# Patient Record
Sex: MALE | Race: WHITE | NOT HISPANIC OR LATINO
[De-identification: names, ages, dates, MRNs, and addresses within clinical notes are randomized per-mention and may not be internally consistent; named-entity substitution may affect disease eponyms.]

---

## 2018-01-10 ENCOUNTER — TRANSCRIPTION ENCOUNTER (OUTPATIENT)
Age: 73
End: 2018-01-10

## 2018-01-11 ENCOUNTER — OUTPATIENT (OUTPATIENT)
Dept: OUTPATIENT SERVICES | Facility: HOSPITAL | Age: 73
LOS: 1 days | End: 2018-01-11
Payer: MEDICARE

## 2018-01-11 VITALS
HEART RATE: 65 BPM | RESPIRATION RATE: 12 BRPM | OXYGEN SATURATION: 100 % | SYSTOLIC BLOOD PRESSURE: 125 MMHG | DIASTOLIC BLOOD PRESSURE: 60 MMHG

## 2018-01-11 VITALS
TEMPERATURE: 98 F | HEART RATE: 61 BPM | SYSTOLIC BLOOD PRESSURE: 122 MMHG | HEIGHT: 67 IN | DIASTOLIC BLOOD PRESSURE: 61 MMHG | OXYGEN SATURATION: 100 % | RESPIRATION RATE: 19 BRPM | WEIGHT: 199.74 LBS

## 2018-01-11 DIAGNOSIS — Z95.810 PRESENCE OF AUTOMATIC (IMPLANTABLE) CARDIAC DEFIBRILLATOR: Chronic | ICD-10-CM

## 2018-01-11 DIAGNOSIS — Z90.89 ACQUIRED ABSENCE OF OTHER ORGANS: Chronic | ICD-10-CM

## 2018-01-11 DIAGNOSIS — H25.11 AGE-RELATED NUCLEAR CATARACT, RIGHT EYE: ICD-10-CM

## 2018-01-11 PROCEDURE — V2632: CPT

## 2018-01-11 PROCEDURE — 66984 XCAPSL CTRC RMVL W/O ECP: CPT | Mod: RT

## 2018-01-11 NOTE — ASU PATIENT PROFILE, ADULT - PMH
Aortic insufficiency    Cardiomyopathy    CKD (chronic kidney disease)    Hypertension    Mitral insufficiency

## 2018-01-11 NOTE — ASU DISCHARGE PLAN (ADULT/PEDIATRIC). - SPECIAL INSTRUCTIONS
No bending, straining or lifting for 1 week. No swimming, hot tubs, saunas or steam rooms for 2 weeks. Keep eye shield in place when not using drops. Start drops as directed when you get home.  Call Dr. Metzger at [phone] with any quesitons or concerns. No bending, straining or lifting for 1 week. No swimming, hot tubs, saunas or steam rooms for 2 weeks. Keep eye shield in place when not using drops. Start drops as directed when you get home.  Call Dr. Metzger at [phone] with any questions or concerns.

## 2018-06-04 PROBLEM — I35.1 NONRHEUMATIC AORTIC (VALVE) INSUFFICIENCY: Chronic | Status: ACTIVE | Noted: 2018-01-11

## 2018-06-04 PROBLEM — I10 ESSENTIAL (PRIMARY) HYPERTENSION: Chronic | Status: ACTIVE | Noted: 2018-01-11

## 2018-06-04 PROBLEM — N18.9 CHRONIC KIDNEY DISEASE, UNSPECIFIED: Chronic | Status: ACTIVE | Noted: 2018-01-11

## 2018-06-04 PROBLEM — I34.0 NONRHEUMATIC MITRAL (VALVE) INSUFFICIENCY: Chronic | Status: ACTIVE | Noted: 2018-01-11

## 2018-09-05 ENCOUNTER — APPOINTMENT (OUTPATIENT)
Dept: HEART AND VASCULAR | Facility: CLINIC | Age: 73
End: 2018-09-05
Payer: MEDICARE

## 2018-09-05 VITALS
HEIGHT: 70 IN | RESPIRATION RATE: 16 BRPM | HEART RATE: 135 BPM | SYSTOLIC BLOOD PRESSURE: 90 MMHG | WEIGHT: 198 LBS | DIASTOLIC BLOOD PRESSURE: 60 MMHG | BODY MASS INDEX: 28.35 KG/M2

## 2018-09-05 PROCEDURE — 93000 ELECTROCARDIOGRAM COMPLETE: CPT

## 2018-09-05 PROCEDURE — 99215 OFFICE O/P EST HI 40 MIN: CPT | Mod: 25

## 2018-09-06 ENCOUNTER — APPOINTMENT (OUTPATIENT)
Dept: HEART AND VASCULAR | Facility: CLINIC | Age: 73
End: 2018-09-06
Payer: MEDICARE

## 2018-09-06 PROCEDURE — 99223 1ST HOSP IP/OBS HIGH 75: CPT

## 2018-09-20 ENCOUNTER — APPOINTMENT (OUTPATIENT)
Dept: HEART AND VASCULAR | Facility: CLINIC | Age: 73
End: 2018-09-20
Payer: MEDICARE

## 2018-09-20 VITALS — WEIGHT: 200 LBS | HEIGHT: 70 IN | BODY MASS INDEX: 28.63 KG/M2

## 2018-09-20 VITALS — SYSTOLIC BLOOD PRESSURE: 125 MMHG | DIASTOLIC BLOOD PRESSURE: 70 MMHG

## 2018-09-20 PROCEDURE — 93000 ELECTROCARDIOGRAM COMPLETE: CPT

## 2018-09-20 PROCEDURE — 99214 OFFICE O/P EST MOD 30 MIN: CPT

## 2018-09-21 LAB
ANION GAP SERPL CALC-SCNC: 11 MMOL/L
BASOPHILS # BLD AUTO: 0.02 K/UL
BASOPHILS NFR BLD AUTO: 0.4 %
BUN SERPL-MCNC: 35 MG/DL
CALCIUM SERPL-MCNC: 9.8 MG/DL
CHLORIDE SERPL-SCNC: 104 MMOL/L
CO2 SERPL-SCNC: 29 MMOL/L
CREAT SERPL-MCNC: 1.3 MG/DL
EOSINOPHIL # BLD AUTO: 0.18 K/UL
EOSINOPHIL NFR BLD AUTO: 3.9 %
GLUCOSE SERPL-MCNC: 189 MG/DL
HCT VFR BLD CALC: 39.7 %
HGB BLD-MCNC: 12.8 G/DL
IMM GRANULOCYTES NFR BLD AUTO: 0 %
LYMPHOCYTES # BLD AUTO: 0.98 K/UL
LYMPHOCYTES NFR BLD AUTO: 21.2 %
MAN DIFF?: NORMAL
MCHC RBC-ENTMCNC: 31.1 PG
MCHC RBC-ENTMCNC: 32.2 GM/DL
MCV RBC AUTO: 96.4 FL
MONOCYTES # BLD AUTO: 0.31 K/UL
MONOCYTES NFR BLD AUTO: 6.7 %
NEUTROPHILS # BLD AUTO: 3.14 K/UL
NEUTROPHILS NFR BLD AUTO: 67.8 %
NT-PROBNP SERPL-MCNC: 5161 PG/ML
PLATELET # BLD AUTO: 105 K/UL
POTASSIUM SERPL-SCNC: 4.5 MMOL/L
RBC # BLD: 4.12 M/UL
RBC # FLD: 15.5 %
SODIUM SERPL-SCNC: 144 MMOL/L
WBC # FLD AUTO: 4.63 K/UL

## 2018-10-10 ENCOUNTER — RX RENEWAL (OUTPATIENT)
Age: 73
End: 2018-10-10

## 2018-10-10 DIAGNOSIS — M10.9 GOUT, UNSPECIFIED: ICD-10-CM

## 2018-10-30 ENCOUNTER — APPOINTMENT (OUTPATIENT)
Dept: HEART AND VASCULAR | Facility: CLINIC | Age: 73
End: 2018-10-30
Payer: MEDICARE

## 2018-10-30 VITALS
RESPIRATION RATE: 15 BRPM | BODY MASS INDEX: 29.35 KG/M2 | HEART RATE: 60 BPM | HEIGHT: 70 IN | WEIGHT: 205 LBS | DIASTOLIC BLOOD PRESSURE: 70 MMHG | SYSTOLIC BLOOD PRESSURE: 120 MMHG

## 2018-10-30 DIAGNOSIS — Z87.09 PERSONAL HISTORY OF OTHER DISEASES OF THE RESPIRATORY SYSTEM: ICD-10-CM

## 2018-10-30 PROCEDURE — G0008: CPT

## 2018-10-30 PROCEDURE — 90686 IIV4 VACC NO PRSV 0.5 ML IM: CPT

## 2018-10-30 PROCEDURE — 93000 ELECTROCARDIOGRAM COMPLETE: CPT

## 2018-10-30 PROCEDURE — 99214 OFFICE O/P EST MOD 30 MIN: CPT

## 2018-11-01 LAB
ANION GAP SERPL CALC-SCNC: 8 MMOL/L
BUN SERPL-MCNC: 38 MG/DL
CALCIUM SERPL-MCNC: 9.8 MG/DL
CHLORIDE SERPL-SCNC: 105 MMOL/L
CO2 SERPL-SCNC: 28 MMOL/L
CREAT SERPL-MCNC: 1.32 MG/DL
GLUCOSE SERPL-MCNC: 123 MG/DL
POTASSIUM SERPL-SCNC: 4.7 MMOL/L
SODIUM SERPL-SCNC: 141 MMOL/L

## 2018-11-27 ENCOUNTER — OTHER (OUTPATIENT)
Age: 73
End: 2018-11-27

## 2018-11-27 ENCOUNTER — APPOINTMENT (OUTPATIENT)
Dept: HEART AND VASCULAR | Facility: CLINIC | Age: 73
End: 2018-11-27
Payer: MEDICARE

## 2018-11-27 VITALS
RESPIRATION RATE: 15 BRPM | HEIGHT: 70 IN | DIASTOLIC BLOOD PRESSURE: 70 MMHG | SYSTOLIC BLOOD PRESSURE: 110 MMHG | WEIGHT: 205 LBS | HEART RATE: 60 BPM | BODY MASS INDEX: 29.35 KG/M2

## 2018-11-27 PROCEDURE — 93000 ELECTROCARDIOGRAM COMPLETE: CPT

## 2018-11-27 PROCEDURE — 99214 OFFICE O/P EST MOD 30 MIN: CPT

## 2018-11-27 NOTE — ASSESSMENT
[FreeTextEntry1] : EKG:  fully paced\par Hemodynamically stable.  No signs of CHF on exam.  BP well controlled\par

## 2018-11-27 NOTE — PHYSICAL EXAM
[General Appearance - Well Developed] : well developed [Normal Appearance] : normal appearance [Well Groomed] : well groomed [General Appearance - Well Nourished] : well nourished [No Deformities] : no deformities [General Appearance - In No Acute Distress] : no acute distress [Normal Conjunctiva] : the conjunctiva exhibited no abnormalities [Eyelids - No Xanthelasma] : the eyelids demonstrated no xanthelasmas [Normal Oral Mucosa] : normal oral mucosa [No Oral Pallor] : no oral pallor [No Oral Cyanosis] : no oral cyanosis [Normal Jugular Venous V Waves Present] : normal jugular venous V waves present [Respiration, Rhythm And Depth] : normal respiratory rhythm and effort [Exaggerated Use Of Accessory Muscles For Inspiration] : no accessory muscle use [Auscultation Breath Sounds / Voice Sounds] : lungs were clear to auscultation bilaterally [Abdomen Soft] : soft [Abdomen Tenderness] : non-tender [Abdomen Mass (___ Cm)] : no abdominal mass palpated [Abnormal Walk] : normal gait [Gait - Sufficient For Exercise Testing] : the gait was sufficient for exercise testing [Nail Clubbing] : no clubbing of the fingernails [Cyanosis, Localized] : no localized cyanosis [Petechial Hemorrhages (___cm)] : no petechial hemorrhages [] : no ischemic changes [FreeTextEntry1] : apical systolic murmur, diastolic murmur at the lower left sternal border

## 2018-11-27 NOTE — DISCUSSION/SUMMARY
[FreeTextEntry1] : Maintain on current medical regimen.  Ambulate as tolerated.  Maintain low sodium, low caloric, low cholesterol diabetic diet.  SMA7 drawn and pending.  Will adjust meds based on results.\par

## 2018-11-27 NOTE — REVIEW OF SYSTEMS
[Negative] : Heme/Lymph [Dyspnea on exertion] : not dyspnea during exertion [Chest Pain] : no chest pain [Palpitations] : no palpitations [Dizziness] : no dizziness [Tremor] : no tremor was seen [Convulsions] : no convulsions [Tingling (Paresthesia)] : no tingling

## 2018-11-28 LAB
ANION GAP SERPL CALC-SCNC: 10 MMOL/L
BUN SERPL-MCNC: 70 MG/DL
CALCIUM SERPL-MCNC: 10 MG/DL
CHLORIDE SERPL-SCNC: 106 MMOL/L
CO2 SERPL-SCNC: 29 MMOL/L
CREAT SERPL-MCNC: 1.78 MG/DL
GLUCOSE SERPL-MCNC: 110 MG/DL
POTASSIUM SERPL-SCNC: 4.4 MMOL/L
SODIUM SERPL-SCNC: 145 MMOL/L

## 2018-11-30 ENCOUNTER — RX RENEWAL (OUTPATIENT)
Age: 73
End: 2018-11-30

## 2018-12-02 ENCOUNTER — RX RENEWAL (OUTPATIENT)
Age: 73
End: 2018-12-02

## 2019-02-06 ENCOUNTER — LABORATORY RESULT (OUTPATIENT)
Age: 74
End: 2019-02-06

## 2019-02-06 ENCOUNTER — APPOINTMENT (OUTPATIENT)
Dept: HEART AND VASCULAR | Facility: CLINIC | Age: 74
End: 2019-02-06
Payer: MEDICARE

## 2019-02-06 VITALS
RESPIRATION RATE: 15 BRPM | SYSTOLIC BLOOD PRESSURE: 110 MMHG | DIASTOLIC BLOOD PRESSURE: 70 MMHG | WEIGHT: 203 LBS | BODY MASS INDEX: 29.06 KG/M2 | HEART RATE: 60 BPM | HEIGHT: 70 IN

## 2019-02-06 DIAGNOSIS — I48.92 UNSPECIFIED ATRIAL FLUTTER: ICD-10-CM

## 2019-02-06 PROCEDURE — 93000 ELECTROCARDIOGRAM COMPLETE: CPT | Mod: 59

## 2019-02-06 PROCEDURE — 93289 INTERROG DEVICE EVAL HEART: CPT

## 2019-02-06 PROCEDURE — 99214 OFFICE O/P EST MOD 30 MIN: CPT | Mod: 25

## 2019-02-06 NOTE — HISTORY OF PRESENT ILLNESS
[FreeTextEntry1] : Patient denies chest pain, palpitation, PND or orthopnea.  Has occasional ALCAZAR.\par

## 2019-02-06 NOTE — PROCEDURE
[DDD] : DDD [Longevity: ___ months] : The estimated remaining battery life is [unfilled] months [de-identified] : [facility]

## 2019-02-06 NOTE — REASON FOR VISIT
[Follow-Up - Clinic] : a clinic follow-up of [Cardiomyopathy] : cardiomyopathy [Hypertension] : hypertension [Follow-up Device Check] : follow-up device check visit

## 2019-02-06 NOTE — PROCEDURE
[DDD] : DDD [Longevity: ___ months] : The estimated remaining battery life is [unfilled] months [de-identified] : [facility]

## 2019-02-06 NOTE — ASSESSMENT
[FreeTextEntry1] : EKG:  fully paced.\par Hemodynamically stable.  No signs of CHF on exam.  BP well controlled\par

## 2019-02-06 NOTE — DISCUSSION/SUMMARY
[FreeTextEntry1] : Maintain on current medical regimen.  Ambulate as tolerated.  Maintain low sodium, low caloric, low cholesterol diabetic diet.\par Regular ICD interrogations. [AICD Function Normal] : normal AICD function

## 2019-02-11 ENCOUNTER — RX CHANGE (OUTPATIENT)
Age: 74
End: 2019-02-11

## 2019-02-11 LAB
ALBUMIN SERPL ELPH-MCNC: 4.1 G/DL
ALP BLD-CCNC: 56 U/L
ALT SERPL-CCNC: 13 U/L
ANION GAP SERPL CALC-SCNC: 13 MMOL/L
AST SERPL-CCNC: 20 U/L
BASOPHILS # BLD AUTO: 0.02 K/UL
BASOPHILS NFR BLD AUTO: 0.4 %
BILIRUB SERPL-MCNC: 0.7 MG/DL
BUN SERPL-MCNC: 69 MG/DL
CALCIUM SERPL-MCNC: 9.7 MG/DL
CHLORIDE SERPL-SCNC: 105 MMOL/L
CHOLEST SERPL-MCNC: 127 MG/DL
CHOLEST/HDLC SERPL: 2.6 RATIO
CO2 SERPL-SCNC: 25 MMOL/L
CREAT SERPL-MCNC: 1.87 MG/DL
EOSINOPHIL # BLD AUTO: 0.11 K/UL
EOSINOPHIL NFR BLD AUTO: 2.4 %
GLUCOSE SERPL-MCNC: 99 MG/DL
HBA1C MFR BLD HPLC: 5.7 %
HCT VFR BLD CALC: 40 %
HDLC SERPL-MCNC: 49 MG/DL
HGB BLD-MCNC: 12.6 G/DL
IMM GRANULOCYTES NFR BLD AUTO: 0 %
LDLC SERPL CALC-MCNC: 66 MG/DL
LYMPHOCYTES # BLD AUTO: 0.9 K/UL
LYMPHOCYTES NFR BLD AUTO: 19.9 %
MAN DIFF?: NORMAL
MCHC RBC-ENTMCNC: 30.5 PG
MCHC RBC-ENTMCNC: 31.5 GM/DL
MCV RBC AUTO: 96.9 FL
MONOCYTES # BLD AUTO: 0.42 K/UL
MONOCYTES NFR BLD AUTO: 9.3 %
NEUTROPHILS # BLD AUTO: 3.07 K/UL
NEUTROPHILS NFR BLD AUTO: 68 %
PLATELET # BLD AUTO: 88 K/UL
POTASSIUM SERPL-SCNC: 5.1 MMOL/L
PROT SERPL-MCNC: 6.2 G/DL
RBC # BLD: 4.13 M/UL
RBC # FLD: 15 %
SODIUM SERPL-SCNC: 143 MMOL/L
T3RU NFR SERPL: 0.88 INDEX
T4 FREE SERPL-MCNC: 1.4 NG/DL
T4 SERPL-MCNC: 7.3 UG/DL
TRIGL SERPL-MCNC: 58 MG/DL
TSH SERPL-ACNC: 2.47 UIU/ML
WBC # FLD AUTO: 4.52 K/UL

## 2019-02-11 RX ORDER — LOSARTAN POTASSIUM 25 MG/1
25 TABLET, FILM COATED ORAL
Qty: 90 | Refills: 0 | Status: DISCONTINUED | COMMUNITY
Start: 2018-10-10 | End: 2019-02-11

## 2019-02-11 RX ORDER — METOLAZONE 2.5 MG/1
2.5 TABLET ORAL
Qty: 12 | Refills: 3 | Status: DISCONTINUED | COMMUNITY
End: 2019-02-11

## 2019-02-11 RX ORDER — METOLAZONE 2.5 MG/1
2.5 TABLET ORAL
Qty: 12 | Refills: 0 | Status: DISCONTINUED | COMMUNITY
Start: 2018-09-25 | End: 2019-02-11

## 2019-04-19 ENCOUNTER — RX RENEWAL (OUTPATIENT)
Age: 74
End: 2019-04-19

## 2019-05-07 ENCOUNTER — RESULT CHARGE (OUTPATIENT)
Age: 74
End: 2019-05-07

## 2019-05-07 ENCOUNTER — APPOINTMENT (OUTPATIENT)
Dept: HEART AND VASCULAR | Facility: CLINIC | Age: 74
End: 2019-05-07
Payer: MEDICARE

## 2019-05-07 ENCOUNTER — LABORATORY RESULT (OUTPATIENT)
Age: 74
End: 2019-05-07

## 2019-05-07 VITALS
DIASTOLIC BLOOD PRESSURE: 70 MMHG | HEIGHT: 70 IN | SYSTOLIC BLOOD PRESSURE: 110 MMHG | RESPIRATION RATE: 16 BRPM | BODY MASS INDEX: 29.78 KG/M2 | HEART RATE: 66 BPM | WEIGHT: 208 LBS

## 2019-05-07 PROCEDURE — 93289 INTERROG DEVICE EVAL HEART: CPT

## 2019-05-07 PROCEDURE — 93290 INTERROG DEV EVAL ICPMS IP: CPT

## 2019-05-07 PROCEDURE — 93000 ELECTROCARDIOGRAM COMPLETE: CPT

## 2019-05-07 PROCEDURE — 99214 OFFICE O/P EST MOD 30 MIN: CPT

## 2019-05-07 NOTE — HISTORY OF PRESENT ILLNESS
[FreeTextEntry1] : Patient denies chest pain, palpitation, PND or orthopnea.  Now exercising regularly in gym with a .  Has occasional ALCAZAR when increasing incline on treadmill.  has a change in bowel habits the past few months.  Not as regular as has been.\par

## 2019-05-07 NOTE — DISCUSSION/SUMMARY
[FreeTextEntry1] : Maintain on current medical regimen.  Ambulate as tolerated.  Maintain low sodium, low caloric, low cholesterol diabetic diet.  GI consultation and possible CF advised.  Regular ICD evaluations.\par  [AICD Function Normal] : normal AICD function

## 2019-05-07 NOTE — REASON FOR VISIT
[Follow-Up - Clinic] : a clinic follow-up of [Cardiomyopathy] : cardiomyopathy [Follow-up Device Check] : follow-up device check visit

## 2019-05-07 NOTE — REVIEW OF SYSTEMS
[Negative] : Heme/Lymph [Dyspnea on exertion] : not dyspnea during exertion [Chest Pain] : no chest pain [Tremor] : no tremor was seen [Dizziness] : no dizziness [Palpitations] : no palpitations [Convulsions] : no convulsions [Tingling (Paresthesia)] : no tingling

## 2019-05-07 NOTE — PROCEDURE
[ICD] : Implantable cardioverter-defibrillator [Longevity: ___ months] : The estimated remaining battery life is [unfilled] months [DDD] : DDD [de-identified] : [facility]

## 2019-05-07 NOTE — PROCEDURE
[ICD] : Implantable cardioverter-defibrillator [Longevity: ___ months] : The estimated remaining battery life is [unfilled] months [DDD] : DDD [de-identified] : [facility]

## 2019-05-07 NOTE — PHYSICAL EXAM
[General Appearance - Well Developed] : well developed [Normal Appearance] : normal appearance [Well Groomed] : well groomed [General Appearance - Well Nourished] : well nourished [General Appearance - In No Acute Distress] : no acute distress [No Deformities] : no deformities [Normal Conjunctiva] : the conjunctiva exhibited no abnormalities [Eyelids - No Xanthelasma] : the eyelids demonstrated no xanthelasmas [Normal Oral Mucosa] : normal oral mucosa [No Oral Pallor] : no oral pallor [No Oral Cyanosis] : no oral cyanosis [Normal Jugular Venous V Waves Present] : normal jugular venous V waves present [Respiration, Rhythm And Depth] : normal respiratory rhythm and effort [Exaggerated Use Of Accessory Muscles For Inspiration] : no accessory muscle use [Auscultation Breath Sounds / Voice Sounds] : lungs were clear to auscultation bilaterally [Abdomen Tenderness] : non-tender [Abdomen Soft] : soft [Gait - Sufficient For Exercise Testing] : the gait was sufficient for exercise testing [Abnormal Walk] : normal gait [Abdomen Mass (___ Cm)] : no abdominal mass palpated [Nail Clubbing] : no clubbing of the fingernails [Petechial Hemorrhages (___cm)] : no petechial hemorrhages [Cyanosis, Localized] : no localized cyanosis [] : no ischemic changes [FreeTextEntry1] : soft bilateral carotid bruits

## 2019-05-07 NOTE — PHYSICAL EXAM
[Normal Appearance] : normal appearance [General Appearance - Well Developed] : well developed [General Appearance - Well Nourished] : well nourished [Well Groomed] : well groomed [General Appearance - In No Acute Distress] : no acute distress [No Deformities] : no deformities [Normal Conjunctiva] : the conjunctiva exhibited no abnormalities [Eyelids - No Xanthelasma] : the eyelids demonstrated no xanthelasmas [Normal Oral Mucosa] : normal oral mucosa [No Oral Cyanosis] : no oral cyanosis [Normal Jugular Venous V Waves Present] : normal jugular venous V waves present [No Oral Pallor] : no oral pallor [Respiration, Rhythm And Depth] : normal respiratory rhythm and effort [Exaggerated Use Of Accessory Muscles For Inspiration] : no accessory muscle use [Auscultation Breath Sounds / Voice Sounds] : lungs were clear to auscultation bilaterally [Abdomen Tenderness] : non-tender [Abdomen Soft] : soft [Gait - Sufficient For Exercise Testing] : the gait was sufficient for exercise testing [Abdomen Mass (___ Cm)] : no abdominal mass palpated [Abnormal Walk] : normal gait [Cyanosis, Localized] : no localized cyanosis [Petechial Hemorrhages (___cm)] : no petechial hemorrhages [Nail Clubbing] : no clubbing of the fingernails [] : no ischemic changes [FreeTextEntry1] : soft bilateral carotid bruits

## 2019-05-10 LAB
ALBUMIN SERPL ELPH-MCNC: 4.1 G/DL
ALP BLD-CCNC: 52 U/L
ALT SERPL-CCNC: 14 U/L
ANION GAP SERPL CALC-SCNC: 11 MMOL/L
AST SERPL-CCNC: 27 U/L
BASOPHILS # BLD AUTO: 0.01 K/UL
BASOPHILS NFR BLD AUTO: 0.2 %
BILIRUB SERPL-MCNC: 0.8 MG/DL
BUN SERPL-MCNC: 59 MG/DL
CALCIUM SERPL-MCNC: 9.8 MG/DL
CHLORIDE SERPL-SCNC: 107 MMOL/L
CHOLEST SERPL-MCNC: 133 MG/DL
CHOLEST/HDLC SERPL: 2.8 RATIO
CO2 SERPL-SCNC: 25 MMOL/L
CREAT SERPL-MCNC: 1.64 MG/DL
EOSINOPHIL # BLD AUTO: 0.11 K/UL
EOSINOPHIL NFR BLD AUTO: 2.4 %
ESTIMATED AVERAGE GLUCOSE: 120 MG/DL
GLUCOSE SERPL-MCNC: 115 MG/DL
HBA1C MFR BLD HPLC: 5.8 %
HCT VFR BLD CALC: 39.7 %
HDLC SERPL-MCNC: 48 MG/DL
HGB BLD-MCNC: 12.1 G/DL
IMM GRANULOCYTES NFR BLD AUTO: 0.2 %
LDLC SERPL CALC-MCNC: 71 MG/DL
LYMPHOCYTES # BLD AUTO: 0.95 K/UL
LYMPHOCYTES NFR BLD AUTO: 20.6 %
MAN DIFF?: NORMAL
MCHC RBC-ENTMCNC: 30.5 GM/DL
MCHC RBC-ENTMCNC: 30.8 PG
MCV RBC AUTO: 101 FL
MONOCYTES # BLD AUTO: 0.44 K/UL
MONOCYTES NFR BLD AUTO: 9.5 %
NEUTROPHILS # BLD AUTO: 3.09 K/UL
NEUTROPHILS NFR BLD AUTO: 67.1 %
NT-PROBNP SERPL-MCNC: 2507 PG/ML
PLATELET # BLD AUTO: 74 K/UL
POTASSIUM SERPL-SCNC: 5.1 MMOL/L
PROT SERPL-MCNC: 6.3 G/DL
RBC # BLD: 3.93 M/UL
RBC # FLD: 14.6 %
SODIUM SERPL-SCNC: 143 MMOL/L
T3FREE SERPL-MCNC: 2.24 PG/ML
T3RU NFR SERPL: 0.9 TBI
T4 FREE SERPL-MCNC: 1.3 NG/DL
T4 SERPL-MCNC: 6.2 UG/DL
TRIGL SERPL-MCNC: 69 MG/DL
TSH SERPL-ACNC: 1.65 UIU/ML
URATE SERPL-MCNC: 4.5 MG/DL
WBC # FLD AUTO: 4.61 K/UL

## 2019-05-12 ENCOUNTER — RX RENEWAL (OUTPATIENT)
Age: 74
End: 2019-05-12

## 2019-08-06 ENCOUNTER — APPOINTMENT (OUTPATIENT)
Dept: HEART AND VASCULAR | Facility: CLINIC | Age: 74
End: 2019-08-06
Payer: MEDICARE

## 2019-08-06 ENCOUNTER — LABORATORY RESULT (OUTPATIENT)
Age: 74
End: 2019-08-06

## 2019-08-06 VITALS
SYSTOLIC BLOOD PRESSURE: 120 MMHG | RESPIRATION RATE: 15 BRPM | DIASTOLIC BLOOD PRESSURE: 70 MMHG | HEIGHT: 70 IN | BODY MASS INDEX: 29.63 KG/M2 | WEIGHT: 207 LBS | HEART RATE: 60 BPM

## 2019-08-06 DIAGNOSIS — Z00.00 ENCOUNTER FOR GENERAL ADULT MEDICAL EXAMINATION W/OUT ABNORMAL FINDINGS: ICD-10-CM

## 2019-08-06 DIAGNOSIS — L30.9 DERMATITIS, UNSPECIFIED: ICD-10-CM

## 2019-08-06 DIAGNOSIS — Z86.79 PERSONAL HISTORY OF OTHER DISEASES OF THE CIRCULATORY SYSTEM: ICD-10-CM

## 2019-08-06 PROCEDURE — 93000 ELECTROCARDIOGRAM COMPLETE: CPT

## 2019-08-06 PROCEDURE — 99214 OFFICE O/P EST MOD 30 MIN: CPT

## 2019-08-06 PROCEDURE — 93290 INTERROG DEV EVAL ICPMS IP: CPT

## 2019-08-06 PROCEDURE — 93289 INTERROG DEVICE EVAL HEART: CPT

## 2019-08-06 NOTE — PHYSICAL EXAM

## 2019-08-06 NOTE — PHYSICAL EXAM
[General Appearance - Well Developed] : well developed [Normal Appearance] : normal appearance [Well Groomed] : well groomed [General Appearance - Well Nourished] : well nourished [No Deformities] : no deformities [Normal Conjunctiva] : the conjunctiva exhibited no abnormalities [General Appearance - In No Acute Distress] : no acute distress [Eyelids - No Xanthelasma] : the eyelids demonstrated no xanthelasmas [Normal Oral Mucosa] : normal oral mucosa [No Oral Cyanosis] : no oral cyanosis [No Oral Pallor] : no oral pallor [Normal Jugular Venous V Waves Present] : normal jugular venous V waves present [Exaggerated Use Of Accessory Muscles For Inspiration] : no accessory muscle use [Respiration, Rhythm And Depth] : normal respiratory rhythm and effort [Auscultation Breath Sounds / Voice Sounds] : lungs were clear to auscultation bilaterally [Abdomen Soft] : soft [Abdomen Tenderness] : non-tender [Abdomen Mass (___ Cm)] : no abdominal mass palpated [Abnormal Walk] : normal gait [Gait - Sufficient For Exercise Testing] : the gait was sufficient for exercise testing [Nail Clubbing] : no clubbing of the fingernails [Cyanosis, Localized] : no localized cyanosis [Petechial Hemorrhages (___cm)] : no petechial hemorrhages [] : no ischemic changes [FreeTextEntry1] : generalized rash

## 2019-08-06 NOTE — HISTORY OF PRESENT ILLNESS
[FreeTextEntry1] : Patient denies chest pain, palpitation, PND or orthopnea.  Has occasional ALCAZAR.\par Remains active.  Dieting properly.  Has rash the past 2 weeks.  Pruritic.

## 2019-08-06 NOTE — PROCEDURE
[ICD] : Implantable cardioverter-defibrillator [DDD] : DDD [Longevity: ___ months] : The estimated remaining battery life is [unfilled] months [de-identified] : [facility]

## 2019-08-06 NOTE — PROCEDURE
[ICD] : Implantable cardioverter-defibrillator [DDD] : DDD [Longevity: ___ months] : The estimated remaining battery life is [unfilled] months [de-identified] : [facility]

## 2019-08-06 NOTE — REVIEW OF SYSTEMS
[Negative] : Heme/Lymph [Chest Pain] : no chest pain [Dyspnea on exertion] : not dyspnea during exertion [Palpitations] : no palpitations [Dizziness] : no dizziness [Tremor] : no tremor was seen [Convulsions] : no convulsions [Tingling (Paresthesia)] : no tingling

## 2019-08-06 NOTE — REASON FOR VISIT
[Cardiomyopathy] : cardiomyopathy [Follow-Up - Clinic] : a clinic follow-up of [Hypertension] : hypertension [Follow-up Device Check] : follow-up device check visit

## 2019-08-08 LAB
ALBUMIN SERPL ELPH-MCNC: 4.2 G/DL
ALP BLD-CCNC: 56 U/L
ALT SERPL-CCNC: 8 U/L
ANION GAP SERPL CALC-SCNC: 10 MMOL/L
AST SERPL-CCNC: 17 U/L
BASOPHILS # BLD AUTO: 0.02 K/UL
BASOPHILS NFR BLD AUTO: 0.4 %
BILIRUB SERPL-MCNC: 1.2 MG/DL
BUN SERPL-MCNC: 36 MG/DL
CALCIUM SERPL-MCNC: 10 MG/DL
CHLORIDE SERPL-SCNC: 107 MMOL/L
CHOLEST SERPL-MCNC: 149 MG/DL
CHOLEST/HDLC SERPL: 2.7 RATIO
CO2 SERPL-SCNC: 26 MMOL/L
CREAT SERPL-MCNC: 1.35 MG/DL
EOSINOPHIL # BLD AUTO: 0.25 K/UL
EOSINOPHIL NFR BLD AUTO: 5.4 %
ESTIMATED AVERAGE GLUCOSE: 120 MG/DL
FERRITIN SERPL-MCNC: 201 NG/ML
GLUCOSE SERPL-MCNC: 96 MG/DL
HBA1C MFR BLD HPLC: 5.8 %
HCT VFR BLD CALC: 38.6 %
HDLC SERPL-MCNC: 56 MG/DL
HGB BLD-MCNC: 12.1 G/DL
IMM GRANULOCYTES NFR BLD AUTO: 0.4 %
IRON SERPL-MCNC: 39 UG/DL
LDLC SERPL CALC-MCNC: 85 MG/DL
LYMPHOCYTES # BLD AUTO: 0.71 K/UL
LYMPHOCYTES NFR BLD AUTO: 15.4 %
MAN DIFF?: NORMAL
MCHC RBC-ENTMCNC: 31.3 GM/DL
MCHC RBC-ENTMCNC: 31.5 PG
MCV RBC AUTO: 100.5 FL
MONOCYTES # BLD AUTO: 0.5 K/UL
MONOCYTES NFR BLD AUTO: 10.8 %
NEUTROPHILS # BLD AUTO: 3.11 K/UL
NEUTROPHILS NFR BLD AUTO: 67.6 %
PLATELET # BLD AUTO: 81 K/UL
POTASSIUM SERPL-SCNC: 4.9 MMOL/L
PROT SERPL-MCNC: 6.1 G/DL
RBC # BLD: 3.84 M/UL
RBC # BLD: 3.84 M/UL
RBC # FLD: 15 %
RETICS # AUTO: 1.8 %
RETICS AGGREG/RBC NFR: 68.7 K/UL
SODIUM SERPL-SCNC: 143 MMOL/L
T3FREE SERPL-MCNC: 1.95 PG/ML
T3RU NFR SERPL: 0.9 TBI
T4 FREE SERPL-MCNC: 1.3 NG/DL
T4 SERPL-MCNC: 6.2 UG/DL
TRIGL SERPL-MCNC: 40 MG/DL
TSH SERPL-ACNC: 2.63 UIU/ML
WBC # FLD AUTO: 4.61 K/UL

## 2019-08-12 ENCOUNTER — RX RENEWAL (OUTPATIENT)
Age: 74
End: 2019-08-12

## 2019-09-09 ENCOUNTER — RX RENEWAL (OUTPATIENT)
Age: 74
End: 2019-09-09

## 2019-10-22 ENCOUNTER — RX RENEWAL (OUTPATIENT)
Age: 74
End: 2019-10-22

## 2019-11-05 ENCOUNTER — LABORATORY RESULT (OUTPATIENT)
Age: 74
End: 2019-11-05

## 2019-11-05 ENCOUNTER — APPOINTMENT (OUTPATIENT)
Dept: HEART AND VASCULAR | Facility: CLINIC | Age: 74
End: 2019-11-05
Payer: MEDICARE

## 2019-11-05 ENCOUNTER — NON-APPOINTMENT (OUTPATIENT)
Age: 74
End: 2019-11-05

## 2019-11-05 VITALS
RESPIRATION RATE: 16 BRPM | BODY MASS INDEX: 29.06 KG/M2 | WEIGHT: 203 LBS | SYSTOLIC BLOOD PRESSURE: 120 MMHG | HEIGHT: 70 IN | HEART RATE: 66 BPM | DIASTOLIC BLOOD PRESSURE: 70 MMHG

## 2019-11-05 DIAGNOSIS — M12.9 ARTHROPATHY, UNSPECIFIED: ICD-10-CM

## 2019-11-05 PROCEDURE — 93290 INTERROG DEV EVAL ICPMS IP: CPT

## 2019-11-05 PROCEDURE — 93000 ELECTROCARDIOGRAM COMPLETE: CPT | Mod: 59

## 2019-11-05 PROCEDURE — 99213 OFFICE O/P EST LOW 20 MIN: CPT

## 2019-11-05 PROCEDURE — 93289 INTERROG DEVICE EVAL HEART: CPT

## 2019-11-05 NOTE — DISCUSSION/SUMMARY
[FreeTextEntry1] : Maintain on current medical regimen.  Ambulate as tolerated.  Maintain low sodium, low caloric, low cholesterol,  diabetic diet.\par  [AICD Function Normal] : normal AICD function

## 2019-11-05 NOTE — PROCEDURE
[ICD] : Implantable cardioverter-defibrillator [DDD] : DDD [Longevity: ___ months] : The estimated remaining battery life is [unfilled] months [de-identified] : [facility]

## 2019-11-05 NOTE — HISTORY OF PRESENT ILLNESS
[FreeTextEntry1] : Patient denies chest pain, palpitation, PND or orthopnea.  Has occasional ALCAZAR.\par Pulse has been elevated at times.  Not exercising the past 10 days due to right knee pain & swelling.

## 2019-11-05 NOTE — PHYSICAL EXAM
[General Appearance - Well Developed] : well developed [Normal Appearance] : normal appearance [Well Groomed] : well groomed [General Appearance - Well Nourished] : well nourished [No Deformities] : no deformities [General Appearance - In No Acute Distress] : no acute distress [Normal Conjunctiva] : the conjunctiva exhibited no abnormalities [Eyelids - No Xanthelasma] : the eyelids demonstrated no xanthelasmas [Normal Oral Mucosa] : normal oral mucosa [No Oral Pallor] : no oral pallor [No Oral Cyanosis] : no oral cyanosis [Normal Jugular Venous V Waves Present] : normal jugular venous V waves present [Respiration, Rhythm And Depth] : normal respiratory rhythm and effort [Exaggerated Use Of Accessory Muscles For Inspiration] : no accessory muscle use [Auscultation Breath Sounds / Voice Sounds] : lungs were clear to auscultation bilaterally [Abdomen Soft] : soft [Abdomen Tenderness] : non-tender [Abdomen Mass (___ Cm)] : no abdominal mass palpated [Abnormal Walk] : normal gait [Gait - Sufficient For Exercise Testing] : the gait was sufficient for exercise testing [Nail Clubbing] : no clubbing of the fingernails [Cyanosis, Localized] : no localized cyanosis [Petechial Hemorrhages (___cm)] : no petechial hemorrhages [] : no ischemic changes [FreeTextEntry1] : generalized rash

## 2019-11-05 NOTE — PROCEDURE
[ICD] : Implantable cardioverter-defibrillator [DDD] : DDD [Longevity: ___ months] : The estimated remaining battery life is [unfilled] months [de-identified] : [facility]

## 2019-11-06 ENCOUNTER — MED ADMIN CHARGE (OUTPATIENT)
Age: 74
End: 2019-11-06

## 2019-11-07 LAB
ALBUMIN SERPL ELPH-MCNC: 4.1 G/DL
ALP BLD-CCNC: 52 U/L
ALT SERPL-CCNC: 11 U/L
ANION GAP SERPL CALC-SCNC: 12 MMOL/L
AST SERPL-CCNC: 15 U/L
BASOPHILS # BLD AUTO: 0.01 K/UL
BASOPHILS NFR BLD AUTO: 0.2 %
BILIRUB SERPL-MCNC: 0.9 MG/DL
BUN SERPL-MCNC: 50 MG/DL
CALCIUM SERPL-MCNC: 10 MG/DL
CHLORIDE SERPL-SCNC: 107 MMOL/L
CHOLEST SERPL-MCNC: 137 MG/DL
CHOLEST/HDLC SERPL: 2.7 RATIO
CO2 SERPL-SCNC: 23 MMOL/L
CREAT SERPL-MCNC: 1.72 MG/DL
EOSINOPHIL # BLD AUTO: 0.31 K/UL
EOSINOPHIL NFR BLD AUTO: 6.3 %
ESTIMATED AVERAGE GLUCOSE: 126 MG/DL
GLUCOSE SERPL-MCNC: 122 MG/DL
HBA1C MFR BLD HPLC: 6 %
HCT VFR BLD CALC: 38.6 %
HDLC SERPL-MCNC: 50 MG/DL
HGB BLD-MCNC: 12 G/DL
IMM GRANULOCYTES NFR BLD AUTO: 0.2 %
LDLC SERPL CALC-MCNC: 78 MG/DL
LYMPHOCYTES # BLD AUTO: 1.03 K/UL
LYMPHOCYTES NFR BLD AUTO: 20.9 %
MAN DIFF?: NORMAL
MCHC RBC-ENTMCNC: 31.1 GM/DL
MCHC RBC-ENTMCNC: 31.2 PG
MCV RBC AUTO: 100.3 FL
MONOCYTES # BLD AUTO: 0.37 K/UL
MONOCYTES NFR BLD AUTO: 7.5 %
NEUTROPHILS # BLD AUTO: 3.19 K/UL
NEUTROPHILS NFR BLD AUTO: 64.9 %
PLATELET # BLD AUTO: 92 K/UL
POTASSIUM SERPL-SCNC: 5.1 MMOL/L
PROT SERPL-MCNC: 6.4 G/DL
RBC # BLD: 3.85 M/UL
RBC # FLD: 15.5 %
SODIUM SERPL-SCNC: 142 MMOL/L
T3FREE SERPL-MCNC: 2.27 PG/ML
T3RU NFR SERPL: 0.9 TBI
T4 FREE SERPL-MCNC: 1.4 NG/DL
T4 SERPL-MCNC: 6.9 UG/DL
TRIGL SERPL-MCNC: 47 MG/DL
TSH SERPL-ACNC: 2.38 UIU/ML
WBC # FLD AUTO: 4.92 K/UL

## 2019-11-12 ENCOUNTER — TRANSCRIPTION ENCOUNTER (OUTPATIENT)
Age: 74
End: 2019-11-12

## 2019-11-13 ENCOUNTER — RX RENEWAL (OUTPATIENT)
Age: 74
End: 2019-11-13

## 2019-11-18 ENCOUNTER — TRANSCRIPTION ENCOUNTER (OUTPATIENT)
Age: 74
End: 2019-11-18

## 2019-11-21 ENCOUNTER — INPATIENT (INPATIENT)
Facility: HOSPITAL | Age: 74
LOS: 2 days | Discharge: HOME | End: 2019-11-24
Attending: INTERNAL MEDICINE | Admitting: INTERNAL MEDICINE
Payer: MEDICARE

## 2019-11-21 VITALS
WEIGHT: 220.02 LBS | RESPIRATION RATE: 20 BRPM | HEART RATE: 80 BPM | HEIGHT: 70 IN | TEMPERATURE: 98 F | SYSTOLIC BLOOD PRESSURE: 122 MMHG | OXYGEN SATURATION: 98 % | DIASTOLIC BLOOD PRESSURE: 82 MMHG

## 2019-11-21 DIAGNOSIS — Z95.810 PRESENCE OF AUTOMATIC (IMPLANTABLE) CARDIAC DEFIBRILLATOR: Chronic | ICD-10-CM

## 2019-11-21 DIAGNOSIS — Z90.89 ACQUIRED ABSENCE OF OTHER ORGANS: Chronic | ICD-10-CM

## 2019-11-21 LAB
ALBUMIN SERPL ELPH-MCNC: 3.5 G/DL — SIGNIFICANT CHANGE UP (ref 3.3–5)
ALP SERPL-CCNC: 59 U/L — SIGNIFICANT CHANGE UP (ref 40–120)
ALT FLD-CCNC: 32 U/L — SIGNIFICANT CHANGE UP (ref 12–78)
ANION GAP SERPL CALC-SCNC: 7 MMOL/L — SIGNIFICANT CHANGE UP (ref 5–17)
APTT BLD: 38.1 SEC — HIGH (ref 27.5–36.3)
AST SERPL-CCNC: 40 U/L — HIGH (ref 15–37)
BILIRUB SERPL-MCNC: 1.2 MG/DL — SIGNIFICANT CHANGE UP (ref 0.2–1.2)
BUN SERPL-MCNC: 43 MG/DL — HIGH (ref 7–23)
CALCIUM SERPL-MCNC: 9.8 MG/DL — SIGNIFICANT CHANGE UP (ref 8.5–10.1)
CHLORIDE SERPL-SCNC: 111 MMOL/L — HIGH (ref 96–108)
CK MB CFR SERPL CALC: 2.3 NG/ML — SIGNIFICANT CHANGE UP (ref 0.5–3.6)
CO2 SERPL-SCNC: 26 MMOL/L — SIGNIFICANT CHANGE UP (ref 22–31)
CREAT SERPL-MCNC: 2.06 MG/DL — HIGH (ref 0.5–1.3)
D DIMER BLD IA.RAPID-MCNC: 303 NG/ML DDU — HIGH
GLUCOSE SERPL-MCNC: 177 MG/DL — HIGH (ref 70–99)
HCT VFR BLD CALC: 40.7 % — SIGNIFICANT CHANGE UP (ref 39–50)
HGB BLD-MCNC: 12.9 G/DL — LOW (ref 13–17)
INR BLD: 1.37 RATIO — HIGH (ref 0.88–1.16)
MCHC RBC-ENTMCNC: 30.7 PG — SIGNIFICANT CHANGE UP (ref 27–34)
MCHC RBC-ENTMCNC: 31.7 GM/DL — LOW (ref 32–36)
MCV RBC AUTO: 96.9 FL — SIGNIFICANT CHANGE UP (ref 80–100)
NRBC # BLD: 0 /100 WBCS — SIGNIFICANT CHANGE UP (ref 0–0)
NT-PROBNP SERPL-SCNC: HIGH PG/ML (ref 0–125)
PLATELET # BLD AUTO: 141 K/UL — LOW (ref 150–400)
POTASSIUM SERPL-MCNC: 5.3 MMOL/L — SIGNIFICANT CHANGE UP (ref 3.5–5.3)
POTASSIUM SERPL-SCNC: 5.3 MMOL/L — SIGNIFICANT CHANGE UP (ref 3.5–5.3)
PROT SERPL-MCNC: 6.7 GM/DL — SIGNIFICANT CHANGE UP (ref 6–8.3)
PROTHROM AB SERPL-ACNC: 15.5 SEC — HIGH (ref 10–12.9)
RBC # BLD: 4.2 M/UL — SIGNIFICANT CHANGE UP (ref 4.2–5.8)
RBC # FLD: 16.2 % — HIGH (ref 10.3–14.5)
SODIUM SERPL-SCNC: 144 MMOL/L — SIGNIFICANT CHANGE UP (ref 135–145)
TROPONIN I SERPL-MCNC: 0.03 NG/ML — SIGNIFICANT CHANGE UP (ref 0.01–0.04)
WBC # BLD: 5.82 K/UL — SIGNIFICANT CHANGE UP (ref 3.8–10.5)
WBC # FLD AUTO: 5.82 K/UL — SIGNIFICANT CHANGE UP (ref 3.8–10.5)

## 2019-11-21 PROCEDURE — 93010 ELECTROCARDIOGRAM REPORT: CPT

## 2019-11-21 PROCEDURE — 99222 1ST HOSP IP/OBS MODERATE 55: CPT

## 2019-11-21 PROCEDURE — 99285 EMERGENCY DEPT VISIT HI MDM: CPT

## 2019-11-21 PROCEDURE — 71045 X-RAY EXAM CHEST 1 VIEW: CPT | Mod: 26

## 2019-11-21 RX ORDER — FUROSEMIDE 40 MG
80 TABLET ORAL ONCE
Refills: 0 | Status: COMPLETED | OUTPATIENT
Start: 2019-11-21 | End: 2019-11-21

## 2019-11-21 RX ORDER — PIPERACILLIN AND TAZOBACTAM 4; .5 G/20ML; G/20ML
3.38 INJECTION, POWDER, LYOPHILIZED, FOR SOLUTION INTRAVENOUS ONCE
Refills: 0 | Status: COMPLETED | OUTPATIENT
Start: 2019-11-21 | End: 2019-11-21

## 2019-11-21 RX ORDER — VANCOMYCIN HCL 1 G
1000 VIAL (EA) INTRAVENOUS ONCE
Refills: 0 | Status: COMPLETED | OUTPATIENT
Start: 2019-11-21 | End: 2019-11-22

## 2019-11-21 RX ADMIN — PIPERACILLIN AND TAZOBACTAM 200 GRAM(S): 4; .5 INJECTION, POWDER, LYOPHILIZED, FOR SOLUTION INTRAVENOUS at 23:27

## 2019-11-21 RX ADMIN — Medication 80 MILLIGRAM(S): at 20:39

## 2019-11-21 NOTE — ED PROVIDER NOTE - PHYSICAL EXAMINATION
Vitals: WNL  Gen: AAOx3, NAD, sitting comfortably in stretcher, calm, cooperative, non-toxic   Head: ncat, perrla, eomi b/l  Neck: supple, no lymphadenopathy, no midline deviation  Heart: rrr, no m/r/g  Lungs: CTA b/l, no rales/ronchi/wheezes  Abd: soft, nontender, non-distended, no rebound or guarding  Ext: no clubbing/cyanosis, 2+ b/l LE edema, no unilateral swelling or tenderness of either calf   Neuro: sensation and muscle strength intact b/l, steady gait

## 2019-11-21 NOTE — ED PROVIDER NOTE - PROGRESS NOTE DETAILS
pt. reports mild improvement in sob  results reported to patient thus far--elevated bnp, cloudy cxr with likely pna on L possible RLL also  will keep for antbx, and possible r/o PE pending creatinine--endorsed to Dr. welch

## 2019-11-21 NOTE — ED PROVIDER NOTE - OBJECTIVE STATEMENT
75 yo M with dyspnea on exertion and LE edema for 1 week.  Pt. was diagnosed with PNA as outpt at [facility] on Nov 12th, s/p Azithro x 5 days, now on doxy x 3 days with no improvement of symptoms.  + generalized weakness and mild cough.  Pt. thought his PNA brought on his CHF.  He hasn't had LE edema for 7 years (s/p AICD).    ROS: negative for fever, headache, chest pain, abd pain, nausea, vomiting, diarrhea, rash, paresthesia, and weakness--all other systems reviewed are negative.   PMH: negative; Meds: lasix 40, potassium, allopurinol, coreg, Eliquis, Entresto, doxycycline; SH: Denies smoking/drinking/drug use   Cardio: Dr. Cecil Loya, [phone]

## 2019-11-21 NOTE — ED PROVIDER NOTE - CARE PLAN
Principal Discharge DX:	Dyspnea on exertion Principal Discharge DX:	Dyspnea on exertion  Secondary Diagnosis:	Pneumonia of both lower lobes due to infectious organism  Secondary Diagnosis:	Acute on chronic congestive heart failure, unspecified heart failure type

## 2019-11-21 NOTE — ED PROVIDER NOTE - CLINICAL SUMMARY MEDICAL DECISION MAKING FREE TEXT BOX
73 yo M with dyspnea on exertion, LE swelling, possible worsening sob, doubt PE and DVT (Pt. on eliquis), doubt ACS, possible worsening pna/pulmonary edema   -basic labs, coags, trop, ckmb, dimer, cxr, ekg, iv, furosemide 80 IV, monitor  -f/u results, reeval

## 2019-11-22 DIAGNOSIS — I50.23 ACUTE ON CHRONIC SYSTOLIC (CONGESTIVE) HEART FAILURE: ICD-10-CM

## 2019-11-22 DIAGNOSIS — N18.9 CHRONIC KIDNEY DISEASE, UNSPECIFIED: ICD-10-CM

## 2019-11-22 DIAGNOSIS — J15.9 UNSPECIFIED BACTERIAL PNEUMONIA: ICD-10-CM

## 2019-11-22 DIAGNOSIS — I48.20 CHRONIC ATRIAL FIBRILLATION, UNSPECIFIED: ICD-10-CM

## 2019-11-22 LAB
ANION GAP SERPL CALC-SCNC: 5 MMOL/L — SIGNIFICANT CHANGE UP (ref 5–17)
BUN SERPL-MCNC: 43 MG/DL — HIGH (ref 7–23)
CALCIUM SERPL-MCNC: 9 MG/DL — SIGNIFICANT CHANGE UP (ref 8.5–10.1)
CHLORIDE SERPL-SCNC: 110 MMOL/L — HIGH (ref 96–108)
CO2 SERPL-SCNC: 29 MMOL/L — SIGNIFICANT CHANGE UP (ref 22–31)
CREAT SERPL-MCNC: 1.78 MG/DL — HIGH (ref 0.5–1.3)
GLUCOSE SERPL-MCNC: 151 MG/DL — HIGH (ref 70–99)
POTASSIUM SERPL-MCNC: 3.2 MMOL/L — LOW (ref 3.5–5.3)
POTASSIUM SERPL-SCNC: 3.2 MMOL/L — LOW (ref 3.5–5.3)
SODIUM SERPL-SCNC: 144 MMOL/L — SIGNIFICANT CHANGE UP (ref 135–145)

## 2019-11-22 PROCEDURE — 99222 1ST HOSP IP/OBS MODERATE 55: CPT

## 2019-11-22 PROCEDURE — 99233 SBSQ HOSP IP/OBS HIGH 50: CPT

## 2019-11-22 PROCEDURE — 99223 1ST HOSP IP/OBS HIGH 75: CPT

## 2019-11-22 RX ORDER — FUROSEMIDE 40 MG
40 TABLET ORAL
Refills: 0 | Status: DISCONTINUED | OUTPATIENT
Start: 2019-11-22 | End: 2019-11-23

## 2019-11-22 RX ORDER — POTASSIUM CHLORIDE 20 MEQ
10 PACKET (EA) ORAL ONCE
Refills: 0 | Status: COMPLETED | OUTPATIENT
Start: 2019-11-22 | End: 2019-11-22

## 2019-11-22 RX ORDER — HEPARIN SODIUM 5000 [USP'U]/ML
5000 INJECTION INTRAVENOUS; SUBCUTANEOUS EVERY 12 HOURS
Refills: 0 | Status: DISCONTINUED | OUTPATIENT
Start: 2019-11-22 | End: 2019-11-22

## 2019-11-22 RX ORDER — ALLOPURINOL 300 MG
100 TABLET ORAL DAILY
Refills: 0 | Status: DISCONTINUED | OUTPATIENT
Start: 2019-11-22 | End: 2019-11-24

## 2019-11-22 RX ORDER — SACUBITRIL AND VALSARTAN 24; 26 MG/1; MG/1
1 TABLET, FILM COATED ORAL
Refills: 0 | Status: DISCONTINUED | OUTPATIENT
Start: 2019-11-22 | End: 2019-11-24

## 2019-11-22 RX ORDER — CARVEDILOL PHOSPHATE 80 MG/1
12.5 CAPSULE, EXTENDED RELEASE ORAL EVERY 12 HOURS
Refills: 0 | Status: DISCONTINUED | OUTPATIENT
Start: 2019-11-22 | End: 2019-11-24

## 2019-11-22 RX ORDER — APIXABAN 2.5 MG/1
5 TABLET, FILM COATED ORAL EVERY 12 HOURS
Refills: 0 | Status: DISCONTINUED | OUTPATIENT
Start: 2019-11-22 | End: 2019-11-24

## 2019-11-22 RX ADMIN — SACUBITRIL AND VALSARTAN 1 TABLET(S): 24; 26 TABLET, FILM COATED ORAL at 17:25

## 2019-11-22 RX ADMIN — Medication 250 MILLIGRAM(S): at 00:25

## 2019-11-22 RX ADMIN — SACUBITRIL AND VALSARTAN 1 TABLET(S): 24; 26 TABLET, FILM COATED ORAL at 06:39

## 2019-11-22 RX ADMIN — Medication 40 MILLIGRAM(S): at 17:25

## 2019-11-22 RX ADMIN — APIXABAN 5 MILLIGRAM(S): 2.5 TABLET, FILM COATED ORAL at 17:25

## 2019-11-22 RX ADMIN — CARVEDILOL PHOSPHATE 12.5 MILLIGRAM(S): 80 CAPSULE, EXTENDED RELEASE ORAL at 17:25

## 2019-11-22 RX ADMIN — APIXABAN 5 MILLIGRAM(S): 2.5 TABLET, FILM COATED ORAL at 06:39

## 2019-11-22 RX ADMIN — CARVEDILOL PHOSPHATE 12.5 MILLIGRAM(S): 80 CAPSULE, EXTENDED RELEASE ORAL at 06:39

## 2019-11-22 RX ADMIN — Medication 40 MILLIGRAM(S): at 06:39

## 2019-11-22 RX ADMIN — Medication 100 MILLIEQUIVALENT(S): at 11:39

## 2019-11-22 RX ADMIN — Medication 100 MILLIGRAM(S): at 11:39

## 2019-11-22 NOTE — PATIENT PROFILE ADULT - NSPROMEDSBROUGHTTOHOSP_GEN_A_NUR
Rx for Lorazepam 1 mg faxed to [facility] in [facility] ([facility]) (confirmation received ) . no

## 2019-11-22 NOTE — PROGRESS NOTE ADULT - ASSESSMENT
73 yo M with PMH of CHF (EF 10%) s/p AICD, Gout, CKD presents to the ER for worsening shortness of breath. Pt was treated for pneumonia with azithro and then doxy as an outpt. He states that he had to prop his bed while sleeping as he was more short of breath for the past few days.    Acute on chronic CHF  - on iv lasix, could be changed to PO tomorrow  - continue with Entresto 75 yo M with PMH of CHF (EF 10%) s/p AICD, Gout, CKD, Afib on Eliquis presents to the ER for worsening shortness of breath. Pt was treated for pneumonia with azithro and then doxy as an outpt. He states that he had to prop his bed while sleeping as he was more short of breath for the past few days.    Acute on chronic CHF  - on iv lasix, could be changed to PO tomorrow  - continue with Entresto, carvedilol  - EF ~ 10%  - cardiology consult noted.    EDIE on CKD 75 yo M with PMH of CHF (EF 10%) s/p AICD, Gout, CKD, Afib on Eliquis presents to the ER for worsening shortness of breath. Pt was treated for pneumonia with azithro and then doxy as an outpt. Hold off on abx for now as he does not have any fever, WBC, cough. He states that he had to prop his bed while sleeping as he was more short of breath for the past few days.    Acute on chronic CHF  - CXR shows b/l infiltrates, BNP- 15,641  - on iv lasix, could be changed to PO tomorrow  - continue with Entresto, carvedilol  - EF ~ 10%  - cardiology consult noted.    EDIE on CKD  - Creat trending down 1.76  - probably sec to Ac on chronic CHF  - continue with lasix    Hypokalemia  - repleted  - f/u bmp.

## 2019-11-22 NOTE — PROGRESS NOTE ADULT - SUBJECTIVE AND OBJECTIVE BOX
MAIN [id number]  Patient is a 74y old  Male who presents with a chief complaint of dyspnea (2019 15:40)        Subjective: Pt seen and examined. Pt feels better.      Daily Height in cm: 177.8 (2019 03:00)    Daily Weight in k.7 (2019 05:40)  Vital Signs Last 24 Hrs  T(C): 36.4 (2019 16:34), Max: 36.9 (2019 03:00)  T(F): 97.6 (2019 16:34), Max: 98.5 (2019 03:00)  HR: 78 (2019 16:34) (77 - 82)  BP: 133/84 (2019 16:34) (112/63 - 140/88)  BP(mean): --  RR: 17 (2019 16:34) (15 - 21)  SpO2: 96% (2019 16:34) (96% - 98%)                        12.9   5.82  )-----------( 141      ( 2019 20:37 )             40.7   11-22    144  |  110<H>  |  43<H>  ----------------------------<  151<H>  3.2<L>   |  29  |  1.78<H>    Ca    9.0      2019 09:36    TPro  6.7  /  Alb  3.5  /  TBili  1.2  /  DBili  x   /  AST  40<H>  /  ALT  32  /  AlkPhos  59  11-21  PT/INR - ( 2019 20:37 )   PT: 15.5 sec;   INR: 1.37 ratio         PTT - ( 2019 20:37 )  PTT:38.1 secCAPILLARY BLOOD GLUCOSE      CARDIAC MARKERS ( 2019 20:37 )  .028 ng/mL / x     / x     / x     / 2.3 ng/mL        MEDICATIONS  (STANDING):  allopurinol 100 milliGRAM(s) Oral daily  apixaban 5 milliGRAM(s) Oral every 12 hours  carvedilol 12.5 milliGRAM(s) Oral every 12 hours  furosemide   Injectable 40 milliGRAM(s) IV Push two times a day  sacubitril 24 mG/valsartan 26 mG 1 Tablet(s) Oral two times a day    MEDICATIONS  (PRN):

## 2019-11-22 NOTE — CONSULT NOTE ADULT - SUBJECTIVE AND OBJECTIVE BOX
CARDIOLOGY CONSULT NOTE    11-22-19 @ 09:59  MAIN BRASWELL is a 74y Male with a known history of chronic systolic HF s/p ICD, gout, CKD, AFib? on Eliquis.  He presented to the ED for worsening dyspnea on exertion for the last 10 days.  Patient reports he developed a dry cough and he went to an urgent care center and was diagnosed with CAP.  Was given oral azithromycin.  Patient states that his breathing did not improve so he returned and was started on PO doxycyclcine which he has been on for the last 3-4 days.  Patient reports that now his dyspnea has progressed to where he cannot walk into a different room in his house without dyspnea.  Patient has no other complaints. (22 Nov 2019 00:58)      REVIEW OF SYSTEMS:    CONSTITUTIONAL: No fever, weight loss, or fatigue  EYES: No eye pain, visual disturbances, or discharge  ENMT:  No difficulty hearing, tinnitus, vertigo; No sinus or throat pain  NECK: No pain or stiffness  BREASTS: No pain, masses, or nipple discharge  RESPIRATORY: No cough, wheezing, chills or hemoptysis; No shortness of breath  CARDIOVASCULAR: as above  GASTROINTESTINAL: No abdominal or epigastric pain. No nausea, vomiting, or hematemesis; No diarrhea or constipation. No melena or hematochezia.  GENITOURINARY: No dysuria, frequency, hematuria, or incontinence  NEUROLOGICAL: No headaches, memory loss, loss of strength, numbness, or tremors  SKIN: No itching, burning, rashes, or lesions   LYMPH NODES: No enlarged glands  ENDOCRINE: No heat or cold intolerance; No hair loss  MUSCULOSKELETAL: No joint pain or swelling; No muscle, back, or extremity pain  PSYCHIATRIC: No depression, anxiety, mood swings, or difficulty sleeping  HEME/LYMPH: No easy bruising, or bleeding gums  ALLERGY AND IMMUNOLOGIC: No hives or eczema    MEDICATIONS  (STANDING):  allopurinol 100 milliGRAM(s) Oral daily  apixaban 5 milliGRAM(s) Oral every 12 hours  carvedilol 12.5 milliGRAM(s) Oral every 12 hours  furosemide   Injectable 40 milliGRAM(s) IV Push two times a day  sacubitril 24 mG/valsartan 26 mG 1 Tablet(s) Oral two times a day    MEDICATIONS  (PRN):    allopurinal:   Coreg 12.5 mg oral tablet: 1 tab(s) orally 2 times a day  doxycycline:   Eliquis:   intrestor:   Lasix 40 mg oral tablet: 1 tab(s) orally once a day  potassium chloride 20 mEq oral granule, extended release:     ALLERGIES: No Known Allergies      FAMILY HISTORY: FAMILY HISTORY:      PHYSICAL EXAMINATION:  -----------------------------  T(C): 36.1 (11-22-19 @ 05:40), Max: 36.9 (11-22-19 @ 03:00)  HR: 77 (11-22-19 @ 05:40) (77 - 82)  BP: 133/76 (11-22-19 @ 05:40) (122/82 - 140/88)  RR: 18 (11-22-19 @ 05:40) (15 - 21)  SpO2: 97% (11-22-19 @ 05:40) (96% - 98%)  Wt(kg): --    11-21 @ 07:01  -  11-22 @ 07:00  --------------------------------------------------------  IN:  Total IN: 0 mL    OUT:    Voided: 250 mL  Total OUT: 250 mL    Total NET: -250 mL        Height (cm): 177.8 (11-22 @ 03:00)  Weight (kg): 93.7 (11-22 @ 03:00)  BMI (kg/m2): 29.6 (11-22 @ 03:00)  BSA (m2): 2.12 (11-22 @ 03:00)    Constitutional: well developed, normal appearance, well groomed, well nourished, no deformities and no acute distress.   Eyes: the conjunctiva exhibited no abnormalities and the eyelids demonstrated no xanthelasmas.   HEENT: normal oral mucosa, no oral pallor and no oral cyanosis.   Neck: normal jugular venous A waves present, normal jugular venous V waves present and no jugular venous montemayor A waves.   Pulmonary: no respiratory distress, normal respiratory rhythm and effort, no accessory muscle use and lungs were clear to auscultation bilaterally.   Cardiovascular: heart rate and rhythm were normal, normal S1 and S2 and no murmur, gallop, rub, heave or thrill are present.   Abdomen: soft, non-tender, no hepato-splenomegaly and no abdominal mass palpated.   Musculoskeletal: the gait could not be assessed..   Extremities: no clubbing of the fingernails, no localized cyanosis, no petechial hemorrhages and no ischemic changes.   Skin: normal skin color and pigmentation, no rash, no venous stasis, no skin lesions, no skin ulcer and no xanthoma was observed.   Psychiatric: oriented to person, place, and time, the affect was normal, the mood was normal and not feeling anxious.     LABS:   --------  11-21    144  |  111<H>  |  43<H>  ----------------------------<  177<H>  5.3   |  26  |  2.06<H>    Ca    9.8      21 Nov 2019 20:37    TPro  6.7  /  Alb  3.5  /  TBili  1.2  /  DBili  x   /  AST  40<H>  /  ALT  32  /  AlkPhos  59  11-21                         12.9   5.82  )-----------( 141      ( 21 Nov 2019 20:37 )             40.7     PT/INR - ( 21 Nov 2019 20:37 )   PT: 15.5 sec;   INR: 1.37 ratio         PTT - ( 21 Nov 2019 20:37 )  PTT:38.1 sec  11-21 @ 20:37 BNP: 49520 pg/mL    11-21 @ 20:37 CPK total:--, CKMB --, Troponin I - .028 ng/mL        CARDIAC MARKERS ( 21 Nov 2019 20:37 )  .028 ng/mL / x     / x     / x     / 2.3 ng/mL        RADIOLOGY:  -----------------    < from: Xray Chest 1 View- PORTABLE-Urgent (11.21.19 @ 21:46) >    EXAM:  XR CHEST PORTABLE URGENT 1V                            PROCEDURE DATE:  11/21/2019          INTERPRETATION:  INDICATION: Shortness of breath    PRIORS: 11/12/2019    VIEWS: Portable AP radiography of the chest performed. Evaluation limited   secondary to shallow inspiration.    FINDINGS: Heart size appears enlarged. Note is made of an indwelling   pacemaker. Increased markings are identified within the bilateral lower   lung fields, without significant interval change from prior examination,   suggesting bilateral infiltrates. Small pleural effusions cannot be   excluded. There is no evidence for pneumothorax. No mediastinal shift is   noted. No osseous fractures are demonstrated.    IMPRESSION: As above.                SHANELL BURCIAGA  This document has been electronically signed. Nov 22 2019  8:46AM                < end of copied text >      ECG: CARDIOLOGY CONSULT NOTE    11-22-19 @ 09:59  MAIN BRASWELL is a 74y Male with a known history of chronic systolic HF (NICMP, EF=10% at last hospitalization in 2018) s/p ICD, gout, CKD, h/o AFib (s/p DCCV in 2018 at [facility]) on Eliquis.  He presented to the ED for worsening dyspnea on exertion for the last 10 days.  Patient reports he developed a dry cough and he went to an urgent care center and was diagnosed with CAP.  Was given oral azithromycin.  Patient states that his breathing did not improve so he returned and was started on PO doxycycline which he has been on for the last 3-4 days.  Patient reports that now his dyspnea has progressed to where he cannot walk into a different room in his house without dyspnea.  Noted increased LE edema a several days with icreased weight (12 lbs). No fever/chills.      REVIEW OF SYSTEMS:    CONSTITUTIONAL: No fever, weight loss, or fatigue  EYES: No eye pain, visual disturbances, or discharge  ENMT:  No difficulty hearing, tinnitus, vertigo; No sinus or throat pain  NECK: No pain or stiffness  BREASTS: No pain, masses, or nipple discharge  RESPIRATORY: No cough, wheezing, chills or hemoptysis; No shortness of breath  CARDIOVASCULAR: as above  GASTROINTESTINAL: No abdominal or epigastric pain. No nausea, vomiting, or hematemesis; No diarrhea or constipation. No melena or hematochezia.  GENITOURINARY: No dysuria, frequency, hematuria, or incontinence  NEUROLOGICAL: No headaches, memory loss, loss of strength, numbness, or tremors  SKIN: No itching, burning, rashes, or lesions   LYMPH NODES: No enlarged glands  ENDOCRINE: No heat or cold intolerance; No hair loss  MUSCULOSKELETAL: No joint pain or swelling; No muscle, back, or extremity pain  PSYCHIATRIC: No depression, anxiety, mood swings, or difficulty sleeping  HEME/LYMPH: No easy bruising, or bleeding gums  ALLERGY AND IMMUNOLOGIC: No hives or eczema    MEDICATIONS  (STANDING):  allopurinol 100 milliGRAM(s) Oral daily  apixaban 5 milliGRAM(s) Oral every 12 hours  carvedilol 12.5 milliGRAM(s) Oral every 12 hours  furosemide   Injectable 40 milliGRAM(s) IV Push two times a day  sacubitril 24 mG/valsartan 26 mG 1 Tablet(s) Oral two times a day    MEDICATIONS  (PRN):    allopurinal:   Coreg 12.5 mg oral tablet: 1 tab(s) orally 2 times a day  doxycycline:   Eliquis:   intrestor:   Lasix 40 mg oral tablet: 1 tab(s) orally once a day  potassium chloride 20 mEq oral granule, extended release:     ALLERGIES: No Known Allergies      FAMILY HISTORY: FAMILY HISTORY:      PHYSICAL EXAMINATION:  -----------------------------  T(C): 36.1 (11-22-19 @ 05:40), Max: 36.9 (11-22-19 @ 03:00)  HR: 77 (11-22-19 @ 05:40) (77 - 82)  BP: 133/76 (11-22-19 @ 05:40) (122/82 - 140/88)  RR: 18 (11-22-19 @ 05:40) (15 - 21)  SpO2: 97% (11-22-19 @ 05:40) (96% - 98%)  Wt(kg): --    11-21 @ 07:01  -  11-22 @ 07:00  --------------------------------------------------------  IN:  Total IN: 0 mL    OUT:    Voided: 250 mL  Total OUT: 250 mL    Total NET: -250 mL        Height (cm): 177.8 (11-22 @ 03:00)  Weight (kg): 93.7 (11-22 @ 03:00)  BMI (kg/m2): 29.6 (11-22 @ 03:00)  BSA (m2): 2.12 (11-22 @ 03:00)    Constitutional: well developed, normal appearance, well groomed, well nourished, no deformities and no acute distress.   Eyes: the conjunctiva exhibited no abnormalities and the eyelids demonstrated no xanthelasmas.   HEENT: normal oral mucosa, no oral pallor and no oral cyanosis.   Neck: normal jugular venous A waves present, normal jugular venous V waves present and no jugular venous montemayor A waves.   Pulmonary: no respiratory distress, normal respiratory rhythm and effort, no accessory muscle use and lungs were clear to auscultation bilaterally.   Cardiovascular: heart rate and rhythm were normal, normal S1 and S2 and no murmur, gallop, rub, heave or thrill are present. 1+ Le edema.  Abdomen: soft, non-tender, no hepato-splenomegaly and no abdominal mass palpated.   Musculoskeletal: the gait could not be assessed..   Extremities: no clubbing of the fingernails, no localized cyanosis, no petechial hemorrhages and no ischemic changes.   Skin: normal skin color and pigmentation, no rash, no venous stasis, no skin lesions, no skin ulcer and no xanthoma was observed.   Psychiatric: oriented to person, place, and time, the affect was normal, the mood was normal and not feeling anxious.     LABS:   --------  11-21    144  |  111<H>  |  43<H>  ----------------------------<  177<H>  5.3   |  26  |  2.06<H>    Ca    9.8      21 Nov 2019 20:37    TPro  6.7  /  Alb  3.5  /  TBili  1.2  /  DBili  x   /  AST  40<H>  /  ALT  32  /  AlkPhos  59  11-21                         12.9   5.82  )-----------( 141      ( 21 Nov 2019 20:37 )             40.7     PT/INR - ( 21 Nov 2019 20:37 )   PT: 15.5 sec;   INR: 1.37 ratio         PTT - ( 21 Nov 2019 20:37 )  PTT:38.1 sec  11-21 @ 20:37 BNP: 32098 pg/mL    11-21 @ 20:37 CPK total:--, CKMB --, Troponin I - .028 ng/mL        CARDIAC MARKERS ( 21 Nov 2019 20:37 )  .028 ng/mL / x     / x     / x     / 2.3 ng/mL        RADIOLOGY:  -----------------    < from: Xray Chest 1 View- PORTABLE-Urgent (11.21.19 @ 21:46) >    EXAM:  XR CHEST PORTABLE URGENT 1V                            PROCEDURE DATE:  11/21/2019          INTERPRETATION:  INDICATION: Shortness of breath    PRIORS: 11/12/2019    VIEWS: Portable AP radiography of the chest performed. Evaluation limited   secondary to shallow inspiration.    FINDINGS: Heart size appears enlarged. Note is made of an indwelling   pacemaker. Increased markings are identified within the bilateral lower   lung fields, without significant interval change from prior examination,   suggesting bilateral infiltrates. Small pleural effusions cannot be   excluded. There is no evidence for pneumothorax. No mediastinal shift is   noted. No osseous fractures are demonstrated.    IMPRESSION: As above.                SHANELL BURCIAGA  This document has been electronically signed. Nov 22 2019  8:46AM                < end of copied text >      ECG:

## 2019-11-22 NOTE — H&P ADULT - HISTORY OF PRESENT ILLNESS
Patient is a 75 YO M with a PE of CHF s/p ICD, gout, CKD, AFib? on Eliquis who presents to ED for worsening dyspnea on exertion for the last 10 days.  Patient reports he developed a dry cough and he went to an urgent care center and was diagnosed with CAP.  Was given oral azithromycin.  Patient states that his breathing did not improve so he returned and was started on PO doxycyclcine which he has been on for the last 3-4 days.  Patient reports that now his dyspnea has progressed to where he cannot walk into a diffrent room in his house without dyspnea.  Patient has no other complaints.

## 2019-11-22 NOTE — H&P ADULT - NSICDXPASTSURGICALHX_GEN_ALL_CORE_FT
PAST SURGICAL HISTORY:  AICD (automatic cardioverter/defibrillator) present 8/2012    History of tonsillectomy childhood

## 2019-11-22 NOTE — H&P ADULT - NSHPPHYSICALEXAM_GEN_ALL_CORE
Physical exam:  General: patient in no acute distress, resting comfortably  Cardio: S1/S2 +ve, regular rate and rhythm, no M/G/R  Resp: bibasalar rales and poor air entry  GI: abdomen soft, nontender, non distended, no guarding, BS +ve x 4  Ext: 2+ pedal edema  Neuro: CN 2-12 intact, no significant motor or sensory deficits.

## 2019-11-22 NOTE — H&P ADULT - ASSESSMENT
Elderly male with CHF s/p ICD presents with dyspnea.  Has been terated for outpatient PNA and reports continued symptoms.  Labs show elevated Cr, signifcant elevation of BNP.  Will admit for PNA and CHF    IMPROVE VTE Individual Risk Assessment          RISK                                                          Points  [  ] Previous VTE                                                3  [  ] Thrombophilia                                             2  [  ] Lower limb paralysis                                    2        (unable to hold up >15 seconds)    [  ] Current Cancer                                             2         (within 6 months)  [  ] Immobilization > 24 hrs                              1  [  ] ICU/CCU stay > 24 hours                            1  [  ] Age > 60                                                    1    IMPROVE VTE Score - on eliquis

## 2019-11-22 NOTE — CONSULT NOTE ADULT - SUBJECTIVE AND OBJECTIVE BOX
Infectious Diseases - Attending at Dr. Black    HPI:  Patient is a 73 YO M with a PE of CHF s/p ICD, gout, CKD, AFib? on Eliquis who presents to ED for worsening dyspnea on exertion for the last 10 days.  Patient reports he developed a dry cough and he went to an urgent care center and was diagnosed with CAP.  Was given oral azithromycin.  Patient states that his breathing did not improve so he returned and was started on PO doxycyclcine which he has been on for the last 3-4 days.  Patient reports that now his dyspnea has progressed to where he cannot walk into a diffrent room in his house without dyspnea.  Patient has no other complaints. (22 Nov 2019 00:58) At no time purulent sputum or fever etc. Pt says it doesn't feel like pneumonia which he had twice in 2012      PAST MEDICAL & SURGICAL HISTORY:  Aortic insufficiency  Mitral insufficiency  CKD (chronic kidney disease)  Cardiomyopathy  Hypertension  History of tonsillectomy: childhood  AICD (automatic cardioverter/defibrillator) present: 8/2012      Allergies    No Known Allergies    Intolerances        FAMILY HISTORY: no h/o CHF in mother ,father      SOCIAL HISTORY: non smoker    REVIEW OF SYSTEMS:    Constitutional: No fever, weight loss or fatigue  Eyes: No eye pain, visual disturbances, or discharge  ENT:  No difficulty hearing, tinnitus, vertigo; No sinus or throat pain  Neck: No pain or stiffness  Respiratory: + chronic dry cough but now comes in bouts, wheezing, chills or hemoptysis  Cardiovascular: No chest pain, palpitations, +shortness of breath, dizziness + leg swelling  Gastrointestinal: No abdominal or epigastric pain. No nausea, vomiting or hematemesis; No diarrhea or constipation. No melena or hematochezia.  Genitourinary: No dysuria, frequency, hematuria or incontinence  Neurological: No headaches, memory loss, loss of strength, numbness or tremors  Skin: No itching, burning, rashes or lesions       MEDICATIONS  (STANDING):  allopurinol 100 milliGRAM(s) Oral daily  apixaban 5 milliGRAM(s) Oral every 12 hours  carvedilol 12.5 milliGRAM(s) Oral every 12 hours  furosemide   Injectable 40 milliGRAM(s) IV Push two times a day  sacubitril 24 mG/valsartan 26 mG 1 Tablet(s) Oral two times a day    MEDICATIONS  (PRN):      Vital Signs Last 24 Hrs  T(C): 36.1 (22 Nov 2019 11:22), Max: 36.9 (22 Nov 2019 03:00)  T(F): 96.9 (22 Nov 2019 11:22), Max: 98.5 (22 Nov 2019 03:00)  HR: 78 (22 Nov 2019 11:22) (77 - 82)  BP: 112/63 (22 Nov 2019 11:22) (112/63 - 140/88)  BP(mean): --  RR: 18 (22 Nov 2019 11:22) (15 - 21)  SpO2: 98% (22 Nov 2019 11:22) (96% - 98%)    PHYSICAL EXAM:    Constitutional: NAD, well-groomed, well-developed  HEENT: PERRLA, EOMI, Normal Hearing, MMM  Neck: No LAD, No JVD  Back: Normal spine flexure, No CVA tenderness  Respiratory: CTAB/L  Cardiovascular: S1 and S2, RRR, no M/G/R  Gastrointestinal: BS+, soft, NT/ND  Extremities: + mild  peripheral edema  Vascular: 2+ peripheral pulses  Neurological: A/O x 3, no focal deficits  Skin: No rashes      LABS:                        12.9   5.82  )-----------( 141      ( 21 Nov 2019 20:37 )             40.7     11-22    144  |  110<H>  |  43<H>  ----------------------------<  151<H>  3.2<L>   |  29  |  1.78<H>    Ca    9.0      22 Nov 2019 09:36    TPro  6.7  /  Alb  3.5  /  TBili  1.2  /  DBili  x   /  AST  40<H>  /  ALT  32  /  AlkPhos  59  11-21    PT/INR - ( 21 Nov 2019 20:37 )   PT: 15.5 sec;   INR: 1.37 ratio         PTT - ( 21 Nov 2019 20:37 )  PTT:38.1 sec      MICROBIOLOGY:  RECENT CULTURES:        RADIOLOGY & ADDITIONAL STUDIES:    FINDINGS: Heart size appears enlarged. Note is made of an indwelling   pacemaker. Increased markings are identified within the bilateral lower   lung fields, without significant interval change from prior examination,   suggesting bilateral infiltrates. Small pleural effusions cannot be   excluded. There is no evidence for pneumothorax. No mediastinal shift is   noted. No osseous fractures are demonstrated.    IMPRESSION: As above.

## 2019-11-22 NOTE — CONSULT NOTE ADULT - ASSESSMENT
Patient is a 75 YO M with a PE of CHF s/p ICD, gout, CKD, AFib? on Eliquis who presents to ED for worsening dyspnea on exertion for the last 10 days.Pt has been treated for pneumonia as outpt twice ,first with azithro and doxy without much improvement  seen with   1.worsening shortness of breath and bouts of cough : lungs CTA ,no phlegm   no fever or leukocytosis   pt may have a tracheobronchitis   CXR has not worsened since 11/12   s/p i dose of vanco and zosyn   wont cont antibiotics right now   check PCT   monitor for fever and leukocytosis   2.CHF with defibrillator  CXR shows congestion though CTA B/L   3AKI or EDIE on CKD
75 yo male with acute on chronic sys/diastolic HF, known NICMP for many years, EF ~ 10%.  Possibly superimposed bilateral infiltrates that may have trigerred this decompensation.  Known CKD, last Creat 1.7, 2 weeks ago.  Plan:    Agree with IV diuretics, close monitoring of renal function.  Continue Entresto, BBL, may benefit from Aldactone addition.  Repleting potassium.  Treat for possible pneumonic process as per medicine.  Contacted Dr Loya and let him known his patient in ospital.

## 2019-11-22 NOTE — H&P ADULT - PROBLEM SELECTOR PLAN 1
Started on Zosyn and Vanco in the ED.  Will continue  Follow blood cultures - de-escalate antibiotics as needed   ID consult  - emerson

## 2019-11-22 NOTE — H&P ADULT - NSHPLABSRESULTS_GEN_ALL_CORE
Recent Vitals  T(C): 36.3 (11-21-19 @ 23:38), Max: 36.6 (11-21-19 @ 15:49)  HR: 80 (11-21-19 @ 23:38) (77 - 80)  BP: 135/79 (11-21-19 @ 23:38) (122/82 - 135/79)  RR: 19 (11-21-19 @ 23:38) (19 - 21)  SpO2: 98% (11-21-19 @ 23:38) (97% - 98%)                        12.9   5.82  )-----------( 141      ( 21 Nov 2019 20:37 )             40.7     11-21    144  |  111<H>  |  43<H>  ----------------------------<  177<H>  5.3   |  26  |  2.06<H>    Ca    9.8      21 Nov 2019 20:37    TPro  6.7  /  Alb  3.5  /  TBili  1.2  /  DBili  x   /  AST  40<H>  /  ALT  32  /  AlkPhos  59  11-21    PT/INR - ( 21 Nov 2019 20:37 )   PT: 15.5 sec;   INR: 1.37 ratio         PTT - ( 21 Nov 2019 20:37 )  PTT:38.1 sec  LIVER FUNCTIONS - ( 21 Nov 2019 20:37 )  Alb: 3.5 g/dL / Pro: 6.7 gm/dL / ALK PHOS: 59 U/L / ALT: 32 U/L / AST: 40 U/L / GGT: x               Home Medications:  allopurinal:  (21 Nov 2019 17:25)  Coreg 12.5 mg oral tablet: 1 tab(s) orally 2 times a day (21 Nov 2019 17:25)  doxycycline:  (21 Nov 2019 17:25)  Eliquis:  (21 Nov 2019 17:25)  intrestor:  (21 Nov 2019 17:25)  Lasix 40 mg oral tablet: 1 tab(s) orally once a day (21 Nov 2019 17:25)  potassium chloride 20 mEq oral granule, extended release:  (21 Nov 2019 17:25)

## 2019-11-22 NOTE — H&P ADULT - NSICDXPASTMEDICALHX_GEN_ALL_CORE_FT
PAST MEDICAL HISTORY:  Aortic insufficiency     Cardiomyopathy     CKD (chronic kidney disease)     Hypertension     Mitral insufficiency

## 2019-11-23 LAB
ANION GAP SERPL CALC-SCNC: 6 MMOL/L — SIGNIFICANT CHANGE UP (ref 5–17)
BUN SERPL-MCNC: 42 MG/DL — HIGH (ref 7–23)
CALCIUM SERPL-MCNC: 9 MG/DL — SIGNIFICANT CHANGE UP (ref 8.5–10.1)
CHLORIDE SERPL-SCNC: 110 MMOL/L — HIGH (ref 96–108)
CO2 SERPL-SCNC: 29 MMOL/L — SIGNIFICANT CHANGE UP (ref 22–31)
CREAT SERPL-MCNC: 1.61 MG/DL — HIGH (ref 0.5–1.3)
GLUCOSE SERPL-MCNC: 139 MG/DL — HIGH (ref 70–99)
HCV AB S/CO SERPL IA: 0.15 S/CO — SIGNIFICANT CHANGE UP (ref 0–0.99)
HCV AB SERPL-IMP: SIGNIFICANT CHANGE UP
POTASSIUM SERPL-MCNC: 2.9 MMOL/L — CRITICAL LOW (ref 3.5–5.3)
POTASSIUM SERPL-MCNC: 3.7 MMOL/L — SIGNIFICANT CHANGE UP (ref 3.5–5.3)
POTASSIUM SERPL-SCNC: 2.9 MMOL/L — CRITICAL LOW (ref 3.5–5.3)
POTASSIUM SERPL-SCNC: 3.7 MMOL/L — SIGNIFICANT CHANGE UP (ref 3.5–5.3)
PROCALCITONIN SERPL-MCNC: 0.15 NG/ML — HIGH (ref 0.02–0.1)
SODIUM SERPL-SCNC: 145 MMOL/L — SIGNIFICANT CHANGE UP (ref 135–145)

## 2019-11-23 PROCEDURE — 99232 SBSQ HOSP IP/OBS MODERATE 35: CPT

## 2019-11-23 PROCEDURE — 99233 SBSQ HOSP IP/OBS HIGH 50: CPT

## 2019-11-23 RX ORDER — POTASSIUM CHLORIDE 20 MEQ
20 PACKET (EA) ORAL ONCE
Refills: 0 | Status: COMPLETED | OUTPATIENT
Start: 2019-11-23 | End: 2019-11-23

## 2019-11-23 RX ORDER — POTASSIUM CHLORIDE 20 MEQ
10 PACKET (EA) ORAL
Refills: 0 | Status: COMPLETED | OUTPATIENT
Start: 2019-11-23 | End: 2019-11-23

## 2019-11-23 RX ORDER — POTASSIUM CHLORIDE 20 MEQ
40 PACKET (EA) ORAL EVERY 4 HOURS
Refills: 0 | Status: COMPLETED | OUTPATIENT
Start: 2019-11-23 | End: 2019-11-23

## 2019-11-23 RX ORDER — FUROSEMIDE 40 MG
40 TABLET ORAL DAILY
Refills: 0 | Status: DISCONTINUED | OUTPATIENT
Start: 2019-11-24 | End: 2019-11-23

## 2019-11-23 RX ORDER — POTASSIUM CHLORIDE 20 MEQ
40 PACKET (EA) ORAL DAILY
Refills: 0 | Status: DISCONTINUED | OUTPATIENT
Start: 2019-11-23 | End: 2019-11-24

## 2019-11-23 RX ADMIN — SACUBITRIL AND VALSARTAN 1 TABLET(S): 24; 26 TABLET, FILM COATED ORAL at 18:37

## 2019-11-23 RX ADMIN — Medication 40 MILLIEQUIVALENT(S): at 18:37

## 2019-11-23 RX ADMIN — APIXABAN 5 MILLIGRAM(S): 2.5 TABLET, FILM COATED ORAL at 06:25

## 2019-11-23 RX ADMIN — Medication 40 MILLIEQUIVALENT(S): at 11:29

## 2019-11-23 RX ADMIN — APIXABAN 5 MILLIGRAM(S): 2.5 TABLET, FILM COATED ORAL at 18:39

## 2019-11-23 RX ADMIN — CARVEDILOL PHOSPHATE 12.5 MILLIGRAM(S): 80 CAPSULE, EXTENDED RELEASE ORAL at 06:25

## 2019-11-23 RX ADMIN — Medication 40 MILLIGRAM(S): at 06:25

## 2019-11-23 RX ADMIN — Medication 100 MILLIGRAM(S): at 11:29

## 2019-11-23 RX ADMIN — Medication 20 MILLIEQUIVALENT(S): at 07:58

## 2019-11-23 RX ADMIN — Medication 100 MILLIEQUIVALENT(S): at 07:58

## 2019-11-23 RX ADMIN — CARVEDILOL PHOSPHATE 12.5 MILLIGRAM(S): 80 CAPSULE, EXTENDED RELEASE ORAL at 18:37

## 2019-11-23 RX ADMIN — Medication 100 MILLIEQUIVALENT(S): at 09:07

## 2019-11-23 RX ADMIN — Medication 40 MILLIEQUIVALENT(S): at 16:05

## 2019-11-23 RX ADMIN — SACUBITRIL AND VALSARTAN 1 TABLET(S): 24; 26 TABLET, FILM COATED ORAL at 06:25

## 2019-11-23 NOTE — PROGRESS NOTE ADULT - ASSESSMENT
73 yo M with PMH of CHF (EF 10%) s/p AICD, Gout, CKD, Afib on Eliquis presents to the ER for worsening shortness of breath. Pt was treated for pneumonia with azithro and then doxy as an outpt. Hold off on abx for now as he does not have any fever, WBC, cough. He states that he had to prop his bed while sleeping as he was more short of breath for the past few days.    Acute on chronic CHF  - CXR shows b/l infiltrates, BNP- 15,641  - on iv lasix, could be changed to PO tomorrow  - continue with Entresto, carvedilol  - EF ~ 10%  - cardiology consult noted.    EDIE on CKD  - Creat trending down 1.76  - probably sec to Ac on chronic CHF  - continue with lasix    Hypokalemia  - repleted  - f/u bmp. 75 yo M with PMH of CHF (EF 10%) s/p AICD, Gout, CKD, Afib on Eliquis presents to the ER for worsening shortness of breath. Pt was treated for pneumonia with azithro and then doxy as an outpt. Hold off on abx for now as he does not have any fever, WBC, cough. He states that he had to prop his bed while sleeping as he was more short of breath for the past few days.    Acute on chronic CHF  - CXR shows b/l infiltrates, BNP- 15,641, On lasix, will hold today until K > 4.0. K low today at 2.9.   - continue with Entresto, carvedilol 12.5 mg bid. EF ~ 10%, has AICD in place.   - cardiology consult noted.    EDIE on CKD  - Creat trending down 1.61  - probably sec to Ac on chronic CHF  -     Hypokalemia  - repleted,  f/u bmp AM with Magnesium. 75 yo M with PMH of CHF (EF 10%) s/p AICD, Gout, CKD, Afib on Eliquis presents to the ER for worsening shortness of breath. Pt was treated for pneumonia with azithro and then doxy as an outpt. Hold off on abx for now as he does not have any fever, WBC, cough. He states that he had to prop his bed while sleeping as he was more short of breath for the past few days.    Acute on chronic CHF  - CXR shows b/l infiltrates, BNP- 15,641, On lasix, will hold today until K > 4.0. K low today at 2.9 and supplemented oral and IV.    - continue with Entresto, carvedilol 12.5 mg bid. EF ~ 10%, has AICD in place.   - cardiology consult noted. TTE to be done today.     EDIE on CKD  - Creat trending down 1.61  - probably sec to Ac on chronic CHF  -     Hypokalemia  - repleted,  f/u bmp AM with Magnesium.

## 2019-11-23 NOTE — PROGRESS NOTE ADULT - SUBJECTIVE AND OBJECTIVE BOX
CARDIOLOGY PROGRESS NOTE    MAIN BRASWELL is a 74y Male with a known history of chronic systolic HF (NICMP, EF=10% at last hospitalization in 2018) s/p ICD, gout, CKD, h/o AFib (s/p DCCV in 2018 at [facility]) on Eliquis.  He presented to the ED for worsening dyspnea on exertion for the last 10 days.  Patient reports he developed a dry cough and he went to an urgent care center and was diagnosed with CAP.  Was given oral azithromycin.  Patient states that his breathing did not improve so he returned and was started on PO doxycycline which he has been on for the last 3-4 days.  Patient reports that now his dyspnea has progressed to where he cannot walk into a different room in his house without dyspnea.  Noted increased LE edema a several days with increased weight (12 lbs). No fever/chills.    O/N no events, dyspnea and edema have resolved. Remains quite hypokalemic.    MEDICATIONS  (STANDING):  allopurinol 100 milliGRAM(s) Oral daily  apixaban 5 milliGRAM(s) Oral every 12 hours  carvedilol 12.5 milliGRAM(s) Oral every 12 hours  potassium chloride    Tablet ER 40 milliEquivalent(s) Oral daily  potassium chloride    Tablet ER 40 milliEquivalent(s) Oral every 4 hours  sacubitril 24 mG/valsartan 26 mG 1 Tablet(s) Oral two times a day    REVIEW OF SYSTEMS:    CONSTITUTIONAL: No fever, weight loss, or fatigue  EYES: No eye pain, visual disturbances, or discharge  ENMT:  No difficulty hearing, tinnitus, vertigo; No sinus or throat pain  NECK: No pain or stiffness  BREASTS: No pain, masses, or nipple discharge  RESPIRATORY: No cough, wheezing, chills or hemoptysis; No shortness of breath  CARDIOVASCULAR: as above  GASTROINTESTINAL: No abdominal or epigastric pain. No nausea, vomiting, or hematemesis; No diarrhea or constipation. No melena or hematochezia.  GENITOURINARY: No dysuria, frequency, hematuria, or incontinence  NEUROLOGICAL: No headaches, memory loss, loss of strength, numbness, or tremors  SKIN: No itching, burning, rashes, or lesions   LYMPH NODES: No enlarged glands  ENDOCRINE: No heat or cold intolerance; No hair loss  MUSCULOSKELETAL: No joint pain or swelling; No muscle, back, or extremity pain  PSYCHIATRIC: No depression, anxiety, mood swings, or difficulty sleeping  HEME/LYMPH: No easy bruising, or bleeding gums  ALLERGY AND IMMUNOLOGIC: No hives or eczema    allopurinal:   Coreg 12.5 mg oral tablet: 1 tab(s) orally 2 times a day  doxycycline:   Eliquis:   intrestor:   Lasix 40 mg oral tablet: 1 tab(s) orally once a day  potassium chloride 20 mEq oral granule, extended release:     ALLERGIES: No Known Allergies      FAMILY HISTORY: FAMILY HISTORY:      PHYSICAL EXAMINATION:  -----------------------------  ICU Vital Signs Last 24 Hrs  T(C): 36.3 (2019 11:16), Max: 36.4 (2019 16:34)  T(F): 97.3 (2019 11:16), Max: 97.6 (2019 16:34)  HR: 78 (2019 11:16) (65 - 78)  BP: 125/78 (2019 11:16) (117/72 - 133/84)  RR: 17 (2019 11:16) (17 - 18)  SpO2: 94% (2019 11:16) (94% - 97%)    Daily     Daily Weight in k.2 (2019 05:13)     Weight (kg): 93.7 ( @ 03:00)      Constitutional: well developed, normal appearance, well groomed, well nourished, no deformities and no acute distress.   Eyes: the conjunctiva exhibited no abnormalities and the eyelids demonstrated no xanthelasmas.   HEENT: normal oral mucosa, no oral pallor and no oral cyanosis.   Neck: normal jugular venous A waves present, normal jugular venous V waves present and no jugular venous montemayor A waves.   Pulmonary: no respiratory distress, normal respiratory rhythm and effort, no accessory muscle use and lungs were clear to auscultation bilaterally.   Cardiovascular: heart rate and rhythm were normal, normal S1 and S2 and no murmur, gallop, rub, heave or thrill are present. 1+ Le edema.  Abdomen: soft, non-tender, no hepato-splenomegaly and no abdominal mass palpated.   Musculoskeletal: the gait could not be assessed..   Extremities: no clubbing of the fingernails, no localized cyanosis, no petechial hemorrhages and no ischemic changes.   Skin: normal skin color and pigmentation, no rash, no venous stasis, no skin lesions, no skin ulcer and no xanthoma was observed.   Psychiatric: oriented to person, place, and time, the affect was normal, the mood was normal and not feeling anxious.     LABS:   --------      x   |  x   |  x   ----------------------------<  x   3.7   |  x   |  x     Ca    9.0      2019 06:44    TPro  6.7  /  Alb  3.5  /  TBili  1.2  /  DBili  x   /  AST  40<H>  /  ALT  32  /  AlkPhos  59          RADIOLOGY:  -----------------    < from: Xray Chest 1 View- PORTABLE-Urgent (19 @ 21:46) >    EXAM:  XR CHEST PORTABLE URGENT 1V                            PROCEDURE DATE:  2019          INTERPRETATION:  INDICATION: Shortness of breath    PRIORS: 2019    VIEWS: Portable AP radiography of the chest performed. Evaluation limited   secondary to shallow inspiration.    FINDINGS: Heart size appears enlarged. Note is made of an indwelling   pacemaker. Increased markings are identified within the bilateral lower   lung fields, without significant interval change from prior examination,   suggesting bilateral infiltrates. Small pleural effusions cannot be   excluded. There is no evidence for pneumothorax. No mediastinal shift is   noted. No osseous fractures are demonstrated.    IMPRESSION: As above.                SHANELL BURCIAGA  This document has been electronically signed. 2019  8:46AM                < end of copied text >

## 2019-11-23 NOTE — PROGRESS NOTE ADULT - ASSESSMENT
73 yo male with acute on chronic sys/diastolic HF, known NICMP for many years, EF ~ 10%.  Possibly superimposed bilateral infiltrates that may have trigerred this decompensation.  Known CKD, last Creat 1.7, 2 weeks ago.      Plan:    1.	Decrease intensity of diuretics, close monitoring of renal function.  2.	Continue Entresto, BBL, may benefit from Aldactone addition.  3.	Repleting potassium aggressively.  4.	Treat for possible pneumonic process as per medicine.  5.	TTE pending.  6.	Contacted Dr Loya and let him known his patient in the hospital.  7.	Increase activity as tolerated.

## 2019-11-23 NOTE — PROGRESS NOTE ADULT - SUBJECTIVE AND OBJECTIVE BOX
INTERVAL HPI/OVERNIGHT EVENTS:  Pt seen and examined at bedside.     Allergies/Intolerance: No Known Allergies      MEDICATIONS  (STANDING):  allopurinol 100 milliGRAM(s) Oral daily  apixaban 5 milliGRAM(s) Oral every 12 hours  carvedilol 12.5 milliGRAM(s) Oral every 12 hours  furosemide    Tablet 40 milliGRAM(s) Oral daily  potassium chloride    Tablet ER 40 milliEquivalent(s) Oral daily  sacubitril 24 mG/valsartan 26 mG 1 Tablet(s) Oral two times a day    MEDICATIONS  (PRN):        ROS: all systems reviewed and wnl      PHYSICAL EXAMINATION:  Vital Signs Last 24 Hrs  T(C): 35.9 (23 Nov 2019 05:13), Max: 36.4 (22 Nov 2019 16:34)  T(F): 96.7 (23 Nov 2019 05:13), Max: 97.6 (22 Nov 2019 16:34)  HR: 65 (23 Nov 2019 05:13) (65 - 78)  BP: 120/80 (23 Nov 2019 05:13) (112/63 - 133/84)  BP(mean): --  RR: 18 (23 Nov 2019 05:13) (17 - 18)  SpO2: 97% (23 Nov 2019 05:13) (95% - 98%)  CAPILLARY BLOOD GLUCOSE          11-22 @ 07:01  -  11-23 @ 07:00  --------------------------------------------------------  IN: 200 mL / OUT: 300 mL / NET: -100 mL    11-23 @ 07:01  -  11-23 @ 11:04  --------------------------------------------------------  IN: 200 mL / OUT: 600 mL / NET: -400 mL        GENERAL:   NECK: supple, No JVD  CHEST/LUNG: clear to auscultation bilaterally; no rales, rhonchi, or wheezing b/l  HEART: normal S1, S2  ABDOMEN: BS+, soft, ND, NT   EXTREMITIES:  pulses palpable; no clubbing, cyanosis, or edema b/l LEs  SKIN: no rashes or lesions      LABS:                        12.9   5.82  )-----------( 141      ( 21 Nov 2019 20:37 )             40.7     11-23    145  |  110<H>  |  42<H>  ----------------------------<  139<H>  2.9<LL>   |  29  |  1.61<H>    Ca    9.0      23 Nov 2019 06:44    TPro  6.7  /  Alb  3.5  /  TBili  1.2  /  DBili  x   /  AST  40<H>  /  ALT  32  /  AlkPhos  59  11-21    PT/INR - ( 21 Nov 2019 20:37 )   PT: 15.5 sec;   INR: 1.37 ratio         PTT - ( 21 Nov 2019 20:37 )  PTT:38.1 sec INTERVAL HPI/OVERNIGHT EVENTS:  Pt seen and examined at bedside.     Allergies/Intolerance: No Known Allergies      MEDICATIONS  (STANDING):  allopurinol 100 milliGRAM(s) Oral daily  apixaban 5 milliGRAM(s) Oral every 12 hours  carvedilol 12.5 milliGRAM(s) Oral every 12 hours  furosemide    Tablet 40 milliGRAM(s) Oral daily  potassium chloride    Tablet ER 40 milliEquivalent(s) Oral daily  sacubitril 24 mG/valsartan 26 mG 1 Tablet(s) Oral two times a day    MEDICATIONS  (PRN):        ROS: all systems reviewed and wnl      PHYSICAL EXAMINATION:  Vital Signs Last 24 Hrs  T(C): 35.9 (23 Nov 2019 05:13), Max: 36.4 (22 Nov 2019 16:34)  T(F): 96.7 (23 Nov 2019 05:13), Max: 97.6 (22 Nov 2019 16:34)  HR: 65 (23 Nov 2019 05:13) (65 - 78)  BP: 120/80 (23 Nov 2019 05:13) (112/63 - 133/84)  BP(mean): --  RR: 18 (23 Nov 2019 05:13) (17 - 18)  SpO2: 97% (23 Nov 2019 05:13) (95% - 98%)  CAPILLARY BLOOD GLUCOSE          11-22 @ 07:01  -  11-23 @ 07:00  --------------------------------------------------------  IN: 200 mL / OUT: 300 mL / NET: -100 mL    11-23 @ 07:01  -  11-23 @ 11:04  --------------------------------------------------------  IN: 200 mL / OUT: 600 mL / NET: -400 mL        GENERAL: comfortable in chair, NAD, no SOB or CP at rest.   NECK: supple, No JVD  CHEST/LUNG: clear to auscultation bilaterally; no rales, rhonchi, or wheezing b/l  HEART: normal S1, S2  ABDOMEN: BS+, soft, ND, NT   EXTREMITIES:  pulses palpable; no clubbing, cyanosis, 1 plus edema b/l LEs  SKIN: no rashes or lesions      LABS:                        12.9   5.82  )-----------( 141      ( 21 Nov 2019 20:37 )             40.7     11-23    145  |  110<H>  |  42<H>  ----------------------------<  139<H>  2.9<LL>   |  29  |  1.61<H>    Ca    9.0      23 Nov 2019 06:44    TPro  6.7  /  Alb  3.5  /  TBili  1.2  /  DBili  x   /  AST  40<H>  /  ALT  32  /  AlkPhos  59  11-21    PT/INR - ( 21 Nov 2019 20:37 )   PT: 15.5 sec;   INR: 1.37 ratio         PTT - ( 21 Nov 2019 20:37 )  PTT:38.1 sec

## 2019-11-24 ENCOUNTER — TRANSCRIPTION ENCOUNTER (OUTPATIENT)
Age: 74
End: 2019-11-24

## 2019-11-24 VITALS
OXYGEN SATURATION: 97 % | HEART RATE: 72 BPM | RESPIRATION RATE: 18 BRPM | DIASTOLIC BLOOD PRESSURE: 54 MMHG | SYSTOLIC BLOOD PRESSURE: 106 MMHG | TEMPERATURE: 97 F

## 2019-11-24 LAB
ANION GAP SERPL CALC-SCNC: 6 MMOL/L — SIGNIFICANT CHANGE UP (ref 5–17)
BUN SERPL-MCNC: 45 MG/DL — HIGH (ref 7–23)
CALCIUM SERPL-MCNC: 9.5 MG/DL — SIGNIFICANT CHANGE UP (ref 8.5–10.1)
CHLORIDE SERPL-SCNC: 112 MMOL/L — HIGH (ref 96–108)
CO2 SERPL-SCNC: 27 MMOL/L — SIGNIFICANT CHANGE UP (ref 22–31)
CREAT SERPL-MCNC: 1.72 MG/DL — HIGH (ref 0.5–1.3)
GLUCOSE SERPL-MCNC: 146 MG/DL — HIGH (ref 70–99)
MAGNESIUM SERPL-MCNC: 1.8 MG/DL — SIGNIFICANT CHANGE UP (ref 1.6–2.6)
POTASSIUM SERPL-MCNC: 4.3 MMOL/L — SIGNIFICANT CHANGE UP (ref 3.5–5.3)
POTASSIUM SERPL-SCNC: 4.3 MMOL/L — SIGNIFICANT CHANGE UP (ref 3.5–5.3)
SODIUM SERPL-SCNC: 145 MMOL/L — SIGNIFICANT CHANGE UP (ref 135–145)

## 2019-11-24 PROCEDURE — 99238 HOSP IP/OBS DSCHRG MGMT 30/<: CPT

## 2019-11-24 PROCEDURE — 99239 HOSP IP/OBS DSCHRG MGMT >30: CPT

## 2019-11-24 RX ORDER — SACUBITRIL AND VALSARTAN 24; 26 MG/1; MG/1
1 TABLET, FILM COATED ORAL
Qty: 0 | Refills: 0 | DISCHARGE
Start: 2019-11-24

## 2019-11-24 RX ORDER — SPIRONOLACTONE 25 MG/1
25 TABLET, FILM COATED ORAL DAILY
Refills: 0 | Status: DISCONTINUED | OUTPATIENT
Start: 2019-11-24 | End: 2019-11-24

## 2019-11-24 RX ORDER — SPIRONOLACTONE 25 MG/1
1 TABLET, FILM COATED ORAL
Qty: 30 | Refills: 0
Start: 2019-11-24 | End: 2019-12-23

## 2019-11-24 RX ORDER — POTASSIUM CHLORIDE 20 MEQ
0 PACKET (EA) ORAL
Qty: 0 | Refills: 0 | DISCHARGE

## 2019-11-24 RX ORDER — POTASSIUM CHLORIDE 20 MEQ
2 PACKET (EA) ORAL
Qty: 0 | Refills: 0 | DISCHARGE
Start: 2019-11-24

## 2019-11-24 RX ORDER — ALLOPURINOL 300 MG
1 TABLET ORAL
Qty: 0 | Refills: 0 | DISCHARGE
Start: 2019-11-24

## 2019-11-24 RX ADMIN — APIXABAN 5 MILLIGRAM(S): 2.5 TABLET, FILM COATED ORAL at 05:19

## 2019-11-24 RX ADMIN — CARVEDILOL PHOSPHATE 12.5 MILLIGRAM(S): 80 CAPSULE, EXTENDED RELEASE ORAL at 05:19

## 2019-11-24 RX ADMIN — SACUBITRIL AND VALSARTAN 1 TABLET(S): 24; 26 TABLET, FILM COATED ORAL at 05:19

## 2019-11-24 RX ADMIN — Medication 100 MILLIGRAM(S): at 11:16

## 2019-11-24 RX ADMIN — Medication 40 MILLIEQUIVALENT(S): at 11:15

## 2019-11-24 NOTE — PROGRESS NOTE ADULT - ASSESSMENT
75 yo male with acute on chronic sys/diastolic HF, known NICMP for many years, EF ~ 10%.  Possibly superimposed bilateral infiltrates that may have trigerred this decompensation.  Known CKD, last Creat 1.7, 2 weeks ago.    Plan:    1.	Discharge on Lasix 40, advised patient that if he appears to be retaining fluid, should double up on diuretic dose and should discuss Metolazone supplementation with his cardiologist.  2.	Continue Entresto, BBL, may benefit from Aldactone addition.  3.	Continue potassium 40 meq, until reevaluated.  4.	Cardiomyopathy appears unchanged.  5.	Contacted Dr Loya and let him known his patient in the hospital.  6.	Can d/c from CV perspective.

## 2019-11-24 NOTE — DISCHARGE NOTE PROVIDER - NSDCMRMEDTOKEN_GEN_ALL_CORE_FT
allopurinol 100 mg oral tablet: 1 tab(s) orally once a day  Coreg 12.5 mg oral tablet: 1 tab(s) orally 2 times a day  Eliquis:   Lasix 40 mg oral tablet: 1 tab(s) orally once a day  potassium chloride 20 mEq oral tablet, extended release: 2 tab(s) orally once a day  sacubitril-valsartan 24 mg-26 mg oral tablet: 1 tab(s) orally 2 times a day  spironolactone 25 mg oral tablet: 1 tab(s) orally once a day

## 2019-11-24 NOTE — DISCHARGE NOTE PROVIDER - CARE PROVIDERS DIRECT ADDRESSES
[email or web address]

## 2019-11-24 NOTE — DISCHARGE NOTE PROVIDER - NSDCCPCAREPLAN_GEN_ALL_CORE_FT
PRINCIPAL DISCHARGE DIAGNOSIS  Diagnosis: Dyspnea on exertion  Assessment and Plan of Treatment:       SECONDARY DISCHARGE DIAGNOSES  Diagnosis: Acute on chronic congestive heart failure, unspecified heart failure type  Assessment and Plan of Treatment:     Diagnosis: Pneumonia of both lower lobes due to infectious organism  Assessment and Plan of Treatment:

## 2019-11-24 NOTE — PROGRESS NOTE ADULT - SUBJECTIVE AND OBJECTIVE BOX
CARDIOLOGY PROGRESS NOTE    MAIN BRASWELL is a 74y Male with a known history of chronic systolic HF (NICMP, EF=10% at last hospitalization in 2018) s/p ICD, gout, CKD, h/o AFib (s/p DCCV in 2018 at [facility]) on Eliquis.  He presented to the ED for worsening dyspnea on exertion for the last 10 days.  Patient reports he developed a dry cough and he went to an urgent care center and was diagnosed with CAP.  Was given oral azithromycin.  Patient states that his breathing did not improve so he returned and was started on PO doxycycline which he has been on for the last 3-4 days.  Patient reports that now his dyspnea has progressed to where he cannot walk into a different room in his house without dyspnea.  Noted increased LE edema a several days with increased weight (12 lbs). No fever/chills.    O/N no events, dyspnea and edema have resolved. Hypokalemia resolved, no new complaints.    MEDICATIONS  (STANDING):  allopurinol 100 milliGRAM(s) Oral daily  apixaban 5 milliGRAM(s) Oral every 12 hours  carvedilol 12.5 milliGRAM(s) Oral every 12 hours  potassium chloride    Tablet ER 40 milliEquivalent(s) Oral daily  potassium chloride    Tablet ER 40 milliEquivalent(s) Oral every 4 hours  sacubitril 24 mG/valsartan 26 mG 1 Tablet(s) Oral two times a day    REVIEW OF SYSTEMS:    CONSTITUTIONAL: No fever, weight loss, or fatigue  EYES: No eye pain, visual disturbances, or discharge  ENMT:  No difficulty hearing, tinnitus, vertigo; No sinus or throat pain  NECK: No pain or stiffness  BREASTS: No pain, masses, or nipple discharge  RESPIRATORY: No cough, wheezing, chills or hemoptysis; No shortness of breath  CARDIOVASCULAR: as above  GASTROINTESTINAL: No abdominal or epigastric pain. No nausea, vomiting, or hematemesis; No diarrhea or constipation. No melena or hematochezia.  GENITOURINARY: No dysuria, frequency, hematuria, or incontinence  NEUROLOGICAL: No headaches, memory loss, loss of strength, numbness, or tremors  SKIN: No itching, burning, rashes, or lesions   LYMPH NODES: No enlarged glands  ENDOCRINE: No heat or cold intolerance; No hair loss  MUSCULOSKELETAL: No joint pain or swelling; No muscle, back, or extremity pain  PSYCHIATRIC: No depression, anxiety, mood swings, or difficulty sleeping  HEME/LYMPH: No easy bruising, or bleeding gums  ALLERGY AND IMMUNOLOGIC: No hives or eczema    allopurinal:   Coreg 12.5 mg oral tablet: 1 tab(s) orally 2 times a day  doxycycline:   Eliquis:   intrestor:   Lasix 40 mg oral tablet: 1 tab(s) orally once a day  potassium chloride 20 mEq oral granule, extended release:     ALLERGIES: No Known Allergies      FAMILY HISTORY: FAMILY HISTORY:      PHYSICAL EXAMINATION:  -----------------------------  ICU Vital Signs Last 24 Hrs  T(C): 36.3 (2019 11:16), Max: 36.4 (2019 16:34)  T(F): 97.3 (2019 11:16), Max: 97.6 (2019 16:34)  HR: 78 (2019 11:16) (65 - 78)  BP: 125/78 (2019 11:16) (117/72 - 133/84)  RR: 17 (2019 11:16) (17 - 18)  SpO2: 94% (2019 11:16) (94% - 97%)    Daily     Daily Weight in k.2 (2019 05:13)     Weight (kg): 93.7 (-22 @ 03:00)      Constitutional: well developed, normal appearance, well groomed, well nourished, no deformities and no acute distress.   Eyes: the conjunctiva exhibited no abnormalities and the eyelids demonstrated no xanthelasmas.   HEENT: normal oral mucosa, no oral pallor and no oral cyanosis.   Neck: normal jugular venous A waves present, normal jugular venous V waves present and no jugular venous montemayor A waves.   Pulmonary: no respiratory distress, normal respiratory rhythm and effort, no accessory muscle use and lungs were clear to auscultation bilaterally.   Cardiovascular: heart rate and rhythm were normal, normal S1 and S2 and no murmur, gallop, rub, heave or thrill are present. 1+ Le edema.  Abdomen: soft, non-tender, no hepato-splenomegaly and no abdominal mass palpated.   Musculoskeletal: the gait could not be assessed..   Extremities: no clubbing of the fingernails, no localized cyanosis, no petechial hemorrhages and no ischemic changes.   Skin: normal skin color and pigmentation, no rash, no venous stasis, no skin lesions, no skin ulcer and no xanthoma was observed.   Psychiatric: oriented to person, place, and time, the affect was normal, the mood was normal and not feeling anxious.     LABS:   --------      x   |  x   |  x   ----------------------------<  x   3.7   |  x   |  x     Ca    9.0      2019 06:44    TPro  6.7  /  Alb  3.5  /  TBili  1.2  /  DBili  x   /  AST  40<H>  /  ALT  32  /  AlkPhos  59          RADIOLOGY:  -----------------    < from: Xray Chest 1 View- PORTABLE-Urgent (19 @ 21:46) >    EXAM:  XR CHEST PORTABLE URGENT 1V                            PROCEDURE DATE:  2019          INTERPRETATION:  INDICATION: Shortness of breath    PRIORS: 2019    VIEWS: Portable AP radiography of the chest performed. Evaluation limited   secondary to shallow inspiration.    FINDINGS: Heart size appears enlarged. Note is made of an indwelling   pacemaker. Increased markings are identified within the bilateral lower   lung fields, without significant interval change from prior examination,   suggesting bilateral infiltrates. Small pleural effusions cannot be   excluded. There is no evidence for pneumothorax. No mediastinal shift is   noted. No osseous fractures are demonstrated.    IMPRESSION: As above.      SHANELL BURCIAGA  This document has been electronically signed. 2019  8:46AM      < end of copied text >

## 2019-11-24 NOTE — DISCHARGE NOTE NURSING/CASE MANAGEMENT/SOCIAL WORK - PATIENT PORTAL LINK FT
You can access the FollowMyHealth Patient Portal offered by [facility] by registering at the following website: [email or web address]. By joining State’s FollowMyHealth portal, you will also be able to view your health information using other applications (apps) compatible with our system.

## 2019-11-24 NOTE — PROGRESS NOTE ADULT - ASSESSMENT
73 yo M with PMH of CHF (EF 10%) s/p AICD, Gout, CKD, Afib on Eliquis presents to the ER for worsening shortness of breath. Pt was treated for pneumonia with azithro and then doxy as an outpt. Hold off on abx for now as he does not have any fever, WBC, cough. He states that he had to prop his bed while sleeping as he was more short of breath for the past few days.    Acute on chronic CHF  - CXR shows b/l infiltrates, BNP- 15,641, On lasix, will hold today until K > 4.0. K low today at 2.9 and supplemented oral and IV.    - continue with Entresto, carvedilol 12.5 mg bid. EF ~ 10%, has AICD in place.   - cardiology consult noted. TTE to be done today.     EDIE on CKD  - Creat trending down 1.61  - probably sec to Ac on chronic CHF  -     Hypokalemia  - repleted,  f/u bmp AM with Magnesium. 75 yo M with PMH of CHF (EF 10%) s/p AICD, Gout, CKD, Afib on Eliquis presents to the ER for worsening shortness of breath. Pt was treated for pneumonia with azithro and then doxy as an outpt. Hold off on abx for now as he does not have any fever, WBC, cough. He states that he had to prop his bed while sleeping as he was more short of breath for the past few days.    Acute on chronic CHF  - CXR shows b/l infiltrates, BNP- 15,641, Resume lasix and Aldactone as K is 4.3 after supplements. Continue with Entresto, carvedilol 12.5 mg bid. EF ~ 10%, has AICD in place. cardiology consult noted. TTE done, no changes compared to prior. Continue Eliquis 5 mg bid.      EDIE on CKD  - Creat trending down 1.61  - probably sec to Ac on chronic CHF  -     Hypokalemia  - repleted,  f/u bmp AM with Magnesium.

## 2019-11-24 NOTE — DISCHARGE NOTE PROVIDER - CARE PROVIDER_API CALL
Ever Lopez)  Cardiology; Cardiovascular Disease; Nuclear Cardiology  [address] [place] [address]  Phone: [phone]  Fax: [phone]  Follow Up Time: 1 week

## 2019-11-24 NOTE — PROGRESS NOTE ADULT - SUBJECTIVE AND OBJECTIVE BOX
INTERVAL HPI/OVERNIGHT EVENTS:  Pt seen and examined at bedside.     Allergies/Intolerance: No Known Allergies      MEDICATIONS  (STANDING):  allopurinol 100 milliGRAM(s) Oral daily  apixaban 5 milliGRAM(s) Oral every 12 hours  carvedilol 12.5 milliGRAM(s) Oral every 12 hours  potassium chloride    Tablet ER 40 milliEquivalent(s) Oral daily  sacubitril 24 mG/valsartan 26 mG 1 Tablet(s) Oral two times a day    MEDICATIONS  (PRN):        ROS: all systems reviewed and wnl      PHYSICAL EXAMINATION:  Vital Signs Last 24 Hrs  T(C): 36.2 (24 Nov 2019 04:51), Max: 36.3 (23 Nov 2019 11:16)  T(F): 97.1 (24 Nov 2019 04:51), Max: 97.3 (23 Nov 2019 11:16)  HR: 79 (24 Nov 2019 04:51) (74 - 79)  BP: 125/79 (24 Nov 2019 04:51) (115/82 - 125/79)  BP(mean): --  RR: 20 (24 Nov 2019 04:51) (17 - 20)  SpO2: 97% (24 Nov 2019 04:51) (94% - 98%)  CAPILLARY BLOOD GLUCOSE          11-23 @ 07:01  -  11-24 @ 07:00  --------------------------------------------------------  IN: 914 mL / OUT: 1400 mL / NET: -486 mL        GENERAL:   NECK: supple, No JVD  CHEST/LUNG: clear to auscultation bilaterally; no rales, rhonchi, or wheezing b/l  HEART: normal S1, S2  ABDOMEN: BS+, soft, ND, NT   EXTREMITIES:  pulses palpable; no clubbing, cyanosis, or edema b/l LEs  SKIN: no rashes or lesions      LABS:    11-24    145  |  112<H>  |  45<H>  ----------------------------<  146<H>  4.3   |  27  |  1.72<H>    Ca    9.5      24 Nov 2019 07:01  Mg     1.8     11-24 INTERVAL HPI/OVERNIGHT EVENTS:  Pt seen and examined at bedside.     Allergies/Intolerance: No Known Allergies      MEDICATIONS  (STANDING):  allopurinol 100 milliGRAM(s) Oral daily  apixaban 5 milliGRAM(s) Oral every 12 hours  carvedilol 12.5 milliGRAM(s) Oral every 12 hours  potassium chloride    Tablet ER 40 milliEquivalent(s) Oral daily  sacubitril 24 mG/valsartan 26 mG 1 Tablet(s) Oral two times a day    MEDICATIONS  (PRN):        ROS: all systems reviewed and wnl      PHYSICAL EXAMINATION:  Vital Signs Last 24 Hrs  T(C): 36.2 (24 Nov 2019 04:51), Max: 36.3 (23 Nov 2019 11:16)  T(F): 97.1 (24 Nov 2019 04:51), Max: 97.3 (23 Nov 2019 11:16)  HR: 79 (24 Nov 2019 04:51) (74 - 79)  BP: 125/79 (24 Nov 2019 04:51) (115/82 - 125/79)  BP(mean): --  RR: 20 (24 Nov 2019 04:51) (17 - 20)  SpO2: 97% (24 Nov 2019 04:51) (94% - 98%)  CAPILLARY BLOOD GLUCOSE          11-23 @ 07:01  -  11-24 @ 07:00  --------------------------------------------------------  IN: 914 mL / OUT: 1400 mL / NET: -486 mL        GENERAL: stable in chair, no new complaints, eager for discharge.   NECK: supple, No JVD  CHEST/LUNG: clear to auscultation bilaterally; no rales, rhonchi, or wheezing b/l  HEART: normal S1, S2  ABDOMEN: BS+, soft, ND, NT   EXTREMITIES:  pulses palpable; no clubbing, cyanosis, less edema b/l LEs  SKIN: no rashes or lesions      LABS:    11-24    145  |  112<H>  |  45<H>  ----------------------------<  146<H>  4.3   |  27  |  1.72<H>    Ca    9.5      24 Nov 2019 07:01  Mg     1.8     11-24

## 2019-11-24 NOTE — DISCHARGE NOTE PROVIDER - HOSPITAL COURSE
Patient is a 73 YO M with a PE of CHF s/p ICD, gout, CKD, AFib? on Eliquis who presents to ED for worsening dyspnea on exertion for the last 10 days.  Patient reports he developed a dry cough and he went to an urgent care center and was diagnosed with CAP.  Was given oral azithromycin.  Patient states that his breathing did not improve so he returned and was started on PO doxycyclcine which he has been on for the last 3-4 days.  Patient reports that now his dyspnea has progressed to where he cannot walk into a diffrent room in his house without dyspnea.  Patient has no other complaints.     In hospital pt admitted to telemetry unit. Pt with acutely decompensated CHF, diuresed with lasix.

## 2019-11-25 ENCOUNTER — INBOUND DOCUMENT (OUTPATIENT)
Age: 74
End: 2019-11-25

## 2019-11-27 LAB
CULTURE RESULTS: SIGNIFICANT CHANGE UP
CULTURE RESULTS: SIGNIFICANT CHANGE UP
SPECIMEN SOURCE: SIGNIFICANT CHANGE UP
SPECIMEN SOURCE: SIGNIFICANT CHANGE UP

## 2019-11-28 DIAGNOSIS — I13.0 HYPERTENSIVE HEART AND CHRONIC KIDNEY DISEASE WITH HEART FAILURE AND STAGE 1 THROUGH STAGE 4 CHRONIC KIDNEY DISEASE, OR UNSPECIFIED CHRONIC KIDNEY DISEASE: ICD-10-CM

## 2019-11-28 DIAGNOSIS — N17.9 ACUTE KIDNEY FAILURE, UNSPECIFIED: ICD-10-CM

## 2019-11-28 DIAGNOSIS — J15.9 UNSPECIFIED BACTERIAL PNEUMONIA: ICD-10-CM

## 2019-11-28 DIAGNOSIS — I50.23 ACUTE ON CHRONIC SYSTOLIC (CONGESTIVE) HEART FAILURE: ICD-10-CM

## 2019-11-28 DIAGNOSIS — I42.9 CARDIOMYOPATHY, UNSPECIFIED: ICD-10-CM

## 2019-11-28 DIAGNOSIS — N18.9 CHRONIC KIDNEY DISEASE, UNSPECIFIED: ICD-10-CM

## 2019-11-28 DIAGNOSIS — R06.02 SHORTNESS OF BREATH: ICD-10-CM

## 2019-12-04 ENCOUNTER — NON-APPOINTMENT (OUTPATIENT)
Age: 74
End: 2019-12-04

## 2019-12-04 ENCOUNTER — APPOINTMENT (OUTPATIENT)
Dept: HEART AND VASCULAR | Facility: CLINIC | Age: 74
End: 2019-12-04
Payer: MEDICARE

## 2019-12-04 VITALS
DIASTOLIC BLOOD PRESSURE: 70 MMHG | BODY MASS INDEX: 27.63 KG/M2 | HEIGHT: 70 IN | WEIGHT: 193 LBS | SYSTOLIC BLOOD PRESSURE: 120 MMHG | RESPIRATION RATE: 15 BRPM | HEART RATE: 78 BPM

## 2019-12-04 DIAGNOSIS — I34.0 NONRHEUMATIC MITRAL (VALVE) INSUFFICIENCY: ICD-10-CM

## 2019-12-04 DIAGNOSIS — I35.1 NONRHEUMATIC AORTIC (VALVE) INSUFFICIENCY: ICD-10-CM

## 2019-12-04 PROCEDURE — 99214 OFFICE O/P EST MOD 30 MIN: CPT

## 2019-12-04 PROCEDURE — 93000 ELECTROCARDIOGRAM COMPLETE: CPT

## 2019-12-04 RX ORDER — CARVEDILOL 12.5 MG/1
12.5 TABLET, FILM COATED ORAL
Qty: 180 | Refills: 0 | Status: DISCONTINUED | COMMUNITY
Start: 2018-10-10 | End: 2019-12-04

## 2019-12-04 NOTE — PHYSICAL EXAM
[Normal Appearance] : normal appearance [General Appearance - Well Developed] : well developed [Well Groomed] : well groomed [No Deformities] : no deformities [General Appearance - Well Nourished] : well nourished [General Appearance - In No Acute Distress] : no acute distress [Normal Conjunctiva] : the conjunctiva exhibited no abnormalities [Eyelids - No Xanthelasma] : the eyelids demonstrated no xanthelasmas [Normal Oral Mucosa] : normal oral mucosa [No Oral Pallor] : no oral pallor [No Oral Cyanosis] : no oral cyanosis [Respiration, Rhythm And Depth] : normal respiratory rhythm and effort [Normal Jugular Venous V Waves Present] : normal jugular venous V waves present [Exaggerated Use Of Accessory Muscles For Inspiration] : no accessory muscle use [Auscultation Breath Sounds / Voice Sounds] : lungs were clear to auscultation bilaterally [Abdomen Soft] : soft [Abdomen Tenderness] : non-tender [Abnormal Walk] : normal gait [Abdomen Mass (___ Cm)] : no abdominal mass palpated [Gait - Sufficient For Exercise Testing] : the gait was sufficient for exercise testing [Nail Clubbing] : no clubbing of the fingernails [Cyanosis, Localized] : no localized cyanosis [] : no ischemic changes [Petechial Hemorrhages (___cm)] : no petechial hemorrhages [FreeTextEntry1] : generalized rash

## 2019-12-04 NOTE — HISTORY OF PRESENT ILLNESS
[FreeTextEntry1] : Patient was hospitalized with URI symptoms & CHF.  Gained 15 lbs at home prior to admission.  Discharged home on increased dose of Lasix x 2 days & on spironolactone 25 mg a day until his weight dropped into mid 190 lbs.  Off Spironolactone now & Lasix dose back to 40 MG a day.  Much less dyspnea though he feels weak and at times ataxic.

## 2019-12-04 NOTE — REASON FOR VISIT
[Follow-Up - From Hospitalization] : follow-up of a recent hospitalization for [Cardiomyopathy] : cardiomyopathy [Dyspnea] : dyspnea

## 2019-12-04 NOTE — ASSESSMENT
[FreeTextEntry1] : EKG:  fully paced.\par Hemodynamically stable.  No signs of CHF on exam.  BP well controlled\par Patient's weight is lower than his baseline.

## 2019-12-04 NOTE — DISCUSSION/SUMMARY
[FreeTextEntry1] : Maintain on current medical regimen.  Will check electrolytes & renal function.  Ambulate as tolerated.  Maintain low sodium, low caloric, low cholesterol,  diabetic diet.  Will adjust diuretics as appropriate after reviewing blood work.\par

## 2019-12-05 LAB
ANION GAP SERPL CALC-SCNC: 15 MMOL/L
BUN SERPL-MCNC: 51 MG/DL
CALCIUM SERPL-MCNC: 10.1 MG/DL
CHLORIDE SERPL-SCNC: 105 MMOL/L
CO2 SERPL-SCNC: 23 MMOL/L
CREAT SERPL-MCNC: 1.71 MG/DL
GLUCOSE SERPL-MCNC: 131 MG/DL
NT-PROBNP SERPL-MCNC: 6064 PG/ML
POTASSIUM SERPL-SCNC: 4.6 MMOL/L
SODIUM SERPL-SCNC: 143 MMOL/L

## 2020-01-01 ENCOUNTER — NON-APPOINTMENT (OUTPATIENT)
Age: 75
End: 2020-01-01

## 2020-01-01 ENCOUNTER — APPOINTMENT (OUTPATIENT)
Dept: ENDOCRINOLOGY | Facility: CLINIC | Age: 75
End: 2020-01-01
Payer: MEDICARE

## 2020-01-01 ENCOUNTER — INPATIENT (INPATIENT)
Facility: HOSPITAL | Age: 75
LOS: 2 days | Discharge: ANOTHER HOSPITAL | End: 2020-07-17
Attending: INTERNAL MEDICINE | Admitting: INTERNAL MEDICINE
Payer: MEDICARE

## 2020-01-01 ENCOUNTER — APPOINTMENT (OUTPATIENT)
Dept: HEART AND VASCULAR | Facility: CLINIC | Age: 75
End: 2020-01-01

## 2020-01-01 ENCOUNTER — APPOINTMENT (OUTPATIENT)
Dept: HEART FAILURE | Facility: CLINIC | Age: 75
End: 2020-01-01
Payer: MEDICARE

## 2020-01-01 ENCOUNTER — APPOINTMENT (OUTPATIENT)
Dept: HEART FAILURE | Facility: CLINIC | Age: 75
End: 2020-01-01

## 2020-01-01 ENCOUNTER — RX RENEWAL (OUTPATIENT)
Age: 75
End: 2020-01-01

## 2020-01-01 ENCOUNTER — LABORATORY RESULT (OUTPATIENT)
Age: 75
End: 2020-01-01

## 2020-01-01 ENCOUNTER — APPOINTMENT (OUTPATIENT)
Dept: CARDIOTHORACIC SURGERY | Facility: HOSPITAL | Age: 75
End: 2020-01-01

## 2020-01-01 ENCOUNTER — APPOINTMENT (OUTPATIENT)
Dept: ELECTROPHYSIOLOGY | Facility: CLINIC | Age: 75
End: 2020-01-01
Payer: MEDICARE

## 2020-01-01 ENCOUNTER — TRANSCRIPTION ENCOUNTER (OUTPATIENT)
Age: 75
End: 2020-01-01

## 2020-01-01 ENCOUNTER — APPOINTMENT (OUTPATIENT)
Dept: CARDIOTHORACIC SURGERY | Facility: CLINIC | Age: 75
End: 2020-01-01
Payer: MEDICARE

## 2020-01-01 ENCOUNTER — INPATIENT (INPATIENT)
Facility: HOSPITAL | Age: 75
LOS: 30 days | Discharge: HOME | DRG: 266 | End: 2020-09-21
Attending: STUDENT IN AN ORGANIZED HEALTH CARE EDUCATION/TRAINING PROGRAM | Admitting: INTERNAL MEDICINE
Payer: MEDICARE

## 2020-01-01 ENCOUNTER — INPATIENT (INPATIENT)
Facility: HOSPITAL | Age: 75
LOS: 16 days | Discharge: HOME | DRG: 286 | End: 2020-11-18
Attending: INTERNAL MEDICINE | Admitting: INTERNAL MEDICINE
Payer: MEDICARE

## 2020-01-01 ENCOUNTER — APPOINTMENT (OUTPATIENT)
Dept: OTOLARYNGOLOGY | Facility: CLINIC | Age: 75
End: 2020-01-01
Payer: MEDICARE

## 2020-01-01 ENCOUNTER — APPOINTMENT (OUTPATIENT)
Dept: HEART AND VASCULAR | Facility: CLINIC | Age: 75
End: 2020-01-01
Payer: MEDICARE

## 2020-01-01 ENCOUNTER — INPATIENT (INPATIENT)
Facility: HOSPITAL | Age: 75
LOS: 19 days | DRG: 388 | End: 2020-12-27
Attending: INTERNAL MEDICINE | Admitting: INTERNAL MEDICINE
Payer: MEDICARE

## 2020-01-01 ENCOUNTER — APPOINTMENT (OUTPATIENT)
Dept: NEPHROLOGY | Facility: CLINIC | Age: 75
End: 2020-01-01
Payer: MEDICARE

## 2020-01-01 ENCOUNTER — INPATIENT (INPATIENT)
Facility: HOSPITAL | Age: 75
LOS: 4 days | Discharge: HOME | DRG: 245 | End: 2020-07-22
Attending: INTERNAL MEDICINE | Admitting: INTERNAL MEDICINE
Payer: MEDICARE

## 2020-01-01 VITALS — BODY MASS INDEX: 26.92 KG/M2 | WEIGHT: 188 LBS | HEIGHT: 70 IN

## 2020-01-01 VITALS
DIASTOLIC BLOOD PRESSURE: 61 MMHG | HEART RATE: 81 BPM | WEIGHT: 177 LBS | BODY MASS INDEX: 25.4 KG/M2 | SYSTOLIC BLOOD PRESSURE: 97 MMHG | OXYGEN SATURATION: 97 %

## 2020-01-01 VITALS
BODY MASS INDEX: 25.77 KG/M2 | TEMPERATURE: 98.1 F | SYSTOLIC BLOOD PRESSURE: 96 MMHG | RESPIRATION RATE: 14 BRPM | HEIGHT: 70 IN | OXYGEN SATURATION: 100 % | HEART RATE: 80 BPM | WEIGHT: 180 LBS | DIASTOLIC BLOOD PRESSURE: 64 MMHG

## 2020-01-01 VITALS
RESPIRATION RATE: 12 BRPM | DIASTOLIC BLOOD PRESSURE: 60 MMHG | SYSTOLIC BLOOD PRESSURE: 95 MMHG | BODY MASS INDEX: 29.49 KG/M2 | OXYGEN SATURATION: 100 % | HEART RATE: 60 BPM | WEIGHT: 206 LBS | HEIGHT: 70 IN | TEMPERATURE: 97.4 F

## 2020-01-01 VITALS
TEMPERATURE: 97 F | DIASTOLIC BLOOD PRESSURE: 52 MMHG | OXYGEN SATURATION: 96 % | SYSTOLIC BLOOD PRESSURE: 92 MMHG | HEIGHT: 68 IN | WEIGHT: 199.96 LBS | RESPIRATION RATE: 18 BRPM | HEART RATE: 50 BPM

## 2020-01-01 VITALS
DIASTOLIC BLOOD PRESSURE: 53 MMHG | HEIGHT: 70 IN | RESPIRATION RATE: 12 BRPM | TEMPERATURE: 97.4 F | WEIGHT: 198 LBS | OXYGEN SATURATION: 95 % | BODY MASS INDEX: 28.35 KG/M2 | SYSTOLIC BLOOD PRESSURE: 85 MMHG | HEART RATE: 83 BPM

## 2020-01-01 VITALS
TEMPERATURE: 95.5 F | WEIGHT: 185 LBS | HEIGHT: 70 IN | HEART RATE: 59 BPM | SYSTOLIC BLOOD PRESSURE: 107 MMHG | BODY MASS INDEX: 26.48 KG/M2 | DIASTOLIC BLOOD PRESSURE: 66 MMHG

## 2020-01-01 VITALS
OXYGEN SATURATION: 99 % | RESPIRATION RATE: 18 BRPM | DIASTOLIC BLOOD PRESSURE: 63 MMHG | TEMPERATURE: 97 F | HEART RATE: 82 BPM | SYSTOLIC BLOOD PRESSURE: 98 MMHG

## 2020-01-01 VITALS
TEMPERATURE: 98 F | OXYGEN SATURATION: 96 % | DIASTOLIC BLOOD PRESSURE: 51 MMHG | SYSTOLIC BLOOD PRESSURE: 88 MMHG | HEART RATE: 101 BPM | HEIGHT: 69 IN | RESPIRATION RATE: 16 BRPM | WEIGHT: 149.91 LBS

## 2020-01-01 VITALS
SYSTOLIC BLOOD PRESSURE: 96 MMHG | OXYGEN SATURATION: 100 % | WEIGHT: 208.33 LBS | BODY MASS INDEX: 29.89 KG/M2 | DIASTOLIC BLOOD PRESSURE: 62 MMHG | HEART RATE: 69 BPM

## 2020-01-01 VITALS
RESPIRATION RATE: 12 BRPM | SYSTOLIC BLOOD PRESSURE: 84 MMHG | OXYGEN SATURATION: 100 % | HEIGHT: 70 IN | HEART RATE: 80 BPM | DIASTOLIC BLOOD PRESSURE: 47 MMHG | WEIGHT: 184 LBS | TEMPERATURE: 98 F | BODY MASS INDEX: 26.34 KG/M2

## 2020-01-01 VITALS
TEMPERATURE: 98 F | SYSTOLIC BLOOD PRESSURE: 99 MMHG | RESPIRATION RATE: 18 BRPM | DIASTOLIC BLOOD PRESSURE: 57 MMHG | OXYGEN SATURATION: 99 % | HEART RATE: 80 BPM

## 2020-01-01 VITALS
HEART RATE: 96 BPM | WEIGHT: 204 LBS | SYSTOLIC BLOOD PRESSURE: 120 MMHG | HEIGHT: 70 IN | DIASTOLIC BLOOD PRESSURE: 70 MMHG | BODY MASS INDEX: 29.2 KG/M2 | RESPIRATION RATE: 15 BRPM

## 2020-01-01 VITALS
TEMPERATURE: 98 F | OXYGEN SATURATION: 90 % | HEART RATE: 80 BPM | DIASTOLIC BLOOD PRESSURE: 40 MMHG | SYSTOLIC BLOOD PRESSURE: 68 MMHG | RESPIRATION RATE: 17 BRPM

## 2020-01-01 VITALS
BODY MASS INDEX: 27.77 KG/M2 | SYSTOLIC BLOOD PRESSURE: 110 MMHG | OXYGEN SATURATION: 98 % | HEIGHT: 70 IN | TEMPERATURE: 98 F | DIASTOLIC BLOOD PRESSURE: 70 MMHG | WEIGHT: 194 LBS | HEART RATE: 92 BPM

## 2020-01-01 VITALS
WEIGHT: 198 LBS | SYSTOLIC BLOOD PRESSURE: 82 MMHG | OXYGEN SATURATION: 100 % | RESPIRATION RATE: 12 BRPM | DIASTOLIC BLOOD PRESSURE: 45 MMHG | TEMPERATURE: 97.5 F | HEART RATE: 80 BPM | HEIGHT: 70 IN | BODY MASS INDEX: 28.35 KG/M2

## 2020-01-01 VITALS
WEIGHT: 161 LBS | BODY MASS INDEX: 23.05 KG/M2 | DIASTOLIC BLOOD PRESSURE: 54 MMHG | HEART RATE: 79 BPM | OXYGEN SATURATION: 100 % | SYSTOLIC BLOOD PRESSURE: 93 MMHG | RESPIRATION RATE: 16 BRPM | TEMPERATURE: 97.6 F | HEIGHT: 70 IN

## 2020-01-01 VITALS
HEART RATE: 87 BPM | DIASTOLIC BLOOD PRESSURE: 72 MMHG | BODY MASS INDEX: 29.2 KG/M2 | OXYGEN SATURATION: 99 % | WEIGHT: 204 LBS | HEIGHT: 70 IN | SYSTOLIC BLOOD PRESSURE: 109 MMHG

## 2020-01-01 VITALS
RESPIRATION RATE: 18 BRPM | DIASTOLIC BLOOD PRESSURE: 58 MMHG | HEART RATE: 93 BPM | OXYGEN SATURATION: 99 % | SYSTOLIC BLOOD PRESSURE: 101 MMHG | TEMPERATURE: 98 F

## 2020-01-01 VITALS
OXYGEN SATURATION: 100 % | DIASTOLIC BLOOD PRESSURE: 56 MMHG | BODY MASS INDEX: 28.41 KG/M2 | SYSTOLIC BLOOD PRESSURE: 87 MMHG | HEART RATE: 80 BPM | WEIGHT: 198 LBS

## 2020-01-01 VITALS
SYSTOLIC BLOOD PRESSURE: 99 MMHG | DIASTOLIC BLOOD PRESSURE: 60 MMHG | TEMPERATURE: 98 F | RESPIRATION RATE: 20 BRPM | WEIGHT: 190.04 LBS | HEART RATE: 120 BPM | OXYGEN SATURATION: 100 % | HEIGHT: 70 IN

## 2020-01-01 VITALS
HEIGHT: 70 IN | HEART RATE: 77 BPM | OXYGEN SATURATION: 95 % | BODY MASS INDEX: 29.2 KG/M2 | TEMPERATURE: 97.3 F | DIASTOLIC BLOOD PRESSURE: 60 MMHG | SYSTOLIC BLOOD PRESSURE: 100 MMHG | WEIGHT: 204 LBS

## 2020-01-01 VITALS
DIASTOLIC BLOOD PRESSURE: 68 MMHG | SYSTOLIC BLOOD PRESSURE: 100 MMHG | RESPIRATION RATE: 18 BRPM | HEART RATE: 84 BPM | HEIGHT: 70 IN | TEMPERATURE: 98 F | WEIGHT: 212.31 LBS | OXYGEN SATURATION: 100 %

## 2020-01-01 VITALS
HEIGHT: 68 IN | TEMPERATURE: 98 F | WEIGHT: 199.96 LBS | OXYGEN SATURATION: 100 % | DIASTOLIC BLOOD PRESSURE: 76 MMHG | SYSTOLIC BLOOD PRESSURE: 115 MMHG | HEART RATE: 86 BPM | RESPIRATION RATE: 18 BRPM

## 2020-01-01 VITALS — HEART RATE: 79 BPM | DIASTOLIC BLOOD PRESSURE: 63 MMHG | SYSTOLIC BLOOD PRESSURE: 98 MMHG

## 2020-01-01 VITALS
SYSTOLIC BLOOD PRESSURE: 110 MMHG | HEART RATE: 81 BPM | DIASTOLIC BLOOD PRESSURE: 75 MMHG | TEMPERATURE: 99 F | OXYGEN SATURATION: 98 % | RESPIRATION RATE: 18 BRPM

## 2020-01-01 DIAGNOSIS — I50.21 ACUTE SYSTOLIC (CONGESTIVE) HEART FAILURE: ICD-10-CM

## 2020-01-01 DIAGNOSIS — D69.6 THROMBOCYTOPENIA, UNSPECIFIED: ICD-10-CM

## 2020-01-01 DIAGNOSIS — I50.22 CHRONIC SYSTOLIC (CONGESTIVE) HEART FAILURE: ICD-10-CM

## 2020-01-01 DIAGNOSIS — K56.609 UNSPECIFIED INTESTINAL OBSTRUCTION, UNSPECIFIED AS TO PARTIAL VERSUS COMPLETE OBSTRUCTION: ICD-10-CM

## 2020-01-01 DIAGNOSIS — I48.20 CHRONIC ATRIAL FIBRILLATION, UNSPECIFIED: ICD-10-CM

## 2020-01-01 DIAGNOSIS — Z87.891 PERSONAL HISTORY OF NICOTINE DEPENDENCE: ICD-10-CM

## 2020-01-01 DIAGNOSIS — I95.9 HYPOTENSION, UNSPECIFIED: ICD-10-CM

## 2020-01-01 DIAGNOSIS — I10 ESSENTIAL (PRIMARY) HYPERTENSION: ICD-10-CM

## 2020-01-01 DIAGNOSIS — S02.2XXA FRACTURE OF NASAL BONES, INITIAL ENCOUNTER FOR CLOSED FRACTURE: ICD-10-CM

## 2020-01-01 DIAGNOSIS — R53.2 FUNCTIONAL QUADRIPLEGIA: ICD-10-CM

## 2020-01-01 DIAGNOSIS — E87.6 HYPOKALEMIA: ICD-10-CM

## 2020-01-01 DIAGNOSIS — I48.0 PAROXYSMAL ATRIAL FIBRILLATION: ICD-10-CM

## 2020-01-01 DIAGNOSIS — S90.829A BLISTER (NONTHERMAL), UNSPECIFIED FOOT, INITIAL ENCOUNTER: ICD-10-CM

## 2020-01-01 DIAGNOSIS — N18.9 CHRONIC KIDNEY DISEASE, UNSPECIFIED: ICD-10-CM

## 2020-01-01 DIAGNOSIS — R09.81 NASAL CONGESTION: ICD-10-CM

## 2020-01-01 DIAGNOSIS — Z95.810 PRESENCE OF AUTOMATIC (IMPLANTABLE) CARDIAC DEFIBRILLATOR: Chronic | ICD-10-CM

## 2020-01-01 DIAGNOSIS — E11.22 TYPE 2 DIABETES MELLITUS WITH DIABETIC CHRONIC KIDNEY DISEASE: ICD-10-CM

## 2020-01-01 DIAGNOSIS — N17.9 ACUTE KIDNEY FAILURE, UNSPECIFIED: ICD-10-CM

## 2020-01-01 DIAGNOSIS — R57.0 CARDIOGENIC SHOCK: ICD-10-CM

## 2020-01-01 DIAGNOSIS — Z51.5 ENCOUNTER FOR PALLIATIVE CARE: ICD-10-CM

## 2020-01-01 DIAGNOSIS — R00.0 TACHYCARDIA, UNSPECIFIED: ICD-10-CM

## 2020-01-01 DIAGNOSIS — D64.9 ANEMIA, UNSPECIFIED: ICD-10-CM

## 2020-01-01 DIAGNOSIS — I50.9 HEART FAILURE, UNSPECIFIED: ICD-10-CM

## 2020-01-01 DIAGNOSIS — I50.23 ACUTE ON CHRONIC SYSTOLIC (CONGESTIVE) HEART FAILURE: ICD-10-CM

## 2020-01-01 DIAGNOSIS — E87.2 ACIDOSIS: ICD-10-CM

## 2020-01-01 DIAGNOSIS — R79.89 OTHER SPECIFIED ABNORMAL FINDINGS OF BLOOD CHEMISTRY: ICD-10-CM

## 2020-01-01 DIAGNOSIS — I50.43 ACUTE ON CHRONIC COMBINED SYSTOLIC (CONGESTIVE) AND DIASTOLIC (CONGESTIVE) HEART FAILURE: ICD-10-CM

## 2020-01-01 DIAGNOSIS — Z29.9 ENCOUNTER FOR PROPHYLACTIC MEASURES, UNSPECIFIED: ICD-10-CM

## 2020-01-01 DIAGNOSIS — Z98.890 OTHER SPECIFIED POSTPROCEDURAL STATES: ICD-10-CM

## 2020-01-01 DIAGNOSIS — Z95.810 PRESENCE OF AUTOMATIC (IMPLANTABLE) CARDIAC DEFIBRILLATOR: ICD-10-CM

## 2020-01-01 DIAGNOSIS — R11.0 NAUSEA: ICD-10-CM

## 2020-01-01 DIAGNOSIS — Z90.89 ACQUIRED ABSENCE OF OTHER ORGANS: Chronic | ICD-10-CM

## 2020-01-01 DIAGNOSIS — E11.69 TYPE 2 DIABETES MELLITUS WITH OTHER SPECIFIED COMPLICATION: ICD-10-CM

## 2020-01-01 DIAGNOSIS — M10.9 GOUT, UNSPECIFIED: ICD-10-CM

## 2020-01-01 DIAGNOSIS — E03.9 HYPOTHYROIDISM, UNSPECIFIED: ICD-10-CM

## 2020-01-01 DIAGNOSIS — I42.9 CARDIOMYOPATHY, UNSPECIFIED: ICD-10-CM

## 2020-01-01 DIAGNOSIS — I95.89 OTHER HYPOTENSION: ICD-10-CM

## 2020-01-01 DIAGNOSIS — I48.91 UNSPECIFIED ATRIAL FIBRILLATION: ICD-10-CM

## 2020-01-01 DIAGNOSIS — I34.0 NONRHEUMATIC MITRAL (VALVE) INSUFFICIENCY: ICD-10-CM

## 2020-01-01 DIAGNOSIS — I50.20 UNSPECIFIED SYSTOLIC (CONGESTIVE) HEART FAILURE: ICD-10-CM

## 2020-01-01 DIAGNOSIS — Z86.39 PERSONAL HISTORY OF OTHER ENDOCRINE, NUTRITIONAL AND METABOLIC DISEASE: ICD-10-CM

## 2020-01-01 DIAGNOSIS — E87.5 HYPERKALEMIA: ICD-10-CM

## 2020-01-01 DIAGNOSIS — D53.9 NUTRITIONAL ANEMIA, UNSPECIFIED: ICD-10-CM

## 2020-01-01 DIAGNOSIS — Z86.79 PERSONAL HISTORY OF OTHER DISEASES OF THE CIRCULATORY SYSTEM: ICD-10-CM

## 2020-01-01 DIAGNOSIS — R06.00 DYSPNEA, UNSPECIFIED: ICD-10-CM

## 2020-01-01 DIAGNOSIS — R52 PAIN, UNSPECIFIED: ICD-10-CM

## 2020-01-01 DIAGNOSIS — N18.30 CHRONIC KIDNEY DISEASE, STAGE 3 UNSPECIFIED: ICD-10-CM

## 2020-01-01 DIAGNOSIS — G47.00 INSOMNIA, UNSPECIFIED: ICD-10-CM

## 2020-01-01 DIAGNOSIS — N28.9 DISORDER OF KIDNEY AND URETER, UNSPECIFIED: ICD-10-CM

## 2020-01-01 DIAGNOSIS — N19 UNSPECIFIED KIDNEY FAILURE: ICD-10-CM

## 2020-01-01 DIAGNOSIS — C73 MALIGNANT NEOPLASM OF THYROID GLAND: ICD-10-CM

## 2020-01-01 DIAGNOSIS — Z71.89 OTHER SPECIFIED COUNSELING: ICD-10-CM

## 2020-01-01 DIAGNOSIS — N18.4 CHRONIC KIDNEY DISEASE, STAGE 4 (SEVERE): ICD-10-CM

## 2020-01-01 DIAGNOSIS — E83.39 OTHER DISORDERS OF PHOSPHORUS METABOLISM: ICD-10-CM

## 2020-01-01 DIAGNOSIS — D72.828 OTHER ELEVATED WHITE BLOOD CELL COUNT: ICD-10-CM

## 2020-01-01 DIAGNOSIS — Z79.01 LONG TERM (CURRENT) USE OF ANTICOAGULANTS: ICD-10-CM

## 2020-01-01 DIAGNOSIS — Z95.818 OTHER SPECIFIED POSTPROCEDURAL STATES: ICD-10-CM

## 2020-01-01 DIAGNOSIS — E11.9 TYPE 2 DIABETES MELLITUS WITHOUT COMPLICATIONS: ICD-10-CM

## 2020-01-01 DIAGNOSIS — I13.0 HYPERTENSIVE HEART AND CHRONIC KIDNEY DISEASE WITH HEART FAILURE AND STAGE 1 THROUGH STAGE 4 CHRONIC KIDNEY DISEASE, OR UNSPECIFIED CHRONIC KIDNEY DISEASE: ICD-10-CM

## 2020-01-01 DIAGNOSIS — R74.01 ELEVATION OF LEVELS OF LIVER TRANSAMINASE LEVELS: ICD-10-CM

## 2020-01-01 DIAGNOSIS — R13.10 DYSPHAGIA, UNSPECIFIED: ICD-10-CM

## 2020-01-01 DIAGNOSIS — R05 COUGH: ICD-10-CM

## 2020-01-01 DIAGNOSIS — I27.20 PULMONARY HYPERTENSION, UNSPECIFIED: ICD-10-CM

## 2020-01-01 DIAGNOSIS — N18.9 ACUTE KIDNEY FAILURE, UNSPECIFIED: ICD-10-CM

## 2020-01-01 DIAGNOSIS — D72.829 ELEVATED WHITE BLOOD CELL COUNT, UNSPECIFIED: ICD-10-CM

## 2020-01-01 DIAGNOSIS — S80.829A BLISTER (NONTHERMAL), UNSPECIFIED LOWER LEG, INITIAL ENCOUNTER: ICD-10-CM

## 2020-01-01 DIAGNOSIS — Z82.49 FAMILY HISTORY OF ISCHEMIC HEART DISEASE AND OTHER DISEASES OF THE CIRCULATORY SYSTEM: ICD-10-CM

## 2020-01-01 DIAGNOSIS — E04.2 NONTOXIC MULTINODULAR GOITER: ICD-10-CM

## 2020-01-01 DIAGNOSIS — Z80.0 FAMILY HISTORY OF MALIGNANT NEOPLASM OF DIGESTIVE ORGANS: ICD-10-CM

## 2020-01-01 DIAGNOSIS — I08.0 RHEUMATIC DISORDERS OF BOTH MITRAL AND AORTIC VALVES: ICD-10-CM

## 2020-01-01 LAB
% ALBUMIN: 62.2 % — SIGNIFICANT CHANGE UP
% ALBUMIN: 63.4 % — SIGNIFICANT CHANGE UP
% ALPHA 1: 4.5 % — SIGNIFICANT CHANGE UP
% ALPHA 1: 5.1 % — SIGNIFICANT CHANGE UP
% ALPHA 2: 6.7 % — SIGNIFICANT CHANGE UP
% ALPHA 2: 7.7 % — SIGNIFICANT CHANGE UP
% BETA: 10.8 % — SIGNIFICANT CHANGE UP
% BETA: 9.1 % — SIGNIFICANT CHANGE UP
% GAMMA: 15.2 % — SIGNIFICANT CHANGE UP
% GAMMA: 15.3 % — SIGNIFICANT CHANGE UP
-  AMIKACIN: SIGNIFICANT CHANGE UP
-  AMIKACIN: SIGNIFICANT CHANGE UP
-  AMOXICILLIN/CLAVULANIC ACID: SIGNIFICANT CHANGE UP
-  AMPICILLIN/SULBACTAM: SIGNIFICANT CHANGE UP
-  AMPICILLIN: SIGNIFICANT CHANGE UP
-  AZTREONAM: SIGNIFICANT CHANGE UP
-  AZTREONAM: SIGNIFICANT CHANGE UP
-  CEFAZOLIN: SIGNIFICANT CHANGE UP
-  CEFEPIME: SIGNIFICANT CHANGE UP
-  CEFEPIME: SIGNIFICANT CHANGE UP
-  CEFOXITIN: SIGNIFICANT CHANGE UP
-  CEFTAZIDIME: SIGNIFICANT CHANGE UP
-  CEFTRIAXONE: SIGNIFICANT CHANGE UP
-  CIPROFLOXACIN: SIGNIFICANT CHANGE UP
-  CIPROFLOXACIN: SIGNIFICANT CHANGE UP
-  ERTAPENEM: SIGNIFICANT CHANGE UP
-  GENTAMICIN: SIGNIFICANT CHANGE UP
-  GENTAMICIN: SIGNIFICANT CHANGE UP
-  IMIPENEM: SIGNIFICANT CHANGE UP
-  LEVOFLOXACIN: SIGNIFICANT CHANGE UP
-  LEVOFLOXACIN: SIGNIFICANT CHANGE UP
-  MEROPENEM: SIGNIFICANT CHANGE UP
-  MEROPENEM: SIGNIFICANT CHANGE UP
-  NITROFURANTOIN: SIGNIFICANT CHANGE UP
-  PIPERACILLIN/TAZOBACTAM: SIGNIFICANT CHANGE UP
-  PIPERACILLIN/TAZOBACTAM: SIGNIFICANT CHANGE UP
-  TOBRAMYCIN: SIGNIFICANT CHANGE UP
-  TOBRAMYCIN: SIGNIFICANT CHANGE UP
-  TRIMETHOPRIM/SULFAMETHOXAZOLE: SIGNIFICANT CHANGE UP
24R-OH-CALCIDIOL SERPL-MCNC: 21.7 NG/ML — LOW (ref 30–80)
A1C WITH ESTIMATED AVERAGE GLUCOSE RESULT: 6.1 % — HIGH (ref 4–5.6)
A1C WITH ESTIMATED AVERAGE GLUCOSE RESULT: 6.2 % — HIGH (ref 4–5.6)
A1C WITH ESTIMATED AVERAGE GLUCOSE RESULT: 6.3 % — HIGH (ref 4–5.6)
ALBUMIN SERPL ELPH-MCNC: 2.1 G/DL — LOW (ref 3.3–5)
ALBUMIN SERPL ELPH-MCNC: 2.2 G/DL — LOW (ref 3.3–5)
ALBUMIN SERPL ELPH-MCNC: 2.3 G/DL — LOW (ref 3.3–5)
ALBUMIN SERPL ELPH-MCNC: 2.9 G/DL — LOW (ref 3.3–5)
ALBUMIN SERPL ELPH-MCNC: 3 G/DL — LOW (ref 3.3–5)
ALBUMIN SERPL ELPH-MCNC: 3 G/DL — LOW (ref 3.3–5)
ALBUMIN SERPL ELPH-MCNC: 3.1 G/DL — LOW (ref 3.3–5)
ALBUMIN SERPL ELPH-MCNC: 3.2 G/DL — LOW (ref 3.3–5)
ALBUMIN SERPL ELPH-MCNC: 3.3 G/DL — SIGNIFICANT CHANGE UP (ref 3.3–5)
ALBUMIN SERPL ELPH-MCNC: 3.4 G/DL — SIGNIFICANT CHANGE UP (ref 3.3–5)
ALBUMIN SERPL ELPH-MCNC: 3.6 G/DL — SIGNIFICANT CHANGE UP (ref 3.3–5)
ALBUMIN SERPL ELPH-MCNC: 3.6 G/DL — SIGNIFICANT CHANGE UP (ref 3.6–5.5)
ALBUMIN SERPL ELPH-MCNC: 3.7 G/DL — SIGNIFICANT CHANGE UP (ref 3.3–5)
ALBUMIN SERPL ELPH-MCNC: 3.8 G/DL
ALBUMIN SERPL ELPH-MCNC: 3.8 G/DL — SIGNIFICANT CHANGE UP (ref 3.6–5.5)
ALBUMIN SERPL ELPH-MCNC: 3.9 G/DL — SIGNIFICANT CHANGE UP (ref 3.3–5)
ALBUMIN SERPL ELPH-MCNC: 3.9 G/DL — SIGNIFICANT CHANGE UP (ref 3.3–5)
ALBUMIN SERPL ELPH-MCNC: 4 G/DL — SIGNIFICANT CHANGE UP (ref 3.3–5)
ALBUMIN SERPL ELPH-MCNC: 4 G/DL — SIGNIFICANT CHANGE UP (ref 3.3–5)
ALBUMIN SERPL ELPH-MCNC: 4.1 G/DL
ALBUMIN SERPL ELPH-MCNC: 4.1 G/DL — SIGNIFICANT CHANGE UP (ref 3.3–5)
ALBUMIN SERPL ELPH-MCNC: 4.1 G/DL — SIGNIFICANT CHANGE UP (ref 3.3–5)
ALBUMIN SERPL ELPH-MCNC: 4.3 G/DL
ALBUMIN SERPL ELPH-MCNC: 4.3 G/DL
ALBUMIN/GLOB SERPL ELPH: 1.7 RATIO — SIGNIFICANT CHANGE UP
ALBUMIN/GLOB SERPL ELPH: 1.7 RATIO — SIGNIFICANT CHANGE UP
ALP BLD-CCNC: 108 U/L
ALP BLD-CCNC: 60 U/L
ALP BLD-CCNC: 64 U/L
ALP BLD-CCNC: 66 U/L
ALP BLD-CCNC: 78 U/L
ALP SERPL-CCNC: 100 U/L — SIGNIFICANT CHANGE UP (ref 40–120)
ALP SERPL-CCNC: 101 U/L — SIGNIFICANT CHANGE UP (ref 40–120)
ALP SERPL-CCNC: 103 U/L — SIGNIFICANT CHANGE UP (ref 40–120)
ALP SERPL-CCNC: 103 U/L — SIGNIFICANT CHANGE UP (ref 40–120)
ALP SERPL-CCNC: 104 U/L — SIGNIFICANT CHANGE UP (ref 40–120)
ALP SERPL-CCNC: 105 U/L — SIGNIFICANT CHANGE UP (ref 40–120)
ALP SERPL-CCNC: 106 U/L — SIGNIFICANT CHANGE UP (ref 40–120)
ALP SERPL-CCNC: 106 U/L — SIGNIFICANT CHANGE UP (ref 40–120)
ALP SERPL-CCNC: 110 U/L — SIGNIFICANT CHANGE UP (ref 40–120)
ALP SERPL-CCNC: 111 U/L — SIGNIFICANT CHANGE UP (ref 40–120)
ALP SERPL-CCNC: 114 U/L — SIGNIFICANT CHANGE UP (ref 40–120)
ALP SERPL-CCNC: 114 U/L — SIGNIFICANT CHANGE UP (ref 40–120)
ALP SERPL-CCNC: 123 U/L — HIGH (ref 40–120)
ALP SERPL-CCNC: 131 U/L — HIGH (ref 40–120)
ALP SERPL-CCNC: 132 U/L — HIGH (ref 40–120)
ALP SERPL-CCNC: 59 U/L — SIGNIFICANT CHANGE UP (ref 40–120)
ALP SERPL-CCNC: 61 U/L — SIGNIFICANT CHANGE UP (ref 40–120)
ALP SERPL-CCNC: 61 U/L — SIGNIFICANT CHANGE UP (ref 40–120)
ALP SERPL-CCNC: 62 U/L — SIGNIFICANT CHANGE UP (ref 40–120)
ALP SERPL-CCNC: 62 U/L — SIGNIFICANT CHANGE UP (ref 40–120)
ALP SERPL-CCNC: 63 U/L — SIGNIFICANT CHANGE UP (ref 40–120)
ALP SERPL-CCNC: 64 U/L — SIGNIFICANT CHANGE UP (ref 40–120)
ALP SERPL-CCNC: 66 U/L — SIGNIFICANT CHANGE UP (ref 40–120)
ALP SERPL-CCNC: 67 U/L — SIGNIFICANT CHANGE UP (ref 40–120)
ALP SERPL-CCNC: 67 U/L — SIGNIFICANT CHANGE UP (ref 40–120)
ALP SERPL-CCNC: 69 U/L — SIGNIFICANT CHANGE UP (ref 40–120)
ALP SERPL-CCNC: 77 U/L — SIGNIFICANT CHANGE UP (ref 40–120)
ALP SERPL-CCNC: 78 U/L — SIGNIFICANT CHANGE UP (ref 40–120)
ALP SERPL-CCNC: 85 U/L — SIGNIFICANT CHANGE UP (ref 40–120)
ALP SERPL-CCNC: 88 U/L — SIGNIFICANT CHANGE UP (ref 40–120)
ALP SERPL-CCNC: 94 U/L — SIGNIFICANT CHANGE UP (ref 40–120)
ALP SERPL-CCNC: 96 U/L — SIGNIFICANT CHANGE UP (ref 40–120)
ALPHA1 GLOB SERPL ELPH-MCNC: 0.3 G/DL — SIGNIFICANT CHANGE UP (ref 0.1–0.4)
ALPHA1 GLOB SERPL ELPH-MCNC: 0.3 G/DL — SIGNIFICANT CHANGE UP (ref 0.1–0.4)
ALPHA2 GLOB SERPL ELPH-MCNC: 0.4 G/DL — LOW (ref 0.5–1)
ALPHA2 GLOB SERPL ELPH-MCNC: 0.4 G/DL — LOW (ref 0.5–1)
ALT FLD-CCNC: 109 U/L — HIGH (ref 10–45)
ALT FLD-CCNC: 11 U/L — SIGNIFICANT CHANGE UP (ref 10–45)
ALT FLD-CCNC: 12 U/L — SIGNIFICANT CHANGE UP (ref 10–45)
ALT FLD-CCNC: 12 U/L — SIGNIFICANT CHANGE UP (ref 10–45)
ALT FLD-CCNC: 128 U/L — HIGH (ref 10–45)
ALT FLD-CCNC: 129 U/L — HIGH (ref 10–45)
ALT FLD-CCNC: 131 U/L — HIGH (ref 10–45)
ALT FLD-CCNC: 14 U/L — SIGNIFICANT CHANGE UP (ref 10–45)
ALT FLD-CCNC: 140 U/L — HIGH (ref 10–45)
ALT FLD-CCNC: 146 U/L — HIGH (ref 10–45)
ALT FLD-CCNC: 153 U/L — HIGH (ref 10–45)
ALT FLD-CCNC: 16 U/L — SIGNIFICANT CHANGE UP (ref 10–45)
ALT FLD-CCNC: 195 U/L — HIGH (ref 10–45)
ALT FLD-CCNC: 20 U/L — SIGNIFICANT CHANGE UP (ref 10–45)
ALT FLD-CCNC: 203 U/L — HIGH (ref 10–45)
ALT FLD-CCNC: 203 U/L — HIGH (ref 10–45)
ALT FLD-CCNC: 205 U/L — HIGH (ref 10–45)
ALT FLD-CCNC: 21 U/L — SIGNIFICANT CHANGE UP (ref 10–45)
ALT FLD-CCNC: 220 U/L — HIGH (ref 10–45)
ALT FLD-CCNC: 224 U/L — HIGH (ref 10–45)
ALT FLD-CCNC: 25 U/L — SIGNIFICANT CHANGE UP (ref 10–45)
ALT FLD-CCNC: 254 U/L — HIGH (ref 10–45)
ALT FLD-CCNC: 30 U/L — SIGNIFICANT CHANGE UP (ref 10–45)
ALT FLD-CCNC: 34 U/L — SIGNIFICANT CHANGE UP (ref 10–45)
ALT FLD-CCNC: 34 U/L — SIGNIFICANT CHANGE UP (ref 12–78)
ALT FLD-CCNC: 34 U/L — SIGNIFICANT CHANGE UP (ref 12–78)
ALT FLD-CCNC: 35 U/L — SIGNIFICANT CHANGE UP (ref 10–45)
ALT FLD-CCNC: 35 U/L — SIGNIFICANT CHANGE UP (ref 10–45)
ALT FLD-CCNC: 37 U/L — SIGNIFICANT CHANGE UP (ref 10–45)
ALT FLD-CCNC: 39 U/L — SIGNIFICANT CHANGE UP (ref 12–78)
ALT FLD-CCNC: 40 U/L — SIGNIFICANT CHANGE UP (ref 10–45)
ALT FLD-CCNC: 45 U/L — SIGNIFICANT CHANGE UP (ref 10–45)
ALT FLD-CCNC: 45 U/L — SIGNIFICANT CHANGE UP (ref 10–45)
ALT FLD-CCNC: 46 U/L — HIGH (ref 10–45)
ALT FLD-CCNC: 49 U/L — HIGH (ref 10–45)
ALT FLD-CCNC: 59 U/L — HIGH (ref 10–45)
ALT FLD-CCNC: 97 U/L — HIGH (ref 10–45)
ALT SERPL-CCNC: 13 U/L
ALT SERPL-CCNC: 16 U/L
ALT SERPL-CCNC: 32 U/L
ALT SERPL-CCNC: 43 U/L
ALT SERPL-CCNC: 46 U/L
AMPHET UR-MCNC: NEGATIVE — SIGNIFICANT CHANGE UP
ANA SER IF-ACNC: NEGATIVE
ANABASINE UR-MCNC: <2 NG/ML — SIGNIFICANT CHANGE UP
ANION GAP SERPL CALC-SCNC: 10 MMOL/L — SIGNIFICANT CHANGE UP (ref 5–17)
ANION GAP SERPL CALC-SCNC: 11 MMOL/L — SIGNIFICANT CHANGE UP (ref 5–17)
ANION GAP SERPL CALC-SCNC: 12 MMOL/L
ANION GAP SERPL CALC-SCNC: 12 MMOL/L
ANION GAP SERPL CALC-SCNC: 12 MMOL/L — SIGNIFICANT CHANGE UP (ref 5–17)
ANION GAP SERPL CALC-SCNC: 13 MMOL/L
ANION GAP SERPL CALC-SCNC: 13 MMOL/L — SIGNIFICANT CHANGE UP (ref 5–17)
ANION GAP SERPL CALC-SCNC: 14 MMOL/L
ANION GAP SERPL CALC-SCNC: 14 MMOL/L — SIGNIFICANT CHANGE UP (ref 5–17)
ANION GAP SERPL CALC-SCNC: 15 MMOL/L
ANION GAP SERPL CALC-SCNC: 15 MMOL/L — SIGNIFICANT CHANGE UP (ref 5–17)
ANION GAP SERPL CALC-SCNC: 16 MMOL/L
ANION GAP SERPL CALC-SCNC: 16 MMOL/L
ANION GAP SERPL CALC-SCNC: 16 MMOL/L — SIGNIFICANT CHANGE UP (ref 5–17)
ANION GAP SERPL CALC-SCNC: 17 MMOL/L
ANION GAP SERPL CALC-SCNC: 17 MMOL/L — SIGNIFICANT CHANGE UP (ref 5–17)
ANION GAP SERPL CALC-SCNC: 18 MMOL/L — HIGH (ref 5–17)
ANION GAP SERPL CALC-SCNC: 19 MMOL/L — HIGH (ref 5–17)
ANION GAP SERPL CALC-SCNC: 7 MMOL/L — SIGNIFICANT CHANGE UP (ref 5–17)
ANION GAP SERPL CALC-SCNC: 8 MMOL/L — SIGNIFICANT CHANGE UP (ref 5–17)
ANION GAP SERPL CALC-SCNC: 9 MMOL/L — SIGNIFICANT CHANGE UP (ref 5–17)
APPEARANCE UR: CLEAR — SIGNIFICANT CHANGE UP
APPEARANCE: CLEAR
APTT BLD: 104.4 SEC — HIGH (ref 27.5–35.5)
APTT BLD: 119 SEC — HIGH (ref 27.5–35.5)
APTT BLD: 124.5 SEC — CRITICAL HIGH (ref 27.5–35.5)
APTT BLD: 124.7 SEC — CRITICAL HIGH (ref 27.5–35.5)
APTT BLD: 128.3 SEC — CRITICAL HIGH (ref 27.5–35.5)
APTT BLD: 135.3 SEC — CRITICAL HIGH (ref 27.5–35.5)
APTT BLD: 142.8 SEC — CRITICAL HIGH (ref 27.5–35.5)
APTT BLD: 144.6 SEC — CRITICAL HIGH (ref 27.5–35.5)
APTT BLD: 150.9 SEC — CRITICAL HIGH (ref 27.5–35.5)
APTT BLD: 168.6 SEC — CRITICAL HIGH (ref 27.5–35.5)
APTT BLD: 36.4 SEC — HIGH (ref 27.5–35.5)
APTT BLD: 36.7 SEC — HIGH (ref 27.5–35.5)
APTT BLD: 36.9 SEC — HIGH (ref 27.5–35.5)
APTT BLD: 39.2 SEC — HIGH (ref 27.5–35.5)
APTT BLD: 39.2 SEC — HIGH (ref 27.5–35.5)
APTT BLD: 39.7 SEC — HIGH (ref 27.5–35.5)
APTT BLD: 40.2 SEC — HIGH (ref 27.5–35.5)
APTT BLD: 40.2 SEC — HIGH (ref 27.5–35.5)
APTT BLD: 40.7 SEC — HIGH (ref 27.5–35.5)
APTT BLD: 40.8 SEC — HIGH (ref 27.5–35.5)
APTT BLD: 40.9 SEC — HIGH (ref 27.5–35.5)
APTT BLD: 41.6 SEC — HIGH (ref 27.5–35.5)
APTT BLD: 42.6 SEC — HIGH (ref 27.5–35.5)
APTT BLD: 44.1 SEC — HIGH (ref 27.5–35.5)
APTT BLD: 44.7 SEC — HIGH (ref 27.5–35.5)
APTT BLD: 44.7 SEC — HIGH (ref 27.5–35.5)
APTT BLD: 45.1 SEC — HIGH (ref 27.5–35.5)
APTT BLD: 45.2 SEC — HIGH (ref 27.5–35.5)
APTT BLD: 49.5 SEC — HIGH (ref 27.5–35.5)
APTT BLD: 50.5 SEC — HIGH (ref 27.5–35.5)
APTT BLD: 52.7 SEC — HIGH (ref 27.5–35.5)
APTT BLD: 53.7 SEC — HIGH (ref 27.5–35.5)
APTT BLD: 54.4 SEC — HIGH (ref 27.5–35.5)
APTT BLD: 55 SEC — HIGH (ref 27.5–35.5)
APTT BLD: 56.3 SEC — HIGH (ref 27.5–35.5)
APTT BLD: 57.7 SEC — HIGH (ref 27.5–35.5)
APTT BLD: 57.8 SEC — HIGH (ref 27.5–35.5)
APTT BLD: 58.7 SEC — HIGH (ref 27.5–35.5)
APTT BLD: 59.3 SEC — HIGH (ref 27.5–35.5)
APTT BLD: 59.8 SEC — HIGH (ref 27.5–35.5)
APTT BLD: 62.2 SEC — HIGH (ref 27.5–35.5)
APTT BLD: 62.5 SEC — HIGH (ref 27.5–35.5)
APTT BLD: 64.1 SEC — HIGH (ref 27.5–35.5)
APTT BLD: 64.3 SEC — HIGH (ref 27.5–35.5)
APTT BLD: 65.3 SEC — HIGH (ref 27.5–35.5)
APTT BLD: 65.7 SEC — HIGH (ref 27.5–35.5)
APTT BLD: 68.9 SEC — HIGH (ref 27.5–35.5)
APTT BLD: 70.1 SEC — HIGH (ref 27.5–35.5)
APTT BLD: 71.2 SEC — HIGH (ref 27.5–35.5)
APTT BLD: 71.9 SEC — HIGH (ref 27.5–35.5)
APTT BLD: 74.9 SEC — HIGH (ref 27.5–35.5)
APTT BLD: 76 SEC — HIGH (ref 27.5–35.5)
APTT BLD: 77.5 SEC — HIGH (ref 27.5–35.5)
APTT BLD: 81.3 SEC — HIGH (ref 27.5–35.5)
APTT BLD: 81.5 SEC — HIGH (ref 27.5–35.5)
APTT BLD: 83.9 SEC — HIGH (ref 27.5–35.5)
APTT BLD: 85.5 SEC — HIGH (ref 27.5–35.5)
APTT BLD: 85.8 SEC — HIGH (ref 27.5–35.5)
APTT BLD: 90.7 SEC — HIGH (ref 27.5–35.5)
APTT BLD: 95.1 SEC — HIGH (ref 27.5–35.5)
APTT BLD: 97.1 SEC — HIGH (ref 27.5–35.5)
APTT BLD: >200 SEC — CRITICAL HIGH (ref 27.5–35.5)
AST SERPL-CCNC: 102 U/L — HIGH (ref 10–40)
AST SERPL-CCNC: 154 U/L — HIGH (ref 10–40)
AST SERPL-CCNC: 17 U/L — SIGNIFICANT CHANGE UP (ref 10–40)
AST SERPL-CCNC: 171 U/L — HIGH (ref 10–40)
AST SERPL-CCNC: 18 U/L — SIGNIFICANT CHANGE UP (ref 10–40)
AST SERPL-CCNC: 20 U/L — SIGNIFICANT CHANGE UP (ref 10–40)
AST SERPL-CCNC: 202 U/L — HIGH (ref 10–40)
AST SERPL-CCNC: 206 U/L — HIGH (ref 10–40)
AST SERPL-CCNC: 207 U/L — HIGH (ref 10–40)
AST SERPL-CCNC: 22 U/L — SIGNIFICANT CHANGE UP (ref 10–40)
AST SERPL-CCNC: 22 U/L — SIGNIFICANT CHANGE UP (ref 10–40)
AST SERPL-CCNC: 23 U/L
AST SERPL-CCNC: 243 U/L — HIGH (ref 10–40)
AST SERPL-CCNC: 25 U/L — SIGNIFICANT CHANGE UP (ref 10–40)
AST SERPL-CCNC: 26 U/L — SIGNIFICANT CHANGE UP (ref 10–40)
AST SERPL-CCNC: 27 U/L — SIGNIFICANT CHANGE UP (ref 15–37)
AST SERPL-CCNC: 28 U/L — SIGNIFICANT CHANGE UP (ref 15–37)
AST SERPL-CCNC: 29 U/L
AST SERPL-CCNC: 29 U/L — SIGNIFICANT CHANGE UP (ref 10–40)
AST SERPL-CCNC: 29 U/L — SIGNIFICANT CHANGE UP (ref 10–40)
AST SERPL-CCNC: 30 U/L — SIGNIFICANT CHANGE UP (ref 15–37)
AST SERPL-CCNC: 31 U/L
AST SERPL-CCNC: 31 U/L — SIGNIFICANT CHANGE UP (ref 10–40)
AST SERPL-CCNC: 31 U/L — SIGNIFICANT CHANGE UP (ref 10–40)
AST SERPL-CCNC: 315 U/L — HIGH (ref 10–40)
AST SERPL-CCNC: 32 U/L — SIGNIFICANT CHANGE UP (ref 10–40)
AST SERPL-CCNC: 33 U/L — SIGNIFICANT CHANGE UP (ref 10–40)
AST SERPL-CCNC: 33 U/L — SIGNIFICANT CHANGE UP (ref 10–40)
AST SERPL-CCNC: 37 U/L — SIGNIFICANT CHANGE UP (ref 10–40)
AST SERPL-CCNC: 38 U/L — SIGNIFICANT CHANGE UP (ref 10–40)
AST SERPL-CCNC: 39 U/L
AST SERPL-CCNC: 39 U/L — SIGNIFICANT CHANGE UP (ref 10–40)
AST SERPL-CCNC: 44 U/L — HIGH (ref 10–40)
AST SERPL-CCNC: 46 U/L
AST SERPL-CCNC: 46 U/L — HIGH (ref 10–40)
AST SERPL-CCNC: 61 U/L — HIGH (ref 10–40)
AST SERPL-CCNC: 74 U/L — HIGH (ref 10–40)
AST SERPL-CCNC: 81 U/L — HIGH (ref 10–40)
AST SERPL-CCNC: 85 U/L — HIGH (ref 10–40)
AST SERPL-CCNC: 85 U/L — HIGH (ref 10–40)
AST SERPL-CCNC: 92 U/L — HIGH (ref 10–40)
AST SERPL-CCNC: 99 U/L — HIGH (ref 10–40)
B-GLOBULIN SERPL ELPH-MCNC: 0.5 G/DL — SIGNIFICANT CHANGE UP (ref 0.5–1)
B-GLOBULIN SERPL ELPH-MCNC: 0.7 G/DL — SIGNIFICANT CHANGE UP (ref 0.5–1)
BACTERIA # UR AUTO: NEGATIVE — SIGNIFICANT CHANGE UP
BACTERIA: NEGATIVE
BARBITURATES, URINE.: NEGATIVE — SIGNIFICANT CHANGE UP
BASE EXCESS BLDMV CALC-SCNC: -1.1 MMOL/L — SIGNIFICANT CHANGE UP (ref -3–3)
BASE EXCESS BLDMV CALC-SCNC: -1.3 MMOL/L — SIGNIFICANT CHANGE UP (ref -3–3)
BASE EXCESS BLDMV CALC-SCNC: -1.7 MMOL/L — SIGNIFICANT CHANGE UP (ref -3–3)
BASE EXCESS BLDMV CALC-SCNC: -2.5 MMOL/L — SIGNIFICANT CHANGE UP (ref -3–3)
BASE EXCESS BLDMV CALC-SCNC: -3 MMOL/L — SIGNIFICANT CHANGE UP (ref -3–3)
BASE EXCESS BLDMV CALC-SCNC: -3.3 MMOL/L — LOW (ref -3–3)
BASE EXCESS BLDMV CALC-SCNC: -3.4 MMOL/L — LOW (ref -3–3)
BASE EXCESS BLDMV CALC-SCNC: -4.9 MMOL/L — LOW (ref -3–3)
BASE EXCESS BLDMV CALC-SCNC: 0.1 MMOL/L — SIGNIFICANT CHANGE UP (ref -3–3)
BASE EXCESS BLDMV CALC-SCNC: 0.8 MMOL/L — SIGNIFICANT CHANGE UP (ref -3–3)
BASE EXCESS BLDMV CALC-SCNC: 1.1 MMOL/L — SIGNIFICANT CHANGE UP (ref -3–3)
BASE EXCESS BLDMV CALC-SCNC: 1.5 MMOL/L — SIGNIFICANT CHANGE UP (ref -3–3)
BASE EXCESS BLDMV CALC-SCNC: 1.7 MMOL/L — SIGNIFICANT CHANGE UP (ref -3–3)
BASE EXCESS BLDMV CALC-SCNC: 10.8 MMOL/L — HIGH (ref -3–3)
BASE EXCESS BLDMV CALC-SCNC: 11.2 MMOL/L — HIGH (ref -3–3)
BASE EXCESS BLDMV CALC-SCNC: 2.2 MMOL/L — SIGNIFICANT CHANGE UP (ref -3–3)
BASE EXCESS BLDMV CALC-SCNC: 6 MMOL/L — HIGH (ref -3–3)
BASE EXCESS BLDMV CALC-SCNC: 6.7 MMOL/L — HIGH (ref -3–3)
BASE EXCESS BLDMV CALC-SCNC: 7.3 MMOL/L — HIGH (ref -3–3)
BASE EXCESS BLDMV CALC-SCNC: 9.9 MMOL/L — HIGH (ref -3–3)
BASE EXCESS BLDV CALC-SCNC: -2.3 MMOL/L — LOW (ref -2–2)
BASE EXCESS BLDV CALC-SCNC: -4 MMOL/L — LOW (ref -2–2)
BASE EXCESS BLDV CALC-SCNC: -5.4 MMOL/L — LOW (ref -2–2)
BASE EXCESS BLDV CALC-SCNC: -5.4 MMOL/L — LOW (ref -2–2)
BASE EXCESS BLDV CALC-SCNC: -5.5 MMOL/L — LOW (ref -2–2)
BASE EXCESS BLDV CALC-SCNC: 1.1 MMOL/L — SIGNIFICANT CHANGE UP (ref -2–2)
BASE EXCESS BLDV CALC-SCNC: 4.5 MMOL/L — HIGH (ref -2–2)
BASE EXCESS BLDV CALC-SCNC: 4.7 MMOL/L — HIGH (ref -2–2)
BASE EXCESS BLDV CALC-SCNC: 6.7 MMOL/L — HIGH (ref -2–2)
BASE EXCESS BLDV CALC-SCNC: 6.9 MMOL/L — HIGH (ref -2–2)
BASOPHILS # BLD AUTO: 0.02 K/UL — SIGNIFICANT CHANGE UP (ref 0–0.2)
BASOPHILS # BLD AUTO: 0.03 K/UL
BASOPHILS # BLD AUTO: 0.03 K/UL — SIGNIFICANT CHANGE UP (ref 0–0.2)
BASOPHILS # BLD AUTO: 0.03 K/UL — SIGNIFICANT CHANGE UP (ref 0–0.2)
BASOPHILS # BLD AUTO: 0.04 K/UL
BASOPHILS # BLD AUTO: 0.04 K/UL — SIGNIFICANT CHANGE UP (ref 0–0.2)
BASOPHILS # BLD AUTO: 0.05 K/UL — SIGNIFICANT CHANGE UP (ref 0–0.2)
BASOPHILS NFR BLD AUTO: 0.2 % — SIGNIFICANT CHANGE UP (ref 0–2)
BASOPHILS NFR BLD AUTO: 0.3 % — SIGNIFICANT CHANGE UP (ref 0–2)
BASOPHILS NFR BLD AUTO: 0.4 % — SIGNIFICANT CHANGE UP (ref 0–2)
BASOPHILS NFR BLD AUTO: 0.5 %
BASOPHILS NFR BLD AUTO: 0.5 % — SIGNIFICANT CHANGE UP (ref 0–2)
BASOPHILS NFR BLD AUTO: 0.5 % — SIGNIFICANT CHANGE UP (ref 0–2)
BASOPHILS NFR BLD AUTO: 0.6 % — SIGNIFICANT CHANGE UP (ref 0–2)
BASOPHILS NFR BLD AUTO: 0.9 %
BENZODIAZ UR-MCNC: NEGATIVE — SIGNIFICANT CHANGE UP
BILIRUB DIRECT SERPL-MCNC: 0.3 MG/DL — HIGH (ref 0–0.2)
BILIRUB DIRECT SERPL-MCNC: 0.6 MG/DL — HIGH (ref 0–0.2)
BILIRUB INDIRECT FLD-MCNC: 0.5 MG/DL — SIGNIFICANT CHANGE UP (ref 0.2–1)
BILIRUB INDIRECT FLD-MCNC: 0.8 MG/DL — SIGNIFICANT CHANGE UP (ref 0.2–1)
BILIRUB SERPL-MCNC: 0.6 MG/DL — SIGNIFICANT CHANGE UP (ref 0.2–1.2)
BILIRUB SERPL-MCNC: 0.6 MG/DL — SIGNIFICANT CHANGE UP (ref 0.2–1.2)
BILIRUB SERPL-MCNC: 0.7 MG/DL — SIGNIFICANT CHANGE UP (ref 0.2–1.2)
BILIRUB SERPL-MCNC: 0.8 MG/DL
BILIRUB SERPL-MCNC: 0.8 MG/DL — SIGNIFICANT CHANGE UP (ref 0.2–1.2)
BILIRUB SERPL-MCNC: 0.9 MG/DL — SIGNIFICANT CHANGE UP (ref 0.2–1.2)
BILIRUB SERPL-MCNC: 1 MG/DL — SIGNIFICANT CHANGE UP (ref 0.2–1.2)
BILIRUB SERPL-MCNC: 1 MG/DL — SIGNIFICANT CHANGE UP (ref 0.2–1.2)
BILIRUB SERPL-MCNC: 1.2 MG/DL — SIGNIFICANT CHANGE UP (ref 0.2–1.2)
BILIRUB SERPL-MCNC: 1.2 MG/DL — SIGNIFICANT CHANGE UP (ref 0.2–1.2)
BILIRUB SERPL-MCNC: 1.3 MG/DL
BILIRUB SERPL-MCNC: 1.3 MG/DL — HIGH (ref 0.2–1.2)
BILIRUB SERPL-MCNC: 1.4 MG/DL — HIGH (ref 0.2–1.2)
BILIRUB SERPL-MCNC: 1.5 MG/DL — HIGH (ref 0.2–1.2)
BILIRUB SERPL-MCNC: 1.6 MG/DL — HIGH (ref 0.2–1.2)
BILIRUB SERPL-MCNC: 1.6 MG/DL — HIGH (ref 0.2–1.2)
BILIRUB SERPL-MCNC: 1.7 MG/DL — HIGH (ref 0.2–1.2)
BILIRUB SERPL-MCNC: 1.7 MG/DL — HIGH (ref 0.2–1.2)
BILIRUB SERPL-MCNC: 1.8 MG/DL
BILIRUB SERPL-MCNC: 1.8 MG/DL
BILIRUB SERPL-MCNC: 1.8 MG/DL — HIGH (ref 0.2–1.2)
BILIRUB SERPL-MCNC: 1.8 MG/DL — HIGH (ref 0.2–1.2)
BILIRUB SERPL-MCNC: 1.9 MG/DL — HIGH (ref 0.2–1.2)
BILIRUB SERPL-MCNC: 2 MG/DL — HIGH (ref 0.2–1.2)
BILIRUB SERPL-MCNC: 2 MG/DL — HIGH (ref 0.2–1.2)
BILIRUB SERPL-MCNC: 2.1 MG/DL — HIGH (ref 0.2–1.2)
BILIRUB SERPL-MCNC: 2.1 MG/DL — HIGH (ref 0.2–1.2)
BILIRUB SERPL-MCNC: 2.2 MG/DL
BILIRUB SERPL-MCNC: 2.3 MG/DL — HIGH (ref 0.2–1.2)
BILIRUB SERPL-MCNC: 2.3 MG/DL — HIGH (ref 0.2–1.2)
BILIRUB SERPL-MCNC: 2.7 MG/DL — HIGH (ref 0.2–1.2)
BILIRUB SERPL-MCNC: 2.7 MG/DL — HIGH (ref 0.2–1.2)
BILIRUB SERPL-MCNC: 2.8 MG/DL — HIGH (ref 0.2–1.2)
BILIRUB SERPL-MCNC: 2.9 MG/DL — HIGH (ref 0.2–1.2)
BILIRUB UR-MCNC: NEGATIVE — SIGNIFICANT CHANGE UP
BILIRUBIN URINE: NEGATIVE
BLD GP AB SCN SERPL QL: NEGATIVE — SIGNIFICANT CHANGE UP
BLOOD URINE: NEGATIVE
BUN SERPL-MCNC: 100 MG/DL — HIGH (ref 7–23)
BUN SERPL-MCNC: 100 MG/DL — HIGH (ref 7–23)
BUN SERPL-MCNC: 101 MG/DL — HIGH (ref 7–23)
BUN SERPL-MCNC: 102 MG/DL — HIGH (ref 7–23)
BUN SERPL-MCNC: 103 MG/DL — HIGH (ref 7–23)
BUN SERPL-MCNC: 104 MG/DL — HIGH (ref 7–23)
BUN SERPL-MCNC: 105 MG/DL — HIGH (ref 7–23)
BUN SERPL-MCNC: 107 MG/DL
BUN SERPL-MCNC: 108 MG/DL — HIGH (ref 7–23)
BUN SERPL-MCNC: 109 MG/DL
BUN SERPL-MCNC: 109 MG/DL — HIGH (ref 7–23)
BUN SERPL-MCNC: 110 MG/DL — HIGH (ref 7–23)
BUN SERPL-MCNC: 111 MG/DL — HIGH (ref 7–23)
BUN SERPL-MCNC: 112 MG/DL — HIGH (ref 7–23)
BUN SERPL-MCNC: 112 MG/DL — HIGH (ref 7–23)
BUN SERPL-MCNC: 113 MG/DL — HIGH (ref 7–23)
BUN SERPL-MCNC: 114 MG/DL — HIGH (ref 7–23)
BUN SERPL-MCNC: 114 MG/DL — HIGH (ref 7–23)
BUN SERPL-MCNC: 116 MG/DL — HIGH (ref 7–23)
BUN SERPL-MCNC: 117 MG/DL
BUN SERPL-MCNC: 117 MG/DL
BUN SERPL-MCNC: 117 MG/DL — HIGH (ref 7–23)
BUN SERPL-MCNC: 117 MG/DL — HIGH (ref 7–23)
BUN SERPL-MCNC: 121 MG/DL — HIGH (ref 7–23)
BUN SERPL-MCNC: 122 MG/DL — HIGH (ref 7–23)
BUN SERPL-MCNC: 123 MG/DL — HIGH (ref 7–23)
BUN SERPL-MCNC: 124 MG/DL — HIGH (ref 7–23)
BUN SERPL-MCNC: 125 MG/DL — HIGH (ref 7–23)
BUN SERPL-MCNC: 128 MG/DL — HIGH (ref 7–23)
BUN SERPL-MCNC: 129 MG/DL — HIGH (ref 7–23)
BUN SERPL-MCNC: 130 MG/DL — HIGH (ref 7–23)
BUN SERPL-MCNC: 131 MG/DL — HIGH (ref 7–23)
BUN SERPL-MCNC: 131 MG/DL — HIGH (ref 7–23)
BUN SERPL-MCNC: 132 MG/DL — HIGH (ref 7–23)
BUN SERPL-MCNC: 134 MG/DL — HIGH (ref 7–23)
BUN SERPL-MCNC: 141 MG/DL — HIGH (ref 7–23)
BUN SERPL-MCNC: 150 MG/DL — HIGH (ref 7–23)
BUN SERPL-MCNC: 58 MG/DL — HIGH (ref 7–23)
BUN SERPL-MCNC: 60 MG/DL — HIGH (ref 7–23)
BUN SERPL-MCNC: 62 MG/DL
BUN SERPL-MCNC: 62 MG/DL — HIGH (ref 7–23)
BUN SERPL-MCNC: 62 MG/DL — HIGH (ref 7–23)
BUN SERPL-MCNC: 63 MG/DL — HIGH (ref 7–23)
BUN SERPL-MCNC: 64 MG/DL
BUN SERPL-MCNC: 64 MG/DL — HIGH (ref 7–23)
BUN SERPL-MCNC: 65 MG/DL — HIGH (ref 7–23)
BUN SERPL-MCNC: 68 MG/DL
BUN SERPL-MCNC: 69 MG/DL — HIGH (ref 7–23)
BUN SERPL-MCNC: 70 MG/DL — HIGH (ref 7–23)
BUN SERPL-MCNC: 71 MG/DL — HIGH (ref 7–23)
BUN SERPL-MCNC: 73 MG/DL — HIGH (ref 7–23)
BUN SERPL-MCNC: 74 MG/DL
BUN SERPL-MCNC: 76 MG/DL — HIGH (ref 7–23)
BUN SERPL-MCNC: 80 MG/DL — HIGH (ref 7–23)
BUN SERPL-MCNC: 80 MG/DL — HIGH (ref 7–23)
BUN SERPL-MCNC: 86 MG/DL — HIGH (ref 7–23)
BUN SERPL-MCNC: 89 MG/DL — HIGH (ref 7–23)
BUN SERPL-MCNC: 89 MG/DL — HIGH (ref 7–23)
BUN SERPL-MCNC: 92 MG/DL — HIGH (ref 7–23)
BUN SERPL-MCNC: 92 MG/DL — HIGH (ref 7–23)
BUN SERPL-MCNC: 93 MG/DL — HIGH (ref 7–23)
BUN SERPL-MCNC: 94 MG/DL — HIGH (ref 7–23)
BUN SERPL-MCNC: 95 MG/DL — HIGH (ref 7–23)
BUN SERPL-MCNC: 96 MG/DL — HIGH (ref 7–23)
BUN SERPL-MCNC: 96 MG/DL — HIGH (ref 7–23)
BUN SERPL-MCNC: 97 MG/DL — HIGH (ref 7–23)
BUN SERPL-MCNC: 98 MG/DL — HIGH (ref 7–23)
BUN SERPL-MCNC: 99 MG/DL — HIGH (ref 7–23)
C3 SERPL-MCNC: 58 MG/DL
C4 SERPL-MCNC: 8 MG/DL
CA-I SERPL-SCNC: 1.14 MMOL/L — SIGNIFICANT CHANGE UP (ref 1.12–1.3)
CA-I SERPL-SCNC: 1.24 MMOL/L — SIGNIFICANT CHANGE UP (ref 1.12–1.3)
CA-I SERPL-SCNC: 1.25 MMOL/L — SIGNIFICANT CHANGE UP (ref 1.12–1.3)
CA-I SERPL-SCNC: 1.25 MMOL/L — SIGNIFICANT CHANGE UP (ref 1.12–1.3)
CA-I SERPL-SCNC: 1.28 MMOL/L — SIGNIFICANT CHANGE UP (ref 1.12–1.3)
CA-I SERPL-SCNC: 1.29 MMOL/L — SIGNIFICANT CHANGE UP (ref 1.12–1.3)
CA-I SERPL-SCNC: 1.36 MMOL/L — HIGH (ref 1.12–1.3)
CALCIUM SERPL-MCNC: 10 MG/DL
CALCIUM SERPL-MCNC: 10 MG/DL — SIGNIFICANT CHANGE UP (ref 8.4–10.5)
CALCIUM SERPL-MCNC: 10.1 MG/DL — SIGNIFICANT CHANGE UP (ref 8.4–10.5)
CALCIUM SERPL-MCNC: 10.2 MG/DL — SIGNIFICANT CHANGE UP (ref 8.4–10.5)
CALCIUM SERPL-MCNC: 10.3 MG/DL
CALCIUM SERPL-MCNC: 10.3 MG/DL — SIGNIFICANT CHANGE UP (ref 8.4–10.5)
CALCIUM SERPL-MCNC: 10.4 MG/DL — SIGNIFICANT CHANGE UP (ref 8.4–10.5)
CALCIUM SERPL-MCNC: 10.5 MG/DL — SIGNIFICANT CHANGE UP (ref 8.4–10.5)
CALCIUM SERPL-MCNC: 10.6 MG/DL — HIGH (ref 8.4–10.5)
CALCIUM SERPL-MCNC: 11.1 MG/DL — HIGH (ref 8.4–10.5)
CALCIUM SERPL-MCNC: 7.3 MG/DL — LOW (ref 8.4–10.5)
CALCIUM SERPL-MCNC: 8.2 MG/DL — LOW (ref 8.4–10.5)
CALCIUM SERPL-MCNC: 8.2 MG/DL — LOW (ref 8.4–10.5)
CALCIUM SERPL-MCNC: 8.4 MG/DL — SIGNIFICANT CHANGE UP (ref 8.4–10.5)
CALCIUM SERPL-MCNC: 8.4 MG/DL — SIGNIFICANT CHANGE UP (ref 8.4–10.5)
CALCIUM SERPL-MCNC: 8.5 MG/DL — SIGNIFICANT CHANGE UP (ref 8.4–10.5)
CALCIUM SERPL-MCNC: 8.6 MG/DL — SIGNIFICANT CHANGE UP (ref 8.4–10.5)
CALCIUM SERPL-MCNC: 8.7 MG/DL — SIGNIFICANT CHANGE UP (ref 8.4–10.5)
CALCIUM SERPL-MCNC: 8.8 MG/DL — SIGNIFICANT CHANGE UP (ref 8.4–10.5)
CALCIUM SERPL-MCNC: 8.9 MG/DL — SIGNIFICANT CHANGE UP (ref 8.5–10.1)
CALCIUM SERPL-MCNC: 8.9 MG/DL — SIGNIFICANT CHANGE UP (ref 8.5–10.1)
CALCIUM SERPL-MCNC: 9.1 MG/DL — SIGNIFICANT CHANGE UP (ref 8.4–10.5)
CALCIUM SERPL-MCNC: 9.2 MG/DL — SIGNIFICANT CHANGE UP (ref 8.4–10.5)
CALCIUM SERPL-MCNC: 9.2 MG/DL — SIGNIFICANT CHANGE UP (ref 8.5–10.1)
CALCIUM SERPL-MCNC: 9.3 MG/DL
CALCIUM SERPL-MCNC: 9.3 MG/DL — SIGNIFICANT CHANGE UP (ref 8.4–10.5)
CALCIUM SERPL-MCNC: 9.3 MG/DL — SIGNIFICANT CHANGE UP (ref 8.4–10.5)
CALCIUM SERPL-MCNC: 9.3 MG/DL — SIGNIFICANT CHANGE UP (ref 8.5–10.1)
CALCIUM SERPL-MCNC: 9.4 MG/DL — SIGNIFICANT CHANGE UP (ref 8.4–10.5)
CALCIUM SERPL-MCNC: 9.5 MG/DL — SIGNIFICANT CHANGE UP (ref 8.4–10.5)
CALCIUM SERPL-MCNC: 9.6 MG/DL
CALCIUM SERPL-MCNC: 9.6 MG/DL — SIGNIFICANT CHANGE UP (ref 8.4–10.5)
CALCIUM SERPL-MCNC: 9.6 MG/DL — SIGNIFICANT CHANGE UP (ref 8.5–10.1)
CALCIUM SERPL-MCNC: 9.7 MG/DL
CALCIUM SERPL-MCNC: 9.7 MG/DL
CALCIUM SERPL-MCNC: 9.7 MG/DL — SIGNIFICANT CHANGE UP (ref 8.4–10.5)
CALCIUM SERPL-MCNC: 9.8 MG/DL
CALCIUM SERPL-MCNC: 9.8 MG/DL — SIGNIFICANT CHANGE UP (ref 8.4–10.5)
CALCIUM SERPL-MCNC: 9.9 MG/DL — SIGNIFICANT CHANGE UP (ref 8.4–10.5)
CHLORIDE BLDV-SCNC: 100 MMOL/L — SIGNIFICANT CHANGE UP (ref 96–108)
CHLORIDE BLDV-SCNC: 103 MMOL/L — SIGNIFICANT CHANGE UP (ref 96–108)
CHLORIDE BLDV-SCNC: 105 MMOL/L — SIGNIFICANT CHANGE UP (ref 96–108)
CHLORIDE BLDV-SCNC: 97 MMOL/L — SIGNIFICANT CHANGE UP (ref 96–108)
CHLORIDE BLDV-SCNC: 98 MMOL/L — SIGNIFICANT CHANGE UP (ref 96–108)
CHLORIDE BLDV-SCNC: 98 MMOL/L — SIGNIFICANT CHANGE UP (ref 96–108)
CHLORIDE BLDV-SCNC: 99 MMOL/L — SIGNIFICANT CHANGE UP (ref 96–108)
CHLORIDE SERPL-SCNC: 100 MMOL/L
CHLORIDE SERPL-SCNC: 100 MMOL/L — SIGNIFICANT CHANGE UP (ref 96–108)
CHLORIDE SERPL-SCNC: 101 MMOL/L — SIGNIFICANT CHANGE UP (ref 96–108)
CHLORIDE SERPL-SCNC: 102 MMOL/L
CHLORIDE SERPL-SCNC: 102 MMOL/L
CHLORIDE SERPL-SCNC: 103 MMOL/L
CHLORIDE SERPL-SCNC: 103 MMOL/L — SIGNIFICANT CHANGE UP (ref 96–108)
CHLORIDE SERPL-SCNC: 104 MMOL/L — SIGNIFICANT CHANGE UP (ref 96–108)
CHLORIDE SERPL-SCNC: 105 MMOL/L
CHLORIDE SERPL-SCNC: 105 MMOL/L — SIGNIFICANT CHANGE UP (ref 96–108)
CHLORIDE SERPL-SCNC: 105 MMOL/L — SIGNIFICANT CHANGE UP (ref 96–108)
CHLORIDE SERPL-SCNC: 108 MMOL/L — SIGNIFICANT CHANGE UP (ref 96–108)
CHLORIDE SERPL-SCNC: 110 MMOL/L — HIGH (ref 96–108)
CHLORIDE SERPL-SCNC: 112 MMOL/L — HIGH (ref 96–108)
CHLORIDE SERPL-SCNC: 112 MMOL/L — HIGH (ref 96–108)
CHLORIDE SERPL-SCNC: 114 MMOL/L — HIGH (ref 96–108)
CHLORIDE SERPL-SCNC: 89 MMOL/L — LOW (ref 96–108)
CHLORIDE SERPL-SCNC: 90 MMOL/L
CHLORIDE SERPL-SCNC: 91 MMOL/L — LOW (ref 96–108)
CHLORIDE SERPL-SCNC: 92 MMOL/L — LOW (ref 96–108)
CHLORIDE SERPL-SCNC: 93 MMOL/L
CHLORIDE SERPL-SCNC: 93 MMOL/L — LOW (ref 96–108)
CHLORIDE SERPL-SCNC: 94 MMOL/L — LOW (ref 96–108)
CHLORIDE SERPL-SCNC: 95 MMOL/L — LOW (ref 96–108)
CHLORIDE SERPL-SCNC: 96 MMOL/L
CHLORIDE SERPL-SCNC: 96 MMOL/L — SIGNIFICANT CHANGE UP (ref 96–108)
CHLORIDE SERPL-SCNC: 97 MMOL/L — SIGNIFICANT CHANGE UP (ref 96–108)
CHLORIDE SERPL-SCNC: 98 MMOL/L — SIGNIFICANT CHANGE UP (ref 96–108)
CHLORIDE SERPL-SCNC: 99 MMOL/L — SIGNIFICANT CHANGE UP (ref 96–108)
CHLORIDE UR-SCNC: <35 MMOL/L — SIGNIFICANT CHANGE UP
CHLORIDE UR-SCNC: <35 MMOL/L — SIGNIFICANT CHANGE UP
CHOLEST SERPL-MCNC: 116 MG/DL — SIGNIFICANT CHANGE UP (ref 10–199)
CHOLEST SERPL-MCNC: 139 MG/DL
CHOLEST SERPL-MCNC: 98 MG/DL
CHOLEST/HDLC SERPL: 2.2 RATIO
CHOLEST/HDLC SERPL: 2.9 RATIO
CK SERPL-CCNC: 214 U/L — HIGH (ref 30–200)
CK SERPL-CCNC: 325 U/L — HIGH (ref 30–200)
CO2 BLDMV-SCNC: 20 MMOL/L — LOW (ref 21–29)
CO2 BLDMV-SCNC: 21 MMOL/L — SIGNIFICANT CHANGE UP (ref 21–29)
CO2 BLDMV-SCNC: 22 MMOL/L — SIGNIFICANT CHANGE UP (ref 21–29)
CO2 BLDMV-SCNC: 22 MMOL/L — SIGNIFICANT CHANGE UP (ref 21–29)
CO2 BLDMV-SCNC: 23 MMOL/L — SIGNIFICANT CHANGE UP (ref 21–29)
CO2 BLDMV-SCNC: 24 MMOL/L — SIGNIFICANT CHANGE UP (ref 21–29)
CO2 BLDMV-SCNC: 24 MMOL/L — SIGNIFICANT CHANGE UP (ref 21–29)
CO2 BLDMV-SCNC: 25 MMOL/L — SIGNIFICANT CHANGE UP (ref 21–29)
CO2 BLDMV-SCNC: 26 MMOL/L — SIGNIFICANT CHANGE UP (ref 21–29)
CO2 BLDMV-SCNC: 27 MMOL/L — SIGNIFICANT CHANGE UP (ref 21–29)
CO2 BLDMV-SCNC: 28 MMOL/L — SIGNIFICANT CHANGE UP (ref 21–29)
CO2 BLDMV-SCNC: 28 MMOL/L — SIGNIFICANT CHANGE UP (ref 21–29)
CO2 BLDMV-SCNC: 32 MMOL/L — HIGH (ref 21–29)
CO2 BLDMV-SCNC: 33 MMOL/L — HIGH (ref 21–29)
CO2 BLDMV-SCNC: 33 MMOL/L — HIGH (ref 21–29)
CO2 BLDMV-SCNC: 36 MMOL/L — HIGH (ref 21–29)
CO2 BLDMV-SCNC: 37 MMOL/L — HIGH (ref 21–29)
CO2 BLDMV-SCNC: 37 MMOL/L — HIGH (ref 21–29)
CO2 BLDV-SCNC: 21 MMOL/L — LOW (ref 22–30)
CO2 BLDV-SCNC: 22 MMOL/L — SIGNIFICANT CHANGE UP (ref 22–30)
CO2 BLDV-SCNC: 22 MMOL/L — SIGNIFICANT CHANGE UP (ref 22–30)
CO2 BLDV-SCNC: 28 MMOL/L — SIGNIFICANT CHANGE UP (ref 22–30)
CO2 BLDV-SCNC: 30 MMOL/L — SIGNIFICANT CHANGE UP (ref 22–30)
CO2 BLDV-SCNC: 31 MMOL/L — HIGH (ref 22–30)
CO2 BLDV-SCNC: 33 MMOL/L — HIGH (ref 22–30)
CO2 BLDV-SCNC: 35 MMOL/L — HIGH (ref 22–30)
CO2 SERPL-SCNC: 16 MMOL/L — LOW (ref 22–31)
CO2 SERPL-SCNC: 17 MMOL/L — LOW (ref 22–31)
CO2 SERPL-SCNC: 18 MMOL/L — LOW (ref 22–31)
CO2 SERPL-SCNC: 19 MMOL/L
CO2 SERPL-SCNC: 19 MMOL/L — LOW (ref 22–31)
CO2 SERPL-SCNC: 20 MMOL/L — LOW (ref 22–31)
CO2 SERPL-SCNC: 21 MMOL/L — LOW (ref 22–31)
CO2 SERPL-SCNC: 22 MMOL/L — SIGNIFICANT CHANGE UP (ref 22–31)
CO2 SERPL-SCNC: 23 MMOL/L — SIGNIFICANT CHANGE UP (ref 22–31)
CO2 SERPL-SCNC: 24 MMOL/L
CO2 SERPL-SCNC: 24 MMOL/L — SIGNIFICANT CHANGE UP (ref 22–31)
CO2 SERPL-SCNC: 25 MMOL/L — SIGNIFICANT CHANGE UP (ref 22–31)
CO2 SERPL-SCNC: 26 MMOL/L
CO2 SERPL-SCNC: 26 MMOL/L
CO2 SERPL-SCNC: 26 MMOL/L — SIGNIFICANT CHANGE UP (ref 22–31)
CO2 SERPL-SCNC: 26 MMOL/L — SIGNIFICANT CHANGE UP (ref 22–31)
CO2 SERPL-SCNC: 27 MMOL/L — SIGNIFICANT CHANGE UP (ref 22–31)
CO2 SERPL-SCNC: 28 MMOL/L
CO2 SERPL-SCNC: 28 MMOL/L — SIGNIFICANT CHANGE UP (ref 22–31)
CO2 SERPL-SCNC: 29 MMOL/L — SIGNIFICANT CHANGE UP (ref 22–31)
CO2 SERPL-SCNC: 30 MMOL/L
CO2 SERPL-SCNC: 30 MMOL/L — SIGNIFICANT CHANGE UP (ref 22–31)
CO2 SERPL-SCNC: 31 MMOL/L — SIGNIFICANT CHANGE UP (ref 22–31)
CO2 SERPL-SCNC: 32 MMOL/L — HIGH (ref 22–31)
CO2 SERPL-SCNC: 33 MMOL/L
CO2 SERPL-SCNC: 33 MMOL/L — HIGH (ref 22–31)
CO2 SERPL-SCNC: 34 MMOL/L
CO2 SERPL-SCNC: 34 MMOL/L — HIGH (ref 22–31)
COCAINE METAB.OTHER UR-MCNC: NEGATIVE — SIGNIFICANT CHANGE UP
COLOR SPEC: SIGNIFICANT CHANGE UP
COLOR SPEC: YELLOW — SIGNIFICANT CHANGE UP
COLOR: YELLOW
COTININE UR-MCNC: <5 NG/ML — SIGNIFICANT CHANGE UP
CREAT ?TM UR-MCNC: 105 MG/DL — SIGNIFICANT CHANGE UP
CREAT ?TM UR-MCNC: 131 MG/DL — SIGNIFICANT CHANGE UP
CREAT ?TM UR-MCNC: 16 MG/DL — SIGNIFICANT CHANGE UP
CREAT ?TM UR-MCNC: 26 MG/DL — SIGNIFICANT CHANGE UP
CREAT ?TM UR-MCNC: 38 MG/DL — SIGNIFICANT CHANGE UP
CREAT ?TM UR-MCNC: 85 MG/DL — SIGNIFICANT CHANGE UP
CREAT SERPL-MCNC: 2.03 MG/DL
CREAT SERPL-MCNC: 2.13 MG/DL
CREAT SERPL-MCNC: 2.2 MG/DL — HIGH (ref 0.5–1.3)
CREAT SERPL-MCNC: 2.22 MG/DL — HIGH (ref 0.5–1.3)
CREAT SERPL-MCNC: 2.23 MG/DL — HIGH (ref 0.5–1.3)
CREAT SERPL-MCNC: 2.25 MG/DL
CREAT SERPL-MCNC: 2.26 MG/DL — HIGH (ref 0.5–1.3)
CREAT SERPL-MCNC: 2.33 MG/DL — HIGH (ref 0.5–1.3)
CREAT SERPL-MCNC: 2.33 MG/DL — HIGH (ref 0.5–1.3)
CREAT SERPL-MCNC: 2.34 MG/DL — HIGH (ref 0.5–1.3)
CREAT SERPL-MCNC: 2.34 MG/DL — HIGH (ref 0.5–1.3)
CREAT SERPL-MCNC: 2.36 MG/DL — HIGH (ref 0.5–1.3)
CREAT SERPL-MCNC: 2.38 MG/DL — HIGH (ref 0.5–1.3)
CREAT SERPL-MCNC: 2.38 MG/DL — HIGH (ref 0.5–1.3)
CREAT SERPL-MCNC: 2.39 MG/DL
CREAT SERPL-MCNC: 2.39 MG/DL — HIGH (ref 0.5–1.3)
CREAT SERPL-MCNC: 2.4 MG/DL — HIGH (ref 0.5–1.3)
CREAT SERPL-MCNC: 2.42 MG/DL — HIGH (ref 0.5–1.3)
CREAT SERPL-MCNC: 2.46 MG/DL — HIGH (ref 0.5–1.3)
CREAT SERPL-MCNC: 2.47 MG/DL
CREAT SERPL-MCNC: 2.47 MG/DL — HIGH (ref 0.5–1.3)
CREAT SERPL-MCNC: 2.48 MG/DL — HIGH (ref 0.5–1.3)
CREAT SERPL-MCNC: 2.49 MG/DL — HIGH (ref 0.5–1.3)
CREAT SERPL-MCNC: 2.5 MG/DL — HIGH (ref 0.5–1.3)
CREAT SERPL-MCNC: 2.53 MG/DL — HIGH (ref 0.5–1.3)
CREAT SERPL-MCNC: 2.55 MG/DL — HIGH (ref 0.5–1.3)
CREAT SERPL-MCNC: 2.59 MG/DL — HIGH (ref 0.5–1.3)
CREAT SERPL-MCNC: 2.6 MG/DL — HIGH (ref 0.5–1.3)
CREAT SERPL-MCNC: 2.63 MG/DL — HIGH (ref 0.5–1.3)
CREAT SERPL-MCNC: 2.64 MG/DL — HIGH (ref 0.5–1.3)
CREAT SERPL-MCNC: 2.67 MG/DL — HIGH (ref 0.5–1.3)
CREAT SERPL-MCNC: 2.71 MG/DL
CREAT SERPL-MCNC: 2.71 MG/DL — HIGH (ref 0.5–1.3)
CREAT SERPL-MCNC: 2.71 MG/DL — HIGH (ref 0.5–1.3)
CREAT SERPL-MCNC: 2.74 MG/DL — HIGH (ref 0.5–1.3)
CREAT SERPL-MCNC: 2.74 MG/DL — HIGH (ref 0.5–1.3)
CREAT SERPL-MCNC: 2.76 MG/DL — HIGH (ref 0.5–1.3)
CREAT SERPL-MCNC: 2.77 MG/DL — HIGH (ref 0.5–1.3)
CREAT SERPL-MCNC: 2.77 MG/DL — HIGH (ref 0.5–1.3)
CREAT SERPL-MCNC: 2.79 MG/DL — HIGH (ref 0.5–1.3)
CREAT SERPL-MCNC: 2.81 MG/DL — HIGH (ref 0.5–1.3)
CREAT SERPL-MCNC: 2.81 MG/DL — HIGH (ref 0.5–1.3)
CREAT SERPL-MCNC: 2.82 MG/DL — HIGH (ref 0.5–1.3)
CREAT SERPL-MCNC: 2.82 MG/DL — HIGH (ref 0.5–1.3)
CREAT SERPL-MCNC: 2.86 MG/DL — HIGH (ref 0.5–1.3)
CREAT SERPL-MCNC: 2.87 MG/DL — HIGH (ref 0.5–1.3)
CREAT SERPL-MCNC: 2.89 MG/DL — HIGH (ref 0.5–1.3)
CREAT SERPL-MCNC: 2.89 MG/DL — HIGH (ref 0.5–1.3)
CREAT SERPL-MCNC: 2.9 MG/DL — HIGH (ref 0.5–1.3)
CREAT SERPL-MCNC: 2.91 MG/DL — HIGH (ref 0.5–1.3)
CREAT SERPL-MCNC: 2.92 MG/DL
CREAT SERPL-MCNC: 2.92 MG/DL — HIGH (ref 0.5–1.3)
CREAT SERPL-MCNC: 2.93 MG/DL — HIGH (ref 0.5–1.3)
CREAT SERPL-MCNC: 2.94 MG/DL — HIGH (ref 0.5–1.3)
CREAT SERPL-MCNC: 2.96 MG/DL — HIGH (ref 0.5–1.3)
CREAT SERPL-MCNC: 2.97 MG/DL — HIGH (ref 0.5–1.3)
CREAT SERPL-MCNC: 2.99 MG/DL — HIGH (ref 0.5–1.3)
CREAT SERPL-MCNC: 3.08 MG/DL — HIGH (ref 0.5–1.3)
CREAT SERPL-MCNC: 3.08 MG/DL — HIGH (ref 0.5–1.3)
CREAT SERPL-MCNC: 3.12 MG/DL — HIGH (ref 0.5–1.3)
CREAT SERPL-MCNC: 3.12 MG/DL — HIGH (ref 0.5–1.3)
CREAT SERPL-MCNC: 3.15 MG/DL
CREAT SERPL-MCNC: 3.16 MG/DL — HIGH (ref 0.5–1.3)
CREAT SERPL-MCNC: 3.19 MG/DL — HIGH (ref 0.5–1.3)
CREAT SERPL-MCNC: 3.2 MG/DL — HIGH (ref 0.5–1.3)
CREAT SERPL-MCNC: 3.2 MG/DL — HIGH (ref 0.5–1.3)
CREAT SERPL-MCNC: 3.25 MG/DL — HIGH (ref 0.5–1.3)
CREAT SERPL-MCNC: 3.3 MG/DL — HIGH (ref 0.5–1.3)
CREAT SERPL-MCNC: 3.32 MG/DL — HIGH (ref 0.5–1.3)
CREAT SERPL-MCNC: 3.35 MG/DL — HIGH (ref 0.5–1.3)
CREAT SERPL-MCNC: 3.41 MG/DL — HIGH (ref 0.5–1.3)
CREAT SERPL-MCNC: 3.43 MG/DL — HIGH (ref 0.5–1.3)
CREAT SERPL-MCNC: 3.46 MG/DL — HIGH (ref 0.5–1.3)
CREAT SERPL-MCNC: 3.46 MG/DL — HIGH (ref 0.5–1.3)
CREAT SERPL-MCNC: 3.48 MG/DL — HIGH (ref 0.5–1.3)
CREAT SERPL-MCNC: 3.56 MG/DL — HIGH (ref 0.5–1.3)
CREAT SERPL-MCNC: 3.6 MG/DL — HIGH (ref 0.5–1.3)
CREAT SERPL-MCNC: 3.64 MG/DL — HIGH (ref 0.5–1.3)
CREAT SERPL-MCNC: 3.7 MG/DL — HIGH (ref 0.5–1.3)
CREAT SERPL-MCNC: 3.85 MG/DL — HIGH (ref 0.5–1.3)
CREAT SERPL-MCNC: 3.92 MG/DL — HIGH (ref 0.5–1.3)
CREAT SERPL-MCNC: 4.14 MG/DL — HIGH (ref 0.5–1.3)
CREAT SERPL-MCNC: 4.21 MG/DL — HIGH (ref 0.5–1.3)
CREAT SERPL-MCNC: 4.25 MG/DL — HIGH (ref 0.5–1.3)
CREAT SERPL-MCNC: 4.26 MG/DL — HIGH (ref 0.5–1.3)
CREAT SERPL-MCNC: 4.28 MG/DL — HIGH (ref 0.5–1.3)
CREAT SERPL-MCNC: 4.31 MG/DL — HIGH (ref 0.5–1.3)
CREAT SERPL-MCNC: 4.32 MG/DL — HIGH (ref 0.5–1.3)
CREAT SERPL-MCNC: 4.34 MG/DL — HIGH (ref 0.5–1.3)
CREAT SERPL-MCNC: 4.36 MG/DL — HIGH (ref 0.5–1.3)
CREAT SERPL-MCNC: 4.37 MG/DL — HIGH (ref 0.5–1.3)
CREAT SERPL-MCNC: 4.39 MG/DL — HIGH (ref 0.5–1.3)
CREAT SERPL-MCNC: 4.41 MG/DL — HIGH (ref 0.5–1.3)
CREAT SERPL-MCNC: 4.49 MG/DL — HIGH (ref 0.5–1.3)
CREAT SERPL-MCNC: 4.49 MG/DL — HIGH (ref 0.5–1.3)
CREAT SERPL-MCNC: 4.91 MG/DL — HIGH (ref 0.5–1.3)
CREAT SERPL-MCNC: 4.94 MG/DL — HIGH (ref 0.5–1.3)
CREAT SERPL-MCNC: 5 MG/DL — HIGH (ref 0.5–1.3)
CREAT SERPL-MCNC: 5.15 MG/DL — HIGH (ref 0.5–1.3)
CREAT SERPL-MCNC: 5.18 MG/DL — HIGH (ref 0.5–1.3)
CREAT SERPL-MCNC: 5.2 MG/DL — HIGH (ref 0.5–1.3)
CREAT SERPL-MCNC: 5.22 MG/DL — HIGH (ref 0.5–1.3)
CREAT SERPL-MCNC: 5.68 MG/DL — HIGH (ref 0.5–1.3)
CREAT SERPL-MCNC: 5.87 MG/DL — HIGH (ref 0.5–1.3)
CREAT SPEC-SCNC: 72 MG/DL
CREAT/PROT UR: 0.7 RATIO
CREATININE, URINE RESULT: 132 MG/DL — SIGNIFICANT CHANGE UP
CREATININE, URINE THERAPEUTIC: 36.4 MG/DL — SIGNIFICANT CHANGE UP
CRP SERPL-MCNC: 0.66 MG/DL — HIGH (ref 0–0.4)
CULTURE RESULTS: SIGNIFICANT CHANGE UP
DIFF PNL FLD: ABNORMAL
DIFF PNL FLD: ABNORMAL
DIFF PNL FLD: NEGATIVE — SIGNIFICANT CHANGE UP
DSDNA AB SER-ACNC: <12 IU/ML
EOSINOPHIL # BLD AUTO: 0.01 K/UL — SIGNIFICANT CHANGE UP (ref 0–0.5)
EOSINOPHIL # BLD AUTO: 0.03 K/UL — SIGNIFICANT CHANGE UP (ref 0–0.5)
EOSINOPHIL # BLD AUTO: 0.04 K/UL — SIGNIFICANT CHANGE UP (ref 0–0.5)
EOSINOPHIL # BLD AUTO: 0.09 K/UL — SIGNIFICANT CHANGE UP (ref 0–0.5)
EOSINOPHIL # BLD AUTO: 0.09 K/UL — SIGNIFICANT CHANGE UP (ref 0–0.5)
EOSINOPHIL # BLD AUTO: 0.1 K/UL — SIGNIFICANT CHANGE UP (ref 0–0.5)
EOSINOPHIL # BLD AUTO: 0.12 K/UL
EOSINOPHIL # BLD AUTO: 0.14 K/UL — SIGNIFICANT CHANGE UP (ref 0–0.5)
EOSINOPHIL # BLD AUTO: 0.16 K/UL — SIGNIFICANT CHANGE UP (ref 0–0.5)
EOSINOPHIL # BLD AUTO: 0.17 K/UL — SIGNIFICANT CHANGE UP (ref 0–0.5)
EOSINOPHIL # BLD AUTO: 0.18 K/UL — SIGNIFICANT CHANGE UP (ref 0–0.5)
EOSINOPHIL # BLD AUTO: 0.19 K/UL — SIGNIFICANT CHANGE UP (ref 0–0.5)
EOSINOPHIL # BLD AUTO: 0.2 K/UL — SIGNIFICANT CHANGE UP (ref 0–0.5)
EOSINOPHIL # BLD AUTO: 0.21 K/UL — SIGNIFICANT CHANGE UP (ref 0–0.5)
EOSINOPHIL # BLD AUTO: 0.27 K/UL — SIGNIFICANT CHANGE UP (ref 0–0.5)
EOSINOPHIL # BLD AUTO: 0.28 K/UL
EOSINOPHIL # BLD AUTO: 0.28 K/UL — SIGNIFICANT CHANGE UP (ref 0–0.5)
EOSINOPHIL NFR BLD AUTO: 0.1 % — SIGNIFICANT CHANGE UP (ref 0–6)
EOSINOPHIL NFR BLD AUTO: 0.5 % — SIGNIFICANT CHANGE UP (ref 0–6)
EOSINOPHIL NFR BLD AUTO: 0.5 % — SIGNIFICANT CHANGE UP (ref 0–6)
EOSINOPHIL NFR BLD AUTO: 1.8 %
EOSINOPHIL NFR BLD AUTO: 2 % — SIGNIFICANT CHANGE UP (ref 0–6)
EOSINOPHIL NFR BLD AUTO: 2.1 % — SIGNIFICANT CHANGE UP (ref 0–6)
EOSINOPHIL NFR BLD AUTO: 2.2 % — SIGNIFICANT CHANGE UP (ref 0–6)
EOSINOPHIL NFR BLD AUTO: 2.5 % — SIGNIFICANT CHANGE UP (ref 0–6)
EOSINOPHIL NFR BLD AUTO: 2.6 % — SIGNIFICANT CHANGE UP (ref 0–6)
EOSINOPHIL NFR BLD AUTO: 2.6 % — SIGNIFICANT CHANGE UP (ref 0–6)
EOSINOPHIL NFR BLD AUTO: 2.9 % — SIGNIFICANT CHANGE UP (ref 0–6)
EOSINOPHIL NFR BLD AUTO: 3.7 % — SIGNIFICANT CHANGE UP (ref 0–6)
EOSINOPHIL NFR BLD AUTO: 3.8 % — SIGNIFICANT CHANGE UP (ref 0–6)
EOSINOPHIL NFR BLD AUTO: 3.9 % — SIGNIFICANT CHANGE UP (ref 0–6)
EOSINOPHIL NFR BLD AUTO: 4.2 % — SIGNIFICANT CHANGE UP (ref 0–6)
EOSINOPHIL NFR BLD AUTO: 4.2 % — SIGNIFICANT CHANGE UP (ref 0–6)
EOSINOPHIL NFR BLD AUTO: 6.3 %
EPI CELLS # UR: 0 /HPF — SIGNIFICANT CHANGE UP
ERYTHROCYTE [SEDIMENTATION RATE] IN BLOOD: 14 MM/HR — SIGNIFICANT CHANGE UP (ref 0–20)
ESTIMATED AVERAGE GLUCOSE: 123 MG/DL
ESTIMATED AVERAGE GLUCOSE: 128 MG/DL — HIGH (ref 68–114)
ESTIMATED AVERAGE GLUCOSE: 131 MG/DL — HIGH (ref 68–114)
ESTIMATED AVERAGE GLUCOSE: 134 MG/DL — HIGH (ref 68–114)
ESTIMATED AVERAGE GLUCOSE: 137 MG/DL
FERRITIN SERPL-MCNC: 108 NG/ML — SIGNIFICANT CHANGE UP (ref 30–400)
FERRITIN SERPL-MCNC: 145 NG/ML — SIGNIFICANT CHANGE UP (ref 30–400)
FERRITIN SERPL-MCNC: 92 NG/ML — SIGNIFICANT CHANGE UP (ref 30–400)
FERRITIN SERPL-MCNC: 95 NG/ML — SIGNIFICANT CHANGE UP (ref 30–400)
FIBRINOGEN PPP-MCNC: 463 MG/DL — SIGNIFICANT CHANGE UP (ref 350–510)
FOLATE SERPL-MCNC: 12.4 NG/ML — SIGNIFICANT CHANGE UP
FOLATE SERPL-MCNC: 19.4 NG/ML — SIGNIFICANT CHANGE UP
GAMMA GLOBULIN: 0.9 G/DL — SIGNIFICANT CHANGE UP (ref 0.6–1.6)
GAMMA GLOBULIN: 0.9 G/DL — SIGNIFICANT CHANGE UP (ref 0.6–1.6)
GAMMA INTERFERON BACKGROUND BLD IA-ACNC: 0.01 IU/ML — SIGNIFICANT CHANGE UP
GAS PNL BLDA: SIGNIFICANT CHANGE UP
GAS PNL BLDMV: SIGNIFICANT CHANGE UP
GAS PNL BLDV: 129 MMOL/L — LOW (ref 135–145)
GAS PNL BLDV: 130 MMOL/L — LOW (ref 135–145)
GAS PNL BLDV: 134 MMOL/L — LOW (ref 135–145)
GAS PNL BLDV: 134 MMOL/L — LOW (ref 135–145)
GAS PNL BLDV: 135 MMOL/L — SIGNIFICANT CHANGE UP (ref 135–145)
GAS PNL BLDV: 135 MMOL/L — SIGNIFICANT CHANGE UP (ref 135–145)
GAS PNL BLDV: 139 MMOL/L — SIGNIFICANT CHANGE UP (ref 135–145)
GAS PNL BLDV: SIGNIFICANT CHANGE UP
GLUCOSE BLDC GLUCOMTR-MCNC: 101 MG/DL — HIGH (ref 70–99)
GLUCOSE BLDC GLUCOMTR-MCNC: 110 MG/DL — HIGH (ref 70–99)
GLUCOSE BLDC GLUCOMTR-MCNC: 111 MG/DL — HIGH (ref 70–99)
GLUCOSE BLDC GLUCOMTR-MCNC: 115 MG/DL — HIGH (ref 70–99)
GLUCOSE BLDC GLUCOMTR-MCNC: 118 MG/DL — HIGH (ref 70–99)
GLUCOSE BLDC GLUCOMTR-MCNC: 118 MG/DL — HIGH (ref 70–99)
GLUCOSE BLDC GLUCOMTR-MCNC: 122 MG/DL — HIGH (ref 70–99)
GLUCOSE BLDC GLUCOMTR-MCNC: 123 MG/DL — HIGH (ref 70–99)
GLUCOSE BLDC GLUCOMTR-MCNC: 123 MG/DL — HIGH (ref 70–99)
GLUCOSE BLDC GLUCOMTR-MCNC: 124 MG/DL — HIGH (ref 70–99)
GLUCOSE BLDC GLUCOMTR-MCNC: 125 MG/DL — HIGH (ref 70–99)
GLUCOSE BLDC GLUCOMTR-MCNC: 126 MG/DL — HIGH (ref 70–99)
GLUCOSE BLDC GLUCOMTR-MCNC: 126 MG/DL — HIGH (ref 70–99)
GLUCOSE BLDC GLUCOMTR-MCNC: 127 MG/DL — HIGH (ref 70–99)
GLUCOSE BLDC GLUCOMTR-MCNC: 127 MG/DL — HIGH (ref 70–99)
GLUCOSE BLDC GLUCOMTR-MCNC: 130 MG/DL — HIGH (ref 70–99)
GLUCOSE BLDC GLUCOMTR-MCNC: 131 MG/DL — HIGH (ref 70–99)
GLUCOSE BLDC GLUCOMTR-MCNC: 132 MG/DL — HIGH (ref 70–99)
GLUCOSE BLDC GLUCOMTR-MCNC: 133 MG/DL — HIGH (ref 70–99)
GLUCOSE BLDC GLUCOMTR-MCNC: 134 MG/DL — HIGH (ref 70–99)
GLUCOSE BLDC GLUCOMTR-MCNC: 135 MG/DL — HIGH (ref 70–99)
GLUCOSE BLDC GLUCOMTR-MCNC: 136 MG/DL — HIGH (ref 70–99)
GLUCOSE BLDC GLUCOMTR-MCNC: 137 MG/DL — HIGH (ref 70–99)
GLUCOSE BLDC GLUCOMTR-MCNC: 138 MG/DL — HIGH (ref 70–99)
GLUCOSE BLDC GLUCOMTR-MCNC: 139 MG/DL — HIGH (ref 70–99)
GLUCOSE BLDC GLUCOMTR-MCNC: 139 MG/DL — HIGH (ref 70–99)
GLUCOSE BLDC GLUCOMTR-MCNC: 140 MG/DL — HIGH (ref 70–99)
GLUCOSE BLDC GLUCOMTR-MCNC: 141 MG/DL — HIGH (ref 70–99)
GLUCOSE BLDC GLUCOMTR-MCNC: 142 MG/DL — HIGH (ref 70–99)
GLUCOSE BLDC GLUCOMTR-MCNC: 142 MG/DL — HIGH (ref 70–99)
GLUCOSE BLDC GLUCOMTR-MCNC: 143 MG/DL — HIGH (ref 70–99)
GLUCOSE BLDC GLUCOMTR-MCNC: 144 MG/DL — HIGH (ref 70–99)
GLUCOSE BLDC GLUCOMTR-MCNC: 145 MG/DL — HIGH (ref 70–99)
GLUCOSE BLDC GLUCOMTR-MCNC: 146 MG/DL — HIGH (ref 70–99)
GLUCOSE BLDC GLUCOMTR-MCNC: 146 MG/DL — HIGH (ref 70–99)
GLUCOSE BLDC GLUCOMTR-MCNC: 147 MG/DL — HIGH (ref 70–99)
GLUCOSE BLDC GLUCOMTR-MCNC: 148 MG/DL — HIGH (ref 70–99)
GLUCOSE BLDC GLUCOMTR-MCNC: 149 MG/DL — HIGH (ref 70–99)
GLUCOSE BLDC GLUCOMTR-MCNC: 149 MG/DL — HIGH (ref 70–99)
GLUCOSE BLDC GLUCOMTR-MCNC: 150 MG/DL — HIGH (ref 70–99)
GLUCOSE BLDC GLUCOMTR-MCNC: 151 MG/DL — HIGH (ref 70–99)
GLUCOSE BLDC GLUCOMTR-MCNC: 152 MG/DL — HIGH (ref 70–99)
GLUCOSE BLDC GLUCOMTR-MCNC: 153 MG/DL — HIGH (ref 70–99)
GLUCOSE BLDC GLUCOMTR-MCNC: 153 MG/DL — HIGH (ref 70–99)
GLUCOSE BLDC GLUCOMTR-MCNC: 154 MG/DL — HIGH (ref 70–99)
GLUCOSE BLDC GLUCOMTR-MCNC: 155 MG/DL — HIGH (ref 70–99)
GLUCOSE BLDC GLUCOMTR-MCNC: 155 MG/DL — HIGH (ref 70–99)
GLUCOSE BLDC GLUCOMTR-MCNC: 156 MG/DL — HIGH (ref 70–99)
GLUCOSE BLDC GLUCOMTR-MCNC: 156 MG/DL — HIGH (ref 70–99)
GLUCOSE BLDC GLUCOMTR-MCNC: 157 MG/DL — HIGH (ref 70–99)
GLUCOSE BLDC GLUCOMTR-MCNC: 157 MG/DL — HIGH (ref 70–99)
GLUCOSE BLDC GLUCOMTR-MCNC: 158 MG/DL — HIGH (ref 70–99)
GLUCOSE BLDC GLUCOMTR-MCNC: 159 MG/DL — HIGH (ref 70–99)
GLUCOSE BLDC GLUCOMTR-MCNC: 160 MG/DL — HIGH (ref 70–99)
GLUCOSE BLDC GLUCOMTR-MCNC: 161 MG/DL — HIGH (ref 70–99)
GLUCOSE BLDC GLUCOMTR-MCNC: 162 MG/DL — HIGH (ref 70–99)
GLUCOSE BLDC GLUCOMTR-MCNC: 164 MG/DL — HIGH (ref 70–99)
GLUCOSE BLDC GLUCOMTR-MCNC: 166 MG/DL — HIGH (ref 70–99)
GLUCOSE BLDC GLUCOMTR-MCNC: 168 MG/DL — HIGH (ref 70–99)
GLUCOSE BLDC GLUCOMTR-MCNC: 169 MG/DL — HIGH (ref 70–99)
GLUCOSE BLDC GLUCOMTR-MCNC: 170 MG/DL — HIGH (ref 70–99)
GLUCOSE BLDC GLUCOMTR-MCNC: 170 MG/DL — HIGH (ref 70–99)
GLUCOSE BLDC GLUCOMTR-MCNC: 171 MG/DL — HIGH (ref 70–99)
GLUCOSE BLDC GLUCOMTR-MCNC: 174 MG/DL — HIGH (ref 70–99)
GLUCOSE BLDC GLUCOMTR-MCNC: 175 MG/DL — HIGH (ref 70–99)
GLUCOSE BLDC GLUCOMTR-MCNC: 177 MG/DL — HIGH (ref 70–99)
GLUCOSE BLDC GLUCOMTR-MCNC: 178 MG/DL — HIGH (ref 70–99)
GLUCOSE BLDC GLUCOMTR-MCNC: 179 MG/DL — HIGH (ref 70–99)
GLUCOSE BLDC GLUCOMTR-MCNC: 180 MG/DL — HIGH (ref 70–99)
GLUCOSE BLDC GLUCOMTR-MCNC: 180 MG/DL — HIGH (ref 70–99)
GLUCOSE BLDC GLUCOMTR-MCNC: 181 MG/DL — HIGH (ref 70–99)
GLUCOSE BLDC GLUCOMTR-MCNC: 182 MG/DL — HIGH (ref 70–99)
GLUCOSE BLDC GLUCOMTR-MCNC: 183 MG/DL — HIGH (ref 70–99)
GLUCOSE BLDC GLUCOMTR-MCNC: 184 MG/DL — HIGH (ref 70–99)
GLUCOSE BLDC GLUCOMTR-MCNC: 184 MG/DL — HIGH (ref 70–99)
GLUCOSE BLDC GLUCOMTR-MCNC: 185 MG/DL — HIGH (ref 70–99)
GLUCOSE BLDC GLUCOMTR-MCNC: 186 MG/DL — HIGH (ref 70–99)
GLUCOSE BLDC GLUCOMTR-MCNC: 187 MG/DL — HIGH (ref 70–99)
GLUCOSE BLDC GLUCOMTR-MCNC: 188 MG/DL — HIGH (ref 70–99)
GLUCOSE BLDC GLUCOMTR-MCNC: 189 MG/DL — HIGH (ref 70–99)
GLUCOSE BLDC GLUCOMTR-MCNC: 189 MG/DL — HIGH (ref 70–99)
GLUCOSE BLDC GLUCOMTR-MCNC: 190 MG/DL — HIGH (ref 70–99)
GLUCOSE BLDC GLUCOMTR-MCNC: 192 MG/DL — HIGH (ref 70–99)
GLUCOSE BLDC GLUCOMTR-MCNC: 193 MG/DL — HIGH (ref 70–99)
GLUCOSE BLDC GLUCOMTR-MCNC: 194 MG/DL — HIGH (ref 70–99)
GLUCOSE BLDC GLUCOMTR-MCNC: 195 MG/DL — HIGH (ref 70–99)
GLUCOSE BLDC GLUCOMTR-MCNC: 196 MG/DL — HIGH (ref 70–99)
GLUCOSE BLDC GLUCOMTR-MCNC: 197 MG/DL — HIGH (ref 70–99)
GLUCOSE BLDC GLUCOMTR-MCNC: 198 MG/DL — HIGH (ref 70–99)
GLUCOSE BLDC GLUCOMTR-MCNC: 200 MG/DL — HIGH (ref 70–99)
GLUCOSE BLDC GLUCOMTR-MCNC: 200 MG/DL — HIGH (ref 70–99)
GLUCOSE BLDC GLUCOMTR-MCNC: 201 MG/DL — HIGH (ref 70–99)
GLUCOSE BLDC GLUCOMTR-MCNC: 201 MG/DL — HIGH (ref 70–99)
GLUCOSE BLDC GLUCOMTR-MCNC: 202 MG/DL — HIGH (ref 70–99)
GLUCOSE BLDC GLUCOMTR-MCNC: 203 MG/DL — HIGH (ref 70–99)
GLUCOSE BLDC GLUCOMTR-MCNC: 204 MG/DL — HIGH (ref 70–99)
GLUCOSE BLDC GLUCOMTR-MCNC: 205 MG/DL — HIGH (ref 70–99)
GLUCOSE BLDC GLUCOMTR-MCNC: 206 MG/DL — HIGH (ref 70–99)
GLUCOSE BLDC GLUCOMTR-MCNC: 206 MG/DL — HIGH (ref 70–99)
GLUCOSE BLDC GLUCOMTR-MCNC: 207 MG/DL — HIGH (ref 70–99)
GLUCOSE BLDC GLUCOMTR-MCNC: 207 MG/DL — HIGH (ref 70–99)
GLUCOSE BLDC GLUCOMTR-MCNC: 211 MG/DL — HIGH (ref 70–99)
GLUCOSE BLDC GLUCOMTR-MCNC: 212 MG/DL — HIGH (ref 70–99)
GLUCOSE BLDC GLUCOMTR-MCNC: 214 MG/DL — HIGH (ref 70–99)
GLUCOSE BLDC GLUCOMTR-MCNC: 214 MG/DL — HIGH (ref 70–99)
GLUCOSE BLDC GLUCOMTR-MCNC: 215 MG/DL — HIGH (ref 70–99)
GLUCOSE BLDC GLUCOMTR-MCNC: 216 MG/DL — HIGH (ref 70–99)
GLUCOSE BLDC GLUCOMTR-MCNC: 218 MG/DL — HIGH (ref 70–99)
GLUCOSE BLDC GLUCOMTR-MCNC: 218 MG/DL — HIGH (ref 70–99)
GLUCOSE BLDC GLUCOMTR-MCNC: 221 MG/DL — HIGH (ref 70–99)
GLUCOSE BLDC GLUCOMTR-MCNC: 222 MG/DL — HIGH (ref 70–99)
GLUCOSE BLDC GLUCOMTR-MCNC: 223 MG/DL — HIGH (ref 70–99)
GLUCOSE BLDC GLUCOMTR-MCNC: 224 MG/DL — HIGH (ref 70–99)
GLUCOSE BLDC GLUCOMTR-MCNC: 225 MG/DL — HIGH (ref 70–99)
GLUCOSE BLDC GLUCOMTR-MCNC: 231 MG/DL — HIGH (ref 70–99)
GLUCOSE BLDC GLUCOMTR-MCNC: 231 MG/DL — HIGH (ref 70–99)
GLUCOSE BLDC GLUCOMTR-MCNC: 232 MG/DL — HIGH (ref 70–99)
GLUCOSE BLDC GLUCOMTR-MCNC: 234 MG/DL — HIGH (ref 70–99)
GLUCOSE BLDC GLUCOMTR-MCNC: 234 MG/DL — HIGH (ref 70–99)
GLUCOSE BLDC GLUCOMTR-MCNC: 235 MG/DL — HIGH (ref 70–99)
GLUCOSE BLDC GLUCOMTR-MCNC: 235 MG/DL — HIGH (ref 70–99)
GLUCOSE BLDC GLUCOMTR-MCNC: 239 MG/DL — HIGH (ref 70–99)
GLUCOSE BLDC GLUCOMTR-MCNC: 239 MG/DL — HIGH (ref 70–99)
GLUCOSE BLDC GLUCOMTR-MCNC: 242 MG/DL — HIGH (ref 70–99)
GLUCOSE BLDC GLUCOMTR-MCNC: 243 MG/DL — HIGH (ref 70–99)
GLUCOSE BLDC GLUCOMTR-MCNC: 244 MG/DL — HIGH (ref 70–99)
GLUCOSE BLDC GLUCOMTR-MCNC: 245 MG/DL — HIGH (ref 70–99)
GLUCOSE BLDC GLUCOMTR-MCNC: 245 MG/DL — HIGH (ref 70–99)
GLUCOSE BLDC GLUCOMTR-MCNC: 252 MG/DL — HIGH (ref 70–99)
GLUCOSE BLDC GLUCOMTR-MCNC: 255 MG/DL — HIGH (ref 70–99)
GLUCOSE BLDC GLUCOMTR-MCNC: 258 MG/DL — HIGH (ref 70–99)
GLUCOSE BLDC GLUCOMTR-MCNC: 78 MG/DL — SIGNIFICANT CHANGE UP (ref 70–99)
GLUCOSE BLDC GLUCOMTR-MCNC: 79 MG/DL — SIGNIFICANT CHANGE UP (ref 70–99)
GLUCOSE BLDC GLUCOMTR-MCNC: 83 MG/DL — SIGNIFICANT CHANGE UP (ref 70–99)
GLUCOSE BLDC GLUCOMTR-MCNC: 84 MG/DL — SIGNIFICANT CHANGE UP (ref 70–99)
GLUCOSE BLDC GLUCOMTR-MCNC: 87 MG/DL — SIGNIFICANT CHANGE UP (ref 70–99)
GLUCOSE BLDC GLUCOMTR-MCNC: 91 MG/DL — SIGNIFICANT CHANGE UP (ref 70–99)
GLUCOSE BLDC GLUCOMTR-MCNC: 93 MG/DL — SIGNIFICANT CHANGE UP (ref 70–99)
GLUCOSE BLDV-MCNC: 123 MG/DL — HIGH (ref 70–99)
GLUCOSE BLDV-MCNC: 150 MG/DL — HIGH (ref 70–99)
GLUCOSE BLDV-MCNC: 180 MG/DL — HIGH (ref 70–99)
GLUCOSE BLDV-MCNC: 183 MG/DL — HIGH (ref 70–99)
GLUCOSE BLDV-MCNC: 71 MG/DL — SIGNIFICANT CHANGE UP (ref 70–99)
GLUCOSE BLDV-MCNC: 92 MG/DL — SIGNIFICANT CHANGE UP (ref 70–99)
GLUCOSE BLDV-MCNC: 97 MG/DL — SIGNIFICANT CHANGE UP (ref 70–99)
GLUCOSE QUALITATIVE U: NEGATIVE
GLUCOSE SERPL-MCNC: 100 MG/DL — HIGH (ref 70–99)
GLUCOSE SERPL-MCNC: 105 MG/DL — HIGH (ref 70–99)
GLUCOSE SERPL-MCNC: 106 MG/DL — HIGH (ref 70–99)
GLUCOSE SERPL-MCNC: 107 MG/DL — HIGH (ref 70–99)
GLUCOSE SERPL-MCNC: 113 MG/DL — HIGH (ref 70–99)
GLUCOSE SERPL-MCNC: 115 MG/DL — HIGH (ref 70–99)
GLUCOSE SERPL-MCNC: 117 MG/DL — HIGH (ref 70–99)
GLUCOSE SERPL-MCNC: 117 MG/DL — HIGH (ref 70–99)
GLUCOSE SERPL-MCNC: 119 MG/DL — HIGH (ref 70–99)
GLUCOSE SERPL-MCNC: 120 MG/DL — HIGH (ref 70–99)
GLUCOSE SERPL-MCNC: 122 MG/DL — HIGH (ref 70–99)
GLUCOSE SERPL-MCNC: 123 MG/DL — HIGH (ref 70–99)
GLUCOSE SERPL-MCNC: 124 MG/DL — HIGH (ref 70–99)
GLUCOSE SERPL-MCNC: 124 MG/DL — HIGH (ref 70–99)
GLUCOSE SERPL-MCNC: 126 MG/DL — HIGH (ref 70–99)
GLUCOSE SERPL-MCNC: 127 MG/DL — HIGH (ref 70–99)
GLUCOSE SERPL-MCNC: 129 MG/DL
GLUCOSE SERPL-MCNC: 130 MG/DL — HIGH (ref 70–99)
GLUCOSE SERPL-MCNC: 131 MG/DL — HIGH (ref 70–99)
GLUCOSE SERPL-MCNC: 131 MG/DL — HIGH (ref 70–99)
GLUCOSE SERPL-MCNC: 132 MG/DL — HIGH (ref 70–99)
GLUCOSE SERPL-MCNC: 133 MG/DL — HIGH (ref 70–99)
GLUCOSE SERPL-MCNC: 135 MG/DL — HIGH (ref 70–99)
GLUCOSE SERPL-MCNC: 138 MG/DL — HIGH (ref 70–99)
GLUCOSE SERPL-MCNC: 138 MG/DL — HIGH (ref 70–99)
GLUCOSE SERPL-MCNC: 139 MG/DL — HIGH (ref 70–99)
GLUCOSE SERPL-MCNC: 139 MG/DL — HIGH (ref 70–99)
GLUCOSE SERPL-MCNC: 141 MG/DL — HIGH (ref 70–99)
GLUCOSE SERPL-MCNC: 143 MG/DL — HIGH (ref 70–99)
GLUCOSE SERPL-MCNC: 143 MG/DL — HIGH (ref 70–99)
GLUCOSE SERPL-MCNC: 144 MG/DL — HIGH (ref 70–99)
GLUCOSE SERPL-MCNC: 145 MG/DL — HIGH (ref 70–99)
GLUCOSE SERPL-MCNC: 147 MG/DL — HIGH (ref 70–99)
GLUCOSE SERPL-MCNC: 147 MG/DL — HIGH (ref 70–99)
GLUCOSE SERPL-MCNC: 148 MG/DL — HIGH (ref 70–99)
GLUCOSE SERPL-MCNC: 148 MG/DL — HIGH (ref 70–99)
GLUCOSE SERPL-MCNC: 149 MG/DL — HIGH (ref 70–99)
GLUCOSE SERPL-MCNC: 150 MG/DL — HIGH (ref 70–99)
GLUCOSE SERPL-MCNC: 151 MG/DL — HIGH (ref 70–99)
GLUCOSE SERPL-MCNC: 152 MG/DL
GLUCOSE SERPL-MCNC: 154 MG/DL — HIGH (ref 70–99)
GLUCOSE SERPL-MCNC: 154 MG/DL — HIGH (ref 70–99)
GLUCOSE SERPL-MCNC: 155 MG/DL — HIGH (ref 70–99)
GLUCOSE SERPL-MCNC: 155 MG/DL — HIGH (ref 70–99)
GLUCOSE SERPL-MCNC: 156 MG/DL — HIGH (ref 70–99)
GLUCOSE SERPL-MCNC: 156 MG/DL — HIGH (ref 70–99)
GLUCOSE SERPL-MCNC: 158 MG/DL — HIGH (ref 70–99)
GLUCOSE SERPL-MCNC: 159 MG/DL — HIGH (ref 70–99)
GLUCOSE SERPL-MCNC: 160 MG/DL — HIGH (ref 70–99)
GLUCOSE SERPL-MCNC: 160 MG/DL — HIGH (ref 70–99)
GLUCOSE SERPL-MCNC: 162 MG/DL
GLUCOSE SERPL-MCNC: 162 MG/DL — HIGH (ref 70–99)
GLUCOSE SERPL-MCNC: 162 MG/DL — HIGH (ref 70–99)
GLUCOSE SERPL-MCNC: 163 MG/DL — HIGH (ref 70–99)
GLUCOSE SERPL-MCNC: 163 MG/DL — HIGH (ref 70–99)
GLUCOSE SERPL-MCNC: 164 MG/DL — HIGH (ref 70–99)
GLUCOSE SERPL-MCNC: 165 MG/DL — HIGH (ref 70–99)
GLUCOSE SERPL-MCNC: 166 MG/DL — HIGH (ref 70–99)
GLUCOSE SERPL-MCNC: 167 MG/DL — HIGH (ref 70–99)
GLUCOSE SERPL-MCNC: 168 MG/DL — HIGH (ref 70–99)
GLUCOSE SERPL-MCNC: 170 MG/DL — HIGH (ref 70–99)
GLUCOSE SERPL-MCNC: 171 MG/DL — HIGH (ref 70–99)
GLUCOSE SERPL-MCNC: 172 MG/DL
GLUCOSE SERPL-MCNC: 172 MG/DL — HIGH (ref 70–99)
GLUCOSE SERPL-MCNC: 173 MG/DL — HIGH (ref 70–99)
GLUCOSE SERPL-MCNC: 176 MG/DL — HIGH (ref 70–99)
GLUCOSE SERPL-MCNC: 177 MG/DL — HIGH (ref 70–99)
GLUCOSE SERPL-MCNC: 177 MG/DL — HIGH (ref 70–99)
GLUCOSE SERPL-MCNC: 179 MG/DL — HIGH (ref 70–99)
GLUCOSE SERPL-MCNC: 180 MG/DL — HIGH (ref 70–99)
GLUCOSE SERPL-MCNC: 182 MG/DL — HIGH (ref 70–99)
GLUCOSE SERPL-MCNC: 182 MG/DL — HIGH (ref 70–99)
GLUCOSE SERPL-MCNC: 183 MG/DL — HIGH (ref 70–99)
GLUCOSE SERPL-MCNC: 185 MG/DL — HIGH (ref 70–99)
GLUCOSE SERPL-MCNC: 186 MG/DL
GLUCOSE SERPL-MCNC: 187 MG/DL — HIGH (ref 70–99)
GLUCOSE SERPL-MCNC: 189 MG/DL
GLUCOSE SERPL-MCNC: 189 MG/DL — HIGH (ref 70–99)
GLUCOSE SERPL-MCNC: 192 MG/DL — HIGH (ref 70–99)
GLUCOSE SERPL-MCNC: 194 MG/DL — HIGH (ref 70–99)
GLUCOSE SERPL-MCNC: 199 MG/DL — HIGH (ref 70–99)
GLUCOSE SERPL-MCNC: 200 MG/DL — HIGH (ref 70–99)
GLUCOSE SERPL-MCNC: 205 MG/DL — HIGH (ref 70–99)
GLUCOSE SERPL-MCNC: 217 MG/DL
GLUCOSE SERPL-MCNC: 220 MG/DL — HIGH (ref 70–99)
GLUCOSE SERPL-MCNC: 233 MG/DL — HIGH (ref 70–99)
GLUCOSE SERPL-MCNC: 236 MG/DL — HIGH (ref 70–99)
GLUCOSE SERPL-MCNC: 245 MG/DL — HIGH (ref 70–99)
GLUCOSE SERPL-MCNC: 69 MG/DL
GLUCOSE SERPL-MCNC: 71 MG/DL — SIGNIFICANT CHANGE UP (ref 70–99)
GLUCOSE SERPL-MCNC: 72 MG/DL — SIGNIFICANT CHANGE UP (ref 70–99)
GLUCOSE SERPL-MCNC: 97 MG/DL — SIGNIFICANT CHANGE UP (ref 70–99)
GLUCOSE SERPL-MCNC: 98 MG/DL — SIGNIFICANT CHANGE UP (ref 70–99)
GLUCOSE UR QL: NEGATIVE MG/DL — SIGNIFICANT CHANGE UP
GLUCOSE UR QL: NEGATIVE — SIGNIFICANT CHANGE UP
HAV IGG SER QL IA: SIGNIFICANT CHANGE UP
HBA1C MFR BLD HPLC: 5.9 %
HBA1C MFR BLD HPLC: 6.4 %
HBV CORE AB SER-ACNC: SIGNIFICANT CHANGE UP
HBV CORE IGG+IGM SER QL: NONREACTIVE
HBV DNA # SERPL NAA+PROBE: SIGNIFICANT CHANGE UP
HBV DNA SERPL NAA+PROBE-LOG#: SIGNIFICANT CHANGE UP LOG10IU/ML
HBV SURFACE AB SER-ACNC: <3 MIU/ML — LOW
HBV SURFACE AB SER-ACNC: SIGNIFICANT CHANGE UP
HBV SURFACE AB SER-ACNC: SIGNIFICANT CHANGE UP
HBV SURFACE AB SERPL IA-ACNC: <3 MIU/ML
HBV SURFACE AG SER QL: NONREACTIVE
HBV SURFACE AG SER-ACNC: SIGNIFICANT CHANGE UP
HBV SURFACE AG SER-ACNC: SIGNIFICANT CHANGE UP
HCO3 BLDMV-SCNC: 19 MMOL/L — LOW (ref 20–28)
HCO3 BLDMV-SCNC: 20 MMOL/L — SIGNIFICANT CHANGE UP (ref 20–28)
HCO3 BLDMV-SCNC: 21 MMOL/L — SIGNIFICANT CHANGE UP (ref 20–28)
HCO3 BLDMV-SCNC: 21 MMOL/L — SIGNIFICANT CHANGE UP (ref 20–28)
HCO3 BLDMV-SCNC: 22 MMOL/L — SIGNIFICANT CHANGE UP (ref 20–28)
HCO3 BLDMV-SCNC: 23 MMOL/L — SIGNIFICANT CHANGE UP (ref 20–28)
HCO3 BLDMV-SCNC: 24 MMOL/L — SIGNIFICANT CHANGE UP (ref 20–28)
HCO3 BLDMV-SCNC: 25 MMOL/L — SIGNIFICANT CHANGE UP (ref 20–28)
HCO3 BLDMV-SCNC: 25 MMOL/L — SIGNIFICANT CHANGE UP (ref 20–28)
HCO3 BLDMV-SCNC: 26 MMOL/L — SIGNIFICANT CHANGE UP (ref 20–28)
HCO3 BLDMV-SCNC: 26 MMOL/L — SIGNIFICANT CHANGE UP (ref 20–28)
HCO3 BLDMV-SCNC: 27 MMOL/L — SIGNIFICANT CHANGE UP (ref 20–28)
HCO3 BLDMV-SCNC: 30 MMOL/L — HIGH (ref 20–28)
HCO3 BLDMV-SCNC: 31 MMOL/L — HIGH (ref 20–28)
HCO3 BLDMV-SCNC: 32 MMOL/L — HIGH (ref 20–28)
HCO3 BLDMV-SCNC: 35 MMOL/L — HIGH (ref 20–28)
HCO3 BLDMV-SCNC: 35 MMOL/L — HIGH (ref 20–28)
HCO3 BLDMV-SCNC: 36 MMOL/L — HIGH (ref 20–28)
HCO3 BLDV-SCNC: 20 MMOL/L — LOW (ref 21–29)
HCO3 BLDV-SCNC: 21 MMOL/L — SIGNIFICANT CHANGE UP (ref 21–29)
HCO3 BLDV-SCNC: 21 MMOL/L — SIGNIFICANT CHANGE UP (ref 21–29)
HCO3 BLDV-SCNC: 26 MMOL/L — SIGNIFICANT CHANGE UP (ref 21–29)
HCO3 BLDV-SCNC: 29 MMOL/L — SIGNIFICANT CHANGE UP (ref 21–29)
HCO3 BLDV-SCNC: 29 MMOL/L — SIGNIFICANT CHANGE UP (ref 21–29)
HCO3 BLDV-SCNC: 32 MMOL/L — HIGH (ref 21–29)
HCO3 BLDV-SCNC: 33 MMOL/L — HIGH (ref 21–29)
HCT VFR BLD CALC: 23.8 % — LOW (ref 39–50)
HCT VFR BLD CALC: 24.1 % — LOW (ref 39–50)
HCT VFR BLD CALC: 25.3 % — LOW (ref 39–50)
HCT VFR BLD CALC: 25.3 % — LOW (ref 39–50)
HCT VFR BLD CALC: 25.4 % — LOW (ref 39–50)
HCT VFR BLD CALC: 25.8 % — LOW (ref 39–50)
HCT VFR BLD CALC: 26 % — LOW (ref 39–50)
HCT VFR BLD CALC: 26.2 % — LOW (ref 39–50)
HCT VFR BLD CALC: 26.5 % — LOW (ref 39–50)
HCT VFR BLD CALC: 26.8 % — LOW (ref 39–50)
HCT VFR BLD CALC: 26.9 % — LOW (ref 39–50)
HCT VFR BLD CALC: 27 % — LOW (ref 39–50)
HCT VFR BLD CALC: 27 % — LOW (ref 39–50)
HCT VFR BLD CALC: 27.2 % — LOW (ref 39–50)
HCT VFR BLD CALC: 27.3 % — LOW (ref 39–50)
HCT VFR BLD CALC: 27.3 % — LOW (ref 39–50)
HCT VFR BLD CALC: 27.4 % — LOW (ref 39–50)
HCT VFR BLD CALC: 27.4 % — LOW (ref 39–50)
HCT VFR BLD CALC: 27.5 % — LOW (ref 39–50)
HCT VFR BLD CALC: 27.8 % — LOW (ref 39–50)
HCT VFR BLD CALC: 27.8 % — LOW (ref 39–50)
HCT VFR BLD CALC: 27.9 % — LOW (ref 39–50)
HCT VFR BLD CALC: 28 % — LOW (ref 39–50)
HCT VFR BLD CALC: 28.1 % — LOW (ref 39–50)
HCT VFR BLD CALC: 28.3 % — LOW (ref 39–50)
HCT VFR BLD CALC: 28.3 % — LOW (ref 39–50)
HCT VFR BLD CALC: 28.4 % — LOW (ref 39–50)
HCT VFR BLD CALC: 28.7 % — LOW (ref 39–50)
HCT VFR BLD CALC: 28.9 % — LOW (ref 39–50)
HCT VFR BLD CALC: 29 % — LOW (ref 39–50)
HCT VFR BLD CALC: 29.1 % — LOW (ref 39–50)
HCT VFR BLD CALC: 29.4 % — LOW (ref 39–50)
HCT VFR BLD CALC: 29.5 % — LOW (ref 39–50)
HCT VFR BLD CALC: 29.8 % — LOW (ref 39–50)
HCT VFR BLD CALC: 29.9 % — LOW (ref 39–50)
HCT VFR BLD CALC: 30.2 % — LOW (ref 39–50)
HCT VFR BLD CALC: 30.3 % — LOW (ref 39–50)
HCT VFR BLD CALC: 30.5 % — LOW (ref 39–50)
HCT VFR BLD CALC: 30.5 % — LOW (ref 39–50)
HCT VFR BLD CALC: 30.9 % — LOW (ref 39–50)
HCT VFR BLD CALC: 31 % — LOW (ref 39–50)
HCT VFR BLD CALC: 31.2 % — LOW (ref 39–50)
HCT VFR BLD CALC: 32.6 % — LOW (ref 39–50)
HCT VFR BLD CALC: 32.7 % — LOW (ref 39–50)
HCT VFR BLD CALC: 33.1 % — LOW (ref 39–50)
HCT VFR BLD CALC: 33.6 % — LOW (ref 39–50)
HCT VFR BLD CALC: 33.8 % — LOW (ref 39–50)
HCT VFR BLD CALC: 34.3 % — LOW (ref 39–50)
HCT VFR BLD CALC: 34.3 % — LOW (ref 39–50)
HCT VFR BLD CALC: 34.4 % — LOW (ref 39–50)
HCT VFR BLD CALC: 35 % — LOW (ref 39–50)
HCT VFR BLD CALC: 35.1 % — LOW (ref 39–50)
HCT VFR BLD CALC: 35.9 % — LOW (ref 39–50)
HCT VFR BLD CALC: 36.1 %
HCT VFR BLD CALC: 37.6 % — LOW (ref 39–50)
HCT VFR BLD CALC: 38.9 % — LOW (ref 39–50)
HCT VFR BLD CALC: 38.9 % — LOW (ref 39–50)
HCT VFR BLD CALC: 39.1 % — SIGNIFICANT CHANGE UP (ref 39–50)
HCT VFR BLD CALC: 39.5 %
HCT VFR BLD CALC: 39.7 % — SIGNIFICANT CHANGE UP (ref 39–50)
HCT VFR BLD CALC: 40.5 % — SIGNIFICANT CHANGE UP (ref 39–50)
HCT VFR BLD CALC: 42 % — SIGNIFICANT CHANGE UP (ref 39–50)
HCT VFR BLD CALC: 44 % — SIGNIFICANT CHANGE UP (ref 39–50)
HCT VFR BLDA CALC: 27 % — LOW (ref 39–50)
HCT VFR BLDA CALC: 29 % — LOW (ref 39–50)
HCT VFR BLDA CALC: 30 % — LOW (ref 39–50)
HCT VFR BLDA CALC: 32 % — LOW (ref 39–50)
HCT VFR BLDA CALC: 34 % — LOW (ref 39–50)
HCV AB S/CO SERPL IA: 0.1 S/CO — SIGNIFICANT CHANGE UP (ref 0–0.99)
HCV AB S/CO SERPL IA: 0.16 S/CO — SIGNIFICANT CHANGE UP (ref 0–0.99)
HCV AB SER QL: NONREACTIVE
HCV AB SERPL-IMP: SIGNIFICANT CHANGE UP
HCV AB SERPL-IMP: SIGNIFICANT CHANGE UP
HCV RNA SERPL NAA DL=5-ACNC: SIGNIFICANT CHANGE UP
HCV RNA SPEC NAA+PROBE-LOG IU: SIGNIFICANT CHANGE UP LOG10IU/ML
HCV S/CO RATIO: 0.11 S/CO
HDLC SERPL-MCNC: 44 MG/DL
HDLC SERPL-MCNC: 48 MG/DL
HDLC SERPL-MCNC: 48 MG/DL — SIGNIFICANT CHANGE UP
HDV AB SER-ACNC: NEGATIVE — SIGNIFICANT CHANGE UP
HEPARIN-PF4 AB RESULT: <0.6 U/ML — SIGNIFICANT CHANGE UP (ref 0–0.9)
HEPARIN-PF4 AB RESULT: <0.6 U/ML — SIGNIFICANT CHANGE UP (ref 0–0.9)
HEV AB FLD QL: NEGATIVE — SIGNIFICANT CHANGE UP
HGB BLD CALC-MCNC: 10.3 G/DL — LOW (ref 13–17)
HGB BLD CALC-MCNC: 11 G/DL — LOW (ref 13–17)
HGB BLD CALC-MCNC: 11.2 G/DL — LOW (ref 13–17)
HGB BLD CALC-MCNC: 11.2 G/DL — LOW (ref 13–17)
HGB BLD CALC-MCNC: 8.8 G/DL — LOW (ref 13–17)
HGB BLD CALC-MCNC: 9.5 G/DL — LOW (ref 13–17)
HGB BLD CALC-MCNC: 9.8 G/DL — LOW (ref 13–17)
HGB BLD-MCNC: 10 G/DL — LOW (ref 13–17)
HGB BLD-MCNC: 10.3 G/DL — LOW (ref 13–17)
HGB BLD-MCNC: 10.4 G/DL — LOW (ref 13–17)
HGB BLD-MCNC: 10.4 G/DL — LOW (ref 13–17)
HGB BLD-MCNC: 10.5 G/DL
HGB BLD-MCNC: 10.5 G/DL — LOW (ref 13–17)
HGB BLD-MCNC: 10.6 G/DL — LOW (ref 13–17)
HGB BLD-MCNC: 10.7 G/DL — LOW (ref 13–17)
HGB BLD-MCNC: 10.7 G/DL — LOW (ref 13–17)
HGB BLD-MCNC: 10.8 G/DL — LOW (ref 13–17)
HGB BLD-MCNC: 11 G/DL — LOW (ref 13–17)
HGB BLD-MCNC: 11.8 G/DL — LOW (ref 13–17)
HGB BLD-MCNC: 12.4 G/DL — LOW (ref 13–17)
HGB BLD-MCNC: 12.5 G/DL
HGB BLD-MCNC: 12.5 G/DL — LOW (ref 13–17)
HGB BLD-MCNC: 12.6 G/DL — LOW (ref 13–17)
HGB BLD-MCNC: 12.7 G/DL — LOW (ref 13–17)
HGB BLD-MCNC: 13 G/DL — SIGNIFICANT CHANGE UP (ref 13–17)
HGB BLD-MCNC: 13.5 G/DL — SIGNIFICANT CHANGE UP (ref 13–17)
HGB BLD-MCNC: 14.2 G/DL — SIGNIFICANT CHANGE UP (ref 13–17)
HGB BLD-MCNC: 7.5 G/DL — LOW (ref 13–17)
HGB BLD-MCNC: 7.5 G/DL — LOW (ref 13–17)
HGB BLD-MCNC: 7.7 G/DL — LOW (ref 13–17)
HGB BLD-MCNC: 7.8 G/DL — LOW (ref 13–17)
HGB BLD-MCNC: 7.8 G/DL — LOW (ref 13–17)
HGB BLD-MCNC: 7.9 G/DL — LOW (ref 13–17)
HGB BLD-MCNC: 8 G/DL — LOW (ref 13–17)
HGB BLD-MCNC: 8.1 G/DL — LOW (ref 13–17)
HGB BLD-MCNC: 8.1 G/DL — LOW (ref 13–17)
HGB BLD-MCNC: 8.2 G/DL — LOW (ref 13–17)
HGB BLD-MCNC: 8.3 G/DL — LOW (ref 13–17)
HGB BLD-MCNC: 8.5 G/DL — LOW (ref 13–17)
HGB BLD-MCNC: 8.5 G/DL — LOW (ref 13–17)
HGB BLD-MCNC: 8.6 G/DL — LOW (ref 13–17)
HGB BLD-MCNC: 8.6 G/DL — LOW (ref 13–17)
HGB BLD-MCNC: 8.7 G/DL — LOW (ref 13–17)
HGB BLD-MCNC: 8.8 G/DL — LOW (ref 13–17)
HGB BLD-MCNC: 8.9 G/DL — LOW (ref 13–17)
HGB BLD-MCNC: 8.9 G/DL — LOW (ref 13–17)
HGB BLD-MCNC: 9 G/DL — LOW (ref 13–17)
HGB BLD-MCNC: 9 G/DL — LOW (ref 13–17)
HGB BLD-MCNC: 9.1 G/DL — LOW (ref 13–17)
HGB BLD-MCNC: 9.2 G/DL — LOW (ref 13–17)
HGB BLD-MCNC: 9.3 G/DL — LOW (ref 13–17)
HGB BLD-MCNC: 9.4 G/DL — LOW (ref 13–17)
HGB BLD-MCNC: 9.5 G/DL — LOW (ref 13–17)
HGB BLD-MCNC: 9.6 G/DL — LOW (ref 13–17)
HGB BLD-MCNC: 9.7 G/DL — LOW (ref 13–17)
HGB BLD-MCNC: 9.8 G/DL — LOW (ref 13–17)
HGB BLD-MCNC: 9.9 G/DL — LOW (ref 13–17)
HIV-1 VIRAL LOAD RESULT: SIGNIFICANT CHANGE UP
HIV1 RNA # SERPL NAA+PROBE: SIGNIFICANT CHANGE UP
HIV1 RNA SER-IMP: SIGNIFICANT CHANGE UP
HIV1 RNA SERPL NAA+PROBE-ACNC: SIGNIFICANT CHANGE UP
HIV1 RNA SERPL NAA+PROBE-LOG#: SIGNIFICANT CHANGE UP LG COP/ML
HOROWITZ INDEX BLDMV+IHG-RTO: 21 — SIGNIFICANT CHANGE UP
HOROWITZ INDEX BLDMV+IHG-RTO: 28 — SIGNIFICANT CHANGE UP
HOROWITZ INDEX BLDV+IHG-RTO: 21 — SIGNIFICANT CHANGE UP
HOROWITZ INDEX BLDV+IHG-RTO: 28 — SIGNIFICANT CHANGE UP
HYALINE CASTS # UR AUTO: 0 /LPF — SIGNIFICANT CHANGE UP (ref 0–2)
HYALINE CASTS # UR AUTO: 0 /LPF — SIGNIFICANT CHANGE UP (ref 0–2)
HYALINE CASTS # UR AUTO: 6 /LPF — HIGH (ref 0–2)
HYALINE CASTS: 1 /LPF
IGA FLD-MCNC: 258 MG/DL — SIGNIFICANT CHANGE UP (ref 84–499)
IGG FLD-MCNC: 965 MG/DL — SIGNIFICANT CHANGE UP (ref 610–1660)
IGM SERPL-MCNC: 116 MG/DL — SIGNIFICANT CHANGE UP (ref 35–242)
IMM GRANULOCYTES NFR BLD AUTO: 0.2 %
IMM GRANULOCYTES NFR BLD AUTO: 0.2 % — SIGNIFICANT CHANGE UP (ref 0–1.5)
IMM GRANULOCYTES NFR BLD AUTO: 0.2 % — SIGNIFICANT CHANGE UP (ref 0–1.5)
IMM GRANULOCYTES NFR BLD AUTO: 0.3 %
IMM GRANULOCYTES NFR BLD AUTO: 0.3 % — SIGNIFICANT CHANGE UP (ref 0–1.5)
IMM GRANULOCYTES NFR BLD AUTO: 0.4 % — SIGNIFICANT CHANGE UP (ref 0–1.5)
IMM GRANULOCYTES NFR BLD AUTO: 0.5 % — SIGNIFICANT CHANGE UP (ref 0–1.5)
IMM GRANULOCYTES NFR BLD AUTO: 0.5 % — SIGNIFICANT CHANGE UP (ref 0–1.5)
IMM GRANULOCYTES NFR BLD AUTO: 0.6 % — SIGNIFICANT CHANGE UP (ref 0–1.5)
IMM GRANULOCYTES NFR BLD AUTO: 0.6 % — SIGNIFICANT CHANGE UP (ref 0–1.5)
IMM GRANULOCYTES NFR BLD AUTO: 0.7 % — SIGNIFICANT CHANGE UP (ref 0–1.5)
IMM GRANULOCYTES NFR BLD AUTO: 0.8 % — SIGNIFICANT CHANGE UP (ref 0–1.5)
IMM GRANULOCYTES NFR BLD AUTO: 1 % — SIGNIFICANT CHANGE UP (ref 0–1.5)
INR BLD: 1.08 RATIO — SIGNIFICANT CHANGE UP (ref 0.88–1.16)
INR BLD: 1.1 RATIO — SIGNIFICANT CHANGE UP (ref 0.88–1.16)
INR BLD: 1.12 RATIO — SIGNIFICANT CHANGE UP (ref 0.88–1.16)
INR BLD: 1.13 RATIO — SIGNIFICANT CHANGE UP (ref 0.88–1.16)
INR BLD: 1.13 RATIO — SIGNIFICANT CHANGE UP (ref 0.88–1.16)
INR BLD: 1.32 RATIO — HIGH (ref 0.88–1.16)
INR BLD: 1.62 RATIO — HIGH (ref 0.88–1.16)
INR BLD: 1.65 RATIO — HIGH (ref 0.88–1.16)
INR BLD: 1.66 RATIO — HIGH (ref 0.88–1.16)
INR BLD: 1.82 RATIO — HIGH (ref 0.88–1.16)
INR BLD: 1.86 RATIO — HIGH (ref 0.88–1.16)
INR BLD: 1.99 RATIO — HIGH (ref 0.88–1.16)
INR BLD: 2 RATIO — HIGH (ref 0.88–1.16)
INR BLD: 2.04 RATIO — HIGH (ref 0.88–1.16)
INR BLD: 2.06 RATIO — HIGH (ref 0.88–1.16)
INR BLD: 2.17 RATIO — HIGH (ref 0.88–1.16)
INR BLD: 2.22 RATIO — HIGH (ref 0.88–1.16)
INR BLD: 2.24 RATIO — HIGH (ref 0.88–1.16)
INR BLD: 2.29 RATIO — HIGH (ref 0.88–1.16)
INR BLD: 2.78 RATIO — HIGH (ref 0.88–1.16)
INR BLD: 3.21 RATIO — HIGH (ref 0.88–1.16)
INR BLD: 4.92 RATIO — HIGH (ref 0.88–1.16)
INTERPRETATION 24H UR IFE-IMP: SIGNIFICANT CHANGE UP
INTERPRETATION SERPL IFE-IMP: SIGNIFICANT CHANGE UP
IRON SATN MFR SERPL: 12 % — LOW (ref 16–55)
IRON SATN MFR SERPL: 25 % — SIGNIFICANT CHANGE UP (ref 16–55)
IRON SATN MFR SERPL: 26 % — SIGNIFICANT CHANGE UP (ref 16–55)
IRON SATN MFR SERPL: 28 % — SIGNIFICANT CHANGE UP (ref 16–55)
IRON SATN MFR SERPL: 36 UG/DL — LOW (ref 45–165)
IRON SATN MFR SERPL: 64 UG/DL — SIGNIFICANT CHANGE UP (ref 45–165)
IRON SATN MFR SERPL: 76 UG/DL — SIGNIFICANT CHANGE UP (ref 45–165)
IRON SATN MFR SERPL: 78 UG/DL — SIGNIFICANT CHANGE UP (ref 45–165)
KAPPA LC SER QL IFE: 6.27 MG/DL — HIGH (ref 0.33–1.94)
KAPPA LC SER QL IFE: 6.27 MG/DL — HIGH (ref 0.33–1.94)
KAPPA/LAMBDA FREE LIGHT CHAIN RATIO, SERUM: 1.36 RATIO — SIGNIFICANT CHANGE UP (ref 0.26–1.65)
KAPPA/LAMBDA FREE LIGHT CHAIN RATIO, SERUM: 1.36 RATIO — SIGNIFICANT CHANGE UP (ref 0.26–1.65)
KETONES UR-MCNC: NEGATIVE — SIGNIFICANT CHANGE UP
KETONES URINE: NEGATIVE
LACTATE BLDV-MCNC: 0.5 MMOL/L — LOW (ref 0.7–2)
LACTATE BLDV-MCNC: 1.1 MMOL/L — SIGNIFICANT CHANGE UP (ref 0.7–2)
LACTATE BLDV-MCNC: 1.2 MMOL/L — SIGNIFICANT CHANGE UP (ref 0.7–2)
LACTATE BLDV-MCNC: 1.4 MMOL/L — SIGNIFICANT CHANGE UP (ref 0.7–2)
LACTATE BLDV-MCNC: 1.4 MMOL/L — SIGNIFICANT CHANGE UP (ref 0.7–2)
LACTATE BLDV-MCNC: 2.2 MMOL/L — HIGH (ref 0.7–2)
LACTATE BLDV-MCNC: 2.4 MMOL/L — HIGH (ref 0.7–2)
LACTATE BLDV-MCNC: 2.6 MMOL/L — HIGH (ref 0.7–2)
LACTATE BLDV-MCNC: 5.2 MMOL/L — CRITICAL HIGH (ref 0.7–2)
LACTATE SERPL-SCNC: 0.5 MMOL/L — LOW (ref 0.7–2)
LACTATE SERPL-SCNC: 0.6 MMOL/L — LOW (ref 0.7–2)
LACTATE SERPL-SCNC: 0.7 MMOL/L — SIGNIFICANT CHANGE UP (ref 0.7–2)
LACTATE SERPL-SCNC: 0.8 MMOL/L — SIGNIFICANT CHANGE UP (ref 0.7–2)
LACTATE SERPL-SCNC: 0.9 MMOL/L — SIGNIFICANT CHANGE UP (ref 0.7–2)
LACTATE SERPL-SCNC: 0.9 MMOL/L — SIGNIFICANT CHANGE UP (ref 0.7–2)
LACTATE SERPL-SCNC: 1.1 MMOL/L — SIGNIFICANT CHANGE UP (ref 0.7–2)
LACTATE SERPL-SCNC: 1.1 MMOL/L — SIGNIFICANT CHANGE UP (ref 0.7–2)
LACTATE SERPL-SCNC: 1.3 MMOL/L — SIGNIFICANT CHANGE UP (ref 0.7–2)
LACTATE SERPL-SCNC: 3.6 MMOL/L — HIGH (ref 0.7–2)
LAMBDA LC SER QL IFE: 4.6 MG/DL — HIGH (ref 0.57–2.63)
LAMBDA LC SER QL IFE: 4.6 MG/DL — HIGH (ref 0.57–2.63)
LDH SERPL L TO P-CCNC: 189 U/L — SIGNIFICANT CHANGE UP (ref 50–242)
LDH SERPL L TO P-CCNC: 196 U/L — SIGNIFICANT CHANGE UP (ref 50–242)
LDH SERPL L TO P-CCNC: 215 U/L — SIGNIFICANT CHANGE UP (ref 50–242)
LDLC SERPL CALC-MCNC: 42 MG/DL
LDLC SERPL CALC-MCNC: 79 MG/DL
LEUKOCYTE ESTERASE UR-ACNC: NEGATIVE — SIGNIFICANT CHANGE UP
LEUKOCYTE ESTERASE URINE: NEGATIVE
LIPID PNL WITH DIRECT LDL SERPL: 58 MG/DL — SIGNIFICANT CHANGE UP
LYMPHOCYTES # BLD AUTO: 0.29 K/UL — LOW (ref 1–3.3)
LYMPHOCYTES # BLD AUTO: 0.37 K/UL — LOW (ref 1–3.3)
LYMPHOCYTES # BLD AUTO: 0.47 K/UL — LOW (ref 1–3.3)
LYMPHOCYTES # BLD AUTO: 0.52 K/UL — LOW (ref 1–3.3)
LYMPHOCYTES # BLD AUTO: 0.55 K/UL — LOW (ref 1–3.3)
LYMPHOCYTES # BLD AUTO: 0.56 K/UL — LOW (ref 1–3.3)
LYMPHOCYTES # BLD AUTO: 0.58 K/UL — LOW (ref 1–3.3)
LYMPHOCYTES # BLD AUTO: 0.61 K/UL — LOW (ref 1–3.3)
LYMPHOCYTES # BLD AUTO: 0.62 K/UL — LOW (ref 1–3.3)
LYMPHOCYTES # BLD AUTO: 0.62 K/UL — LOW (ref 1–3.3)
LYMPHOCYTES # BLD AUTO: 0.63 K/UL — LOW (ref 1–3.3)
LYMPHOCYTES # BLD AUTO: 0.66 K/UL — LOW (ref 1–3.3)
LYMPHOCYTES # BLD AUTO: 0.67 K/UL
LYMPHOCYTES # BLD AUTO: 0.71 K/UL
LYMPHOCYTES # BLD AUTO: 0.79 K/UL — LOW (ref 1–3.3)
LYMPHOCYTES # BLD AUTO: 0.84 K/UL — LOW (ref 1–3.3)
LYMPHOCYTES # BLD AUTO: 1.16 K/UL — SIGNIFICANT CHANGE UP (ref 1–3.3)
LYMPHOCYTES # BLD AUTO: 10.1 % — LOW (ref 13–44)
LYMPHOCYTES # BLD AUTO: 10.4 % — LOW (ref 13–44)
LYMPHOCYTES # BLD AUTO: 10.4 % — LOW (ref 13–44)
LYMPHOCYTES # BLD AUTO: 11.4 % — LOW (ref 13–44)
LYMPHOCYTES # BLD AUTO: 11.5 % — LOW (ref 13–44)
LYMPHOCYTES # BLD AUTO: 12.1 % — LOW (ref 13–44)
LYMPHOCYTES # BLD AUTO: 13 % — SIGNIFICANT CHANGE UP (ref 13–44)
LYMPHOCYTES # BLD AUTO: 13.3 % — SIGNIFICANT CHANGE UP (ref 13–44)
LYMPHOCYTES # BLD AUTO: 13.5 % — SIGNIFICANT CHANGE UP (ref 13–44)
LYMPHOCYTES # BLD AUTO: 14.6 % — SIGNIFICANT CHANGE UP (ref 13–44)
LYMPHOCYTES # BLD AUTO: 2.5 % — LOW (ref 13–44)
LYMPHOCYTES # BLD AUTO: 6.5 % — LOW (ref 13–44)
LYMPHOCYTES # BLD AUTO: 8.9 % — LOW (ref 13–44)
LYMPHOCYTES # BLD AUTO: 9.1 % — LOW (ref 13–44)
LYMPHOCYTES # BLD AUTO: 9.6 % — LOW (ref 13–44)
LYMPHOCYTES NFR BLD AUTO: 10.3 %
LYMPHOCYTES NFR BLD AUTO: 16 %
M PROTEIN 24H UR ELPH-MRATE: SIGNIFICANT CHANGE UP
M TB IFN-G BLD-IMP: NEGATIVE — SIGNIFICANT CHANGE UP
M TB IFN-G CD4+ BCKGRND COR BLD-ACNC: 0 IU/ML — SIGNIFICANT CHANGE UP
M TB IFN-G CD4+CD8+ BCKGRND COR BLD-ACNC: 0 IU/ML — SIGNIFICANT CHANGE UP
MAGNESIUM SERPL-MCNC: 1.6 MG/DL — SIGNIFICANT CHANGE UP (ref 1.6–2.6)
MAGNESIUM SERPL-MCNC: 1.9 MG/DL — SIGNIFICANT CHANGE UP (ref 1.6–2.6)
MAGNESIUM SERPL-MCNC: 2 MG/DL — SIGNIFICANT CHANGE UP (ref 1.6–2.6)
MAGNESIUM SERPL-MCNC: 2.1 MG/DL — SIGNIFICANT CHANGE UP (ref 1.6–2.6)
MAGNESIUM SERPL-MCNC: 2.2 MG/DL — SIGNIFICANT CHANGE UP (ref 1.6–2.6)
MAGNESIUM SERPL-MCNC: 2.3 MG/DL — SIGNIFICANT CHANGE UP (ref 1.6–2.6)
MAGNESIUM SERPL-MCNC: 2.4 MG/DL — SIGNIFICANT CHANGE UP (ref 1.6–2.6)
MAGNESIUM SERPL-MCNC: 2.5 MG/DL
MAGNESIUM SERPL-MCNC: 2.5 MG/DL — SIGNIFICANT CHANGE UP (ref 1.6–2.6)
MAGNESIUM SERPL-MCNC: 2.5 MG/DL — SIGNIFICANT CHANGE UP (ref 1.6–2.6)
MAGNESIUM SERPL-MCNC: 2.6 MG/DL
MAGNESIUM SERPL-MCNC: 2.6 MG/DL — SIGNIFICANT CHANGE UP (ref 1.6–2.6)
MAGNESIUM SERPL-MCNC: 2.7 MG/DL
MAGNESIUM SERPL-MCNC: 2.7 MG/DL — HIGH (ref 1.6–2.6)
MAGNESIUM SERPL-MCNC: 2.8 MG/DL — HIGH (ref 1.6–2.6)
MAGNESIUM SERPL-MCNC: 2.9 MG/DL — HIGH (ref 1.6–2.6)
MAGNESIUM SERPL-MCNC: 3 MG/DL — HIGH (ref 1.6–2.6)
MAN DIFF?: NORMAL
MAN DIFF?: NORMAL
MCHC RBC-ENTMCNC: 25.7 PG — LOW (ref 27–34)
MCHC RBC-ENTMCNC: 25.8 PG — LOW (ref 27–34)
MCHC RBC-ENTMCNC: 25.8 PG — LOW (ref 27–34)
MCHC RBC-ENTMCNC: 25.9 PG — LOW (ref 27–34)
MCHC RBC-ENTMCNC: 25.9 PG — LOW (ref 27–34)
MCHC RBC-ENTMCNC: 26.1 PG — LOW (ref 27–34)
MCHC RBC-ENTMCNC: 26.1 PG — LOW (ref 27–34)
MCHC RBC-ENTMCNC: 26.2 PG — LOW (ref 27–34)
MCHC RBC-ENTMCNC: 26.3 PG — LOW (ref 27–34)
MCHC RBC-ENTMCNC: 26.4 PG — LOW (ref 27–34)
MCHC RBC-ENTMCNC: 26.6 PG — LOW (ref 27–34)
MCHC RBC-ENTMCNC: 26.8 PG — LOW (ref 27–34)
MCHC RBC-ENTMCNC: 26.9 PG — LOW (ref 27–34)
MCHC RBC-ENTMCNC: 26.9 PG — LOW (ref 27–34)
MCHC RBC-ENTMCNC: 27.1 PG — SIGNIFICANT CHANGE UP (ref 27–34)
MCHC RBC-ENTMCNC: 27.1 PG — SIGNIFICANT CHANGE UP (ref 27–34)
MCHC RBC-ENTMCNC: 27.2 PG — SIGNIFICANT CHANGE UP (ref 27–34)
MCHC RBC-ENTMCNC: 27.3 PG — SIGNIFICANT CHANGE UP (ref 27–34)
MCHC RBC-ENTMCNC: 27.4 PG — SIGNIFICANT CHANGE UP (ref 27–34)
MCHC RBC-ENTMCNC: 27.5 PG — SIGNIFICANT CHANGE UP (ref 27–34)
MCHC RBC-ENTMCNC: 27.5 PG — SIGNIFICANT CHANGE UP (ref 27–34)
MCHC RBC-ENTMCNC: 27.7 PG — SIGNIFICANT CHANGE UP (ref 27–34)
MCHC RBC-ENTMCNC: 27.8 PG — SIGNIFICANT CHANGE UP (ref 27–34)
MCHC RBC-ENTMCNC: 29.1 GM/DL
MCHC RBC-ENTMCNC: 29.1 PG — SIGNIFICANT CHANGE UP (ref 27–34)
MCHC RBC-ENTMCNC: 29.4 PG — SIGNIFICANT CHANGE UP (ref 27–34)
MCHC RBC-ENTMCNC: 29.5 PG — SIGNIFICANT CHANGE UP (ref 27–34)
MCHC RBC-ENTMCNC: 29.5 PG — SIGNIFICANT CHANGE UP (ref 27–34)
MCHC RBC-ENTMCNC: 29.8 GM/DL — LOW (ref 32–36)
MCHC RBC-ENTMCNC: 29.9 GM/DL — LOW (ref 32–36)
MCHC RBC-ENTMCNC: 29.9 PG — SIGNIFICANT CHANGE UP (ref 27–34)
MCHC RBC-ENTMCNC: 29.9 PG — SIGNIFICANT CHANGE UP (ref 27–34)
MCHC RBC-ENTMCNC: 30 GM/DL — LOW (ref 32–36)
MCHC RBC-ENTMCNC: 30 PG — SIGNIFICANT CHANGE UP (ref 27–34)
MCHC RBC-ENTMCNC: 30 PG — SIGNIFICANT CHANGE UP (ref 27–34)
MCHC RBC-ENTMCNC: 30.1 GM/DL — LOW (ref 32–36)
MCHC RBC-ENTMCNC: 30.1 GM/DL — LOW (ref 32–36)
MCHC RBC-ENTMCNC: 30.2 GM/DL — LOW (ref 32–36)
MCHC RBC-ENTMCNC: 30.2 PG — SIGNIFICANT CHANGE UP (ref 27–34)
MCHC RBC-ENTMCNC: 30.2 PG — SIGNIFICANT CHANGE UP (ref 27–34)
MCHC RBC-ENTMCNC: 30.3 GM/DL — LOW (ref 32–36)
MCHC RBC-ENTMCNC: 30.3 GM/DL — LOW (ref 32–36)
MCHC RBC-ENTMCNC: 30.3 PG — SIGNIFICANT CHANGE UP (ref 27–34)
MCHC RBC-ENTMCNC: 30.4 GM/DL — LOW (ref 32–36)
MCHC RBC-ENTMCNC: 30.4 PG — SIGNIFICANT CHANGE UP (ref 27–34)
MCHC RBC-ENTMCNC: 30.4 PG — SIGNIFICANT CHANGE UP (ref 27–34)
MCHC RBC-ENTMCNC: 30.5 GM/DL — LOW (ref 32–36)
MCHC RBC-ENTMCNC: 30.5 PG — SIGNIFICANT CHANGE UP (ref 27–34)
MCHC RBC-ENTMCNC: 30.6 GM/DL — LOW (ref 32–36)
MCHC RBC-ENTMCNC: 30.6 PG — SIGNIFICANT CHANGE UP (ref 27–34)
MCHC RBC-ENTMCNC: 30.7 GM/DL — LOW (ref 32–36)
MCHC RBC-ENTMCNC: 30.7 PG — SIGNIFICANT CHANGE UP (ref 27–34)
MCHC RBC-ENTMCNC: 30.8 GM/DL — LOW (ref 32–36)
MCHC RBC-ENTMCNC: 30.8 PG — SIGNIFICANT CHANGE UP (ref 27–34)
MCHC RBC-ENTMCNC: 30.8 PG — SIGNIFICANT CHANGE UP (ref 27–34)
MCHC RBC-ENTMCNC: 30.9 GM/DL — LOW (ref 32–36)
MCHC RBC-ENTMCNC: 30.9 PG — SIGNIFICANT CHANGE UP (ref 27–34)
MCHC RBC-ENTMCNC: 31 GM/DL — LOW (ref 32–36)
MCHC RBC-ENTMCNC: 31 GM/DL — LOW (ref 32–36)
MCHC RBC-ENTMCNC: 31 PG — SIGNIFICANT CHANGE UP (ref 27–34)
MCHC RBC-ENTMCNC: 31.1 GM/DL — LOW (ref 32–36)
MCHC RBC-ENTMCNC: 31.1 PG — SIGNIFICANT CHANGE UP (ref 27–34)
MCHC RBC-ENTMCNC: 31.2 GM/DL — LOW (ref 32–36)
MCHC RBC-ENTMCNC: 31.3 GM/DL — LOW (ref 32–36)
MCHC RBC-ENTMCNC: 31.3 PG — SIGNIFICANT CHANGE UP (ref 27–34)
MCHC RBC-ENTMCNC: 31.4 GM/DL — LOW (ref 32–36)
MCHC RBC-ENTMCNC: 31.4 PG
MCHC RBC-ENTMCNC: 31.4 PG — SIGNIFICANT CHANGE UP (ref 27–34)
MCHC RBC-ENTMCNC: 31.5 GM/DL — LOW (ref 32–36)
MCHC RBC-ENTMCNC: 31.5 PG
MCHC RBC-ENTMCNC: 31.5 PG — SIGNIFICANT CHANGE UP (ref 27–34)
MCHC RBC-ENTMCNC: 31.6 GM/DL
MCHC RBC-ENTMCNC: 31.6 PG — SIGNIFICANT CHANGE UP (ref 27–34)
MCHC RBC-ENTMCNC: 31.7 GM/DL — LOW (ref 32–36)
MCHC RBC-ENTMCNC: 31.7 GM/DL — LOW (ref 32–36)
MCHC RBC-ENTMCNC: 31.9 GM/DL — LOW (ref 32–36)
MCHC RBC-ENTMCNC: 31.9 GM/DL — LOW (ref 32–36)
MCHC RBC-ENTMCNC: 32 GM/DL — SIGNIFICANT CHANGE UP (ref 32–36)
MCHC RBC-ENTMCNC: 32.1 GM/DL — SIGNIFICANT CHANGE UP (ref 32–36)
MCHC RBC-ENTMCNC: 32.2 GM/DL — SIGNIFICANT CHANGE UP (ref 32–36)
MCHC RBC-ENTMCNC: 32.3 GM/DL — SIGNIFICANT CHANGE UP (ref 32–36)
MCHC RBC-ENTMCNC: 32.3 GM/DL — SIGNIFICANT CHANGE UP (ref 32–36)
MCHC RBC-ENTMCNC: 32.4 GM/DL — SIGNIFICANT CHANGE UP (ref 32–36)
MCHC RBC-ENTMCNC: 32.5 GM/DL — SIGNIFICANT CHANGE UP (ref 32–36)
MCHC RBC-ENTMCNC: 33 GM/DL — SIGNIFICANT CHANGE UP (ref 32–36)
MCV RBC AUTO: 100.6 FL — HIGH (ref 80–100)
MCV RBC AUTO: 108.4 FL
MCV RBC AUTO: 83.2 FL — SIGNIFICANT CHANGE UP (ref 80–100)
MCV RBC AUTO: 84.2 FL — SIGNIFICANT CHANGE UP (ref 80–100)
MCV RBC AUTO: 84.3 FL — SIGNIFICANT CHANGE UP (ref 80–100)
MCV RBC AUTO: 84.6 FL — SIGNIFICANT CHANGE UP (ref 80–100)
MCV RBC AUTO: 84.7 FL — SIGNIFICANT CHANGE UP (ref 80–100)
MCV RBC AUTO: 84.7 FL — SIGNIFICANT CHANGE UP (ref 80–100)
MCV RBC AUTO: 85 FL — SIGNIFICANT CHANGE UP (ref 80–100)
MCV RBC AUTO: 85.2 FL — SIGNIFICANT CHANGE UP (ref 80–100)
MCV RBC AUTO: 85.2 FL — SIGNIFICANT CHANGE UP (ref 80–100)
MCV RBC AUTO: 86.2 FL — SIGNIFICANT CHANGE UP (ref 80–100)
MCV RBC AUTO: 86.3 FL — SIGNIFICANT CHANGE UP (ref 80–100)
MCV RBC AUTO: 86.3 FL — SIGNIFICANT CHANGE UP (ref 80–100)
MCV RBC AUTO: 86.4 FL — SIGNIFICANT CHANGE UP (ref 80–100)
MCV RBC AUTO: 86.6 FL — SIGNIFICANT CHANGE UP (ref 80–100)
MCV RBC AUTO: 87.2 FL — SIGNIFICANT CHANGE UP (ref 80–100)
MCV RBC AUTO: 87.3 FL — SIGNIFICANT CHANGE UP (ref 80–100)
MCV RBC AUTO: 87.5 FL — SIGNIFICANT CHANGE UP (ref 80–100)
MCV RBC AUTO: 87.9 FL — SIGNIFICANT CHANGE UP (ref 80–100)
MCV RBC AUTO: 88.2 FL — SIGNIFICANT CHANGE UP (ref 80–100)
MCV RBC AUTO: 88.5 FL — SIGNIFICANT CHANGE UP (ref 80–100)
MCV RBC AUTO: 88.5 FL — SIGNIFICANT CHANGE UP (ref 80–100)
MCV RBC AUTO: 88.8 FL — SIGNIFICANT CHANGE UP (ref 80–100)
MCV RBC AUTO: 88.9 FL — SIGNIFICANT CHANGE UP (ref 80–100)
MCV RBC AUTO: 89.1 FL — SIGNIFICANT CHANGE UP (ref 80–100)
MCV RBC AUTO: 89.1 FL — SIGNIFICANT CHANGE UP (ref 80–100)
MCV RBC AUTO: 89.5 FL — SIGNIFICANT CHANGE UP (ref 80–100)
MCV RBC AUTO: 89.7 FL — SIGNIFICANT CHANGE UP (ref 80–100)
MCV RBC AUTO: 89.8 FL — SIGNIFICANT CHANGE UP (ref 80–100)
MCV RBC AUTO: 89.8 FL — SIGNIFICANT CHANGE UP (ref 80–100)
MCV RBC AUTO: 89.9 FL — SIGNIFICANT CHANGE UP (ref 80–100)
MCV RBC AUTO: 89.9 FL — SIGNIFICANT CHANGE UP (ref 80–100)
MCV RBC AUTO: 90 FL — SIGNIFICANT CHANGE UP (ref 80–100)
MCV RBC AUTO: 90.3 FL — SIGNIFICANT CHANGE UP (ref 80–100)
MCV RBC AUTO: 90.4 FL — SIGNIFICANT CHANGE UP (ref 80–100)
MCV RBC AUTO: 94.5 FL — SIGNIFICANT CHANGE UP (ref 80–100)
MCV RBC AUTO: 95.1 FL — SIGNIFICANT CHANGE UP (ref 80–100)
MCV RBC AUTO: 95.2 FL — SIGNIFICANT CHANGE UP (ref 80–100)
MCV RBC AUTO: 95.3 FL — SIGNIFICANT CHANGE UP (ref 80–100)
MCV RBC AUTO: 95.5 FL — SIGNIFICANT CHANGE UP (ref 80–100)
MCV RBC AUTO: 95.6 FL — SIGNIFICANT CHANGE UP (ref 80–100)
MCV RBC AUTO: 95.7 FL — SIGNIFICANT CHANGE UP (ref 80–100)
MCV RBC AUTO: 95.8 FL — SIGNIFICANT CHANGE UP (ref 80–100)
MCV RBC AUTO: 95.9 FL — SIGNIFICANT CHANGE UP (ref 80–100)
MCV RBC AUTO: 95.9 FL — SIGNIFICANT CHANGE UP (ref 80–100)
MCV RBC AUTO: 96 FL — SIGNIFICANT CHANGE UP (ref 80–100)
MCV RBC AUTO: 96.1 FL — SIGNIFICANT CHANGE UP (ref 80–100)
MCV RBC AUTO: 96.2 FL — SIGNIFICANT CHANGE UP (ref 80–100)
MCV RBC AUTO: 96.2 FL — SIGNIFICANT CHANGE UP (ref 80–100)
MCV RBC AUTO: 96.3 FL — SIGNIFICANT CHANGE UP (ref 80–100)
MCV RBC AUTO: 96.4 FL — SIGNIFICANT CHANGE UP (ref 80–100)
MCV RBC AUTO: 96.5 FL — SIGNIFICANT CHANGE UP (ref 80–100)
MCV RBC AUTO: 96.5 FL — SIGNIFICANT CHANGE UP (ref 80–100)
MCV RBC AUTO: 96.7 FL — SIGNIFICANT CHANGE UP (ref 80–100)
MCV RBC AUTO: 96.9 FL — SIGNIFICANT CHANGE UP (ref 80–100)
MCV RBC AUTO: 97 FL — SIGNIFICANT CHANGE UP (ref 80–100)
MCV RBC AUTO: 97 FL — SIGNIFICANT CHANGE UP (ref 80–100)
MCV RBC AUTO: 97.1 FL — SIGNIFICANT CHANGE UP (ref 80–100)
MCV RBC AUTO: 97.4 FL — SIGNIFICANT CHANGE UP (ref 80–100)
MCV RBC AUTO: 97.7 FL — SIGNIFICANT CHANGE UP (ref 80–100)
MCV RBC AUTO: 97.8 FL — SIGNIFICANT CHANGE UP (ref 80–100)
MCV RBC AUTO: 97.8 FL — SIGNIFICANT CHANGE UP (ref 80–100)
MCV RBC AUTO: 97.9 FL — SIGNIFICANT CHANGE UP (ref 80–100)
MCV RBC AUTO: 98 FL — SIGNIFICANT CHANGE UP (ref 80–100)
MCV RBC AUTO: 98.3 FL — SIGNIFICANT CHANGE UP (ref 80–100)
MCV RBC AUTO: 98.6 FL — SIGNIFICANT CHANGE UP (ref 80–100)
MCV RBC AUTO: 99.2 FL
MCV RBC AUTO: 99.4 FL — SIGNIFICANT CHANGE UP (ref 80–100)
MCV RBC AUTO: 99.4 FL — SIGNIFICANT CHANGE UP (ref 80–100)
MCV RBC AUTO: 99.7 FL — SIGNIFICANT CHANGE UP (ref 80–100)
METHADONE UR-MCNC: NEGATIVE — SIGNIFICANT CHANGE UP
METHAQUALONE UR QL: NEGATIVE — SIGNIFICANT CHANGE UP
METHAQUALONE UR-MCNC: NEGATIVE — SIGNIFICANT CHANGE UP
METHOD TYPE: SIGNIFICANT CHANGE UP
METHOD TYPE: SIGNIFICANT CHANGE UP
MICROSCOPIC-UA: NORMAL
MONOCYTES # BLD AUTO: 0.28 K/UL — SIGNIFICANT CHANGE UP (ref 0–0.9)
MONOCYTES # BLD AUTO: 0.41 K/UL — SIGNIFICANT CHANGE UP (ref 0–0.9)
MONOCYTES # BLD AUTO: 0.44 K/UL — SIGNIFICANT CHANGE UP (ref 0–0.9)
MONOCYTES # BLD AUTO: 0.5 K/UL — SIGNIFICANT CHANGE UP (ref 0–0.9)
MONOCYTES # BLD AUTO: 0.51 K/UL
MONOCYTES # BLD AUTO: 0.57 K/UL — SIGNIFICANT CHANGE UP (ref 0–0.9)
MONOCYTES # BLD AUTO: 0.58 K/UL
MONOCYTES # BLD AUTO: 0.6 K/UL — SIGNIFICANT CHANGE UP (ref 0–0.9)
MONOCYTES # BLD AUTO: 0.62 K/UL — SIGNIFICANT CHANGE UP (ref 0–0.9)
MONOCYTES # BLD AUTO: 0.66 K/UL — SIGNIFICANT CHANGE UP (ref 0–0.9)
MONOCYTES # BLD AUTO: 0.68 K/UL — SIGNIFICANT CHANGE UP (ref 0–0.9)
MONOCYTES # BLD AUTO: 0.76 K/UL — SIGNIFICANT CHANGE UP (ref 0–0.9)
MONOCYTES # BLD AUTO: 0.77 K/UL — SIGNIFICANT CHANGE UP (ref 0–0.9)
MONOCYTES # BLD AUTO: 0.98 K/UL — HIGH (ref 0–0.9)
MONOCYTES # BLD AUTO: 0.99 K/UL — HIGH (ref 0–0.9)
MONOCYTES # BLD AUTO: 1.09 K/UL — HIGH (ref 0–0.9)
MONOCYTES # BLD AUTO: 1.25 K/UL — HIGH (ref 0–0.9)
MONOCYTES NFR BLD AUTO: 11.2 % — SIGNIFICANT CHANGE UP (ref 2–14)
MONOCYTES NFR BLD AUTO: 11.4 % — SIGNIFICANT CHANGE UP (ref 2–14)
MONOCYTES NFR BLD AUTO: 11.5 %
MONOCYTES NFR BLD AUTO: 11.6 % — SIGNIFICANT CHANGE UP (ref 2–14)
MONOCYTES NFR BLD AUTO: 11.9 % — SIGNIFICANT CHANGE UP (ref 2–14)
MONOCYTES NFR BLD AUTO: 12 % — SIGNIFICANT CHANGE UP (ref 2–14)
MONOCYTES NFR BLD AUTO: 12.6 % — SIGNIFICANT CHANGE UP (ref 2–14)
MONOCYTES NFR BLD AUTO: 12.9 % — SIGNIFICANT CHANGE UP (ref 2–14)
MONOCYTES NFR BLD AUTO: 13.3 % — SIGNIFICANT CHANGE UP (ref 2–14)
MONOCYTES NFR BLD AUTO: 13.7 % — SIGNIFICANT CHANGE UP (ref 2–14)
MONOCYTES NFR BLD AUTO: 14.6 % — HIGH (ref 2–14)
MONOCYTES NFR BLD AUTO: 6.5 % — SIGNIFICANT CHANGE UP (ref 2–14)
MONOCYTES NFR BLD AUTO: 8.6 % — SIGNIFICANT CHANGE UP (ref 2–14)
MONOCYTES NFR BLD AUTO: 8.7 % — SIGNIFICANT CHANGE UP (ref 2–14)
MONOCYTES NFR BLD AUTO: 8.9 %
MONOCYTES NFR BLD AUTO: 9.3 % — SIGNIFICANT CHANGE UP (ref 2–14)
MONOCYTES NFR BLD AUTO: 9.9 % — SIGNIFICANT CHANGE UP (ref 2–14)
MPO AB + PR3 PNL SER: NORMAL
MRSA PCR RESULT.: SIGNIFICANT CHANGE UP
MRSA PCR RESULT.: SIGNIFICANT CHANGE UP
NEUTROPHILS # BLD AUTO: 10.1 K/UL — HIGH (ref 1.8–7.4)
NEUTROPHILS # BLD AUTO: 2.9 K/UL
NEUTROPHILS # BLD AUTO: 3.04 K/UL — SIGNIFICANT CHANGE UP (ref 1.8–7.4)
NEUTROPHILS # BLD AUTO: 3.16 K/UL — SIGNIFICANT CHANGE UP (ref 1.8–7.4)
NEUTROPHILS # BLD AUTO: 3.17 K/UL — SIGNIFICANT CHANGE UP (ref 1.8–7.4)
NEUTROPHILS # BLD AUTO: 3.21 K/UL — SIGNIFICANT CHANGE UP (ref 1.8–7.4)
NEUTROPHILS # BLD AUTO: 3.28 K/UL — SIGNIFICANT CHANGE UP (ref 1.8–7.4)
NEUTROPHILS # BLD AUTO: 3.42 K/UL — SIGNIFICANT CHANGE UP (ref 1.8–7.4)
NEUTROPHILS # BLD AUTO: 3.85 K/UL — SIGNIFICANT CHANGE UP (ref 1.8–7.4)
NEUTROPHILS # BLD AUTO: 4.05 K/UL — SIGNIFICANT CHANGE UP (ref 1.8–7.4)
NEUTROPHILS # BLD AUTO: 4.13 K/UL — SIGNIFICANT CHANGE UP (ref 1.8–7.4)
NEUTROPHILS # BLD AUTO: 4.51 K/UL — SIGNIFICANT CHANGE UP (ref 1.8–7.4)
NEUTROPHILS # BLD AUTO: 4.52 K/UL — SIGNIFICANT CHANGE UP (ref 1.8–7.4)
NEUTROPHILS # BLD AUTO: 5.1 K/UL
NEUTROPHILS # BLD AUTO: 6.78 K/UL — SIGNIFICANT CHANGE UP (ref 1.8–7.4)
NEUTROPHILS # BLD AUTO: 7.11 K/UL — SIGNIFICANT CHANGE UP (ref 1.8–7.4)
NEUTROPHILS # BLD AUTO: 8.36 K/UL — HIGH (ref 1.8–7.4)
NEUTROPHILS NFR BLD AUTO: 65.1 %
NEUTROPHILS NFR BLD AUTO: 69.1 % — SIGNIFICANT CHANGE UP (ref 43–77)
NEUTROPHILS NFR BLD AUTO: 69.8 % — SIGNIFICANT CHANGE UP (ref 43–77)
NEUTROPHILS NFR BLD AUTO: 70.1 % — SIGNIFICANT CHANGE UP (ref 43–77)
NEUTROPHILS NFR BLD AUTO: 71 % — SIGNIFICANT CHANGE UP (ref 43–77)
NEUTROPHILS NFR BLD AUTO: 72.1 % — SIGNIFICANT CHANGE UP (ref 43–77)
NEUTROPHILS NFR BLD AUTO: 73.4 % — SIGNIFICANT CHANGE UP (ref 43–77)
NEUTROPHILS NFR BLD AUTO: 73.6 % — SIGNIFICANT CHANGE UP (ref 43–77)
NEUTROPHILS NFR BLD AUTO: 74.9 % — SIGNIFICANT CHANGE UP (ref 43–77)
NEUTROPHILS NFR BLD AUTO: 75.6 % — SIGNIFICANT CHANGE UP (ref 43–77)
NEUTROPHILS NFR BLD AUTO: 75.7 % — SIGNIFICANT CHANGE UP (ref 43–77)
NEUTROPHILS NFR BLD AUTO: 75.7 % — SIGNIFICANT CHANGE UP (ref 43–77)
NEUTROPHILS NFR BLD AUTO: 76.1 % — SIGNIFICANT CHANGE UP (ref 43–77)
NEUTROPHILS NFR BLD AUTO: 78.2 %
NEUTROPHILS NFR BLD AUTO: 78.4 % — HIGH (ref 43–77)
NEUTROPHILS NFR BLD AUTO: 80.1 % — HIGH (ref 43–77)
NEUTROPHILS NFR BLD AUTO: 88.2 % — HIGH (ref 43–77)
NICOTINE UR-MCNC: <5 NG/ML — SIGNIFICANT CHANGE UP
NITRITE UR-MCNC: NEGATIVE — SIGNIFICANT CHANGE UP
NITRITE URINE: NEGATIVE
NORNICOTINE UR-MCNC: <2 NG/ML — SIGNIFICANT CHANGE UP
NRBC # BLD: 0 /100 WBCS — SIGNIFICANT CHANGE UP (ref 0–0)
NT-PROBNP SERPL-MCNC: ABNORMAL PG/ML
NT-PROBNP SERPL-SCNC: HIGH PG/ML (ref 0–125)
NT-PROBNP SERPL-SCNC: HIGH PG/ML (ref 0–300)
O2 CT VFR BLD CALC: 32 MMHG — SIGNIFICANT CHANGE UP (ref 30–65)
O2 CT VFR BLD CALC: 35 MMHG — SIGNIFICANT CHANGE UP (ref 30–65)
O2 CT VFR BLD CALC: 37 MMHG — SIGNIFICANT CHANGE UP (ref 30–65)
O2 CT VFR BLD CALC: 39 MMHG — SIGNIFICANT CHANGE UP (ref 30–65)
O2 CT VFR BLD CALC: 40 MMHG — SIGNIFICANT CHANGE UP (ref 30–65)
O2 CT VFR BLD CALC: 41 MMHG — SIGNIFICANT CHANGE UP (ref 30–65)
O2 CT VFR BLD CALC: 41 MMHG — SIGNIFICANT CHANGE UP (ref 30–65)
O2 CT VFR BLD CALC: 42 MMHG — SIGNIFICANT CHANGE UP (ref 30–65)
O2 CT VFR BLD CALC: 43 MMHG — SIGNIFICANT CHANGE UP (ref 30–65)
O2 CT VFR BLD CALC: 45 MMHG — SIGNIFICANT CHANGE UP (ref 30–65)
O2 CT VFR BLD CALC: 49 MMHG — SIGNIFICANT CHANGE UP (ref 30–65)
OPIATES UR-MCNC: NEGATIVE — SIGNIFICANT CHANGE UP
ORGANISM # SPEC MICROSCOPIC CNT: SIGNIFICANT CHANGE UP
OSMOLALITY UR: 306 MOS/KG — SIGNIFICANT CHANGE UP (ref 300–900)
OSMOLALITY UR: 307 MOSM/KG — SIGNIFICANT CHANGE UP (ref 50–1200)
OSMOLALITY UR: 387 MOSM/KG — SIGNIFICANT CHANGE UP (ref 50–1200)
OTHER CELLS CSF MANUAL: 10 ML/DL — LOW (ref 18–22)
OTHER CELLS CSF MANUAL: 11 ML/DL — LOW (ref 18–22)
OTHER CELLS CSF MANUAL: 13 ML/DL — LOW (ref 18–22)
OTHER CELLS CSF MANUAL: 7 ML/DL — LOW (ref 18–23)
OTHER CELLS CSF MANUAL: 9 ML/DL — LOW (ref 18–22)
PARATHYROID HORMONE INTACT: 328 PG/ML
PCO2 BLDMV: 30 MMHG — SIGNIFICANT CHANGE UP (ref 30–65)
PCO2 BLDMV: 35 MMHG — SIGNIFICANT CHANGE UP (ref 30–65)
PCO2 BLDMV: 36 MMHG — SIGNIFICANT CHANGE UP (ref 30–65)
PCO2 BLDMV: 38 MMHG — SIGNIFICANT CHANGE UP (ref 30–65)
PCO2 BLDMV: 39 MMHG — SIGNIFICANT CHANGE UP (ref 30–65)
PCO2 BLDMV: 40 MMHG — SIGNIFICANT CHANGE UP (ref 30–65)
PCO2 BLDMV: 41 MMHG — SIGNIFICANT CHANGE UP (ref 30–65)
PCO2 BLDMV: 41 MMHG — SIGNIFICANT CHANGE UP (ref 30–65)
PCO2 BLDMV: 42 MMHG — SIGNIFICANT CHANGE UP (ref 30–65)
PCO2 BLDMV: 43 MMHG — SIGNIFICANT CHANGE UP (ref 30–65)
PCO2 BLDMV: 43 MMHG — SIGNIFICANT CHANGE UP (ref 30–65)
PCO2 BLDMV: 44 MMHG — SIGNIFICANT CHANGE UP (ref 30–65)
PCO2 BLDMV: 44 MMHG — SIGNIFICANT CHANGE UP (ref 30–65)
PCO2 BLDMV: 46 MMHG — SIGNIFICANT CHANGE UP (ref 30–65)
PCO2 BLDMV: 47 MMHG — SIGNIFICANT CHANGE UP (ref 30–65)
PCO2 BLDMV: 48 MMHG — SIGNIFICANT CHANGE UP (ref 30–65)
PCO2 BLDMV: 49 MMHG — SIGNIFICANT CHANGE UP (ref 30–65)
PCO2 BLDMV: 49 MMHG — SIGNIFICANT CHANGE UP (ref 30–65)
PCO2 BLDV: 34 MMHG — LOW (ref 35–50)
PCO2 BLDV: 38 MMHG — SIGNIFICANT CHANGE UP (ref 35–50)
PCO2 BLDV: 40 MMHG — SIGNIFICANT CHANGE UP (ref 35–50)
PCO2 BLDV: 40 MMHG — SIGNIFICANT CHANGE UP (ref 35–50)
PCO2 BLDV: 42 MMHG — SIGNIFICANT CHANGE UP (ref 35–50)
PCO2 BLDV: 43 MMHG — SIGNIFICANT CHANGE UP (ref 35–50)
PCO2 BLDV: 47 MMHG — SIGNIFICANT CHANGE UP (ref 35–50)
PCO2 BLDV: 47 MMHG — SIGNIFICANT CHANGE UP (ref 35–50)
PCO2 BLDV: 48 MMHG — SIGNIFICANT CHANGE UP (ref 35–50)
PCO2 BLDV: 60 MMHG — HIGH (ref 35–50)
PCP UR-MCNC: NEGATIVE — SIGNIFICANT CHANGE UP
PF4 HEPARIN CMPLX AB SER-ACNC: NEGATIVE — SIGNIFICANT CHANGE UP
PF4 HEPARIN CMPLX AB SER-ACNC: NEGATIVE — SIGNIFICANT CHANGE UP
PH BLDMV: 7.36 — SIGNIFICANT CHANGE UP (ref 7.32–7.45)
PH BLDMV: 7.38 — SIGNIFICANT CHANGE UP (ref 7.32–7.45)
PH BLDMV: 7.39 — SIGNIFICANT CHANGE UP (ref 7.32–7.45)
PH BLDMV: 7.4 — SIGNIFICANT CHANGE UP (ref 7.32–7.45)
PH BLDMV: 7.4 — SIGNIFICANT CHANGE UP (ref 7.32–7.45)
PH BLDMV: 7.41 — SIGNIFICANT CHANGE UP (ref 7.32–7.45)
PH BLDMV: 7.41 — SIGNIFICANT CHANGE UP (ref 7.32–7.45)
PH BLDMV: 7.42 — SIGNIFICANT CHANGE UP (ref 7.32–7.45)
PH BLDMV: 7.44 — SIGNIFICANT CHANGE UP (ref 7.32–7.45)
PH BLDMV: 7.45 — SIGNIFICANT CHANGE UP (ref 7.32–7.45)
PH BLDMV: 7.46 — HIGH (ref 7.32–7.45)
PH BLDMV: 7.48 — HIGH (ref 7.32–7.45)
PH BLDMV: 7.48 — HIGH (ref 7.32–7.45)
PH BLDV: 7.31 — LOW (ref 7.35–7.45)
PH BLDV: 7.32 — LOW (ref 7.35–7.45)
PH BLDV: 7.32 — LOW (ref 7.35–7.45)
PH BLDV: 7.33 — LOW (ref 7.35–7.45)
PH BLDV: 7.36 — SIGNIFICANT CHANGE UP (ref 7.35–7.45)
PH BLDV: 7.36 — SIGNIFICANT CHANGE UP (ref 7.35–7.45)
PH BLDV: 7.4 — SIGNIFICANT CHANGE UP (ref 7.35–7.45)
PH BLDV: 7.42 — SIGNIFICANT CHANGE UP (ref 7.35–7.45)
PH BLDV: 7.44 — SIGNIFICANT CHANGE UP (ref 7.35–7.45)
PH BLDV: 7.44 — SIGNIFICANT CHANGE UP (ref 7.35–7.45)
PH UR: 5 — SIGNIFICANT CHANGE UP (ref 5–8)
PH UR: 5 — SIGNIFICANT CHANGE UP (ref 5–8)
PH UR: 5.5 — SIGNIFICANT CHANGE UP (ref 5–8)
PH UR: 6.5 — SIGNIFICANT CHANGE UP (ref 5–8)
PH UR: 7 — SIGNIFICANT CHANGE UP (ref 5–8)
PH URINE: 6.5
PHOSPHATE 24H UR-MCNC: 52.9 MG/DL — SIGNIFICANT CHANGE UP
PHOSPHATE SERPL-MCNC: 1.6 MG/DL — LOW (ref 2.5–4.5)
PHOSPHATE SERPL-MCNC: 2.1 MG/DL — LOW (ref 2.5–4.5)
PHOSPHATE SERPL-MCNC: 2.2 MG/DL — LOW (ref 2.5–4.5)
PHOSPHATE SERPL-MCNC: 2.6 MG/DL — SIGNIFICANT CHANGE UP (ref 2.5–4.5)
PHOSPHATE SERPL-MCNC: 2.6 MG/DL — SIGNIFICANT CHANGE UP (ref 2.5–4.5)
PHOSPHATE SERPL-MCNC: 2.7 MG/DL — SIGNIFICANT CHANGE UP (ref 2.5–4.5)
PHOSPHATE SERPL-MCNC: 2.8 MG/DL — SIGNIFICANT CHANGE UP (ref 2.5–4.5)
PHOSPHATE SERPL-MCNC: 2.8 MG/DL — SIGNIFICANT CHANGE UP (ref 2.5–4.5)
PHOSPHATE SERPL-MCNC: 2.9 MG/DL — SIGNIFICANT CHANGE UP (ref 2.5–4.5)
PHOSPHATE SERPL-MCNC: 2.9 MG/DL — SIGNIFICANT CHANGE UP (ref 2.5–4.5)
PHOSPHATE SERPL-MCNC: 3 MG/DL — SIGNIFICANT CHANGE UP (ref 2.5–4.5)
PHOSPHATE SERPL-MCNC: 3 MG/DL — SIGNIFICANT CHANGE UP (ref 2.5–4.5)
PHOSPHATE SERPL-MCNC: 3.1 MG/DL — SIGNIFICANT CHANGE UP (ref 2.5–4.5)
PHOSPHATE SERPL-MCNC: 3.2 MG/DL — SIGNIFICANT CHANGE UP (ref 2.5–4.5)
PHOSPHATE SERPL-MCNC: 3.4 MG/DL
PHOSPHATE SERPL-MCNC: 3.4 MG/DL — SIGNIFICANT CHANGE UP (ref 2.5–4.5)
PHOSPHATE SERPL-MCNC: 3.4 MG/DL — SIGNIFICANT CHANGE UP (ref 2.5–4.5)
PHOSPHATE SERPL-MCNC: 3.5 MG/DL — SIGNIFICANT CHANGE UP (ref 2.5–4.5)
PHOSPHATE SERPL-MCNC: 3.6 MG/DL — SIGNIFICANT CHANGE UP (ref 2.5–4.5)
PHOSPHATE SERPL-MCNC: 3.8 MG/DL — SIGNIFICANT CHANGE UP (ref 2.5–4.5)
PHOSPHATE SERPL-MCNC: 3.9 MG/DL — SIGNIFICANT CHANGE UP (ref 2.5–4.5)
PHOSPHATE SERPL-MCNC: 4 MG/DL — SIGNIFICANT CHANGE UP (ref 2.5–4.5)
PHOSPHATE SERPL-MCNC: 4.1 MG/DL — SIGNIFICANT CHANGE UP (ref 2.5–4.5)
PHOSPHATE SERPL-MCNC: 4.3 MG/DL — SIGNIFICANT CHANGE UP (ref 2.5–4.5)
PHOSPHATE SERPL-MCNC: 4.4 MG/DL — SIGNIFICANT CHANGE UP (ref 2.5–4.5)
PHOSPHATE SERPL-MCNC: 4.4 MG/DL — SIGNIFICANT CHANGE UP (ref 2.5–4.5)
PHOSPHATE SERPL-MCNC: 4.5 MG/DL — SIGNIFICANT CHANGE UP (ref 2.5–4.5)
PHOSPHATE SERPL-MCNC: 4.5 MG/DL — SIGNIFICANT CHANGE UP (ref 2.5–4.5)
PHOSPHATE SERPL-MCNC: 4.7 MG/DL — HIGH (ref 2.5–4.5)
PHOSPHATE SERPL-MCNC: 4.9 MG/DL — HIGH (ref 2.5–4.5)
PHOSPHATE SERPL-MCNC: 5.5 MG/DL — HIGH (ref 2.5–4.5)
PHOSPHATE SERPL-MCNC: 5.6 MG/DL — HIGH (ref 2.5–4.5)
PLATELET # BLD AUTO: 100 K/UL — LOW (ref 150–400)
PLATELET # BLD AUTO: 101 K/UL — LOW (ref 150–400)
PLATELET # BLD AUTO: 101 K/UL — LOW (ref 150–400)
PLATELET # BLD AUTO: 102 K/UL — LOW (ref 150–400)
PLATELET # BLD AUTO: 103 K/UL — LOW (ref 150–400)
PLATELET # BLD AUTO: 105 K/UL — LOW (ref 150–400)
PLATELET # BLD AUTO: 105 K/UL — LOW (ref 150–400)
PLATELET # BLD AUTO: 107 K/UL — LOW (ref 150–400)
PLATELET # BLD AUTO: 109 K/UL — LOW (ref 150–400)
PLATELET # BLD AUTO: 111 K/UL — LOW (ref 150–400)
PLATELET # BLD AUTO: 112 K/UL — LOW (ref 150–400)
PLATELET # BLD AUTO: 118 K/UL — LOW (ref 150–400)
PLATELET # BLD AUTO: 120 K/UL — LOW (ref 150–400)
PLATELET # BLD AUTO: 121 K/UL — LOW (ref 150–400)
PLATELET # BLD AUTO: 121 K/UL — LOW (ref 150–400)
PLATELET # BLD AUTO: 122 K/UL — LOW (ref 150–400)
PLATELET # BLD AUTO: 123 K/UL — LOW (ref 150–400)
PLATELET # BLD AUTO: 124 K/UL — LOW (ref 150–400)
PLATELET # BLD AUTO: 127 K/UL — LOW (ref 150–400)
PLATELET # BLD AUTO: 128 K/UL — LOW (ref 150–400)
PLATELET # BLD AUTO: 129 K/UL — LOW (ref 150–400)
PLATELET # BLD AUTO: 130 K/UL — LOW (ref 150–400)
PLATELET # BLD AUTO: 131 K/UL — LOW (ref 150–400)
PLATELET # BLD AUTO: 131 K/UL — LOW (ref 150–400)
PLATELET # BLD AUTO: 133 K/UL — LOW (ref 150–400)
PLATELET # BLD AUTO: 134 K/UL — LOW (ref 150–400)
PLATELET # BLD AUTO: 136 K/UL — LOW (ref 150–400)
PLATELET # BLD AUTO: 136 K/UL — LOW (ref 150–400)
PLATELET # BLD AUTO: 137 K/UL — LOW (ref 150–400)
PLATELET # BLD AUTO: 137 K/UL — LOW (ref 150–400)
PLATELET # BLD AUTO: 138 K/UL — LOW (ref 150–400)
PLATELET # BLD AUTO: 139 K/UL — LOW (ref 150–400)
PLATELET # BLD AUTO: 142 K/UL — LOW (ref 150–400)
PLATELET # BLD AUTO: 143 K/UL — LOW (ref 150–400)
PLATELET # BLD AUTO: 146 K/UL — LOW (ref 150–400)
PLATELET # BLD AUTO: 152 K/UL — SIGNIFICANT CHANGE UP (ref 150–400)
PLATELET # BLD AUTO: 154 K/UL — SIGNIFICANT CHANGE UP (ref 150–400)
PLATELET # BLD AUTO: 156 K/UL — SIGNIFICANT CHANGE UP (ref 150–400)
PLATELET # BLD AUTO: 162 K/UL — SIGNIFICANT CHANGE UP (ref 150–400)
PLATELET # BLD AUTO: 170 K/UL — SIGNIFICANT CHANGE UP (ref 150–400)
PLATELET # BLD AUTO: 171 K/UL — SIGNIFICANT CHANGE UP (ref 150–400)
PLATELET # BLD AUTO: 199 K/UL — SIGNIFICANT CHANGE UP (ref 150–400)
PLATELET # BLD AUTO: 199 K/UL — SIGNIFICANT CHANGE UP (ref 150–400)
PLATELET # BLD AUTO: 212 K/UL — SIGNIFICANT CHANGE UP (ref 150–400)
PLATELET # BLD AUTO: 57 K/UL — LOW (ref 150–400)
PLATELET # BLD AUTO: 62 K/UL — LOW (ref 150–400)
PLATELET # BLD AUTO: 62 K/UL — LOW (ref 150–400)
PLATELET # BLD AUTO: 65 K/UL — LOW (ref 150–400)
PLATELET # BLD AUTO: 66 K/UL — LOW (ref 150–400)
PLATELET # BLD AUTO: 67 K/UL — LOW (ref 150–400)
PLATELET # BLD AUTO: 68 K/UL — LOW (ref 150–400)
PLATELET # BLD AUTO: 74 K/UL — LOW (ref 150–400)
PLATELET # BLD AUTO: 75 K/UL — LOW (ref 150–400)
PLATELET # BLD AUTO: 76 K/UL — LOW (ref 150–400)
PLATELET # BLD AUTO: 76 K/UL — LOW (ref 150–400)
PLATELET # BLD AUTO: 77 K/UL — LOW (ref 150–400)
PLATELET # BLD AUTO: 78 K/UL — LOW (ref 150–400)
PLATELET # BLD AUTO: 79 K/UL
PLATELET # BLD AUTO: 79 K/UL — LOW (ref 150–400)
PLATELET # BLD AUTO: 79 K/UL — LOW (ref 150–400)
PLATELET # BLD AUTO: 80 K/UL — LOW (ref 150–400)
PLATELET # BLD AUTO: 82 K/UL — LOW (ref 150–400)
PLATELET # BLD AUTO: 85 K/UL — LOW (ref 150–400)
PLATELET # BLD AUTO: 86 K/UL — LOW (ref 150–400)
PLATELET # BLD AUTO: 87 K/UL
PLATELET # BLD AUTO: 87 K/UL — LOW (ref 150–400)
PLATELET # BLD AUTO: 87 K/UL — LOW (ref 150–400)
PLATELET # BLD AUTO: 89 K/UL — LOW (ref 150–400)
PLATELET # BLD AUTO: 90 K/UL — LOW (ref 150–400)
PLATELET # BLD AUTO: 91 K/UL — LOW (ref 150–400)
PLATELET # BLD AUTO: 93 K/UL — LOW (ref 150–400)
PO2 BLDV: 21 MMHG — LOW (ref 25–45)
PO2 BLDV: 33 MMHG — SIGNIFICANT CHANGE UP (ref 25–45)
PO2 BLDV: 36 MMHG — SIGNIFICANT CHANGE UP (ref 25–45)
PO2 BLDV: 37 MMHG — SIGNIFICANT CHANGE UP (ref 25–45)
PO2 BLDV: 38 MMHG — SIGNIFICANT CHANGE UP (ref 25–45)
PO2 BLDV: 43 MMHG — SIGNIFICANT CHANGE UP (ref 25–45)
PO2 BLDV: 44 MMHG — SIGNIFICANT CHANGE UP (ref 25–45)
PO2 BLDV: 45 MMHG — SIGNIFICANT CHANGE UP (ref 25–45)
PO2 BLDV: 45 MMHG — SIGNIFICANT CHANGE UP (ref 25–45)
PO2 BLDV: 54 MMHG — HIGH (ref 25–45)
POTASSIUM BLDV-SCNC: 3.4 MMOL/L — LOW (ref 3.5–5.3)
POTASSIUM BLDV-SCNC: 3.9 MMOL/L — SIGNIFICANT CHANGE UP (ref 3.5–5.3)
POTASSIUM BLDV-SCNC: 4.2 MMOL/L — SIGNIFICANT CHANGE UP (ref 3.5–5.3)
POTASSIUM BLDV-SCNC: 4.5 MMOL/L — SIGNIFICANT CHANGE UP (ref 3.5–5.3)
POTASSIUM BLDV-SCNC: 4.5 MMOL/L — SIGNIFICANT CHANGE UP (ref 3.5–5.3)
POTASSIUM BLDV-SCNC: 5.2 MMOL/L — SIGNIFICANT CHANGE UP (ref 3.5–5.3)
POTASSIUM BLDV-SCNC: 5.3 MMOL/L — SIGNIFICANT CHANGE UP (ref 3.5–5.3)
POTASSIUM SERPL-MCNC: 2.7 MMOL/L — CRITICAL LOW (ref 3.5–5.3)
POTASSIUM SERPL-MCNC: 2.9 MMOL/L — CRITICAL LOW (ref 3.5–5.3)
POTASSIUM SERPL-MCNC: 3.2 MMOL/L — LOW (ref 3.5–5.3)
POTASSIUM SERPL-MCNC: 3.4 MMOL/L — LOW (ref 3.5–5.3)
POTASSIUM SERPL-MCNC: 3.5 MMOL/L — SIGNIFICANT CHANGE UP (ref 3.5–5.3)
POTASSIUM SERPL-MCNC: 3.6 MMOL/L — SIGNIFICANT CHANGE UP (ref 3.5–5.3)
POTASSIUM SERPL-MCNC: 3.7 MMOL/L — SIGNIFICANT CHANGE UP (ref 3.5–5.3)
POTASSIUM SERPL-MCNC: 3.8 MMOL/L — SIGNIFICANT CHANGE UP (ref 3.5–5.3)
POTASSIUM SERPL-MCNC: 3.9 MMOL/L — SIGNIFICANT CHANGE UP (ref 3.5–5.3)
POTASSIUM SERPL-MCNC: 4 MMOL/L — SIGNIFICANT CHANGE UP (ref 3.5–5.3)
POTASSIUM SERPL-MCNC: 4.1 MMOL/L — SIGNIFICANT CHANGE UP (ref 3.5–5.3)
POTASSIUM SERPL-MCNC: 4.2 MMOL/L — SIGNIFICANT CHANGE UP (ref 3.5–5.3)
POTASSIUM SERPL-MCNC: 4.2 MMOL/L — SIGNIFICANT CHANGE UP (ref 3.5–5.3)
POTASSIUM SERPL-MCNC: 4.3 MMOL/L — SIGNIFICANT CHANGE UP (ref 3.5–5.3)
POTASSIUM SERPL-MCNC: 4.4 MMOL/L — SIGNIFICANT CHANGE UP (ref 3.5–5.3)
POTASSIUM SERPL-MCNC: 4.4 MMOL/L — SIGNIFICANT CHANGE UP (ref 3.5–5.3)
POTASSIUM SERPL-MCNC: 4.5 MMOL/L — SIGNIFICANT CHANGE UP (ref 3.5–5.3)
POTASSIUM SERPL-MCNC: 4.6 MMOL/L — SIGNIFICANT CHANGE UP (ref 3.5–5.3)
POTASSIUM SERPL-MCNC: 4.7 MMOL/L — SIGNIFICANT CHANGE UP (ref 3.5–5.3)
POTASSIUM SERPL-MCNC: 4.7 MMOL/L — SIGNIFICANT CHANGE UP (ref 3.5–5.3)
POTASSIUM SERPL-MCNC: 4.9 MMOL/L — SIGNIFICANT CHANGE UP (ref 3.5–5.3)
POTASSIUM SERPL-MCNC: 5 MMOL/L — SIGNIFICANT CHANGE UP (ref 3.5–5.3)
POTASSIUM SERPL-MCNC: 5.1 MMOL/L — SIGNIFICANT CHANGE UP (ref 3.5–5.3)
POTASSIUM SERPL-MCNC: 5.2 MMOL/L — SIGNIFICANT CHANGE UP (ref 3.5–5.3)
POTASSIUM SERPL-MCNC: 5.2 MMOL/L — SIGNIFICANT CHANGE UP (ref 3.5–5.3)
POTASSIUM SERPL-MCNC: 5.3 MMOL/L — SIGNIFICANT CHANGE UP (ref 3.5–5.3)
POTASSIUM SERPL-MCNC: 5.5 MMOL/L — HIGH (ref 3.5–5.3)
POTASSIUM SERPL-MCNC: 5.6 MMOL/L — HIGH (ref 3.5–5.3)
POTASSIUM SERPL-MCNC: 5.7 MMOL/L — HIGH (ref 3.5–5.3)
POTASSIUM SERPL-MCNC: 6 MMOL/L — HIGH (ref 3.5–5.3)
POTASSIUM SERPL-MCNC: 6.6 MMOL/L — CRITICAL HIGH (ref 3.5–5.3)
POTASSIUM SERPL-MCNC: 7.3 MMOL/L — CRITICAL HIGH (ref 3.5–5.3)
POTASSIUM SERPL-MCNC: 8.1 MMOL/L — CRITICAL HIGH (ref 3.5–5.3)
POTASSIUM SERPL-SCNC: 2.7 MMOL/L — CRITICAL LOW (ref 3.5–5.3)
POTASSIUM SERPL-SCNC: 2.8 MMOL/L
POTASSIUM SERPL-SCNC: 2.9 MMOL/L — CRITICAL LOW (ref 3.5–5.3)
POTASSIUM SERPL-SCNC: 3.2 MMOL/L — LOW (ref 3.5–5.3)
POTASSIUM SERPL-SCNC: 3.4 MMOL/L — LOW (ref 3.5–5.3)
POTASSIUM SERPL-SCNC: 3.5 MMOL/L — SIGNIFICANT CHANGE UP (ref 3.5–5.3)
POTASSIUM SERPL-SCNC: 3.6 MMOL/L — SIGNIFICANT CHANGE UP (ref 3.5–5.3)
POTASSIUM SERPL-SCNC: 3.7 MMOL/L
POTASSIUM SERPL-SCNC: 3.7 MMOL/L — SIGNIFICANT CHANGE UP (ref 3.5–5.3)
POTASSIUM SERPL-SCNC: 3.8 MMOL/L
POTASSIUM SERPL-SCNC: 3.8 MMOL/L — SIGNIFICANT CHANGE UP (ref 3.5–5.3)
POTASSIUM SERPL-SCNC: 3.9 MMOL/L
POTASSIUM SERPL-SCNC: 3.9 MMOL/L — SIGNIFICANT CHANGE UP (ref 3.5–5.3)
POTASSIUM SERPL-SCNC: 4 MMOL/L — SIGNIFICANT CHANGE UP (ref 3.5–5.3)
POTASSIUM SERPL-SCNC: 4.1 MMOL/L — SIGNIFICANT CHANGE UP (ref 3.5–5.3)
POTASSIUM SERPL-SCNC: 4.2 MMOL/L — SIGNIFICANT CHANGE UP (ref 3.5–5.3)
POTASSIUM SERPL-SCNC: 4.2 MMOL/L — SIGNIFICANT CHANGE UP (ref 3.5–5.3)
POTASSIUM SERPL-SCNC: 4.3 MMOL/L — SIGNIFICANT CHANGE UP (ref 3.5–5.3)
POTASSIUM SERPL-SCNC: 4.4 MMOL/L — SIGNIFICANT CHANGE UP (ref 3.5–5.3)
POTASSIUM SERPL-SCNC: 4.4 MMOL/L — SIGNIFICANT CHANGE UP (ref 3.5–5.3)
POTASSIUM SERPL-SCNC: 4.5 MMOL/L
POTASSIUM SERPL-SCNC: 4.5 MMOL/L
POTASSIUM SERPL-SCNC: 4.5 MMOL/L — SIGNIFICANT CHANGE UP (ref 3.5–5.3)
POTASSIUM SERPL-SCNC: 4.6 MMOL/L — SIGNIFICANT CHANGE UP (ref 3.5–5.3)
POTASSIUM SERPL-SCNC: 4.7 MMOL/L — SIGNIFICANT CHANGE UP (ref 3.5–5.3)
POTASSIUM SERPL-SCNC: 4.7 MMOL/L — SIGNIFICANT CHANGE UP (ref 3.5–5.3)
POTASSIUM SERPL-SCNC: 4.9 MMOL/L
POTASSIUM SERPL-SCNC: 4.9 MMOL/L — SIGNIFICANT CHANGE UP (ref 3.5–5.3)
POTASSIUM SERPL-SCNC: 5 MMOL/L — SIGNIFICANT CHANGE UP (ref 3.5–5.3)
POTASSIUM SERPL-SCNC: 5.1 MMOL/L — SIGNIFICANT CHANGE UP (ref 3.5–5.3)
POTASSIUM SERPL-SCNC: 5.2 MMOL/L — SIGNIFICANT CHANGE UP (ref 3.5–5.3)
POTASSIUM SERPL-SCNC: 5.2 MMOL/L — SIGNIFICANT CHANGE UP (ref 3.5–5.3)
POTASSIUM SERPL-SCNC: 5.3 MMOL/L — SIGNIFICANT CHANGE UP (ref 3.5–5.3)
POTASSIUM SERPL-SCNC: 5.5 MMOL/L — HIGH (ref 3.5–5.3)
POTASSIUM SERPL-SCNC: 5.6 MMOL/L — HIGH (ref 3.5–5.3)
POTASSIUM SERPL-SCNC: 5.7 MMOL/L — HIGH (ref 3.5–5.3)
POTASSIUM SERPL-SCNC: 6 MMOL/L
POTASSIUM SERPL-SCNC: 6 MMOL/L — HIGH (ref 3.5–5.3)
POTASSIUM SERPL-SCNC: 6.6 MMOL/L — CRITICAL HIGH (ref 3.5–5.3)
POTASSIUM SERPL-SCNC: 7.3 MMOL/L — CRITICAL HIGH (ref 3.5–5.3)
POTASSIUM SERPL-SCNC: 8.1 MMOL/L — CRITICAL HIGH (ref 3.5–5.3)
POTASSIUM UR-SCNC: 42 MMOL/L — SIGNIFICANT CHANGE UP
PREALB SERPL-MCNC: 15 MG/DL — LOW (ref 20–40)
PROPOXYPH UR QL: NEGATIVE — SIGNIFICANT CHANGE UP
PROT ?TM UR-MCNC: 16 MG/DL — HIGH (ref 0–12)
PROT ?TM UR-MCNC: 4 MG/DL — SIGNIFICANT CHANGE UP (ref 0–12)
PROT ?TM UR-MCNC: <4 MG/DL — SIGNIFICANT CHANGE UP (ref 0–12)
PROT ?TM UR-MCNC: SIGNIFICANT CHANGE UP MG/DL (ref 0–12)
PROT PATTERN 24H UR ELPH-IMP: SIGNIFICANT CHANGE UP
PROT PATTERN SERPL ELPH-IMP: SIGNIFICANT CHANGE UP
PROT PATTERN SERPL ELPH-IMP: SIGNIFICANT CHANGE UP
PROT SERPL-MCNC: 4.3 G/DL — LOW (ref 6–8.3)
PROT SERPL-MCNC: 4.8 G/DL — LOW (ref 6–8.3)
PROT SERPL-MCNC: 5.1 G/DL — LOW (ref 6–8.3)
PROT SERPL-MCNC: 5.5 G/DL — LOW (ref 6–8.3)
PROT SERPL-MCNC: 5.6 G/DL — LOW (ref 6–8.3)
PROT SERPL-MCNC: 5.7 G/DL — LOW (ref 6–8.3)
PROT SERPL-MCNC: 5.9 G/DL — LOW (ref 6–8.3)
PROT SERPL-MCNC: 5.9 G/DL — LOW (ref 6–8.3)
PROT SERPL-MCNC: 6 G/DL — SIGNIFICANT CHANGE UP (ref 6–8.3)
PROT SERPL-MCNC: 6.1 G/DL
PROT SERPL-MCNC: 6.1 G/DL — SIGNIFICANT CHANGE UP (ref 6–8.3)
PROT SERPL-MCNC: 6.1 GM/DL — SIGNIFICANT CHANGE UP (ref 6–8.3)
PROT SERPL-MCNC: 6.2 G/DL — SIGNIFICANT CHANGE UP (ref 6–8.3)
PROT SERPL-MCNC: 6.2 GM/DL — SIGNIFICANT CHANGE UP (ref 6–8.3)
PROT SERPL-MCNC: 6.3 G/DL — SIGNIFICANT CHANGE UP (ref 6–8.3)
PROT SERPL-MCNC: 6.4 G/DL
PROT SERPL-MCNC: 6.4 G/DL
PROT SERPL-MCNC: 6.5 GM/DL — SIGNIFICANT CHANGE UP (ref 6–8.3)
PROT SERPL-MCNC: 6.6 G/DL
PROT SERPL-MCNC: 6.6 G/DL
PROT SERPL-MCNC: 6.6 G/DL — SIGNIFICANT CHANGE UP (ref 6–8.3)
PROT SERPL-MCNC: 7 G/DL — SIGNIFICANT CHANGE UP (ref 6–8.3)
PROT SERPL-MCNC: 7 G/DL — SIGNIFICANT CHANGE UP (ref 6–8.3)
PROT SERPL-MCNC: 7.1 G/DL — SIGNIFICANT CHANGE UP (ref 6–8.3)
PROT SERPL-MCNC: 7.9 G/DL — SIGNIFICANT CHANGE UP (ref 6–8.3)
PROT UR-MCNC: 30 MG/DL
PROT UR-MCNC: 51 MG/DL
PROT UR-MCNC: ABNORMAL
PROT UR-MCNC: NEGATIVE — SIGNIFICANT CHANGE UP
PROT UR-MCNC: SIGNIFICANT CHANGE UP
PROT/CREAT UR-RTO: 0.1 RATIO — SIGNIFICANT CHANGE UP (ref 0–0.2)
PROT/CREAT UR-RTO: <0.2 RATIO — SIGNIFICANT CHANGE UP (ref 0–0.2)
PROTEIN URINE: ABNORMAL
PROTHROM AB SERPL-ACNC: 12.8 SEC — SIGNIFICANT CHANGE UP (ref 10.6–13.6)
PROTHROM AB SERPL-ACNC: 13 SEC — SIGNIFICANT CHANGE UP (ref 10.6–13.6)
PROTHROM AB SERPL-ACNC: 13.2 SEC — SIGNIFICANT CHANGE UP (ref 10.6–13.6)
PROTHROM AB SERPL-ACNC: 13.3 SEC — SIGNIFICANT CHANGE UP (ref 10.6–13.6)
PROTHROM AB SERPL-ACNC: 13.3 SEC — SIGNIFICANT CHANGE UP (ref 10.6–13.6)
PROTHROM AB SERPL-ACNC: 13.4 SEC — SIGNIFICANT CHANGE UP (ref 10.6–13.6)
PROTHROM AB SERPL-ACNC: 13.4 SEC — SIGNIFICANT CHANGE UP (ref 10.6–13.6)
PROTHROM AB SERPL-ACNC: 15.5 SEC — HIGH (ref 10.6–13.6)
PROTHROM AB SERPL-ACNC: 19 SEC — HIGH (ref 10.6–13.6)
PROTHROM AB SERPL-ACNC: 19.3 SEC — HIGH (ref 10.6–13.6)
PROTHROM AB SERPL-ACNC: 19.5 SEC — HIGH (ref 10.6–13.6)
PROTHROM AB SERPL-ACNC: 20 SEC — HIGH (ref 10.6–13.6)
PROTHROM AB SERPL-ACNC: 21.1 SEC — HIGH (ref 10.6–13.6)
PROTHROM AB SERPL-ACNC: 21.4 SEC — HIGH (ref 10.6–13.6)
PROTHROM AB SERPL-ACNC: 23.2 SEC — HIGH (ref 10.6–13.6)
PROTHROM AB SERPL-ACNC: 23.5 SEC — HIGH (ref 10.6–13.6)
PROTHROM AB SERPL-ACNC: 23.9 SEC — HIGH (ref 10.6–13.6)
PROTHROM AB SERPL-ACNC: 25.1 SEC — HIGH (ref 10.6–13.6)
PROTHROM AB SERPL-ACNC: 25.4 SEC — HIGH (ref 10.6–13.6)
PROTHROM AB SERPL-ACNC: 25.7 SEC — HIGH (ref 10.6–13.6)
PROTHROM AB SERPL-ACNC: 26.4 SEC — HIGH (ref 10.6–13.6)
PROTHROM AB SERPL-ACNC: 31.5 SEC — HIGH (ref 10.6–13.6)
PROTHROM AB SERPL-ACNC: 36.4 SEC — HIGH (ref 10.6–13.6)
PROTHROM AB SERPL-ACNC: 54.7 SEC — HIGH (ref 10.6–13.6)
PSA SERPL-MCNC: 1.14 NG/ML — SIGNIFICANT CHANGE UP (ref 0–4)
QUANT TB PLUS MITOGEN MINUS NIL: 0.72 IU/ML — SIGNIFICANT CHANGE UP
RBC # BLD: 2.79 M/UL — LOW (ref 4.2–5.8)
RBC # BLD: 2.84 M/UL — LOW (ref 4.2–5.8)
RBC # BLD: 2.85 M/UL — LOW (ref 4.2–5.8)
RBC # BLD: 2.86 M/UL — LOW (ref 4.2–5.8)
RBC # BLD: 2.86 M/UL — LOW (ref 4.2–5.8)
RBC # BLD: 2.87 M/UL — LOW (ref 4.2–5.8)
RBC # BLD: 2.89 M/UL — LOW (ref 4.2–5.8)
RBC # BLD: 2.91 M/UL — LOW (ref 4.2–5.8)
RBC # BLD: 2.94 M/UL — LOW (ref 4.2–5.8)
RBC # BLD: 2.95 M/UL — LOW (ref 4.2–5.8)
RBC # BLD: 2.96 M/UL — LOW (ref 4.2–5.8)
RBC # BLD: 2.96 M/UL — LOW (ref 4.2–5.8)
RBC # BLD: 2.97 M/UL — LOW (ref 4.2–5.8)
RBC # BLD: 2.97 M/UL — LOW (ref 4.2–5.8)
RBC # BLD: 2.98 M/UL — LOW (ref 4.2–5.8)
RBC # BLD: 2.99 M/UL — LOW (ref 4.2–5.8)
RBC # BLD: 2.99 M/UL — LOW (ref 4.2–5.8)
RBC # BLD: 3.01 M/UL — LOW (ref 4.2–5.8)
RBC # BLD: 3.01 M/UL — LOW (ref 4.2–5.8)
RBC # BLD: 3.02 M/UL — LOW (ref 4.2–5.8)
RBC # BLD: 3.03 M/UL — LOW (ref 4.2–5.8)
RBC # BLD: 3.03 M/UL — LOW (ref 4.2–5.8)
RBC # BLD: 3.05 M/UL — LOW (ref 4.2–5.8)
RBC # BLD: 3.05 M/UL — LOW (ref 4.2–5.8)
RBC # BLD: 3.06 M/UL — LOW (ref 4.2–5.8)
RBC # BLD: 3.06 M/UL — LOW (ref 4.2–5.8)
RBC # BLD: 3.1 M/UL — LOW (ref 4.2–5.8)
RBC # BLD: 3.11 M/UL — LOW (ref 4.2–5.8)
RBC # BLD: 3.13 M/UL — LOW (ref 4.2–5.8)
RBC # BLD: 3.15 M/UL — LOW (ref 4.2–5.8)
RBC # BLD: 3.16 M/UL — LOW (ref 4.2–5.8)
RBC # BLD: 3.17 M/UL — LOW (ref 4.2–5.8)
RBC # BLD: 3.21 M/UL — LOW (ref 4.2–5.8)
RBC # BLD: 3.21 M/UL — LOW (ref 4.2–5.8)
RBC # BLD: 3.22 M/UL — LOW (ref 4.2–5.8)
RBC # BLD: 3.24 M/UL — LOW (ref 4.2–5.8)
RBC # BLD: 3.31 M/UL — LOW (ref 4.2–5.8)
RBC # BLD: 3.33 M/UL
RBC # BLD: 3.33 M/UL — LOW (ref 4.2–5.8)
RBC # BLD: 3.37 M/UL — LOW (ref 4.2–5.8)
RBC # BLD: 3.38 M/UL — LOW (ref 4.2–5.8)
RBC # BLD: 3.39 M/UL — LOW (ref 4.2–5.8)
RBC # BLD: 3.42 M/UL — LOW (ref 4.2–5.8)
RBC # BLD: 3.45 M/UL — LOW (ref 4.2–5.8)
RBC # BLD: 3.46 M/UL — LOW (ref 4.2–5.8)
RBC # BLD: 3.46 M/UL — LOW (ref 4.2–5.8)
RBC # BLD: 3.48 M/UL — LOW (ref 4.2–5.8)
RBC # BLD: 3.49 M/UL — LOW (ref 4.2–5.8)
RBC # BLD: 3.53 M/UL — LOW (ref 4.2–5.8)
RBC # BLD: 3.57 M/UL — LOW (ref 4.2–5.8)
RBC # BLD: 3.79 M/UL — LOW (ref 4.2–5.8)
RBC # BLD: 3.81 M/UL — LOW (ref 4.2–5.8)
RBC # BLD: 3.9 M/UL — LOW (ref 4.2–5.8)
RBC # BLD: 3.92 M/UL — LOW (ref 4.2–5.8)
RBC # BLD: 3.98 M/UL
RBC # BLD: 3.99 M/UL — LOW (ref 4.2–5.8)
RBC # BLD: 4.01 M/UL — LOW (ref 4.2–5.8)
RBC # BLD: 4.01 M/UL — LOW (ref 4.2–5.8)
RBC # BLD: 4.06 M/UL — LOW (ref 4.2–5.8)
RBC # BLD: 4.06 M/UL — LOW (ref 4.2–5.8)
RBC # BLD: 4.2 M/UL — SIGNIFICANT CHANGE UP (ref 4.2–5.8)
RBC # BLD: 4.31 M/UL — SIGNIFICANT CHANGE UP (ref 4.2–5.8)
RBC # BLD: 4.37 M/UL — SIGNIFICANT CHANGE UP (ref 4.2–5.8)
RBC # BLD: 4.54 M/UL — SIGNIFICANT CHANGE UP (ref 4.2–5.8)
RBC # FLD: 16.1 %
RBC # FLD: 16.3 % — HIGH (ref 10.3–14.5)
RBC # FLD: 16.4 % — HIGH (ref 10.3–14.5)
RBC # FLD: 16.5 % — HIGH (ref 10.3–14.5)
RBC # FLD: 16.6 % — HIGH (ref 10.3–14.5)
RBC # FLD: 16.7 % — HIGH (ref 10.3–14.5)
RBC # FLD: 16.8 % — HIGH (ref 10.3–14.5)
RBC # FLD: 17 % — HIGH (ref 10.3–14.5)
RBC # FLD: 17 % — HIGH (ref 10.3–14.5)
RBC # FLD: 17.1 % — HIGH (ref 10.3–14.5)
RBC # FLD: 17.2 % — HIGH (ref 10.3–14.5)
RBC # FLD: 17.3 % — HIGH (ref 10.3–14.5)
RBC # FLD: 17.4 % — HIGH (ref 10.3–14.5)
RBC # FLD: 17.5 % — HIGH (ref 10.3–14.5)
RBC # FLD: 17.6 % — HIGH (ref 10.3–14.5)
RBC # FLD: 17.6 % — HIGH (ref 10.3–14.5)
RBC # FLD: 17.7 % — HIGH (ref 10.3–14.5)
RBC # FLD: 17.8 % — HIGH (ref 10.3–14.5)
RBC # FLD: 17.9 % — HIGH (ref 10.3–14.5)
RBC # FLD: 18.2 %
RBC # FLD: 18.3 % — HIGH (ref 10.3–14.5)
RBC # FLD: 18.4 % — HIGH (ref 10.3–14.5)
RBC # FLD: 18.5 % — HIGH (ref 10.3–14.5)
RBC # FLD: 18.5 % — HIGH (ref 10.3–14.5)
RBC # FLD: 18.6 % — HIGH (ref 10.3–14.5)
RBC # FLD: 18.6 % — HIGH (ref 10.3–14.5)
RBC # FLD: 18.7 % — HIGH (ref 10.3–14.5)
RBC # FLD: 18.8 % — HIGH (ref 10.3–14.5)
RBC CASTS # UR COMP ASSIST: 2 /HPF — SIGNIFICANT CHANGE UP (ref 0–4)
RBC CASTS # UR COMP ASSIST: 2 /HPF — SIGNIFICANT CHANGE UP (ref 0–4)
RBC CASTS # UR COMP ASSIST: 23 /HPF — HIGH (ref 0–4)
RED BLOOD CELLS URINE: 0 /HPF
RETICS #: 79.7 K/UL — SIGNIFICANT CHANGE UP (ref 25–125)
RETICS/RBC NFR: 2.3 % — SIGNIFICANT CHANGE UP (ref 0.5–2.5)
RH IG SCN BLD-IMP: NEGATIVE — SIGNIFICANT CHANGE UP
RHEUMATOID FACT SERPL-ACNC: <10 IU/ML — SIGNIFICANT CHANGE UP (ref 0–13)
S AUREUS DNA NOSE QL NAA+PROBE: SIGNIFICANT CHANGE UP
S AUREUS DNA NOSE QL NAA+PROBE: SIGNIFICANT CHANGE UP
SAO2 % BLDMV: 55 % — LOW (ref 60–90)
SAO2 % BLDMV: 61 % — SIGNIFICANT CHANGE UP (ref 60–90)
SAO2 % BLDMV: 64 % — SIGNIFICANT CHANGE UP (ref 60–90)
SAO2 % BLDMV: 67 % — SIGNIFICANT CHANGE UP (ref 60–90)
SAO2 % BLDMV: 68 % — SIGNIFICANT CHANGE UP (ref 60–90)
SAO2 % BLDMV: 70 % — SIGNIFICANT CHANGE UP (ref 60–90)
SAO2 % BLDMV: 70 % — SIGNIFICANT CHANGE UP (ref 60–90)
SAO2 % BLDMV: 71 % — SIGNIFICANT CHANGE UP (ref 60–90)
SAO2 % BLDMV: 72 % — SIGNIFICANT CHANGE UP (ref 60–90)
SAO2 % BLDMV: 74 % — SIGNIFICANT CHANGE UP (ref 60–90)
SAO2 % BLDMV: 75 % — SIGNIFICANT CHANGE UP (ref 60–90)
SAO2 % BLDMV: 76 % — SIGNIFICANT CHANGE UP (ref 60–90)
SAO2 % BLDMV: 76 % — SIGNIFICANT CHANGE UP (ref 60–90)
SAO2 % BLDMV: 81 % — SIGNIFICANT CHANGE UP (ref 60–90)
SAO2 % BLDV: 26 % — LOW (ref 67–88)
SAO2 % BLDV: 56 % — LOW (ref 67–88)
SAO2 % BLDV: 59 % — LOW (ref 67–88)
SAO2 % BLDV: 63 % — LOW (ref 67–88)
SAO2 % BLDV: 68 % — SIGNIFICANT CHANGE UP (ref 67–88)
SAO2 % BLDV: 71 % — SIGNIFICANT CHANGE UP (ref 67–88)
SAO2 % BLDV: 72 % — SIGNIFICANT CHANGE UP (ref 67–88)
SAO2 % BLDV: 74 % — SIGNIFICANT CHANGE UP (ref 67–88)
SAO2 % BLDV: 75 % — SIGNIFICANT CHANGE UP (ref 67–88)
SAO2 % BLDV: 86 % — SIGNIFICANT CHANGE UP (ref 67–88)
SARS-COV-2 IGG SERPL QL IA: NEGATIVE — SIGNIFICANT CHANGE UP
SARS-COV-2 IGM SERPL IA-ACNC: 0.09 INDEX — SIGNIFICANT CHANGE UP
SARS-COV-2 IGM SERPL IA-ACNC: <0.1 INDEX — SIGNIFICANT CHANGE UP
SARS-COV-2 RNA SPEC QL NAA+PROBE: SIGNIFICANT CHANGE UP
SODIUM SERPL-SCNC: 128 MMOL/L — LOW (ref 135–145)
SODIUM SERPL-SCNC: 129 MMOL/L — LOW (ref 135–145)
SODIUM SERPL-SCNC: 130 MMOL/L — LOW (ref 135–145)
SODIUM SERPL-SCNC: 130 MMOL/L — LOW (ref 135–145)
SODIUM SERPL-SCNC: 131 MMOL/L — LOW (ref 135–145)
SODIUM SERPL-SCNC: 131 MMOL/L — LOW (ref 135–145)
SODIUM SERPL-SCNC: 132 MMOL/L — LOW (ref 135–145)
SODIUM SERPL-SCNC: 133 MMOL/L — LOW (ref 135–145)
SODIUM SERPL-SCNC: 134 MMOL/L — LOW (ref 135–145)
SODIUM SERPL-SCNC: 135 MMOL/L — SIGNIFICANT CHANGE UP (ref 135–145)
SODIUM SERPL-SCNC: 136 MMOL/L
SODIUM SERPL-SCNC: 136 MMOL/L — SIGNIFICANT CHANGE UP (ref 135–145)
SODIUM SERPL-SCNC: 137 MMOL/L — SIGNIFICANT CHANGE UP (ref 135–145)
SODIUM SERPL-SCNC: 138 MMOL/L
SODIUM SERPL-SCNC: 138 MMOL/L — SIGNIFICANT CHANGE UP (ref 135–145)
SODIUM SERPL-SCNC: 139 MMOL/L — SIGNIFICANT CHANGE UP (ref 135–145)
SODIUM SERPL-SCNC: 139 MMOL/L — SIGNIFICANT CHANGE UP (ref 135–145)
SODIUM SERPL-SCNC: 140 MMOL/L
SODIUM SERPL-SCNC: 140 MMOL/L — SIGNIFICANT CHANGE UP (ref 135–145)
SODIUM SERPL-SCNC: 141 MMOL/L
SODIUM SERPL-SCNC: 141 MMOL/L
SODIUM SERPL-SCNC: 141 MMOL/L — SIGNIFICANT CHANGE UP (ref 135–145)
SODIUM SERPL-SCNC: 142 MMOL/L — SIGNIFICANT CHANGE UP (ref 135–145)
SODIUM SERPL-SCNC: 143 MMOL/L
SODIUM SERPL-SCNC: 143 MMOL/L
SODIUM SERPL-SCNC: 143 MMOL/L — SIGNIFICANT CHANGE UP (ref 135–145)
SODIUM SERPL-SCNC: 144 MMOL/L
SODIUM SERPL-SCNC: 144 MMOL/L — SIGNIFICANT CHANGE UP (ref 135–145)
SODIUM SERPL-SCNC: 144 MMOL/L — SIGNIFICANT CHANGE UP (ref 135–145)
SODIUM UR-SCNC: 85 MMOL/L — SIGNIFICANT CHANGE UP
SODIUM UR-SCNC: 86 MMOL/L — SIGNIFICANT CHANGE UP
SODIUM UR-SCNC: <20 MMOL/L — SIGNIFICANT CHANGE UP
SODIUM UR-SCNC: <35 MMOL/L — SIGNIFICANT CHANGE UP
SODIUM UR-SCNC: <35 MMOL/L — SIGNIFICANT CHANGE UP
SP GR SPEC: 1.01 — LOW (ref 1.01–1.02)
SP GR SPEC: 1.01 — LOW (ref 1.01–1.02)
SP GR SPEC: 1.01 — SIGNIFICANT CHANGE UP (ref 1.01–1.02)
SP GR SPEC: 1.02 — SIGNIFICANT CHANGE UP (ref 1.01–1.02)
SP GR SPEC: 1.02 — SIGNIFICANT CHANGE UP (ref 1.01–1.02)
SPECIFIC GRAVITY URINE: 1.01
SPECIMEN SOURCE: SIGNIFICANT CHANGE UP
SQUAMOUS EPITHELIAL CELLS: 0 /HPF
T PALLIDUM AB TITR SER: NEGATIVE — SIGNIFICANT CHANGE UP
T3 SERPL-MCNC: 54 NG/DL — LOW (ref 80–200)
T3 SERPL-MCNC: 71 NG/DL
T3FREE SERPL-MCNC: 1.48 PG/ML — LOW (ref 1.8–4.6)
T3FREE SERPL-MCNC: 1.62 PG/ML — LOW (ref 1.8–4.6)
T3FREE SERPL-MCNC: 2.33 PG/ML
T3RU NFR SERPL: 0.8 TBI
T3RU NFR SERPL: 0.9 TBI
T4 AB SER-ACNC: 7.4 UG/DL — SIGNIFICANT CHANGE UP (ref 4.6–12)
T4 FREE SERPL-MCNC: 1.4 NG/DL
T4 FREE SERPL-MCNC: 1.5 NG/DL
T4 FREE SERPL-MCNC: 1.6 NG/DL — SIGNIFICANT CHANGE UP (ref 0.9–1.8)
T4 FREE SERPL-MCNC: 1.7 NG/DL — SIGNIFICANT CHANGE UP (ref 0.9–1.8)
T4 FREE SERPL-MCNC: 2.5 NG/DL — HIGH (ref 0.9–1.8)
T4 SERPL-MCNC: 7 UG/DL
T4 SERPL-MCNC: 7.1 UG/DL
THC UR QL: NEGATIVE — SIGNIFICANT CHANGE UP
TIBC SERPL-MCNC: 253 UG/DL — SIGNIFICANT CHANGE UP (ref 220–430)
TIBC SERPL-MCNC: 282 UG/DL — SIGNIFICANT CHANGE UP (ref 220–430)
TIBC SERPL-MCNC: 292 UG/DL — SIGNIFICANT CHANGE UP (ref 220–430)
TIBC SERPL-MCNC: 314 UG/DL — SIGNIFICANT CHANGE UP (ref 220–430)
TOTAL CHOLESTEROL/HDL RATIO MEASUREMENT: 2.4 RATIO — LOW (ref 3.4–9.6)
TRIGL SERPL-MCNC: 55 MG/DL — SIGNIFICANT CHANGE UP (ref 10–149)
TRIGL SERPL-MCNC: 60 MG/DL
TRIGL SERPL-MCNC: 63 MG/DL
TROPONIN I SERPL-MCNC: 0.05 NG/ML — HIGH (ref 0.01–0.04)
TROPONIN I SERPL-MCNC: 0.06 NG/ML — HIGH (ref 0.01–0.04)
TROPONIN T, HIGH SENSITIVITY RESULT: 93 NG/L — HIGH (ref 0–51)
TROPONIN T, HIGH SENSITIVITY RESULT: 93 NG/L — HIGH (ref 0–51)
TROPONIN T, HIGH SENSITIVITY RESULT: 95 NG/L — HIGH (ref 0–51)
TSH SERPL-ACNC: 2.96 UIU/ML
TSH SERPL-ACNC: 4.86 UIU/ML
TSH SERPL-MCNC: 2.13 UU/ML — SIGNIFICANT CHANGE UP (ref 0.36–3.74)
TSH SERPL-MCNC: 3.6 UIU/ML — SIGNIFICANT CHANGE UP (ref 0.27–4.2)
TSH SERPL-MCNC: 3.86 UIU/ML — SIGNIFICANT CHANGE UP (ref 0.27–4.2)
TSH SERPL-MCNC: 4.13 UIU/ML — SIGNIFICANT CHANGE UP (ref 0.27–4.2)
TSH SERPL-MCNC: 4.23 UIU/ML — HIGH (ref 0.27–4.2)
TSH SERPL-MCNC: 8.61 UIU/ML — HIGH (ref 0.27–4.2)
UIBC SERPL-MCNC: 189 UG/DL — SIGNIFICANT CHANGE UP (ref 110–370)
UIBC SERPL-MCNC: 204 UG/DL — SIGNIFICANT CHANGE UP (ref 110–370)
UIBC SERPL-MCNC: 216 UG/DL — SIGNIFICANT CHANGE UP (ref 110–370)
UIBC SERPL-MCNC: 278 UG/DL — SIGNIFICANT CHANGE UP (ref 110–370)
URATE SERPL-MCNC: 6.1 MG/DL — SIGNIFICANT CHANGE UP (ref 3.4–8.8)
URATE SERPL-MCNC: 6.1 MG/DL — SIGNIFICANT CHANGE UP (ref 3.4–8.8)
URATE SERPL-MCNC: 7.2 MG/DL — SIGNIFICANT CHANGE UP (ref 3.4–8.8)
URINE CREATININE CALCULATION: SIGNIFICANT CHANGE UP G/24 H (ref 1–2)
UROBILINOGEN FLD QL: ABNORMAL
UROBILINOGEN FLD QL: NEGATIVE MG/DL — SIGNIFICANT CHANGE UP
UROBILINOGEN FLD QL: NEGATIVE — SIGNIFICANT CHANGE UP
UROBILINOGEN FLD QL: SIGNIFICANT CHANGE UP
UROBILINOGEN URINE: NORMAL
UUN UR-MCNC: 355 MG/DL — SIGNIFICANT CHANGE UP
UUN UR-MCNC: 453 MG/DL — SIGNIFICANT CHANGE UP
UUN UR-MCNC: 673 MG/DL — SIGNIFICANT CHANGE UP
VANCOMYCIN FLD-MCNC: 14.2 UG/ML — SIGNIFICANT CHANGE UP
VANCOMYCIN FLD-MCNC: 7.1 UG/ML — SIGNIFICANT CHANGE UP
VANCOMYCIN TROUGH SERPL-MCNC: 11.4 UG/ML — SIGNIFICANT CHANGE UP (ref 10–20)
VIT B12 SERPL-MCNC: 592 PG/ML — SIGNIFICANT CHANGE UP (ref 232–1245)
VIT B12 SERPL-MCNC: 853 PG/ML — SIGNIFICANT CHANGE UP (ref 232–1245)
WBC # BLD: 10.92 K/UL — HIGH (ref 3.8–10.5)
WBC # BLD: 11.17 K/UL — HIGH (ref 3.8–10.5)
WBC # BLD: 11.45 K/UL — HIGH (ref 3.8–10.5)
WBC # BLD: 13.18 K/UL — HIGH (ref 3.8–10.5)
WBC # BLD: 14.15 K/UL — HIGH (ref 3.8–10.5)
WBC # BLD: 15.75 K/UL — HIGH (ref 3.8–10.5)
WBC # BLD: 17.04 K/UL — HIGH (ref 3.8–10.5)
WBC # BLD: 3.63 K/UL — LOW (ref 3.8–10.5)
WBC # BLD: 3.75 K/UL — LOW (ref 3.8–10.5)
WBC # BLD: 3.85 K/UL — SIGNIFICANT CHANGE UP (ref 3.8–10.5)
WBC # BLD: 3.87 K/UL — SIGNIFICANT CHANGE UP (ref 3.8–10.5)
WBC # BLD: 3.9 K/UL — SIGNIFICANT CHANGE UP (ref 3.8–10.5)
WBC # BLD: 3.93 K/UL — SIGNIFICANT CHANGE UP (ref 3.8–10.5)
WBC # BLD: 3.94 K/UL — SIGNIFICANT CHANGE UP (ref 3.8–10.5)
WBC # BLD: 3.95 K/UL — SIGNIFICANT CHANGE UP (ref 3.8–10.5)
WBC # BLD: 3.99 K/UL — SIGNIFICANT CHANGE UP (ref 3.8–10.5)
WBC # BLD: 4.03 K/UL — SIGNIFICANT CHANGE UP (ref 3.8–10.5)
WBC # BLD: 4.14 K/UL — SIGNIFICANT CHANGE UP (ref 3.8–10.5)
WBC # BLD: 4.15 K/UL — SIGNIFICANT CHANGE UP (ref 3.8–10.5)
WBC # BLD: 4.18 K/UL — SIGNIFICANT CHANGE UP (ref 3.8–10.5)
WBC # BLD: 4.31 K/UL — SIGNIFICANT CHANGE UP (ref 3.8–10.5)
WBC # BLD: 4.31 K/UL — SIGNIFICANT CHANGE UP (ref 3.8–10.5)
WBC # BLD: 4.48 K/UL — SIGNIFICANT CHANGE UP (ref 3.8–10.5)
WBC # BLD: 4.5 K/UL — SIGNIFICANT CHANGE UP (ref 3.8–10.5)
WBC # BLD: 4.52 K/UL — SIGNIFICANT CHANGE UP (ref 3.8–10.5)
WBC # BLD: 4.52 K/UL — SIGNIFICANT CHANGE UP (ref 3.8–10.5)
WBC # BLD: 4.53 K/UL — SIGNIFICANT CHANGE UP (ref 3.8–10.5)
WBC # BLD: 4.56 K/UL — SIGNIFICANT CHANGE UP (ref 3.8–10.5)
WBC # BLD: 4.63 K/UL — SIGNIFICANT CHANGE UP (ref 3.8–10.5)
WBC # BLD: 4.65 K/UL — SIGNIFICANT CHANGE UP (ref 3.8–10.5)
WBC # BLD: 4.71 K/UL — SIGNIFICANT CHANGE UP (ref 3.8–10.5)
WBC # BLD: 4.74 K/UL — SIGNIFICANT CHANGE UP (ref 3.8–10.5)
WBC # BLD: 4.81 K/UL — SIGNIFICANT CHANGE UP (ref 3.8–10.5)
WBC # BLD: 4.81 K/UL — SIGNIFICANT CHANGE UP (ref 3.8–10.5)
WBC # BLD: 4.84 K/UL — SIGNIFICANT CHANGE UP (ref 3.8–10.5)
WBC # BLD: 4.87 K/UL — SIGNIFICANT CHANGE UP (ref 3.8–10.5)
WBC # BLD: 4.94 K/UL — SIGNIFICANT CHANGE UP (ref 3.8–10.5)
WBC # BLD: 4.99 K/UL — SIGNIFICANT CHANGE UP (ref 3.8–10.5)
WBC # BLD: 5 K/UL — SIGNIFICANT CHANGE UP (ref 3.8–10.5)
WBC # BLD: 5.04 K/UL — SIGNIFICANT CHANGE UP (ref 3.8–10.5)
WBC # BLD: 5.08 K/UL — SIGNIFICANT CHANGE UP (ref 3.8–10.5)
WBC # BLD: 5.08 K/UL — SIGNIFICANT CHANGE UP (ref 3.8–10.5)
WBC # BLD: 5.15 K/UL — SIGNIFICANT CHANGE UP (ref 3.8–10.5)
WBC # BLD: 5.28 K/UL — SIGNIFICANT CHANGE UP (ref 3.8–10.5)
WBC # BLD: 5.33 K/UL — SIGNIFICANT CHANGE UP (ref 3.8–10.5)
WBC # BLD: 5.33 K/UL — SIGNIFICANT CHANGE UP (ref 3.8–10.5)
WBC # BLD: 5.35 K/UL — SIGNIFICANT CHANGE UP (ref 3.8–10.5)
WBC # BLD: 5.47 K/UL — SIGNIFICANT CHANGE UP (ref 3.8–10.5)
WBC # BLD: 5.48 K/UL — SIGNIFICANT CHANGE UP (ref 3.8–10.5)
WBC # BLD: 5.53 K/UL — SIGNIFICANT CHANGE UP (ref 3.8–10.5)
WBC # BLD: 5.55 K/UL — SIGNIFICANT CHANGE UP (ref 3.8–10.5)
WBC # BLD: 5.56 K/UL — SIGNIFICANT CHANGE UP (ref 3.8–10.5)
WBC # BLD: 5.65 K/UL — SIGNIFICANT CHANGE UP (ref 3.8–10.5)
WBC # BLD: 5.71 K/UL — SIGNIFICANT CHANGE UP (ref 3.8–10.5)
WBC # BLD: 5.73 K/UL — SIGNIFICANT CHANGE UP (ref 3.8–10.5)
WBC # BLD: 5.96 K/UL — SIGNIFICANT CHANGE UP (ref 3.8–10.5)
WBC # BLD: 5.97 K/UL — SIGNIFICANT CHANGE UP (ref 3.8–10.5)
WBC # BLD: 6.11 K/UL — SIGNIFICANT CHANGE UP (ref 3.8–10.5)
WBC # BLD: 6.13 K/UL — SIGNIFICANT CHANGE UP (ref 3.8–10.5)
WBC # BLD: 6.53 K/UL — SIGNIFICANT CHANGE UP (ref 3.8–10.5)
WBC # BLD: 6.57 K/UL — SIGNIFICANT CHANGE UP (ref 3.8–10.5)
WBC # BLD: 6.96 K/UL — SIGNIFICANT CHANGE UP (ref 3.8–10.5)
WBC # BLD: 7.07 K/UL — SIGNIFICANT CHANGE UP (ref 3.8–10.5)
WBC # BLD: 7.34 K/UL — SIGNIFICANT CHANGE UP (ref 3.8–10.5)
WBC # BLD: 7.52 K/UL — SIGNIFICANT CHANGE UP (ref 3.8–10.5)
WBC # BLD: 7.6 K/UL — SIGNIFICANT CHANGE UP (ref 3.8–10.5)
WBC # BLD: 7.7 K/UL — SIGNIFICANT CHANGE UP (ref 3.8–10.5)
WBC # BLD: 8.66 K/UL — SIGNIFICANT CHANGE UP (ref 3.8–10.5)
WBC # BLD: 9.41 K/UL — SIGNIFICANT CHANGE UP (ref 3.8–10.5)
WBC # FLD AUTO: 10.92 K/UL — HIGH (ref 3.8–10.5)
WBC # FLD AUTO: 11.17 K/UL — HIGH (ref 3.8–10.5)
WBC # FLD AUTO: 11.45 K/UL — HIGH (ref 3.8–10.5)
WBC # FLD AUTO: 13.18 K/UL — HIGH (ref 3.8–10.5)
WBC # FLD AUTO: 14.15 K/UL — HIGH (ref 3.8–10.5)
WBC # FLD AUTO: 15.75 K/UL — HIGH (ref 3.8–10.5)
WBC # FLD AUTO: 17.04 K/UL — HIGH (ref 3.8–10.5)
WBC # FLD AUTO: 3.63 K/UL — LOW (ref 3.8–10.5)
WBC # FLD AUTO: 3.75 K/UL — LOW (ref 3.8–10.5)
WBC # FLD AUTO: 3.85 K/UL — SIGNIFICANT CHANGE UP (ref 3.8–10.5)
WBC # FLD AUTO: 3.87 K/UL — SIGNIFICANT CHANGE UP (ref 3.8–10.5)
WBC # FLD AUTO: 3.9 K/UL — SIGNIFICANT CHANGE UP (ref 3.8–10.5)
WBC # FLD AUTO: 3.93 K/UL — SIGNIFICANT CHANGE UP (ref 3.8–10.5)
WBC # FLD AUTO: 3.94 K/UL — SIGNIFICANT CHANGE UP (ref 3.8–10.5)
WBC # FLD AUTO: 3.95 K/UL — SIGNIFICANT CHANGE UP (ref 3.8–10.5)
WBC # FLD AUTO: 3.99 K/UL — SIGNIFICANT CHANGE UP (ref 3.8–10.5)
WBC # FLD AUTO: 4.03 K/UL — SIGNIFICANT CHANGE UP (ref 3.8–10.5)
WBC # FLD AUTO: 4.14 K/UL — SIGNIFICANT CHANGE UP (ref 3.8–10.5)
WBC # FLD AUTO: 4.15 K/UL — SIGNIFICANT CHANGE UP (ref 3.8–10.5)
WBC # FLD AUTO: 4.18 K/UL — SIGNIFICANT CHANGE UP (ref 3.8–10.5)
WBC # FLD AUTO: 4.31 K/UL — SIGNIFICANT CHANGE UP (ref 3.8–10.5)
WBC # FLD AUTO: 4.31 K/UL — SIGNIFICANT CHANGE UP (ref 3.8–10.5)
WBC # FLD AUTO: 4.45 K/UL
WBC # FLD AUTO: 4.48 K/UL — SIGNIFICANT CHANGE UP (ref 3.8–10.5)
WBC # FLD AUTO: 4.5 K/UL — SIGNIFICANT CHANGE UP (ref 3.8–10.5)
WBC # FLD AUTO: 4.52 K/UL — SIGNIFICANT CHANGE UP (ref 3.8–10.5)
WBC # FLD AUTO: 4.52 K/UL — SIGNIFICANT CHANGE UP (ref 3.8–10.5)
WBC # FLD AUTO: 4.53 K/UL — SIGNIFICANT CHANGE UP (ref 3.8–10.5)
WBC # FLD AUTO: 4.56 K/UL — SIGNIFICANT CHANGE UP (ref 3.8–10.5)
WBC # FLD AUTO: 4.63 K/UL — SIGNIFICANT CHANGE UP (ref 3.8–10.5)
WBC # FLD AUTO: 4.65 K/UL — SIGNIFICANT CHANGE UP (ref 3.8–10.5)
WBC # FLD AUTO: 4.71 K/UL — SIGNIFICANT CHANGE UP (ref 3.8–10.5)
WBC # FLD AUTO: 4.74 K/UL — SIGNIFICANT CHANGE UP (ref 3.8–10.5)
WBC # FLD AUTO: 4.81 K/UL — SIGNIFICANT CHANGE UP (ref 3.8–10.5)
WBC # FLD AUTO: 4.81 K/UL — SIGNIFICANT CHANGE UP (ref 3.8–10.5)
WBC # FLD AUTO: 4.84 K/UL — SIGNIFICANT CHANGE UP (ref 3.8–10.5)
WBC # FLD AUTO: 4.87 K/UL — SIGNIFICANT CHANGE UP (ref 3.8–10.5)
WBC # FLD AUTO: 4.94 K/UL — SIGNIFICANT CHANGE UP (ref 3.8–10.5)
WBC # FLD AUTO: 4.99 K/UL — SIGNIFICANT CHANGE UP (ref 3.8–10.5)
WBC # FLD AUTO: 5 K/UL — SIGNIFICANT CHANGE UP (ref 3.8–10.5)
WBC # FLD AUTO: 5.04 K/UL — SIGNIFICANT CHANGE UP (ref 3.8–10.5)
WBC # FLD AUTO: 5.08 K/UL — SIGNIFICANT CHANGE UP (ref 3.8–10.5)
WBC # FLD AUTO: 5.08 K/UL — SIGNIFICANT CHANGE UP (ref 3.8–10.5)
WBC # FLD AUTO: 5.15 K/UL — SIGNIFICANT CHANGE UP (ref 3.8–10.5)
WBC # FLD AUTO: 5.28 K/UL — SIGNIFICANT CHANGE UP (ref 3.8–10.5)
WBC # FLD AUTO: 5.33 K/UL — SIGNIFICANT CHANGE UP (ref 3.8–10.5)
WBC # FLD AUTO: 5.33 K/UL — SIGNIFICANT CHANGE UP (ref 3.8–10.5)
WBC # FLD AUTO: 5.35 K/UL — SIGNIFICANT CHANGE UP (ref 3.8–10.5)
WBC # FLD AUTO: 5.47 K/UL — SIGNIFICANT CHANGE UP (ref 3.8–10.5)
WBC # FLD AUTO: 5.48 K/UL — SIGNIFICANT CHANGE UP (ref 3.8–10.5)
WBC # FLD AUTO: 5.53 K/UL — SIGNIFICANT CHANGE UP (ref 3.8–10.5)
WBC # FLD AUTO: 5.55 K/UL — SIGNIFICANT CHANGE UP (ref 3.8–10.5)
WBC # FLD AUTO: 5.56 K/UL — SIGNIFICANT CHANGE UP (ref 3.8–10.5)
WBC # FLD AUTO: 5.65 K/UL — SIGNIFICANT CHANGE UP (ref 3.8–10.5)
WBC # FLD AUTO: 5.71 K/UL — SIGNIFICANT CHANGE UP (ref 3.8–10.5)
WBC # FLD AUTO: 5.73 K/UL — SIGNIFICANT CHANGE UP (ref 3.8–10.5)
WBC # FLD AUTO: 5.96 K/UL — SIGNIFICANT CHANGE UP (ref 3.8–10.5)
WBC # FLD AUTO: 5.97 K/UL — SIGNIFICANT CHANGE UP (ref 3.8–10.5)
WBC # FLD AUTO: 6.11 K/UL — SIGNIFICANT CHANGE UP (ref 3.8–10.5)
WBC # FLD AUTO: 6.13 K/UL — SIGNIFICANT CHANGE UP (ref 3.8–10.5)
WBC # FLD AUTO: 6.52 K/UL
WBC # FLD AUTO: 6.53 K/UL — SIGNIFICANT CHANGE UP (ref 3.8–10.5)
WBC # FLD AUTO: 6.57 K/UL — SIGNIFICANT CHANGE UP (ref 3.8–10.5)
WBC # FLD AUTO: 6.96 K/UL — SIGNIFICANT CHANGE UP (ref 3.8–10.5)
WBC # FLD AUTO: 7.07 K/UL — SIGNIFICANT CHANGE UP (ref 3.8–10.5)
WBC # FLD AUTO: 7.34 K/UL — SIGNIFICANT CHANGE UP (ref 3.8–10.5)
WBC # FLD AUTO: 7.52 K/UL — SIGNIFICANT CHANGE UP (ref 3.8–10.5)
WBC # FLD AUTO: 7.6 K/UL — SIGNIFICANT CHANGE UP (ref 3.8–10.5)
WBC # FLD AUTO: 7.7 K/UL — SIGNIFICANT CHANGE UP (ref 3.8–10.5)
WBC # FLD AUTO: 8.66 K/UL — SIGNIFICANT CHANGE UP (ref 3.8–10.5)
WBC # FLD AUTO: 9.41 K/UL — SIGNIFICANT CHANGE UP (ref 3.8–10.5)
WBC UR QL: 1 /HPF — SIGNIFICANT CHANGE UP (ref 0–5)
WBC UR QL: 1 /HPF — SIGNIFICANT CHANGE UP (ref 0–5)
WBC UR QL: 2 /HPF — SIGNIFICANT CHANGE UP (ref 0–5)
WBC UR QL: SIGNIFICANT CHANGE UP
WHITE BLOOD CELLS URINE: 0 /HPF

## 2020-01-01 PROCEDURE — 85014 HEMATOCRIT: CPT

## 2020-01-01 PROCEDURE — 93923 UPR/LXTR ART STDY 3+ LVLS: CPT | Mod: 26

## 2020-01-01 PROCEDURE — 36556 INSERT NON-TUNNEL CV CATH: CPT

## 2020-01-01 PROCEDURE — 80048 BASIC METABOLIC PNL TOTAL CA: CPT

## 2020-01-01 PROCEDURE — 93010 ELECTROCARDIOGRAM REPORT: CPT

## 2020-01-01 PROCEDURE — 99233 SBSQ HOSP IP/OBS HIGH 50: CPT

## 2020-01-01 PROCEDURE — 82728 ASSAY OF FERRITIN: CPT

## 2020-01-01 PROCEDURE — 71045 X-RAY EXAM CHEST 1 VIEW: CPT | Mod: 26

## 2020-01-01 PROCEDURE — 99232 SBSQ HOSP IP/OBS MODERATE 35: CPT

## 2020-01-01 PROCEDURE — 33418 REPAIR TCAT MITRAL VALVE: CPT | Mod: 62,Q0

## 2020-01-01 PROCEDURE — 99285 EMERGENCY DEPT VISIT HI MDM: CPT

## 2020-01-01 PROCEDURE — 97110 THERAPEUTIC EXERCISES: CPT

## 2020-01-01 PROCEDURE — 83735 ASSAY OF MAGNESIUM: CPT

## 2020-01-01 PROCEDURE — C1889: CPT

## 2020-01-01 PROCEDURE — 87040 BLOOD CULTURE FOR BACTERIA: CPT

## 2020-01-01 PROCEDURE — C1882: CPT

## 2020-01-01 PROCEDURE — 76700 US EXAM ABDOM COMPLETE: CPT | Mod: 26

## 2020-01-01 PROCEDURE — 99291 CRITICAL CARE FIRST HOUR: CPT

## 2020-01-01 PROCEDURE — 99233 SBSQ HOSP IP/OBS HIGH 50: CPT | Mod: GC

## 2020-01-01 PROCEDURE — 93320 DOPPLER ECHO COMPLETE: CPT | Mod: 26

## 2020-01-01 PROCEDURE — 86850 RBC ANTIBODY SCREEN: CPT

## 2020-01-01 PROCEDURE — 99223 1ST HOSP IP/OBS HIGH 75: CPT

## 2020-01-01 PROCEDURE — 82330 ASSAY OF CALCIUM: CPT

## 2020-01-01 PROCEDURE — 84132 ASSAY OF SERUM POTASSIUM: CPT

## 2020-01-01 PROCEDURE — 82570 ASSAY OF URINE CREATININE: CPT

## 2020-01-01 PROCEDURE — 99232 SBSQ HOSP IP/OBS MODERATE 35: CPT | Mod: GC

## 2020-01-01 PROCEDURE — 93355 ECHO TRANSESOPHAGEAL (TEE): CPT

## 2020-01-01 PROCEDURE — 93451 RIGHT HEART CATH: CPT

## 2020-01-01 PROCEDURE — 72170 X-RAY EXAM OF PELVIS: CPT | Mod: 26

## 2020-01-01 PROCEDURE — 72170 X-RAY EXAM OF PELVIS: CPT

## 2020-01-01 PROCEDURE — 99024 POSTOP FOLLOW-UP VISIT: CPT

## 2020-01-01 PROCEDURE — 86022 PLATELET ANTIBODIES: CPT

## 2020-01-01 PROCEDURE — 93925 LOWER EXTREMITY STUDY: CPT

## 2020-01-01 PROCEDURE — 71250 CT THORAX DX C-: CPT | Mod: 26

## 2020-01-01 PROCEDURE — 93970 EXTREMITY STUDY: CPT

## 2020-01-01 PROCEDURE — 84540 ASSAY OF URINE/UREA-N: CPT

## 2020-01-01 PROCEDURE — U0003: CPT

## 2020-01-01 PROCEDURE — 97164 PT RE-EVAL EST PLAN CARE: CPT

## 2020-01-01 PROCEDURE — 87340 HEPATITIS B SURFACE AG IA: CPT

## 2020-01-01 PROCEDURE — 82746 ASSAY OF FOLIC ACID SERUM: CPT

## 2020-01-01 PROCEDURE — 87522 HEPATITIS C REVRS TRNSCRPJ: CPT

## 2020-01-01 PROCEDURE — 86769 SARS-COV-2 COVID-19 ANTIBODY: CPT

## 2020-01-01 PROCEDURE — 86803 HEPATITIS C AB TEST: CPT

## 2020-01-01 PROCEDURE — 99205 OFFICE O/P NEW HI 60 MIN: CPT

## 2020-01-01 PROCEDURE — 93306 TTE W/DOPPLER COMPLETE: CPT | Mod: 26

## 2020-01-01 PROCEDURE — 99497 ADVNCD CARE PLAN 30 MIN: CPT

## 2020-01-01 PROCEDURE — 99152 MOD SED SAME PHYS/QHP 5/>YRS: CPT

## 2020-01-01 PROCEDURE — 99215 OFFICE O/P EST HI 40 MIN: CPT | Mod: 24

## 2020-01-01 PROCEDURE — 97116 GAIT TRAINING THERAPY: CPT

## 2020-01-01 PROCEDURE — 74176 CT ABD & PELVIS W/O CONTRAST: CPT | Mod: 26

## 2020-01-01 PROCEDURE — 97166 OT EVAL MOD COMPLEX 45 MIN: CPT

## 2020-01-01 PROCEDURE — 93325 DOPPLER ECHO COLOR FLOW MAPG: CPT | Mod: 26

## 2020-01-01 PROCEDURE — 84550 ASSAY OF BLOOD/URIC ACID: CPT

## 2020-01-01 PROCEDURE — 93290 INTERROG DEV EVAL ICPMS IP: CPT

## 2020-01-01 PROCEDURE — 93005 ELECTROCARDIOGRAM TRACING: CPT

## 2020-01-01 PROCEDURE — 84481 FREE ASSAY (FT-3): CPT

## 2020-01-01 PROCEDURE — 86900 BLOOD TYPING SEROLOGIC ABO: CPT

## 2020-01-01 PROCEDURE — 99072 ADDL SUPL MATRL&STAF TM PHE: CPT

## 2020-01-01 PROCEDURE — 99214 OFFICE O/P EST MOD 30 MIN: CPT

## 2020-01-01 PROCEDURE — 84100 ASSAY OF PHOSPHORUS: CPT

## 2020-01-01 PROCEDURE — 71250 CT THORAX DX C-: CPT

## 2020-01-01 PROCEDURE — 86780 TREPONEMA PALLIDUM: CPT

## 2020-01-01 PROCEDURE — 70450 CT HEAD/BRAIN W/O DYE: CPT

## 2020-01-01 PROCEDURE — 74263 CT COLONOGRAPHY SCREENING: CPT | Mod: 26

## 2020-01-01 PROCEDURE — 83880 ASSAY OF NATRIURETIC PEPTIDE: CPT

## 2020-01-01 PROCEDURE — 87640 STAPH A DNA AMP PROBE: CPT

## 2020-01-01 PROCEDURE — 93010 ELECTROCARDIOGRAM REPORT: CPT | Mod: 76

## 2020-01-01 PROCEDURE — 99223 1ST HOSP IP/OBS HIGH 75: CPT | Mod: 25

## 2020-01-01 PROCEDURE — 93284 PRGRMG EVAL IMPLANTABLE DFB: CPT

## 2020-01-01 PROCEDURE — 99292 CRITICAL CARE ADDL 30 MIN: CPT

## 2020-01-01 PROCEDURE — G0480: CPT

## 2020-01-01 PROCEDURE — 74263 CT COLONOGRAPHY SCREENING: CPT

## 2020-01-01 PROCEDURE — C1769: CPT

## 2020-01-01 PROCEDURE — 82247 BILIRUBIN TOTAL: CPT

## 2020-01-01 PROCEDURE — 93284 PRGRMG EVAL IMPLANTABLE DFB: CPT | Mod: 26

## 2020-01-01 PROCEDURE — 93312 ECHO TRANSESOPHAGEAL: CPT | Mod: 26

## 2020-01-01 PROCEDURE — 93000 ELECTROCARDIOGRAM COMPLETE: CPT

## 2020-01-01 PROCEDURE — 84300 ASSAY OF URINE SODIUM: CPT

## 2020-01-01 PROCEDURE — 82550 ASSAY OF CK (CPK): CPT

## 2020-01-01 PROCEDURE — C8929: CPT

## 2020-01-01 PROCEDURE — 84436 ASSAY OF TOTAL THYROXINE: CPT

## 2020-01-01 PROCEDURE — 93923 UPR/LXTR ART STDY 3+ LVLS: CPT

## 2020-01-01 PROCEDURE — 85027 COMPLETE CBC AUTOMATED: CPT

## 2020-01-01 PROCEDURE — 99204 OFFICE O/P NEW MOD 45 MIN: CPT | Mod: 25

## 2020-01-01 PROCEDURE — 93978 VASCULAR STUDY: CPT

## 2020-01-01 PROCEDURE — C1751: CPT

## 2020-01-01 PROCEDURE — 94640 AIRWAY INHALATION TREATMENT: CPT

## 2020-01-01 PROCEDURE — 94010 BREATHING CAPACITY TEST: CPT

## 2020-01-01 PROCEDURE — 93306 TTE W/DOPPLER COMPLETE: CPT

## 2020-01-01 PROCEDURE — 82435 ASSAY OF BLOOD CHLORIDE: CPT

## 2020-01-01 PROCEDURE — 33418 REPAIR TCAT MITRAL VALVE: CPT | Mod: Q0

## 2020-01-01 PROCEDURE — 99285 EMERGENCY DEPT VISIT HI MDM: CPT | Mod: 25

## 2020-01-01 PROCEDURE — 36581 REPLACE TUNNELED CV CATH: CPT

## 2020-01-01 PROCEDURE — 85384 FIBRINOGEN ACTIVITY: CPT

## 2020-01-01 PROCEDURE — 70450 CT HEAD/BRAIN W/O DYE: CPT | Mod: 26

## 2020-01-01 PROCEDURE — 80061 LIPID PANEL: CPT

## 2020-01-01 PROCEDURE — 93970 EXTREMITY STUDY: CPT | Mod: 26

## 2020-01-01 PROCEDURE — 77001 FLUOROGUIDE FOR VEIN DEVICE: CPT | Mod: 26

## 2020-01-01 PROCEDURE — 99223 1ST HOSP IP/OBS HIGH 75: CPT | Mod: GC

## 2020-01-01 PROCEDURE — 76705 ECHO EXAM OF ABDOMEN: CPT | Mod: 26

## 2020-01-01 PROCEDURE — 83605 ASSAY OF LACTIC ACID: CPT

## 2020-01-01 PROCEDURE — 99233 SBSQ HOSP IP/OBS HIGH 50: CPT | Mod: 24

## 2020-01-01 PROCEDURE — 84156 ASSAY OF PROTEIN URINE: CPT

## 2020-01-01 PROCEDURE — 83036 HEMOGLOBIN GLYCOSYLATED A1C: CPT

## 2020-01-01 PROCEDURE — 93503 INSERT/PLACE HEART CATHETER: CPT

## 2020-01-01 PROCEDURE — 93451 RIGHT HEART CATH: CPT | Mod: 26,79

## 2020-01-01 PROCEDURE — 86706 HEP B SURFACE ANTIBODY: CPT

## 2020-01-01 PROCEDURE — 82962 GLUCOSE BLOOD TEST: CPT

## 2020-01-01 PROCEDURE — 86901 BLOOD TYPING SEROLOGIC RH(D): CPT

## 2020-01-01 PROCEDURE — 84484 ASSAY OF TROPONIN QUANT: CPT

## 2020-01-01 PROCEDURE — 99204 OFFICE O/P NEW MOD 45 MIN: CPT | Mod: GC

## 2020-01-01 PROCEDURE — 76700 US EXAM ABDOM COMPLETE: CPT

## 2020-01-01 PROCEDURE — 93312 ECHO TRANSESOPHAGEAL: CPT

## 2020-01-01 PROCEDURE — 99497 ADVNCD CARE PLAN 30 MIN: CPT | Mod: 25

## 2020-01-01 PROCEDURE — 84480 ASSAY TRIIODOTHYRONINE (T3): CPT

## 2020-01-01 PROCEDURE — 76981 USE PARENCHYMA: CPT | Mod: 26

## 2020-01-01 PROCEDURE — 85025 COMPLETE CBC W/AUTO DIFF WBC: CPT

## 2020-01-01 PROCEDURE — 99442: CPT

## 2020-01-01 PROCEDURE — 76536 US EXAM OF HEAD AND NECK: CPT

## 2020-01-01 PROCEDURE — 71045 X-RAY EXAM CHEST 1 VIEW: CPT | Mod: 26,77

## 2020-01-01 PROCEDURE — 83615 LACTATE (LD) (LDH) ENZYME: CPT

## 2020-01-01 PROCEDURE — 99239 HOSP IP/OBS DSCHRG MGMT >30: CPT

## 2020-01-01 PROCEDURE — 85730 THROMBOPLASTIN TIME PARTIAL: CPT

## 2020-01-01 PROCEDURE — 85018 HEMOGLOBIN: CPT

## 2020-01-01 PROCEDURE — 86692 HEPATITIS DELTA AGENT ANTBDY: CPT

## 2020-01-01 PROCEDURE — 74018 RADEX ABDOMEN 1 VIEW: CPT

## 2020-01-01 PROCEDURE — 97530 THERAPEUTIC ACTIVITIES: CPT

## 2020-01-01 PROCEDURE — C1894: CPT

## 2020-01-01 PROCEDURE — 76981 USE PARENCHYMA: CPT

## 2020-01-01 PROCEDURE — 93451 RIGHT HEART CATH: CPT | Mod: 26

## 2020-01-01 PROCEDURE — 84443 ASSAY THYROID STIM HORMONE: CPT

## 2020-01-01 PROCEDURE — 36556 INSERT NON-TUNNEL CV CATH: CPT | Mod: 59

## 2020-01-01 PROCEDURE — 99231 SBSQ HOSP IP/OBS SF/LOW 25: CPT

## 2020-01-01 PROCEDURE — 94010 BREATHING CAPACITY TEST: CPT | Mod: 26

## 2020-01-01 PROCEDURE — 85610 PROTHROMBIN TIME: CPT

## 2020-01-01 PROCEDURE — 82607 VITAMIN B-12: CPT

## 2020-01-01 PROCEDURE — 33264 RMVL & RPLCMT DFB GEN MLT LD: CPT

## 2020-01-01 PROCEDURE — 77001 FLUOROGUIDE FOR VEIN DEVICE: CPT

## 2020-01-01 PROCEDURE — 71045 X-RAY EXAM CHEST 1 VIEW: CPT

## 2020-01-01 PROCEDURE — 76937 US GUIDE VASCULAR ACCESS: CPT

## 2020-01-01 PROCEDURE — 97535 SELF CARE MNGMENT TRAINING: CPT

## 2020-01-01 PROCEDURE — 99222 1ST HOSP IP/OBS MODERATE 55: CPT | Mod: GC

## 2020-01-01 PROCEDURE — 97161 PT EVAL LOW COMPLEX 20 MIN: CPT

## 2020-01-01 PROCEDURE — 86704 HEP B CORE ANTIBODY TOTAL: CPT

## 2020-01-01 PROCEDURE — 83550 IRON BINDING TEST: CPT

## 2020-01-01 PROCEDURE — 83540 ASSAY OF IRON: CPT

## 2020-01-01 PROCEDURE — 84295 ASSAY OF SERUM SODIUM: CPT

## 2020-01-01 PROCEDURE — 36558 INSERT TUNNELED CV CATH: CPT

## 2020-01-01 PROCEDURE — 93978 VASCULAR STUDY: CPT | Mod: 26

## 2020-01-01 PROCEDURE — 99215 OFFICE O/P EST HI 40 MIN: CPT | Mod: 25

## 2020-01-01 PROCEDURE — 82947 ASSAY GLUCOSE BLOOD QUANT: CPT

## 2020-01-01 PROCEDURE — 31575 DIAGNOSTIC LARYNGOSCOPY: CPT

## 2020-01-01 PROCEDURE — 82306 VITAMIN D 25 HYDROXY: CPT

## 2020-01-01 PROCEDURE — 83521 IG LIGHT CHAINS FREE EACH: CPT

## 2020-01-01 PROCEDURE — 99291 CRITICAL CARE FIRST HOUR: CPT | Mod: 25

## 2020-01-01 PROCEDURE — 93010 ELECTROCARDIOGRAM REPORT: CPT | Mod: 77

## 2020-01-01 PROCEDURE — 76937 US GUIDE VASCULAR ACCESS: CPT | Mod: 26

## 2020-01-01 PROCEDURE — 93880 EXTRACRANIAL BILAT STUDY: CPT | Mod: 26

## 2020-01-01 PROCEDURE — 80053 COMPREHEN METABOLIC PANEL: CPT

## 2020-01-01 PROCEDURE — 74018 RADEX ABDOMEN 1 VIEW: CPT | Mod: 26

## 2020-01-01 PROCEDURE — 93325 DOPPLER ECHO COLOR FLOW MAPG: CPT

## 2020-01-01 PROCEDURE — 93925 LOWER EXTREMITY STUDY: CPT | Mod: 26

## 2020-01-01 PROCEDURE — 99222 1ST HOSP IP/OBS MODERATE 55: CPT

## 2020-01-01 PROCEDURE — 86140 C-REACTIVE PROTEIN: CPT

## 2020-01-01 PROCEDURE — 86790 VIRUS ANTIBODY NOS: CPT

## 2020-01-01 PROCEDURE — 93880 EXTRACRANIAL BILAT STUDY: CPT

## 2020-01-01 PROCEDURE — 87641 MR-STAPH DNA AMP PROBE: CPT

## 2020-01-01 PROCEDURE — 99221 1ST HOSP IP/OBS SF/LOW 40: CPT

## 2020-01-01 PROCEDURE — 86480 TB TEST CELL IMMUN MEASURE: CPT

## 2020-01-01 PROCEDURE — 81003 URINALYSIS AUTO W/O SCOPE: CPT

## 2020-01-01 PROCEDURE — 84439 ASSAY OF FREE THYROXINE: CPT

## 2020-01-01 PROCEDURE — 82248 BILIRUBIN DIRECT: CPT

## 2020-01-01 PROCEDURE — 99215 OFFICE O/P EST HI 40 MIN: CPT

## 2020-01-01 PROCEDURE — 99292 CRITICAL CARE ADDL 30 MIN: CPT | Mod: 25

## 2020-01-01 PROCEDURE — 84165 PROTEIN E-PHORESIS SERUM: CPT

## 2020-01-01 PROCEDURE — 83935 ASSAY OF URINE OSMOLALITY: CPT

## 2020-01-01 PROCEDURE — 99214 OFFICE O/P EST MOD 30 MIN: CPT | Mod: GC

## 2020-01-01 PROCEDURE — 81001 URINALYSIS AUTO W/SCOPE: CPT

## 2020-01-01 PROCEDURE — 82803 BLOOD GASES ANY COMBINATION: CPT

## 2020-01-01 PROCEDURE — 87517 HEPATITIS B DNA QUANT: CPT

## 2020-01-01 PROCEDURE — 33418 REPAIR TCAT MITRAL VALVE: CPT | Mod: Q0,62

## 2020-01-01 PROCEDURE — 92960 CARDIOVERSION ELECTRIC EXT: CPT | Mod: 59

## 2020-01-01 PROCEDURE — 84153 ASSAY OF PSA TOTAL: CPT

## 2020-01-01 PROCEDURE — 82565 ASSAY OF CREATININE: CPT

## 2020-01-01 PROCEDURE — 86431 RHEUMATOID FACTOR QUANT: CPT

## 2020-01-01 PROCEDURE — 76770 US EXAM ABDO BACK WALL COMP: CPT | Mod: 26

## 2020-01-01 PROCEDURE — 86708 HEPATITIS A ANTIBODY: CPT

## 2020-01-01 PROCEDURE — 80202 ASSAY OF VANCOMYCIN: CPT

## 2020-01-01 PROCEDURE — 93289 INTERROG DEVICE EVAL HEART: CPT

## 2020-01-01 PROCEDURE — 84134 ASSAY OF PREALBUMIN: CPT

## 2020-01-01 PROCEDURE — 80307 DRUG TEST PRSMV CHEM ANLYZR: CPT

## 2020-01-01 PROCEDURE — 36620 INSERTION CATHETER ARTERY: CPT

## 2020-01-01 PROCEDURE — 85045 AUTOMATED RETICULOCYTE COUNT: CPT

## 2020-01-01 PROCEDURE — 85652 RBC SED RATE AUTOMATED: CPT

## 2020-01-01 PROCEDURE — 97163 PT EVAL HIGH COMPLEX 45 MIN: CPT

## 2020-01-01 PROCEDURE — 76770 US EXAM ABDO BACK WALL COMP: CPT

## 2020-01-01 PROCEDURE — 93320 DOPPLER ECHO COMPLETE: CPT

## 2020-01-01 PROCEDURE — 99283 EMERGENCY DEPT VISIT LOW MDM: CPT

## 2020-01-01 PROCEDURE — 97162 PT EVAL MOD COMPLEX 30 MIN: CPT

## 2020-01-01 PROCEDURE — 87536 HIV-1 QUANT&REVRSE TRNSCRPJ: CPT

## 2020-01-01 RX ORDER — MUPIROCIN 20 MG/G
1 OINTMENT TOPICAL
Qty: 1 | Refills: 0
Start: 2020-01-01 | End: 2020-01-01

## 2020-01-01 RX ORDER — DEXTROSE 50 % IN WATER 50 %
15 SYRINGE (ML) INTRAVENOUS ONCE
Refills: 0 | Status: DISCONTINUED | OUTPATIENT
Start: 2020-01-01 | End: 2020-01-01

## 2020-01-01 RX ORDER — SODIUM CHLORIDE 9 MG/ML
500 INJECTION INTRAMUSCULAR; INTRAVENOUS; SUBCUTANEOUS ONCE
Refills: 0 | Status: COMPLETED | OUTPATIENT
Start: 2020-01-01 | End: 2020-01-01

## 2020-01-01 RX ORDER — INSULIN LISPRO 100/ML
VIAL (ML) SUBCUTANEOUS
Refills: 0 | Status: DISCONTINUED | OUTPATIENT
Start: 2020-01-01 | End: 2020-01-01

## 2020-01-01 RX ORDER — NOREPINEPHRINE BITARTRATE/D5W 8 MG/250ML
0.05 PLASTIC BAG, INJECTION (ML) INTRAVENOUS
Qty: 16 | Refills: 0 | Status: DISCONTINUED | OUTPATIENT
Start: 2020-01-01 | End: 2020-01-01

## 2020-01-01 RX ORDER — HEPARIN SODIUM 5000 [USP'U]/ML
1100 INJECTION INTRAVENOUS; SUBCUTANEOUS
Qty: 25000 | Refills: 0 | Status: DISCONTINUED | OUTPATIENT
Start: 2020-01-01 | End: 2020-01-01

## 2020-01-01 RX ORDER — MUPIROCIN 20 MG/G
1 OINTMENT TOPICAL
Refills: 0 | Status: DISCONTINUED | OUTPATIENT
Start: 2020-01-01 | End: 2020-01-01

## 2020-01-01 RX ORDER — SODIUM CHLORIDE 9 MG/ML
1000 INJECTION, SOLUTION INTRAVENOUS
Refills: 0 | Status: DISCONTINUED | OUTPATIENT
Start: 2020-01-01 | End: 2020-01-01

## 2020-01-01 RX ORDER — APIXABAN 2.5 MG/1
1 TABLET, FILM COATED ORAL
Qty: 0 | Refills: 0 | DISCHARGE

## 2020-01-01 RX ORDER — POLYETHYLENE GLYCOL 3350 17 G/17G
17 POWDER, FOR SOLUTION ORAL ONCE
Refills: 0 | Status: COMPLETED | OUTPATIENT
Start: 2020-01-01 | End: 2020-01-01

## 2020-01-01 RX ORDER — ACETAMINOPHEN 500 MG
650 TABLET ORAL EVERY 6 HOURS
Refills: 0 | Status: DISCONTINUED | OUTPATIENT
Start: 2020-01-01 | End: 2020-01-01

## 2020-01-01 RX ORDER — AMIODARONE HYDROCHLORIDE 400 MG/1
200 TABLET ORAL DAILY
Refills: 0 | Status: DISCONTINUED | OUTPATIENT
Start: 2020-01-01 | End: 2020-01-01

## 2020-01-01 RX ORDER — SPIRONOLACTONE 25 MG/1
12.5 TABLET, FILM COATED ORAL DAILY
Refills: 0 | Status: DISCONTINUED | OUTPATIENT
Start: 2020-01-01 | End: 2020-01-01

## 2020-01-01 RX ORDER — ISOSORBIDE DINITRATE 5 MG/1
10 TABLET ORAL THREE TIMES A DAY
Refills: 0 | Status: DISCONTINUED | OUTPATIENT
Start: 2020-01-01 | End: 2020-01-01

## 2020-01-01 RX ORDER — POTASSIUM CHLORIDE 20 MEQ
20 PACKET (EA) ORAL ONCE
Refills: 0 | Status: COMPLETED | OUTPATIENT
Start: 2020-01-01 | End: 2020-01-01

## 2020-01-01 RX ORDER — INSULIN LISPRO 100/ML
1 VIAL (ML) SUBCUTANEOUS
Refills: 0 | Status: DISCONTINUED | OUTPATIENT
Start: 2020-01-01 | End: 2020-01-01

## 2020-01-01 RX ORDER — DEXTROSE 50 % IN WATER 50 %
25 SYRINGE (ML) INTRAVENOUS ONCE
Refills: 0 | Status: DISCONTINUED | OUTPATIENT
Start: 2020-01-01 | End: 2020-01-01

## 2020-01-01 RX ORDER — ZALEPLON 10 MG
5 CAPSULE ORAL AT BEDTIME
Refills: 0 | Status: DISCONTINUED | OUTPATIENT
Start: 2020-01-01 | End: 2020-01-01

## 2020-01-01 RX ORDER — FUROSEMIDE 40 MG
20 TABLET ORAL
Qty: 500 | Refills: 0 | Status: DISCONTINUED | OUTPATIENT
Start: 2020-01-01 | End: 2020-01-01

## 2020-01-01 RX ORDER — ALTEPLASE 100 MG
2 KIT INTRAVENOUS ONCE
Refills: 0 | Status: COMPLETED | OUTPATIENT
Start: 2020-01-01 | End: 2020-01-01

## 2020-01-01 RX ORDER — CHLORHEXIDINE GLUCONATE 213 G/1000ML
1 SOLUTION TOPICAL
Refills: 0 | Status: DISCONTINUED | OUTPATIENT
Start: 2020-01-01 | End: 2020-01-01

## 2020-01-01 RX ORDER — SODIUM CHLORIDE 9 MG/ML
1000 INJECTION INTRAMUSCULAR; INTRAVENOUS; SUBCUTANEOUS ONCE
Refills: 0 | Status: COMPLETED | OUTPATIENT
Start: 2020-01-01 | End: 2020-01-01

## 2020-01-01 RX ORDER — HEPARIN SODIUM 5000 [USP'U]/ML
3000 INJECTION INTRAVENOUS; SUBCUTANEOUS EVERY 6 HOURS
Refills: 0 | Status: DISCONTINUED | OUTPATIENT
Start: 2020-01-01 | End: 2020-01-01

## 2020-01-01 RX ORDER — POTASSIUM CHLORIDE 20 MEQ
2 PACKET (EA) ORAL
Qty: 0 | Refills: 0 | DISCHARGE

## 2020-01-01 RX ORDER — CEFEPIME 1 G/1
INJECTION, POWDER, FOR SOLUTION INTRAMUSCULAR; INTRAVENOUS
Refills: 0 | Status: DISCONTINUED | OUTPATIENT
Start: 2020-01-01 | End: 2020-01-01

## 2020-01-01 RX ORDER — LOSARTAN POTASSIUM 100 MG/1
25 TABLET, FILM COATED ORAL DAILY
Refills: 0 | Status: DISCONTINUED | OUTPATIENT
Start: 2020-01-01 | End: 2020-01-01

## 2020-01-01 RX ORDER — MILRINONE LACTATE 1 MG/ML
0.4 INJECTION, SOLUTION INTRAVENOUS
Qty: 20 | Refills: 0 | Status: DISCONTINUED | OUTPATIENT
Start: 2020-01-01 | End: 2020-01-01

## 2020-01-01 RX ORDER — MIDODRINE HYDROCHLORIDE 2.5 MG/1
10 TABLET ORAL ONCE
Refills: 0 | Status: COMPLETED | OUTPATIENT
Start: 2020-01-01 | End: 2020-01-01

## 2020-01-01 RX ORDER — POTASSIUM CHLORIDE 1500 MG/1
20 TABLET, EXTENDED RELEASE ORAL DAILY
Refills: 0 | Status: DISCONTINUED | COMMUNITY
Start: 2020-01-01 | End: 2020-01-01

## 2020-01-01 RX ORDER — POTASSIUM CHLORIDE 20 MEQ
40 PACKET (EA) ORAL EVERY 4 HOURS
Refills: 0 | Status: COMPLETED | OUTPATIENT
Start: 2020-01-01 | End: 2020-01-01

## 2020-01-01 RX ORDER — HYDRALAZINE HCL 50 MG
25 TABLET ORAL EVERY 8 HOURS
Refills: 0 | Status: DISCONTINUED | OUTPATIENT
Start: 2020-01-01 | End: 2020-01-01

## 2020-01-01 RX ORDER — LANOLIN ALCOHOL/MO/W.PET/CERES
3 CREAM (GRAM) TOPICAL AT BEDTIME
Refills: 0 | Status: DISCONTINUED | OUTPATIENT
Start: 2020-01-01 | End: 2020-01-01

## 2020-01-01 RX ORDER — POTASSIUM CHLORIDE 20 MEQ
20 PACKET (EA) ORAL
Refills: 0 | Status: COMPLETED | OUTPATIENT
Start: 2020-01-01 | End: 2020-01-01

## 2020-01-01 RX ORDER — POTASSIUM CHLORIDE 20 MEQ
40 PACKET (EA) ORAL ONCE
Refills: 0 | Status: COMPLETED | OUTPATIENT
Start: 2020-01-01 | End: 2020-01-01

## 2020-01-01 RX ORDER — SODIUM CHLORIDE 9 MG/ML
250 INJECTION INTRAMUSCULAR; INTRAVENOUS; SUBCUTANEOUS ONCE
Refills: 0 | Status: COMPLETED | OUTPATIENT
Start: 2020-01-01 | End: 2020-01-01

## 2020-01-01 RX ORDER — HYDROCORTISONE 1 %
1 OINTMENT (GRAM) TOPICAL DAILY
Refills: 0 | Status: DISCONTINUED | OUTPATIENT
Start: 2020-01-01 | End: 2020-01-01

## 2020-01-01 RX ORDER — GLUCAGON INJECTION, SOLUTION 0.5 MG/.1ML
1 INJECTION, SOLUTION SUBCUTANEOUS ONCE
Refills: 0 | Status: DISCONTINUED | OUTPATIENT
Start: 2020-01-01 | End: 2020-01-01

## 2020-01-01 RX ORDER — HEPARIN SODIUM 5000 [USP'U]/ML
1400 INJECTION INTRAVENOUS; SUBCUTANEOUS
Qty: 25000 | Refills: 0 | Status: DISCONTINUED | OUTPATIENT
Start: 2020-01-01 | End: 2020-01-01

## 2020-01-01 RX ORDER — SIMETHICONE 80 MG/1
80 TABLET, CHEWABLE ORAL
Refills: 0 | Status: DISCONTINUED | OUTPATIENT
Start: 2020-01-01 | End: 2020-01-01

## 2020-01-01 RX ORDER — CARVEDILOL PHOSPHATE 80 MG/1
6.25 CAPSULE, EXTENDED RELEASE ORAL EVERY 12 HOURS
Refills: 0 | Status: DISCONTINUED | OUTPATIENT
Start: 2020-01-01 | End: 2020-01-01

## 2020-01-01 RX ORDER — LEVOTHYROXINE SODIUM 125 MCG
25 TABLET ORAL DAILY
Refills: 0 | Status: DISCONTINUED | OUTPATIENT
Start: 2020-01-01 | End: 2020-01-01

## 2020-01-01 RX ORDER — ONDANSETRON 8 MG/1
4 TABLET, FILM COATED ORAL ONCE
Refills: 0 | Status: COMPLETED | OUTPATIENT
Start: 2020-01-01 | End: 2020-01-01

## 2020-01-01 RX ORDER — HYDRALAZINE HYDROCHLORIDE 10 MG/1
10 TABLET ORAL
Qty: 270 | Refills: 1 | Status: DISCONTINUED | COMMUNITY
Start: 2020-01-01 | End: 2020-01-01

## 2020-01-01 RX ORDER — SPIRONOLACTONE 25 MG/1
1 TABLET, FILM COATED ORAL
Qty: 30 | Refills: 0
Start: 2020-01-01 | End: 2020-01-01

## 2020-01-01 RX ORDER — ZOLPIDEM TARTRATE 10 MG/1
5 TABLET ORAL ONCE
Refills: 0 | Status: DISCONTINUED | OUTPATIENT
Start: 2020-01-01 | End: 2020-01-01

## 2020-01-01 RX ORDER — INSULIN LISPRO 100/ML
VIAL (ML) SUBCUTANEOUS AT BEDTIME
Refills: 0 | Status: DISCONTINUED | OUTPATIENT
Start: 2020-01-01 | End: 2020-01-01

## 2020-01-01 RX ORDER — NOREPINEPHRINE BITARTRATE/D5W 8 MG/250ML
0.05 PLASTIC BAG, INJECTION (ML) INTRAVENOUS
Qty: 8 | Refills: 0 | Status: DISCONTINUED | OUTPATIENT
Start: 2020-01-01 | End: 2020-01-01

## 2020-01-01 RX ORDER — POTASSIUM CHLORIDE 20 MEQ
10 PACKET (EA) ORAL ONCE
Refills: 0 | Status: COMPLETED | OUTPATIENT
Start: 2020-01-01 | End: 2020-01-01

## 2020-01-01 RX ORDER — HEPARIN SODIUM 5000 [USP'U]/ML
INJECTION INTRAVENOUS; SUBCUTANEOUS
Qty: 25000 | Refills: 0 | Status: DISCONTINUED | OUTPATIENT
Start: 2020-01-01 | End: 2020-01-01

## 2020-01-01 RX ORDER — HYDROCORTISONE 1 %
1 OINTMENT (GRAM) TOPICAL
Refills: 0 | Status: COMPLETED | OUTPATIENT
Start: 2020-01-01 | End: 2020-01-01

## 2020-01-01 RX ORDER — APIXABAN 5 MG/1
5 TABLET, FILM COATED ORAL TWICE DAILY
Qty: 2 | Refills: 5 | Status: ACTIVE | COMMUNITY
Start: 2018-12-02

## 2020-01-01 RX ORDER — POTASSIUM CHLORIDE 1500 MG/1
20 TABLET, EXTENDED RELEASE ORAL DAILY
Qty: 180 | Refills: 1 | Status: COMPLETED | COMMUNITY
Start: 2018-10-10 | End: 2020-01-01

## 2020-01-01 RX ORDER — ISOSORBIDE MONONITRATE 60 MG/1
1 TABLET, EXTENDED RELEASE ORAL
Qty: 30 | Refills: 0
Start: 2020-01-01 | End: 2020-01-01

## 2020-01-01 RX ORDER — HYDRALAZINE HCL 50 MG
10 TABLET ORAL THREE TIMES A DAY
Refills: 0 | Status: DISCONTINUED | OUTPATIENT
Start: 2020-01-01 | End: 2020-01-01

## 2020-01-01 RX ORDER — LEVOTHYROXINE SODIUM 125 MCG
50 TABLET ORAL DAILY
Refills: 0 | Status: DISCONTINUED | OUTPATIENT
Start: 2020-01-01 | End: 2020-01-01

## 2020-01-01 RX ORDER — CARVEDILOL 12.5 MG/1
12.5 TABLET, FILM COATED ORAL TWICE DAILY
Qty: 60 | Refills: 5 | Status: DISCONTINUED | COMMUNITY
Start: 2019-10-22 | End: 2020-01-01

## 2020-01-01 RX ORDER — MILRINONE LACTATE 1 MG/ML
0.3 INJECTION, SOLUTION INTRAVENOUS
Qty: 20 | Refills: 0 | Status: DISCONTINUED | OUTPATIENT
Start: 2020-01-01 | End: 2020-01-01

## 2020-01-01 RX ORDER — HYDRALAZINE HCL 50 MG
1 TABLET ORAL
Qty: 90 | Refills: 0
Start: 2020-01-01 | End: 2020-01-01

## 2020-01-01 RX ORDER — POLYETHYLENE GLYCOL 3350 17 G/17G
17 POWDER, FOR SOLUTION ORAL DAILY
Refills: 0 | Status: DISCONTINUED | OUTPATIENT
Start: 2020-01-01 | End: 2020-01-01

## 2020-01-01 RX ORDER — ROBINUL 0.2 MG/ML
0.4 INJECTION INTRAMUSCULAR; INTRAVENOUS EVERY 4 HOURS
Refills: 0 | Status: DISCONTINUED | OUTPATIENT
Start: 2020-01-01 | End: 2020-01-01

## 2020-01-01 RX ORDER — BUMETANIDE 0.25 MG/ML
2 INJECTION INTRAMUSCULAR; INTRAVENOUS ONCE
Refills: 0 | Status: DISCONTINUED | OUTPATIENT
Start: 2020-01-01 | End: 2020-01-01

## 2020-01-01 RX ORDER — METOPROLOL SUCCINATE 25 MG/1
25 TABLET, EXTENDED RELEASE ORAL DAILY
Qty: 30 | Refills: 3 | Status: ACTIVE | COMMUNITY
Start: 2020-01-01 | End: 1900-01-01

## 2020-01-01 RX ORDER — DEXTROSE 50 % IN WATER 50 %
25 SYRINGE (ML) INTRAVENOUS ONCE
Refills: 0 | Status: COMPLETED | OUTPATIENT
Start: 2020-01-01 | End: 2020-01-01

## 2020-01-01 RX ORDER — LANOLIN ALCOHOL/MO/W.PET/CERES
1 CREAM (GRAM) TOPICAL
Qty: 30 | Refills: 0
Start: 2020-01-01 | End: 2020-01-01

## 2020-01-01 RX ORDER — ISOSORBIDE MONONITRATE 60 MG/1
60 TABLET, EXTENDED RELEASE ORAL AT BEDTIME
Refills: 0 | Status: DISCONTINUED | OUTPATIENT
Start: 2020-01-01 | End: 2020-01-01

## 2020-01-01 RX ORDER — ISOSORBIDE MONONITRATE 60 MG/1
30 TABLET, EXTENDED RELEASE ORAL AT BEDTIME
Refills: 0 | Status: DISCONTINUED | OUTPATIENT
Start: 2020-01-01 | End: 2020-01-01

## 2020-01-01 RX ORDER — FLUTICASONE PROPIONATE 50 MCG
1 SPRAY, SUSPENSION NASAL EVERY 12 HOURS
Refills: 0 | Status: DISCONTINUED | OUTPATIENT
Start: 2020-01-01 | End: 2020-01-01

## 2020-01-01 RX ORDER — MILRINONE LACTATE 1 MG/ML
0.15 INJECTION, SOLUTION INTRAVENOUS
Qty: 20 | Refills: 0 | Status: DISCONTINUED | OUTPATIENT
Start: 2020-01-01 | End: 2020-01-01

## 2020-01-01 RX ORDER — MIDODRINE HYDROCHLORIDE 2.5 MG/1
5 TABLET ORAL ONCE
Refills: 0 | Status: COMPLETED | OUTPATIENT
Start: 2020-01-01 | End: 2020-01-01

## 2020-01-01 RX ORDER — HYDROMORPHONE HYDROCHLORIDE 2 MG/ML
0.2 INJECTION INTRAMUSCULAR; INTRAVENOUS; SUBCUTANEOUS
Refills: 0 | Status: DISCONTINUED | OUTPATIENT
Start: 2020-01-01 | End: 2020-01-01

## 2020-01-01 RX ORDER — VANCOMYCIN HCL 1 G
1000 VIAL (EA) INTRAVENOUS ONCE
Refills: 0 | Status: COMPLETED | OUTPATIENT
Start: 2020-01-01 | End: 2020-01-01

## 2020-01-01 RX ORDER — POTASSIUM CHLORIDE 20 MEQ
10 PACKET (EA) ORAL
Refills: 0 | Status: COMPLETED | OUTPATIENT
Start: 2020-01-01 | End: 2020-01-01

## 2020-01-01 RX ORDER — APIXABAN 2.5 MG/1
2.5 TABLET, FILM COATED ORAL
Refills: 0 | Status: DISCONTINUED | OUTPATIENT
Start: 2020-01-01 | End: 2020-01-01

## 2020-01-01 RX ORDER — SODIUM CHLORIDE 9 MG/ML
1000 INJECTION INTRAMUSCULAR; INTRAVENOUS; SUBCUTANEOUS
Refills: 0 | Status: DISCONTINUED | OUTPATIENT
Start: 2020-01-01 | End: 2020-01-01

## 2020-01-01 RX ORDER — SODIUM CHLORIDE 5 G/100ML
150 INJECTION, SOLUTION INTRAVENOUS
Refills: 0 | Status: COMPLETED | OUTPATIENT
Start: 2020-01-01 | End: 2020-01-01

## 2020-01-01 RX ORDER — AMIODARONE HYDROCHLORIDE 400 MG/1
200 TABLET ORAL DAILY
Refills: 0 | Status: CANCELLED | OUTPATIENT
Start: 2020-01-01 | End: 2020-01-01

## 2020-01-01 RX ORDER — ASPIRIN/CALCIUM CARB/MAGNESIUM 324 MG
325 TABLET ORAL ONCE
Refills: 0 | Status: COMPLETED | OUTPATIENT
Start: 2020-01-01 | End: 2020-01-01

## 2020-01-01 RX ORDER — DEXTROSE 50 % IN WATER 50 %
12.5 SYRINGE (ML) INTRAVENOUS ONCE
Refills: 0 | Status: DISCONTINUED | OUTPATIENT
Start: 2020-01-01 | End: 2020-01-01

## 2020-01-01 RX ORDER — ACETAMINOPHEN 500 MG
1000 TABLET ORAL ONCE
Refills: 0 | Status: COMPLETED | OUTPATIENT
Start: 2020-01-01 | End: 2020-01-01

## 2020-01-01 RX ORDER — METOCLOPRAMIDE HCL 10 MG
10 TABLET ORAL EVERY 8 HOURS
Refills: 0 | Status: DISCONTINUED | OUTPATIENT
Start: 2020-01-01 | End: 2020-01-01

## 2020-01-01 RX ORDER — DOBUTAMINE HCL 250MG/20ML
2.5 VIAL (ML) INTRAVENOUS
Qty: 500 | Refills: 0 | Status: DISCONTINUED | OUTPATIENT
Start: 2020-01-01 | End: 2020-01-01

## 2020-01-01 RX ORDER — HEPARIN SODIUM 5000 [USP'U]/ML
900 INJECTION INTRAVENOUS; SUBCUTANEOUS
Qty: 25000 | Refills: 0 | Status: DISCONTINUED | OUTPATIENT
Start: 2020-01-01 | End: 2020-01-01

## 2020-01-01 RX ORDER — BUMETANIDE 0.25 MG/ML
4 INJECTION INTRAMUSCULAR; INTRAVENOUS ONCE
Refills: 0 | Status: COMPLETED | OUTPATIENT
Start: 2020-01-01 | End: 2020-01-01

## 2020-01-01 RX ORDER — LEVOTHYROXINE SODIUM 125 MCG
1 TABLET ORAL
Qty: 0 | Refills: 0 | DISCHARGE

## 2020-01-01 RX ORDER — METOPROLOL TARTRATE 50 MG
1 TABLET ORAL
Qty: 30 | Refills: 0
Start: 2020-01-01 | End: 2020-01-01

## 2020-01-01 RX ORDER — BACITRACIN ZINC 500 UNIT/G
1 OINTMENT IN PACKET (EA) TOPICAL THREE TIMES A DAY
Refills: 0 | Status: DISCONTINUED | OUTPATIENT
Start: 2020-01-01 | End: 2020-01-01

## 2020-01-01 RX ORDER — FUROSEMIDE 40 MG
40 TABLET ORAL DAILY
Refills: 0 | Status: DISCONTINUED | OUTPATIENT
Start: 2020-01-01 | End: 2020-01-01

## 2020-01-01 RX ORDER — TORSEMIDE 20 MG/1
20 TABLET ORAL DAILY
Refills: 0 | Status: COMPLETED | COMMUNITY
Start: 2020-01-01 | End: 2020-01-01

## 2020-01-01 RX ORDER — ZOLPIDEM TARTRATE 10 MG/1
1 TABLET ORAL
Qty: 0 | Refills: 0 | DISCHARGE

## 2020-01-01 RX ORDER — INSULIN LISPRO 100/ML
3 VIAL (ML) SUBCUTANEOUS
Refills: 0 | Status: DISCONTINUED | OUTPATIENT
Start: 2020-01-01 | End: 2020-01-01

## 2020-01-01 RX ORDER — HEPARIN SODIUM 5000 [USP'U]/ML
700 INJECTION INTRAVENOUS; SUBCUTANEOUS
Qty: 25000 | Refills: 0 | Status: DISCONTINUED | OUTPATIENT
Start: 2020-01-01 | End: 2020-01-01

## 2020-01-01 RX ORDER — METOPROLOL TARTRATE 50 MG
25 TABLET ORAL DAILY
Refills: 0 | Status: DISCONTINUED | OUTPATIENT
Start: 2020-01-01 | End: 2020-01-01

## 2020-01-01 RX ORDER — CEFUROXIME AXETIL 250 MG
1500 TABLET ORAL EVERY 24 HOURS
Refills: 0 | Status: COMPLETED | OUTPATIENT
Start: 2020-01-01 | End: 2020-01-01

## 2020-01-01 RX ORDER — MILRINONE LACTATE 1 MG/ML
0.25 INJECTION, SOLUTION INTRAVENOUS
Qty: 20 | Refills: 0 | Status: DISCONTINUED | OUTPATIENT
Start: 2020-01-01 | End: 2020-01-01

## 2020-01-01 RX ORDER — POTASSIUM CHLORIDE 20 MEQ
1 PACKET (EA) ORAL
Qty: 0 | Refills: 0 | DISCHARGE

## 2020-01-01 RX ORDER — POTASSIUM PHOSPHATE, MONOBASIC POTASSIUM PHOSPHATE, DIBASIC 236; 224 MG/ML; MG/ML
15 INJECTION, SOLUTION INTRAVENOUS ONCE
Refills: 0 | Status: COMPLETED | OUTPATIENT
Start: 2020-01-01 | End: 2020-01-01

## 2020-01-01 RX ORDER — CEFUROXIME AXETIL 250 MG
1500 TABLET ORAL EVERY 12 HOURS
Refills: 0 | Status: DISCONTINUED | OUTPATIENT
Start: 2020-01-01 | End: 2020-01-01

## 2020-01-01 RX ORDER — MIDODRINE HYDROCHLORIDE 2.5 MG/1
5 TABLET ORAL
Refills: 0 | Status: DISCONTINUED | OUTPATIENT
Start: 2020-01-01 | End: 2020-01-01

## 2020-01-01 RX ORDER — INSULIN GLARGINE 100 [IU]/ML
10 INJECTION, SOLUTION SUBCUTANEOUS AT BEDTIME
Refills: 0 | Status: DISCONTINUED | OUTPATIENT
Start: 2020-01-01 | End: 2020-01-01

## 2020-01-01 RX ORDER — FUROSEMIDE 40 MG
40 TABLET ORAL ONCE
Refills: 0 | Status: COMPLETED | OUTPATIENT
Start: 2020-01-01 | End: 2020-01-01

## 2020-01-01 RX ORDER — ACETAMINOPHEN 500 MG
2 TABLET ORAL
Qty: 0 | Refills: 0 | DISCHARGE
Start: 2020-01-01

## 2020-01-01 RX ORDER — HEPARIN SODIUM 5000 [USP'U]/ML
6000 INJECTION INTRAVENOUS; SUBCUTANEOUS EVERY 6 HOURS
Refills: 0 | Status: DISCONTINUED | OUTPATIENT
Start: 2020-01-01 | End: 2020-01-01

## 2020-01-01 RX ORDER — HYDROCORTISONE 1 %
1 OINTMENT (GRAM) TOPICAL
Qty: 0 | Refills: 0 | DISCHARGE
Start: 2020-01-01

## 2020-01-01 RX ORDER — METOLAZONE 2.5 MG/1
2.5 TABLET ORAL
Qty: 30 | Refills: 0 | Status: DISCONTINUED | COMMUNITY
Start: 2020-01-01 | End: 2020-01-01

## 2020-01-01 RX ORDER — POTASSIUM CHLORIDE 20 MEQ
40 PACKET (EA) ORAL DAILY
Refills: 0 | Status: DISCONTINUED | OUTPATIENT
Start: 2020-01-01 | End: 2020-01-01

## 2020-01-01 RX ORDER — SODIUM CHLORIDE 9 MG/ML
10 INJECTION INTRAMUSCULAR; INTRAVENOUS; SUBCUTANEOUS
Refills: 0 | Status: DISCONTINUED | OUTPATIENT
Start: 2020-01-01 | End: 2020-01-01

## 2020-01-01 RX ORDER — METOCLOPRAMIDE HCL 10 MG
10 TABLET ORAL ONCE
Refills: 0 | Status: DISCONTINUED | OUTPATIENT
Start: 2020-01-01 | End: 2020-01-01

## 2020-01-01 RX ORDER — TRAMADOL HYDROCHLORIDE AND ACETAMINOPHEN 37.5; 325 MG/1; MG/1
37.5-325 TABLET, FILM COATED ORAL TWICE DAILY
Qty: 60 | Refills: 3 | Status: COMPLETED | COMMUNITY
Start: 2018-11-28 | End: 2020-01-01

## 2020-01-01 RX ORDER — BUMETANIDE 0.25 MG/ML
2 INJECTION INTRAMUSCULAR; INTRAVENOUS
Qty: 20 | Refills: 0 | Status: DISCONTINUED | OUTPATIENT
Start: 2020-01-01 | End: 2020-01-01

## 2020-01-01 RX ORDER — HYDROMORPHONE HYDROCHLORIDE 2 MG/ML
0.2 INJECTION INTRAMUSCULAR; INTRAVENOUS; SUBCUTANEOUS EVERY 4 HOURS
Refills: 0 | Status: DISCONTINUED | OUTPATIENT
Start: 2020-01-01 | End: 2020-01-01

## 2020-01-01 RX ORDER — LEVOTHYROXINE SODIUM 0.03 MG/1
25 TABLET ORAL
Qty: 30 | Refills: 0 | Status: COMPLETED | COMMUNITY
Start: 2020-01-01 | End: 2020-01-01

## 2020-01-01 RX ORDER — APIXABAN 2.5 MG/1
5 TABLET, FILM COATED ORAL
Refills: 0 | Status: DISCONTINUED | OUTPATIENT
Start: 2020-01-01 | End: 2020-01-01

## 2020-01-01 RX ORDER — HYDROCORTISONE 25 MG/G
2.5 OINTMENT TOPICAL
Refills: 0 | Status: DISCONTINUED | COMMUNITY
Start: 2020-01-01 | End: 2020-01-01

## 2020-01-01 RX ORDER — CARVEDILOL PHOSPHATE 80 MG/1
1 CAPSULE, EXTENDED RELEASE ORAL
Qty: 0 | Refills: 0 | DISCHARGE

## 2020-01-01 RX ORDER — SODIUM CHLORIDE 9 MG/ML
250 INJECTION INTRAMUSCULAR; INTRAVENOUS; SUBCUTANEOUS ONCE
Refills: 0 | Status: DISCONTINUED | OUTPATIENT
Start: 2020-01-01 | End: 2020-01-01

## 2020-01-01 RX ORDER — SPIRONOLACTONE 25 MG/1
25 TABLET, FILM COATED ORAL DAILY
Refills: 0 | Status: DISCONTINUED | OUTPATIENT
Start: 2020-01-01 | End: 2020-01-01

## 2020-01-01 RX ORDER — CALCIUM GLUCONATE 100 MG/ML
1 VIAL (ML) INTRAVENOUS ONCE
Refills: 0 | Status: COMPLETED | OUTPATIENT
Start: 2020-01-01 | End: 2020-01-01

## 2020-01-01 RX ORDER — DESMOPRESSIN ACETATE 0.1 MG/1
20 TABLET ORAL ONCE
Refills: 0 | Status: COMPLETED | OUTPATIENT
Start: 2020-01-01 | End: 2020-01-01

## 2020-01-01 RX ORDER — INSULIN HUMAN 100 [IU]/ML
4 INJECTION, SOLUTION SUBCUTANEOUS ONCE
Refills: 0 | Status: COMPLETED | OUTPATIENT
Start: 2020-01-01 | End: 2020-01-01

## 2020-01-01 RX ORDER — FUROSEMIDE 40 MG
10 TABLET ORAL
Qty: 500 | Refills: 0 | Status: DISCONTINUED | OUTPATIENT
Start: 2020-01-01 | End: 2020-01-01

## 2020-01-01 RX ORDER — METOCLOPRAMIDE HCL 10 MG
5 TABLET ORAL ONCE
Refills: 0 | Status: COMPLETED | OUTPATIENT
Start: 2020-01-01 | End: 2020-01-01

## 2020-01-01 RX ORDER — CARVEDILOL PHOSPHATE 80 MG/1
3.12 CAPSULE, EXTENDED RELEASE ORAL EVERY 12 HOURS
Refills: 0 | Status: DISCONTINUED | OUTPATIENT
Start: 2020-01-01 | End: 2020-01-01

## 2020-01-01 RX ORDER — SODIUM CHLORIDE 5 G/100ML
150 INJECTION, SOLUTION INTRAVENOUS
Refills: 0 | Status: DISCONTINUED | OUTPATIENT
Start: 2020-01-01 | End: 2020-01-01

## 2020-01-01 RX ORDER — AMIODARONE HYDROCHLORIDE 400 MG/1
400 TABLET ORAL EVERY 8 HOURS
Refills: 0 | Status: DISCONTINUED | OUTPATIENT
Start: 2020-01-01 | End: 2020-01-01

## 2020-01-01 RX ORDER — POLYETHYLENE GLYCOL 3350 17 G/17G
17 POWDER, FOR SOLUTION ORAL ONCE
Refills: 0 | Status: DISCONTINUED | OUTPATIENT
Start: 2020-01-01 | End: 2020-01-01

## 2020-01-01 RX ORDER — ISOSORBIDE DINITRATE 5 MG/1
20 TABLET ORAL EVERY 8 HOURS
Refills: 0 | Status: DISCONTINUED | OUTPATIENT
Start: 2020-01-01 | End: 2020-01-01

## 2020-01-01 RX ORDER — MIDODRINE HYDROCHLORIDE 2.5 MG/1
2.5 TABLET ORAL THREE TIMES A DAY
Refills: 0 | Status: DISCONTINUED | OUTPATIENT
Start: 2020-01-01 | End: 2020-01-01

## 2020-01-01 RX ORDER — HEPARIN SODIUM 5000 [USP'U]/ML
6500 INJECTION INTRAVENOUS; SUBCUTANEOUS EVERY 6 HOURS
Refills: 0 | Status: DISCONTINUED | OUTPATIENT
Start: 2020-01-01 | End: 2020-01-01

## 2020-01-01 RX ORDER — MILRINONE LACTATE 1 MG/ML
0.12 INJECTION, SOLUTION INTRAVENOUS
Qty: 20 | Refills: 0 | Status: DISCONTINUED | OUTPATIENT
Start: 2020-01-01 | End: 2020-01-01

## 2020-01-01 RX ORDER — CEFEPIME 1 G/1
1000 INJECTION, POWDER, FOR SOLUTION INTRAMUSCULAR; INTRAVENOUS ONCE
Refills: 0 | Status: COMPLETED | OUTPATIENT
Start: 2020-01-01 | End: 2020-01-01

## 2020-01-01 RX ORDER — AMIODARONE HYDROCHLORIDE 200 MG/1
200 TABLET ORAL DAILY
Qty: 90 | Refills: 3 | Status: ACTIVE | COMMUNITY
Start: 2020-01-01 | End: 1900-01-01

## 2020-01-01 RX ORDER — HYDROCORTISONE 1 %
12 CREAM (GRAM) TOPICAL TWICE DAILY
Qty: 1 | Refills: 3 | Status: DISCONTINUED | COMMUNITY
Start: 2019-08-06 | End: 2020-01-01

## 2020-01-01 RX ORDER — SODIUM CHLORIDE 9 MG/ML
200 INJECTION INTRAMUSCULAR; INTRAVENOUS; SUBCUTANEOUS ONCE
Refills: 0 | Status: COMPLETED | OUTPATIENT
Start: 2020-01-01 | End: 2020-01-01

## 2020-01-01 RX ORDER — HYDRALAZINE HCL 50 MG
10 TABLET ORAL EVERY 8 HOURS
Refills: 0 | Status: DISCONTINUED | OUTPATIENT
Start: 2020-01-01 | End: 2020-01-01

## 2020-01-01 RX ORDER — BUMETANIDE 0.25 MG/ML
4 INJECTION INTRAMUSCULAR; INTRAVENOUS
Refills: 0 | Status: DISCONTINUED | OUTPATIENT
Start: 2020-01-01 | End: 2020-01-01

## 2020-01-01 RX ORDER — FUROSEMIDE 40 MG
15 TABLET ORAL
Qty: 500 | Refills: 0 | Status: DISCONTINUED | OUTPATIENT
Start: 2020-01-01 | End: 2020-01-01

## 2020-01-01 RX ORDER — HYDRALAZINE HCL 50 MG
50 TABLET ORAL THREE TIMES A DAY
Refills: 0 | Status: DISCONTINUED | OUTPATIENT
Start: 2020-01-01 | End: 2020-01-01

## 2020-01-01 RX ORDER — HYDROCORTISONE 25 MG/G
2.5 CREAM TOPICAL
Qty: 30 | Refills: 3 | Status: COMPLETED | COMMUNITY
Start: 2019-11-05 | End: 2020-01-01

## 2020-01-01 RX ORDER — METOPROLOL TARTRATE 50 MG
2.5 TABLET ORAL
Refills: 0 | Status: DISCONTINUED | OUTPATIENT
Start: 2020-01-01 | End: 2020-01-01

## 2020-01-01 RX ORDER — ALLOPURINOL 300 MG
1 TABLET ORAL
Qty: 0 | Refills: 0 | DISCHARGE

## 2020-01-01 RX ORDER — CHLORHEXIDINE GLUCONATE 213 G/1000ML
1 SOLUTION TOPICAL ONCE
Refills: 0 | Status: COMPLETED | OUTPATIENT
Start: 2020-01-01 | End: 2020-01-01

## 2020-01-01 RX ORDER — MAGNESIUM SULFATE 500 MG/ML
1 VIAL (ML) INJECTION ONCE
Refills: 0 | Status: COMPLETED | OUTPATIENT
Start: 2020-01-01 | End: 2020-01-01

## 2020-01-01 RX ORDER — INSULIN HUMAN 100 [IU]/ML
4 INJECTION, SOLUTION SUBCUTANEOUS
Qty: 100 | Refills: 0 | Status: DISCONTINUED | OUTPATIENT
Start: 2020-01-01 | End: 2020-01-01

## 2020-01-01 RX ORDER — BENZOCAINE AND MENTHOL 5; 1 G/100ML; G/100ML
1 LIQUID ORAL
Refills: 0 | Status: DISCONTINUED | OUTPATIENT
Start: 2020-01-01 | End: 2020-01-01

## 2020-01-01 RX ORDER — CEFEPIME 1 G/1
1000 INJECTION, POWDER, FOR SOLUTION INTRAMUSCULAR; INTRAVENOUS EVERY 12 HOURS
Refills: 0 | Status: COMPLETED | OUTPATIENT
Start: 2020-01-01 | End: 2020-01-01

## 2020-01-01 RX ORDER — MUPIROCIN 20 MG/G
2 OINTMENT TOPICAL
Qty: 1 | Refills: 3 | Status: DISCONTINUED | COMMUNITY
Start: 2020-01-01 | End: 2020-01-01

## 2020-01-01 RX ORDER — SACUBITRIL AND VALSARTAN 49; 51 MG/1; MG/1
49-51 TABLET, FILM COATED ORAL
Qty: 60 | Refills: 3 | Status: DISCONTINUED | COMMUNITY
Start: 2018-09-20 | End: 2020-01-01

## 2020-01-01 RX ORDER — CEFEPIME 1 G/1
1000 INJECTION, POWDER, FOR SOLUTION INTRAMUSCULAR; INTRAVENOUS ONCE
Refills: 0 | Status: DISCONTINUED | OUTPATIENT
Start: 2020-01-01 | End: 2020-01-01

## 2020-01-01 RX ORDER — FUROSEMIDE 40 MG
60 TABLET ORAL
Refills: 0 | Status: DISCONTINUED | OUTPATIENT
Start: 2020-01-01 | End: 2020-01-01

## 2020-01-01 RX ORDER — CARVEDILOL PHOSPHATE 80 MG/1
1 CAPSULE, EXTENDED RELEASE ORAL
Qty: 60 | Refills: 0
Start: 2020-01-01 | End: 2020-01-01

## 2020-01-01 RX ORDER — MILRINONE LACTATE 0.2 MG/ML
20-5 INJECTION, SOLUTION INTRAVENOUS
Refills: 0 | Status: ACTIVE | COMMUNITY
Start: 2020-01-01

## 2020-01-01 RX ORDER — VANCOMYCIN HCL 1 G
900 VIAL (EA) INTRAVENOUS ONCE
Refills: 0 | Status: DISCONTINUED | OUTPATIENT
Start: 2020-01-01 | End: 2020-01-01

## 2020-01-01 RX ORDER — SODIUM CHLORIDE 9 MG/ML
500 INJECTION INTRAMUSCULAR; INTRAVENOUS; SUBCUTANEOUS ONCE
Refills: 0 | Status: DISCONTINUED | OUTPATIENT
Start: 2020-01-01 | End: 2020-01-01

## 2020-01-01 RX ORDER — INSULIN LISPRO 100/ML
2 VIAL (ML) SUBCUTANEOUS ONCE
Refills: 0 | Status: COMPLETED | OUTPATIENT
Start: 2020-01-01 | End: 2020-01-01

## 2020-01-01 RX ORDER — HEPARIN SODIUM 5000 [USP'U]/ML
1000 INJECTION INTRAVENOUS; SUBCUTANEOUS
Qty: 25000 | Refills: 0 | Status: DISCONTINUED | OUTPATIENT
Start: 2020-01-01 | End: 2020-01-01

## 2020-01-01 RX ORDER — METOPROLOL TARTRATE 50 MG
2.5 TABLET ORAL ONCE
Refills: 0 | Status: DISCONTINUED | OUTPATIENT
Start: 2020-01-01 | End: 2020-01-01

## 2020-01-01 RX ORDER — METOPROLOL TARTRATE 50 MG
25 TABLET ORAL
Refills: 0 | Status: DISCONTINUED | OUTPATIENT
Start: 2020-01-01 | End: 2020-01-01

## 2020-01-01 RX ORDER — SPIRONOLACTONE 25 MG/1
25 TABLET ORAL DAILY
Refills: 0 | Status: DISCONTINUED | COMMUNITY
Start: 2020-01-01 | End: 2020-01-01

## 2020-01-01 RX ORDER — FUROSEMIDE 40 MG/1
40 TABLET ORAL
Qty: 90 | Refills: 3 | Status: COMPLETED | COMMUNITY
Start: 2018-10-10 | End: 2020-01-01

## 2020-01-01 RX ORDER — POTASSIUM CHLORIDE 20 MEQ
2 PACKET (EA) ORAL
Qty: 60 | Refills: 0
Start: 2020-01-01 | End: 2020-01-01

## 2020-01-01 RX ORDER — METOPROLOL TARTRATE 50 MG
1 TABLET ORAL
Qty: 0 | Refills: 0 | DISCHARGE

## 2020-01-01 RX ORDER — METOPROLOL TARTRATE 50 MG
12.5 TABLET ORAL
Refills: 0 | Status: DISCONTINUED | OUTPATIENT
Start: 2020-01-01 | End: 2020-01-01

## 2020-01-01 RX ORDER — CALCIUM CARBONATE 500(1250)
1 TABLET ORAL ONCE
Refills: 0 | Status: COMPLETED | OUTPATIENT
Start: 2020-01-01 | End: 2020-01-01

## 2020-01-01 RX ORDER — HYDRALAZINE HCL 50 MG
37.5 TABLET ORAL THREE TIMES A DAY
Refills: 0 | Status: DISCONTINUED | OUTPATIENT
Start: 2020-01-01 | End: 2020-01-01

## 2020-01-01 RX ORDER — FUROSEMIDE 40 MG
80 TABLET ORAL ONCE
Refills: 0 | Status: COMPLETED | OUTPATIENT
Start: 2020-01-01 | End: 2020-01-01

## 2020-01-01 RX ORDER — FLUTICASONE PROPIONATE 50 MCG
1 SPRAY, SUSPENSION NASAL
Refills: 0 | Status: DISCONTINUED | OUTPATIENT
Start: 2020-01-01 | End: 2020-01-01

## 2020-01-01 RX ORDER — AMIODARONE HYDROCHLORIDE 400 MG/1
1 TABLET ORAL
Qty: 30 | Refills: 0
Start: 2020-01-01 | End: 2020-01-01

## 2020-01-01 RX ORDER — FLUTICASONE PROPIONATE 50 MCG
1 SPRAY, SUSPENSION NASAL
Qty: 1 | Refills: 0
Start: 2020-01-01 | End: 2020-01-01

## 2020-01-01 RX ORDER — SIMETHICONE 80 MG/1
80 TABLET, CHEWABLE ORAL ONCE
Refills: 0 | Status: COMPLETED | OUTPATIENT
Start: 2020-01-01 | End: 2020-01-01

## 2020-01-01 RX ORDER — SODIUM ZIRCONIUM CYCLOSILICATE 10 G/10G
10 POWDER, FOR SUSPENSION ORAL ONCE
Refills: 0 | Status: COMPLETED | OUTPATIENT
Start: 2020-01-01 | End: 2020-01-01

## 2020-01-01 RX ORDER — HEPARIN SODIUM 5000 [USP'U]/ML
7500 INJECTION INTRAVENOUS; SUBCUTANEOUS EVERY 6 HOURS
Refills: 0 | Status: DISCONTINUED | OUTPATIENT
Start: 2020-01-01 | End: 2020-01-01

## 2020-01-01 RX ORDER — LEVOTHYROXINE SODIUM 125 MCG
25 TABLET ORAL AT BEDTIME
Refills: 0 | Status: DISCONTINUED | OUTPATIENT
Start: 2020-01-01 | End: 2020-01-01

## 2020-01-01 RX ORDER — MILRINONE LACTATE 1 MG/ML
0.3 INJECTION, SOLUTION INTRAVENOUS
Qty: 12960 | Refills: 0
Start: 2020-01-01 | End: 2020-01-01

## 2020-01-01 RX ORDER — MIDODRINE HYDROCHLORIDE 2.5 MG/1
5 TABLET ORAL THREE TIMES A DAY
Refills: 0 | Status: DISCONTINUED | OUTPATIENT
Start: 2020-01-01 | End: 2020-01-01

## 2020-01-01 RX ORDER — BUMETANIDE 0.25 MG/ML
4 INJECTION INTRAMUSCULAR; INTRAVENOUS ONCE
Refills: 0 | Status: DISCONTINUED | OUTPATIENT
Start: 2020-01-01 | End: 2020-01-01

## 2020-01-01 RX ORDER — ALLOPURINOL 300 MG/1
300 TABLET ORAL
Qty: 90 | Refills: 3 | Status: COMPLETED | COMMUNITY
Start: 2018-10-10 | End: 2020-01-01

## 2020-01-01 RX ORDER — ALBUTEROL 90 UG/1
10 AEROSOL, METERED ORAL ONCE
Refills: 0 | Status: COMPLETED | OUTPATIENT
Start: 2020-01-01 | End: 2020-01-01

## 2020-01-01 RX ORDER — MILRINONE LACTATE 1 MG/ML
0.2 INJECTION, SOLUTION INTRAVENOUS
Qty: 20 | Refills: 0 | Status: DISCONTINUED | OUTPATIENT
Start: 2020-01-01 | End: 2020-01-01

## 2020-01-01 RX ORDER — HEPARIN SODIUM 5000 [USP'U]/ML
950 INJECTION INTRAVENOUS; SUBCUTANEOUS
Qty: 25000 | Refills: 0 | Status: DISCONTINUED | OUTPATIENT
Start: 2020-01-01 | End: 2020-01-01

## 2020-01-01 RX ORDER — ISOSORBIDE MONONITRATE 60 MG/1
60 TABLET, EXTENDED RELEASE ORAL AT BEDTIME
Refills: 0 | Status: ACTIVE | COMMUNITY
Start: 2020-01-01

## 2020-01-01 RX ORDER — ONDANSETRON 8 MG/1
4 TABLET, FILM COATED ORAL EVERY 6 HOURS
Refills: 0 | Status: COMPLETED | OUTPATIENT
Start: 2020-01-01 | End: 2020-01-01

## 2020-01-01 RX ORDER — BUMETANIDE 0.25 MG/ML
0.5 INJECTION INTRAMUSCULAR; INTRAVENOUS
Qty: 20 | Refills: 0 | Status: DISCONTINUED | OUTPATIENT
Start: 2020-01-01 | End: 2020-01-01

## 2020-01-01 RX ORDER — APIXABAN 2.5 MG/1
0 TABLET, FILM COATED ORAL
Qty: 0 | Refills: 0 | DISCHARGE

## 2020-01-01 RX ORDER — APIXABAN 2.5 MG/1
2.5 TABLET, FILM COATED ORAL EVERY 12 HOURS
Refills: 0 | Status: DISCONTINUED | OUTPATIENT
Start: 2020-01-01 | End: 2020-01-01

## 2020-01-01 RX ORDER — TORSEMIDE 20 MG/1
20 TABLET ORAL TWICE DAILY
Qty: 360 | Refills: 3 | Status: DISCONTINUED | COMMUNITY
Start: 2020-01-01 | End: 2020-01-01

## 2020-01-01 RX ORDER — FUROSEMIDE 40 MG
1 TABLET ORAL
Qty: 0 | Refills: 0 | DISCHARGE

## 2020-01-01 RX ORDER — HEPARIN SODIUM 5000 [USP'U]/ML
3500 INJECTION INTRAVENOUS; SUBCUTANEOUS EVERY 6 HOURS
Refills: 0 | Status: DISCONTINUED | OUTPATIENT
Start: 2020-01-01 | End: 2020-01-01

## 2020-01-01 RX ORDER — MILRINONE LACTATE 1 MG/ML
0.38 INJECTION, SOLUTION INTRAVENOUS
Qty: 20 | Refills: 0 | Status: DISCONTINUED | OUTPATIENT
Start: 2020-01-01 | End: 2020-01-01

## 2020-01-01 RX ORDER — MAGNESIUM SULFATE 500 MG/ML
2 VIAL (ML) INJECTION ONCE
Refills: 0 | Status: COMPLETED | OUTPATIENT
Start: 2020-01-01 | End: 2020-01-01

## 2020-01-01 RX ORDER — HYDROCORTISONE 1 %
1 OINTMENT (GRAM) TOPICAL
Refills: 0 | Status: DISCONTINUED | OUTPATIENT
Start: 2020-01-01 | End: 2020-01-01

## 2020-01-01 RX ORDER — BACITRACIN ZINC 500 UNIT/G
1 OINTMENT IN PACKET (EA) TOPICAL
Refills: 0 | Status: DISCONTINUED | OUTPATIENT
Start: 2020-01-01 | End: 2020-01-01

## 2020-01-01 RX ORDER — INSULIN HUMAN 100 [IU]/ML
5 INJECTION, SOLUTION SUBCUTANEOUS ONCE
Refills: 0 | Status: COMPLETED | OUTPATIENT
Start: 2020-01-01 | End: 2020-01-01

## 2020-01-01 RX ORDER — INSULIN HUMAN 100 [IU]/ML
10 INJECTION, SOLUTION SUBCUTANEOUS ONCE
Refills: 0 | Status: COMPLETED | OUTPATIENT
Start: 2020-01-01 | End: 2020-01-01

## 2020-01-01 RX ORDER — VASOPRESSIN 20 [USP'U]/ML
0.04 INJECTION INTRAVENOUS
Qty: 50 | Refills: 0 | Status: DISCONTINUED | OUTPATIENT
Start: 2020-01-01 | End: 2020-01-01

## 2020-01-01 RX ORDER — CEFEPIME 1 G/1
1000 INJECTION, POWDER, FOR SOLUTION INTRAMUSCULAR; INTRAVENOUS EVERY 24 HOURS
Refills: 0 | Status: COMPLETED | OUTPATIENT
Start: 2020-01-01 | End: 2020-01-01

## 2020-01-01 RX ORDER — FUROSEMIDE 40 MG
60 TABLET ORAL DAILY
Refills: 0 | Status: DISCONTINUED | OUTPATIENT
Start: 2020-01-01 | End: 2020-01-01

## 2020-01-01 RX ORDER — METOCLOPRAMIDE HCL 10 MG
10 TABLET ORAL
Refills: 0 | Status: DISCONTINUED | OUTPATIENT
Start: 2020-01-01 | End: 2020-01-01

## 2020-01-01 RX ORDER — AMIODARONE HYDROCHLORIDE 400 MG/1
1 TABLET ORAL
Qty: 0 | Refills: 0 | DISCHARGE

## 2020-01-01 RX ORDER — MIDODRINE HYDROCHLORIDE 2.5 MG/1
10 TABLET ORAL ONCE
Refills: 0 | Status: DISCONTINUED | OUTPATIENT
Start: 2020-01-01 | End: 2020-01-01

## 2020-01-01 RX ORDER — BUMETANIDE 0.25 MG/ML
1 INJECTION INTRAMUSCULAR; INTRAVENOUS
Qty: 20 | Refills: 0 | Status: DISCONTINUED | OUTPATIENT
Start: 2020-01-01 | End: 2020-01-01

## 2020-01-01 RX ORDER — DEXTROSE 50 % IN WATER 50 %
50 SYRINGE (ML) INTRAVENOUS ONCE
Refills: 0 | Status: COMPLETED | OUTPATIENT
Start: 2020-01-01 | End: 2020-01-01

## 2020-01-01 RX ORDER — CHLORHEXIDINE GLUCONATE 213 G/1000ML
5 SOLUTION TOPICAL ONCE
Refills: 0 | Status: DISCONTINUED | OUTPATIENT
Start: 2020-01-01 | End: 2020-01-01

## 2020-01-01 RX ORDER — POTASSIUM CHLORIDE 20 MEQ
2 PACKET (EA) ORAL
Qty: 0 | Refills: 0 | DISCHARGE
Start: 2020-01-01

## 2020-01-01 RX ORDER — ZOLPIDEM TARTRATE 10 MG/1
10 TABLET ORAL
Qty: 30 | Refills: 5 | Status: DISCONTINUED | COMMUNITY
Start: 2019-11-06 | End: 2020-01-01

## 2020-01-01 RX ORDER — AMIODARONE HYDROCHLORIDE 400 MG/1
TABLET ORAL
Refills: 0 | Status: DISCONTINUED | OUTPATIENT
Start: 2020-01-01 | End: 2020-01-01

## 2020-01-01 RX ORDER — CALCIUM GLUCONATE 100 MG/ML
2 VIAL (ML) INTRAVENOUS ONCE
Refills: 0 | Status: COMPLETED | OUTPATIENT
Start: 2020-01-01 | End: 2020-01-01

## 2020-01-01 RX ORDER — CARVEDILOL PHOSPHATE 80 MG/1
12.5 CAPSULE, EXTENDED RELEASE ORAL EVERY 12 HOURS
Refills: 0 | Status: DISCONTINUED | OUTPATIENT
Start: 2020-01-01 | End: 2020-01-01

## 2020-01-01 RX ORDER — MIDODRINE HYDROCHLORIDE 2.5 MG/1
1 TABLET ORAL
Qty: 0 | Refills: 0 | DISCHARGE

## 2020-01-01 RX ORDER — CEFEPIME 1 G/1
1000 INJECTION, POWDER, FOR SOLUTION INTRAMUSCULAR; INTRAVENOUS EVERY 24 HOURS
Refills: 0 | Status: DISCONTINUED | OUTPATIENT
Start: 2020-01-01 | End: 2020-01-01

## 2020-01-01 RX ORDER — ALLOPURINOL 300 MG
100 TABLET ORAL DAILY
Refills: 0 | Status: DISCONTINUED | OUTPATIENT
Start: 2020-01-01 | End: 2020-01-01

## 2020-01-01 RX ORDER — FUROSEMIDE 40 MG
40 TABLET ORAL
Refills: 0 | Status: DISCONTINUED | OUTPATIENT
Start: 2020-01-01 | End: 2020-01-01

## 2020-01-01 RX ORDER — DILTIAZEM HCL 120 MG
15 CAPSULE, EXT RELEASE 24 HR ORAL ONCE
Refills: 0 | Status: COMPLETED | OUTPATIENT
Start: 2020-01-01 | End: 2020-01-01

## 2020-01-01 RX ORDER — FAMOTIDINE 10 MG/ML
20 INJECTION INTRAVENOUS DAILY
Refills: 0 | Status: DISCONTINUED | OUTPATIENT
Start: 2020-01-01 | End: 2020-01-01

## 2020-01-01 RX ORDER — HYDRALAZINE HCL 50 MG
25 TABLET ORAL THREE TIMES A DAY
Refills: 0 | Status: DISCONTINUED | OUTPATIENT
Start: 2020-01-01 | End: 2020-01-01

## 2020-01-01 RX ORDER — CARVEDILOL PHOSPHATE 80 MG/1
0.5 CAPSULE, EXTENDED RELEASE ORAL
Qty: 0 | Refills: 0 | DISCHARGE

## 2020-01-01 RX ORDER — POTASSIUM CHLORIDE 1500 MG/1
20 TABLET, EXTENDED RELEASE ORAL
Qty: 180 | Refills: 3 | Status: DISCONTINUED | COMMUNITY
End: 2020-01-01

## 2020-01-01 RX ORDER — METOCLOPRAMIDE HCL 10 MG
10 TABLET ORAL ONCE
Refills: 0 | Status: COMPLETED | OUTPATIENT
Start: 2020-01-01 | End: 2020-01-01

## 2020-01-01 RX ORDER — HEPARIN SODIUM 5000 [USP'U]/ML
5000 INJECTION INTRAVENOUS; SUBCUTANEOUS EVERY 8 HOURS
Refills: 0 | Status: DISCONTINUED | OUTPATIENT
Start: 2020-01-01 | End: 2020-01-01

## 2020-01-01 RX ADMIN — Medication 40 MILLIGRAM(S): at 05:30

## 2020-01-01 RX ADMIN — APIXABAN 5 MILLIGRAM(S): 2.5 TABLET, FILM COATED ORAL at 05:26

## 2020-01-01 RX ADMIN — APIXABAN 5 MILLIGRAM(S): 2.5 TABLET, FILM COATED ORAL at 06:44

## 2020-01-01 RX ADMIN — Medication 10 MILLIGRAM(S): at 22:29

## 2020-01-01 RX ADMIN — AMIODARONE HYDROCHLORIDE 200 MILLIGRAM(S): 400 TABLET ORAL at 06:01

## 2020-01-01 RX ADMIN — Medication 10 MILLIGRAM(S): at 12:42

## 2020-01-01 RX ADMIN — Medication 1 SPRAY(S): at 18:35

## 2020-01-01 RX ADMIN — SPIRONOLACTONE 12.5 MILLIGRAM(S): 25 TABLET, FILM COATED ORAL at 05:41

## 2020-01-01 RX ADMIN — Medication 50 MILLIGRAM(S): at 05:29

## 2020-01-01 RX ADMIN — AMIODARONE HYDROCHLORIDE 200 MILLIGRAM(S): 400 TABLET ORAL at 05:21

## 2020-01-01 RX ADMIN — MILRINONE LACTATE 10.2 MICROGRAM(S)/KG/MIN: 1 INJECTION, SOLUTION INTRAVENOUS at 00:05

## 2020-01-01 RX ADMIN — Medication 10 MILLIGRAM(S): at 12:19

## 2020-01-01 RX ADMIN — Medication 40 MILLIEQUIVALENT(S): at 20:57

## 2020-01-01 RX ADMIN — CARVEDILOL PHOSPHATE 6.25 MILLIGRAM(S): 80 CAPSULE, EXTENDED RELEASE ORAL at 18:08

## 2020-01-01 RX ADMIN — Medication 1: at 08:34

## 2020-01-01 RX ADMIN — Medication 50 MICROGRAM(S): at 05:16

## 2020-01-01 RX ADMIN — APIXABAN 5 MILLIGRAM(S): 2.5 TABLET, FILM COATED ORAL at 05:40

## 2020-01-01 RX ADMIN — Medication 50 MICROGRAM(S): at 05:50

## 2020-01-01 RX ADMIN — Medication 1 SPRAY(S): at 17:49

## 2020-01-01 RX ADMIN — MUPIROCIN 1 APPLICATION(S): 20 OINTMENT TOPICAL at 16:20

## 2020-01-01 RX ADMIN — MILRINONE LACTATE 4.32 MICROGRAM(S)/KG/MIN: 1 INJECTION, SOLUTION INTRAVENOUS at 07:48

## 2020-01-01 RX ADMIN — CARVEDILOL PHOSPHATE 6.25 MILLIGRAM(S): 80 CAPSULE, EXTENDED RELEASE ORAL at 18:25

## 2020-01-01 RX ADMIN — HEPARIN SODIUM 1100 UNIT(S)/HR: 5000 INJECTION INTRAVENOUS; SUBCUTANEOUS at 10:35

## 2020-01-01 RX ADMIN — Medication 1: at 21:50

## 2020-01-01 RX ADMIN — Medication 1 SPRAY(S): at 05:26

## 2020-01-01 RX ADMIN — Medication 50 MICROGRAM(S): at 06:05

## 2020-01-01 RX ADMIN — Medication 25 MICROGRAM(S): at 05:54

## 2020-01-01 RX ADMIN — Medication 1 SPRAY(S): at 17:46

## 2020-01-01 RX ADMIN — HEPARIN SODIUM 700 UNIT(S)/HR: 5000 INJECTION INTRAVENOUS; SUBCUTANEOUS at 07:18

## 2020-01-01 RX ADMIN — Medication 1 APPLICATION(S): at 17:44

## 2020-01-01 RX ADMIN — Medication 1 SPRAY(S): at 17:28

## 2020-01-01 RX ADMIN — Medication 1 SPRAY(S): at 18:37

## 2020-01-01 RX ADMIN — HEPARIN SODIUM 5000 UNIT(S): 5000 INJECTION INTRAVENOUS; SUBCUTANEOUS at 22:15

## 2020-01-01 RX ADMIN — BUMETANIDE 132 MILLIGRAM(S): 0.25 INJECTION INTRAMUSCULAR; INTRAVENOUS at 08:36

## 2020-01-01 RX ADMIN — Medication 2: at 12:14

## 2020-01-01 RX ADMIN — Medication 40 MILLIEQUIVALENT(S): at 06:02

## 2020-01-01 RX ADMIN — CARVEDILOL PHOSPHATE 6.25 MILLIGRAM(S): 80 CAPSULE, EXTENDED RELEASE ORAL at 18:26

## 2020-01-01 RX ADMIN — Medication 30 MILLILITER(S): at 22:59

## 2020-01-01 RX ADMIN — Medication 20 MILLIEQUIVALENT(S): at 13:47

## 2020-01-01 RX ADMIN — Medication 250 MILLIGRAM(S): at 17:26

## 2020-01-01 RX ADMIN — MIDODRINE HYDROCHLORIDE 5 MILLIGRAM(S): 2.5 TABLET ORAL at 17:25

## 2020-01-01 RX ADMIN — Medication 1 APPLICATION(S): at 05:50

## 2020-01-01 RX ADMIN — Medication 1 UNIT(S): at 08:10

## 2020-01-01 RX ADMIN — Medication 100 MILLIGRAM(S): at 11:50

## 2020-01-01 RX ADMIN — Medication 650 MILLIGRAM(S): at 22:00

## 2020-01-01 RX ADMIN — CHLORHEXIDINE GLUCONATE 1 APPLICATION(S): 213 SOLUTION TOPICAL at 11:55

## 2020-01-01 RX ADMIN — Medication 80 MILLIGRAM(S): at 09:19

## 2020-01-01 RX ADMIN — Medication 1 UNIT(S): at 08:36

## 2020-01-01 RX ADMIN — Medication 1: at 17:54

## 2020-01-01 RX ADMIN — AMIODARONE HYDROCHLORIDE 200 MILLIGRAM(S): 400 TABLET ORAL at 04:56

## 2020-01-01 RX ADMIN — BUMETANIDE 2.5 MG/HR: 0.25 INJECTION INTRAMUSCULAR; INTRAVENOUS at 18:32

## 2020-01-01 RX ADMIN — Medication 1: at 12:40

## 2020-01-01 RX ADMIN — CEFEPIME 100 MILLIGRAM(S): 1 INJECTION, POWDER, FOR SOLUTION INTRAMUSCULAR; INTRAVENOUS at 12:02

## 2020-01-01 RX ADMIN — HYDROMORPHONE HYDROCHLORIDE 0.2 MILLIGRAM(S): 2 INJECTION INTRAMUSCULAR; INTRAVENOUS; SUBCUTANEOUS at 15:10

## 2020-01-01 RX ADMIN — ISOSORBIDE DINITRATE 10 MILLIGRAM(S): 5 TABLET ORAL at 22:03

## 2020-01-01 RX ADMIN — MILRINONE LACTATE 5.63 MICROGRAM(S)/KG/MIN: 1 INJECTION, SOLUTION INTRAVENOUS at 05:25

## 2020-01-01 RX ADMIN — Medication 1 APPLICATION(S): at 04:28

## 2020-01-01 RX ADMIN — Medication 1 UNIT(S): at 18:14

## 2020-01-01 RX ADMIN — Medication 1 SPRAY(S): at 06:05

## 2020-01-01 RX ADMIN — CHLORHEXIDINE GLUCONATE 1 APPLICATION(S): 213 SOLUTION TOPICAL at 09:48

## 2020-01-01 RX ADMIN — HEPARIN SODIUM 1700 UNIT(S)/HR: 5000 INJECTION INTRAVENOUS; SUBCUTANEOUS at 06:03

## 2020-01-01 RX ADMIN — HEPARIN SODIUM 900 UNIT(S)/HR: 5000 INJECTION INTRAVENOUS; SUBCUTANEOUS at 00:40

## 2020-01-01 RX ADMIN — HEPARIN SODIUM 1100 UNIT(S)/HR: 5000 INJECTION INTRAVENOUS; SUBCUTANEOUS at 08:00

## 2020-01-01 RX ADMIN — CHLORHEXIDINE GLUCONATE 1 APPLICATION(S): 213 SOLUTION TOPICAL at 08:33

## 2020-01-01 RX ADMIN — Medication 1: at 11:51

## 2020-01-01 RX ADMIN — HEPARIN SODIUM 9.5 UNIT(S)/HR: 5000 INJECTION INTRAVENOUS; SUBCUTANEOUS at 04:26

## 2020-01-01 RX ADMIN — Medication 50 MICROGRAM(S): at 06:14

## 2020-01-01 RX ADMIN — MILRINONE LACTATE 10.2 MICROGRAM(S)/KG/MIN: 1 INJECTION, SOLUTION INTRAVENOUS at 05:29

## 2020-01-01 RX ADMIN — Medication 1 APPLICATION(S): at 10:00

## 2020-01-01 RX ADMIN — Medication 1 UNIT(S): at 08:04

## 2020-01-01 RX ADMIN — CARVEDILOL PHOSPHATE 6.25 MILLIGRAM(S): 80 CAPSULE, EXTENDED RELEASE ORAL at 04:44

## 2020-01-01 RX ADMIN — APIXABAN 5 MILLIGRAM(S): 2.5 TABLET, FILM COATED ORAL at 18:08

## 2020-01-01 RX ADMIN — CHLORHEXIDINE GLUCONATE 1 APPLICATION(S): 213 SOLUTION TOPICAL at 12:52

## 2020-01-01 RX ADMIN — POTASSIUM PHOSPHATE, MONOBASIC POTASSIUM PHOSPHATE, DIBASIC 62.5 MILLIMOLE(S): 236; 224 INJECTION, SOLUTION INTRAVENOUS at 16:35

## 2020-01-01 RX ADMIN — APIXABAN 5 MILLIGRAM(S): 2.5 TABLET, FILM COATED ORAL at 05:54

## 2020-01-01 RX ADMIN — APIXABAN 5 MILLIGRAM(S): 2.5 TABLET, FILM COATED ORAL at 17:29

## 2020-01-01 RX ADMIN — ISOSORBIDE DINITRATE 10 MILLIGRAM(S): 5 TABLET ORAL at 05:26

## 2020-01-01 RX ADMIN — AMIODARONE HYDROCHLORIDE 200 MILLIGRAM(S): 400 TABLET ORAL at 17:56

## 2020-01-01 RX ADMIN — Medication 3 MILLIGRAM(S): at 21:43

## 2020-01-01 RX ADMIN — ISOSORBIDE DINITRATE 10 MILLIGRAM(S): 5 TABLET ORAL at 05:40

## 2020-01-01 RX ADMIN — Medication 2: at 12:27

## 2020-01-01 RX ADMIN — ISOSORBIDE DINITRATE 10 MILLIGRAM(S): 5 TABLET ORAL at 05:18

## 2020-01-01 RX ADMIN — Medication 650 MILLIGRAM(S): at 06:44

## 2020-01-01 RX ADMIN — MILRINONE LACTATE 4.32 MICROGRAM(S)/KG/MIN: 1 INJECTION, SOLUTION INTRAVENOUS at 19:26

## 2020-01-01 RX ADMIN — MILRINONE LACTATE 4.32 MICROGRAM(S)/KG/MIN: 1 INJECTION, SOLUTION INTRAVENOUS at 18:21

## 2020-01-01 RX ADMIN — AMIODARONE HYDROCHLORIDE 400 MILLIGRAM(S): 400 TABLET ORAL at 05:53

## 2020-01-01 RX ADMIN — SPIRONOLACTONE 12.5 MILLIGRAM(S): 25 TABLET, FILM COATED ORAL at 05:18

## 2020-01-01 RX ADMIN — CEFEPIME 1000 MILLIGRAM(S): 1 INJECTION, POWDER, FOR SOLUTION INTRAMUSCULAR; INTRAVENOUS at 14:15

## 2020-01-01 RX ADMIN — CARVEDILOL PHOSPHATE 6.25 MILLIGRAM(S): 80 CAPSULE, EXTENDED RELEASE ORAL at 17:47

## 2020-01-01 RX ADMIN — Medication 50 MICROGRAM(S): at 04:56

## 2020-01-01 RX ADMIN — Medication 40 MILLIEQUIVALENT(S): at 18:29

## 2020-01-01 RX ADMIN — Medication 3 UNIT(S): at 14:08

## 2020-01-01 RX ADMIN — Medication 10 MILLIGRAM(S): at 21:42

## 2020-01-01 RX ADMIN — MILRINONE LACTATE 5.45 MICROGRAM(S)/KG/MIN: 1 INJECTION, SOLUTION INTRAVENOUS at 08:33

## 2020-01-01 RX ADMIN — SODIUM CHLORIDE 60 MILLILITER(S): 9 INJECTION INTRAMUSCULAR; INTRAVENOUS; SUBCUTANEOUS at 05:52

## 2020-01-01 RX ADMIN — Medication 2: at 18:02

## 2020-01-01 RX ADMIN — Medication 1 APPLICATION(S): at 17:42

## 2020-01-01 RX ADMIN — Medication 2: at 14:13

## 2020-01-01 RX ADMIN — MILRINONE LACTATE 4.32 MICROGRAM(S)/KG/MIN: 1 INJECTION, SOLUTION INTRAVENOUS at 05:18

## 2020-01-01 RX ADMIN — Medication 50 MICROGRAM(S): at 05:18

## 2020-01-01 RX ADMIN — BUMETANIDE 5 MG/HR: 0.25 INJECTION INTRAMUSCULAR; INTRAVENOUS at 21:09

## 2020-01-01 RX ADMIN — Medication 25 MILLIGRAM(S): at 17:56

## 2020-01-01 RX ADMIN — MIDODRINE HYDROCHLORIDE 5 MILLIGRAM(S): 2.5 TABLET ORAL at 11:50

## 2020-01-01 RX ADMIN — Medication 1: at 18:19

## 2020-01-01 RX ADMIN — INSULIN HUMAN 5 UNIT(S): 100 INJECTION, SOLUTION SUBCUTANEOUS at 03:29

## 2020-01-01 RX ADMIN — Medication 0.5 MILLIGRAM(S): at 11:18

## 2020-01-01 RX ADMIN — APIXABAN 5 MILLIGRAM(S): 2.5 TABLET, FILM COATED ORAL at 13:40

## 2020-01-01 RX ADMIN — Medication 25 MILLIGRAM(S): at 11:06

## 2020-01-01 RX ADMIN — Medication 1 APPLICATION(S): at 21:41

## 2020-01-01 RX ADMIN — HEPARIN SODIUM 1000 UNIT(S)/HR: 5000 INJECTION INTRAVENOUS; SUBCUTANEOUS at 13:06

## 2020-01-01 RX ADMIN — SODIUM CHLORIDE 100 MILLILITER(S): 9 INJECTION INTRAMUSCULAR; INTRAVENOUS; SUBCUTANEOUS at 14:41

## 2020-01-01 RX ADMIN — Medication 10 MILLIGRAM(S): at 13:04

## 2020-01-01 RX ADMIN — AMIODARONE HYDROCHLORIDE 200 MILLIGRAM(S): 400 TABLET ORAL at 17:58

## 2020-01-01 RX ADMIN — Medication 1 APPLICATION(S): at 06:59

## 2020-01-01 RX ADMIN — Medication 10 MILLIGRAM(S): at 06:40

## 2020-01-01 RX ADMIN — MILRINONE LACTATE 7.5 MICROGRAM(S)/KG/MIN: 1 INJECTION, SOLUTION INTRAVENOUS at 22:10

## 2020-01-01 RX ADMIN — APIXABAN 5 MILLIGRAM(S): 2.5 TABLET, FILM COATED ORAL at 18:35

## 2020-01-01 RX ADMIN — ISOSORBIDE DINITRATE 10 MILLIGRAM(S): 5 TABLET ORAL at 13:13

## 2020-01-01 RX ADMIN — MILRINONE LACTATE 7.47 MICROGRAM(S)/KG/MIN: 1 INJECTION, SOLUTION INTRAVENOUS at 16:40

## 2020-01-01 RX ADMIN — Medication 20 MILLIEQUIVALENT(S): at 12:20

## 2020-01-01 RX ADMIN — Medication 25 MILLIGRAM(S): at 05:21

## 2020-01-01 RX ADMIN — Medication 5 MG/HR: at 11:36

## 2020-01-01 RX ADMIN — Medication 25 MILLIGRAM(S): at 13:53

## 2020-01-01 RX ADMIN — MILRINONE LACTATE 4.32 MICROGRAM(S)/KG/MIN: 1 INJECTION, SOLUTION INTRAVENOUS at 01:21

## 2020-01-01 RX ADMIN — SODIUM CHLORIDE 250 MILLILITER(S): 9 INJECTION INTRAMUSCULAR; INTRAVENOUS; SUBCUTANEOUS at 13:32

## 2020-01-01 RX ADMIN — CARVEDILOL PHOSPHATE 6.25 MILLIGRAM(S): 80 CAPSULE, EXTENDED RELEASE ORAL at 05:26

## 2020-01-01 RX ADMIN — MILRINONE LACTATE 5.45 MICROGRAM(S)/KG/MIN: 1 INJECTION, SOLUTION INTRAVENOUS at 19:34

## 2020-01-01 RX ADMIN — Medication 40 MILLIGRAM(S): at 17:00

## 2020-01-01 RX ADMIN — Medication 40 MILLIGRAM(S): at 17:29

## 2020-01-01 RX ADMIN — Medication 10 MILLIGRAM(S): at 06:45

## 2020-01-01 RX ADMIN — Medication 40 MILLIEQUIVALENT(S): at 13:50

## 2020-01-01 RX ADMIN — AMIODARONE HYDROCHLORIDE 200 MILLIGRAM(S): 400 TABLET ORAL at 05:06

## 2020-01-01 RX ADMIN — Medication 10 MILLIGRAM(S): at 07:49

## 2020-01-01 RX ADMIN — Medication 650 MILLIGRAM(S): at 11:30

## 2020-01-01 RX ADMIN — Medication 100 MILLIGRAM(S): at 08:29

## 2020-01-01 RX ADMIN — Medication 650 MILLIGRAM(S): at 12:20

## 2020-01-01 RX ADMIN — HEPARIN SODIUM 1100 UNIT(S)/HR: 5000 INJECTION INTRAVENOUS; SUBCUTANEOUS at 04:00

## 2020-01-01 RX ADMIN — ROBINUL 0.4 MILLIGRAM(S): 0.2 INJECTION INTRAMUSCULAR; INTRAVENOUS at 16:49

## 2020-01-01 RX ADMIN — SODIUM ZIRCONIUM CYCLOSILICATE 10 GRAM(S): 10 POWDER, FOR SUSPENSION ORAL at 08:48

## 2020-01-01 RX ADMIN — BUMETANIDE 5 MG/HR: 0.25 INJECTION INTRAMUSCULAR; INTRAVENOUS at 21:02

## 2020-01-01 RX ADMIN — Medication 20 MILLIEQUIVALENT(S): at 08:28

## 2020-01-01 RX ADMIN — MIDODRINE HYDROCHLORIDE 5 MILLIGRAM(S): 2.5 TABLET ORAL at 11:57

## 2020-01-01 RX ADMIN — HYDROMORPHONE HYDROCHLORIDE 0.2 MILLIGRAM(S): 2 INJECTION INTRAMUSCULAR; INTRAVENOUS; SUBCUTANEOUS at 19:44

## 2020-01-01 RX ADMIN — Medication 50 MICROGRAM(S): at 05:38

## 2020-01-01 RX ADMIN — Medication 20 MILLIEQUIVALENT(S): at 16:43

## 2020-01-01 RX ADMIN — CARVEDILOL PHOSPHATE 6.25 MILLIGRAM(S): 80 CAPSULE, EXTENDED RELEASE ORAL at 18:18

## 2020-01-01 RX ADMIN — CHLORHEXIDINE GLUCONATE 1 APPLICATION(S): 213 SOLUTION TOPICAL at 07:10

## 2020-01-01 RX ADMIN — Medication 50 MICROGRAM(S): at 05:17

## 2020-01-01 RX ADMIN — SPIRONOLACTONE 12.5 MILLIGRAM(S): 25 TABLET, FILM COATED ORAL at 05:21

## 2020-01-01 RX ADMIN — CARVEDILOL PHOSPHATE 6.25 MILLIGRAM(S): 80 CAPSULE, EXTENDED RELEASE ORAL at 06:02

## 2020-01-01 RX ADMIN — Medication 40 MILLIEQUIVALENT(S): at 12:23

## 2020-01-01 RX ADMIN — Medication 4.25 MICROGRAM(S)/KG/MIN: at 05:14

## 2020-01-01 RX ADMIN — AMIODARONE HYDROCHLORIDE 200 MILLIGRAM(S): 400 TABLET ORAL at 18:03

## 2020-01-01 RX ADMIN — SPIRONOLACTONE 12.5 MILLIGRAM(S): 25 TABLET, FILM COATED ORAL at 05:26

## 2020-01-01 RX ADMIN — MUPIROCIN 1 APPLICATION(S): 20 OINTMENT TOPICAL at 18:23

## 2020-01-01 RX ADMIN — POLYETHYLENE GLYCOL 3350 17 GRAM(S): 17 POWDER, FOR SOLUTION ORAL at 17:10

## 2020-01-01 RX ADMIN — Medication 25 MICROGRAM(S): at 21:48

## 2020-01-01 RX ADMIN — AMIODARONE HYDROCHLORIDE 200 MILLIGRAM(S): 400 TABLET ORAL at 05:54

## 2020-01-01 RX ADMIN — INSULIN HUMAN 10 UNIT(S): 100 INJECTION, SOLUTION SUBCUTANEOUS at 19:39

## 2020-01-01 RX ADMIN — APIXABAN 5 MILLIGRAM(S): 2.5 TABLET, FILM COATED ORAL at 17:27

## 2020-01-01 RX ADMIN — HEPARIN SODIUM 0 UNIT(S)/HR: 5000 INJECTION INTRAVENOUS; SUBCUTANEOUS at 21:07

## 2020-01-01 RX ADMIN — Medication 1: at 07:55

## 2020-01-01 RX ADMIN — Medication 1 APPLICATION(S): at 06:15

## 2020-01-01 RX ADMIN — MILRINONE LACTATE 7.5 MICROGRAM(S)/KG/MIN: 1 INJECTION, SOLUTION INTRAVENOUS at 19:38

## 2020-01-01 RX ADMIN — Medication 100 MILLIEQUIVALENT(S): at 02:24

## 2020-01-01 RX ADMIN — Medication 1 UNIT(S): at 17:46

## 2020-01-01 RX ADMIN — BUMETANIDE 2.5 MG/HR: 0.25 INJECTION INTRAMUSCULAR; INTRAVENOUS at 05:17

## 2020-01-01 RX ADMIN — MIDODRINE HYDROCHLORIDE 5 MILLIGRAM(S): 2.5 TABLET ORAL at 12:03

## 2020-01-01 RX ADMIN — MIDODRINE HYDROCHLORIDE 5 MILLIGRAM(S): 2.5 TABLET ORAL at 17:17

## 2020-01-01 RX ADMIN — Medication 1 SPRAY(S): at 17:33

## 2020-01-01 RX ADMIN — CARVEDILOL PHOSPHATE 6.25 MILLIGRAM(S): 80 CAPSULE, EXTENDED RELEASE ORAL at 18:20

## 2020-01-01 RX ADMIN — Medication 50 MICROGRAM(S): at 05:01

## 2020-01-01 RX ADMIN — Medication 1: at 13:08

## 2020-01-01 RX ADMIN — CARVEDILOL PHOSPHATE 6.25 MILLIGRAM(S): 80 CAPSULE, EXTENDED RELEASE ORAL at 17:49

## 2020-01-01 RX ADMIN — Medication 5 MILLIGRAM(S): at 06:01

## 2020-01-01 RX ADMIN — Medication 2: at 17:48

## 2020-01-01 RX ADMIN — CEFEPIME 100 MILLIGRAM(S): 1 INJECTION, POWDER, FOR SOLUTION INTRAMUSCULAR; INTRAVENOUS at 23:25

## 2020-01-01 RX ADMIN — SODIUM ZIRCONIUM CYCLOSILICATE 10 GRAM(S): 10 POWDER, FOR SUSPENSION ORAL at 22:15

## 2020-01-01 RX ADMIN — Medication 2: at 13:46

## 2020-01-01 RX ADMIN — HEPARIN SODIUM 0 UNIT(S)/HR: 5000 INJECTION INTRAVENOUS; SUBCUTANEOUS at 13:37

## 2020-01-01 RX ADMIN — Medication 1: at 12:15

## 2020-01-01 RX ADMIN — MUPIROCIN 1 APPLICATION(S): 20 OINTMENT TOPICAL at 11:21

## 2020-01-01 RX ADMIN — Medication 50 MICROGRAM(S): at 05:57

## 2020-01-01 RX ADMIN — Medication 1 UNIT(S): at 17:47

## 2020-01-01 RX ADMIN — SODIUM CHLORIDE 150 MILLILITER(S): 5 INJECTION, SOLUTION INTRAVENOUS at 14:01

## 2020-01-01 RX ADMIN — CARVEDILOL PHOSPHATE 6.25 MILLIGRAM(S): 80 CAPSULE, EXTENDED RELEASE ORAL at 18:17

## 2020-01-01 RX ADMIN — Medication 4: at 12:07

## 2020-01-01 RX ADMIN — MIDODRINE HYDROCHLORIDE 5 MILLIGRAM(S): 2.5 TABLET ORAL at 17:39

## 2020-01-01 RX ADMIN — Medication 1 SPRAY(S): at 05:40

## 2020-01-01 RX ADMIN — Medication 25 MICROGRAM(S): at 21:00

## 2020-01-01 RX ADMIN — Medication 1 UNIT(S): at 08:45

## 2020-01-01 RX ADMIN — SODIUM CHLORIDE 50 MILLILITER(S): 9 INJECTION, SOLUTION INTRAVENOUS at 18:21

## 2020-01-01 RX ADMIN — Medication 1 UNIT(S): at 13:12

## 2020-01-01 RX ADMIN — Medication 1 SPRAY(S): at 06:14

## 2020-01-01 RX ADMIN — Medication 10 MILLIGRAM(S): at 17:22

## 2020-01-01 RX ADMIN — Medication 10 MILLIGRAM(S): at 21:50

## 2020-01-01 RX ADMIN — AMIODARONE HYDROCHLORIDE 200 MILLIGRAM(S): 400 TABLET ORAL at 05:30

## 2020-01-01 RX ADMIN — Medication 650 MILLIGRAM(S): at 16:11

## 2020-01-01 RX ADMIN — APIXABAN 5 MILLIGRAM(S): 2.5 TABLET, FILM COATED ORAL at 05:38

## 2020-01-01 RX ADMIN — APIXABAN 5 MILLIGRAM(S): 2.5 TABLET, FILM COATED ORAL at 05:57

## 2020-01-01 RX ADMIN — Medication 1: at 17:49

## 2020-01-01 RX ADMIN — MILRINONE LACTATE 4.32 MICROGRAM(S)/KG/MIN: 1 INJECTION, SOLUTION INTRAVENOUS at 23:12

## 2020-01-01 RX ADMIN — Medication 5 MILLIGRAM(S): at 21:35

## 2020-01-01 RX ADMIN — Medication 1 SPRAY(S): at 18:43

## 2020-01-01 RX ADMIN — MILRINONE LACTATE 10.2 MICROGRAM(S)/KG/MIN: 1 INJECTION, SOLUTION INTRAVENOUS at 21:44

## 2020-01-01 RX ADMIN — MILRINONE LACTATE 5.45 MICROGRAM(S)/KG/MIN: 1 INJECTION, SOLUTION INTRAVENOUS at 16:35

## 2020-01-01 RX ADMIN — Medication 12.5 MILLIGRAM(S): at 03:27

## 2020-01-01 RX ADMIN — AMIODARONE HYDROCHLORIDE 200 MILLIGRAM(S): 400 TABLET ORAL at 06:02

## 2020-01-01 RX ADMIN — APIXABAN 5 MILLIGRAM(S): 2.5 TABLET, FILM COATED ORAL at 05:22

## 2020-01-01 RX ADMIN — HEPARIN SODIUM 900 UNIT(S)/HR: 5000 INJECTION INTRAVENOUS; SUBCUTANEOUS at 04:19

## 2020-01-01 RX ADMIN — Medication 1 UNIT(S): at 12:55

## 2020-01-01 RX ADMIN — MILRINONE LACTATE 5.63 MICROGRAM(S)/KG/MIN: 1 INJECTION, SOLUTION INTRAVENOUS at 16:30

## 2020-01-01 RX ADMIN — Medication 1 APPLICATION(S): at 17:45

## 2020-01-01 RX ADMIN — CHLORHEXIDINE GLUCONATE 1 APPLICATION(S): 213 SOLUTION TOPICAL at 11:36

## 2020-01-01 RX ADMIN — HYDROMORPHONE HYDROCHLORIDE 0.2 MILLIGRAM(S): 2 INJECTION INTRAMUSCULAR; INTRAVENOUS; SUBCUTANEOUS at 07:00

## 2020-01-01 RX ADMIN — MILRINONE LACTATE 10.2 MICROGRAM(S)/KG/MIN: 1 INJECTION, SOLUTION INTRAVENOUS at 05:44

## 2020-01-01 RX ADMIN — Medication 50 MICROGRAM(S): at 05:43

## 2020-01-01 RX ADMIN — Medication 1 UNIT(S): at 13:09

## 2020-01-01 RX ADMIN — APIXABAN 5 MILLIGRAM(S): 2.5 TABLET, FILM COATED ORAL at 04:36

## 2020-01-01 RX ADMIN — HYDROMORPHONE HYDROCHLORIDE 0.2 MILLIGRAM(S): 2 INJECTION INTRAMUSCULAR; INTRAVENOUS; SUBCUTANEOUS at 20:00

## 2020-01-01 RX ADMIN — Medication 10 MILLIGRAM(S): at 21:11

## 2020-01-01 RX ADMIN — Medication 10 MILLIGRAM(S): at 05:17

## 2020-01-01 RX ADMIN — Medication 10 MILLIGRAM(S): at 14:01

## 2020-01-01 RX ADMIN — Medication 50 MICROGRAM(S): at 06:23

## 2020-01-01 RX ADMIN — Medication 25 MILLIGRAM(S): at 13:55

## 2020-01-01 RX ADMIN — CARVEDILOL PHOSPHATE 6.25 MILLIGRAM(S): 80 CAPSULE, EXTENDED RELEASE ORAL at 18:27

## 2020-01-01 RX ADMIN — ONDANSETRON 4 MILLIGRAM(S): 8 TABLET, FILM COATED ORAL at 05:12

## 2020-01-01 RX ADMIN — Medication 50 MICROGRAM(S): at 06:03

## 2020-01-01 RX ADMIN — Medication 80 MILLIGRAM(S): at 08:43

## 2020-01-01 RX ADMIN — HEPARIN SODIUM 0 UNIT(S)/HR: 5000 INJECTION INTRAVENOUS; SUBCUTANEOUS at 00:54

## 2020-01-01 RX ADMIN — MUPIROCIN 1 APPLICATION(S): 20 OINTMENT TOPICAL at 08:30

## 2020-01-01 RX ADMIN — Medication 1 APPLICATION(S): at 17:56

## 2020-01-01 RX ADMIN — Medication 650 MILLIGRAM(S): at 22:39

## 2020-01-01 RX ADMIN — Medication 3 UNIT(S): at 17:16

## 2020-01-01 RX ADMIN — Medication 20 MILLIEQUIVALENT(S): at 08:41

## 2020-01-01 RX ADMIN — MUPIROCIN 1 APPLICATION(S): 20 OINTMENT TOPICAL at 12:20

## 2020-01-01 RX ADMIN — Medication 3 UNIT(S): at 08:02

## 2020-01-01 RX ADMIN — Medication 50 MICROGRAM(S): at 05:26

## 2020-01-01 RX ADMIN — Medication 10 MILLIGRAM(S): at 06:12

## 2020-01-01 RX ADMIN — MILRINONE LACTATE 5.63 MICROGRAM(S)/KG/MIN: 1 INJECTION, SOLUTION INTRAVENOUS at 21:36

## 2020-01-01 RX ADMIN — Medication 1 APPLICATION(S): at 19:39

## 2020-01-01 RX ADMIN — HEPARIN SODIUM 700 UNIT(S)/HR: 5000 INJECTION INTRAVENOUS; SUBCUTANEOUS at 22:48

## 2020-01-01 RX ADMIN — MILRINONE LACTATE 7.5 MICROGRAM(S)/KG/MIN: 1 INJECTION, SOLUTION INTRAVENOUS at 12:30

## 2020-01-01 RX ADMIN — Medication 50 MICROGRAM(S): at 05:29

## 2020-01-01 RX ADMIN — CARVEDILOL PHOSPHATE 6.25 MILLIGRAM(S): 80 CAPSULE, EXTENDED RELEASE ORAL at 18:33

## 2020-01-01 RX ADMIN — Medication 60 MILLIGRAM(S): at 11:54

## 2020-01-01 RX ADMIN — Medication 40 MILLIEQUIVALENT(S): at 12:17

## 2020-01-01 RX ADMIN — Medication 50 MICROGRAM(S): at 05:22

## 2020-01-01 RX ADMIN — Medication 1 APPLICATION(S): at 18:25

## 2020-01-01 RX ADMIN — Medication 1 APPLICATION(S): at 05:19

## 2020-01-01 RX ADMIN — Medication 50 MICROGRAM(S): at 06:01

## 2020-01-01 RX ADMIN — Medication 1 APPLICATION(S): at 17:50

## 2020-01-01 RX ADMIN — MILRINONE LACTATE 5.63 MICROGRAM(S)/KG/MIN: 1 INJECTION, SOLUTION INTRAVENOUS at 08:32

## 2020-01-01 RX ADMIN — HEPARIN SODIUM 1400 UNIT(S)/HR: 5000 INJECTION INTRAVENOUS; SUBCUTANEOUS at 14:45

## 2020-01-01 RX ADMIN — MILRINONE LACTATE 6.8 MICROGRAM(S)/KG/MIN: 1 INJECTION, SOLUTION INTRAVENOUS at 16:20

## 2020-01-01 RX ADMIN — Medication 50 MILLIEQUIVALENT(S): at 13:00

## 2020-01-01 RX ADMIN — MILRINONE LACTATE 7.47 MICROGRAM(S)/KG/MIN: 1 INJECTION, SOLUTION INTRAVENOUS at 12:59

## 2020-01-01 RX ADMIN — Medication 3: at 11:41

## 2020-01-01 RX ADMIN — CARVEDILOL PHOSPHATE 6.25 MILLIGRAM(S): 80 CAPSULE, EXTENDED RELEASE ORAL at 05:16

## 2020-01-01 RX ADMIN — Medication 100 MILLIGRAM(S): at 12:01

## 2020-01-01 RX ADMIN — Medication 1: at 08:19

## 2020-01-01 RX ADMIN — Medication 1000 MILLIGRAM(S): at 07:30

## 2020-01-01 RX ADMIN — Medication 3 MILLIGRAM(S): at 21:45

## 2020-01-01 RX ADMIN — MILRINONE LACTATE 5.63 MICROGRAM(S)/KG/MIN: 1 INJECTION, SOLUTION INTRAVENOUS at 22:02

## 2020-01-01 RX ADMIN — HEPARIN SODIUM 1100 UNIT(S)/HR: 5000 INJECTION INTRAVENOUS; SUBCUTANEOUS at 10:34

## 2020-01-01 RX ADMIN — SODIUM ZIRCONIUM CYCLOSILICATE 10 GRAM(S): 10 POWDER, FOR SUSPENSION ORAL at 19:58

## 2020-01-01 RX ADMIN — CEFEPIME 100 MILLIGRAM(S): 1 INJECTION, POWDER, FOR SOLUTION INTRAMUSCULAR; INTRAVENOUS at 11:47

## 2020-01-01 RX ADMIN — Medication 2: at 11:54

## 2020-01-01 RX ADMIN — Medication 100 MILLIGRAM(S): at 14:58

## 2020-01-01 RX ADMIN — ISOSORBIDE DINITRATE 10 MILLIGRAM(S): 5 TABLET ORAL at 05:20

## 2020-01-01 RX ADMIN — AMIODARONE HYDROCHLORIDE 400 MILLIGRAM(S): 400 TABLET ORAL at 06:24

## 2020-01-01 RX ADMIN — Medication 1 APPLICATION(S): at 06:05

## 2020-01-01 RX ADMIN — MIDODRINE HYDROCHLORIDE 5 MILLIGRAM(S): 2.5 TABLET ORAL at 05:17

## 2020-01-01 RX ADMIN — Medication 650 MILLIGRAM(S): at 05:17

## 2020-01-01 RX ADMIN — AMIODARONE HYDROCHLORIDE 200 MILLIGRAM(S): 400 TABLET ORAL at 06:12

## 2020-01-01 RX ADMIN — Medication 10 MILLIGRAM(S): at 09:11

## 2020-01-01 RX ADMIN — SODIUM CHLORIDE 250 MILLILITER(S): 9 INJECTION INTRAMUSCULAR; INTRAVENOUS; SUBCUTANEOUS at 17:14

## 2020-01-01 RX ADMIN — Medication 1: at 12:20

## 2020-01-01 RX ADMIN — Medication 3 UNIT(S): at 14:01

## 2020-01-01 RX ADMIN — MUPIROCIN 1 APPLICATION(S): 20 OINTMENT TOPICAL at 12:40

## 2020-01-01 RX ADMIN — AMIODARONE HYDROCHLORIDE 200 MILLIGRAM(S): 400 TABLET ORAL at 05:41

## 2020-01-01 RX ADMIN — Medication 1 SPRAY(S): at 18:29

## 2020-01-01 RX ADMIN — Medication 100 GRAM(S): at 16:52

## 2020-01-01 RX ADMIN — Medication 10 MILLIGRAM(S): at 21:05

## 2020-01-01 RX ADMIN — Medication 25 MICROGRAM(S): at 21:30

## 2020-01-01 RX ADMIN — HYDROMORPHONE HYDROCHLORIDE 0.2 MILLIGRAM(S): 2 INJECTION INTRAMUSCULAR; INTRAVENOUS; SUBCUTANEOUS at 12:35

## 2020-01-01 RX ADMIN — SPIRONOLACTONE 12.5 MILLIGRAM(S): 25 TABLET, FILM COATED ORAL at 05:20

## 2020-01-01 RX ADMIN — Medication 10 MILLIGRAM(S): at 21:36

## 2020-01-01 RX ADMIN — MILRINONE LACTATE 7.5 MICROGRAM(S)/KG/MIN: 1 INJECTION, SOLUTION INTRAVENOUS at 21:56

## 2020-01-01 RX ADMIN — Medication 50 MICROGRAM(S): at 05:08

## 2020-01-01 RX ADMIN — Medication 1 SPRAY(S): at 16:32

## 2020-01-01 RX ADMIN — CARVEDILOL PHOSPHATE 6.25 MILLIGRAM(S): 80 CAPSULE, EXTENDED RELEASE ORAL at 17:26

## 2020-01-01 RX ADMIN — Medication 1 UNIT(S): at 13:44

## 2020-01-01 RX ADMIN — Medication 2: at 12:39

## 2020-01-01 RX ADMIN — Medication 1 APPLICATION(S): at 16:11

## 2020-01-01 RX ADMIN — SODIUM CHLORIDE 50 MILLILITER(S): 9 INJECTION INTRAMUSCULAR; INTRAVENOUS; SUBCUTANEOUS at 17:43

## 2020-01-01 RX ADMIN — CEFEPIME 100 MILLIGRAM(S): 1 INJECTION, POWDER, FOR SOLUTION INTRAMUSCULAR; INTRAVENOUS at 11:34

## 2020-01-01 RX ADMIN — Medication 2: at 17:16

## 2020-01-01 RX ADMIN — Medication 1: at 08:04

## 2020-01-01 RX ADMIN — BENZOCAINE AND MENTHOL 1 LOZENGE: 5; 1 LIQUID ORAL at 06:54

## 2020-01-01 RX ADMIN — Medication 3 MILLIGRAM(S): at 21:15

## 2020-01-01 RX ADMIN — HEPARIN SODIUM 1100 UNIT(S)/HR: 5000 INJECTION INTRAVENOUS; SUBCUTANEOUS at 21:05

## 2020-01-01 RX ADMIN — APIXABAN 5 MILLIGRAM(S): 2.5 TABLET, FILM COATED ORAL at 17:17

## 2020-01-01 RX ADMIN — MILRINONE LACTATE 4.32 MICROGRAM(S)/KG/MIN: 1 INJECTION, SOLUTION INTRAVENOUS at 19:40

## 2020-01-01 RX ADMIN — Medication 1: at 14:21

## 2020-01-01 RX ADMIN — FAMOTIDINE 20 MILLIGRAM(S): 10 INJECTION INTRAVENOUS at 14:21

## 2020-01-01 RX ADMIN — ISOSORBIDE DINITRATE 10 MILLIGRAM(S): 5 TABLET ORAL at 13:04

## 2020-01-01 RX ADMIN — SODIUM CHLORIDE 250 MILLILITER(S): 9 INJECTION INTRAMUSCULAR; INTRAVENOUS; SUBCUTANEOUS at 17:43

## 2020-01-01 RX ADMIN — Medication 50 MICROGRAM(S): at 06:12

## 2020-01-01 RX ADMIN — HEPARIN SODIUM 900 UNIT(S)/HR: 5000 INJECTION INTRAVENOUS; SUBCUTANEOUS at 22:59

## 2020-01-01 RX ADMIN — CHLORHEXIDINE GLUCONATE 1 APPLICATION(S): 213 SOLUTION TOPICAL at 07:05

## 2020-01-01 RX ADMIN — MIDODRINE HYDROCHLORIDE 5 MILLIGRAM(S): 2.5 TABLET ORAL at 13:28

## 2020-01-01 RX ADMIN — Medication 40 MILLIGRAM(S): at 18:28

## 2020-01-01 RX ADMIN — Medication 10 MILLIGRAM(S): at 14:18

## 2020-01-01 RX ADMIN — Medication 10 MILLIGRAM(S): at 04:36

## 2020-01-01 RX ADMIN — BUMETANIDE 5 MG/HR: 0.25 INJECTION INTRAMUSCULAR; INTRAVENOUS at 09:37

## 2020-01-01 RX ADMIN — APIXABAN 5 MILLIGRAM(S): 2.5 TABLET, FILM COATED ORAL at 16:32

## 2020-01-01 RX ADMIN — Medication 650 MILLIGRAM(S): at 05:47

## 2020-01-01 RX ADMIN — HEPARIN SODIUM 900 UNIT(S)/HR: 5000 INJECTION INTRAVENOUS; SUBCUTANEOUS at 06:41

## 2020-01-01 RX ADMIN — MIDODRINE HYDROCHLORIDE 5 MILLIGRAM(S): 2.5 TABLET ORAL at 11:29

## 2020-01-01 RX ADMIN — Medication 7.5 MG/HR: at 07:38

## 2020-01-01 RX ADMIN — AMIODARONE HYDROCHLORIDE 200 MILLIGRAM(S): 400 TABLET ORAL at 05:34

## 2020-01-01 RX ADMIN — APIXABAN 5 MILLIGRAM(S): 2.5 TABLET, FILM COATED ORAL at 17:37

## 2020-01-01 RX ADMIN — Medication 1 SPRAY(S): at 17:41

## 2020-01-01 RX ADMIN — AMIODARONE HYDROCHLORIDE 200 MILLIGRAM(S): 400 TABLET ORAL at 04:17

## 2020-01-01 RX ADMIN — BUMETANIDE 5 MG/HR: 0.25 INJECTION INTRAMUSCULAR; INTRAVENOUS at 08:09

## 2020-01-01 RX ADMIN — AMIODARONE HYDROCHLORIDE 200 MILLIGRAM(S): 400 TABLET ORAL at 05:40

## 2020-01-01 RX ADMIN — MIDODRINE HYDROCHLORIDE 5 MILLIGRAM(S): 2.5 TABLET ORAL at 16:21

## 2020-01-01 RX ADMIN — MILRINONE LACTATE 10.2 MICROGRAM(S)/KG/MIN: 1 INJECTION, SOLUTION INTRAVENOUS at 19:00

## 2020-01-01 RX ADMIN — Medication 100 GRAM(S): at 09:56

## 2020-01-01 RX ADMIN — Medication 1 SPRAY(S): at 17:18

## 2020-01-01 RX ADMIN — Medication 1: at 12:02

## 2020-01-01 RX ADMIN — MIDODRINE HYDROCHLORIDE 5 MILLIGRAM(S): 2.5 TABLET ORAL at 16:53

## 2020-01-01 RX ADMIN — Medication 50 MICROGRAM(S): at 05:20

## 2020-01-01 RX ADMIN — AMIODARONE HYDROCHLORIDE 400 MILLIGRAM(S): 400 TABLET ORAL at 20:37

## 2020-01-01 RX ADMIN — Medication 1 UNIT(S): at 08:20

## 2020-01-01 RX ADMIN — HYDROMORPHONE HYDROCHLORIDE 0.2 MILLIGRAM(S): 2 INJECTION INTRAMUSCULAR; INTRAVENOUS; SUBCUTANEOUS at 17:08

## 2020-01-01 RX ADMIN — APIXABAN 5 MILLIGRAM(S): 2.5 TABLET, FILM COATED ORAL at 17:47

## 2020-01-01 RX ADMIN — APIXABAN 5 MILLIGRAM(S): 2.5 TABLET, FILM COATED ORAL at 05:30

## 2020-01-01 RX ADMIN — MILRINONE LACTATE 5.63 MICROGRAM(S)/KG/MIN: 1 INJECTION, SOLUTION INTRAVENOUS at 06:05

## 2020-01-01 RX ADMIN — Medication 10 MILLIGRAM(S): at 13:13

## 2020-01-01 RX ADMIN — Medication 1 SPRAY(S): at 05:59

## 2020-01-01 RX ADMIN — MILRINONE LACTATE 10.2 MICROGRAM(S)/KG/MIN: 1 INJECTION, SOLUTION INTRAVENOUS at 19:24

## 2020-01-01 RX ADMIN — Medication 1: at 12:00

## 2020-01-01 RX ADMIN — MILRINONE LACTATE 4.32 MICROGRAM(S)/KG/MIN: 1 INJECTION, SOLUTION INTRAVENOUS at 11:38

## 2020-01-01 RX ADMIN — Medication 1: at 17:38

## 2020-01-01 RX ADMIN — AMIODARONE HYDROCHLORIDE 200 MILLIGRAM(S): 400 TABLET ORAL at 05:28

## 2020-01-01 RX ADMIN — Medication 25 MILLIGRAM(S): at 14:41

## 2020-01-01 RX ADMIN — BUMETANIDE 2.5 MG/HR: 0.25 INJECTION INTRAMUSCULAR; INTRAVENOUS at 20:06

## 2020-01-01 RX ADMIN — AMIODARONE HYDROCHLORIDE 200 MILLIGRAM(S): 400 TABLET ORAL at 04:36

## 2020-01-01 RX ADMIN — HEPARIN SODIUM 3000 UNIT(S): 5000 INJECTION INTRAVENOUS; SUBCUTANEOUS at 19:50

## 2020-01-01 RX ADMIN — AMIODARONE HYDROCHLORIDE 400 MILLIGRAM(S): 400 TABLET ORAL at 14:18

## 2020-01-01 RX ADMIN — Medication 40 MILLIGRAM(S): at 17:57

## 2020-01-01 RX ADMIN — CARVEDILOL PHOSPHATE 6.25 MILLIGRAM(S): 80 CAPSULE, EXTENDED RELEASE ORAL at 05:40

## 2020-01-01 RX ADMIN — MIDODRINE HYDROCHLORIDE 5 MILLIGRAM(S): 2.5 TABLET ORAL at 16:51

## 2020-01-01 RX ADMIN — APIXABAN 5 MILLIGRAM(S): 2.5 TABLET, FILM COATED ORAL at 05:34

## 2020-01-01 RX ADMIN — AMIODARONE HYDROCHLORIDE 200 MILLIGRAM(S): 400 TABLET ORAL at 05:26

## 2020-01-01 RX ADMIN — Medication 100 MILLIGRAM(S): at 11:15

## 2020-01-01 RX ADMIN — AMIODARONE HYDROCHLORIDE 200 MILLIGRAM(S): 400 TABLET ORAL at 07:01

## 2020-01-01 RX ADMIN — MILRINONE LACTATE 4.32 MICROGRAM(S)/KG/MIN: 1 INJECTION, SOLUTION INTRAVENOUS at 05:16

## 2020-01-01 RX ADMIN — BUMETANIDE 2.5 MG/HR: 0.25 INJECTION INTRAMUSCULAR; INTRAVENOUS at 12:58

## 2020-01-01 RX ADMIN — CARVEDILOL PHOSPHATE 6.25 MILLIGRAM(S): 80 CAPSULE, EXTENDED RELEASE ORAL at 05:37

## 2020-01-01 RX ADMIN — Medication 100 MILLIGRAM(S): at 11:57

## 2020-01-01 RX ADMIN — Medication 1: at 08:17

## 2020-01-01 RX ADMIN — Medication 2 UNIT(S): at 14:58

## 2020-01-01 RX ADMIN — Medication 10 MILLIGRAM(S): at 05:26

## 2020-01-01 RX ADMIN — MIDODRINE HYDROCHLORIDE 5 MILLIGRAM(S): 2.5 TABLET ORAL at 05:27

## 2020-01-01 RX ADMIN — AMIODARONE HYDROCHLORIDE 200 MILLIGRAM(S): 400 TABLET ORAL at 18:22

## 2020-01-01 RX ADMIN — Medication 1 UNIT(S): at 18:08

## 2020-01-01 RX ADMIN — CARVEDILOL PHOSPHATE 6.25 MILLIGRAM(S): 80 CAPSULE, EXTENDED RELEASE ORAL at 16:43

## 2020-01-01 RX ADMIN — Medication 1: at 13:42

## 2020-01-01 RX ADMIN — CHLORHEXIDINE GLUCONATE 1 APPLICATION(S): 213 SOLUTION TOPICAL at 04:42

## 2020-01-01 RX ADMIN — MILRINONE LACTATE 5.63 MICROGRAM(S)/KG/MIN: 1 INJECTION, SOLUTION INTRAVENOUS at 21:09

## 2020-01-01 RX ADMIN — Medication 1 SPRAY(S): at 17:42

## 2020-01-01 RX ADMIN — ISOSORBIDE DINITRATE 10 MILLIGRAM(S): 5 TABLET ORAL at 07:04

## 2020-01-01 RX ADMIN — Medication 1 APPLICATION(S): at 18:36

## 2020-01-01 RX ADMIN — AMIODARONE HYDROCHLORIDE 400 MILLIGRAM(S): 400 TABLET ORAL at 21:41

## 2020-01-01 RX ADMIN — Medication 1 APPLICATION(S): at 03:26

## 2020-01-01 RX ADMIN — Medication 40 MILLIGRAM(S): at 05:08

## 2020-01-01 RX ADMIN — MILRINONE LACTATE 4.32 MICROGRAM(S)/KG/MIN: 1 INJECTION, SOLUTION INTRAVENOUS at 11:13

## 2020-01-01 RX ADMIN — Medication 1 APPLICATION(S): at 16:19

## 2020-01-01 RX ADMIN — MIDODRINE HYDROCHLORIDE 5 MILLIGRAM(S): 2.5 TABLET ORAL at 01:51

## 2020-01-01 RX ADMIN — ONDANSETRON 4 MILLIGRAM(S): 8 TABLET, FILM COATED ORAL at 16:33

## 2020-01-01 RX ADMIN — CARVEDILOL PHOSPHATE 6.25 MILLIGRAM(S): 80 CAPSULE, EXTENDED RELEASE ORAL at 17:10

## 2020-01-01 RX ADMIN — Medication 1 APPLICATION(S): at 18:04

## 2020-01-01 RX ADMIN — AMIODARONE HYDROCHLORIDE 200 MILLIGRAM(S): 400 TABLET ORAL at 06:40

## 2020-01-01 RX ADMIN — MILRINONE LACTATE 2.82 MICROGRAM(S)/KG/MIN: 1 INJECTION, SOLUTION INTRAVENOUS at 22:03

## 2020-01-01 RX ADMIN — ISOSORBIDE DINITRATE 10 MILLIGRAM(S): 5 TABLET ORAL at 22:04

## 2020-01-01 RX ADMIN — MILRINONE LACTATE 7.5 MICROGRAM(S)/KG/MIN: 1 INJECTION, SOLUTION INTRAVENOUS at 21:45

## 2020-01-01 RX ADMIN — CHLORHEXIDINE GLUCONATE 1 APPLICATION(S): 213 SOLUTION TOPICAL at 23:15

## 2020-01-01 RX ADMIN — Medication 1: at 11:57

## 2020-01-01 RX ADMIN — AMIODARONE HYDROCHLORIDE 200 MILLIGRAM(S): 400 TABLET ORAL at 05:20

## 2020-01-01 RX ADMIN — Medication 1 UNIT(S): at 18:35

## 2020-01-01 RX ADMIN — ISOSORBIDE DINITRATE 10 MILLIGRAM(S): 5 TABLET ORAL at 18:53

## 2020-01-01 RX ADMIN — Medication 10 MILLIGRAM(S): at 16:51

## 2020-01-01 RX ADMIN — MIDODRINE HYDROCHLORIDE 2.5 MILLIGRAM(S): 2.5 TABLET ORAL at 13:00

## 2020-01-01 RX ADMIN — CHLORHEXIDINE GLUCONATE 1 APPLICATION(S): 213 SOLUTION TOPICAL at 05:13

## 2020-01-01 RX ADMIN — Medication 4.25 MICROGRAM(S)/KG/MIN: at 19:07

## 2020-01-01 RX ADMIN — MILRINONE LACTATE 5.63 MICROGRAM(S)/KG/MIN: 1 INJECTION, SOLUTION INTRAVENOUS at 08:30

## 2020-01-01 RX ADMIN — Medication 1: at 08:25

## 2020-01-01 RX ADMIN — Medication 1 UNIT(S): at 17:18

## 2020-01-01 RX ADMIN — MILRINONE LACTATE 5.63 MICROGRAM(S)/KG/MIN: 1 INJECTION, SOLUTION INTRAVENOUS at 21:29

## 2020-01-01 RX ADMIN — MILRINONE LACTATE 6.64 MICROGRAM(S)/KG/MIN: 1 INJECTION, SOLUTION INTRAVENOUS at 16:23

## 2020-01-01 RX ADMIN — Medication 10 MILLIGRAM(S): at 13:05

## 2020-01-01 RX ADMIN — ALTEPLASE 2 MILLIGRAM(S): KIT at 22:58

## 2020-01-01 RX ADMIN — SODIUM CHLORIDE 300 MILLILITER(S): 5 INJECTION, SOLUTION INTRAVENOUS at 22:02

## 2020-01-01 RX ADMIN — Medication 20 MILLIEQUIVALENT(S): at 08:38

## 2020-01-01 RX ADMIN — ISOSORBIDE DINITRATE 10 MILLIGRAM(S): 5 TABLET ORAL at 05:21

## 2020-01-01 RX ADMIN — ISOSORBIDE DINITRATE 10 MILLIGRAM(S): 5 TABLET ORAL at 06:32

## 2020-01-01 RX ADMIN — APIXABAN 2.5 MILLIGRAM(S): 2.5 TABLET, FILM COATED ORAL at 05:28

## 2020-01-01 RX ADMIN — Medication 1 SPRAY(S): at 05:24

## 2020-01-01 RX ADMIN — SPIRONOLACTONE 25 MILLIGRAM(S): 25 TABLET, FILM COATED ORAL at 05:21

## 2020-01-01 RX ADMIN — Medication 50 MILLIEQUIVALENT(S): at 23:37

## 2020-01-01 RX ADMIN — CARVEDILOL PHOSPHATE 6.25 MILLIGRAM(S): 80 CAPSULE, EXTENDED RELEASE ORAL at 05:23

## 2020-01-01 RX ADMIN — CEFEPIME 100 MILLIGRAM(S): 1 INJECTION, POWDER, FOR SOLUTION INTRAMUSCULAR; INTRAVENOUS at 23:16

## 2020-01-01 RX ADMIN — Medication 50 MICROGRAM(S): at 06:02

## 2020-01-01 RX ADMIN — ISOSORBIDE DINITRATE 10 MILLIGRAM(S): 5 TABLET ORAL at 21:21

## 2020-01-01 RX ADMIN — MILRINONE LACTATE 7.47 MICROGRAM(S)/KG/MIN: 1 INJECTION, SOLUTION INTRAVENOUS at 09:38

## 2020-01-01 RX ADMIN — APIXABAN 5 MILLIGRAM(S): 2.5 TABLET, FILM COATED ORAL at 09:27

## 2020-01-01 RX ADMIN — Medication 1 UNIT(S): at 08:01

## 2020-01-01 RX ADMIN — Medication 50 MILLIEQUIVALENT(S): at 01:34

## 2020-01-01 RX ADMIN — MILRINONE LACTATE 4.32 MICROGRAM(S)/KG/MIN: 1 INJECTION, SOLUTION INTRAVENOUS at 20:04

## 2020-01-01 RX ADMIN — MUPIROCIN 1 APPLICATION(S): 20 OINTMENT TOPICAL at 09:25

## 2020-01-01 RX ADMIN — Medication 1 UNIT(S): at 14:15

## 2020-01-01 RX ADMIN — Medication 10 MILLIGRAM(S): at 16:25

## 2020-01-01 RX ADMIN — Medication 5 MG/HR: at 20:58

## 2020-01-01 RX ADMIN — CEFEPIME 100 MILLIGRAM(S): 1 INJECTION, POWDER, FOR SOLUTION INTRAMUSCULAR; INTRAVENOUS at 14:00

## 2020-01-01 RX ADMIN — Medication 4: at 14:08

## 2020-01-01 RX ADMIN — Medication 1 APPLICATION(S): at 17:24

## 2020-01-01 RX ADMIN — Medication 250 MILLIGRAM(S): at 16:16

## 2020-01-01 RX ADMIN — MILRINONE LACTATE 10 MICROGRAM(S)/KG/MIN: 1 INJECTION, SOLUTION INTRAVENOUS at 21:45

## 2020-01-01 RX ADMIN — Medication 1: at 18:33

## 2020-01-01 RX ADMIN — MILRINONE LACTATE 7.47 MICROGRAM(S)/KG/MIN: 1 INJECTION, SOLUTION INTRAVENOUS at 08:20

## 2020-01-01 RX ADMIN — CARVEDILOL PHOSPHATE 6.25 MILLIGRAM(S): 80 CAPSULE, EXTENDED RELEASE ORAL at 17:38

## 2020-01-01 RX ADMIN — BUMETANIDE 2.5 MG/HR: 0.25 INJECTION INTRAMUSCULAR; INTRAVENOUS at 19:34

## 2020-01-01 RX ADMIN — Medication 1 APPLICATION(S): at 05:22

## 2020-01-01 RX ADMIN — Medication 3 MILLIGRAM(S): at 21:57

## 2020-01-01 RX ADMIN — CEFEPIME 100 MILLIGRAM(S): 1 INJECTION, POWDER, FOR SOLUTION INTRAMUSCULAR; INTRAVENOUS at 13:14

## 2020-01-01 RX ADMIN — Medication 20 MILLIEQUIVALENT(S): at 17:09

## 2020-01-01 RX ADMIN — Medication 650 MILLIGRAM(S): at 06:43

## 2020-01-01 RX ADMIN — CHLORHEXIDINE GLUCONATE 1 APPLICATION(S): 213 SOLUTION TOPICAL at 05:36

## 2020-01-01 RX ADMIN — Medication 100 MILLIEQUIVALENT(S): at 11:13

## 2020-01-01 RX ADMIN — AMIODARONE HYDROCHLORIDE 200 MILLIGRAM(S): 400 TABLET ORAL at 17:17

## 2020-01-01 RX ADMIN — Medication 3 MILLIGRAM(S): at 22:45

## 2020-01-01 RX ADMIN — Medication 25 MILLILITER(S): at 03:29

## 2020-01-01 RX ADMIN — Medication 1 SPRAY(S): at 06:13

## 2020-01-01 RX ADMIN — APIXABAN 5 MILLIGRAM(S): 2.5 TABLET, FILM COATED ORAL at 17:45

## 2020-01-01 RX ADMIN — Medication 50 MILLIEQUIVALENT(S): at 20:59

## 2020-01-01 RX ADMIN — APIXABAN 5 MILLIGRAM(S): 2.5 TABLET, FILM COATED ORAL at 04:56

## 2020-01-01 RX ADMIN — ISOSORBIDE DINITRATE 10 MILLIGRAM(S): 5 TABLET ORAL at 13:58

## 2020-01-01 RX ADMIN — ISOSORBIDE DINITRATE 10 MILLIGRAM(S): 5 TABLET ORAL at 13:24

## 2020-01-01 RX ADMIN — Medication 40 MILLIEQUIVALENT(S): at 23:32

## 2020-01-01 RX ADMIN — HEPARIN SODIUM 1100 UNIT(S)/HR: 5000 INJECTION INTRAVENOUS; SUBCUTANEOUS at 01:29

## 2020-01-01 RX ADMIN — Medication 10 MILLIGRAM(S): at 04:54

## 2020-01-01 RX ADMIN — SPIRONOLACTONE 12.5 MILLIGRAM(S): 25 TABLET, FILM COATED ORAL at 05:29

## 2020-01-01 RX ADMIN — Medication 7.5 MG/HR: at 21:56

## 2020-01-01 RX ADMIN — MILRINONE LACTATE 7.47 MICROGRAM(S)/KG/MIN: 1 INJECTION, SOLUTION INTRAVENOUS at 21:03

## 2020-01-01 RX ADMIN — Medication 50 MICROGRAM(S): at 05:59

## 2020-01-01 RX ADMIN — Medication 1 UNIT(S): at 12:34

## 2020-01-01 RX ADMIN — HYDROMORPHONE HYDROCHLORIDE 0.2 MILLIGRAM(S): 2 INJECTION INTRAMUSCULAR; INTRAVENOUS; SUBCUTANEOUS at 08:08

## 2020-01-01 RX ADMIN — Medication 10 MG/HR: at 00:38

## 2020-01-01 RX ADMIN — ISOSORBIDE DINITRATE 10 MILLIGRAM(S): 5 TABLET ORAL at 13:55

## 2020-01-01 RX ADMIN — HYDROMORPHONE HYDROCHLORIDE 0.2 MILLIGRAM(S): 2 INJECTION INTRAMUSCULAR; INTRAVENOUS; SUBCUTANEOUS at 11:06

## 2020-01-01 RX ADMIN — MILRINONE LACTATE 10.2 MICROGRAM(S)/KG/MIN: 1 INJECTION, SOLUTION INTRAVENOUS at 20:57

## 2020-01-01 RX ADMIN — SODIUM CHLORIDE 166.67 MILLILITER(S): 9 INJECTION INTRAMUSCULAR; INTRAVENOUS; SUBCUTANEOUS at 04:27

## 2020-01-01 RX ADMIN — BENZOCAINE AND MENTHOL 1 LOZENGE: 5; 1 LIQUID ORAL at 17:44

## 2020-01-01 RX ADMIN — Medication 1: at 17:25

## 2020-01-01 RX ADMIN — BUMETANIDE 2.5 MG/HR: 0.25 INJECTION INTRAMUSCULAR; INTRAVENOUS at 21:13

## 2020-01-01 RX ADMIN — MIDODRINE HYDROCHLORIDE 5 MILLIGRAM(S): 2.5 TABLET ORAL at 05:16

## 2020-01-01 RX ADMIN — HEPARIN SODIUM 1700 UNIT(S)/HR: 5000 INJECTION INTRAVENOUS; SUBCUTANEOUS at 23:37

## 2020-01-01 RX ADMIN — ZOLPIDEM TARTRATE 5 MILLIGRAM(S): 10 TABLET ORAL at 23:29

## 2020-01-01 RX ADMIN — APIXABAN 5 MILLIGRAM(S): 2.5 TABLET, FILM COATED ORAL at 17:25

## 2020-01-01 RX ADMIN — Medication 1 SPRAY(S): at 05:13

## 2020-01-01 RX ADMIN — CARVEDILOL PHOSPHATE 6.25 MILLIGRAM(S): 80 CAPSULE, EXTENDED RELEASE ORAL at 17:15

## 2020-01-01 RX ADMIN — APIXABAN 5 MILLIGRAM(S): 2.5 TABLET, FILM COATED ORAL at 18:43

## 2020-01-01 RX ADMIN — MIDODRINE HYDROCHLORIDE 10 MILLIGRAM(S): 2.5 TABLET ORAL at 06:50

## 2020-01-01 RX ADMIN — Medication 1 UNIT(S): at 17:53

## 2020-01-01 RX ADMIN — Medication 40 MILLIEQUIVALENT(S): at 11:41

## 2020-01-01 RX ADMIN — AMIODARONE HYDROCHLORIDE 200 MILLIGRAM(S): 400 TABLET ORAL at 06:00

## 2020-01-01 RX ADMIN — MUPIROCIN 1 APPLICATION(S): 20 OINTMENT TOPICAL at 12:26

## 2020-01-01 RX ADMIN — Medication 3 MILLIGRAM(S): at 21:11

## 2020-01-01 RX ADMIN — SPIRONOLACTONE 12.5 MILLIGRAM(S): 25 TABLET, FILM COATED ORAL at 18:29

## 2020-01-01 RX ADMIN — Medication 50 MICROGRAM(S): at 05:40

## 2020-01-01 RX ADMIN — Medication 50 MICROGRAM(S): at 06:40

## 2020-01-01 RX ADMIN — Medication 10 MILLIGRAM(S): at 05:57

## 2020-01-01 RX ADMIN — Medication 250 MILLIGRAM(S): at 18:16

## 2020-01-01 RX ADMIN — Medication 80 MILLIGRAM(S): at 15:23

## 2020-01-01 RX ADMIN — ISOSORBIDE DINITRATE 20 MILLIGRAM(S): 5 TABLET ORAL at 06:06

## 2020-01-01 RX ADMIN — Medication 0.5 MILLIGRAM(S): at 14:18

## 2020-01-01 RX ADMIN — Medication 10 MILLIGRAM(S): at 15:43

## 2020-01-01 RX ADMIN — SODIUM CHLORIDE 50 MILLILITER(S): 9 INJECTION, SOLUTION INTRAVENOUS at 14:04

## 2020-01-01 RX ADMIN — Medication 10 MILLIGRAM(S): at 11:08

## 2020-01-01 RX ADMIN — MUPIROCIN 1 APPLICATION(S): 20 OINTMENT TOPICAL at 08:46

## 2020-01-01 RX ADMIN — Medication 7.5 MG/HR: at 12:12

## 2020-01-01 RX ADMIN — Medication 2: at 12:12

## 2020-01-01 RX ADMIN — Medication 200 GRAM(S): at 22:16

## 2020-01-01 RX ADMIN — Medication 10 MILLIGRAM(S): at 13:23

## 2020-01-01 RX ADMIN — Medication 10 MILLIGRAM(S): at 05:21

## 2020-01-01 RX ADMIN — MILRINONE LACTATE 7.47 MICROGRAM(S)/KG/MIN: 1 INJECTION, SOLUTION INTRAVENOUS at 09:11

## 2020-01-01 RX ADMIN — BUMETANIDE 5 MG/HR: 0.25 INJECTION INTRAMUSCULAR; INTRAVENOUS at 14:23

## 2020-01-01 RX ADMIN — Medication 50 MICROGRAM(S): at 05:09

## 2020-01-01 RX ADMIN — AMIODARONE HYDROCHLORIDE 400 MILLIGRAM(S): 400 TABLET ORAL at 22:02

## 2020-01-01 RX ADMIN — SODIUM CHLORIDE 50 MILLILITER(S): 9 INJECTION, SOLUTION INTRAVENOUS at 23:12

## 2020-01-01 RX ADMIN — Medication 10 MILLIGRAM(S): at 05:20

## 2020-01-01 RX ADMIN — Medication 1 TABLET(S): at 03:27

## 2020-01-01 RX ADMIN — Medication 25 MILLIGRAM(S): at 06:58

## 2020-01-01 RX ADMIN — Medication 1 SPRAY(S): at 18:03

## 2020-01-01 RX ADMIN — APIXABAN 5 MILLIGRAM(S): 2.5 TABLET, FILM COATED ORAL at 17:15

## 2020-01-01 RX ADMIN — AMIODARONE HYDROCHLORIDE 200 MILLIGRAM(S): 400 TABLET ORAL at 05:38

## 2020-01-01 RX ADMIN — Medication 1 APPLICATION(S): at 05:40

## 2020-01-01 RX ADMIN — Medication 1: at 17:27

## 2020-01-01 RX ADMIN — Medication 10 MILLIGRAM(S): at 05:08

## 2020-01-01 RX ADMIN — CHLORHEXIDINE GLUCONATE 1 APPLICATION(S): 213 SOLUTION TOPICAL at 06:04

## 2020-01-01 RX ADMIN — SPIRONOLACTONE 12.5 MILLIGRAM(S): 25 TABLET, FILM COATED ORAL at 05:25

## 2020-01-01 RX ADMIN — AMIODARONE HYDROCHLORIDE 400 MILLIGRAM(S): 400 TABLET ORAL at 13:22

## 2020-01-01 RX ADMIN — APIXABAN 5 MILLIGRAM(S): 2.5 TABLET, FILM COATED ORAL at 17:11

## 2020-01-01 RX ADMIN — Medication 25 MILLIGRAM(S): at 11:13

## 2020-01-01 RX ADMIN — AMIODARONE HYDROCHLORIDE 200 MILLIGRAM(S): 400 TABLET ORAL at 17:18

## 2020-01-01 RX ADMIN — Medication 1: at 17:11

## 2020-01-01 RX ADMIN — SPIRONOLACTONE 25 MILLIGRAM(S): 25 TABLET, FILM COATED ORAL at 15:28

## 2020-01-01 RX ADMIN — HEPARIN SODIUM 0 UNIT(S)/HR: 5000 INJECTION INTRAVENOUS; SUBCUTANEOUS at 06:53

## 2020-01-01 RX ADMIN — Medication 80 MILLIGRAM(S): at 09:25

## 2020-01-01 RX ADMIN — Medication 2: at 17:37

## 2020-01-01 RX ADMIN — Medication 1 UNIT(S): at 08:05

## 2020-01-01 RX ADMIN — Medication 50 MILLILITER(S): at 19:39

## 2020-01-01 RX ADMIN — CHLORHEXIDINE GLUCONATE 1 APPLICATION(S): 213 SOLUTION TOPICAL at 06:59

## 2020-01-01 RX ADMIN — HYDROMORPHONE HYDROCHLORIDE 0.2 MILLIGRAM(S): 2 INJECTION INTRAMUSCULAR; INTRAVENOUS; SUBCUTANEOUS at 15:12

## 2020-01-01 RX ADMIN — Medication 40 MILLIEQUIVALENT(S): at 07:47

## 2020-01-01 RX ADMIN — Medication 1 SPRAY(S): at 18:16

## 2020-01-01 RX ADMIN — Medication 1 APPLICATION(S): at 18:06

## 2020-01-01 RX ADMIN — AMIODARONE HYDROCHLORIDE 200 MILLIGRAM(S): 400 TABLET ORAL at 05:51

## 2020-01-01 RX ADMIN — FAMOTIDINE 20 MILLIGRAM(S): 10 INJECTION INTRAVENOUS at 14:17

## 2020-01-01 RX ADMIN — HEPARIN SODIUM 10 UNIT(S)/HR: 5000 INJECTION INTRAVENOUS; SUBCUTANEOUS at 02:29

## 2020-01-01 RX ADMIN — Medication 1 SPRAY(S): at 06:58

## 2020-01-01 RX ADMIN — Medication 1: at 17:47

## 2020-01-01 RX ADMIN — CHLORHEXIDINE GLUCONATE 1 APPLICATION(S): 213 SOLUTION TOPICAL at 10:52

## 2020-01-01 RX ADMIN — SODIUM CHLORIDE 100 MILLILITER(S): 9 INJECTION INTRAMUSCULAR; INTRAVENOUS; SUBCUTANEOUS at 23:40

## 2020-01-01 RX ADMIN — MIDODRINE HYDROCHLORIDE 2.5 MILLIGRAM(S): 2.5 TABLET ORAL at 17:18

## 2020-01-01 RX ADMIN — MILRINONE LACTATE 5.63 MICROGRAM(S)/KG/MIN: 1 INJECTION, SOLUTION INTRAVENOUS at 23:21

## 2020-01-01 RX ADMIN — Medication 10 MILLIGRAM(S): at 14:36

## 2020-01-01 RX ADMIN — Medication 10 MILLIGRAM(S): at 21:41

## 2020-01-01 RX ADMIN — Medication 25 MICROGRAM(S): at 05:29

## 2020-01-01 RX ADMIN — APIXABAN 5 MILLIGRAM(S): 2.5 TABLET, FILM COATED ORAL at 21:33

## 2020-01-01 RX ADMIN — Medication 10 MILLIGRAM(S): at 05:24

## 2020-01-01 RX ADMIN — CARVEDILOL PHOSPHATE 6.25 MILLIGRAM(S): 80 CAPSULE, EXTENDED RELEASE ORAL at 05:12

## 2020-01-01 RX ADMIN — Medication 1: at 17:10

## 2020-01-01 RX ADMIN — Medication 2: at 18:28

## 2020-01-01 RX ADMIN — Medication 1 SPRAY(S): at 05:51

## 2020-01-01 RX ADMIN — HYDROMORPHONE HYDROCHLORIDE 0.2 MILLIGRAM(S): 2 INJECTION INTRAMUSCULAR; INTRAVENOUS; SUBCUTANEOUS at 17:15

## 2020-01-01 RX ADMIN — MILRINONE LACTATE 10.2 MICROGRAM(S)/KG/MIN: 1 INJECTION, SOLUTION INTRAVENOUS at 06:04

## 2020-01-01 RX ADMIN — Medication 10 MILLIGRAM(S): at 21:21

## 2020-01-01 RX ADMIN — MILRINONE LACTATE 10.2 MICROGRAM(S)/KG/MIN: 1 INJECTION, SOLUTION INTRAVENOUS at 22:14

## 2020-01-01 RX ADMIN — HEPARIN SODIUM 900 UNIT(S)/HR: 5000 INJECTION INTRAVENOUS; SUBCUTANEOUS at 02:32

## 2020-01-01 RX ADMIN — Medication 10 MILLIGRAM(S): at 05:40

## 2020-01-01 RX ADMIN — ISOSORBIDE DINITRATE 10 MILLIGRAM(S): 5 TABLET ORAL at 21:09

## 2020-01-01 RX ADMIN — Medication 25 MICROGRAM(S): at 05:47

## 2020-01-01 RX ADMIN — AMIODARONE HYDROCHLORIDE 200 MILLIGRAM(S): 400 TABLET ORAL at 06:23

## 2020-01-01 RX ADMIN — Medication 10 MILLIGRAM(S): at 22:47

## 2020-01-01 RX ADMIN — Medication 1 APPLICATION(S): at 18:30

## 2020-01-01 RX ADMIN — Medication 1 APPLICATION(S): at 17:18

## 2020-01-01 RX ADMIN — Medication 40 MILLIEQUIVALENT(S): at 21:43

## 2020-01-01 RX ADMIN — AMIODARONE HYDROCHLORIDE 200 MILLIGRAM(S): 400 TABLET ORAL at 05:12

## 2020-01-01 RX ADMIN — Medication 10 MILLIGRAM(S): at 13:26

## 2020-01-01 RX ADMIN — AMIODARONE HYDROCHLORIDE 200 MILLIGRAM(S): 400 TABLET ORAL at 05:23

## 2020-01-01 RX ADMIN — SODIUM CHLORIDE 50 MILLILITER(S): 9 INJECTION, SOLUTION INTRAVENOUS at 21:00

## 2020-01-01 RX ADMIN — Medication 0.5 MILLIGRAM(S): at 01:20

## 2020-01-01 RX ADMIN — CHLORHEXIDINE GLUCONATE 1 APPLICATION(S): 213 SOLUTION TOPICAL at 12:14

## 2020-01-01 RX ADMIN — HEPARIN SODIUM 9.5 UNIT(S)/HR: 5000 INJECTION INTRAVENOUS; SUBCUTANEOUS at 19:28

## 2020-01-01 RX ADMIN — APIXABAN 5 MILLIGRAM(S): 2.5 TABLET, FILM COATED ORAL at 05:21

## 2020-01-01 RX ADMIN — MILRINONE LACTATE 4.32 MICROGRAM(S)/KG/MIN: 1 INJECTION, SOLUTION INTRAVENOUS at 07:35

## 2020-01-01 RX ADMIN — Medication 10 MILLIGRAM(S): at 17:38

## 2020-01-01 RX ADMIN — AMIODARONE HYDROCHLORIDE 200 MILLIGRAM(S): 400 TABLET ORAL at 17:44

## 2020-01-01 RX ADMIN — CARVEDILOL PHOSPHATE 6.25 MILLIGRAM(S): 80 CAPSULE, EXTENDED RELEASE ORAL at 18:00

## 2020-01-01 RX ADMIN — APIXABAN 5 MILLIGRAM(S): 2.5 TABLET, FILM COATED ORAL at 18:17

## 2020-01-01 RX ADMIN — AMIODARONE HYDROCHLORIDE 200 MILLIGRAM(S): 400 TABLET ORAL at 06:05

## 2020-01-01 RX ADMIN — HEPARIN SODIUM 1700 UNIT(S)/HR: 5000 INJECTION INTRAVENOUS; SUBCUTANEOUS at 23:16

## 2020-01-01 RX ADMIN — Medication 20 MILLIEQUIVALENT(S): at 07:44

## 2020-01-01 RX ADMIN — Medication 2: at 07:44

## 2020-01-01 RX ADMIN — Medication 25 MILLIGRAM(S): at 05:30

## 2020-01-01 RX ADMIN — CHLORHEXIDINE GLUCONATE 1 APPLICATION(S): 213 SOLUTION TOPICAL at 08:00

## 2020-01-01 RX ADMIN — HEPARIN SODIUM 1100 UNIT(S)/HR: 5000 INJECTION INTRAVENOUS; SUBCUTANEOUS at 07:53

## 2020-01-01 RX ADMIN — Medication 0.5 MILLIGRAM(S): at 00:43

## 2020-01-01 RX ADMIN — CARVEDILOL PHOSPHATE 6.25 MILLIGRAM(S): 80 CAPSULE, EXTENDED RELEASE ORAL at 05:30

## 2020-01-01 RX ADMIN — CHLORHEXIDINE GLUCONATE 1 APPLICATION(S): 213 SOLUTION TOPICAL at 11:39

## 2020-01-01 RX ADMIN — Medication 40 MILLIEQUIVALENT(S): at 16:36

## 2020-01-01 RX ADMIN — Medication 12.5 MILLIGRAM(S): at 18:52

## 2020-01-01 RX ADMIN — ISOSORBIDE DINITRATE 10 MILLIGRAM(S): 5 TABLET ORAL at 22:15

## 2020-01-01 RX ADMIN — Medication 1 APPLICATION(S): at 05:26

## 2020-01-01 RX ADMIN — MILRINONE LACTATE 6.64 MICROGRAM(S)/KG/MIN: 1 INJECTION, SOLUTION INTRAVENOUS at 05:50

## 2020-01-01 RX ADMIN — APIXABAN 5 MILLIGRAM(S): 2.5 TABLET, FILM COATED ORAL at 06:40

## 2020-01-01 RX ADMIN — Medication 1: at 08:01

## 2020-01-01 RX ADMIN — HEPARIN SODIUM 1100 UNIT(S)/HR: 5000 INJECTION INTRAVENOUS; SUBCUTANEOUS at 01:08

## 2020-01-01 RX ADMIN — Medication 3 UNIT(S): at 18:07

## 2020-01-01 RX ADMIN — Medication 40 MILLIEQUIVALENT(S): at 11:27

## 2020-01-01 RX ADMIN — Medication 10 MILLIGRAM(S): at 05:41

## 2020-01-01 RX ADMIN — Medication 40 MILLIEQUIVALENT(S): at 18:48

## 2020-01-01 RX ADMIN — FAMOTIDINE 20 MILLIGRAM(S): 10 INJECTION INTRAVENOUS at 11:47

## 2020-01-01 RX ADMIN — MILRINONE LACTATE 7.47 MICROGRAM(S)/KG/MIN: 1 INJECTION, SOLUTION INTRAVENOUS at 09:56

## 2020-01-01 RX ADMIN — Medication 3 UNIT(S): at 07:47

## 2020-01-01 RX ADMIN — APIXABAN 5 MILLIGRAM(S): 2.5 TABLET, FILM COATED ORAL at 05:25

## 2020-01-01 RX ADMIN — MIDODRINE HYDROCHLORIDE 5 MILLIGRAM(S): 2.5 TABLET ORAL at 05:33

## 2020-01-01 RX ADMIN — Medication 40 MILLIEQUIVALENT(S): at 14:22

## 2020-01-01 RX ADMIN — Medication 3 UNIT(S): at 11:54

## 2020-01-01 RX ADMIN — AMIODARONE HYDROCHLORIDE 200 MILLIGRAM(S): 400 TABLET ORAL at 18:52

## 2020-01-01 RX ADMIN — MILRINONE LACTATE 5.63 MICROGRAM(S)/KG/MIN: 1 INJECTION, SOLUTION INTRAVENOUS at 18:27

## 2020-01-01 RX ADMIN — Medication 2: at 12:04

## 2020-01-01 RX ADMIN — AMIODARONE HYDROCHLORIDE 400 MILLIGRAM(S): 400 TABLET ORAL at 05:22

## 2020-01-01 RX ADMIN — ALBUTEROL 10 MILLIGRAM(S): 90 AEROSOL, METERED ORAL at 16:12

## 2020-01-01 RX ADMIN — AMIODARONE HYDROCHLORIDE 200 MILLIGRAM(S): 400 TABLET ORAL at 05:59

## 2020-01-01 RX ADMIN — Medication 1 APPLICATION(S): at 05:53

## 2020-01-01 RX ADMIN — MILRINONE LACTATE 4.32 MICROGRAM(S)/KG/MIN: 1 INJECTION, SOLUTION INTRAVENOUS at 21:01

## 2020-01-01 RX ADMIN — MILRINONE LACTATE 10.2 MICROGRAM(S)/KG/MIN: 1 INJECTION, SOLUTION INTRAVENOUS at 16:20

## 2020-01-01 RX ADMIN — Medication 3 MILLIGRAM(S): at 22:14

## 2020-01-01 RX ADMIN — Medication 50 MICROGRAM(S): at 05:15

## 2020-01-01 RX ADMIN — Medication 50 MICROGRAM(S): at 05:06

## 2020-01-01 RX ADMIN — CHLORHEXIDINE GLUCONATE 1 APPLICATION(S): 213 SOLUTION TOPICAL at 06:01

## 2020-01-01 RX ADMIN — Medication 1 UNIT(S): at 13:56

## 2020-01-01 RX ADMIN — ISOSORBIDE DINITRATE 10 MILLIGRAM(S): 5 TABLET ORAL at 21:06

## 2020-01-01 RX ADMIN — SPIRONOLACTONE 12.5 MILLIGRAM(S): 25 TABLET, FILM COATED ORAL at 05:22

## 2020-01-01 RX ADMIN — Medication 1 UNIT(S): at 17:50

## 2020-01-01 RX ADMIN — Medication 10 MILLIGRAM(S): at 21:06

## 2020-01-01 RX ADMIN — AMIODARONE HYDROCHLORIDE 200 MILLIGRAM(S): 400 TABLET ORAL at 17:45

## 2020-01-01 RX ADMIN — Medication 12.5 MILLIGRAM(S): at 20:47

## 2020-01-01 RX ADMIN — POLYETHYLENE GLYCOL 3350 17 GRAM(S): 17 POWDER, FOR SOLUTION ORAL at 17:22

## 2020-01-01 RX ADMIN — ISOSORBIDE DINITRATE 10 MILLIGRAM(S): 5 TABLET ORAL at 05:09

## 2020-01-01 RX ADMIN — Medication 10 MILLIGRAM(S): at 21:57

## 2020-01-01 RX ADMIN — HEPARIN SODIUM 900 UNIT(S)/HR: 5000 INJECTION INTRAVENOUS; SUBCUTANEOUS at 20:12

## 2020-01-01 RX ADMIN — HEPARIN SODIUM 1300 UNIT(S)/HR: 5000 INJECTION INTRAVENOUS; SUBCUTANEOUS at 00:26

## 2020-01-01 RX ADMIN — Medication 100 MILLIEQUIVALENT(S): at 07:55

## 2020-01-01 RX ADMIN — SPIRONOLACTONE 12.5 MILLIGRAM(S): 25 TABLET, FILM COATED ORAL at 05:06

## 2020-01-01 RX ADMIN — Medication 10 MG/HR: at 16:19

## 2020-01-01 RX ADMIN — HEPARIN SODIUM 1200 UNIT(S)/HR: 5000 INJECTION INTRAVENOUS; SUBCUTANEOUS at 18:21

## 2020-01-01 RX ADMIN — CHLORHEXIDINE GLUCONATE 1 APPLICATION(S): 213 SOLUTION TOPICAL at 09:49

## 2020-01-01 RX ADMIN — MIDODRINE HYDROCHLORIDE 5 MILLIGRAM(S): 2.5 TABLET ORAL at 05:54

## 2020-01-01 RX ADMIN — HEPARIN SODIUM 900 UNIT(S)/HR: 5000 INJECTION INTRAVENOUS; SUBCUTANEOUS at 14:38

## 2020-01-01 RX ADMIN — Medication 10 MILLIGRAM(S): at 05:16

## 2020-01-01 RX ADMIN — HEPARIN SODIUM 0 UNIT(S)/HR: 5000 INJECTION INTRAVENOUS; SUBCUTANEOUS at 06:30

## 2020-01-01 RX ADMIN — ISOSORBIDE DINITRATE 10 MILLIGRAM(S): 5 TABLET ORAL at 14:52

## 2020-01-01 RX ADMIN — MIDODRINE HYDROCHLORIDE 5 MILLIGRAM(S): 2.5 TABLET ORAL at 11:08

## 2020-01-01 RX ADMIN — Medication 25 MICROGRAM(S): at 06:00

## 2020-01-01 RX ADMIN — Medication 10 MILLIGRAM(S): at 13:49

## 2020-01-01 RX ADMIN — CHLORHEXIDINE GLUCONATE 1 APPLICATION(S): 213 SOLUTION TOPICAL at 05:12

## 2020-01-01 RX ADMIN — ONDANSETRON 4 MILLIGRAM(S): 8 TABLET, FILM COATED ORAL at 14:20

## 2020-01-01 RX ADMIN — AMIODARONE HYDROCHLORIDE 200 MILLIGRAM(S): 400 TABLET ORAL at 05:50

## 2020-01-01 RX ADMIN — Medication 50 MICROGRAM(S): at 05:41

## 2020-01-01 RX ADMIN — CARVEDILOL PHOSPHATE 6.25 MILLIGRAM(S): 80 CAPSULE, EXTENDED RELEASE ORAL at 17:45

## 2020-01-01 RX ADMIN — CARVEDILOL PHOSPHATE 3.12 MILLIGRAM(S): 80 CAPSULE, EXTENDED RELEASE ORAL at 06:45

## 2020-01-01 RX ADMIN — MILRINONE LACTATE 10.2 MICROGRAM(S)/KG/MIN: 1 INJECTION, SOLUTION INTRAVENOUS at 11:36

## 2020-01-01 RX ADMIN — Medication 50 MILLIEQUIVALENT(S): at 06:02

## 2020-01-01 RX ADMIN — Medication 4: at 12:05

## 2020-01-01 RX ADMIN — Medication 10 MILLIGRAM(S): at 06:01

## 2020-01-01 RX ADMIN — CARVEDILOL PHOSPHATE 6.25 MILLIGRAM(S): 80 CAPSULE, EXTENDED RELEASE ORAL at 17:22

## 2020-01-01 RX ADMIN — Medication 1: at 13:24

## 2020-01-01 RX ADMIN — Medication 2: at 13:04

## 2020-01-01 RX ADMIN — AMIODARONE HYDROCHLORIDE 200 MILLIGRAM(S): 400 TABLET ORAL at 05:18

## 2020-01-01 RX ADMIN — MILRINONE LACTATE 5.63 MICROGRAM(S)/KG/MIN: 1 INJECTION, SOLUTION INTRAVENOUS at 10:27

## 2020-01-01 RX ADMIN — AMIODARONE HYDROCHLORIDE 200 MILLIGRAM(S): 400 TABLET ORAL at 20:47

## 2020-01-01 RX ADMIN — BUMETANIDE 5 MG/HR: 0.25 INJECTION INTRAMUSCULAR; INTRAVENOUS at 01:22

## 2020-01-01 RX ADMIN — SODIUM CHLORIDE 50 MILLILITER(S): 9 INJECTION, SOLUTION INTRAVENOUS at 20:05

## 2020-01-01 RX ADMIN — CARVEDILOL PHOSPHATE 6.25 MILLIGRAM(S): 80 CAPSULE, EXTENDED RELEASE ORAL at 04:54

## 2020-01-01 RX ADMIN — ISOSORBIDE DINITRATE 10 MILLIGRAM(S): 5 TABLET ORAL at 14:35

## 2020-01-01 RX ADMIN — Medication 10 MILLIGRAM(S): at 04:56

## 2020-01-01 RX ADMIN — Medication 10 MILLIGRAM(S): at 21:09

## 2020-01-01 RX ADMIN — Medication 1 UNIT(S): at 13:57

## 2020-01-01 RX ADMIN — Medication 1 UNIT(S): at 18:27

## 2020-01-01 RX ADMIN — Medication 10 MILLIGRAM(S): at 20:58

## 2020-01-01 RX ADMIN — HEPARIN SODIUM 0 UNIT(S)/HR: 5000 INJECTION INTRAVENOUS; SUBCUTANEOUS at 12:25

## 2020-01-01 RX ADMIN — SODIUM CHLORIDE 60 MILLILITER(S): 9 INJECTION INTRAMUSCULAR; INTRAVENOUS; SUBCUTANEOUS at 17:20

## 2020-01-01 RX ADMIN — Medication 2: at 11:50

## 2020-01-01 RX ADMIN — Medication 40 MILLIGRAM(S): at 12:29

## 2020-01-01 RX ADMIN — MIDODRINE HYDROCHLORIDE 5 MILLIGRAM(S): 2.5 TABLET ORAL at 05:09

## 2020-01-01 RX ADMIN — MIDODRINE HYDROCHLORIDE 5 MILLIGRAM(S): 2.5 TABLET ORAL at 12:01

## 2020-01-01 RX ADMIN — ISOSORBIDE DINITRATE 10 MILLIGRAM(S): 5 TABLET ORAL at 13:05

## 2020-01-01 RX ADMIN — Medication 1 APPLICATION(S): at 17:16

## 2020-01-01 RX ADMIN — Medication 1: at 18:29

## 2020-01-01 RX ADMIN — Medication 400 MILLIGRAM(S): at 06:26

## 2020-01-01 RX ADMIN — MILRINONE LACTATE 7.47 MICROGRAM(S)/KG/MIN: 1 INJECTION, SOLUTION INTRAVENOUS at 22:05

## 2020-01-01 RX ADMIN — HYDROMORPHONE HYDROCHLORIDE 0.2 MILLIGRAM(S): 2 INJECTION INTRAMUSCULAR; INTRAVENOUS; SUBCUTANEOUS at 07:11

## 2020-01-01 RX ADMIN — BUMETANIDE 2.5 MG/HR: 0.25 INJECTION INTRAMUSCULAR; INTRAVENOUS at 08:33

## 2020-01-01 RX ADMIN — Medication 100 MILLIEQUIVALENT(S): at 13:06

## 2020-01-01 RX ADMIN — Medication 3 MILLIGRAM(S): at 21:44

## 2020-01-01 RX ADMIN — CHLORHEXIDINE GLUCONATE 1 APPLICATION(S): 213 SOLUTION TOPICAL at 05:22

## 2020-01-01 RX ADMIN — Medication 8.5 MICROGRAM(S)/KG/MIN: at 00:38

## 2020-01-01 RX ADMIN — AMIODARONE HYDROCHLORIDE 200 MILLIGRAM(S): 400 TABLET ORAL at 17:38

## 2020-01-01 RX ADMIN — MILRINONE LACTATE 7.47 MICROGRAM(S)/KG/MIN: 1 INJECTION, SOLUTION INTRAVENOUS at 08:09

## 2020-01-01 RX ADMIN — Medication 40 MILLIEQUIVALENT(S): at 13:00

## 2020-01-01 RX ADMIN — APIXABAN 5 MILLIGRAM(S): 2.5 TABLET, FILM COATED ORAL at 05:06

## 2020-01-01 RX ADMIN — Medication 50 GRAM(S): at 17:35

## 2020-01-01 RX ADMIN — Medication 10 MILLIGRAM(S): at 14:02

## 2020-01-01 RX ADMIN — Medication 1: at 18:14

## 2020-01-01 RX ADMIN — Medication 37.5 MILLIGRAM(S): at 23:19

## 2020-01-01 RX ADMIN — INSULIN HUMAN 4 UNIT(S): 100 INJECTION, SOLUTION SUBCUTANEOUS at 16:14

## 2020-01-01 RX ADMIN — APIXABAN 5 MILLIGRAM(S): 2.5 TABLET, FILM COATED ORAL at 17:16

## 2020-01-01 RX ADMIN — ISOSORBIDE DINITRATE 10 MILLIGRAM(S): 5 TABLET ORAL at 04:54

## 2020-01-01 RX ADMIN — Medication 50 MICROGRAM(S): at 06:04

## 2020-01-01 RX ADMIN — AMIODARONE HYDROCHLORIDE 200 MILLIGRAM(S): 400 TABLET ORAL at 04:44

## 2020-01-01 RX ADMIN — CHLORHEXIDINE GLUCONATE 1 APPLICATION(S): 213 SOLUTION TOPICAL at 05:29

## 2020-01-01 RX ADMIN — CARVEDILOL PHOSPHATE 6.25 MILLIGRAM(S): 80 CAPSULE, EXTENDED RELEASE ORAL at 05:06

## 2020-01-01 RX ADMIN — Medication 20 MILLIEQUIVALENT(S): at 12:02

## 2020-01-01 RX ADMIN — BUMETANIDE 5 MG/HR: 0.25 INJECTION INTRAMUSCULAR; INTRAVENOUS at 22:04

## 2020-01-01 RX ADMIN — Medication 650 MILLIGRAM(S): at 17:00

## 2020-01-01 RX ADMIN — CHLORHEXIDINE GLUCONATE 1 APPLICATION(S): 213 SOLUTION TOPICAL at 07:52

## 2020-01-01 RX ADMIN — HEPARIN SODIUM 1400 UNIT(S)/HR: 5000 INJECTION INTRAVENOUS; SUBCUTANEOUS at 07:35

## 2020-01-01 RX ADMIN — Medication 1: at 17:28

## 2020-01-01 RX ADMIN — MILRINONE LACTATE 6.64 MICROGRAM(S)/KG/MIN: 1 INJECTION, SOLUTION INTRAVENOUS at 22:02

## 2020-01-01 RX ADMIN — HYDROMORPHONE HYDROCHLORIDE 0.2 MILLIGRAM(S): 2 INJECTION INTRAMUSCULAR; INTRAVENOUS; SUBCUTANEOUS at 15:01

## 2020-01-01 RX ADMIN — MILRINONE LACTATE 6.64 MICROGRAM(S)/KG/MIN: 1 INJECTION, SOLUTION INTRAVENOUS at 17:44

## 2020-01-01 RX ADMIN — FAMOTIDINE 20 MILLIGRAM(S): 10 INJECTION INTRAVENOUS at 11:17

## 2020-01-01 RX ADMIN — Medication 40 MILLIEQUIVALENT(S): at 13:04

## 2020-01-01 RX ADMIN — Medication 1: at 12:34

## 2020-01-01 RX ADMIN — HYDROMORPHONE HYDROCHLORIDE 0.2 MILLIGRAM(S): 2 INJECTION INTRAMUSCULAR; INTRAVENOUS; SUBCUTANEOUS at 01:01

## 2020-01-01 RX ADMIN — Medication 10 MILLIGRAM(S): at 16:53

## 2020-01-01 RX ADMIN — Medication 650 MILLIGRAM(S): at 10:00

## 2020-01-01 RX ADMIN — ISOSORBIDE DINITRATE 10 MILLIGRAM(S): 5 TABLET ORAL at 05:01

## 2020-01-01 RX ADMIN — Medication 10 MILLIGRAM(S): at 15:23

## 2020-01-01 RX ADMIN — BUMETANIDE 2.5 MG/HR: 0.25 INJECTION INTRAMUSCULAR; INTRAVENOUS at 21:08

## 2020-01-01 RX ADMIN — Medication 1 APPLICATION(S): at 18:23

## 2020-01-01 RX ADMIN — APIXABAN 5 MILLIGRAM(S): 2.5 TABLET, FILM COATED ORAL at 04:54

## 2020-01-01 RX ADMIN — MIDODRINE HYDROCHLORIDE 5 MILLIGRAM(S): 2.5 TABLET ORAL at 07:49

## 2020-01-01 RX ADMIN — Medication 20 MILLIEQUIVALENT(S): at 11:47

## 2020-01-01 RX ADMIN — SODIUM CHLORIDE 100 MILLILITER(S): 9 INJECTION INTRAMUSCULAR; INTRAVENOUS; SUBCUTANEOUS at 01:23

## 2020-01-01 RX ADMIN — MIDODRINE HYDROCHLORIDE 2.5 MILLIGRAM(S): 2.5 TABLET ORAL at 17:45

## 2020-01-01 RX ADMIN — SODIUM CHLORIDE 100 MILLILITER(S): 9 INJECTION INTRAMUSCULAR; INTRAVENOUS; SUBCUTANEOUS at 19:54

## 2020-01-01 RX ADMIN — APIXABAN 5 MILLIGRAM(S): 2.5 TABLET, FILM COATED ORAL at 17:49

## 2020-01-01 RX ADMIN — Medication 50 MICROGRAM(S): at 05:21

## 2020-01-01 RX ADMIN — APIXABAN 5 MILLIGRAM(S): 2.5 TABLET, FILM COATED ORAL at 06:00

## 2020-01-01 RX ADMIN — Medication 10 MILLIGRAM(S): at 14:04

## 2020-01-01 RX ADMIN — Medication 10 MILLIGRAM(S): at 14:09

## 2020-01-01 RX ADMIN — Medication 1 SPRAY(S): at 16:19

## 2020-01-01 RX ADMIN — SODIUM CHLORIDE 4.17 MILLILITER(S): 9 INJECTION INTRAMUSCULAR; INTRAVENOUS; SUBCUTANEOUS at 11:17

## 2020-01-01 RX ADMIN — Medication 1 APPLICATION(S): at 18:43

## 2020-01-01 RX ADMIN — Medication 1 SPRAY(S): at 17:59

## 2020-01-01 RX ADMIN — MILRINONE LACTATE 5.63 MICROGRAM(S)/KG/MIN: 1 INJECTION, SOLUTION INTRAVENOUS at 18:45

## 2020-01-01 RX ADMIN — MIDODRINE HYDROCHLORIDE 5 MILLIGRAM(S): 2.5 TABLET ORAL at 12:19

## 2020-01-01 RX ADMIN — AMIODARONE HYDROCHLORIDE 200 MILLIGRAM(S): 400 TABLET ORAL at 05:08

## 2020-01-01 RX ADMIN — Medication 5 MG/HR: at 03:27

## 2020-01-01 RX ADMIN — CHLORHEXIDINE GLUCONATE 1 APPLICATION(S): 213 SOLUTION TOPICAL at 08:06

## 2020-01-01 RX ADMIN — Medication 25 MICROGRAM(S): at 05:33

## 2020-01-01 RX ADMIN — Medication 25 MICROGRAM(S): at 06:23

## 2020-01-01 RX ADMIN — Medication 3 UNIT(S): at 08:04

## 2020-01-01 RX ADMIN — SODIUM CHLORIDE 50 MILLILITER(S): 9 INJECTION, SOLUTION INTRAVENOUS at 12:43

## 2020-01-01 RX ADMIN — CARVEDILOL PHOSPHATE 6.25 MILLIGRAM(S): 80 CAPSULE, EXTENDED RELEASE ORAL at 17:17

## 2020-01-01 RX ADMIN — Medication 50 MICROGRAM(S): at 13:45

## 2020-01-01 RX ADMIN — Medication 10 MILLIGRAM(S): at 05:01

## 2020-01-01 RX ADMIN — MILRINONE LACTATE 5.63 MICROGRAM(S)/KG/MIN: 1 INJECTION, SOLUTION INTRAVENOUS at 05:26

## 2020-01-01 RX ADMIN — APIXABAN 5 MILLIGRAM(S): 2.5 TABLET, FILM COATED ORAL at 23:29

## 2020-01-01 RX ADMIN — MILRINONE LACTATE 7.5 MICROGRAM(S)/KG/MIN: 1 INJECTION, SOLUTION INTRAVENOUS at 11:37

## 2020-01-01 RX ADMIN — AMIODARONE HYDROCHLORIDE 200 MILLIGRAM(S): 400 TABLET ORAL at 05:33

## 2020-01-01 RX ADMIN — ONDANSETRON 4 MILLIGRAM(S): 8 TABLET, FILM COATED ORAL at 05:26

## 2020-01-01 RX ADMIN — Medication 650 MILLIGRAM(S): at 09:05

## 2020-01-01 RX ADMIN — MILRINONE LACTATE 10.2 MICROGRAM(S)/KG/MIN: 1 INJECTION, SOLUTION INTRAVENOUS at 12:07

## 2020-01-01 RX ADMIN — MIDODRINE HYDROCHLORIDE 5 MILLIGRAM(S): 2.5 TABLET ORAL at 18:49

## 2020-01-01 RX ADMIN — BUMETANIDE 5 MG/HR: 0.25 INJECTION INTRAMUSCULAR; INTRAVENOUS at 08:20

## 2020-01-01 RX ADMIN — HEPARIN SODIUM 900 UNIT(S)/HR: 5000 INJECTION INTRAVENOUS; SUBCUTANEOUS at 21:05

## 2020-01-01 RX ADMIN — Medication 4: at 12:52

## 2020-01-01 RX ADMIN — FAMOTIDINE 20 MILLIGRAM(S): 10 INJECTION INTRAVENOUS at 14:02

## 2020-01-01 RX ADMIN — MIDODRINE HYDROCHLORIDE 5 MILLIGRAM(S): 2.5 TABLET ORAL at 10:46

## 2020-01-01 RX ADMIN — AMIODARONE HYDROCHLORIDE 200 MILLIGRAM(S): 400 TABLET ORAL at 04:54

## 2020-01-01 RX ADMIN — CARVEDILOL PHOSPHATE 6.25 MILLIGRAM(S): 80 CAPSULE, EXTENDED RELEASE ORAL at 05:20

## 2020-01-01 RX ADMIN — HEPARIN SODIUM 900 UNIT(S)/HR: 5000 INJECTION INTRAVENOUS; SUBCUTANEOUS at 08:40

## 2020-01-01 RX ADMIN — Medication 1: at 08:05

## 2020-01-01 RX ADMIN — HEPARIN SODIUM 1200 UNIT(S)/HR: 5000 INJECTION INTRAVENOUS; SUBCUTANEOUS at 02:33

## 2020-01-01 RX ADMIN — Medication 40 MILLIEQUIVALENT(S): at 10:58

## 2020-01-01 RX ADMIN — BENZOCAINE AND MENTHOL 1 LOZENGE: 5; 1 LIQUID ORAL at 18:22

## 2020-01-01 RX ADMIN — MILRINONE LACTATE 2.82 MICROGRAM(S)/KG/MIN: 1 INJECTION, SOLUTION INTRAVENOUS at 08:01

## 2020-01-01 RX ADMIN — AMIODARONE HYDROCHLORIDE 200 MILLIGRAM(S): 400 TABLET ORAL at 05:22

## 2020-01-01 RX ADMIN — Medication 20 MILLIEQUIVALENT(S): at 10:38

## 2020-01-01 RX ADMIN — AMIODARONE HYDROCHLORIDE 200 MILLIGRAM(S): 400 TABLET ORAL at 09:27

## 2020-01-01 RX ADMIN — MILRINONE LACTATE 7.35 MICROGRAM(S)/KG/MIN: 1 INJECTION, SOLUTION INTRAVENOUS at 07:57

## 2020-01-01 RX ADMIN — Medication 25 MILLIGRAM(S): at 22:00

## 2020-01-01 RX ADMIN — Medication 2: at 17:49

## 2020-01-01 RX ADMIN — HEPARIN SODIUM 1100 UNIT(S)/HR: 5000 INJECTION INTRAVENOUS; SUBCUTANEOUS at 18:38

## 2020-01-01 RX ADMIN — Medication 100 MILLIGRAM(S): at 11:02

## 2020-01-01 RX ADMIN — Medication 1 UNIT(S): at 17:49

## 2020-01-01 RX ADMIN — Medication 25 MILLIGRAM(S): at 21:12

## 2020-01-01 RX ADMIN — Medication 1 APPLICATION(S): at 05:52

## 2020-01-01 RX ADMIN — MILRINONE LACTATE 4.32 MICROGRAM(S)/KG/MIN: 1 INJECTION, SOLUTION INTRAVENOUS at 12:03

## 2020-01-01 RX ADMIN — ISOSORBIDE DINITRATE 10 MILLIGRAM(S): 5 TABLET ORAL at 05:28

## 2020-01-01 RX ADMIN — ALTEPLASE 2 MILLIGRAM(S): KIT at 22:00

## 2020-01-01 RX ADMIN — Medication 0.5 MILLIGRAM(S): at 08:12

## 2020-01-01 RX ADMIN — MILRINONE LACTATE 3.24 MICROGRAM(S)/KG/MIN: 1 INJECTION, SOLUTION INTRAVENOUS at 06:53

## 2020-01-01 RX ADMIN — Medication 1: at 11:47

## 2020-01-01 RX ADMIN — MILRINONE LACTATE 7.5 MICROGRAM(S)/KG/MIN: 1 INJECTION, SOLUTION INTRAVENOUS at 08:42

## 2020-01-01 RX ADMIN — ISOSORBIDE DINITRATE 10 MILLIGRAM(S): 5 TABLET ORAL at 13:49

## 2020-01-01 RX ADMIN — Medication 1: at 13:55

## 2020-01-01 RX ADMIN — CHLORHEXIDINE GLUCONATE 1 APPLICATION(S): 213 SOLUTION TOPICAL at 06:26

## 2020-01-01 RX ADMIN — HEPARIN SODIUM 3000 UNIT(S): 5000 INJECTION INTRAVENOUS; SUBCUTANEOUS at 06:03

## 2020-01-01 RX ADMIN — MILRINONE LACTATE 3.24 MICROGRAM(S)/KG/MIN: 1 INJECTION, SOLUTION INTRAVENOUS at 21:18

## 2020-01-01 RX ADMIN — Medication 1 APPLICATION(S): at 04:36

## 2020-01-01 RX ADMIN — Medication 40 MILLIGRAM(S): at 05:21

## 2020-01-01 RX ADMIN — APIXABAN 2.5 MILLIGRAM(S): 2.5 TABLET, FILM COATED ORAL at 19:14

## 2020-01-01 RX ADMIN — ISOSORBIDE DINITRATE 10 MILLIGRAM(S): 5 TABLET ORAL at 17:18

## 2020-01-01 RX ADMIN — CARVEDILOL PHOSPHATE 6.25 MILLIGRAM(S): 80 CAPSULE, EXTENDED RELEASE ORAL at 16:40

## 2020-01-01 RX ADMIN — CARVEDILOL PHOSPHATE 6.25 MILLIGRAM(S): 80 CAPSULE, EXTENDED RELEASE ORAL at 05:41

## 2020-01-01 RX ADMIN — Medication 2: at 14:22

## 2020-01-01 RX ADMIN — Medication 1 APPLICATION(S): at 06:12

## 2020-01-01 RX ADMIN — Medication 20 MILLIGRAM(S): at 06:39

## 2020-01-01 RX ADMIN — Medication 1: at 17:41

## 2020-01-01 RX ADMIN — HEPARIN SODIUM 1100 UNIT(S)/HR: 5000 INJECTION INTRAVENOUS; SUBCUTANEOUS at 02:07

## 2020-01-01 RX ADMIN — ONDANSETRON 4 MILLIGRAM(S): 8 TABLET, FILM COATED ORAL at 22:00

## 2020-01-01 RX ADMIN — SODIUM CHLORIDE 50 MILLILITER(S): 9 INJECTION, SOLUTION INTRAVENOUS at 21:18

## 2020-01-01 RX ADMIN — Medication 50 MICROGRAM(S): at 06:45

## 2020-01-01 RX ADMIN — ISOSORBIDE DINITRATE 10 MILLIGRAM(S): 5 TABLET ORAL at 21:29

## 2020-01-01 RX ADMIN — MILRINONE LACTATE 6.8 MICROGRAM(S)/KG/MIN: 1 INJECTION, SOLUTION INTRAVENOUS at 00:38

## 2020-01-01 RX ADMIN — MILRINONE LACTATE 4.32 MICROGRAM(S)/KG/MIN: 1 INJECTION, SOLUTION INTRAVENOUS at 13:00

## 2020-01-01 RX ADMIN — HYDROMORPHONE HYDROCHLORIDE 0.2 MILLIGRAM(S): 2 INJECTION INTRAMUSCULAR; INTRAVENOUS; SUBCUTANEOUS at 07:53

## 2020-01-01 RX ADMIN — Medication 50 MICROGRAM(S): at 04:35

## 2020-01-01 RX ADMIN — Medication 2: at 13:57

## 2020-01-01 RX ADMIN — CHLORHEXIDINE GLUCONATE 1 APPLICATION(S): 213 SOLUTION TOPICAL at 07:01

## 2020-01-01 RX ADMIN — SPIRONOLACTONE 12.5 MILLIGRAM(S): 25 TABLET, FILM COATED ORAL at 05:01

## 2020-01-01 RX ADMIN — HEPARIN SODIUM 9 UNIT(S)/HR: 5000 INJECTION INTRAVENOUS; SUBCUTANEOUS at 21:45

## 2020-01-01 RX ADMIN — Medication 1 APPLICATION(S): at 18:26

## 2020-01-01 RX ADMIN — Medication 4.25 MICROGRAM(S)/KG/MIN: at 19:23

## 2020-01-01 RX ADMIN — HEPARIN SODIUM 900 UNIT(S)/HR: 5000 INJECTION INTRAVENOUS; SUBCUTANEOUS at 13:14

## 2020-01-01 RX ADMIN — Medication 10 MILLIGRAM(S): at 21:29

## 2020-01-01 RX ADMIN — Medication 40 MILLIGRAM(S): at 10:42

## 2020-01-01 RX ADMIN — HEPARIN SODIUM 1100 UNIT(S)/HR: 5000 INJECTION INTRAVENOUS; SUBCUTANEOUS at 10:26

## 2020-01-01 RX ADMIN — ALTEPLASE 2 MILLIGRAM(S): KIT at 18:31

## 2020-01-01 RX ADMIN — Medication 1 UNIT(S): at 08:32

## 2020-01-01 RX ADMIN — MIDODRINE HYDROCHLORIDE 5 MILLIGRAM(S): 2.5 TABLET ORAL at 06:58

## 2020-01-01 RX ADMIN — MIDODRINE HYDROCHLORIDE 5 MILLIGRAM(S): 2.5 TABLET ORAL at 06:14

## 2020-01-01 RX ADMIN — Medication 1 UNIT(S): at 17:10

## 2020-01-01 RX ADMIN — BENZOCAINE AND MENTHOL 1 LOZENGE: 5; 1 LIQUID ORAL at 06:44

## 2020-01-01 RX ADMIN — Medication 100 MILLIEQUIVALENT(S): at 07:48

## 2020-01-01 RX ADMIN — Medication 40 MILLIEQUIVALENT(S): at 10:33

## 2020-01-01 RX ADMIN — BUMETANIDE 5 MG/HR: 0.25 INJECTION INTRAMUSCULAR; INTRAVENOUS at 16:40

## 2020-01-01 RX ADMIN — MILRINONE LACTATE 10 MICROGRAM(S)/KG/MIN: 1 INJECTION, SOLUTION INTRAVENOUS at 21:09

## 2020-01-01 RX ADMIN — MILRINONE LACTATE 10 MICROGRAM(S)/KG/MIN: 1 INJECTION, SOLUTION INTRAVENOUS at 17:54

## 2020-01-01 RX ADMIN — Medication 10 MILLIGRAM(S): at 21:08

## 2020-01-01 RX ADMIN — Medication 1 APPLICATION(S): at 05:58

## 2020-01-01 RX ADMIN — CHLORHEXIDINE GLUCONATE 1 APPLICATION(S): 213 SOLUTION TOPICAL at 06:05

## 2020-01-01 RX ADMIN — MIDODRINE HYDROCHLORIDE 2.5 MILLIGRAM(S): 2.5 TABLET ORAL at 04:22

## 2020-01-01 RX ADMIN — CHLORHEXIDINE GLUCONATE 1 APPLICATION(S): 213 SOLUTION TOPICAL at 05:21

## 2020-01-01 RX ADMIN — Medication 10 MILLIGRAM(S): at 13:14

## 2020-01-01 RX ADMIN — Medication 10 MG/HR: at 19:23

## 2020-01-01 RX ADMIN — APIXABAN 2.5 MILLIGRAM(S): 2.5 TABLET, FILM COATED ORAL at 05:16

## 2020-01-01 RX ADMIN — Medication 1 SPRAY(S): at 17:38

## 2020-01-01 RX ADMIN — Medication 5 MILLIGRAM(S): at 20:11

## 2020-01-01 RX ADMIN — APIXABAN 5 MILLIGRAM(S): 2.5 TABLET, FILM COATED ORAL at 05:12

## 2020-01-01 RX ADMIN — Medication 3 MILLIGRAM(S): at 20:57

## 2020-01-01 RX ADMIN — HEPARIN SODIUM 900 UNIT(S)/HR: 5000 INJECTION INTRAVENOUS; SUBCUTANEOUS at 14:37

## 2020-01-01 RX ADMIN — Medication 1 APPLICATION(S): at 05:24

## 2020-01-01 RX ADMIN — Medication 15 MILLIGRAM(S): at 00:26

## 2020-01-01 RX ADMIN — MILRINONE LACTATE 7.5 MICROGRAM(S)/KG/MIN: 1 INJECTION, SOLUTION INTRAVENOUS at 05:57

## 2020-01-01 RX ADMIN — Medication 650 MILLIGRAM(S): at 05:59

## 2020-01-01 RX ADMIN — Medication 1 APPLICATION(S): at 16:32

## 2020-01-01 RX ADMIN — Medication 50 MILLIEQUIVALENT(S): at 16:35

## 2020-01-01 RX ADMIN — MIDODRINE HYDROCHLORIDE 5 MILLIGRAM(S): 2.5 TABLET ORAL at 19:14

## 2020-01-01 RX ADMIN — Medication 7.5 MG/HR: at 21:45

## 2020-01-01 RX ADMIN — BENZOCAINE AND MENTHOL 1 LOZENGE: 5; 1 LIQUID ORAL at 18:25

## 2020-01-01 RX ADMIN — Medication 10 MILLIGRAM(S): at 05:51

## 2020-01-01 RX ADMIN — CARVEDILOL PHOSPHATE 6.25 MILLIGRAM(S): 80 CAPSULE, EXTENDED RELEASE ORAL at 07:00

## 2020-01-01 RX ADMIN — Medication 1: at 12:25

## 2020-01-01 RX ADMIN — APIXABAN 5 MILLIGRAM(S): 2.5 TABLET, FILM COATED ORAL at 17:22

## 2020-01-01 RX ADMIN — Medication 1 UNIT(S): at 08:46

## 2020-01-01 RX ADMIN — MILRINONE LACTATE 5.63 MICROGRAM(S)/KG/MIN: 1 INJECTION, SOLUTION INTRAVENOUS at 21:05

## 2020-01-01 RX ADMIN — MILRINONE LACTATE 4.32 MICROGRAM(S)/KG/MIN: 1 INJECTION, SOLUTION INTRAVENOUS at 17:19

## 2020-01-01 RX ADMIN — ISOSORBIDE DINITRATE 10 MILLIGRAM(S): 5 TABLET ORAL at 17:22

## 2020-01-01 RX ADMIN — HEPARIN SODIUM 900 UNIT(S)/HR: 5000 INJECTION INTRAVENOUS; SUBCUTANEOUS at 06:03

## 2020-01-01 RX ADMIN — Medication 100 MILLIEQUIVALENT(S): at 06:48

## 2020-01-01 RX ADMIN — CARVEDILOL PHOSPHATE 6.25 MILLIGRAM(S): 80 CAPSULE, EXTENDED RELEASE ORAL at 05:29

## 2020-01-01 RX ADMIN — Medication 1 UNIT(S): at 12:27

## 2020-01-01 RX ADMIN — HYDROMORPHONE HYDROCHLORIDE 0.2 MILLIGRAM(S): 2 INJECTION INTRAMUSCULAR; INTRAVENOUS; SUBCUTANEOUS at 23:34

## 2020-01-01 RX ADMIN — Medication 1 APPLICATION(S): at 05:00

## 2020-01-01 RX ADMIN — CARVEDILOL PHOSPHATE 3.12 MILLIGRAM(S): 80 CAPSULE, EXTENDED RELEASE ORAL at 05:40

## 2020-01-01 RX ADMIN — Medication 2: at 13:50

## 2020-01-01 RX ADMIN — AMIODARONE HYDROCHLORIDE 200 MILLIGRAM(S): 400 TABLET ORAL at 05:02

## 2020-01-01 RX ADMIN — ISOSORBIDE DINITRATE 10 MILLIGRAM(S): 5 TABLET ORAL at 03:26

## 2020-01-01 RX ADMIN — SPIRONOLACTONE 12.5 MILLIGRAM(S): 25 TABLET, FILM COATED ORAL at 04:56

## 2020-01-01 RX ADMIN — APIXABAN 2.5 MILLIGRAM(S): 2.5 TABLET, FILM COATED ORAL at 18:33

## 2020-01-01 RX ADMIN — CHLORHEXIDINE GLUCONATE 1 APPLICATION(S): 213 SOLUTION TOPICAL at 12:26

## 2020-01-01 RX ADMIN — Medication 1 APPLICATION(S): at 22:54

## 2020-01-01 RX ADMIN — HYDROMORPHONE HYDROCHLORIDE 0.2 MILLIGRAM(S): 2 INJECTION INTRAMUSCULAR; INTRAVENOUS; SUBCUTANEOUS at 14:57

## 2020-01-01 RX ADMIN — Medication 10 MILLIGRAM(S): at 14:51

## 2020-01-01 RX ADMIN — Medication 3 MILLIGRAM(S): at 21:41

## 2020-01-01 RX ADMIN — Medication 60 MILLIGRAM(S): at 05:20

## 2020-01-01 RX ADMIN — Medication 25 MICROGRAM(S): at 05:34

## 2020-01-01 RX ADMIN — Medication 4: at 14:00

## 2020-01-01 RX ADMIN — ISOSORBIDE DINITRATE 10 MILLIGRAM(S): 5 TABLET ORAL at 01:02

## 2020-01-01 RX ADMIN — CHLORHEXIDINE GLUCONATE 1 APPLICATION(S): 213 SOLUTION TOPICAL at 06:00

## 2020-01-01 RX ADMIN — SODIUM CHLORIDE 50 MILLILITER(S): 9 INJECTION, SOLUTION INTRAVENOUS at 18:19

## 2020-01-01 RX ADMIN — SPIRONOLACTONE 12.5 MILLIGRAM(S): 25 TABLET, FILM COATED ORAL at 06:02

## 2020-01-01 RX ADMIN — Medication 1 APPLICATION(S): at 05:43

## 2020-01-01 RX ADMIN — CARVEDILOL PHOSPHATE 6.25 MILLIGRAM(S): 80 CAPSULE, EXTENDED RELEASE ORAL at 06:40

## 2020-01-01 RX ADMIN — ISOSORBIDE DINITRATE 10 MILLIGRAM(S): 5 TABLET ORAL at 21:05

## 2020-01-01 RX ADMIN — Medication 25 MILLIGRAM(S): at 13:45

## 2020-01-01 RX ADMIN — Medication 2: at 12:02

## 2020-01-01 RX ADMIN — Medication 1 APPLICATION(S): at 17:35

## 2020-01-01 RX ADMIN — CARVEDILOL PHOSPHATE 3.12 MILLIGRAM(S): 80 CAPSULE, EXTENDED RELEASE ORAL at 17:16

## 2020-01-01 RX ADMIN — SODIUM CHLORIDE 250 MILLILITER(S): 9 INJECTION INTRAMUSCULAR; INTRAVENOUS; SUBCUTANEOUS at 06:30

## 2020-01-01 RX ADMIN — CHLORHEXIDINE GLUCONATE 1 APPLICATION(S): 213 SOLUTION TOPICAL at 08:29

## 2020-01-01 RX ADMIN — MIDODRINE HYDROCHLORIDE 5 MILLIGRAM(S): 2.5 TABLET ORAL at 21:05

## 2020-01-01 RX ADMIN — Medication 1: at 18:27

## 2020-01-01 RX ADMIN — Medication 50 MICROGRAM(S): at 07:12

## 2020-01-01 RX ADMIN — Medication 50 MICROGRAM(S): at 05:12

## 2020-01-01 RX ADMIN — Medication 10 MILLIGRAM(S): at 21:43

## 2020-01-01 RX ADMIN — Medication 4: at 12:50

## 2020-01-01 RX ADMIN — Medication 40 MILLIGRAM(S): at 18:11

## 2020-01-01 RX ADMIN — Medication 10 MILLIGRAM(S): at 13:58

## 2020-01-01 RX ADMIN — CARVEDILOL PHOSPHATE 6.25 MILLIGRAM(S): 80 CAPSULE, EXTENDED RELEASE ORAL at 05:08

## 2020-01-01 RX ADMIN — HEPARIN SODIUM 1200 UNIT(S)/HR: 5000 INJECTION INTRAVENOUS; SUBCUTANEOUS at 19:48

## 2020-01-01 RX ADMIN — AMIODARONE HYDROCHLORIDE 200 MILLIGRAM(S): 400 TABLET ORAL at 05:16

## 2020-01-01 RX ADMIN — MUPIROCIN 1 APPLICATION(S): 20 OINTMENT TOPICAL at 11:35

## 2020-01-01 RX ADMIN — Medication 12.5 MILLIGRAM(S): at 18:22

## 2020-01-01 RX ADMIN — MIDODRINE HYDROCHLORIDE 2.5 MILLIGRAM(S): 2.5 TABLET ORAL at 11:02

## 2020-01-01 RX ADMIN — Medication 25 MILLIGRAM(S): at 21:20

## 2020-01-01 RX ADMIN — AMIODARONE HYDROCHLORIDE 200 MILLIGRAM(S): 400 TABLET ORAL at 05:57

## 2020-01-01 RX ADMIN — Medication 1 SPRAY(S): at 05:30

## 2020-01-01 RX ADMIN — Medication 1: at 08:42

## 2020-01-01 RX ADMIN — BENZOCAINE AND MENTHOL 1 LOZENGE: 5; 1 LIQUID ORAL at 17:17

## 2020-01-01 RX ADMIN — Medication 650 MILLIGRAM(S): at 22:30

## 2020-01-01 RX ADMIN — Medication 7.5 MG/HR: at 21:09

## 2020-01-01 RX ADMIN — Medication 1: at 15:59

## 2020-01-01 RX ADMIN — ISOSORBIDE DINITRATE 10 MILLIGRAM(S): 5 TABLET ORAL at 21:04

## 2020-01-01 RX ADMIN — Medication 1 UNIT(S): at 08:02

## 2020-01-01 RX ADMIN — ISOSORBIDE DINITRATE 10 MILLIGRAM(S): 5 TABLET ORAL at 04:56

## 2020-01-01 RX ADMIN — Medication 1 SPRAY(S): at 05:22

## 2020-01-01 RX ADMIN — Medication 25 MICROGRAM(S): at 21:17

## 2020-01-01 RX ADMIN — Medication 1 SPRAY(S): at 04:36

## 2020-01-01 RX ADMIN — Medication 100 MILLIGRAM(S): at 11:29

## 2020-01-01 RX ADMIN — Medication 20 MILLIEQUIVALENT(S): at 19:12

## 2020-01-01 RX ADMIN — Medication 10 MILLIGRAM(S): at 05:28

## 2020-01-01 RX ADMIN — SODIUM CHLORIDE 125 MILLILITER(S): 9 INJECTION INTRAMUSCULAR; INTRAVENOUS; SUBCUTANEOUS at 11:26

## 2020-01-01 RX ADMIN — Medication 3 MILLIGRAM(S): at 22:16

## 2020-01-01 RX ADMIN — APIXABAN 5 MILLIGRAM(S): 2.5 TABLET, FILM COATED ORAL at 06:12

## 2020-01-01 RX ADMIN — AMIODARONE HYDROCHLORIDE 200 MILLIGRAM(S): 400 TABLET ORAL at 06:44

## 2020-01-01 RX ADMIN — Medication 25 MILLIGRAM(S): at 07:03

## 2020-01-01 RX ADMIN — Medication 50 MICROGRAM(S): at 04:54

## 2020-01-01 RX ADMIN — Medication 325 MILLIGRAM(S): at 14:55

## 2020-01-01 RX ADMIN — Medication 3 MILLIGRAM(S): at 01:16

## 2020-01-01 RX ADMIN — Medication 20 MILLIEQUIVALENT(S): at 09:02

## 2020-01-01 RX ADMIN — ONDANSETRON 4 MILLIGRAM(S): 8 TABLET, FILM COATED ORAL at 22:08

## 2020-01-01 RX ADMIN — ISOSORBIDE DINITRATE 10 MILLIGRAM(S): 5 TABLET ORAL at 13:35

## 2020-01-01 RX ADMIN — ISOSORBIDE DINITRATE 10 MILLIGRAM(S): 5 TABLET ORAL at 06:02

## 2020-01-01 RX ADMIN — HYDROMORPHONE HYDROCHLORIDE 0.2 MILLIGRAM(S): 2 INJECTION INTRAMUSCULAR; INTRAVENOUS; SUBCUTANEOUS at 17:45

## 2020-01-01 RX ADMIN — Medication 1: at 18:35

## 2020-01-01 RX ADMIN — Medication 1 UNIT(S): at 13:24

## 2020-01-01 RX ADMIN — SPIRONOLACTONE 12.5 MILLIGRAM(S): 25 TABLET, FILM COATED ORAL at 04:54

## 2020-01-01 RX ADMIN — ISOSORBIDE MONONITRATE 30 MILLIGRAM(S): 60 TABLET, EXTENDED RELEASE ORAL at 22:16

## 2020-01-01 RX ADMIN — Medication 1 UNIT(S): at 13:03

## 2020-01-01 RX ADMIN — CARVEDILOL PHOSPHATE 6.25 MILLIGRAM(S): 80 CAPSULE, EXTENDED RELEASE ORAL at 05:21

## 2020-01-01 RX ADMIN — Medication 1 APPLICATION(S): at 05:30

## 2020-01-01 RX ADMIN — Medication 650 MILLIGRAM(S): at 23:40

## 2020-01-01 RX ADMIN — Medication 2: at 12:17

## 2020-01-01 RX ADMIN — SODIUM CHLORIDE 1000 MILLILITER(S): 9 INJECTION INTRAMUSCULAR; INTRAVENOUS; SUBCUTANEOUS at 00:20

## 2020-01-01 RX ADMIN — APIXABAN 5 MILLIGRAM(S): 2.5 TABLET, FILM COATED ORAL at 17:19

## 2020-01-01 RX ADMIN — ISOSORBIDE DINITRATE 10 MILLIGRAM(S): 5 TABLET ORAL at 21:42

## 2020-01-01 RX ADMIN — AMIODARONE HYDROCHLORIDE 200 MILLIGRAM(S): 400 TABLET ORAL at 06:14

## 2020-01-01 RX ADMIN — MILRINONE LACTATE 7.47 MICROGRAM(S)/KG/MIN: 1 INJECTION, SOLUTION INTRAVENOUS at 14:23

## 2020-01-01 RX ADMIN — SODIUM CHLORIDE 50 MILLILITER(S): 9 INJECTION, SOLUTION INTRAVENOUS at 11:13

## 2020-01-01 RX ADMIN — Medication 1 APPLICATION(S): at 06:03

## 2020-01-01 RX ADMIN — MIDODRINE HYDROCHLORIDE 2.5 MILLIGRAM(S): 2.5 TABLET ORAL at 18:21

## 2020-01-01 RX ADMIN — MILRINONE LACTATE 7.47 MICROGRAM(S)/KG/MIN: 1 INJECTION, SOLUTION INTRAVENOUS at 21:09

## 2020-01-01 RX ADMIN — Medication 10 MILLIGRAM(S): at 21:44

## 2020-01-01 RX ADMIN — Medication 10 MILLIGRAM(S): at 12:03

## 2020-01-01 RX ADMIN — MIDODRINE HYDROCHLORIDE 5 MILLIGRAM(S): 2.5 TABLET ORAL at 05:35

## 2020-01-01 RX ADMIN — MIDODRINE HYDROCHLORIDE 5 MILLIGRAM(S): 2.5 TABLET ORAL at 13:13

## 2020-01-01 RX ADMIN — Medication 40 MILLIGRAM(S): at 05:20

## 2020-01-01 RX ADMIN — Medication 40 MILLIEQUIVALENT(S): at 14:01

## 2020-01-01 RX ADMIN — Medication 50 MICROGRAM(S): at 06:32

## 2020-01-01 RX ADMIN — ISOSORBIDE DINITRATE 10 MILLIGRAM(S): 5 TABLET ORAL at 21:36

## 2020-01-01 RX ADMIN — Medication 2: at 12:54

## 2020-01-01 RX ADMIN — CHLORHEXIDINE GLUCONATE 1 APPLICATION(S): 213 SOLUTION TOPICAL at 10:02

## 2020-01-01 RX ADMIN — AMIODARONE HYDROCHLORIDE 200 MILLIGRAM(S): 400 TABLET ORAL at 05:24

## 2020-01-01 RX ADMIN — AMIODARONE HYDROCHLORIDE 200 MILLIGRAM(S): 400 TABLET ORAL at 06:04

## 2020-01-01 RX ADMIN — Medication 50 MICROGRAM(S): at 05:24

## 2020-01-01 RX ADMIN — CHLORHEXIDINE GLUCONATE 1 APPLICATION(S): 213 SOLUTION TOPICAL at 12:21

## 2020-01-01 RX ADMIN — Medication 40 MILLIEQUIVALENT(S): at 13:13

## 2020-01-01 RX ADMIN — Medication 2: at 13:03

## 2020-01-01 RX ADMIN — Medication 10 MILLIGRAM(S): at 14:38

## 2020-01-01 RX ADMIN — Medication 1: at 08:10

## 2020-01-01 RX ADMIN — CEFEPIME 100 MILLIGRAM(S): 1 INJECTION, POWDER, FOR SOLUTION INTRAMUSCULAR; INTRAVENOUS at 11:38

## 2020-01-01 RX ADMIN — Medication 100 GRAM(S): at 15:23

## 2020-01-01 RX ADMIN — MIDODRINE HYDROCHLORIDE 2.5 MILLIGRAM(S): 2.5 TABLET ORAL at 05:28

## 2020-01-01 RX ADMIN — Medication 2: at 17:15

## 2020-01-01 RX ADMIN — Medication 50 MILLILITER(S): at 16:13

## 2020-01-01 RX ADMIN — HEPARIN SODIUM 0 UNIT(S)/HR: 5000 INJECTION INTRAVENOUS; SUBCUTANEOUS at 17:15

## 2020-01-01 RX ADMIN — ISOSORBIDE DINITRATE 10 MILLIGRAM(S): 5 TABLET ORAL at 00:07

## 2020-01-01 RX ADMIN — DESMOPRESSIN ACETATE 220 MICROGRAM(S): 0.1 TABLET ORAL at 15:57

## 2020-01-01 RX ADMIN — POLYETHYLENE GLYCOL 3350 17 GRAM(S): 17 POWDER, FOR SOLUTION ORAL at 18:58

## 2020-01-01 RX ADMIN — HYDROMORPHONE HYDROCHLORIDE 0.2 MILLIGRAM(S): 2 INJECTION INTRAMUSCULAR; INTRAVENOUS; SUBCUTANEOUS at 00:41

## 2020-01-01 RX ADMIN — CARVEDILOL PHOSPHATE 6.25 MILLIGRAM(S): 80 CAPSULE, EXTENDED RELEASE ORAL at 04:56

## 2020-01-01 RX ADMIN — MIDODRINE HYDROCHLORIDE 5 MILLIGRAM(S): 2.5 TABLET ORAL at 05:59

## 2020-01-01 RX ADMIN — Medication 25 MILLIGRAM(S): at 05:25

## 2020-01-01 RX ADMIN — APIXABAN 5 MILLIGRAM(S): 2.5 TABLET, FILM COATED ORAL at 16:44

## 2020-01-01 RX ADMIN — Medication 50 MILLIGRAM(S): at 13:35

## 2020-01-01 RX ADMIN — HEPARIN SODIUM 900 UNIT(S)/HR: 5000 INJECTION INTRAVENOUS; SUBCUTANEOUS at 18:21

## 2020-01-01 RX ADMIN — AMIODARONE HYDROCHLORIDE 200 MILLIGRAM(S): 400 TABLET ORAL at 18:06

## 2020-01-01 RX ADMIN — AMIODARONE HYDROCHLORIDE 200 MILLIGRAM(S): 400 TABLET ORAL at 05:37

## 2020-01-01 RX ADMIN — Medication 40 MILLIEQUIVALENT(S): at 17:10

## 2020-01-01 RX ADMIN — MIDODRINE HYDROCHLORIDE 5 MILLIGRAM(S): 2.5 TABLET ORAL at 13:22

## 2020-01-01 RX ADMIN — Medication 1 APPLICATION(S): at 05:20

## 2020-01-01 RX ADMIN — AMIODARONE HYDROCHLORIDE 200 MILLIGRAM(S): 400 TABLET ORAL at 05:47

## 2020-01-01 RX ADMIN — HEPARIN SODIUM 900 UNIT(S)/HR: 5000 INJECTION INTRAVENOUS; SUBCUTANEOUS at 11:03

## 2020-01-01 RX ADMIN — Medication 2: at 13:12

## 2020-01-01 RX ADMIN — APIXABAN 5 MILLIGRAM(S): 2.5 TABLET, FILM COATED ORAL at 17:10

## 2020-01-01 RX ADMIN — MIDODRINE HYDROCHLORIDE 2.5 MILLIGRAM(S): 2.5 TABLET ORAL at 14:31

## 2020-01-01 RX ADMIN — ISOSORBIDE DINITRATE 10 MILLIGRAM(S): 5 TABLET ORAL at 20:58

## 2020-01-01 RX ADMIN — ISOSORBIDE MONONITRATE 30 MILLIGRAM(S): 60 TABLET, EXTENDED RELEASE ORAL at 21:15

## 2020-01-01 RX ADMIN — SODIUM CHLORIDE 500 MILLILITER(S): 9 INJECTION INTRAMUSCULAR; INTRAVENOUS; SUBCUTANEOUS at 03:21

## 2020-01-01 RX ADMIN — BUMETANIDE 5 MG/HR: 0.25 INJECTION INTRAMUSCULAR; INTRAVENOUS at 19:25

## 2020-01-01 RX ADMIN — MILRINONE LACTATE 7.47 MICROGRAM(S)/KG/MIN: 1 INJECTION, SOLUTION INTRAVENOUS at 19:25

## 2020-01-01 RX ADMIN — Medication 50 MICROGRAM(S): at 05:13

## 2020-01-01 RX ADMIN — MILRINONE LACTATE 5.63 MICROGRAM(S)/KG/MIN: 1 INJECTION, SOLUTION INTRAVENOUS at 08:37

## 2020-01-01 RX ADMIN — Medication 25 MILLIGRAM(S): at 13:12

## 2020-01-01 RX ADMIN — INSULIN HUMAN 4 UNIT(S)/HR: 100 INJECTION, SOLUTION SUBCUTANEOUS at 06:06

## 2020-01-01 RX ADMIN — Medication 37.5 MILLIGRAM(S): at 05:10

## 2020-01-01 RX ADMIN — Medication 1: at 13:10

## 2020-01-01 RX ADMIN — SODIUM CHLORIDE 1000 MILLILITER(S): 9 INJECTION INTRAMUSCULAR; INTRAVENOUS; SUBCUTANEOUS at 06:08

## 2020-01-01 RX ADMIN — Medication 40 MILLIEQUIVALENT(S): at 16:38

## 2020-01-01 RX ADMIN — Medication 1 APPLICATION(S): at 18:02

## 2020-01-01 RX ADMIN — MILRINONE LACTATE 5.63 MICROGRAM(S)/KG/MIN: 1 INJECTION, SOLUTION INTRAVENOUS at 20:58

## 2020-01-01 RX ADMIN — Medication 1 APPLICATION(S): at 17:36

## 2020-01-01 RX ADMIN — Medication 100 MILLIGRAM(S): at 11:38

## 2020-01-01 RX ADMIN — Medication 40 MILLIEQUIVALENT(S): at 12:25

## 2020-01-01 RX ADMIN — Medication 50 MILLIEQUIVALENT(S): at 03:10

## 2020-01-01 RX ADMIN — Medication 50 MILLIEQUIVALENT(S): at 18:35

## 2020-01-01 RX ADMIN — Medication 1 APPLICATION(S): at 17:57

## 2020-01-01 RX ADMIN — HYDROMORPHONE HYDROCHLORIDE 0.2 MILLIGRAM(S): 2 INJECTION INTRAMUSCULAR; INTRAVENOUS; SUBCUTANEOUS at 16:49

## 2020-01-01 RX ADMIN — APIXABAN 5 MILLIGRAM(S): 2.5 TABLET, FILM COATED ORAL at 18:29

## 2020-01-01 RX ADMIN — Medication 3 MILLIGRAM(S): at 22:03

## 2020-01-01 RX ADMIN — AMIODARONE HYDROCHLORIDE 200 MILLIGRAM(S): 400 TABLET ORAL at 13:43

## 2020-01-01 RX ADMIN — BUMETANIDE 5 MG/HR: 0.25 INJECTION INTRAMUSCULAR; INTRAVENOUS at 09:56

## 2020-01-01 RX ADMIN — Medication 1: at 18:17

## 2020-01-01 RX ADMIN — APIXABAN 5 MILLIGRAM(S): 2.5 TABLET, FILM COATED ORAL at 05:24

## 2020-01-01 RX ADMIN — ISOSORBIDE DINITRATE 10 MILLIGRAM(S): 5 TABLET ORAL at 21:12

## 2020-01-01 RX ADMIN — CHLORHEXIDINE GLUCONATE 1 APPLICATION(S): 213 SOLUTION TOPICAL at 08:43

## 2020-01-01 RX ADMIN — Medication 20 MILLIGRAM(S): at 11:47

## 2020-01-01 RX ADMIN — HYDROMORPHONE HYDROCHLORIDE 0.2 MILLIGRAM(S): 2 INJECTION INTRAMUSCULAR; INTRAVENOUS; SUBCUTANEOUS at 23:19

## 2020-01-01 RX ADMIN — MIDODRINE HYDROCHLORIDE 2.5 MILLIGRAM(S): 2.5 TABLET ORAL at 11:14

## 2020-01-01 RX ADMIN — MILRINONE LACTATE 4.32 MICROGRAM(S)/KG/MIN: 1 INJECTION, SOLUTION INTRAVENOUS at 13:14

## 2020-01-01 RX ADMIN — Medication 3 MILLIGRAM(S): at 21:09

## 2020-01-01 RX ADMIN — BENZOCAINE AND MENTHOL 1 LOZENGE: 5; 1 LIQUID ORAL at 18:03

## 2020-01-01 RX ADMIN — CARVEDILOL PHOSPHATE 6.25 MILLIGRAM(S): 80 CAPSULE, EXTENDED RELEASE ORAL at 04:17

## 2020-01-01 RX ADMIN — Medication 1: at 12:57

## 2020-01-01 RX ADMIN — AMIODARONE HYDROCHLORIDE 200 MILLIGRAM(S): 400 TABLET ORAL at 05:15

## 2020-01-01 RX ADMIN — Medication 10 MILLIGRAM(S): at 05:18

## 2020-01-01 RX ADMIN — HEPARIN SODIUM 900 UNIT(S)/HR: 5000 INJECTION INTRAVENOUS; SUBCUTANEOUS at 17:45

## 2020-01-01 RX ADMIN — HEPARIN SODIUM 3000 UNIT(S): 5000 INJECTION INTRAVENOUS; SUBCUTANEOUS at 21:06

## 2020-01-01 RX ADMIN — Medication 50 MICROGRAM(S): at 05:30

## 2020-01-01 RX ADMIN — Medication 10 MILLIGRAM(S): at 08:14

## 2020-01-01 RX ADMIN — HYDROMORPHONE HYDROCHLORIDE 0.2 MILLIGRAM(S): 2 INJECTION INTRAMUSCULAR; INTRAVENOUS; SUBCUTANEOUS at 00:43

## 2020-01-01 RX ADMIN — ISOSORBIDE DINITRATE 10 MILLIGRAM(S): 5 TABLET ORAL at 13:26

## 2020-01-01 RX ADMIN — ISOSORBIDE DINITRATE 10 MILLIGRAM(S): 5 TABLET ORAL at 13:12

## 2020-01-01 RX ADMIN — MILRINONE LACTATE 7.35 MICROGRAM(S)/KG/MIN: 1 INJECTION, SOLUTION INTRAVENOUS at 19:57

## 2020-01-01 RX ADMIN — APIXABAN 5 MILLIGRAM(S): 2.5 TABLET, FILM COATED ORAL at 05:47

## 2020-01-01 RX ADMIN — CARVEDILOL PHOSPHATE 6.25 MILLIGRAM(S): 80 CAPSULE, EXTENDED RELEASE ORAL at 05:18

## 2020-01-01 RX ADMIN — Medication 50 MICROGRAM(S): at 06:00

## 2020-01-01 RX ADMIN — Medication 25 MILLIGRAM(S): at 09:27

## 2020-01-01 RX ADMIN — Medication 1 APPLICATION(S): at 17:43

## 2020-01-01 RX ADMIN — Medication 50 MILLIGRAM(S): at 21:04

## 2020-01-01 RX ADMIN — Medication 50 MICROGRAM(S): at 06:56

## 2020-01-01 RX ADMIN — Medication 25 MICROGRAM(S): at 22:25

## 2020-01-01 RX ADMIN — CARVEDILOL PHOSPHATE 6.25 MILLIGRAM(S): 80 CAPSULE, EXTENDED RELEASE ORAL at 06:12

## 2020-01-01 RX ADMIN — Medication 40 MILLIEQUIVALENT(S): at 11:13

## 2020-01-01 RX ADMIN — HEPARIN SODIUM 1100 UNIT(S)/HR: 5000 INJECTION INTRAVENOUS; SUBCUTANEOUS at 11:37

## 2020-01-01 RX ADMIN — SIMETHICONE 80 MILLIGRAM(S): 80 TABLET, CHEWABLE ORAL at 18:03

## 2020-01-01 RX ADMIN — Medication 50 MICROGRAM(S): at 05:51

## 2020-01-01 RX ADMIN — MUPIROCIN 1 APPLICATION(S): 20 OINTMENT TOPICAL at 12:15

## 2020-01-01 RX ADMIN — APIXABAN 5 MILLIGRAM(S): 2.5 TABLET, FILM COATED ORAL at 16:39

## 2020-01-01 RX ADMIN — Medication 1 SPRAY(S): at 23:23

## 2020-01-01 RX ADMIN — MIDODRINE HYDROCHLORIDE 5 MILLIGRAM(S): 2.5 TABLET ORAL at 05:47

## 2020-02-04 PROBLEM — R05 COUGH, PERSISTENT: Status: ACTIVE | Noted: 2020-01-01

## 2020-02-04 NOTE — ASSESSMENT
[FreeTextEntry1] : EKG:  fully paced.\par Hemodynamically stable.  No signs of CHF on exam.  BP well controlled\par Has cough that persists.  May be due to Entresto but will rule out pulmonary pathology.\par States has decreased exertional tolerance but not exercising regularly at this point.

## 2020-02-04 NOTE — PHYSICAL EXAM
[General Appearance - Well Developed] : well developed [Normal Appearance] : normal appearance [General Appearance - Well Nourished] : well nourished [Well Groomed] : well groomed [General Appearance - In No Acute Distress] : no acute distress [No Deformities] : no deformities [Normal Conjunctiva] : the conjunctiva exhibited no abnormalities [Eyelids - No Xanthelasma] : the eyelids demonstrated no xanthelasmas [No Oral Pallor] : no oral pallor [Normal Oral Mucosa] : normal oral mucosa [No Oral Cyanosis] : no oral cyanosis [Normal Jugular Venous V Waves Present] : normal jugular venous V waves present [Respiration, Rhythm And Depth] : normal respiratory rhythm and effort [Exaggerated Use Of Accessory Muscles For Inspiration] : no accessory muscle use [Auscultation Breath Sounds / Voice Sounds] : lungs were clear to auscultation bilaterally [Abdomen Tenderness] : non-tender [Abdomen Soft] : soft [Gait - Sufficient For Exercise Testing] : the gait was sufficient for exercise testing [Abdomen Mass (___ Cm)] : no abdominal mass palpated [Abnormal Walk] : normal gait [Cyanosis, Localized] : no localized cyanosis [Nail Clubbing] : no clubbing of the fingernails [] : no ischemic changes [Petechial Hemorrhages (___cm)] : no petechial hemorrhages [FreeTextEntry1] : generalized rash

## 2020-02-04 NOTE — PROCEDURE
[ICD] : Implantable cardioverter-defibrillator [DDD] : DDD [Longevity: ___ months] : The estimated remaining battery life is [unfilled] months [de-identified] : [facility]

## 2020-02-04 NOTE — PHYSICAL EXAM
[Normal Appearance] : normal appearance [General Appearance - Well Developed] : well developed [General Appearance - Well Nourished] : well nourished [Well Groomed] : well groomed [General Appearance - In No Acute Distress] : no acute distress Continue aspirin 81 mg daily  Daily Coumadin dosing and PT/INR. [No Deformities] : no deformities [Eyelids - No Xanthelasma] : the eyelids demonstrated no xanthelasmas [Normal Conjunctiva] : the conjunctiva exhibited no abnormalities [Normal Oral Mucosa] : normal oral mucosa [No Oral Pallor] : no oral pallor [No Oral Cyanosis] : no oral cyanosis [Normal Jugular Venous V Waves Present] : normal jugular venous V waves present [Respiration, Rhythm And Depth] : normal respiratory rhythm and effort [Exaggerated Use Of Accessory Muscles For Inspiration] : no accessory muscle use [Auscultation Breath Sounds / Voice Sounds] : lungs were clear to auscultation bilaterally [Abdomen Tenderness] : non-tender [Abdomen Soft] : soft [Abdomen Mass (___ Cm)] : no abdominal mass palpated [Abnormal Walk] : normal gait Continue aspirin 81 mg daily  Daily Coumadin dosing and PT/INR- 2.32 [Gait - Sufficient For Exercise Testing] : the gait was sufficient for exercise testing [Nail Clubbing] : no clubbing of the fingernails [Cyanosis, Localized] : no localized cyanosis [] : no ischemic changes [Petechial Hemorrhages (___cm)] : no petechial hemorrhages [FreeTextEntry1] : generalized rash

## 2020-02-04 NOTE — HISTORY OF PRESENT ILLNESS
[FreeTextEntry1] : Patient complains of dry rasping cough that occurs intermittently.  Does not have a post nasal drip at this point.  Appetite is less & not eating as much.  Less endurance now than several months ago.  No fevers or chills.

## 2020-02-04 NOTE — PROCEDURE
[ICD] : Implantable cardioverter-defibrillator [DDD] : DDD [Longevity: ___ months] : The estimated remaining battery life is [unfilled] months [de-identified] : [facility]

## 2020-02-04 NOTE — DISCUSSION/SUMMARY
[FreeTextEntry1] : Urged to walk regularly on treadmill & advise me if endurance doesn't increase.  Maintain on current medical regimen for now.  May need higher dose of Entresto.  Maintain on all other baseline medication.\par Low sodium diet reinforced.  Will obtain CT scan of chest without contrast.   [AICD Function Normal] : normal AICD function

## 2020-02-04 NOTE — REASON FOR VISIT
[Follow-Up - Clinic] : a clinic follow-up of [Hypertension] : hypertension [Cardiomyopathy] : cardiomyopathy [Follow-up Device Check] : follow-up device check visit

## 2020-03-04 NOTE — ASU PREOP CHECKLIST - DENTURES
[facility]    Dept: [phone]    Marybeth Denny MD    Patient Name: Dagmar Hawthorne      : 1947     You are scheduled to have the following procedure: EGD     Dr. Marybeth Denny MD will be performing this procedure.      Procedure Date: 3/18/20 Arrival Time: 8:00 Procedure Time: 9:00       Please report to the [facility] (1) hour before your procedure:    [address] [place] [address].  This is the south entrance along [facility] (closest to [facility]).      PREP INSTRUCTIONS     NOTHING TO DRINK (OR EAT) AFTER MIDNIGHT DAY OF PROCEDURE.    MEDICATION INSTRUCTIONS    Do not take vitamins or fish oil for 7 days prior to procedure.      Do not take ibuprofen or NSAIDs for 3 days prior to procedure.      Please call our office if you need to reschedule your procedure or if you have questions regarding your upcoming procedure: Dept: [phone]    Office Hours:  8:00 am- 4:30 pm Monday-Friday         no

## 2020-04-17 PROBLEM — Z86.39 HISTORY OF TYPE 2 DIABETES MELLITUS: Status: RESOLVED | Noted: 2018-09-05 | Resolved: 2020-01-01

## 2020-04-17 PROBLEM — Z86.79 HISTORY OF ESSENTIAL HYPERTENSION: Status: RESOLVED | Noted: 2020-01-01 | Resolved: 2020-01-01

## 2020-04-17 PROBLEM — E04.2 MULTIPLE THYROID NODULES: Status: ACTIVE | Noted: 2020-01-01

## 2020-04-17 NOTE — HISTORY OF PRESENT ILLNESS
[de-identified] : 75 y/o M with multiple thyroid nodules that were noted on chest CT.  US on 2/18 showed multiple nodules, 2 largest were Rt. mid lobe 1.5 x 1.3 x 1.2 CM and left upper pole 1.2 x 1.2 x 0.9cm.  Both were then Biopsied.  Rt. Mid pole was Benign, bethesda II.  The left upper came back suspicious for papillary thyroid carcinoma [place] V. \par He notes no throat pain.  No trouble eating or drinking and No change in voice. \par Pt. on Eliquis.

## 2020-04-17 NOTE — PROCEDURE
[de-identified] : Flexible scope #28 used. Passed through nasal passage and nasopharynx/oropharynx/hypopharynx clear. Supraglottis normal. Glottis, post presbi laryngis, with fully mobile vocal cords with lesions or masses. Visualized subglottis clear. Postcricoid area without erythema or edema. No pooling of secretions.\par \par

## 2020-04-17 NOTE — REASON FOR VISIT
[Initial Consultation] : an initial consultation for [FreeTextEntry2] : Papillary thyroid carcinoma.

## 2020-04-17 NOTE — DATA REVIEWED
[de-identified] : thyroid function reviewed and essentially all WNL [de-identified] : thyroid us with multiple nodules. noted to have a left 1.2 cm nodule bethesda V on FNA

## 2020-04-17 NOTE — CONSULT LETTER
[Consult Letter:] : I had the pleasure of evaluating your patient, [unfilled]. [Consult Closing:] : Thank you very much for allowing me to participate in the care of this patient.  If you have any questions, please do not hesitate to contact me. [Sincerely,] : Sincerely, [Dear  ___] : Dear  [unfilled], [Please see my note below.] : Please see my note below. [FreeTextEntry1] : Hello, please see my consult below. Can you also comment on his overall prognosis and health as we determine whether he is a candidate for surgery. Also have sent him to see endocrinology for their input. Thank you [FreeTextEntry3] : Shahla Haddad MD\par Otolaryngology and Cranial Base Surgery\par Attending Physician - Department of Otolaryngology and Head & Neck Surgery\par [facility]\par  - [employer] [facility]\par Office: [phone]\par Fax: [phone]\par

## 2020-04-17 NOTE — ASSESSMENT
[FreeTextEntry1] : Thyroid nodules with FNA showing Left upper lobe nodule Possible Papillary carcinoma, [place] Catagory V.\par - discussed indolent nature of PTC and in light of his significant medical comorbidities will refer him to endocrinology (prob telehealth visit) to discuss need for surgery\par - if they recommend surgery would recommend lobectomy vs total thyroidectomy; scope today with normal cord motion so does not need to come back in for exam if surgery is recommended\par - if plan for surgery will need cardiac clearance, will need to come off Eliquis for surgery; will ask his PCP who is also his cardiologist to comment on his risk of general anesthesia \par - will touch base with patient after endo eval

## 2020-04-17 NOTE — PHYSICAL EXAM
[Midline] : trachea located in midline position [Normal] : no rashes [de-identified] : slightly irregular gland felt, but no palpable nodules noted

## 2020-05-06 PROBLEM — Z95.810 ICD (IMPLANTABLE CARDIOVERTER-DEFIBRILLATOR) IN PLACE: Status: RESOLVED | Noted: 2018-09-05 | Resolved: 2020-01-01

## 2020-05-17 NOTE — PHYSICAL EXAM
[Alert] : alert [Well Nourished] : well nourished [No Acute Distress] : no acute distress [Normal Sclera/Conjunctiva] : normal sclera/conjunctiva [PERRL] : pupils equal, round and reactive to light [No Proptosis] : no proptosis [Normal Hearing] : hearing was normal [Normal Outer Ear/Nose] : the ears and nose were normal in appearance [Normal TMs] : both tympanic membranes were normal [No Neck Mass] : no neck mass was observed [Thyroid Not Enlarged] : the thyroid was not enlarged [No Thyroid Nodules] : no palpable thyroid nodules [No Respiratory Distress] : no respiratory distress [Normal to Percussion] : lungs were normal to percussion [Clear to Auscultation] : lungs were clear to auscultation bilaterally [Normal S1, S2] : normal S1 and S2 [Normal Rate] : heart rate was normal [Normal Bowel Sounds] : normal bowel sounds [Regular Rhythm] : with a regular rhythm [Not Tender] : non-tender [Soft] : abdomen soft [No CVA Tenderness] : no ~M costovertebral angle tenderness [Kyphosis] : no kyphosis present [Scoliosis] : no scoliosis [Normal Gait] : normal gait [No Clubbing, Cyanosis] : no clubbing  or cyanosis of the fingernails [No Joint Swelling] : no joint swelling seen [Normal Strength/Tone] : muscle strength and tone were normal [No Rash] : no rash [No Skin Lesions] : no skin lesions [No Motor Deficits] : the motor exam was normal [Normal Reflexes] : deep tendon reflexes were 2+ and symmetric [Oriented x3] : oriented to person, place, and time [Normal Affect] : the affect was normal [No Tremors] : no tremors [Normal Insight/Judgement] : insight and judgment were intact [Normal Mood] : the mood was normal

## 2020-05-17 NOTE — HISTORY OF PRESENT ILLNESS
[FreeTextEntry1] : 74 year old male with history of HTN, aortic valve insufficiency, renal insufficiency and anemia who was here for evaluation of recently discovered thyroid nodules \par \par \par 75 y/o M with multiple thyroid nodules that were noted on chest CT.\par  US on 2/18 showed multiple nodules however the largest nodules were in the right mid lobe 1.5 x 1.3 x 1.2 cm and left upper pole 1.2 x 1.2 x 0.9 cm. Both nodules have been FNAed and the left upper pole nodule (calcified) was noted to be [place] V ( PTC)\par Right sided nodule was benign \par He denied any family history of thyroid cancer or any previous head or neck radiation exposure \par He denied any dysphonia, dysphagia or choking sensation \par \par \par

## 2020-05-17 NOTE — REVIEW OF SYSTEMS
[Fatigue] : no fatigue [Decreased Appetite] : appetite not decreased [Recent Weight Gain (___ Lbs)] : no recent weight gain [Recent Weight Loss (___ Lbs)] : no recent weight loss [Dry Eyes] : no dryness [Visual Field Defect] : no visual field defect [Eye Pain] : no pain [Neck Pain] : no neck pain [Dysphagia] : no dysphagia [Dysphonia] : no dysphonia [Nasal Congestion] : no nasal congestion [Chest Pain] : no chest pain [Slow Heart Rate] : heart rate is not slow [Palpitations] : no palpitations [Shortness Of Breath] : no shortness of breath [Fast Heart Rate] : heart rate is not fast [Cough] : no cough [Polyuria] : no polyuria [Hesistancy] : no hesitancy [Joint Pain] : no joint pain [Myalgia] : no myalgia  [Muscle Weakness] : no muscle weakness [Acanthosis] : no acanthosis  [Acne] : no acne [Headaches] : no headaches [Dry Skin] : no dry skin [Tremors] : no tremors [Dizziness] : no dizziness [Pain/Numbness of Digits] : no pain/numbness of digits [Depression] : no depression [Polydipsia] : no polydipsia [Cold Intolerance] : no cold intolerance [Easy Bleeding] : no ~M tendency for easy bleeding [Easy Bruising] : no tendency for easy bruising [Swelling] : no swelling

## 2020-05-17 NOTE — ASSESSMENT
[FreeTextEntry1] : 74 year old male with history of thyroid nodules here for evaluation. A left sided thyroid nodule on the left which is 1.2 cm in maximal dimension resulted as bethesda V, he is explained that he had a >75% that this nodule is PTC. However at this time, patient would prefer to hold off on surgery and continued to be monitored. \par Will initiate him on low dose levothyroxine to keep the TSH <2. Will monitor his nodule every 3 months for size and also lymph node surveillance will be done \par \par -Follow up in 3 months for ultrasound visit \par -Start levothyroxine 25 mcg

## 2020-07-14 NOTE — RAPID RESPONSE TEAM SUMMARY - NSADDTLFINDINGSRRT_GEN_ALL_CORE
RRT was called overhead for patient with hypotension, according to RN was initially SBP 78, then 80s upon arrival of writer.  Pt awake, alert, oriented, denies CP, SOB, dizziness. He states that he has a history of afib and knew his heart rate was fast, so he came into the hospital. In the ED, pt's SBP was from 80-99 with HR in the 110s.  Dr. Jacobs also in attendance. Will give IVF bolus of 250cc/hr x1, then continue with gentle hydration for BP and EDIE. Continue coreg and elquis. Closely monitor BP.  Pt is in agreement. RN aware. RRT was called overhead for patient with hypotension, according to RN was initially SBP 78, then 80s upon arrival of writer.  Pt awake, alert, oriented, denies CP, SOB, dizziness. He states that he has a history of afib and knew his heart rate was fast, so he came into the hospital. In the ED, pt's SBP was from 80-99 with HR in the 110s.  Dr. Jacobs also in attendance. Will give IVF bolus of 250cc/hr x1, then continue with gentle hydration for BP and EDIE. Continue coreg and elquis. Closely monitor BP. Will also f/u cardiology consult called.  Pt is in agreement. RN aware.

## 2020-07-14 NOTE — ED ADULT TRIAGE NOTE - CHIEF COMPLAINT QUOTE
heart racing and difficulty breathing since last night thinks it may be his atrial fibrillation H e has a pacemaker defibrillator. He thinks it may have to be interrogated heart racing and difficulty breathing since last night thinks it may be his ventricular fibrillation He has a pacemaker defibrillator. He thinks it may have to be interrogated

## 2020-07-14 NOTE — ED PROVIDER NOTE - OBJECTIVE STATEMENT
75yo M with with pmh CHF s/p ICD, gout, CKD,  ?AFib on Eliquis with history of cardioversion presents for exhaustion and soboe x 3 days. Pt took his HR and it was 130s and BP was 90/50. Pt thought he'd wait it out. Pt states the last time he felt this he was cardioverted. Pt otherwise denies any recent illness, Pt denies cp. Denies fever, recent illness. Pt does report chronic cough unchanged.     No fever/chills, No photophobia/eye pain/changes in vision, No ear pain/sore throat/dysphagia, No chest pain/+palpitations, +SOB, no cough, no wheeze/stridor, No abdominal pain, No N/V/D, no dysuria/frequency/discharge, No neck/back pain, no rash, no changes in neurological status/function.

## 2020-07-14 NOTE — ED PROVIDER NOTE - PROGRESS NOTE DETAILS
SBP to 85, however, pt is AOx3, and not symptomatic. . CXR clear, will given 250cc bolus and reassess. BP improved.

## 2020-07-14 NOTE — ED PROVIDER NOTE - PHYSICAL EXAMINATION
Gen: Alert, Well appearing. NAD    Head: NC, AT, PERRL, normal lids/conjunctiva   ENT: Bilateral TM WNL, patent oropharynx without erythema/exudate, uvula midline  Neck: supple, no tenderness/meningismus  Pulm: Bilateral clear BS, normal resp effort  CV: + irregular, tachy, + murmur, no R/G, +dist pulses   Abd: soft, NT/ND, +BS, no guarding/rebound tenderness  Mskel: no edema/erythema/cyanosis   Skin: no rash, no bruising  Neuro: AAOx3, no sensory/motor deficits, CN 2-12 intact

## 2020-07-14 NOTE — H&P ADULT - NSHPPHYSICALEXAM_GEN_ALL_CORE
ICU Vital Signs Last 24 Hrs  T(C): 36.5 (14 Jul 2020 10:51), Max: 36.5 (14 Jul 2020 10:51)  T(F): 97.7 (14 Jul 2020 10:51), Max: 97.7 (14 Jul 2020 10:51)  HR: 100 (14 Jul 2020 13:59) (89 - 120)  BP: 95/59 (14 Jul 2020 13:59) (84/55 - 99/60)  BP(mean): --  ABP: --  ABP(mean): --  RR: 20 (14 Jul 2020 10:51) (20 - 20)  SpO2: 100% (14 Jul 2020 13:59) (100% - 100%)  GENERAL: NAD well-developed  HEAD:  Atraumatic, Normocephalic  EYES: EOMI, PERRLA, conjunctiva and sclera clear  ENMT: No tonsillar erythema, exudates, or enlargement; Moist mucous membranes, Good dentition, No lesions  NECK: Supple, No JVD, Normal thyroid  NERVOUS SYSTEM:  Alert & Oriented X3, Good concentration; Motor Strength 5/5 B/L upper and lower extremities; DTRs 2+ intact and symmetric  CHEST/LUNG: Clear to percussion bilaterally; No rales, rhonchi, wheezing, or rubs  HEART: IRRegular rate and rhythm; No murmurs, rubs, or gallops  ABDOMEN: Soft, Nontender, Nondistended; Bowel sounds present  EXTREMITIES:  2+ Peripheral Pulses, No clubbing, cyanosis, or edema  LYMPH: No lymphadenopathy   SKIN: No rashes or lesions

## 2020-07-14 NOTE — H&P ADULT - ASSESSMENT
74m with history of atrial fibrillation comes in with weakness aND FOUND TO HAVE DECREASED B/P AND AFIB WITH RVR - SEN BY DR GIRARD         IMPROVE VTE Individual Risk Assessment    RISK                                                          Points  [] Previous VTE                                           3  [] Thrombophilia                                        2  [] Lower limb paralysis                              2   [] Current Cancer                                       2   [] Immobilization > 24 hrs                        1  [] ICU/CCU stay > 24 hours                       1  [] Age > 60                                                   1    IMPROVE VTE Score:22  ON XARELTO

## 2020-07-14 NOTE — ED PROVIDER NOTE - CARE PLAN
Principal Discharge DX:	Atrial fibrillation with RVR  Secondary Diagnosis:	Pacemaker malfunction Principal Discharge DX:	Tachycardia  Secondary Diagnosis:	Atrial fibrillation with RVR  Secondary Diagnosis:	Hypotension  Secondary Diagnosis:	EDIE (acute kidney injury) Principal Discharge DX:	Tachycardia  Secondary Diagnosis:	Atrial fibrillation with RVR  Secondary Diagnosis:	Hypotension  Secondary Diagnosis:	EDIE (acute kidney injury)  Secondary Diagnosis:	Elevated troponin

## 2020-07-14 NOTE — ED PROVIDER NOTE - SECONDARY DIAGNOSIS.
Pacemaker malfunction Atrial fibrillation with RVR Hypotension EDIE (acute kidney injury) Elevated troponin

## 2020-07-14 NOTE — H&P ADULT - NSHPREVIEWOFSYSTEMS_GEN_ALL_CORE
CONSTITUTIONAL: No fever, weight loss, or POSITIVE fatigue  EYES: No eye pain, visual disturbances, or discharge  ENMT:  No difficulty hearing, tinnitus, vertigo; No sinus or throat pain  NECK: No pain or stiffness  RESPIRATORY: No cough, wheezing, chills or hemoptysis; No shortness of breath  CARDIOVASCULAR: No chest pain, palpitations, dizziness, or leg swelling  GASTROINTESTINAL: No abdominal or epigastric pain. No nausea, vomiting, or hematemesis; No diarrhea or constipation. No melena or hematochezia.  GENITOURINARY: No dysuria, frequency, hematuria, or incontinence  NEUROLOGICAL: No headaches, memory loss, loss of strength, numbness, or tremors  SKIN: No itching, burning, rashes, or lesions   LYMPH NODES: No enlarged glands  ENDOCRINE: No heat or cold intolerance; No hair loss  MUSCULOSKELETAL: No joint pain or swelling; No muscle, back, or extremity pain  PSYCHIATRIC: No depression, anxiety, mood swings, or difficulty sleeping  HEME/LYMPH: No easy bruising, or bleeding gums  ALLERGY AND IMMUNOLOGIC: No hives or eczema

## 2020-07-14 NOTE — ED ADULT NURSE NOTE - NSIMPLEMENTINTERV_GEN_ALL_ED
Implemented All Fall with Harm Risk Interventions:  [place] to call system. Call bell, personal items and telephone within reach. Instruct patient to call for assistance. Room bathroom lighting operational. Non-slip footwear when patient is off stretcher. Physically safe environment: no spills, clutter or unnecessary equipment. Stretcher in lowest position, wheels locked, appropriate side rails in place. Provide visual cue, wrist band, yellow gown, etc. Monitor gait and stability. Monitor for mental status changes and reorient to person, place, and time. Review medications for side effects contributing to fall risk. Reinforce activity limits and safety measures with patient and family. Provide visual clues: red socks.

## 2020-07-14 NOTE — RAPID RESPONSE TEAM SUMMARY - NSSITUATIONBACKGROUNDRRT_GEN_ALL_CORE
74m with history of CHF s/p ICD, gout, CKD, AFib on Eliquis, comes in with weakness and tachycardia, noted to have afib with RVR, hypotension, EDIE

## 2020-07-14 NOTE — CONSULT NOTE ADULT - ASSESSMENT
MAIN BRASWELL is a 74y Male with a known history of chronic systolic HF (NICMP, EF=10% at last hospitalization in 2018) s/p ICD, gout, CKD, h/o AFib (s/[facility]  He presented to the ED for worsening dyspnea on exertion for the last few days.    In the ER:  HR 130s and BP 90/50  Denied chest pain.  Last time he felt like this, he was cardioverted.  Labs significant for :  creat 1.7 -> 2.7  K 5.5  INR 2  BNP 20,000  LFTs normal ranges    -monitor on tele  -trend Arcadio MAIN BRASWELL is a 74y Male with a known history of chronic systolic HF (NICMP, EF=10% at last hospitalization in 2018) s/p ICD, gout, CKD, h/o AFib (s/p DCCV in 2018 at [facility]) on Eliquis.  He presented to the ED for worsening dyspnea on exertion for the last few days.    In the ER:  HR 130s and BP 90/50  Denied chest pain.  Last time he felt like this, he was cardioverted.  Labs significant for :  creat 1.7 -> 2.7  K 5.5  INR 2  BNP 20,000  LFTs normal ranges  CXR no evidence of CHF or infiltrates    -monitor on tele  -trend Arcadio  -VERY gentle IVFs; LVEF 10%  -would d/c lasix overnight and monitor  -would recheck K/creat overnight  -renal following  -would hold Eliquis... ?elevated INR 2... auto-AC?  LFTs appear normal   -started amio load for RVR... hypotensive but asymptomatic; unable to give bbs/CCBs 2/2 hypotension and creat elevated so cannot give dig  -if RVR persists and HD unstable, will need to DCCV emergently

## 2020-07-14 NOTE — ED ADULT NURSE NOTE - CHIEF COMPLAINT QUOTE
heart racing and difficulty breathing since last night thinks it may be his ventricular fibrillation He has a pacemaker defibrillator. He thinks it may have to be interrogated

## 2020-07-14 NOTE — ED ADULT NURSE NOTE - OBJECTIVE STATEMENT
Pt presents to the ED for feeling palpitations, hypotension, and SOB. Pt explains that he has a history of afib and would like to be evaluated.  At this time pt denies chest pain/dizziness/syncope/abd pain/dysuria.

## 2020-07-14 NOTE — ED PROVIDER NOTE - CLINICAL SUMMARY MEDICAL DECISION MAKING FREE TEXT BOX
Pt AOX3, comfortable, no distress. sat 100% on RA. Pt in afib however HR in 100s, BP 85, unclear etiology. Also notes with some EDIE. will admit. dw dr zapata and dr montes de oca

## 2020-07-14 NOTE — CONSULT NOTE ADULT - SUBJECTIVE AND OBJECTIVE BOX
MAIN BRASWELL is a 74y Male with a known history of chronic systolic HF (NICMP, EF=10% at last hospitalization in 2018) s/p ICD, gout, CKD, h/o AFib (s/p DCCV in 2018 at [facility]) on [facility].  He presented to the ED for worsening dyspnea on exertion for the last few days.    In the ER:  HR 130s and BP 90/50  Denied chest pain.  Last time he felt like this, he was cardioverted.  Labs significant for :  creat 1.7 -> 2.7  K 5.5  INR 2  BNP 20,000  LFTs normal ranges      MEDICATIONS  (STANDING):  allopurinol 100 milliGRAM(s) Oral daily  apixaban 2.5 milliGRAM(s) Oral every 12 hours  carvedilol 12.5 milliGRAM(s) Oral every 12 hours  furosemide    Tablet 40 milliGRAM(s) Oral daily  losartan 25 milliGRAM(s) Oral daily  spironolactone 25 milliGRAM(s) Oral daily    MEDICATIONS  (PRN):      REVIEW OF SYSTEMS:    CONSTITUTIONAL: No fever, weight loss, or fatigue  EYES: No eye pain, visual disturbances, or discharge  ENMT:  No difficulty hearing, tinnitus, vertigo; No sinus or throat pain  NECK: No pain or stiffness  BREASTS: No pain, masses, or nipple discharge  RESPIRATORY: No cough, wheezing, chills or hemoptysis; +shortness of breath  CARDIOVASCULAR: as above  GASTROINTESTINAL: No abdominal or epigastric pain. No nausea, vomiting, or hematemesis; No diarrhea or constipation. No melena or hematochezia.  GENITOURINARY: No dysuria, frequency, hematuria, or incontinence  NEUROLOGICAL: No headaches, memory loss, loss of strength, numbness, or tremors  SKIN: No itching, burning, rashes, or lesions   LYMPH NODES: No enlarged glands  ENDOCRINE: No heat or cold intolerance; No hair loss  MUSCULOSKELETAL: No joint pain or swelling; No muscle, back, or extremity pain  PSYCHIATRIC: No depression, anxiety, mood swings, or difficulty sleeping  HEME/LYMPH: No easy bruising, or bleeding gums  ALLERGY AND IMMUNOLOGIC: No hives or eczema      ALLERGIES: No Known Allergies      FAMILY HISTORY: FAMILY HISTORY:      PHYSICAL EXAMINATION:  -----------------------------  Vital Signs Last 24 Hrs  T(C): 36.5 (14 Jul 2020 16:15), Max: 36.5 (14 Jul 2020 10:51)  T(F): 97.7 (14 Jul 2020 16:15), Max: 97.7 (14 Jul 2020 10:51)  HR: 116 (14 Jul 2020 16:53) (89 - 120)  BP: 85/64 (14 Jul 2020 16:53) (80/59 - 99/60)  RR: 20 (14 Jul 2020 16:15) (19 - 20)  SpO2: 99% (14 Jul 2020 16:15) (99% - 100%)      Constitutional: ill appearing but in no acute distress  Eyes: the conjunctiva exhibited no abnormalities and the eyelids demonstrated no xanthelasmas.   HEENT: normal oral mucosa, no oral pallor and no oral cyanosis.   Neck: normal jugular venous A waves present, normal jugular venous V waves present and no jugular venous montemayor A waves.   Pulmonary: diminished at bases; no rales  Cardiovascular: tachy irreg irreg; 2/6 SM  Abdomen: soft, non-tender, no hepato-splenomegaly and no abdominal mass palpated.   Musculoskeletal: the gait could not be assessed..   Extremities: no clubbing of the fingernails, no localized cyanosis, no petechial hemorrhages and no ischemic changes.   Skin: normal skin color and pigmentation, no rash, no venous stasis, no skin lesions, no skin ulcer and no xanthoma was observed.   Psychiatric: oriented to person, place, and time, the affect was normal, the mood was normal and not feeling anxious.     LABS:   --------                        12.6   4.31  )-----------( 75       ( 14 Jul 2020 11:55 )             38.9     07-14    142  |  110<H>  |  93<H>  ----------------------------<  200<H>  5.5<H>   |  24  |  2.76<H>    Ca    9.3      14 Jul 2020 11:55  Mg     2.8     07-14    TPro  6.5  /  Alb  3.6  /  TBili  1.2  /  DBili  x   /  AST  28  /  ALT  39  /  AlkPhos  69  07-14          RADIOLOGY:  -----------------    EKG: afib w RVR 111bpm; LBBB    < from: TTE Echo Doppler w/o Cont (11.23.19 @ 14:57) >   1. Left ventricular ejection fraction, by visual estimation, is <20%.   2. Multiple left ventricular regional wall motion abnormalities exist.   See wall motion findings.   3. Dilated cardiomyopathy.   4. Mildly enlarged right atrium.   5. Large pleural effusion in both left and right lateral regions.   6. Mild mitral valve regurgitation.   7. Moderate tricuspid regurgitation.   8. Mild aortic regurgitation.   9. Moderate pulmonic valve regurgitation.  10. Estimated pulmonary artery systolic pressure is 71.2 mmHg assuming a   right atrial pressure of 15 mmHg, which is consistent with severe   pulmonary hypertension.    < end of copied text > MAIN BRASWELL is a 74y Male with a known history of chronic systolic HF (NICMP, EF=10% at last hospitalization in 2018) s/p ICD, gout, CKD, h/o AFib ([facility]  He presented to the ED for worsening dyspnea on exertion for the last few days.    In the ER:  HR 130s and BP 90/50  Denied chest pain.  Last time he felt like this, he was cardioverted.  Labs significant for :  creat 1.7 -> 2.7  K 5.5  INR 2  BNP 20,000  LFTs normal ranges    MEDICATIONS  (STANDING):  allopurinol 100 milliGRAM(s) Oral daily  aMIOdarone    Tablet   Oral   aMIOdarone    Tablet 400 milliGRAM(s) Oral every 8 hours  apixaban 2.5 milliGRAM(s) Oral every 12 hours  carvedilol 12.5 milliGRAM(s) Oral every 12 hours  furosemide    Tablet 40 milliGRAM(s) Oral daily  losartan 25 milliGRAM(s) Oral daily  sodium chloride 0.9%. 1000 milliLiter(s) (250 mL/Hr) IV Continuous <Continuous>  sodium chloride 0.9%. 1000 milliLiter(s) (60 mL/Hr) IV Continuous <Continuous>  spironolactone 25 milliGRAM(s) Oral daily    MEDICATIONS  (PRN):      REVIEW OF SYSTEMS:    CONSTITUTIONAL: No fever, weight loss, or fatigue  EYES: No eye pain, visual disturbances, or discharge  ENMT:  No difficulty hearing, tinnitus, vertigo; No sinus or throat pain  NECK: No pain or stiffness  BREASTS: No pain, masses, or nipple discharge  RESPIRATORY: No cough, wheezing, chills or hemoptysis; +shortness of breath  CARDIOVASCULAR: as above  GASTROINTESTINAL: No abdominal or epigastric pain. No nausea, vomiting, or hematemesis; No diarrhea or constipation. No melena or hematochezia.  GENITOURINARY: No dysuria, frequency, hematuria, or incontinence  NEUROLOGICAL: No headaches, memory loss, loss of strength, numbness, or tremors  SKIN: No itching, burning, rashes, or lesions   LYMPH NODES: No enlarged glands  ENDOCRINE: No heat or cold intolerance; No hair loss  MUSCULOSKELETAL: No joint pain or swelling; No muscle, back, or extremity pain  PSYCHIATRIC: No depression, anxiety, mood swings, or difficulty sleeping  HEME/LYMPH: No easy bruising, or bleeding gums  ALLERGY AND IMMUNOLOGIC: No hives or eczema      ALLERGIES: No Known Allergies      FAMILY HISTORY: FAMILY HISTORY:      PHYSICAL EXAMINATION:  -----------------------------  Vital Signs Last 24 Hrs  T(C): 36.5 (14 Jul 2020 16:15), Max: 36.5 (14 Jul 2020 10:51)  T(F): 97.7 (14 Jul 2020 16:15), Max: 97.7 (14 Jul 2020 10:51)  HR: 116 (14 Jul 2020 16:53) (89 - 120)  BP: 85/64 (14 Jul 2020 16:53) (80/59 - 99/60)  RR: 20 (14 Jul 2020 16:15) (19 - 20)  SpO2: 99% (14 Jul 2020 16:15) (99% - 100%)      Constitutional: ill appearing but in no acute distress  Eyes: the conjunctiva exhibited no abnormalities and the eyelids demonstrated no xanthelasmas.   HEENT: normal oral mucosa, no oral pallor and no oral cyanosis.   Neck: normal jugular venous A waves present, normal jugular venous V waves present and no jugular venous montemayor A waves.   Pulmonary: diminished at bases; no rales  Cardiovascular: tachy irreg irreg; 2/6 SM  Abdomen: soft, non-tender, no hepato-splenomegaly and no abdominal mass palpated.   Musculoskeletal: the gait could not be assessed..   Extremities: no clubbing of the fingernails, no localized cyanosis, no petechial hemorrhages and no ischemic changes.   Skin: normal skin color and pigmentation, no rash, no venous stasis, no skin lesions, no skin ulcer and no xanthoma was observed.   Psychiatric: oriented to person, place, and time, the affect was normal, the mood was normal and not feeling anxious.     LABS:   --------                        12.6   4.31  )-----------( 75       ( 14 Jul 2020 11:55 )             38.9     07-14    142  |  110<H>  |  93<H>  ----------------------------<  200<H>  5.5<H>   |  24  |  2.76<H>    Ca    9.3      14 Jul 2020 11:55  Mg     2.8     07-14    TPro  6.5  /  Alb  3.6  /  TBili  1.2  /  DBili  x   /  AST  28  /  ALT  39  /  AlkPhos  69  07-14          RADIOLOGY:  -----------------    EKG: afib w RVR 111bpm; LBBB    < from: TTE Echo Doppler w/o Cont (11.23.19 @ 14:57) >   1. Left ventricular ejection fraction, by visual estimation, is <20%.   2. Multiple left ventricular regional wall motion abnormalities exist.   See wall motion findings.   3. Dilated cardiomyopathy.   4. Mildly enlarged right atrium.   5. Large pleural effusion in both left and right lateral regions.   6. Mild mitral valve regurgitation.   7. Moderate tricuspid regurgitation.   8. Mild aortic regurgitation.   9. Moderate pulmonic valve regurgitation.  10. Estimated pulmonary artery systolic pressure is 71.2 mmHg assuming a   right atrial pressure of 15 mmHg, which is consistent with severe   pulmonary hypertension.    < end of copied text >

## 2020-07-15 NOTE — PROGRESS NOTE ADULT - ASSESSMENT
74y Male with a known history of chronic systolic HF (NICMP, EF=10% at last hospitalization in 2018) s/p ICD, gout, CKD, h/o AFib (s/p DCCV in 2018 at [facility]) on Eliquis.  He presented to the ED for worsening dyspnea on exertion for the last few days.    In the ER:  HR 130s and BP 90/50  Denied chest pain.  Last time he felt like this, he was cardioverted.  Labs significant for :  creat 1.7 -> 2.7 ->2.9  K 5.5->5.2  INR 1.99  BNP 20,000  Trop .05 x3  LFTs normal ranges  CXR no evidence of CHF or infiltrates    Plan  -monitor on tele  -VERY gentle IVFs; LVEF ~10%  -Holding Lasix   -renal eval  -Cont Eliquis for AC  -On amio load for afib with RVR, remains  hypotensive but asymptomatic; unable to give bbs/CCBs 2/2 hypotension and creat elevated so cannot give dig  -HR in low 100s,  If RVR reoccurs and HD unstable, will need to DCCV emergently 74y Male with a known history of chronic systolic HF (NICMP, EF=10% at last hospitalization in 2018) s/p ICD, gout, CKD, h/o AFib (s/p DCCV in 2018 at [facility]) on Eliquis.  He presented to the ED for worsening dyspnea on exertion for the last few days.    In the ER:  HR 130s and BP 90/50  Denied chest pain.  Last time he felt like this, he was cardioverted.  Labs significant for :  creat 1.7 -> 2.7 ->2.9  K 5.5->5.2  INR 1.99  BNP 20,000  Trop .05 x3  LFTs normal ranges  CXR no evidence of CHF or infiltrates    Plan  -monitor on tele  -Very gentle IVFs; LVEF ~10%  -Holding Lasix for now  -renal eval  -Cont Eliquis for AC  -Hold Losartan 2/2 low BP and EDIE  -On amio load for afib with RVR, remains  hypotensive but asymptomatic; unable to give bbs/CCBs 2/2 hypotension and creat elevated so cannot give dig  -HR 90- low 100s now,  If RVR reoccurs and HD unstable, will need to DCCV emergently   -Will interrogate AICD ( [place]) to assess battery life  -Pt follow with Dr. Cecil Loya  in [place], his cardiologist

## 2020-07-15 NOTE — CONSULT NOTE ADULT - SUBJECTIVE AND OBJECTIVE BOX
[facility] NEPHROLOGY SERVICES, [facility]  NEPHROLOGY AND HYPERTENSION  [address]  [address]  [place] [address]  [phone]    MD TE RALPH MD ANDREY GONCHARUK, MD MADHU KORRAPATI, MD YELENA ROSENBERG, MD BINNY KOSHY, MD CHRISTOPHER CAPUTO, MD EDWARD BOVER, MD      Information from chart:  "Patient is a 74y old  Male who presents with a chief complaint of weakness (15 Jul 2020 15:30)    HPI:  73yo M with with pmh CHF s/p ICD, gout, CKD,  ?AFib on Eliquis with history of cardioversion presents for exhaustion and soboe x 3 days. Pt took his HR and it was 130s and BP was 90/50. Pt thought he'd wait it out. Pt states the last time he felt this he was cardioverted. Pt otherwise denies any recent illness, Pt denies cp. Denies fever, recent illness. Pt does report chronic cough unchanged.   	No fever/chills, No photophobia/eye pain/changes in vision, No ear pain/sore throat/dysphagia, No chest pain/+palpitations, +SOB, no cough, no wheeze/stridor, No abdominal pain, No N/V/D, no dysuria/frequency/discharge, No neck/back pain, no rash, no changes in neurological status/function. (14 Jul 2020 15:12)   "    Patient alert and awake; no distress      PAST MEDICAL & SURGICAL HISTORY:  Aortic insufficiency  Mitral insufficiency  CKD (chronic kidney disease)  Cardiomyopathy  Hypertension  History of tonsillectomy: childhood  AICD (automatic cardioverter/defibrillator) present: 8/2012    FAMILY HISTORY:    Allergies    No Known Allergies    Intolerances      Home Medications:  allopurinol 100 mg oral tablet: 1 tab(s) orally once a day (24 Nov 2019 14:32)  Coreg 12.5 mg oral tablet: 1 tab(s) orally 2 times a day (21 Nov 2019 17:25)  Eliquis:  (21 Nov 2019 17:25)  Lasix 40 mg oral tablet: 1 tab(s) orally once a day (21 Nov 2019 17:25)  potassium chloride 20 mEq oral tablet, extended release: 2 tab(s) orally once a day (24 Nov 2019 14:32)  sacubitril-valsartan 24 mg-26 mg oral tablet: 1 tab(s) orally 2 times a day (24 Nov 2019 14:32)    MEDICATIONS  (STANDING):  allopurinol 100 milliGRAM(s) Oral daily  aMIOdarone    Tablet   Oral   aMIOdarone    Tablet 400 milliGRAM(s) Oral every 8 hours  apixaban 2.5 milliGRAM(s) Oral every 12 hours  levothyroxine 25 MICROGram(s) Oral daily  sodium chloride 0.9%. 1000 milliLiter(s) (250 mL/Hr) IV Continuous <Continuous>  sodium chloride 0.9%. 1000 milliLiter(s) (60 mL/Hr) IV Continuous <Continuous>    MEDICATIONS  (PRN):    Vital Signs Last 24 Hrs  T(C): 36.3 (15 Jul 2020 16:35), Max: 36.6 (15 Jul 2020 00:48)  T(F): 97.4 (15 Jul 2020 16:35), Max: 97.9 (15 Jul 2020 11:20)  HR: 120 (15 Jul 2020 21:20) (80 - 140)  BP: 97/51 (15 Jul 2020 21:20) (81/60 - 97/68)  BP(mean): --  RR: 17 (15 Jul 2020 16:35) (17 - 18)  SpO2: 100% (15 Jul 2020 16:35) (100% - 100%)    Daily Height in cm: 177.8 (15 Jul 2020 02:00)    Daily     07-14-20 @ 07:01  -  07-15-20 @ 07:00  --------------------------------------------------------  IN: 360 mL / OUT: 1 mL / NET: 359 mL    07-15-20 @ 07:01  -  07-15-20 @ 21:37  --------------------------------------------------------  IN: 565 mL / OUT: 0 mL / NET: 565 mL      CAPILLARY BLOOD GLUCOSE        PHYSICAL EXAM:      T(C): 36.3 (07-15-20 @ 16:35), Max: 36.6 (07-15-20 @ 00:48)  HR: 120 (07-15-20 @ 21:20) (80 - 140)  BP: 97/51 (07-15-20 @ 21:20) (81/60 - 97/68)  RR: 17 (07-15-20 @ 16:35) (17 - 18)  SpO2: 100% (07-15-20 @ 16:35) (100% - 100%)  Wt(kg): --  Lungs clear  Heart S1S2  IRREG  Abd soft NT ND  Extremities:   tr edema              07-15    144  |  112<H>  |  96<H>  ----------------------------<  172<H>  5.3   |  25  |  2.97<H>    Ca    9.2      15 Jul 2020 07:45  Mg     2.8     07-15    TPro  6.1  /  Alb  3.3  /  TBili  0.9  /  DBili  x   /  AST  30  /  ALT  34  /  AlkPhos  67  07-15                          12.5   4.87  )-----------( 89       ( 15 Jul 2020 07:45 )             39.1     Creatinine Trend: 2.97<--, 2.87<--, 2.76<--          Assessment   EDIE CKD 3-4 CRS; warranted diuresis;     Plan  Agree with holding diuretics and sacubitril-valsartan    Hold KCL  Judicious IVF for 24 hrs  Will follow.    Berlin Castelan MD

## 2020-07-15 NOTE — PROGRESS NOTE ADULT - SUBJECTIVE AND OBJECTIVE BOX
Patient is a 74y old  Male who presents with a chief complaint of weakness (15 Jul 2020 15:05)    PAST MEDICAL & SURGICAL HISTORY:  Aortic insufficiency  Afib  Mitral insufficiency  CKD (chronic kidney disease)  Cardiomyopathy  Hypertension  History of tonsillectomy: childhood  AICD (automatic cardioverter/defibrillator) present: 8/2012    INTERVAL HISTORY:  	  MEDICATIONS:  MEDICATIONS  (STANDING):  allopurinol 100 milliGRAM(s) Oral daily  aMIOdarone    Tablet   Oral   aMIOdarone    Tablet 400 milliGRAM(s) Oral every 8 hours  apixaban 2.5 milliGRAM(s) Oral every 12 hours  carvedilol 12.5 milliGRAM(s) Oral every 12 hours  levothyroxine 25 MICROGram(s) Oral daily  losartan 25 milliGRAM(s) Oral daily  sodium chloride 0.9%. 1000 milliLiter(s) (250 mL/Hr) IV Continuous <Continuous>  sodium chloride 0.9%. 1000 milliLiter(s) (60 mL/Hr) IV Continuous <Continuous>    Vitals:  T(F): 97.9 (07-15-20 @ 11:20), Max: 98.3 (07-14-20 @ 20:14)  HR: 103 (07-15-20 @ 14:13) (80 - 140)  BP: 81/60 (07-15-20 @ 14:13) (80/53 - 97/68)  RR: 18 (07-15-20 @ 11:20) (17 - 20)  SpO2: 100% (07-15-20 @ 11:20) (99% - 100%)    07-14 @ 07:01  -  07-15 @ 07:00  --------------------------------------------------------  IN:    sodium chloride 0.9%.: 360 mL  Total IN: 360 mL    OUT:    Voided: 1 mL  Total OUT: 1 mL    Total NET: 359 mL      07-15 @ 07:01  -  07-15 @ 15:31  --------------------------------------------------------  IN:    Oral Fluid: 240 mL  Total IN: 240 mL    OUT:  Total OUT: 0 mL    Total NET: 240 mL    Weight (kg): 92.5 (07-15 @ 02:00)  BMI (kg/m2): 29.3 (07-15 @ 02:00)    PHYSICAL EXAM:  Neuro: Awake, responsive  CV: S1 S2 RRR  Lungs: CTABL  GI: Soft, BS +, ND, NT  Extremities: No edema    TELEMETRY: paced/afib    RADIOLOGY:< from: Xray Chest 1 View-PORTABLE IMMEDIATE (07.14.20 @ 13:30) >  No definite evidence for focal infiltrate or lobar consolidation.    < end of copied text >    DIAGNOSTIC TESTING:    [x ] Echocardiogram:< from: TTE Echo Doppler w/o Cont (11.23.19 @ 14:57) >   1. Left ventricular ejection fraction, by visual estimation, is <20%.   2. Multiple left ventricular regional wall motion abnormalities exist.   See wall motion findings.   3. Dilated cardiomyopathy.   4. Mildly enlarged right atrium.   5. Large pleural effusion in both left and right lateral regions.   6. Mild mitral valve regurgitation.   7. Moderate tricuspid regurgitation.   8. Mild aortic regurgitation.   9. Moderate pulmonic valve regurgitation.  10. Estimated pulmonary artery systolic pressure is 71.2 mmHg assuming a   right atrial pressure of 15 mmHg, which is consistent with severe   pulmonary hypertension.    < end of copied text >    LABS:	 	    CARDIAC MARKERS:  Troponin I, Serum: .058 ng/mL (07-15 @ 07:45)  Troponin I, Serum: .058 ng/mL (07-15 @ 00:43)  Troponin I, Serum: .059 ng/mL (07-14 @ 11:55)    15 Jul 2020 07:45    144    |  112    |  96     ----------------------------<  172    5.3     |  25     |  2.97   15 Jul 2020 00:43    143    |  114    |  93     ----------------------------<  162    5.2     |  21     |  2.87   14 Jul 2020 11:55    142    |  110    |  93     ----------------------------<  200    5.5     |  24     |  2.76     Ca    9.2        15 Jul 2020 07:45  Mg     2.8       15 Jul 2020 00:43    TPro  6.1    /  Alb  3.3    /  TBili  0.9    /  DBili  x      /  AST  30     /  ALT  34     /  AlkPhos  67     15 Jul 2020 00:43                          12.5   4.87  )-----------( 89       ( 15 Jul 2020 07:45 )             39.1 ,                       12.4   4.74  )-----------( 77       ( 15 Jul 2020 00:43 )             37.6 ,                       12.6   4.31  )-----------( 75       ( 14 Jul 2020 11:55 )             38.9   proBNP: Serum Pro-Brain Natriuretic Peptide: 14993 pg/mL (07-14 @ 11:55)    INR: 1.99 ratio (07-14 @ 11:55) Patient is a 74y old  Male who presents with a chief complaint of weakness (15 Jul 2020 15:05)    PAST MEDICAL & SURGICAL HISTORY:  Aortic insufficiency  Afib  Mitral insufficiency  CKD (chronic kidney disease)  Cardiomyopathy  Hypertension  Thyroid Ca  History of tonsillectomy: childhood  AICD (automatic cardioverter/defibrillator) present: 8/2012    INTERVAL HISTORY: Resting in bed, not in any distress   	  MEDICATIONS:  MEDICATIONS  (STANDING):  allopurinol 100 milliGRAM(s) Oral daily  aMIOdarone    Tablet   Oral   aMIOdarone    Tablet 400 milliGRAM(s) Oral every 8 hours  apixaban 2.5 milliGRAM(s) Oral every 12 hours  carvedilol 12.5 milliGRAM(s) Oral every 12 hours  levothyroxine 25 MICROGram(s) Oral daily  losartan 25 milliGRAM(s) Oral daily  sodium chloride 0.9%. 1000 milliLiter(s) (250 mL/Hr) IV Continuous <Continuous>  sodium chloride 0.9%. 1000 milliLiter(s) (60 mL/Hr) IV Continuous <Continuous>    Vitals:  T(F): 97.9 (07-15-20 @ 11:20), Max: 98.3 (07-14-20 @ 20:14)  HR: 103 (07-15-20 @ 14:13) (80 - 140)  BP: 81/60 (07-15-20 @ 14:13) (80/53 - 97/68)  RR: 18 (07-15-20 @ 11:20) (17 - 20)  SpO2: 100% (07-15-20 @ 11:20) (99% - 100%)    07-14 @ 07:01  -  07-15 @ 07:00  --------------------------------------------------------  IN:    sodium chloride 0.9%.: 360 mL  Total IN: 360 mL    OUT:    Voided: 1 mL  Total OUT: 1 mL    Total NET: 359 mL      07-15 @ 07:01  -  07-15 @ 15:31  --------------------------------------------------------  IN:    Oral Fluid: 240 mL  Total IN: 240 mL    OUT:  Total OUT: 0 mL    Total NET: 240 mL    Weight (kg): 92.5 (07-15 @ 02:00)  BMI (kg/m2): 29.3 (07-15 @ 02:00)    PHYSICAL EXAM:  Neuro: Awake, responsive  CV: S1 S2 irregular  Lungs: CTABL  GI: Soft, BS +, ND, NT  Extremities: + LE edema    TELEMETRY: paced/afib    RADIOLOGY:< from: Xray Chest 1 View-PORTABLE IMMEDIATE (07.14.20 @ 13:30) >  No definite evidence for focal infiltrate or lobar consolidation.    < end of copied text >    DIAGNOSTIC TESTING:    [x ] Echocardiogram:< from: TTE Echo Doppler w/o Cont (11.23.19 @ 14:57) >   1. Left ventricular ejection fraction, by visual estimation, is <20%.   2. Multiple left ventricular regional wall motion abnormalities exist.   See wall motion findings.   3. Dilated cardiomyopathy.   4. Mildly enlarged right atrium.   5. Large pleural effusion in both left and right lateral regions.   6. Mild mitral valve regurgitation.   7. Moderate tricuspid regurgitation.   8. Mild aortic regurgitation.   9. Moderate pulmonic valve regurgitation.  10. Estimated pulmonary artery systolic pressure is 71.2 mmHg assuming a   right atrial pressure of 15 mmHg, which is consistent with severe   pulmonary hypertension.    < end of copied text >    LABS:	 	    CARDIAC MARKERS:  Troponin I, Serum: .058 ng/mL (07-15 @ 07:45)  Troponin I, Serum: .058 ng/mL (07-15 @ 00:43)  Troponin I, Serum: .059 ng/mL (07-14 @ 11:55)    15 Jul 2020 07:45    144    |  112    |  96     ----------------------------<  172    5.3     |  25     |  2.97   15 Jul 2020 00:43    143    |  114    |  93     ----------------------------<  162    5.2     |  21     |  2.87   14 Jul 2020 11:55    142    |  110    |  93     ----------------------------<  200    5.5     |  24     |  2.76     Ca    9.2        15 Jul 2020 07:45  Mg     2.8       15 Jul 2020 00:43    TPro  6.1    /  Alb  3.3    /  TBili  0.9    /  DBili  x      /  AST  30     /  ALT  34     /  AlkPhos  67     15 Jul 2020 00:43                          12.5   4.87  )-----------( 89       ( 15 Jul 2020 07:45 )             39.1 ,                       12.4   4.74  )-----------( 77       ( 15 Jul 2020 00:43 )             37.6 ,                       12.6   4.31  )-----------( 75       ( 14 Jul 2020 11:55 )             38.9   proBNP: Serum Pro-Brain Natriuretic Peptide: 38351 pg/mL (07-14 @ 11:55)    INR: 1.99 ratio (07-14 @ 11:55)

## 2020-07-15 NOTE — PROGRESS NOTE ADULT - ASSESSMENT
73 yo M with PMH of CHF (EF 10%) s/p AICD, Gout, CKD, Afib on Eliquis presents to the ER for worsening shortness of breath. Pt was treated for pneumonia with azithro and then doxy as an outpt. Hold off on abx for now as he does not have any fever, WBC, cough. He states that he had to prop his bed while sleeping as he was more short of breath for the past few days.    A/P  Acute on chronic CHF  - CXR shows b/l infiltrates, BNP- 15,641,   - Resume lasix with cautions given EDIE.  - continue Aldactone.  - Continue with Entresto, carvedilol 12.5 mg bid. EF ~ 10%, has AICD in place.   - cardiology consult noted. TTE done, no changes compared to prior.   - Continue Eliquis 5 mg bid.      EDIE on CKD  - probably sec to Ac on chronic CHF.  - Renal eval wit Dr Castelan.    Hypokalemia  - repleted,     PT eval.

## 2020-07-15 NOTE — PATIENT PROFILE ADULT - NSPROHMSYMPCOND_GEN_A_NUR
Patient states that if his circumstances change during his stay here he may consider home care if needed/cardiovascular

## 2020-07-15 NOTE — PROGRESS NOTE ADULT - SUBJECTIVE AND OBJECTIVE BOX
Patient is a 74y old  Male who presents with a chief complaint of weakness (14 Jul 2020 17:05), no chest pin, no SOB.       MEDICATIONS  (STANDING):  allopurinol 100 milliGRAM(s) Oral daily  aMIOdarone    Tablet   Oral   aMIOdarone    Tablet 400 milliGRAM(s) Oral every 8 hours  apixaban 2.5 milliGRAM(s) Oral every 12 hours  carvedilol 12.5 milliGRAM(s) Oral every 12 hours  levothyroxine 25 MICROGram(s) Oral daily  losartan 25 milliGRAM(s) Oral daily  sodium chloride 0.9%. 1000 milliLiter(s) (250 mL/Hr) IV Continuous <Continuous>  sodium chloride 0.9%. 1000 milliLiter(s) (60 mL/Hr) IV Continuous <Continuous>    MEDICATIONS  (PRN):    REVIEW OF SYSTEMS: 12 systems reviewed and negative.      Vital Signs Last 24 Hrs  T(C): 36.6 (15 Jul 2020 11:20), Max: 36.8 (14 Jul 2020 20:14)  T(F): 97.9 (15 Jul 2020 11:20), Max: 98.3 (14 Jul 2020 20:14)  HR: 103 (15 Jul 2020 14:13) (80 - 140)  BP: 81/60 (15 Jul 2020 14:13) (80/53 - 97/68)  BP(mean): --  RR: 18 (15 Jul 2020 11:20) (17 - 20)  SpO2: 100% (15 Jul 2020 11:20) (99% - 100%)    PHYSICAL EXAM:  GENERAL: NAD, well-groomed, well-developed  HEAD:  Atraumatic, Normocephalic  EYES:  Conjunctiva and sclera clear  ENMT: No tonsillar erythema, exudates, or enlargement; Moist mucous membranes   NECK: Supple, No JVD   NERVOUS SYSTEM:  Alert & Oriented X 3, Motor Strength 5/5 B/L upper and lower extremities.  CHEST/LUNG: good air entry bilaterally; No rales, rhonchi, wheezing, or rubs  HEART: Regular rate and rhythm; No murmurs, rubs, or gallops  ABDOMEN: Soft, Nontender, Nondistended; Bowel sounds present  EXTREMITIES:  2+ Peripheral Pulses, No clubbing, bilateral leg edema, no tenderness.  LYMPH: No lymphadenopathy noted  SKIN: No petechiae.    LABS:                        12.5   4.87  )-----------( 89       ( 15 Jul 2020 07:45 )             39.1     07-15    144  |  112<H>  |  96<H>  ----------------------------<  172<H>  5.3   |  25  |  2.97<H>    Ca    9.2      15 Jul 2020 07:45  Mg     2.8     07-15    TPro  6.1  /  Alb  3.3  /  TBili  0.9  /  DBili  x   /  AST  30  /  ALT  34  /  AlkPhos  67  07-15    PT/INR - ( 14 Jul 2020 11:55 )   PT: 21.4 sec;   INR: 1.99 ratio         PTT - ( 14 Jul 2020 11:55 )  PTT:41.6 sec   cardiac markers Troponin .058  CK --  Troponin .058  CK --      CAPILLARY BLOOD GLUCOSE      POCT Blood Glucose.: 258 mg/dL (14 Jul 2020 16:28)    Cultures    RADIOLOGY & ADDITIONAL TESTS:    Imaging Personally Reviewed:  [ ] YES  [ ] NO    Consultant(s) Notes Reviewed:  [ x] YES  [ ] NO    Care Discussed with Consultants/Other Providers [ ] YES  [ ] NO

## 2020-07-16 NOTE — PROGRESS NOTE ADULT - SUBJECTIVE AND OBJECTIVE BOX
Patient is a 74y old  Male who presents with a chief complaint of weakness (16 Jul 2020 11:08), he denies any pain.      MEDICATIONS  (STANDING):  allopurinol 100 milliGRAM(s) Oral daily  aMIOdarone    Tablet   Oral   aMIOdarone    Tablet 400 milliGRAM(s) Oral every 8 hours  apixaban 2.5 milliGRAM(s) Oral every 12 hours  levothyroxine 25 MICROGram(s) Oral daily  midodrine. 2.5 milliGRAM(s) Oral three times a day  sodium chloride 0.9%. 1000 milliLiter(s) (250 mL/Hr) IV Continuous <Continuous>  sodium chloride 0.9%. 1000 milliLiter(s) (60 mL/Hr) IV Continuous <Continuous>    MEDICATIONS  (PRN):        REVIEW OF SYSTEMS:  12 systems reviewed and negative.     Vital Signs Last 24 Hrs  T(C): 36.4 (16 Jul 2020 10:16), Max: 36.4 (15 Jul 2020 23:22)  T(F): 97.6 (16 Jul 2020 10:16), Max: 97.6 (16 Jul 2020 04:49)  HR: 106 (16 Jul 2020 12:31) (81 - 120)  BP: 92/65 (16 Jul 2020 12:31) (81/60 - 106/56)  BP(mean): --  RR: 18 (16 Jul 2020 10:16) (17 - 18)  SpO2: 98% (16 Jul 2020 10:16) (98% - 100%)    PHYSICAL EXAM:  GENERAL: NAD, well-groomed, well-developed  HEAD:  Atraumatic, Normocephalic  EYES:  Conjunctiva and sclera clear  ENMT:  Moist mucous membranes   NECK: Supple, No JVD   NERVOUS SYSTEM:  Alert & Oriented X 3, Motor Strength 5/5 B/L upper and lower extremities.  CHEST/LUNG: good air entry bilaterally; No rales, rhonchi, wheezing, or rubs  HEART: Regular rate and rhythm; No murmurs, rubs, or gallops  ABDOMEN: Soft, Nontender, Nondistended; Bowel sounds present  EXTREMITIES:  2+ Peripheral Pulses, No clubbing, bilateral leg edema, no tenderness.  LYMPH: No lymphadenopathy noted  SKIN: No petechiae.    LABS:                        12.7   5.96  )-----------( 82       ( 16 Jul 2020 06:36 )             39.7     07-16    142  |  112<H>  |  98<H>  ----------------------------<  166<H>  5.1   |  22  |  2.91<H>    Ca    8.9      16 Jul 2020 06:36  Mg     2.8     07-15    TPro  6.1  /  Alb  3.3  /  TBili  0.9  /  DBili  x   /  AST  30  /  ALT  34  /  AlkPhos  67  07-15       cardiac markers Troponin .046  CK --      CAPILLARY BLOOD GLUCOSE      POCT Blood Glucose.: 196 mg/dL (16 Jul 2020 11:25)  POCT Blood Glucose.: 177 mg/dL (16 Jul 2020 08:03)  POCT Blood Glucose.: 186 mg/dL (15 Jul 2020 21:40)    Cultures    RADIOLOGY & ADDITIONAL TESTS:    Imaging Personally Reviewed:  [ ] YES  [ ] NO    Consultant(s) Notes Reviewed:  [ ] YES  [ ] NO    Care Discussed with Consultants/Other Providers [ ] YES  [ ] NO

## 2020-07-16 NOTE — PROGRESS NOTE ADULT - ASSESSMENT
74y Male with a known history of chronic systolic HF (NICMP, EF=10% at last hospitalization in 2018) s/p ICD, gout, CKD, h/o AFib (s/p DCCV in 2018 at [facility]) on Eliquis.  He presented to the ED for worsening dyspnea on exertion for the last few days.    In the ER:  HR 130s and BP 90/50  Denied chest pain.  Last time he felt like this, he was cardioverted.  Labs significant for :  creat 1.7 -> 2.7 ->2.9  K 5.5->5.2  INR 1.99  BNP 20,000  Trop .05 x3  LFTs normal range  CXR no evidence of CHF or infiltrates    Plan  -monitor on tele  -Very gentle IVFs; LVEF ~10%  -Holding Lasix for now  -renal eval  -Cont Eliquis for AC  -No ACE-I/ARB 2/2 low BP and EDIE  -On amio load for afib with RVR, remains  hypotensive but asymptomatic; unable to give bbs/CCBs 2/2 hypotension and creat elevated so cannot give dig, will add midodrine for BP support   -HR 80- 90s, If RVR reoccurs and HD unstable, will need to DCCV emergently   -Interrogated AICD ( [employer]), battery life 4.1 month, longer episodes of afib since July 10th, will make arrangements for pt to go to [facility] for EP eval/generator change, will ask ICU consult if symptomatic hypotension occurs in the interum  -Pt follow with Dr. Cecil Loya  in [place], his cardiologist as outpatient 74y Male with a known history of chronic systolic HF (NICMP, EF=10% at last hospitalization in 2018) s/p ICD, gout, CKD, h/o AFib (s/p DCCV in 2018 at [facility]) on Eliquis.  He presented to the ED for worsening dyspnea on exertion for the last few days.    In the ER:  HR 130s and BP 90/50  Denied chest pain.  Last time he felt like this, he was cardioverted.  Labs significant for :  creat 1.7 -> 2.7 ->2.9  K 5.5->5.2  INR 1.99  BNP 20,000  Trop .05 x3  LFTs normal range  CXR no evidence of CHF or infiltrates    Plan  -monitor on tele  -Very gentle IVFs; LVEF ~10%  -Holding Lasix for now  -renal eval  -will repeat TTE  -Cont Eliquis for AC  -No ACE-I/ARB 2/2 low BP and EDIE  -On amio load for afib with RVR, remains  hypotensive but asymptomatic; unable to give bbs/CCBs 2/2 hypotension and creat elevated so cannot give dig, added midodrine for BP support   -HR 80- 90s, If RVR reoccurs and HD unstable, will need to DCCV emergently   -Interrogated AICD ([employer]), battery life 4.1 month, longer episodes of afib since July 10th, will make arrangements for pt to go to [facility] for EP eval/possible generator change with Dr. Alvarenga/Dr. Diehl,  will ask ICU consult if symptomatic hypotension occurs in the interum  -Pt follow with Dr. Cecil Loya  in [place], his cardiologist as outpatient

## 2020-07-16 NOTE — PROGRESS NOTE ADULT - ASSESSMENT
73 yo M with PMH of CHF (EF 10%) s/p AICD, Gout, CKD, Afib on Eliquis presents to the ER for worsening shortness of breath. Pt was treated for pneumonia with azithro and then doxy as an outpt. Hold off on abx for now as he does not have any fever, WBC, cough. He states that he had to prop his bed while sleeping as he was more short of breath for the past few days.    A/P  Acute on chronic CHF  - CXR shows b/l infiltrates, BNP- 15,641,   - Resume lasix with cautions given EDIE.  - continue Aldactone.  - Continue with Entresto, carvedilol 12.5 mg bid. EF ~ 10%, has AICD in place.   - cardiology consult noted. TTE done, no changes compared to prior.   - Continue Eliquis 5 mg bid.      Persistent arterial hypotension.  EF of 10%  -low dose midodrine started, 2.5 mg PO q8hr.  -monitor BP closely.  -cardiology is following.    EDIE on CKD  - probably sec to Ac on chronic CHF.  - Renal is following    PT eval.

## 2020-07-16 NOTE — PROGRESS NOTE ADULT - SUBJECTIVE AND OBJECTIVE BOX
Patient is a 74y old  Male who presents with a chief complaint of weakness (16 Jul 2020 11:08)    PAST MEDICAL & SURGICAL HISTORY:  Aortic insufficiency  Mitral insufficiency  CKD (chronic kidney disease)  Cardiomyopathy  Hypertension  History of tonsillectomy: childhood  AICD (automatic cardioverter/defibrillator) present: 8/2012    INTERVAL HISTORY: Resting in bed, not [place] distress, denies any chest pain , sob, dizziness or palpitation  	  MEDICATIONS:  MEDICATIONS  (STANDING):  allopurinol 100 milliGRAM(s) Oral daily  aMIOdarone    Tablet   Oral   aMIOdarone    Tablet 400 milliGRAM(s) Oral every 8 hours  apixaban 2.5 milliGRAM(s) Oral every 12 hours  levothyroxine 25 MICROGram(s) Oral daily  midodrine. 2.5 milliGRAM(s) Oral three times a day  sodium chloride 0.9%. 1000 milliLiter(s) (250 mL/Hr) IV Continuous <Continuous>  sodium chloride 0.9%. 1000 milliLiter(s) (60 mL/Hr) IV Continuous <Continuous>    Vitals:  T(F): 97.6 (07-16-20 @ 10:16), Max: 97.6 (07-16-20 @ 04:49)  HR: 106 (07-16-20 @ 12:31) (81 - 120)  BP: 92/65 (07-16-20 @ 12:31) (81/60 - 106/56)  RR: 18 (07-16-20 @ 10:16) (17 - 18)  SpO2: 98% (07-16-20 @ 10:16) (98% - 100%)    07-15 @ 07:01  -  07-16 @ 07:00  --------------------------------------------------------  IN:    Oral Fluid: 765 mL    sodium chloride 0.9%.: 250 mL  Total IN: 1015 mL    OUT:  Total OUT: 0 mL    Total NET: 1015 mL    Weight (kg): 92.5 (07-15 @ 02:00)  BMI (kg/m2): 29.3 (07-15 @ 02:00)    PHYSICAL EXAM:  Neuro: Awake, responsive  CV: S1 S2 irregular   Lungs: CTABL  GI: Soft, BS +, ND, NT  Extremities: + LE edema    TELEMETRY: atrial fibrillation/paced    RADIOLOGY: < from: Xray Chest 1 View-PORTABLE IMMEDIATE (07.14.20 @ 13:30) >  No definite evidence for focal infiltrate or lobar consolidation.    < end of copied text >    DIAGNOSTIC TESTING:    [x ] Echocardiogram: < from: TTE Echo Doppler w/o Cont (11.23.19 @ 14:57) >   1. Left ventricular ejection fraction, by visual estimation, is <20%.   2. Multiple left ventricular regional wall motion abnormalities exist.   See wall motion findings.   3. Dilated cardiomyopathy.   4. Mildly enlarged right atrium.   5. Large pleural effusion in both left and right lateral regions.   6. Mild mitral valve regurgitation.   7. Moderate tricuspid regurgitation.   8. Mild aortic regurgitation.   9. Moderate pulmonic valve regurgitation.  10. Estimated pulmonary artery systolic pressure is 71.2 mmHg assuming a   right atrial pressure of 15 mmHg, which is consistent with severe   pulmonary hypertension.    < end of copied text >    LABS:	 	    CARDIAC MARKERS:  Troponin I, Serum: .046 ng/mL (07-15 @ 18:01)  Troponin I, Serum: .058 ng/mL (07-15 @ 07:45)  Troponin I, Serum: .058 ng/mL (07-15 @ 00:43)  Troponin I, Serum: .059 ng/mL (07-14 @ 11:55)    16 Jul 2020 06:36    142    |  112    |  98     ----------------------------<  166    5.1     |  22     |  2.91   15 Jul 2020 07:45    144    |  112    |  96     ----------------------------<  172    5.3     |  25     |  2.97   15 Jul 2020 00:43    143    |  114    |  93     ----------------------------<  162    5.2     |  21     |  2.87     Ca    8.9        16 Jul 2020 06:36  Mg     2.8       15 Jul 2020 00:43    TPro  6.1    /  Alb  3.3    /  TBili  0.9    /  DBili  x      /  AST  30     /  ALT  34     /  AlkPhos  67     15 Jul 2020 00:43                          12.7   5.96  )-----------( 82       ( 16 Jul 2020 06:36 )             39.7 ,                       12.5   4.87  )-----------( 89       ( 15 Jul 2020 07:45 )             39.1 ,                       12.4   4.74  )-----------( 77       ( 15 Jul 2020 00:43 )             37.6 ,                       12.6   4.31  )-----------( 75       ( 14 Jul 2020 11:55 )             38.9   proBNP: Serum Pro-Brain Natriuretic Peptide: 32125 pg/mL (07-14 @ 11:55)  TSH: Thyroid Stimulating Hormone, Serum: 2.130 uU/mL (07-16 @ 06:36)    INR: 1.99 ratio (07-14 @ 11:55)

## 2020-07-17 NOTE — H&P ADULT - PROBLEM SELECTOR PLAN 3
-Holding anti-hypertensives and diuretics for now.   -F/u urine studies.   -Will get Renal US.   -Avoid nephrotoxins and renally dose meds.   -Monitor BMP, Mg, Phos.   -Likely will need inpatient renal consult.  -C/w renally dosed allopurinol for gout PPx.

## 2020-07-17 NOTE — PATIENT PROFILE ADULT - NSPROHMSYMPCOND_GEN_A_NUR
cardiovascular/Patient states that if his circumstances change during his stay here he may consider home care if needed

## 2020-07-17 NOTE — H&P ADULT - NSICDXPASTMEDICALHX_GEN_ALL_CORE_FT
PAST MEDICAL HISTORY:  Afib     Aortic insufficiency     Cardiomyopathy Systolic CHF    CKD (chronic kidney disease)     Hypertension     Mitral insufficiency     Type II diabetes mellitus PAST MEDICAL HISTORY:  Afib     Aortic insufficiency     Cardiomyopathy Systolic CHF    CKD (chronic kidney disease)     Hypertension     Hypothyroidism     Mitral insufficiency     Type II diabetes mellitus

## 2020-07-17 NOTE — H&P ADULT - PROBLEM SELECTOR PLAN 1
-History of cardioversion about 4 years ago.   -F/u EP recs re: ?Afib ablation.   -C/w amiodarone maintenance dose for now.   -Holding carvedilol for now in view of hypotension.  -C/w Eliquis 2.5mg BID (renally dosed) for now, but may need to change to heparin drip if patient going for procedure.   -C/w telemetry.

## 2020-07-17 NOTE — H&P ADULT - PROBLEM SELECTOR PLAN 7
-On Eliquis for AC which covers DVT PPx.   -Fall precautions.   -PT eval. -C/w home levothyroxine.  -TFT's checked at OSH.

## 2020-07-17 NOTE — H&P ADULT - PROBLEM SELECTOR PROBLEM 5
Type 2 diabetes mellitus with other specified complication, without long-term current use of insulin Thrombocytopenia

## 2020-07-17 NOTE — H&P ADULT - PROBLEM SELECTOR PLAN 6
-C/w home levothyroxine.  -TFT's checked at OSH. -Diet controlled reportedly.   -MARYSOL QAC/HS for now.   -DASH CC diet.   -F/u HbA1c.  -ENRIQUE donaldson.

## 2020-07-17 NOTE — H&P ADULT - PROBLEM SELECTOR PLAN 2
-Patient reportedly with an EF of 10%.   -Holding Lasix and Entresto and spironolactone for now in view of hypotension and EDIE on CKD.   -Holding carvedilol for now in view of hypotension.  -F/u Echo report done at [facility] before transfer.   -F/u with EP regarding ICD ?battery change.   -ICD reportedly interrogated at OSH.   -Strict I's/O's and daily weights.   -Consider advanced heart failure team consultation.  -Wean O2 as tolerated.  -F/u BNP in am.

## 2020-07-17 NOTE — H&P ADULT - NSHPLABSRESULTS_GEN_ALL_CORE
LABS:                        12.7   5.96  )-----------( 82       ( 2020 06:36 )             39.7       07-17    138  |  108  |  110<H>  ----------------------------<  160<H>  4.9   |  20<L>  |  3.19<H>    Ca    9.6      2020 06:33  Phos  4.3     07-17    TPro  6.2  /  Alb  3.1<L>  /  TBili  1.0  /  DBili  x   /  AST  27  /  ALT  34  /  AlkPhos  59  07-17          CAPILLARY BLOOD GLUCOSE      POCT Blood Glucose.: 196 mg/dL (2020 11:25)      PT/INR - ( 2020 06:33 )   PT: 20.0 sec;   INR: 1.86 ratio         PTT - ( 2020 06:33 )  PTT:36.7 sec    Lactate Trend      CARDIAC MARKERS ( 15 Jul 2020 18:01 )  .046 ng/mL / x     / x     / x     / x        Urinalysis Basic - ( 2020 14:23 )    Color: Yellow / Appearance: Clear / S.020 / pH: x  Gluc: x / Ketone: Negative  / Bili: Negative / Urobili: Negative mg/dL   Blood: x / Protein: 30 mg/dL / Nitrite: Negative   Leuk Esterase: Negative / RBC: x / WBC 0-2   Sq Epi: x / Non Sq Epi: x / Bacteria: x LABS:                        12.7   5.96  )-----------( 82       ( 2020 06:36 )             39.7       07-17    138  |  108  |  110<H>  ----------------------------<  160<H>  4.9   |  20<L>  |  3.19<H>    Ca    9.6      2020 06:33  Phos  4.3     07-17    TPro  6.2  /  Alb  3.1<L>  /  TBili  1.0  /  DBili  x   /  AST  27  /  ALT  34  /  AlkPhos  59  07-17          CAPILLARY BLOOD GLUCOSE      POCT Blood Glucose.: 196 mg/dL (2020 11:25)      PT/INR - ( 2020 06:33 )   PT: 20.0 sec;   INR: 1.86 ratio         PTT - ( 2020 06:33 )  PTT:36.7 sec    Lactate Trend      CARDIAC MARKERS ( 15 Jul 2020 18:01 )  .046 ng/mL / x     / x     / x     / x        Urinalysis Basic - ( 2020 14:23 )    Color: Yellow / Appearance: Clear / S.020 / pH: x  Gluc: x / Ketone: Negative  / Bili: Negative / Urobili: Negative mg/dL   Blood: x / Protein: 30 mg/dL / Nitrite: Negative   Leuk Esterase: Negative / RBC: x / WBC 0-2   Sq Epi: x / Non Sq Epi: x / Bacteria: x    RADIOLOGY:    < from: Xray Chest 1 View-PORTABLE IMMEDIATE (20 @ 13:30) >      IMPRESSION: No definite evidence for focal infiltrate or lobar consolidation.    < end of copied text >      CARDIOLOGY:    < from: TTE Echo Doppler w/o Cont (19 @ 14:57) >    Summary:   1. Left ventricular ejection fraction, by visual estimation, is <20%.   2. Multiple left ventricular regional wall motion abnormalities exist.   See wall motion findings.   3. Dilated cardiomyopathy.   4. Mildly enlarged right atrium.   5. Large pleural effusion in both left and right lateral regions.   6. Mild mitral valve regurgitation.   7. Moderate tricuspid regurgitation.   8. Mild aortic regurgitation.   9. Moderate pulmonic valve regurgitation.  10. Estimated pulmonary artery systolic pressure is 71.2 mmHg assuming a   right atrial pressure of 15 mmHg, which is consistent with severe   pulmonary hypertension.    < end of copied text >    < from: 12 Lead ECG (20 @ 10:48) >    Diagnosis Line Atrial fibrillation with rapid ventricular response  Left axis deviation  Left bundle branch block  Abnormal ECG  When compared with ECG of 2019 20:11,  Atrial fibrillation has replaced Electronic ventricular pacemaker    < end of copied text >    Telemetry reviewed: Vpaced. Afib 's.

## 2020-07-17 NOTE — H&P ADULT - ASSESSMENT
74 year old male with PMH of chronic systolic heart failure (EF 10%) s/p ICD, Afib s/p DCCV, CKD, DM-2, hypothyroidism; initially presented to [facility] with ALCAZAR/SOB and found to be in Afib with RVR and having hypotension and EDIE on CKD; transferred to [facility] for EP eval for possible Afib ablation and ICD battery change.

## 2020-07-17 NOTE — PROGRESS NOTE ADULT - ASSESSMENT
75 yo M with PMH of CHF (EF 10%) s/p AICD, Gout, CKD, Afib on Eliquis presents to the ER for worsening shortness of breath. Pt was treated for pneumonia with azithro and then doxy as an outpt. Hold off on abx for now as he does not have any fever, WBC, cough. He states that he had to prop his bed while sleeping as he was more short of breath for the past few days.    A/P  Acute on chronic CHF  - Persistent arterial hypotension.  - CXR shows b/l infiltrates, BNP- 15,641,   - hold lasix  given EIDE, and hypotension.  - Hold Aldactone, Entresto, carvedilol given hypotension.  - EF ~ 10%, has AICD in place.   - TTE done, no changes compared to prior.   - Added midodrine for blood pressure support.  - Cardiology is following.    Chronic A. fib, rate better now.  - on amiodarone  - AICD was Interrogated by cardiology, battery life 4.1 month, longer episodes of afib since July 10th,   - As per cardiology, arrangements to transfer [facility] for EP eval/poss generator change with Dr. Alvarenga/Dr. Diehl,   - Eliquis 5 mg bid.   - Cardiology is following    EDIE on CKD  - probably sec to Ac on chronic CHF, diuretic use.  - Renal is following    PT eval.    DVT Px.  - on eliquis

## 2020-07-17 NOTE — H&P ADULT - HISTORY OF PRESENT ILLNESS
74 year old male with PMH of chronic systolic heart failure (EF 10%) s/p ICD, Afib s/p DCCV, CKD, DM-2, hypothyroidism; initially presented to [facility] with ALCAZAR/SOB and found to be in Afib with RVR and having hypotension and EDIE on CKD; transferred to [facility] for EP eval for possible Afib ablation and ICD battery change. Diuretics and carvedilol being held for hypotension and EDIE on CKD. Amiodarone and midodrine initiated.

## 2020-07-17 NOTE — CONSULT NOTE ADULT - SUBJECTIVE AND OBJECTIVE BOX
CHIEF COMPLAINT:  ALCAZAR/SOB    HISTORY OF PRESENT ILLNESS:  74 year old male with PMH of chronic systolic heart failure (NICM, EF <20% ) s/p BIV ICD  at [facility], Afib diagnosed approx , s/p DCCV , CKD, DM-2, hypothyroidism; initially presented to [facility] with worsening dyspnea on exertion and shortness of breath, foundt o be in Atrial Fibrillation with RVR hypotension systolic in 80’s and EDIE Cr today 3.1. Patient was transferred to [facility] for further AF management and ICD generator change. Currently denies chest pain, palpitations, dizziness, lightheadedness, and shortness of breath.     Cardiologist: Eunice Tate      Allergies  No Known Allergies    	    MEDICATIONS:  aMIOdarone    Tablet 200 milliGRAM(s) Oral daily  apixaban 2.5 milliGRAM(s) Oral two times a day  midodrine. 5 milliGRAM(s) Oral three times a day  acetaminophen   Tablet .. 650 milliGRAM(s) Oral every 6 hours PRN  allopurinol 100 milliGRAM(s) Oral daily  dextrose 40% Gel 15 Gram(s) Oral once PRN  dextrose 50% Injectable 12.5 Gram(s) IV Push once  dextrose 50% Injectable 25 Gram(s) IV Push once  dextrose 50% Injectable 25 Gram(s) IV Push once  glucagon  Injectable 1 milliGRAM(s) IntraMuscular once PRN  insulin lispro (HumaLOG) corrective regimen sliding scale   SubCutaneous three times a day before meals  insulin lispro (HumaLOG) corrective regimen sliding scale   SubCutaneous at bedtime  levothyroxine 25 MICROGram(s) Oral daily  dextrose 5%. 1000 milliLiter(s) IV Continuous <Continuous>      PAST MEDICAL & SURGICAL HISTORY:  Hypothyroidism  Afib  Type II diabetes mellitus  Aortic insufficiency  Mitral insufficiency  CKD (chronic kidney disease)  Cardiomyopathy: Systolic CHF  Hypertension  History of tonsillectomy: childhood  AICD (automatic cardioverter/defibrillator) present: 2012      FAMILY HISTORY:  Family history of CVA      SOCIAL HISTORY:    [x] Non-smoker-Quit 50 years ago  [x] Denies Alcohol      REVIEW OF SYSTEMS:  See HPI. Otherwise, 10 point ROS done and otherwise negative.    PHYSICAL EXAM:  T(C): 36.6 (20 @ 11:38), Max: 36.6 (20 @ 23:16)  HR: 84 (20 @ 11:38) (83 - 93)  BP: 100/68 (20 @ 11:38) (95/64 - 102/69)  RR: 18 (20 @ 15:34) (18 - 18)  SpO2: 95% (20 @ 15:34) (95% - 100%)  Wt(kg): --  I&O's Summary    2020 07:01  -  2020 18:30  --------------------------------------------------------  IN: 0 mL / OUT: 250 mL / NET: -250 mL        Appearance: Normal	  Cardiovascular: Normal S1 S2, No JVD, No murmurs, No edema, irregular  Respiratory: Lungs clear to auscultation	  Psychiatry: A & O x 3, Mood & affect appropriate  Skin: No rashes, No ecchymoses, No cyanosis	  Extremities: Normal range of motion, No clubbing, +1 b/l lower extremity edema  Vascular: Peripheral pulses palpable 2+ bilaterally        LABS:	 	    CBC Full  -  ( 2020 06:36 )  WBC Count : 5.96 K/uL  Hemoglobin : 12.7 g/dL  Hematocrit : 39.7 %  Platelet Count - Automated : 82 K/uL  Mean Cell Volume : 97.8 fl  Mean Cell Hemoglobin : 31.3 pg  Mean Cell Hemoglobin Concentration : 32.0 gm/dL  Auto Neutrophil # : x  Auto Lymphocyte # : x  Auto Monocyte # : x  Auto Eosinophil # : x  Auto Basophil # : x  Auto Neutrophil % : x  Auto Lymphocyte % : x  Auto Monocyte % : x  Auto Eosinophil % : x  Auto Basophil % : x        138  |  108  |  110<H>  ----------------------------<  160<H>  4.9   |  20<L>  |  3.19<H>      142  |  112<H>  |  98<H>  ----------------------------<  166<H>  5.1   |  22  |  2.91<H>    Ca    9.6      2020 06:33  Ca    8.9      2020 06:36  Phos  4.3     07-17    TPro  6.2  /  Alb  3.1<L>  /  TBili  1.0  /  DBili  x   /  AST  27  /  ALT  34  /  AlkPhos  59  07-17  TPro  5.7<L>  /  Alb  x   /  TBili  x   /  DBili  x   /  AST  x   /  ALT  x   /  AlkPhos  x   07-16        TSH: Thyroid Stimulating Hormone, Serum in AM (20 @ 06:36)    Thyroid Stimulating Hormone, Serum: 2.130 uU/mL        TELEMETRY: AF, Vpaced 80's    ECG:    	  PREVIOUS DIAGNOSTIC TESTING:    [x] Echocardiogram:  < from: TTE Echo Complete w/o Contrast w/ Doppler (20 @ 19:12) >     EXAM:  ECHO TTE WO CON COMP W DOPP         PROCEDURE DATE:  2020        INTERPRETATION:  REPORT:    TRANSTHORACIC ECHOCARDIOGRAM REPORT         Patient Name:   MAIN BRASWELL Patient Location: [facility] Rec #:  [id number]         Accession #:      [id number]  Account #:                         Height:           67.3 in 171.0 cm  YOB: 1945           Weight:           203.9 lb 92.50 kg  Patient Age:    74 years           BSA:              2.05 m²  Patient Gender: M          BP:               76/59 mmHg       Date of Exam:        2020 7:12:09 PM  Sonographer:         ÓSCAR  Referring Physician: TAMMY    Procedure:     2D Echo/Doppler/Color Doppler Complete.  Indications:   Heart failure, unspecified - I50.9  Diagnosis:     Heart failure, unspecified - I50.9  Study Details: Technically good study.         2D AND M-MODE MEASUREMENTS (normal ranges within parentheses):  Left Ventricle:                  Normal   Aorta/Left Atrium:          Normal  IVSd (2D):             0.86 cm (0.7-1.1) Aortic Root (2D):  3.32 cm (2.4-3.7)  LVPWd (2D):             0.92 cm (0.7-1.1) Left Atrium (2D):  5.19 cm (1.9-4.0)  LVIDd (2D):             6.72 cm (3.4-5.7) Right Ventricle:  LVIDs (2D):             6.33 cmTAPSE:           1.64 cm  LV FS (2D):              5.8 %   (>25%)  Relative Wall Thickness  0.27    (<0.42)    LV DIASTOLIC FUNCTION:  MV Peak E: 0.93 m/s Decel Time: 111 msec    SPECTRAL DOPPLER ANALYSIS (where applicable):  Mitral Valve:  MV P1/2 Time: 32.29 msec  MV Area, PHT: 6.81 cm²    Aortic Valve: AoV Max Josias: 1.01 m/s AoV Peak P.1 mmHg AoV Mean P.6 mmHg    LVOT Vmax: 0.77 m/s LVOT VTI: 0.099 m LVOT Diameter: 2.14 cm    AoV Area, Vmax: 2.73 cm² AoV Area, VTI: 2.74 cm² AoV Area, Vmn: 2.22 cm²    Tricuspid Valve and PA/RV Systolic Pressure: TR Max Velocity: 3.25 m/s RA Pressure: 15 mmHg RVSP/PASP: 57.2 mmHg       PHYSICIAN INTERPRETATION:  Left Ventricle: The left ventricular internal cavity size is moderate to severely increased.  Global LV systolic function was severely decreased. Left ventricular ejection fraction, by visual estimation, is <20%. The mitral in-flow pattern reveals no discernable A-wave, therefore no comment on diastolic function can be made.  Findings are consistent with dilated cardiomyopathy.       LV Wall Scoring:  The basal and mid anterior septum is dyskinetic. The mid and apical inferior  septum, mid and apical inferior wall, mid inferolateral segment, and apex are  akinetic.    Right Ventricle: The right ventricular size is normal. RV systolic function is mildly reduced.  Left Atrium: Moderately enlarged left atrium.  Right Atrium: Moderately enlarged right atrium.  Mitral Valve: The mitral valve leaflets are tethered which is due to reducedsystolic function and elevated LVDP. Moderate to severe mitral valve regurgitation is seen.  Tricuspid Valve: Moderate tricuspid regurgitation is visualized. Estimated pulmonary artery systolic pressure is 57.2 mmHg assuming a right atrial pressure of 15 mmHg, which is consistent with moderate pulmonary hypertension.  Aortic Valve: The aortic valve is trileaflet. Peak transaortic gradient equals 4.1 mmHg, mean transaortic gradient equals 2.6 mmHg, the calculated aortic valve area equals 2.74 cm² by the continuity equation consistent with normally opening aortic valve. The peak aortic velocity was obtained from the apical view. Trivial aortic valve regurgitation is seen.  Pulmonic Valve: Moderate pulmonic valve regurgitation.  Additional Comments: A pacer wire is visualized in the right atrium and right ventricle.       Summary:   1. Left ventricular ejection fraction, by visual estimation, is <20%.   2. Technically good study.   3. Severely decreased global left ventricular systolic function.   4. Multiple left ventricular regional wall motion abnormalities exist. See wall motion findings.   5. Dilated cardiomyopathy.   6. Moderate to severely increased left ventricular internal cavity size.   7. The mitral in-flow pattern reveals no discernable A-wave, therefore no comment on diastolic function can be made.   8. Moderate to severe mitral valve regurgitation.   9. The mitral valve leaflets are tethered which is due to reduced systolic function and elevated LVDP.  10. Moderate tricuspid regurgitation.  11. Moderate pulmonic valve regurgitation.  12. Estimated pulmonary artery systolic pressure is 57.2 mmHg assuming a right atrial pressure of 15 mmHg, which is consistent with moderate/severe pulmonary hypertension.  13. Comparedwith the study from 19, findings are similar.    Mane Buchanan MD FACC, FASE, FACP  Electronically signed on 2020 at 3:41:18 PM      *** Final ***    < end of copied text > CHIEF COMPLAINT:  ALCAZAR/SOB    HISTORY OF PRESENT ILLNESS:  74 year old male with PMH of chronic systolic heart failure (NICM, EF <20% ) s/p BIV ICD  at [facility], Afib diagnosed approx , s/p DCCV , CKD, DM-2, hypothyroidism; initially presented to [facility] with worsening dyspnea on exertion and shortness of breath, found to be in Atrial Fibrillation with RVR hypotension systolic in 80’s and EDIE Cr today 3.1. Patient was transferred to [facility] for further AF management and ICD generator change. Currently denies chest pain, palpitations, dizziness, lightheadedness, and shortness of breath.     Cardiologist: Eunice Tate      Allergies  No Known Allergies    	    MEDICATIONS:  aMIOdarone    Tablet 200 milliGRAM(s) Oral daily  apixaban 2.5 milliGRAM(s) Oral two times a day  midodrine. 5 milliGRAM(s) Oral three times a day  acetaminophen   Tablet .. 650 milliGRAM(s) Oral every 6 hours PRN  allopurinol 100 milliGRAM(s) Oral daily  dextrose 40% Gel 15 Gram(s) Oral once PRN  dextrose 50% Injectable 12.5 Gram(s) IV Push once  dextrose 50% Injectable 25 Gram(s) IV Push once  dextrose 50% Injectable 25 Gram(s) IV Push once  glucagon  Injectable 1 milliGRAM(s) IntraMuscular once PRN  insulin lispro (HumaLOG) corrective regimen sliding scale   SubCutaneous three times a day before meals  insulin lispro (HumaLOG) corrective regimen sliding scale   SubCutaneous at bedtime  levothyroxine 25 MICROGram(s) Oral daily  dextrose 5%. 1000 milliLiter(s) IV Continuous <Continuous>      PAST MEDICAL & SURGICAL HISTORY:  Hypothyroidism  Afib  Type II diabetes mellitus  Aortic insufficiency  Mitral insufficiency  CKD (chronic kidney disease)  Cardiomyopathy: Systolic CHF  Hypertension  History of tonsillectomy: childhood  AICD (automatic cardioverter/defibrillator) present: 2012      FAMILY HISTORY:  Family history of CVA      SOCIAL HISTORY:    [x] Non-smoker-Quit 50 years ago  [x] Denies Alcohol      REVIEW OF SYSTEMS:  See HPI. Otherwise, 10 point ROS done and otherwise negative.    PHYSICAL EXAM:  T(C): 36.6 (20 @ 11:38), Max: 36.6 (20 @ 23:16)  HR: 84 (07-17-20 @ 11:38) (83 - 93)  BP: 100/68 (20 @ 11:38) (95/64 - 102/69)  RR: 18 (20 @ 15:34) (18 - 18)  SpO2: 95% (20 @ 15:34) (95% - 100%)  Wt(kg): --  I&O's Summary    2020 07:01  -  2020 18:30  --------------------------------------------------------  IN: 0 mL / OUT: 250 mL / NET: -250 mL        Appearance: Normal	  Cardiovascular: Normal S1 S2, No JVD, No murmurs, No edema, irregular  Respiratory: Lungs clear to auscultation	  Psychiatry: A & O x 3, Mood & affect appropriate  Skin: No rashes, No ecchymoses, No cyanosis	  Extremities: Normal range of motion, No clubbing, +1 b/l lower extremity edema  Vascular: Peripheral pulses palpable 2+ bilaterally        LABS:	 	    CBC Full  -  ( 2020 06:36 )  WBC Count : 5.96 K/uL  Hemoglobin : 12.7 g/dL  Hematocrit : 39.7 %  Platelet Count - Automated : 82 K/uL  Mean Cell Volume : 97.8 fl  Mean Cell Hemoglobin : 31.3 pg  Mean Cell Hemoglobin Concentration : 32.0 gm/dL  Auto Neutrophil # : x  Auto Lymphocyte # : x  Auto Monocyte # : x  Auto Eosinophil # : x  Auto Basophil # : x  Auto Neutrophil % : x  Auto Lymphocyte % : x  Auto Monocyte % : x  Auto Eosinophil % : x  Auto Basophil % : x        138  |  108  |  110<H>  ----------------------------<  160<H>  4.9   |  20<L>  |  3.19<H>      142  |  112<H>  |  98<H>  ----------------------------<  166<H>  5.1   |  22  |  2.91<H>    Ca    9.6      2020 06:33  Ca    8.9      2020 06:36  Phos  4.3     07-17    TPro  6.2  /  Alb  3.1<L>  /  TBili  1.0  /  DBili  x   /  AST  27  /  ALT  34  /  AlkPhos  59  07-17  TPro  5.7<L>  /  Alb  x   /  TBili  x   /  DBili  x   /  AST  x   /  ALT  x   /  AlkPhos  x   07-16        TSH: Thyroid Stimulating Hormone, Serum in AM (20 @ 06:36)    Thyroid Stimulating Hormone, Serum: 2.130 uU/mL        TELEMETRY: AF, Vpaced 80's    ECG:    	  PREVIOUS DIAGNOSTIC TESTING:    [x] Echocardiogram:  < from: TTE Echo Complete w/o Contrast w/ Doppler (20 @ 19:12) >     EXAM:  ECHO TTE WO CON COMP W DOPP         PROCEDURE DATE:  2020        INTERPRETATION:  REPORT:    TRANSTHORACIC ECHOCARDIOGRAM REPORT         Patient Name:   MAIN BRASWELL Patient Location: [facility] Rec #:  [id number]         Accession #:      [id number]  Account #:                         Height:           67.3 in 171.0 cm  YOB: 1945           Weight:           203.9 lb 92.50 kg  Patient Age:    74 years           BSA:              2.05 m²  Patient Gender: M          BP:               76/59 mmHg       Date of Exam:        2020 7:12:09 PM  Sonographer:         ÓSCAR  Referring Physician: TAMMY    Procedure:     2D Echo/Doppler/Color Doppler Complete.  Indications:   Heart failure, unspecified - I50.9  Diagnosis:     Heart failure, unspecified - I50.9  Study Details: Technically good study.         2D AND M-MODE MEASUREMENTS (normal ranges within parentheses):  Left Ventricle:                  Normal   Aorta/Left Atrium:          Normal  IVSd (2D):             0.86 cm (0.7-1.1) Aortic Root (2D):  3.32 cm (2.4-3.7)  LVPWd (2D):             0.92 cm (0.7-1.1) Left Atrium (2D):  5.19 cm (1.9-4.0)  LVIDd (2D):             6.72 cm (3.4-5.7) Right Ventricle:  LVIDs (2D):             6.33 cmTAPSE:           1.64 cm  LV FS (2D):              5.8 %   (>25%)  Relative Wall Thickness  0.27    (<0.42)    LV DIASTOLIC FUNCTION:  MV Peak E: 0.93 m/s Decel Time: 111 msec    SPECTRAL DOPPLER ANALYSIS (where applicable):  Mitral Valve:  MV P1/2 Time: 32.29 msec  MV Area, PHT: 6.81 cm²    Aortic Valve: AoV Max Josias: 1.01 m/s AoV Peak P.1 mmHg AoV Mean P.6 mmHg    LVOT Vmax: 0.77 m/s LVOT VTI: 0.099 m LVOT Diameter: 2.14 cm    AoV Area, Vmax: 2.73 cm² AoV Area, VTI: 2.74 cm² AoV Area, Vmn: 2.22 cm²    Tricuspid Valve and PA/RV Systolic Pressure: TR Max Velocity: 3.25 m/s RA Pressure: 15 mmHg RVSP/PASP: 57.2 mmHg       PHYSICIAN INTERPRETATION:  Left Ventricle: The left ventricular internal cavity size is moderate to severely increased.  Global LV systolic function was severely decreased. Left ventricular ejection fraction, by visual estimation, is <20%. The mitral in-flow pattern reveals no discernable A-wave, therefore no comment on diastolic function can be made.  Findings are consistent with dilated cardiomyopathy.       LV Wall Scoring:  The basal and mid anterior septum is dyskinetic. The mid and apical inferior  septum, mid and apical inferior wall, mid inferolateral segment, and apex are  akinetic.    Right Ventricle: The right ventricular size is normal. RV systolic function is mildly reduced.  Left Atrium: Moderately enlarged left atrium.  Right Atrium: Moderately enlarged right atrium.  Mitral Valve: The mitral valve leaflets are tethered which is due to reducedsystolic function and elevated LVDP. Moderate to severe mitral valve regurgitation is seen.  Tricuspid Valve: Moderate tricuspid regurgitation is visualized. Estimated pulmonary artery systolic pressure is 57.2 mmHg assuming a right atrial pressure of 15 mmHg, which is consistent with moderate pulmonary hypertension.  Aortic Valve: The aortic valve is trileaflet. Peak transaortic gradient equals 4.1 mmHg, mean transaortic gradient equals 2.6 mmHg, the calculated aortic valve area equals 2.74 cm² by the continuity equation consistent with normally opening aortic valve. The peak aortic velocity was obtained from the apical view. Trivial aortic valve regurgitation is seen.  Pulmonic Valve: Moderate pulmonic valve regurgitation.  Additional Comments: A pacer wire is visualized in the right atrium and right ventricle.       Summary:   1. Left ventricular ejection fraction, by visual estimation, is <20%.   2. Technically good study.   3. Severely decreased global left ventricular systolic function.   4. Multiple left ventricular regional wall motion abnormalities exist. See wall motion findings.   5. Dilated cardiomyopathy.   6. Moderate to severely increased left ventricular internal cavity size.   7. The mitral in-flow pattern reveals no discernable A-wave, therefore no comment on diastolic function can be made.   8. Moderate to severe mitral valve regurgitation.   9. The mitral valve leaflets are tethered which is due to reduced systolic function and elevated LVDP.  10. Moderate tricuspid regurgitation.  11. Moderate pulmonic valve regurgitation.  12. Estimated pulmonary artery systolic pressure is 57.2 mmHg assuming a right atrial pressure of 15 mmHg, which is consistent with moderate/severe pulmonary hypertension.  13. Comparedwith the study from 19, findings are similar.    Mane Buchanan MD FACC, FASE, FACP  Electronically signed on 2020 at 3:41:18 PM      *** Final ***    < end of copied text > CHIEF COMPLAINT:  ALCAZAR/SOB    HISTORY OF PRESENT ILLNESS:  74 year old male with PMH of chronic systolic heart failure (NICM, EF <20% ) s/p BIV ICD  at [facility], Afib diagnosed approx , s/p DCCV , CKD, DM-2, hypothyroidism; initially presented to [facility] with worsening dyspnea on exertion and shortness of breath, found to be in Atrial Fibrillation with RVR hypotension systolic in 80’s and EDIE Cr today 3.1. Patient was transferred to [facility] for further AF management and ICD generator change. Currently denies chest pain, palpitations, dizziness, lightheadedness, and shortness of breath.     Cardiologist: Eunice Tate      Allergies  No Known Allergies    	    MEDICATIONS:  aMIOdarone    Tablet 200 milliGRAM(s) Oral daily  apixaban 2.5 milliGRAM(s) Oral two times a day  midodrine. 5 milliGRAM(s) Oral three times a day  acetaminophen   Tablet .. 650 milliGRAM(s) Oral every 6 hours PRN  allopurinol 100 milliGRAM(s) Oral daily  dextrose 40% Gel 15 Gram(s) Oral once PRN  dextrose 50% Injectable 12.5 Gram(s) IV Push once  dextrose 50% Injectable 25 Gram(s) IV Push once  dextrose 50% Injectable 25 Gram(s) IV Push once  glucagon  Injectable 1 milliGRAM(s) IntraMuscular once PRN  insulin lispro (HumaLOG) corrective regimen sliding scale   SubCutaneous three times a day before meals  insulin lispro (HumaLOG) corrective regimen sliding scale   SubCutaneous at bedtime  levothyroxine 25 MICROGram(s) Oral daily  dextrose 5%. 1000 milliLiter(s) IV Continuous <Continuous>      PAST MEDICAL & SURGICAL HISTORY:  Hypothyroidism  Afib  Type II diabetes mellitus  Aortic insufficiency  Mitral insufficiency  CKD (chronic kidney disease)  Cardiomyopathy: Systolic CHF  Hypertension  History of tonsillectomy: childhood  AICD (automatic cardioverter/defibrillator) present: 2012      FAMILY HISTORY:  Family history of CVA      SOCIAL HISTORY:    [x] Non-smoker-Quit 50 years ago  [x] Denies Alcohol      REVIEW OF SYSTEMS:  See HPI. Otherwise, 10 point ROS done and otherwise negative.    PHYSICAL EXAM:  T(C): 36.6 (20 @ 11:38), Max: 36.6 (20 @ 23:16)  HR: 84 (07-17-20 @ 11:38) (83 - 93)  BP: 100/68 (20 @ 11:38) (95/64 - 102/69)  RR: 18 (20 @ 15:34) (18 - 18)  SpO2: 95% (20 @ 15:34) (95% - 100%)  Wt(kg): --  I&O's Summary    2020 07:01  -  2020 18:30  --------------------------------------------------------  IN: 0 mL / OUT: 250 mL / NET: -250 mL        Appearance: Normal	  Cardiovascular: Normal S1 S2, No JVD, No murmurs, No edema, irregular  Respiratory: Lungs clear to auscultation	  Psychiatry: A & O x 3, Mood & affect appropriate  Skin: No rashes, No ecchymoses, No cyanosis	  Extremities: Normal range of motion, No clubbing, +1 b/l lower extremity edema  Vascular: Peripheral pulses palpable 2+ bilaterally        LABS:	 	    CBC Full  -  ( 2020 06:36 )  WBC Count : 5.96 K/uL  Hemoglobin : 12.7 g/dL  Hematocrit : 39.7 %  Platelet Count - Automated : 82 K/uL  Mean Cell Volume : 97.8 fl  Mean Cell Hemoglobin : 31.3 pg  Mean Cell Hemoglobin Concentration : 32.0 gm/dL  Auto Neutrophil # : x  Auto Lymphocyte # : x  Auto Monocyte # : x  Auto Eosinophil # : x  Auto Basophil # : x  Auto Neutrophil % : x  Auto Lymphocyte % : x  Auto Monocyte % : x  Auto Eosinophil % : x  Auto Basophil % : x        138  |  108  |  110<H>  ----------------------------<  160<H>  4.9   |  20<L>  |  3.19<H>      142  |  112<H>  |  98<H>  ----------------------------<  166<H>  5.1   |  22  |  2.91<H>    Ca    9.6      2020 06:33  Ca    8.9      2020 06:36  Phos  4.3     07-17    TPro  6.2  /  Alb  3.1<L>  /  TBili  1.0  /  DBili  x   /  AST  27  /  ALT  34  /  AlkPhos  59  07-17  TPro  5.7<L>  /  Alb  x   /  TBili  x   /  DBili  x   /  AST  x   /  ALT  x   /  AlkPhos  x   07-16        TSH: Thyroid Stimulating Hormone, Serum in AM (20 @ 06:36)    Thyroid Stimulating Hormone, Serum: 2.130 uU/mL        TELEMETRY: AF, Vpaced 80's    EC20 Atrial Fibrillation w/intermittent BIV pacing w/LBBB 90bpm. QRS 174ms   	  PREVIOUS DIAGNOSTIC TESTING:    [x] Echocardiogram:  < from: TTE Echo Complete w/o Contrast w/ Doppler (20 @ 19:12) >     EXAM:  ECHO TTE WO CON COMP W DOPP         PROCEDURE DATE:  2020        INTERPRETATION:  REPORT:    TRANSTHORACIC ECHOCARDIOGRAM REPORT         Patient Name:   MAIN BRASWELL Patient Location: [facility] Rec #:  [id number]         Accession #:      [id number]  Account #:                         Height:           67.3 in 171.0 cm  YOB: 1945           Weight:           203.9 lb 92.50 kg  Patient Age:    74 years           BSA:              2.05 m²  Patient Gender: M          BP:               76/59 mmHg       Date of Exam:        2020 7:12:09 PM  Sonographer:         ÓSCAR  Referring Physician: TAMMY    Procedure:     2D Echo/Doppler/Color Doppler Complete.  Indications:   Heart failure, unspecified - I50.9  Diagnosis:     Heart failure, unspecified - I50.9  Study Details: Technically good study.         2D AND M-MODE MEASUREMENTS (normal ranges within parentheses):  Left Ventricle:                  Normal   Aorta/Left Atrium:          Normal  IVSd (2D):             0.86 cm (0.7-1.1) Aortic Root (2D):  3.32 cm (2.4-3.7)  LVPWd (2D):             0.92 cm (0.7-1.1) Left Atrium (2D):  5.19 cm (1.9-4.0)  LVIDd (2D):             6.72 cm (3.4-5.7) Right Ventricle:  LVIDs (2D):             6.33 cmTAPSE:           1.64 cm  LV FS (2D):              5.8 %   (>25%)  Relative Wall Thickness  0.27    (<0.42)    LV DIASTOLIC FUNCTION:  MV Peak E: 0.93 m/s Decel Time: 111 msec    SPECTRAL DOPPLER ANALYSIS (where applicable):  Mitral Valve:  MV P1/2 Time: 32.29 msec  MV Area, PHT: 6.81 cm²    Aortic Valve: AoV Max Josias: 1.01 m/s AoV Peak P.1 mmHg AoV Mean P.6 mmHg    LVOT Vmax: 0.77 m/s LVOT VTI: 0.099 m LVOT Diameter: 2.14 cm    AoV Area, Vmax: 2.73 cm² AoV Area, VTI: 2.74 cm² AoV Area, Vmn: 2.22 cm²    Tricuspid Valve and PA/RV Systolic Pressure: TR Max Velocity: 3.25 m/s RA Pressure: 15 mmHg RVSP/PASP: 57.2 mmHg       PHYSICIAN INTERPRETATION:  Left Ventricle: The left ventricular internal cavity size is moderate to severely increased.  Global LV systolic function was severely decreased. Left ventricular ejection fraction, by visual estimation, is <20%. The mitral in-flow pattern reveals no discernable A-wave, therefore no comment on diastolic function can be made.  Findings are consistent with dilated cardiomyopathy.       LV Wall Scoring:  The basal and mid anterior septum is dyskinetic. The mid and apical inferior  septum, mid and apical inferior wall, mid inferolateral segment, and apex are  akinetic.    Right Ventricle: The right ventricular size is normal. RV systolic function is mildly reduced.  Left Atrium: Moderately enlarged left atrium.  Right Atrium: Moderately enlarged right atrium.  Mitral Valve: The mitral valve leaflets are tethered which is due to reducedsystolic function and elevated LVDP. Moderate to severe mitral valve regurgitation is seen.  Tricuspid Valve: Moderate tricuspid regurgitation is visualized. Estimated pulmonary artery systolic pressure is 57.2 mmHg assuming a right atrial pressure of 15 mmHg, which is consistent with moderate pulmonary hypertension.  Aortic Valve: The aortic valve is trileaflet. Peak transaortic gradient equals 4.1 mmHg, mean transaortic gradient equals 2.6 mmHg, the calculated aortic valve area equals 2.74 cm² by the continuity equation consistent with normally opening aortic valve. The peak aortic velocity was obtained from the apical view. Trivial aortic valve regurgitation is seen.  Pulmonic Valve: Moderate pulmonic valve regurgitation.  Additional Comments: A pacer wire is visualized in the right atrium and right ventricle.       Summary:   1. Left ventricular ejection fraction, by visual estimation, is <20%.   2. Technically good study.   3. Severely decreased global left ventricular systolic function.   4. Multiple left ventricular regional wall motion abnormalities exist. See wall motion findings.   5. Dilated cardiomyopathy.   6. Moderate to severely increased left ventricular internal cavity size.   7. The mitral in-flow pattern reveals no discernable A-wave, therefore no comment on diastolic function can be made.   8. Moderate to severe mitral valve regurgitation.   9. The mitral valve leaflets are tethered which is due to reduced systolic function and elevated LVDP.  10. Moderate tricuspid regurgitation.  11. Moderate pulmonic valve regurgitation.  12. Estimated pulmonary artery systolic pressure is 57.2 mmHg assuming a right atrial pressure of 15 mmHg, which is consistent with moderate/severe pulmonary hypertension.  13. Comparedwith the study from 19, findings are similar.    Mane Buchanan MD FACC, FASE, FACP  Electronically signed on 2020 at 3:41:18 PM      *** Final ***    < end of copied text >

## 2020-07-17 NOTE — H&P ADULT - PROBLEM SELECTOR PLAN 4
-Likely due to severe systolic CHF and Afib.   -Holding anti-hypertensives and diuretics for now.   -Started on midodrine 5mg TID at OSH, continue cautiously for now.

## 2020-07-17 NOTE — H&P ADULT - ATTENDING COMMENTS
Yogi Montero MD  Division of [facility] Medicine  Cell: [phone]  Pager: [phone]  Office: [phone]

## 2020-07-17 NOTE — H&P ADULT - NSHPREVIEWOFSYSTEMS_GEN_ALL_CORE
REVIEW OF SYSTEMS:    CONSTITUTIONAL: No fevers or chills  ENMT:  No ear pain, No vertigo or throat pain  EYES: No visual changes  or photophobia  NECK: No pain or stiffness  RESPIRATORY: No cough, wheezing, hemoptysis; mild shortness of breath  CARDIOVASCULAR: No chest pain or palpitations  GASTROINTESTINAL: No abdominal or epigastric pain. No nausea, vomiting, or hematemesis; No diarrhea or constipation. No melena or hematochezia.  MUSCULOSKELETAL: Mild LE edema.   GENITOURINARY: No dysuria, frequency or hematuria  NEUROLOGICAL: No numbness or weakness  PSYCHIATRIC: No depression or anhedonia.  ENDOCRINE: No sx hypoglycemic episodes.  SKIN: No itching, burning, rashes, or lesions

## 2020-07-17 NOTE — PROGRESS NOTE ADULT - SUBJECTIVE AND OBJECTIVE BOX
Patient is a 74y old  Male who presents with a chief complaint of weakness (2020 13:43), has no chest pain, no SOB, states he is feeling fine.       OVERNIGHT EVENTS:  None.    MEDICATIONS  (STANDING):  allopurinol 100 milliGRAM(s) Oral daily  aMIOdarone    Tablet   Oral   aMIOdarone    Tablet 400 milliGRAM(s) Oral every 8 hours  apixaban 2.5 milliGRAM(s) Oral every 12 hours  levothyroxine 25 MICROGram(s) Oral daily  midodrine. 5 milliGRAM(s) Oral three times a day  sodium chloride 0.9%. 1000 milliLiter(s) (250 mL/Hr) IV Continuous <Continuous>  sodium chloride 0.9%. 1000 milliLiter(s) (60 mL/Hr) IV Continuous <Continuous>    MEDICATIONS  (PRN):      REVIEW OF SYSTEMS:  12 systems reviewed and negative.     Vital Signs Last 24 Hrs  T(C): 36.6 (2020 10:34), Max: 36.6 (2020 23:16)  T(F): 97.8 (2020 10:34), Max: 97.8 (2020 23:16)  HR: 93 (2020 10:34) (82 - 106)  BP: 101/58 (2020 10:34) (76/59 - 102/69)  BP(mean): --  RR: 18 (2020 10:34) (18 - 18)  SpO2: 99% (2020 10:34) (98% - 99%)    PHYSICAL EXAM:  GENERAL: NAD, well-groomed, well-developed  HEAD:  Atraumatic, Normocephalic  EYES: EOMI, PERRLA, conjunctiva and sclera clear  ENMT: No tonsillar erythema, exudates, or enlargement; Moist mucous membranes   NECK: Supple, No JVD   NERVOUS SYSTEM:  Alert & Oriented X 3, Good concentration; Motor Strength 5/5 B/L upper and lower extremities; DTRs 2+ intact and symmetric  CHEST/LUNG: Clear to auscultation  bilaterally; No rales, rhonchi, wheezing, or rubs  HEART: Regular rate and rhythm; No murmurs, rubs, or gallops  ABDOMEN: Soft, Nontender, Nondistended; Bowel sounds present  EXTREMITIES:  2+ Peripheral Pulses, mild leg edema, no clubbing, or cyanosis,   LYMPH: No lymphadenopathy noted  SKIN: No  lesions    LABS:                        12.7   5.96  )-----------( 82       ( 2020 06:36 )             39.7     07-17    138  |  108  |  110<H>  ----------------------------<  160<H>  4.9   |  20<L>  |  3.19<H>    Ca    9.6      2020 06:33  Phos  4.3     07-17    TPro  6.2  /  Alb  3.1<L>  /  TBili  1.0  /  DBili  x   /  AST  27  /  ALT  34  /  AlkPhos  59  07-17    PT/INR - ( 2020 06:33 )   PT: 20.0 sec;   INR: 1.86 ratio         PTT - ( 2020 06:33 )  PTT:36.7 sec   cardiac markers   Urinalysis Basic - ( 2020 14:23 )    Color: Yellow / Appearance: Clear / S.020 / pH: x  Gluc: x / Ketone: Negative  / Bili: Negative / Urobili: Negative mg/dL   Blood: x / Protein: 30 mg/dL / Nitrite: Negative   Leuk Esterase: Negative / RBC: x / WBC 0-2   Sq Epi: x / Non Sq Epi: x / Bacteria: x      CAPILLARY BLOOD GLUCOSE      POCT Blood Glucose.: 196 mg/dL (2020 11:25)    Cultures    RADIOLOGY & ADDITIONAL TESTS:    Imaging Personally Reviewed:  [ ] YES  [ ] NO    Consultant(s) Notes Reviewed:  [x] YES  [ ] NO    Care Discussed with Consultants/Other Providers [ ] YES  [ ] NO

## 2020-07-17 NOTE — H&P ADULT - PROBLEM SELECTOR PLAN 5
-Diet controlled reportedly.   -MARYSOL QAC/HS for now.   -DASH CC diet.   -F/u HbA1c.  -ENRIQUE donaldson. -Thrombocytopenia noted.  -Monitor CBC daily.   -C/w AC cautiously.   -Will check iron studies and B12 and folate in am.

## 2020-07-18 NOTE — DIETITIAN INITIAL EVALUATION ADULT. - REASON INDICATOR FOR ASSESSMENT
Nutrition consult warranted for RD Assessment/Education  Source: EMR; Pending Pt Interview Nutrition consult warranted for RD Assessment/Education  Source: EMR, Pt

## 2020-07-18 NOTE — PHYSICAL THERAPY INITIAL EVALUATION ADULT - PLANNED THERAPY INTERVENTIONS, PT EVAL
bed mobility training/Stair Training: Goal: Improve to 10 steps I with single rail, step to pattern by 2 weeks./gait training/balance training/transfer training

## 2020-07-18 NOTE — CONSULT NOTE ADULT - ASSESSMENT
74y Male with a known history of chronic systolic HF (NICMP, EF=10% at last hospitalization in 2018) s/p ICD, gout, CKD, h/o AFib (s/p DCCV in 2018 at [facility]) on Eliquis.  He presented to the ED for worsening dyspnea on exertion,noted AF with RVR was Amiodarone loaded,ICD [facility] interrogated battery life 4.1 month, longer episodes of AFib since July 10th transferred to [facility] for EF evaluation.  Consideration for ANAHI/DCCV in attempt to restore Sinus rhythm.      Problem/Plan - 1:  ·  Problem: Atrial fibrillation with RVR.  Plan: -History of cardioversion 2008  -Rate control limited by hypotension  -Amiodarone loaded on maintenance  -AC with Eliquis  -EP evaluation for ANAHI/DCCV      Problem/Plan - 2:  ·  Problem: Acute on chronic systolic (congestive) heart failure.  Plan:EF <20%  -Holding Lasix and Entresto and spironolactone for now in view of hypotension and EDIE on CKD.   -Holding carvedilol for now in view of hypotension.  -ECG-LBBB QRS 172ms  -Consideration for BiVICD    Problem/Plan - 3:  ·  Problem: Acute renal failure superimposed on stage 3 chronic kidney disease, unspecified acute renal failure type.  Plan: -Holding anti-hypertensives and diuretics for now.   -Likely Cardiorenal      Problem/Plan - 4:  ·  Problem: Hypotension, unspecified hypotension type.  Plan: -Likely due to severe systolic CHF and Afib.   -Holding anti-hypertensives and diuretics for now.   -Started on midodrine 5mg TID       Problem/Plan - 5:  ·  Problem: Thrombocytopenia. Plan: -Thrombocytopenia noted.  -Monitor CBC daily.    - 72---> 82 >--- 131K-  -Improved    Problem/Plan - 6:  Problem: Type 2 diabetes mellitus with other specified complication, without long-term current use of insulin.Plan: -Diet controlled reportedly.   POCT  Blood Glucose (07.17.20 @ 14:14)    POCT Blood Glucose.: 221 <---196 <---172 mg/dL  -A1C with Estimated Average Glucose in AM (07.18.20 @ 09:51)    A1C with Estimated Average Glucose Result: 6.1    Problem/Plan - 7:  ·  Problem: Hypothyroidism, unspecified type. Plan: -C/w home levothyroxine.  -Thyroid Stimulating Hormone, Serum in AM (07.16.20 @ 06:36)    Thyroid Stimulating Hormone, Serum: 2.130 uU/mL

## 2020-07-18 NOTE — DIETITIAN INITIAL EVALUATION ADULT. - PROBLEM SELECTOR PLAN 6
-Diet controlled reportedly.   -MARYSOL QAC/HS for now.   -DASH CC diet.   -F/u HbA1c.  -ENRIQUE donaldson.

## 2020-07-18 NOTE — PROGRESS NOTE ADULT - SUBJECTIVE AND OBJECTIVE BOX
Cardiology Progress Note    Interval: Pt resting comfortably in bed. Noted feeling a mild GI symptom but otherwise denied chest pain and palpitations.    Tele: AF with ventricular rate 's    Medications:  acetaminophen   Tablet .. 650 milliGRAM(s) Oral every 6 hours PRN  allopurinol 100 milliGRAM(s) Oral daily  aMIOdarone    Tablet 200 milliGRAM(s) Oral daily  apixaban 5 milliGRAM(s) Oral two times a day  dextrose 40% Gel 15 Gram(s) Oral once PRN  dextrose 5%. 1000 milliLiter(s) IV Continuous <Continuous>  dextrose 50% Injectable 12.5 Gram(s) IV Push once  dextrose 50% Injectable 25 Gram(s) IV Push once  dextrose 50% Injectable 25 Gram(s) IV Push once  glucagon  Injectable 1 milliGRAM(s) IntraMuscular once PRN  insulin lispro (HumaLOG) corrective regimen sliding scale   SubCutaneous three times a day before meals  insulin lispro (HumaLOG) corrective regimen sliding scale   SubCutaneous at bedtime  levothyroxine 25 MICROGram(s) Oral daily  midodrine. 5 milliGRAM(s) Oral three times a day      Review of Systems:  Constitutional: [ ] Fever [ ] Chills [ ] Fatigue [ ] Weight change   HEENT: [ ] Blurred vision [ ] Eye Pain [ ] Headache [ ] Runny nose [ ] Sore Throat   Respiratory: [ ] Cough [ ] Wheezing [ ] Shortness of breath  Cardiovascular: [ ] Chest Pain [ ] Palpitations [ ] ALCAZAR [ ] PND [ ] Orthopnea  Gastrointestinal: [ ] Abdominal Pain [ ] Diarrhea [ ] Constipation [ ] Hemorrhoids [ ] Nausea [ ] Vomiting  Genitourinary: [ ] Nocturia [ ] Dysuria [ ] Incontinence  Extremities: [ ] Swelling [ ] Joint Pain  Neurologic: [ ] Focal deficit [ ] Paresthesias [ ] Syncope  Lymphatic: [ ] Swelling [ ] Lymphadenopathy   Skin: [ ] Rash [ ] Ecchymoses [ ] Wounds [ ] Lesions  Psychiatry: [ ] Depression [ ] Suicidal/Homicidal Ideation [ ] Anxiety [ ] Sleep Disturbances  [ ] 10 point review of systems is otherwise negative except as mentioned above            [ ]Unable to obtain    Vitals:  T(C): 36.6 (07-18-20 @ 04:34), Max: 36.6 (07-17-20 @ 11:38)  HR: 88 (07-18-20 @ 04:34) (76 - 88)  BP: 104/71 (07-18-20 @ 04:34) (93/64 - 104/71)  BP(mean): --  RR: 18 (07-18-20 @ 04:34) (18 - 18)  SpO2: 98% (07-18-20 @ 04:34) (95% - 100%)  Wt(kg): --  Daily Height in cm: 177.8 (17 Jul 2020 11:38)    Daily   I&O's Summary    17 Jul 2020 07:01  -  18 Jul 2020 07:00  --------------------------------------------------------  IN: 620 mL / OUT: 550 mL / NET: 70 mL    18 Jul 2020 07:01  -  18 Jul 2020 10:43  --------------------------------------------------------  IN: 240 mL / OUT: 0 mL / NET: 240 mL        Physical Exam:  General: NAD  Eye: PERRL, EOMI  HENT: Normal oral mucosa NC/AT  CV: Normal S1/S2, irregular, No M/R/G, no edema, no elevation in JVP  Resp: Normal respiratory effort, clear to auscultation bilaterally, no wheezing, no crackles  Abd: Soft, Non-tender, Non-distended, BS+  Ext: No clubbing, No joint deformity   Neuro: Non-focal, motor and sensory intact  Lymph: No lymphadenopathy  Psych: AAOx3, Mood & affect appropriate  Skin: No rashes, No ecchymoses, No cyanosis    Labs:                        13.5   6.57  )-----------( 131      ( 18 Jul 2020 07:07 )             42.0     07-18    134<L>  |  101  |  114<H>  ----------------------------<  147<H>  5.3   |  16<L>  |  3.56<H>    Ca    9.9      18 Jul 2020 07:05  Phos  4.9     07-18  Mg     2.7     07-18    TPro  6.2  /  Alb  3.1<L>  /  TBili  1.0  /  DBili  x   /  AST  27  /  ALT  34  /  AlkPhos  59  07-17    PT/INR - ( 17 Jul 2020 06:33 )   PT: 20.0 sec;   INR: 1.86 ratio         PTT - ( 17 Jul 2020 06:33 )  PTT:36.7 sec      Serum Pro-Brain Natriuretic Peptide: 83213 pg/mL (07-18 @ 07:05)  Serum Pro-Brain Natriuretic Peptide: 84414 pg/mL (07-14 @ 11:55)          New results/imaging:

## 2020-07-18 NOTE — PROGRESS NOTE ADULT - ASSESSMENT
A/P: 74 year old male with PMH of NICM (EF<20%) s/p BIV ICD 2012, AF diagnosed in 2015 s/p DCCV 2015, CKD, DM-2, hypothyroidism; initially presented to [facility] with worsening dyspnea on exertion and shortness of breath, found to be in AF with RVR hypotension systolic in 80’s. Patient was transferred to [facility] for further AF management and ICD generator change.    - cont iv diuresis  - cont cardioversion / ep flu    DM2- iss

## 2020-07-18 NOTE — PHYSICAL THERAPY INITIAL EVALUATION ADULT - PERTINENT HX OF CURRENT PROBLEM, REHAB EVAL
73 y/o M with PMH of chronic systolic heart failure (EF 10%) s/p ICD, Afib s/p DCCV, CKD, DM-2, hypothyroidism; initially presented to [facility] with ALCAZAR/SOB and found to be in Afib with RVR and having hypotension and EDIE on CKD; transferred to [facility] for EP eval for possible Afib ablation and ICD battery change. Pt is planned for ANAHI/DCCV and generator change on 7/20.

## 2020-07-18 NOTE — DIETITIAN INITIAL EVALUATION ADULT. - OTHER INFO
"74 year old male with PMH of chronic systolic heart failure (EF 10%) s/p ICD, Afib s/p DCCV, CKD, DM-2, hypothyroidism; initially presented to [facility] with ALCAZAR/SOB and found to be in Afib with RVR and having hypotension and EDIE on CKD; transferred to [facility] for EP eval for possible Afib ablation and ICD battery change."    Dosing weight 7/17 - 212.3 pounds (standing) - pt noted edematous with lasix currently being held for hypotension and EDIE on CKD; Pt taking PO lasix PTA.   Per H&P, NKFA, and micronutrient supplementation PTA includes potassium chloride.    Hx of DM, A1c 6.1%, revealing optimal glycemic control PTA "74 year old male with PMH of chronic systolic heart failure (EF 10%) s/p ICD, Afib s/p DCCV, CKD, DM-2, hypothyroidism; initially presented to [facility] with ALCAZAR/SOB and found to be in Afib with RVR and having hypotension and EDIE on CKD; transferred to [facility] for EP eval for possible Afib ablation and ICD battery change."    Pt notes he usually consumes at least 3 meals/day PTA, admits to slight decrease in appetite x4 days, consuming ~75% of usual intake. Admits at home he follows a Consistent Carb/Sugar restricted & Low sodium diet and is conscious of protein needs.    Admits to taking daily weights; UBW "ranges from high 180's to low 190's". Dosing weight 7/17 - 212.3 pounds (standing) - pt noted edematous with lasix currently being held for hypotension and EDIE on CKD; Pt taking PO lasix PTA. Pt notes weight used to be in 300's prior to losing 150 pounds 11 years ago (intentional); joined [employer]. Pt knowledgeable about overall healthy lifestyle. Pt notes after he lost weigh he was able to control his DM without medication - does not monitor BG daily. Current A1c 6.1% (7/18), revealing optimal glycemic control PTA.    Pt declined Nutrition focused physical exam at this time, no overt signs of muscle wasting/fat loss observed. Denies N+V, admits to loose bowels with last BM 7/18. No difficulties chewing/swallowing. Per H&P, NKFA, and micronutrient supplementation PTA includes potassium chloride.    Briefly reviewed HF diet education. Discussed Na restriction, foods high in Na to avoid, reading food labels, tips for limiting Na in your diet. Reviewed daily weights, Wt gain parameters to contact MD. Discussed Na intake in relation to fluid retention, edema and Wt gain. Also reviewed dietary recommendations for EDIE on CKD -  Reviewed Phos restriction (phos currently elevated), discussed relationship to Protein and Phos, foods high in Phos and portion sizing. Pt verbalized understanding and accepted written materials.

## 2020-07-18 NOTE — CONSULT NOTE ADULT - ATTENDING COMMENTS
Patient was seen and examined by me on 07/18/2020,interim events noted,labs and radiology studies reviewed.  Loyd Mitchell MD,FACC.  [address] [place] [address].  [id number].  [phone]

## 2020-07-18 NOTE — DIETITIAN INITIAL EVALUATION ADULT. - ADD RECOMMEND
1. Continue Consistent CHO, DASH diet as ordered. Monitor fluid intake. 2. Recommend Multivitamin supplementation daily. 3. RD to provide Diet Mighty Shake x2 daily (per servinkcals, 7g pro). 4. Diet education provided with literature presented at bedside. Pt made aware RD remains available for additional information PRN. 5. Monitor PO intake/tolerance, weights, labs, hydration status, bowels, and skin integrity.

## 2020-07-18 NOTE — DIETITIAN INITIAL EVALUATION ADULT. - PROBLEM SELECTOR PLAN 2
-Patient reportedly with an EF of 10%.   -Holding Lasix and Entresto and spironolactone for now in view of hypotension and EDIE on CKD.   -Holding carvedilol for now in view of hypotension.  -F/u Echo report done at [facility] before transfer.   -F/u with EP regarding ICD ?battery change.   -ICD reportedly interrogated at OSH.   -Strict I's/O's and daily weights.   -Consider advanced heart failure team consultation.  -Wean O2 as tolerated.  -F/u BNP in am.

## 2020-07-18 NOTE — PROGRESS NOTE ADULT - ASSESSMENT
A/P: 74 year old male with PMH of NICM (EF<20%) s/p BIV ICD 2012, AF diagnosed in 2015 s/p DCCV 2015, CKD, DM-2, hypothyroidism; initially presented to [facility] with worsening dyspnea on exertion and shortness of breath, found to be in AF with RVR hypotension systolic in 80’s. Patient was transferred to [facility] for further AF management and ICD generator change.    - clinically stable with no active palpitations but endorsing mild GI symptoms  - telemetry reviewed, in AF  - continue eliquis for a/c and amiodarone  - NPO after midnight Sunday for ANAHI/DCCV on Monday (7/20)  - will continue to follow, please call with further questions    Mane Rao, #[phone]  Cardiology Fellow

## 2020-07-18 NOTE — PROGRESS NOTE ADULT - SUBJECTIVE AND OBJECTIVE BOX
SUBJECTIVE / OVERNIGHT EVENTS: pt denies chest pain, shortness of breath       MEDICATIONS  (STANDING):  allopurinol 100 milliGRAM(s) Oral daily  aMIOdarone    Tablet 200 milliGRAM(s) Oral daily  apixaban 5 milliGRAM(s) Oral two times a day  dextrose 5%. 1000 milliLiter(s) (50 mL/Hr) IV Continuous <Continuous>  dextrose 50% Injectable 12.5 Gram(s) IV Push once  dextrose 50% Injectable 25 Gram(s) IV Push once  dextrose 50% Injectable 25 Gram(s) IV Push once  insulin lispro (HumaLOG) corrective regimen sliding scale   SubCutaneous three times a day before meals  insulin lispro (HumaLOG) corrective regimen sliding scale   SubCutaneous at bedtime  levothyroxine 25 MICROGram(s) Oral daily  midodrine. 5 milliGRAM(s) Oral three times a day    MEDICATIONS  (PRN):  acetaminophen   Tablet .. 650 milliGRAM(s) Oral every 6 hours PRN Mild Pain (1 - 3), Moderate Pain (4 - 6)  dextrose 40% Gel 15 Gram(s) Oral once PRN Blood Glucose LESS THAN 70 milliGRAM(s)/deciliter  glucagon  Injectable 1 milliGRAM(s) IntraMuscular once PRN Glucose LESS THAN 70 milligrams/deciliter      Vital Signs Last 24 Hrs  T(C): 36.8 (18 Jul 2020 12:40), Max: 36.8 (18 Jul 2020 12:40)  T(F): 98.3 (18 Jul 2020 12:40), Max: 98.3 (18 Jul 2020 12:40)  HR: 106 (18 Jul 2020 14:11) (88 - 109)  BP: 90/58 (18 Jul 2020 14:11) (84/58 - 104/71)  BP(mean): --  RR: 18 (18 Jul 2020 12:40) (18 - 18)  SpO2: 98% (18 Jul 2020 14:11) (95% - 98%)  CAPILLARY BLOOD GLUCOSE      POCT Blood Glucose.: 155 mg/dL (18 Jul 2020 16:34)  POCT Blood Glucose.: 158 mg/dL (18 Jul 2020 11:28)  POCT Blood Glucose.: 147 mg/dL (18 Jul 2020 07:28)  POCT Blood Glucose.: 125 mg/dL (17 Jul 2020 21:21)    I&O's Summary    17 Jul 2020 07:01  -  18 Jul 2020 07:00  --------------------------------------------------------  IN: 620 mL / OUT: 550 mL / NET: 70 mL    18 Jul 2020 07:01  -  18 Jul 2020 20:32  --------------------------------------------------------  IN: 240 mL / OUT: 200 mL / NET: 40 mL        Constitutional: No fever, fatigue  Skin: No rash.  Eyes: No recent vision problems or eye pain.  ENT: No congestion, ear pain, or sore throat.  Cardiovascular: No chest pain or palpation.  Respiratory: No cough, shortness of breath, congestion, or wheezing.  Gastrointestinal: No abdominal pain, nausea, vomiting, or diarrhea.  Genitourinary: No dysuria.  Musculoskeletal: No joint swelling.  Neurologic: No headache.    PHYSICAL EXAM:  GENERAL: NAD  EYES: EOMI, PERRLA  NECK: Supple, No JVD  CHEST/LUNG: crackles at bases  HEART:  S1 , S2 +  ABDOMEN: soft , bs+  EXTREMITIES:  edema+  NEUROLOGY:alert awake       LABS:                        13.5   6.57  )-----------( 131      ( 18 Jul 2020 07:07 )             42.0     07-18    134<L>  |  101  |  114<H>  ----------------------------<  147<H>  5.3   |  16<L>  |  3.56<H>    Ca    9.9      18 Jul 2020 07:05  Phos  4.9     07-18  Mg     2.7     07-18    TPro  6.2  /  Alb  3.1<L>  /  TBili  1.0  /  DBili  x   /  AST  27  /  ALT  34  /  AlkPhos  59  07-17    PT/INR - ( 17 Jul 2020 06:33 )   PT: 20.0 sec;   INR: 1.86 ratio         PTT - ( 17 Jul 2020 06:33 )  PTT:36.7 sec          RADIOLOGY & ADDITIONAL TESTS:    Imaging Personally Reviewed:    Consultant(s) Notes Reviewed:      Care Discussed with Consultants/Other Providers:

## 2020-07-18 NOTE — DIETITIAN INITIAL EVALUATION ADULT. - PHYSICAL APPEARANCE
Ht: 5'10"  Wt: 212.3 pounds  BMI: 30.5 kg/m2 IBW: 166 pounds (+/-10%) 128%IBW  Edema: 2+ R+L leg/ankle  Skin per nursing documentation: no pressure injuries noted other (specify)

## 2020-07-18 NOTE — CONSULT NOTE ADULT - SUBJECTIVE AND OBJECTIVE BOX
DATE OF SERVICE:Patient was seen,examined and evaluated on-07/18/2020    CHIEF COMPLAINT:    HPI:74 year old male with PMH of Chronic systolic heart failure (EF 10%) s/p ICD, Atrial Fibrillation s/p DCCV, CKD, DM-2, hypothyroidism; initially presented to [facility] with ALCAZAR/SOB and found to be in Afib with RVR and having hypotension and EDIE on CKD; transferred to [facility] for EP eval for possible Afib ablation and ICD battery change. Diuretics and carvedilol being held for hypotension and EDIE on CKD. Amiodarone and midodrine initiated. (17 Jul 2020 12:57)      PAST MEDICAL & SURGICAL HISTORY:  Hypothyroidism  Afib  Type II diabetes mellitus  Aortic insufficiency  Mitral insufficiency  CKD (chronic kidney disease)  Cardiomyopathy: Systolic CHF  Hypertension  History of tonsillectomy: childhood  AICD (automatic cardioverter/defibrillator) present: 8/2012      MEDICATIONS  (STANDING):  allopurinol 100 milliGRAM(s) Oral daily  aMIOdarone    Tablet 200 milliGRAM(s) Oral daily  apixaban 5 milliGRAM(s) Oral two times a day  dextrose 5%. 1000 milliLiter(s) (50 mL/Hr) IV Continuous <Continuous>  dextrose 50% Injectable 12.5 Gram(s) IV Push once  dextrose 50% Injectable 25 Gram(s) IV Push once  dextrose 50% Injectable 25 Gram(s) IV Push once  insulin lispro (HumaLOG) corrective regimen sliding scale   SubCutaneous three times a day before meals  insulin lispro (HumaLOG) corrective regimen sliding scale   SubCutaneous at bedtime  levothyroxine 25 MICROGram(s) Oral daily  midodrine. 5 milliGRAM(s) Oral three times a day    MEDICATIONS  (PRN):  acetaminophen   Tablet .. 650 milliGRAM(s) Oral every 6 hours PRN Mild Pain (1 - 3), Moderate Pain (4 - 6)  dextrose 40% Gel 15 Gram(s) Oral once PRN Blood Glucose LESS THAN 70 milliGRAM(s)/deciliter  glucagon  Injectable 1 milliGRAM(s) IntraMuscular once PRN Glucose LESS THAN 70 milligrams/deciliter      FAMILY HISTORY:  Family history of CVA    No family history of premature coronary artery disease or sudden cardiac death    SOCIAL HISTORY:  Smoking-  Alcohol-  Ilicit Drug use-    REVIEW OF SYSTEMS:  Constitutional: [ ] fever, [ ]weight loss, [ ]fatigue Activity [ ] Bedbound,[ ] Ambulates [ ] Unassisted[ ] Cane/Walker [ ] Assistence.  Eyes: [ ] visual changes  Respiratory: [ ]shortness of breath;  [ ] cough, [ ]wheezing, [ ]chills, [ ]hemoptysis  Cardiovascular: [ ] chest pain, [ ]palpitations, [ ]dizziness,  [ ]leg swelling[ ]orthopnea [ ]PND  Gastrointestinal: [ ] abdominal pain, [ ]nausea, [ ]vomiting,  [ ]diarrhea,[ ]constipation  Genitourinary: [ ] dysuria, [ ] hematuria  Neurologic: [ ] headaches [ ] tremors[ ] weakness  Skin: [ ] itching, [ ]burning, [ ] rashes  Endocrine: [ ] heat or cold intolerance  Musculoskeletal: [ ] joint pain or swelling; [ ] muscle, back, or extremity pain  Psychiatric: [ ] depression, [ ]anxiety, [ ]mood swings, or [ ]difficulty sleeping  Hematologic: [ ] easy bruising, [ ] bleeding gums       [ x] All others negative	  [ ] Unable to obtain    Vital Signs Last 24 Hrs  T(C): 36.6 (18 Jul 2020 04:34), Max: 36.6 (17 Jul 2020 10:34)  T(F): 97.9 (18 Jul 2020 04:34), Max: 97.9 (17 Jul 2020 11:38)  HR: 88 (18 Jul 2020 04:34) (76 - 93)  BP: 104/71 (18 Jul 2020 04:34) (93/64 - 104/71)  BP(mean): --  RR: 18 (18 Jul 2020 04:34) (18 - 18)  SpO2: 98% (18 Jul 2020 04:34) (95% - 100%)  I&O's Summary    17 Jul 2020 07:01  -  18 Jul 2020 07:00  --------------------------------------------------------  IN: 620 mL / OUT: 550 mL / NET: 70 mL        PHYSICAL EXAM:  General: No acute distress BMI-  HEENT: EOMI, PERRL[ ] Icteric  Neck: Supple, No JVD  Lungs: Equal air entry bilaterally; [ ] Rales [ ] Rhonchi [ ] Wheezing  Heart: Regular rate and rhythm;[ ] Murmurs-   /6 [ ] Systolic [ ] Diastolic [ ] Radiation,No rubs, or gallops  Abdomen: Nontender, bowel sounds present  Extremities: No clubbing, cyanosis, or edema[ ] Calf tenderness  Nervous system:  Alert & Oriented X3, no focal deficits  Psychiatric: Normal affect  Skin: No rashes or lesions      LABS:  07-18    134<L>  |  101  |  114<H>  ----------------------------<  147<H>  5.3   |  16<L>  |  3.56<H>    Ca    9.9      18 Jul 2020 07:05  Phos  4.9     07-18  Mg     2.7     07-18    TPro  6.2  /  Alb  3.1<L>  /  TBili  1.0  /  DBili  x   /  AST  27  /  ALT  34  /  AlkPhos  59  07-17    Creatinine Trend: 3.56<--, 3.19<--, 2.91<--, 2.97<--, 2.87<--, 2.76<--                        13.5   6.57  )-----------( 131      ( 18 Jul 2020 07:07 )             42.0     PT/INR - ( 17 Jul 2020 06:33 )   PT: 20.0 sec;   INR: 1.86 ratio         PTT - ( 17 Jul 2020 06:33 )  PTT:36.7 sec    Lipid Panel:   Cardiac Enzymes:     Serum Pro-Brain Natriuretic Peptide: [id number] pg/mL (07-18-20 @ 07:05)        RADIOLOGY:    ECG [my interpretation]:    TELEMETRY:    ECHO:    STRESS TEST:    CATHETERIZATION: DATE OF SERVICE:Patient was seen,examined and evaluated on-07/18/2020    CHIEF COMPLAINT:Dyspnea ADHF    HPI:74 year old male with PMH of Chronic systolic heart failure (EF 10%) s/p ICD, Atrial Fibrillation s/p DCCV, CKD, DM-2, hypothyroidism; initially presented to [facility] with ALCAZAR/SOB and found to be in Afib with RVR and having hypotension and EDIE on CKD; transferred to [facility] for EP eval for possible Afib ablation and ICD battery change. Diuretics and carvedilol being held for hypotension and EDIE on CKD. Amiodarone and midodrine initiated.No chest pain remains in Atrial Fibrillation Dyspnea stable     PAST MEDICAL & SURGICAL HISTORY:  Hypothyroidism  Afib  Type II diabetes mellitus  Aortic insufficiency  Mitral insufficiency  CKD (chronic kidney disease)  Cardiomyopathy: Systolic CHF  Hypertension  History of tonsillectomy: childhood  AICD (automatic cardioverter/defibrillator) present: 8/2012      MEDICATIONS  (STANDING):  allopurinol 100 milliGRAM(s) Oral daily  aMIOdarone    Tablet 200 milliGRAM(s) Oral daily  apixaban 5 milliGRAM(s) Oral two times a day  insulin lispro (HumaLOG) corrective regimen sliding scale   SubCutaneous three times a day before meals  insulin lispro (HumaLOG) corrective regimen sliding scale   SubCutaneous at bedtime  levothyroxine 25 MICROGram(s) Oral daily  midodrine. 5 milliGRAM(s) Oral three times a day    MEDICATIONS  (PRN):  acetaminophen   Tablet .. 650 milliGRAM(s) Oral every 6 hours PRN Mild Pain (1 - 3), Moderate Pain (4 - 6)  dextrose 40% Gel 15 Gram(s) Oral once PRN Blood Glucose LESS THAN 70 milliGRAM(s)/deciliter  glucagon  Injectable 1 milliGRAM(s) IntraMuscular once PRN Glucose LESS THAN 70 milligrams/deciliter      FAMILY HISTORY:  Family history of CVA    No family history of premature coronary artery disease or sudden cardiac death    SOCIAL HISTORY:  Smoking-Former Smoker  Alcohol-Denies  Ilicit Drug use-Denies    REVIEW OF SYSTEMS:  Constitutional: [ ] fever, [ ]weight loss, [x ]fatigue Activity [ ] Bedbound,[x ] Ambulates [x ] Unassisted[ ] Cane/Walker [ ] Assistence.  Eyes: [ ] visual changes  Respiratory: [x ]shortness of breath;  [ ] cough, [ ]wheezing, [ ]chills, [ ]hemoptysis  Cardiovascular: [ ] chest pain, [ ]palpitations, [ ]dizziness,  [ ]leg swelling[ ]orthopnea [ ]PND  Gastrointestinal: [x ] abdominal pain, [ ]nausea, [ ]vomiting,  [ ]diarrhea,[ ]constipation  Genitourinary: [ ] dysuria, [ ] hematuria  Neurologic: [ ] headaches [ ] tremors[ ] weakness  Skin: [ ] itching, [ ]burning, [ ] rashes  Endocrine: [ ] heat or cold intolerance  Musculoskeletal: [ ] joint pain or swelling; [ ] muscle, back, or extremity pain  Psychiatric: [ ] depression, [ ]anxiety, [ ]mood swings, or [ ]difficulty sleeping  Hematologic: [ ] easy bruising, [ ] bleeding gums       [ x] All others negative	  [ ] Unable to obtain    Vital Signs Last 24 Hrs  T(C): 36.6 (18 Jul 2020 04:34), Max: 36.6 (17 Jul 2020 10:34)  T(F): 97.9 (18 Jul 2020 04:34), Max: 97.9 (17 Jul 2020 11:38)  HR: 88 (18 Jul 2020 04:34) (76 - 93)  BP: 104/71 (18 Jul 2020 04:34) (93/64 - 104/71)  RR: 18 (18 Jul 2020 04:34) (18 - 18)  SpO2: 98% (18 Jul 2020 04:34) (95% - 100%)  I&O's Summary    17 Jul 2020 07:01  -  18 Jul 2020 07:00  --------------------------------------------------------  IN: 620 mL / OUT: 550 mL / NET: 70 mL        PHYSICAL EXAM:  General: No acute distress BMI-30.5  HEENT: EOMI, PERRL[ ] Icteric  Neck: Supple, No JVD  Lungs: Equal air entry bilaterally; [ ] Rales [ ] Rhonchi [ ] Wheezing  Heart: Irregular rate and rhythm;[x ] Murmurs-  2 /6 [x ] Systolic [ ] Diastolic [ ] Radiation,No rubs, or gallops  Abdomen: Nontender, bowel sounds present  Extremities: No clubbing, cyanosis, 1+ edema[ ] Calf tenderness  Nervous system:  Alert & Oriented X3, no focal deficits  Psychiatric: Normal affect  Skin: No rashes or lesions      LABS:  07-18    134<L>  |  101  |  114<H>  ----------------------------<  147<H>  5.3   |  16<L>  |  3.56<H>    Ca    9.9      18 Jul 2020 07:05  Phos  4.9     07-18  Mg     2.7     07-18    TPro  6.2  /  Alb  3.1<L>  /  TBili  1.0  /  DBili  x   /  AST  27  /  ALT  34  /  AlkPhos  59  07-17    Creatinine Trend: 3.56<--, 3.19<--, 2.91<--, 2.97<--, 2.87<--, 2.76<--                        13.5   6.57  )-----------( 131      ( 18 Jul 2020 07:07 )             42.0     PT/INR - ( 17 Jul 2020 06:33 )   PT: 20.0 sec;   INR: 1.86 ratio         PTT - ( 17 Jul 2020 06:33 )  PTT:36.7 sec      Serum Pro-Brain Natriuretic Peptide: [id number] pg/mL (07-18-20 @ 07:05)    COVID-19  Antibody - for prior infection screening (07.17.20 @ 16:47)    COVID-19 IgG Antibody Index: <0.10: [employer] CMIA  Negative Result    <= 1.39 Index  Positive Result      >= 1.40 Index Index    COVID-19 IgG Antibody Interpretation: Negative    COVID-19 PCR . (07.14.20 @ 11:55)    COVID-19 PCR: NotDetected        RADIOLOGY:   XR CHEST PORTABLE IMMED 1V               IMPRESSION: No definite evidence for focal infiltrate or lobar consolidation.      ECG [my interpretation]:Atrial Fibrillation at 92 BPM Demand Paced  LBBB QRS 172ms  QTc 576ms    TELEMETRY:Atrial Fibrillation Demand V paced    ECHO:PROCEDURE DATE:  07/16/2020    Summary:   1. Left ventricular ejection fraction, by visual estimation, is <20%.   2. Technically good study.   3. Severely decreased global left ventricular systolic function.   4. Multiple left ventricular regional wall motion abnormalities exist. See wall motion findings.   5. Dilated cardiomyopathy.   6. Moderate to severely increased left ventricular internal cavity size.   7. The mitral in-flow pattern reveals no discernable A-wave, therefore no comment on diastolic function can be made.   8. Moderate to severe mitral valve regurgitation.   9. The mitral valve leaflets are tethered which is due to reduced systolic function and elevated LVDP.  10. Moderate tricuspid regurgitation.  11. Moderate pulmonic valve regurgitation.  12. Estimated pulmonary artery systolic pressure is 57.2 mmHg assuming a right atrial pressure of 15 mmHg, which is consistent with moderate/severe pulmonary hypertension.  13. Comparedwith the study from 11-23-19, findings are similar.

## 2020-07-18 NOTE — DIETITIAN INITIAL EVALUATION ADULT. - PERTINENT LABORATORY DATA
07-18 Na134 mmol/L<L> Glu 147 mg/dL<H> K+ 5.3 mmol/L Cr  3.56 mg/dL<H>  mg/dL<H> Phos 4.9 mg/dL<H> Alb n/a   PAB n/a

## 2020-07-18 NOTE — DIETITIAN INITIAL EVALUATION ADULT. - PERTINENT MEDS FT
MEDICATIONS  (STANDING):  allopurinol 100 milliGRAM(s) Oral daily  aMIOdarone    Tablet 200 milliGRAM(s) Oral daily  apixaban 5 milliGRAM(s) Oral two times a day  dextrose 5%. 1000 milliLiter(s) (50 mL/Hr) IV Continuous <Continuous>  dextrose 50% Injectable 12.5 Gram(s) IV Push once  dextrose 50% Injectable 25 Gram(s) IV Push once  dextrose 50% Injectable 25 Gram(s) IV Push once  insulin lispro (HumaLOG) corrective regimen sliding scale   SubCutaneous three times a day before meals  insulin lispro (HumaLOG) corrective regimen sliding scale   SubCutaneous at bedtime  levothyroxine 25 MICROGram(s) Oral daily  midodrine. 5 milliGRAM(s) Oral three times a day

## 2020-07-18 NOTE — PHYSICAL THERAPY INITIAL EVALUATION ADULT - ADDITIONAL COMMENTS
Patient lives in a private home with his niece  4 AKHIL. +HR, as per pt he was independent w/ all ADLs and functional mobility PTA, pt owns a SC but haven't used in 11 years.

## 2020-07-18 NOTE — DIETITIAN INITIAL EVALUATION ADULT. - PROBLEM SELECTOR PLAN 5
-Thrombocytopenia noted.  -Monitor CBC daily.   -C/w AC cautiously.   -Will check iron studies and B12 and folate in am.

## 2020-07-19 NOTE — PROGRESS NOTE ADULT - SUBJECTIVE AND OBJECTIVE BOX
SUBJECTIVE / OVERNIGHT EVENTS: pt denies chest pain, shortness of breath     MEDICATIONS  (STANDING):  allopurinol 100 milliGRAM(s) Oral daily  aMIOdarone    Tablet 200 milliGRAM(s) Oral daily  apixaban 5 milliGRAM(s) Oral two times a day  dextrose 5%. 1000 milliLiter(s) (50 mL/Hr) IV Continuous <Continuous>  dextrose 50% Injectable 12.5 Gram(s) IV Push once  dextrose 50% Injectable 25 Gram(s) IV Push once  dextrose 50% Injectable 25 Gram(s) IV Push once  insulin lispro (HumaLOG) corrective regimen sliding scale   SubCutaneous three times a day before meals  insulin lispro (HumaLOG) corrective regimen sliding scale   SubCutaneous at bedtime  levothyroxine 25 MICROGram(s) Oral daily  midodrine. 5 milliGRAM(s) Oral three times a day    MEDICATIONS  (PRN):  acetaminophen   Tablet .. 650 milliGRAM(s) Oral every 6 hours PRN Mild Pain (1 - 3), Moderate Pain (4 - 6)  aluminum hydroxide/magnesium hydroxide/simethicone Suspension 30 milliLiter(s) Oral every 4 hours PRN Dyspepsia  dextrose 40% Gel 15 Gram(s) Oral once PRN Blood Glucose LESS THAN 70 milliGRAM(s)/deciliter  glucagon  Injectable 1 milliGRAM(s) IntraMuscular once PRN Glucose LESS THAN 70 milligrams/deciliter    Vital Signs Last 24 Hrs  T(C): 36.5 (2020 20:25), Max: 36.7 (2020 04:03)  T(F): 97.7 (2020 20:25), Max: 98 (2020 04:03)  HR: 76 (2020 20:25) (76 - 108)  BP: 98/60 (2020 20:57) (88/58 - 99/64)  BP(mean): --  RR: 17 (2020 20:25) (17 - 18)  SpO2: 98% (2020 20:25) (92% - 99%)    Constitutional: No fever, fatigue  Skin: No rash.  Eyes: No recent vision problems or eye pain.  ENT: No congestion, ear pain, or sore throat.  Cardiovascular: No chest pain or palpation.  Respiratory: No cough, shortness of breath, congestion, or wheezing.  Gastrointestinal: No abdominal pain, nausea, vomiting, or diarrhea.  Genitourinary: No dysuria.  Musculoskeletal: No joint swelling.  Neurologic: No headache.    PHYSICAL EXAM:  GENERAL: NAD  EYES: EOMI, PERRLA  NECK: Supple, No JVD  CHEST/LUNG: crackles at bases  HEART:  S1 , S2 +  ABDOMEN: soft , bs+  EXTREMITIES:  edema+  NEUROLOGY:alert awake       LABS:      133<L>  |  99  |  123<H>  ----------------------------<  133<H>  4.9   |  16<L>  |  3.85<H>    Ca    10.1      2020 07:22  Phos  4.9     07-18  Mg     2.7     07-18      Creatinine Trend: 3.85 <--, 3.56 <--, 3.19 <--, 2.91 <--, 2.97 <--, 2.87 <--, 2.76 <--                        14.2   5.97  )-----------( 154      ( 2020 07:22 )             44.0     Urine Studies:  Urinalysis Basic - ( 2020 14:23 )    Color: Yellow / Appearance: Clear / S.020 / pH:   Gluc:  / Ketone: Negative  / Bili: Negative / Urobili: Negative mg/dL   Blood:  / Protein: 30 mg/dL / Nitrite: Negative   Leuk Esterase: Negative / RBC:  / WBC 0-2   Sq Epi:  / Non Sq Epi:  / Bacteria:       Sodium, Random Urine: <20 mmol/L ( @ 00:45)  Creatinine, Random Urine: 131 mg/dL ( @ 00:45)              PTT - ( 2020 06:33 )  PTT:36.7 sec          RADIOLOGY & ADDITIONAL TESTS:    Imaging Personally Reviewed:    Consultant(s) Notes Reviewed:      Care Discussed with Consultants/Other Providers:

## 2020-07-19 NOTE — PROGRESS NOTE ADULT - ASSESSMENT
A/P: 74 year old male with PMH of NICM (EF<20%) s/p BIV ICD 2012, AF diagnosed in 2015 s/p DCCV 2015, CKD, DM-2, hypothyroidism; initially presented to [facility] with worsening dyspnea on exertion and shortness of breath, found to be in AF with RVR hypotension systolic in 80’s. Patient was transferred to [facility] for further AF management and ICD generator change.    - cont iv diuresis  - cont cardioversion / ep flu    DM2- iss

## 2020-07-19 NOTE — PROGRESS NOTE ADULT - SUBJECTIVE AND OBJECTIVE BOX
DATE OF SERVICE:Patient was seen and examined :07/19/2020    PRESENTING CC:ADHF/Atrial Fibrillation    SUBJ: 74 year old male with PMH of Chronic systolic heart failure (EF 10%) s/p ICD, Atrial Fibrillation s/p DCCV, CKD, DM-2, hypothyroidism; initially presented to [facility] with ALCAZAR/SOB and found to be in Afib with RVR and having hypotension and EDIE on CKD remains in atrial fibrillation no dyspnea but refers to abdominal discomfort mild nausea has not had a good BM though feels like going to bathroom frequently      PMH -reviewed admission note, no change since admission  Heart failure: acute [ ] chronic [ ] acute or chronic [x ] diastolic [ ] systolic [x ] combined systolic and diastolic[ ]  EDIE: ATN[ ] renal medullary necrosis [ ] CKD I [ ]CKDII [ ]CKD III [ ]CKD IV [ ]CKD V [ ]Other pathological lesions [ ]    MEDICATIONS  (STANDING):  allopurinol 100 milliGRAM(s) Oral daily  aMIOdarone    Tablet 200 milliGRAM(s) Oral daily  apixaban 5 milliGRAM(s) Oral two times a day  dextrose 50% Injectable 25 Gram(s) IV Push once  insulin lispro (HumaLOG) corrective regimen sliding scale   SubCutaneous three times a day before meals  insulin lispro (HumaLOG) corrective regimen sliding scale   SubCutaneous at bedtime  levothyroxine 25 MICROGram(s) Oral daily  midodrine. 5 milliGRAM(s) Oral three times a day    MEDICATIONS  (PRN):  acetaminophen   Tablet .. 650 milliGRAM(s) Oral every 6 hours PRN Mild Pain (1 - 3), Moderate Pain (4 - 6)  dextrose 40% Gel 15 Gram(s) Oral once PRN Blood Glucose LESS THAN 70 milliGRAM(s)/deciliter  glucagon  Injectable 1 milliGRAM(s) IntraMuscular once PRN Glucose LESS THAN 70 milligrams/deciliter              REVIEW OF SYSTEMS:  Constitutional: [ ] fever, [ ]weight loss,  [x ]fatigue  Eyes: [ ] visual changes  Respiratory: [ ]shortness of breath;  [ ] cough, [ ]wheezing, [ ]chills, [ ]hemoptysis  Cardiovascular: [ ] chest pain, [ ]palpitations, [ ]dizziness,  [ ]leg swelling[ ]orthopnea[ ]PND  Gastrointestinal: [x ] abdominal pain, [x ]nausea, [ ]vomiting,  [ ]diarrhea   Genitourinary: [ ] dysuria, [ ] hematuria  Neurologic: [ ] headaches [ ] tremors[ ]weakness  Skin: [ ] itching, [ ]burning, [ ] rashes  Endocrine: [ ] heat or cold intolerance  Musculoskeletal: [ ] joint pain or swelling; [ ] muscle, back, or extremity pain  Psychiatric: [ ] depression, [ ]anxiety, [ ]mood swings, or [ ]difficulty sleeping  Hematologic: [ ] easy bruising, [ ] bleeding gums    [x] All remaining systems negative except as per above.   [ ]Unable to obtain.    Vital Signs Last 24 Hrs  T(C): 36.7 (19 Jul 2020 04:03), Max: 36.8 (18 Jul 2020 12:40)  T(F): 98 (19 Jul 2020 04:03), Max: 98.3 (18 Jul 2020 12:40)  HR: 104 (19 Jul 2020 04:03) (102 - 109)  BP: 99/64 (19 Jul 2020 04:03) (84/58 - 99/64)  RR: 18 (19 Jul 2020 04:03) (18 - 18)  SpO2: 99% (19 Jul 2020 04:03) (95% - 99%)  I&O's Summary    18 Jul 2020 07:01  -  19 Jul 2020 07:00  --------------------------------------------------------  IN: 240 mL / OUT: 200 mL / NET: 40 mL        PHYSICAL EXAM:  General: No acute distress BMI-29  HEENT: EOMI, PERRL  Neck: Supple, [ ] JVD  Lungs: Equal air entry bilaterally; [ ] rales [ ] wheezing [ ] rhonchi  Heart: Irregular rate and rhythm; [x ] murmur   2/6 [x ] systolic [ ] diastolic [ ] radiation[ ] rubs [ ]  gallops  Abdomen: Nontender, bowel sounds present  Extremities: No clubbing, cyanosis, [x ] edema  Nervous system:  Alert & Oriented X3, no focal deficits  Psychiatric: Normal affect  Skin: No rashes or lesions    LABS:  07-18    134<L>  |  101  |  114<H>  ----------------------------<  147<H>  5.3   |  16<L>  |  3.56<H>    Ca    9.9      18 Jul 2020 07:05  Phos  4.9     07-18  Mg     2.7     07-18      Creatinine Trend: 3.56<--, 3.19<--, 2.91<--, 2.97<--, 2.87<--, 2.76<--                        13.5   6.57  )-----------( 131      ( 18 Jul 2020 07:07 )             42.0       Serum Pro-Brain Natriuretic Peptide: 15342 pg/mL (07-18-20 @ 07:05)        TELEMETRY:Atrial Fibrillation ~~ 100's Demand V Paved    ECHO:PROCEDURE DATE:  07/16/2020    Summary:   1. Left ventricular ejection fraction, by visual estimation, is <20%.   2. Technically good study.   3. Severely decreased global left ventricular systolic function.   4. Multiple left ventricular regional wall motion abnormalities exist. See wall motion findings.   5. Dilated cardiomyopathy.   6. Moderate to severely increased left ventricular internal cavity size.   7. The mitral in-flow pattern reveals no discernable A-wave, therefore no comment on diastolic function can be made.   8. Moderate to severe mitral valve regurgitation.   9. The mitral valve leaflets are tethered which is due to reduced systolic function and elevated LVDP.  10. Moderate tricuspid regurgitation.  11. Moderate pulmonic valve regurgitation.  12. Estimated pulmonary artery systolic pressure is 57.2 mmHg assuming a right atrial pressure of 15 mmHg, which is consistent with moderate/severe pulmonary hypertension.  13. Comparedwith the study from 11-23-19, findings are similar.    IMPRESSION AND PLAN:    Stable BP now has abdominal discomfort.  -Abdominal Xray  -Laxative      Problem/Plan - 1:  ·  Problem: Atrial fibrillation with RVR.  Plan: -History of cardioversion 2008  -Rate control limited by hypotension  -Amiodarone loaded on maintenance  -AC with Eliquis  -Scheduled  for ANAHI/DCCV      Problem/Plan - 2:  ·  Problem: Acute on chronic systolic (congestive) heart failure.  Plan:EF <20%  -Holding Lasix and Entresto and spironolactone for now in view of hypotension and EDIE on CKD.   -Holding carvedilol for now in view of hypotension.  -ECG-LBBB QRS 172ms  -Consideration for BiVICD    Problem/Plan - 3:  ·  Problem: Acute renal failure superimposed on stage 3 chronic kidney disease, unspecified acute renal failure type.  Plan: -Holding anti-hypertensives and diuretics for now.   -Likely Cardiorenal      Problem/Plan - 4:  ·  Problem: Hypotension, unspecified hypotension type.  Plan: -Likely due to severe systolic CHF and Afib.   -Holding anti-hypertensives and diuretics for now.   -Started on midodrine 5mg TID       Problem/Plan - 5:  ·  Problem: Thrombocytopenia. Plan: -Thrombocytopenia noted.  -Monitor CBC daily.    - 72---> 82 >--- 131K-  -Improved    Problem/Plan - 6:  Problem: Type 2 diabetes mellitus with other specified complication, without long-term current use of insulin.Plan: -Diet controlled reportedly.

## 2020-07-20 NOTE — PROGRESS NOTE ADULT - ASSESSMENT
A/P: 74 year old male with PMH of NICM (EF<20%) s/p BIV ICD 2012, AF diagnosed in 2015 s/p DCCV 2015, CKD, DM-2, hypothyroidism; initially presented to [facility] with worsening dyspnea on exertion and shortness of breath, found to be in AF with RVR hypotension systolic in 80’s. Patient was transferred to [facility] for further AF management and ICD generator change.    - cont iv diuresis  - poss cardioversion / ep flu    DM2- iss

## 2020-07-20 NOTE — PROGRESS NOTE ADULT - ASSESSMENT
74 year old male with PMH of NICM (EF<20%) s/p BIV ICD 2012, AF diagnosed in 2015 s/p DCCV 2015, CKD, DM-2, hypothyroidism; initially presented to [facility] with worsening dyspnea on exertion and shortness of breath, found to be in AF with RVR hypotension systolic in 80’s. Patient was transferred to [facility] for further AF management and ICD generator change.    1. NICM s/p STJ BIV ICD  2. Atrial Fibrillation RVR  3. Systolic HF EF <20%    -Tele: AF  bpm  -Currently NPO for ANAHI/DCCV and ICD generator change today  -Continue Amiodarone 200mg daily  -Continue Eliquis 5mg BID  -EP will continue to follow    [phone]

## 2020-07-20 NOTE — PROGRESS NOTE ADULT - SUBJECTIVE AND OBJECTIVE BOX
DATE OF SERVICE:Patient was seen and examined :07/20/2020    PRESENTING CC:Dyspnea ADHF    SUBJ: 74 year old male with PMH of Chronic systolic heart failure (EF 10%) s/p ICD, Atrial Fibrillation s/p DCCV, CKD, DM-2, hypothyroidism; initially presented to [facility] with ALCAZAR/SOB and found to be in Afib with RVR and having hypotension and EDIE on CKD remains in atrial fibrillation      PMH -reviewed admission note, no change since admission  Heart failure: acute [ ] chronic [ ] acute or chronic [ ] diastolic [ ] systolic [ ] combined systolic and diastolic[ ]  EDIE: ATN[ ] renal medullary necrosis [ ] CKD I [ ]CKDII [ ]CKD III [ ]CKD IV [ ]CKD V [ ]Other pathological lesions [ ]    MEDICATIONS  (STANDING):  allopurinol 100 milliGRAM(s) Oral daily  aMIOdarone    Tablet 200 milliGRAM(s) Oral daily  apixaban 5 milliGRAM(s) Oral two times a day  insulin lispro (HumaLOG) corrective regimen sliding scale   SubCutaneous three times a day before meals  insulin lispro (HumaLOG) corrective regimen sliding scale   SubCutaneous at bedtime  levothyroxine 25 MICROGram(s) Oral daily  midodrine. 5 milliGRAM(s) Oral three times a day    MEDICATIONS  (PRN):  acetaminophen   Tablet .. 650 milliGRAM(s) Oral every 6 hours PRN Mild Pain (1 - 3), Moderate Pain (4 - 6)  aluminum hydroxide/magnesium hydroxide/simethicone Suspension 30 milliLiter(s) Oral every 4 hours PRN Dyspepsia  dextrose 40% Gel 15 Gram(s) Oral once PRN Blood Glucose LESS THAN 70 milliGRAM(s)/deciliter  glucagon  Injectable 1 milliGRAM(s) IntraMuscular once PRN Glucose LESS THAN 70 milligrams/deciliter              REVIEW OF SYSTEMS:  Constitutional: [ ] fever, [ ]weight loss,  [ ]fatigue  Eyes: [ ] visual changes  Respiratory: [ ]shortness of breath;  [ ] cough, [ ]wheezing, [ ]chills, [ ]hemoptysis  Cardiovascular: [ ] chest pain, [ ]palpitations, [ ]dizziness,  [ ]leg swelling[ ]orthopnea[ ]PND  Gastrointestinal: [ ] abdominal pain, [ ]nausea, [ ]vomiting,  [ ]diarrhea   Genitourinary: [ ] dysuria, [ ] hematuria  Neurologic: [ ] headaches [ ] tremors[ ]weakness  Skin: [ ] itching, [ ]burning, [ ] rashes  Endocrine: [ ] heat or cold intolerance  Musculoskeletal: [ ] joint pain or swelling; [ ] muscle, back, or extremity pain  Psychiatric: [ ] depression, [ ]anxiety, [ ]mood swings, or [ ]difficulty sleeping  Hematologic: [ ] easy bruising, [ ] bleeding gums    [x] All remaining systems negative except as per above.   [ ]Unable to obtain.    Vital Signs Last 24 Hrs  T(C): 36.3 (20 Jul 2020 04:00), Max: 36.5 (19 Jul 2020 20:25)  T(F): 97.4 (20 Jul 2020 04:00), Max: 97.7 (19 Jul 2020 20:25)  HR: 99 (20 Jul 2020 04:00) (76 - 108)  BP: 90/69 (20 Jul 2020 04:00) (88/58 - 98/60)  BP(mean): --  RR: 18 (20 Jul 2020 04:00) (17 - 18)  SpO2: 100% (20 Jul 2020 04:00) (92% - 100%)  I&O's Summary    19 Jul 2020 07:01  -  20 Jul 2020 07:00  --------------------------------------------------------  IN: 220 mL / OUT: 385 mL / NET: -165 mL        PHYSICAL EXAM:  General: No acute distress BMI-  HEENT: EOMI, PERRL  Neck: Supple, [ ] JVD  Lungs: Equal air entry bilaterally; [ ] rales [ ] wheezing [ ] rhonchi  Heart: Regular rate and rhythm; [ ] murmur   /6 [ ] systolic [ ] diastolic [ ] radiation[ ] rubs [ ]  gallops  Abdomen: Nontender, bowel sounds present  Extremities: No clubbing, cyanosis, [ ] edema  Nervous system:  Alert & Oriented X3, no focal deficits  Psychiatric: Normal affect  Skin: No rashes or lesions    LABS:  07-19    133<L>  |  99  |  123<H>  ----------------------------<  133<H>  4.9   |  16<L>  |  3.85<H>    Ca    10.1      19 Jul 2020 07:22      Creatinine Trend: 3.85<--, 3.56<--, 3.19<--, 2.91<--, 2.97<--, 2.87<--                        14.2   5.97  )-----------( 154      ( 19 Jul 2020 07:22 )             44.0       Lipid Panel:   Cardiac Enzymes:           RADIOLOGY:    ECG [my interpretation]:    TELEMETRY:    ECHO:    STRESS TEST:    CATHETERIZATION:      IMPRESSION AND PLAN: DATE OF SERVICE:Patient was seen and examined :07/20/2020    PRESENTING CC:Dyspnea ADHF    SUBJ: 74 year old male with PMH of Chronic systolic heart failure (EF 10%) s/p ICD, Atrial Fibrillation s/p DCCV, CKD, DM-2, hypothyroidism; initially presented to [facility] with ALCAZAR/SOB and found to be in Afib with RVR and having hypotension and EDIE on CKD remains in atrial fibrillation-is scheduled for ANAHI/DCCV still refers to abdominal discomfort no diarrhea-AXR no acute findings noted      PMH -reviewed admission note, no change since admission  Heart failure: acute [ ] chronic [ ] acute or chronic [x ] diastolic [ ] systolic [x ] combined systolic and diastolic[ ]  EDIE: ATN[ ] renal medullary necrosis [ ] CKD I [ ]CKDII [ ]CKD III [ ]CKD IV [x ]CKD V [ ]Other pathological lesions [ ]    MEDICATIONS  (STANDING):  allopurinol 100 milliGRAM(s) Oral daily  aMIOdarone    Tablet 200 milliGRAM(s) Oral daily  apixaban 5 milliGRAM(s) Oral two times a day  insulin lispro (HumaLOG) corrective regimen sliding scale   SubCutaneous three times a day before meals  insulin lispro (HumaLOG) corrective regimen sliding scale   SubCutaneous at bedtime  levothyroxine 25 MICROGram(s) Oral daily  midodrine. 5 milliGRAM(s) Oral three times a day    MEDICATIONS  (PRN):  acetaminophen   Tablet .. 650 milliGRAM(s) Oral every 6 hours PRN Mild Pain (1 - 3), Moderate Pain (4 - 6)  aluminum hydroxide/magnesium hydroxide/simethicone Suspension 30 milliLiter(s) Oral every 4 hours PRN Dyspepsia  dextrose 40% Gel 15 Gram(s) Oral once PRN Blood Glucose LESS THAN 70 milliGRAM(s)/deciliter  glucagon  Injectable 1 milliGRAM(s) IntraMuscular once PRN Glucose LESS THAN 70 milligrams/deciliter              REVIEW OF SYSTEMS:  Constitutional: [ ] fever, [ ]weight loss,  [x ]fatigue  Eyes: [ ] visual changes  Respiratory: [x ]shortness of breath;  [ ] cough, [ ]wheezing, [ ]chills, [ ]hemoptysis  Cardiovascular: [ ] chest pain, [ ]palpitations, [ ]dizziness,  [ ]leg swelling[ ]orthopnea[ ]PND  Gastrointestinal: [x ] abdominal pain, [x ]nausea, [ ]vomiting,  [ ]diarrhea   Genitourinary: [ ] dysuria, [ ] hematuria  Neurologic: [ ] headaches [ ] tremors[ ]weakness  Skin: [ ] itching, [ ]burning, [ ] rashes  Endocrine: [ ] heat or cold intolerance  Musculoskeletal: [ ] joint pain or swelling; [ ] muscle, back, or extremity pain  Psychiatric: [ ] depression, [ ]anxiety, [ ]mood swings, or [ ]difficulty sleeping  Hematologic: [ ] easy bruising, [ ] bleeding gums    [x] All remaining systems negative except as per above.   [ ]Unable to obtain.    Vital Signs Last 24 Hrs  T(C): 36.3 (20 Jul 2020 04:00), Max: 36.5 (19 Jul 2020 20:25)  T(F): 97.4 (20 Jul 2020 04:00), Max: 97.7 (19 Jul 2020 20:25)  HR: 99 (20 Jul 2020 04:00) (76 - 108)  BP: 90/69 (20 Jul 2020 04:00) (88/58 - 98/60)  RR: 18 (20 Jul 2020 04:00) (17 - 18)  SpO2: 100% (20 Jul 2020 04:00) (92% - 100%)  I&O's Summary    19 Jul 2020 07:01  -  20 Jul 2020 07:00  --------------------------------------------------------  IN: 220 mL / OUT: 385 mL / NET: -165 mL        PHYSICAL EXAM:  General: No acute distress BMI-30.5  HEENT: EOMI, PERRL  Neck: Supple, [ ] JVD  Lungs: Equal air entry bilaterally; [ ] rales [ ] wheezing [ ] rhonchi  Heart: Irregular rate and rhythm; [x ] murmur   2/6 [x ] systolic [ ] diastolic [ ] radiation[ ] rubs [ ]  gallops  Abdomen: Nontender, bowel sounds present  Extremities: No clubbing, cyanosis, [x ] edema  Nervous system:  Alert & Oriented X3, no focal deficits  Psychiatric: Normal affect  Skin: No rashes or lesions    LABS:  07-19    133<L>  |  99  |  123<H>  ----------------------------<  133<H>  4.9   |  16<L>  |  3.85<H>    Ca    10.1      19 Jul 2020 07:22      Creatinine Trend: 3.85<--, 3.56<--, 3.19<--, 2.91<--, 2.97<--, 2.87<--                        14.2   5.97  )-----------( 154      ( 19 Jul 2020 07:22 )             44.0         RADIOLOGY: XR ABDOMEN PORTABLE  IMPRESSION:  Nonobstructive bowel gas pattern.      TELEMETRY:Atrial Fibrillation ~~90's    ECHO:PROCEDURE DATE:  07/16/2020    Summary:   1. Left ventricular ejection fraction, by visual estimation, is <20%.   2. Technically good study.   3. Severely decreased global left ventricular systolic function.   4. Multiple left ventricular regional wall motion abnormalities exist. See wall motion findings.   5. Dilated cardiomyopathy.   6. Moderate to severely increased left ventricular internal cavity size.   7. The mitral in-flow pattern reveals no discernable A-wave, therefore no comment on diastolic function can be made.   8. Moderate to severe mitral valve regurgitation.   9. The mitral valve leaflets are tethered which is due to reduced systolic function and elevated LVDP.  10. Moderate tricuspid regurgitation.  11. Moderate pulmonic valve regurgitation.  12. Estimated pulmonary artery systolic pressure is 57.2 mmHg assuming a right atrial pressure of 15 mmHg, which is consistent with moderate/severe pulmonary hypertension.  13. Comparedwith the study from 11-23-19, findings are similar.        IMPRESSION AND PLAN:    Problem/Plan - 1:  ·  Problem: Atrial fibrillation with RVR.  Plan: -History of cardioversion 2008  -Rate control limited by hypotension  -Amiodarone loaded on maintenance  -AC with Eliquis  -Scheduled  for ANAHI/DCCV today      Problem/Plan - 2:  ·  Problem: Acute on chronic systolic (congestive) heart failure.  Plan:EF <20%  -Holding Lasix and Entresto and spironolactone for now in view of hypotension and EDIE on CKD.   -Holding carvedilol for now in view of hypotension.  -ECG-LBBB QRS 172ms  -Consideration for BiVICD    Problem/Plan - 3:  ·  Problem: Acute renal failure superimposed on stage 3 chronic kidney disease, unspecified acute renal failure type.  Plan: -Holding anti-hypertensives and diuretics for now.   -Likely Cardiorenal  -US kidney-Unremarkable renal ultrasound.      Problem/Plan - 4:  ·  Problem: Hypotension, unspecified hypotension type.  Plan: -Likely due to severe systolic CHF and Afib.   -Holding anti-hypertensives and diuretics for now.   -Started on midodrine 5mg TID       Problem/Plan - 5:  ·  Problem: Thrombocytopenia. Plan: -Thrombocytopenia noted.  -Monitor CBC daily.    - 72k ---> 82k >--- 131k--->154k  -Improved    Problem/Plan - 6:  Problem: Type 2 diabetes mellitus with other specified complication, without long-term current use of insulin.Plan: -Diet controlled reportedly.   POCT  Blood Glucose (07.20.20 @ 07:33)    POCT Blood Glucose.: 144  <--- 150 <---206 mg/dL

## 2020-07-20 NOTE — PROGRESS NOTE ADULT - SUBJECTIVE AND OBJECTIVE BOX
24H hour events:     MEDICATIONS:  aMIOdarone    Tablet 200 milliGRAM(s) Oral daily  apixaban 5 milliGRAM(s) Oral two times a day  midodrine. 5 milliGRAM(s) Oral three times a day        acetaminophen   Tablet .. 650 milliGRAM(s) Oral every 6 hours PRN    aluminum hydroxide/magnesium hydroxide/simethicone Suspension 30 milliLiter(s) Oral every 4 hours PRN    allopurinol 100 milliGRAM(s) Oral daily  dextrose 40% Gel 15 Gram(s) Oral once PRN  dextrose 50% Injectable 12.5 Gram(s) IV Push once  dextrose 50% Injectable 25 Gram(s) IV Push once  dextrose 50% Injectable 25 Gram(s) IV Push once  glucagon  Injectable 1 milliGRAM(s) IntraMuscular once PRN  insulin lispro (HumaLOG) corrective regimen sliding scale   SubCutaneous three times a day before meals  insulin lispro (HumaLOG) corrective regimen sliding scale   SubCutaneous at bedtime  levothyroxine 25 MICROGram(s) Oral daily    dextrose 5%. 1000 milliLiter(s) IV Continuous <Continuous>      REVIEW OF SYSTEMS:  Complete 10point ROS negative.    PHYSICAL EXAM:  T(C): 36.4 (07-20-20 @ 11:19), Max: 36.5 (07-19-20 @ 20:25)  HR: 111 (07-20-20 @ 11:19) (76 - 111)  BP: 96/62 (07-20-20 @ 11:19) (88/58 - 98/60)  RR: 17 (07-20-20 @ 11:19) (17 - 18)  SpO2: 100% (07-20-20 @ 11:19) (92% - 100%)  Wt(kg): --  I&O's Summary    19 Jul 2020 07:01  -  20 Jul 2020 07:00  --------------------------------------------------------  IN: 220 mL / OUT: 385 mL / NET: -165 mL        Appearance: Normal	  HEENT:   Normal oral mucosa, PERRL, EOMI	  Lymphatic: No lymphadenopathy  Cardiovascular: Normal S1 S2, No JVD, No murmurs, No edema  Respiratory: Lungs clear to auscultation	  Psychiatry: A & O x 3, Mood & affect appropriate  Gastrointestinal:  Soft, Non-tender, + BS	  Skin: No rashes, No ecchymoses, No cyanosis	  Neurologic: Non-focal  Extremities: Normal range of motion, No clubbing, cyanosis or edema  Vascular: Peripheral pulses palpable 2+ bilaterally        LABS:	 	    CBC Full  -  ( 19 Jul 2020 07:22 )  WBC Count : 5.97 K/uL  Hemoglobin : 14.2 g/dL  Hematocrit : 44.0 %  Platelet Count - Automated : 154 K/uL  Mean Cell Volume : 96.9 fl  Mean Cell Hemoglobin : 31.3 pg  Mean Cell Hemoglobin Concentration : 32.3 gm/dL  Auto Neutrophil # : x  Auto Lymphocyte # : x  Auto Monocyte # : x  Auto Eosinophil # : x  Auto Basophil # : x  Auto Neutrophil % : x  Auto Lymphocyte % : x  Auto Monocyte % : x  Auto Eosinophil % : x  Auto Basophil % : x    07-20    131<L>  |  99  |  131<H>  ----------------------------<  148<H>  5.3   |  16<L>  |  3.92<H>  07-19    133<L>  |  99  |  123<H>  ----------------------------<  133<H>  4.9   |  16<L>  |  3.85<H>    Ca    9.9      20 Jul 2020 07:17  Ca    10.1      19 Jul 2020 07:22        proBNP: Serum Pro-Brain Natriuretic Peptide: 94041 pg/mL (07-18 @ 07:05)      TELEMETRY: AF Ventricular paced  	    	  ASSESSMENT/PLAN: 24H hour events:   No acute events overnight. Patient is sitting in recliner resting comfortably inquiring when he will be going for his EP procedures today. Denies chest pain, palpitations, dizziness, lightheadedness, and shortness of breath.     MEDICATIONS:  aMIOdarone    Tablet 200 milliGRAM(s) Oral daily  apixaban 5 milliGRAM(s) Oral two times a day  midodrine. 5 milliGRAM(s) Oral three times a day  acetaminophen   Tablet .. 650 milliGRAM(s) Oral every 6 hours PRN  aluminum hydroxide/magnesium hydroxide/simethicone Suspension 30 milliLiter(s) Oral every 4 hours PRN  allopurinol 100 milliGRAM(s) Oral daily  dextrose 40% Gel 15 Gram(s) Oral once PRN  dextrose 50% Injectable 12.5 Gram(s) IV Push once  dextrose 50% Injectable 25 Gram(s) IV Push once  dextrose 50% Injectable 25 Gram(s) IV Push once  glucagon  Injectable 1 milliGRAM(s) IntraMuscular once PRN  insulin lispro (HumaLOG) corrective regimen sliding scale   SubCutaneous three times a day before meals  insulin lispro (HumaLOG) corrective regimen sliding scale   SubCutaneous at bedtime  levothyroxine 25 MICROGram(s) Oral daily  dextrose 5%. 1000 milliLiter(s) IV Continuous <Continuous>      REVIEW OF SYSTEMS:  Complete 10point ROS negative.    PHYSICAL EXAM:  T(C): 36.4 (07-20-20 @ 11:19), Max: 36.5 (07-19-20 @ 20:25)  HR: 111 (07-20-20 @ 11:19) (76 - 111)  BP: 96/62 (07-20-20 @ 11:19) (88/58 - 98/60)  RR: 17 (07-20-20 @ 11:19) (17 - 18)  SpO2: 100% (07-20-20 @ 11:19) (92% - 100%)  Wt(kg): --  I&O's Summary    19 Jul 2020 07:01  -  20 Jul 2020 07:00  --------------------------------------------------------  IN: 220 mL / OUT: 385 mL / NET: -165 mL        Appearance: Normal	  Cardiovascular: Normal S1 S2, No JVD, No murmurs, No edema  Respiratory: Lungs clear to auscultation	  Psychiatry: A & O x 3, Mood & affect appropriate  Skin: No rashes, No ecchymoses, No cyanosis	  Extremities: Normal range of motion, No clubbing, cyanosis or edema, b/l lower ext edema +2  Vascular: Peripheral pulses palpable 2+ bilaterally        LABS:	 	    CBC Full  -  ( 19 Jul 2020 07:22 )  WBC Count : 5.97 K/uL  Hemoglobin : 14.2 g/dL  Hematocrit : 44.0 %  Platelet Count - Automated : 154 K/uL  Mean Cell Volume : 96.9 fl  Mean Cell Hemoglobin : 31.3 pg  Mean Cell Hemoglobin Concentration : 32.3 gm/dL  Auto Neutrophil # : x  Auto Lymphocyte # : x  Auto Monocyte # : x  Auto Eosinophil # : x  Auto Basophil # : x  Auto Neutrophil % : x  Auto Lymphocyte % : x  Auto Monocyte % : x  Auto Eosinophil % : x  Auto Basophil % : x    07-20    131<L>  |  99  |  131<H>  ----------------------------<  148<H>  5.3   |  16<L>  |  3.92<H>  07-19    133<L>  |  99  |  123<H>  ----------------------------<  133<H>  4.9   |  16<L>  |  3.85<H>    Ca    9.9      20 Jul 2020 07:17  Ca    10.1      19 Jul 2020 07:22        proBNP: Serum Pro-Brain Natriuretic Peptide: 45661 pg/mL (07-18 @ 07:05)      TELEMETRY: AF Ventricular paced

## 2020-07-20 NOTE — CHART NOTE - NSCHARTNOTEFT_GEN_A_CORE
Procedure Note    Pre-op diagnosis  Chronic systolic heart failure  Heart failure decompensation  Persistent atrial fibrillation  CRT-D nearing FRANKY      Post operative diagnosis  Same    Procedure  Cardioversion post ANAHI  ICD generator change    Findings:  ANAHI - no LA thrombus  Note CS lead in possible anterolateral branch with RBBB configuration to pacing. Hence lead accepted. ICD generator change performed. Note abrasion on atrial lead ([employer] 2088 lead) repaired by use of a lead sleeve and adhesive.    New CR- Generator ([employer]) implanted and wound closed in layers. No bleeding issues.    Cardioversion performed 200J single shock with conversion to sinus rhythm,    Device programming  DDD 70 -130   bpm      Plan  Resume apixaban and amiodarone  Consider discontinuation of midodrine given severe systolic heart failure      Choco Mccarty MD  Attending Cardiac Electrophysiologist

## 2020-07-20 NOTE — PROGRESS NOTE ADULT - SUBJECTIVE AND OBJECTIVE BOX
SUBJECTIVE / OVERNIGHT EVENTS: pt denies chest pain, shortness of breath     MEDICATIONS  (STANDING):  allopurinol 100 milliGRAM(s) Oral daily  aMIOdarone    Tablet 200 milliGRAM(s) Oral daily  apixaban 5 milliGRAM(s) Oral two times a day  dextrose 5%. 1000 milliLiter(s) (50 mL/Hr) IV Continuous <Continuous>  dextrose 50% Injectable 12.5 Gram(s) IV Push once  dextrose 50% Injectable 25 Gram(s) IV Push once  dextrose 50% Injectable 25 Gram(s) IV Push once  insulin lispro (HumaLOG) corrective regimen sliding scale   SubCutaneous three times a day before meals  insulin lispro (HumaLOG) corrective regimen sliding scale   SubCutaneous at bedtime  levothyroxine 25 MICROGram(s) Oral daily  midodrine. 5 milliGRAM(s) Oral three times a day    MEDICATIONS  (PRN):  acetaminophen   Tablet .. 650 milliGRAM(s) Oral every 6 hours PRN Mild Pain (1 - 3), Moderate Pain (4 - 6)  aluminum hydroxide/magnesium hydroxide/simethicone Suspension 30 milliLiter(s) Oral every 4 hours PRN Dyspepsia  dextrose 40% Gel 15 Gram(s) Oral once PRN Blood Glucose LESS THAN 70 milliGRAM(s)/deciliter  glucagon  Injectable 1 milliGRAM(s) IntraMuscular once PRN Glucose LESS THAN 70 milligrams/deciliter    Vital Signs Last 24 Hrs  T(C): 36.5 (2020 20:25), Max: 36.7 (2020 04:03)  T(F): 97.7 (2020 20:25), Max: 98 (2020 04:03)  HR: 76 (2020 20:25) (76 - 108)  BP: 98/60 (2020 20:57) (88/58 - 99/64)  BP(mean): --  RR: 17 (2020 20:25) (17 - 18)  SpO2: 98% (2020 20:25) (92% - 99%)    Constitutional: No fever, fatigue  Skin: No rash.  Eyes: No recent vision problems or eye pain.  ENT: No congestion, ear pain, or sore throat.  Cardiovascular: No chest pain or palpation.  Respiratory: No cough, shortness of breath, congestion, or wheezing.  Gastrointestinal: No abdominal pain, nausea, vomiting, or diarrhea.  Genitourinary: No dysuria.  Musculoskeletal: No joint swelling.  Neurologic: No headache.    PHYSICAL EXAM:  GENERAL: NAD  EYES: EOMI, PERRLA  NECK: Supple, No JVD  CHEST/LUNG: crackles at bases  HEART:  S1 , S2 +  ABDOMEN: soft , bs+  EXTREMITIES:  edema+  NEUROLOGY:alert awake     LABS:      131<L>  |  99  |  131<H>  ----------------------------<  148<H>  5.3   |  16<L>  |  3.92<H>    Ca    9.9      2020 07:17      Creatinine Trend: 3.92 <--, 3.85 <--, 3.56 <--, 3.19 <--, 2.91 <--, 2.97 <--, 2.87 <--, 2.76 <--                        14.2   5.97  )-----------( 154      ( 2020 07:22 )             44.0     Urine Studies:  Urinalysis Basic - ( 2020 14:23 )    Color: Yellow / Appearance: Clear / S.020 / pH:   Gluc:  / Ketone: Negative  / Bili: Negative / Urobili: Negative mg/dL   Blood:  / Protein: 30 mg/dL / Nitrite: Negative   Leuk Esterase: Negative / RBC:  / WBC 0-2   Sq Epi:  / Non Sq Epi:  / Bacteria:       Sodium, Random Urine: <20 mmol/L ( @ 00:45)  Creatinine, Random Urine: 131 mg/dL ( @ 00:45)              Leuk Esterase: Negative / RBC:  / WBC 0-2   Sq Epi:  / Non Sq Epi:  / Bacteria:       Sodium, Random Urine: <20 mmol/L ( @ 00:45)  Creatinine, Random Urine: 131 mg/dL ( @ 00:45)              PTT - ( 2020 06:33 )  PTT:36.7 sec          RADIOLOGY & ADDITIONAL TESTS:    Imaging Personally Reviewed:    Consultant(s) Notes Reviewed:      Care Discussed with Consultants/Other Providers:

## 2020-07-21 PROBLEM — E03.9 HYPOTHYROIDISM, UNSPECIFIED: Chronic | Status: ACTIVE | Noted: 2020-01-01

## 2020-07-21 PROBLEM — E11.9 TYPE 2 DIABETES MELLITUS WITHOUT COMPLICATIONS: Chronic | Status: ACTIVE | Noted: 2020-01-01

## 2020-07-21 PROBLEM — I48.91 UNSPECIFIED ATRIAL FIBRILLATION: Chronic | Status: ACTIVE | Noted: 2020-01-01

## 2020-07-21 PROBLEM — I42.9 CARDIOMYOPATHY, UNSPECIFIED: Chronic | Status: ACTIVE | Noted: 2018-01-11

## 2020-07-21 NOTE — PROGRESS NOTE ADULT - ASSESSMENT
A/P: 74 year old male with PMH of NICM (EF<20%) s/p BIV ICD 2012, AF diagnosed in 2015 s/p DCCV 2015, CKD, DM-2, hypothyroidism; initially presented to [facility] with worsening dyspnea on exertion and shortness of breath, found to be in AF with RVR hypotension systolic in 80’s. Patient was transferred to [facility] for further AF management and ICD generator change.    - cards to decide about diuresis  - s/p cardioversion / ep flu    DM2- iss    PT recommended LATRICIA - pt declined LATRICIA want to ho home

## 2020-07-21 NOTE — CONSULT NOTE ADULT - SUBJECTIVE AND OBJECTIVE BOX
NEPHROLOGY CONSULTATION    CHIEF COMPLAINT: CM    HPI:  Pt is 73 yo male with PMH of chronic systolic heart failure(EF 10%), s/p ICD, Afib s/p DCCV, CKD 3, DM2, hypothyroidism, initially presented to [facility] 7/14 with ALCAZAR/SOB and found to be in Afib with RVR and having hypotension and EDIE on CKD. Transferred to [facility] 7/17 for EP eval for possible Afib ablation and ICD battery change. Diuretics and carvedilol being held for hypotension and EDIE on CKD. Amiodarone and midodrine initiated.     ROS:  as above    Allergies:  No Known Allergies    PAST MEDICAL & SURGICAL HISTORY:  Hypothyroidism  Afib  Type II diabetes mellitus  Aortic insufficiency  Mitral insufficiency  CKD 3 (chronic kidney disease)  Cardiomyopathy: Systolic CHF  History of tonsillectomy: childhood  AICD (automatic cardioverter/defibrillator) present: 8/2012    SOCIAL HISTORY:  negative    FAMILY HISTORY:  Family history of CVA    MEDICATIONS  (STANDING):  allopurinol 100 milliGRAM(s) Oral daily  aMIOdarone    Tablet 200 milliGRAM(s) Oral daily  apixaban 5 milliGRAM(s) Oral two times a day  dextrose 5%. 1000 milliLiter(s) (50 mL/Hr) IV Continuous <Continuous>  dextrose 50% Injectable 12.5 Gram(s) IV Push once  dextrose 50% Injectable 25 Gram(s) IV Push once  dextrose 50% Injectable 25 Gram(s) IV Push once  insulin lispro (HumaLOG) corrective regimen sliding scale   SubCutaneous three times a day before meals  insulin lispro (HumaLOG) corrective regimen sliding scale   SubCutaneous at bedtime  levothyroxine 25 MICROGram(s) Oral daily  midodrine. 5 milliGRAM(s) Oral three times a day    Vital Signs Last 24 Hrs  T(C): 36.5 (07-21-20 @ 11:36), Max: 36.6 (07-20-20 @ 17:30)  T(F): 97.7 (07-21-20 @ 11:36), Max: 97.8 (07-20-20 @ 17:30)  HR: 73 (07-21-20 @ 11:36) (70 - 73)  BP: 101/67 (07-21-20 @ 11:36) (101/67 - 114/76)  RR: 18 (07-21-20 @ 11:36) (18 - 18)  SpO2: 97% (07-21-20 @ 11:36) (97% - 100%)    s1s2  b/l air entry  soft  edema    LABS:                        13.0   6.96  )-----------( 146      ( 21 Jul 2020 06:57 )             40.5     07-21    133<L>  |  101  |  129<H>  ----------------------------<  145<H>  5.0   |  17<L>  |  3.70<H>    Ca    9.9      21 Jul 2020 06:57    A/P: NEPHROLOGY CONSULTATION    CHIEF COMPLAINT: CM    HPI:  Pt is 75 yo male with PMH of chronic systolic heart failure(EF < 20%), s/p ICD, Afib s/p DCCV, CKD 3, DM2, hypothyroidism, initially presented to [facility] 7/14 with ALCAZAR/SOB and found to be in Afib with RVR and having hypotension and EDIE on CKD. Transferred to [facility] 7/17 for EP eval for possible Afib ablation and ICD battery change. Diuretics and carvedilol being held for hypotension and EDIE on CKD. Amiodarone and midodrine initiated. Awake, alert, weak, presently denies CP, SOB, + ALCAZAR, no N/V/D/C/F/C, dysuria.     ROS:  as above    Allergies:  No Known Allergies    PAST MEDICAL & SURGICAL HISTORY:  Hypothyroidism  Afib  Type II diabetes mellitus  Aortic insufficiency  Mitral insufficiency  CKD 3 (chronic kidney disease)  Cardiomyopathy: Systolic CHF  History of tonsillectomy: childhood  AICD (automatic cardioverter/defibrillator) present: 8/2012    SOCIAL HISTORY:  negative    FAMILY HISTORY:  Family history of CVA    MEDICATIONS  (STANDING):  allopurinol 100 milliGRAM(s) Oral daily  aMIOdarone    Tablet 200 milliGRAM(s) Oral daily  apixaban 5 milliGRAM(s) Oral two times a day  dextrose 5%. 1000 milliLiter(s) (50 mL/Hr) IV Continuous <Continuous>  dextrose 50% Injectable 12.5 Gram(s) IV Push once  dextrose 50% Injectable 25 Gram(s) IV Push once  dextrose 50% Injectable 25 Gram(s) IV Push once  insulin lispro (HumaLOG) corrective regimen sliding scale   SubCutaneous three times a day before meals  insulin lispro (HumaLOG) corrective regimen sliding scale   SubCutaneous at bedtime  levothyroxine 25 MICROGram(s) Oral daily  midodrine. 5 milliGRAM(s) Oral three times a day    Vital Signs Last 24 Hrs  T(C): 36.5 (07-21-20 @ 11:36), Max: 36.6 (07-20-20 @ 17:30)  T(F): 97.7 (07-21-20 @ 11:36), Max: 97.8 (07-20-20 @ 17:30)  HR: 73 (07-21-20 @ 11:36) (70 - 73)  BP: 101/67 (07-21-20 @ 11:36) (101/67 - 114/76)  RR: 18 (07-21-20 @ 11:36) (18 - 18)  SpO2: 97% (07-21-20 @ 11:36) (97% - 100%)    s1s2  b/l air entry  soft, NT  ++ edema  AO    LABS:                        13.0   6.96  )-----------( 146      ( 21 Jul 2020 06:57 )             40.5     07-21    133<L>  |  101  |  129<H>  ----------------------------<  145<H>  5.0   |  17<L>  |  3.70<H>    Ca    9.9      21 Jul 2020 06:57    A/P:    Cardio-renal EDIE on CKD 3 (Cr 1.71 - 12/4/19, Cr 2.13 - 2/4/20)  SONO w/o hydro  UA w/pr 30, otherwise negative  Diuretics on hold  Edema  Options are limited and prognosis is guarded overall  Dose meds for CrCl ~ 15  Avoid nephrotoxins  BMP in am  I/Os  Daily weights  Further recommendations pending clinical course    [phone]

## 2020-07-21 NOTE — PROGRESS NOTE ADULT - SUBJECTIVE AND OBJECTIVE BOX
DATE OF SERVICE:Patient was seen and examined :07/21/2020    PRESENTING CC:Dyspnea    SUBJ: 74 year old male with PMH of Chronic systolic heart failure (EF 10%) s/p ICD, Atrial Fibrillation s/p DCCV, CKD, DM-2, hypothyroidism; initially presented to [facility] with ALCAZAR/SOB and found to be in Afib with RVR and having hypotension and EDIE on CKD underwent ANAHI/DCCV now in sinus rhythm also had ICD generator change,refers to mild pain at ICD site no cyspnea at rest      PMH -reviewed admission note, no change since admission  Heart failure: acute [ ] chronic [ ] acute or chronic [x ] diastolic [ ] systolic [x ] combined systolic and diastolic[ ]  EDIE: ATN[ ] renal medullary necrosis [ ] CKD I [ ]CKDII [ ]CKD III [ ]CKD IV [x ]CKD V [ ]Other pathological lesions [ ]    MEDICATIONS  (STANDING):  allopurinol 100 milliGRAM(s) Oral daily  aMIOdarone    Tablet 200 milliGRAM(s) Oral daily  apixaban 5 milliGRAM(s) Oral two times a day  insulin lispro (HumaLOG) corrective regimen sliding scale   SubCutaneous three times a day before meals  insulin lispro (HumaLOG) corrective regimen sliding scale   SubCutaneous at bedtime  levothyroxine 25 MICROGram(s) Oral daily  midodrine. 5 milliGRAM(s) Oral three times a day    MEDICATIONS  (PRN):  acetaminophen   Tablet .. 650 milliGRAM(s) Oral every 6 hours PRN Mild Pain (1 - 3), Moderate Pain (4 - 6)  aluminum hydroxide/magnesium hydroxide/simethicone Suspension 30 milliLiter(s) Oral every 4 hours PRN Dyspepsia  dextrose 40% Gel 15 Gram(s) Oral once PRN Blood Glucose LESS THAN 70 milliGRAM(s)/deciliter  glucagon  Injectable 1 milliGRAM(s) IntraMuscular once PRN Glucose LESS THAN 70 milligrams/deciliter              REVIEW OF SYSTEMS:  Constitutional: [ ] fever, [ ]weight loss,  [x ]fatigue  Eyes: [ ] visual changes  Respiratory: [ ]shortness of breath;  [ ] cough, [ ]wheezing, [ ]chills, [ ]hemoptysis  Cardiovascular: [x ] chest pain, [ ]palpitations, [ ]dizziness,  [x ]leg swelling[ ]orthopnea[ ]PND  Gastrointestinal: [ ] abdominal pain, [ ]nausea, [ ]vomiting,  [ ]diarrhea   Genitourinary: [ ] dysuria, [ ] hematuria  Neurologic: [ ] headaches [ ] tremors[ ]weakness  Skin: [ ] itching, [ ]burning, [ ] rashes  Endocrine: [ ] heat or cold intolerance  Musculoskeletal: [ ] joint pain or swelling; [ ] muscle, back, or extremity pain  Psychiatric: [ ] depression, [ ]anxiety, [ ]mood swings, or [ ]difficulty sleeping  Hematologic: [ ] easy bruising, [ ] bleeding gums    [x] All remaining systems negative except as per above.   [ ]Unable to obtain.    Vital Signs Last 24 Hrs  T(C): 36.5 (21 Jul 2020 04:27), Max: 36.6 (20 Jul 2020 17:30)  T(F): 97.7 (21 Jul 2020 04:27), Max: 97.8 (20 Jul 2020 17:30)  HR: 70 (21 Jul 2020 04:27) (70 - 111)  BP: 105/63 (21 Jul 2020 04:27) (96/62 - 114/76)  RR: 18 (21 Jul 2020 04:27) (17 - 18)  SpO2: 98% (21 Jul 2020 04:27) (98% - 100%)  I&O's Summary    20 Jul 2020 07:01  -  21 Jul 2020 07:00  --------------------------------------------------------  IN: 400 mL / OUT: 990 mL / NET: -590 mL        PHYSICAL EXAM:  General: No acute distress BMI-25.4  HEENT: EOMI, PERRL  Neck: Supple, [ ] JVD  Lungs: Equal air entry bilaterally; [ ] rales [ ] wheezing [ ] rhonchi  Heart: Regular rate and rhythm; [x ] murmur  2 /6 [x ] systolic [ ] diastolic [ ] radiation[ ] rubs [ ]  gallops  Abdomen: Nontender, bowel sounds present  Extremities: No clubbing, cyanosis, [x ] edema  Nervous system:  Alert & Oriented X3, no focal deficits  Psychiatric: Normal affect  Skin: No rashes or lesions    LABS:  07-21    133<L>  |  101  |  x   ----------------------------<  145<H>  5.0   |  17<L>  |  3.70<H>    Ca    9.9      21 Jul 2020 06:57      Creatinine Trend: 3.70<--, 3.92<--, 3.85<--, 3.56<--, 3.19<--, 2.91<--                        13.0   6.96  )-----------( 146      ( 21 Jul 2020 06:57 )             40.5         TELEMETRY:Paced rhythm    IMPRESSION AND PLAN:    Problem/Plan - 1:  ·  Problem: Atrial fibrillation with RVR.  Plan: -History of cardioversion 2008  -Rate control limited by hypotension  -Amiodarone loaded on maintenance  -AC with Eliquis  -s/p ANAHI/DCCV      Problem/Plan - 2:  ·  Problem: Acute on chronic systolic (congestive) heart failure.  Plan:EF <20%  -Holding Lasix and Entresto and spironolactone for now in view of hypotension and EDIE on CKD.   -Holding carvedilol for now in view of hypotension.  -ECG-LBBB QRS 172ms  -Had ICD generator change    Problem/Plan - 3:  ·  Problem: Acute renal failure superimposed on stage 3 chronic kidney disease, unspecified acute renal failure type.  Plan: -Holding anti-hypertensives and diuretics for now.   -Likely Cardiorenal  -US kidney-Unremarkable renal ultrasound.      Problem/Plan - 4:  ·  Problem: Hypotension, unspecified hypotension type.  Plan: -Likely due to severe systolic CHF and Afib.   -Holding anti-hypertensives and diuretics for now.   -To stop Mididrine in view of Severe Systolic HF    Problem/Plan - 5:  ·  Problem: Thrombocytopenia. Plan: -Thrombocytopenia noted.  -Monitor CBC daily.    - 72k ---> 82k >--- 131k--->154k ---> 146k  -Improved    Problem/Plan - 6:  Problem: Type 2 diabetes mellitus with other specified complication, without long-term current use of insulin.Plan: -Diet controlled reportedly.   POCT  Blood Glucose (07.21.20 @ 07:35)    POCT Blood Glucose.: 130 <--- 164 <---150 mg/dL

## 2020-07-21 NOTE — PROGRESS NOTE ADULT - ASSESSMENT
74 year old male with PMH of NICM (EF<20%) s/p BIV ICD 2012, AF diagnosed in 2015 s/p DCCV 2015, CKD, DM-2, hypothyroidism; [facility] on [facility]ion and shortness of breath, found to be in AF with RVR hypotension systolic in 80’s. Patient was transferred to [facility] for further AF management and ICD generator change.    1. NICM s/p STJ BIV ICD  2. Atrial Fibrillation RVR  3. Systolic HF EF <20%    -Tele: Sinus BIV paced 70-90  -s/p ANAHI/DCCV with STJ BIV ICD generator change   -Continue Amiodarone 200mg daily. Patient educated about amiodarone black box warning. Will need periodic follow up of LFT, TFT, PFT, and opthalmology eval as outpatient while on amiodarone.  -Continue Eliquis 5mg BID  -Consider HF consult  -Consider discontinuing Midodrine  -To follow up for wound check 7/31 @1300. F/u with cardiologist.  -Clear from EP perspective for discharge planning, EP will sign off    [id number] 74 year old male with PMH of NICM (EF<20%) s/p BIV ICD 2012, AF diagnosed in 2015 s/p DCCV 2015, CKD, DM-2, hypothyroidism; initially presented to [facility] with worsening dyspnea on exertion and shortness of breath, found to be in AF with RVR hypotension systolic in 80’s. Patient was transferred to [facility] for further AF management and ICD generator change.    1. NICM s/p STJ BIV ICD  2. Atrial Fibrillation RVR  3. Systolic HF EF <20%    -Tele: Sinus BIV paced 70-90  -s/p ANAHI/DCCV with STJ BIV ICD generator change.  -Post op interrogation done and device paired by ([facility]) rep; Normal device function  -ICD ID card and booklet given to patient, teaching enforced and patient verbalized understanding  -Continue Amiodarone 200mg daily. Patient educated about amiodarone black box warning. Will need periodic follow up of LFT, TFT, PFT, and opthalmology eval as outpatient while on amiodarone.  -Continue Eliquis 5mg BID  -Consider HF consult  -Consider discontinuing Midodrine  -To follow up for wound check 7/31 @1300. Patient would like to f/u with his cardiologist.  -Clear from EP perspective for discharge planning, EP will sign off    [id number]

## 2020-07-21 NOTE — PROGRESS NOTE ADULT - SUBJECTIVE AND OBJECTIVE BOX
SUBJECTIVE / OVERNIGHT EVENTS: pt denies chest pain, shortness of breath     MEDICATIONS  (STANDING):  allopurinol 100 milliGRAM(s) Oral daily  aMIOdarone    Tablet 200 milliGRAM(s) Oral daily  apixaban 5 milliGRAM(s) Oral two times a day  dextrose 5%. 1000 milliLiter(s) (50 mL/Hr) IV Continuous <Continuous>  dextrose 50% Injectable 12.5 Gram(s) IV Push once  dextrose 50% Injectable 25 Gram(s) IV Push once  dextrose 50% Injectable 25 Gram(s) IV Push once  insulin lispro (HumaLOG) corrective regimen sliding scale   SubCutaneous three times a day before meals  insulin lispro (HumaLOG) corrective regimen sliding scale   SubCutaneous at bedtime  levothyroxine 25 MICROGram(s) Oral daily    MEDICATIONS  (PRN):  acetaminophen   Tablet .. 650 milliGRAM(s) Oral every 6 hours PRN Mild Pain (1 - 3), Moderate Pain (4 - 6)  dextrose 40% Gel 15 Gram(s) Oral once PRN Blood Glucose LESS THAN 70 milliGRAM(s)/deciliter  glucagon  Injectable 1 milliGRAM(s) IntraMuscular once PRN Glucose LESS THAN 70 milligrams/deciliter    Vital Signs Last 24 Hrs  T(C): 36.6 (2020 21:13), Max: 36.6 (2020 21:13)  T(F): 97.9 (2020 21:13), Max: 97.9 (2020 21:13)  HR: 82 (2020 21:13) (70 - 82)  BP: 110/75 (2020 21:13) (101/67 - 110/75)  BP(mean): --  RR: 18 (2020 21:13) (18 - 18)  SpO2: 100% (2020 21:13) (97% - 100%)    Constitutional: No fever, fatigue  Skin: No rash.  Eyes: No recent vision problems or eye pain.  ENT: No congestion, ear pain, or sore throat.  Cardiovascular: No chest pain or palpation.  Respiratory: No cough, shortness of breath, congestion, or wheezing.  Gastrointestinal: No abdominal pain, nausea, vomiting, or diarrhea.  Genitourinary: No dysuria.  Musculoskeletal: No joint swelling.  Neurologic: No headache.    PHYSICAL EXAM:  GENERAL: NAD  EYES: EOMI, PERRLA  NECK: Supple, No JVD  CHEST/LUNG: crackles at bases  HEART:  S1 , S2 +  ABDOMEN: soft , bs+  EXTREMITIES:  edema+  NEUROLOGY:alert awake     LABS:      133<L>  |  101  |  129<H>  ----------------------------<  145<H>  5.0   |  17<L>  |  3.70<H>    Ca    9.9      2020 06:57      Creatinine Trend: 3.70 <--, 3.92 <--, 3.85 <--, 3.56 <--, 3.19 <--, 2.91 <--, 2.97 <--, 2.87 <--                        13.0   6.96  )-----------( 146      ( 2020 06:57 )             40.5     Urine Studies:  Urinalysis Basic - ( 2020 14:23 )    Color: Yellow / Appearance: Clear / S.020 / pH:   Gluc:  / Ketone: Negative  / Bili: Negative / Urobili: Negative mg/dL   Blood:  / Protein: 30 mg/dL / Nitrite: Negative   Leuk Esterase: Negative / RBC:  / WBC 0-2   Sq Epi:  / Non Sq Epi:  / Bacteria:       Sodium, Random Urine: <20 mmol/L ( @ 00:45)  Creatinine, Random Urine: 131 mg/dL ( @ 00:45)              Consultant(s) Notes Reviewed:      Care Discussed with Consultants/Other Providers:

## 2020-07-21 NOTE — CHART NOTE - NSCHARTNOTEFT_GEN_A_CORE
Medicine Np note    CC: Pain at procedure site    Notified by RN that patient c/o pain left chest wall procedure site  Patient s/p ANAHI/DCCV and PPM generator change 07/20  Seen the patient at bedside and evaluated the procedure site,  site with no bleeding or hematoma  Vitals stable  Tylenol 1 gram IVPB x1 dose ordered  Reassess pain  F/U EP AM  Will follow    Brennan Will P BC  [id number]

## 2020-07-21 NOTE — CONSULT NOTE ADULT - REASON FOR ADMISSION
Afib, EP eval, acute on chronic systolic CHF

## 2020-07-21 NOTE — PROGRESS NOTE ADULT - SUBJECTIVE AND OBJECTIVE BOX
24H hour events:   S/p ANAHI/DCCV w/STJ BIV ICD generator change by EP attending MD Mccarty. Complaints of mild soreness at the generator change site. Denies chest pain, palpitations, dizziness, lightheadedness, and shortness of breath.       MEDICATIONS:  aMIOdarone    Tablet 200 milliGRAM(s) Oral daily  apixaban 5 milliGRAM(s) Oral two times a day  midodrine. 5 milliGRAM(s) Oral three times a day  acetaminophen   Tablet .. 650 milliGRAM(s) Oral every 6 hours PRN  aluminum hydroxide/magnesium hydroxide/simethicone Suspension 30 milliLiter(s) Oral every 4 hours PRN  allopurinol 100 milliGRAM(s) Oral daily  dextrose 40% Gel 15 Gram(s) Oral once PRN  dextrose 50% Injectable 12.5 Gram(s) IV Push once  dextrose 50% Injectable 25 Gram(s) IV Push once  dextrose 50% Injectable 25 Gram(s) IV Push once  glucagon  Injectable 1 milliGRAM(s) IntraMuscular once PRN  insulin lispro (HumaLOG) corrective regimen sliding scale   SubCutaneous three times a day before meals  insulin lispro (HumaLOG) corrective regimen sliding scale   SubCutaneous at bedtime  levothyroxine 25 MICROGram(s) Oral daily  dextrose 5%. 1000 milliLiter(s) IV Continuous <Continuous>      REVIEW OF SYSTEMS:  Complete 10point ROS negative.    PHYSICAL EXAM:  T(C): 36.5 (07-21-20 @ 04:27), Max: 36.6 (07-20-20 @ 17:30)  HR: 70 (07-21-20 @ 04:27) (70 - 111)  BP: 105/63 (07-21-20 @ 04:27) (96/62 - 114/76)  RR: 18 (07-21-20 @ 04:27) (17 - 18)  SpO2: 98% (07-21-20 @ 04:27) (98% - 100%)  Wt(kg): --  I&O's Summary    20 Jul 2020 07:01  -  21 Jul 2020 07:00  --------------------------------------------------------  IN: 400 mL / OUT: 990 mL / NET: -590 mL    21 Jul 2020 07:01  -  21 Jul 2020 10:12  --------------------------------------------------------  IN: 240 mL / OUT: 0 mL / NET: 240 mL        Appearance: Normal	  Cardiovascular: Normal S1 S2, No JVD, No murmurs, No edema  Respiratory: Lungs clear to auscultation	  Psychiatry: A & O x 3, Mood & affect appropriate  Skin: No rashes, No ecchymoses, No cyanosis. Left infraclavicular ICD site open to air, with steri strips in place, scant blood, mild swelling, no ecchymosis. No active oozing or hematoma.	  Extremities: Normal range of motion, No clubbing, cyanosis or edema. B/l lower extremity edema+2   Vascular: Peripheral pulses palpable 2+ bilaterally        LABS:	 	    CBC Full  -  ( 21 Jul 2020 06:57 )  WBC Count : 6.96 K/uL  Hemoglobin : 13.0 g/dL  Hematocrit : 40.5 %  Platelet Count - Automated : 146 K/uL  Mean Cell Volume : 96.4 fl  Mean Cell Hemoglobin : 31.0 pg  Mean Cell Hemoglobin Concentration : 32.1 gm/dL  Auto Neutrophil # : x  Auto Lymphocyte # : x  Auto Monocyte # : x  Auto Eosinophil # : x  Auto Basophil # : x  Auto Neutrophil % : x  Auto Lymphocyte % : x  Auto Monocyte % : x  Auto Eosinophil % : x  Auto Basophil % : x    07-21    133<L>  |  101  |  129<H>  ----------------------------<  145<H>  5.0   |  17<L>  |  3.70<H>  07-20    131<L>  |  99  |  131<H>  ----------------------------<  148<H>  5.3   |  16<L>  |  3.92<H>    Ca    9.9      21 Jul 2020 06:57  Ca    9.9      20 Jul 2020 07:17        proBNP: Serum Pro-Brain Natriuretic Peptide: 42299 pg/mL (07-18 @ 07:05)          TELEMETRY: SR BVP 70-90 	    ECG: SR BVP 	  RADIOLOGY:  [x] Echocardiogram:    < from: Transesophageal Echocardiogram w/o TTE (07.20.20 @ 14:19) >  Patient name: MAIN BRASWELL  YOB: 1945   Age: 74 (M)   MR#: [id number]  Study Date: 7/20/2020  Location: [address] [place], [place] [id number]: Edna Jain MD  Study quality: Technically good  Referring Physician: Gerald Diehl MD  Height:178 cm  Weight: 96 kg  BSA: 2.1 m2  ------------------------------------------------------------------------  PROCEDURE: Transesophageal (ANAHI) was performed.  Informed  consent was first obtained for ANAHI. The patient was sedated  - see anesthesia record.  The procedure was monitored with  automatic blood pressure monitoring, ECG tracings and pulse  oximetry. The transesophageal probe was placed in the  esophagus posterior to the heart without complications.  INDICATION: Unspecified atrial fibrillation (I48.91)  ------------------------------------------------------------------------  Observations:  Mitral Valve: Tethered mitral valve leaflets with normal  opening. At least moderate-severe mitral regurgitation. By  PISA, calculated EROabout 37mmsq (Pisa rad 0.9 cm, Va 28.1  cm/s, Vm 3.8 m/sec)  Aortic Valve/Aorta: Normal trileaflet aortic valve.  Normal aortic root, aortic arch and descending thoracic  aorta. Mild atheroma.  Left Atrium: Left atrial enlargement. No left atrial or  left atrial appendage thrombus. Decreased left atrial  appendage velocities (about 30 cm/sec) noted.  Left Ventricle: Severe global left ventricular systolic  dysfunction. Severe left ventricular enlargement.  Right Heart: No right atrial thrombus. Decreasedright  ventricular systolic function. A device wire is noted in  the right heart. Normal tricuspid valve. Moderate tricuspid  regurgitation.  Pericardium/Pleura: Normal pericardium with no pericardial  effusion.  ------------------------------------------------------------------------  Conclusions:  1. Tethered mitral valve leaflets with normal opening. At  least moderate-severe mitral regurgitation. By PISA,  calculated EROabout 37 mmsq (Pisa rad 0.9 cm, Va 28.1 cm/s,  Vm 3.8 m/sec)  2. Left atrial enlargement. No left atrial or left atrial  appendage thrombus. Decreased left atrial appendage  velocities (about 30 cm/sec) noted.  3. Severe left ventricular enlargement.  4. Severe global left ventricular systolic dysfunction.  5. Decreased right ventricular systolic function. A device  wire is noted in the right heart.  *** No previous Echo exam.  ADDENDUM 7/20/2020: Height and weight corrected.  ------------------------------------------------------------------------  Revised on 7/20/2020 - 5:41 PM by Carl Jain M.D.  ------------------------------------------------------------------------    < end of copied text >

## 2020-07-22 NOTE — PROGRESS NOTE ADULT - PROVIDER SPECIALTY LIST ADULT
Cardiology
Cardiology
Electrophysiology
Internal Medicine
Nephrology
Cardiology
Cardiology

## 2020-07-22 NOTE — DISCHARGE NOTE PROVIDER - HOSPITAL COURSE
A/P: 74 year old male with PMH of NICM (EF<20%) s/p BIV ICD 2012, AF diagnosed in 2015 s/p DCCV 2015, CKD, DM-2, hypothyroidism; [facility] dys[facility] on exertion and shortness of breath, found to be in AF with RVR hypotensive with systolic in 80’s. Patient was transferred to [facility] for further AF management and ICD generator change. A/P: 74 year old male with PMH of NICM (EF<20%) s/p BIV ICD 2012, AF diagnosed in 2015 s/p DCCV 2015, CKD, DM-2, hypothyroidism; initially presented to [facility] with worsening dyspnea on exertion and shortness of breath, found to be in AF with RVR hypotensive with systolic in 80’s. Patient was transferred to [facility] for further AF management and ICD generator change.        Atrial fibrillation with RVR.  Plan: -History of cardioversion 2008    -Rate control limited by hypotension    -Amiodarone loaded on maintenance    -AC with Eliquis    -s/p ANAHI/DCCV        Acute on chronic systolic (congestive) heart failure.  Plan:EF <20%    -Holding Lasix and Entresto and spironolactone for now in view of hypotension and EDIE on CKD.     -Holding carvedilol for now in view of hypotension.    -ECG-LBBB QRS 172ms    -Had ICD generator change        Acute renal failure superimposed on stage 3 chronic kidney disease, unspecified acute renal failure type.  Plan: -Holding anti-hypertensives and diuretics for now.     -Likely Cardiorenal    -US kidney-Unremarkable renal ultrasound.        Hypotension, unspecified hypotension type.  Plan: -Likely due to severe systolic CHF and Afib.     -Holding anti-hypertensives and diuretics for now.     Midodrine stopped in view of Severe Systolic HF        Thrombocytopenia. Plan: -Thrombocytopenia noted.    -Monitor CBC daily.     -Improved

## 2020-07-22 NOTE — DISCHARGE NOTE PROVIDER - CARE PROVIDER_API CALL
Cecil Loya  CARDIOLOGY  [address] [id number]  Phone: [phone]  Fax: [phone]  Scheduled Appointment: 07/29/2020

## 2020-07-22 NOTE — DISCHARGE NOTE PROVIDER - NSDCFUADDAPPT_GEN_ALL_CORE_FT
Maintain appointment with cardiologist on 7/29/20 Maintain appointment with cardiologist on 7/29/20  Follow up with EP for wound check 7/31 @1300.

## 2020-07-22 NOTE — PROGRESS NOTE ADULT - SUBJECTIVE AND OBJECTIVE BOX
No distress    Vital Signs Last 24 Hrs  T(C): 37 (07-22-20 @ 11:18), Max: 37 (07-22-20 @ 11:18)  T(F): 98.6 (07-22-20 @ 11:18), Max: 98.6 (07-22-20 @ 11:18)  HR: 81 (07-22-20 @ 11:18) (75 - 82)  BP: 110/75 (07-22-20 @ 11:18) (105/71 - 110/75)  RR: 18 (07-22-20 @ 11:18) (18 - 18)  SpO2: 98% (07-22-20 @ 11:18) (98% - 100%)    s1s2  b/l air entry  soft, NT  + edema  AO                        13.0   6.96  )-----------( 146      ( 21 Jul 2020 06:57 )             40.5     22 Jul 2020 06:29    136    |  103    |  117    ----------------------------<  143    4.5     |  17     |  3.30     Ca    9.6        22 Jul 2020 06:29    acetaminophen   Tablet .. 650 milliGRAM(s) Oral every 6 hours PRN  allopurinol 100 milliGRAM(s) Oral daily  aMIOdarone    Tablet 200 milliGRAM(s) Oral daily  apixaban 5 milliGRAM(s) Oral two times a day  dextrose 40% Gel 15 Gram(s) Oral once PRN  dextrose 5%. 1000 milliLiter(s) IV Continuous <Continuous>  dextrose 50% Injectable 12.5 Gram(s) IV Push once  dextrose 50% Injectable 25 Gram(s) IV Push once  dextrose 50% Injectable 25 Gram(s) IV Push once  glucagon  Injectable 1 milliGRAM(s) IntraMuscular once PRN  insulin lispro (HumaLOG) corrective regimen sliding scale   SubCutaneous three times a day before meals  insulin lispro (HumaLOG) corrective regimen sliding scale   SubCutaneous at bedtime  levothyroxine 25 MICROGram(s) Oral daily    A/P:    Cardio-renal EDIE on CKD 3 (Cr 1.71 - 12/4/19, Cr 2.13 - 2/4/20)  SONO w/o hydro  UA w/pr 30, otherwise negative  Diuretics on hold  Edema  Options are limited and prognosis is guarded overall  Cr is better today  Dose meds for CrCl < 20  Avoid nephrotoxins  F/u BMP, I/Os  Daily weights  Avoid ACE/ARB/Entresto in future    [phone]

## 2020-07-22 NOTE — PROGRESS NOTE ADULT - SUBJECTIVE AND OBJECTIVE BOX
DATE OF SERVICE:Patient was seen and examined :07/22/2020    PRESENTING CC:Dyspnea    SUBJ: 74 year old male with PMH of Chronic systolic heart failure (EF 10%) s/p ICD, Atrial Fibrillation s/p DCCV, CKD, DM-2, hypothyroidism; initially presented to [facility] with ALCAZAR/SOB and found to be in Afib with RVR and having hypotension and EDIE on CKD underwent ANAHI/DCCV now in sinus rhythm also had ICD generator change,refers to mild pain at ICD site no dyspnea at rest abdominal discomfort is better anxious to go home.BP remains soft        PMH -reviewed admission note, no change since admission  Heart failure: acute [ ] chronic [ ] acute or chronic [x ] diastolic [ ] systolic [x ] combined systolic and diastolic[ ]  EDIE: ATN[ ] renal medullary necrosis [ ] CKD I [ ]CKDII [ ]CKD III [x ]CKD IV [ ]CKD V [ ]Other pathological lesions [ ]    MEDICATIONS  (STANDING):  allopurinol 100 milliGRAM(s) Oral daily  aMIOdarone    Tablet 200 milliGRAM(s) Oral daily  apixaban 5 milliGRAM(s) Oral two times a day  dextrose 50% Injectable 25 Gram(s) IV Push once  insulin lispro (HumaLOG) corrective regimen sliding scale   SubCutaneous three times a day before meals  insulin lispro (HumaLOG) corrective regimen sliding scale   SubCutaneous at bedtime  levothyroxine 25 MICROGram(s) Oral daily      REVIEW OF SYSTEMS:  Constitutional: [ ] fever, [ ]weight loss,  [x ]fatigue  Eyes: [ ] visual changes  Respiratory: [ ]shortness of breath;  [ ] cough, [ ]wheezing, [ ]chills, [ ]hemoptysis  Cardiovascular: [ ] chest pain, [ ]palpitations, [ ]dizziness,  [ ]leg swelling[ ]orthopnea[ ]PND  Gastrointestinal: [ ] abdominal pain, [ ]nausea, [ ]vomiting,  [ ]diarrhea   Genitourinary: [ ] dysuria, [ ] hematuria  Neurologic: [ ] headaches [ ] tremors[ ]weakness  Skin: [ ] itching, [ ]burning, [ ] rashes  Endocrine: [ ] heat or cold intolerance  Musculoskeletal: [ ] joint pain or swelling; [ ] muscle, back, or extremity pain  Psychiatric: [ ] depression, [ ]anxiety, [ ]mood swings, or [ ]difficulty sleeping  Hematologic: [ ] easy bruising, [ ] bleeding gums    [x] All remaining systems negative except as per above.   [ ]Unable to obtain.    Vital Signs Last 24 Hrs  T(C): 37 (22 Jul 2020 11:18), Max: 37 (22 Jul 2020 11:18)  T(F): 98.6 (22 Jul 2020 11:18), Max: 98.6 (22 Jul 2020 11:18)  HR: 81 (22 Jul 2020 11:18) (81 - 81)  BP: 110/75 (22 Jul 2020 11:18) (110/75 - 110/75)  RR: 18 (22 Jul 2020 11:18) (18 - 18)  SpO2: 98% (22 Jul 2020 11:18) (98% - 98%)  I&O's Summary    21 Jul 2020 07:01  -  22 Jul 2020 07:00  --------------------------------------------------------  IN: 800 mL / OUT: 2140 mL / NET: -1340 mL    22 Jul 2020 07:01  -  23 Jul 2020 06:36  --------------------------------------------------------  IN: 440 mL / OUT: 1300 mL / NET: -860 mL        PHYSICAL EXAM:  General: No acute distress BMI-25.1  HEENT: EOMI, PERRL  Neck: Supple, [ ] JVD  Lungs: Equal air entry bilaterally; [ ] rales [ ] wheezing [ ] rhonchi  Heart: Regular rate and rhythm; [x ] murmur  3 /6 [x ] systolic [ ] diastolic [ ] radiation[ ] rubs [ ]  gallops  Abdomen: Nontender, bowel sounds present  Extremities: No clubbing, cyanosis, [x ] edema  Nervous system:  Alert & Oriented X3, no focal deficits  Psychiatric: Normal affect  Skin: No rashes or lesions    LABS:  07-22    136  |  103  |  117<H>  ----------------------------<  143<H>  4.5   |  17<L>  |  3.30<H>    Ca    9.6      22 Jul 2020 06:29      Creatinine Trend: 3.30<--, 3.70<--, 3.92<--, 3.85<--, 3.56<--, 3.19<--                        13.0   6.96  )-----------( 146      ( 21 Jul 2020 06:57 )             40.5     ECG [my interpretation]:AV Sequential paced rhythm BiV paced with improved QRS interval    TELEMETRY:AV Paced        IMPRESSION AND PLAN:      74 year old male with PMH of Chronic systolic heart failure (EF 10%) s/p ICD, Atrial Fibrillation s/p DCCV, CKD, DM-2, hypothyroidism; initially presented to [facility] with ALCAZAR/SOB and found to be in Afib with RVR and having hypotension and EDIE on CKD underwent ANAHI/DCCV now in sinus rhythm also had ICD generator change,with improved BiV pacing      Problem/Plan - 1:  ·  Problem: Atrial fibrillation with RVR.  Plan: -History of cardioversion 2008  -Rate control limited by hypotension  -Amiodarone loaded on maintenance  -AC with Eliquis  -s/p ANAHI/DCCV  -Remains in AV paced rhythm      Problem/Plan - 2:  ·  Problem: Acute on chronic systolic (congestive) heart failure.  Plan:EF <20%  -Holding Lasix and Entresto and spironolactone for now in view of hypotension and EDIE on CKD.   -Holding carvedilol for now in view of hypotension.  -ECG-LBBB QRS 172ms  -Had ICD generator change with BiV pacing  -HF meds not restarted due to low BP will reinstate as outpatient      Problem/Plan - 3:  ·  Problem: Acute renal failure superimposed on stage 3 chronic kidney disease, unspecified acute renal failure type.  Plan: -Holding anti-hypertensives and diuretics for now.   -Likely Cardiorenal  -US kidney-Unremarkable renal ultrasound.      Problem/Plan - 4:  ·  Problem: Hypotension, unspecified hypotension type.  Plan: -Likely due to severe systolic CHF and Afib.   -Holding anti-hypertensives and diuretics for now.   -To stop Mididrine in view of Severe Systolic HF      Problem/Plan - 5:  ·  Problem: Thrombocytopenia. Plan: -Thrombocytopenia noted.  -Monitor CBC daily.    - 72k ---> 82k >--- 131k--->154k ---> 146k  -Improved    Problem/Plan - 6:  Problem: Type 2 diabetes mellitus with other specified complication, without long-term current use of insulin.Plan: -Diet controlled reportedly.

## 2020-07-22 NOTE — DISCHARGE NOTE PROVIDER - NSDCFUSCHEDAPPT_GEN_ALL_CORE_FT
MAIN [place] ; 07/29/2020 ; [facility] HeartVasc [address]  MAIN [place] ; 07/31/2020 ; [facility] Cardio Electro [address] [place] ; 08/19/2020 ; [facility] Med Endocr [address] [facility] [place] [place] ; 07/29/2020 ; [facility] HeartVasc [address]  MAIN [place] ; 07/31/2020 ; [facility] Cardio Electro [address] ; 08/19/2020 ; [facility] Med Endocr [address] [facility]

## 2020-07-22 NOTE — DISCHARGE NOTE PROVIDER - NSDCMRMEDTOKEN_GEN_ALL_CORE_FT
allopurinol 100 mg oral tablet: 1 tab(s) orally once a day  amiodarone 200 mg oral tablet: 1 tab(s) orally once a day  Coreg 12.5 mg oral tablet: 1 tab(s) orally 2 times a day  Eliquis 2.5 mg oral tablet: 1 tab(s) orally 2 times a day  Lasix 40 mg oral tablet: 1 tab(s) orally once a day  levothyroxine 25 mcg (0.025 mg) oral tablet: 1 tab(s) orally once a day  midodrine 5 mg oral tablet: 1 tab(s) orally 3 times a day  potassium chloride 20 mEq oral tablet, extended release: 2 tab(s) orally once a day  sacubitril-valsartan 24 mg-26 mg oral tablet: 1 tab(s) orally 2 times a day  spironolactone 25 mg oral tablet: 1 tab(s) orally once a day allopurinol 100 mg oral tablet: 1 tab(s) orally once a day  amiodarone 200 mg oral tablet: 1 tab(s) orally once a day  Eliquis 2.5 mg oral tablet: 1 tab(s) orally 2 times a day  levothyroxine 25 mcg (0.025 mg) oral tablet: 1 tab(s) orally once a day

## 2020-07-22 NOTE — PROGRESS NOTE ADULT - REASON FOR ADMISSION
Afib, EP eval, acute on chronic systolic CHF

## 2020-07-22 NOTE — PROGRESS NOTE ADULT - ATTENDING COMMENTS
Patient was seen and examined by me on 07/21/2020,interim events noted,labs and radiology studies reviewed.  Loyd Mitchell MD,FACC.  [address] [place] [address].  [id number].  [phone]
Patient was seen and examined by me on 07/22/2020,interim events noted,labs and radiology studies reviewed.  Loyd Mitchell MD,FACC.  [address] [place] [address].  [facility].  [phone]
He has loss of BiV pacing due to onset of AF. We have increased upper rate limit and will plan to cardiovert after ANAHI because he was underdosed with apixaban. He has renal disease with a creatinine of 3.1 but age below 80 and weight above 60 Kg.     He wants to have his ICD generator replaced and we will proceed as such after cardioversion without interruption of anticoagulation.
Patient was seen and examined by me on 07/19/2020,interim events noted,labs and radiology studies reviewed.  Loyd Mitchell MD,FACC.  [address] [place] [address].  [id number].  [phone]
Patient was seen and examined by me on 07/20/2020,interim events noted,labs and radiology studies reviewed.  Loyd Mitchell MD,FACC.  [address] [place] [address].  [id number].  [phone]

## 2020-07-22 NOTE — DISCHARGE NOTE PROVIDER - NSDCCPCAREPLAN_GEN_ALL_CORE_FT
PRINCIPAL DISCHARGE DIAGNOSIS  Diagnosis: Atrial fibrillation with RVR  Assessment and Plan of Treatment: Post cardioversion, and ICD generator changed   Currently in normal sinus rhythm        SECONDARY DISCHARGE DIAGNOSES  Diagnosis: Acute on chronic systolic (congestive) heart failure  Assessment and Plan of Treatment: Continue to Hold Lasix  Follow up with Shalini Anne in 1 week  Weigh yourself daily.  If you gain 3lbs in 3 days, or 5lbs in a week call your Health Care Provider.  Do not eat or drink foods containing more than 2000mg of salt (sodium) in your diet every day.  Call your Health Care Provider if you have any swelling or increased swelling in your feet, ankles, and/or stomach.  Take all of your medication as directed.  If you become dizzy call your Health Care Provider.      Diagnosis: Acute renal failure superimposed on stage 3 chronic kidney disease, unspecified acute renal failure type  Assessment and Plan of Treatment: Acute renal failure superimposed on stage 3 chronic kidney disease, unspecified acute renal failure type PRINCIPAL DISCHARGE DIAGNOSIS  Diagnosis: Atrial fibrillation with RVR  Assessment and Plan of Treatment: Post cardioversion, currently in normal sinus rhythm  Continue Eliquis and amiodarone  Will need periodic follow up of LFT, TFT, PFT, and opthalmology eval as outpatient while on amiodarone.        SECONDARY DISCHARGE DIAGNOSES  Diagnosis: Implantable cardioverter-defibrillator (ICD) generator end of life  Assessment and Plan of Treatment: ICD generator change.  -Post op interrogation done and device paired by (SILKE) rep; Normal device function  -ICD ID card and booklet given to patient, teaching enforced and patient verbalized understanding  Follow up for wound check 7/31 @1300.       Diagnosis: Hypotension, unspecified hypotension type  Assessment and Plan of Treatment: Hold all antihypertensive medications  Follow up with Dr. Loya in 1 week    Diagnosis: Acute on chronic systolic (congestive) heart failure  Assessment and Plan of Treatment: Continue to Hold Lasix and Midodrine  Follow up with Shalini Anne in 1 week  Weigh yourself daily.  If you gain 3lbs in 3 days, or 5lbs in a week call your Health Care Provider.  Do not eat or drink foods containing more than 2000mg of salt (sodium) in your diet every day.  Call your Health Care Provider if you have any swelling or increased swelling in your feet, ankles, and/or stomach.  Take all of your medication as directed.  If you become dizzy call your Health Care Provider.      Diagnosis: Acute renal failure superimposed on stage 3 chronic kidney disease, unspecified acute renal failure type  Assessment and Plan of Treatment: HOLD Entresto, LAsix and Aldactone  Follow up with primary care physician/cardiologist on when to resume  Avoid taking (NSAIDs) - (ex: Ibuprofen, Advil, Celebrex, Naprosyn)  Avoid taking any nephrotoxic agents (can harm kidneys) - Intravenous contrast for diagnostic testing, combination cold medications.  Have all medications adjusted for your renal function by your Health Care Provider.  Blood pressure control is important.  Take all medication as prescribed.

## 2020-07-22 NOTE — DISCHARGE NOTE NURSING/CASE MANAGEMENT/SOCIAL WORK - PATIENT PORTAL LINK FT
You can access the FollowMyHealth Patient Portal offered by [facility] by registering at the following website: [email or web address]. By joining [employer]’s FollowMyHealth portal, you will also be able to view your health information using other applications (apps) compatible with our system.

## 2020-07-29 PROBLEM — E87.6 HYPOKALEMIA: Status: ACTIVE | Noted: 2018-10-10

## 2020-07-29 PROBLEM — S90.829A BLISTER OF FOOT: Status: ACTIVE | Noted: 2020-01-01

## 2020-07-29 PROBLEM — N28.9 RENAL INSUFFICIENCY: Status: ACTIVE | Noted: 2018-09-05

## 2020-07-29 PROBLEM — S80.829A BLISTER OF LEG: Status: ACTIVE | Noted: 2020-01-01

## 2020-07-29 PROBLEM — D64.9 ANEMIA: Status: ACTIVE | Noted: 2018-09-05

## 2020-07-29 NOTE — DISCUSSION/SUMMARY
[FreeTextEntry1] : Redressed RLE open blister.  Will start Bactroban BID.  Will check blood work today and address all meds tomorrow after reviewing results.  Low sodium, diabetic diet reinforced.

## 2020-07-29 NOTE — PHYSICAL EXAM
[General Appearance - Well Developed] : well developed [Normal Appearance] : normal appearance [Well Groomed] : well groomed [General Appearance - Well Nourished] : well nourished [No Deformities] : no deformities [General Appearance - In No Acute Distress] : no acute distress [Normal Conjunctiva] : the conjunctiva exhibited no abnormalities [Eyelids - No Xanthelasma] : the eyelids demonstrated no xanthelasmas [Normal Oral Mucosa] : normal oral mucosa [No Oral Pallor] : no oral pallor [No Oral Cyanosis] : no oral cyanosis [Normal Jugular Venous V Waves Present] : normal jugular venous V waves present [Respiration, Rhythm And Depth] : normal respiratory rhythm and effort [Exaggerated Use Of Accessory Muscles For Inspiration] : no accessory muscle use [Auscultation Breath Sounds / Voice Sounds] : lungs were clear to auscultation bilaterally [Abdomen Soft] : soft [Abdomen Tenderness] : non-tender [] : no hepato-splenomegaly [Abdomen Mass (___ Cm)] : no abdominal mass palpated [Abnormal Walk] : normal gait [Gait - Sufficient For Exercise Testing] : the gait was sufficient for exercise testing [FreeTextEntry1] : open blister RLE.  No pus or oozing., blister dorsum right foot.  No signs of erythema or infection.

## 2020-07-29 NOTE — REVIEW OF SYSTEMS
[Negative] : Heme/Lymph [Dyspnea on exertion] : not dyspnea during exertion [Dizziness] : no dizziness [Palpitations] : no palpitations [Chest Pain] : no chest pain [Tingling (Paresthesia)] : no tingling [Tremor] : no tremor was seen [Convulsions] : no convulsions

## 2020-07-29 NOTE — HISTORY OF PRESENT ILLNESS
[FreeTextEntry1] : Hospitalized in [facility] with A fib & hypotension.  Transferred to [facility] where had ICD generator changed & cardioversion to sinus rhythm.  Entresto, coreg & Lasix discontinued due to acute renal insufficiency with a creatinine of 3 & hypotension.  Patient developed LE edema & restarted Lasix 4 days ago.  Edema has resolved.  Had water blisters on LLE that opened.

## 2020-07-29 NOTE — ASSESSMENT
[FreeTextEntry1] : EKG:  Fully paced\par Patient has some LE edema\par Patient has blisters RLE\par Has cardiomyopathy & renal insufficiency\par Has nocturia.

## 2020-07-29 NOTE — REASON FOR VISIT
[Follow-Up - Clinic] : a clinic follow-up of [Atrial Fibrillation] : atrial fibrillation [Cardiomyopathy] : cardiomyopathy

## 2020-08-11 NOTE — PHYSICAL EXAM
[General Appearance - In No Acute Distress] : in no acute distress [General Appearance - Alert] : alert [General Appearance - Well Nourished] : well nourished [General Appearance - Well Developed] : well developed [PERRL With Normal Accommodation] : pupils were equal in size, round, and reactive to light [Both Tympanic Membranes Were Examined] : both tympanic membranes were normal [Outer Ear] : the ears and nose were normal in appearance [Neck Appearance] : the appearance of the neck was normal [Jugular Venous Distention Increased] : there was no jugular-venous distention [] : no respiratory distress [Oriented To Time, Place, And Person] : oriented to person, place, and time [FreeTextEntry1] : +3 edema of LE b/l

## 2020-08-11 NOTE — END OF VISIT
[] : Fellow [FreeTextEntry3] : Mr Jarquin has likely chronic and acute cardiorenal syndrome with likely renal venous congestion. His long standing CHF is what is leading to his CKD. We did a bedside lung US, and renal sonogram that showed B lines at all 4 quads and a decent view of 4 chamber heart and decrease contractility and renal sonogram showing thinned cortex and renal disease that is chronic- no hydro,. Bladder was able to be visualized likely no retention as just had urinated. This is likely suggesting all acute volume overload.\par Change furosemide to torsemide 40mg BID and monitor every 2 weeks crt\par Daily wts and goal of 185lbs goal \par Needs CHF team eval- d/w with Primary cards and in agreement\par Allow some elevation in serum creatinine for benefit for decongestion \par f.u 1-2 months but needs more frequent wt monitoring

## 2020-08-11 NOTE — REVIEW OF SYSTEMS
[Orthopnea] : orthopnea [Negative] : Genitourinary [Fever] : no fever [Chills] : no chills [FreeTextEntry2] : fatigue

## 2020-08-11 NOTE — ASSESSMENT
[FreeTextEntry1] : 75 year old male with history of DM, HTN, cardiomyopathy,  HFrEF (last Ef was 10% as per pt), Afib s/p ICD placement, anemia, thyroid carcinoma,  and CKD 3 - Patient presenting today for evaluation of CKD stage3 \par \par \par CKD 3\par sCr ranging between 1.7-2.0 in the past 1 year.\par most likely secondary to cardiorenal, 3+ edema of LE - r/o GN causes. \par will discontinue Furosemide \par start Torsemide 40mg po bid\par avoid NSAIDs, nephrotoxic drugs \par adjust medications to current GFR. \par needs to check daily weights. \par will check kidney sonogram \par renal panel, UA, prot/Cr ratio, CBC, PTH\par will check KAMINI, dsDNA, C3, C4, ANCA\par \par BP \par controlled on current medications \par low Salt diet.  will start torsemide \par discontinue lasix\par monitor BP at home

## 2020-08-11 NOTE — HISTORY OF PRESENT ILLNESS
[FreeTextEntry1] : 75 year old male with history of HTN, cardiomyopathy,  HFrEF (last Ef was 10% as per pt), Afib s/p ICD placement, anemia, thyroid carcinoma,  and CKD 3 - Patient presenting today for evaluation of CKD stage3 \par \par Patient currently c/o fatigue, ET to 100-200 yards, reports b/l edema of LE. sCr ranging between 1.7-2.0 in the past 1 year. Denies SOB, chest pain, hematuria, Nausea/vomiting, diarrhea, abdominal pain \par Denies recent NSAIDs use \par \par To his knowledge, he has had some knowledge of elevated crt even 20 years ago in 1.4mg/dl range and he was told it might be diuretic related. He has had CHF since >36 years and slowly worsening EF from 50% to 35% and now 10%. He has had hosp stays for Acute CHF admissions requiring IV diuresis. His crt does fluctuate with diuresis. He has never seen a nephrologsit. No known prior UA showing proteiuria. No NSIAD use, no diabetic retinopathy. No FH of renal disease. His most recent crt has been rising and now in 2-3 range and was thought to be related to diuresis and entresto. his entresto has been held and his furosemide cut back from 40mg to 20mg dose. His wt is still above his baseline but better than before. \par No n/v. No decrease in appetite, no termors. no decreased sleep. No foamy urine. no hematuria, no dysuria. no confusion. \par

## 2020-08-19 PROBLEM — Z82.49 FAMILY HISTORY OF CEREBRAL ANEURYSM: Status: ACTIVE | Noted: 2020-01-01

## 2020-08-19 PROBLEM — C73 PAPILLARY THYROID CARCINOMA: Status: ACTIVE | Noted: 2020-01-01

## 2020-08-19 PROBLEM — I42.9 CARDIOMYOPATHY: Status: ACTIVE | Noted: 2018-09-05

## 2020-08-19 PROBLEM — Z87.891 FORMER SMOKER: Status: ACTIVE | Noted: 2020-01-01

## 2020-08-19 PROBLEM — Z80.0 FAMILY HISTORY OF MALIGNANT NEOPLASM OF STOMACH: Status: ACTIVE | Noted: 2020-01-01

## 2020-08-19 NOTE — HISTORY OF PRESENT ILLNESS
[FreeTextEntry1] : 74 year old male with history of HTN, aortic valve insufficiency, renal insufficiency and anemia who was here for evaluation of recently discovered thyroid nodules  on chest CT.\par  US on 2/18 showed multiple nodules however the largest nodules were in the right mid lobe 1.5 x 1.3 x 1.2 cm and left upper pole 1.2 x 1.2 x 0.9 cm. Both nodules have been FNAed and the left upper pole nodule (calcified) was noted to be [place] V ( PTC)\par Right sided nodule was benign \par \par Patient decided to hold off on thyroidectomy given his other comorbidities \par He has been taking Levothyroxine 25 mcg - most recent TSH of 4.86\par He denied any family history of thyroid cancer or any previous head or neck radiation exposure \par He denied any dysphonia, dysphagia or choking sensation \par

## 2020-08-19 NOTE — REVIEW OF SYSTEMS
[Fatigue] : fatigue [Decreased Appetite] : appetite not decreased [Visual Field Defect] : no visual field defect [Recent Weight Loss (___ Lbs)] : no recent weight loss [Recent Weight Gain (___ Lbs)] : no recent weight gain [Dysphonia] : no dysphonia [Dysphagia] : no dysphagia [Dry Eyes] : no dryness [Chest Pain] : no chest pain [Palpitations] : no palpitations [Shortness Of Breath] : no shortness of breath [Cough] : no cough [Nausea] : no nausea [Vomiting] : no vomiting [Polyuria] : no polyuria [Hesistancy] : no hesitancy [Polydipsia] : no polydipsia [Headaches] : no headaches [Tremors] : no tremors [Easy Bleeding] : no ~M tendency for easy bleeding

## 2020-08-19 NOTE — PROCEDURE
[Atieva e 2008 model, 10-12 MHz frequencies] : multiple real time longitudinal and transverse images were obtained using a high resolution ultrasound with a linear transducer, Atieva e 2008 model, 10-12 MHz frequencies. All measurements will be reported as longitudinal x ethel-posterior x transverse. [Isoechoic] : isoechoic nodule [Spongiform Appearance] : spongiform appearance [Thin] : has a thin halo [Distinct] : distinct [Right Thyroid] : right [Solid] : solid [Upper] : upper pole there is a  [Hypoechoic] : hypoechoic nodule [Irregular] : irregular [Macrocalcifications] : macrocalcifications [No vascularity] : no vascularity [1] : 1 [Posterior acoustic shadowing] : posterior acoustic shadowing [Cyst] : cyst [Lower] : lower pole there is a  [Left Thyroid] : left [Smooth] : smooth [Mixed] : mixed [Ovoid] : ovoid in shape [Peripheral vascularity] : peripheral vascularity [No] : does not have a halo [Echogenic Foci] : echogenic foci [2] : 2 [No abnormal lymph nodes are seen.] : no abnormal lymph nodes are seen [FreeTextEntry2] : 0.32 [FreeTextEntry1] : 4.32 x 2.3 x 1.36 [FreeTextEntry5] : 3.58 x 1.41 x 1.59 [FreeTextEntry3] : 0.9 x 0.53 x 0.79

## 2020-08-19 NOTE — ASSESSMENT
[FreeTextEntry1] : 75 year old male with history of thyroid nodules here for follow-up.\par A left sided thyroid nodule on the left which is 1.2 cm in maximal dimension resulted as [place] V, he is explained that he had a >75% that this nodule is PTC. However at this time, patient would prefer to hold off on surgery and continued to be monitored. \par Will increase his levothyroxine dose to keep the TSH <2. Will monitor his nodule every 3 months for size and also lymph node surveillance will be done \par \par -Ultrasound today ( see report) displaying interval stability in the left upper pole nodule ( 1.04 cm) \par -No abnormal lymph node metastases noted \par -Increase Levothyroxine to 50 mcg daily \par -Follow up in 3 months for ultrasound visit \par

## 2020-08-19 NOTE — PROCEDURE
[[employer] e 2008 model, 10-12 MHz frequencies] : multiple real time longitudinal and transverse images were obtained using a high resolution ultrasound with a linear transducer, [employer] e 2008 model, 10-12 MHz frequencies. All measurements will be reported as longitudinal x ethel-posterior x transverse. [Isoechoic] : isoechoic nodule [Spongiform Appearance] : spongiform appearance [Thin] : has a thin halo [Distinct] : distinct [Right Thyroid] : right [Solid] : solid [Upper] : upper pole there is a  [Hypoechoic] : hypoechoic nodule [Irregular] : irregular [Macrocalcifications] : macrocalcifications [No vascularity] : no vascularity [Posterior acoustic shadowing] : posterior acoustic shadowing [1] : 1 [Cyst] : cyst [Left Thyroid] : left [Lower] : lower pole there is a  [Ovoid] : ovoid in shape [Smooth] : smooth [Mixed] : mixed [No] : does not have a halo [Peripheral vascularity] : peripheral vascularity [Echogenic Foci] : echogenic foci [No abnormal lymph nodes are seen.] : no abnormal lymph nodes are seen [2] : 2 [FreeTextEntry1] : 4.32 x 2.3 x 1.36 [FreeTextEntry5] : 3.58 x 1.41 x 1.59 [FreeTextEntry2] : 0.32 [FreeTextEntry3] : 0.9 x 0.53 x 0.79

## 2020-08-19 NOTE — PHYSICAL EXAM
[Well Nourished] : well nourished [Alert] : alert [Normal Sclera/Conjunctiva] : normal sclera/conjunctiva [No Acute Distress] : no acute distress [Normal Outer Ear/Nose] : the ears and nose were normal in appearance [PERRL] : pupils equal, round and reactive to light [Normal Hearing] : hearing was normal [Normal S1, S2] : normal S1 and S2 [Clear to Auscultation] : lungs were clear to auscultation bilaterally [No Respiratory Distress] : no respiratory distress [Normal Rate] : heart rate was normal [Normal Bowel Sounds] : normal bowel sounds [Not Tender] : non-tender [Normal Gait] : normal gait [No Clubbing, Cyanosis] : no clubbing  or cyanosis of the fingernails [No CVA Tenderness] : no ~M costovertebral angle tenderness [No Joint Swelling] : no joint swelling seen [No Rash] : no rash [Normal Strength/Tone] : muscle strength and tone were normal [No Tremors] : no tremors [No Skin Lesions] : no skin lesions [No Motor Deficits] : the motor exam was normal [Normal Affect] : the affect was normal [Oriented x3] : oriented to person, place, and time [Normal Insight/Judgement] : insight and judgment were intact [Normal Mood] : the mood was normal

## 2020-08-19 NOTE — IMPRESSION
[FreeTextEntry2] : Follow up ultrasound in 3 months  [FreeTextEntry1] : Left upper pole hypoechoic nodule ( [place] V previously) displaying interval stability. No abnormal lymphadenopathy noted \par Other nodules are stable as well

## 2020-08-19 NOTE — REVIEW OF SYSTEMS
[Fatigue] : fatigue [Decreased Appetite] : appetite not decreased [Recent Weight Gain (___ Lbs)] : no recent weight gain [Visual Field Defect] : no visual field defect [Recent Weight Loss (___ Lbs)] : no recent weight loss [Dry Eyes] : no dryness [Dysphagia] : no dysphagia [Dysphonia] : no dysphonia [Chest Pain] : no chest pain [Palpitations] : no palpitations [Shortness Of Breath] : no shortness of breath [Cough] : no cough [Nausea] : no nausea [Vomiting] : no vomiting [Polyuria] : no polyuria [Hesistancy] : no hesitancy [Headaches] : no headaches [Tremors] : no tremors [Polydipsia] : no polydipsia [Easy Bleeding] : no ~M tendency for easy bleeding

## 2020-08-19 NOTE — HISTORY OF PRESENT ILLNESS
[FreeTextEntry1] : 74 year old male with history of HTN, aortic valve insufficiency, renal insufficiency and anemia who was here for evaluation of recently discovered thyroid nodules  on chest CT.\par  US on 2/18 showed multiple nodules however the largest nodules were in the right mid lobe 1.5 x 1.3 x 1.2 cm and left upper pole 1.2 x 1.2 x 0.9 cm. Both nodules have been FNAed and the left upper pole nodule (calcified) was noted to be [place] V ( PTC)\par Right sided nodule was benign \par \par Patient decided to hold off on thyroidectomy given his other comorbidities \par He has been taking Levothyroxine 25 mcg - most recent TSH of 4.86\par He denied any family history of thyroid cancer or any previous head or neck radiation exposure \par He denied any dysphonia, dysphagia or choking sensation \par

## 2020-08-19 NOTE — PHYSICAL EXAM
[Well Nourished] : well nourished [Alert] : alert [Normal Sclera/Conjunctiva] : normal sclera/conjunctiva [No Acute Distress] : no acute distress [PERRL] : pupils equal, round and reactive to light [Normal Outer Ear/Nose] : the ears and nose were normal in appearance [Normal Hearing] : hearing was normal [Normal S1, S2] : normal S1 and S2 [Clear to Auscultation] : lungs were clear to auscultation bilaterally [No Respiratory Distress] : no respiratory distress [Normal Bowel Sounds] : normal bowel sounds [Normal Rate] : heart rate was normal [Not Tender] : non-tender [No Clubbing, Cyanosis] : no clubbing  or cyanosis of the fingernails [No CVA Tenderness] : no ~M costovertebral angle tenderness [Normal Gait] : normal gait [No Joint Swelling] : no joint swelling seen [No Rash] : no rash [Normal Strength/Tone] : muscle strength and tone were normal [No Motor Deficits] : the motor exam was normal [No Tremors] : no tremors [No Skin Lesions] : no skin lesions [Oriented x3] : oriented to person, place, and time [Normal Affect] : the affect was normal [Normal Insight/Judgement] : insight and judgment were intact [Normal Mood] : the mood was normal

## 2020-08-19 NOTE — ASSESSMENT
[FreeTextEntry1] : 75 year old male with history of thyroid nodules here for follow-up.\par A left sided thyroid nodule on the left which is 1.2 cm in maximal dimension resulted as [place] V, he is explained that he had a >75% that this nodule is PTC. However at this time, patient would prefer to hold off on surgery and continued to be monitored. \par Will increase his levothyroxine dose to keep the TSH <2. Will monitor his nodule every 3 months for size and also lymph node surveillance will be done \par \par -Ultrasound today ( see report) displaying interval stability in the left upper pole nodule ( 1.04 cm) \par -No abnormal lymph node metastases noted \par -Increase Levothyroxine to 50 mcg daily \par -Follow up in 3 months for ultrasound visit \par

## 2020-08-21 NOTE — PHYSICAL THERAPY INITIAL EVALUATION ADULT - DISCHARGE PLANNER MADE AWARE
Patient called midwives voice mail by mistake. She would like a call back for issues not discussed on message left. She did state she was a patient of dr Arianna Lal. yes

## 2020-08-21 NOTE — ED PROVIDER NOTE - OBJECTIVE STATEMENT
A/P: 75 year old male with PMH of NICM (EF<20%) s/p BIV ICD 2012, AF diagnosed in 2015 s/p DCCV 2015, CKD, DM-2, hypothyroidism, recent admission A/P: 75 year old male with PMH of NICM (EF<20%) s/p BIV ICD 2012, AF diagnosed in 2015 s/p DCCV 2015, CKD, DM-2, hypothyroidism, recent admission 7/17 - 7/22 for a fib with rvr + CHF exacerbation s/p ICD change who is now presenting for IV diuresis. The patient is being sent to the hospital for IV diuresis by his heart failure doctor, Dr. Anglin. He reports weight gain and increased b/l LE edema since being discharged 7/22/20. He reports attempting PO diuresis with torsemide and metolazone unsuccessfully, therefore he was sent in for IV diuresis. He endorses worsening b/l edema LE and some slight worsening SOB. Cough is chronic, not worsened. Denies CP, fevers, chills, n/v/d or recent illness.

## 2020-08-21 NOTE — PHYSICAL THERAPY INITIAL EVALUATION ADULT - GAIT DEVIATIONS NOTED, PT EVAL
decreased step length/decreased favian/decreased stride length/increased time in double stance/decreased weight-shifting ability

## 2020-08-21 NOTE — ED PROVIDER NOTE - NS ED ROS FT
REVIEW OF SYSTEMS:    CONSTITUTIONAL: No weakness, fevers or chills  EYES/ENT: No visual changes;  No vertigo or throat pain   NECK: No pain or stiffness  RESPIRATORY: No cough, wheezing, hemoptysis; (+) shortness of breath  CARDIOVASCULAR: No chest pain or palpitations  GASTROINTESTINAL: No abdominal or epigastric pain. No nausea, vomiting, or hematemesis; No diarrhea or constipation. No melena or hematochezia.  GENITOURINARY: No dysuria, frequency or hematuria  NEUROLOGICAL: No numbness or weakness  SKIN: No itching, rashes  Ext: leg edema

## 2020-08-21 NOTE — H&P ADULT - PROBLEM SELECTOR PLAN 8
-trend cbc  -maintain active t+s while on eliquis -trend cbc  -hep c negative, send peripheral smear  -maintain active t+s while on eliquis

## 2020-08-21 NOTE — PHYSICAL THERAPY INITIAL EVALUATION ADULT - TRANSFER SAFETY CONCERNS NOTED: SIT/STAND, REHAB EVAL
decreased balance during turns/decreased step length/decreased weight-shifting ability/decreased safety awareness/decreased sequencing ability

## 2020-08-21 NOTE — H&P ADULT - NSHPREVIEWOFSYSTEMS_GEN_ALL_CORE
Review of Systems:   CONSTITUTIONAL: No fever, weight loss  EYES: No eye pain, visual disturbances  ENMT:  No sinus or throat pain  RESPIRATORY: +SOB. +cough No wheezing, chills or hemoptysis  CARDIOVASCULAR: No chest pain, palpitations. No syncope. +leg swelling  GASTROINTESTINAL: No abdominal or epigastric pain. No nausea, vomiting, or hematemesis; No diarrhea or constipation. No melena or hematochezia.  GENITOURINARY: No dysuria, hematuria, or incontinence  NEUROLOGICAL: No headaches, +generalized weakness  SKIN: No itching, burning, rashes, or lesions   LYMPH NODES: No enlarged glands  ENDOCRINE: No heat or cold intolerance; No hair loss  MUSCULOSKELETAL: +joint pain or swelling; +back pain  PSYCHIATRIC: No depression, anxiety  HEME/LYMPH: +easy bruising, No bleeding gums

## 2020-08-21 NOTE — ED PROVIDER NOTE - CLINICAL SUMMARY MEDICAL DECISION MAKING FREE TEXT BOX
Attending MD Quach: sent in by Dr. Anglin for IV diuresis.  Increased weight and LE edema.  ALCAZAR.  Exam: A & O x 3, NAD, HEENT WNL and no facial asymmetry; lungs decreased BS at bases no crackles, heart with reg rhythm without murmur; abdomen soft NTND; extremities with 2+ pitting edema; skin with no rashes, neuro exam non focal with no motor or sensory deficits.

## 2020-08-21 NOTE — CONSULT NOTE ADULT - PROBLEM SELECTOR RECOMMENDATION 9
-Pt VOL on exam now  -Would diurese  -Trial bumetanide 4mg IV x 1; if adequate UOP would start bumetanide 4mg IV BID to target net neg ~2-3L/day  -If inadequate UOP after 3 hours, would start bumetanide gtt @ 0.5mg/hr  -Await basic labs and perfusion labs incl liver tests, lactate  -C/w home med carvedilol 6.25 for now; if labs concerning would halve/hold  -Add more GDMT as hemodynamics allow

## 2020-08-21 NOTE — PHYSICAL THERAPY INITIAL EVALUATION ADULT - PLANNED THERAPY INTERVENTIONS, PT EVAL
transfer training/GOAL: Pt will ascend/descend (1 flight) steps (I) with U HR and step over step pattern in 4 weeks./balance training/bed mobility training/strengthening/gait training

## 2020-08-21 NOTE — ED PROVIDER NOTE - ATTENDING CONTRIBUTION TO CARE
Attending MD Quach:  I personally have seen and examined this patient.  Resident note reviewed and agree on plan of care and except where noted.

## 2020-08-21 NOTE — H&P ADULT - ASSESSMENT
74M w/ PMHx of HFrEF (EF 10%) s/p ICD, Afib w/ recent admission for CHF/afib s/p DCCV, CKD, DM2, hypothyroidism presents after being referred from CHF clinic for admission due to worsening of LE edema and failure of PO diuretics at home. Optimal diuretic management has been difficult to obtain due to baseline hypotension and significant CKD. Will trial 4 iv bumex bid with a low threshold to start on a gtt w/ metolazone as needed to maintain net negative. Low suspicion for new ACS event as never had CP, but will r/o w/ troponin x2. Will maintain on tele, repeat TTE and check TSH, and interrogate device to investigate recurrent afib to investigate other triggers for his current decompensation.

## 2020-08-21 NOTE — H&P ADULT - PROBLEM SELECTOR PLAN 4
-overall creatinine is improving (2.47 from 3.3), prior baseline was ~1.8  -unclear if ongoing cardiorenal EDIE vs new baseline and progression of CKD

## 2020-08-21 NOTE — H&P ADULT - PROBLEM SELECTOR PLAN 1
-bumex IV 4mg bid - titrate to 1-2L net negative  -low threshold to escalate to bumex gtt, add metolazone  -ECG similar to prior  -will obtain trop x2, though low suspicion for acs  -check tsh given recent synthroid change  -CXR  -repeat tte, maintain on tele  -prior tte w/ SWMAs suggestive of CAD- will check lipids. ?role of statin, has indication due to DM2 if nothing else. Currently on eliquis, no indication to add aspirin  -okay to c/w home coreg 6.25 bid - hold if hypotensive  -appreciate CHF recs

## 2020-08-21 NOTE — CONSULT NOTE ADULT - ATTENDING COMMENTS
75yrs, DM, DCM, HFrEF LVEDD 6.7, LVEF 20. CRTD. Af with recent DCCV. Followed   CKD baseline Cr 1.6 in Dec 2019  Seen for first time by NP in HF clinic and referred from clinic with [facility].   Long standing HF. Followed by Dr Loya on ? HF meds.   Admitted July 14-22 dyspnea AFib with RVR DCCV. Generator change. Diuretics.   However sent home off coreg, entresto and lasix. Worsening LE edema. Called cardiologist told him to take lasix 40. ? other meds.   home meds: amnio 200, coreg 6.125 bid, epixiban, synthroid, torsemide 40 bid.   108/44, 80, afebrile,   08-21    141  |  100  |  69<H>  ----------------------------<  180<H>  3.7   |  30  |  2.47<H>  Ca    10.0      21 Aug 2020 08:17  TPro  6.6  /  Alb  4.1  /  TBili  1.6<H>  /  DBili  x   /  AST  31  /  ALT  16  /  AlkPhos  66  08-21                        10.8   5.08  )-----------( 93       ( 21 Aug 2020 08:17 )             35.9   TTE July 2020: LVEDD 6.7, LVEF < 20, mod-sev MR, mod TR, mod PI. RVSP 57.   NT proBNP 71785  EKG: AVbiVpaced. 75yrs, DM, DCM, HFrEF LVEDD 6.7, LVEF 20. CRTD since 2012. Af with recent DCCV. Followed   CKD baseline Cr 1.6 in Dec 2019  Seen for first time by Dr Carreon in HF clinic this week and referred from clinic with [facility].   Long standing HF. Followed by Dr Loya on ? HF meds.   Admitted July 14-22 dyspnea AFib with RVR DCCV. Generator change. Diuretics.   However sent home off coreg, entresto and lasix. Worsening LE edema. Called cardiologist told him to take lasix 40. ? other meds.   home meds: amnio 200, coreg 6.125 bid, epixiban, synthroid, torsemide 40 bid.   Describes progressive decline in exercise tolerance over the past year. Was extremely active a year ago. Now can walk only a few steps.   Dry weight 190. More than 10 lbs up on dry weight.   108/44, 80, afebrile,   08-21    141  |  100  |  69<H>  ----------------------------<  180<H>  3.7   |  30  |  2.47<H>  Ca    10.0      21 Aug 2020 08:17  TPro  6.6  /  Alb  4.1  /  TBili  1.6<H>  /  DBili  x   /  AST  31  /  ALT  16  /  AlkPhos  66  08-21                        10.8   5.08  )-----------( 93       ( 21 Aug 2020 08:17 )             35.9   TTE July 2020: LVEDD 6.7, LVEF < 20, mod-sev MR, mod TR, mod PI. RVSP 57.   NT proBNP 66476  EKG: AVbiVpaced.  75yrs, Stage D HF. Volume overload. Unclear what his true baseline renal function is. May require inotropes if he does not improve with diuresis.   Plan:  Bumex .5/hr, continue outpatient meds.   Daily weights.  Please send BC X1  Check TFTs  Manpreet Rivera.

## 2020-08-21 NOTE — CONSULT NOTE ADULT - ASSESSMENT
76 y/o M w/ PMH of DM2, NICMP/HFrEF (LVIDd 6.7cm, LVEF <20%) s/p CRT-D, AF s/p recent admission w/ DCCV on amio, CKD (BL Cr ~3), and hypoTH referred from CHF clinic for VOL.

## 2020-08-21 NOTE — PHYSICAL THERAPY INITIAL EVALUATION ADULT - ADDITIONAL COMMENTS
Pt resides alone in a two family home, niece lives upstairs. 4 steps to enter with HR. 1 flight to basement  Was ambulating (I) without use of an AD, reports using SC occasionally for outdoors when he felt weak. Was (I) with all ADLS PTA

## 2020-08-21 NOTE — H&P ADULT - NSHPLABSRESULTS_GEN_ALL_CORE
LABS:                         10.8   5.08  )-----------( x        ( 21 Aug 2020 08:17 )             35.9     08-21    141  |  100  |  69<H>  ----------------------------<  180<H>  3.7   |  30  |  2.47<H>    Ca    10.0      21 Aug 2020 08:17    TPro  6.6  /  Alb  4.1  /  TBili  1.6<H>  /  DBili  x   /  AST  31  /  ALT  16  /  AlkPhos  66  08-21    PT/INR - ( 21 Aug 2020 08:17 )   PT: 31.5 sec;   INR: 2.78 ratio         PTT - ( 21 Aug 2020 08:17 )  PTT:40.9 sec          Serum Pro-Brain Natriuretic Peptide: 21670 pg/mL (08-21 @ 08:17)      Records reviewed from prior hospitalization.  Labs reviewed remarkable for: Elevated BNP, decreased from prior admission. Creatinine improved to 2.47 (3.3 1 mo prior)  EKG personally reviewed - av paced, LBBB morphology, nonspecific TW changes, similar to prior  CXR - pending    Transesophageal echo 7/20  < from: Transesophageal Echocardiogram w/o TTE (07.20.20 @ 14:19) >    Conclusions:  1. Tethered mitral valve leaflets with normal opening. At  least moderate-severe mitral regurgitation. By PISA,  calculated EROabout 37 mmsq (Pisa rad 0.9 cm, Va 28.1 cm/s,  Vm 3.8 m/sec)  2. Left atrial enlargement. No left atrial or left atrial  appendage thrombus. Decreased left atrial appendage  velocities (about 30 cm/sec) noted.  3. Severe left ventricular enlargement.  4. Severe global left ventricular systolic dysfunction.  5. Decreased right ventricular systolic function. A device  wire is noted in the right heart.    < end of copied text >  TTE 7/16  < from: TTE Echo Complete w/o Contrast w/ Doppler (07.16.20 @ 19:12) >    Summary:   1. Left ventricular ejection fraction, by visual estimation, is <20%.   2. Technically good study.   3. Severely decreased global left ventricular systolic function.   4. Multiple left ventricular regional wall motion abnormalities exist. See wall motion findings.   5. Dilated cardiomyopathy.   6. Moderate to severely increased left ventricular internal cavity size.   7. The mitral in-flow pattern reveals no discernable A-wave, therefore no comment on diastolic function can be made.   8. Moderate to severe mitral valve regurgitation.   9. The mitral valve leaflets are tethered which is due to reduced systolic function and elevated LVDP.  10. Moderate tricuspid regurgitation.  11. Moderate pulmonic valve regurgitation.  12. Estimated pulmonary artery systolic pressure is 57.2 mmHg assuming a right atrial pressure of 15 mmHg, which is consistent with moderate/severe pulmonary hypertension.  13. Comparedwith the study from 11-23-19, findings are similar.    < end of copied text >

## 2020-08-21 NOTE — PHYSICAL THERAPY INITIAL EVALUATION ADULT - PRECAUTIONS/LIMITATIONS, REHAB EVAL
fall precautions/Per patient was relatively functional until ~1 mo ago when he developed palpitations and light headedness. He was found to be hypotensive and in Afib w/ RVR resulting in his hospitalization at the end  of July. His course was c/b EDIE on CKD and persistent hypotension. He improved after dccv and was ultimately discharged home off entresto, coreg and diuretics due to c/f worsening hypotension. After discharge, patient notes worsening LE edema, ongoing reduced functional capacity, fatigue. fall precautions/Per patient was relatively functional until ~1 mo ago when he developed palpitations and light headedness. He was found to be hypotensive and in Afib w/ RVR resulting in his hospitalization at the end  of July. His course was c/b EDIE on CKD and persistent hypotension. He improved after dccv and was ultimately discharged home off entresto, coreg and diuretics due to c/f worsening hypotension. After discharge, patient notes worsening LE edema, ongoing reduced functional capacity, fatigue. A/w Acute HFrEF and management of LE edema

## 2020-08-21 NOTE — ED PROVIDER NOTE - PHYSICAL EXAMINATION
VITALS:   T(C): 36.6 (08-21-20 @ 07:40), Max: 36.6 (08-21-20 @ 06:48)  HR: 80 (08-21-20 @ 07:40) (80 - 86)  BP: 108/74 (08-21-20 @ 07:40) (108/74 - 115/76)  RR: 18 (08-21-20 @ 07:40) (18 - 18)  SpO2: 100% (08-21-20 @ 07:40) (100% - 100%)    GENERAL: NAD, lying in bed comfortably  HEAD:  Atraumatic, Normocephalic  EYES: EOMI, PERRLA, conjunctiva and sclera clear  ENT: Moist mucous membranes  NECK: Supple, No JVD  CHEST/LUNG: Decreased BS b/l bases. No crackles   HEART: Regular rate; (+) systolic murmur  ABDOMEN: BSx4; Soft, nontender, nondistended  EXTREMITIES:  2+ pitting edema b/l LE  NERVOUS SYSTEM:  A&Ox3, no focal deficits   SKIN: No rashes or lesions

## 2020-08-21 NOTE — ED ADULT NURSE NOTE - NSIMPLEMENTINTERV_GEN_ALL_ED
Implemented All Fall with Harm Risk Interventions:  [place] to call system. Call bell, personal items and telephone within reach. Instruct patient to call for assistance. Room bathroom lighting operational. Non-slip footwear when patient is off stretcher. Physically safe environment: no spills, clutter or unnecessary equipment. Stretcher in lowest position, wheels locked, appropriate side rails in place. Provide visual cue, wrist band, yellow gown, etc. Monitor gait and stability. Monitor for mental status changes and reorient to person, place, and time. Review medications for side effects contributing to fall risk. Reinforce activity limits and safety measures with patient and family. Provide visual clues: red socks.

## 2020-08-21 NOTE — PATIENT PROFILE ADULT - NSPROMEDSBROUGHTTOHOSP_GEN_A_NUR
Body Location Override (Optional - Billing Will Still Be Based On Selected Body Map Location If Applicable): right lateral zygomatic
Detail Level: Detailed
Add 35285 Cpt? (Important Note: In 2017 The Use Of 00914 Is Being Tracked By Cms To Determine Future Global Period Reimbursement For Global Periods): yes
no

## 2020-08-21 NOTE — H&P ADULT - NSICDXPASTMEDICALHX_GEN_ALL_CORE_FT
PAST MEDICAL HISTORY:  Afib     Aortic insufficiency     Cardiomyopathy Systolic CHF    CKD (chronic kidney disease)     Hypertension     Hypothyroidism     Mitral insufficiency     Type II diabetes mellitus

## 2020-08-21 NOTE — PHYSICAL THERAPY INITIAL EVALUATION ADULT - PERTINENT HX OF CURRENT PROBLEM, REHAB EVAL
74M w/ PMHx of HFrEF (EF 10%) s/p ICD, Afib w/ recent admission for CHF/afib s/p DCCV, CKD, DM2, hypothyroidism presents after being referred from CHF clinic for admission due to worsening of LE edema and failure of PO diuretics at home.

## 2020-08-21 NOTE — ED PROVIDER NOTE - PMH
Afib    Aortic insufficiency    Cardiomyopathy  Systolic CHF  CKD (chronic kidney disease)    Hypertension    Hypothyroidism    Mitral insufficiency    Type II diabetes mellitus

## 2020-08-21 NOTE — H&P ADULT - PROBLEM SELECTOR PLAN 7
-repeat iron studies  -check retic count, ldh. B12/folate wnl at last admission  -monitor for bleeding  -maintain active T+S -repeat iron studies  -check retic count, ldh. B12/folate wnl at last admission  -monitor for bleeding  -maintain active T+S  -if anemia w/u unrevealing may need heme w/u outpatient given macrocytic anemia, thrombocytopenia

## 2020-08-21 NOTE — PHYSICAL THERAPY INITIAL EVALUATION ADULT - STRENGTHENING, PT EVAL
GOAL: Pt will improve bilateral LE strength by 1/2 grade on MMT for increased limb stability, to improve gait and facilitate stair negotiation in 4 weeks.

## 2020-08-21 NOTE — H&P ADULT - NSHPPHYSICALEXAM_GEN_ALL_CORE
Vital Signs Last 24 Hrs  T(C): 36.4 (21 Aug 2020 09:30), Max: 36.6 (21 Aug 2020 06:48)  T(F): 97.5 (21 Aug 2020 09:30), Max: 97.9 (21 Aug 2020 07:40)  HR: 87 (21 Aug 2020 09:30) (80 - 87)  BP: 110/75 (21 Aug 2020 09:30) (108/74 - 115/76)  BP(mean): --  RR: 19 (21 Aug 2020 09:30) (18 - 20)  SpO2: 100% (21 Aug 2020 09:30) (99% - 100%)    PHYSICAL EXAM:  GENERAL:  Elderly, chronically ill appearing, in NAD  HEAD:  NCAT  EYES: PERRL, EOMI, conjunctiva clear  NECK: Supple, +slight JVD 1/3 neck  CHEST/LUNG: decreased at bases, no signicant crackles  HEART: Reg rate. Normal S1, S2. +systolic murmur  ABDOMEN: SNTND. Bowel sounds present  EXTREMITIES:  2+ Peripheral Pulses, wwp, +edema to mid thigh  PSYCH: AAOx3, appropriate affect  NEUROLOGY: no gross sensory changes, moving all extremities  SKIN: No rashes or lesions. +eccymoses on legs

## 2020-08-21 NOTE — H&P ADULT - PROBLEM SELECTOR PLAN 2
-appears regular, av paced on tele - on ecg, difficult to discern atrial pacer spikes or clear p waves  -interrogate device for recurrent afib -appears regular, av paced on tele - on ecg, difficult to discern atrial pacer spikes or clear p waves  -interrogate device for recurrent episodes of afib  -cr > 1.5, however age < 80 and weight > 60kg - no indication for dose reduction at this time. Will monitor closely for bleeding. -appears regular, av paced on tele - on ecg, difficult to discern atrial pacer spikes or clear p waves  -interrogate device for recurrent episodes of afib  -cr > 1.5, however age < 80 and weight > 60kg - no definitive indication for dose reduction at this time, however given thrombocytopenia < 100 and significant ckd, will dose reduce to 2.5 mg bid

## 2020-08-21 NOTE — ED ADULT TRIAGE NOTE - CHIEF COMPLAINT QUOTE
Sent in by MD Eunice Loya (cardiologist) & MD Anglin for admission for IV lasix , CHF. Pt denies SOB, presents with Bilateral LE swelling.

## 2020-08-21 NOTE — H&P ADULT - HISTORY OF PRESENT ILLNESS
74M w/ PMHx of HFrEF (EF 10%) s/p ICD, Afib w/ recent admission for CHF/afib s/p DCCV, CKD, DM2, hypothyroidism presents after being referred from CHF clinic for admission due to worsening of LE edema and failure of PO diuretics at home. Per patient was relatively functional until ~1 mo ago when he developed palpitations and light headedness. He was found to be hypotensive and in Afib w/ RVR resulting in his hospitalization at the end  of July. His course was c/b EDIE on CKD and persistent hypotension. He improved after dccv and was ultimately discharged home off entresto, coreg and diuretics due to c/f worsening hypotension. After discharge, patient notes worsening LE edema. He was instructed to resume lasix, which was ultimately escalated to toresemide 80mg bid w/ metolazone 2.5 mg daily. With this regimen he noted increased urination and some mild improvement. However, due to ongoing reduced functional capacity, fatigue, and ALCAZAR he was referred for admission for IV diuretics. Denies any cp or palpitations. Denies repaid HR. Notes weight gain since discharge. Continues to have poor appetite and reduced exercise capacity. No fevers, chills. Notes chronic nonproductive cough which is unchanged. Sleeps elevated due to back pain, denies significant orthopnea. Is able to complete ADLs at baseline with some assistance from his niece. Notes increased urination with high dose diuretics, but no dysuria or hematuria. No abd pain, nausea, vomiting. No diarrhea.

## 2020-08-21 NOTE — CONSULT NOTE ADULT - SUBJECTIVE AND OBJECTIVE BOX
Patient seen and evaluated at bedside    Chief Complaint: VOL    HPI: Briefly, 74 y/o M w/ PMH of DM2, NICMP/HFrEF (LVIDd 6.7cm, LVEF <20%) s/p CRT-D, AF s/p recent admission w/ DCCV on amio, CKD (BL Cr ~3), and hypoTH referred from CHF clinic for VOL. Pt states that he was first dx w/ HFrEF >30Y ago after mult illnesses w/ PNA. He has been doing well since that time and has not highly symptomatic when taking medications. He was recently admitted for AF w/ RVR and had successful DCCV and gen change, however he was noted to be HoTNsive throughout admission and many of his meds incl his diuretics were d/c at time of d/c. Pt states that shortly after d/c, he began to "collect fluid" primarily by LE edema. He denies any CP. No dyspnea, but he does report worsening ALCAZAR. No palps. No presyncope/syncope. He typically sleeps sitting up due to chronic LBP, so he denies any orthopnea/PND. He called his cardiologist, who rec taking furosemide 40mg PO daily, and was eventually seen in CHF clinic and was advised to take torsemide 40mg PO daily. He had no improvement and was advised to come to ED.      PMHx:   Hypothyroidism  Afib  Type II diabetes mellitus  Aortic insufficiency  Mitral insufficiency  CKD (chronic kidney disease)  Cardiomyopathy  Hypertension      PSHx:   History of tonsillectomy  AICD (automatic cardioverter/defibrillator) present      Allergies:  No Known Allergies      Home Meds: Reviewed    Current Medications:   aMIOdarone    Tablet 200 milliGRAM(s) Oral daily  apixaban 5 milliGRAM(s) Oral two times a day  buMETAnide IVPB 4 milliGRAM(s) IV Intermittent two times a day  carvedilol 6.25 milliGRAM(s) Oral every 12 hours  levothyroxine 50 MICROGram(s) Oral daily      FAMILY HISTORY:  Family history of CVA      Social History:  Smoking History: Denies  Alcohol Use: Denies  Drug Use: Denies    REVIEW OF SYSTEMS:  CONSTITUTIONAL: No weakness, fevers or chills  EYES/ENT: No visual changes;  No dysphagia  NECK: No pain or stiffness  RESPIRATORY: No cough, wheezing, hemoptysis; No shortness of breath  CARDIOVASCULAR: No chest pain or palpitations; No lower extremity edema  GASTROINTESTINAL: No abdominal or epigastric pain. No nausea, vomiting, or hematemesis; No diarrhea or constipation. No melena or hematochezia.  BACK: No back pain  GENITOURINARY: No dysuria, frequency or hematuria  NEUROLOGICAL: No numbness or weakness  SKIN: No itching, burning, rashes, or lesions   All other review of systems is negative unless indicated above.    Physical Exam:  T(F): 97.5 (08-21), Max: 97.9 (08-21)  HR: 87 (08-21) (80 - 87)  BP: 110/75 (08-21) (108/74 - 115/76)  RR: 19 (08-21)  SpO2: 100% (08-21)  Gen: NAD. Bradyphrenic.  HEENT: NCAT.  Neck: No JVP elev. Neg HJR.  CV: Normal S1, S2. RRR. No MRG.  Chest: CTAB. No WRR.  Abd: +BSx4. Soft. NTND.  Ext: 3+ b/l LE edema.  Skin: No cyanosis.    Cardiovascular Diagnostic Testing:    ECG: Personally reviewed: A-BiV paced    Echo: Personally reviewed: < from: TTE Echo Complete w/o Contrast w/ Doppler (07.16.20 @ 19:12) >   1. Left ventricular ejection fraction, by visual estimation, is <20%.   2. Technically good study.   3. Severely decreased global left ventricular systolic function.   4. Multiple left ventricular regional wall motion abnormalities exist. See wall motion findings.   5. Dilated cardiomyopathy.   6. Moderate to severely increased left ventricular internal cavity size.   7. The mitral in-flow pattern reveals no discernable A-wave, therefore no comment on diastolic function can be made.   8. Moderate to severe mitral valve regurgitation.   9. The mitral valve leaflets are tethered which is due to reduced systolic function and elevated LVDP.  10. Moderate tricuspid regurgitation.  11. Moderate pulmonic valve regurgitation.  12. Estimated pulmonary artery systolic pressure is 57.2 mmHg assuming a right atrial pressure of 15 mmHg, which is consistent with moderate/severe pulmonary hypertension.  13. Comparedwith the study from 11-23-19, findings are similar.    Imaging:    Labs: Personally reviewed                        10.8   5.08  )-----------( x        ( 21 Aug 2020 08:17 )             35.9     08-21    141  |  100  |  69<H>  ----------------------------<  180<H>  3.7   |  30  |  2.47<H>    Ca    10.0      21 Aug 2020 08:17    TPro  6.6  /  Alb  4.1  /  TBili  1.6<H>  /  DBili  x   /  AST  31  /  ALT  16  /  AlkPhos  66  08-21    PT/INR - ( 21 Aug 2020 08:17 )   PT: 31.5 sec;   INR: 2.78 ratio         PTT - ( 21 Aug 2020 08:17 )  PTT:40.9 sec          Serum Pro-Brain Natriuretic Peptide: 32504 pg/mL (08-21 @ 08:17)

## 2020-08-21 NOTE — ED ADULT NURSE NOTE - OBJECTIVE STATEMENT
76 y/o male sent by MD for IV diuresis.  Pt reports he gained weight and has increased bilateral  LE edema since being discharged 7/22/20.  He reports he tried PO diuresis with torsemide and metolazone but were unsuccessful so he is sent in for IV diuresis.   Pt reports his bilateral leg edema is getting worse and he has some slight worsening SOB, with chronic cough.   Pt denies fevers, chills, chest pain n/v/d or recent illness.  (+) Bilateral leg edema. 76 y/o male sent by MD for IV diuresis.  Pt reports he gained weight and has increased bilateral  LE edema since being discharged 7/22/20.  He reports he tried PO diuresis with torsemide and metolazone but were unsuccessful so he is sent in for IV diuresis.   Pt reports his bilateral leg edema is getting worse and he has some slight worsening SOB, with chronic cough.   Pt denies fevers, chills, chest pain n/v/d or recent illness. (+) SOB on exertion, HOB elevated.  (+) Bilateral leg edema.  Dry healed area on top of foot and lower leg, pt reports he had blisters that have healed. 76 y/o male sent by MD for IV diuresis.  Pt reports he gained weight and has increased bilateral  LE edema since being discharged 7/22/20.  He reports he tried PO diuresis with torsemide and metolazone but were unsuccessful so he is sent in for IV diuresis.   Pt reports his bilateral leg edema is getting worse and he has some slight worsening SOB, with chronic cough.   Pt denies fevers, chills, chest pain n/v/d or recent illness. (+) SOB on exertion, HOB elevated.  (+) Bilateral leg edema.  Dry healed area on top of right  foot and right  lower leg, pt reports he had blisters that have healed.

## 2020-08-22 NOTE — PROGRESS NOTE ADULT - SUBJECTIVE AND OBJECTIVE BOX
SUBJECTIVE / OVERNIGHT EVENTS: pt seen and examined      MEDICATIONS  (STANDING):  aMIOdarone    Tablet 200 milliGRAM(s) Oral daily  apixaban 5 milliGRAM(s) Oral two times a day  buMETAnide Infusion 0.5 mG/Hr (2.5 mL/Hr) IV Continuous <Continuous>  carvedilol 6.25 milliGRAM(s) Oral every 12 hours  dextrose 5%. 1000 milliLiter(s) (50 mL/Hr) IV Continuous <Continuous>  dextrose 50% Injectable 12.5 Gram(s) IV Push once  dextrose 50% Injectable 25 Gram(s) IV Push once  dextrose 50% Injectable 25 Gram(s) IV Push once  insulin lispro (HumaLOG) corrective regimen sliding scale   SubCutaneous three times a day before meals  levothyroxine 50 MICROGram(s) Oral daily    MEDICATIONS  (PRN):  dextrose 40% Gel 15 Gram(s) Oral once PRN Blood Glucose LESS THAN 70 milliGRAM(s)/deciliter  glucagon  Injectable 1 milliGRAM(s) IntraMuscular once PRN Glucose LESS THAN 70 milligrams/deciliter      Vital Signs Last 24 Hrs  T(C): 36.8 (22 Aug 2020 20:06), Max: 36.8 (22 Aug 2020 20:06)  T(F): 98.2 (22 Aug 2020 20:06), Max: 98.2 (22 Aug 2020 20:06)  HR: 82 (22 Aug 2020 20:06) (82 - 85)  BP: 99/59 (22 Aug 2020 20:06) (99/59 - 110/74)  BP(mean): --  RR: 17 (22 Aug 2020 20:06) (16 - 17)  SpO2: 97% (22 Aug 2020 20:06) (97% - 97%)  CAPILLARY BLOOD GLUCOSE      POCT Blood Glucose.: 175 mg/dL (22 Aug 2020 21:31)  POCT Blood Glucose.: 180 mg/dL (22 Aug 2020 16:57)  POCT Blood Glucose.: 216 mg/dL (22 Aug 2020 13:19)  POCT Blood Glucose.: 137 mg/dL (22 Aug 2020 08:22)    I&O's Summary    21 Aug 2020 07:01  -  22 Aug 2020 07:00  --------------------------------------------------------  IN: 630 mL / OUT: 4060 mL / NET: -3430 mL    22 Aug 2020 07:01  -  23 Aug 2020 01:15  --------------------------------------------------------  IN: 992.5 mL / OUT: 1850 mL / NET: -857.5 mL        Constitutional: No fever, fatigue  Skin: No rash.  Eyes: No recent vision problems or eye pain.  ENT: No congestion, ear pain, or sore throat.  Cardiovascular: No chest pain or palpation.  Respiratory: No cough, shortness of breath, congestion, or wheezing.  Gastrointestinal: No abdominal pain, nausea, vomiting, or diarrhea.  Genitourinary: No dysuria.  Musculoskeletal: No joint swelling.  Neurologic: No headache.    PHYSICAL EXAM:  GENERAL: NAD  EYES: EOMI, PERRLA  NECK: Supple, No JVD  CHEST/LUNG: crackles at bases  HEART:  S1 , S2 +  ABDOMEN: soft , bs+  EXTREMITIES:  edema+  NEUROLOGY:alert awake oriented       LABS:                        10.7   4.14  )-----------( 79       ( 22 Aug 2020 06:54 )             34.4     08-22    144  |  99  |  63<H>  ----------------------------<  151<H>  2.7<LL>   |  31  |  2.48<H>    Ca    10.0      22 Aug 2020 06:52  Phos  3.5     08-22  Mg     2.2     08-22    TPro  5.7<L>  /  Alb  3.6  /  TBili  2.0<H>  /  DBili  x   /  AST  20  /  ALT  14  /  AlkPhos  63  08-22    PT/INR - ( 22 Aug 2020 09:15 )   PT: 25.7 sec;   INR: 2.24 ratio         PTT - ( 22 Aug 2020 09:15 )  PTT:40.2 sec          RADIOLOGY & ADDITIONAL TESTS:    Imaging Personally Reviewed:    Consultant(s) Notes Reviewed:      Care Discussed with Consultants/Other Providers:

## 2020-08-22 NOTE — CHART NOTE - NSCHARTNOTEFT_GEN_A_CORE
NP note- cardio f/u    increased dose of eliquis from 2.5mg to 5mg BID as cardiology recommended. age < 80, Wt >60Kg CKD. will monitor s/s of acute bleeding.    NP. Shahzad Beckman  [id number]

## 2020-08-22 NOTE — CONSULT NOTE ADULT - ASSESSMENT
74M w/ PMHx of HFrEF (EF 10%) s/p ICD, Atrial Fibrillation w/ recent admission for CHF/afib s/p DCCV, CKD, DM2, hypothyroidism presents after being referred from CHF clinic for admission due to worsening of LE edema 74M w/ PMHx of HFrEF (EF 10%) s/p ICD, Afib w/ recent admission for CHF/afib s/p DCCV, CKD, DM2, hypothyroidism presents after being referred from CHF clinic for admission due to worsening of LE edema         Problem/Plan - 1:  ·  Problem: Acute HFrEF (heart failure with reduced ejection fraction).  Plan:Acute on Chronic Systolic Heart Failure  -Was off Entresto due to low BP  -Continue Bumex gtt is diuresing well  -Weight >15lbs from baseline in July  -Consider Dobutamine assisted diuresis  -restarted on Carvedilol        Problem/Plan - 2:  ·  Problem: Atrial fibrillation, unspecified type.  Plan:CHADS2-4  Paced rhythm s/p DCCV  -Continue Eliquis-[Age<80 Wt >60Kg CKD] would resume 5 mg BID        Problem/Plan - 3:  ·  Problem: Essential hypertension.  Plan:BP stable        Problem/Plan - 4:  ·  Problem: Acute renal failure superimposed on stage 3 chronic kidney disease, unspecified acute renal failure type.  Plan: -overall creatinine is improving (2.47 from 3.3), prior baseline was ~1.8      Problem/Plan - 5:  ·  Problem: Hypothyroidism, unspecified type.  Plan:Thyroid Stimulating Hormone, Serum in AM (07.16.20 @ 06:36)    Thyroid Stimulating Hormone, Serum: 2.130 uU/mL          Problem/Plan - 6:  Problem: Type 2 diabetes mellitus without complication, without long-term current use of insulin. Plan: -diet controlled at home.  -A1C with Estimated Average Glucose in AM (07.18.20 @ 09:51)    A1C with Estimated Average Glucose Result: 6.1 %  -POCT  Blood Glucose (08.21.20 @ 22:01)    POCT Blood Glucose.: 155 <---156 <---158 mg/dL

## 2020-08-22 NOTE — CONSULT NOTE ADULT - ATTENDING COMMENTS
Patient was seen and examined by me on 08/22/2020,interim events noted,labs and radiology studies reviewed.  Loyd Micthell MD,FACC.  [address] [place] [address].  [facility].  [phone]

## 2020-08-22 NOTE — PROGRESS NOTE ADULT - PROBLEM SELECTOR PLAN 2
-appears regular, av paced on tele - on ecg, difficult to discern atrial pacer spikes or clear p waves  -interrogate device for recurrent episodes of afib  -cr > 1.5, however age < 80 and weight > 60kg - no definitive indication for dose reduction at this time, however given thrombocytopenia < 100 and significant ckd, will dose reduce to 2.5 mg bid

## 2020-08-22 NOTE — CONSULT NOTE ADULT - SUBJECTIVE AND OBJECTIVE BOX
DATE OF SERVICE:Patient was seen,examined and evaluated on-08/22/2020    CHIEF COMPLAINT:LE Edema    HPI:74M w/ PMHx of HFrEF (EF 10%) s/p ICD, Atrial Fibrillation w/ recent admission for CHF/afib s/p DCCV, CKD, DM2, hypothyroidism presents after being referred from CHF clinic for admission due to worsening of LE edema and failure of PO diuretics at home.   Per patient was relatively functional until ~1 mo ago when he developed palpitations and light headedness. He was found to be hypotensive and in Afib w/ RVR resulting in his hospitalization at the end  of July. His course was c/b EDIE on CKD and persistent hypotension. He improved after dccv and was ultimately discharged home off entresto, coreg and diuretics due to c/f worsening hypotension. After discharge, patient notes worsening LE edema. He was instructed to resume lasix, which was ultimately escalated to toresemide 80mg bid w/ metolazone 2.5 mg daily. With this regimen he noted increased urination and some mild improvement. However, due to ongoing reduced functional capacity, fatigue, and ALCAZAR he was referred for admission for IV diuretics. Denies any cp or palpitations. Denies repaid HR. Notes weight gain since discharge. Continues to have poor appetite and reduced exercise capacity. No fevers, chills. Notes chronic nonproductive cough which is unchanged. Sleeps elevated due to back pain, denies significant orthopnea. Is able to complete ADLs at baseline with some assistance from his niece. Notes increased urination with high dose diuretics, but no dysuria or hematuria. No abd pain, nausea, vomiting. No diarrhea.   Seen by [facility] team-started on Bumex gtt is diuresing well    PAST MEDICAL & SURGICAL HISTORY:  Hypothyroidism  Afib  Type II diabetes mellitus  Aortic insufficiency  Mitral insufficiency  CKD (chronic kidney disease)  Cardiomyopathy: Systolic CHF  Hypertension  History of tonsillectomy: childhood  AICD (automatic cardioverter/defibrillator) present: 8/2012      MEDICATIONS  (STANDING):  aMIOdarone    Tablet 200 milliGRAM(s) Oral daily  apixaban 2.5 milliGRAM(s) Oral two times a day  buMETAnide Infusion 0.5 mG/Hr (2.5 mL/Hr) IV Continuous <Continuous>  carvedilol 6.25 milliGRAM(s) Oral every 12 hours  insulin lispro (HumaLOG) corrective regimen sliding scale   SubCutaneous three times a day before meals  levothyroxine 50 MICROGram(s) Oral daily    MEDICATIONS  (PRN):  dextrose 40% Gel 15 Gram(s) Oral once PRN Blood Glucose LESS THAN 70 milliGRAM(s)/deciliter  glucagon  Injectable 1 milliGRAM(s) IntraMuscular once PRN Glucose LESS THAN 70 milligrams/deciliter      FAMILY HISTORY:  Family history of CVA    No family history of premature coronary artery disease or sudden cardiac death    SOCIAL HISTORY:  Smoking-  Alcohol-  Ilicit Drug use-    REVIEW OF SYSTEMS:  Constitutional: [ ] fever, [ ]weight loss, [x ]fatigue Activity [ ] Bedbound,[x ] Ambulates [ ] Unassisted[x ] Cane/Walker [ ] Assistence.  Eyes: [ ] visual changes  Respiratory: [x ]shortness of breath;  [ ] cough, [ ]wheezing, [ ]chills, [ ]hemoptysis  Cardiovascular: [ ] chest pain, [ ]palpitations, [ ]dizziness,  [x ]leg swelling[x ]orthopnea [ ]PND  Gastrointestinal: [ ] abdominal pain, [ ]nausea, [ ]vomiting,  [ ]diarrhea,[ ]constipation  Genitourinary: [ ] dysuria, [ ] hematuria  Neurologic: [ ] headaches [ ] tremors[ ] weakness  Skin: [ ] itching, [ ]burning, [ ] rashes  Endocrine: [ ] heat or cold intolerance  Musculoskeletal: [ ] joint pain or swelling; [ ] muscle, back, or extremity pain  Psychiatric: [ ] depression, [ ]anxiety, [ ]mood swings, or [ ]difficulty sleeping  Hematologic: [ ] easy bruising, [ ] bleeding gums       [ x] All others negative	  [ ] Unable to obtain    Vital Signs Last 24 Hrs  T(C): 36.7 (22 Aug 2020 04:12), Max: 36.7 (22 Aug 2020 04:12)  T(F): 98.1 (22 Aug 2020 04:12), Max: 98.1 (22 Aug 2020 04:12)  HR: 85 (22 Aug 2020 04:12) (80 - 88)  BP: 107/68 (22 Aug 2020 04:12) (98/66 - 124/81)  RR: 16 (22 Aug 2020 04:12) (16 - 20)  SpO2: 97% (22 Aug 2020 04:12) (95% - 100%)  I&O's Summary    21 Aug 2020 07:01  -  22 Aug 2020 07:00  --------------------------------------------------------  IN: 630 mL / OUT: 4060 mL / NET: -3430 mL    Wt:186 ---> 204 Lbs    PHYSICAL EXAM:  General: No acute distress BMI-30.5  HEENT: EOMI, PERRL[ ] Icteric  Neck: Supple, No JVD  Lungs: Equal air entry bilaterally; [ ] Rales [ ] Rhonchi [ ] Wheezing  Heart: Regular rate and rhythm;[x ] Murmurs-   2/6 [x ] Systolic [ ] Diastolic [ ] Radiation,No rubs, or gallops  Abdomen: Nontender, bowel sounds present  Extremities: No clubbing, cyanosis,  2+ edema[ ] Calf tenderness  Nervous system:  Alert & Oriented X3, no focal deficits  Psychiatric: Normal affect  Skin: No rashes or lesions      LABS:  08-21    141  |  100  |  69<H>  ----------------------------<  180<H>  3.7   |  30  |  2.47<H>    Ca    10.0      21 Aug 2020 08:17    TPro  6.6  /  Alb  4.1  /  TBili  1.6<H>  /  DBili  x   /  AST  31  /  ALT  16  /  AlkPhos  66  08-21    Creatinine Trend: 2.47<--3.30 <---3.37                        10.8   5.08  )-----------( 93 <---126k      ( 21 Aug 2020 08:17 )             35.9     PT/INR - ( 21 Aug 2020 08:17 )   PT: 31.5 sec;   INR: 2.78 ratio    PTT - ( 21 Aug 2020 08:17 )  PTT:40.9 sec      Serum Pro-Brain Natriuretic Peptide: 53614 pg/mL (08-21-20 @ 08:17)    ECG [my interpretation]:Paced rhythm    TELEMETRY:Paced    ECHO:Study Date: 8/21/2020  Conclusions:  1. Mitral annular calcification.  Tethered mitral valve leaflets with normal opening. Moderate-severe mitral regurgitation.  2. Calcified trileaflet aortic valve with normal opening.      Mild aortic regurgitation.  3. Severely dilated left atrium.  LA volume index = 69 cc/m2.  4. Moderate left ventricular enlargement.  5. Endocardial visualization enhanced with intravenous injection of Ultrasonic Enhancing Agent (Definity). Severe global left ventricular systolic dysfunction.       No left ventricular thrombus.  6. Severe diastolic dysfunction  7. Right ventricular enlargement with decreased right ventricular systolic function.  *** Compared with echocardiogram of 7/20/2020, no significant changes noted.

## 2020-08-23 NOTE — PROGRESS NOTE ADULT - SUBJECTIVE AND OBJECTIVE BOX
DATE OF SERVICE:Patient was seen and examined :08/23/2020    PRESENTING CC:LE Edema ADHF    SUBJ: 74M w/ PMHx of HFrEF (EF 10%) s/p ICD, Atrial Fibrillation w/ recent admission for CHF/afib s/p DCCV, CKD, DM2, hypothyroidism presents after being referred from CHF clinic for admission due to worsening of LE edema and failure of PO diuretics at home. Is on Bumex gtt voiding well edema has subsided      PMH -reviewed admission note, no change since admission  Heart failure: acute [ ] chronic [ ] acute or chronic [x ] diastolic [ ] systolic [x ] combined systolic and diastolic[ ]  EDIE: ATN[ ] renal medullary necrosis [ ] CKD I [ ]CKDII [ ]CKD III [x ]CKD IV [ ]CKD V [ ]Other pathological lesions [ ]    MEDICATIONS  (STANDING):  aMIOdarone    Tablet 200 milliGRAM(s) Oral daily  apixaban 5 milliGRAM(s) Oral two times a day  buMETAnide Infusion 0.5 mG/Hr (2.5 mL/Hr) IV Continuous <Continuous>  carvedilol 6.25 milliGRAM(s) Oral every 12 hours  insulin lispro (HumaLOG) corrective regimen sliding scale   SubCutaneous three times a day before meals  levothyroxine 50 MICROGram(s) Oral daily  potassium chloride    Tablet ER 40 milliEquivalent(s) Oral every 4 hours    MEDICATIONS  (PRN):  dextrose 40% Gel 15 Gram(s) Oral once PRN Blood Glucose LESS THAN 70 milliGRAM(s)/deciliter  glucagon  Injectable 1 milliGRAM(s) IntraMuscular once PRN Glucose LESS THAN 70 milligrams/deciliter              REVIEW OF SYSTEMS:  Constitutional: [ ] fever, [ ]weight loss,  [ ]fatigue  Eyes: [ ] visual changes  Respiratory: [x ]shortness of breath;  [ ] cough, [ ]wheezing, [ ]chills, [ ]hemoptysis  Cardiovascular: [ ] chest pain, [ ]palpitations, [ ]dizziness,  [x ]leg swelling[ ]orthopnea[ ]PND  Gastrointestinal: [ ] abdominal pain, [ ]nausea, [ ]vomiting,  [ ]diarrhea   Genitourinary: [ ] dysuria, [ ] hematuria  Neurologic: [ ] headaches [ ] tremors[ ]weakness  Skin: [ ] itching, [ ]burning, [ ] rashes  Endocrine: [ ] heat or cold intolerance  Musculoskeletal: [ ] joint pain or swelling; [ ] muscle, back, or extremity pain  Psychiatric: [ ] depression, [ ]anxiety, [ ]mood swings, or [ ]difficulty sleeping  Hematologic: [ ] easy bruising, [ ] bleeding gums    [x] All remaining systems negative except as per above.   [ ]Unable to obtain.    Vital Signs Last 24 Hrs  T(C): 36.4 (23 Aug 2020 13:21), Max: 36.9 (23 Aug 2020 05:00)  T(F): 97.6 (23 Aug 2020 13:21), Max: 98.4 (23 Aug 2020 05:00)  HR: 84 (23 Aug 2020 13:21) (80 - 84)  BP: 99/63 (23 Aug 2020 13:21) (99/59 - 106/70)  RR: 16 (23 Aug 2020 13:21) (16 - 18)  SpO2: 100% (23 Aug 2020 13:21) (97% - 100%)  I&O's Summary    22 Aug 2020 07:01  -  23 Aug 2020 07:00  --------------------------------------------------------  IN: 1257.5 mL / OUT: 3875 mL / NET: -2617.5 mL    23 Aug 2020 07:01  -  23 Aug 2020 16:26  --------------------------------------------------------  IN: 360 mL / OUT: 775 mL / NET: -415 mL        PHYSICAL EXAM:  General: No acute distress BMI-25.6  HEENT: EOMI, PERRL  Neck: Supple, [ ] JVD  Lungs: Equal air entry bilaterally; [ ] rales [ ] wheezing [ ] rhonchi  Heart: Regular rate and rhythm; [x] murmur 2  /6 [x ] systolic [ ] diastolic [ ] radiation[ ] rubs [ ]  gallops  Abdomen: Nontender, bowel sounds present  Extremities: No clubbing, cyanosis, [x ] edema  Nervous system:  Alert & Oriented X3, no focal deficits  Psychiatric: Normal affect  Skin: No rashes or lesions    LABS:  08-23    142  |  97  |  60<H>  ----------------------------<  233<H>  3.6   |  33<H>  |  2.46<H>    Ca    10.2      23 Aug 2020 12:45  Phos  3.5     08-22  Mg     2.2     08-22    TPro  5.7<L>  /  Alb  3.6  /  TBili  2.0<H>  /  DBili  x   /  AST  20  /  ALT  14  /  AlkPhos  63  08-22    Creatinine Trend: 2.46<--, 2.55<--, 2.48<--, 2.47<--                        10.7   4.14  )-----------( 79       ( 22 Aug 2020 06:54 )             34.4     PT/INR - ( 22 Aug 2020 09:15 )   PT: 25.7 sec;   INR: 2.24 ratio         PTT - ( 22 Aug 2020 09:15 )  PTT:40.2 sec          TELEMETRY:      IMPRESSION AND PLAN:      74M w/ PMHx of HFrEF (EF 10%) s/p ICD, Atrial Fibrillation w/ recent admission for CHF/afib s/p DCCV, CKD, DM2, hypothyroidism presents after being referred from CHF clinic for admission due to worsening of LE edema 74M w/ PMHx of HFrEF (EF 10%) s/p ICD, Afib w/ recent admission for CHF/afib s/p DCCV, CKD, DM2, hypothyroidism presents after being referred from CHF clinic for admission due to worsening of LE edema         Problem/Plan - 1:  ·  Problem: Acute HFrEF (heart failure with reduced ejection fraction).  Plan:Acute on Chronic Systolic Heart Failure  -Was off Entresto due to low BP  -Continue Bumex gtt is diuresing well  -Weight >15lbs from baseline in July  -restarted on Carvedilol-tolerating        Problem/Plan - 2:  ·  Problem: Atrial fibrillation, unspecified type.  Plan:CHADS2-4  Paced rhythm s/p DCCV  -Continue Eliquis-[Age<80 Wt >60Kg CKD] would resume 5 mg BID      Problem/Plan - 3:  ·  Problem: Essential hypertension.  Plan:BP stable        Problem/Plan - 4:  ·  Problem: Acute renal failure superimposed on stage 3 chronic kidney disease, unspecified acute renal failure type.  Plan: -overall creatinine is improving (2.46 from 3.3), prior baseline was ~1.8  -Trending to baseline likely Cardiorenal      Problem/Plan - 5:  ·  Problem: Hypothyroidism, unspecified type.  Plan:Thyroid Stimulating Hormone, Serum in AM (07.16.20 @ 06:36)    Thyroid Stimulating Hormone, Serum: 2.130 uU/mL    Problem/Plan - 6:  Problem: Type 2 diabetes mellitus without complication, without long-term current use of insulin. Plan: -diet controlled at home.  -A1C with Estimated Average Glucose in AM (07.18.20 @ 09:51)    A1C with Estimated Average Glucose Result: 6.1 %

## 2020-08-23 NOTE — PROGRESS NOTE ADULT - ATTENDING COMMENTS
Patient was seen and examined by me on 08/23/2020,interim events noted,labs and radiology studies reviewed.  Loyd Mitchell MD,FACC.  [address] [place] [address].  [id number].  [phone]

## 2020-08-23 NOTE — PROGRESS NOTE ADULT - SUBJECTIVE AND OBJECTIVE BOX
SUBJECTIVE / OVERNIGHT EVENTS: pt seen and examined    MEDICATIONS  (STANDING):  aMIOdarone    Tablet 200 milliGRAM(s) Oral daily  apixaban 5 milliGRAM(s) Oral two times a day  buMETAnide Infusion 0.5 mG/Hr (2.5 mL/Hr) IV Continuous <Continuous>  carvedilol 6.25 milliGRAM(s) Oral every 12 hours  dextrose 5%. 1000 milliLiter(s) (50 mL/Hr) IV Continuous <Continuous>  dextrose 50% Injectable 12.5 Gram(s) IV Push once  dextrose 50% Injectable 25 Gram(s) IV Push once  dextrose 50% Injectable 25 Gram(s) IV Push once  insulin lispro (HumaLOG) corrective regimen sliding scale   SubCutaneous three times a day before meals  levothyroxine 50 MICROGram(s) Oral daily    MEDICATIONS  (PRN):  dextrose 40% Gel 15 Gram(s) Oral once PRN Blood Glucose LESS THAN 70 milliGRAM(s)/deciliter  glucagon  Injectable 1 milliGRAM(s) IntraMuscular once PRN Glucose LESS THAN 70 milligrams/deciliter    Vital Signs Last 24 Hrs  T(C): 36.9 (23 Aug 2020 20:30), Max: 36.9 (23 Aug 2020 05:00)  T(F): 98.4 (23 Aug 2020 20:30), Max: 98.4 (23 Aug 2020 05:00)  HR: 81 (23 Aug 2020 20:30) (80 - 84)  BP: 99/64 (23 Aug 2020 20:30) (99/63 - 106/70)  BP(mean): --  RR: 18 (23 Aug 2020 20:30) (16 - 18)  SpO2: 97% (23 Aug 2020 20:30) (97% - 100%)    Skin: No rash.  Eyes: No recent vision problems or eye pain.  ENT: No congestion, ear pain, or sore throat.  Cardiovascular: No chest pain or palpation.  Respiratory: No cough, shortness of breath, congestion, or wheezing.  Gastrointestinal: No abdominal pain, nausea, vomiting, or diarrhea.  Genitourinary: No dysuria.  Musculoskeletal: No joint swelling.  Neurologic: No headache.    PHYSICAL EXAM:  GENERAL: NAD  EYES: EOMI, PERRLA  NECK: Supple, No JVD  CHEST/LUNG: crackles at bases  HEART:  S1 , S2 +  ABDOMEN: soft , bs+  EXTREMITIES:  edema+  NEUROLOGY:alert awake oriented     LABS:  08-23    142  |  97  |  60<H>  ----------------------------<  233<H>  3.6   |  33<H>  |  2.46<H>    Ca    10.2      23 Aug 2020 12:45  Phos  3.5     08-22  Mg     2.2     08-22    TPro  5.7<L>  /  Alb  3.6  /  TBili  2.0<H>  /  DBili      /  AST  20  /  ALT  14  /  AlkPhos  63  08-22    Creatinine Trend: 2.46 <--, 2.55 <--, 2.48 <--, 2.47 <--                        10.7   4.14  )-----------( 79       ( 22 Aug 2020 06:54 )             34.4     Urine Studies:            LIVER FUNCTIONS - ( 22 Aug 2020 06:52 )  Alb: 3.6 g/dL / Pro: 5.7 g/dL / ALK PHOS: 63 U/L / ALT: 14 U/L / AST: 20 U/L / GGT: x           PT/INR - ( 22 Aug 2020 09:15 )   PT: 25.7 sec;   INR: 2.24 ratio         PTT - ( 22 Aug 2020 09:15 )  PTT:40.2 sec    Care Discussed with Consultants/Other Providers:

## 2020-08-23 NOTE — PROGRESS NOTE ADULT - ATTENDING COMMENTS
no events.   bumex .5/hr, coreg 6.25 bid, epixiban 5, amnio 200, synthroid.   afebrile, 80sAfib, 100/-110/, 185lbs (195lbsadmission )  08-23    142  |  97  |  60<H>  ----------------------------<  233<H>  3.6   |  33<H>  |  2.46<H>  Cr 2.46, 2.55  Ca    10.2      23 Aug 2020 12:45  Phos  3.5     08-22  Mg     2.2     08-22  TPro  5.7<L>  /  Alb  3.6  /  TBili  2.0<H>  /  DBili  x   /  AST  20  /  ALT  14  /  AlkPhos  63  08-22  TSH: 4.2, T3 54, T4 normal      Excellent response to diuretics.   No role for milrinone now. Will need RHC prior to d/c  ? Advanced therpies eval this admission  Manpreet Rivera

## 2020-08-23 NOTE — PROGRESS NOTE ADULT - PROBLEM SELECTOR PLAN 1
-c/w Bumex .5 / hr. Pt still extremely volume expanded.   -C/w home med carvedilol 6.25 for now  -Add hydralazine as tolerated.

## 2020-08-23 NOTE — PROGRESS NOTE ADULT - SUBJECTIVE AND OBJECTIVE BOX
Patient seen and examined at bedside.    Overnight Events:       REVIEW OF SYSTEMS:  Constitutional:     [ ] negative [ ] fevers [ ] chills [ ] weight loss [ ] weight gain  HEENT:                  [ ] negative [ ] dry eyes [ ] eye irritation [ ] postnasal drip [ ] nasal congestion  CV:                         [ ] negative  [ ] chest pain [ ] orthopnea [ ] palpitations [ ] murmur  Resp:                     [ ] negative [ ] cough [ ] shortness of breath [ ] dyspnea [ ] wheezing [ ] sputum [ ]hemoptysis  GI:                          [ ] negative [ ] nausea [ ] vomiting [ ] diarrhea [ ] constipation [ ] abd pain [ ] dysphagia   :                        [ ] negative [ ] dysuria [ ] nocturia [ ] hematuria [ ] increased urinary frequency  Musculoskeletal: [ ] negative [ ] back pain [ ] myalgias [ ] arthralgias [ ] fracture  Skin:                       [ ] negative [ ] rash [ ] itch  Neurological:        [ ] negative [ ] headache [ ] dizziness [ ] syncope [ ] weakness [ ] numbness  Psychiatric:           [ ] negative [ ] anxiety [ ] depression  Endocrine:            [ ] negative [ ] diabetes [ ] thyroid problem  Heme/Lymph:      [ ] negative [ ] anemia [ ] bleeding problem  Allergic/Immune: [ ] negative [ ] itchy eyes [ ] nasal discharge [ ] hives [ ] angioedema    [ ] All other systems negative  [ ] Unable to assess ROS due to    Current Meds:  aMIOdarone    Tablet 200 milliGRAM(s) Oral daily  apixaban 5 milliGRAM(s) Oral two times a day  buMETAnide Infusion 0.5 mG/Hr IV Continuous <Continuous>  carvedilol 6.25 milliGRAM(s) Oral every 12 hours  dextrose 40% Gel 15 Gram(s) Oral once PRN  dextrose 5%. 1000 milliLiter(s) IV Continuous <Continuous>  dextrose 50% Injectable 12.5 Gram(s) IV Push once  dextrose 50% Injectable 25 Gram(s) IV Push once  dextrose 50% Injectable 25 Gram(s) IV Push once  glucagon  Injectable 1 milliGRAM(s) IntraMuscular once PRN  insulin lispro (HumaLOG) corrective regimen sliding scale   SubCutaneous three times a day before meals  levothyroxine 50 MICROGram(s) Oral daily  potassium chloride    Tablet ER 40 milliEquivalent(s) Oral every 4 hours      PAST MEDICAL & SURGICAL HISTORY:  Hypothyroidism  Afib  Type II diabetes mellitus  Aortic insufficiency  Mitral insufficiency  CKD (chronic kidney disease)  Cardiomyopathy: Systolic CHF  Hypertension  History of tonsillectomy: childhood  AICD (automatic cardioverter/defibrillator) present: 8/2012      Vitals:  T(F): 97.6 (08-23), Max: 98.4 (08-23)  HR: 84 (08-23) (80 - 84)  BP: 99/63 (08-23) (99/59 - 106/70)  RR: 16 (08-23)  SpO2: 100% (08-23)  I&O's Summary    22 Aug 2020 07:01  -  23 Aug 2020 07:00  --------------------------------------------------------  IN: 1257.5 mL / OUT: 3875 mL / NET: -2617.5 mL    23 Aug 2020 07:01  -  23 Aug 2020 15:01  --------------------------------------------------------  IN: 360 mL / OUT: 775 mL / NET: -415 mL        Physical Exam:  Appearance: No acute distress; well appearing  Eyes: PERRL, EOMI, pink conjunctiva  HENT: Normal oral mucosa  Cardiovascular: RRR, S1, S2, no murmurs, rubs, or gallops; no edema; no JVD  Respiratory: Clear to auscultation bilaterally  Gastrointestinal: soft, non-tender, non-distended with normal bowel sounds  Musculoskeletal: No clubbing; no joint deformity   Neurologic: Non-focal  Lymphatic: No lymphadenopathy  Psychiatry: AAOx3, mood & affect appropriate  Skin: No rashes, ecchymoses, or cyanosis                          10.7   4.14  )-----------( 79       ( 22 Aug 2020 06:54 )             34.4     08-23    142  |  97  |  60<H>  ----------------------------<  233<H>  3.6   |  33<H>  |  2.46<H>    Ca    10.2      23 Aug 2020 12:45  Phos  3.5     08-22  Mg     2.2     08-22    TPro  5.7<L>  /  Alb  3.6  /  TBili  2.0<H>  /  DBili  x   /  AST  20  /  ALT  14  /  AlkPhos  63  08-22    PT/INR - ( 22 Aug 2020 09:15 )   PT: 25.7 sec;   INR: 2.24 ratio         PTT - ( 22 Aug 2020 09:15 )  PTT:40.2 sec      Serum Pro-Brain Natriuretic Peptide: 78763 pg/mL (08-21 @ 08:17) Patient seen and examined at bedside.    Overnight Events: Pt feeling well. He has felt like he has lost a significant amount of weight.       REVIEW OF SYSTEMS:  Constitutional:     [ ] negative [ ] fevers [ ] chills [ ] weight loss [ ] weight gain  HEENT:                  [ ] negative [ ] dry eyes [ ] eye irritation [ ] postnasal drip [ ] nasal congestion  CV:                         [ ] negative  [ ] chest pain [ ] orthopnea [ ] palpitations [ ] murmur  Resp:                     [ ] negative [ ] cough [ ] shortness of breath [ ] dyspnea [ ] wheezing [ ] sputum [ ]hemoptysis  GI:                          [ ] negative [ ] nausea [ ] vomiting [ ] diarrhea [ ] constipation [ ] abd pain [ ] dysphagia   :                        [ ] negative [ ] dysuria [ ] nocturia [ ] hematuria [ ] increased urinary frequency  Musculoskeletal: [ ] negative [ ] back pain [ ] myalgias [ ] arthralgias [ ] fracture  Skin:                       [ ] negative [ ] rash [ ] itch  Neurological:        [ ] negative [ ] headache [ ] dizziness [ ] syncope [ ] weakness [ ] numbness  Psychiatric:           [ ] negative [ ] anxiety [ ] depression  Endocrine:            [ ] negative [ ] diabetes [ ] thyroid problem  Heme/Lymph:      [ ] negative [ ] anemia [ ] bleeding problem  Allergic/Immune: [ ] negative [ ] itchy eyes [ ] nasal discharge [ ] hives [ ] angioedema    [ x] All other systems negative  [ ] Unable to assess ROS due to    Current Meds:  aMIOdarone    Tablet 200 milliGRAM(s) Oral daily  apixaban 5 milliGRAM(s) Oral two times a day  buMETAnide Infusion 0.5 mG/Hr IV Continuous <Continuous>  carvedilol 6.25 milliGRAM(s) Oral every 12 hours  dextrose 40% Gel 15 Gram(s) Oral once PRN  dextrose 5%. 1000 milliLiter(s) IV Continuous <Continuous>  dextrose 50% Injectable 12.5 Gram(s) IV Push once  dextrose 50% Injectable 25 Gram(s) IV Push once  dextrose 50% Injectable 25 Gram(s) IV Push once  glucagon  Injectable 1 milliGRAM(s) IntraMuscular once PRN  insulin lispro (HumaLOG) corrective regimen sliding scale   SubCutaneous three times a day before meals  levothyroxine 50 MICROGram(s) Oral daily  potassium chloride    Tablet ER 40 milliEquivalent(s) Oral every 4 hours      PAST MEDICAL & SURGICAL HISTORY:  Hypothyroidism  Afib  Type II diabetes mellitus  Aortic insufficiency  Mitral insufficiency  CKD (chronic kidney disease)  Cardiomyopathy: Systolic CHF  Hypertension  History of tonsillectomy: childhood  AICD (automatic cardioverter/defibrillator) present: 8/2012      Vitals:  T(F): 97.6 (08-23), Max: 98.4 (08-23)  HR: 84 (08-23) (80 - 84)  BP: 99/63 (08-23) (99/59 - 106/70)  RR: 16 (08-23)  SpO2: 100% (08-23)  I&O's Summary    22 Aug 2020 07:01  -  23 Aug 2020 07:00  --------------------------------------------------------  IN: 1257.5 mL / OUT: 3875 mL / NET: -2617.5 mL    23 Aug 2020 07:01  -  23 Aug 2020 15:01  --------------------------------------------------------  IN: 360 mL / OUT: 775 mL / NET: -415 mL        Physical Exam:  Appearance: No acute distress; well appearing  Eyes: PERRL, EOMI, pink conjunctiva  HENT: Normal oral mucosa  Cardiovascular: RRR, S1, S2, no murmurs, rubs, or gallops; no edema; no JVD  Respiratory: Clear to auscultation bilaterally  Gastrointestinal: soft, non-tender, non-distended with normal bowel sounds  Musculoskeletal: No clubbing; no joint deformity   Neurologic: Non-focal  Lymphatic: No lymphadenopathy  Psychiatry: AAOx3, mood & affect appropriate  Skin: No rashes, ecchymoses, or cyanosis                          10.7   4.14  )-----------( 79       ( 22 Aug 2020 06:54 )             34.4     08-23    142  |  97  |  60<H>  ----------------------------<  233<H>  3.6   |  33<H>  |  2.46<H>    Ca    10.2      23 Aug 2020 12:45  Phos  3.5     08-22  Mg     2.2     08-22    TPro  5.7<L>  /  Alb  3.6  /  TBili  2.0<H>  /  DBili  x   /  AST  20  /  ALT  14  /  AlkPhos  63  08-22    PT/INR - ( 22 Aug 2020 09:15 )   PT: 25.7 sec;   INR: 2.24 ratio         PTT - ( 22 Aug 2020 09:15 )  PTT:40.2 sec      Serum Pro-Brain Natriuretic Peptide: 36719 pg/mL (08-21 @ 08:17)

## 2020-08-24 NOTE — PROGRESS NOTE ADULT - ASSESSMENT
76 y/o M w/ PMH of DM2, NICMP/HFrEF (LVIDd 6.7cm, LVEF <20%) s/p CRT-D, AF s/p recent admission w/ DCCV on amio, CKD (BL Cr ~3), and hypothyroid referred from CHF clinic for VOL. He has diuresed over 10lbs on a bumex gtt. His BUN/Cr remain elevated but stable. His BP is borderline.

## 2020-08-24 NOTE — PROVIDER CONTACT NOTE (OTHER) - ASSESSMENT
Patient hypotensive. BP 91/46 then 90/48 manually. Other VSS. Patient asymptomatic. Patient on Milrinone and Bumex drip.

## 2020-08-24 NOTE — PROGRESS NOTE ADULT - PROBLEM SELECTOR PLAN 1
- Given persistently elevated Cr despite diuresis, will empirically start low dose milrinone 0.2mcg/kg/min  -c/w Bumex .5 / hr. Aggressive electrolyte repletion to maintain K 4-4.5 and Mg 2-2.5  -C/w carvedilol 6.25 for now  - Will plan for RHC to evaluate hemodynamics once euvolemic

## 2020-08-24 NOTE — PROGRESS NOTE ADULT - ATTENDING COMMENTS
Patient was seen and examined by me on 08/24/2020,interim events noted,labs and radiology studies reviewed.  Loyd Mitchell MD,FACC.  [address] [place] [address].  [id number].  [phone]

## 2020-08-24 NOTE — PROGRESS NOTE ADULT - SUBJECTIVE AND OBJECTIVE BOX
Subjective:    Medications:  aMIOdarone    Tablet 200 milliGRAM(s) Oral daily  apixaban 5 milliGRAM(s) Oral two times a day  buMETAnide Infusion 0.5 mG/Hr IV Continuous <Continuous>  carvedilol 6.25 milliGRAM(s) Oral every 12 hours  dextrose 40% Gel 15 Gram(s) Oral once PRN  dextrose 5%. 1000 milliLiter(s) IV Continuous <Continuous>  dextrose 50% Injectable 12.5 Gram(s) IV Push once  dextrose 50% Injectable 25 Gram(s) IV Push once  dextrose 50% Injectable 25 Gram(s) IV Push once  glucagon  Injectable 1 milliGRAM(s) IntraMuscular once PRN  insulin lispro (HumaLOG) corrective regimen sliding scale   SubCutaneous three times a day before meals  levothyroxine 50 MICROGram(s) Oral daily      Physical Exam:    Vitals:  Vital Signs Last 24 Hours  T(C): 36.6 (20 @ 04:13), Max: 36.9 (20 @ 20:30)  HR: 93 (20 @ 04:13) (80 - 93)  BP: 107/70 (20 @ 04:13) (99/63 - 107/70)  RR: 18 (20 @ 04:13) (16 - 18)  SpO2: 98% (20 @ 04:13) (97% - 100%)    Weight in k ( @ 08:12)    I&O's Summary    23 Aug 2020 07:01  -  24 Aug 2020 07:00  --------------------------------------------------------  IN: 1190 mL / OUT: 3425 mL / NET: -2235 mL        Tele:    General: No distress. Comfortable.  HEENT: EOM intact.  Neck: Neck supple. JVP not elevated. No masses  Chest: Clear to auscultation bilaterally  CV: Normal S1 and S2. No murmurs, rub, or gallops. Radial pulses normal.  Abdomen: Soft, non-distended, non-tender  Skin: No rashes or skin breakdown  Neurology: Alert and oriented times three. Sensation intact  Psych: Affect normal    Labs:        143  |  98  |  58<H>  ----------------------------<  138<H>  3.2<L>   |  32<H>  |  2.23<H>    Ca    10.0      24 Aug 2020 06:25  Phos  3.0       Mg     2.0                 Serum Pro-Brain Natriuretic Peptide: 07329 pg/mL ( @ 08:17)      Lactate Dehydrogenase, Serum: 189 U/L ( @ 06:52) Subjective:  - No acute events overnight  - Feeling stronger and overall improved from admission    Medications:  aMIOdarone    Tablet 200 milliGRAM(s) Oral daily  apixaban 5 milliGRAM(s) Oral two times a day  buMETAnide Infusion 0.5 mG/Hr IV Continuous <Continuous>  carvedilol 6.25 milliGRAM(s) Oral every 12 hours  dextrose 40% Gel 15 Gram(s) Oral once PRN  dextrose 5%. 1000 milliLiter(s) IV Continuous <Continuous>  dextrose 50% Injectable 12.5 Gram(s) IV Push once  dextrose 50% Injectable 25 Gram(s) IV Push once  dextrose 50% Injectable 25 Gram(s) IV Push once  glucagon  Injectable 1 milliGRAM(s) IntraMuscular once PRN  insulin lispro (HumaLOG) corrective regimen sliding scale   SubCutaneous three times a day before meals  levothyroxine 50 MICROGram(s) Oral daily      Physical Exam:    Vitals:  Vital Signs Last 24 Hours  T(C): 36.6 (20 @ 04:13), Max: 36.9 (20 @ 20:30)  HR: 93 (20 @ 04:13) (80 - 93)  BP: 107/70 (20 @ 04:13) (99/63 - 107/70)  RR: 18 (20 @ 04:13) (16 - 18)  SpO2: 98% (20 @ 04:13) (97% - 100%)    Weight in k ( @ 08:12)    I&O's Summary    23 Aug 2020 07:01  -  24 Aug 2020 07:00  --------------------------------------------------------  IN: 1190 mL / OUT: 3425 mL / NET: -2235 mL    Tele: vpaced HR 80-90, couplets    General: No distress. Comfortable.  HEENT: EOM intact.  Neck: Neck supple. JVP elevated. No masses  Chest: Clear to auscultation bilaterally  CV: Regular. Normal S1 and S2. No murmurs, rub, or gallops. Radial pulses normal. +2 BLE edema  Abdomen: Soft, non-distended, non-tender  Skin: No rashes or skin breakdown  Neurology: Alert and oriented times three. Sensation intact  Psych: Affect normal    Labs:        143  |  98  |  58<H>  ----------------------------<  138<H>  3.2<L>   |  32<H>  |  2.23<H>    Ca    10.0      24 Aug 2020 06:25  Phos  3.0       Mg     2.0           Serum Pro-Brain Natriuretic Peptide: 89423 pg/mL ( @ 08:17)      Lactate Dehydrogenase, Serum: 189 U/L ( @ 06:52)

## 2020-08-24 NOTE — PROGRESS NOTE ADULT - PROBLEM SELECTOR PLAN 2
-Currently in paced rhythm  -C/w home med amio  -C/w home med carvedilol, as above  - Continue apixaban 5mg BID

## 2020-08-24 NOTE — PROGRESS NOTE ADULT - PROBLEM SELECTOR PLAN 4
-overall creatinine is improving (2.47 from 3.3), prior baseline was ~1.8  -unclear if ongoing cardiorenal EDIE vs new baseline and progression of CKD Bilateral Helical Rim Advancement Flap Text: The defect edges were debeveled with a #15 blade scalpel.  Given the location of the defect and the proximity to free margins (helical rim) a bilateral helical rim advancement flap was deemed most appropriate.  Using a sterile surgical marker, the appropriate advancement flaps were drawn incorporating the defect and placing the expected incisions between the helical rim and antihelix where possible.  The area thus outlined was incised through and through with a #15 scalpel blade.  With a skin hook and iris scissors, the flaps were gently and sharply undermined and freed up.

## 2020-08-24 NOTE — PROGRESS NOTE ADULT - ATTENDING COMMENTS
meds: bumex .5, Coreg 6.25 bid, amnio 200, epixiban 5 bid synthroid.  afebrile BivP 80-90, 99/-107/   weight 182, 195admission   I/o -2.2  08-24  143  |  98  |  58<H>  ----------------------------<  138<H>  3.2<L>   |  32<H>  |  2.23<H>    Ca    10.0      24 Aug 2020 06:25  Phos  3.0     08-24  Mg     2.0     08-24 meds: bumex .5, Coreg 6.25 bid, amnio 200, epixiban 5 bid synthroid.  afebrile BivP 80-90, 99/-107/   weight 182, 195admission   I/o -2.2  08-24  143  |  98  |  58<H>  ----------------------------<  138<H>  3.2<L>   |  32<H>  |  2.23<H>    Ca    10.0      24 Aug 2020 06:25  Phos  3.0     08-24  Mg     2.0     08-24  excellent response to diuretics but remains with clinical volume overload  would start milrinone .2 moving to .3,   continue bumex IV  will likely initiate advanced therapies eval tomorrow.  RHC when euvolemic. Repeat TTE when euvolemic to evaluate MR. Manpreet Rothman

## 2020-08-25 NOTE — PROGRESS NOTE ADULT - ATTENDING COMMENTS
Patient was seen and examined by me on 08/25/2020,interim events noted,labs and radiology studies reviewed.  Loyd Mitchell MD,FACC.  [address] [place] [address].  [id number].  [phone]

## 2020-08-25 NOTE — CONSULT NOTE ADULT - PROBLEM SELECTOR RECOMMENDATION 9
- Currently being evaluated for LVAD implantation  - Patient was educated and made aware of the risk-benefits. He was able to put all connections together including batteries to battery clips and batteries to . Was made aware of the course while in the hospital and what will be required as outpatient for follow up. Is aware that he would no longer be able to swim, take a bath, play contact sports or get an MRI  - educated on the risk- benefits of coumadin. Told that he would need to get weekly blood work as outpatient and is ok with that.   - Also stressed the importance of having a care giver with him and that they would need to be educated on the LVAD and how to preform dressing changes. Patient stated that his niece would be with him but would not feel comfortable performing dressing changes  - has agreed to launch evaluation

## 2020-08-25 NOTE — PROGRESS NOTE ADULT - ASSESSMENT
76 y/o M w/ PMH of DM2, NICMP/HFrEF (LVIDd 6.7cm, LVEF <20%) s/p CRT-D, AF s/p recent admission w/ DCCV on amio, CKD (BL Cr ~3), and hypothyroid referred from CHF clinic for VOL. He has diuresed over 10lbs on a bumex gtt. His weight is down about a pound over the past 24 hours with 2.3L UOP. His BUN/Cr remain elevated but stable. He remains volume overloaded. His BP is borderline. Symptomatically feeling better now on low dose milrinone.

## 2020-08-25 NOTE — PROGRESS NOTE ADULT - SUBJECTIVE AND OBJECTIVE BOX
DATE OF SERVICE:Patient was seen and examined :08/25/2020    PRESENTING CC:Edema    SUBJ: 74M w/ PMHx of HFrEF (EF 10%) s/p ICD, Atrial Fibrillation w/ recent admission for CHF/afib s/p DCCV, CKD, DM2, hypothyroidism presents after being referred from CHF clinic for admission due to worsening of LE edema and failure of PO diuretics at home. Is on Bumex gtt voiding well  Started on Milrinone    PMH -reviewed admission note, no change since admission  Heart failure: acute [ ] chronic [ ] acute or chronic [x ] diastolic [ ] systolic [ ] combined systolic and diastolic[ ]  EDIE: ATN[ ] renal medullary necrosis [ ] CKD I [ ]CKDII [ ]CKD III [ ]CKD IV [ ]CKD V [ ]Other pathological lesions [ ]    MEDICATIONS  (STANDING):  aMIOdarone    Tablet 200 milliGRAM(s) Oral daily  apixaban 5 milliGRAM(s) Oral two times a day  buMETAnide Infusion 1 mG/Hr (5 mL/Hr) IV Continuous <Continuous>  carvedilol 6.25 milliGRAM(s) Oral every 12 hours  dextrose 5%. 1000 milliLiter(s) (50 mL/Hr) IV Continuous <Continuous>  dextrose 50% Injectable 12.5 Gram(s) IV Push once  dextrose 50% Injectable 25 Gram(s) IV Push once  dextrose 50% Injectable 25 Gram(s) IV Push once  insulin lispro (HumaLOG) corrective regimen sliding scale   SubCutaneous three times a day before meals  levothyroxine 50 MICROGram(s) Oral daily  milrinone Infusion 0.3 MICROgram(s)/kG/Min (7.47 mL/Hr) IV Continuous <Continuous>    MEDICATIONS  (PRN):  dextrose 40% Gel 15 Gram(s) Oral once PRN Blood Glucose LESS THAN 70 milliGRAM(s)/deciliter  glucagon  Injectable 1 milliGRAM(s) IntraMuscular once PRN Glucose LESS THAN 70 milligrams/deciliter              REVIEW OF SYSTEMS:  Constitutional: [ ] fever, [ ]weight loss,  [ ]fatigue  Eyes: [ ] visual changes  Respiratory: [ ]shortness of breath;  [ ] cough, [ ]wheezing, [ ]chills, [ ]hemoptysis  Cardiovascular: [ ] chest pain, [ ]palpitations, [ ]dizziness,  [x ]leg swelling[ ]orthopnea[ ]PND  Gastrointestinal: [ ] abdominal pain, [ ]nausea, [ ]vomiting,  [ ]diarrhea   Genitourinary: [ ] dysuria, [ ] hematuria  Neurologic: [ ] headaches [ ] tremors[ ]weakness  Skin: [ ] itching, [ ]burning, [ ] rashes  Endocrine: [ ] heat or cold intolerance  Musculoskeletal: [ ] joint pain or swelling; [ ] muscle, back, or extremity pain  Psychiatric: [ ] depression, [ ]anxiety, [ ]mood swings, or [ ]difficulty sleeping  Hematologic: [ ] easy bruising, [ ] bleeding gums    [x] All remaining systems negative except as per above.   [ ]Unable to obtain.    Vital Signs Last 24 Hrs  T(C): 36.5 (08-25-20 @ 04:05), Max: 36.8 (08-24-20 @ 14:02)  HR: 78 (08-25-20 @ 04:05) (78 - 81)  BP: 106/67 (08-25-20 @ 04:05) (90/48 - 106/67)  RR: 18 (08-25-20 @ 04:05) (17 - 18)  SpO2: 97% (08-25-20 @ 04:05) (97% - 99%)    I&O's Summary    24 Aug 2020 07:01  -  25 Aug 2020 07:00  --------------------------------------------------------  IN: 1080 mL / OUT: 2300 mL / NET: -1220 mL      PHYSICAL EXAM:  General: No acute distress BMI-25  HEENT: EOMI, PERRL  Neck: Supple, [x ] JVD  Lungs: Equal air entry bilaterally; [ ] rales [ ] wheezing [ ] rhonchi  Heart: Regular rate and rhythm; [x ] murmur  3 /6 [x ] systolic [ ] diastolic [ ] radiation[ ] rubs [ ]  gallops  Abdomen: Nontender, bowel sounds present  Extremities: No clubbing, cyanosis, [x ] edema  Nervous system:  Alert & Oriented X3, no focal deficits  Psychiatric: Normal affect  Skin: No rashes or lesions    LABS:  08-25    143  |  98  |  62<H>  ----------------------------<  147<H>  3.9   |  32<H>  |  2.26<H>    Ca    10.0      25 Aug 2020 19:45  Phos  3.0     08-25  Mg     2.0     08-25    TPro  6.3  /  Alb  4.1  /  TBili  1.9<H>  /  DBili  x   /  AST  22  /  ALT  11  /  AlkPhos  64  08-25    Creatinine Trend: 2.26<--, 2.20<--, 2.23<--, 2.46<--, 2.55<--, 2.48<--                        10.4   5.35  )-----------( 86       ( 25 Aug 2020 19:45 )             33.8     PT/INR - ( 25 Aug 2020 19:45 )   PT: 25.4 sec;   INR: 2.22 ratio         PTT - ( 25 Aug 2020 19:45 )  PTT:40.7 sec  Lipid Panel: Serum Pro-Brain Natriuretic Peptide: 93290 pg/mL (08-25 @ 19:45)    Serum Pro-Brain Natriuretic Peptide: 65273 pg/mL (08-25-20 @ 19:45)    TELEMETRY:Ventricular paced      ECHO:  Study Date: 8/21/2020  Conclusions:  1. Mitral annular calcification.  Tethered mitral valve leaflets with normal opening. Moderate-severe mitral regurgitation.  2. Calcified trileaflet aortic valve with normal opening.  Mild aortic regurgitation.  3. Severely dilated left atrium.  LA volume index = 69 cc/m2.  4. Moderate left ventricular enlargement.  5. Endocardial visualization enhanced with intravenous injection of Ultrasonic Enhancing Agent (Definity). Severe global left ventricular systolic dysfunction. No left ventricular thrombus.  6. Severe diastolic dysfunction  7. Right ventricular enlargement with decreased right ventricular systolic function.  *** Compared with echocardiogram of 7/20/2020, no significant changes noted.      IMPRESSION AND PLAN:    74M w/ PMHx of HFrEF (EF 10%) s/p ICD, Atrial Fibrillation w/ recent admission for CHF/afib s/p DCCV, CKD, DM2, hypothyroidism presents after being referred from CHF clinic for admission due to worsening of LE edema 74M w/ PMHx of HFrEF (EF 10%) s/p ICD, Afib w/ recent admission for CHF/afib s/p DCCV, CKD, DM2, hypothyroidism presents after being referred from CHF clinic for admission due to worsening of LE edema         Problem/Plan - 1:  ·  Problem: Acute HFrEF (heart failure with reduced ejection fraction).  Plan:Acute on Chronic Systolic Heart Failure  -Was off Entresto due to low BP  -Continue Bumex gtt is diuresing well  -Weight >15lbs from baseline in July  -restarted on Carvedilol-tolerating  -RHC once euvolemic  -Started on Milrinone  -consideration for LVAD      Problem/Plan - 2:  ·  Problem: Atrial fibrillation, unspecified type.  Plan:CHADS2-4  Paced rhythm s/p DCCV  -Continue Eliquis-[Age<80 Wt >60Kg CKD] would resume 5 mg BID      Problem/Plan - 3:  ·  Problem: Essential hypertension.  Plan:BP stable      Problem/Plan - 4:  ·  Problem: Acute renal failure superimposed on stage 3 chronic kidney disease, unspecified acute renal failure type.  Plan: -overall creatinine is improving (2.46 from 3.3), prior baseline was ~1.8  -Trending to baseline likely Cardiorenal      Problem/Plan - 5:  ·  Problem: Hypothyroidism, unspecified type.  Plan:Thyroid Stimulating Hormone, Serum in AM (07.16.20 @ 06:36)    Thyroid Stimulating Hormone, Serum: 2.130 uU/mL    Problem/Plan - 6:  Problem: Type 2 diabetes mellitus without complication, without long-term current use of insulin. Plan: -diet controlled at home.  -A1C with Estimated Average Glucose in AM (07.18.20 @ 09:51)    A1C with Estimated Average Glucose Result: 6.1 %

## 2020-08-25 NOTE — CONSULT NOTE ADULT - ASSESSMENT
76 y/o M w/ PMH of DM2, NICMP/HFrEF (LVIDd 6.7cm, LVEF <20%) s/p CRT-D, AF s/p recent admission w/ DCCV on amio, CKD (BL Cr ~3), and hypothyroid referred from CHF clinic for VOL. He has diuresed over 10lbs on a bumex gtt. His BUN/Cr remain elevated but stable. He remains volume overloaded. His BP is borderline. Symptomatically feeling better now on low dose milrinone. Patient is currently being evaluated for LVAD implantation.

## 2020-08-25 NOTE — CHART NOTE - NSCHARTNOTEFT_GEN_A_CORE
Medicine PA Episodic Note    Notified by RN of pt. having BP 91/46 then 90/48 manually. Pt. seen and examined at bedside, sleeping, in NAD. VSS otherwise. Will continue to monitor.    Vital Signs Last 24 Hrs  T(C): 36.6 (24 Aug 2020 21:02), Max: 36.8 (24 Aug 2020 14:02)  T(F): 97.8 (24 Aug 2020 21:02), Max: 98.3 (24 Aug 2020 14:02)  HR: 81 (24 Aug 2020 21:05) (78 - 93)  BP: 90/48 (24 Aug 2020 21:05) (90/48 - 107/70)  BP(mean): --  RR: 17 (24 Aug 2020 21:02) (17 - 18)  SpO2: 98% (24 Aug 2020 21:02) (98% - 99%)      Labs:      08-24    143  |  98  |  58<H>  ----------------------------<  138<H>  3.2<L>   |  32<H>  |  2.23<H>    Ca    10.0      24 Aug 2020 06:25  Phos  3.0     08-24  Mg     2.0     08-24    Plan:  #Hypotension likely 2/2 aggressive diuresis  - Pt. is currently asymptomatic   - C/W Bumex & Primacor gtt as pt. requires aggressive diuresis  - Monitor VS closely  - Eventual RHC once euvolemic  - HF to follow in AM  - Will endorse above to primary team in AM    Follow up with Attending in AM.    José Antonio Robert PA-C  Department of Medicine  [facility] [address]

## 2020-08-25 NOTE — CONSULT NOTE ADULT - SUBJECTIVE AND OBJECTIVE BOX
HPI: Briefly, 74 y/o M w/ PMH of DM2, NICMP/HFrEF (LVIDd 6.7cm, LVEF <20%) s/p CRT-D, AF s/p recent admission w/ DCCV on amio, CKD (BL Cr ~3), and hypoTH referred from CHF clinic for VOL. Pt states that he was first dx w/ HFrEF >30Y ago after mult illnesses w/ PNA. He has been doing well since that time and has not highly symptomatic when taking medications. He was recently admitted for AF w/ RVR and had successful DCCV and gen change, however he was noted to be HoTNsive throughout admission and many of his meds incl his diuretics were d/c at time of d/c. Pt states that shortly after d/c, he began to "collect fluid" primarily by LE edema. He denies any CP. No dyspnea, but he does report worsening ALCAZAR. No palps. No presyncope/syncope. He typically sleeps sitting up due to chronic LBP, so he denies any orthopnea/PND. He called his cardiologist, who rec taking furosemide 40mg PO daily, and was eventually seen in CHF clinic and was advised to take torsemide 40mg PO daily. He had no improvement and was advised to come to ED.      PMHx:   Hypothyroidism  Afib  Type II diabetes mellitus  Aortic insufficiency  Mitral insufficiency  CKD (chronic kidney disease)  Cardiomyopathy  Hypertension      PSHx:   History of tonsillectomy  AICD (automatic cardioverter/defibrillator) present    Allergies:  No Known Allergies    Home Meds: Reviewed    FAMILY HISTORY:  Family history of CVA    Social History:  Smoking History: Denies  Alcohol Use: Denies  Drug Use: Denies    REVIEW OF SYSTEMS:  CONSTITUTIONAL: No weakness, fevers or chills  EYES/ENT: No visual changes;  No dysphagia  NECK: No pain or stiffness  RESPIRATORY: No cough, wheezing, hemoptysis; No shortness of breath  CARDIOVASCULAR: No chest pain or palpitations; No lower extremity edema  GASTROINTESTINAL: No abdominal or epigastric pain. No nausea, vomiting, or hematemesis; No diarrhea or constipation. No melena or hematochezia.  BACK: No back pain  GENITOURINARY: No dysuria, frequency or hematuria  NEUROLOGICAL: No numbness or weakness  SKIN: No itching, burning, rashes, or lesions   All other review of systems is negative unless indicated above.    Medications:  aMIOdarone    Tablet 200 milliGRAM(s) Oral daily  apixaban 5 milliGRAM(s) Oral two times a day  buMETAnide Infusion 1 mG/Hr IV Continuous <Continuous>  carvedilol 6.25 milliGRAM(s) Oral every 12 hours  dextrose 40% Gel 15 Gram(s) Oral once PRN  dextrose 5%. 1000 milliLiter(s) IV Continuous <Continuous>  dextrose 50% Injectable 12.5 Gram(s) IV Push once  dextrose 50% Injectable 25 Gram(s) IV Push once  dextrose 50% Injectable 25 Gram(s) IV Push once  glucagon  Injectable 1 milliGRAM(s) IntraMuscular once PRN  insulin lispro (HumaLOG) corrective regimen sliding scale   SubCutaneous three times a day before meals  levothyroxine 50 MICROGram(s) Oral daily  milrinone Infusion 0.3 MICROgram(s)/kG/Min IV Continuous <Continuous>  potassium chloride    Tablet ER 20 milliEquivalent(s) Oral every 2 hours    Vitals:  Vital Signs Last 24 Hours  T(C): 36.5 (20 @ 12:49), Max: 36.6 (20 @ 21:02)  HR: 80 (20 @ 12:49) (78 - 81)  BP: 107/65 (20 @ 12:49) (90/48 - 107/65)  RR: 16 (20 @ 12:49) (16 - 18)  SpO2: 100% (20 @ 12:49) (97% - 100%)    Weight in k.6 ( @ 12:49)    I&O's Summary    24 Aug 2020 07  -  25 Aug 2020 07:00  --------------------------------------------------------  IN: 1080 mL / OUT: 2300 mL / NET: -1220 mL    25 Aug 2020 07:  -  25 Aug 2020 17:21  --------------------------------------------------------  IN: 720 mL / OUT: 1400 mL / NET: -680 mL      Physical Exam  General: No distress. Comfortable.  HEENT: EOM intact.  Neck: Neck supple. JVP not elevated. No masses  Chest: Clear to auscultation bilaterally  CV: Normal S1 and S2. No murmurs, rub, or gallops. Radial pulses normal.  Abdomen: Soft, non-distended, non-tender  Skin: No rashes or skin breakdown  Neurology: Alert and oriented times three. Sensation intact  Psych: Affect normal    Labs:                        10.3   5.04  )-----------( 107      ( 25 Aug 2020 05:05 )             34.3     08-25    143  |  99  |  63<H>  ----------------------------<  182<H>  3.5   |  34<H>  |  2.20<H>    Ca    10.0      25 Aug 2020 05:05  Phos  3.0     08-24  Mg     2.0     08-24      Serum Pro-Brain Natriuretic Peptide: 97297 pg/mL ( @ 08:17)      Imaging Studies     < from: Xray Chest 1 View- PORTABLE-Urgent (20 @ 17:04) >  IMPRESSION:  Left-sided AICD in situ.  The cardiomediastinal silhouette is unremarkable.  Mild interstitial-type pulmonary edema with right-sided predominance.  No focal consolidation.  No pleural effusion or pneumothorax.  No acute bony finding.    nodule. Remainder of the lungs are clear. There is evidence of pleural effusion. The visualized bones are grossly intact.    < end of copied text >      < from: TTE with Doppler (w/Cont) (20 @ 15:56) >  Conclusions:  1. Mitral annular calcification.  Tethered mitral valve  leaflets with normal opening. Moderate-severe mitral  regurgitation.  2. Calcified trileaflet aortic valve with normal opening.  Mild aortic regurgitation.  3. Severely dilated left atrium.  LA volume index = 69  cc/m2.  4. Moderate left ventricular enlargement.  5. Endocardial visualization enhanced with intravenous  injection of Ultrasonic Enhancing Agent (Definity). Severe  global left ventricular systolic dysfunction. No left  ventricular thrombus.  6. Severe diastolic dysfunction  7. Right ventricular enlargement with decreased right  ventricular systolic function.  *** Compared with echocardiogram of 2020, no  significant changes noted.    < end of copied text >

## 2020-08-25 NOTE — PROGRESS NOTE ADULT - SUBJECTIVE AND OBJECTIVE BOX
Subjective:    Medications:  aMIOdarone    Tablet 200 milliGRAM(s) Oral daily  apixaban 5 milliGRAM(s) Oral two times a day  buMETAnide Infusion 0.5 mG/Hr IV Continuous <Continuous>  carvedilol 6.25 milliGRAM(s) Oral every 12 hours  dextrose 40% Gel 15 Gram(s) Oral once PRN  dextrose 5%. 1000 milliLiter(s) IV Continuous <Continuous>  dextrose 50% Injectable 12.5 Gram(s) IV Push once  dextrose 50% Injectable 25 Gram(s) IV Push once  dextrose 50% Injectable 25 Gram(s) IV Push once  glucagon  Injectable 1 milliGRAM(s) IntraMuscular once PRN  insulin lispro (HumaLOG) corrective regimen sliding scale   SubCutaneous three times a day before meals  levothyroxine 50 MICROGram(s) Oral daily  milrinone Infusion 0.2 MICROgram(s)/kG/Min IV Continuous <Continuous>      Physical Exam:    Vitals:  Vital Signs Last 24 Hours  T(C): 36.5 (08-25-20 @ 04:05), Max: 36.8 (08-24-20 @ 14:02)  HR: 78 (08-25-20 @ 04:05) (78 - 81)  BP: 106/67 (08-25-20 @ 04:05) (90/48 - 106/67)  RR: 18 (08-25-20 @ 04:05) (17 - 18)  SpO2: 97% (08-25-20 @ 04:05) (97% - 99%)        I&O's Summary    24 Aug 2020 07:01  -  25 Aug 2020 07:00  --------------------------------------------------------  IN: 1080 mL / OUT: 2300 mL / NET: -1220 mL        Tele:    General: No distress. Comfortable.  HEENT: EOM intact.  Neck: Neck supple. JVP not elevated. No masses  Chest: Clear to auscultation bilaterally  CV: Normal S1 and S2. No murmurs, rub, or gallops. Radial pulses normal.  Abdomen: Soft, non-distended, non-tender  Skin: No rashes or skin breakdown  Neurology: Alert and oriented times three. Sensation intact  Psych: Affect normal    Labs:                        10.3   5.04  )-----------( 107      ( 25 Aug 2020 05:05 )             34.3     08-25    143  |  99  |  63<H>  ----------------------------<  182<H>  3.5   |  34<H>  |  2.20<H>    Ca    10.0      25 Aug 2020 05:05  Phos  3.0     08-24  Mg     2.0     08-24            Serum Pro-Brain Natriuretic Peptide: 95306 pg/mL (08-21 @ 08:17) Subjective:  - Reports feeling better with initiation of milrinone    Medications:  aMIOdarone    Tablet 200 milliGRAM(s) Oral daily  apixaban 5 milliGRAM(s) Oral two times a day  buMETAnide Infusion 0.5 mG/Hr IV Continuous <Continuous>  carvedilol 6.25 milliGRAM(s) Oral every 12 hours  dextrose 40% Gel 15 Gram(s) Oral once PRN  dextrose 5%. 1000 milliLiter(s) IV Continuous <Continuous>  dextrose 50% Injectable 12.5 Gram(s) IV Push once  dextrose 50% Injectable 25 Gram(s) IV Push once  dextrose 50% Injectable 25 Gram(s) IV Push once  glucagon  Injectable 1 milliGRAM(s) IntraMuscular once PRN  insulin lispro (HumaLOG) corrective regimen sliding scale   SubCutaneous three times a day before meals  levothyroxine 50 MICROGram(s) Oral daily  milrinone Infusion 0.2 MICROgram(s)/kG/Min IV Continuous <Continuous>      Physical Exam:    Vitals:  Vital Signs Last 24 Hours  T(C): 36.5 (08-25-20 @ 04:05), Max: 36.8 (08-24-20 @ 14:02)  HR: 78 (08-25-20 @ 04:05) (78 - 81)  BP: 106/67 (08-25-20 @ 04:05) (90/48 - 106/67)  RR: 18 (08-25-20 @ 04:05) (17 - 18)  SpO2: 97% (08-25-20 @ 04:05) (97% - 99%)    I&O's Summary    24 Aug 2020 07:01  -  25 Aug 2020 07:00  --------------------------------------------------------  IN: 1080 mL / OUT: 2300 mL / NET: -1220 mL    Tele: V paced HR 70-80s, PVCs    General: No distress. Comfortable.  HEENT: EOM intact.  Neck: Neck supple. JVP mild-moderately elevated. No masses  Chest: Clear to auscultation bilaterally  CV: Regular. Normal S1 and S2. No murmurs, rub, or gallops. Radial pulses normal. +1 BLE edema, improving  Abdomen: Soft, non-distended, non-tender  Skin: No rashes or skin breakdown  Neurology: Alert and oriented times three. Sensation intact  Psych: Affect normal    Labs:                        10.3   5.04  )-----------( 107      ( 25 Aug 2020 05:05 )             34.3     08-25    143  |  99  |  63<H>  ----------------------------<  182<H>  3.5   |  34<H>  |  2.20<H>    Ca    10.0      25 Aug 2020 05:05  Phos  3.0     08-24  Mg     2.0     08-24    Serum Pro-Brain Natriuretic Peptide: 35996 pg/mL (08-21 @ 08:17)

## 2020-08-25 NOTE — PROGRESS NOTE ADULT - PROBLEM SELECTOR PLAN 1
- Increase milrinone to 0.3 mcg/kg/min  - Increase Bumex gtt to 1mg/hour. Aggressive electrolyte repletion to maintain K 4-4.5 and Mg 2-2.5. Recommend starting standing KCl 40 meq daily   -C/w carvedilol 6.25 for now  - Will plan for RHC to evaluate hemodynamics once optimized, potentially early next week  - Will launch evaluation for LVAD as destination therapy. He is not a heart transplant candidate given age.

## 2020-08-25 NOTE — PROGRESS NOTE ADULT - SUBJECTIVE AND OBJECTIVE BOX
SUBJECTIVE / OVERNIGHT EVENTS: pt seen and examined    MEDICATIONS  (STANDING):  aMIOdarone    Tablet 200 milliGRAM(s) Oral daily  apixaban 5 milliGRAM(s) Oral two times a day  buMETAnide Infusion 1 mG/Hr (5 mL/Hr) IV Continuous <Continuous>  carvedilol 6.25 milliGRAM(s) Oral every 12 hours  dextrose 5%. 1000 milliLiter(s) (50 mL/Hr) IV Continuous <Continuous>  dextrose 50% Injectable 12.5 Gram(s) IV Push once  dextrose 50% Injectable 25 Gram(s) IV Push once  dextrose 50% Injectable 25 Gram(s) IV Push once  insulin lispro (HumaLOG) corrective regimen sliding scale   SubCutaneous three times a day before meals  levothyroxine 50 MICROGram(s) Oral daily  milrinone Infusion 0.3 MICROgram(s)/kG/Min (7.47 mL/Hr) IV Continuous <Continuous>    MEDICATIONS  (PRN):  dextrose 40% Gel 15 Gram(s) Oral once PRN Blood Glucose LESS THAN 70 milliGRAM(s)/deciliter  glucagon  Injectable 1 milliGRAM(s) IntraMuscular once PRN Glucose LESS THAN 70 milligrams/deciliter    Vital Signs Last 24 Hrs  T(C): 36.7 (25 Aug 2020 20:16), Max: 36.7 (25 Aug 2020 20:16)  T(F): 98.1 (25 Aug 2020 20:16), Max: 98.1 (25 Aug 2020 20:16)  HR: 90 (25 Aug 2020 20:16) (78 - 90)  BP: 97/62 (25 Aug 2020 20:16) (97/62 - 107/65)  BP(mean): --  RR: 16 (25 Aug 2020 20:16) (16 - 18)  SpO2: 100% (25 Aug 2020 20:16) (97% - 100%)  Skin: No rash.  Eyes: No recent vision problems or eye pain.  ENT: No congestion, ear pain, or sore throat.  Cardiovascular: No chest pain or palpation.  Respiratory: No cough, shortness of breath, congestion, or wheezing.  Gastrointestinal: No abdominal pain, nausea, vomiting, or diarrhea.  Genitourinary: No dysuria.  Musculoskeletal: No joint swelling.  Neurologic: No headache.    PHYSICAL EXAM:  GENERAL: NAD  EYES: EOMI, PERRLA  NECK: Supple, No JVD  CHEST/LUNG: crackles at bases  HEART:  S1 , S2 +  ABDOMEN: soft , bs+  EXTREMITIES:  edema+  NEUROLOGY:alert awake oriented     LABS:      143  |  98  |  62<H>  ----------------------------<  147<H>  3.9   |  32<H>  |  2.26<H>    Ca    10.0      25 Aug 2020 19:45  Phos  3.0       Mg     2.0         TPro  6.3  /  Alb  4.1  /  TBili  1.9<H>  /  DBili      /  AST  22  /  ALT  11  /  AlkPhos  64      Creatinine Trend: 2.26 <--, 2.20 <--, 2.23 <--, 2.46 <--, 2.55 <--, 2.48 <--, 2.47 <--                        10.4   5.35  )-----------( 86       ( 25 Aug 2020 19:45 )             33.8     Urine Studies:  Urinalysis Basic - ( 25 Aug 2020 19:47 )    Color: Light Yellow / Appearance: Clear / S.008 / pH:   Gluc:  / Ketone: Negative  / Bili: Negative / Urobili: Negative   Blood:  / Protein: Negative / Nitrite: Negative   Leuk Esterase: Negative / RBC:  / WBC    Sq Epi:  / Non Sq Epi:  / Bacteria:       Creatinine, Random Urine: 38 mg/dL ( @ 19:47)  Protein/Creatinine Ratio Calculation: 0.1 Ratio ( @ 19:47)          LIVER FUNCTIONS - ( 25 Aug 2020 19:45 )  Alb: 4.1 g/dL / Pro: 6.3 g/dL / ALK PHOS: 64 U/L / ALT: 11 U/L / AST: 22 U/L / GGT: x           PT/INR - ( 25 Aug 2020 19:45 )   PT: 25.4 sec;   INR: 2.22 ratio         PTT - ( 25 Aug 2020 19:45 )  PTT:40.7 sec

## 2020-08-25 NOTE — PROGRESS NOTE ADULT - ATTENDING COMMENTS
no events. initiated on milrinone. no ectopy.  meds: milrione .3, bumex .5, coreg 6.25 bid, amnio, epixiban, synthroid.  70-80bIVpaced. 90/-106/.   I/O: -1200  08-25    143  |  99  |  63<H>  ----------------------------<  182<H>  3.5   |  34<H>  |  2.20<H>  Cr 2.5  Ca    10.0      25 Aug 2020 05:05  Phos  3.0     08-24  Mg     2.0     08-24                        10.3   5.04  )-----------( 107      ( 25 Aug 2020 05:05 )             34.3 no events. initiated on milrinone. no ectopy.  meds: milrione .3, bumex .5, coreg 6.25 bid, amnio, epixiban, synthroid.  70-80bIVpaced. 90/-106/. 182   I/O: -1200  08-25    143  |  99  |  63<H>  ----------------------------<  182<H>  3.5   |  34<H>  |  2.20<H>  Cr 2.5  Ca    10.0      25 Aug 2020 05:05  Phos  3.0     08-24  Mg     2.0     08-24                        10.3   5.04  )-----------( 107      ( 25 Aug 2020 05:05 )             34.3  Feels better on milrinone.   Discussed further with patient. Will initiated LVAD DT evaluation.   Continue diuresis for now. will increase bumex to 1/day  Defer RHC till euvolemic and possibly off milrinone.   Manpreet Rivera no events. initiated on milrinone. no ectopy.  meds: milrione .3, bumex .5, coreg 6.25 bid, amnio, epixiban, synthroid.  70-80bIVpaced. 90/-106/. 182   I/O: -1200  08-25    143  |  99  |  63<H>  ----------------------------<  182<H>  3.5   |  34<H>  |  2.20<H>  Cr 2.5  Ca    10.0      25 Aug 2020 05:05  Phos  3.0     08-24  Mg     2.0     08-24                        10.3   5.04  )-----------( 107      ( 25 Aug 2020 05:05 )             34.3  Feels better on milrinone.   Discussed further with patient. Will initiated LVAD DT evaluation.   Continue diuresis for now. will increase bumex to 1/day  Defer RHC till euvolemic and possibly off milrinone.   Manpreet Rivera    Addendum:   Coronary angiogram 7/16/2012 by Dr. Robert Frankel at [facility]: Ao 132/81, LVEDp 33-38, LV gram not performed. LM normal, dLAD 40% stenosis, m 2nd diag 75% stenosis, dLCx 50% stenosis, RCA normal

## 2020-08-26 NOTE — PROGRESS NOTE ADULT - SUBJECTIVE AND OBJECTIVE BOX
Subjective:    Medications:  aMIOdarone    Tablet 200 milliGRAM(s) Oral daily  apixaban 5 milliGRAM(s) Oral two times a day  buMETAnide Infusion 1 mG/Hr IV Continuous <Continuous>  carvedilol 6.25 milliGRAM(s) Oral every 12 hours  dextrose 40% Gel 15 Gram(s) Oral once PRN  dextrose 5%. 1000 milliLiter(s) IV Continuous <Continuous>  dextrose 50% Injectable 12.5 Gram(s) IV Push once  dextrose 50% Injectable 25 Gram(s) IV Push once  dextrose 50% Injectable 25 Gram(s) IV Push once  glucagon  Injectable 1 milliGRAM(s) IntraMuscular once PRN  insulin lispro (HumaLOG) corrective regimen sliding scale   SubCutaneous three times a day before meals  levothyroxine 50 MICROGram(s) Oral daily  milrinone Infusion 0.3 MICROgram(s)/kG/Min IV Continuous <Continuous>  potassium chloride    Tablet ER 40 milliEquivalent(s) Oral every 4 hours      Physical Exam:    Vitals:  Vital Signs Last 24 Hours  T(C): 36.6 (20 @ 04:04), Max: 36.7 (20 @ 20:16)  HR: 79 (20 @ 04:04) (79 - 90)  BP: 101/57 (20 @ 04:04) (97/62 - 107/65)  RR: 18 (20 @ 04:04) (16 - 18)  SpO2: 97% (20 @ 04:04) (97% - 100%)    Weight in k.6 ( @ 12:49)    I&O's Summary    25 Aug 2020 07:01  -  26 Aug 2020 07:00  --------------------------------------------------------  IN: 1240 mL / OUT: 2380 mL / NET: -1140 mL        Tele:    General: No distress. Comfortable.  HEENT: EOM intact.  Neck: Neck supple. JVP not elevated. No masses  Chest: Clear to auscultation bilaterally  CV: Normal S1 and S2. No murmurs, rub, or gallops. Radial pulses normal.  Abdomen: Soft, non-distended, non-tender  Skin: No rashes or skin breakdown  Neurology: Alert and oriented times three. Sensation intact  Psych: Affect normal    Labs:                        10.4   5.35  )-----------( 86       ( 25 Aug 2020 19:45 )             33.8         141  |  96  |  69<H>  ----------------------------<  170<H>  2.9<LL>   |  31  |  2.33<H>    Ca    10.1      26 Aug 2020 06:52  Phos  2.7       Mg     2.0         TPro  6.3  /  Alb  4.1  /  TBili  1.9<H>  /  DBili  x   /  AST  22  /  ALT  11  /  AlkPhos  64      PT/INR - ( 25 Aug 2020 19:45 )   PT: 25.4 sec;   INR: 2.22 ratio         PTT - ( 25 Aug 2020 19:45 )  PTT:40.7 sec      Serum Pro-Brain Natriuretic Peptide: 78805 pg/mL ( @ 19:45)  Serum Pro-Brain Natriuretic Peptide: 78578 pg/mL ( @ 08:17)      Lactate Dehydrogenase, Serum: 215 U/L ( @ 19:45)      Total Cholesterol: 116  LDL: 58  HDL: 48  T Subjective:  - No acute events overnight  - OOB in chair feeling well today    Medications:  aMIOdarone    Tablet 200 milliGRAM(s) Oral daily  apixaban 5 milliGRAM(s) Oral two times a day  buMETAnide Infusion 1 mG/Hr IV Continuous <Continuous>  carvedilol 6.25 milliGRAM(s) Oral every 12 hours  dextrose 40% Gel 15 Gram(s) Oral once PRN  dextrose 5%. 1000 milliLiter(s) IV Continuous <Continuous>  dextrose 50% Injectable 12.5 Gram(s) IV Push once  dextrose 50% Injectable 25 Gram(s) IV Push once  dextrose 50% Injectable 25 Gram(s) IV Push once  glucagon  Injectable 1 milliGRAM(s) IntraMuscular once PRN  insulin lispro (HumaLOG) corrective regimen sliding scale   SubCutaneous three times a day before meals  levothyroxine 50 MICROGram(s) Oral daily  milrinone Infusion 0.3 MICROgram(s)/kG/Min IV Continuous <Continuous>  potassium chloride    Tablet ER 40 milliEquivalent(s) Oral every 4 hours      Physical Exam:    Vitals:  Vital Signs Last 24 Hours  T(C): 36.6 (20 @ 04:04), Max: 36.7 (20 @ 20:16)  HR: 79 (20 @ 04:04) (79 - 90)  BP: 101/57 (20 @ 04:04) (97/62 - 107/65)  RR: 18 (20 @ 04:04) (16 - 18)  SpO2: 97% (20 @ 04:04) (97% - 100%)    Weight in k.6 ( @ 12:49)    I&O's Summary    25 Aug 2020 07:01  -  26 Aug 2020 07:00  --------------------------------------------------------  IN: 1240 mL / OUT: 2380 mL / NET: -1140 mL    Tele: V paced HR 70-80s    General: No distress. Comfortable.  HEENT: EOM intact.  Neck: Neck supple. JVP mild-moderately elevated. No masses  Chest: Clear to auscultation bilaterally  CV: Regular. Normal S1 and S2. No murmurs, rub, or gallops. Radial pulses normal. +1 BLE edema, improving  Abdomen: Soft, non-distended, non-tender  Skin: No rashes or skin breakdown  Neurology: Alert and oriented times three. Sensation intact  Psych: Affect normal    Labs:                        10.4   5.35  )-----------( 86       ( 25 Aug 2020 19:45 )             33.8         141  |  96  |  69<H>  ----------------------------<  170<H>  2.9<LL>   |  31  |  2.33<H>    Ca    10.1      26 Aug 2020 06:52  Phos  2.7       Mg     2.0         TPro  6.3  /  Alb  4.1  /  TBili  1.9<H>  /  DBili  x   /  AST  22  /  ALT  11  /  AlkPhos  64      PT/INR - ( 25 Aug 2020 19:45 )   PT: 25.4 sec;   INR: 2.22 ratio         PTT - ( 25 Aug 2020 19:45 )  PTT:40.7 sec    Serum Pro-Brain Natriuretic Peptide: 87893 pg/mL ( @ 19:45)  Serum Pro-Brain Natriuretic Peptide: 86270 pg/mL ( @ 08:17)      Lactate Dehydrogenase, Serum: 215 U/L ( @ 19:45)    Total Cholesterol: 116  LDL: 58  HDL: 48  T

## 2020-08-26 NOTE — PROGRESS NOTE ADULT - PROBLEM SELECTOR PLAN 5
- Please consult endocrinology regarding need for intervention and prognosis particularly in setting of being evaluate for LVAD  - He follows with Dr. Winslow outpatient (records in [employer]). Fine needle aspiration showing L upper pole nodule to be [place] VI per outpatient endo note 8/19.

## 2020-08-26 NOTE — PROGRESS NOTE ADULT - ATTENDING COMMENTS
Patient was seen and examined by me on 08/26/2020,interim events noted,labs and radiology studies reviewed.  Loyd Mitchell MD,FACC.  [address] [place] [address].  [id number].  [phone]

## 2020-08-26 NOTE — CONSULT NOTE ADULT - ASSESSMENT
Impression:  #Pre-Advanced HF Therapy Liver evaluation:  - portal hypertension evaluation: normal plts, no splenomegaly, normal appearing liver, no ascites  - viral hepatitis: HbsAg pending, HbsAb pending, HbcAb pending, HBV DNA pending, HCV Ab pending, HCV RNA pending  - fibrosis: U/S elastography results pending    Recommendations:  - please check liver enzymes  - please send HbsAg, HbsAb, HbcAb, HBV DNA, HCV Ab, HCV RNA  - please obtain U/S elastography when patient euvolemic: please enter "Ultrasound elastography" under Orders and select the 3rd option "Ultrasound elastography parynchyma" - this is different from a normal RUQ or Abd U/S  - please inform us if unable to obtain euvolemia or if having difficulty ordering/scheduling U/S elastography  - transplant hepatology to follow up above work-up, further recommendations to follow        Gordo Novak   Gastroeneterology Fellow  Pager: [phone]  Please page GI (Pager: [phone]) or call GI ([phone]) if there are any additional questions or concerns.   Please call on-call GI fellow after 5pm and before 8am, and on weekends. Impression:  #Pre-Advanced HF Therapy Liver evaluation:  - portal hypertension evaluation: normal plts, no splenomegaly, normal appearing liver, no ascites  - viral hepatitis: HbsAg pending, HbsAb pending, HbcAb pending, HBV DNA pending, HCV Ab pending, HCV RNA pending  - fibrosis: U/S elastography results pending    Recommendations:  - Obtain RUQ Abd U/S  - please obtain U/S elastography when patient euvolemic: please enter "Ultrasound elastography" under Orders and select the 3rd option "Ultrasound elastography parynchyma"  - please inform us if unable to obtain euvolemia or if having difficulty ordering/scheduling U/S elastography  - transplant hepatology to follow up above work-up, further recommendations to follow        Gordo Novak   Gastroenterology Fellow  Pager: [phone]  Please call on-call GI fellow after 5pm and before 8am, and on weekends. Impression:  # Pre-Advanced HF Therapy Liver evaluation: ( No personal or family history of liver disease. Liver enzymes has been normal except for mildly elevated T Bili 2.0 - Gilbert disease? )   # Colon cancer screening : H/o colonoscopy and polypectomy about 7 years ago.    Recommendations:  - Obtain RUQ Abd U/S to assess liver morphology   - please obtain U/S elastography when patient euvolemic: please enter "Ultrasound elastography" under Orders and select the 3rd option "Ultrasound elastography parynchyma"  - would obtain CT colonography for colon cancer screening   - please inform us if unable to obtain euvolemia or if having difficulty ordering/scheduling U/S elastography  - transplant hepatology to follow up above work-up, further recommendations to follow        Gordo Novak   Gastroenterology Fellow  Pager: [phone]  Please call on-call GI fellow after 5pm and before 8am, and on weekends. 74M w/ PMHx of HFrEF (EF 10%) s/p ICD, Afib w/ recent admission for CHF/afib s/p DCCV, CKD, DM2, hypothyroidism presents after being referred from CHF clinic for admission due to worsening of LE edema and failure of PO diuretics at home, treated for acute on chronic CHF, on milrinone infusion on medical floor.    # Pre-Advanced HF Therapy Liver evaluation: ( No personal or family history of liver disease. Liver enzymes has been normal except for mildly elevated T Bili 2.0 - Gilbert disease? )  # at risk for MIRANDA given DM2 and history of severe obesity -  current BMI 27 with 1 to 2+ edema. Max body weight was 300 lbs in 2006.  # Colon cancer screening : H/o colonoscopy and polypectomy about 7 years ago.    Recommendations:  - Obtain RUQ Abd U/S to assess liver morphology   - please obtain U/S elastography when patient euvolemic: please enter "Ultrasound elastography" under Orders and select the 3rd option "Ultrasound elastography parynchyma"  - would obtain CT colonography for colon cancer screening   - please inform us if unable to obtain euvolemia or if having difficulty ordering/scheduling U/S elastography  - transplant hepatology to follow up above work-up, further recommendations to follow        Gordo Novak   Gastroenterology Fellow  Pager: [phone]  Please call on-call GI fellow after 5pm and before 8am, and on weekends.

## 2020-08-26 NOTE — CONSULT NOTE ADULT - SUBJECTIVE AND OBJECTIVE BOX
Pre-Advanced HF therapy Liver Evaluation    Chief Complaint:      HPI:    Last Colonoscopy:  Last Endoscopy:    Allergies:  No Known Allergies        Hospital Medications:  aMIOdarone    Tablet 200 milliGRAM(s) Oral daily  apixaban 5 milliGRAM(s) Oral two times a day  buMETAnide Infusion 1 mG/Hr IV Continuous <Continuous>  carvedilol 6.25 milliGRAM(s) Oral every 12 hours  dextrose 40% Gel 15 Gram(s) Oral once PRN  dextrose 5%. 1000 milliLiter(s) IV Continuous <Continuous>  dextrose 50% Injectable 12.5 Gram(s) IV Push once  dextrose 50% Injectable 25 Gram(s) IV Push once  dextrose 50% Injectable 25 Gram(s) IV Push once  glucagon  Injectable 1 milliGRAM(s) IntraMuscular once PRN  insulin lispro (HumaLOG) corrective regimen sliding scale   SubCutaneous three times a day before meals  levothyroxine 50 MICROGram(s) Oral daily  milrinone Infusion 0.3 MICROgram(s)/kG/Min IV Continuous <Continuous>  potassium chloride    Tablet ER 40 milliEquivalent(s) Oral every 4 hours      PMHX/PSHX:  Hypothyroidism  Afib  Type II diabetes mellitus  Aortic insufficiency  Mitral insufficiency  CKD (chronic kidney disease)  Cardiomyopathy  Hypertension  History of tonsillectomy  AICD (automatic cardioverter/defibrillator) present      Family history:  Family history of CVA      Social History: no smoking    ROS:   General:  No fevers, chills or night sweats  ENT:  No sore throat or dysphagia  CV:  No pain or palpitations  Resp:  No dyspnea, cough or  wheezing  GI:  as above  Skin:  No rash or edema  Neuro: no weakness   Hematologic: no bleeding  Musculoskeletal: no muscle pain or join pain  Psych: no agitation     : no dysuria      PHYSICAL EXAM:   GENERAL:  NAD, Appears stated age  HEENT:  NC/AT,  conjunctivae clear and pink, sclera -anicteric  CHEST:  CTA B/L, Normal effort  HEART:  RRR S1/S2,  ABDOMEN:  Soft, non-tender, non-distended,  no masses   EXTREMITIES:  No cyanosis or Edema  SKIN:  Warm & Dry. No rash or erythema  NEURO:  Alert, oriented    Vital Signs:  Vital Signs Last 24 Hrs  T(C): 36.6 (26 Aug 2020 04:04), Max: 36.7 (25 Aug 2020 20:16)  T(F): 97.9 (26 Aug 2020 04:04), Max: 98.1 (25 Aug 2020 20:16)  HR: 79 (26 Aug 2020 04:04) (79 - 90)  BP: 101/57 (26 Aug 2020 04:04) (97/62 - 107/65)  BP(mean): --  RR: 18 (26 Aug 2020 04:04) (16 - 18)  SpO2: 97% (26 Aug 2020 04:04) (97% - 100%)  Daily     Daily Weight in k.6 (25 Aug 2020 12:49)    LABS:                        10.4   5.35  )-----------( 86       ( 25 Aug 2020 19:45 )             33.8     Mean Cell Volume: 99.7 fl (- @ 19:45)        141  |  96  |  69<H>  ----------------------------<  170<H>  2.9<LL>   |  31  |  2.33<H>    Ca    10.1      26 Aug 2020 06:52  Phos  2.7       Mg     2.0         TPro  6.3  /  Alb  4.1  /  TBili  1.9<H>  /  DBili  x   /  AST  22  /  ALT  11  /  AlkPhos  64      LIVER FUNCTIONS - ( 25 Aug 2020 19:45 )  Alb: 4.1 g/dL / Pro: 6.3 g/dL / ALK PHOS: 64 U/L / ALT: 11 U/L / AST: 22 U/L / GGT: x           PT/INR - ( 25 Aug 2020 19:45 )   PT: 25.4 sec;   INR: 2.22 ratio         PTT - ( 25 Aug 2020 19:45 )  PTT:40.7 sec  Urinalysis Basic - ( 25 Aug 2020 19:47 )    Color: Light Yellow / Appearance: Clear / S.008 / pH: x  Gluc: x / Ketone: Negative  / Bili: Negative / Urobili: Negative   Blood: x / Protein: Negative / Nitrite: Negative   Leuk Esterase: Negative / RBC: x / WBC x   Sq Epi: x / Non Sq Epi: x / Bacteria: x                              10.4   5.35  )-----------( 86       ( 25 Aug 2020 19:45 )             33.8   < from: US Duplex Aorta/IVC/Iliac Comp (20 @ 08:54) >    EXAM:  US DPLX AORTA IVC ILIACS COMPL                            PROCEDURE DATE:  2020            INTERPRETATION:  CLINICAL INFORMATION: Preoperative evaluation for LVAD.    COMPARISON: None available.    TECHNIQUE: Ultrasonography of the aorta was performed using grayscale, color and spectral Doppler imaging.    FINDINGS:    There are moderate atherosclerotic changes of the aorta.    The following measurements were obtained:    Proximal Aorta: 3.0 cm.    Mid Aorta: 2.0 cm. systolic Velocity-118.5 cm/s.    Distal Aorta: 1.5 cm. Turbulent color Doppler flow.    Right common iliac artery measures 1.8 cm, velocity-75 cm/s  Left common iliac artery measures 1.4 cm, velocity-115 cm/s  Right external iliac artery measures 1.2 cm, velocity-83 cm/s  Left external iliac artery measures 1.4 cm, velocity-97 cm/s.    IVC and iliac veins are patent with normal unidirectional phasic waveforms.    Incidental finding: Cholelithiasis.    IMPRESSION:    No evidence of abdominal aortic aneurysm. Moderate atherosclerotic changes of the aorta.    Cholelithiasis.    < end of copied text >                        10.3   5.04  )-----------( 107      ( 25 Aug 2020 05:05 )             34.3     Imaging: Pre-Advanced HF therapy Liver Evaluation    Chief Complaint:  fluid overload     HPI:  74M w/ PMHx of HFrEF (EF 10%) s/p ICD, Afib w/ recent admission for CHF/afib s/p DCCV, CKD, DM2, hypothyroidism presents after being referred from CHF clinic for admission due to worsening of LE edema and failure of PO diuretics at home. Per patient was relatively functional until ~1 mo ago when he developed palpitations and light headedness. He was found to be hypotensive and in Afib w/ RVR resulting in his hospitalization at the end  of July. His course was c/b EDIE on CKD and persistent hypotension. He improved after dccv and was ultimately discharged home off entresto, coreg and diuretics due to c/f worsening hypotension. After discharge, patient notes worsening LE edema. He was instructed to resume lasix, which was ultimately escalated to toresemide 80mg bid w/ metolazone 2.5 mg daily. With this regimen he noted increased urination and some mild improvement. However, due to ongoing reduced functional capacity, fatigue, and ALCAZAR he was referred for admission for IV diuretics. Denies any cp or palpitations. Denies repaid HR. Notes weight gain since discharge. Continues to have poor appetite and reduced exercise capacity. No fevers, chills. Notes chronic nonproductive cough which is unchanged. Sleeps elevated due to back pain, denies significant orthopnea. Is able to complete ADLs at baseline with some assistance from his niece. Notes increased urination with high dose diuretics, but no dysuria or hematuria. No abd pain, nausea, vomiting. No diarrhea.     Hepatology transplant evaluation was called for pre LVAD evaluation.        Allergies:  No Known Allergies    Home Medications:  carvedilol 12.5 mg oral tablet: 0.5 tab(s) orally 2 times a day (21 Aug 2020 09:38)  Eliquis 5 mg oral tablet: 1 tab(s) orally 2 times a day (21 Aug 2020 09:)  levothyroxine 50 mcg (0.05 mg) oral tablet: 1 tab(s) orally once a day (21 Aug 2020 09:)  metOLazone 2.5 mg oral tablet: 1 tab(s) orally once a day (21 Aug 2020 09:)  torsemide 20 mg oral tablet: 4 tab(s) orally 2 times a day (21 Aug 2020 09:38)      Hospital Medications:  aMIOdarone    Tablet 200 milliGRAM(s) Oral daily  apixaban 5 milliGRAM(s) Oral two times a day  buMETAnide Infusion 1 mG/Hr IV Continuous <Continuous>  carvedilol 6.25 milliGRAM(s) Oral every 12 hours  dextrose 40% Gel 15 Gram(s) Oral once PRN  dextrose 5%. 1000 milliLiter(s) IV Continuous <Continuous>  dextrose 50% Injectable 12.5 Gram(s) IV Push once  dextrose 50% Injectable 25 Gram(s) IV Push once  dextrose 50% Injectable 25 Gram(s) IV Push once  glucagon  Injectable 1 milliGRAM(s) IntraMuscular once PRN  insulin lispro (HumaLOG) corrective regimen sliding scale   SubCutaneous three times a day before meals  levothyroxine 50 MICROGram(s) Oral daily  milrinone Infusion 0.3 MICROgram(s)/kG/Min IV Continuous <Continuous>  potassium chloride    Tablet ER 40 milliEquivalent(s) Oral every 4 hours      PMHX/PSHX:  Hypothyroidism  Afib  Type II diabetes mellitus  Aortic insufficiency  Mitral insufficiency  CKD (chronic kidney disease)  Cardiomyopathy  Hypertension  History of tonsillectomy  AICD (automatic cardioverter/defibrillator) present      Family history:  Family history of CVA      Social History: no smoking    ROS:   General:  No fevers, chills or night sweats  ENT:  No sore throat or dysphagia  CV:  No pain or palpitations  Resp:  No dyspnea, cough or  wheezing  GI:  as above  Skin:  No rash or edema  Neuro: no weakness   Hematologic: no bleeding  Musculoskeletal: no muscle pain or join pain  Psych: no agitation     : no dysuria      PHYSICAL EXAM:   GENERAL:  NAD, Appears stated age  HEENT:  NC/AT,  conjunctivae clear and pink, sclera -anicteric  CHEST:  decreased breath sounds bilateral   HEART:  RRR S1/S2,  ABDOMEN:  Soft, non-tender, non-distended,  no masses   EXTREMITIES:  + Edema  SKIN:  Warm & Dry. No rash or erythema  NEURO:  Alert, oriented    Vital Signs:  Vital Signs Last 24 Hrs  T(C): 36.6 (26 Aug 2020 04:04), Max: 36.7 (25 Aug 2020 20:16)  T(F): 97.9 (26 Aug 2020 04:04), Max: 98.1 (25 Aug 2020 20:16)  HR: 79 (26 Aug 2020 04:04) (79 - 90)  BP: 101/57 (26 Aug 2020 04:04) (97/62 - 107/65)  BP(mean): --  RR: 18 (26 Aug 2020 04:04) (16 - 18)  SpO2: 97% (26 Aug 2020 04:04) (97% - 100%)  Daily     Daily Weight in k.6 (25 Aug 2020 12:49)    LABS:                        10.4   5.35  )-----------( 86       ( 25 Aug 2020 19:45 )             33.8     Mean Cell Volume: 99.7 fl (- @ 19:45)        141  |  96  |  69<H>  ----------------------------<  170<H>  2.9<LL>   |  31  |  2.33<H>    Ca    10.1      26 Aug 2020 06:52  Phos  2.7       Mg     2.0         TPro  6.3  /  Alb  4.1  /  TBili  1.9<H>  /  DBili  x   /  AST  22  /  ALT  11  /  AlkPhos  64  0825    LIVER FUNCTIONS - ( 25 Aug 2020 19:45 )  Alb: 4.1 g/dL / Pro: 6.3 g/dL / ALK PHOS: 64 U/L / ALT: 11 U/L / AST: 22 U/L / GGT: x           PT/INR - ( 25 Aug 2020 19:45 )   PT: 25.4 sec;   INR: 2.22 ratio         PTT - ( 25 Aug 2020 19:45 )  PTT:40.7 sec  Urinalysis Basic - ( 25 Aug 2020 19:47 )    Color: Light Yellow / Appearance: Clear / S.008 / pH: x  Gluc: x / Ketone: Negative  / Bili: Negative / Urobili: Negative   Blood: x / Protein: Negative / Nitrite: Negative   Leuk Esterase: Negative / RBC: x / WBC x   Sq Epi: x / Non Sq Epi: x / Bacteria: x                              10.4   5.35  )-----------( 86       ( 25 Aug 2020 19:45 )             33.8                           10.3   5.04  )-----------( 107      ( 25 Aug 2020 05:05 )             34.3     Imaging:    < from: US Duplex Aorta/IVC/Iliac Comp (20 @ 08:54) >    EXAM:  US DPLX AORTA IVC ILIACS COMPL                            PROCEDURE DATE:  2020            INTERPRETATION:  CLINICAL INFORMATION: Preoperative evaluation for LVAD.    COMPARISON: None available.    TECHNIQUE: Ultrasonography of the aorta was performed using grayscale, color and spectral Doppler imaging.    FINDINGS:    There are moderate atherosclerotic changes of the aorta.    The following measurements were obtained:    Proximal Aorta: 3.0 cm.    Mid Aorta: 2.0 cm. systolic Velocity-118.5 cm/s.    Distal Aorta: 1.5 cm. Turbulent color Doppler flow.    Right common iliac artery measures 1.8 cm, velocity-75 cm/s  Left common iliac artery measures 1.4 cm, velocity-115 cm/s  Right external iliac artery measures 1.2 cm, velocity-83 cm/s  Left external iliac artery measures 1.4 cm, velocity-97 cm/s.    IVC and iliac veins are patent with normal unidirectional phasic waveforms.    Incidental finding: Cholelithiasis.    IMPRESSION:    No evidence of abdominal aortic aneurysm. Moderate atherosclerotic changes of the aorta.    Cholelithiasis.

## 2020-08-26 NOTE — PROGRESS NOTE ADULT - PROBLEM SELECTOR PLAN 1
- Continue milrinone to 0.3 mcg/kg/min  - Continue Bumex gtt at 1mg/hour. Aggressive electrolyte repletion to maintain K 4-4.5 and Mg 2-2.5. Recommend starting standing KCl 40 meq daily   - Please start spironolactone 12.5mg daily  -C/w carvedilol 6.25 for now  - Will plan for RHC to evaluate hemodynamics once optimized, potentially early next week  - Will launch evaluation for LVAD as destination therapy. He is not a heart transplant candidate given age.

## 2020-08-26 NOTE — PROGRESS NOTE ADULT - SUBJECTIVE AND OBJECTIVE BOX
SUBJECTIVE / OVERNIGHT EVENTS: pt seen and examined    MEDICATIONS  (STANDING):  aMIOdarone    Tablet 200 milliGRAM(s) Oral daily  apixaban 5 milliGRAM(s) Oral two times a day  buMETAnide Infusion 1 mG/Hr (5 mL/Hr) IV Continuous <Continuous>  carvedilol 6.25 milliGRAM(s) Oral every 12 hours  dextrose 5%. 1000 milliLiter(s) (50 mL/Hr) IV Continuous <Continuous>  dextrose 50% Injectable 12.5 Gram(s) IV Push once  dextrose 50% Injectable 25 Gram(s) IV Push once  dextrose 50% Injectable 25 Gram(s) IV Push once  insulin lispro (HumaLOG) corrective regimen sliding scale   SubCutaneous three times a day before meals  levothyroxine 50 MICROGram(s) Oral daily  milrinone Infusion 0.3 MICROgram(s)/kG/Min (7.47 mL/Hr) IV Continuous <Continuous>  potassium chloride    Tablet ER 40 milliEquivalent(s) Oral once  spironolactone 12.5 milliGRAM(s) Oral daily    MEDICATIONS  (PRN):  dextrose 40% Gel 15 Gram(s) Oral once PRN Blood Glucose LESS THAN 70 milliGRAM(s)/deciliter  glucagon  Injectable 1 milliGRAM(s) IntraMuscular once PRN Glucose LESS THAN 70 milligrams/deciliter  polyethylene glycol 3350 17 Gram(s) Oral daily PRN Constipation    Vital Signs Last 24 Hrs  T(C): 36.7 (26 Aug 2020 20:13), Max: 36.7 (26 Aug 2020 20:13)  T(F): 98 (26 Aug 2020 20:13), Max: 98 (26 Aug 2020 20:13)  HR: 81 (26 Aug 2020 20:13) (79 - 81)  BP: 102/64 (26 Aug 2020 20:13) (97/60 - 108/67)  BP(mean): --  RR: 18 (26 Aug 2020 20:13) (18 - 18)  SpO2: 97% (26 Aug 2020 20:13) (97% - 100%)    Skin: No rash.  Eyes: No recent vision problems or eye pain.  ENT: No congestion, ear pain, or sore throat.  Cardiovascular: No chest pain or palpation.  Respiratory: No cough, shortness of breath, congestion, or wheezing.  Gastrointestinal: No abdominal pain, nausea, vomiting, or diarrhea.  Genitourinary: No dysuria.  Musculoskeletal: No joint swelling.  Neurologic: No headache.    PHYSICAL EXAM:  GENERAL: NAD  EYES: EOMI, PERRLA  NECK: Supple, No JVD  CHEST/LUNG: crackles at bases  HEART:  S1 , S2 +  ABDOMEN: soft , bs+  EXTREMITIES:  edema+  NEUROLOGY:alert awake oriented     LABS:      141  |  96  |  65<H>  ----------------------------<  177<H>  3.4<L>   |  34<H>  |  2.34<H>    Ca    10.5      26 Aug 2020 17:15  Phos  2.7       Mg     2.0         TPro  6.3  /  Alb  4.1  /  TBili  1.9<H>  /  DBili      /  AST  22  /  ALT  11  /  AlkPhos  64      Creatinine Trend: 2.34 <--, 2.33 <--, 2.26 <--, 2.20 <--, 2.23 <--, 2.46 <--, 2.55 <--, 2.48 <--, 2.47 <--                        10.4   5.35  )-----------( 86       ( 25 Aug 2020 19:45 )             33.8     Urine Studies:  Urinalysis Basic - ( 25 Aug 2020 19:47 )    Color: Light Yellow / Appearance: Clear / S.008 / pH:   Gluc:  / Ketone: Negative  / Bili: Negative / Urobili: Negative   Blood:  / Protein: Negative / Nitrite: Negative   Leuk Esterase: Negative / RBC:  / WBC    Sq Epi:  / Non Sq Epi:  / Bacteria:       Creatinine, Random Urine: 38 mg/dL ( @ 19:47)  Protein/Creatinine Ratio Calculation: 0.1 Ratio ( @ 19:47)          LIVER FUNCTIONS - ( 25 Aug 2020 19:45 )  Alb: 4.1 g/dL / Pro: 6.3 g/dL / ALK PHOS: 64 U/L / ALT: 11 U/L / AST: 22 U/L / GGT: x           PT/INR - ( 25 Aug 2020 19:45 )   PT: 25.4 sec;   INR: 2.22 ratio         PTT - ( 25 Aug 2020 19:45 )  PTT:40.7 sec       PTT - ( 25 Aug 2020 19:45 )  PTT:40.7 sec

## 2020-08-26 NOTE — PROGRESS NOTE ADULT - ASSESSMENT
76 y/o M w/ PMH of DM2, NICMP/HFrEF (LVIDd 6.7cm, LVEF <20%) s/p CRT-D, AF s/p recent admission w/ DCCV on amio, CKD (BL Cr ~3), and hypothyroid referred from CHF clinic for VOL.   He remains volume overloaded but is responding well to bumex infusion with ~3lb weight loss and 2.4L UOP. His SCr is stable. He is low normotensive not yet on oral vasodilators. Symptomatically feeling better now on low dose milrinone.

## 2020-08-26 NOTE — PROGRESS NOTE ADULT - SUBJECTIVE AND OBJECTIVE BOX
DATE OF SERVICE:Patient was seen and examined :2020    PRESENTING CC:AD[facility]    SUBJ: 74M w/ PMHx of HFrEF (EF 10%) s/p ICD, Atrial Fibrillation w/ recent admission for CHF/afib s/p DCCV, CKD, DM2, hypothyroidism presents after being referred from CHF clinic for admission due to worsening of LE edema and failure of PO diuretics at home. Is on Bumex gtt voiding well  Started on Milrinone OOB less edema feels stronger      PMH -reviewed admission note, no change since admission  Heart failure: acute [ ] chronic [ ] acute or chronic [ x] diastolic [ ] systolic [ ] combined systolic and diastolic[ ]  EDIE: ATN[ ] renal medullary necrosis [ ] CKD I [ ]CKDII [ ]CKD III [ ]CKD IV [ ]CKD V [ ]Other pathological lesions [ ]    MEDICATIONS  (STANDING):  aMIOdarone    Tablet 200 milliGRAM(s) Oral daily  apixaban 5 milliGRAM(s) Oral two times a day  buMETAnide Infusion 1 mG/Hr IV Continuous <Continuous>  carvedilol 6.25 milliGRAM(s) Oral every 12 hours  glucagon  Injectable 1 milliGRAM(s) IntraMuscular once PRN  insulin lispro (HumaLOG) corrective regimen sliding scale   SubCutaneous three times a day before meals  levothyroxine 50 MICROGram(s) Oral daily  milrinone Infusion 0.3 MICROgram(s)/kG/Min IV Continuous <Continuous>  potassium chloride    Tablet ER 40 milliEquivalent(s) Oral every 4 hours    MEDICATIONS  (PRN):  dextrose 40% Gel 15 Gram(s) Oral once PRN Blood Glucose LESS THAN 70 milliGRAM(s)/deciliter  glucagon  Injectable 1 milliGRAM(s) IntraMuscular once PRN Glucose LESS THAN 70 milligrams/deciliter  polyethylene glycol 3350 17 Gram(s) Oral daily PRN Constipation              REVIEW OF SYSTEMS:  Constitutional: [ ] fever, [ ]weight loss,  [x ]fatigue  Eyes: [ ] visual changes  Respiratory: [ ]shortness of breath;  [ ] cough, [ ]wheezing, [ ]chills, [ ]hemoptysis  Cardiovascular: [ ] chest pain, [ ]palpitations, [ ]dizziness,  [x ]leg swelling[ ]orthopnea[ ]PND  Gastrointestinal: [ ] abdominal pain, [ ]nausea, [ ]vomiting,  [ ]diarrhea   Genitourinary: [ ] dysuria, [ ] hematuria  Neurologic: [ ] headaches [ ] tremors[ ]weakness  Skin: [ ] itching, [ ]burning, [ ] rashes  Endocrine: [ ] heat or cold intolerance  Musculoskeletal: [ ] joint pain or swelling; [ ] muscle, back, or extremity pain  Psychiatric: [ ] depression, [ ]anxiety, [ ]mood swings, or [ ]difficulty sleeping  Hematologic: [ ] easy bruising, [ ] bleeding gums    [x] All remaining systems negative except as per above.   [ ]Unable to obtain.    Vital Signs Last 24 Hrs  T(C): 36.6 (20 @ 04:04), Max: 36.7 (20 @ 20:16)  HR: 79 (20 @ 04:04) (79 - 90)  BP: 101/57 (20 @ 04:04) (97/62 - 107/65)  RR: 18 (20 @ 04:04) (16 - 18)  SpO2: 97% (20 @ 04:04) (97% - 100%)    Weight in k.6 ( @ 12:49)    I&O's Summary    25 Aug 2020 07:01  -  26 Aug 2020 07:00  --------------------------------------------------------  IN: 1240 mL / OUT: 2380 mL / NET: -1140 mL      PHYSICAL EXAM:  General: No acute distress BMI-  HEENT: EOMI, PERRL  Neck: Supple, [x ] JVD  Lungs: Equal air entry bilaterally; [ ] rales [ ] wheezing [ ] rhonchi  Heart: Regular rate and rhythm; [x ] murmur   3/6 [x ] systolic [ ] diastolic [ ] radiation[ ] rubs [ ]  gallops  Abdomen: Nontender, bowel sounds present  Extremities: No clubbing, cyanosis, [x ] edema  Nervous system:  Alert & Oriented X3, no focal deficits  Psychiatric: Normal affect  Skin: No rashes or lesions    LABS:      141  |  96  |  65<H>  ----------------------------<  177<H>  3.4<L>   |  34<H>  |  2.34<H>    Ca    10.5      26 Aug 2020 17:15  Phos  2.7       Mg     2.0         TPro  6.3  /  Alb  4.1  /  TBili  1.9<H>  /  DBili  x   /  AST  22  /  ALT  11  /  AlkPhos  64      Creatinine Trend: 2.34<--, 2.33<--, 2.26<--, 2.20<--, 2.23<--, 2.46<--                        10.4   5.35  )-----------( 86       ( 25 Aug 2020 19:45 )             33.8     PT/INR - ( 25 Aug 2020 19:45 )   PT: 25.4 sec;   INR: 2.22 ratio         PTT - ( 25 Aug 2020 19:45 )  PTT:40.7 sec    Serum Pro-Brain Natriuretic Peptide: 03699 pg/mL (20 @ 19:45)        TELEMETRY:Ventricular paced      IMPRESSION AND PLAN:  74 y/o M w/ PMH of DM2, NICMP/HFrEF (LVIDd 6.7cm, LVEF <20%) s/p CRT-D, AF s/p recent admission w/ DCCV on amio, CKD (BL Cr ~3), and hypothyroid referred from CHF clinic for VOL.   He remains volume overloaded but is responding well to bumex infusion with ~3lb weight loss and 2.4L UOP. His SCr is stable. He is low normotensive not yet on oral vasodilators. Symptomatically feeling better now on low dose milrinone.        Problem/Plan - 1:  ·  Problem: Acute HFrEF (heart failure with reduced ejection fraction).  Plan:Acute on Chronic Systolic Heart Failure  -Was off Entresto due to low BP  -Continue Bumex gtt is diuresing well  -Weight >15lbs from baseline in July  -restarted on Carvedilol-tolerating  -RHC once euvolemic  -Started on Milrinone diuresing well  -consideration for LVAD      Problem/Plan - 2:  ·  Problem: Atrial fibrillation, unspecified type.  Plan:CHADS2-4  Paced rhythm s/p DCCV  -Continue Eliquis-[Age<80 Wt >60Kg CKD] would resume 5 mg BID      Problem/Plan - 3:  ·  Problem: Essential hypertension.  Plan:BP stable      Problem/Plan - 4:  ·  Problem: Acute renal failure superimposed on stage 3 chronic kidney disease, unspecified acute renal failure type.  Plan: -overall creatinine is improving (2.46 from 3.3), prior baseline was ~1.8  -Trending to baseline likely Cardiorenal      Problem/Plan - 5:  ·  Problem: Hypothyroidism, unspecified type.  Plan:Thyroid Stimulating Hormone, Serum in AM (20 @ 06:36)    Thyroid Stimulating Hormone, Serum: 2.130 uU/mL    Problem/Plan - 6:  Problem: Type 2 diabetes mellitus without complication, without long-term current use of insulin. Plan: -diet controlled at home.  -A1C with Estimated Average Glucose in AM (20 @ 09:51)    A1C with Estimated Average Glucose Result: 6.1 %

## 2020-08-27 NOTE — CONSULT NOTE ADULT - ASSESSMENT
74M w/ PMHx of HFrEF (EF 10%) s/p ICD, Afib w/ recent admission for CHF/afib s/p DCCV, CKD, DM2, hypothyroidism presents after being referred from CHF clinic for admission due to worsening of LE edema and failure of PO diuretics at home. Hematology consulted for thrombocytopenia     #Thrombocytopenia   -Patient has had chronic thrombocytopenia 80s-100s since 2015   -Multifactorial in the setting of moderate to severe MR with tethered leaflet along with chronic illness   -HIV, hepatitis negative   -No indication for further work up     Je Sherwood MD   Hematology/Oncology Fellow   [phone]  After 5pm and on weekends page on call fellow 74M w/ PMHx of HFrEF (EF 10%) s/p ICD, Afib w/ recent admission for CHF/afib s/p DCCV, CKD, DM2, hypothyroidism presents after being referred from CHF clinic for admission due to worsening of LE edema and failure of PO diuretics at home. Hematology consulted for thrombocytopenia     #Thrombocytopenia   -Patient has had chronic thrombocytopenia 80s-100s since 2015   -Multifactorial in the setting of moderate to severe MR with tethered leaflet along with chronic illness   -HIV, hepatitis negative   -No indication for further work up at this time     Je Sherwood MD   Hematology/Oncology Fellow   [phone]  After 5pm and on weekends page on call fellow 74M w/ PMHx of HFrEF (EF 10%) s/p ICD, Afib w/ recent admission for CHF/afib s/p DCCV, CKD, DM2, hypothyroidism presents after being referred from CHF clinic for admission due to worsening of LE edema and failure of PO diuretics at home. Hematology consulted for thrombocytopenia     #Thrombocytopenia   -Patient has had chronic thrombocytopenia 80s-100s since 2015   -Multifactorial in the setting of moderate to severe MR with tethered leaflet along with chronic illness   -peripheral smear reviewed: 1-2 schistocytes/ HPF (expected given vulvular disorder and tethered leaflet), otherwise normal RBC and WBC morphology   -HIV, hepatitis negative   -No indication for further work up at this time     Je Sherwood MD   Hematology/Oncology Fellow   [phone]  After 5pm and on weekends page on call fellow

## 2020-08-27 NOTE — PROGRESS NOTE ADULT - SUBJECTIVE AND OBJECTIVE BOX
Subjective:    Medications:  aMIOdarone    Tablet 200 milliGRAM(s) Oral daily  apixaban 5 milliGRAM(s) Oral two times a day  buMETAnide Infusion 1 mG/Hr IV Continuous <Continuous>  carvedilol 6.25 milliGRAM(s) Oral every 12 hours  dextrose 40% Gel 15 Gram(s) Oral once PRN  dextrose 5%. 1000 milliLiter(s) IV Continuous <Continuous>  dextrose 50% Injectable 12.5 Gram(s) IV Push once  dextrose 50% Injectable 25 Gram(s) IV Push once  dextrose 50% Injectable 25 Gram(s) IV Push once  glucagon  Injectable 1 milliGRAM(s) IntraMuscular once PRN  insulin lispro (HumaLOG) corrective regimen sliding scale   SubCutaneous three times a day before meals  levothyroxine 50 MICROGram(s) Oral daily  milrinone Infusion 0.3 MICROgram(s)/kG/Min IV Continuous <Continuous>  polyethylene glycol 3350 17 Gram(s) Oral daily PRN  potassium chloride    Tablet ER 40 milliEquivalent(s) Oral daily  spironolactone 12.5 milliGRAM(s) Oral daily      Physical Exam:    Vitals:  Vital Signs Last 24 Hours  T(C): 36.8 (20 @ 12:04), Max: 36.8 (20 @ 04:15)  HR: 89 (20 @ 12:04) (79 - 89)  BP: 100/63 (20 @ 12:04) (97/60 - 106/67)  RR: 18 (20 @ 12:04) (18 - 18)  SpO2: 100% (20 @ 12:04) (96% - 100%)    Weight in k.7 ( @ 08:17)    I&O's Summary    26 Aug 2020 07  -  27 Aug 2020 07:00  --------------------------------------------------------  IN: 840 mL / OUT: 3025 mL / NET: -2185 mL    27 Aug 2020 07:01  -  27 Aug 2020 13:19  --------------------------------------------------------  IN: 360 mL / OUT: 700 mL / NET: -340 mL        Tele:    General: No distress. Comfortable.  HEENT: EOM intact.  Neck: Neck supple. JVP not elevated. No masses  Chest: Clear to auscultation bilaterally  CV: Normal S1 and S2. No murmurs, rub, or gallops. Radial pulses normal.  Abdomen: Soft, non-distended, non-tender  Skin: No rashes or skin breakdown  Neurology: Alert and oriented times three. Sensation intact  Psych: Affect normal    Labs:                        9.7    4.71  )-----------( 76       ( 27 Aug 2020 06:10 )             30.9         141  |  100  |  63<H>  ----------------------------<  135<H>  4.0   |  30  |  2.22<H>    Ca    9.9      27 Aug 2020 06:10  Phos  2.6       Mg     1.9         TPro  6.1  /  Alb  x   /  TBili  x   /  DBili  x   /  AST  x   /  ALT  x   /  AlkPhos  x       PT/INR - ( 25 Aug 2020 19:45 )   PT: 25.4 sec;   INR: 2.22 ratio         PTT - ( 25 Aug 2020 19:45 )  PTT:40.7 sec      Serum Pro-Brain Natriuretic Peptide: 76589 pg/mL ( @ 19:45)  Serum Pro-Brain Natriuretic Peptide: 92886 pg/mL ( @ 08:17)      Lactate Dehydrogenase, Serum: 215 U/L ( @ 19:45) Subjective:  - Notes improvement in his energy and activity tolerance since starting milrinone  - walking several laps around the nursing unit daily without dyspnea  - Denies lightheadedness, dizziness, abdominal pain/bloating, LE edema improving    Medications:  aMIOdarone    Tablet 200 milliGRAM(s) Oral daily  apixaban 5 milliGRAM(s) Oral two times a day  buMETAnide Infusion 1 mG/Hr IV Continuous <Continuous>  carvedilol 6.25 milliGRAM(s) Oral every 12 hours  dextrose 40% Gel 15 Gram(s) Oral once PRN  dextrose 5%. 1000 milliLiter(s) IV Continuous <Continuous>  dextrose 50% Injectable 12.5 Gram(s) IV Push once  dextrose 50% Injectable 25 Gram(s) IV Push once  dextrose 50% Injectable 25 Gram(s) IV Push once  glucagon  Injectable 1 milliGRAM(s) IntraMuscular once PRN  insulin lispro (HumaLOG) corrective regimen sliding scale   SubCutaneous three times a day before meals  levothyroxine 50 MICROGram(s) Oral daily  milrinone Infusion 0.3 MICROgram(s)/kG/Min IV Continuous <Continuous>  polyethylene glycol 3350 17 Gram(s) Oral daily PRN  potassium chloride    Tablet ER 40 milliEquivalent(s) Oral daily  spironolactone 12.5 milliGRAM(s) Oral daily      Physical Exam:    Vitals:  Vital Signs Last 24 Hours  T(C): 36.8 (20 @ 12:04), Max: 36.8 (20 @ 04:15)  HR: 89 (20 @ 12:04) (79 - 89)  BP: 100/63 (20 @ 12:04) (97/60 - 106/67)  RR: 18 (20 @ 12:04) (18 - 18)  SpO2: 100% (20 @ 12:04) (96% - 100%)    Weight in k.7 ( @ 08:17)    I&O's Summary    26 Aug 2020 07:01  -  27 Aug 2020 07:00  --------------------------------------------------------  IN: 840 mL / OUT: 3025 mL / NET: -2185 mL    27 Aug 2020 07:01  -  27 Aug 2020 13:19  --------------------------------------------------------  IN: 360 mL / OUT: 700 mL / NET: -340 mL    Tele: Vpaced 90s    General: No distress. Comfortable.  HEENT: EOM intact.  Neck: Neck supple. JVP mild-moderately elevated. No masses  Chest: Clear to auscultation bilaterally  CV: Regular. Normal S1 and S2. No murmurs, rub, or gallops. Radial pulses normal. +1-2 BLE edema  Abdomen: Soft, non-distended, non-tender  Skin: No rashes or skin breakdown  Neurology: Alert and oriented times three. Sensation intact  Psych: Affect normal    Labs:                        9.7    4.71  )-----------( 76       ( 27 Aug 2020 06:10 )             30.9         141  |  100  |  63<H>  ----------------------------<  135<H>  4.0   |  30  |  2.22<H>    Ca    9.9      27 Aug 2020 06:10  Phos  2.6       Mg     1.9         TPro  6.1  /  Alb  x   /  TBili  x   /  DBili  x   /  AST  x   /  ALT  x   /  AlkPhos  x       PT/INR - ( 25 Aug 2020 19:45 )   PT: 25.4 sec;   INR: 2.22 ratio         PTT - ( 25 Aug 2020 19:45 )  PTT:40.7 sec      Serum Pro-Brain Natriuretic Peptide: 13687 pg/mL ( @ 19:45)  Serum Pro-Brain Natriuretic Peptide: 67187 pg/mL ( @ 08:17)      Lactate Dehydrogenase, Serum: 215 U/L ( @ 19:45)

## 2020-08-27 NOTE — CONSULT NOTE ADULT - ATTENDING COMMENTS
Agree with assessment and plan as above by Dr. Enriquez. Reviewed all pertinent labs, glucose values, and imaging studies. Modifications made as indicated above. No endocrine contraindications to LVAD given hx of thyroid cancer. Agree with close monitoring and holding off on surgery for now. Can c/w levothyroxine for some TSH suppressive effects for goal <2. Last dose adjustment just last week. Monitor glucose on correction scale ideally on carb consistent diet and less dextrose with IV meds.     Jaxon Guerrero D.O  [phone]

## 2020-08-27 NOTE — PROGRESS NOTE ADULT - SUBJECTIVE AND OBJECTIVE BOX
DATE OF SERVICE:Patient was seen and examined :08/27/2020    PRESENTING CC:Edema ADHF    SUBJ: 74M w/ PMHx of HFrEF (EF 10%) s/p ICD, Atrial Fibrillation w/ recent admission for CHF/afib s/p DCCV, CKD, DM2, hypothyroidism presents after being referred from CHF clinic for admission due to worsening of LE edema      PMH -reviewed admission note, no change since admission  Heart failure: acute [ ] chronic [ ] acute or chronic [x ] diastolic [ ] systolic [ ] combined systolic and diastolic[ ]  EDIE: ATN[ ] renal medullary necrosis [ ] CKD I [ ]CKDII [ ]CKD III [ ]CKD IV [ ]CKD V [ ]Other pathological lesions [ ]    MEDICATIONS  (STANDING):  aMIOdarone    Tablet 200 milliGRAM(s) Oral daily  apixaban 5 milliGRAM(s) Oral two times a day  buMETAnide Infusion 1 mG/Hr (5 mL/Hr) IV Continuous <Continuous>  carvedilol 6.25 milliGRAM(s) Oral every 12 hours  insulin lispro (HumaLOG) corrective regimen sliding scale   SubCutaneous three times a day before meals  levothyroxine 50 MICROGram(s) Oral daily  milrinone Infusion 0.3 MICROgram(s)/kG/Min (7.47 mL/Hr) IV Continuous <Continuous>  potassium chloride    Tablet ER 40 milliEquivalent(s) Oral daily  spironolactone 12.5 milliGRAM(s) Oral daily    MEDICATIONS  (PRN):  dextrose 40% Gel 15 Gram(s) Oral once PRN Blood Glucose LESS THAN 70 milliGRAM(s)/deciliter  glucagon  Injectable 1 milliGRAM(s) IntraMuscular once PRN Glucose LESS THAN 70 milligrams/deciliter  polyethylene glycol 3350 17 Gram(s) Oral daily PRN Constipation              REVIEW OF SYSTEMS:  Constitutional: [ ] fever, [ ]weight loss,  [ ]fatigue  Eyes: [ ] visual changes  Respiratory: [ ]shortness of breath;  [ ] cough, [ ]wheezing, [ ]chills, [ ]hemoptysis  Cardiovascular: [ ] chest pain, [ ]palpitations, [ ]dizziness,  [ ]leg swelling[ ]orthopnea[ ]PND  Gastrointestinal: [ ] abdominal pain, [ ]nausea, [ ]vomiting,  [ ]diarrhea   Genitourinary: [ ] dysuria, [ ] hematuria  Neurologic: [ ] headaches [ ] tremors[ ]weakness  Skin: [ ] itching, [ ]burning, [ ] rashes  Endocrine: [ ] heat or cold intolerance  Musculoskeletal: [ ] joint pain or swelling; [ ] muscle, back, or extremity pain  Psychiatric: [ ] depression, [ ]anxiety, [ ]mood swings, or [ ]difficulty sleeping  Hematologic: [ ] easy bruising, [ ] bleeding gums    [x] All remaining systems negative except as per above.   [ ]Unable to obtain.    Vital Signs Last 24 Hrs  T(C): 36.8 (27 Aug 2020 04:15), Max: 36.8 (27 Aug 2020 04:15)  T(F): 98.3 (27 Aug 2020 04:15), Max: 98.3 (27 Aug 2020 04:15)  HR: 79 (27 Aug 2020 04:15) (79 - 81)  BP: 106/67 (27 Aug 2020 04:15) (97/60 - 108/67)  BP(mean): --  RR: 18 (27 Aug 2020 04:15) (18 - 18)  SpO2: 96% (27 Aug 2020 04:15) (96% - 100%)  I&O's Summary    25 Aug 2020 07:01  -  26 Aug 2020 07:00  --------------------------------------------------------  IN: 1240 mL / OUT: 2380 mL / NET: -1140 mL    26 Aug 2020 07:01  -  27 Aug 2020 06:20  --------------------------------------------------------  IN: 840 mL / OUT: 3025 mL / NET: -2185 mL        PHYSICAL EXAM:  General: No acute distress BMI-  HEENT: EOMI, PERRL  Neck: Supple, [ ] JVD  Lungs: Equal air entry bilaterally; [ ] rales [ ] wheezing [ ] rhonchi  Heart: Regular rate and rhythm; [ ] murmur   /6 [ ] systolic [ ] diastolic [ ] radiation[ ] rubs [ ]  gallops  Abdomen: Nontender, bowel sounds present  Extremities: No clubbing, cyanosis, [ ] edema  Nervous system:  Alert & Oriented X3, no focal deficits  Psychiatric: Normal affect  Skin: No rashes or lesions    LABS:  08-26    141  |  96  |  65<H>  ----------------------------<  177<H>  3.4<L>   |  34<H>  |  2.34<H>    Ca    10.5      26 Aug 2020 17:15  Phos  2.7     08-26  Mg     2.0     08-26    TPro  6.3  /  Alb  4.1  /  TBili  1.9<H>  /  DBili  x   /  AST  22  /  ALT  11  /  AlkPhos  64  08-25    Creatinine Trend: 2.34<--, 2.33<--, 2.26<--, 2.20<--, 2.23<--, 2.46<--                        10.4   5.35  )-----------( 86       ( 25 Aug 2020 19:45 )             33.8     PT/INR - ( 25 Aug 2020 19:45 )   PT: 25.4 sec;   INR: 2.22 ratio         PTT - ( 25 Aug 2020 19:45 )  PTT:40.7 sec  Lipid Panel:   Cardiac Enzymes:     Serum Pro-Brain Natriuretic Peptide: 63075 pg/mL (08-25-20 @ 19:45)        RADIOLOGY:    ECG [my interpretation]:    TELEMETRY:    ECHO:    STRESS TEST:    CATHETERIZATION:      IMPRESSION AND PLAN: DATE OF SERVICE:Patient was seen and examined :08/27/2020    PRESENTING CC:Edema ADHF    SUBJ: 74M w/ PMHx of HFrEF (EF 10%) s/p ICD, Atrial Fibrillation w/ recent admission for CHF/afib s/p DCCV, CKD, DM2, hypothyroidism presents after being referred from CHF clinic for admission due to worsening of LE edema      PMH -reviewed admission note, no change since admission  Heart failure: acute [ ] chronic [ ] acute or chronic [x ] diastolic [ ] systolic [ ] combined systolic and diastolic[ ]  EDIE: ATN[ ] renal medullary necrosis [ ] CKD I [ ]CKDII [ ]CKD III [ ]CKD IV [ ]CKD V [ ]Other pathological lesions [ ]    MEDICATIONS  (STANDING):  aMIOdarone    Tablet 200 milliGRAM(s) Oral daily  apixaban 5 milliGRAM(s) Oral two times a day  buMETAnide Infusion 1 mG/Hr (5 mL/Hr) IV Continuous <Continuous>  carvedilol 6.25 milliGRAM(s) Oral every 12 hours  insulin lispro (HumaLOG) corrective regimen sliding scale   SubCutaneous three times a day before meals  levothyroxine 50 MICROGram(s) Oral daily  milrinone Infusion 0.3 MICROgram(s)/kG/Min (7.47 mL/Hr) IV Continuous <Continuous>  potassium chloride    Tablet ER 40 milliEquivalent(s) Oral daily  spironolactone 12.5 milliGRAM(s) Oral daily    MEDICATIONS  (PRN):  dextrose 40% Gel 15 Gram(s) Oral once PRN Blood Glucose LESS THAN 70 milliGRAM(s)/deciliter  glucagon  Injectable 1 milliGRAM(s) IntraMuscular once PRN Glucose LESS THAN 70 milligrams/deciliter  polyethylene glycol 3350 17 Gram(s) Oral daily PRN Constipation              REVIEW OF SYSTEMS:  Constitutional: [ ] fever, [ ]weight loss,  [ ]fatigue  Eyes: [ ] visual changes  Respiratory: [x ]shortness of breath;  [ ] cough, [ ]wheezing, [ ]chills, [ ]hemoptysis  Cardiovascular: [ ] chest pain, [ ]palpitations, [ ]dizziness,  [ ]leg swelling[ ]orthopnea[ ]PND  Gastrointestinal: [ ] abdominal pain, [ ]nausea, [ ]vomiting,  [ ]diarrhea   Genitourinary: [ ] dysuria, [ ] hematuria  Neurologic: [ ] headaches [ ] tremors[ ]weakness  Skin: [ ] itching, [ ]burning, [ ] rashes  Endocrine: [ ] heat or cold intolerance  Musculoskeletal: [ ] joint pain or swelling; [ ] muscle, back, or extremity pain  Psychiatric: [ ] depression, [ ]anxiety, [ ]mood swings, or [ ]difficulty sleeping  Hematologic: [ ] easy bruising, [ ] bleeding gums    [x] All remaining systems negative except as per above.   [ ]Unable to obtain.    Vital Signs Last 24 Hrs  T(C): 36.8 (27 Aug 2020 04:15), Max: 36.8 (27 Aug 2020 04:15)  T(F): 98.3 (27 Aug 2020 04:15), Max: 98.3 (27 Aug 2020 04:15)  HR: 79 (27 Aug 2020 04:15) (79 - 81)  BP: 106/67 (27 Aug 2020 04:15) (97/60 - 108/67)  RR: 18 (27 Aug 2020 04:15) (18 - 18)  SpO2: 96% (27 Aug 2020 04:15) (96% - 100%)  I&O's Summary    25 Aug 2020 07:01  -  26 Aug 2020 07:00  --------------------------------------------------------  IN: 1240 mL / OUT: 2380 mL / NET: -1140 mL    26 Aug 2020 07:01  -  27 Aug 2020 06:20  --------------------------------------------------------  IN: 840 mL / OUT: 3025 mL / NET: -2185 mL        PHYSICAL EXAM:  General: No acute distress BMI-  HEENT: EOMI, PERRL  Neck: Supple, [x ] JVD  Lungs: Equal air entry bilaterally; [ ] rales [ ] wheezing [ ] rhonchi  Heart: Regular rate and rhythm; [x ] murmur   2/6 [x ] systolic [ ] diastolic [ ] radiation[ ] rubs [ ]  gallops  Abdomen: Nontender, bowel sounds present  Extremities: No clubbing, cyanosis, [ ] edema  Nervous system:  Alert & Oriented X3, no focal deficits  Psychiatric: Normal affect  Skin: No rashes or lesions    LABS:  08-26    141  |  96  |  65<H>  ----------------------------<  177<H>  3.4<L>   |  34<H>  |  2.34<H>    Ca    10.5      26 Aug 2020 17:15  Phos  2.7     08-26  Mg     2.0     08-26    TPro  6.3  /  Alb  4.1  /  TBili  1.9<H>  /  DBili  x   /  AST  22  /  ALT  11  /  AlkPhos  64  08-25    Creatinine Trend: 2.34<--, 2.33<--, 2.26<--, 2.20<--, 2.23<--, 2.46<--                        10.4   5.35  )-----------( 86       ( 25 Aug 2020 19:45 )             33.8     PT/INR - ( 25 Aug 2020 19:45 )   PT: 25.4 sec;   INR: 2.22 ratio         PTT - ( 25 Aug 2020 19:45 )  PTT:40.7 sec  Lipid Panel:   Cardiac Enzymes:     Serum Pro-Brain Natriuretic Peptide: 83330 pg/mL (08-25-20 @ 19:45)        IMPRESSION AND PLAN:      Problem/Plan - 1:  ·  Problem: Acute HFrEF (heart failure with reduced ejection fraction).  Plan:Acute on Chronic Systolic Heart Failure  -Was off Entresto due to low BP  -Continue Bumex gtt is diuresing well  -Weight >15lbs from baseline in July  -restarted on Carvedilol-tolerating  -RHC once euvolemic  -Started on Milrinone diuresing well  -consideration for LVAD      Problem/Plan - 2:  ·  Problem: Atrial fibrillation, unspecified type.  Plan:CHADS2-4  Paced rhythm s/p DCCV  -Continue Eliquis-[Age<80 Wt >60Kg CKD] would resume 5 mg BID      Problem/Plan - 3:  ·  Problem: Essential hypertension.  Plan:BP stable      Problem/Plan - 4:  ·  Problem: Acute renal failure superimposed on stage 3 chronic kidney disease, unspecified acute       Problem/Plan - 5:  ·  Problem: Hypothyroidism, unspecified type.  Plan:Thyroid Stimulating Hormone, Serum in AM (07.16.20 @ 06:36)    Thyroid Stimulating Hormone, Serum: 2.130 uU/mL    Problem/Plan - 6:  Problem: Type 2 diabetes mellitus without complication, without long-term current use of insulin. Plan: -diet controlled at home.  -A1C with Estimated Average Glucose in AM (07.18.20 @ 09:51)    A1C with Estimated Average Glucose Result: 6.1 %

## 2020-08-27 NOTE — PROGRESS NOTE ADULT - SUBJECTIVE AND OBJECTIVE BOX
SUBJECTIVE / OVERNIGHT EVENTS: pt seen and examined    MEDICATIONS  (STANDING):  aMIOdarone    Tablet 200 milliGRAM(s) Oral daily  apixaban 5 milliGRAM(s) Oral two times a day  buMETAnide Infusion 1 mG/Hr (5 mL/Hr) IV Continuous <Continuous>  carvedilol 6.25 milliGRAM(s) Oral every 12 hours  dextrose 5%. 1000 milliLiter(s) (50 mL/Hr) IV Continuous <Continuous>  dextrose 50% Injectable 12.5 Gram(s) IV Push once  dextrose 50% Injectable 25 Gram(s) IV Push once  dextrose 50% Injectable 25 Gram(s) IV Push once  insulin lispro (HumaLOG) corrective regimen sliding scale   SubCutaneous three times a day before meals  levothyroxine 50 MICROGram(s) Oral daily  milrinone Infusion 0.3 MICROgram(s)/kG/Min (7.47 mL/Hr) IV Continuous <Continuous>  potassium chloride    Tablet ER 40 milliEquivalent(s) Oral daily  spironolactone 12.5 milliGRAM(s) Oral daily    MEDICATIONS  (PRN):  dextrose 40% Gel 15 Gram(s) Oral once PRN Blood Glucose LESS THAN 70 milliGRAM(s)/deciliter  glucagon  Injectable 1 milliGRAM(s) IntraMuscular once PRN Glucose LESS THAN 70 milligrams/deciliter  polyethylene glycol 3350 17 Gram(s) Oral daily PRN Constipation    Vital Signs Last 24 Hrs  T(C): 36.9 (27 Aug 2020 20:15), Max: 36.9 (27 Aug 2020 20:15)  T(F): 98.5 (27 Aug 2020 20:15), Max: 98.5 (27 Aug 2020 20:15)  HR: 80 (27 Aug 2020 20:15) (79 - 89)  BP: 103/66 (27 Aug 2020 20:15) (99/61 - 106/67)  BP(mean): --  RR: 18 (27 Aug 2020 20:15) (18 - 18)  SpO2: 98% (27 Aug 2020 20:15) (96% - 100%)    Skin: No rash.  Eyes: No recent vision problems or eye pain.  ENT: No congestion, ear pain, or sore throat.  Cardiovascular: No chest pain or palpation.  Respiratory: No cough, shortness of breath, congestion, or wheezing.  Gastrointestinal: No abdominal pain, nausea, vomiting, or diarrhea.  Genitourinary: No dysuria.  Musculoskeletal: No joint swelling.  Neurologic: No headache.    PHYSICAL EXAM:  GENERAL: NAD  EYES: EOMI, PERRLA  NECK: Supple, No JVD  CHEST/LUNG: crackles at bases  HEART:  S1 , S2 +  ABDOMEN: soft , bs+  EXTREMITIES:  edema+  NEUROLOGY:alert awake oriented     LABS:      142  |  99  |  62<H>  ----------------------------<  173<H>  3.9   |  32<H>  |  2.33<H>    Ca    10.2      27 Aug 2020 18:09  Phos  2.2       Mg     2.1         TPro  6.1  /  Alb      /  TBili      /  DBili      /  AST      /  ALT      /  AlkPhos          Creatinine Trend: 2.33 <--, 2.22 <--, 2.34 <--, 2.33 <--, 2.26 <--, 2.20 <--, 2.23 <--, 2.46 <--, 2.55 <--, 2.48 <--, 2.47 <--                        9.7    4.71  )-----------( 76       ( 27 Aug 2020 06:10 )             30.9     Urine Studies:  Urinalysis Basic - ( 25 Aug 2020 19:47 )    Color: Light Yellow / Appearance: Clear / S.008 / pH:   Gluc:  / Ketone: Negative  / Bili: Negative / Urobili: Negative   Blood:  / Protein: Negative / Nitrite: Negative   Leuk Esterase: Negative / RBC:  / WBC    Sq Epi:  / Non Sq Epi:  / Bacteria:       Creatinine, Random Urine: 38 mg/dL ( @ 19:47)  Protein/Creatinine Ratio Calculation: 0.1 Ratio ( @ 19:47)          LIVER FUNCTIONS - ( 26 Aug 2020 04:46 )  Alb: x     / Pro: 6.1 g/dL / ALK PHOS: x     / ALT: x     / AST: x     / GGT: x             PTT - ( 25 Aug 2020 19:45 )  PTT:40.7 sec

## 2020-08-27 NOTE — PROGRESS NOTE ADULT - ASSESSMENT
76 y/o M w/ PMH of DM2, NICMP/HFrEF (LVIDd 6.7cm, LVEF <20%) s/p CRT-D, AF s/p recent admission w/ DCCV on amio, CKD (BL Cr ~3), and hypothyroid referred from CHF clinic for VOL.   He remains volume overloaded but is responding well to bumex infusion with 3L UOP. His weight today, however is up slightly. His SCr is stable. He is low normotensive not yet on oral vasodilators. Symptomatically feeling better now on low dose milrinone.

## 2020-08-27 NOTE — CONSULT NOTE ADULT - SUBJECTIVE AND OBJECTIVE BOX
HPI:  74M w/ PMHx of HFrEF (EF 10%) s/p ICD, Afib w/ recent admission for CHF/afib s/p DCCV, CKD, DM2, hypothyroidism presents after being referred from CHF clinic for admission due to worsening of LE edema and failure of PO diuretics at home. Per patient was relatively functional until ~1 mo ago when he developed palpitations and light headedness. He was found to be hypotensive and in Afib w/ RVR resulting in his hospitalization at the end  of July. His course was c/b EDIE on CKD and persistent hypotension. He improved after dccv and was ultimately discharged home off entresto, coreg and diuretics due to c/f worsening hypotension. After discharge, patient notes worsening LE edema. He was instructed to resume lasix, which was ultimately escalated to toresemide 80mg bid w/ metolazone 2.5 mg daily. With this regimen he noted increased urination and some mild improvement. However, due to ongoing reduced functional capacity, fatigue, and ALCAZAR he was referred for admission for IV diuretics. Denies any cp or palpitations. Denies repaid HR. Notes weight gain since discharge. Continues to have poor appetite and reduced exercise capacity. No fevers, chills. Notes chronic nonproductive cough which is unchanged. Sleeps elevated due to back pain, denies significant orthopnea. Is able to complete ADLs at baseline with some assistance from his niece. Notes increased urination with high dose diuretics, but no dysuria or hematuria. No abd pain, nausea, vomiting. No diarrhea.     Patient denies any history of bleeding or easy bruising.       PAST MEDICAL & SURGICAL HISTORY:  Hypothyroidism  Afib  Type II diabetes mellitus  Aortic insufficiency  Mitral insufficiency  CKD (chronic kidney disease)  Cardiomyopathy: Systolic CHF  Hypertension  History of tonsillectomy: childhood  AICD (automatic cardioverter/defibrillator) present: 8/2012      Allergies    No Known Allergies    Intolerances        MEDICATIONS  (STANDING):  aMIOdarone    Tablet 200 milliGRAM(s) Oral daily  apixaban 5 milliGRAM(s) Oral two times a day  buMETAnide Infusion 1 mG/Hr (5 mL/Hr) IV Continuous <Continuous>  carvedilol 6.25 milliGRAM(s) Oral every 12 hours  dextrose 5%. 1000 milliLiter(s) (50 mL/Hr) IV Continuous <Continuous>  dextrose 50% Injectable 12.5 Gram(s) IV Push once  dextrose 50% Injectable 25 Gram(s) IV Push once  dextrose 50% Injectable 25 Gram(s) IV Push once  insulin lispro (HumaLOG) corrective regimen sliding scale   SubCutaneous three times a day before meals  levothyroxine 50 MICROGram(s) Oral daily  milrinone Infusion 0.3 MICROgram(s)/kG/Min (7.47 mL/Hr) IV Continuous <Continuous>  potassium chloride    Tablet ER 40 milliEquivalent(s) Oral daily  spironolactone 12.5 milliGRAM(s) Oral daily    MEDICATIONS  (PRN):  dextrose 40% Gel 15 Gram(s) Oral once PRN Blood Glucose LESS THAN 70 milliGRAM(s)/deciliter  glucagon  Injectable 1 milliGRAM(s) IntraMuscular once PRN Glucose LESS THAN 70 milligrams/deciliter  polyethylene glycol 3350 17 Gram(s) Oral daily PRN Constipation      FAMILY HISTORY:  Family history of CVA      SOCIAL HISTORY: No EtOH, no tobacco    REVIEW OF SYSTEMS:    CONSTITUTIONAL: No weakness, fevers or chills  EYES/ENT: No visual changes;  No vertigo or throat pain   NECK: No pain or stiffness  RESPIRATORY: No cough, wheezing, hemoptysis; No shortness of breath  CARDIOVASCULAR: No chest pain or palpitations  GASTROINTESTINAL: No abdominal or epigastric pain. No nausea, vomiting, or hematemesis; No diarrhea or constipation. No melena or hematochezia.  GENITOURINARY: No dysuria, frequency or hematuria  NEUROLOGICAL: No numbness or weakness  SKIN: No itching, burning, rashes, or lesions   All other review of systems is negative unless indicated above.        T(F): 98.2 (08-27-20 @ 12:04), Max: 98.3 (08-27-20 @ 04:15)  HR: 89 (08-27-20 @ 12:04)  BP: 99/61 (08-27-20 @ 17:27)  RR: 18 (08-27-20 @ 12:04)  SpO2: 100% (08-27-20 @ 12:04)  Wt(kg): --    GENERAL: NAD, well-developed  HEAD:  Atraumatic, Normocephalic  EYES: EOMI, PERRLA, conjunctiva and sclera clear  NECK: Supple, No JVD  CHEST/LUNG: Clear to auscultation bilaterally; No wheeze  HEART: Regular rate and rhythm; No murmurs, rubs, or gallops  ABDOMEN: Soft, Nontender, Nondistended; Bowel sounds present  EXTREMITIES:  2+ Peripheral Pulses, No clubbing, cyanosis. 2+ pitting edema b/l   NEUROLOGY: non-focal  SKIN: No rashes or lesions                          9.7    4.71  )-----------( 76       ( 27 Aug 2020 06:10 )             30.9       08-27    142  |  99  |  62<H>  ----------------------------<  173<H>  3.9   |  32<H>  |  2.33<H>    Ca    10.2      27 Aug 2020 18:09  Phos  2.2     08-27  Mg     2.1     08-27    TPro  6.1  /  Alb  x   /  TBili  x   /  DBili  x   /  AST  x   /  ALT  x   /  AlkPhos  x   08-26      Magnesium, Serum: 2.1 mg/dL (08-27 @ 18:09)  Phosphorus Level, Serum: 2.2 mg/dL (08-27 @ 18:09)  Magnesium, Serum: 1.9 mg/dL (08-27 @ 06:10)  Phosphorus Level, Serum: 2.6 mg/dL (08-27 @ 06:10)      PT/INR - ( 25 Aug 2020 19:45 )   PT: 25.4 sec;   INR: 2.22 ratio         PTT - ( 25 Aug 2020 19:45 )  PTT:40.7 sec    .Blood Blood-Peripheral  08-22 @ 21:30   No growth to date.  --  --

## 2020-08-27 NOTE — CONSULT NOTE ADULT - PROBLEM SELECTOR RECOMMENDATION 2
T2DM well controlled (HbA1C 6.2) with diet alone. Inpatient, BG slightly above goal likely due to combination of multiple drips running in dextrose containing fluids and not being ordered for CC diet  - continue low correction scales ac and hs   - change diet to CC  - if possible, change drips (Bumex, Milrinone) to run in NS instead of dextrose containing fluids  - check FS ac/hs  Discharge:  - continue diet control. T2DM well controlled (HbA1C 6.2) with diet alone. Inpatient, BG slightly above goal likely due to combination of multiple drips running in dextrose containing fluids and not being ordered for CC diet  - continue low correction scales ac and hs   - change diet to CC  - if possible, change drips (Bumex, Milrinone) to run in NS instead of dextrose containing fluids  - check FS ac/hs  Discharge:  - continue diet control.    Deyanira Enriquez DO, Endocrine Fellow   Pager [phone]. from 9-5PM. After hours and on weekends please call [phone]. T2DM well controlled (HbA1C 6.2) with diet alone. Inpatient, BG slightly above goal likely due to combination of multiple drips running in dextrose containing fluids and not being ordered for CC diet  - continue low correction scales ac and hs   - change diet to CC  - if possible, change drips (Bumex, Milrinone) to run in NS or alternative fluids without dextrose.  - check FS ac/hs  -If persistently >180 will start standing insulin  Discharge:  - continue diet control.    Deyanira Enriquez DO, Endocrine Fellow   Pager [phone]. from 9-5PM. After hours and on weekends please call [phone].

## 2020-08-27 NOTE — PROGRESS NOTE ADULT - ATTENDING COMMENTS
Patient was seen and examined by me on 08/27/2020,interim events noted,labs and radiology studies reviewed.  Loyd Mitchell MD,FACC.  [address] [place] [address].  [id number].  [phone]

## 2020-08-27 NOTE — CONSULT NOTE ADULT - SUBJECTIVE AND OBJECTIVE BOX
Behavioral Cardiology Psychological Assessment    History of present illness: Mr. Jarquin is a 75 year old man with PMH of NICMP/HFrEF (LVIDd 6.7cm, LVEF <20%) s/p CRT-D, AF s/p recent admission w/ DCCV on amio, CKD (BL Cr ~3), DM2 and hypothyroid referred from CHF clinic for ADHF. Psychological assessment for LVAD evaluation.    Social history: Single, never , no children. Lives in private house in [facility] with his niece. Parents . Only brother has not been in contact with family for many years. Worked in photography field in fashion, retired in . Education: high school graduate.      Substance use  ·	Tobacco: Former smoker, 2-3PPD age 16 to 22.   ·	Alcohol: Rare alcohol use, gin and tonic when out to dinner with friends a few times/year.     ·	Drug: Past marijuana use in teens for 1-2 years.     Understanding of heart disease and advanced therapies: Diagnosed with heart failure 37 years ago by cardiologist Dr. Tate. First heard about need for LVAD last week by Dr. Rivera. Good understanding of LVAD education including battery management, driveline dressing changes, water restriction. Needs review of LVAD risks/benefits. Feels he needs "time to process" information about LVAD.     Adherence to heart failure guidelines:   ·	Medication: Manages his own medication, never misses a dose.   ·	Low Na diet: Follows low Na diet, prepares his own meals.   ·	Monitor weight: Checks daily weight.   ·	Symptom management: Good understanding of when to call MD with symptoms.   ·	Physical activity: Gardening, housework, ADLs. Was going to gym and fishing up until 2019 when he stopped due to SOB.   ·	Provider follow-up: Good adherence with providers, goes to cardiologist every 3 months.      Support system: Identified his niece (Marilou) as primary support person and HCP. She works full time as  for rental company. When asked about driveline dressing changes, stated he does not feel she will be comfortable nor would he, doing dressing change. Marilou no longer drives. Has several close friends but doesn't want to burden them with requests for help.     Past psychological history: Reports history of anxiety/depression due to multiple losses during his lifetime. Had therapy for several years. Never took medication. No inpatient hospitalizations. No s/a.     Psychological assessment: Concerned about his heart disease and possible need for advanced therapies. Would prefer not to need LVAD and hopeful he doesn't need one but stated "if my choice is LVAD or die, then of course I'll do it." Other concerns are the recovery process and expense. Lives on SS and earnings from investments but concerned medical costs might become problematic. In past experienced periods of depression from multiple losses (father  when he was 11, mother  when he was 21, his fiance  in a MVA many years ago, brother left home no contact since ). When his mother  he didn't pursue college but started working to help family; raised his niece after his brother left home. Was in psychotherapy for 3-4 years to address life issues. Attended weight watchers since  when he weighed 345 lbs. Now weighs 179 lbs. Continues to attend weight watchers which he enjoys for the sense of community and support. Denies symptoms of anxiety/depression.   Denies s/i.     Mental status exam: Seen resting in bed. Alert and oriented x4. Pleasant and cooperative, well related with good eye contact. Speech overproductive with normal rate/volume. Thought process tangential. No evidence of any psychosis, carlos, delusions. No s/i. Mood neutral. Affect full range. Insight and judgment adequate.      Cognitive screen: Performed within normal range on brief cognitive screen; MOCA 26/30.     Impression: Mr. Jarquin is a 75 year old man with end stage heart failure admitted with ADHF undergoing evaluation for LVAD. Single, never . Lives in private house he owns in [facility] with his 57 year old niece who is his primary support person and HCP. She does not drive and he is uncomfortable with her doing driveline dressing change. Good understanding of heart disease and advanced therapy. Able to teach back education on LVAD. Good adherence with HF guidelines including medication and MD follow-up. No tobacco use. No drug use. Rare alcohol use, one drink a few times a year when out with friends. Past psychiatric history includes self report of depression related to multiple past losses, was in psychotherapy for several years in past. Denies current symptoms of anxiety/depression but is concerned about health issues and need for advanced therapies. Performed within normal range on brief cognitive screen; MOCA 26/30.     Dx: Adjustment disorder; unspecified. F43.20      Recommendations: No psychological barriers but will need to further evaluate support system.    ·	Meeting with callie to assess availability of support    ·	Behavioral Cardiology will continue to follow as needed

## 2020-08-27 NOTE — CONSULT NOTE ADULT - ATTENDING COMMENTS
Agree with fellow note which I have edited where necessary: 74M w/ PMHx of HFrEF (EF 10%) s/p ICD, Afib w/ recent admission for CHF/afib s/p DCCV, CKD, DM2, hypothyroidism admitted for heart failure exacerbation. He has had chronic thrombocytopenia since 2015 (and he states that this has probably been longer). His thrombocytopenia is likely multifactorial from mild consumptive process due to valvular disease, sequestration, and chronic illness. He has not had any major bleeding events. With a platelet count of 80-100K (his range) he is not at risk for major bleeding. There could also be a component of chronic ITP, but I wouldn't recommend treating this as the treatments are likely to worsen his volume status (IVIG, steroids), without providing a great amount of benefit. Can continue to monitor and trend platelet count.   -we will sign off, please re-consult if any new questions arise

## 2020-08-27 NOTE — CONSULT NOTE ADULT - SUBJECTIVE AND OBJECTIVE BOX
HPI:  74M w/ PMHx of HFrEF (EF 10%) s/p ICD, Afib w/ recent admission for CHF/afib s/p DCCV, CKD, DM2 (diet controlled), recently diagnosed papillary thyroid cancer, referred from CHF clinic for admission due to worsening of LE edema and failure of PO diuretics at home. Admitted for acute decompensated heart failure and evaluation for LVAD.     Endocrine history:  Per pt, had incidental finding of thyroid nodule on CXR ~ 3 months ago. Per pt, has periodic CXR due to chronic cough and h/o frequent PNAs. Pt states he was referred to Endocrinology clinic @ 865 and had US guided FNA which showed papillary thyroid cancer.   Per Endocrinology outpatient documentation, pt had Thyroid US on 2/18 which showed multiple nodules however the largest nodules were in the right mid lobe 1.5 x 1.3 x 1.2 cm and left upper pole 1.2 x 1.2 x 0.9 cm. Both nodules were FNAed and the left upper pole nodule (calcified) was noted to be [place] V ( PTC)  Right sided nodule was benign. Endocrinologist discussed options of management with pt and pt preferred to hold off on thyroidectomy and continue to monitor. Pt was started on levothyroxine 25mcg in May 2020 with up-titration to 50mg on August 19th 2020 for TSH suppression. Repeat Thyroid US on August 19th 2020 showed interval stability in left upper pole nodule and no evidence of abnormal lymph node metastasis.   Pt denies neck pain, dysphonia, dysphagia, difficulty breathing.   Denies family history of thyroid disease or thyroid cancer.   Diabetes controlled with diet for 11 years with A1Cs consistently below 6.5 per pt.      PAST MEDICAL & SURGICAL HISTORY:  Hypothyroidism  Afib  Type II diabetes mellitus  Aortic insufficiency  Mitral insufficiency  CKD (chronic kidney disease)  Cardiomyopathy: Systolic CHF  Hypertension  History of tonsillectomy: childhood  AICD (automatic cardioverter/defibrillator) present: 8/2012      FAMILY HISTORY:  Family history of CVA  Family history of Diabetes      Social History:  no tobacco or alcohol abuse     Outpatient Medications: Home Medications:  carvedilol 12.5 mg oral tablet: 0.5 tab(s) orally 2 times a day (21 Aug 2020 09:38)  Eliquis 5 mg oral tablet: 1 tab(s) orally 2 times a day (21 Aug 2020 09:38)  levothyroxine 50 mcg (0.05 mg) oral tablet: 1 tab(s) orally once a day (21 Aug 2020 09:38)  metOLazone 2.5 mg oral tablet: 1 tab(s) orally once a day (21 Aug 2020 09:38)  torsemide 20 mg oral tablet: 4 tab(s) orally 2 times a day (21 Aug 2020 09:38)      MEDICATIONS  (STANDING):  aMIOdarone    Tablet 200 milliGRAM(s) Oral daily  apixaban 5 milliGRAM(s) Oral two times a day  buMETAnide Infusion 1 mG/Hr (5 mL/Hr) IV Continuous <Continuous>  carvedilol 6.25 milliGRAM(s) Oral every 12 hours  dextrose 5%. 1000 milliLiter(s) (50 mL/Hr) IV Continuous <Continuous>  dextrose 50% Injectable 12.5 Gram(s) IV Push once  dextrose 50% Injectable 25 Gram(s) IV Push once  dextrose 50% Injectable 25 Gram(s) IV Push once  insulin lispro (HumaLOG) corrective regimen sliding scale   SubCutaneous three times a day before meals  levothyroxine 50 MICROGram(s) Oral daily  metolazone 5 milliGRAM(s) Oral once  milrinone Infusion 0.3 MICROgram(s)/kG/Min (7.47 mL/Hr) IV Continuous <Continuous>  potassium chloride    Tablet ER 40 milliEquivalent(s) Oral daily  spironolactone 12.5 milliGRAM(s) Oral daily    MEDICATIONS  (PRN):  dextrose 40% Gel 15 Gram(s) Oral once PRN Blood Glucose LESS THAN 70 milliGRAM(s)/deciliter  glucagon  Injectable 1 milliGRAM(s) IntraMuscular once PRN Glucose LESS THAN 70 milligrams/deciliter  polyethylene glycol 3350 17 Gram(s) Oral daily PRN Constipation      Allergies    No Known Allergies    Intolerances      Review of Systems:  Constitutional: No fever, chills   Neuro: No tremors, headache   Cardiovascular: No chest pain, palpitations  Respiratory: No SOB, no cough  GI: No nausea, vomiting, abdominal pain  : No dysuria, polyuria   Skin: no rash, ulcers   Psych: no depression, anxiety   Endocrine: no polyphagia, polydipsia     ALL OTHER SYSTEMS REVIEWED AND NEGATIVE        PHYSICAL EXAM:  VITALS: T(C): 36.8 (08-27-20 @ 12:04)  T(F): 98.2 (08-27-20 @ 12:04), Max: 98.3 (08-27-20 @ 04:15)  HR: 89 (08-27-20 @ 12:04) (79 - 89)  BP: 99/61 (08-27-20 @ 17:27) (99/61 - 106/67)  RR:  (18 - 18)  SpO2:  (96% - 100%)  Wt(kg): --  GENERAL: NAD, well-groomed, well-developed  EYES: No proptosis, anicteric  HEENT:  Atraumatic, Normocephalic, moist mucous membranes  THYROID: Normal size, non-tender, nodules not appreciable to palpation   RESPIRATORY: Clear to auscultation bilaterally; No rales, rhonchi, wheezing  CARDIOVASCULAR: Regular rate and rhythm; No murmurs  GI: Soft, nontender, non distended, normal bowel sounds  SKIN: Dry, intact, No rashes or lesions  NEURO: AOx3, moves all extremities spontaneously   PSYCH: Reactive affect, euthymic mood    POCT Blood Glucose.: 182 mg/dL (08-27-20 @ 17:56)  POCT Blood Glucose.: 216 mg/dL (08-27-20 @ 13:32)  POCT Blood Glucose.: 161 mg/dL (08-27-20 @ 08:17)  POCT Blood Glucose.: 101 mg/dL (08-26-20 @ 21:36)  POCT Blood Glucose.: 223 mg/dL (08-26-20 @ 17:35)  POCT Blood Glucose.: 207 mg/dL (08-26-20 @ 14:16)  POCT Blood Glucose.: 170 mg/dL (08-26-20 @ 07:45)  POCT Blood Glucose.: 189 mg/dL (08-25-20 @ 21:36)  POCT Blood Glucose.: 144 mg/dL (08-25-20 @ 17:29)  POCT Blood Glucose.: 184 mg/dL (08-25-20 @ 12:40)  POCT Blood Glucose.: 162 mg/dL (08-25-20 @ 08:27)  POCT Blood Glucose.: 211 mg/dL (08-24-20 @ 21:44)                            9.7    4.71  )-----------( 76       ( 27 Aug 2020 06:10 )             30.9       08-27    141  |  100  |  63<H>  ----------------------------<  135<H>  4.0   |  30  |  2.22<H>    EGFR if : 33<L>  EGFR if non : 28<L>    Ca    9.9      08-27  Mg     1.9     08-27  Phos  2.6     08-27    TPro  6.1  /  Alb  x   /  TBili  x   /  DBili  x   /  AST  x   /  ALT  x   /  AlkPhos  x   08-26      Thyroid Function Tests:  08-26 @ 00:19 TSH 3.86 FreeT4 1.6 T3 -- Anti TPO -- Anti Thyroglobulin Ab -- TSI --  08-22 @ 11:25 TSH 4.23 FreeT4 1.7 T3 54 Anti TPO -- Anti Thyroglobulin Ab -- TSI --      08-26 Chol 116 LDL 58 HDL 48 Trig 55    Hemoglobin A1C   A1C with Estimated Average Glucose Result: 6.2 (08.26.20 @ 05:42)        Radiology: HPI:  74M w/ PMHx of HFrEF (EF 10%) s/p ICD, Afib w/ recent admission for CHF/afib s/p DCCV, CKD, DM2 (diet controlled), recently diagnosed papillary thyroid cancer, referred from CHF clinic for admission due to worsening of LE edema and failure of PO diuretics at home. Admitted for acute decompensated heart failure and evaluation for LVAD.     Endocrine history:  Per pt, had incidental finding of thyroid nodule on CXR ~ 3 months ago. Per pt, has periodic CXR due to chronic cough and h/o frequent PNAs. Pt states he was referred to Endocrinology clinic @ 865 and had US guided FNA which showed papillary thyroid cancer.   Per Endocrinology outpatient documentation, pt had Thyroid US on 2/18 which showed multiple nodules however the largest nodules were in the right mid lobe 1.5 x 1.3 x 1.2 cm and left upper pole 1.2 x 1.2 x 0.9 cm. Both nodules were FNAed and the left upper pole nodule (calcified) was noted to be [place] V ( PTC)  Right sided nodule was benign. Endocrinologist discussed options of management with pt and pt preferred to hold off on thyroidectomy and continue to monitor. Pt was started on levothyroxine 25mcg in May 2020 with up-titration to 50mg on August 19th 2020 for TSH suppression. Repeat Thyroid US on August 19th 2020 showed interval stability in left upper pole nodule and no evidence of abnormal lymph node metastasis.   Pt denies neck pain, dysphonia, dysphagia, difficulty breathing.   Denies family history of thyroid disease or thyroid cancer.   Diabetes controlled with diet for 11 years with A1Cs consistently below 6.5 per pt.      PAST MEDICAL & SURGICAL HISTORY:  Hypothyroidism  Afib  Type II diabetes mellitus  Aortic insufficiency  Mitral insufficiency  CKD (chronic kidney disease)  Cardiomyopathy: Systolic CHF  Hypertension  History of tonsillectomy: childhood  AICD (automatic cardioverter/defibrillator) present: 8/2012      FAMILY HISTORY:  Family history of CVA  Family history of Diabetes      Social History:  no tobacco or alcohol abuse     Outpatient Medications: Home Medications:  carvedilol 12.5 mg oral tablet: 0.5 tab(s) orally 2 times a day (21 Aug 2020 09:38)  Eliquis 5 mg oral tablet: 1 tab(s) orally 2 times a day (21 Aug 2020 09:38)  levothyroxine 50 mcg (0.05 mg) oral tablet: 1 tab(s) orally once a day (21 Aug 2020 09:38)  metOLazone 2.5 mg oral tablet: 1 tab(s) orally once a day (21 Aug 2020 09:38)  torsemide 20 mg oral tablet: 4 tab(s) orally 2 times a day (21 Aug 2020 09:38)      MEDICATIONS  (STANDING):  aMIOdarone    Tablet 200 milliGRAM(s) Oral daily  apixaban 5 milliGRAM(s) Oral two times a day  buMETAnide Infusion 1 mG/Hr (5 mL/Hr) IV Continuous <Continuous>  carvedilol 6.25 milliGRAM(s) Oral every 12 hours  dextrose 5%. 1000 milliLiter(s) (50 mL/Hr) IV Continuous <Continuous>  dextrose 50% Injectable 12.5 Gram(s) IV Push once  dextrose 50% Injectable 25 Gram(s) IV Push once  dextrose 50% Injectable 25 Gram(s) IV Push once  insulin lispro (HumaLOG) corrective regimen sliding scale   SubCutaneous three times a day before meals  levothyroxine 50 MICROGram(s) Oral daily  metolazone 5 milliGRAM(s) Oral once  milrinone Infusion 0.3 MICROgram(s)/kG/Min (7.47 mL/Hr) IV Continuous <Continuous>  potassium chloride    Tablet ER 40 milliEquivalent(s) Oral daily  spironolactone 12.5 milliGRAM(s) Oral daily    MEDICATIONS  (PRN):  dextrose 40% Gel 15 Gram(s) Oral once PRN Blood Glucose LESS THAN 70 milliGRAM(s)/deciliter  glucagon  Injectable 1 milliGRAM(s) IntraMuscular once PRN Glucose LESS THAN 70 milligrams/deciliter  polyethylene glycol 3350 17 Gram(s) Oral daily PRN Constipation      Allergies    No Known Allergies    Intolerances      Review of Systems:  Constitutional: No fever, chills   Neuro: No tremors, headache   Cardiovascular: No chest pain, palpitations  Respiratory: No SOB, no cough  GI: No nausea, vomiting, abdominal pain  : No dysuria, polyuria   Skin: no rash, ulcers   Psych: no depression, anxiety   Endocrine: no polyphagia, polydipsia     ALL OTHER SYSTEMS REVIEWED AND NEGATIVE        PHYSICAL EXAM:  VITALS: T(C): 36.8 (08-27-20 @ 12:04)  T(F): 98.2 (08-27-20 @ 12:04), Max: 98.3 (08-27-20 @ 04:15)  HR: 89 (08-27-20 @ 12:04) (79 - 89)  BP: 99/61 (08-27-20 @ 17:27) (99/61 - 106/67)  RR:  (18 - 18)  SpO2:  (96% - 100%)  Wt(kg): --  GENERAL: NAD, well-groomed, well-developed  EYES: No proptosis, anicteric  HEENT:  Atraumatic, Normocephalic, moist mucous membranes  THYROID: Normal size, non-tender, nodules not appreciable to palpation   RESPIRATORY: Clear to auscultation bilaterally; No rales, rhonchi, wheezing  CARDIOVASCULAR: Regular rate and rhythm; 2-3+ LE edema b/l  GI: Soft, nontender, non distended, normal bowel sounds  SKIN: Dry, intact, No rashes or lesions  NEURO: AOx3, moves all extremities spontaneously   PSYCH: Reactive affect, euthymic mood    POCT Blood Glucose.: 182 mg/dL (08-27-20 @ 17:56)  POCT Blood Glucose.: 216 mg/dL (08-27-20 @ 13:32)  POCT Blood Glucose.: 161 mg/dL (08-27-20 @ 08:17)  POCT Blood Glucose.: 101 mg/dL (08-26-20 @ 21:36)  POCT Blood Glucose.: 223 mg/dL (08-26-20 @ 17:35)  POCT Blood Glucose.: 207 mg/dL (08-26-20 @ 14:16)  POCT Blood Glucose.: 170 mg/dL (08-26-20 @ 07:45)  POCT Blood Glucose.: 189 mg/dL (08-25-20 @ 21:36)  POCT Blood Glucose.: 144 mg/dL (08-25-20 @ 17:29)  POCT Blood Glucose.: 184 mg/dL (08-25-20 @ 12:40)  POCT Blood Glucose.: 162 mg/dL (08-25-20 @ 08:27)  POCT Blood Glucose.: 211 mg/dL (08-24-20 @ 21:44)                            9.7    4.71  )-----------( 76       ( 27 Aug 2020 06:10 )             30.9       08-27    141  |  100  |  63<H>  ----------------------------<  135<H>  4.0   |  30  |  2.22<H>    EGFR if : 33<L>  EGFR if non : 28<L>    Ca    9.9      08-27  Mg     1.9     08-27  Phos  2.6     08-27    TPro  6.1  /  Alb  x   /  TBili  x   /  DBili  x   /  AST  x   /  ALT  x   /  AlkPhos  x   08-26      Thyroid Function Tests:  08-26 @ 00:19 TSH 3.86 FreeT4 1.6 T3 -- Anti TPO -- Anti Thyroglobulin Ab -- TSI --  08-22 @ 11:25 TSH 4.23 FreeT4 1.7 T3 54 Anti TPO -- Anti Thyroglobulin Ab -- TSI --      08-26 Chol 116 LDL 58 HDL 48 Trig 55    Hemoglobin A1C   A1C with Estimated Average Glucose Result: 6.2 (08.26.20 @ 05:42)        Radiology:

## 2020-08-27 NOTE — PROGRESS NOTE ADULT - PROBLEM SELECTOR PLAN 5
- Please consult endocrinology regarding need for intervention and 5 year prognosis particularly in setting of being evaluate for LVAD  - He follows with Dr. Winslow outpatient (records in [employer]). Fine needle aspiration showing L upper pole nodule to be [place] VI per outpatient endo note 8/19.

## 2020-08-27 NOTE — CONSULT NOTE ADULT - PROBLEM SELECTOR RECOMMENDATION 9
- no endocrine contraindications to LVAD surgery  - agree with current management plan of close monitoring with levothyroxine treatment to achieve TSH suppression; given comorbidities and age, risk of surgery outweighs benefit  - continue LT4 50mcg daily. Take in AM on empty stomach at least 30 minutes prior to food or other medications  - goal TSH<2. LT4 dose recently increased by outpatient Endocrine (on Aug 19th) so will not see response in TSH level for ~6 weeks.   Discharge  - continue close f/u with outpatient Endocrinology (Dr. Winslow) to monitor thyroid nodularity and thyroid levels. Repeat TFTs at next apt with Endo and adjust LT4 accordingly to goal TSH<2. - no endocrine contraindications to LVAD surgery  - continue LT4 50mcg daily. Take in AM on empty stomach at least 30 minutes prior to food or other medications  - goal TSH<2. LT4 dose recently increased by outpatient Endocrine (on Aug 19th) so will not see response in TSH level for ~6 weeks.   Discharge  - agree with current management plan of close monitoring with levothyroxine treatment to achieve TSH suppression; given comorbidities and age, risk of thyroid surgery outweighs benefit  - continue close f/u with outpatient Endocrinology (Dr. Winslow) to monitor thyroid nodularity and thyroid levels. Repeat TFTs at next apt with Endo and adjust LT4 accordingly to goal TSH<2.

## 2020-08-27 NOTE — PROGRESS NOTE ADULT - PROBLEM SELECTOR PLAN 1
- Continue milrinone to 0.3 mcg/kg/min  - Please give metolazone 5mg x 1 today  - Continue Bumex gtt at 1mg/hour. Continue KCl 40 meq daily. Aggressive electrolyte repletion to maintain K 4-4.5 and Mg 2-2.5.  - Continue spironolactone 12.5mg daily  -C/w carvedilol 6.25 for now  - Will plan for [facility] to evaluate hemodynamics once optimized, potentially early next week  - Under evaluation for LVAD as destination therapy. He is not a heart transplant candidate given age.  - Please arrange for CT colonography for colon CA screening as part of evaluation

## 2020-08-27 NOTE — CONSULT NOTE ADULT - ASSESSMENT
74M w/ PMHx of HFrEF (EF 10%) s/p ICD, Afib w/ recent admission for CHF/afib s/p DCCV, CKD, DM2 (diet controlled), recently diagnosed papillary thyroid cancer, referred from CHF clinic for admission due to worsening of LE edema and failure of PO diuretics at home. Admitted for acute decompensated heart failure and evaluation for LVAD. Endocrinology consulted for input re: LVAD surgery from Endocrinology perspective in setting of recent history of papillary thyroid cancer.     Endocrine history:  Per pt, had incidental finding of thyroid nodule on CXR ~ 3 months ago. Per pt, has periodic CXR due to chronic cough and h/o frequent PNAs. Pt states he was referred to Endocrinology clinic @ 865 and had US guided FNA which showed papillary thyroid cancer.   Per Endocrinology outpatient documentation, pt had Thyroid US on 2/18 which showed multiple nodules however the largest nodules were in the right mid lobe 1.5 x 1.3 x 1.2 cm and left upper pole 1.2 x 1.2 x 0.9 cm. Both nodules were FNAed and the left upper pole nodule (calcified) was noted to be [place] V ( PTC)  Right sided nodule was benign. Endocrinologist discussed options of management with pt and pt preferred to hold off on thyroidectomy and continue to monitor. Pt was started on levothyroxine 25mcg in May 2020 with up-titration to 50mg on August 19th 2020 for TSH suppression. Repeat Thyroid US on August 19th 2020 showed interval stability in left upper pole nodule and no evidence of abnormal lymph node metastasis.   Pt denies neck pain, dysphonia, dysphagia, difficulty breathing.   Denies family history of thyroid disease or thyroid cancer.   Diabetes controlled with diet for 11 years with A1Cs consistently below 6.5 per pt. 74M w/ PMHx of HFrEF (EF 10%) s/p ICD, Afib w/ recent admission for CHF/afib s/p DCCV, CKD, DM2 (diet controlled), recently diagnosed papillary thyroid cancer, referred from CHF clinic for admission due to worsening of LE edema and failure of PO diuretics at home. Admitted for acute decompensated heart failure and evaluation for LVAD. Endocrinology consulted for input re: LVAD surgery from Endocrinology perspective in setting of recent history of papillary thyroid cancer.

## 2020-08-27 NOTE — PROGRESS NOTE ADULT - ATTENDING COMMENTS
LVAD eval underway.   Outpatient endocrine PTC slow growing tumor, "hard to predict long term outcome but unless patient has mets would hold off on thyroidectomy, which is patients wishes. Overall prognosis is good."  meds: milrinone .3, bumex 1/hr, coreg 6.25 bid, Ald 12.5, amnio 200. epixiban 5 bid. synthroid.   afeb 36.8, HR 70-80biVpaced. 97/-108/, 180, 179 (admission ? 195).   I/O: 840/3025.   08-27    141  |  100  |  63<H>  ----------------------------<  135<H>  4.0   |  30  |  2.22<H>  Cr stable   Ca    9.9      27 Aug 2020 06:10  Phos  2.6     08-27  Mg     1.9     08-27  TPro  6.1  /  Alb  x   /  TBili  x   /  DBili  x   /  AST  x   /  ALT  x   /  AlkPhos  x   08-26                        9.7    4.71  )-----------( 76       ( 27 Aug 2020 06:10 )             30.9 LVAD eval underway.   Outpatient endocrine PTC slow growing tumor, "hard to predict long term outcome but unless patient has mets would hold off on thyroidectomy, which is patients wishes. Overall prognosis is good."  meds: milrinone .3, bumex 1/hr, coreg 6.25 bid, Ald 12.5, amnio 200. epixiban 5 bid. synthroid.   afeb 36.8, HR 70-80biVpaced. 97/-108/, 180, 179 (admission ? 195).   I/O: 840/3025.   08-27    141  |  100  |  63<H>  ----------------------------<  135<H>  4.0   |  30  |  2.22<H>  Cr stable   Ca    9.9      27 Aug 2020 06:10  Phos  2.6     08-27  Mg     1.9     08-27  TPro  6.1  /  Alb  x   /  TBili  x   /  DBili  x   /  AST  x   /  ALT  x   /  AlkPhos  x   08-26                        9.7    4.71  )-----------( 76       ( 27 Aug 2020 06:10 )             30.9  Still appears volume up. Weight stable today.   Plan:  Add zarox 5. continue milrinone and bumex.   CT colanography.   Hem consult (low plts)  Will ask for Dr Winslow or her service to formally consult on management of PTC. Will cancer  change his outcome over the next 5-7 yrs?  Manpreet Rivera

## 2020-08-28 NOTE — PROGRESS NOTE ADULT - PROBLEM SELECTOR PLAN 1
- Continue milrinone 0.3 mcg/kg/min, will wean when euvolemic  - Please give metolazone 5mg x 1 again today  - Continue Bumex gtt at 1mg/hour. Continue KCl 40 meq daily. Aggressive electrolyte repletion to maintain K 4-4.5 and Mg 2-2.5.  - Continue spironolactone 12.5 mg daily  - Continue carvedilol 6.25 mg BID  - Start hydralazine 10 mg TID and if SBP remains >90, then tonight start ISDN 10 mg TID  - Will plan for [facility] to evaluate hemodynamics 2 days after milrinone is weaned  - Under evaluation for LVAD as destination therapy. He is not a heart transplant candidate given age.  - Pending CT colonography for colon CA screening as part of evaluation. Will also need cardio-renal input as well as elastography when euvolemic. Posterior Auricular Interpolation Flap Text: A decision was made to reconstruct the defect utilizing an interpolation axial flap and a staged reconstruction.  A telfa template was made of the defect.  This telfa template was then used to outline the posterior auricular interpolation flap.  The donor area for the pedicle flap was then injected with anesthesia.  The flap was excised through the skin and subcutaneous tissue down to the layer of the underlying musculature.  The pedicle flap was carefully excised within this deep plane to maintain its blood supply.  The edges of the donor site were undermined.   The donor site was closed in a primary fashion.  The pedicle was then rotated into position and sutured.  Once the tube was sutured into place, adequate blood supply was confirmed with blanching and refill.  The pedicle was then wrapped with xeroform gauze and dressed appropriately with a telfa and gauze bandage to ensure continued blood supply and protect the attached pedicle.

## 2020-08-28 NOTE — PROGRESS NOTE ADULT - ASSESSMENT
74M w/ PMHx of HFrEF (EF 10%) s/p ICD, Afib w/ recent admission for CHF/afib s/p DCCV, CKD, DM2 (diet controlled), recently diagnosed papillary thyroid cancer, referred from CHF clinic for admission due to worsening of LE edema and failure of PO diuretics at home. Admitted for acute decompensated heart failure and evaluation for LVAD. Endocrinology consulted for input re: LVAD surgery from Endocrinology perspective in setting of recent history of papillary thyroid cancer.

## 2020-08-28 NOTE — CONSULT NOTE ADULT - SUBJECTIVE AND OBJECTIVE BOX
Behavioral Cardiology Psychological Assessment    History of present illness: Mr. Jarquin is a 75 year old man with PMH of NICMP/HFrEF (LVIDd 6.7cm, LVEF <20%) s/p CRT-D, AF s/p recent admission w/ DCCV on amio, CKD (BL Cr ~3), DM2 and hypothyroid referred from CHF clinic for ADHF. Psychological assessment for LVAD evaluation.    Social history: Single, never , no children. Lives in private house in [facility] with his niece. Parents . Only brother has not been in contact with family for many years. Worked in photography field in fashion, retired in . Education: high school graduate.      Substance use  ·	Tobacco: Former, smoked 2-3PPD age 16 to 22.   ·	Alcohol: Rare alcohol use, gin and tonic when out to dinner with friends.     ·	Drug: Past marijuana use in teens for 1-2 years.   Understanding of heart disease and advanced therapies: Diagnosed with heart failure  years ago,     Adherence to heart failure guidelines:   ·	Medication: Manages his own medication, never misses a dose.   ·	Low Na diet: Follows low Na diet, prepares his own meals.   ·	Monitor weight: Checks daily weight.   ·	Symptom management: Good understanding of when to call MD with symptoms.   ·	Physical activity: Gardening, housework, ADLs.   ·	Provider follow-up: Good adherence with providers, goes to cardiologist every 3 months.      Support system: Identified his niece (Marilou) as primary support person and HCP. She works full time as  for rental company. When asked about driveline dressing changes, stated he does not feel she will be comfortable nor would he, doing dressing change. Marilou no longer drives.   Past psychological history: Reports history of anxiety/depression due to multiple losses during his lifetime. Had therapy for several years. Never took medication. No inpatient hospitalizations. No s/a.   Psychological assessment: Concerned about his health issues and possible need for LVAD. Would prefer not to need LVAD and hopeful he doesn't need one yet but "if my choice is LVAD or die, then of course I'll do it." Other concerns are the recovery process and expense. Lives on SS and earnings from investments but concerned medical costs might become problematic. Through his lifetime has had multiple losses (father  when he was 11, mother  when he was 21, his fiance  in a MVA many years ago, brother left home no contact since ). When his mother  he didn't pursue college but got job to help family and raised his niece after his brother left home.   Mental status exam: Seen resting in bed. Alert and oriented x4. Pleasant and cooperative, well related with good eye contact. Speech overproductive with normal rate/volume. Thought process tangential. No evidence of any psychosis, carlos, delusions. No s/i. Mood neutral. Affect full range. Insight and judgment adequate.    Cognitive screen: Performed within normal range on brief cognitive screen; MOCA 26/.     Impression: Mr. Jarquin is a 75 year old man with end stage heart failure admitted with ADHF undergoing evaluation for LVAD. Single, never . Lives in private house he owns in [facility] with his 57 year old niece who is his primary support person and HCP. She does not drive and he is uncomfortable with her doing driveline dressing change. Good understanding of heart disease and advanced therapy. Able to teach back education on LVAD. No tobacco use. No drug use. Rare alcohol use, one drink a few times a year when out with friends. Past psychiatric history includes self report of depression related to multiple past losses, was in psychotherapy for several years in past. Denies current symptoms of anxiety/depression but is concerned about health issues and need for advanced therapies. Performed within normal range on brief cognitive screen; MOCA 26/.     Dx: Adjustment disorder; unspecified. F43.20

## 2020-08-28 NOTE — DIETITIAN INITIAL EVALUATION ADULT. - PHYSICAL APPEARANCE
debilitated/other (specify) Nutrition focused physical exam.   Severe muscle wasting at temporals, clavicles, deltoids. Moderate muscle wasting at calfs.   Moderate fat wasting at orbitals, triceps.

## 2020-08-28 NOTE — PROGRESS NOTE ADULT - PROBLEM SELECTOR PLAN 1
- continue LT4 50mcg daily. Take in AM on empty stomach at least 30 minutes prior to food or other medications  - goal TSH<2. LT4 dose recently increased by outpatient Endocrine (on Aug 19th) so will not see response in TSH level for ~6 weeks.   Discharge  - agree with current management plan of close monitoring with levothyroxine treatment to achieve TSH suppression; given comorbidities and age, risk of thyroid surgery outweighs benefit  - continue close f/u with outpatient Endocrinology (Dr. Winslow) to monitor thyroid nodularity and thyroid levels. Repeat TFTs at next apt with Endo and adjust LT4 accordingly to goal TSH<2.

## 2020-08-28 NOTE — PROGRESS NOTE ADULT - ASSESSMENT
76 y/o M w/ PMH of DM2, NICMP/HFrEF (LVIDd 6.7cm, LVEF <20%) s/p CRT-D, AF s/p recent admission w/ DCCV on amio, CKD (BL Cr ~3), and hypothyroid referred from CHF clinic for ADHF. He remains volume overloaded but is responding well to a dose of metolazone yesterday in addition to the bumex infusion with 3.1L UOP. His weight is down 1.6kg since yesterday. His SCr slightly increased today, but appears to be well supported on current dose of milrinone. He is low normotensive not yet on oral vasodilators.

## 2020-08-28 NOTE — CHART NOTE - NSCHARTNOTEFT_GEN_A_CORE
Upon Nutritional Assessment by the Registered Dietitian your patient was determined to meet criteria / has evidence of the following diagnosis/diagnoses:          [ ]  Mild Protein Calorie Malnutrition        [ ]  Moderate Protein Calorie Malnutrition        [ X] Severe Protein Calorie Malnutrition        [ ] Unspecified Protein Calorie Malnutrition        [ ] Underweight / BMI <19        [ ] Morbid Obesity / BMI > 40      Findings as based on:  [ X] Comprehensive nutrition assessment   [ X] Nutrition Focused Physical Exam  [ X] Other:   meeting 50% of needs x5 weeks, moderate/severe fat and muscle wasting, fluid retention.     Nutrition Plan/Recommendations:      Add Glucerna x2 daily       PROVIDER Section:     By signing this assessment you are acknowledging and agree with the diagnosis/diagnoses assigned by the Registered Dietitian    Comments:

## 2020-08-28 NOTE — PROGRESS NOTE ADULT - SUBJECTIVE AND OBJECTIVE BOX
DATE OF SERVICE:08/28/2020 Patient seen and examined    · Reason for Admission	lower extremity edema	      · Subjective and Objective: 	  Subjective:  - Feels better overnight  - Denies SOB at rest, lightheadedness, dizziness, abdominal pain/bloating, LE edema improving    Medications:  aMIOdarone    Tablet 200 milliGRAM(s) Oral daily  apixaban 5 milliGRAM(s) Oral two times a day  buMETAnide Infusion 1 mG/Hr IV Continuous <Continuous>  carvedilol 6.25 milliGRAM(s) Oral every 12 hours  glucagon  Injectable 1 milliGRAM(s) IntraMuscular once PRN  insulin lispro (HumaLOG) corrective regimen sliding scale   SubCutaneous three times a day before meals  levothyroxine 50 MICROGram(s) Oral daily  milrinone Infusion 0.3 MICROgram(s)/kG/Min IV Continuous <Continuous>  polyethylene glycol 3350 17 Gram(s) Oral daily PRN  potassium chloride    Tablet ER 40 milliEquivalent(s) Oral daily  potassium chloride    Tablet ER 40 milliEquivalent(s) Oral once  spironolactone 12.5 milliGRAM(s) Oral dailyVital Signs Last 24 Hrs      Vitals last 24 hrs:  T(C): 36.4 (28 Aug 2020 20:15), Max: 36.7 (28 Aug 2020 13:50)  T(F): 97.6 (28 Aug 2020 20:15), Max: 98 (28 Aug 2020 13:50)  HR: 80 (28 Aug 2020 20:15) (80 - 80)  BP: 102/65 (28 Aug 2020 20:15) (96/58 - 106/68)  RR: 18 (28 Aug 2020 20:15) (17 - 18)  SpO2: 98% (28 Aug 2020 20:15) (97% - 100%)    Physical Exam:  General: No distress. Comfortable.  HEENT: EOM intact.  Neck: Neck supple. JVP mild-moderately elevated. No masses  Chest: Clear to auscultation bilaterally  CV: Regular. Normal S1 and S2. 2/6 Systolic murmur murmurs, rub, or gallops.   Radial pulses normal. +1-2 BLE edema  Abdomen: Soft, non-distended, non-tender  Skin: No rashes or skin breakdown  Neurology: Alert and oriented times three. Sensation intact  Psych: Affect normal      LABS:               9.7    4.71  )--------( 76                   30.9     ( 08-28-20 @ 06:32 )     143  |  100  |  69  ---------------------<  156  3.7  |  33  |  2.40    Ca 10.2  Phos 2.6  Mg 2.1    ( 08-26-20 @ 04:46 )  TPro  6.1  /  Alb  x   /  TBili  x   /  DBili  x   /  AST  x   /  ALT  x   /  AlkPhos  x   ( 08-25-20 @ 19:45 )  TPro  6.3  /  Alb  4.1  /  TBili  1.9  /  DBili  x   /  AST  22  /  ALT  11  /  AlkPhos  64    Serum Pro-Brain Natriuretic Peptide: 39559 (08-25-20 @ 19:45)    Lactate Dehydrogenase, Serum: 215 (08-25-20 @ 19:45)Labs:      IMPRESSION AND PLAN:  74 y/o M w/ PMH of DM2, NICMP/HFrEF (LVIDd 6.7cm, LVEF <20%) s/p CRT-D, AF s/p recent admission w/ DCCV on amio, CKD (BL Cr ~3), and hypothyroid referred from CHF clinic for VOL. Problem/Plan - 1:  ·  Problem: Acute HFrEF (heart failure with reduced ejection fraction).  Plan:Acute on Chronic Systolic Heart Failure  -Was off Entresto due to low BP  -Continue Bumex gtt is diuresing well  -Weight >15lbs from baseline in July  -restarted on Carvedilol-tolerating  -RHC once euvolemic  -Started on Milrinone diuresing well  -consideration for LVAD        Problem/Plan - 2:  ·  Problem: Atrial fibrillation, unspecified type.  Plan:CHADS2-4  Paced rhythm s/p DCCV  -Continue Eliquis-[Age<80 Wt >60Kg CKD] would resume 5 mg BID      Problem/Plan - 3:  ·  Problem: Essential hypertension.  Plan:BP stable      Problem/Plan - 4:  ·  Problem: Acute renal failure superimposed on stage 3 chronic kidney disease, unspecified acute renal failure type.  Plan: -overall creatinine is improving (2.46 from 3.3), prior baseline was ~1.8  -Trending to baseline likely Cardiorenal      Problem/Plan - 5:  ·  Problem: Hypothyroidism, unspecified type.  Plan:Thyroid Stimulating Hormone, Serum in AM (07.16.20 @ 06:36)    Thyroid Stimulating Hormone, Serum: 2.130 uU/mL    Problem/Plan - 6:  Problem: Type 2 diabetes mellitus without complication, without long-term current use of insulin. Plan: -diet controlled at home.  -A1C with Estimated Average Glucose in AM (07.18.20 @ 09:51)    A1C with Estimated Average Glucose Result: 6.1 %

## 2020-08-28 NOTE — DIETITIAN INITIAL EVALUATION ADULT. - OTHER INFO
Pt seen for LVAD evaluation.     Pt seen, reports feeling well. Pt states he follows a strict low NA and low sugar diet. States his diet at home matches that of Weight [employer]. States often eating 3 balances meals of protein, vegetable and starches. Drinks water, about 8 glasses a day. Drinks Glucerna x1 daily as needed.   Pt reports for about the last 5 weeks his appetite has been variable. States due to fluid retention he was often so full would only eat one meal daily. Pt endorses this would go on fr days at a time, sometimes would be able to consume 2 meals daily. Currently Pt states appetite has resumed to normal with diuresis.   Pt has T2DM, Hgba1c 6.1%. States he is diet controlled. Does not check BG levels nor does he require medications.     Pt reports UBW ot also fluctuate. States 11 years ago he was 340lbs lost weight to 192lbs and has maintained a weight below 200lbs for the past 10 years. Pt is currently 176lbs, was admitted at 200lbs. Pt has been aggressively diuresed but states some of the 24lbs lost is absolutely from nutrition.  Reviewed CHF diet education. Discussed Na restriction, foods high in Na to avoid, reading food labels, tips for limiting Na in your diet. Reviewed daily weights, Wt gain parameters to contact MD. Discussed Na intake in relation to fluid retention, edema and Wt gain. Pt verbalized understanding and accepted Heart Failure Nutrition Therapy Handout.   In the setting of LVAD evaluation, RD reviewed coumadin diet education. Pt verbalized understanding.     Reviewed nutritional status with Pt. Pt willing to consume Glucerna x2 daily during admission to help supplement PO intake and promote overall strength.  Pt denies GI distress, chewing/swallowing difficulty, micronutrient supplements. NKFA.     At this time, no absolute contraindications to proceeding with LVAD, however malnutrition status does present a concern. Would prefer Pt's nutritional status to stabilize/improve prior to surgical intervention. Will review with HF team.

## 2020-08-28 NOTE — CONSULT NOTE ADULT - PROBLEM SELECTOR RECOMMENDATION 9
Currently on Milrinone gtt, bumex, coreg bid, aldactone, amio and Eliquis   Tolerating diuresis   Ongoing LVAD evaluation

## 2020-08-28 NOTE — CONSULT NOTE ADULT - PROBLEM SELECTOR RECOMMENDATION 3
Currently is a Full code. Patient reports that he has living will at home designating niece, Marilou Jarquin, as HCP. He is also DNR "under certain circumstances" designated in living will (which can be "dug" up by niece"). His niece does not drive and has not been able to visit him while in the hospital. I spoke with Marilou [phone] who confirmed that she is HCP and her uncle is DNR/DNI circumstantially, which the niece states "depends on quality of life". She will bring in the living will over the weekend when she visits him. Currently is a Full code. Patient reports that he has living will at home designating niece, Marilou Jarquin, as HCP. He is also would want to be DNR "under certain circumstances" designated in living will (which can be "dug" up by niece"). His niece does not drive and has not been able to visit him while in the hospital. I spoke with Marilou [phone] who confirmed that she is HCP and her uncle is DNR/DNI circumstantially, which the niece states "depends on quality of life". She will bring in the living will over the weekend when she visits him.

## 2020-08-28 NOTE — PROGRESS NOTE ADULT - PROBLEM SELECTOR PLAN 2
T2DM well controlled (HbA1C 6.2) with diet alone. Inpatient, BG slightly above goal likely due to combination of multiple drips running in dextrose containing fluids and not being ordered for CC diet  - continue low correction scales ac and hs   - changed diet to CC  - if possible, change drips (Bumex, Milrinone) to run in NS or alternative fluids without dextrose. If unable to do so will add Humalog 1 unit with meals  - check FS ac/hs  -If persistently >200 can adjust regimen further.  Discharge:  - continue diet control.    Will sign off at this time. Please reconsult if needed.    Jaxon Guerrero D.O  [phone]

## 2020-08-28 NOTE — PROGRESS NOTE ADULT - SUBJECTIVE AND OBJECTIVE BOX
SUBJECTIVE / OVERNIGHT EVENTS: pt seen and examined    MEDICATIONS  (STANDING):  aMIOdarone    Tablet 200 milliGRAM(s) Oral daily  apixaban 5 milliGRAM(s) Oral two times a day  buMETAnide Infusion 1 mG/Hr (5 mL/Hr) IV Continuous <Continuous>  carvedilol 6.25 milliGRAM(s) Oral every 12 hours  dextrose 5%. 1000 milliLiter(s) (50 mL/Hr) IV Continuous <Continuous>  dextrose 50% Injectable 12.5 Gram(s) IV Push once  dextrose 50% Injectable 25 Gram(s) IV Push once  dextrose 50% Injectable 25 Gram(s) IV Push once  hydrALAZINE 10 milliGRAM(s) Oral three times a day  insulin lispro (HumaLOG) corrective regimen sliding scale   SubCutaneous three times a day before meals  isosorbide   dinitrate Tablet (ISORDIL) 10 milliGRAM(s) Oral three times a day  levothyroxine 50 MICROGram(s) Oral daily  milrinone Infusion 0.3 MICROgram(s)/kG/Min (7.47 mL/Hr) IV Continuous <Continuous>  potassium chloride    Tablet ER 40 milliEquivalent(s) Oral daily  spironolactone 12.5 milliGRAM(s) Oral daily    MEDICATIONS  (PRN):  dextrose 40% Gel 15 Gram(s) Oral once PRN Blood Glucose LESS THAN 70 milliGRAM(s)/deciliter  glucagon  Injectable 1 milliGRAM(s) IntraMuscular once PRN Glucose LESS THAN 70 milligrams/deciliter  polyethylene glycol 3350 17 Gram(s) Oral daily PRN Constipation    Vital Signs Last 24 Hrs  T(C): 36.4 (28 Aug 2020 20:15), Max: 36.7 (28 Aug 2020 13:50)  T(F): 97.6 (28 Aug 2020 20:15), Max: 98 (28 Aug 2020 13:50)  HR: 80 (28 Aug 2020 20:15) (80 - 80)  BP: 102/65 (28 Aug 2020 20:15) (96/58 - 106/68)  BP(mean): --  RR: 18 (28 Aug 2020 20:15) (17 - 18)  SpO2: 98% (28 Aug 2020 20:15) (97% - 100%)    Skin: No rash.  Eyes: No recent vision problems or eye pain.  ENT: No congestion, ear pain, or sore throat.  Cardiovascular: No chest pain or palpation.  Respiratory: No cough, shortness of breath, congestion, or wheezing.  Gastrointestinal: No abdominal pain, nausea, vomiting, or diarrhea.  Genitourinary: No dysuria.  Musculoskeletal: No joint swelling.  Neurologic: No headache.    PHYSICAL EXAM:  GENERAL: NAD  EYES: EOMI, PERRLA  NECK: Supple, No JVD  CHEST/LUNG: crackles at bases  HEART:  S1 , S2 +  ABDOMEN: soft , bs+  EXTREMITIES:  edema+  NEUROLOGY:alert awake oriented     LABS:      143  |  100  |  69<H>  ----------------------------<  156<H>  3.7   |  33<H>  |  2.40<H>    Ca    10.2      28 Aug 2020 06:32  Phos  2.6       Mg     2.1           Creatinine Trend: 2.40 <--, 2.33 <--, 2.22 <--, 2.34 <--, 2.33 <--, 2.26 <--, 2.20 <--, 2.23 <--, 2.46 <--, 2.55 <--, 2.48 <--                        9.7    4.71  )-----------( 76       ( 27 Aug 2020 06:10 )             30.9     Urine Studies:  Urinalysis Basic - ( 25 Aug 2020 19:47 )    Color: Light Yellow / Appearance: Clear / S.008 / pH:   Gluc:  / Ketone: Negative  / Bili: Negative / Urobili: Negative   Blood:  / Protein: Negative / Nitrite: Negative   Leuk Esterase: Negative / RBC:  / WBC    Sq Epi:  / Non Sq Epi:  / Bacteria:       Creatinine, Random Urine: 38 mg/dL ( @ 19:47)  Protein/Creatinine Ratio Calculation: 0.1 Ratio ( @ 19:47)

## 2020-08-28 NOTE — PROGRESS NOTE ADULT - ATTENDING COMMENTS
Good response to zarox.   Appreciate input from hem and endocrine.   meds: bumex 1/hr, milrinone .3, coreg 6.25 bid, ald 12.5, KCL 40, amnio 200, epixiban 5 bid.   BiVpaced 80s, 99/-106/  I/O: -1840 08-28    143  |  100  |  69<H>  ----------------------------<  156<H>  3.7   |  33<H>  |  2.40<H>  Cr 2.4, 2.2  BUN 69, 63  Ca    10.2      28 Aug 2020 06:32  Phos  2.6     08-28  Mg     2.1     08-28                        9.7    4.71  )-----------( 76       ( 27 Aug 2020 06:10 )             30.9 Good response to zarox.   Appreciate input from hem and endocrine.   meds: bumex 1/hr, milrinone .3, coreg 6.25 bid, ald 12.5, KCL 40, amnio 200, epixiban 5 bid.   BiVpaced 80s, 99/-106/, 176.5, 180.1   JVP still elevated. Bilat LE edema.   I/O: -1840 08-28    143  |  100  |  69<H>  ----------------------------<  156<H>  3.7   |  33<H>  |  2.40<H>  Cr 2.4, 2.2  BUN 69, 63  Ca    10.2      28 Aug 2020 06:32  Phos  2.6     08-28  Mg     2.1     08-28                        9.7    4.71  )-----------( 76       ( 27 Aug 2020 06:10 )             30.9  Remains with  volume overload. Excellent response to zarox.  Note small rise in Cr.   Further dose of zarox today.   Start low dose HDZN/ISDN   Plan will be to wean milrinone when euvolemic for RHC 2 days after d/c of milrinone.   LVAD eval on going. Hem and endo consults would not suggest issue for candidacy.   Need CT colonography.  Cardiorenal consult.   Complete all vasc studies and elastography when euvolemic.   Manpreet Rivera

## 2020-08-28 NOTE — DIETITIAN INITIAL EVALUATION ADULT. - ADD RECOMMEND
1. Provide food preferences as requested by Pt/family within diet restrictions  2. Encourage PO intake during meal times 3. Reviewed menu ordering procedures 4. Add Glucerna x2 daily 5. 1. Provide food preferences as requested by Pt/family within diet restrictions  2. Encourage PO intake during meal times 3. Reviewed menu ordering procedures 4. Add Glucerna x2 daily 5. malnutrition alert in chart

## 2020-08-28 NOTE — DIETITIAN INITIAL EVALUATION ADULT. - ENERGY NEEDS
Ht: 5'8", Wt: 176lbsm BMI: 26.7kg/m2, IBW: 142lbs(+/-10%), 114%IBW  Pertinent information: 75 year old male with PMHx of HF EF 10%, ICD, Afib, CKD and DM and known Afib with RVR admits for SOB and edema. Pt now being followed by HF team, started on milrinone and Bumex. Under going LVAD evaluation.   +1 suzy leg edema, Skin: intact

## 2020-08-28 NOTE — PROGRESS NOTE ADULT - ATTENDING COMMENTS
Patient was seen and examined by me on 08/28/2020,interim events noted,labs and radiology studies reviewed.  Loyd Mitchell MD,FACC.  [address] [place] [address].  [id number].  [phone]

## 2020-08-28 NOTE — PROGRESS NOTE ADULT - SUBJECTIVE AND OBJECTIVE BOX
Subjective:      Medications:  aMIOdarone    Tablet 200 milliGRAM(s) Oral daily  apixaban 5 milliGRAM(s) Oral two times a day  buMETAnide Infusion 1 mG/Hr IV Continuous <Continuous>  carvedilol 6.25 milliGRAM(s) Oral every 12 hours  dextrose 40% Gel 15 Gram(s) Oral once PRN  dextrose 5%. 1000 milliLiter(s) IV Continuous <Continuous>  dextrose 50% Injectable 12.5 Gram(s) IV Push once  dextrose 50% Injectable 25 Gram(s) IV Push once  dextrose 50% Injectable 25 Gram(s) IV Push once  glucagon  Injectable 1 milliGRAM(s) IntraMuscular once PRN  insulin lispro (HumaLOG) corrective regimen sliding scale   SubCutaneous three times a day before meals  levothyroxine 50 MICROGram(s) Oral daily  milrinone Infusion 0.3 MICROgram(s)/kG/Min IV Continuous <Continuous>  polyethylene glycol 3350 17 Gram(s) Oral daily PRN  potassium chloride    Tablet ER 40 milliEquivalent(s) Oral daily  potassium chloride    Tablet ER 40 milliEquivalent(s) Oral once  spironolactone 12.5 milliGRAM(s) Oral daily      ICU Vital Signs Last 24 Hrs  T(C): 36.6, Max: 36.9 ( @ 20:15)  HR: 80 (80 - 89)  BP: 106/65 (99/61 - 106/65)  BP(mean): --  ABP: --  ABP(mean): --  RR: 17 (17 - 18)  SpO2: 97% (97% - 100%)    Weight in k.1 (20)  Weight in k.7 (20)      I&O's Summary Last 24 Hrs    IN: 1319 mL / OUT: 3160 mL / NET: -1841 mL      Tele:    Physical Exam:    General: No distress. Comfortable.  HEENT: EOM intact.  Neck: Neck supple. JVP not elevated. No masses  Chest: Clear to auscultation bilaterally  CV: Normal S1 and S2. No murmurs, rub, or gallops. Radial pulses normal.  Abdomen: Soft, non-distended, non-tender  Skin: No rashes or skin breakdown  Neurology: Alert and oriented times three. Sensation intact  Psych: Affect normal    Labs:    ( 20 @ 06:10 )               9.7    4.71  )--------( 76                   30.9     ( 20 @ 06:32 )     143  |  100  |  69  ---------------------<  156  3.7  |  33  |  2.40    Ca 10.2  Phos 2.6  Mg 2.1    ( 20 @ 04:46 )  TPro  6.1  /  Alb  x   /  TBili  x   /  DBili  x   /  AST  x   /  ALT  x   /  AlkPhos  x   ( 20 @ 19:45 )  TPro  6.3  /  Alb  4.1  /  TBili  1.9  /  DBili  x   /  AST  22  /  ALT  11  /  AlkPhos  64        Serum Pro-Brain Natriuretic Peptide: [id number] (20 @ 19:45)            Lactate Dehydrogenase, Serum: 215 (20 @ 19:45) Subjective:  - DEYVI overnight  - Denies SOB at rest, lightheadedness, dizziness, abdominal pain/bloating, LE edema improving    Medications:  aMIOdarone    Tablet 200 milliGRAM(s) Oral daily  apixaban 5 milliGRAM(s) Oral two times a day  buMETAnide Infusion 1 mG/Hr IV Continuous <Continuous>  carvedilol 6.25 milliGRAM(s) Oral every 12 hours  dextrose 40% Gel 15 Gram(s) Oral once PRN  dextrose 5%. 1000 milliLiter(s) IV Continuous <Continuous>  dextrose 50% Injectable 12.5 Gram(s) IV Push once  dextrose 50% Injectable 25 Gram(s) IV Push once  dextrose 50% Injectable 25 Gram(s) IV Push once  glucagon  Injectable 1 milliGRAM(s) IntraMuscular once PRN  insulin lispro (HumaLOG) corrective regimen sliding scale   SubCutaneous three times a day before meals  levothyroxine 50 MICROGram(s) Oral daily  milrinone Infusion 0.3 MICROgram(s)/kG/Min IV Continuous <Continuous>  polyethylene glycol 3350 17 Gram(s) Oral daily PRN  potassium chloride    Tablet ER 40 milliEquivalent(s) Oral daily  potassium chloride    Tablet ER 40 milliEquivalent(s) Oral once  spironolactone 12.5 milliGRAM(s) Oral daily      ICU Vital Signs Last 24 Hrs  T(C): 36.6, Max: 36.9 ( @ 20:15)  HR: 80 (80 - 89)  BP: 106/65 (99/61 - 106/65)  BP(mean): --  ABP: --  ABP(mean): --  RR: 17 (17 - 18)  SpO2: 97% (97% - 100%)    Weight in k.1 (20)  Weight in k.7 (20)      I&O's Summary Last 24 Hrs    IN: 1319 mL / OUT: 3160 mL / NET: -1841 mL      Tele:  80s    Physical Exam:    General: No distress. Comfortable.  HEENT: EOM intact.  Neck: Neck supple. JVP mild-moderately elevated. No masses  Chest: Clear to auscultation bilaterally  CV: Regular. Normal S1 and S2. No murmurs, rub, or gallops. Radial pulses normal. +1-2 BLE edema  Abdomen: Soft, non-distended, non-tender  Skin: No rashes or skin breakdown  Neurology: Alert and oriented times three. Sensation intact  Psych: Affect normal    Labs:    ( 20 @ 06:10 )               9.7    4.71  )--------( 76                   30.9     ( 20 @ 06:32 )     143  |  100  |  69  ---------------------<  156  3.7  |  33  |  2.40    Ca 10.2  Phos 2.6  Mg 2.1    ( 20 @ 04:46 )  TPro  6.1  /  Alb  x   /  TBili  x   /  DBili  x   /  AST  x   /  ALT  x   /  AlkPhos  x   ( 20 @ 19:45 )  TPro  6.3  /  Alb  4.1  /  TBili  1.9  /  DBili  x   /  AST  22  /  ALT  11  /  AlkPhos  64    Serum Pro-Brain Natriuretic Peptide: [id number] (20 @ 19:45)    Lactate Dehydrogenase, Serum: 215 (20 @ 19:45)

## 2020-08-28 NOTE — PROGRESS NOTE ADULT - PROBLEM SELECTOR PLAN 5
- Appreciate endocrinology c/s, will discuss with Dr. Winslow further regarding need for intervention and 5 year prognosis particularly in setting of being evaluate for LVAD  - Fine needle aspiration showing L upper pole nodule to be [place] VI per outpatient endo note 8/19.

## 2020-08-28 NOTE — PROGRESS NOTE ADULT - SUBJECTIVE AND OBJECTIVE BOX
Chief Complaint: Evaluating this 76 y/o M for thyroid cancer and hyperglycemia      Interval History: No current complaints. Remains hyperglycemic on D5 with meds    MEDICATIONS  (STANDING):  aMIOdarone    Tablet 200 milliGRAM(s) Oral daily  apixaban 5 milliGRAM(s) Oral two times a day  buMETAnide Infusion 1 mG/Hr (5 mL/Hr) IV Continuous <Continuous>  carvedilol 6.25 milliGRAM(s) Oral every 12 hours  dextrose 5%. 1000 milliLiter(s) (50 mL/Hr) IV Continuous <Continuous>  dextrose 50% Injectable 12.5 Gram(s) IV Push once  dextrose 50% Injectable 25 Gram(s) IV Push once  dextrose 50% Injectable 25 Gram(s) IV Push once  hydrALAZINE 10 milliGRAM(s) Oral three times a day  insulin lispro (HumaLOG) corrective regimen sliding scale   SubCutaneous three times a day before meals  isosorbide   dinitrate Tablet (ISORDIL) 10 milliGRAM(s) Oral three times a day  levothyroxine 50 MICROGram(s) Oral daily  milrinone Infusion 0.3 MICROgram(s)/kG/Min (7.47 mL/Hr) IV Continuous <Continuous>  potassium chloride    Tablet ER 40 milliEquivalent(s) Oral daily  spironolactone 12.5 milliGRAM(s) Oral daily    MEDICATIONS  (PRN):  dextrose 40% Gel 15 Gram(s) Oral once PRN Blood Glucose LESS THAN 70 milliGRAM(s)/deciliter  glucagon  Injectable 1 milliGRAM(s) IntraMuscular once PRN Glucose LESS THAN 70 milligrams/deciliter  polyethylene glycol 3350 17 Gram(s) Oral daily PRN Constipation      Allergies    No Known Allergies    Intolerances      Review of Systems:  Constitutional: No fever  Eyes: No blurry vision  Cardiovascular: No chest pain  Respiratory: No SOB  GI: No abdominal pain, No nausea, No vomiting  Endocrine: as noted in HPI    All other negative      PHYSICAL EXAM:  VITALS: T(C): 36.4 (08-28-20 @ 20:15)  T(F): 97.6 (08-28-20 @ 20:15), Max: 98 (08-28-20 @ 13:50)  HR: 80 (08-28-20 @ 20:15) (80 - 80)  BP: 102/65 (08-28-20 @ 20:15) (96/58 - 106/68)  RR:  (17 - 18)  SpO2:  (97% - 100%)  Wt(kg): --  GENERAL: NAD at this time  EYES: EOMI, No proptosis  HEENT:  Atraumatic, Normocephalic,   RESPIRATORY: full excursion, non labored  CARDIOVASCULAR: Regular rhythm; normal S1/S2, no peripheral edema  GI: Soft, nontender, non distended, normal bowel sounds  SKIN: Warm and dry  PSYCH: normal affect, normal mood      POCT Blood Glucose.: 137 mg/dL (08-28-20 @ 16:49)  POCT Blood Glucose.: 196 mg/dL (08-28-20 @ 14:14)  POCT Blood Glucose.: 147 mg/dL (08-28-20 @ 08:27)  POCT Blood Glucose.: 171 mg/dL (08-27-20 @ 21:41)  POCT Blood Glucose.: 182 mg/dL (08-27-20 @ 17:56)  POCT Blood Glucose.: 216 mg/dL (08-27-20 @ 13:32)  POCT Blood Glucose.: 161 mg/dL (08-27-20 @ 08:17)  POCT Blood Glucose.: 101 mg/dL (08-26-20 @ 21:36)  POCT Blood Glucose.: 223 mg/dL (08-26-20 @ 17:35)  POCT Blood Glucose.: 207 mg/dL (08-26-20 @ 14:16)  POCT Blood Glucose.: 170 mg/dL (08-26-20 @ 07:45)        08-28    138  |  95<L>  |  71<H>  ----------------------------<  205<H>  4.6   |  33<H>  |  2.38<H>    EGFR if : 30<L>  EGFR if non : 26<L>    Ca    10.5      08-28  Mg     2.0     08-28  Phos  2.7     08-28    TPro  6.1  /  Alb  x   /  TBili  x   /  DBili  x   /  AST  x   /  ALT  x   /  AlkPhos  x   08-26        Thyroid Function Tests:  08-26 @ 00:19 TSH 3.86 FreeT4 1.6 T3 -- Anti TPO -- Anti Thyroglobulin Ab -- TSI --  08-22 @ 11:25 TSH 4.23 FreeT4 1.7 T3 54 Anti TPO -- Anti Thyroglobulin Ab -- TSI --

## 2020-08-28 NOTE — CONSULT NOTE ADULT - PROBLEM SELECTOR RECOMMENDATION 4
Palliative consulted for LVAD. Patient would prefer to be medically optimized without surgical intervention for his heart failure. He expressed concerns/hesitancy about the postoperative care and finances of maintaining an LVAD but is very realistic when it comes to his situation. He is focused on the quality of life he would have after LVAD and seems skeptical about "living on a battery". His niece expressed some concerns about helping with dressing changes "stating she doesn't really like to touch people but will help her uncle if needed because she wants him to improve his quality of life". Another concerning factor would be transportation as his niece does not drive and he would not be able to drive for follow up appointments. Additionally, patient expressed concerns for finances as he "lost 69% of his 401K during the Bush administration". Overall, patient has appropriate insight into his heart condition and is realistic about what his quality of life would be if LVAD is needed for him. Palliative consulted for LVAD. Patient would prefer to be medically optimized without surgical intervention for his heart failure. He expressed concerns/hesitancy about the postoperative care and finances of maintaining an LVAD but is very realistic when it comes to his situation. He is focused on the quality of life he would have after LVAD and seems skeptical about "living on a battery". His niece expressed some concerns about helping with dressing changes stating she "doesn't really like to touch people but will help her uncle if needed because she wants him to improve his quality of life". Another concerning factor would be transportation as his niece does not drive and he would not be able to drive for follow up appointments. Additionally, patient expressed concerns for finances as he "lost 69% of his 401K during the Bush administration". Overall, patient has appropriate insight into his heart condition and is realistic about what his quality of life would be if LVAD is needed for him. Patients own motivations are a significant barrier identified to implantation. He would also need assistance with transportation and likely home care if he were to change his mind.

## 2020-08-28 NOTE — CONSULT NOTE ADULT - ASSESSMENT
75M w/ PMHx of HFrEF (EF 10%) s/p ICD, Afib w/ recent admission for CHF/afib s/p DCCV, CKD, DM2, recently diagnosed papillary thyroid cancer presented after being referred from CHF clinic for admission due to worsening of LE edema and failure of PO diuretics at home. Palliative consulted for LVAD evaluation.

## 2020-08-28 NOTE — PROVIDER CONTACT NOTE (OTHER) - ASSESSMENT
Patient ordered Hydralazine and Isordil with soft blood pressures. Patient /67, other VSS. Patient asymptomatic.

## 2020-08-28 NOTE — CONSULT NOTE ADULT - ATTENDING COMMENTS
Case discussed with Dr. Torrez. Patient seen in consultation for LVAD evaluation. He has insight into his disease condition and has completed a living will and a HCP. Although he agreed to the LVAD evaluation, he shows some reluctance in receiving an LVAD due to financial, caretaking and quality of life concerns post operatively. He lives with his niece who is his main support and identified her as the primary caregiver, however, she does not drive and would not be able to assist with follow up visits, appointments, and has reluctance for involvement with wound care dressing changes. At this time, the above presents a barrier to implantation. However, if patient changes his mind, would need assistance with transportation and home care. Agree with the rest that is documented above. Patients current goal is to be medically optimized. Will sign off at this time. Please reconsult if issues arise or acute uncontrolled symptoms. [phone]  Thank you for allowing us to participate in the care of this patient.

## 2020-08-29 NOTE — PROGRESS NOTE ADULT - ATTENDING COMMENTS
Patient was seen and examined by me on 08/29/2020,interim events noted,labs and radiology studies reviewed.  Loyd Mitchell MD,FACC.  [address] [place] [address].  [id number].  [phone]

## 2020-08-29 NOTE — PROGRESS NOTE ADULT - SUBJECTIVE AND OBJECTIVE BOX
SUBJECTIVE / OVERNIGHT EVENTS: pt seen and examined    MEDICATIONS  (STANDING):  aMIOdarone    Tablet 200 milliGRAM(s) Oral daily  apixaban 5 milliGRAM(s) Oral two times a day  buMETAnide Infusion 1 mG/Hr (5 mL/Hr) IV Continuous <Continuous>  carvedilol 6.25 milliGRAM(s) Oral every 12 hours  dextrose 5%. 1000 milliLiter(s) (50 mL/Hr) IV Continuous <Continuous>  dextrose 50% Injectable 12.5 Gram(s) IV Push once  dextrose 50% Injectable 25 Gram(s) IV Push once  dextrose 50% Injectable 25 Gram(s) IV Push once  hydrALAZINE 10 milliGRAM(s) Oral three times a day  insulin lispro (HumaLOG) corrective regimen sliding scale   SubCutaneous three times a day before meals  insulin lispro Injectable (HumaLOG) 1 Unit(s) SubCutaneous three times a day before meals  isosorbide   dinitrate Tablet (ISORDIL) 10 milliGRAM(s) Oral three times a day  levothyroxine 50 MICROGram(s) Oral daily  milrinone Infusion 0.3 MICROgram(s)/kG/Min (7.47 mL/Hr) IV Continuous <Continuous>  potassium chloride    Tablet ER 40 milliEquivalent(s) Oral daily  spironolactone 12.5 milliGRAM(s) Oral daily    MEDICATIONS  (PRN):  dextrose 40% Gel 15 Gram(s) Oral once PRN Blood Glucose LESS THAN 70 milliGRAM(s)/deciliter  glucagon  Injectable 1 milliGRAM(s) IntraMuscular once PRN Glucose LESS THAN 70 milligrams/deciliter  polyethylene glycol 3350 17 Gram(s) Oral daily PRN Constipation    Vital Signs Last 24 Hrs  T(C): 36.8 (29 Aug 2020 21:03), Max: 36.8 (29 Aug 2020 21:03)  T(F): 98.3 (29 Aug 2020 21:03), Max: 98.3 (29 Aug 2020 21:03)  HR: 85 (29 Aug 2020 21:03) (80 - 94)  BP: 112/69 (29 Aug 2020 21:03) (95/55 - 112/69)  BP(mean): --  RR: 16 (29 Aug 2020 21:03) (16 - 18)  SpO2: 99% (29 Aug 2020 21:03) (98% - 99%)    Skin: No rash.  Eyes: No recent vision problems or eye pain.  ENT: No congestion, ear pain, or sore throat.  Cardiovascular: No chest pain or palpation.  Respiratory: No cough, shortness of breath, congestion, or wheezing.  Gastrointestinal: No abdominal pain, nausea, vomiting, or diarrhea.  Genitourinary: No dysuria.  Musculoskeletal: No joint swelling.  Neurologic: No headache.    PHYSICAL EXAM:  GENERAL: NAD  EYES: EOMI, PERRLA  NECK: Supple, No JVD  CHEST/LUNG: crackles at bases  HEART:  S1 , S2 +  ABDOMEN: soft , bs+  EXTREMITIES:  edema+  NEUROLOGY:alert awake oriented     LABS:      139  |  94<L>  |  80<H>  ----------------------------<  171<H>  3.9   |  33<H>  |  2.50<H>    Ca    10.3      29 Aug 2020 18:40  Phos  3.4       Mg     2.0           Creatinine Trend: 2.50 <--, 2.39 <--, 2.38 <--, 2.40 <--, 2.33 <--, 2.22 <--, 2.34 <--, 2.33 <--, 2.26 <--, 2.20 <--, 2.23 <--, 2.46 <--, 2.55 <--    Urine Studies:  Urinalysis Basic - ( 25 Aug 2020 19:47 )    Color: Light Yellow / Appearance: Clear / S.008 / pH:   Gluc:  / Ketone: Negative  / Bili: Negative / Urobili: Negative   Blood:  / Protein: Negative / Nitrite: Negative   Leuk Esterase: Negative / RBC:  / WBC    Sq Epi:  / Non Sq Epi:  / Bacteria:       Creatinine, Random Urine: 38 mg/dL ( @ 19:47)  Protein/Creatinine Ratio Calculation: 0.1 Ratio ( @ 19:47)

## 2020-08-29 NOTE — PROGRESS NOTE ADULT - PROBLEM SELECTOR PLAN 5
- Appreciate endocrinology c/s, will discuss with Dr. Winslow further regarding need for intervention and 5 year prognosis particularly in setting of being evaluate for LVAD  - Fine needle aspiration showing L upper pole nodule to be [place] VI per outpatient endo note 8/19.

## 2020-08-29 NOTE — PROGRESS NOTE ADULT - PROBLEM SELECTOR PLAN 1
- Continue milrinone 0.3 mcg/kg/min, will wean when euvolemic  - Continue Bumex gtt at 1mg/hour. Continue KCl 40 meq daily. Aggressive electrolyte repletion to maintain K 4-4.5 and Mg 2-2.5.  - Continue spironolactone 12.5 mg daily  - Continue carvedilol 6.25 mg BID  - Continue hydralazine 10 mg TID and ISDN 10 mg TID; hold for SBP <90  - Will plan for RHC to evaluate hemodynamics 2 days after milrinone is weaned  - Under evaluation for LVAD as destination therapy. He is not a heart transplant candidate given age.  - Pending CT colonography for colon CA screening as part of evaluation. Will also need cardio-renal input as well as elastography when euvolemic.

## 2020-08-29 NOTE — PROGRESS NOTE ADULT - ASSESSMENT
76 y/o M w/ PMH of DM2, NICMP/HFrEF (LVIDd 6.7cm, LVEF <20%) s/p CRT-D, AF s/p recent admission w/ DCCV on amio, CKD (BL Cr ~3), and hypothyroid referred from CHF clinic for ADHF. He remains volume overloaded but is responding well to a dose of metolazone yesterday in addition to the bumex infusion with 2.7L UOP over the past 24 hours. His weight is down 1.8kg since yesterday. His renal function is stable and appears to be well supported on current dose of milrinone. He is normotensive tolerating initiation of vasodilators.

## 2020-08-29 NOTE — PROGRESS NOTE ADULT - SUBJECTIVE AND OBJECTIVE BOX
Subjective:  - DEYVI overnight  - Walking around the nursing unit without dyspnea or fatigue  - Denies CP, palpitations, lightheadedness, dizziness, orthopnea, PND    Medications:  aMIOdarone    Tablet 200 milliGRAM(s) Oral daily  apixaban 5 milliGRAM(s) Oral two times a day  buMETAnide Infusion 1 mG/Hr IV Continuous <Continuous>  carvedilol 6.25 milliGRAM(s) Oral every 12 hours  dextrose 40% Gel 15 Gram(s) Oral once PRN  dextrose 5%. 1000 milliLiter(s) IV Continuous <Continuous>  dextrose 50% Injectable 12.5 Gram(s) IV Push once  dextrose 50% Injectable 25 Gram(s) IV Push once  dextrose 50% Injectable 25 Gram(s) IV Push once  glucagon  Injectable 1 milliGRAM(s) IntraMuscular once PRN  hydrALAZINE 10 milliGRAM(s) Oral three times a day  insulin lispro (HumaLOG) corrective regimen sliding scale   SubCutaneous three times a day before meals  insulin lispro Injectable (HumaLOG) 1 Unit(s) SubCutaneous three times a day before meals  isosorbide   dinitrate Tablet (ISORDIL) 10 milliGRAM(s) Oral three times a day  levothyroxine 50 MICROGram(s) Oral daily  milrinone Infusion 0.3 MICROgram(s)/kG/Min IV Continuous <Continuous>  polyethylene glycol 3350 17 Gram(s) Oral daily PRN  potassium chloride    Tablet ER 40 milliEquivalent(s) Oral daily  spironolactone 12.5 milliGRAM(s) Oral daily      ICU Vital Signs Last 24 Hrs  T(C): 36.6, Max: 36.7 ( @ 13:50)  HR: 94 (80 - 94)  BP: 102/63 (96/58 - 106/68)  BP(mean): --  ABP: --  ABP(mean): --  RR: 16 (16 - 18)  SpO2: 99% (98% - 100%)    Weight in k.3 (20)  Weight in k.8 (20)      I&O's Summary Last 24 Hrs    IN: 1084 mL / OUT: 2200 mL / NET: -1116 mL      Tele:      Physical Exam:    General: No distress. Comfortable.  HEENT: EOM intact.  Neck: Neck supple. JVP approx 8 cm H2O with HJR. No masses  Chest: Clear to auscultation bilaterally  CV: Regular. Normal S1 and S2. No murmurs, rub, or gallops. Radial pulses normal. 1+ BLE ankle edema  Abdomen: Soft, non-distended, non-tender  Skin: No rashes or skin breakdown. Warm peripherally.  Neurology: Alert and oriented times three. Sensation intact  Psych: Affect normal    Labs:    ( 20 @ 05:11 )     140  |  96  |  73  ---------------------<  148  3.9  |  33  |  2.39    Ca 10.3  Phos 2.9  Mg 2.0    Serum Pro-Brain Natriuretic Peptide: [id number] (20 @ 19:45)    Lactate Dehydrogenase, Serum: 215 (20 @ 19:45)

## 2020-08-29 NOTE — PROGRESS NOTE ADULT - SUBJECTIVE AND OBJECTIVE BOX
DATE OF SERVICE:Patient was seen and examined :08/29/2020    PRESENTING CC:Edema ADHF    SUBJ: 76 y/o M w/ PMH of DM2, NICMP/HFrEF (LVIDd 6.7cm, LVEF <20%) s/p CRT-D, AF s/p recent admission w/ DCCV on amio, CKD (BL Cr ~3), and hypothyroid referred from CHF clinic for VOL.Remains on Milrinine diuresed well feels better    PMH -reviewed admission note, no change since admission  Heart failure: acute [ ] chronic [ ] acute or chronic [x ] diastolic [ ] systolic [ ] combined systolic and diastolic[x ]  EDIE: ATN[ ] renal medullary necrosis [ ] CKD I [ ]CKDII [ ]CKD III [ ]CKD IV [ ]CKD V [ ]Other pathological lesions [ ]    MEDICATIONS  (STANDING):    aMIOdarone    Tablet 200 milliGRAM(s) Oral daily  apixaban 5 milliGRAM(s) Oral two times a day  buMETAnide Infusion 1 mG/Hr (5 mL/Hr) IV Continuous <Continuous>  carvedilol 6.25 milliGRAM(s) Oral every 12 hours  hydrALAZINE 10 milliGRAM(s) Oral three times a day  insulin lispro (HumaLOG) corrective regimen sliding scale   SubCutaneous three times a day before meals  insulin lispro Injectable (HumaLOG) 1 Unit(s) SubCutaneous three times a day before meals  isosorbide   dinitrate Tablet (ISORDIL) 10 milliGRAM(s) Oral three times a day  levothyroxine 50 MICROGram(s) Oral daily  milrinone Infusion 0.3 MICROgram(s)/kG/Min (7.47 mL/Hr) IV Continuous <Continuous>  potassium chloride    Tablet ER 40 milliEquivalent(s) Oral daily  spironolactone 12.5 milliGRAM(s) Oral daily    MEDICATIONS  (PRN):  dextrose 40% Gel 15 Gram(s) Oral once PRN Blood Glucose LESS THAN 70 milliGRAM(s)/deciliter  glucagon  Injectable 1 milliGRAM(s) IntraMuscular once PRN Glucose LESS THAN 70 milligrams/deciliter  polyethylene glycol 3350 17 Gram(s) Oral daily PRN Constipation            REVIEW OF SYSTEMS:  Constitutional: [ ] fever, [ ]weight loss,  [ ]fatigue  Eyes: [ ] visual changes  Respiratory: [x ]shortness of breath;  [ ] cough, [ ]wheezing, [ ]chills, [ ]hemoptysis  Cardiovascular: [ ] chest pain, [ ]palpitations, [ ]dizziness,  [x ]leg swelling[ ]orthopnea[ ]PND  Gastrointestinal: [ ] abdominal pain, [ ]nausea, [ ]vomiting,  [ ]diarrhea   Genitourinary: [ ] dysuria, [ ] hematuria  Neurologic: [ ] headaches [ ] tremors[ ]weakness  Skin: [ ] itching, [ ]burning, [ ] rashes  Endocrine: [ ] heat or cold intolerance  Musculoskeletal: [ ] joint pain or swelling; [ ] muscle, back, or extremity pain  Psychiatric: [ ] depression, [ ]anxiety, [ ]mood swings, or [ ]difficulty sleeping  Hematologic: [ ] easy bruising, [ ] bleeding gums    [x] All remaining systems negative except as per above.   [ ]Unable to obtain.    Vital Signs Last 24 Hrs  T(C): 36.8 (29 Aug 2020 21:03), Max: 36.8 (29 Aug 2020 21:03)  T(F): 98.3 (29 Aug 2020 21:03), Max: 98.3 (29 Aug 2020 21:03)  HR: 85 (29 Aug 2020 21:03) (80 - 94)  BP: 112/69 (29 Aug 2020 21:03) (95/55 - 112/69)  RR: 16 (29 Aug 2020 21:03) (16 - 18)  SpO2: 99% (29 Aug 2020 21:03) (98% - 99%)      PHYSICAL EXAM:  General: No acute distress BMI-26  HEENT: EOMI, PERRL  Neck: Supple, [x ] JVD  Lungs: Equal air entry bilaterally; [ ] rales [ ] wheezing [ ] rhonchi  Heart: Regular rate and rhythm; [x ] murmur  2 /6 [x ] systolic [ ] diastolic [ ] radiation[ ] rubs [ ]  gallops  Abdomen: Nontender, bowel sounds present  Extremities: No clubbing, cyanosis, [x ] edema  Nervous system:  Alert & Oriented X3, no focal deficits  Psychiatric: Normal affect  Skin: No rashes or lesions    LABS:  08-29    139  |  94<L>  |  80<H>  ----------------------------<  171<H>  3.9   |  33<H>  |  2.50<H>    Ca    10.3      29 Aug 2020 18:40  Phos  3.4     08-29  Mg     2.0     08-29      Creatinine Trend: 2.50 <--, 2.39 <--, 2.38 <--, 2.40 <--, 2.33 <--, 2.22 <--, 2.34 <--, 2.33 <--, 2.26 <--, 2.20 <--, 2.23 <--, 2.46 <--, 2.55 <--      IMPRESSION AND PLAN:      74M w/ PMHx of HFrEF (EF 10%) s/p ICD, Afib w/ recent admission for HF/AFib s/p DCCV, CKD, DM2, hypothyroidism presents after being referred from CHF clinic for admission due to worsening of LE edema and failure of PO diuretics at home.       Problem/Plan - 1:  ·  Problem: Acute HFrEF (heart failure with reduced ejection fraction).  Plan: - Continue milrinone 0.3 mcg/kg/min, will wean when euvolemic  - Continue Bumex gtt at 1mg/hour. Continue KCl 40 meq daily. Aggressive electrolyte repletion to maintain K 4-4.5 and Mg 2-2.5.  - Continue spironolactone 12.5 mg daily  - Continue carvedilol 6.25 mg BID  - Continue hydralazine 10 mg TID and ISDN 10 mg TID; hold for SBP <90      Problem/Plan - 2:  ·  Problem: Afib.  Plan: -Currently in paced rhythm  -C/w home med amio  -C/w home med carvedilol, as above  - Continue apixaban 5mg BID.     Problem/Plan - 3:  ·  Problem: Mitral insufficiency.  Plan: - Reassess once euvolemic  - Pt already has CRT-D device.     Problem/Plan - 4:  ·  Problem: Thrombocytopenia.  Plan: - Chronic.

## 2020-08-30 NOTE — PROGRESS NOTE ADULT - SUBJECTIVE AND OBJECTIVE BOX
DATE OF SERVICE:Patient was seen and examined :08/30/2020    PRESENTING CC:Edema ADHF    SUBJ: 74 y/o M w/ PMH of DM2, NICMP/HFrEF (LVIDd 6.7cm, LVEF <20%) s/p CRT-D, AF s/p recent admission w/ DCCV on amio, CKD (BL Cr ~3), and hypothyroid referred from CHF clinic for VOL.Remains on Milrinone diuresed well feels better tolerating Hydrallazine/Isosorbide titer      PMH -reviewed admission note, no change since admission  Heart failure: acute [ ] chronic [ ] acute or chronic [x ] diastolic [ ] systolic [x ] combined systolic and diastolic[ ]  EDIE: ATN[ ] renal medullary necrosis [ ] CKD I [ ]CKDII [ ]CKD III [ ]CKD IV [ ]CKD V [ ]Other pathological lesions [ ]    MEDICATIONS  (STANDING):  aMIOdarone    Tablet 200 milliGRAM(s) Oral daily  apixaban 5 milliGRAM(s) Oral two times a day  carvedilol 6.25 milliGRAM(s) Oral every 12 hours  hydrALAZINE 10 milliGRAM(s) Oral three times a day  insulin lispro (HumaLOG) corrective regimen sliding scale   SubCutaneous three times a day before meals  insulin lispro Injectable (HumaLOG) 1 Unit(s) SubCutaneous three times a day before meals  isosorbide   dinitrate Tablet (ISORDIL) 10 milliGRAM(s) Oral three times a day  levothyroxine 50 MICROGram(s) Oral daily  milrinone Infusion 0.3 MICROgram(s)/kG/Min (7.47 mL/Hr) IV Continuous <Continuous>  spironolactone 12.5 milliGRAM(s) Oral daily    MEDICATIONS  (PRN):  dextrose 40% Gel 15 Gram(s) Oral once PRN Blood Glucose LESS THAN 70 milliGRAM(s)/deciliter  glucagon  Injectable 1 milliGRAM(s) IntraMuscular once PRN Glucose LESS THAN 70 milligrams/deciliter  polyethylene glycol 3350 17 Gram(s) Oral daily PRN Constipation              REVIEW OF SYSTEMS:  Constitutional: [ ] fever, [ ]weight loss,  [x ]fatigue  Eyes: [ ] visual changes  Respiratory: [ ]shortness of breath;  [ ] cough, [ ]wheezing, [ ]chills, [ ]hemoptysis  Cardiovascular: [ ] chest pain, [ ]palpitations, [ ]dizziness,  [ ]leg swelling[ ]orthopnea[ ]PND  Gastrointestinal: [ ] abdominal pain, [ ]nausea, [ ]vomiting,  [ ]diarrhea   Genitourinary: [ ] dysuria, [ ] hematuria  Neurologic: [ ] headaches [ ] tremors[ ]weakness  Skin: [ ] itching, [ ]burning, [ ] rashes  Endocrine: [ ] heat or cold intolerance  Musculoskeletal: [ ] joint pain or swelling; [ ] muscle, back, or extremity pain  Psychiatric: [ ] depression, [ ]anxiety, [ ]mood swings, or [ ]difficulty sleeping  Hematologic: [ ] easy bruising, [ ] bleeding gums    [x] All remaining systems negative except as per above.   [ ]Unable to obtain.    Vital Signs Last 24 Hrs        PHYSICAL EXAM:  General: No acute distress BMI-26  HEENT: EOMI, PERRL  Neck: Supple, [ ] JVD  Lungs: Equal air entry bilaterally; [ ] rales [ ] wheezing [ ] rhonchi  Heart: Regular rate and rhythm; [x ] murmur  2 /6 [x ] systolic [ ] diastolic [ ] radiation[ ] rubs [ ]  gallops  Abdomen: Nontender, bowel sounds present  Extremities: No clubbing, cyanosis, [x ] edema  Nervous system:  Alert & Oriented X3, no focal deficits  Psychiatric: Normal affect  Skin: No rashes or lesions    LABS:           10.0   5.28  )--------( 102                  31.0     ( 08-30-20 @ 06:30 )     137  |  92  |  94  ---------------------<  160  3.5  |  30  | 2.82      Ca 10.6  Phos 3.6  Mg 2.0      IMPRESSION AND PLAN:      74M w/ PMHx of HFrEF (EF 10%) s/p ICD, Afib w/ recent admission for HF/AFib s/p DCCV, CKD, DM2, hypothyroidism presents after being referred from CHF clinic for admission due to worsening of LE edema and failure of PO diuretics at home.       Problem/Plan - 1:  ·  Problem: Acute HFrEF (heart failure with reduced ejection fraction).  Plan: - Continue milrinone 0.3 mcg/kg/min, will wean when euvolemic  - Continue Bumex gtt at 1mg/hour. Continue KCl 40 meq daily. Aggressive electrolyte repletion to maintain K 4-4.5 and Mg 2-2.5.  - Continue spironolactone 12.5 mg daily  - Continue carvedilol 6.25 mg BID  - Continue hydralazine 10 mg TID and ISDN 10 mg TID; hold for SBP <90  -RHC once off Milrinone gtt      Problem/Plan - 2:  ·  Problem: Afib.  Plan: -Currently in paced rhythm  -C/w home med amio  -C/w home med carvedilol, as above  - Continue apixaban 5mg BID.     Problem/Plan - 3:  ·  Problem: Mitral insufficiency.  Plan: - Reassess once euvolemic  - Pt already has CRT-D device.     Problem/Plan - 4:  ·  Problem: Thrombocytopenia.  Plan: - Chronic.   Stable

## 2020-08-30 NOTE — PROGRESS NOTE ADULT - ASSESSMENT
74 y/o M w/ PMH of DM2, NICMP/HFrEF (LVIDd 6.7cm, LVEF <20%) s/p CRT-D, AF s/p recent admission w/ DCCV on amio, CKD (BL Cr ~3), and hypothyroid referred from CHF clinic for ADHF. He has diuresed more than 20lbs since admission and is now slightly dry on exam. His SCr is starting to uptrend but he is normotensive and appears to be well supported on current dose of milrinone.

## 2020-08-30 NOTE — PROGRESS NOTE ADULT - PROBLEM SELECTOR PLAN 5
- Appreciate endocrinology c/s, will discuss with Dr. Winslow further regarding need for intervention and 5 year prognosis particularly in setting of being evaluate for LVAD  - Fine needle aspiration showing L upper pole nodule to be [place] VI per outpatient endo note 8/19.

## 2020-08-30 NOTE — PROGRESS NOTE ADULT - ATTENDING COMMENTS
Patient was seen and examined by me on 08/30/2020,interim events noted,labs and radiology studies reviewed.  Loyd Mitchell MD,FACC.  [address] [place] [address].  [facility].  [phone]

## 2020-08-30 NOTE — PROGRESS NOTE ADULT - PROBLEM SELECTOR PLAN 1
- Continue milrinone 0.3 mcg/kg/min, will wean when renal function stabilizes  - Hold diuretics and encouraged to increase PO fluid intake today  - Continue spironolactone 12.5 mg daily  - Continue carvedilol 6.25 mg BID  - Continue hydralazine 10 mg TID and ISDN 10 mg TID; hold for SBP <90  - Maintain K 4-4.5 and Mg 2-2.5.  - Will plan for RHC to evaluate hemodynamics 2 days after milrinone is weaned  - Under evaluation for LVAD as destination therapy. He is not a heart transplant candidate given age.  - Pending CT colonography for colon CA screening as part of evaluation, which is planned for tomorrow. Will also need cardio-renal input as well as elastography

## 2020-08-30 NOTE — CHART NOTE - NSCHARTNOTEFT_GEN_A_CORE
Nutrition Follow Up Note    Patient seen for: Malnutrition follow up     Source: RN, medical record,     Chart reviewed, events noted. 75 year old male with PMHx of HF EF 10%, ICD, Afib, CKD and DM and known Afib with RVR admits for SOB and edema. Pt now being followed by HF team, started on milrinone and Bumex. Under going LVAD evaluation  Plan for CT colonography     Diet : Consistent CHO, DASH, Glucerna x2 daily     Patient reports: Pt seen, reports feeling well.   Pt reports he is drinking Glucerna x2 daily but would prefer strawberry flavor; RD will update preferences.   Pt states a good PO intake.     Pt reports concern over evening snack elevated BG levels. RD reviewed consistent CHO diet, stressed importance of small frequent meals, pairing CHO and protein and monitoring portion control. Pt verbalized understanding but states he would prefer night snack be removed from his diet; Will discuss with team.     Pt denies any further nutritional questions or concerns, is aware RD remains available.     GI: No distress       Daily Weight in k.1 (-30), Weight in k.3 (-29), Weight in k.8 (-28), Weight in k.1 (-28), Weight in k.7 (-27), Weight in k.6 (-25), Weight in k (-24)  Weight trending down. Likely in the setting of diuresis.     Drug Dosing Weight  Weight (kg): 83 (24 Aug 2020 19:14)  BMI (kg/m2): 27.8 (24 Aug 2020 19:14)      Pertinent Medications: MEDICATIONS  (STANDING):  aMIOdarone    Tablet 200 milliGRAM(s) Oral daily  apixaban 5 milliGRAM(s) Oral two times a day  carvedilol 6.25 milliGRAM(s) Oral every 12 hours  dextrose 5%. 1000 milliLiter(s) (50 mL/Hr) IV Continuous <Continuous>  dextrose 50% Injectable 12.5 Gram(s) IV Push once  dextrose 50% Injectable 25 Gram(s) IV Push once  dextrose 50% Injectable 25 Gram(s) IV Push once  hydrALAZINE 10 milliGRAM(s) Oral three times a day  insulin lispro (HumaLOG) corrective regimen sliding scale   SubCutaneous three times a day before meals  insulin lispro Injectable (HumaLOG) 1 Unit(s) SubCutaneous three times a day before meals  isosorbide   dinitrate Tablet (ISORDIL) 10 milliGRAM(s) Oral three times a day  levothyroxine 50 MICROGram(s) Oral daily  milrinone Infusion 0.3 MICROgram(s)/kG/Min (7.47 mL/Hr) IV Continuous <Continuous>  spironolactone 12.5 milliGRAM(s) Oral daily    MEDICATIONS  (PRN):  dextrose 40% Gel 15 Gram(s) Oral once PRN Blood Glucose LESS THAN 70 milliGRAM(s)/deciliter  glucagon  Injectable 1 milliGRAM(s) IntraMuscular once PRN Glucose LESS THAN 70 milligrams/deciliter  polyethylene glycol 3350 17 Gram(s) Oral daily PRN Constipation      LABS:    @ 06:30: Sodium 137, Potassium 3.5, Calcium 10.6<H>, Magnesium 2.0, Phosphorus 3.6, BUN 94<H>, Creatinine 2.82<H>, Glucose 160<H>, Alk Phos --, ALT/SGPT --, AST/SGOT --, Albumin --, Prealbumin --, Total Bilirubin --, Hemoglobin 10.0<L>, Hematocrit 31.0<L>, Ferritin --, C-Reactive Protein --, Creatine Kinase <<27>   @ 18:40: Sodium 139, Potassium 3.9, Calcium 10.3, Magnesium 2.0, Phosphorus 3.4, BUN 80<H>, Creatinine 2.50<H>, Glucose 171<H>, Alk Phos --, ALT/SGPT --, AST/SGOT --, Albumin --, Prealbumin --, Total Bilirubin --, Hemoglobin --, Hematocrit --, Ferritin --, C-Reactive Protein --, Creatine Kinase <<27>      Finger Sticks:  POCT Blood Glucose.: 245 mg/dL ( @ 13:07)  POCT Blood Glucose.: 181 mg/dL ( @ 07:31)  POCT Blood Glucose.: 196 mg/dL ( @ 21:39)  POCT Blood Glucose.: 161 mg/dL ( @ 16:51)      Skin per nursing documentation: no pressure injuries   Edema: +2 suzy leg edema    Estimated Needs:   [X ] no change since previous assessment  [ ] recalculated:     Previous Nutrition Diagnosis: Severe malnutrition   Nutrition Diagnosis is: on going     New Nutrition Diagnosis: None      Interventions:     Recommend  1. Change diet to Consistent CHO (no snack) + DASH + Glucerna x2 daily   2. Provide food preferences as requested by Pt/family within diet restrictions    3. Encourage PO intake during meal times   4. Consistent CHO diet education reviewed   5. Trend BG levels, renal indices, LFT's, electrolytes     Monitoring and Evaluation:   Continue to monitor nutritional intake, tolerance to diet prescription, weights, labs, skin integrity  RD remains available upon request and will follow up per protocol  Natalie Spencer RD, CDN, [facility] Pager #[phone]

## 2020-08-30 NOTE — PROGRESS NOTE ADULT - SUBJECTIVE AND OBJECTIVE BOX
Subjective:  - DEYVI overnight  - Walking around the nursing unit without dyspnea or fatigue  - Denies CP, palpitations, lightheadedness, dizziness, orthopnea, PND    Medications:  aMIOdarone    Tablet 200 milliGRAM(s) Oral daily  apixaban 5 milliGRAM(s) Oral two times a day  carvedilol 6.25 milliGRAM(s) Oral every 12 hours  dextrose 40% Gel 15 Gram(s) Oral once PRN  dextrose 5%. 1000 milliLiter(s) IV Continuous <Continuous>  dextrose 50% Injectable 12.5 Gram(s) IV Push once  dextrose 50% Injectable 25 Gram(s) IV Push once  dextrose 50% Injectable 25 Gram(s) IV Push once  glucagon  Injectable 1 milliGRAM(s) IntraMuscular once PRN  hydrALAZINE 10 milliGRAM(s) Oral three times a day  insulin lispro (HumaLOG) corrective regimen sliding scale   SubCutaneous three times a day before meals  insulin lispro Injectable (HumaLOG) 1 Unit(s) SubCutaneous three times a day before meals  isosorbide   dinitrate Tablet (ISORDIL) 10 milliGRAM(s) Oral three times a day  levothyroxine 50 MICROGram(s) Oral daily  milrinone Infusion 0.3 MICROgram(s)/kG/Min IV Continuous <Continuous>  polyethylene glycol 3350 17 Gram(s) Oral daily PRN  potassium chloride    Tablet ER 40 milliEquivalent(s) Oral daily  spironolactone 12.5 milliGRAM(s) Oral daily      ICU Vital Signs Last 24 Hrs  T(C): 36.5, Max: 36.8 (08-29 @ 21:03)  HR: 80 (80 - 85)  BP: 102/62 (95/55 - 112/69)  BP(mean): --  ABP: --  ABP(mean): --  RR: 16 (16 - 18)  SpO2: 98% (96% - 99%)    Weight in k.1 (-20)  Weight in k.3 (-)      I&O's Summary Last 24 Hrs    IN: 1396.9 mL / OUT: 4425 mL / NET: -3028.1 mL      Tele: SR/ 80s    Physical Exam:    General: No distress. Comfortable.  HEENT: EOM intact.  Neck: Neck supple. JVP not elevated. No masses  Chest: Clear to auscultation bilaterally  CV: Regular. Normal S1 and S2. No murmurs, rub, or gallops. Radial pulses normal. Trace BLE ankle edema  Abdomen: Soft, non-distended, non-tender  Skin: No rashes or skin breakdown. Warm peripherally.  Neurology: Alert and oriented times three. Sensation intact  Psych: Affect normal    Labs:    ( 20 @ 06:30 )               10.0   5.28  )--------( 102                  31.0     ( 20 @ 06:30 )     137  |  92  |  94  ---------------------<  160  3.5  |  30  |  2.82    Ca 10.6  Phos 3.6  Mg 2.0    Serum Pro-Brain Natriuretic Peptide: 05617 (20 @ 19:45)    Lactate Dehydrogenase, Serum: 215 (20 @ 19:45)

## 2020-08-30 NOTE — PROGRESS NOTE ADULT - SUBJECTIVE AND OBJECTIVE BOX
SUBJECTIVE / OVERNIGHT EVENTS: pt seen and examined    MEDICATIONS  (STANDING):  aMIOdarone    Tablet 200 milliGRAM(s) Oral daily  apixaban 5 milliGRAM(s) Oral two times a day  carvedilol 6.25 milliGRAM(s) Oral every 12 hours  dextrose 5%. 1000 milliLiter(s) (50 mL/Hr) IV Continuous <Continuous>  dextrose 50% Injectable 12.5 Gram(s) IV Push once  dextrose 50% Injectable 25 Gram(s) IV Push once  dextrose 50% Injectable 25 Gram(s) IV Push once  hydrALAZINE 10 milliGRAM(s) Oral three times a day  insulin lispro (HumaLOG) corrective regimen sliding scale   SubCutaneous three times a day before meals  insulin lispro Injectable (HumaLOG) 1 Unit(s) SubCutaneous three times a day before meals  isosorbide   dinitrate Tablet (ISORDIL) 10 milliGRAM(s) Oral three times a day  levothyroxine 50 MICROGram(s) Oral daily  milrinone Infusion 0.3 MICROgram(s)/kG/Min (7.47 mL/Hr) IV Continuous <Continuous>  spironolactone 12.5 milliGRAM(s) Oral daily    MEDICATIONS  (PRN):  dextrose 40% Gel 15 Gram(s) Oral once PRN Blood Glucose LESS THAN 70 milliGRAM(s)/deciliter  glucagon  Injectable 1 milliGRAM(s) IntraMuscular once PRN Glucose LESS THAN 70 milligrams/deciliter  polyethylene glycol 3350 17 Gram(s) Oral daily PRN Constipation    Vital Signs Last 24 Hrs  T(C): 36.3 (30 Aug 2020 20:37), Max: 36.8 (29 Aug 2020 21:03)  T(F): 97.4 (30 Aug 2020 20:37), Max: 98.3 (29 Aug 2020 21:03)  HR: 80 (30 Aug 2020 20:37) (80 - 85)  BP: 111/68 (30 Aug 2020 20:37) (100/61 - 112/69)  BP(mean): --  RR: 16 (30 Aug 2020 20:37) (16 - 18)  SpO2: 100% (30 Aug 2020 20:37) (96% - 100%)    Skin: No rash.  Eyes: No recent vision problems or eye pain.  ENT: No congestion, ear pain, or sore throat.  Cardiovascular: No chest pain or palpation.  Respiratory: No cough, shortness of breath, congestion, or wheezing.  Gastrointestinal: No abdominal pain, nausea, vomiting, or diarrhea.  Genitourinary: No dysuria.  Musculoskeletal: No joint swelling.  Neurologic: No headache.    PHYSICAL EXAM:  GENERAL: NAD  EYES: EOMI, PERRLA  NECK: Supple, No JVD  CHEST/LUNG: crackles at bases  HEART:  S1 , S2 +  ABDOMEN: soft , bs+  EXTREMITIES:  edema+  NEUROLOGY:alert awake oriented     LABS:      137  |  92<L>  |  94<H>  ----------------------------<  160<H>  3.5   |  30  |  2.82<H>    Ca    10.6<H>      30 Aug 2020 06:30  Phos  3.6       Mg     2.0           Creatinine Trend: 2.82 <--, 2.50 <--, 2.39 <--, 2.38 <--, 2.40 <--, 2.33 <--, 2.22 <--, 2.34 <--, 2.33 <--, 2.26 <--, 2.20 <--, 2.23 <--                        10.0   5.28  )-----------( 102      ( 30 Aug 2020 06:30 )             31.0     Urine Studies:  Urinalysis Basic - ( 25 Aug 2020 19:47 )    Color: Light Yellow / Appearance: Clear / S.008 / pH:   Gluc:  / Ketone: Negative  / Bili: Negative / Urobili: Negative   Blood:  / Protein: Negative / Nitrite: Negative   Leuk Esterase: Negative / RBC:  / WBC    Sq Epi:  / Non Sq Epi:  / Bacteria:       Creatinine, Random Urine: 38 mg/dL ( @ 19:47)  Protein/Creatinine Ratio Calculation: 0.1 Ratio ( @ 19:47)

## 2020-08-31 NOTE — PROGRESS NOTE ADULT - PROBLEM SELECTOR PLAN 2
- net negative 3.3L today  - hold diuretics, reassess volume status/creatinine in AM  - avoid nephrotoxic medications

## 2020-08-31 NOTE — PROGRESS NOTE ADULT - SUBJECTIVE AND OBJECTIVE BOX
SUBJECTIVE / OVERNIGHT EVENTS: pt seen and examined    MEDICATIONS  (STANDING):  aMIOdarone    Tablet 200 milliGRAM(s) Oral daily  apixaban 5 milliGRAM(s) Oral two times a day  carvedilol 6.25 milliGRAM(s) Oral every 12 hours  dextrose 5%. 1000 milliLiter(s) (50 mL/Hr) IV Continuous <Continuous>  dextrose 50% Injectable 12.5 Gram(s) IV Push once  dextrose 50% Injectable 25 Gram(s) IV Push once  dextrose 50% Injectable 25 Gram(s) IV Push once  hydrALAZINE 10 milliGRAM(s) Oral three times a day  insulin lispro (HumaLOG) corrective regimen sliding scale   SubCutaneous three times a day before meals  insulin lispro Injectable (HumaLOG) 1 Unit(s) SubCutaneous three times a day before meals  isosorbide   dinitrate Tablet (ISORDIL) 10 milliGRAM(s) Oral three times a day  levothyroxine 50 MICROGram(s) Oral daily  milrinone Infusion 0.125 MICROgram(s)/kG/Min (2.82 mL/Hr) IV Continuous <Continuous>  spironolactone 12.5 milliGRAM(s) Oral daily    MEDICATIONS  (PRN):  dextrose 40% Gel 15 Gram(s) Oral once PRN Blood Glucose LESS THAN 70 milliGRAM(s)/deciliter  glucagon  Injectable 1 milliGRAM(s) IntraMuscular once PRN Glucose LESS THAN 70 milligrams/deciliter  polyethylene glycol 3350 17 Gram(s) Oral daily PRN Constipation    Vital Signs Last 24 Hrs  T(C): 36.5 (31 Aug 2020 20:27), Max: 36.5 (31 Aug 2020 05:03)  T(F): 97.7 (31 Aug 2020 20:27), Max: 97.7 (31 Aug 2020 05:03)  HR: 75 (31 Aug 2020 20:27) (75 - 80)  BP: 93/54 (31 Aug 2020 20:27) (93/54 - 113/67)  BP(mean): --  RR: 17 (31 Aug 2020 20:27) (16 - 17)  SpO2: 96% (31 Aug 2020 20:27) (96% - 99%)    Skin: No rash.  Eyes: No recent vision problems or eye pain.  ENT: No congestion, ear pain, or sore throat.  Cardiovascular: No chest pain or palpation.  Respiratory: No cough, shortness of breath, congestion, or wheezing.  Gastrointestinal: No abdominal pain, nausea, vomiting, or diarrhea.  Genitourinary: No dysuria.  Musculoskeletal: No joint swelling.  Neurologic: No headache.    PHYSICAL EXAM:  GENERAL: NAD  EYES: EOMI, PERRLA  NECK: Supple, No JVD  CHEST/LUNG: crackles at bases  HEART:  S1 , S2 +  ABDOMEN: soft , bs+  EXTREMITIES:  edema+  NEUROLOGY:alert awake oriented     LABS:      137  |  91<L>  |  103<H>  ----------------------------<  170<H>  3.5   |  33<H>  |  2.93<H>    Ca    11.1<H>      31 Aug 2020 05:14  Phos  3.6       Mg     2.0           Creatinine Trend: 2.93 <--, 2.82 <--, 2.50 <--, 2.39 <--, 2.38 <--, 2.40 <--, 2.33 <--, 2.22 <--, 2.34 <--, 2.33 <--, 2.26 <--, 2.20 <--                        10.0   5.28  )-----------( 102      ( 30 Aug 2020 06:30 )             31.0     Urine Studies:  Urinalysis Basic - ( 25 Aug 2020 19:47 )    Color: Light Yellow / Appearance: Clear / S.008 / pH:   Gluc:  / Ketone: Negative  / Bili: Negative / Urobili: Negative   Blood:  / Protein: Negative / Nitrite: Negative   Leuk Esterase: Negative / RBC:  / WBC    Sq Epi:  / Non Sq Epi:  / Bacteria:       Creatinine, Random Urine: 38 mg/dL ( @ 19:47)  Protein/Creatinine Ratio Calculation: 0.1 Ratio ( @ 19:47)

## 2020-08-31 NOTE — PROGRESS NOTE ADULT - ATTENDING COMMENTS
Patient was seen and examined by me on 08/31/2020,interim events noted,labs and radiology studies reviewed.  Loyd Mitchell MD,FACC.  [address] [place] [address].  [id number].  [phone]

## 2020-08-31 NOTE — PROGRESS NOTE ADULT - PROBLEM SELECTOR PLAN 3
-Currently in paced rhythm  -C/w home med amio  -C/w home med carvedilol, as above  - would d/c apixaban 5mg BID  - start heparin gtt

## 2020-08-31 NOTE — PROGRESS NOTE ADULT - SUBJECTIVE AND OBJECTIVE BOX
Patient seen and examined at bedside.    Overnight Events:   No overnight events. This morning, pt. comfortable and asymptomatic.     Current Meds:  aMIOdarone    Tablet 200 milliGRAM(s) Oral daily  apixaban 5 milliGRAM(s) Oral two times a day  carvedilol 6.25 milliGRAM(s) Oral every 12 hours  dextrose 40% Gel 15 Gram(s) Oral once PRN  dextrose 5%. 1000 milliLiter(s) IV Continuous <Continuous>  dextrose 50% Injectable 12.5 Gram(s) IV Push once  dextrose 50% Injectable 25 Gram(s) IV Push once  dextrose 50% Injectable 25 Gram(s) IV Push once  glucagon  Injectable 1 milliGRAM(s) IntraMuscular once PRN  hydrALAZINE 10 milliGRAM(s) Oral three times a day  insulin lispro (HumaLOG) corrective regimen sliding scale   SubCutaneous three times a day before meals  insulin lispro Injectable (HumaLOG) 1 Unit(s) SubCutaneous three times a day before meals  isosorbide   dinitrate Tablet (ISORDIL) 10 milliGRAM(s) Oral three times a day  levothyroxine 50 MICROGram(s) Oral daily  milrinone Infusion 0.125 MICROgram(s)/kG/Min IV Continuous <Continuous>  polyethylene glycol 3350 17 Gram(s) Oral daily PRN  spironolactone 12.5 milliGRAM(s) Oral daily      PAST MEDICAL & SURGICAL HISTORY:  Hypothyroidism  Afib  Type II diabetes mellitus  Aortic insufficiency  Mitral insufficiency  CKD (chronic kidney disease)  Cardiomyopathy: Systolic CHF  Hypertension  History of tonsillectomy: childhood  AICD (automatic cardioverter/defibrillator) present: 8/2012      Vitals:  T(F): 97.7 (08-31), Max: 97.7 (08-31)  HR: 80 (08-31) (75 - 80)  BP: 113/67 (08-31) (93/57 - 113/67)  RR: 16 (08-31)  SpO2: 98% (08-31)  I&O's Summary    30 Aug 2020 07:01  -  31 Aug 2020 07:00  --------------------------------------------------------  IN: 1087 mL / OUT: 4425 mL / NET: -3338 mL    31 Aug 2020 07:01  -  31 Aug 2020 19:29  --------------------------------------------------------  IN: 509.6 mL / OUT: 1450 mL / NET: -940.4 mL        Physical Exam:  Appearance: No acute distress; well appearing  Eyes: PERRL, EOMI, pink conjunctiva  HENT: Normal oral mucosa  Cardiovascular: RRR, S1, S2, no murmurs, rubs, or gallops; left lower extremity edema more prominent than right; JVD wnl  Respiratory: crackles at bases  Gastrointestinal: soft, non-tender, non-distended with normal bowel sounds  Musculoskeletal: No clubbing; no joint deformity   Neurologic: Non-focal  Lymphatic: No lymphadenopathy  Psychiatry: AAOx3, mood & affect appropriate  Skin: No rashes, ecchymoses, or cyanosis                          10.0   5.28  )-----------( 102      ( 30 Aug 2020 06:30 )             31.0     08-31    137  |  91<L>  |  103<H>  ----------------------------<  170<H>  3.5   |  33<H>  |  2.93<H>    Ca    11.1<H>      31 Aug 2020 05:14  Phos  3.6     08-30  Mg     2.0     08-30            Serum Pro-Brain Natriuretic Peptide: 70814 pg/mL (08-25 @ 19:45)          New ECG(s): Personally reviewed    Echo:    Stress Testing:     Cath:    Imaging:    Interpretation of Telemetry:  vpaced 80

## 2020-08-31 NOTE — PROGRESS NOTE ADULT - SUBJECTIVE AND OBJECTIVE BOX
DATE OF SERVICE:   08/31/2020     Patient seen and examined.Interim events reviewed.      HPI:74 y/o M w/ PMH of DM2, NICMP/HFrEF (LVIDd 6.7cm, LVEF <20%) s/p CRT-D, AF s/p recent admission w/ DCCV on amio, CKD (BL Cr ~3), and hypothyroid referred from CHF clinic for ADHF.     Overnight Events:   No overnight events. This morning, pt. comfortable and asymptomatic. Current Meds:  aMIOdarone    Tablet 200 milliGRAM(s) Oral daily  apixaban 5 milliGRAM(s) Oral two times a day  carvedilol 6.25 milliGRAM(s) Oral every 12 hours  glucagon  Injectable 1 milliGRAM(s) IntraMuscular once PRN  hydrALAZINE 10 milliGRAM(s) Oral three times a day  insulin lispro (HumaLOG) corrective regimen sliding scale   SubCutaneous three times a day before meals  insulin lispro Injectable (HumaLOG) 1 Unit(s) SubCutaneous three times a day before meals  isosorbide   dinitrate Tablet (ISORDIL) 10 milliGRAM(s) Oral three times a day  levothyroxine 50 MICROGram(s) Oral daily  milrinone Infusion 0.125 MICROgram(s)/kG/Min IV Continuous <Continuous>  polyethylene glycol 3350 17 Gram(s) Oral daily PRN  spironolactone 12.5 milliGRAM(s) Oral daily      Vitals:  T(F): 97.7 (08-31), Max: 97.7 (08-31)  HR: 80 (08-31) (75 - 80)  BP: 113/67 (08-31) (93/57 - 113/67)  RR: 16 (08-31)  SpO2: 98% (08-31)  I&O's Summary    30 Aug 2020 07:01  -  31 Aug 2020 07:00  --------------------------------------------------------  IN: 1087 mL / OUT: 4425 mL / NET: -3338 mL    31 Aug 2020 07:01  -  31 Aug 2020 19:29  --------------------------------------------------------  IN: 509.6 mL / OUT: 1450 mL / NET: -940.4 mL        Physical Exam:  Appearance: No acute distress; well appearing  Eyes: PERRL, EOMI, pink conjunctiva  HENT: Normal oral mucosa  Cardiovascular: RRR, S1, S2, no murmurs, rubs, or gallops; left lower extremity edema more prominent than right; JVD wnl  Respiratory: crackles at bases  Gastrointestinal: soft, non-tender, non-distended with normal bowel sounds  Musculoskeletal: No clubbing; no joint deformity   Neurologic: Non-focal  Lymphatic: No lymphadenopathy  Psychiatry: AAOx3, mood & affect appropriate  Skin: No rashes, ecchymoses, or cyanosis      LABS:              10.0   5.28  )-----------( 102      ( 30 Aug 2020 06:30 )             31.0     08-31    137  |  91<L>  |  103<H>  ----------------------------<  170<H>  3.5   |  33<H>  |  2.93<H>    Ca    11.1<H>      31 Aug 2020 05:14  Phos  3.6     08-30  Mg     2.0     08-30    IMPRESSION AND PLAN:    74M w/ PMHx of HFrEF (EF 10%) s/p ICD, Afib w/ recent admission for HF/AFib s/p DCCV, CKD, DM2, hypothyroidism presents after being referred from CHF clinic for admission due to worsening of LE edema and failure of PO diuretics at home.       Problem/Plan - 1:  ·  Problem: Acute HFrEF (heart failure with reduced ejection fraction).  Plan: - Continue milrinone 0.3 mcg/kg/min, will wean when euvolemic  - Continue Bumex gtt at 1mg/hour. Continue KCl 40 meq daily. Aggressive electrolyte repletion to maintain K 4-4.5 and Mg 2-2.5.  - Continue spironolactone 12.5 mg daily  - Continue carvedilol 6.25 mg BID  - Continue hydralazine 10 mg TID and ISDN 10 mg TID; hold for SBP <90  -RHC once off Milrinone gtt  -Well diuresed lost > 10 Lbs      Problem/Plan - 2:  ·  Problem: Afib.  Plan: -Currently in paced rhythm  -C/w home med amio  -C/w home med carvedilol, as above  - Continue apixaban 5mg BID.     Problem/Plan - 3:  ·  Problem: Mitral insufficiency.  Plan: - Reassess once euvolemic  - Pt already has CRT-D device.     Problem/Plan - 4:  ·  Problem: Thrombocytopenia.  Plan: - Chronic.   Stable

## 2020-08-31 NOTE — PROGRESS NOTE ADULT - ASSESSMENT
76 y/o M w/ PMH of DM2, NICMP/HFrEF (LVIDd 6.7cm, LVEF <20%) s/p CRT-D, AF s/p recent admission w/ DCCV on amio, CKD (BL Cr ~3), and hypothyroid referred from CHF clinic for ADHF. He has diuresed more than 20lbs since admission and is now slightly dry on exam. His SCr is starting to uptrend but he is normotensive and appears to be well supported on current dose of milrinone.

## 2020-08-31 NOTE — PROGRESS NOTE ADULT - PROBLEM SELECTOR PLAN 1
- Continue milrinone 0.3 mcg/kg/min, will wean when renal function stabilizes  - Hold diuretics and encouraged to increase PO fluid intake today  - Continue spironolactone 12.5 mg daily  - Continue carvedilol 6.25 mg BID  - Continue hydralazine 10 mg TID and ISDN 10 mg TID; hold for SBP <90  - Maintain K 4-4.5 and Mg 2-2.5.  - Will plan for RHC to evaluate hemodynamics 2 days after milrinone is weaned  - Would switch to heparin gtt  - Under evaluation for LVAD as destination therapy. He is not a heart transplant candidate given age.  - f/u CT colonography for colon CA screening as part of evaluation

## 2020-09-01 NOTE — PROGRESS NOTE ADULT - PROBLEM SELECTOR PLAN 3
-Currently in paced rhythm  -C/w home med amio  -C/w home med carvedilol, as above  - would d/c apixaban 5mg BID  - start heparin gtt -Currently in paced rhythm  -C/w home med amio  -C/w home med carvedilol, as above  - start heparin gtt

## 2020-09-01 NOTE — PROGRESS NOTE ADULT - PROBLEM SELECTOR PLAN 1
- Would d/c milrinone.   - Hold diuretics and encouraged to increase PO fluid intake today  - Continue spironolactone 12.5 mg daily  - Continue carvedilol 6.25 mg BID  - Continue hydralazine 10 mg TID and ISDN 10 mg TID; hold for SBP <85  - Maintain K 4-4.5 and Mg 2-2.5.  - Plan for RHC tomorrow off milrinone.   - c/w heparin gtt  - Under evaluation for LVAD as destination therapy. He is not a heart transplant candidate given age.

## 2020-09-01 NOTE — PROGRESS NOTE ADULT - ATTENDING COMMENTS
Patient was seen and examined by me on 09/01/2020,interim events noted,labs and radiology studies reviewed.  Loyd Mitchell MD,FACC.  [address] [place] [address].  [facility].  [phone]

## 2020-09-01 NOTE — CHART NOTE - NSCHARTNOTEFT_GEN_A_CORE
[id number]  [facility]    Received phone call from cardiology fellow Dr. Dickson regarding the above patient this evening at 20:10 requesting to discontinue milrinone gtt tonight in preparation for RHC tomorrow. Also requested to change hold parameters for hydralazine 10mg PO TID; hold for SBP <85. Will closely monitor patient/vitals and discuss with day team.       Mesfin Thomas PA-C  Dept of Medicine  [phone]

## 2020-09-01 NOTE — PROGRESS NOTE ADULT - SUBJECTIVE AND OBJECTIVE BOX
SUBJECTIVE / OVERNIGHT EVENTS: pt seen and examined    MEDICATIONS  (STANDING):  aMIOdarone    Tablet 200 milliGRAM(s) Oral daily  apixaban 5 milliGRAM(s) Oral two times a day  carvedilol 6.25 milliGRAM(s) Oral every 12 hours  dextrose 5%. 1000 milliLiter(s) (50 mL/Hr) IV Continuous <Continuous>  dextrose 50% Injectable 12.5 Gram(s) IV Push once  dextrose 50% Injectable 25 Gram(s) IV Push once  dextrose 50% Injectable 25 Gram(s) IV Push once  hydrALAZINE 10 milliGRAM(s) Oral three times a day  insulin lispro (HumaLOG) corrective regimen sliding scale   SubCutaneous three times a day before meals  insulin lispro Injectable (HumaLOG) 1 Unit(s) SubCutaneous three times a day before meals  isosorbide   dinitrate Tablet (ISORDIL) 10 milliGRAM(s) Oral three times a day  levothyroxine 50 MICROGram(s) Oral daily  milrinone Infusion 0.125 MICROgram(s)/kG/Min (2.82 mL/Hr) IV Continuous <Continuous>  spironolactone 12.5 milliGRAM(s) Oral daily    MEDICATIONS  (PRN):  dextrose 40% Gel 15 Gram(s) Oral once PRN Blood Glucose LESS THAN 70 milliGRAM(s)/deciliter  glucagon  Injectable 1 milliGRAM(s) IntraMuscular once PRN Glucose LESS THAN 70 milligrams/deciliter  polyethylene glycol 3350 17 Gram(s) Oral daily PRN Constipation    Vital Signs Last 24 Hrs  T(C): 36.9 (01 Sep 2020 20:29), Max: 36.9 (01 Sep 2020 20:29)  T(F): 98.5 (01 Sep 2020 20:29), Max: 98.5 (01 Sep 2020 20:29)  HR: 80 (01 Sep 2020 20:29) (79 - 101)  BP: 94/51 (01 Sep 2020 20:29) (91/49 - 104/66)  BP(mean): --  RR: 16 (01 Sep 2020 20:29) (16 - 17)  SpO2: 98% (01 Sep 2020 20:29) (98% - 100%)    Skin: No rash.  Eyes: No recent vision problems or eye pain.  ENT: No congestion, ear pain, or sore throat.  Cardiovascular: No chest pain or palpation.  Respiratory: No cough, shortness of breath, congestion, or wheezing.  Gastrointestinal: No abdominal pain, nausea, vomiting, or diarrhea.  Genitourinary: No dysuria.  Musculoskeletal: No joint swelling.  Neurologic: No headache.    PHYSICAL EXAM:  GENERAL: NAD  EYES: EOMI, PERRLA  NECK: Supple, No JVD  CHEST/LUNG: crackles at bases  HEART:  S1 , S2 +  ABDOMEN: soft , bs+  EXTREMITIES:  edema+  NEUROLOGY:alert awake oriented     LABS:  09-01    136  |  92<L>  |  101<H>  ----------------------------<  154<H>  3.5   |  33<H>  |  2.93<H>    Ca    10.6<H>      01 Sep 2020 05:08  Mg     2.9     09-01      Creatinine Trend: 2.93 <--, 2.93 <--, 2.82 <--, 2.50 <--, 2.39 <--, 2.38 <--, 2.40 <--, 2.33 <--, 2.22 <--, 2.34 <--, 2.33 <--                        10.6   5.00  )-----------( 123      ( 01 Sep 2020 05:08 )             33.1     Urine Studies:              PT/INR - ( 31 Aug 2020 23:12 )   PT: 21.1 sec;   INR: 1.82 ratio         PTT - ( 01 Sep 2020 16:09 )  PTT:168.6 sec

## 2020-09-01 NOTE — PROGRESS NOTE ADULT - SUBJECTIVE AND OBJECTIVE BOX
Patient seen and examined at bedside.    Overnight Events:   No overnight events.   Yesterday had CT colonography - limited exam but no polyp.   Hydralazine held due to hypotension.   No complaints at bedside. Pt. feels well.       Current Meds:  aMIOdarone    Tablet 200 milliGRAM(s) Oral daily  carvedilol 6.25 milliGRAM(s) Oral every 12 hours  dextrose 40% Gel 15 Gram(s) Oral once PRN  dextrose 5%. 1000 milliLiter(s) IV Continuous <Continuous>  dextrose 50% Injectable 12.5 Gram(s) IV Push once  dextrose 50% Injectable 25 Gram(s) IV Push once  dextrose 50% Injectable 25 Gram(s) IV Push once  glucagon  Injectable 1 milliGRAM(s) IntraMuscular once PRN  heparin   Injectable 6000 Unit(s) IV Push every 6 hours PRN  heparin   Injectable 3000 Unit(s) IV Push every 6 hours PRN  heparin  Infusion.  Unit(s)/Hr IV Continuous <Continuous>  hydrALAZINE 10 milliGRAM(s) Oral three times a day  hydrocortisone 2.5% Lotion 1 Application(s) Topical two times a day  insulin lispro (HumaLOG) corrective regimen sliding scale   SubCutaneous three times a day before meals  insulin lispro Injectable (HumaLOG) 1 Unit(s) SubCutaneous three times a day before meals  isosorbide   dinitrate Tablet (ISORDIL) 10 milliGRAM(s) Oral three times a day  levothyroxine 50 MICROGram(s) Oral daily  milrinone Infusion 0.125 MICROgram(s)/kG/Min IV Continuous <Continuous>  polyethylene glycol 3350 17 Gram(s) Oral daily PRN  spironolactone 12.5 milliGRAM(s) Oral daily      PAST MEDICAL & SURGICAL HISTORY:  Hypothyroidism  Afib  Type II diabetes mellitus  Aortic insufficiency  Mitral insufficiency  CKD (chronic kidney disease)  Cardiomyopathy: Systolic CHF  Hypertension  History of tonsillectomy: childhood  AICD (automatic cardioverter/defibrillator) present: 8/2012      Vitals:  T(F): 97.4 (09-01), Max: 98.2 (09-01)  HR: 101 (09-01) (75 - 101)  BP: 95/58 (09-01) (91/49 - 104/66)  RR: 17 (09-01)  SpO2: 100% (09-01)  I&O's Summary    31 Aug 2020 07:01  -  01 Sep 2020 07:00  --------------------------------------------------------  IN: 509.6 mL / OUT: 2050 mL / NET: -1540.4 mL    01 Sep 2020 07:01  -  01 Sep 2020 19:02  --------------------------------------------------------  IN: 870.8 mL / OUT: 700 mL / NET: 170.8 mL        Physical Exam:  Appearance: No acute distress; well appearing  Eyes: PERRL, EOMI, pink conjunctiva  HENT: Normal oral mucosa  Cardiovascular: RRR, S1, S2, no murmurs, rubs, or gallops; left lower extremity edema more prominent than right; JVD wnl  Respiratory: crackles at bases  Gastrointestinal: soft, non-tender, non-distended with normal bowel sounds  Musculoskeletal: No clubbing; no joint deformity   Neurologic: Non-focal  Lymphatic: No lymphadenopathy  Psychiatry: AAOx3, mood & affect appropriate  Skin: No rashes, ecchymoses, or cyanosis                          10.6   5.00  )-----------( 123      ( 01 Sep 2020 05:08 )             33.1     09-01    136  |  92<L>  |  101<H>  ----------------------------<  154<H>  3.5   |  33<H>  |  2.93<H>    Ca    10.6<H>      01 Sep 2020 05:08  Mg     2.9     09-01      PT/INR - ( 31 Aug 2020 23:12 )   PT: 21.1 sec;   INR: 1.82 ratio         PTT - ( 01 Sep 2020 16:09 )  PTT:168.6 sec      Serum Pro-Brain Natriuretic Peptide: 23742 pg/mL (08-25 @ 19:45)          New ECG(s): Personally reviewed    Echo:  < from: TTE with Doppler (w/Cont) (08.21.20 @ 15:56) >  Conclusions:  1. Mitral annular calcification.  Tethered mitral valve  leaflets with normal opening. Moderate-severe mitral  regurgitation.  2. Calcified trileaflet aortic valve with normal opening.  Mild aortic regurgitation.  3. Severely dilated left atrium.  LA volume index = 69  cc/m2.  4. Moderate left ventricular enlargement.  5. Endocardial visualization enhanced with intravenous  injection of Ultrasonic Enhancing Agent (Definity). Severe  global left ventricular systolic dysfunction. No left  ventricular thrombus.  6. Severe diastolic dysfunction  7. Right ventricular enlargement with decreased right  ventricular systolic function.  *** Compared with echocardiogram of 7/20/2020, no  significant changes noted.    < end of copied text >      Stress Testing:     Cath:    Imaging:    Interpretation of Telemetry:  A sensed V paced 80

## 2020-09-01 NOTE — PROGRESS NOTE ADULT - SUBJECTIVE AND OBJECTIVE BOX
DATE OF SERVICE:   09/01/2020     Patient seen and examined.Interim events reviewed.     HPI:76 y/o M w/ PMH of DM2, NICMP/HFrEF (LVIDd 6.7cm, LVEF <20%) s/p CRT-D, AF s/p recent admission w/ DCCV on amio, CKD (BL Cr ~3), and hypothyroid referred from CHF clinic for ADHF.     Overnight Events:   No overnight events. This morning, pt. comfortable and asymptomatic.     MEDICATIONS  (STANDING):  aMIOdarone    Tablet 200 milliGRAM(s) Oral daily  apixaban 5 milliGRAM(s) Oral two times a day  carvedilol 6.25 milliGRAM(s) Oral every 12 hours  dextrose 5%. 1000 milliLiter(s) (50 mL/Hr) IV Continuous <Continuous>  dextrose 50% Injectable 12.5 Gram(s) IV Push once  dextrose 50% Injectable 25 Gram(s) IV Push once  dextrose 50% Injectable 25 Gram(s) IV Push once  hydrALAZINE 10 milliGRAM(s) Oral three times a day  insulin lispro (HumaLOG) corrective regimen sliding scale   SubCutaneous three times a day before meals  insulin lispro Injectable (HumaLOG) 1 Unit(s) SubCutaneous three times a day before meals  isosorbide   dinitrate Tablet (ISORDIL) 10 milliGRAM(s) Oral three times a day  levothyroxine 50 MICROGram(s) Oral daily  milrinone Infusion 0.125 MICROgram(s)/kG/Min (2.82 mL/Hr) IV Continuous <Continuous>  spironolactone 12.5 milliGRAM(s) Oral daily    MEDICATIONS  (PRN):  dextrose 40% Gel 15 Gram(s) Oral once PRN Blood Glucose LESS THAN 70 milliGRAM(s)/deciliter  glucagon  Injectable 1 milliGRAM(s) IntraMuscular once PRN Glucose LESS THAN 70 milligrams/deciliter  polyethylene glycol 3350 17 Gram(s) Oral daily PRN Constipation    Vital Signs Last 24 Hrs  T(C): 36.9 (01 Sep 2020 20:29), Max: 36.9 (01 Sep 2020 20:29)  T(F): 98.5 (01 Sep 2020 20:29), Max: 98.5 (01 Sep 2020 20:29)  HR: 80 (01 Sep 2020 20:29) (79 - 101)  BP: 94/51 (01 Sep 2020 20:29) (91/49 - 104/66)  BP(mean): --  RR: 16 (01 Sep 2020 20:29) (16 - 17)  SpO2: 98% (01 Sep 2020 20:29) (98% - 100%)        LABS:  09-01    136  |  92<L>  |  101<H>  ----------------------------<  154<H>  3.5   |  33<H>  |  2.93<H>    Ca    10.6<H>      01 Sep 2020 05:08  Mg     2.9     09-01      Creatinine Trend: 2.93 <--, 2.93 <--, 2.82 <--, 2.50 <--, 2.39 <--, 2.38 <--, 2.40 <--, 2.33 <--, 2.22 <--, 2.34 <--, 2.33 <--                        10.6   5.00  )-----------( 123      ( 01 Sep 2020 05:08 )             33.1   · Assessment		  74M w/ PMHx of HFrEF (EF 10%) s/p ICD, Afib w/ recent admission for CHF/afib s/p DCCV, CKD, DM2, hypothyroidism presents after being referred from CHF clinic for admission due to worsening of LE edema and failure of PO diuretics at home.      Problem/Plan - 1:  ·  Problem: Acute HFrEF (heart failure with reduced ejection fraction).  Plan: - Continue milrinone 0.3 mcg/kg/min, will wean when euvolemic  - Continue Bumex gtt at 1mg/hour. Continue KCl 40 meq daily. Aggressive electrolyte repletion to maintain K 4-4.5 and Mg 2-2.5.  - Continue spironolactone 12.5 mg daily  - Continue carvedilol 6.25 mg BID  - Continue hydralazine 10 mg TID and ISDN 10 mg TID; hold for SBP <90  -RHC once off Milrinone gtt  -Well diuresed lost > 10 Lbs      Problem/Plan - 2:  ·  Problem: Afib.  Plan: -Currently in paced rhythm  -C/w home med amio  -C/w home med carvedilol, as above  - Continue apixaban 5mg BID.     Problem/Plan - 3:  ·  Problem: Mitral insufficiency.  Plan: - Reassess once euvolemic  - Pt already has CRT-D device.         Problem/Plan - 4:  ·  Problem: Thrombocytopenia.  Plan: - Chronic.   Stable

## 2020-09-02 NOTE — PROGRESS NOTE ADULT - ATTENDING COMMENTS
Patient was seen and examined by me on 09/02/2020,interim events noted,labs and radiology studies reviewed.  Loyd Mitchell MD,FACC.  [address] [place] [address].  [id number].  [phone]

## 2020-09-02 NOTE — PROVIDER CONTACT NOTE (MEDICATION) - SITUATION
patient returned from heart cath without heparing gt, as per cath rn, no need for heparin continuation, order still in place

## 2020-09-02 NOTE — PROGRESS NOTE ADULT - SUBJECTIVE AND OBJECTIVE BOX
DATE OF SERVICE:Patient was seen and examined :09/02/2020    PRESENTING CC:Edema    SUBJ: 76 y/o M w/ PMH of DM2, NICMP/HFrEF (LVIDd 6.7cm, LVEF <20%) s/p CRT-D, AF s/p recent admission w/ DCCV on amio, CKD (BL Cr ~3), and hypothyroid referred from CHF clinic for ADHF.       PMH -reviewed admission note, no change since admission  Heart failure: acute [ ] chronic [ ] acute or chronic [ ] diastolic [ ] systolic [ ] combined systolic and diastolic[ ]  EDIE: ATN[ ] renal medullary necrosis [ ] CKD I [ ]CKDII [ ]CKD III [ ]CKD IV [ ]CKD V [ ]Other pathological lesions [ ]    MEDICATIONS  (STANDING):  aMIOdarone    Tablet 200 milliGRAM(s) Oral daily  carvedilol 6.25 milliGRAM(s) Oral every 12 hours  dextrose 5%. 1000 milliLiter(s) (50 mL/Hr) IV Continuous <Continuous>  dextrose 50% Injectable 12.5 Gram(s) IV Push once  dextrose 50% Injectable 25 Gram(s) IV Push once  dextrose 50% Injectable 25 Gram(s) IV Push once  heparin  Infusion.  Unit(s)/Hr (13 mL/Hr) IV Continuous <Continuous>  hydrALAZINE 10 milliGRAM(s) Oral three times a day  hydrocortisone 2.5% Lotion 1 Application(s) Topical two times a day  insulin lispro (HumaLOG) corrective regimen sliding scale   SubCutaneous three times a day before meals  insulin lispro Injectable (HumaLOG) 1 Unit(s) SubCutaneous three times a day before meals  isosorbide   dinitrate Tablet (ISORDIL) 10 milliGRAM(s) Oral three times a day  levothyroxine 50 MICROGram(s) Oral daily  spironolactone 12.5 milliGRAM(s) Oral daily    MEDICATIONS  (PRN):  dextrose 40% Gel 15 Gram(s) Oral once PRN Blood Glucose LESS THAN 70 milliGRAM(s)/deciliter  glucagon  Injectable 1 milliGRAM(s) IntraMuscular once PRN Glucose LESS THAN 70 milligrams/deciliter  heparin   Injectable 6000 Unit(s) IV Push every 6 hours PRN For aPTT less than 40  heparin   Injectable 3000 Unit(s) IV Push every 6 hours PRN For aPTT between 40 - 57  polyethylene glycol 3350 17 Gram(s) Oral daily PRN Constipation              REVIEW OF SYSTEMS:  Constitutional: [ ] fever, [ ]weight loss,  [ ]fatigue  Eyes: [ ] visual changes  Respiratory: [ ]shortness of breath;  [ ] cough, [ ]wheezing, [ ]chills, [ ]hemoptysis  Cardiovascular: [ ] chest pain, [ ]palpitations, [ ]dizziness,  [ ]leg swelling[ ]orthopnea[ ]PND  Gastrointestinal: [ ] abdominal pain, [ ]nausea, [ ]vomiting,  [ ]diarrhea   Genitourinary: [ ] dysuria, [ ] hematuria  Neurologic: [ ] headaches [ ] tremors[ ]weakness  Skin: [ ] itching, [ ]burning, [ ] rashes  Endocrine: [ ] heat or cold intolerance  Musculoskeletal: [ ] joint pain or swelling; [ ] muscle, back, or extremity pain  Psychiatric: [ ] depression, [ ]anxiety, [ ]mood swings, or [ ]difficulty sleeping  Hematologic: [ ] easy bruising, [ ] bleeding gums    [x] All remaining systems negative except as per above.   [ ]Unable to obtain.    Vital Signs Last 24 Hrs  T(C): 36.7 (02 Sep 2020 14:05), Max: 36.9 (01 Sep 2020 20:29)  T(F): 98.1 (02 Sep 2020 14:05), Max: 98.5 (01 Sep 2020 20:29)  HR: 81 (02 Sep 2020 14:05) (79 - 101)  BP: 109/61 (02 Sep 2020 14:05) (94/51 - 109/61)  BP(mean): --  RR: 18 (02 Sep 2020 14:05) (16 - 18)  SpO2: 98% (02 Sep 2020 14:05) (98% - 100%)  I&O's Summary    01 Sep 2020 07:01  -  02 Sep 2020 07:00  --------------------------------------------------------  IN: 1458.8 mL / OUT: 2050 mL / NET: -591.2 mL    02 Sep 2020 07:01  -  02 Sep 2020 14:54  --------------------------------------------------------  IN: 600 mL / OUT: 750 mL / NET: -150 mL        PHYSICAL EXAM:  General: No acute distress BMI-  HEENT: EOMI, PERRL  Neck: Supple, [ ] JVD  Lungs: Equal air entry bilaterally; [ ] rales [ ] wheezing [ ] rhonchi  Heart: Regular rate and rhythm; [ ] murmur   /6 [ ] systolic [ ] diastolic [ ] radiation[ ] rubs [ ]  gallops  Abdomen: Nontender, bowel sounds present  Extremities: No clubbing, cyanosis, [ ] edema  Nervous system:  Alert & Oriented X3, no focal deficits  Psychiatric: Normal affect  Skin: No rashes or lesions    LABS:  09-02    132<L>  |  89<L>  |  103<H>  ----------------------------<  149<H>  4.5   |  31  |  3.32<H>    Ca    10.4      02 Sep 2020 06:23  Mg     2.8     09-02      Creatinine Trend: 3.32<--, 2.93<--, 2.93<--, 2.82<--, 2.50<--, 2.39<--                        9.9    5.55  )-----------( 122      ( 02 Sep 2020 06:23 )             31.2     PT/INR - ( 31 Aug 2020 23:12 )   PT: 21.1 sec;   INR: 1.82 ratio         PTT - ( 02 Sep 2020 06:23 )  PTT:124.5 sec  Lipid Panel:   Cardiac Enzymes:           RADIOLOGY:    ECG [my interpretation]:    TELEMETRY:    ECHO:    STRESS TEST:    CATHETERIZATION:      IMPRESSION AND PLAN: DATE OF SERVICE:Patient was seen and examined :09/02/2020    PRESENTING CC:Edema    SUBJ: 74 y/o M w/ PMH of DM2, NICMP/HFrEF (LVIDd 6.7cm, LVEF <20%) s/p CRT-D, AF s/p recent admission w/ DCCV on amio, CKD (BL Cr ~3), and hypothyroid referred from CHF clinic for ADHF.       PMH -reviewed admission note, no change since admission  Heart failure: acute [ ] chronic [ ] acute or chronic [x ] diastolic [ ] systolic [x ] combined systolic and diastolic[ ]  EDIE: ATN[ ] renal medullary necrosis [ ] CKD I [ ]CKDII [ ]CKD III [x ]CKD IV [ ]CKD V [ ]Other pathological lesions [ ]    MEDICATIONS  (STANDING):  aMIOdarone    Tablet 200 milliGRAM(s) Oral daily  carvedilol 6.25 milliGRAM(s) Oral every 12 hours  heparin  Infusion.  Unit(s)/Hr (13 mL/Hr) IV Continuous <Continuous>  hydrALAZINE 10 milliGRAM(s) Oral three times a day  hydrocortisone 2.5% Lotion 1 Application(s) Topical two times a day  insulin lispro (HumaLOG) corrective regimen sliding scale   SubCutaneous three times a day before meals  insulin lispro Injectable (HumaLOG) 1 Unit(s) SubCutaneous three times a day before meals  isosorbide   dinitrate Tablet (ISORDIL) 10 milliGRAM(s) Oral three times a day  levothyroxine 50 MICROGram(s) Oral daily  spironolactone 12.5 milliGRAM(s) Oral daily    MEDICATIONS  (PRN):  dextrose 40% Gel 15 Gram(s) Oral once PRN Blood Glucose LESS THAN 70 milliGRAM(s)/deciliter  glucagon  Injectable 1 milliGRAM(s) IntraMuscular once PRN Glucose LESS THAN 70 milligrams/deciliter  heparin   Injectable 6000 Unit(s) IV Push every 6 hours PRN For aPTT less than 40  heparin   Injectable 3000 Unit(s) IV Push every 6 hours PRN For aPTT between 40 - 57  polyethylene glycol 3350 17 Gram(s) Oral daily PRN Constipation              REVIEW OF SYSTEMS:  Constitutional: [ ] fever, [ ]weight loss,  [x ]fatigue  Eyes: [ ] visual changes  Respiratory: [ ]shortness of breath;  [ ] cough, [ ]wheezing, [ ]chills, [ ]hemoptysis  Cardiovascular: [ ] chest pain, [ ]palpitations, [ ]dizziness,  [ ]leg swelling[ ]orthopnea[ ]PND  Gastrointestinal: [ ] abdominal pain, [ ]nausea, [ ]vomiting,  [ ]diarrhea   Genitourinary: [ ] dysuria, [ ] hematuria  Neurologic: [ ] headaches [ ] tremors[ ]weakness  Skin: [ ] itching, [ ]burning, [ ] rashes  Endocrine: [ ] heat or cold intolerance  Musculoskeletal: [ ] joint pain or swelling; [ ] muscle, back, or extremity pain  Psychiatric: [ ] depression, [ ]anxiety, [ ]mood swings, or [ ]difficulty sleeping  Hematologic: [ ] easy bruising, [ ] bleeding gums    [x] All remaining systems negative except as per above.   [ ]Unable to obtain.    Vital Signs Last 24 Hrs  T(C): 36.7 (02 Sep 2020 14:05), Max: 36.9 (01 Sep 2020 20:29)  T(F): 98.1 (02 Sep 2020 14:05), Max: 98.5 (01 Sep 2020 20:29)  HR: 81 (02 Sep 2020 14:05) (79 - 101)  BP: 109/61 (02 Sep 2020 14:05) (94/51 - 109/61)  RR: 18 (02 Sep 2020 14:05) (16 - 18)  SpO2: 98% (02 Sep 2020 14:05) (98% - 100%)  I&O's Summary    01 Sep 2020 07:01  -  02 Sep 2020 07:00  --------------------------------------------------------  IN: 1458.8 mL / OUT: 2050 mL / NET: -591.2 mL    02 Sep 2020 07:01  -  02 Sep 2020 14:54  --------------------------------------------------------  IN: 600 mL / OUT: 750 mL / NET: -150 mL        PHYSICAL EXAM:  General: No acute distress BMI-26  HEENT: EOMI, PERRL  Neck: Supple, [x ] JVD  Lungs: Equal air entry bilaterally; [ ] rales [ ] wheezing [ ] rhonchi  Heart: Regular rate and rhythm; [x ] murmur  2 /6 [x ] systolic [ ] diastolic [ ] radiation[ ] rubs [ ]  gallops  Abdomen: Nontender, bowel sounds present  Extremities: No clubbing, cyanosis, [x ] edema  Nervous system:  Alert & Oriented X3, no focal deficits  Psychiatric: Normal affect  Skin: No rashes or lesions    LABS:  09-02    132<L>  |  89<L>  |  103<H>  ----------------------------<  149<H>  4.5   |  31  |  3.32<H>    Ca    10.4      02 Sep 2020 06:23  Mg     2.8     09-02      Creatinine Trend: 3.32<--, 2.93<--, 2.93<--, 2.82<--, 2.50<--, 2.39<--                        9.9    5.55  )-----------( 122      ( 02 Sep 2020 06:23 )             31.2     PT/INR - ( 31 Aug 2020 23:12 )   PT: 21.1 sec;   INR: 1.82 ratio         PTT - ( 02 Sep 2020 06:23 )  PTT:124.5 sec    TELEMETRY:Ventricular paced  74 y/o M w/ PMH of DM2, NICMP/HFrEF (LVIDd 6.7cm, LVEF <20%) s/p CRT-D, AF s/p recent admission w/ DCCV on amio, CKD (BL Cr ~3), and hypothyroid referred from CHF clinic for ADHF. He has diuresed more than 20lbs since admission and is now slightly dry on exam.         Problem/Plan - 1:  ·  Problem: Acute HFrEF (heart failure with reduced ejection fraction).  Plan: - Would restart milrinone 0.25mcg/kg/min  - Would continue to Hold diuretics and encouraged to increase PO fluid intake today  - Continue spironolactone 12.5 mg daily  - Continue carvedilol 6.25 mg BID  - Continue hydralazine 10 mg TID and ISDN 10 mg TID; hold for SBP <85  - Maintain K 4-4.5 and Mg 2-2.5.  - c/w heparin gtt  - Under evaluation for LVAD as destination therapy. He is not a heart transplant candidate given age.   -RHC-normal RAP, elevated pulmonary pressures, elevated wedge pressure, and borderline low cardiac output  -Restart Milrinone          Problem/Plan - 2:  ·  Problem: Acute renal failure superimposed on stage 3 chronic kidney disease, unspecified acute renal failure type.  Plan: - Creatinine stable  - hold diuretics, reassess volume status/creatinine in AM off milrinone  - avoid nephrotoxic medications.     Problem/Plan - 3:  ·  Problem: Afib.  Plan: -Currently in paced rhythm  -C/w home med amio  -C/w home med carvedilol, as above  - start heparin gtt.     Problem/Plan - 4:  ·  Problem: Mitral insufficiency.  Plan: - Reassess once euvolemic  - Pt already has CRT-D device.     Problem/Plan - 5:  ·  Problem: Thrombocytopenia.  Plan: - Chronic. Appreciate hematology c/s.     Problem/Plan - 6:  Problem: Papillary thyroid carcinoma. Plan: - appreciate endo c/s.   - No containdication to LVAD.   - Fine needle aspiration showing L upper pole nodule to be [place] VI per outpatient endo note 8/19.    IMPRESSION AND PLAN:

## 2020-09-02 NOTE — PROGRESS NOTE ADULT - SUBJECTIVE AND OBJECTIVE BOX
SUBJECTIVE / OVERNIGHT EVENTS: pt seen and examined    MEDICATIONS  (STANDING):  aMIOdarone    Tablet 200 milliGRAM(s) Oral daily  carvedilol 6.25 milliGRAM(s) Oral every 12 hours  dextrose 5%. 1000 milliLiter(s) (50 mL/Hr) IV Continuous <Continuous>  dextrose 50% Injectable 12.5 Gram(s) IV Push once  dextrose 50% Injectable 25 Gram(s) IV Push once  dextrose 50% Injectable 25 Gram(s) IV Push once  hydrALAZINE 10 milliGRAM(s) Oral three times a day  insulin lispro (HumaLOG) corrective regimen sliding scale   SubCutaneous three times a day before meals  insulin lispro Injectable (HumaLOG) 1 Unit(s) SubCutaneous three times a day before meals  isosorbide   dinitrate Tablet (ISORDIL) 10 milliGRAM(s) Oral three times a day  levothyroxine 50 MICROGram(s) Oral daily  milrinone Infusion 0.25 MICROgram(s)/kG/Min (5.63 mL/Hr) IV Continuous <Continuous>  spironolactone 12.5 milliGRAM(s) Oral daily    MEDICATIONS  (PRN):  dextrose 40% Gel 15 Gram(s) Oral once PRN Blood Glucose LESS THAN 70 milliGRAM(s)/deciliter  glucagon  Injectable 1 milliGRAM(s) IntraMuscular once PRN Glucose LESS THAN 70 milligrams/deciliter  heparin   Injectable 6000 Unit(s) IV Push every 6 hours PRN For aPTT less than 40  heparin   Injectable 3000 Unit(s) IV Push every 6 hours PRN For aPTT between 40 - 57  polyethylene glycol 3350 17 Gram(s) Oral daily PRN Constipation    Vital Signs Last 24 Hrs  T(C): 36.4 (02 Sep 2020 16:30), Max: 36.8 (02 Sep 2020 04:32)  T(F): 97.6 (02 Sep 2020 16:30), Max: 98.3 (02 Sep 2020 04:32)  HR: 80 (02 Sep 2020 18:29) (79 - 81)  BP: 99/66 (02 Sep 2020 18:29) (96/59 - 109/61)  BP(mean): --  RR: 18 (02 Sep 2020 17:48) (16 - 18)  SpO2: 99% (02 Sep 2020 17:48) (96% - 100%)    Skin: No rash.  Eyes: No recent vision problems or eye pain.  ENT: No congestion, ear pain, or sore throat.  Cardiovascular: No chest pain or palpation.  Respiratory: No cough, shortness of breath, congestion, or wheezing.  Gastrointestinal: No abdominal pain, nausea, vomiting, or diarrhea.  Genitourinary: No dysuria.  Musculoskeletal: No joint swelling.  Neurologic: No headache.    PHYSICAL EXAM:  GENERAL: NAD  EYES: EOMI, PERRLA  NECK: Supple, No JVD  CHEST/LUNG: crackles at bases  HEART:  S1 , S2 +  ABDOMEN: soft , bs+  EXTREMITIES:  edema+  NEUROLOGY:alert awake oriented     LABS:  09-02    132<L>  |  89<L>  |  103<H>  ----------------------------<  149<H>  4.5   |  31  |  3.32<H>    Ca    10.4      02 Sep 2020 06:23  Mg     2.8     09-02      Creatinine Trend: 3.32 <--, 2.93 <--, 2.93 <--, 2.82 <--, 2.50 <--, 2.39 <--, 2.38 <--, 2.40 <--, 2.33 <--, 2.22 <--                        9.9    5.55  )-----------( 122      ( 02 Sep 2020 06:23 )             31.2     Urine Studies:              PT/INR - ( 31 Aug 2020 23:12 )   PT: 21.1 sec;   INR: 1.82 ratio         PTT - ( 02 Sep 2020 06:23 )  PTT:124.5 sec

## 2020-09-02 NOTE — PROGRESS NOTE ADULT - PROBLEM SELECTOR PLAN 3
-Currently in paced rhythm  -C/w home med amio  -C/w home med carvedilol, as above  - start heparin gtt

## 2020-09-02 NOTE — PROGRESS NOTE ADULT - SUBJECTIVE AND OBJECTIVE BOX
Patient seen and examined at bedside.    Overnight Events:   Overnight milrinone held  Carvedilol evening dose held due to systolic pressure  Pt asymptomatic. Feels well. Remains mostly in bed with audio books but walked yesterday a few laps around the unit without issues    REVIEW OF SYSTEMS:  Constitutional:     [x] negative [ ] fevers [ ] chills [ ] weight loss [ ] weight gain  HEENT:                  [x] negative [ ] dry eyes [ ] eye irritation [ ] postnasal drip [ ] nasal congestion  CV:                         [x] negative  [] chest pain [ ] orthopnea [ ] palpitations [ ] murmur  Resp:                     [x] negative [ ] cough [ ] shortness of breath [ ] dyspnea [ ] wheezing [ ] sputum [ ]hemoptysis  GI:                          [x] negative [ ] nausea [ ] vomiting [ ] diarrhea [ ] constipation [ ] abd pain [ ] dysphagia   :                        [x] negative [ ] dysuria [ ] nocturia [ ] hematuria [ ] increased urinary frequency  Musculoskeletal: [x] negative [ ] back pain [ ] myalgias [ ] arthralgias [ ] fracture  Skin:                       [x] negative [ ] rash [ ] itch  Neurological:        [x] negative [ ] headache [ ] dizziness [ ] syncope [ ] weakness [ ] numbness  Psychiatric:           [x] negative [ ] anxiety [ ] depression  Endocrine:            [x] negative [ ] diabetes [ ] thyroid problem  Heme/Lymph:      [x] negative [ ] anemia [ ] bleeding problem  Allergic/Immune: [x] negative [ ] itchy eyes [ ] nasal discharge [ ] hives [ ] angioedema    [x] All other systems negative  [ ] Unable to assess ROS due to    Current Meds:  aMIOdarone    Tablet 200 milliGRAM(s) Oral daily  carvedilol 6.25 milliGRAM(s) Oral every 12 hours  dextrose 40% Gel 15 Gram(s) Oral once PRN  dextrose 5%. 1000 milliLiter(s) IV Continuous <Continuous>  dextrose 50% Injectable 12.5 Gram(s) IV Push once  dextrose 50% Injectable 25 Gram(s) IV Push once  dextrose 50% Injectable 25 Gram(s) IV Push once  glucagon  Injectable 1 milliGRAM(s) IntraMuscular once PRN  heparin   Injectable 6000 Unit(s) IV Push every 6 hours PRN  heparin   Injectable 3000 Unit(s) IV Push every 6 hours PRN  heparin  Infusion.  Unit(s)/Hr IV Continuous <Continuous>  hydrALAZINE 10 milliGRAM(s) Oral three times a day  hydrocortisone 2.5% Lotion 1 Application(s) Topical two times a day  insulin lispro (HumaLOG) corrective regimen sliding scale   SubCutaneous three times a day before meals  insulin lispro Injectable (HumaLOG) 1 Unit(s) SubCutaneous three times a day before meals  isosorbide   dinitrate Tablet (ISORDIL) 10 milliGRAM(s) Oral three times a day  levothyroxine 50 MICROGram(s) Oral daily  polyethylene glycol 3350 17 Gram(s) Oral daily PRN  spironolactone 12.5 milliGRAM(s) Oral daily      Vitals:  T(F): 98.3 (09-02), Max: 98.5 (09-01)  HR: 79 (09-02) (79 - 101)  BP: 96/59 (09-02) (94/51 - 96/59)  RR: 16 (09-02)  SpO2: 98% (09-02)  I&O's Summary    01 Sep 2020 07:01  -  02 Sep 2020 07:00  --------------------------------------------------------  IN: 1458.8 mL / OUT: 2050 mL / NET: -591.2 mL    02 Sep 2020 07:01  -  02 Sep 2020 13:13  --------------------------------------------------------  IN: 360 mL / OUT: 550 mL / NET: -190 mL      Physical Exam:   Appearance: No acute distress; well appearing  Eyes: PERRL, EOMI, pink conjunctiva  HENT: Normal oral mucosa  Cardiovascular: RRR, S1, S2, s3, holo systolic murmur loudest over left sternal border. No rubs, or gallops; left lower extremity edema more prominent than right; JVD wnl  Respiratory: crackles at bases  Gastrointestinal: soft, non-tender, non-distended with normal bowel sounds  Musculoskeletal: No clubbing; no joint deformity   Neurologic: Non-focal  Lymphatic: No lymphadenopathy  Psychiatry: AAOx3, mood & affect appropriate  Skin: No rashes, ecchymoses, or cyanosis                        9.9    5.55  )-----------( 122      ( 02 Sep 2020 06:23 )             31.2     09-02    132<L>  |  89<L>  |  103<H>  ----------------------------<  149<H>  4.5   |  31  |  3.32<H>    Ca    10.4      02 Sep 2020 06:23  Mg     2.8     09-02      PT/INR - ( 31 Aug 2020 23:12 )   PT: 21.1 sec;   INR: 1.82 ratio         PTT - ( 02 Sep 2020 06:23 )  PTT:124.5 sec              New ECG(s): Personally reviewed    Echo:    Stress Testing:     Cath:    Imaging:    Interpretation of Telemetry:  V paced 7--80

## 2020-09-02 NOTE — PROGRESS NOTE ADULT - ATTENDING COMMENTS
Mr. Jarquin's right heart catheterization today showed normal RAP, elevated pulmonary pressures, elevated wedge pressure, and borderline low cardiac output. Given his worsening renal function with cessation of milrinone, we will resume for now and monitor his response.

## 2020-09-02 NOTE — CHART NOTE - NSCHARTNOTEFT_GEN_A_CORE
[id number]  [facility]    Patient s/p [facility] today, heparin gtt was turned off for procedure. Discussed with attending Dr. Diehl, will re-start heparin gtt given underlying Afib.         Mesfin Thomas PA-C  Dept of Medicine  [phone] [id number]  [facility]    Patient s/p [facility] today, heparin gtt was turned off for procedure. Discussed with attending Dr. Diehl, will re-start heparin gtt given underlying Afib in the event patient needs a procedure this admission. Monitor closely for signs/symptoms of bleeding. Will continue to closely monitor patient/vitals and discuss with day team.         Mesfin Thomas PA-C  Dept of Medicine  [id number]

## 2020-09-02 NOTE — PROGRESS NOTE ADULT - ASSESSMENT
76 y/o M w/ PMH of DM2, NICMP/HFrEF (LVIDd 6.7cm, LVEF <20%) s/p CRT-D, AF s/p recent admission w/ DCCV on amio, CKD (BL Cr ~3), and hypothyroid referred from CHF clinic for ADHF. He has diuresed more than 20lbs since admission and is now slightly dry on exam. Taken off milrinone for RHC. Had rising creatinine today.

## 2020-09-02 NOTE — PROGRESS NOTE ADULT - PROBLEM SELECTOR PLAN 1
- Would restart milrinone 0.25mcg/kg/min  - Would continue to Hold diuretics and encouraged to increase PO fluid intake today  - Continue spironolactone 12.5 mg daily  - Continue carvedilol 6.25 mg BID  - Continue hydralazine 10 mg TID and ISDN 10 mg TID; hold for SBP <85  - Maintain K 4-4.5 and Mg 2-2.5.  - c/w heparin gtt  - Under evaluation for LVAD as destination therapy. He is not a heart transplant candidate given age.

## 2020-09-02 NOTE — PROGRESS NOTE ADULT - PROBLEM SELECTOR PLAN 2
- Creatinine stable  - hold diuretics, reassess volume status/creatinine in AM off milrinone  - avoid nephrotoxic medications

## 2020-09-03 NOTE — PROGRESS NOTE ADULT - SUBJECTIVE AND OBJECTIVE BOX
DATE OF SERVICE:Patient was seen and examined :09/03/2020    PRESENTING CC:Edema ADHF    SUBJ: 74 y/o M w/ PMH of DM2, NICMP/HFrEF (LVIDd 6.7cm, LVEF <20%) s/p CRT-D, AF s/p recent admission w/ DCCV on amio, CKD (BL Cr ~3), and hypothyroid referred from CHF clinic for ADHF. Is back on Milrinone is ambulating      PMH -reviewed admission note, no change since admission  Heart failure: acute [ ] chronic [ ] acute or chronic [x ] diastolic [ ] systolic [x ] combined systolic and diastolic[ ]  EDIE: ATN[ ] renal medullary necrosis [ ] CKD I [ ]CKDII [ ]CKD III [ ]CKD IV [ ]CKD V [ ]Other pathological lesions [ ]    Current Meds:  aMIOdarone    Tablet 200 milliGRAM(s) Oral daily  carvedilol 6.25 milliGRAM(s) Oral every 12 hours  glucagon  Injectable 1 milliGRAM(s) IntraMuscular once PRN  heparin   Injectable 6000 Unit(s) IV Push every 6 hours PRN  heparin   Injectable 3000 Unit(s) IV Push every 6 hours PRN  heparin  Infusion. 700 Unit(s)/Hr IV Continuous <Continuous>  hydrALAZINE 10 milliGRAM(s) Oral three times a day  insulin lispro (HumaLOG) corrective regimen sliding scale   SubCutaneous three times a day before meals  insulin lispro Injectable (HumaLOG) 1 Unit(s) SubCutaneous three times a day before meals  isosorbide   dinitrate Tablet (ISORDIL) 10 milliGRAM(s) Oral three times a day  levothyroxine 50 MICROGram(s) Oral daily  milrinone Infusion 0.25 MICROgram(s)/kG/Min IV Continuous <Continuous>  polyethylene glycol 3350 17 Gram(s) Oral daily PRN  spironolactone 12.5 milliGRAM(s) Oral daily            REVIEW OF SYSTEMS:  Constitutional: [ ] fever, [ ]weight loss,  [x ]fatigue  Eyes: [ ] visual changes  Respiratory: [ ]shortness of breath;  [ ] cough, [ ]wheezing, [ ]chills, [ ]hemoptysis  Cardiovascular: [ ] chest pain, [ ]palpitations, [ ]dizziness,  [x ]leg swelling[ ]orthopnea[ ]PND  Gastrointestinal: [ ] abdominal pain, [ ]nausea, [ ]vomiting,  [ ]diarrhea   Genitourinary: [ ] dysuria, [ ] hematuria  Neurologic: [ ] headaches [ ] tremors[ ]weakness  Skin: [ ] itching, [ ]burning, [ ] rashes  Endocrine: [ ] heat or cold intolerance  Musculoskeletal: [ ] joint pain or swelling; [ ] muscle, back, or extremity pain  Psychiatric: [ ] depression, [ ]anxiety, [ ]mood swings, or [ ]difficulty sleeping  Hematologic: [ ] easy bruising, [ ] bleeding gums    [x] All remaining systems negative except as per above.   [ ]Unable to obtain.    Vital Signs Last 24 Hrs  T(F): 98.3 (09-03), Max: 98.5 (09-03)  HR: 79 (09-03) (72 - 84)  BP: 101/63 (09-03) (97/60 - 110/74)  RR: 16 (09-03)  SpO2: 99% (09-03)  I&O's Summary    02 Sep 2020 07:01  -  03 Sep 2020 07:00  --------------------------------------------------------  IN: 1052.7 mL / OUT: 1350 mL / NET: -297.3 mL    03 Sep 2020 07:01  -  03 Sep 2020 15:19  --------------------------------------------------------  IN: 420 mL / OUT: 950 mL / NET: -530 mL        PHYSICAL EXAM:  General: No acute distress BMI-25  HEENT: EOMI, PERRL  Neck: Supple, [x ] JVD  Lungs: Equal air entry bilaterally; [ ] rales [ ] wheezing [ ] rhonchi  Heart: Regular rate and rhythm; [x ] murmur   2/6 [x ] systolic [ ] diastolic [ ] radiation[ ] rubs [ ]  gallops  Abdomen: Nontender, bowel sounds present  Extremities: No clubbing, cyanosis, [x ] edema  Nervous system:  Alert & Oriented X3, no focal deficits  Psychiatric: Normal affect  Skin: No rashes or lesions    LABS:             9.4    5.47  )-----------( 109      ( 03 Sep 2020 05:08 )             30.2     09-03    133<L>  |  92<L>  |  110<H>  ----------------------------<  220<H>  4.9   |  28  |  3.16<H>    Ca    10.1      03 Sep 2020 05:09  Mg     2.8     09-02      PTT - ( 03 Sep 2020 12:03 )  PTT:85.5 sec      TELEMETRY:Ventricular paced    IMPRESSION AND PLAN:    74M w/ PMHx of HFrEF (EF 10%) s/p ICD, Afib w/ recent admission for CHF/afib s/p DCCV, CKD, DM2, hypothyroidism presents after being referred from CHF clinic for admission due to worsening of LE edema and failure of PO diuretics at home.      Problem/Plan - 1:  ·  Problem: Acute HFrEF (heart failure with reduced ejection fraction).  Plan: - Continue milrinone 0.3 mcg/kg/min, will wean when euvolemic  - Continue Bumex gtt at 1mg/hour. Continue KCl 40 meq daily. Aggressive electrolyte repletion to maintain K 4-4.5 and Mg 2-2.5.  - Continue spironolactone 12.5 mg daily  - Continue carvedilol 6.25 mg BID  - Continue hydralazine 10 mg TID and ISDN 10 mg TID; hold for SBP <90  -Well diuresed lost > 10 Lbs  -On milrinone in attempt to improve renal function to baseline~~2      Problem/Plan - 2:  ·  Problem: Afib.  Plan: -Currently in paced rhythm  -C/w home med amio  -C/w home med carvedilol, as above  - Continue apixaban 5mg BID.     Problem/Plan - 3:  ·  Problem: Mitral insufficiency.  Plan: - Reassess once euvolemic  - Pt already has CRT-D device.   Problem/Plan - 4:  ·  Problem: Thrombocytopenia.  Plan: - Chronic.   Stable

## 2020-09-03 NOTE — PROGRESS NOTE ADULT - PROBLEM SELECTOR PLAN 1
- c/w milrinone 0.25mcg/kg/min  - Would continue to Hold diuretics and encouraged to increase PO fluid intake today  - Continue spironolactone 12.5 mg daily  - Continue carvedilol 6.25 mg BID  - Continue hydralazine 10 mg TID and ISDN 10 mg TID; hold for SBP <85  - Maintain K 4-4.5 and Mg 2-2.5.  - c/w heparin gtt  - Under evaluation for LVAD as destination therapy. He is not a heart transplant candidate given age. - c/w milrinone 0.25mcg/kg/min  - Would continue to Hold diuretics.  - Continue spironolactone 12.5 mg daily  - Continue carvedilol 6.25 mg BID  - Continue hydralazine 10 mg TID and ISDN 10 mg TID; hold for SBP <85  - Maintain K 4-4.5 and Mg 2-2.5.  - c/w heparin gtt  - Under evaluation for LVAD as destination therapy. He is not a heart transplant candidate given age.

## 2020-09-03 NOTE — PROGRESS NOTE ADULT - SUBJECTIVE AND OBJECTIVE BOX
SUBJECTIVE / OVERNIGHT EVENTS: pt seen and examined    MEDICATIONS  (STANDING):  aMIOdarone    Tablet 200 milliGRAM(s) Oral daily  carvedilol 6.25 milliGRAM(s) Oral every 12 hours  dextrose 5%. 1000 milliLiter(s) (50 mL/Hr) IV Continuous <Continuous>  dextrose 50% Injectable 12.5 Gram(s) IV Push once  dextrose 50% Injectable 25 Gram(s) IV Push once  dextrose 50% Injectable 25 Gram(s) IV Push once  heparin  Infusion. 700 Unit(s)/Hr (7 mL/Hr) IV Continuous <Continuous>  hydrALAZINE 10 milliGRAM(s) Oral three times a day  insulin lispro (HumaLOG) corrective regimen sliding scale   SubCutaneous three times a day before meals  insulin lispro Injectable (HumaLOG) 1 Unit(s) SubCutaneous three times a day before meals  isosorbide   dinitrate Tablet (ISORDIL) 10 milliGRAM(s) Oral three times a day  levothyroxine 50 MICROGram(s) Oral daily  milrinone Infusion 0.25 MICROgram(s)/kG/Min (5.63 mL/Hr) IV Continuous <Continuous>  spironolactone 12.5 milliGRAM(s) Oral daily    MEDICATIONS  (PRN):  dextrose 40% Gel 15 Gram(s) Oral once PRN Blood Glucose LESS THAN 70 milliGRAM(s)/deciliter  glucagon  Injectable 1 milliGRAM(s) IntraMuscular once PRN Glucose LESS THAN 70 milligrams/deciliter  heparin   Injectable 6000 Unit(s) IV Push every 6 hours PRN For aPTT less than 40  heparin   Injectable 3000 Unit(s) IV Push every 6 hours PRN For aPTT between 40 - 57  polyethylene glycol 3350 17 Gram(s) Oral daily PRN Constipation    Vital Signs Last 24 Hrs  T(C): 36.8 (03 Sep 2020 20:44), Max: 36.9 (03 Sep 2020 04:35)  T(F): 98.3 (03 Sep 2020 20:44), Max: 98.5 (03 Sep 2020 04:35)  HR: 81 (03 Sep 2020 20:44) (77 - 84)  BP: 109/65 (03 Sep 2020 20:44) (98/61 - 109/65)  BP(mean): --  RR: 17 (03 Sep 2020 20:44) (16 - 17)  SpO2: 99% (03 Sep 2020 20:44) (97% - 99%)    Skin: No rash.  Eyes: No recent vision problems or eye pain.  ENT: No congestion, ear pain, or sore throat.  Cardiovascular: No chest pain or palpation.  Respiratory: No cough, shortness of breath, congestion, or wheezing.  Gastrointestinal: No abdominal pain, nausea, vomiting, or diarrhea.  Genitourinary: No dysuria.  Musculoskeletal: No joint swelling.  Neurologic: No headache.    PHYSICAL EXAM:  GENERAL: NAD  EYES: EOMI, PERRLA  NECK: Supple, No JVD  CHEST/LUNG: crackles at bases  HEART:  S1 , S2 +  ABDOMEN: soft , bs+  EXTREMITIES:  edema+  NEUROLOGY:alert awake oriented     LABS:  09-03    133<L>  |  92<L>  |  110<H>  ----------------------------<  220<H>  4.9   |  28  |  3.16<H>    Ca    10.1      03 Sep 2020 05:09  Mg     2.8     09-02      Creatinine Trend: 3.16 <--, 3.32 <--, 2.93 <--, 2.93 <--, 2.82 <--, 2.50 <--, 2.39 <--, 2.38 <--, 2.40 <--                        9.4    5.47  )-----------( 109      ( 03 Sep 2020 05:08 )             30.2     Urine Studies:              PTT - ( 03 Sep 2020 19:38 )  PTT:56.3 sec

## 2020-09-03 NOTE — PROGRESS NOTE ADULT - ASSESSMENT
74 y/o M w/ PMH of DM2, NICMP/HFrEF (LVIDd 6.7cm, LVEF <20%) s/p CRT-D, AF s/p recent admission w/ DCCV on amio, CKD (BL Cr ~3), and hypothyroid referred from CHF clinic for ADHF. He has diuresed more than 20lbs since admission and is now slightly dry on exam. Taken off milrinone for RHC. Had rising creatinine yesterday. Now improving.

## 2020-09-03 NOTE — CONSULT NOTE ADULT - SUBJECTIVE AND OBJECTIVE BOX
Patient is a 75y old  Male who presents with a chief complaint of lower extremity edema (03 Sep 2020 15:19)      HPI:  74M w/ PMHx of HFrEF (EF 10%) s/p ICD, Afib w/ recent admission for CHF/afib s/p DCCV, CKD, DM2, hypothyroidism presents after being referred from CHF clinic for admission due to worsening of LE edema and failure of PO diuretics at home. Per patient was relatively functional until ~1 mo ago when he developed palpitations and light headedness. He was found to be hypotensive and in Afib w/ RVR resulting in his hospitalization at the end  of July. His course was c/b EDIE on CKD and persistent hypotension. He improved after dccv and was ultimately discharged home off entresto, coreg and diuretics due to c/f worsening hypotension. After discharge, patient notes worsening LE edema. He was instructed to resume lasix, which was ultimately escalated to toresemide 80mg bid w/ metolazone 2.5 mg daily. With this regimen he noted increased urination and some mild improvement. However, due to ongoing reduced functional capacity, fatigue, and ALCAZAR he was referred for admission for IV diuretics. Denies any cp or palpitations. Denies repaid HR. Notes weight gain since discharge. Continues to have poor appetite and reduced exercise capacity. No fevers, chills. Notes chronic nonproductive cough which is unchanged. Sleeps elevated due to back pain, denies significant orthopnea. Is able to complete ADLs at baseline with some assistance from his niece. Notes increased urination with high dose diuretics, but no dysuria or hematuria. No abd pain, nausea, vomiting. No diarrhea. (21 Aug 2020 09:51)      PAST MEDICAL & SURGICAL HISTORY:  Hypothyroidism  Afib  Type II diabetes mellitus  Aortic insufficiency  Mitral insufficiency  CKD (chronic kidney disease)  Cardiomyopathy: Systolic CHF  Hypertension  History of tonsillectomy: childhood  AICD (automatic cardioverter/defibrillator) present: 8/2012    MEDICATIONS  (STANDING):  aMIOdarone    Tablet 200 milliGRAM(s) Oral daily  carvedilol 6.25 milliGRAM(s) Oral every 12 hours  dextrose 5%. 1000 milliLiter(s) (50 mL/Hr) IV Continuous <Continuous>  dextrose 50% Injectable 12.5 Gram(s) IV Push once  dextrose 50% Injectable 25 Gram(s) IV Push once  dextrose 50% Injectable 25 Gram(s) IV Push once  heparin  Infusion. 700 Unit(s)/Hr (7 mL/Hr) IV Continuous <Continuous>  hydrALAZINE 10 milliGRAM(s) Oral three times a day  insulin lispro (HumaLOG) corrective regimen sliding scale   SubCutaneous three times a day before meals  insulin lispro Injectable (HumaLOG) 1 Unit(s) SubCutaneous three times a day before meals  isosorbide   dinitrate Tablet (ISORDIL) 10 milliGRAM(s) Oral three times a day  levothyroxine 50 MICROGram(s) Oral daily  milrinone Infusion 0.25 MICROgram(s)/kG/Min (5.63 mL/Hr) IV Continuous <Continuous>  spironolactone 12.5 milliGRAM(s) Oral daily    MEDICATIONS  (PRN):  dextrose 40% Gel 15 Gram(s) Oral once PRN Blood Glucose LESS THAN 70 milliGRAM(s)/deciliter  glucagon  Injectable 1 milliGRAM(s) IntraMuscular once PRN Glucose LESS THAN 70 milligrams/deciliter  heparin   Injectable 6000 Unit(s) IV Push every 6 hours PRN For aPTT less than 40  heparin   Injectable 3000 Unit(s) IV Push every 6 hours PRN For aPTT between 40 - 57  polyethylene glycol 3350 17 Gram(s) Oral daily PRN Constipation      Allergies    No Known Allergies    Intolerances    FAMILY HISTORY:  Family history of CVA    Vital Signs Last 24 Hrs  T(C): 36.8 (03 Sep 2020 13:35), Max: 36.9 (03 Sep 2020 04:35)  T(F): 98.3 (03 Sep 2020 13:35), Max: 98.5 (03 Sep 2020 04:35)  HR: 77 (03 Sep 2020 18:26) (72 - 84)  BP: 102/63 (03 Sep 2020 18:26) (98/61 - 110/74)  BP(mean): --  RR: 16 (03 Sep 2020 13:35) (16 - 17)  SpO2: 99% (03 Sep 2020 13:35) (97% - 99%)    EOE:  TMJ ( - ) clicks                    ( -  ) pops                    ( -  ) crepitus             Mandible FROM             Facial bones and MOM grossly intact             ( - ) trismus             ( - ) LAD             ( - ) swelling             ( - ) asymmetry             ( - ) palpation             ( - ) SOB             ( - ) dysphagia             ( - ) LOC    IOE:  permanent dentition: grossly intact           hard/soft palate:  ( - ) palatal torus           tongue/FOM WNL           labial/buccal mucosa WNL           ( - ) percussion           ( - ) palpation           ( - ) swelling       Radiographs: Panorex obtained and interpreted.    LABS:                        9.4    5.47  )-----------( 109      ( 03 Sep 2020 05:08 )             30.2     09-03    133<L>  |  92<L>  |  110<H>  ----------------------------<  220<H>  4.9   |  28  |  3.16<H>    Ca    10.1      03 Sep 2020 05:09  Mg     2.8     09-02      WBC Count: 5.47 K/uL [3.80 - 10.50] (09-03 @ 05:08)    Platelet Count - Automated: 109 K/uL <L> [150 - 400] (09-03 @ 05:08)  Platelet Count - Automated: 122 K/uL <L> [150 - 400] (09-02 @ 06:23)              RADIOLOGY & ADDITIONAL STUDIES:  Panorex obtained and interpreted.    ASSESSMENT: 76 y/o Male presents for dental exam to rule out any acute source of dental infection prior to LVAD surgery.    PROCEDURE:  Verbal consent obtained from pt. Limited oral evaluation completed. Pt has good oral hygiene, no obvious signs of pathology or infection. Pt denies any pain or sensitivity. No intraoral or extraoral swelling seen. Panorex obtained and interpreted. No evidence of acute dental infection present.      RECOMMENDATIONS:   1) Dental F/U with outpatient dentist for comprehensive dental care.   2) If any difficulty swallowing/breathing, fever occur, page dental.     Reshma Aguilar, JONATAN pager #[phone]  Oral surgeon consulted: Dr. Watkins

## 2020-09-03 NOTE — PROGRESS NOTE ADULT - SUBJECTIVE AND OBJECTIVE BOX
Patient seen and examined at bedside.    Overnight Events:   Overnight carvedilol held  Pt. has no complaints ambulated without symptoms yesterday for 1 lap        Current Meds:  aMIOdarone    Tablet 200 milliGRAM(s) Oral daily  carvedilol 6.25 milliGRAM(s) Oral every 12 hours  dextrose 40% Gel 15 Gram(s) Oral once PRN  dextrose 5%. 1000 milliLiter(s) IV Continuous <Continuous>  dextrose 50% Injectable 12.5 Gram(s) IV Push once  dextrose 50% Injectable 25 Gram(s) IV Push once  dextrose 50% Injectable 25 Gram(s) IV Push once  glucagon  Injectable 1 milliGRAM(s) IntraMuscular once PRN  heparin   Injectable 6000 Unit(s) IV Push every 6 hours PRN  heparin   Injectable 3000 Unit(s) IV Push every 6 hours PRN  heparin  Infusion. 700 Unit(s)/Hr IV Continuous <Continuous>  hydrALAZINE 10 milliGRAM(s) Oral three times a day  insulin lispro (HumaLOG) corrective regimen sliding scale   SubCutaneous three times a day before meals  insulin lispro Injectable (HumaLOG) 1 Unit(s) SubCutaneous three times a day before meals  isosorbide   dinitrate Tablet (ISORDIL) 10 milliGRAM(s) Oral three times a day  levothyroxine 50 MICROGram(s) Oral daily  milrinone Infusion 0.25 MICROgram(s)/kG/Min IV Continuous <Continuous>  polyethylene glycol 3350 17 Gram(s) Oral daily PRN  spironolactone 12.5 milliGRAM(s) Oral daily      Vitals:  T(F): 98.3 (09-03), Max: 98.5 (09-03)  HR: 79 (09-03) (72 - 84)  BP: 101/63 (09-03) (97/60 - 110/74)  RR: 16 (09-03)  SpO2: 99% (09-03)  I&O's Summary    02 Sep 2020 07:01  -  03 Sep 2020 07:00  --------------------------------------------------------  IN: 1052.7 mL / OUT: 1350 mL / NET: -297.3 mL    03 Sep 2020 07:01  -  03 Sep 2020 15:19  --------------------------------------------------------  IN: 420 mL / OUT: 950 mL / NET: -530 mL            Physical Exam:   Appearance: No acute distress; well appearing  Eyes: PERRL, EOMI, pink conjunctiva  HENT: Normal oral mucosa  Cardiovascular: RRR, S1, S2, s3, holo systolic murmur loudest over left sternal border. No rubs, or gallops; left lower extremity edema more prominent than right; JVD wnl  Respiratory: crackles at to mid   Gastrointestinal: soft, non-tender, non-distended with normal bowel sounds  Musculoskeletal: No clubbing; no joint deformity   Neurologic: Non-focal  Lymphatic: No lymphadenopathy  Psychiatry: AAOx3, mood & affect appropriate                        9.4    5.47  )-----------( 109      ( 03 Sep 2020 05:08 )             30.2     09-03    133<L>  |  92<L>  |  110<H>  ----------------------------<  220<H>  4.9   |  28  |  3.16<H>    Ca    10.1      03 Sep 2020 05:09  Mg     2.8     09-02      PTT - ( 03 Sep 2020 12:03 )  PTT:85.5 sec              New ECG(s): Personally reviewed    Echo:    Stress Testing:     Cath:    Imaging:    Interpretation of Telemetry: Patient seen and examined at bedside.    Overnight Events:   Overnight carvedilol held  Pt. has no complaints ambulated without symptoms yesterday for 1 lap, today ambulated 5 laps around the unit with PT.         Current Meds:  aMIOdarone    Tablet 200 milliGRAM(s) Oral daily  carvedilol 6.25 milliGRAM(s) Oral every 12 hours  dextrose 40% Gel 15 Gram(s) Oral once PRN  dextrose 5%. 1000 milliLiter(s) IV Continuous <Continuous>  dextrose 50% Injectable 12.5 Gram(s) IV Push once  dextrose 50% Injectable 25 Gram(s) IV Push once  dextrose 50% Injectable 25 Gram(s) IV Push once  glucagon  Injectable 1 milliGRAM(s) IntraMuscular once PRN  heparin   Injectable 6000 Unit(s) IV Push every 6 hours PRN  heparin   Injectable 3000 Unit(s) IV Push every 6 hours PRN  heparin  Infusion. 700 Unit(s)/Hr IV Continuous <Continuous>  hydrALAZINE 10 milliGRAM(s) Oral three times a day  insulin lispro (HumaLOG) corrective regimen sliding scale   SubCutaneous three times a day before meals  insulin lispro Injectable (HumaLOG) 1 Unit(s) SubCutaneous three times a day before meals  isosorbide   dinitrate Tablet (ISORDIL) 10 milliGRAM(s) Oral three times a day  levothyroxine 50 MICROGram(s) Oral daily  milrinone Infusion 0.25 MICROgram(s)/kG/Min IV Continuous <Continuous>  polyethylene glycol 3350 17 Gram(s) Oral daily PRN  spironolactone 12.5 milliGRAM(s) Oral daily      Vitals:  T(F): 98.3 (09-03), Max: 98.5 (09-03)  HR: 79 (09-03) (72 - 84)  BP: 101/63 (09-03) (97/60 - 110/74)  RR: 16 (09-03)  SpO2: 99% (09-03)  I&O's Summary    02 Sep 2020 07:01  -  03 Sep 2020 07:00  --------------------------------------------------------  IN: 1052.7 mL / OUT: 1350 mL / NET: -297.3 mL    03 Sep 2020 07:01  -  03 Sep 2020 15:19  --------------------------------------------------------  IN: 420 mL / OUT: 950 mL / NET: -530 mL            Physical Exam:   Appearance: No acute distress; well appearing  Eyes: PERRL, EOMI, pink conjunctiva  HENT: Normal oral mucosa  Cardiovascular: RRR, S1, S2, s3, holo systolic murmur loudest over left sternal border. No rubs, or gallops; left lower extremity edema more prominent than right; JVD wnl  Respiratory: crackles at to mid   Gastrointestinal: soft, non-tender, non-distended with normal bowel sounds  Musculoskeletal: No clubbing; no joint deformity   Neurologic: Non-focal  Lymphatic: No lymphadenopathy  Psychiatry: AAOx3, mood & affect appropriate                        9.4    5.47  )-----------( 109      ( 03 Sep 2020 05:08 )             30.2     09-03    133<L>  |  92<L>  |  110<H>  ----------------------------<  220<H>  4.9   |  28  |  3.16<H>    Ca    10.1      03 Sep 2020 05:09  Mg     2.8     09-02      PTT - ( 03 Sep 2020 12:03 )  PTT:85.5 sec              New ECG(s): Personally reviewed    Echo:    Stress Testing:     Cath:    Imaging:    Interpretation of Telemetry:

## 2020-09-03 NOTE — PROGRESS NOTE ADULT - ATTENDING COMMENTS
Patient was seen and examined by me on 09/03/2020,interim events noted,labs and radiology studies reviewed.  Loyd Mitchell MD,FACC.  [address] [place] [address].  [id number].  [phone]

## 2020-09-03 NOTE — PROGRESS NOTE ADULT - ATTENDING COMMENTS
We will determine if his renal function improves with milrinone. He notes that at his best, his creatinine is in the 1.9-2 range.

## 2020-09-03 NOTE — PROGRESS NOTE ADULT - PROBLEM SELECTOR PLAN 2
- Creatinine improving  - hold diuretics, reassess volume status/creatinine in AM   - avoid nephrotoxic medications  - Would need renal function to improve prior to LVAD implant

## 2020-09-04 NOTE — PROGRESS NOTE ADULT - SUBJECTIVE AND OBJECTIVE BOX
Overnight Events:   No overnight events.   Pt. has no complaints this AM.   Comfortable in bed, has not gotten up to walk yet.     Current Meds:  aMIOdarone    Tablet 200 milliGRAM(s) Oral daily  carvedilol 6.25 milliGRAM(s) Oral every 12 hours  dextrose 40% Gel 15 Gram(s) Oral once PRN  dextrose 5%. 1000 milliLiter(s) IV Continuous <Continuous>  dextrose 50% Injectable 12.5 Gram(s) IV Push once  dextrose 50% Injectable 25 Gram(s) IV Push once  dextrose 50% Injectable 25 Gram(s) IV Push once  glucagon  Injectable 1 milliGRAM(s) IntraMuscular once PRN  heparin   Injectable 6000 Unit(s) IV Push every 6 hours PRN  heparin   Injectable 3000 Unit(s) IV Push every 6 hours PRN  heparin  Infusion. 700 Unit(s)/Hr IV Continuous <Continuous>  hydrALAZINE 10 milliGRAM(s) Oral three times a day  insulin lispro (HumaLOG) corrective regimen sliding scale   SubCutaneous three times a day before meals  insulin lispro Injectable (HumaLOG) 1 Unit(s) SubCutaneous three times a day before meals  isosorbide   dinitrate Tablet (ISORDIL) 10 milliGRAM(s) Oral three times a day  levothyroxine 50 MICROGram(s) Oral daily  milrinone Infusion 0.25 MICROgram(s)/kG/Min IV Continuous <Continuous>  polyethylene glycol 3350 17 Gram(s) Oral daily PRN  spironolactone 12.5 milliGRAM(s) Oral daily    Vitals:  T(F): 98.6 (09-04), Max: 98.6 (09-04)  HR: 80 (09-04) (77 - 81)  BP: 101/61 (09-04) (101/61 - 109/65)  RR: 16 (09-04)  SpO2: 98% (09-04)  I&O's Summary    03 Sep 2020 07:01  -  04 Sep 2020 07:00  --------------------------------------------------------  IN: 1155.2 mL / OUT: 2350 mL / NET: -1194.8 mL      Physical Exam:   Appearance: No acute distress; well appearing  Eyes: PERRL, EOMI, pink conjunctiva  HENT: Normal oral mucosa  Cardiovascular: RRR, S1, S2, s3, holo systolic murmur loudest over left sternal border. No rubs, or gallops; left lower extremity edema more prominent than right; JVD wnl  Respiratory: crackles at to mid   Gastrointestinal: soft, non-tender, non-distended with normal bowel sounds  Musculoskeletal: No clubbing; no joint deformity   Neurologic: Non-focal  Lymphatic: No lymphadenopathy  Psychiatry: AAOx3, mood & affect appropriate                        9.3    5.48  )-----------( 79       ( 04 Sep 2020 03:24 )             30.3     09-04    133<L>  |  93<L>  |  101<H>  ----------------------------<  182<H>  5.0   |  27  |  2.92<H>    Ca    10.0      04 Sep 2020 03:24      PTT - ( 04 Sep 2020 03:24 )  PTT:85.8 sec              New ECG(s): Personally reviewed    Echo:    Stress Testing:     Cath:    Imaging:    Interpretation of Telemetry: Overnight Events:   No overnight events.   Pt. has no complaints this AM.   Comfortable in bed, has not gotten up to walk yet.     Current Meds:  aMIOdarone    Tablet 200 milliGRAM(s) Oral daily  carvedilol 6.25 milliGRAM(s) Oral every 12 hours  dextrose 40% Gel 15 Gram(s) Oral once PRN  dextrose 5%. 1000 milliLiter(s) IV Continuous <Continuous>  dextrose 50% Injectable 12.5 Gram(s) IV Push once  dextrose 50% Injectable 25 Gram(s) IV Push once  dextrose 50% Injectable 25 Gram(s) IV Push once  glucagon  Injectable 1 milliGRAM(s) IntraMuscular once PRN  heparin   Injectable 6000 Unit(s) IV Push every 6 hours PRN  heparin   Injectable 3000 Unit(s) IV Push every 6 hours PRN  heparin  Infusion. 700 Unit(s)/Hr IV Continuous <Continuous>  hydrALAZINE 10 milliGRAM(s) Oral three times a day  insulin lispro (HumaLOG) corrective regimen sliding scale   SubCutaneous three times a day before meals  insulin lispro Injectable (HumaLOG) 1 Unit(s) SubCutaneous three times a day before meals  isosorbide   dinitrate Tablet (ISORDIL) 10 milliGRAM(s) Oral three times a day  levothyroxine 50 MICROGram(s) Oral daily  milrinone Infusion 0.25 MICROgram(s)/kG/Min IV Continuous <Continuous>  polyethylene glycol 3350 17 Gram(s) Oral daily PRN  spironolactone 12.5 milliGRAM(s) Oral daily    Vitals:  T(F): 98.6 (09-04), Max: 98.6 (09-04)  HR: 80 (09-04) (77 - 81)  BP: 101/61 (09-04) (101/61 - 109/65)  RR: 16 (09-04)  SpO2: 98% (09-04)  I&O's Summary    03 Sep 2020 07:01  -  04 Sep 2020 07:00  --------------------------------------------------------  IN: 1155.2 mL / OUT: 2350 mL / NET: -1194.8 mL      Physical Exam:   Appearance: No acute distress; well appearing  Eyes: PERRL, EOMI, pink conjunctiva  HENT: Normal oral mucosa  Cardiovascular: RRR, S1, S2, s3, holo systolic murmur loudest over left sternal border. No rubs, or gallops; left lower extremity edema more prominent than right; JVD wnl  Respiratory: crackles at to mid   Gastrointestinal: soft, non-tender, non-distended with normal bowel sounds  Musculoskeletal: No clubbing; no joint deformity   Neurologic: Non-focal  Lymphatic: No lymphadenopathy  Psychiatry: AAOx3, mood & affect appropriate                        9.3    5.48  )-----------( 79       ( 04 Sep 2020 03:24 )             30.3     09-04    133<L>  |  93<L>  |  101<H>  ----------------------------<  182<H>  5.0   |  27  |  2.92<H>    Ca    10.0      04 Sep 2020 03:24      PTT - ( 04 Sep 2020 03:24 )  PTT:85.8 sec              New ECG(s): Personally reviewed    Echo:    Stress Testing:     Cath:    Imaging:    Interpretation of Telemetry:  Vpaced 80-90

## 2020-09-04 NOTE — PROGRESS NOTE ADULT - SUBJECTIVE AND OBJECTIVE BOX
SUBJECTIVE / OVERNIGHT EVENTS: pt seen and examined    MEDICATIONS  (STANDING):  aMIOdarone    Tablet 200 milliGRAM(s) Oral daily  carvedilol 6.25 milliGRAM(s) Oral every 12 hours  dextrose 5%. 1000 milliLiter(s) (50 mL/Hr) IV Continuous <Continuous>  dextrose 50% Injectable 12.5 Gram(s) IV Push once  dextrose 50% Injectable 25 Gram(s) IV Push once  dextrose 50% Injectable 25 Gram(s) IV Push once  heparin  Infusion. 700 Unit(s)/Hr (7 mL/Hr) IV Continuous <Continuous>  hydrALAZINE 10 milliGRAM(s) Oral three times a day  insulin lispro (HumaLOG) corrective regimen sliding scale   SubCutaneous three times a day before meals  insulin lispro Injectable (HumaLOG) 1 Unit(s) SubCutaneous three times a day before meals  isosorbide   dinitrate Tablet (ISORDIL) 10 milliGRAM(s) Oral three times a day  levothyroxine 50 MICROGram(s) Oral daily  milrinone Infusion 0.25 MICROgram(s)/kG/Min (5.63 mL/Hr) IV Continuous <Continuous>  spironolactone 12.5 milliGRAM(s) Oral daily    MEDICATIONS  (PRN):  dextrose 40% Gel 15 Gram(s) Oral once PRN Blood Glucose LESS THAN 70 milliGRAM(s)/deciliter  glucagon  Injectable 1 milliGRAM(s) IntraMuscular once PRN Glucose LESS THAN 70 milligrams/deciliter  heparin   Injectable 6000 Unit(s) IV Push every 6 hours PRN For aPTT less than 40  heparin   Injectable 3000 Unit(s) IV Push every 6 hours PRN For aPTT between 40 - 57  polyethylene glycol 3350 17 Gram(s) Oral daily PRN Constipation      Vital Signs Last 24 Hrs  T(C): 36.9 (04 Sep 2020 20:47), Max: 37 (04 Sep 2020 05:00)  T(F): 98.5 (04 Sep 2020 20:47), Max: 98.6 (04 Sep 2020 05:00)  HR: 80 (04 Sep 2020 20:47) (80 - 80)  BP: 100/60 (04 Sep 2020 20:47) (97/59 - 104/64)  BP(mean): --  RR: 18 (04 Sep 2020 20:47) (16 - 18)  SpO2: 95% (04 Sep 2020 20:47) (95% - 98%)  Skin: No rash.  Eyes: No recent vision problems or eye pain.  ENT: No congestion, ear pain, or sore throat.  Cardiovascular: No chest pain or palpation.  Respiratory: No cough, shortness of breath, congestion, or wheezing.  Gastrointestinal: No abdominal pain, nausea, vomiting, or diarrhea.  Genitourinary: No dysuria.  Musculoskeletal: No joint swelling.  Neurologic: No headache.    PHYSICAL EXAM:  GENERAL: NAD  EYES: EOMI, PERRLA  NECK: Supple, No JVD  CHEST/LUNG: crackles at bases  HEART:  S1 , S2 +  ABDOMEN: soft , bs+  EXTREMITIES:  edema+  NEUROLOGY:alert awake oriented     LABS:  09-04    133<L>  |  93<L>  |  101<H>  ----------------------------<  182<H>  5.0   |  27  |  2.92<H>    Ca    10.0      04 Sep 2020 03:24      Creatinine Trend: 2.92 <--, 3.16 <--, 3.32 <--, 2.93 <--, 2.93 <--, 2.82 <--, 2.50 <--, 2.39 <--                        9.3    5.48  )-----------( 79       ( 04 Sep 2020 03:24 )             30.3     Urine Studies:              PTT - ( 04 Sep 2020 22:10 )  PTT:59.3 sec

## 2020-09-04 NOTE — PROGRESS NOTE ADULT - ATTENDING COMMENTS
Patient was seen and examined by me on 09/04/2020,interim events noted,labs and radiology studies reviewed.  Loyd Mitchell MD,FACC.  [address] [place] [address].  [id number].  [phone]

## 2020-09-04 NOTE — PROGRESS NOTE ADULT - PROBLEM SELECTOR PLAN 1
- c/w milrinone 0.25mcg/kg/min  - Would continue to Hold diuretics.  - Continue spironolactone 12.5 mg daily  - Continue carvedilol 6.25 mg BID  - Continue hydralazine 10 mg TID and ISDN 10 mg TID; hold for SBP <85  - Maintain K 4-4.5 and Mg 2-2.5.  - c/w heparin gtt  - Under evaluation for LVAD as destination therapy. He is not a heart transplant candidate given age.  - Awaiting improvement in creatinine. Then will downtitrate milrinone.

## 2020-09-05 NOTE — PROGRESS NOTE ADULT - ATTENDING COMMENTS
Patient was seen and examined by me on 09/06/2020,interim events noted,labs and radiology studies reviewed.  Loyd Mitchell MD,FACC.  [address] [place] [address].  [id number].  [phone]

## 2020-09-05 NOTE — PROGRESS NOTE ADULT - PROBLEM SELECTOR PLAN 1
- c/w milrinone 0.25mcg/kg/min  - Would continue to Hold diuretics.  - Continue spironolactone 12.5 mg daily  - Continue carvedilol 6.25 mg BID  - Continue hydralazine 10 mg TID and ISDN 10 mg TID; hold for SBP <85  - Maintain K 4-4.5 and Mg 2-2.5.  - c/w heparin gtt  - Under evaluation for LVAD as destination therapy. He is not a heart transplant candidate given age.  - Awaiting improvement in creatinine.

## 2020-09-05 NOTE — CHART NOTE - NSCHARTNOTEFT_GEN_A_CORE
AM labs notable for Potassium of 6.0. STAT EKG revealed no peaked T waves. Lokelma 10mg x1 STAT ordered. No concentrated Potassium added to diet order. Attending notified.    Follow Up:  Repeat K is 5.0

## 2020-09-05 NOTE — PROGRESS NOTE ADULT - ASSESSMENT
74 y/o M w/ PMH of DM2, NICMP/HFrEF (LVIDd 6.7cm, LVEF <20%) s/p CRT-D, AF s/p recent admission w/ DCCV on amio, CKD (BL Cr ~3), and hypothyroid referred from CHF clinic for ADHF. He has diuresed more than 20lbs since admission. Taken off milrinone for RHC. Had rising creatinine on 9/3 off of milrinone. It has improved, but not significantly so, on milrinone 0.25 mcg/kg/min.

## 2020-09-05 NOTE — CONSULT NOTE ADULT - SUBJECTIVE AND OBJECTIVE BOX
Reason for consult:    HPI:  74M w/ PMHx of HFrEF (EF 10%) s/p ICD, Afib w/ recent admission for CHF/afib s/p DCCV, CKD, DM2, hypothyroidism presents after being referred from CHF clinic for admission due to worsening of LE edema and failure of PO diuretics at home. Per patient was relatively functional until ~1 mo ago when he developed palpitations and light headedness. He was found to be hypotensive and in Afib w/ RVR resulting in his hospitalization at the end  of July. His course was c/b EDIE on CKD and persistent hypotension. He improved after dccv and was ultimately discharged home off entresto, coreg and diuretics due to c/f worsening hypotension. After discharge, patient notes worsening LE edema. He was instructed to resume lasix, which was ultimately escalated to toresemide 80mg bid w/ metolazone 2.5 mg daily. With this regimen he noted increased urination and some mild improvement. However, due to ongoing reduced functional capacity, fatigue, and ALCAZAR he was referred for admission for IV diuretics. Denies any cp or palpitations. Denies repaid HR. Notes weight gain since discharge. Continues to have poor appetite and reduced exercise capacity. No fevers, chills. Notes chronic nonproductive cough which is unchanged. Sleeps elevated due to back pain, denies significant orthopnea. Is able to complete ADLs at baseline with some assistance from his niece. Notes increased urination with high dose diuretics, but no dysuria or hematuria. No abd pain, nausea, vomiting. No diarrhea. (21 Aug 2020 09:51)      PAST MEDICAL & SURGICAL HISTORY:  Hypothyroidism  Afib  Type II diabetes mellitus  Aortic insufficiency  Mitral insufficiency  CKD (chronic kidney disease)  Cardiomyopathy: Systolic CHF  Hypertension  History of tonsillectomy: childhood  AICD (automatic cardioverter/defibrillator) present: 8/2012      FAMILY HISTORY:  Family history of CVA      Alochol: Denied  Smoking: Nonsmoker  Drug Use: Denied  Marital Status:         Allergies    No Known Allergies    Intolerances        MEDICATIONS  (STANDING):  aMIOdarone    Tablet 200 milliGRAM(s) Oral daily  carvedilol 6.25 milliGRAM(s) Oral every 12 hours  dextrose 5%. 1000 milliLiter(s) (50 mL/Hr) IV Continuous <Continuous>  dextrose 50% Injectable 12.5 Gram(s) IV Push once  dextrose 50% Injectable 25 Gram(s) IV Push once  dextrose 50% Injectable 25 Gram(s) IV Push once  heparin  Infusion. 700 Unit(s)/Hr (7 mL/Hr) IV Continuous <Continuous>  hydrALAZINE 10 milliGRAM(s) Oral three times a day  insulin lispro (HumaLOG) corrective regimen sliding scale   SubCutaneous three times a day before meals  insulin lispro Injectable (HumaLOG) 1 Unit(s) SubCutaneous three times a day before meals  isosorbide   dinitrate Tablet (ISORDIL) 10 milliGRAM(s) Oral three times a day  levothyroxine 50 MICROGram(s) Oral daily  milrinone Infusion 0.25 MICROgram(s)/kG/Min (5.63 mL/Hr) IV Continuous <Continuous>  spironolactone 12.5 milliGRAM(s) Oral daily    MEDICATIONS  (PRN):  dextrose 40% Gel 15 Gram(s) Oral once PRN Blood Glucose LESS THAN 70 milliGRAM(s)/deciliter  glucagon  Injectable 1 milliGRAM(s) IntraMuscular once PRN Glucose LESS THAN 70 milligrams/deciliter  heparin   Injectable 6000 Unit(s) IV Push every 6 hours PRN For aPTT less than 40  heparin   Injectable 3000 Unit(s) IV Push every 6 hours PRN For aPTT between 40 - 57  polyethylene glycol 3350 17 Gram(s) Oral daily PRN Constipation      ROS  No fever, sweats, chills  No epistaxis, HA, sore throat  No CP, SOB, cough, sputum  No n/v/d, abd pain, melena, hematochezia  No edema  No rash  No anxiety  No back pain, joint pain  No bleeding, bruising  No dysuria, hematuria    T(C): 36.7 (09-05-20 @ 04:35), Max: 36.9 (09-04-20 @ 20:47)  HR: 81 (09-05-20 @ 04:35) (80 - 81)  BP: 101/63 (09-05-20 @ 04:35) (97/59 - 104/64)  RR: 18 (09-05-20 @ 04:35) (16 - 18)  SpO2: 98% (09-05-20 @ 04:35) (95% - 98%)  Wt(kg): --    PE  Not done as patient wasn't available at the time of visit. WIll try later                          9.1    4.15  )-----------( 80       ( 05 Sep 2020 06:03 )             28.4       09-05    136  |  99  |  98<H>  ----------------------------<  155<H>  6.0<H>   |  25  |  2.94<H>    Ca    10.4      05 Sep 2020 06:03  Mg     2.6     09-05

## 2020-09-05 NOTE — PROGRESS NOTE ADULT - SUBJECTIVE AND OBJECTIVE BOX
Subjective: No current complaints. He feels well.     Medications:  aMIOdarone    Tablet 200 milliGRAM(s) Oral daily  carvedilol 6.25 milliGRAM(s) Oral every 12 hours  dextrose 40% Gel 15 Gram(s) Oral once PRN  dextrose 5%. 1000 milliLiter(s) IV Continuous <Continuous>  dextrose 50% Injectable 12.5 Gram(s) IV Push once  dextrose 50% Injectable 25 Gram(s) IV Push once  dextrose 50% Injectable 25 Gram(s) IV Push once  glucagon  Injectable 1 milliGRAM(s) IntraMuscular once PRN  heparin   Injectable 6000 Unit(s) IV Push every 6 hours PRN  heparin   Injectable 3000 Unit(s) IV Push every 6 hours PRN  heparin  Infusion. 700 Unit(s)/Hr IV Continuous <Continuous>  hydrALAZINE 10 milliGRAM(s) Oral three times a day  insulin lispro (HumaLOG) corrective regimen sliding scale   SubCutaneous three times a day before meals  insulin lispro Injectable (HumaLOG) 1 Unit(s) SubCutaneous three times a day before meals  isosorbide   dinitrate Tablet (ISORDIL) 10 milliGRAM(s) Oral three times a day  levothyroxine 50 MICROGram(s) Oral daily  milrinone Infusion 0.25 MICROgram(s)/kG/Min IV Continuous <Continuous>  polyethylene glycol 3350 17 Gram(s) Oral daily PRN  spironolactone 12.5 milliGRAM(s) Oral daily      Physical Exam:    Vital Signs Last 24 Hrs  T(C): 36.7 (05 Sep 2020 04:35), Max: 36.9 (04 Sep 2020 20:47)  T(F): 98 (05 Sep 2020 04:35), Max: 98.5 (04 Sep 2020 20:47)  HR: 81 (05 Sep 2020 04:35) (80 - 81)  BP: 101/63 (05 Sep 2020 04:35) (100/60 - 104/64)  RR: 18 (05 Sep 2020 04:35) (18 - 18)  SpO2: 98% (05 Sep 2020 04:35) (95% - 98%)    Daily Weight in k.7 (05 Sep 2020 08:00)    I&O's Summary    04 Sep 2020 07:01  -  05 Sep 2020 07:00  --------------------------------------------------------  IN: 1155.6 mL / OUT: 2150 mL / NET: -994.4 mL    General: No distress. Comfortable.  HEENT: EOM intact.  Neck: Neck supple. JVP not elevated. No masses  Chest: Clear to auscultation bilaterally  CV: Normal S1 and S2. No murmurs, rub, or gallops. Radial pulses normal. No edema.   Abdomen: Soft, non-distended, non-tender  Skin: No rashes or skin breakdown  Neurology: Alert and oriented times three. Sensation intact  Psych: Affect normal    Labs:                        9.1    4.15  )-----------( 80       ( 05 Sep 2020 06:03 )             28.4     09-05    136  |  99  |  98<H>  ----------------------------<  155<H>  6.0<H>   |  25  |  2.94<H>    Ca    10.4      05 Sep 2020 06:03  Mg     2.6     09-05      PTT - ( 05 Sep 2020 06:03 )  PTT:64.3 sec

## 2020-09-05 NOTE — PROGRESS NOTE ADULT - SUBJECTIVE AND OBJECTIVE BOX
DATE OF SERVICE:Patient was seen and examined :09/05/2020    PRESENTING CC:ADHF    SUBJ: 76 y/o M w/ PMH of DM2, NICMP/HFrEF (LVIDd 6.7cm, LVEF <20%) s/p CRT-D, AF s/p recent admission w/ DCCV on amio, CKD (BL Cr ~3), and hypothyroid referred from CHF clinic for ADHF. Is back on Milrinone is ambulating 4-5 laps,diuresing no orthopnea remains on Milrinone in attempt to improve renal function      PMH -reviewed admission note, no change since admission  Heart failure: acute [ ] chronic [ ] acute or chronic [x ] diastolic [ ] systolic [x ] combined systolic and diastolic[ ]  EDIE: ATN[ ] renal medullary necrosis [ ] CKD I [ ]CKDII [ ]CKD III [x ]CKD IV [ ]CKD V [ ]Other pathological lesions [ ]    MEDICATIONS  (STANDING):  aMIOdarone    Tablet 200 milliGRAM(s) Oral daily  carvedilol 6.25 milliGRAM(s) Oral every 12 hours  heparin  Infusion. 700 Unit(s)/Hr (7 mL/Hr) IV Continuous <Continuous>  hydrALAZINE 10 milliGRAM(s) Oral three times a day  insulin lispro (HumaLOG) corrective regimen sliding scale   SubCutaneous three times a day before meals  insulin lispro Injectable (HumaLOG) 1 Unit(s) SubCutaneous three times a day before meals  isosorbide   dinitrate Tablet (ISORDIL) 10 milliGRAM(s) Oral three times a day  levothyroxine 50 MICROGram(s) Oral daily  milrinone Infusion 0.25 MICROgram(s)/kG/Min (5.63 mL/Hr) IV Continuous <Continuous>  spironolactone 12.5 milliGRAM(s) Oral daily    MEDICATIONS  (PRN):  dextrose 40% Gel 15 Gram(s) Oral once PRN Blood Glucose LESS THAN 70 milliGRAM(s)/deciliter  glucagon  Injectable 1 milliGRAM(s) IntraMuscular once PRN Glucose LESS THAN 70 milligrams/deciliter  heparin   Injectable 6000 Unit(s) IV Push every 6 hours PRN For aPTT less than 40  heparin   Injectable 3000 Unit(s) IV Push every 6 hours PRN For aPTT between 40 - 57  polyethylene glycol 3350 17 Gram(s) Oral daily PRN Constipation            REVIEW OF SYSTEMS:  Constitutional: [ ] fever, [ ]weight loss,  [x ]fatigue  Eyes: [ ] visual changes  Respiratory: [ ]shortness of breath;  [ ] cough, [ ]wheezing, [ ]chills, [ ]hemoptysis  Cardiovascular: [ ] chest pain, [ ]palpitations, [ ]dizziness,  [x ]leg swelling[ ]orthopnea[ ]PND  Gastrointestinal: [ ] abdominal pain, [ ]nausea, [ ]vomiting,  [ ]diarrhea   Genitourinary: [ ] dysuria, [ ] hematuria  Neurologic: [ ] headaches [ ] tremors[ ]weakness  Skin: [ ] itching, [ ]burning, [ ] rashes  Endocrine: [ ] heat or cold intolerance  Musculoskeletal: [ ] joint pain or swelling; [ ] muscle, back, or extremity pain  Psychiatric: [ ] depression, [ ]anxiety, [ ]mood swings, or [ ]difficulty sleeping  Hematologic: [ ] easy bruising, [ ] bleeding gums    [x] All remaining systems negative except as per above.   [ ]Unable to obtain.    Vital Signs Last 24 Hrs  T(C): 36.3 (05 Sep 2020 12:15), Max: 36.9 (04 Sep 2020 20:47)  T(F): 97.4 (05 Sep 2020 12:15), Max: 98.5 (04 Sep 2020 20:47)  HR: 80 (05 Sep 2020 17:12) (80 - 82)  BP: 95/56 (05 Sep 2020 17:12) (95/56 - 101/63)  RR: 18 (05 Sep 2020 12:15) (18 - 18)  SpO2: 95% (05 Sep 2020 12:15) (95% - 98%)        PHYSICAL EXAM:  General: No acute distress BMI-26  HEENT: EOMI, PERRL  Neck: Supple, [x ] JVD  Lungs: Equal air entry bilaterally; [ ] rales [ ] wheezing [ ] rhonchi  Heart: Regular rate and rhythm; [x ] murmur   2/6 [x ] systolic [ ] diastolic [ ] radiation[ ] rubs [ ]  gallops  Abdomen: Nontender, bowel sounds present  Extremities: No clubbing, cyanosis, [x ] edema  Nervous system:  Alert & Oriented X3, no focal deficits  Psychiatric: Normal affect  Skin: No rashes or lesions    09-05    134<L>  |  96  |  98<H>  ----------------------------<  139<H>  5.0   |  27  |  2.67<H>    Ca    10.0      05 Sep 2020 15:41  Mg     2.6     09-05      Creatinine Trend: 2.67 <--, 2.94 <--, 2.92 <--, 3.16 <--, 3.32 <--, 2.93 <--, 2.93 <--, 2.82 <--                        9.1    4.15  )-----------( 80       ( 05 Sep 2020 06:03 )             28.4       ECG [my interpretation]:    TELEMETRY:    IMPRESSION AND PLAN:  Problem/Plan - 1:  ·  Problem: Acute HFrEF (heart failure with reduced ejection fraction).  Plan: - Continue milrinone 0.3 mcg/kg/min, will wean when euvolemic  - Continue carvedilol 6.25 mg BID  - Continue hydralazine 10 mg TID and ISDN 10 mg TID; hold for SBP <90  -Well diuresed lost > 10 Lbs  -On milrinone in attempt to improve renal function to baseline~~2      Problem/Plan - 2:  ·  Problem: Afib.  Plan: -Currently in paced rhythm  -C/w home med amio  -C/w home med carvedilol, as above  - Continue apixaban 5mg BID.     Problem/Plan - 3:  ·  Problem: Mitral insufficiency.  Plan: - Reassess once euvolemic  - Pt already has CRT-D device.       Problem/Plan - 4:  ·  Problem: Thrombocytopenia.  Plan: - Chronic. Stable    Problem/Plan - 5:  ·  Problem: Acute on CKD4  Slowly trending to baseline on Milrinone      Problem/Plan -6 :  ·  Problem:LVAD preop  -Had Virtual Colonoscopy-IMPRESSION:  Slightly limited exam.  No colonic polyp or mass is identified.

## 2020-09-05 NOTE — CONSULT NOTE ADULT - ASSESSMENT
#ANEMIA, NORMOCYTIC  #THROMBOCYTOPENIA, MODERATE, CHRONIC    -Patient has chronic thrombocytopenia (80-100K range) since 2015 mainly attributed to moderate to severe MR with tethered leaflet as well as chronic co-morbidities  -His prior smears have never shown any significant schistocytes; LDH was normal few days ago  -I suspect this is likely secondary to chronic bone marrow suppression from chronic illness; might have a component of ITP as well but doesn't warrant treatment at this point  -His anemia is also multifactorial; likely component of CKD as well; advise touching base with renal regarding need for EPO  -Recommend checking baseline labs including iron studies, B12, folate  -Also repeat checking LDH, bilirubin and haptoglobin  -Will continue to follow      #CHF EXACERBATION  #EDIE ON CKD  #A.FIB, ON HEPARIN  -OK to continue AC unless plt count drops below 50K  -Management per primary team            Chivo Lugo MD  Hematology/Oncology Consultant  [facility]  Cell: [phone]

## 2020-09-05 NOTE — PROGRESS NOTE ADULT - SUBJECTIVE AND OBJECTIVE BOX
SUBJECTIVE / OVERNIGHT EVENTS: pt seen and examined    MEDICATIONS  (STANDING):  aMIOdarone    Tablet 200 milliGRAM(s) Oral daily  carvedilol 6.25 milliGRAM(s) Oral every 12 hours  dextrose 5%. 1000 milliLiter(s) (50 mL/Hr) IV Continuous <Continuous>  dextrose 50% Injectable 12.5 Gram(s) IV Push once  dextrose 50% Injectable 25 Gram(s) IV Push once  dextrose 50% Injectable 25 Gram(s) IV Push once  heparin  Infusion. 700 Unit(s)/Hr (7 mL/Hr) IV Continuous <Continuous>  hydrALAZINE 10 milliGRAM(s) Oral three times a day  insulin lispro (HumaLOG) corrective regimen sliding scale   SubCutaneous three times a day before meals  insulin lispro Injectable (HumaLOG) 1 Unit(s) SubCutaneous three times a day before meals  isosorbide   dinitrate Tablet (ISORDIL) 10 milliGRAM(s) Oral three times a day  levothyroxine 50 MICROGram(s) Oral daily  milrinone Infusion 0.25 MICROgram(s)/kG/Min (5.63 mL/Hr) IV Continuous <Continuous>  spironolactone 12.5 milliGRAM(s) Oral daily    MEDICATIONS  (PRN):  dextrose 40% Gel 15 Gram(s) Oral once PRN Blood Glucose LESS THAN 70 milliGRAM(s)/deciliter  glucagon  Injectable 1 milliGRAM(s) IntraMuscular once PRN Glucose LESS THAN 70 milligrams/deciliter  heparin   Injectable 6000 Unit(s) IV Push every 6 hours PRN For aPTT less than 40  heparin   Injectable 3000 Unit(s) IV Push every 6 hours PRN For aPTT between 40 - 57  polyethylene glycol 3350 17 Gram(s) Oral daily PRN Constipation    Vital Signs Last 24 Hrs  T(C): 36.3 (05 Sep 2020 12:15), Max: 36.9 (04 Sep 2020 20:47)  T(F): 97.4 (05 Sep 2020 12:15), Max: 98.5 (04 Sep 2020 20:47)  HR: 80 (05 Sep 2020 17:12) (80 - 82)  BP: 95/56 (05 Sep 2020 17:12) (95/56 - 101/63)  BP(mean): --  RR: 18 (05 Sep 2020 12:15) (18 - 18)  SpO2: 95% (05 Sep 2020 12:15) (95% - 98%)    Cardiovascular: No chest pain or palpation.  Respiratory: No cough, shortness of breath, congestion, or wheezing.  Gastrointestinal: No abdominal pain, nausea, vomiting, or diarrhea.  Genitourinary: No dysuria.  Musculoskeletal: No joint swelling.  Neurologic: No headache.    PHYSICAL EXAM:  GENERAL: NAD  EYES: EOMI, PERRLA  NECK: Supple, No JVD  CHEST/LUNG: crackles at bases  HEART:  S1 , S2 +  ABDOMEN: soft , bs+  EXTREMITIES:  edema+  NEUROLOGY:alert awake oriented       LABS:  09-05    134<L>  |  96  |  98<H>  ----------------------------<  139<H>  5.0   |  27  |  2.67<H>    Ca    10.0      05 Sep 2020 15:41  Mg     2.6     09-05      Creatinine Trend: 2.67 <--, 2.94 <--, 2.92 <--, 3.16 <--, 3.32 <--, 2.93 <--, 2.93 <--, 2.82 <--                        9.1    4.15  )-----------( 80       ( 05 Sep 2020 06:03 )             28.4     Urine Studies:              PTT - ( 05 Sep 2020 06:03 )  PTT:64.3 sec        PTT - ( 04 Sep 2020 22:10 )  PTT:59.3 sec

## 2020-09-06 NOTE — PROGRESS NOTE ADULT - ASSESSMENT
Mr. 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 He was started on inotropic support with milrinone and diuresed over 20 lbs. He subsequently had worsening renal function, which was thought to be due to overdiuresis. The milrinone was stopped, but resumed after right heart catheterization last week showed elevated wedge pressure and borderline low cardiac output. He is currently hemodynamically stable, but has increased weight, elevated JVP, and worsening renal function. He continues on milrinone 0.25 mcg/kg/min.

## 2020-09-06 NOTE — PROGRESS NOTE ADULT - ATTENDING COMMENTS
Patient was seen and examined by me on 09/06/2020,interim events noted,labs and radiology studies reviewed.  Loyd Mitchell MD,FACC.  [address] [place] [address].  [id number].  [phone]

## 2020-09-06 NOTE — PROGRESS NOTE ADULT - PROBLEM SELECTOR PLAN 2
- Creatinine fluctuating, but portends overall poor prognosis and limits therapeutic options.   - avoid nephrotoxic medications  - Would need renal function to improve prior to LVAD implant

## 2020-09-06 NOTE — PROGRESS NOTE ADULT - PROBLEM SELECTOR PLAN 1
- Continue milrinone 0.25mcg/kg/min  - Start Lasix 40 mg IV daily  - Continue spironolactone 12.5 mg daily  - Continue carvedilol 6.25 mg BID  - Continue hydralazine 10 mg TID and ISDN 10 mg TID; hold for SBP <85  - Maintain K 4-4.5 and Mg 2-2.5.  - c/w heparin gtt  - Under evaluation for LVAD as destination therapy. He is not a heart transplant candidate given age.  - Awaiting improvement in creatinine.

## 2020-09-06 NOTE — PROGRESS NOTE ADULT - SUBJECTIVE AND OBJECTIVE BOX
SUBJECTIVE / OVERNIGHT EVENTS: pt seen and examined    MEDICATIONS  (STANDING):  aMIOdarone    Tablet 200 milliGRAM(s) Oral daily  carvedilol 6.25 milliGRAM(s) Oral every 12 hours  dextrose 5%. 1000 milliLiter(s) (50 mL/Hr) IV Continuous <Continuous>  dextrose 50% Injectable 12.5 Gram(s) IV Push once  dextrose 50% Injectable 25 Gram(s) IV Push once  dextrose 50% Injectable 25 Gram(s) IV Push once  heparin  Infusion. 700 Unit(s)/Hr (7 mL/Hr) IV Continuous <Continuous>  hydrALAZINE 10 milliGRAM(s) Oral three times a day  insulin lispro (HumaLOG) corrective regimen sliding scale   SubCutaneous three times a day before meals  insulin lispro Injectable (HumaLOG) 1 Unit(s) SubCutaneous three times a day before meals  isosorbide   dinitrate Tablet (ISORDIL) 10 milliGRAM(s) Oral three times a day  levothyroxine 50 MICROGram(s) Oral daily  milrinone Infusion 0.25 MICROgram(s)/kG/Min (5.63 mL/Hr) IV Continuous <Continuous>  spironolactone 12.5 milliGRAM(s) Oral daily    MEDICATIONS  (PRN):  dextrose 40% Gel 15 Gram(s) Oral once PRN Blood Glucose LESS THAN 70 milliGRAM(s)/deciliter  glucagon  Injectable 1 milliGRAM(s) IntraMuscular once PRN Glucose LESS THAN 70 milligrams/deciliter  heparin   Injectable 6000 Unit(s) IV Push every 6 hours PRN For aPTT less than 40  heparin   Injectable 3000 Unit(s) IV Push every 6 hours PRN For aPTT between 40 - 57  polyethylene glycol 3350 17 Gram(s) Oral daily PRN Constipation    Vital Signs Last 24 Hrs  T(C): 36.7 (06 Sep 2020 21:23), Max: 36.7 (06 Sep 2020 21:23)  T(F): 98 (06 Sep 2020 21:23), Max: 98 (06 Sep 2020 21:23)  HR: 80 (06 Sep 2020 21:23) (79 - 80)  BP: 98/62 (06 Sep 2020 21:23) (95/56 - 106/67)  BP(mean): --  RR: 17 (06 Sep 2020 21:23) (16 - 18)  SpO2: 98% (06 Sep 2020 21:23) (98% - 100%)    Cardiovascular: No chest pain or palpation.  Respiratory: No cough, shortness of breath, congestion, or wheezing.  Gastrointestinal: No abdominal pain, nausea, vomiting, or diarrhea.  Genitourinary: No dysuria.  Musculoskeletal: No joint swelling.  Neurologic: No headache.    PHYSICAL EXAM:  GENERAL: NAD  EYES: EOMI, PERRLA  NECK: Supple, No JVD  CHEST/LUNG: crackles at bases  HEART:  S1 , S2 +  ABDOMEN: soft , bs+  EXTREMITIES:  edema+  NEUROLOGY:alert awake oriented       LABS:  09-06    134<L>  |  96  |  104<H>  ----------------------------<  144<H>  5.6<H>   |  26  |  3.35<H>    Ca    10.2      06 Sep 2020 17:59  Mg     2.6     09-05      Creatinine Trend: 3.35 <--, 3.43 <--, 2.67 <--, 2.94 <--, 2.92 <--, 3.16 <--, 3.32 <--, 2.93 <--, 2.93 <--                        8.8    4.18  )-----------( 87       ( 06 Sep 2020 05:29 )             27.9     Urine Studies:              PTT - ( 06 Sep 2020 17:59 )  PTT:70.1 sec

## 2020-09-06 NOTE — PROGRESS NOTE ADULT - SUBJECTIVE AND OBJECTIVE BOX
Subjective: No physical complaints. He can walk several laps in the hallways. He is disappointed that his creatinine increased overnight.     Medications:  aMIOdarone    Tablet 200 milliGRAM(s) Oral daily  carvedilol 6.25 milliGRAM(s) Oral every 12 hours  furosemide   Injectable 40 milliGRAM(s) IV Push once  heparin  Infusion. 700 Unit(s)/Hr IV Continuous <Continuous>  hydrALAZINE 10 milliGRAM(s) Oral three times a day  insulin lispro (HumaLOG) corrective regimen sliding scale   SubCutaneous three times a day before meals  insulin lispro Injectable (HumaLOG) 1 Unit(s) SubCutaneous three times a day before meals  isosorbide   dinitrate Tablet (ISORDIL) 10 milliGRAM(s) Oral three times a day  levothyroxine 50 MICROGram(s) Oral daily  milrinone Infusion 0.25 MICROgram(s)/kG/Min IV Continuous <Continuous>  polyethylene glycol 3350 17 Gram(s) Oral daily PRN  spironolactone 12.5 milliGRAM(s) Oral daily      Physical Exam:    Vital Signs Last 24 Hrs  T(C): 36.6 (06 Sep 2020 04:16), Max: 36.8 (05 Sep 2020 20:28)  T(F): 97.9 (06 Sep 2020 04:16), Max: 98.3 (05 Sep 2020 20:28)  HR: 79 (06 Sep 2020 04:16) (79 - 80)  BP: 106/67 (06 Sep 2020 04:16) (95/56 - 106/67)  RR: 18 (06 Sep 2020 04:16) (18 - 18)  SpO2: 98% (06 Sep 2020 04:16) (98% - 98%)    Daily Weight in k.5 (06 Sep 2020 08:00)    I&O's Summary    05 Sep 2020 07:01  -  06 Sep 2020 07:00  --------------------------------------------------------  IN: 895.6 mL / OUT: 2400 mL / NET: -1504.4 mL    06 Sep 2020 07:01  -  06 Sep 2020 12:26  --------------------------------------------------------  IN: 480 mL / OUT: 900 mL / NET: -420 mL      General: No distress. Comfortable.  HEENT: EOM intact.  Neck: Neck supple. JVP not elevated. No masses  Chest: Clear to auscultation bilaterally  CV: Normal S1 and S2. No murmurs, rub, or gallops. Radial pulses normal.  Abdomen: Soft, non-distended, non-tender  Skin: No rashes or skin breakdown  Neurology: Alert and oriented times three. Sensation intact  Psych: Affect normal    Labs:                        8.8    4.18  )-----------( 87       ( 06 Sep 2020 05:29 )             27.9     09-06    134<L>  |  98  |  104<H>  ----------------------------<  139<H>  4.9   |  23  |  3.43<H>    Ca    10.0      06 Sep 2020 05:29  Mg     2.6     09-05      PTT - ( 06 Sep 2020 09:57 )  PTT:52.7 sec

## 2020-09-06 NOTE — PROGRESS NOTE ADULT - SUBJECTIVE AND OBJECTIVE BOX
DATE OF SERVICE:Patient was seen and examined :09/06/2020    PRESENTING CC:Edema    SUBJ: 74 y/o M w/ PMH of DM2, NICMP/HFrEF (LVIDd 6.7cm, LVEF <20%) s/p CRT-D, AF s/p recent admission w/ DCCV on amio, CKD (BL Cr ~3), and hypothyroid referred from CHF clinic for ADHF.   Is back on Milrinone is ambulating remains on Milrinone      PMH -reviewed admission note, no change since admission  Heart failure: acute [ ] chronic [ ] acute or chronic [ x] diastolic [ ] systolic [x ] combined systolic and diastolic[ ]  EDIE: ATN[ ] renal medullary necrosis [ ] CKD I [ ]CKDII [ ]CKD III [x ]CKD IV [ ]CKD V [ ]Other pathological lesions [ ]    MEDICATIONS  (STANDING):  aMIOdarone    Tablet 200 milliGRAM(s) Oral daily  carvedilol 6.25 milliGRAM(s) Oral every 12 hours  heparin  Infusion. 700 Unit(s)/Hr (7 mL/Hr) IV Continuous <Continuous>  hydrALAZINE 10 milliGRAM(s) Oral three times a day  insulin lispro (HumaLOG) corrective regimen sliding scale   SubCutaneous three times a day before meals  insulin lispro Injectable (HumaLOG) 1 Unit(s) SubCutaneous three times a day before meals  isosorbide   dinitrate Tablet (ISORDIL) 10 milliGRAM(s) Oral three times a day  levothyroxine 50 MICROGram(s) Oral daily  milrinone Infusion 0.25 MICROgram(s)/kG/Min (5.63 mL/Hr) IV Continuous <Continuous>  spironolactone 12.5 milliGRAM(s) Oral daily    MEDICATIONS  (PRN):  dextrose 40% Gel 15 Gram(s) Oral once PRN Blood Glucose LESS THAN 70 milliGRAM(s)/deciliter  glucagon  Injectable 1 milliGRAM(s) IntraMuscular once PRN Glucose LESS THAN 70 milligrams/deciliter  heparin   Injectable 6000 Unit(s) IV Push every 6 hours PRN For aPTT less than 40  heparin   Injectable 3000 Unit(s) IV Push every 6 hours PRN For aPTT between 40 - 57  polyethylene glycol 3350 17 Gram(s) Oral daily PRN Constipation              REVIEW OF SYSTEMS:  Constitutional: [ ] fever, [ ]weight loss,  [ ]fatigue  Eyes: [ ] visual changes  Respiratory: [ ]shortness of breath;  [ ] cough, [ ]wheezing, [ ]chills, [ ]hemoptysis  Cardiovascular: [ ] chest pain, [ ]palpitations, [ ]dizziness,  [ ]leg swelling[ ]orthopnea[ ]PND  Gastrointestinal: [ ] abdominal pain, [ ]nausea, [ ]vomiting,  [ ]diarrhea   Genitourinary: [ ] dysuria, [ ] hematuria  Neurologic: [ ] headaches [ ] tremors[ ]weakness  Skin: [ ] itching, [ ]burning, [ ] rashes  Endocrine: [ ] heat or cold intolerance  Musculoskeletal: [ ] joint pain or swelling; [ ] muscle, back, or extremity pain  Psychiatric: [ ] depression, [ ]anxiety, [ ]mood swings, or [ ]difficulty sleeping  Hematologic: [ ] easy bruising, [ ] bleeding gums    [x] All remaining systems negative except as per above.   [ ]Unable to obtain.    Vital Signs Last 24 Hrs  T(C): 36.6 (06 Sep 2020 04:16), Max: 36.8 (05 Sep 2020 20:28)  T(F): 97.9 (06 Sep 2020 04:16), Max: 98.3 (05 Sep 2020 20:28)  HR: 79 (06 Sep 2020 04:16) (79 - 82)  BP: 106/67 (06 Sep 2020 04:16) (95/56 - 106/67)  RR: 18 (06 Sep 2020 04:16) (18 - 18)  SpO2: 98% (06 Sep 2020 04:16) (95% - 98%)  I&O's Summary    05 Sep 2020 07:01  -  06 Sep 2020 07:00  --------------------------------------------------------  IN: 895.6 mL / OUT: 2400 mL / NET: -1504.4 mL        PHYSICAL EXAM:  General: No acute distress BMI-26  HEENT: EOMI, PERRL  Neck: Supple, [x ] JVD  Lungs: Equal air entry bilaterally; [ ] rales [ ] wheezing [ ] rhonchi  Heart: Regular rate and rhythm; [x ] murmur  2 /6 [x ] systolic [ ] diastolic [ ] radiation[ ] rubs [ ]  gallops  Abdomen: Nontender, bowel sounds present  Extremities: No clubbing, cyanosis, [ ] edema  Nervous system:  Alert & Oriented X3, no focal deficits  Psychiatric: Normal affect  Skin: No rashes or lesions    LABS:  09-06    134<L>  |  98  |  104<H>  ----------------------------<  139<H>  4.9   |  23  |  3.43<H>    Ca    10.0      06 Sep 2020 05:29  Mg     2.6     09-05      Creatinine Trend: 3.43<--, 2.67<--, 2.94<--, 2.92<--, 3.16<--, 3.32<--                        8.8    4.18  )-----------( 87       ( 06 Sep 2020 05:29 )             27.9     PTT - ( 05 Sep 2020 06:03 )  PTT:64.3 sec    RADIOLOGY:US ABDOMEN COMPLETE  IMPRESSION:  Cholelithiasis without evidence of acute cholecystitis.  Trace bilateral pleural effusions.      TELEMETRY:Ventricular paced rhythm    ECHO:Study Date: 8/21/2020  Conclusions:  1. Mitral annular calcification.  Tethered mitral valve leaflets with normal opening. Moderate-severe mitral regurgitation.  2. Calcified trileaflet aortic valve with normal opening.      Mild aortic regurgitation.  3. Severely dilated left atrium.  LA volume index = 69 cc/m2.  4. Moderate left ventricular enlargement.  5. Endocardial visualization enhanced with intravenous injection of Ultrasonic Enhancing Agent (Definity). Severe global left ventricular systolic dysfunction. EF 24%      No left ventricular thrombus.  6. Severe diastolic dysfunction  7. Right ventricular enlargement with decreased right ventricular systolic function.  *** Compared with echocardiogram of 7/20/2020, no significant changes noted.      IMPRESSION AND PLAN:  74 y/o M w/ PMH of DM2, NICMP/HFrEF (LVIDd 6.7cm, LVEF <20%) s/p CRT-D, AF s/p recent admission w/ DCCV on amio, CKD (BL Cr ~3), and hypothyroid referred from CHF clinic for ADHF. He has diuresed more than 20lbs since admission. Taken off milrinone for RHC. Had rising creatinine on 9/3 off of milrinone. It has improved, but not significantly so, on milrinone 0.25 mcg/kg/min.       Problem/Plan - 1:  ·  Problem: Acute HFrEF (heart failure with reduced ejection fraction).  Plan: - c/w milrinone 0.25mcg/kg/min Continue spironolactone 12.5 mg daily  - Continue carvedilol 6.25 mg BID  - Continue hydralazine 10 mg TID and ISDN 10 mg TID; hold for SBP <85  - Maintain K 4-4.5 and Mg 2-2.5.- Under evaluation for LVAD as destination therapy.   -He is not a heart transplant candidate given age.  -Off diuretics is voiding and feels well  -Creatinine bumped up consider low dose Dobutrex instead of Milrinone          Problem/Plan - 2:  ·  Problem: Acute renal failure superimposed on stage 4 chronic kidney disease, unspecified acute renal failure type.  Plan: - Creatinine bumped        Problem/Plan - 3:  ·  Problem: Afib.  Plan: -Currently in paced rhythm  -C/w home med amio  -C/w home med carvedilol, as above  - start heparin gtt.     Problem/Plan - 4:  ·  Problem: Mitral insufficiency.  Plan: - Reassess once euvolemic  - Pt already has CRT-D device.     Problem/Plan - 5:  ·  Problem: Thrombocytopenia.  Plan: - Chronic. Stable    Problem/Plan - 6:  Problem: Papillary thyroid carcinoma. Plan: - appreciate endo c/s.   - No containdication to LVAD.   - Fine needle aspiration showing L upper pole nodule to be [place] VI per outpatient endo note 8/19.

## 2020-09-06 NOTE — PROVIDER CONTACT NOTE (OTHER) - ACTION/TREATMENT ORDERED:
Continue to monitor.
Hold Hydralazine and give Isordil. Continue to monitor.
Annette Berumen (NP) to order Lokelma; will continue to monitor

## 2020-09-07 NOTE — PROGRESS NOTE ADULT - SUBJECTIVE AND OBJECTIVE BOX
remains on diuressis for CHF    aMIOdarone    Tablet 200 milliGRAM(s) Oral daily  carvedilol 6.25 milliGRAM(s) Oral every 12 hours  dextrose 40% Gel 15 Gram(s) Oral once PRN  dextrose 5%. 1000 milliLiter(s) IV Continuous <Continuous>  dextrose 50% Injectable 12.5 Gram(s) IV Push once  dextrose 50% Injectable 25 Gram(s) IV Push once  dextrose 50% Injectable 25 Gram(s) IV Push once  furosemide    Tablet 40 milliGRAM(s) Oral daily  glucagon  Injectable 1 milliGRAM(s) IntraMuscular once PRN  heparin   Injectable 6000 Unit(s) IV Push every 6 hours PRN  heparin   Injectable 3000 Unit(s) IV Push every 6 hours PRN  heparin  Infusion. 700 Unit(s)/Hr IV Continuous <Continuous>  hydrALAZINE 10 milliGRAM(s) Oral three times a day  insulin lispro (HumaLOG) corrective regimen sliding scale   SubCutaneous three times a day before meals  insulin lispro Injectable (HumaLOG) 1 Unit(s) SubCutaneous three times a day before meals  isosorbide   dinitrate Tablet (ISORDIL) 10 milliGRAM(s) Oral three times a day  levothyroxine 50 MICROGram(s) Oral daily  milrinone Infusion 0.25 MICROgram(s)/kG/Min IV Continuous <Continuous>  polyethylene glycol 3350 17 Gram(s) Oral daily PRN  spironolactone 12.5 milliGRAM(s) Oral daily      No Known Allergies      ROS otherwise negative     T(C): 36.6 (09-07-20 @ 04:09), Max: 36.7 (09-06-20 @ 21:23)  HR: 82 (09-07-20 @ 04:09) (80 - 82)  BP: 108/67 (09-07-20 @ 04:09) (95/56 - 108/67)  RR: 18 (09-07-20 @ 04:09) (16 - 18)  SpO2: 98% (09-07-20 @ 04:09) (98% - 100%)  PHYSICAL EXAM  Gen:  laying in bed, nad  H:  anicteric, eomi  CV:  RRR, S1, S2  Lungs:  CTA b/l  Ab soft/nt/nd  Ext:  no edema                          8.6    3.93  )-----------( 129      ( 07 Sep 2020 05:54 )             27.5                         8.8    4.18  )-----------( 87       ( 06 Sep 2020 05:29 )             27.9                         9.1    4.15  )-----------( 80       ( 05 Sep 2020 06:03 )             28.4   09-07    135  |  97  |  105<H>  ----------------------------<  131<H>  4.5   |  24  |  3.20<H>    Ca    9.9      07 Sep 2020 05:54 remains on diuressis for CHF  breathings'  improved    aMIOdarone    Tablet 200 milliGRAM(s) Oral daily  carvedilol 6.25 milliGRAM(s) Oral every 12 hours  dextrose 40% Gel 15 Gram(s) Oral once PRN  dextrose 5%. 1000 milliLiter(s) IV Continuous <Continuous>  dextrose 50% Injectable 12.5 Gram(s) IV Push once  dextrose 50% Injectable 25 Gram(s) IV Push once  dextrose 50% Injectable 25 Gram(s) IV Push once  furosemide    Tablet 40 milliGRAM(s) Oral daily  glucagon  Injectable 1 milliGRAM(s) IntraMuscular once PRN  heparin   Injectable 6000 Unit(s) IV Push every 6 hours PRN  heparin   Injectable 3000 Unit(s) IV Push every 6 hours PRN  heparin  Infusion. 700 Unit(s)/Hr IV Continuous <Continuous>  hydrALAZINE 10 milliGRAM(s) Oral three times a day  insulin lispro (HumaLOG) corrective regimen sliding scale   SubCutaneous three times a day before meals  insulin lispro Injectable (HumaLOG) 1 Unit(s) SubCutaneous three times a day before meals  isosorbide   dinitrate Tablet (ISORDIL) 10 milliGRAM(s) Oral three times a day  levothyroxine 50 MICROGram(s) Oral daily  milrinone Infusion 0.25 MICROgram(s)/kG/Min IV Continuous <Continuous>  polyethylene glycol 3350 17 Gram(s) Oral daily PRN  spironolactone 12.5 milliGRAM(s) Oral daily      No Known Allergies      ROS otherwise negative     T(C): 36.6 (09-07-20 @ 04:09), Max: 36.7 (09-06-20 @ 21:23)  HR: 82 (09-07-20 @ 04:09) (80 - 82)  BP: 108/67 (09-07-20 @ 04:09) (95/56 - 108/67)  RR: 18 (09-07-20 @ 04:09) (16 - 18)  SpO2: 98% (09-07-20 @ 04:09) (98% - 100%)  PHYSICAL EXAM  Gen:  laying in bed, nad  H:  anicteric, eomi  CV:  RRR, S1, S2  Lungs:  CTA b/l  Ab soft/nt/nd  Ext:  no edema                          8.6    3.93  )-----------( 129      ( 07 Sep 2020 05:54 )             27.5                         8.8    4.18  )-----------( 87       ( 06 Sep 2020 05:29 )             27.9                         9.1    4.15  )-----------( 80       ( 05 Sep 2020 06:03 )             28.4   09-07    135  |  97  |  105<H>  ----------------------------<  131<H>  4.5   |  24  |  3.20<H>    Ca    9.9      07 Sep 2020 05:54

## 2020-09-07 NOTE — PROGRESS NOTE ADULT - ASSESSMENT
#ANEMIA, NORMOCYTIC  #THROMBOCYTOPENIA, MODERATE, CHRONIC--- now improved    -His prior smears have never shown any significant schistocytes; LDH was normal few days ago  -likely secondary to chronic bone marrow suppression from chronic illness; might have a component of ITP as well but doesn't warrant treatment at this point  -His anemia is also multifactorial; likely component of CKD as well; advise touching base with renal regarding need for EPO  -Recommend checking baseline labs including iron studies, B12, folate  -Also repeat checking LDH, bilirubin and haptoglobin  -Will continue to follow      #CHF EXACERBATION  #EDIE ON CKD  #A.FIB, ON HEPARIN  -OK to continue AC unless plt count drops below 50K  -Management per primary team    Aaron Vela MD  Hematology/Oncology  Cell:  [phone]  Office Phone: [phone]  Office Fax:  [phone] [address] [id number]

## 2020-09-07 NOTE — PROGRESS NOTE ADULT - ASSESSMENT
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 He was started on inotropic support with milrinone and diuresed over 20 lbs. Now off Milrinone,clinically less dyspneac though renal function has not improved        Problem/Plan - 1:  ·  Problem: Acute HFrEF (heart failure with reduced ejection fraction).  Plan: - Discontinued milrinone  - Contniue Lasix 40 mg PO daily  - Continue spironolactone 12.5 mg daily  - Continue carvedilol 6.25 mg BID  - Continue hydralazine 10 mg TID and ISDN 10 mg TID; hold for SBP <85  - Maintain K 4-4.5 and Mg 2-2.5.  - c/w heparin gtt  - Under evaluation for LVAD as destination therapy. He is not a heart transplant candidate given age.     Problem/Plan - 2:  ·  Problem: Acute renal failure superimposed on stage 3 chronic kidney disease, unspecified acute renal failure type.  Plan: - Creatinine fluctuating, but portends overall poor prognosis and limits therapeutic options.   - avoid nephrotoxic medications  - Would need renal function to improve prior to LVAD implant.     Problem/Plan - 3:  ·  Problem: Afib.  Plan: - Currently in paced rhythm  - C/w home med amio  - Continue heparin gtt.     Problem/Plan - 4:  ·  Problem: Mitral insufficiency.  Plan: - Will consider option of Mitraclip give his severe mitral regurgitation.       Problem/Plan - 5:  ·  Problem: Thrombocytopenia.  Plan: - Chronic.

## 2020-09-07 NOTE — PROGRESS NOTE ADULT - ATTENDING COMMENTS
Patient was seen and examined by me on 09/07/2020,interim events noted,labs and radiology studies reviewed.  Loyd Mitchell MD,FACC.  [address] [place] [address].  [id number].  [phone]

## 2020-09-07 NOTE — PROGRESS NOTE ADULT - SUBJECTIVE AND OBJECTIVE BOX
Subjective: He feels well and is able to walk in the halls without significant limitations.     Medications:  aMIOdarone    Tablet 200 milliGRAM(s) Oral daily  carvedilol 6.25 milliGRAM(s) Oral every 12 hours  dextrose 40% Gel 15 Gram(s) Oral once PRN  dextrose 5%. 1000 milliLiter(s) IV Continuous <Continuous>  dextrose 50% Injectable 12.5 Gram(s) IV Push once  dextrose 50% Injectable 25 Gram(s) IV Push once  dextrose 50% Injectable 25 Gram(s) IV Push once  furosemide    Tablet 40 milliGRAM(s) Oral daily  glucagon  Injectable 1 milliGRAM(s) IntraMuscular once PRN  heparin   Injectable 6000 Unit(s) IV Push every 6 hours PRN  heparin   Injectable 3000 Unit(s) IV Push every 6 hours PRN  heparin  Infusion. 700 Unit(s)/Hr IV Continuous <Continuous>  hydrALAZINE 10 milliGRAM(s) Oral three times a day  insulin lispro (HumaLOG) corrective regimen sliding scale   SubCutaneous three times a day before meals  insulin lispro Injectable (HumaLOG) 1 Unit(s) SubCutaneous three times a day before meals  isosorbide   dinitrate Tablet (ISORDIL) 10 milliGRAM(s) Oral three times a day  levothyroxine 50 MICROGram(s) Oral daily  polyethylene glycol 3350 17 Gram(s) Oral daily PRN  spironolactone 12.5 milliGRAM(s) Oral daily      Physical Exam:    Vital Signs Last 24 Hrs  T(C): 36.7 (07 Sep 2020 12:46), Max: 36.7 (06 Sep 2020 21:23)  T(F): 98 (07 Sep 2020 12:46), Max: 98 (06 Sep 2020 21:23)  HR: 80 (07 Sep 2020 14:31) (78 - 82)  BP: 106/64 (07 Sep 2020 14:31) (98/62 - 108/67)  RR: 17 (07 Sep 2020 12:46) (16 - 18)  SpO2: 100% (07 Sep 2020 12:46) (98% - 100%)    Daily Weight in k.4 (07 Sep 2020 08:24)    I&O's Summary    06 Sep 2020 07:01  -  07 Sep 2020 07:00  --------------------------------------------------------  IN: 1537.8 mL / OUT: 3100 mL / NET: -1562.2 mL    07 Sep 2020 07:01  -  07 Sep 2020 17:44  --------------------------------------------------------  IN: 879 mL / OUT: 1400 mL / NET: -521 mL    General: No distress. Comfortable.  HEENT: EOM intact.  Neck: Neck supple. JVP not elevated. No masses  Chest: Clear to auscultation bilaterally  CV: Normal S1 and S2. No murmurs, rub, or gallops. Radial pulses normal. No edema.   Abdomen: Soft, non-distended, non-tender  Skin: No rashes or skin breakdown  Neurology: Alert and oriented times three. Sensation intact  Psych: Affect normal    Labs:                        8.6    3.93  )-----------( 129      ( 07 Sep 2020 05:54 )             27.5     09-07    135  |  97  |  105<H>  ----------------------------<  131<H>  4.5   |  24  |  3.20<H>    Ca    9.9      07 Sep 2020 05:54      PTT - ( 07 Sep 2020 00:32 )  PTT:76.0 sec

## 2020-09-07 NOTE — PROGRESS NOTE ADULT - ASSESSMENT
Mr. 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 He was started on inotropic support with milrinone and diuresed over 20 lbs. He subsequently had worsening renal function, which was thought to be due to overdiuresis. The milrinone was stopped, but resumed after right heart catheterization last week showed elevated wedge pressure and borderline low cardiac output. He is currently hemodynamically stable and is euvolemic and normotensive. His renal function remains poor. He continues on milrinone 0.25 mcg/kg/min.

## 2020-09-07 NOTE — PROGRESS NOTE ADULT - SUBJECTIVE AND OBJECTIVE BOX
SUBJECTIVE / OVERNIGHT EVENTS: pt seen and examined    MEDICATIONS  (STANDING):  aMIOdarone    Tablet 200 milliGRAM(s) Oral daily  carvedilol 6.25 milliGRAM(s) Oral every 12 hours  dextrose 5%. 1000 milliLiter(s) (50 mL/Hr) IV Continuous <Continuous>  dextrose 50% Injectable 12.5 Gram(s) IV Push once  dextrose 50% Injectable 25 Gram(s) IV Push once  dextrose 50% Injectable 25 Gram(s) IV Push once  furosemide    Tablet 40 milliGRAM(s) Oral daily  heparin  Infusion. 700 Unit(s)/Hr (7 mL/Hr) IV Continuous <Continuous>  hydrALAZINE 10 milliGRAM(s) Oral three times a day  insulin lispro (HumaLOG) corrective regimen sliding scale   SubCutaneous three times a day before meals  insulin lispro Injectable (HumaLOG) 1 Unit(s) SubCutaneous three times a day before meals  isosorbide   dinitrate Tablet (ISORDIL) 10 milliGRAM(s) Oral three times a day  levothyroxine 50 MICROGram(s) Oral daily  spironolactone 12.5 milliGRAM(s) Oral daily    MEDICATIONS  (PRN):  dextrose 40% Gel 15 Gram(s) Oral once PRN Blood Glucose LESS THAN 70 milliGRAM(s)/deciliter  glucagon  Injectable 1 milliGRAM(s) IntraMuscular once PRN Glucose LESS THAN 70 milligrams/deciliter  heparin   Injectable 6000 Unit(s) IV Push every 6 hours PRN For aPTT less than 40  heparin   Injectable 3000 Unit(s) IV Push every 6 hours PRN For aPTT between 40 - 57  polyethylene glycol 3350 17 Gram(s) Oral daily PRN Constipation    Vital Signs Last 24 Hrs  T(C): 36.7 (07 Sep 2020 21:15), Max: 36.7 (07 Sep 2020 12:46)  T(F): 98.1 (07 Sep 2020 21:15), Max: 98.1 (07 Sep 2020 21:15)  HR: 80 (07 Sep 2020 21:15) (78 - 82)  BP: 96/61 (07 Sep 2020 21:15) (96/61 - 108/67)  BP(mean): --  RR: 17 (07 Sep 2020 21:15) (17 - 18)  SpO2: 96% (07 Sep 2020 21:15) (96% - 100%)    Cardiovascular: No chest pain or palpation.  Respiratory: No cough, shortness of breath, congestion, or wheezing.  Gastrointestinal: No abdominal pain, nausea, vomiting, or diarrhea.  Genitourinary: No dysuria.  Musculoskeletal: No joint swelling.  Neurologic: No headache.    PHYSICAL EXAM:  GENERAL: NAD  EYES: EOMI, PERRLA  NECK: Supple, No JVD  CHEST/LUNG: crackles at bases  HEART:  S1 , S2 +  ABDOMEN: soft , bs+  EXTREMITIES:  edema+  NEUROLOGY:alert awake oriented       LABS:  09-07    135  |  97  |  105<H>  ----------------------------<  131<H>  4.5   |  24  |  3.20<H>    Ca    9.9      07 Sep 2020 05:54      Creatinine Trend: 3.20 <--, 3.35 <--, 3.43 <--, 2.67 <--, 2.94 <--, 2.92 <--, 3.16 <--, 3.32 <--, 2.93 <--                        8.6    3.93  )-----------( 129      ( 07 Sep 2020 05:54 )             27.5     Urine Studies:              PTT - ( 07 Sep 2020 00:32 )  PTT:76.0 sec

## 2020-09-08 NOTE — PROGRESS NOTE ADULT - PROBLEM SELECTOR PLAN 2
- Creatinine fluctuating, but portends overall poor prognosis and limits therapeutic options.   - avoid nephrotoxic medications  - Would need renal function to improve prior to LVAD implant  - Cardiorenal consult

## 2020-09-08 NOTE — PROGRESS NOTE ADULT - ATTENDING COMMENTS
Patient was seen and examined by me on 09/08/2020,interim events noted,labs and radiology studies reviewed.  Loyd Mitchell MD,FACC.  [address] [place] [address].  [facility].  [phone]

## 2020-09-08 NOTE — PROGRESS NOTE ADULT - PROBLEM SELECTOR PLAN 4
- Will consider option of Mitraclip give his severe mitral regurgitation.  - structural notified, will see patient

## 2020-09-08 NOTE — CONSULT NOTE ADULT - SUBJECTIVE AND OBJECTIVE BOX
[facility] DIVISION OF KIDNEY DISEASES AND HYPERTENSION -- [phone]  -- INITIAL CONSULT NOTE  --------------------------------------------------------------------------------  If any questions, please feel free to contact me  NS pager: [phone], [facility]: [phone]  Arley Ferguson M.D.  Nephrology Fellow    (After 5 pm or on weekends please page the on-call fellow)  --------------------------------------------------------------------------------    HPI:  74M w/ PMHx of HFrEF (EF 10%) s/p ICD, Afib w/ recent admission for CHF ex/afib s/p DCCV (on lujy 2020), CKD3, DM2, hypothyroidism presents after being referred from CHF clinic for admission due to worsening of LE edema and failure of PO diuretics at home. Per patient was relatively functional until ~1 mo ago when he developed palpitations and light headedness. He was found to be hypotensive and in Afib w/ RVR resulting in his hospitalization at the end  of July.  After discharge, patient noteed worsening LE edema. He was instructed to resume lasix, which was ultimately escalated to torsemide 80mg bid w/ metolazone 2.5 mg daily, with mild improvement- Due to ongoing reduced functional capacity, fatigue, weight gain since discharge and ALCAZAR he was referred for admission for IV diuretics. Denies any chest pain, palpitations, nausea, vomiting, diarrhea, syncope, No fevers, chills. On admission sCr 2.4 - peaked to 3.43.     Nephrology consulted for EDIE on CKD      PAST HISTORY  --------------------------------------------------------------------------------  PAST MEDICAL & SURGICAL HISTORY:  Hypothyroidism  Afib  Type II diabetes mellitus  Aortic insufficiency  Mitral insufficiency  CKD (chronic kidney disease)  Cardiomyopathy: Systolic CHF  Hypertension  History of tonsillectomy: childhood  AICD (automatic cardioverter/defibrillator) present: 8/2012    FAMILY HISTORY:  Family history of CVA    PAST SOCIAL HISTORY:  Denies alcohol use, smoking, drug use    ALLERGIES & MEDICATIONS  --------------------------------------------------------------------------------  Allergies    No Known Allergies    Intolerances      Standing Inpatient Medications  aMIOdarone    Tablet 200 milliGRAM(s) Oral daily  carvedilol 6.25 milliGRAM(s) Oral every 12 hours  dextrose 5%. 1000 milliLiter(s) IV Continuous <Continuous>  dextrose 50% Injectable 12.5 Gram(s) IV Push once  dextrose 50% Injectable 25 Gram(s) IV Push once  dextrose 50% Injectable 25 Gram(s) IV Push once  furosemide    Tablet 40 milliGRAM(s) Oral daily  heparin  Infusion. 700 Unit(s)/Hr IV Continuous <Continuous>  hydrALAZINE 10 milliGRAM(s) Oral three times a day  insulin lispro (HumaLOG) corrective regimen sliding scale   SubCutaneous three times a day before meals  insulin lispro Injectable (HumaLOG) 1 Unit(s) SubCutaneous three times a day before meals  isosorbide   dinitrate Tablet (ISORDIL) 10 milliGRAM(s) Oral three times a day  levothyroxine 50 MICROGram(s) Oral daily  spironolactone 12.5 milliGRAM(s) Oral daily    PRN Inpatient Medications  dextrose 40% Gel 15 Gram(s) Oral once PRN  glucagon  Injectable 1 milliGRAM(s) IntraMuscular once PRN  heparin   Injectable 6000 Unit(s) IV Push every 6 hours PRN  heparin   Injectable 3000 Unit(s) IV Push every 6 hours PRN  polyethylene glycol 3350 17 Gram(s) Oral daily PRN      REVIEW OF SYSTEMS  --------------------------------------------------------------------------------  Gen: No fevers/chills  Skin: No rashes  Head/Eyes/Ears: Normal hearing,   Respiratory: No dyspnea, cough  CV: No chest pain  GI: No abdominal pain, diarrhea  : No dysuria, hematuria  MSK:   edema of LE improved  Heme: No easy bruising or bleeding  Psych: No significant depression    All other systems were reviewed and are negative, except as noted.    VITALS/PHYSICAL EXAM  --------------------------------------------------------------------------------  T(C): 36.7 (09-08-20 @ 14:07), Max: 36.7 (09-07-20 @ 21:15)  HR: 82 (09-08-20 @ 14:07) (78 - 82)  BP: 97/58 (09-08-20 @ 14:47) (96/61 - 108/64)  RR: 15 (09-08-20 @ 14:07) (15 - 17)  SpO2: 98% (09-08-20 @ 14:07) (96% - 99%)  Wt(kg): --        09-07-20 @ 07:01  -  09-08-20 @ 07:00  --------------------------------------------------------  IN: 1458 mL / OUT: 3050 mL / NET: -1592 mL    09-08-20 @ 07:01  -  09-08-20 @ 15:43  --------------------------------------------------------  IN: 480 mL / OUT: 1700 mL / NET: -1220 mL      Physical Exam:  	Gen: NAD, cooperative  	HEENT: MMM  	Pulm: CTA B/L, no wheezing anteriorly   	CV: S1S2,   	Abd: Soft, +BS, NT, ND  	Ext: LE edema B/L  	Neuro: Awake, Alert and oriented x3  	Skin: Warm and dry              : no tenderness to palpation               Psych: normal affect and mood      LABS/STUDIES  --------------------------------------------------------------------------------              9.5    4.52  >-----------<  134      [09-08-20 @ 05:28]              29.8     133  |  96  |  110  ----------------------------<  141      [09-08-20 @ 05:28]  5.1   |  23  |  3.41        Ca     10.4     [09-08-20 @ 05:28]    TPro  x   /  Alb  x   /  TBili  0.8  /  DBili  0.3  /  AST  x   /  ALT  x   /  AlkPhos  x   [09-08-20 @ 05:28]      PTT: 74.9       [09-08-20 @ 08:19]          [09-08-20 @ 05:28]    Creatinine Trend:  SCr 3.41 [09-08 @ 05:28]  SCr 3.20 [09-07 @ 05:54]  SCr 3.35 [09-06 @ 17:59]  SCr 3.43 [09-06 @ 05:29]  SCr 2.67 [09-05 @ 15:41]    Urinalysis - [08-25-20 @ 19:47]      Color Light Yellow / Appearance Clear / SG 1.008 / pH 7.0      Gluc Negative / Ketone Negative  / Bili Negative / Urobili Negative       Blood Negative / Protein Negative / Leuk Est Negative / Nitrite Negative      RBC  / WBC  / Hyaline  / Gran  / Sq Epi  / Non Sq Epi  / Bacteria       Iron 36, TIBC 314, %sat 12      [09-08-20 @ 08:45]  Ferritin 145      [09-08-20 @ 08:45]  Vitamin D (25OH) 21.7      [07-18-20 @ 10:13]  TSH 3.86      [08-26-20 @ 00:19]  Lipid: chol 116, TG 55, HDL 48, LDL 58      [08-26-20 @ 00:17]    HBsAb Nonreact      [08-26-20 @ 02:02]  HBsAg Nonreact      [08-26-20 @ 02:02]  HBcAb Nonreact      [08-26-20 @ 02:02]  HCV 0.10, Nonreact      [08-26-20 @ 02:02]    Rheumatoid Factor <10      [08-26-20 @ 00:17]  Syphilis Screen (Treponema Pallidum Ab) Negative      [08-26-20 @ 04:26]  Free Light Chains: kappa 6.27, lambda 4.60, ratio = 1.36      [08-26 @ 04:46]  Immunofixation Serum: No Monoclonal Band Identified    Reference Range: None Detected      [08-26-20 @ 04:46]  SPEP Interpretation: Normal Electrophoresis Pattern      [08-26-20 @ 04:46]  Immunofixation Urine: See Note      [07-17-20 @ 07:37]  UPEP Interpretation: See Note      [07-17-20 @ 07:37]

## 2020-09-08 NOTE — PROGRESS NOTE ADULT - ATTENDING COMMENTS
Hard to tell if he has any improvement on milrinone, but his SCr best was 2.2 during this admission.  Now volume has rebounded and SCr is again > 3.  Resume IV diuretics. Will see how he does on that before adding back milrinone. Hemos were borderline.  Cardio-Renal consult requested for pre-VAD evaluation.   Have asked for Structural Heart consult, as well, given at least mod-severe MR and possible benefit of MitraClip.

## 2020-09-08 NOTE — PROGRESS NOTE ADULT - ASSESSMENT
Mr. 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 He was started on inotropic support with milrinone and diuresed over 20 lbs. He subsequently had worsening renal function, which was thought to be due to overdiuresis. The milrinone was stopped, but resumed after right heart catheterization last week showed elevated wedge pressure and borderline low cardiac output. He is currently hemodynamically stable with rising creatinine. He has been having weight gain and now has elevated JVP.

## 2020-09-08 NOTE — PROGRESS NOTE ADULT - SUBJECTIVE AND OBJECTIVE BOX
Patient seen and examined at bedside.    Overnight Events:   Yesterday milrinone held. Pt. endorsing feeling weak and sore from not moving. No respiratory symptoms. Was able to walk a few laps around the unit today.     Current Meds:  aMIOdarone    Tablet 200 milliGRAM(s) Oral daily  carvedilol 6.25 milliGRAM(s) Oral every 12 hours  dextrose 40% Gel 15 Gram(s) Oral once PRN  dextrose 5%. 1000 milliLiter(s) IV Continuous <Continuous>  dextrose 50% Injectable 12.5 Gram(s) IV Push once  dextrose 50% Injectable 25 Gram(s) IV Push once  dextrose 50% Injectable 25 Gram(s) IV Push once  furosemide    Tablet 40 milliGRAM(s) Oral daily  glucagon  Injectable 1 milliGRAM(s) IntraMuscular once PRN  heparin   Injectable 6000 Unit(s) IV Push every 6 hours PRN  heparin   Injectable 3000 Unit(s) IV Push every 6 hours PRN  heparin  Infusion. 700 Unit(s)/Hr IV Continuous <Continuous>  hydrALAZINE 10 milliGRAM(s) Oral three times a day  insulin lispro (HumaLOG) corrective regimen sliding scale   SubCutaneous three times a day before meals  insulin lispro Injectable (HumaLOG) 1 Unit(s) SubCutaneous three times a day before meals  isosorbide   dinitrate Tablet (ISORDIL) 10 milliGRAM(s) Oral three times a day  levothyroxine 50 MICROGram(s) Oral daily  polyethylene glycol 3350 17 Gram(s) Oral daily PRN  spironolactone 12.5 milliGRAM(s) Oral daily        Vitals:  T(F): 97.7 (09-08), Max: 98.1 (09-07)  HR: 78 (09-08) (78 - 80)  BP: 100/65 (09-08) (96/61 - 106/64)  RR: 16 (09-08)  SpO2: 99% (09-08)  I&O's Summary    07 Sep 2020 07:01  -  08 Sep 2020 07:00  --------------------------------------------------------  IN: 1458 mL / OUT: 3050 mL / NET: -1592 mL    08 Sep 2020 07:01  -  08 Sep 2020 13:54  --------------------------------------------------------  IN: 240 mL / OUT: 0 mL / NET: 240 mL        Physical Exam:  Appearance: No acute distress; comfortable in bed  Eyes: EOMI, pink conjunctiva  HENT: Normal oral mucosa  Cardiovascular: RRR, S1, S2, no murmurs, rubs, or gallops; no edema; elevated 10 cm JVP  Respiratory: Clear to auscultation bilaterally  Gastrointestinal: soft, non-tender, non-distended with normal bowel sounds  Musculoskeletal: No clubbing; no joint deformity   Neurologic: Non-focal  Lymphatic: No lymphadenopathy  Psychiatry: AAOx3, mood & affect appropriate  Skin: No rashes, ecchymoses, or cyanosis                          9.5    4.52  )-----------( 134      ( 08 Sep 2020 05:28 )             29.8     09-08    133<L>  |  96  |  110<H>  ----------------------------<  141<H>  5.1   |  23  |  3.41<H>    Ca    10.4      08 Sep 2020 05:28    TPro  x   /  Alb  x   /  TBili  0.8  /  DBili  0.3<H>  /  AST  x   /  ALT  x   /  AlkPhos  x   09-08    PTT - ( 08 Sep 2020 08:19 )  PTT:74.9 sec              New ECG(s): Personally reviewed    Echo:    Stress Testing:     Cath:    Imaging:    Interpretation of Telemetry:  vpaced 80s

## 2020-09-08 NOTE — PHARMACOTHERAPY INTERVENTION NOTE - COMMENTS
Patient is currently being treated with spironolactone 12.5 mg daily as part of his heart failure regimen. However, patient has had several hyperkalemic episodes in the past few days (K 6.0 on 9/5, 5.6 on 9/6), with potassium 5.1 today. Recommended consider discontinuing spironolactone to avoid further hyperkalemia.    Keshia Vieira, PharmD, BCPS  [phone]

## 2020-09-08 NOTE — PROGRESS NOTE ADULT - PROBLEM SELECTOR PLAN 1
- continue off milrinone  - change lasix 40 mg IV BID  - Discontinue spironolactone for hyperkalemia and elevated creatinine  - Structural eval for moderate-severe MR.   - Cardiorenal consult  - Continue carvedilol 6.25 mg BID  - Continue hydralazine 10 mg TID and ISDN 10 mg TID; hold for SBP <85  - Maintain K 4-4.5 and Mg 2-2.5.  - c/w heparin gtt  - Under evaluation for LVAD as destination therapy. He is not a heart transplant candidate given age.

## 2020-09-08 NOTE — CONSULT NOTE ADULT - ASSESSMENT
74M w/ PMHx of HFrEF (EF 10%) s/p ICD, Afib w/ recent admission for CHF ex/afib s/p DCCV (on july 2020), CKD3, DM2, hypothyroidism presents after being referred from CHF clinic for admission due to worsening of LE edema and failure of PO diuretics at home.    Nephrology consulted for EDEI    #EDIE on CKD  Pt with EDIE in the setting of HFrEF requiring aggressive IV diuresis-  Baseline sCr 2.0 - 2.4.  On admission sCr 2.4 - peaked to 3.43.  EDIE 2/2 cardio-renal, overdiuresis? -- initially requiring Bumex infusion now off - currently on Lasix po 40mg day--  about 9kg down from initial weight.      Please send UA, urine electrolytes (creat, urea, sodium), spot urine TP/CR.   would hold diuresis  strict I/O, daily weights    Will need to consider HD if renal failure continues to worsen.   Monitor labs and urine output.   Avoid NSAIDs, ACEI/ARBS, RCA and nephrotoxins.   Dose medications as per eGFR. 74M w/ PMHx of HFrEF (EF 10%) s/p ICD, Afib w/ recent admission for CHF ex/afib s/p DCCV (on july 2020), CKD3, DM2, hypothyroidism presents after being referred from CHF clinic for admission due to worsening of LE edema and failure of PO diuretics at home.    Nephrology consulted for EDIE    #EDIE on CKD  Pt with EDIE in the setting of HFrEF requiring aggressive IV diuresis-  Baseline sCr 2.0 - 2.4.  On admission sCr 2.4 - peaked to 3.43.  EDIE 2/2 cardio-renal, overdiuresis? -- initially requiring Bumex infusion now off - currently on Lasix po 40mg day--  about 9kg down from initial weight.      Please repeat UA, urine electrolytes (creat, urea, sodium), spot urine TP/CR.   consider decreasing or holding diuresis   strict I/O, daily weights    Will need to consider HD if renal failure continues to worsen.   Monitor labs and urine output.   Avoid NSAIDs, ACEI/ARBS, RCA and nephrotoxins.   Dose medications as per eGFR.

## 2020-09-08 NOTE — PROGRESS NOTE ADULT - SUBJECTIVE AND OBJECTIVE BOX
DATE OF SERVICE: 09/08/2020       Patient seen and examined.Interim events reviewed.       HPI:75 year old man with ACC/AHA stage D chronic systolic heart failure due to a dilated nonischemic caridomyopathy (LVIDd 6.7cm, LVEF <20%) with associated moderate to severe mitral regurgitation, s/p CRT-D, AF s/p recent admission w/ DCCV on amio, stage 4 CKD (BL Cr ~3), and hypothyroid referred from CHF clinic for acute on chronic systolic heart failure with volume overload. He was started on inotropic support with milrinone and diuresed over 20 lbs. He subsequently had worsening renal function, which was thought to be due to overdiuresis. The milrinone was stopped, but resumed after right heart catheterization last week showed elevated wedge pressure and borderline low cardiac output. He is currently hemodynamically stable,    Awake c/o weakness though is ambulating no chest pain    Meds:  aMIOdarone    Tablet 200 milliGRAM(s) Oral daily  carvedilol 6.25 milliGRAM(s) Oral every 12 hours  furosemide    Tablet 40 milliGRAM(s) Oral daily  glucagon  Injectable 1 milliGRAM(s) IntraMuscular once PRN  heparin   Injectable 6000 Unit(s) IV Push every 6 hours PRN  heparin   Injectable 3000 Unit(s) IV Push every 6 hours PRN  heparin  Infusion. 700 Unit(s)/Hr IV Continuous <Continuous>  hydrALAZINE 10 milliGRAM(s) Oral three times a day  insulin lispro (HumaLOG) corrective regimen sliding scale   SubCutaneous three times a day before meals  insulin lispro Injectable (HumaLOG) 1 Unit(s) SubCutaneous three times a day before meals  isosorbide   dinitrate Tablet (ISORDIL) 10 milliGRAM(s) Oral three times a day  levothyroxine 50 MICROGram(s) Oral daily  polyethylene glycol 3350 17 Gram(s) Oral daily PRN  spironolactone 12.5 milliGRAM(s) Oral daily      Vitals:  T(F): 97.7 (09-08), Max: 98.1 (09-07)  HR: 78 (09-08) (78 - 80)  BP: 100/65 (09-08) (96/61 - 106/64)  RR: 16 (09-08)  SpO2: 99% (09-08)  I&O's Summary    07 Sep 2020 07:01  -  08 Sep 2020 07:00  --------------------------------------------------------  IN: 1458 mL / OUT: 3050 mL / NET: -1592 mL    08 Sep 2020 07:01  -  08 Sep 2020 13:54  --------------------------------------------------------  IN: 240 mL / OUT: 0 mL / NET: 240 mL        Physical Exam:  Appearance: No acute distress; comfortable in bed  Eyes: EOMI, pink conjunctiva  HENT: Normal oral mucosa  Cardiovascular: RRR, S1, S2, 2/6 systolic murmur,no rubs, or gallops; no edema; elevated 10 cm JVP  Respiratory: Clear to auscultation bilaterally  Gastrointestinal: soft, non-tender, non-distended with normal bowel sounds  Musculoskeletal: No clubbing; no joint deformity   Neurologic: Non-focal  Lymphatic: No lymphadenopathy  Psychiatry: AAOx3, mood & affect appropriate  Skin: No rashes, ecchymoses, or cyanosis      LABS:              9.5    4.52  )-----------( 134      ( 08 Sep 2020 05:28 )             29.8     09-08    133<L>  |  96  |  110<H>  ----------------------------<  141<H>  5.1   |  23  |  3.41<H>    Ca    10.4      08 Sep 2020 05:28    TPro  x   /  Alb  x   /  TBili  0.8  /  DBili  0.3<H>  /  AST  x   /  ALT  x   /  AlkPhos  x   09-08    PTT - ( 08 Sep 2020 08:19 )  PTT:74.9 sec      IMPRESSION AND PLAN:    Problem/Plan - 1:  ·  Problem: Acute HFrEF (heart failure with reduced ejection fraction).  Plan: - continue off milrinone  - change lasix 40 mg IV BID  - Discontinue spironolactone for hyperkalemia and elevated creatinine  - Continue carvedilol 6.25 mg BID  - Continue hydralazine 10 mg TID and ISDN 10 mg TID; hold for SBP <85  - Maintain K 4-4.5 and Mg 2-2.5.  - c/w heparin gtt      Problem/Plan - 2:  ·  Problem: Acute renal failure superimposed on stage 3 chronic kidney disease, unspecified acute renal failure type.  Plan: - Creatinine fluctuating, but portends overall poor prognosis and limits therapeutic options.   - avoid nephrotoxic medications      Problem/Plan - 3:  ·  Problem: Afib.  Plan: - Currently in paced rhythm  - C/w home med amio  - Continue heparin gtt.     Problem/Plan - 4:  ·  Problem: Mitral insufficiency.  Plan: - Will consider option of Mitraclip give his severe mitral regurgitation.      Problem/Plan - 5:  ·  Problem: Thrombocytopenia.  Plan: - Chronic.

## 2020-09-08 NOTE — CONSULT NOTE ADULT - ATTENDING COMMENTS
74M  HFrEF (EF 10%) s/p ICD,   s/p DCCV (on lujy 2020),   CKD3, DM2, presents CHF worsening of LE edema and failure of PO diuretics was relatively functional until ~1 mo ago when he developed palpitations and light headedness. hospitalization at the end  of July.  After discharge, worsening LE edema.   Resumed lasix, which was ultimately escalated to torsemide 80mg bid w/ metolazone 2.5 mg daily, with mild improvement-   Admission for IV diuretics.On admission sCr 2.4 - peaked to 3.43.  SCr 3.41  May be a bit over diuresed, not fully equilibrated  No evidence for EDIE causes other than hemodynamic

## 2020-09-08 NOTE — PROGRESS NOTE ADULT - SUBJECTIVE AND OBJECTIVE BOX
SUBJECTIVE / OVERNIGHT EVENTS: pt seen and examined    MEDICATIONS  (STANDING):  aMIOdarone    Tablet 200 milliGRAM(s) Oral daily  carvedilol 6.25 milliGRAM(s) Oral every 12 hours  dextrose 5%. 1000 milliLiter(s) (50 mL/Hr) IV Continuous <Continuous>  dextrose 50% Injectable 12.5 Gram(s) IV Push once  dextrose 50% Injectable 25 Gram(s) IV Push once  dextrose 50% Injectable 25 Gram(s) IV Push once  furosemide   Injectable 40 milliGRAM(s) IV Push two times a day  heparin  Infusion. 700 Unit(s)/Hr (7 mL/Hr) IV Continuous <Continuous>  hydrALAZINE 10 milliGRAM(s) Oral three times a day  insulin lispro (HumaLOG) corrective regimen sliding scale   SubCutaneous three times a day before meals  insulin lispro Injectable (HumaLOG) 1 Unit(s) SubCutaneous three times a day before meals  isosorbide   dinitrate Tablet (ISORDIL) 10 milliGRAM(s) Oral three times a day  levothyroxine 50 MICROGram(s) Oral daily    MEDICATIONS  (PRN):  dextrose 40% Gel 15 Gram(s) Oral once PRN Blood Glucose LESS THAN 70 milliGRAM(s)/deciliter  glucagon  Injectable 1 milliGRAM(s) IntraMuscular once PRN Glucose LESS THAN 70 milligrams/deciliter  heparin   Injectable 6000 Unit(s) IV Push every 6 hours PRN For aPTT less than 40  heparin   Injectable 3000 Unit(s) IV Push every 6 hours PRN For aPTT between 40 - 57  polyethylene glycol 3350 17 Gram(s) Oral daily PRN Constipation    Vital Signs Last 24 Hrs  T(C): 36.7 (08 Sep 2020 21:02), Max: 36.7 (08 Sep 2020 14:07)  T(F): 98.1 (08 Sep 2020 21:02), Max: 98.1 (08 Sep 2020 21:02)  HR: 80 (08 Sep 2020 21:02) (78 - 82)  BP: 90/62 (08 Sep 2020 21:02) (90/62 - 108/64)  BP(mean): --  RR: 16 (08 Sep 2020 21:02) (15 - 16)  SpO2: 99% (08 Sep 2020 21:02) (98% - 99%)    Cardiovascular: No chest pain or palpation.  Respiratory: No cough, shortness of breath, congestion, or wheezing.  Gastrointestinal: No abdominal pain, nausea, vomiting, or diarrhea.  Genitourinary: No dysuria.  Musculoskeletal: No joint swelling.  Neurologic: No headache.    PHYSICAL EXAM:  GENERAL: NAD  EYES: EOMI, PERRLA  NECK: Supple, No JVD  CHEST/LUNG: crackles at bases  HEART:  S1 , S2 +  ABDOMEN: soft , bs+  EXTREMITIES:  edema+  NEUROLOGY:alert awake oriented     LABS:  09-08    133<L>  |  96  |  110<H>  ----------------------------<  141<H>  5.1   |  23  |  3.41<H>    Ca    10.4      08 Sep 2020 05:28    TPro      /  Alb      /  TBili  0.8  /  DBili  0.3<H>  /  AST      /  ALT      /  AlkPhos      09-08    Creatinine Trend: 3.41 <--, 3.20 <--, 3.35 <--, 3.43 <--, 2.67 <--, 2.94 <--, 2.92 <--, 3.16 <--, 3.32 <--                        9.5    4.52  )-----------( 134      ( 08 Sep 2020 05:28 )             29.8     Urine Studies:    Sodium, Random Urine: 85 mmol/L (09-08 @ 21:42)  Creatinine, Random Urine: 26 mg/dL (09-08 @ 21:42)  Protein/Creatinine Ratio Calculation: <0.2 Ratio (09-08 @ 21:42)            PTT - ( 08 Sep 2020 08:19 )  PTT:74.9 sec

## 2020-09-09 NOTE — CONSULT NOTE ADULT - REASON FOR ADMISSION
lower extremity edema
VOL

## 2020-09-09 NOTE — PROGRESS NOTE ADULT - SUBJECTIVE AND OBJECTIVE BOX
DATE OF SERVICE:  09/09/2020      Patient seen and examined.Interim events reviewed.    HPI:74M w/ PMHx of HFrEF (EF 10%) s/p ICD, Afib w/ recent admission for CHF/afib s/p DCCV, CKD, DM2, hypothyroidism presents after being referred from CHF clinic for admission due to worsening of LE edema and failure of PO diuretics at home.  Awake alert no dyspnea at rest     MEDS:    aMIOdarone    Tablet 200 milliGRAM(s) Oral daily  carvedilol 6.25 milliGRAM(s) Oral every 12 hours  dextrose 5%. 1000 milliLiter(s) IV Continuous <Continuous>  dextrose 50% Injectable 12.5 Gram(s) IV Push once  dextrose 50% Injectable 25 Gram(s) IV Push once  dextrose 50% Injectable 25 Gram(s) IV Push once  furosemide   Injectable 40 milliGRAM(s) IV Push two times a day  heparin  Infusion. 700 Unit(s)/Hr IV Continuous <Continuous>  hydrALAZINE 10 milliGRAM(s) Oral three times a day  insulin lispro (HumaLOG) corrective regimen sliding scale   SubCutaneous three times a day before meals  insulin lispro Injectable (HumaLOG) 1 Unit(s) SubCutaneous three times a day before meals  isosorbide   dinitrate Tablet (ISORDIL) 10 milliGRAM(s) Oral three times a day  levothyroxine 50 MICROGram(s) Oral daily      VITALS:  T(C): 36.7 (09-09-20 @ 04:39), Max: 36.7 (09-08-20 @ 14:07)  HR: 82 (09-09-20 @ 04:39) (80 - 82)  BP: 94/45 (09-09-20 @ 04:39) (90/62 - 108/64)  RR: 16 (09-09-20 @ 04:39) (15 - 16)  SpO2: 98% (09-09-20 @ 04:39) (98% - 99%)    Physical Exam:  Gen: A/Ox3, NAD  HEENT: No JVD, Neck Supple, Trachea midline, No masses  Pulmonary: Basilar Crackles,  No accessory muscle use for respiration  Cardiac: S1S2, RRR, II/VI JORDI,   No Gallops/Rubs  ECG: Paced  Gastrointestinal: Soft, NT/ND, + Bowel Sounds  Extremities:  Edema,  No joint pain or swelling +PMSx4  Vascular: 1+ pulses B/L, No Bruits  Neurological: Non Focal, = motor and sensory      LABS:   9.2    3.95  )-----------( 136      ( 09 Sep 2020 05:19 )             28.3    09-09    133<L>  |  96  |  114<H>  ----------------------------<  127<H>  4.5   |  21<L>  |  2.96<H>    Ca    10.0      09 Sep 2020 05:19  Mg     2.2     09-09    TPro  x   /  Alb  x   /  TBili  0.8  /  DBili  0.3<H>  /  AST  x   /  ALT  x   /  AlkPhos  x   09-08      Assessment and Plan:   · Assessment		  75 year old man with ACC/AHA stage D chronic systolic heart failure due to a dilated nonischemic caridomyopathy (LVIDd 6.7cm, LVEF <20%) with associated moderate to severe mitral regurgitation, s/p CRT-D, AF s/p recent admission w/ DCCV on amio, stage 4 CKD (BL Cr ~3), and hypothyroid referred from CHF clinic for Acute on Chronic systolic heart failure with volume overload. He was started on inotropic support with milrinone and diuresed over 20 lbs. Now off Milrinone,clinically less dyspneac though renal function has not improved        Problem/Plan - 1:  ·  Problem: Acute HFrEF (heart failure with reduced ejection fraction).  Plan: - Discontinued milrinone and spironolactone  - Contniue Lasix 40 mg PO daily  - Continue carvedilol 6.25 mg BID  - Continue hydralazine 10 mg TID and ISDN 10 mg TID; hold for SBP <85  - Maintain K 4-4.5 and Mg 2-2.5.  - c/w heparin gtt  - Under evaluation for LVAD as destination therapy. He is not a heart transplant candidate given age.     Problem/Plan - 2:  ·  Problem: Acute renal failure superimposed on stage 3 chronic kidney disease, unspecified acute renal failure type.  Plan: - Creatinine fluctuating, but portends overall poor prognosis and limits therapeutic options.   - avoid nephrotoxic medications  - Would need renal function to improve prior to LVAD implant.  -Slight improvement in creatinine     Problem/Plan - 3:  ·  Problem: Afib.  Plan: - Currently in paced rhythm  - C/w home med amio  - Continue heparin gtt.     Problem/Plan - 4:  ·  Problem: Mitral insufficiency.  Plan: - Will consider option of Mitraclip give his severe mitral regurgitation.  Structural Heart Evaluation       Problem/Plan - 5:  ·  Problem: Thrombocytopenia.  Plan: - Chronic.

## 2020-09-09 NOTE — CONSULT NOTE ADULT - SUBJECTIVE AND OBJECTIVE BOX
Structural Heart Team    HPI:    74M w/ PMHx of HFrEF (EF 10%) s/p ICD, Afib w/ recent admission for CHF/afib s/p DCCV, CKD, DM2, hypothyroidism presents after being referred from CHF clinic for admission due to worsening of LE edema and failure of PO diuretics at home. Per patient was relatively functional until ~1 mo ago when he developed palpitations and light headedness. He was found to be hypotensive and in Afib w/ RVR resulting in his hospitalization at the end  of July. His course was c/b EDIE on CKD and persistent hypotension. He improved after DCCV and was ultimately discharged home off entresto, coreg and diuretics due to c/f worsening hypotension. After discharge, patient notes worsening LE edema. He was instructed to resume lasix, which was ultimately escalated to torsemide 80mg bid w/ metolazone 2.5 mg daily. With this regimen he noted increased urination and some mild improvement. However, due to ongoing reduced functional capacity, fatigue, and ALCAZAR he was referred for admission for IV diuretics. Denies any cp or palpitations. Denies repaid HR. Notes weight gain since discharge. Continues to have poor appetite and reduced exercise capacity. No fevers, chills. Notes chronic nonproductive cough which is unchanged. Sleeps elevated due to back pain, denies significant orthopnea. Is able to complete ADLs at baseline with some assistance from his niece. Notes increased urination with high dose diuretics, but no dysuria or hematuria. No abd pain, nausea, vomiting. No diarrhea.     Today he was resting comfortably in a chair, stating he feels a little SOB. He reports feeling "run down" since July, when he was hospitalized at [facility] for new onset AF and exacerbation of CHF. He was ultimately transferred here and had a DCCV performed and discharged. per the patient, he never really felt well after that hospitalization, and noted that he was "filling up with fluid". He was not ambulating as much as he normally would, and had decreased energy. He did not leave his residence much. He denies any CP or SOB, but had b/l pedal edema which he said made his legs feel heavy. He sleeps in a chair or multiple pillows, but he attributes this to an old lower back injury, and not due to respiratory issues. He lives at home with his niece, and is usually able to perform his own ADL's. He was sent to see Dr. Anglin by his outpatient Cardiologist, who subsequently had him admitted for fluid overload. He has been on diuretics, and was on milrinone, which is currently off.        STS 30 day Mortality:  MVR  9.9%   MV Repair 6.2%      09-08 @ 07:01 - 09-09 @ 07:00  --------------------------------------------------------  IN: 1152 mL / OUT: 3350 mL / NET: -2198 mL    09-09 @ 07:01 - 09-09 @ 12:37  --------------------------------------------------------  IN: 240 mL / OUT: 1000 mL / NET: -760 mL        PAST MEDICAL & SURGICAL HISTORY:  Hypothyroidism  Afib  Type II diabetes mellitus  Aortic insufficiency  Mitral insufficiency  CKD (chronic kidney disease)  Cardiomyopathy: Systolic CHF  Hypertension  History of tonsillectomy: childhood  AICD (automatic cardioverter/defibrillator) present: 8/2012      FAMILY HISTORY:  Family history of CVA            No Known Allergies    aMIOdarone    Tablet 200 milliGRAM(s) Oral daily  carvedilol 6.25 milliGRAM(s) Oral every 12 hours  dextrose 5%. 1000 milliLiter(s) IV Continuous <Continuous>  dextrose 50% Injectable 12.5 Gram(s) IV Push once  dextrose 50% Injectable 25 Gram(s) IV Push once  dextrose 50% Injectable 25 Gram(s) IV Push once  furosemide   Injectable 40 milliGRAM(s) IV Push two times a day  heparin  Infusion. 700 Unit(s)/Hr IV Continuous <Continuous>  hydrALAZINE 10 milliGRAM(s) Oral three times a day  insulin lispro (HumaLOG) corrective regimen sliding scale   SubCutaneous three times a day before meals  insulin lispro Injectable (HumaLOG) 1 Unit(s) SubCutaneous three times a day before meals  isosorbide   dinitrate Tablet (ISORDIL) 10 milliGRAM(s) Oral three times a day  levothyroxine 50 MICROGram(s) Oral daily      T(C): 36.7 (09-09-20 @ 04:39), Max: 36.7 (09-08-20 @ 14:07)  HR: 82 (09-09-20 @ 04:39) (80 - 82)  BP: 94/45 (09-09-20 @ 04:39) (90/62 - 108/64)  RR: 16 (09-09-20 @ 04:39) (15 - 16)  SpO2: 98% (09-09-20 @ 04:39) (98% - 99%)  Wt(kg): --      Review of Symptoms:  General: Alert, Follows commands  Respiratory:  + SOB, + ALCAZAR, No Cough  Cardiac: Denies CP, Denies Palpitations  Gastrointestinal: Denies Pain, Denies N/V  Extremities: + Pedal Edema, No Joint pain  Vascular: Negative  Neurological: Negative  Endocrine: negative      Physical Exam:  Gen: A/Ox3, NAD  HEENT: No JVD, Neck Supple, Trachea midline, No masses  Pulmonary: Basilar Crackles,  No accessory muscle use for respiration  Cardiac: S1S2, RRR, II/VI JORDI,   No Gallops/Rubs  ECG: Paced  Gastrointestinal: Soft, NT/ND, + Bowel Sounds  Extremities:  Edema,  No joint pain or swelling +PMSx4  Vascular: 1+ pulses B/L, No Bruits  Neurological: Non Focal, = motor and sensory      Laboratory:                        9.2    3.95  )-----------( 136      ( 09 Sep 2020 05:19 )             28.3    09-09    133<L>  |  96  |  114<H>  ----------------------------<  127<H>  4.5   |  21<L>  |  2.96<H>    Ca    10.0      09 Sep 2020 05:19  Mg     2.2     09-09    TPro  x   /  Alb  x   /  TBili  0.8  /  DBili  0.3<H>  /  AST  x   /  ALT  x   /  AlkPhos  x   09-08   PTT - ( 09 Sep 2020 09:13 )  PTT:64.1 sec    Pro-BNP:    Serum Pro-Brain Natriuretic Peptide (08.25.20 @ 19:45)    Serum Pro-Brain Natriuretic Peptide: 53683 pg/mL    Transthoracic Echo:    < from: TTE with Doppler (w/Cont) (08.21.20 @ 15:56) >  EF (Teicholtz): 24 %  ------------------------------------------------------------------------  Observations:  Mitral Valve: Mitral annular calcification.  Tethered  mitral valve leaflets with normal opening. Moderate-severe  mitral regurgitation.  Aortic Valve/Aorta: Calcified trileaflet aortic valve with  normal opening. Mild aortic regurgitation.  Aortic Root: 3.6 cm.  Left Atrium: Severely dilated left atrium.  LA volume index  = 69 cc/m2.  Left Ventricle: Endocardial visualization enhanced with  intravenous injection of Ultrasonic Enhancing Agent  (Definity). Severe global left ventricular systolic  dysfunction. No left ventricular thrombus. Moderate left  ventricular enlargement. Severe diastolic dysfunction  Right Heart:  A device wire is noted in the right heart.  Moderate right atrial enlargement. Right ventricular  enlargement with decreased right ventricular systolic  function. Normal tricuspid valve. Moderate tricuspid  regurgitation. Normal pulmonic valve. Mild-moderate  pulmonic regurgitation.  Pericardium/Pleura: Normal pericardium with no pericardial  effusion.  Hemodynamic: Estimated right atrial pressure is 8 mm Hg.  Estimated right ventricular systolic pressure equals 49 mm  Hg, assuming right atrial pressure equals 8 mm Hg,  consistent with mild pulmonary hypertension.  ------------------------------------------------------------------------  Conclusions:  1. Mitral annular calcification.  Tethered mitral valve  leaflets with normal opening. Moderate-severe mitral  regurgitation.  2. Calcified trileaflet aortic valve with normal opening.  Mild aortic regurgitation.  3. Severely dilated left atrium.  LA volume index = 69  cc/m2.  4. Moderate left ventricular enlargement.  5. Endocardial visualization enhanced with intravenous  injection of Ultrasonic Enhancing Agent (Definity). Severe  global left ventricular systolic dysfunction. No left  ventricular thrombus.  6. Severe diastolic dysfunction  7. Right ventricular enlargement with decreased right  ventricular systolic function.  *** Compared with echocardiogram of 7/20/2020, no  significant changes noted.  -------------------------------------------------------------    < end of copied text >    TransEsophageal Echo:    < from: Transesophageal Echocardiogram w/o TTE (07.20.20 @ 14:19) >  Observations:  Mitral Valve: Tethered mitral valve leaflets with normal  opening. At least moderate-severe mitral regurgitation. By  PISA, calculated EROabout 37mmsq (Pisa rad 0.9 cm, Va 28.1  cm/s, Vm 3.8 m/sec)  Aortic Valve/Aorta: Normal trileaflet aortic valve.  Normal aortic root, aortic arch and descending thoracic  aorta. Mild atheroma.  Left Atrium: Left atrial enlargement. No left atrial or  left atrial appendage thrombus. Decreased left atrial  appendage velocities (about 30 cm/sec) noted.  Left Ventricle: Severe global left ventricular systolic  dysfunction. Severe left ventricular enlargement.  Right Heart: No right atrial thrombus. Decreasedright  ventricular systolic function. A device wire is noted in  the right heart. Normal tricuspid valve. Moderate tricuspid  regurgitation.  Pericardium/Pleura: Normal pericardium with no pericardial  effusion.  ------------------------------------------------------------------------  Conclusions:  1. Tethered mitral valve leaflets with normal opening. At  least moderate-severe mitral regurgitation. By PISA,  calculated EROabout 37 mmsq (Pisa rad 0.9 cm, Va 28.1 cm/s,  Vm 3.8 m/sec)  2. Left atrial enlargement. No left atrial or left atrial  appendage thrombus. Decreased left atrial appendage  velocities (about 30 cm/sec) noted.  3. Severe left ventricular enlargement.  4. Severe global left ventricular systolic dysfunction.  5. Decreased right ventricular systolic function. A device  wire is noted in the right heart.  *** No previous Echo exam.  ADDENDUM 7/20/2020: Height and weight corrected.    < end of copied text >    Cardiac Cath:  Pending

## 2020-09-09 NOTE — CONSULT NOTE ADULT - CONSULT REQUESTED BY NAME
Cardiology
Dr. Anglin
Dr. Gerald Diehl
Dr. Gerald Diehl
Dr. Rivera
Gerald Diehl
Jorge Alberto Gonzalez MD
Jorge Alberto Gonzalez MD
Med Team
Rebecca Crawford
heme
n/a
Dr Gerald Diehl

## 2020-09-09 NOTE — PROGRESS NOTE ADULT - PROBLEM SELECTOR PLAN 1
- continue off milrinone  - c/w lasix 40 mg IV BID  - Continue carvedilol 6.25 mg BID  - f/u structural   - f/u cardiorenal consult  - Continue hydralazine 10 mg TID and ISDN 10 mg TID; hold for SBP <85  - Maintain K 4-4.5 and Mg 2-2.5.  - c/w heparin gtt  - Under evaluation for LVAD as destination therapy. He is not a heart transplant candidate given age. - continue off milrinone  - Increase lasix 60mg IV BID  - Continue carvedilol 6.25 mg BID  - f/u structural   - f/u cardiorenal consult  - Increase hydralazine 25mg TID and c/w ISDN 10 mg TID; hold for SBP <85  - Maintain K 4-4.5 and Mg 2-2.5.  - c/w heparin gtt  - Under evaluation for LVAD as destination therapy. He is not a heart transplant candidate given age.

## 2020-09-09 NOTE — CONSULT NOTE ADULT - PROVIDER SPECIALTY LIST ADULT
Nephrology
Cardiology
Dental
Palliative Care
Heme/Onc
Heme/Onc
Transplant Hepatology
Psychology
Psychology
CT Surgery
Endocrinology
Structural Heart
Heart Failure

## 2020-09-09 NOTE — PROGRESS NOTE ADULT - SUBJECTIVE AND OBJECTIVE BOX
Patient seen and examined at bedside.    Overnight Events:       Current Meds:  aMIOdarone    Tablet 200 milliGRAM(s) Oral daily  carvedilol 6.25 milliGRAM(s) Oral every 12 hours  dextrose 40% Gel 15 Gram(s) Oral once PRN  dextrose 5%. 1000 milliLiter(s) IV Continuous <Continuous>  dextrose 50% Injectable 12.5 Gram(s) IV Push once  dextrose 50% Injectable 25 Gram(s) IV Push once  dextrose 50% Injectable 25 Gram(s) IV Push once  furosemide   Injectable 40 milliGRAM(s) IV Push two times a day  glucagon  Injectable 1 milliGRAM(s) IntraMuscular once PRN  heparin   Injectable 6000 Unit(s) IV Push every 6 hours PRN  heparin   Injectable 3000 Unit(s) IV Push every 6 hours PRN  heparin  Infusion. 700 Unit(s)/Hr IV Continuous <Continuous>  hydrALAZINE 10 milliGRAM(s) Oral three times a day  insulin lispro (HumaLOG) corrective regimen sliding scale   SubCutaneous three times a day before meals  insulin lispro Injectable (HumaLOG) 1 Unit(s) SubCutaneous three times a day before meals  isosorbide   dinitrate Tablet (ISORDIL) 10 milliGRAM(s) Oral three times a day  levothyroxine 50 MICROGram(s) Oral daily  polyethylene glycol 3350 17 Gram(s) Oral daily PRN        Vitals:  T(F): 98 (09-09), Max: 98.1 (09-08)  HR: 82 (09-09) (80 - 82)  BP: 94/45 (09-09) (90/62 - 108/64)  RR: 16 (09-09)  SpO2: 98% (09-09)  I&O's Summary    08 Sep 2020 07:01  -  09 Sep 2020 07:00  --------------------------------------------------------  IN: 1152 mL / OUT: 3350 mL / NET: -2198 mL        Physical Exam:                              9.2    3.95  )-----------( 136      ( 09 Sep 2020 05:19 )             28.3     09-09    133<L>  |  96  |  114<H>  ----------------------------<  127<H>  4.5   |  21<L>  |  2.96<H>    Ca    10.0      09 Sep 2020 05:19  Mg     2.2     09-09    TPro  x   /  Alb  x   /  TBili  0.8  /  DBili  0.3<H>  /  AST  x   /  ALT  x   /  AlkPhos  x   09-08    PTT - ( 08 Sep 2020 08:19 )  PTT:74.9 sec              New ECG(s): Personally reviewed    Echo:    Stress Testing:     Cath:    Imaging:    Interpretation of Telemetry: Patient seen and examined at bedside.    Overnight Events:   No overnight events.   Today walked 4 laps around unit but felt like legs were heavier and more tired      Current Meds:  aMIOdarone    Tablet 200 milliGRAM(s) Oral daily  carvedilol 6.25 milliGRAM(s) Oral every 12 hours  dextrose 40% Gel 15 Gram(s) Oral once PRN  dextrose 5%. 1000 milliLiter(s) IV Continuous <Continuous>  dextrose 50% Injectable 12.5 Gram(s) IV Push once  dextrose 50% Injectable 25 Gram(s) IV Push once  dextrose 50% Injectable 25 Gram(s) IV Push once  furosemide   Injectable 40 milliGRAM(s) IV Push two times a day  glucagon  Injectable 1 milliGRAM(s) IntraMuscular once PRN  heparin   Injectable 6000 Unit(s) IV Push every 6 hours PRN  heparin   Injectable 3000 Unit(s) IV Push every 6 hours PRN  heparin  Infusion. 700 Unit(s)/Hr IV Continuous <Continuous>  hydrALAZINE 10 milliGRAM(s) Oral three times a day  insulin lispro (HumaLOG) corrective regimen sliding scale   SubCutaneous three times a day before meals  insulin lispro Injectable (HumaLOG) 1 Unit(s) SubCutaneous three times a day before meals  isosorbide   dinitrate Tablet (ISORDIL) 10 milliGRAM(s) Oral three times a day  levothyroxine 50 MICROGram(s) Oral daily  polyethylene glycol 3350 17 Gram(s) Oral daily PRN        Vitals:  T(F): 98 (09-09), Max: 98.1 (09-08)  HR: 82 (09-09) (80 - 82)  BP: 94/45 (09-09) (90/62 - 108/64)  RR: 16 (09-09)  SpO2: 98% (09-09)  I&O's Summary    08 Sep 2020 07:01  -  09 Sep 2020 07:00  --------------------------------------------------------  IN: 1152 mL / OUT: 3350 mL / NET: -2198 mL        Physical Exam:  Appearance: No acute distress; comfortable in bed  Eyes: EOMI, pink conjunctiva  HENT: Normal oral mucosa  Cardiovascular: RRR, S1, S2, 2/6 holosystolic murmur loudest over left sternal boarder no, rubs, or gallops; no edema; elevated 10 cm JVP  Respiratory: Clear to auscultation bilaterally  Gastrointestinal: soft, non-tender, non-distended with normal bowel sounds  Musculoskeletal: No clubbing; no joint deformity   Neurologic: Non-focal  Lymphatic: No lymphadenopathy  Psychiatry: AAOx3, mood & affect appropriate  Skin: No rashes, ecchymoses, or cyanosis                                9.2    3.95  )-----------( 136      ( 09 Sep 2020 05:19 )             28.3     09-09    133<L>  |  96  |  114<H>  ----------------------------<  127<H>  4.5   |  21<L>  |  2.96<H>    Ca    10.0      09 Sep 2020 05:19  Mg     2.2     09-09    TPro  x   /  Alb  x   /  TBili  0.8  /  DBili  0.3<H>  /  AST  x   /  ALT  x   /  AlkPhos  x   09-08    PTT - ( 08 Sep 2020 08:19 )  PTT:74.9 sec              New ECG(s): Personally reviewed    Echo:    Stress Testing:     Cath:    Imaging:    Interpretation of Telemetry:

## 2020-09-09 NOTE — PROGRESS NOTE ADULT - SUBJECTIVE AND OBJECTIVE BOX
Pt seen, sleepy    MEDICATIONS  (STANDING):  aMIOdarone    Tablet 200 milliGRAM(s) Oral daily  carvedilol 6.25 milliGRAM(s) Oral every 12 hours  dextrose 5%. 1000 milliLiter(s) (50 mL/Hr) IV Continuous <Continuous>  dextrose 50% Injectable 12.5 Gram(s) IV Push once  dextrose 50% Injectable 25 Gram(s) IV Push once  dextrose 50% Injectable 25 Gram(s) IV Push once  furosemide   Injectable 40 milliGRAM(s) IV Push two times a day  heparin  Infusion. 700 Unit(s)/Hr (7 mL/Hr) IV Continuous <Continuous>  hydrALAZINE 10 milliGRAM(s) Oral three times a day  insulin lispro (HumaLOG) corrective regimen sliding scale   SubCutaneous three times a day before meals  insulin lispro Injectable (HumaLOG) 1 Unit(s) SubCutaneous three times a day before meals  isosorbide   dinitrate Tablet (ISORDIL) 10 milliGRAM(s) Oral three times a day  levothyroxine 50 MICROGram(s) Oral daily    MEDICATIONS  (PRN):  dextrose 40% Gel 15 Gram(s) Oral once PRN Blood Glucose LESS THAN 70 milliGRAM(s)/deciliter  glucagon  Injectable 1 milliGRAM(s) IntraMuscular once PRN Glucose LESS THAN 70 milligrams/deciliter  heparin   Injectable 6000 Unit(s) IV Push every 6 hours PRN For aPTT less than 40  heparin   Injectable 3000 Unit(s) IV Push every 6 hours PRN For aPTT between 40 - 57  polyethylene glycol 3350 17 Gram(s) Oral daily PRN Constipation      ROS  UTO 2/2 sleepiness    Vital Signs Last 24 Hrs  T(C): 36.4 (09 Sep 2020 13:20), Max: 36.7 (08 Sep 2020 21:02)  T(F): 97.6 (09 Sep 2020 13:20), Max: 98.1 (08 Sep 2020 21:02)  HR: 80 (09 Sep 2020 13:20) (80 - 82)  BP: 94/60 (09 Sep 2020 13:20) (90/62 - 103/67)  BP(mean): --  RR: 16 (09 Sep 2020 13:20) (16 - 16)  SpO2: 100% (09 Sep 2020 13:20) (98% - 100%)    PE  NAD                            9.2    3.95  )-----------( 136      ( 09 Sep 2020 05:19 )             28.3       09-09    133<L>  |  96  |  114<H>  ----------------------------<  127<H>  4.5   |  21<L>  |  2.96<H>    Ca    10.0      09 Sep 2020 05:19  Mg     2.2     09-09    TPro  x   /  Alb  x   /  TBili  0.8  /  DBili  0.3<H>  /  AST  x   /  ALT  x   /  AlkPhos  x   09-08

## 2020-09-09 NOTE — PROGRESS NOTE ADULT - ATTENDING COMMENTS
JVP remains moderately elevated and his renal function is trending in the right direction with more aggressive diuresis.  Increase lasix to 60 mg IV BID - please dose at 7am & 3pm as patient is awake all night to urinate.  Increase hydralazine to 25 mg po TID, hold SBP <90.    Will see how he does before adding back milrinone. Hemos were borderline.  Cardio-Renal consult appreciated.  Structural Heart consult also appreciated.

## 2020-09-09 NOTE — CHART NOTE - NSCHARTNOTEFT_GEN_A_CORE
Nutrition Follow Up Note    Patient seen for: nutritional follow up     Source: RN, medical record,     Chart reviewed, events noted.  75 year old male with PMHx of HF EF 10%, ICD, Afib, CKD and DM and known Afib with RVR admits for SOB and edema. Pt now being followed by HF team, started on milrinone and Bumex. Under going LVAD evaluation. continues off milrinone at this time. Pt with ARF on CKD stage 3, being followed by nephrology.     Diet : DASH, Consistent CHO, No concentrated K+, Glucerna x2 daily     Patient reports: Pt seen, reports feeling well today.   Pt continues to endorse a good PO intake and has been consuming 100% of meals. Pt is consuming Glucerna x2 daily to further supplement PO Intake.    Noted Pt with high K+ x2. Pt was placed on a no concentrated K+ diet. RD reviewed foods high in K+ to monitor/reduce intake of. Pt was able to recall many foods high in K+ ( potato, banana, oranges, tomatoes). Pt states he will try to limit his intake of these foods.     Pt denies any questions related to his HF nutrition therapy or LVAD nutritional evaluation at this time.  Pt is aware that RD remains available.     Pt denies GI distress at this time. Last BM yesterday, Pt states taking Miralax has been very helpful.      Daily Weight in k.3 (), Weight in k.4 (), Weight in k.4 (), Weight in k.5 (), Weight in k.7 (), Weight in k.7 (), Weight in k.7 ()    Drug Dosing Weight  Weight (kg): 75.1 (02 Sep 2020 03:38)  BMI (kg/m2): 24.3 (02 Sep 2020 03:38)      Pertinent Medications: MEDICATIONS  (STANDING):  aMIOdarone    Tablet 200 milliGRAM(s) Oral daily  carvedilol 6.25 milliGRAM(s) Oral every 12 hours  dextrose 5%. 1000 milliLiter(s) (50 mL/Hr) IV Continuous <Continuous>  dextrose 50% Injectable 12.5 Gram(s) IV Push once  dextrose 50% Injectable 25 Gram(s) IV Push once  dextrose 50% Injectable 25 Gram(s) IV Push once  furosemide   Injectable 40 milliGRAM(s) IV Push two times a day  heparin  Infusion. 700 Unit(s)/Hr (7 mL/Hr) IV Continuous <Continuous>  hydrALAZINE 10 milliGRAM(s) Oral three times a day  insulin lispro (HumaLOG) corrective regimen sliding scale   SubCutaneous three times a day before meals  insulin lispro Injectable (HumaLOG) 1 Unit(s) SubCutaneous three times a day before meals  isosorbide   dinitrate Tablet (ISORDIL) 10 milliGRAM(s) Oral three times a day  levothyroxine 50 MICROGram(s) Oral daily    MEDICATIONS  (PRN):  dextrose 40% Gel 15 Gram(s) Oral once PRN Blood Glucose LESS THAN 70 milliGRAM(s)/deciliter  glucagon  Injectable 1 milliGRAM(s) IntraMuscular once PRN Glucose LESS THAN 70 milligrams/deciliter  heparin   Injectable 6000 Unit(s) IV Push every 6 hours PRN For aPTT less than 40  heparin   Injectable 3000 Unit(s) IV Push every 6 hours PRN For aPTT between 40 - 57  polyethylene glycol 3350 17 Gram(s) Oral daily PRN Constipation      LABS:    @ 05:19: Sodium 133<L>, Potassium 4.5, Calcium 10.0, Magnesium 2.2, Phosphorus --, <H>, Creatinine 2.96<H>, Glucose 127<H>, Alk Phos --, ALT/SGPT --, AST/SGOT --, Albumin --, Prealbumin --, Total Bilirubin --, Hemoglobin 9.2<L>, Hematocrit 28.3<L>, Ferritin --, C-Reactive Protein --, Creatine Kinase <<27>      Finger Sticks:  POCT Blood Glucose.: 146 mg/dL ( @ 08:15)  POCT Blood Glucose.: 215 mg/dL ( @ 21:09)  POCT Blood Glucose.: 170 mg/dL ( @ 17:13)  POCT Blood Glucose.: 204 mg/dL ( @ 13:39)      Skin per nursing documentation: intact   Edema: +1 suzy leg edema     Estimated Needs:   [X ] no change since previous assessment  [ ] recalculated:     Previous Nutrition Diagnosis: Severe malnutrition   Nutrition Diagnosis is: on going     New Nutrition Diagnosis: None      Interventions:     Recommend  1. Continue Consistent CHO (no snack) + DASH + No concentrated K+, and Glucerna x2 daily   2. Provide food preferences as requested by Pt/family within diet restrictions    3. Encourage PO intake during meal times   4. Consistent CHO diet education reviewed   5. No concentrated K+ diet reviewed   6. Trend BG levels, renal indices, LFT's, electrolytes     Monitoring and Evaluation:   Continue to monitor nutritional intake, tolerance to diet prescription, weights, labs, skin integrity  RD remains available upon request and will follow up per protocol  Natalie Spencer RD, CDN, [facility] Pager #[phone].

## 2020-09-09 NOTE — CONSULT NOTE ADULT - PROBLEM SELECTOR RECOMMENDATION 9
Asked to evaluate the patient for potential Mitraclip. Will review previous echo's to see if he needs any repeated. He will need a cardiac cath to evaluate his coronary anatomy. His MR is secondary in nature due to NICM. We will also upload his echo to [facility] for their evaluation also

## 2020-09-09 NOTE — CONSULT NOTE ADULT - CONSULT REASON
Anemia/thrombocytopenia
Behavioral Cardiology Psychological Assessment:
LVAD Evaul
LVAD eval
LVAD evaluation
Possible MitraClip
Psychological assessment for LVAD evaluation
Rule out any acute source of dental infection prior to cardiac surgery
Thrombocytopenia
VOL
history of thyroid cancer in setting of possible LVAD surgery
EDIE on CKD
Edema

## 2020-09-09 NOTE — PROGRESS NOTE ADULT - ASSESSMENT
Mr. 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 He was started on inotropic support with milrinone and diuresed over 20 lbs. He subsequently had worsening renal function, which was thought to be due to overdiuresis. The milrinone was stopped, but resumed after right heart catheterization last week showed elevated wedge pressure and borderline low cardiac output. He is currently hemodynamically stable with rising creatinine. He has been having weight gain and now has elevated JVP.     - Do be discussed with attending Mr. 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 He was started on inotropic support with milrinone and diuresed over 20 lbs. He subsequently had worsening renal function, which was thought to be due to overdiuresis. The milrinone was stopped, but resumed after right heart catheterization last week showed elevated wedge pressure and borderline low cardiac output. He is currently hemodynamically stable with rising creatinine. He has been having weight gain and now has elevated JVP.

## 2020-09-09 NOTE — PROGRESS NOTE ADULT - ASSESSMENT
74M w/ PMHx of HFrEF (EF 10%) s/p ICD, Afib w/ recent admission for CHF ex/afib s/p DCCV (on july 2020), CKD3, DM2, hypothyroidism presents after being referred from CHF clinic for admission due to worsening of LE edema and failure of PO diuretics at home.    Nephrology consulted for EDIE    #EDIE on CKD  Pt with EDIE in the setting of HFrEF requiring aggressive IV diuresis-  Baseline sCr 2.0 - 2.4.  On admission sCr 2.4 - peaked to 3.43.  EDIE 2/2 cardio-renal, ?overdiuresis vs ATN -- initially requiring Bumex infusion now off - currently on Lasix po 40mg day--  about 9kg down from initial weight.      currently sCr has remained stable   consider decreasing or holding diuresis   strict I/O, daily weights    Will need to consider HD if renal failure continues to worsen.   Monitor labs and urine output.   Avoid NSAIDs, ACEI/ARBS, RCA and nephrotoxins.   Dose medications as per eGFR.

## 2020-09-09 NOTE — CONSULT NOTE ADULT - CONSULT REQUESTED DATE/TIME
03-Sep-2020 19:49
05-Sep-2020 12:14
09-Sep-2020 12:34
21-Aug-2020 09:56
25-Aug-2020 17:20
26-Aug-2020 10:27
27-Aug-2020 18:04
27-Aug-2020 19:33
27-Aug-2020 21:07
27-Aug-2020 22:16
28-Aug-2020 10:34
08-Sep-2020 15:43
22-Aug-2020 07:16

## 2020-09-09 NOTE — CONSULT NOTE ADULT - PROBLEM SELECTOR RECOMMENDATION 3
Monitor Creatinine. Patient will need Cardiac Catheterization which will require IV Contrast. Will look for renal recommendations.

## 2020-09-09 NOTE — CONSULT NOTE ADULT - PROBLEM SELECTOR PROBLEM 2
Acute on chronic systolic CHF (congestive heart failure), NYHA class 3
Afib
Type 2 diabetes mellitus with stage 4 chronic kidney disease, without long-term current use of insulin
CKD (chronic kidney disease)
CKD (chronic kidney disease)

## 2020-09-09 NOTE — CONSULT NOTE ADULT - ASSESSMENT
75 Male with Severe Mitral Regurgitation, Acute on Chronic Class 3 Systolic Heart Failure, Acute Kidney Injury

## 2020-09-09 NOTE — CONSULT NOTE ADULT - PROBLEM SELECTOR PROBLEM 1
Acute HFrEF (heart failure with reduced ejection fraction)
Acute HFrEF (heart failure with reduced ejection fraction)
Papillary thyroid carcinoma
Severe mitral regurgitation
Acute HFrEF (heart failure with reduced ejection fraction)
EDIE (acute kidney injury)

## 2020-09-09 NOTE — PROGRESS NOTE ADULT - ATTENDING COMMENTS
Patient was seen and examined by me on 09/09/2020,interim events noted,labs and radiology studies reviewed.  Loyd Mitchell MD,FACC.  [address] [place] [address].  [id number].  [phone]

## 2020-09-09 NOTE — PROGRESS NOTE ADULT - SUBJECTIVE AND OBJECTIVE BOX
[facility] DIVISION OF KIDNEY DISEASES AND HYPERTENSION -- FOLLOW UP NOTE  --------------------------------------------------------------------------------  If any questions, please feel free to contact me  NS pager: [phone], [facility]: [phone]  Arley Ferguson M.D.  Nephrology Fellow    (After 5 pm or on weekends please page the on-call fellow)  --------------------------------------------------------------------------------    Chief Complaint:  Patient is a 75y old  Male who presents with a chief complaint of lower extremity edema (09 Sep 2020 12:33)    24 hour events/subjective:  Patient seen and examined at bedside, in NAD  talking in full sentences - sCr 3.4 >> 2.96  no acute events overnight.       PAST HISTORY  --------------------------------------------------------------------------------  No significant changes to PMH, PSH, FHx, SHx, unless otherwise noted    ALLERGIES & MEDICATIONS  --------------------------------------------------------------------------------  Allergies    No Known Allergies    Intolerances      Standing Inpatient Medications  aMIOdarone    Tablet 200 milliGRAM(s) Oral daily  carvedilol 6.25 milliGRAM(s) Oral every 12 hours  dextrose 5%. 1000 milliLiter(s) IV Continuous <Continuous>  dextrose 50% Injectable 12.5 Gram(s) IV Push once  dextrose 50% Injectable 25 Gram(s) IV Push once  dextrose 50% Injectable 25 Gram(s) IV Push once  furosemide   Injectable 40 milliGRAM(s) IV Push two times a day  heparin  Infusion. 700 Unit(s)/Hr IV Continuous <Continuous>  hydrALAZINE 10 milliGRAM(s) Oral three times a day  insulin lispro (HumaLOG) corrective regimen sliding scale   SubCutaneous three times a day before meals  insulin lispro Injectable (HumaLOG) 1 Unit(s) SubCutaneous three times a day before meals  isosorbide   dinitrate Tablet (ISORDIL) 10 milliGRAM(s) Oral three times a day  levothyroxine 50 MICROGram(s) Oral daily    PRN Inpatient Medications  dextrose 40% Gel 15 Gram(s) Oral once PRN  glucagon  Injectable 1 milliGRAM(s) IntraMuscular once PRN  heparin   Injectable 6000 Unit(s) IV Push every 6 hours PRN  heparin   Injectable 3000 Unit(s) IV Push every 6 hours PRN  polyethylene glycol 3350 17 Gram(s) Oral daily PRN      REVIEW OF SYSTEMS  --------------------------------------------------------------------------------  Gen: No fevers/chills  Skin: No rashes  Head/Eyes/Ears: Normal hearing,   Respiratory: No dyspnea, cough  CV: No chest pain  GI: No abdominal pain, diarrhea  : No dysuria, hematuria  MSK: No  edema  Heme: No easy bruising or bleeding  Psych: No significant depression      All other systems were reviewed and are negative, except as noted.    VITALS/PHYSICAL EXAM  --------------------------------------------------------------------------------  T(C): 36.7 (09-09-20 @ 04:39), Max: 36.7 (09-08-20 @ 14:07)  HR: 80 (09-09-20 @ 13:20) (80 - 82)  BP: 94/60 (09-09-20 @ 13:20) (90/62 - 108/64)  RR: 16 (09-09-20 @ 04:39) (15 - 16)  SpO2: 98% (09-09-20 @ 04:39) (98% - 99%)  Wt(kg): --        09-08-20 @ 07:01  -  09-09-20 @ 07:00  --------------------------------------------------------  IN: 1152 mL / OUT: 3350 mL / NET: -2198 mL    09-09-20 @ 07:01  -  09-09-20 @ 13:25  --------------------------------------------------------  IN: 240 mL / OUT: 1000 mL / NET: -760 mL        Physical Exam:  	Gen: NAD, cooperative  	HEENT: MMM  	Pulm: CTA B/L, no wheezing anteriorly   	CV: S1S2,   	Abd: Soft, +BS, NT, ND  	Ext: LE edema B/L  	Neuro: Awake, Alert and oriented x3  	Skin: Warm and dry              : no tenderness to palpation               Psych: normal affect and mood        LABS/STUDIES  --------------------------------------------------------------------------------              9.2    3.95  >-----------<  136      [09-09-20 @ 05:19]              28.3     133  |  96  |  114  ----------------------------<  127      [09-09-20 @ 05:19]  4.5   |  21  |  2.96        Ca     10.0     [09-09-20 @ 05:19]      Mg     2.2     [09-09-20 @ 05:19]    TPro  x   /  Alb  x   /  TBili  0.8  /  DBili  0.3  /  AST  x   /  ALT  x   /  AlkPhos  x   [09-08-20 @ 05:28]      PTT: 64.1       [09-09-20 @ 09:13]          [09-08-20 @ 05:28]    Creatinine Trend:  SCr 2.96 [09-09 @ 05:19]  SCr 3.41 [09-08 @ 05:28]  SCr 3.20 [09-07 @ 05:54]  SCr 3.35 [09-06 @ 17:59]  SCr 3.43 [09-06 @ 05:29]    Urinalysis - [09-09-20 @ 04:31]      Color Light Yellow / Appearance Clear / SG 1.010 / pH 6.5      Gluc Negative / Ketone Negative  / Bili Negative / Urobili <2 mg/dL       Blood Negative / Protein Negative / Leuk Est Negative / Nitrite Negative      RBC  / WBC  / Hyaline  / Gran  / Sq Epi  / Non Sq Epi  / Bacteria     Urine Creatinine 26      [09-08-20 @ 21:42]  Urine Protein <4      [09-08-20 @ 21:42]  Urine Sodium 85      [09-08-20 @ 21:42]  Urine Urea Nitrogen 355      [09-09-20 @ 03:59]    Iron 36, TIBC 314, %sat 12      [09-08-20 @ 08:45]  Ferritin 145      [09-08-20 @ 08:45]  Vitamin D (25OH) 21.7      [07-18-20 @ 10:13]  TSH 3.86      [08-26-20 @ 00:19]  Lipid: chol 116, TG 55, HDL 48, LDL 58      [08-26-20 @ 00:17]    HBsAb Nonreact      [08-26-20 @ 02:02]  HBsAg Nonreact      [08-26-20 @ 02:02]  HBcAb Nonreact      [08-26-20 @ 02:02]  HCV 0.10, Nonreact      [08-26-20 @ 02:02]    Rheumatoid Factor <10      [08-26-20 @ 00:17]  Syphilis Screen (Treponema Pallidum Ab) Negative      [08-26-20 @ 04:26]  Free Light Chains: kappa 6.27, lambda 4.60, ratio = 1.36      [08-26 @ 04:46]  Immunofixation Serum: No Monoclonal Band Identified    Reference Range: None Detected      [08-26-20 @ 04:46]  SPEP Interpretation: Normal Electrophoresis Pattern      [08-26-20 @ 04:46]

## 2020-09-09 NOTE — PROGRESS NOTE ADULT - ASSESSMENT
#ANEMIA, NORMOCYTIC  #THROMBOCYTOPENIA, MODERATE, CHRONIC--- now improved    hgb improved  ADRIANNE neg  Fe, B12, folate adequate  monitor CBC  plts improving as well      #CHF EXACERBATION  #EDIE ON CKD  #A.FIB, ON HEPARIN  -OK to continue AC unless plt count drops below 50K    Will follow, [phone]

## 2020-09-09 NOTE — PROGRESS NOTE ADULT - SUBJECTIVE AND OBJECTIVE BOX
SUBJECTIVE / OVERNIGHT EVENTS: pt seen and examined    MEDICATIONS  (STANDING):  aMIOdarone    Tablet 200 milliGRAM(s) Oral daily  carvedilol 6.25 milliGRAM(s) Oral every 12 hours  dextrose 5%. 1000 milliLiter(s) (50 mL/Hr) IV Continuous <Continuous>  dextrose 50% Injectable 12.5 Gram(s) IV Push once  dextrose 50% Injectable 25 Gram(s) IV Push once  dextrose 50% Injectable 25 Gram(s) IV Push once  furosemide   Injectable 60 milliGRAM(s) IV Push <User Schedule>  heparin  Infusion. 700 Unit(s)/Hr (7 mL/Hr) IV Continuous <Continuous>  hydrALAZINE 25 milliGRAM(s) Oral three times a day  insulin lispro (HumaLOG) corrective regimen sliding scale   SubCutaneous three times a day before meals  insulin lispro Injectable (HumaLOG) 1 Unit(s) SubCutaneous three times a day before meals  isosorbide   dinitrate Tablet (ISORDIL) 10 milliGRAM(s) Oral three times a day  levothyroxine 50 MICROGram(s) Oral daily    MEDICATIONS  (PRN):  dextrose 40% Gel 15 Gram(s) Oral once PRN Blood Glucose LESS THAN 70 milliGRAM(s)/deciliter  glucagon  Injectable 1 milliGRAM(s) IntraMuscular once PRN Glucose LESS THAN 70 milligrams/deciliter  heparin   Injectable 6000 Unit(s) IV Push every 6 hours PRN For aPTT less than 40  heparin   Injectable 3000 Unit(s) IV Push every 6 hours PRN For aPTT between 40 - 57  polyethylene glycol 3350 17 Gram(s) Oral daily PRN Constipation    Vital Signs Last 24 Hrs  T(C): 36.6 (09 Sep 2020 21:08), Max: 36.7 (09 Sep 2020 04:39)  T(F): 97.8 (09 Sep 2020 21:08), Max: 98 (09 Sep 2020 04:39)  HR: 80 (09 Sep 2020 21:08) (80 - 82)  BP: 100/65 (09 Sep 2020 21:08) (94/45 - 100/65)  BP(mean): --  RR: 17 (09 Sep 2020 21:08) (16 - 18)  SpO2: 100% (09 Sep 2020 21:08) (98% - 100%)    Cardiovascular: No chest pain or palpation.  Respiratory: No cough, shortness of breath, congestion, or wheezing.  Gastrointestinal: No abdominal pain, nausea, vomiting, or diarrhea.  Genitourinary: No dysuria.  Musculoskeletal: No joint swelling.  Neurologic: No headache.    PHYSICAL EXAM:  GENERAL: NAD  EYES: EOMI, PERRLA  NECK: Supple, No JVD  CHEST/LUNG: crackles at bases  HEART:  S1 , S2 +  ABDOMEN: soft , bs+  EXTREMITIES:  edema+  NEUROLOGY:alert awake oriented     LABS:      133<L>  |  96  |  114<H>  ----------------------------<  127<H>  4.5   |  21<L>  |  2.96<H>    Ca    10.0      09 Sep 2020 05:19  Mg     2.2         TPro      /  Alb      /  TBili  0.8  /  DBili  0.3<H>  /  AST      /  ALT      /  AlkPhos          Creatinine Trend: 2.96 <--, 3.41 <--, 3.20 <--, 3.35 <--, 3.43 <--, 2.67 <--, 2.94 <--, 2.92 <--, 3.16 <--                        9.2    3.95  )-----------( 136      ( 09 Sep 2020 05:19 )             28.3     Urine Studies:  Urinalysis Basic - ( 09 Sep 2020 04:31 )    Color: Light Yellow / Appearance: Clear / S.010 / pH:   Gluc:  / Ketone: Negative  / Bili: Negative / Urobili: <2 mg/dL   Blood:  / Protein: Negative / Nitrite: Negative   Leuk Esterase: Negative / RBC:  / WBC    Sq Epi:  / Non Sq Epi:  / Bacteria:       Sodium, Random Urine: 85 mmol/L ( @ 21:42)  Creatinine, Random Urine: 26 mg/dL ( @ 21:42)  Protein/Creatinine Ratio Calculation: <0.2 Ratio ( @ 21:42)            PTT - ( 09 Sep 2020 09:13 )  PTT:64.1 sec

## 2020-09-10 NOTE — PROGRESS NOTE ADULT - PROBLEM SELECTOR PLAN 1
as per chf team, - switch lasix 60mg IV Daily  - Continue carvedilol 6.25 mg BID  - Plan for RHC today,

## 2020-09-10 NOTE — PROGRESS NOTE ADULT - ATTENDING COMMENTS
No acute events.  The patient notes that he is clinically unchanged compared to the prior day.  He denies any chest pain/tightness/discomfort.  Continues to experience shortness of breath and dyspnea upon exertion.  The patient suffers from bilateral lower extremity edema, elevated JVD with bibasilar crackles.  The patient is felt to be a prohibitive risk individual.  He was worsening renal insuffiencey and is not a candidate for transplant and at high risk for LVAD implantation.  Discussion was had with the patient concerning the MitraClip procedure.  Indications for the procedure were reviewed with patient.  Details of the procedure were explained.  Benefits and risks were reviewed.  Risks include but are not limited to infection, bleeding, arrhythmia, TIA/stroke, vascular injury, worsening renal insuffiencey, death and urgent heart surgery.  Prior to the MitraClip procedure it is recommended that he be medically optimized.  He may benefit from RHC and reinitiation of milrinone.  Additionally, last cardiac catheterization report was reviewed.  It is suggested that a repeat cardiac catheterization be performed.  The patient is very concerned about receiving contrast which could push him towards dialysis.    All questions and concerns of the patient were addressed.    EKG, laboratory studies and imaging studies were personally reviewed.

## 2020-09-10 NOTE — PROGRESS NOTE ADULT - ASSESSMENT
74M w/ PMHx of HFrEF (EF 10%) s/p ICD, Afib w/ recent admission for CHF ex/afib s/p DCCV (on july 2020), CKD3, DM2, hypothyroidism presents after being referred from CHF clinic for admission due to worsening of LE edema and failure of PO diuretics at home.    Nephrology consulted for EDIE    #EDIE on CKD  Pt with EDIE in the setting of HFrEF requiring aggressive IV diuresis-  Baseline sCr 2.0 - 2.4.  On admission sCr 2.4 - peaked to 3.43.  EDIE 2/2 cardio-renal, ?overdiuresis vs ATN -- initially requiring Bumex infusion now off - currently on Lasix po 40mg day--  about 9kg down from initial weight.      currently sCr has worsened today to 3.46  consider decreasing or holding diuresis   strict I/O, daily weights    Will need to consider HD if renal failure continues to worsen.   Monitor labs and urine output.   Avoid NSAIDs, ACEI/ARBS, RCA and nephrotoxins.   Dose medications as per eGFR.

## 2020-09-10 NOTE — PROGRESS NOTE ADULT - SUBJECTIVE AND OBJECTIVE BOX
Patient seen and examined at bedside.    Overnight Events:   Did not sleep much because frequently urinating  Pt. feels better today than yesterday.     Current Meds:  aMIOdarone    Tablet 200 milliGRAM(s) Oral daily  carvedilol 6.25 milliGRAM(s) Oral every 12 hours  dextrose 40% Gel 15 Gram(s) Oral once PRN  dextrose 5%. 1000 milliLiter(s) IV Continuous <Continuous>  dextrose 50% Injectable 12.5 Gram(s) IV Push once  dextrose 50% Injectable 25 Gram(s) IV Push once  dextrose 50% Injectable 25 Gram(s) IV Push once  furosemide   Injectable 60 milliGRAM(s) IV Push <User Schedule>  glucagon  Injectable 1 milliGRAM(s) IntraMuscular once PRN  heparin   Injectable 6000 Unit(s) IV Push every 6 hours PRN  heparin   Injectable 3000 Unit(s) IV Push every 6 hours PRN  heparin  Infusion. 700 Unit(s)/Hr IV Continuous <Continuous>  hydrALAZINE 25 milliGRAM(s) Oral three times a day  insulin lispro (HumaLOG) corrective regimen sliding scale   SubCutaneous three times a day before meals  insulin lispro Injectable (HumaLOG) 1 Unit(s) SubCutaneous three times a day before meals  isosorbide   dinitrate Tablet (ISORDIL) 10 milliGRAM(s) Oral three times a day  levothyroxine 50 MICROGram(s) Oral daily  polyethylene glycol 3350 17 Gram(s) Oral daily PRN        Vitals:  T(F): 97.6 (09-10), Max: 97.8 (09-09)  HR: 80 (09-10) (80 - 80)  BP: 102/67 (09-10) (94/60 - 102/67)  RR: 16 (09-10)  SpO2: 100% (09-10)  I&O's Summary    09 Sep 2020 07:01  -  10 Sep 2020 07:00  --------------------------------------------------------  IN: 960 mL / OUT: 3450 mL / NET: -2490 mL          Physical Exam:  Appearance: No acute distress; comfortable in bed  Eyes: EOMI, pink conjunctiva  HENT: Normal oral mucosa  Cardiovascular: RRR, S1, S2, 2/6 holosystolic murmur loudest over left sternal boarder no, rubs, or gallops; no edema; elevated 10 cm JVP  Respiratory: Clear to auscultation bilaterally  Gastrointestinal: soft, non-tender, non-distended with normal bowel sounds  Musculoskeletal: No clubbing; no joint deformity   Neurologic: Non-focal  Lymphatic: No lymphadenopathy  Psychiatry: AAOx3, mood & affect appropriate  Skin: No rashes, ecchymoses, or cyanosis                            9.6    4.50  )-----------( 143      ( 10 Sep 2020 04:38 )             30.5     09-10    134<L>  |  96  |  123<H>  ----------------------------<  143<H>  5.1   |  24  |  3.46<H>    Ca    10.1      10 Sep 2020 04:38  Mg     2.3     09-10      PTT - ( 09 Sep 2020 09:13 )  PTT:64.1 sec              New ECG(s): Personally reviewed    Echo:    Stress Testing:     Cath:    Imaging:    Interpretation of Telemetry: Patient seen and examined at bedside.    Overnight Events:   Did not sleep much because frequently urinating  Pt. feels better today than yesterday.   This morning creatinine yamini.   started on milrinone 0.25mcg/kg/min    Current Meds:  aMIOdarone    Tablet 200 milliGRAM(s) Oral daily  carvedilol 6.25 milliGRAM(s) Oral every 12 hours  dextrose 40% Gel 15 Gram(s) Oral once PRN  dextrose 5%. 1000 milliLiter(s) IV Continuous <Continuous>  dextrose 50% Injectable 12.5 Gram(s) IV Push once  dextrose 50% Injectable 25 Gram(s) IV Push once  dextrose 50% Injectable 25 Gram(s) IV Push once  furosemide   Injectable 60 milliGRAM(s) IV Push <User Schedule>  glucagon  Injectable 1 milliGRAM(s) IntraMuscular once PRN  heparin   Injectable 6000 Unit(s) IV Push every 6 hours PRN  heparin   Injectable 3000 Unit(s) IV Push every 6 hours PRN  heparin  Infusion. 700 Unit(s)/Hr IV Continuous <Continuous>  hydrALAZINE 25 milliGRAM(s) Oral three times a day  insulin lispro (HumaLOG) corrective regimen sliding scale   SubCutaneous three times a day before meals  insulin lispro Injectable (HumaLOG) 1 Unit(s) SubCutaneous three times a day before meals  isosorbide   dinitrate Tablet (ISORDIL) 10 milliGRAM(s) Oral three times a day  levothyroxine 50 MICROGram(s) Oral daily  polyethylene glycol 3350 17 Gram(s) Oral daily PRN        Vitals:  T(F): 97.6 (09-10), Max: 97.8 (09-09)  HR: 80 (09-10) (80 - 80)  BP: 102/67 (09-10) (94/60 - 102/67)  RR: 16 (09-10)  SpO2: 100% (09-10)  I&O's Summary    09 Sep 2020 07:01  -  10 Sep 2020 07:00  --------------------------------------------------------  IN: 960 mL / OUT: 3450 mL / NET: -2490 mL          Physical Exam:  Appearance: No acute distress; comfortable in bed  Eyes: EOMI, pink conjunctiva  HENT: Normal oral mucosa  Cardiovascular: RRR, S1, S2, 2/6 holosystolic murmur loudest over left sternal boarder no, rubs, or gallops; no edema; elevated 10 cm JVP  Respiratory: Clear to auscultation bilaterally  Gastrointestinal: soft, non-tender, non-distended with normal bowel sounds  Musculoskeletal: No clubbing; no joint deformity   Neurologic: Non-focal  Lymphatic: No lymphadenopathy  Psychiatry: AAOx3, mood & affect appropriate  Skin: No rashes, ecchymoses, or cyanosis                            9.6    4.50  )-----------( 143      ( 10 Sep 2020 04:38 )             30.5     09-10    134<L>  |  96  |  123<H>  ----------------------------<  143<H>  5.1   |  24  |  3.46<H>    Ca    10.1      10 Sep 2020 04:38  Mg     2.3     09-10      PTT - ( 09 Sep 2020 09:13 )  PTT:64.1 sec              New ECG(s): Personally reviewed    Echo:    Stress Testing:     Cath:    Imaging:    Interpretation of Telemetry:  AV paced

## 2020-09-10 NOTE — CHART NOTE - NSCHARTNOTEFT_GEN_A_CORE
Padua Prediction Score for VTE Risk within 24hours of admission:    Active malignancy:                                                    [  ] YES +3, [X] NO   Previous VTE (Excluding Superficial Vein Thrombosis): [  ] YES +3, [X] NO  Reduced mobility:                                                     [X] YES +3, [  ] NO  Already known thrombophilic condition:                     [  ] YES +3, [X] NO  Recent (</=1 month trauma and/or surgery):             [  ] YES +2, [X] NO  Elderly age (>/=70):                                                  [X] YES +1, [  ] NO  Heart and/or Respiratory Failure:                               [X] YES +1, [  ] NO   Acute MI and/or ischemic CVA:                                  [  ] YES +1, [X] NO   Acute infection and/or rheumatologic disorder:          [  ] YES +1, [X] NO   BMI>/= 30:                                                              [  ] YES +1, [X] NO   Ongoing hormonal treatment:                                   [  ] YES +1, [X] NO    Total Score: [5]  points    [  ] Padua Score <  3: Low Risk of VTE         - Chemical Thromboprophylaxis should be considered on case-by-case basis  [X] Padua Score >/= 4: High Risk of VTE         - Chemical Thromboprophylaxis is recommended for nonpregnant patients without contraindications (Major bleeding, thrombocytopenia) who are >/=  18 years of age                         VTE Prophylaxis Recommendations:  Mechanical Pneumatic Compression Devices                                [  ]  Yes,  [X] No, Contraindicated    Chemical VTE Prophylaxis (Heparin/ Lovenox/ Fondaparinux)        [ X ] Yes,  [  ] No              [ ] Contraindicated, because _____              [X] Already receiving Systemic Anticoagulation

## 2020-09-10 NOTE — PROGRESS NOTE ADULT - SUBJECTIVE AND OBJECTIVE BOX
[facility] DIVISION OF KIDNEY DISEASES AND HYPERTENSION -- FOLLOW UP NOTE  --------------------------------------------------------------------------------  If any questions, please feel free to contact me  NS pager: [phone], [facility]: [phone]  Arley Ferguson M.D.  Nephrology Fellow    (After 5 pm or on weekends please page the on-call fellow)  --------------------------------------------------------------------------------    Chief Complaint:  Patient is a 75y old  Male who presents with a chief complaint of lower extremity edema (10 Sep 2020 10:40)    24 hour events/subjective:  Patient seen and examined at bedside, in NAD  speaking in full sentences-  sCr 3.4 >> 2.96 > 3.4  UOP: 3.4L in the past 24hrs - no acute events overnight.         PAST HISTORY  --------------------------------------------------------------------------------  No significant changes to PMH, PSH, FHx, SHx, unless otherwise noted    ALLERGIES & MEDICATIONS  --------------------------------------------------------------------------------  Allergies    No Known Allergies    Intolerances      Standing Inpatient Medications  aMIOdarone    Tablet 200 milliGRAM(s) Oral daily  carvedilol 6.25 milliGRAM(s) Oral every 12 hours  dextrose 5%. 1000 milliLiter(s) IV Continuous <Continuous>  dextrose 50% Injectable 12.5 Gram(s) IV Push once  dextrose 50% Injectable 25 Gram(s) IV Push once  dextrose 50% Injectable 25 Gram(s) IV Push once  furosemide   Injectable 60 milliGRAM(s) IV Push <User Schedule>  heparin  Infusion. 700 Unit(s)/Hr IV Continuous <Continuous>  hydrALAZINE 25 milliGRAM(s) Oral three times a day  insulin lispro (HumaLOG) corrective regimen sliding scale   SubCutaneous three times a day before meals  insulin lispro Injectable (HumaLOG) 1 Unit(s) SubCutaneous three times a day before meals  isosorbide   dinitrate Tablet (ISORDIL) 10 milliGRAM(s) Oral three times a day  levothyroxine 50 MICROGram(s) Oral daily  milrinone Infusion 0.25 MICROgram(s)/kG/Min IV Continuous <Continuous>    PRN Inpatient Medications  dextrose 40% Gel 15 Gram(s) Oral once PRN  glucagon  Injectable 1 milliGRAM(s) IntraMuscular once PRN  heparin   Injectable 6000 Unit(s) IV Push every 6 hours PRN  heparin   Injectable 3000 Unit(s) IV Push every 6 hours PRN  polyethylene glycol 3350 17 Gram(s) Oral daily PRN      REVIEW OF SYSTEMS  --------------------------------------------------------------------------------  Gen: No fevers/chills  Skin: No rashes  Head/Eyes/Ears: Normal hearing,   Respiratory: No dyspnea, cough  CV: No chest pain  GI: No abdominal pain, diarrhea  : No dysuria, hematuria  MSK: No  edema  Heme: No easy bruising or bleeding  Psych: No significant depression      All other systems were reviewed and are negative, except as noted.    VITALS/PHYSICAL EXAM  --------------------------------------------------------------------------------  T(C): 36.4 (09-10-20 @ 05:06), Max: 36.6 (09-09-20 @ 21:08)  HR: 80 (09-10-20 @ 05:06) (80 - 80)  BP: 102/67 (09-10-20 @ 05:06) (94/60 - 102/67)  RR: 16 (09-10-20 @ 05:06) (16 - 18)  SpO2: 100% (09-10-20 @ 05:06) (100% - 100%)  Wt(kg): --        09-09-20 @ 07:01  -  09-10-20 @ 07:00  --------------------------------------------------------  IN: 960 mL / OUT: 3450 mL / NET: -2490 mL    09-10-20 @ 07:01  -  09-10-20 @ 11:13  --------------------------------------------------------  IN: 360 mL / OUT: 0 mL / NET: 360 mL        Physical Exam:  	Gen: NAD, cooperative  	HEENT: MMM  	Pulm: CTA B/L, no wheezing anteriorly   	CV: S1S2,   	Abd: Soft, +BS, NT, ND  	Ext: LE edema B/L  	Neuro: Awake, Alert and oriented x3  	Skin: Warm and dry              : no tenderness to palpation               Psych: normal affect and mood      LABS/STUDIES  --------------------------------------------------------------------------------              9.6    4.50  >-----------<  143      [09-10-20 @ 04:38]              30.5     134  |  96  |  123  ----------------------------<  143      [09-10-20 @ 04:38]  5.1   |  24  |  3.46        Ca     10.1     [09-10-20 @ 04:38]      Mg     2.3     [09-10-20 @ 04:38]        PTT: 62.5       [09-10-20 @ 09:13]      Creatinine Trend:  SCr 3.46 [09-10 @ 04:38]  SCr 2.96 [09-09 @ 05:19]  SCr 3.41 [09-08 @ 05:28]  SCr 3.20 [09-07 @ 05:54]  SCr 3.35 [09-06 @ 17:59]    Urinalysis - [09-09-20 @ 04:31]      Color Light Yellow / Appearance Clear / SG 1.010 / pH 6.5      Gluc Negative / Ketone Negative  / Bili Negative / Urobili <2 mg/dL       Blood Negative / Protein Negative / Leuk Est Negative / Nitrite Negative      RBC  / WBC  / Hyaline  / Gran  / Sq Epi  / Non Sq Epi  / Bacteria     Urine Creatinine 26      [09-08-20 @ 21:42]  Urine Protein <4      [09-08-20 @ 21:42]  Urine Sodium 85      [09-08-20 @ 21:42]  Urine Urea Nitrogen 355      [09-09-20 @ 03:59]    Iron 36, TIBC 314, %sat 12      [09-08-20 @ 08:45]  Ferritin 145      [09-08-20 @ 08:45]  Vitamin D (25OH) 21.7      [07-18-20 @ 10:13]  TSH 3.86      [08-26-20 @ 00:19]  Lipid: chol 116, TG 55, HDL 48, LDL 58      [08-26-20 @ 00:17]    HBsAb Nonreact      [08-26-20 @ 02:02]  HBsAg Nonreact      [08-26-20 @ 02:02]  HBcAb Nonreact      [08-26-20 @ 02:02]  HCV 0.10, Nonreact      [08-26-20 @ 02:02]    Rheumatoid Factor <10      [08-26-20 @ 00:17]  Syphilis Screen (Treponema Pallidum Ab) Negative      [08-26-20 @ 04:26]  Free Light Chains: kappa 6.27, lambda 4.60, ratio = 1.36      [08-26 @ 04:46]  Immunofixation Serum: No Monoclonal Band Identified    Reference Range: None Detected      [08-26-20 @ 04:46]  SPEP Interpretation: Normal Electrophoresis Pattern      [08-26-20 @ 04:46]

## 2020-09-10 NOTE — CHART NOTE - NSCHARTNOTEFT_GEN_A_CORE
CCU Accept Note    Transfer from: ( X ) Medicine    (  ) Telemetry    (  ) RCU                                        (  ) Palliative     (  ) Stroke Unit    (  ) _______________    Accepting Physician: Dr. Adal Archer    HPI:  75 year old man with ACC/AHA stage D chronic systolic heart failure due to a dilated nonischemic caridomyopathy (LVIDd 6.7cm, LVEF <20%) with associated moderate to severe mitral regurgitation, s/p CRT-D, AF s/p recent admission w/ DCCV on amio, stage 4 CKD (BL Cr ~3), and hypothyroid presents after being referred from CHF clinic for admission due to worsening of LE edema and failure of PO diuretics at home. After recent hospitalization, patient was discharged home off entresto/coreg/diuretics due to concern for hypotension and had worsening edema shortly after returning home. Upon follow up with HF clinic, the patient was restarted on diuretics with trial of lasix and eventual escalation to torsemide 80mg BID and metolazone 2.5mg QD with mild improvement in edema. Despite the increase diuretic regimen the patient continued to have worsening functional capacity, fatigue and ALCAZAR so was instructed to come to the hospital.     HOSPITAL COURSE:   Patient was admitted to medicine service on  where he was started on IV diuretics. He underwent diuresis with bumex and received inotropic support with milrinone as well. During his course the patient had a weight loss of approximately 20 pounds since admission with course complicated by EDIE likely 2/2 overdiuresis. The patient has been intermittently on and off milrinone and was most recently restarted on milrinone after RHC on  showed increased wedge pressures and low cardiac output. Patient underwent a second right heart cath on 9/10 and is currently transferred to CICU for advanced hemodynamic monitoring with swan batsheva catheter for medical optimization prior to tentative LHC and mitraclip placement next week. Patient is currently hemodynamically stable and remains on milrinone at 0.25 mcg/kg/min.    Patient seen and examined at bedside in CICU.    REVIEW OF SYSTEMS:   CONSTITUTIONAL: No weakness, fevers  EYES/ENT: No visual changes;  No vertigo or throat pain   NECK: No pain or stiffness  RESPIRATORY: No cough, wheezing, hemoptysis; No shortness of breath  CARDIOVASCULAR: No chest pain or palpitations  GASTROINTESTINAL: No abdominal or epigastric pain. No nausea, vomiting, or hematemesis; No diarrhea or constipation. No melena or hematochezia.  GENITOURINARY: No dysuria, frequency or hematuria  NEUROLOGICAL: No numbness or weakness  SKIN: No itching, rashes  PSYCHOLOGICAL: appropriate mood and affect  ENDOCRINE: no heat or cold intolerance    MEDICATIONS  (STANDING):  aMIOdarone    Tablet 200 milliGRAM(s) Oral daily  carvedilol 6.25 milliGRAM(s) Oral every 12 hours  chlorhexidine 2% Cloths 1 Application(s) Topical <User Schedule>  dextrose 5%. 1000 milliLiter(s) (50 mL/Hr) IV Continuous <Continuous>  dextrose 50% Injectable 12.5 Gram(s) IV Push once  dextrose 50% Injectable 25 Gram(s) IV Push once  dextrose 50% Injectable 25 Gram(s) IV Push once  heparin  Infusion. 700 Unit(s)/Hr (7 mL/Hr) IV Continuous <Continuous>  hydrALAZINE 25 milliGRAM(s) Oral three times a day  insulin lispro (HumaLOG) corrective regimen sliding scale   SubCutaneous three times a day before meals  insulin lispro Injectable (HumaLOG) 1 Unit(s) SubCutaneous three times a day before meals  isosorbide   dinitrate Tablet (ISORDIL) 10 milliGRAM(s) Oral three times a day  levothyroxine 50 MICROGram(s) Oral daily  milrinone Infusion 0.25 MICROgram(s)/kG/Min (5.63 mL/Hr) IV Continuous <Continuous>    MEDICATIONS  (PRN):  dextrose 40% Gel 15 Gram(s) Oral once PRN Blood Glucose LESS THAN 70 milliGRAM(s)/deciliter  glucagon  Injectable 1 milliGRAM(s) IntraMuscular once PRN Glucose LESS THAN 70 milligrams/deciliter  heparin   Injectable 6000 Unit(s) IV Push every 6 hours PRN For aPTT less than 40  heparin   Injectable 3000 Unit(s) IV Push every 6 hours PRN For aPTT between 40 - 57  polyethylene glycol 3350 17 Gram(s) Oral daily PRN Constipation    Allergies    No Known Allergies    Intolerances    Vital Signs Last 24 Hrs  T(C): 36.7 (10 Sep 2020 16:27), Max: 36.7 (10 Sep 2020 16:27)  T(F): 98.1 (10 Sep 2020 16:27), Max: 98.1 (10 Sep 2020 16:27)  HR: 82 (10 Sep 2020 16:27) (80 - 83)  BP: 96/56 (10 Sep 2020 16:27) (96/56 - 102/67)  BP(mean): --  RR: 18 (10 Sep 2020 16:27) (16 - 18)  SpO2: 100% (10 Sep 2020 16:27) (100% - 100%)    I&O's Summary    09 Sep 2020 07:01  -  10 Sep 2020 07:00  --------------------------------------------------------  IN: 960 mL / OUT: 3450 mL / NET: -2490 mL    10 Sep 2020 07:01  -  10 Sep 2020 20:18  --------------------------------------------------------  IN: 480 mL / OUT: 1750 mL / NET: -1270 mL    Physical Exam:  General: Well-appearing, NAD  HEENT: PERRL, EOMI, normal sclera and conjunctiva, normal oropharynx  Neck: Supple, no JVD, thyroid without masses or enlargement  Chest/Lungs: CTA bilaterally, no wheezing, rales, rhonchi or rub  Heart: RRR, normal S1, S2, no murmurs or gallops  Abdomen: Soft, ND, NTTP, normoactive bowel sounds  Extremities: 2+ peripheral pulses b/l, no edema, clubbing or cyanosis  Skin: Warm, well-perfused, no rashes or lesions  Neurological: A&Ox3, moves all extremities, no focal deficits    LABS:                         9.6    4.50  )-----------( 143      ( 10 Sep 2020 04:38 )             30.5     09-10    134<L>  |  96  |  123<H>  ----------------------------<  143<H>  5.1   |  24  |  3.46<H>    Ca    10.1      10 Sep 2020 04:38  Mg     2.3     09-10    PTT - ( 10 Sep 2020 09:13 )  PTT:62.5 sec    Urinalysis Basic - ( 09 Sep 2020 04:31 )    Color: Light Yellow / Appearance: Clear / S.010 / pH: x  Gluc: x / Ketone: Negative  / Bili: Negative / Urobili: <2 mg/dL   Blood: x / Protein: Negative / Nitrite: Negative   Leuk Esterase: Negative / RBC: x / WBC x   Sq Epi: x / Non Sq Epi: x / Bacteria: x    Echocardiogram:  < from: TTE with Doppler (w/Cont) (20 @ 15:56) >    Dimensions:    Normal Values:  LA:     5.7    2.0 - 4.0 cm  Ao:     3.6    2.0 - 3.8 cm  SEPTUM: 0.8    0.6 - 1.2 cm  PWT:    0.9    0.6 - 1.1 cm  LVIDd:  6.9    3.0 - 5.6 cm  LVIDs:  6.1    1.8 - 4.0 cm  Derived variables:  LVMI: 126 g/m2  RWT: 0.26  Fractional short: 12 %  EF (Teicholtz): 24 %    Conclusions:  1. Mitral annular calcification.  Tethered mitral valve  leaflets with normal opening. Moderate-severe mitral  regurgitation.  2. Calcified trileaflet aortic valve with normal opening.  Mild aortic regurgitation.  3. Severely dilated left atrium.  LA volume index = 69  cc/m2.  4. Moderate left ventricular enlargement.  5. Endocardial visualization enhanced with intravenous  injection of Ultrasonic Enhancing Agent (Definity). Severe  global left ventricular systolic dysfunction. No left  ventricular thrombus.  6. Severe diastolic dysfunction  7. Right ventricular enlargement with decreased right  ventricular systolic function.  *** Compared with echocardiogram of 2020, no  significant changes noted.    < end of copied text >    IMAGING:  < from: CT Chest No Cont (20 @ 08:59) >    FINDINGS:    LUNGS AND LARGE AIRWAYS: Patent central airways. Scattered sub-2 mm nodules, otherwise clear lungs.  PLEURA: Bilateral pleural effusions with adjacent atelectasis.  HEART: Heart is enlarged. Coronary calcifications. Left chest wall dual chamber AICD.  MEDIASTINUM AND REMI: No lymphadenopathy.  CHEST WALL AND LOWER NECK: Calcific density in the left thyroid gland.  VISUALIZED UPPER ABDOMEN: Cholelithiasis.  BONES: Degenerative changes of spine.    IMPRESSION:    Bilateral pleural effusions.    < from: CT Virtual Colonoscopy, Screening (20 @ 16:26) >    IMPRESSION:    Slightly limited exam.    No colonic polyp or mass is identified.    < end of copied text >    ASSESSMENT & PLAN:   76 y/o M with hx of HFrEF (LVEF <20%) s/p AICD, severe MR, afib s/p DCCV, CKD, DM2, and hypothyroidism intially sent in from HF clinic due to worsening LE edema and admitted for acute on chronic systolic heart failure. Now s/p RHC with swan batsheva catheter placement, transferred to CICU for hemodynamic monitoring and optimization prior to tentative LHC and mitraclip placement.    Neuro    Respiratory    Cardiovascular    GI/Hep    Renal    Endo    Heme/Onc    ID CCU Accept Note    Transfer from: ( X ) Medicine    (  ) Telemetry    (  ) RCU                                        (  ) Palliative     (  ) Stroke Unit    (  ) _______________    Accepting Physician: Dr. Adal Archer    HPI:  75 year old man with ACC/AHA stage D chronic systolic heart failure due to a dilated nonischemic caridomyopathy (LVIDd 6.7cm, LVEF <20%) with associated moderate to severe mitral regurgitation, s/p CRT-D, AF s/p recent admission w/ DCCV on amio, stage 4 CKD (BL Cr ~2.0-2.4), and hypothyroid presents after being referred from CHF clinic for admission due to worsening of LE edema and failure of PO diuretics at home. After recent hospitalization, patient was discharged home off entresto/coreg/diuretics due to concern for hypotension and had worsening edema shortly after returning home. Upon follow up with HF clinic, the patient was restarted on diuretics with trial of lasix and eventual escalation to torsemide 80mg BID and metolazone 2.5mg QD with mild improvement in edema. Despite the increase diuretic regimen the patient continued to have worsening functional capacity, fatigue and ALCAZAR so was instructed to come to the hospital.     HOSPITAL COURSE:   Patient was admitted to medicine service on  where he was started on IV diuretics. He underwent diuresis with bumex and received inotropic support with milrinone as well. During his course the patient had a weight loss of approximately 20 pounds since admission with course complicated by EDIE likely 2/2 overdiuresis. The patient has been intermittently on and off milrinone and was most recently restarted on milrinone after RHC on  showed increased wedge pressures and low cardiac output. Patient underwent a second right heart cath on 9/10 and is currently transferred to CICU for advanced hemodynamic monitoring with swan batsheva catheter for medical optimization prior to tentative LHC and mitraclip placement next week. Patient is currently hemodynamically stable and remains on milrinone at 0.25 mcg/kg/min.    Patient seen and examined at bedside in CICU.    REVIEW OF SYSTEMS:   CONSTITUTIONAL: No weakness, fevers  EYES/ENT: No visual changes;  No vertigo or throat pain   NECK: No pain or stiffness  RESPIRATORY: No cough, wheezing, hemoptysis; No shortness of breath  CARDIOVASCULAR: No chest pain or palpitations  GASTROINTESTINAL: No abdominal or epigastric pain. No nausea, vomiting, or hematemesis; No diarrhea or constipation. No melena or hematochezia.  GENITOURINARY: No dysuria, frequency or hematuria  NEUROLOGICAL: No numbness or weakness  SKIN: No itching, rashes  PSYCHOLOGICAL: appropriate mood and affect  ENDOCRINE: no heat or cold intolerance    MEDICATIONS  (STANDING):  aMIOdarone    Tablet 200 milliGRAM(s) Oral daily  carvedilol 6.25 milliGRAM(s) Oral every 12 hours  chlorhexidine 2% Cloths 1 Application(s) Topical <User Schedule>  dextrose 5%. 1000 milliLiter(s) (50 mL/Hr) IV Continuous <Continuous>  dextrose 50% Injectable 12.5 Gram(s) IV Push once  dextrose 50% Injectable 25 Gram(s) IV Push once  dextrose 50% Injectable 25 Gram(s) IV Push once  heparin  Infusion. 700 Unit(s)/Hr (7 mL/Hr) IV Continuous <Continuous>  hydrALAZINE 25 milliGRAM(s) Oral three times a day  insulin lispro (HumaLOG) corrective regimen sliding scale   SubCutaneous three times a day before meals  insulin lispro Injectable (HumaLOG) 1 Unit(s) SubCutaneous three times a day before meals  isosorbide   dinitrate Tablet (ISORDIL) 10 milliGRAM(s) Oral three times a day  levothyroxine 50 MICROGram(s) Oral daily  milrinone Infusion 0.25 MICROgram(s)/kG/Min (5.63 mL/Hr) IV Continuous <Continuous>    MEDICATIONS  (PRN):  dextrose 40% Gel 15 Gram(s) Oral once PRN Blood Glucose LESS THAN 70 milliGRAM(s)/deciliter  glucagon  Injectable 1 milliGRAM(s) IntraMuscular once PRN Glucose LESS THAN 70 milligrams/deciliter  heparin   Injectable 6000 Unit(s) IV Push every 6 hours PRN For aPTT less than 40  heparin   Injectable 3000 Unit(s) IV Push every 6 hours PRN For aPTT between 40 - 57  polyethylene glycol 3350 17 Gram(s) Oral daily PRN Constipation    Allergies    No Known Allergies    Intolerances    Vital Signs Last 24 Hrs  T(C): 36.7 (10 Sep 2020 16:27), Max: 36.7 (10 Sep 2020 16:27)  T(F): 98.1 (10 Sep 2020 16:27), Max: 98.1 (10 Sep 2020 16:27)  HR: 82 (10 Sep 2020 16:27) (80 - 83)  BP: 96/56 (10 Sep 2020 16:27) (96/56 - 102/67)  BP(mean): --  RR: 18 (10 Sep 2020 16:27) (16 - 18)  SpO2: 100% (10 Sep 2020 16:27) (100% - 100%)    I&O's Summary    09 Sep 2020 07:01  -  10 Sep 2020 07:00  --------------------------------------------------------  IN: 960 mL / OUT: 3450 mL / NET: -2490 mL    10 Sep 2020 07:01  -  10 Sep 2020 20:18  --------------------------------------------------------  IN: 480 mL / OUT: 1750 mL / NET: -1270 mL    Physical Exam:  General: Well-appearing, NAD  HEENT: PERRL, EOMI, normal sclera and conjunctiva, normal oropharynx  Neck: Supple, no JVD, thyroid without masses or enlargement  Chest/Lungs: CTA bilaterally, no wheezing, rales, rhonchi or rub  Heart: RRR, normal S1, S2, no murmurs or gallops  Abdomen: Soft, ND, NTTP, normoactive bowel sounds  Extremities: 2+ peripheral pulses b/l, no edema, clubbing or cyanosis  Skin: Warm, well-perfused, no rashes or lesions  Neurological: A&Ox3, moves all extremities, no focal deficits    LABS:                         9.6    4.50  )-----------( 143      ( 10 Sep 2020 04:38 )             30.5     09-10    134<L>  |  96  |  123<H>  ----------------------------<  143<H>  5.1   |  24  |  3.46<H>    Ca    10.1      10 Sep 2020 04:38  Mg     2.3     09-10    PTT - ( 10 Sep 2020 09:13 )  PTT:62.5 sec    Urinalysis Basic - ( 09 Sep 2020 04:31 )    Color: Light Yellow / Appearance: Clear / S.010 / pH: x  Gluc: x / Ketone: Negative  / Bili: Negative / Urobili: <2 mg/dL   Blood: x / Protein: Negative / Nitrite: Negative   Leuk Esterase: Negative / RBC: x / WBC x   Sq Epi: x / Non Sq Epi: x / Bacteria: x    Echocardiogram:  < from: TTE with Doppler (w/Cont) (20 @ 15:56) >    Dimensions:    Normal Values:  LA:     5.7    2.0 - 4.0 cm  Ao:     3.6    2.0 - 3.8 cm  SEPTUM: 0.8    0.6 - 1.2 cm  PWT:    0.9    0.6 - 1.1 cm  LVIDd:  6.9    3.0 - 5.6 cm  LVIDs:  6.1    1.8 - 4.0 cm  Derived variables:  LVMI: 126 g/m2  RWT: 0.26  Fractional short: 12 %  EF (Teicholtz): 24 %    Conclusions:  1. Mitral annular calcification.  Tethered mitral valve  leaflets with normal opening. Moderate-severe mitral  regurgitation.  2. Calcified trileaflet aortic valve with normal opening.  Mild aortic regurgitation.  3. Severely dilated left atrium.  LA volume index = 69  cc/m2.  4. Moderate left ventricular enlargement.  5. Endocardial visualization enhanced with intravenous  injection of Ultrasonic Enhancing Agent (Definity). Severe  global left ventricular systolic dysfunction. No left  ventricular thrombus.  6. Severe diastolic dysfunction  7. Right ventricular enlargement with decreased right  ventricular systolic function.  *** Compared with echocardiogram of 2020, no  significant changes noted.    < end of copied text >    IMAGING:  < from: CT Chest No Cont (20 @ 08:59) >    FINDINGS:    LUNGS AND LARGE AIRWAYS: Patent central airways. Scattered sub-2 mm nodules, otherwise clear lungs.  PLEURA: Bilateral pleural effusions with adjacent atelectasis.  HEART: Heart is enlarged. Coronary calcifications. Left chest wall dual chamber AICD.  MEDIASTINUM AND REMI: No lymphadenopathy.  CHEST WALL AND LOWER NECK: Calcific density in the left thyroid gland.  VISUALIZED UPPER ABDOMEN: Cholelithiasis.  BONES: Degenerative changes of spine.    IMPRESSION:    Bilateral pleural effusions.    < from: CT Virtual Colonoscopy, Screening (20 @ 16:26) >    IMPRESSION:    Slightly limited exam.    No colonic polyp or mass is identified.    < end of copied text >    ASSESSMENT & PLAN:   74 y/o M with hx of HFrEF (LVEF <20%) s/p AICD, severe MR, s/p CRT-D, afib s/p DCCV, CKD, DM2, and hypothyroidism intially sent in from HF clinic due to worsening LE edema and admitted for acute on chronic systolic heart failure. Now s/p RHC with swan batsheva catheter placement, transferred to CICU for hemodynamic monitoring and optimization prior to tentative LHC and mitraclip placement.    Neuro  - A&Ox3  - Patient mentating appropriately  - no active issues    Respiratory  - saturating well on RA  - no active issues    Cardiovascular  #Acute on Chronic Decompensated Heart Failure  - s/p aggressive diuresis during hospital course with approx 20 lb weight loss  - however remaining with high filling pressures  - HF following, appreciate recs  - c/w milrinone 0.25 mcg/kg/min for inotropic support  - c/w hydralazine 25mg TID and ISDN 10mg TID for afterload reduction  - trend hemodynamics with swan batsheva catheter  - trend perfusion markers (creatinine/lactate) daily  - currently undergoing LVAD evaluation    #Mitral Regugitation  - patient with mod-severe MR noted on TTE  - followed by structural heart, planned for LHC and Mitraclip placement next week  - procedures pending improvement in creatinine and medical optimization    #Afib  - s/p CRT-D, currently in paced rhythm  - c/w Amio 200mg qd  - Hep gtt for AC  - monitor on telemetry    GI/Hep  - currently tolerating DASH diet  - no active issues    Renal  #EDIE  - patient with EDIE superimposed on stage 3 CKD (Baseline Cr ~2.0-2.4)  - 2/2 cardiorenal vs overdiuresis  - currently with steadily rising Cr  - currently being followed by nephrology, appreciate recs  - will hold diuresis for now    Endo  #DM  - A1C: 6.2  - c/w Low ISS, FS TIDAC and QHS  - per endocrine recs, recommend running fluids and drips in NS containing solutions instead of dextrose    #Papillary Thyroid Carcinoma  - per endocrine recs: c/w thyroid suppression therapy  - c/w Levothyroxine 50mcg daily   - goal TSH <2  - no plan for intervention at this time    Heme/Onc  - Thyroid carcinoma as above  - H/H stable  - Heparin Gtt for AC    ID  - no fevers, no leukocytosis at this time  - no active issues CCU Accept Note    Transfer from: ( X ) Medicine    (  ) Telemetry    (  ) RCU                                        (  ) Palliative     (  ) Stroke Unit    (  ) _______________    Accepting Physician: Dr. Adal Archer    HPI:  75 year old man with ACC/AHA stage D chronic systolic heart failure due to a dilated nonischemic caridomyopathy (LVIDd 6.7cm, LVEF <20%) with associated moderate to severe mitral regurgitation, s/p CRT-D, AF s/p recent admission w/ DCCV on amio, stage 4 CKD (BL Cr ~2.0-2.4), and hypothyroid presents after being referred from CHF clinic for admission due to worsening of LE edema and failure of PO diuretics at home. After recent hospitalization, patient was discharged home off entresto/coreg/diuretics due to concern for hypotension and had worsening edema shortly after returning home. Upon follow up with HF clinic, the patient was restarted on diuretics with trial of lasix and eventual escalation to torsemide 80mg BID and metolazone 2.5mg QD with mild improvement in edema. Despite the increase diuretic regimen the patient continued to have worsening functional capacity, fatigue and ALCAZAR so was instructed to come to the hospital.     HOSPITAL COURSE:   Patient was admitted to medicine service on  where he was started on IV diuretics. He underwent diuresis with bumex and received inotropic support with milrinone as well. During his course the patient had a weight loss of approximately 20 pounds since admission with course complicated by EDIE likely 2/2 overdiuresis. The patient has been intermittently on and off milrinone and was most recently restarted on milrinone after RHC on  showed increased wedge pressures and low cardiac output. Patient underwent a second right heart cath on 9/10 and is currently transferred to CICU for advanced hemodynamic monitoring with swan batsheva catheter for medical optimization prior to tentative LHC and mitraclip placement next week. Patient is currently hemodynamically stable and remains on milrinone at 0.25 mcg/kg/min.    Patient seen and examined at bedside in CICU. Alert and conversant. Currently states that he feels well. Endorses some discomfort around his right neck near site of catheter. Otherwise denies any CP, SOB, palpitations, lightheadedness, N/V, or abdominal pain. Patient does however endorse feeling somewhat cold at this time.    REVIEW OF SYSTEMS:   CONSTITUTIONAL: No weakness, fevers  NECK: +mild discomfort around RIJ catheter site  RESPIRATORY: No cough; No shortness of breath  CARDIOVASCULAR: No chest pain or palpitations  GASTROINTESTINAL: No abdominal or epigastric pain. No nausea, vomiting.  GENITOURINARY: No dysuria or hematuria  NEUROLOGICAL: No numbness or weakness  PSYCHOLOGICAL: appropriate mood and affect    MEDICATIONS  (STANDING):  aMIOdarone    Tablet 200 milliGRAM(s) Oral daily  carvedilol 6.25 milliGRAM(s) Oral every 12 hours  chlorhexidine 2% Cloths 1 Application(s) Topical <User Schedule>  dextrose 5%. 1000 milliLiter(s) (50 mL/Hr) IV Continuous <Continuous>  dextrose 50% Injectable 12.5 Gram(s) IV Push once  dextrose 50% Injectable 25 Gram(s) IV Push once  dextrose 50% Injectable 25 Gram(s) IV Push once  heparin  Infusion. 700 Unit(s)/Hr (7 mL/Hr) IV Continuous <Continuous>  hydrALAZINE 25 milliGRAM(s) Oral three times a day  insulin lispro (HumaLOG) corrective regimen sliding scale   SubCutaneous three times a day before meals  insulin lispro Injectable (HumaLOG) 1 Unit(s) SubCutaneous three times a day before meals  isosorbide   dinitrate Tablet (ISORDIL) 10 milliGRAM(s) Oral three times a day  levothyroxine 50 MICROGram(s) Oral daily  milrinone Infusion 0.25 MICROgram(s)/kG/Min (5.63 mL/Hr) IV Continuous <Continuous>    MEDICATIONS  (PRN):  dextrose 40% Gel 15 Gram(s) Oral once PRN Blood Glucose LESS THAN 70 milliGRAM(s)/deciliter  glucagon  Injectable 1 milliGRAM(s) IntraMuscular once PRN Glucose LESS THAN 70 milligrams/deciliter  heparin   Injectable 6000 Unit(s) IV Push every 6 hours PRN For aPTT less than 40  heparin   Injectable 3000 Unit(s) IV Push every 6 hours PRN For aPTT between 40 - 57  polyethylene glycol 3350 17 Gram(s) Oral daily PRN Constipation    Allergies    No Known Allergies    Intolerances    Vital Signs Last 24 Hrs  T(C): 36.7 (10 Sep 2020 16:27), Max: 36.7 (10 Sep 2020 16:27)  T(F): 98.1 (10 Sep 2020 16:27), Max: 98.1 (10 Sep 2020 16:27)  HR: 82 (10 Sep 2020 16:27) (80 - 83)  BP: 96/56 (10 Sep 2020 16:27) (96/56 - 102/67)  BP(mean): --  RR: 18 (10 Sep 2020 16:27) (16 - 18)  SpO2: 100% (10 Sep 2020 16:) (100% - 100%)    I&O's Summary    09 Sep 2020 07:01  -  10 Sep 2020 07:00  --------------------------------------------------------  IN: 960 mL / OUT: 3450 mL / NET: -2490 mL    10 Sep 2020 07:01  -  10 Sep 2020 20:18  --------------------------------------------------------  IN: 480 mL / OUT: 1750 mL / NET: -1270 mL    Physical Exam:  General: Well-appearing, NAD  HEENT: NC/AT, EOMI,  Neck: RIJ Central line present, dressing c/d/i  Chest/Lungs: CTA bilaterally, no wheezing, rales, rhonchi or rub  Heart: RRR, normal S1, S2, holosystolic murmur appreciated  Abdomen: Soft, ND, NTTP, normoactive bowel sounds  Extremities: 2+ peripheral pulses b/l, no edema  Skin: Warm, well-perfused, no rashes or lesions  Neurological: A&Ox3, moves all extremities, no focal deficits    LABS:                         9.6    4.50  )-----------( 143      ( 10 Sep 2020 04:38 )             30.5     09-10    134<L>  |  96  |  123<H>  ----------------------------<  143<H>  5.1   |  24  |  3.46<H>    Ca    10.1      10 Sep 2020 04:38  Mg     2.3     09-10    PTT - ( 10 Sep 2020 09:13 )  PTT:62.5 sec    Urinalysis Basic - ( 09 Sep 2020 04:31 )    Color: Light Yellow / Appearance: Clear / S.010 / pH: x  Gluc: x / Ketone: Negative  / Bili: Negative / Urobili: <2 mg/dL   Blood: x / Protein: Negative / Nitrite: Negative   Leuk Esterase: Negative / RBC: x / WBC x   Sq Epi: x / Non Sq Epi: x / Bacteria: x    Echocardiogram:  < from: TTE with Doppler (w/Cont) (20 @ 15:56) >    Dimensions:    Normal Values:  LA:     5.7    2.0 - 4.0 cm  Ao:     3.6    2.0 - 3.8 cm  SEPTUM: 0.8    0.6 - 1.2 cm  PWT:    0.9    0.6 - 1.1 cm  LVIDd:  6.9    3.0 - 5.6 cm  LVIDs:  6.1    1.8 - 4.0 cm  Derived variables:  LVMI: 126 g/m2  RWT: 0.26  Fractional short: 12 %  EF (Teicholtz): 24 %    Conclusions:  1. Mitral annular calcification.  Tethered mitral valve  leaflets with normal opening. Moderate-severe mitral  regurgitation.  2. Calcified trileaflet aortic valve with normal opening.  Mild aortic regurgitation.  3. Severely dilated left atrium.  LA volume index = 69  cc/m2.  4. Moderate left ventricular enlargement.  5. Endocardial visualization enhanced with intravenous  injection of Ultrasonic Enhancing Agent (Definity). Severe  global left ventricular systolic dysfunction. No left  ventricular thrombus.  6. Severe diastolic dysfunction  7. Right ventricular enlargement with decreased right  ventricular systolic function.  *** Compared with echocardiogram of 2020, no  significant changes noted.    < end of copied text >    IMAGING:  < from: CT Chest No Cont (20 @ 08:59) >    FINDINGS:    LUNGS AND LARGE AIRWAYS: Patent central airways. Scattered sub-2 mm nodules, otherwise clear lungs.  PLEURA: Bilateral pleural effusions with adjacent atelectasis.  HEART: Heart is enlarged. Coronary calcifications. Left chest wall dual chamber AICD.  MEDIASTINUM AND REMI: No lymphadenopathy.  CHEST WALL AND LOWER NECK: Calcific density in the left thyroid gland.  VISUALIZED UPPER ABDOMEN: Cholelithiasis.  BONES: Degenerative changes of spine.    IMPRESSION:    Bilateral pleural effusions.    < from: CT Virtual Colonoscopy, Screening (20 @ 16:26) >    IMPRESSION:    Slightly limited exam.    No colonic polyp or mass is identified.    < end of copied text >    ASSESSMENT & PLAN:   74 y/o M with hx of HFrEF (LVEF <20%) s/p AICD, severe MR, s/p CRT-D, afib s/p DCCV, CKD, DM2, and hypothyroidism intially sent in from HF clinic due to worsening LE edema and admitted for acute on chronic systolic heart failure. Now s/p RHC with swan batsheva catheter placement, transferred to CICU for hemodynamic monitoring and optimization prior to tentative LHC and mitraclip placement.    Neuro  - A&Ox3  - Patient mentating appropriately  - no active issues    Respiratory  - saturating well on RA  - no active issues    Cardiovascular  #Acute on Chronic Decompensated Heart Failure  - s/p aggressive diuresis during hospital course with approx 20 lb weight loss  - however remaining with high filling pressures  - HF following, appreciate recs  - c/w milrinone 0.25 mcg/kg/min for inotropic support  - c/w hydralazine 25mg TID and ISDN 10mg TID for afterload reduction  - trend hemodynamics with swan batsheva catheter  - trend perfusion markers (creatinine/lactate) daily  - currently undergoing LVAD evaluation    #Mitral Regugitation  - patient with mod-severe MR noted on TTE  - followed by structural heart, planned for LHC and Mitraclip placement next week  - procedures pending improvement in creatinine and medical optimization    #Afib  - s/p CRT-D, currently in paced rhythm  - c/w Amio 200mg qd  - Hep gtt for AC  - monitor on telemetry    GI/Hep  - currently tolerating DASH diet  - no active issues    Renal  #EDIE  - patient with EDIE superimposed on stage 3 CKD (Baseline Cr ~2.0-2.4)  - 2/2 cardiorenal vs overdiuresis  - currently with steadily rising Cr  - currently being followed by nephrology, appreciate recs  - will hold diuresis for now    Endo  #DM  - A1C: 6.2  - c/w Low ISS, FS TIDAC and QHS  - per endocrine recs, recommend running fluids and drips in NS containing solutions instead of dextrose    #Papillary Thyroid Carcinoma  - per endocrine recs: c/w thyroid suppression therapy  - c/w Levothyroxine 50mcg daily   - goal TSH <2  - no plan for intervention at this time    Heme/Onc  - Thyroid carcinoma as above  - H/H stable  - Heparin Gtt for AC    ID  - no fevers, no leukocytosis at this time  - no active issues

## 2020-09-10 NOTE — PROGRESS NOTE ADULT - SUBJECTIVE AND OBJECTIVE BOX
SUBJECTIVE / OVERNIGHT EVENTS: pt seen and examined    MEDICATIONS  (STANDING):  aMIOdarone    Tablet 200 milliGRAM(s) Oral daily  carvedilol 6.25 milliGRAM(s) Oral every 12 hours  dextrose 5%. 1000 milliLiter(s) (50 mL/Hr) IV Continuous <Continuous>  dextrose 50% Injectable 12.5 Gram(s) IV Push once  dextrose 50% Injectable 25 Gram(s) IV Push once  dextrose 50% Injectable 25 Gram(s) IV Push once  furosemide   Injectable 60 milliGRAM(s) IV Push <User Schedule>  heparin  Infusion. 700 Unit(s)/Hr (7 mL/Hr) IV Continuous <Continuous>  hydrALAZINE 25 milliGRAM(s) Oral three times a day  insulin lispro (HumaLOG) corrective regimen sliding scale   SubCutaneous three times a day before meals  insulin lispro Injectable (HumaLOG) 1 Unit(s) SubCutaneous three times a day before meals  isosorbide   dinitrate Tablet (ISORDIL) 10 milliGRAM(s) Oral three times a day  levothyroxine 50 MICROGram(s) Oral daily    MEDICATIONS  (PRN):  dextrose 40% Gel 15 Gram(s) Oral once PRN Blood Glucose LESS THAN 70 milliGRAM(s)/deciliter  glucagon  Injectable 1 milliGRAM(s) IntraMuscular once PRN Glucose LESS THAN 70 milligrams/deciliter  heparin   Injectable 6000 Unit(s) IV Push every 6 hours PRN For aPTT less than 40  heparin   Injectable 3000 Unit(s) IV Push every 6 hours PRN For aPTT between 40 - 57  polyethylene glycol 3350 17 Gram(s) Oral daily PRN Constipation    Vital Signs Last 24 Hrs  T(C): 36.6 (09 Sep 2020 21:08), Max: 36.7 (09 Sep 2020 04:39)  T(F): 97.8 (09 Sep 2020 21:08), Max: 98 (09 Sep 2020 04:39)  HR: 80 (09 Sep 2020 21:08) (80 - 82)  BP: 100/65 (09 Sep 2020 21:08) (94/45 - 100/65)  BP(mean): --  RR: 17 (09 Sep 2020 21:08) (16 - 18)  SpO2: 100% (09 Sep 2020 21:08) (98% - 100%)    Cardiovascular: No chest pain or palpation.  Respiratory: No cough, shortness of breath, congestion, or wheezing.  Gastrointestinal: No abdominal pain, nausea, vomiting, or diarrhea.  Genitourinary: No dysuria.  Musculoskeletal: No joint swelling.  Neurologic: No headache.    PHYSICAL EXAM:  GENERAL: NAD  EYES: EOMI, PERRLA  NECK: Supple, No JVD  CHEST/LUNG: crackles at bases  HEART:  S1 , S2 +  ABDOMEN: soft , bs+  EXTREMITIES:  edema+  NEUROLOGY:alert awake oriented     LABS:  09-10    134<L>  |  96  |  123<H>  ----------------------------<  143<H>  5.1   |  24  |  3.46<H>    Ca    10.1      10 Sep 2020 04:38  Mg     2.3     09-10      Creatinine Trend: 3.46 <--, 2.96 <--, 3.41 <--, 3.20 <--, 3.35 <--, 3.43 <--, 2.67 <--, 2.94 <--, 2.92 <--                        9.6    4.50  )-----------( 143      ( 10 Sep 2020 04:38 )             30.5     Urine Studies:  Urinalysis Basic - ( 09 Sep 2020 04:31 )    Color: Light Yellow / Appearance: Clear / S.010 / pH:   Gluc:  / Ketone: Negative  / Bili: Negative / Urobili: <2 mg/dL   Blood:  / Protein: Negative / Nitrite: Negative   Leuk Esterase: Negative / RBC:  / WBC    Sq Epi:  / Non Sq Epi:  / Bacteria:       Sodium, Random Urine: 85 mmol/L ( @ 21:42)  Creatinine, Random Urine: 26 mg/dL ( @ 21:42)  Protein/Creatinine Ratio Calculation: <0.2 Ratio ( @ 21:42)            PTT - ( 10 Sep 2020 09:13 )  PTT:62.5 sec

## 2020-09-10 NOTE — PROGRESS NOTE ADULT - PROBLEM SELECTOR PLAN 1
- Restart milrinone 0.25mcg/kg/min  - c/w lasix 60mg IV BID  - Continue carvedilol 6.25 mg BID  - Tentatively planned for MV clip on Monday, will need cath before but creatinine might be prohibitive  - May need right heart cath prior to procedure for optimization  - f/u cardiorenal consult  - c/w hydralazine 25mg TID and c/w ISDN 10 mg TID; hold for SBP <85  - Maintain K 4-4.5 and Mg 2-2.5.  - c/w heparin gtt  - Under evaluation for LVAD as destination therapy. He is not a heart transplant candidate given age. - Restart milrinone 0.25mcg/kg/min  - switch lasix 60mg IV Daily  - Continue carvedilol 6.25 mg BID  - Plan for RHC today, will likely need CCU after for swan maintenance.   - Tentatively planned for MV clip on Monday, will need cath before but creatinine might be prohibitive  - f/u cardiorenal consult  - c/w hydralazine 25mg TID and c/w ISDN 10 mg TID; hold for SBP <85  - Maintain K 4-4.5 and Mg 2-2.5.  - c/w heparin gtt  - Under evaluation for LVAD as destination therapy. He is not a heart transplant candidate given age.

## 2020-09-10 NOTE — PROGRESS NOTE ADULT - SUBJECTIVE AND OBJECTIVE BOX
Structural Heart Team      Mr Jarquin has no complaints this morning.  He is sitting up in bed, comfortably.  He denies and chest pain or pressure and has no sob.  He said that he ambulated about 2 times around the unit and had to stop, no from sob but from leg fatigue.  There were no acute events overnight.      REVIEW OF SYSTEMS:    CONSTITUTIONAL: No weakness, fevers or chills  EYES/ENT: No visual changes;  No vertigo or throat pain   NECK: No pain or stiffness  RESPIRATORY: No cough, wheezing, hemoptysis; No shortness of breath  CARDIOVASCULAR: No chest pain or palpitations  GASTROINTESTINAL: No abdominal or epigastric pain. No nausea, vomiting, or hematemesis; No diarrhea or constipation. No melena or hematochezia.  GENITOURINARY: No dysuria, frequency or hematuria  NEUROLOGICAL: No numbness or weakness  SKIN: No itching, rashes      Allergies    No Known Allergies    Intolerances      Vital Signs Last 24 Hrs  T(C): 36.4 (10 Sep 2020 05:06), Max: 36.6 (09 Sep 2020 21:08)  T(F): 97.6 (10 Sep 2020 05:06), Max: 97.8 (09 Sep 2020 21:08)  HR: 80 (10 Sep 2020 05:06) (80 - 80)  BP: 102/67 (10 Sep 2020 05:06) (94/60 - 102/67)  BP(mean): --  RR: 16 (10 Sep 2020 05:06) (16 - 18)  SpO2: 100% (10 Sep 2020 05:06) (100% - 100%)    MEDICATIONS  (STANDING):  aMIOdarone    Tablet 200 milliGRAM(s) Oral daily  carvedilol 6.25 milliGRAM(s) Oral every 12 hours  dextrose 5%. 1000 milliLiter(s) (50 mL/Hr) IV Continuous <Continuous>  dextrose 50% Injectable 12.5 Gram(s) IV Push once  dextrose 50% Injectable 25 Gram(s) IV Push once  dextrose 50% Injectable 25 Gram(s) IV Push once  furosemide   Injectable 60 milliGRAM(s) IV Push <User Schedule>  heparin  Infusion. 700 Unit(s)/Hr (7 mL/Hr) IV Continuous <Continuous>  hydrALAZINE 25 milliGRAM(s) Oral three times a day  insulin lispro (HumaLOG) corrective regimen sliding scale   SubCutaneous three times a day before meals  insulin lispro Injectable (HumaLOG) 1 Unit(s) SubCutaneous three times a day before meals  isosorbide   dinitrate Tablet (ISORDIL) 10 milliGRAM(s) Oral three times a day  levothyroxine 50 MICROGram(s) Oral daily  milrinone Infusion 0.25 MICROgram(s)/kG/Min (5.63 mL/Hr) IV Continuous <Continuous>      Exam-  Gen: NAD  HEENT: No JVD, Neck Supple, Trachea midline, No masses  Pulmonary: Basilar Crackles,  No accessory muscle use for respiration  Cardiac: S1S2, RRR, II/VI JORDI,   No Gallops/Rubs  ECG: Paced  Gastrointestinal: Soft, NT/ND, + Bowel Sounds  Extremities:  Edema,  No joint pain or swelling +PMSx4  Vascular: 1+ pulses B/L, No Bruits  Neurological: A&Ox3, nonfocal, = motor and sensory                          9.6    4.50  )-----------( 143      ( 10 Sep 2020 04:38 )             30.5   09-10    134<L>  |  96  |  123<H>  ----------------------------<  143<H>  5.1   |  24  |  3.46<H>    Ca    10.1      10 Sep 2020 04:38  Mg     2.3     09-10    PTT - ( 10 Sep 2020 09:13 )  PTT:62.5 sec  I&O's Summary    09 Sep 2020 07:01  -  10 Sep 2020 07:00  --------------------------------------------------------  IN: 960 mL / OUT: 3450 mL / NET: -2490 mL    10 Sep 2020 07:01  -  10 Sep 2020 10:43  --------------------------------------------------------  IN: 360 mL / OUT: 0 mL / NET: 360 mL              Assessment/Plan:  Mr Jarquin is a 75 Male with Severe Mitral Regurgitation, Acute on Chronic Class 3 Systolic Heart Failure, Acute Kidney Injury  - tentatively scheduled for Mitraclip  - needs a cardiac cath for coronary assessment (cath in 2012 had a 40% LAD and a 50% LCx) but creat cont to rise   -- may be done at the time of procedure  - may have RHC/ Aretha placement to assist in optimization prior to procedure    DOC [facility]  [phone]

## 2020-09-10 NOTE — PROGRESS NOTE ADULT - ATTENDING COMMENTS
JVP remains moderately elevated and his renal function is worsening again.  Will drop lasix to 60 mg IV daily and resume milrinone 0.25 mcg/kg/min.  Plan for [place] placement this afternoon and move to CCU for optimization.  Likely MitraClip on Monday. Will need cardiac cath prior to Clip per Structural since no recent angiogram.

## 2020-09-11 NOTE — PROGRESS NOTE ADULT - SUBJECTIVE AND OBJECTIVE BOX
Patient seen and examined at bedside.    Overnight Events:   Yesterday had RHC and swnicole in the cath lab  Upgraded to CCU  Pt. feels well today, has not walked today yet  Wants to walk around ccu      Current Meds:  acetaminophen   Tablet .. 650 milliGRAM(s) Oral every 6 hours PRN  aMIOdarone    Tablet 200 milliGRAM(s) Oral daily  carvedilol 6.25 milliGRAM(s) Oral every 12 hours  chlorhexidine 2% Cloths 1 Application(s) Topical <User Schedule>  dextrose 40% Gel 15 Gram(s) Oral once PRN  dextrose 5%. 1000 milliLiter(s) IV Continuous <Continuous>  dextrose 50% Injectable 12.5 Gram(s) IV Push once  dextrose 50% Injectable 25 Gram(s) IV Push once  dextrose 50% Injectable 25 Gram(s) IV Push once  glucagon  Injectable 1 milliGRAM(s) IntraMuscular once PRN  heparin  Infusion. 1100 Unit(s)/Hr IV Continuous <Continuous>  hydrALAZINE 25 milliGRAM(s) Oral three times a day  insulin lispro (HumaLOG) corrective regimen sliding scale   SubCutaneous three times a day before meals  insulin lispro (HumaLOG) corrective regimen sliding scale   SubCutaneous at bedtime  isosorbide   dinitrate Tablet (ISORDIL) 10 milliGRAM(s) Oral three times a day  levothyroxine 50 MICROGram(s) Oral daily  milrinone Infusion 0.25 MICROgram(s)/kG/Min IV Continuous <Continuous>  polyethylene glycol 3350 17 Gram(s) Oral daily PRN        Vitals:  T(F): 98.4 (09-11), Max: 98.4 (09-11)  HR: 80 (09-11) (78 - 92)  BP: 101/61 (09-11) (83/23 - 113/60)  RR: 16 (09-11)  SpO2: 100% (09-11)  I&O's Summary    10 Sep 2020 07:01  -  11 Sep 2020 07:00  --------------------------------------------------------  IN: 766 mL / OUT: 2670 mL / NET: -1904 mL    11 Sep 2020 07:01  -  11 Sep 2020 16:18  --------------------------------------------------------  IN: 629.4 mL / OUT: 1350 mL / NET: -720.6 mL        Physical Exam:  Appearance: No acute distress; comfortable in bed  Eyes: EOMI, pink conjunctiva  HENT: Normal oral mucosa  Cardiovascular: RRR, S1, S2, 2/6 holosystolic murmur loudest over left sternal boarder no, rubs, or gallops; no edema; JVP wnl  Respiratory: Clear to auscultation bilaterally  Gastrointestinal: soft, non-tender, non-distended with normal bowel sounds  Musculoskeletal: No clubbing; no joint deformity   Neurologic: Non-focal  Lymphatic: No lymphadenopathy  Psychiatry: AAOx3, mood & affect appropriate  Skin: No rashes, ecchymoses, or cyanosis                          8.9    4.65  )-----------( 130      ( 11 Sep 2020 02:52 )             27.9     09-11    134<L>  |  96  |  123<H>  ----------------------------<  199<H>  4.1   |  23  |  2.89<H>    Ca    9.7      11 Sep 2020 02:52  Phos  4.9     09-11  Mg     2.3     09-11    TPro  6.3  /  Alb  3.9  /  TBili  0.9  /  DBili  x   /  AST  46<H>  /  ALT  59<H>  /  AlkPhos  67  09-11    PT/INR - ( 11 Sep 2020 02:52 )   PT: 13.3 sec;   INR: 1.12 ratio         PTT - ( 11 Sep 2020 09:20 )  PTT:97.1 sec              New ECG(s): Personally reviewed    Echo:    Stress Testing:     Cath:    Imaging:    Interpretation of Telemetry: Patient seen and examined at bedside.    Overnight Events:   Overnight, carvedilol, hydralazine, and isordil held for hypotension.     Yesterday had RHC and swnicole in the cath lab  Upgraded to CCU  Pt. feels well today, has not walked today yet  Wants to walk around ccu      Current Meds:  acetaminophen   Tablet .. 650 milliGRAM(s) Oral every 6 hours PRN  aMIOdarone    Tablet 200 milliGRAM(s) Oral daily  carvedilol 6.25 milliGRAM(s) Oral every 12 hours  chlorhexidine 2% Cloths 1 Application(s) Topical <User Schedule>  dextrose 40% Gel 15 Gram(s) Oral once PRN  dextrose 5%. 1000 milliLiter(s) IV Continuous <Continuous>  dextrose 50% Injectable 12.5 Gram(s) IV Push once  dextrose 50% Injectable 25 Gram(s) IV Push once  dextrose 50% Injectable 25 Gram(s) IV Push once  glucagon  Injectable 1 milliGRAM(s) IntraMuscular once PRN  heparin  Infusion. 1100 Unit(s)/Hr IV Continuous <Continuous>  hydrALAZINE 25 milliGRAM(s) Oral three times a day  insulin lispro (HumaLOG) corrective regimen sliding scale   SubCutaneous three times a day before meals  insulin lispro (HumaLOG) corrective regimen sliding scale   SubCutaneous at bedtime  isosorbide   dinitrate Tablet (ISORDIL) 10 milliGRAM(s) Oral three times a day  levothyroxine 50 MICROGram(s) Oral daily  milrinone Infusion 0.25 MICROgram(s)/kG/Min IV Continuous <Continuous>  polyethylene glycol 3350 17 Gram(s) Oral daily PRN        Vitals:  T(F): 98.4 (09-11), Max: 98.4 (09-11)  HR: 80 (09-11) (78 - 92)  BP: 101/61 (09-11) (83/23 - 113/60)  RR: 16 (09-11)  SpO2: 100% (09-11)  I&O's Summary    10 Sep 2020 07:01  -  11 Sep 2020 07:00  --------------------------------------------------------  IN: 766 mL / OUT: 2670 mL / NET: -1904 mL    11 Sep 2020 07:01  -  11 Sep 2020 16:18  --------------------------------------------------------  IN: 629.4 mL / OUT: 1350 mL / NET: -720.6 mL        Physical Exam:  Appearance: No acute distress; comfortable in bed  Eyes: EOMI, pink conjunctiva  HENT: Normal oral mucosa  Cardiovascular: RRR, S1, S2, 2/6 holosystolic murmur loudest over left sternal boarder no, rubs, or gallops; no edema; JVP wnl  Respiratory: Clear to auscultation bilaterally  Gastrointestinal: soft, non-tender, non-distended with normal bowel sounds  Musculoskeletal: No clubbing; no joint deformity   Neurologic: Non-focal  Lymphatic: No lymphadenopathy  Psychiatry: AAOx3, mood & affect appropriate  Skin: No rashes, ecchymoses, or cyanosis                          8.9    4.65  )-----------( 130      ( 11 Sep 2020 02:52 )             27.9     09-11    134<L>  |  96  |  123<H>  ----------------------------<  199<H>  4.1   |  23  |  2.89<H>    Ca    9.7      11 Sep 2020 02:52  Phos  4.9     09-11  Mg     2.3     09-11    TPro  6.3  /  Alb  3.9  /  TBili  0.9  /  DBili  x   /  AST  46<H>  /  ALT  59<H>  /  AlkPhos  67  09-11    PT/INR - ( 11 Sep 2020 02:52 )   PT: 13.3 sec;   INR: 1.12 ratio         PTT - ( 11 Sep 2020 09:20 )  PTT:97.1 sec              New ECG(s): Personally reviewed    Echo:    Stress Testing:     Cath:    Imaging:    Interpretation of Telemetry:

## 2020-09-11 NOTE — PROGRESS NOTE ADULT - ASSESSMENT
74M w/ PMHx of HFrEF (EF 10%) s/p ICD, Afib w/ recent admission for CHF ex/afib s/p DCCV (on july 2020), CKD3, DM2, hypothyroidism presents after being referred from CHF clinic for admission due to worsening of LE edema and failure of PO diuretics at home.    Nephrology consulted for EDIE    #EDIE on CKD  Pt with EDIE in the setting of HFrEF requiring aggressive IV diuresis-  Baseline sCr 2.0 - 2.4.  On admission sCr 2.4 - peaked to 3.43.  EDIE 2/2 cardio-renal, ?overdiuresis vs ATN -- initially requiring Bumex infusion now off     currently sCr has improved to 2.89. Pt. now on Lasix 60 mg IV.  Consider switching to PO diuretics.  strict I/O, daily weights    Monitor labs and urine output.   Avoid NSAIDs, ACEI/ARBS, RCA and nephrotoxins.   Dose medications as per eGFR.

## 2020-09-11 NOTE — PROGRESS NOTE ADULT - ASSESSMENT
#ANEMIA, NORMOCYTIC - CKD, chronic ds   #THROMBOCYTOPENIA, MODERATE, CHRONIC--- now improved    hgb improved  ADIRANNE neg  Fe, B12, folate adequate  monitor CBC - stable   plts improving as well      #CHF EXACERBATION  #EDIE ON CKD  #A.FIB, ON HEPARIN  -OK to continue AC unless plt count drops below 50K    Omega Ballard MD  [facility]  [phone]  cell: [phone]

## 2020-09-11 NOTE — PROGRESS NOTE ADULT - SUBJECTIVE AND OBJECTIVE BOX
Structural Heart Team    Mr Jarquin was seen in CCU sitting in a chair at the bedside.  He reported no new complaints.         REVIEW OF SYSTEMS:    CONSTITUTIONAL: No weakness, fevers or chills  EYES/ENT: No visual changes;  No vertigo or throat pain   NECK: No pain or stiffness  RESPIRATORY: No cough, wheezing, hemoptysis; No shortness of breath  CARDIOVASCULAR: No chest pain or palpitations  GASTROINTESTINAL: No abdominal or epigastric pain. No nausea, vomiting, or hematemesis; No diarrhea or constipation. No melena or hematochezia.  GENITOURINARY: No dysuria, frequency or hematuria  NEUROLOGICAL: No numbness or weakness  SKIN: No itching, rashes      Allergies    No Known Allergies    Intolerances      Vital Signs Last 24 Hrs  T(C): 36.9 (11 Sep 2020 13:00), Max: 36.9 (11 Sep 2020 13:00)  T(F): 98.4 (11 Sep 2020 13:00), Max: 98.4 (11 Sep 2020 13:00)  HR: 80 (11 Sep 2020 16:00) (78 - 92)  BP: 101/61 (11 Sep 2020 16:00) (83/23 - 113/60)  BP(mean): 74 (11 Sep 2020 16:00) (53 - 83)  RR: 16 (11 Sep 2020 16:00) (7 - 38)  SpO2: 100% (11 Sep 2020 13:00) (92% - 100%)    MEDICATIONS  (STANDING):  aMIOdarone    Tablet 200 milliGRAM(s) Oral daily  carvedilol 6.25 milliGRAM(s) Oral every 12 hours  chlorhexidine 2% Cloths 1 Application(s) Topical <User Schedule>  dextrose 5%. 1000 milliLiter(s) (50 mL/Hr) IV Continuous <Continuous>  dextrose 50% Injectable 12.5 Gram(s) IV Push once  dextrose 50% Injectable 25 Gram(s) IV Push once  dextrose 50% Injectable 25 Gram(s) IV Push once  heparin  Infusion. 1100 Unit(s)/Hr (11 mL/Hr) IV Continuous <Continuous>  hydrALAZINE 25 milliGRAM(s) Oral three times a day  insulin lispro (HumaLOG) corrective regimen sliding scale   SubCutaneous three times a day before meals  insulin lispro (HumaLOG) corrective regimen sliding scale   SubCutaneous at bedtime  isosorbide   dinitrate Tablet (ISORDIL) 10 milliGRAM(s) Oral three times a day  levothyroxine 50 MICROGram(s) Oral daily  milrinone Infusion 0.25 MICROgram(s)/kG/Min (5.63 mL/Hr) IV Continuous <Continuous>      Exam-  Gen: NAD  HEENT: No JVD, Neck Supple, Trachea midline, No masses  Pulmonary: Basilar Crackles,  No accessory muscle use for respiration  Cardiac: S1S2, RRR, II/VI JORDI,   No Gallops/Rubs, RIJ catheter in place  ECG: Paced  Gastrointestinal: Soft, NT/ND, + Bowel Sounds  Extremities:  Edema,  No joint pain or swelling +PMSx4  Vascular: 1+ pulses B/L, No Bruits  Neurological: A&Ox3, nonfocal                          8.9    4.65  )-----------( 130      ( 11 Sep 2020 02:52 )             27.9   09-11    134<L>  |  96  |  123<H>  ----------------------------<  199<H>  4.1   |  23  |  2.89<H>    Ca    9.7      11 Sep 2020 02:52  Phos  4.9     09-11  Mg     2.3     09-11    TPro  6.3  /  Alb  3.9  /  TBili  0.9  /  DBili  x   /  AST  46<H>  /  ALT  59<H>  /  AlkPhos  67  09-11  PT/INR - ( 11 Sep 2020 02:52 )   PT: 13.3 sec;   INR: 1.12 ratio         PTT - ( 11 Sep 2020 09:20 )  PTT:97.1 sec  I&O's Summary    10 Sep 2020 07:01  -  11 Sep 2020 07:00  --------------------------------------------------------  IN: 766 mL / OUT: 2670 mL / NET: -1904 mL    11 Sep 2020 07:01  -  11 Sep 2020 16:51  --------------------------------------------------------  IN: 629.4 mL / OUT: 1350 mL / NET: -720.6 mL              Assessment/Plan:  Mr Jarquin is a 75 Male with Severe Mitral Regurgitation, Acute on Chronic Class 3 Systolic Heart Failure, Acute Kidney Injury  - tentatively scheduled for Mitraclip Monday 9/14  - needs a cardiac cath for coronary assessment (cath in 2012 had a 40% LAD and a 50% LCx) but creat cont to rise   -- may be done at the time of procedure (?)  - RHC/ Aretha in place to assist in optimization prior to procedure      [facility]  [phone]

## 2020-09-11 NOTE — PROGRESS NOTE ADULT - ATTENDING COMMENTS
I personally leveled and zeroed the PAC and measured: CVP 3, PA 53/18/33.  Would discontinue lasix completely.  Continue milrinone 0.25 mcg/kg/min.  If SBP > 100, will escalate hydralazine and ISDN tomorrow.  Possible MitraClip on Monday with coronary angiography at time of procedure.

## 2020-09-11 NOTE — PROGRESS NOTE ADULT - ASSESSMENT
====================ASSESSMENT AND PLAN==============      ====================CARDIOVASCULAR==================    Mechaincal Circulatory Support:  [ ] IABP   [ ] Impella 2.5   [ ] Impella CP  Settings:     ==Hemodynamics==     (Date) CVP:      PCWP:        PA S/D:            Cardiac Output:             Cardiac Index:            SVR:    Preload (fluids, diuretics):  Afterload (anti-hypertensives, pressors):  Inotropes:    ==Pump==    Echo Date:  LVEF:                              Regional Wall Motion Abnormaility?:  [ ]Yes   [ ] No, If Yes, Details  Diastolic function:  RV function:   Any change frim prior?: [ ] Yes   [ ] No, If Yes, Details:   Volume status:    ==Coronaries==    Last ischemic workup:   Antiplatelet regimen:   Anticoagulant:   Statin:   Beta blocker:    ==Rhythm==    Current rhythm:  AM EKG Interpretation:   Anti-arrhythmic therapies:   TVP with settings:     aMIOdarone    Tablet 200 milliGRAM(s) Oral daily  carvedilol 6.25 milliGRAM(s) Oral every 12 hours  furosemide   Injectable 60 milliGRAM(s) IV Push daily  hydrALAZINE 25 milliGRAM(s) Oral three times a day  isosorbide   dinitrate Tablet (ISORDIL) 10 milliGRAM(s) Oral three times a day  milrinone Infusion 0.25 MICROgram(s)/kG/Min (5.63 mL/Hr) IV Continuous <Continuous>      ====================== NEUROLOGY=====================  Sedation [ ]Yes   [ ] No  Delirium [ ]Yes   [ ] No    acetaminophen   Tablet .. 650 milliGRAM(s) Oral every 6 hours PRN Mild Pain (1 - 3)    ==================== RESPIRATORY======================  Mechanical Ventilation [ ]    BIPAP [ ]   HFNC [ ]   NC [ ]                 ===================== RENAL =========================    09-09-20 @ 07:01  -  09-10-20 @ 07:00  --------------------------------------------------------  IN: 960 mL / OUT: 3450 mL / NET: -2490 mL    09-10-20 @ 07:01  -  09-11-20 @ 06:48  --------------------------------------------------------  IN: 766 mL / OUT: 2670 mL / NET: -1904 mL      Renal Replacement Therapy:  [ ] CRRT      [ ] IHD, Last Session:    Fluid removal:     [ ] Diuretic therapy, Regimen:       ==================== GASTROINTESTINAL===================    Diet:  Last BM:   Indication for Stress Ulcer Prophylaxis, [ ] Yes    [ ] No   If Yes, Medication:     dextrose 5%. 1000 milliLiter(s) (50 mL/Hr) IV Continuous <Continuous>  polyethylene glycol 3350 17 Gram(s) Oral daily PRN Constipation    ========================INFECTIOUS DISEASE================  T(C): 36.7 (09-11-20 @ 06:00), Max: 36.7 (09-10-20 @ 16:27)  WBC Count: 4.65 K/uL (09-11-20 @ 02:52)  WBC Count: 4.50 K/uL (09-10-20 @ 04:38)  WBC Count: 3.95 K/uL (09-09-20 @ 05:19)        Current Antibiotics/Antifungals with start date:       ===================HEMATOLOGIC/ONC ===================  Hemoglobin: 8.9 g/dL (09-11-20 @ 02:52)  Hemoglobin: 9.6 g/dL (09-10-20 @ 04:38)  Hemoglobin: 9.2 g/dL (09-09-20 @ 05:19)    Platelet Count - Automated: 130 K/uL (09-11-20 @ 02:52)  Platelet Count - Automated: 143 K/uL (09-10-20 @ 04:38)  Platelet Count - Automated: 136 K/uL (09-09-20 @ 05:19)    Chemical VTE Prophylaxis:  [ ] Lovenox    [ ] SQH   [ ]NA  Systemic Anticogaulation:  [ ] Yes    [ ] No,  If Yes, Medication and Indication:     heparin   Injectable 6000 Unit(s) IV Push every 6 hours PRN For aPTT less than 40  heparin   Injectable 3000 Unit(s) IV Push every 6 hours PRN For aPTT between 40 - 57  heparin  Infusion. 1100 Unit(s)/Hr (11 mL/Hr) IV Continuous <Continuous>    =======================    ENDOCRINE  =====================  Insulin drip  [ ] Yes    [ ] No  Basal Insulin [ ] Yes    [ ] No  Bolus insulin [ ] Yes    [ ] No  Sliding Scale  [ ] Yes    [ ] No  Hgb A1c    POCT Blood Glucose.: 224 mg/dL (09-10-20 @ 12:01)  POCT Blood Glucose.: 137 mg/dL (09-10-20 @ 08:23)        dextrose 40% Gel 15 Gram(s) Oral once PRN Blood Glucose LESS THAN 70 milliGRAM(s)/deciliter  dextrose 50% Injectable 12.5 Gram(s) IV Push once  dextrose 50% Injectable 25 Gram(s) IV Push once  dextrose 50% Injectable 25 Gram(s) IV Push once  glucagon  Injectable 1 milliGRAM(s) IntraMuscular once PRN Glucose LESS THAN 70 milligrams/deciliter  insulin lispro (HumaLOG) corrective regimen sliding scale   SubCutaneous three times a day before meals  insulin lispro Injectable (HumaLOG) 1 Unit(s) SubCutaneous three times a day before meals  levothyroxine 50 MICROGram(s) Oral daily    ======================= LINES/TUBES  =====================  [place]: (Date placed)  Central Line: (Date placed)  HD Catheter: (Date placed)  Arterial Line: (Date placed)  Endotracheal Tube: (Date placed)  [place]: (Date placed) ====================ASSESSMENT AND PLAN==============  74M Hx of Severe MR, HFrEF EF 10% s/p ICD, Afib (Eliquis) s/p recent DCCV on Amio, CKD, DM2, hypothyroidism p/w ADHF requiring inotropic support and diuretics s/p RHC revealing elevated filling pressures c/b EDIE on CKD improving on diuretics.  Pending ischemic eval and eventual Mitral clip.    ====================CARDIOVASCULAR==================    Mechaincal Circulatory Support: None     ==Hemodynamics==  9/11 3am on Milrinone 0.25 ~[place]y CO/CO 6.7/3.5, Mv02 67, , Lactate 0.7, CVP 7 PAP 49/18/30    Preload (fluids, diuretics): Lasix 60mg IVP daily   Afterload (anti-hypertensives, pressors):  Inotropes: Milrinone 0.25    ==Pump==    Echo Date:  LVEF:                              Regional Wall Motion Abnormaility?:  [ ]Yes   [ ] No, If Yes, Details  Diastolic function:  RV function:   Any change frim prior?: [ ] Yes   [ ] No, If Yes, Details:   Volume status:    ==Coronaries==    Last ischemic workup:   Antiplatelet regimen:   Anticoagulant:   Statin:   Beta blocker:    ==Rhythm==    Current rhythm:  AM EKG Interpretation:   Anti-arrhythmic therapies:   TVP with settings:     aMIOdarone    Tablet 200 milliGRAM(s) Oral daily  carvedilol 6.25 milliGRAM(s) Oral every 12 hours  furosemide   Injectable 60 milliGRAM(s) IV Push daily  hydrALAZINE 25 milliGRAM(s) Oral three times a day  isosorbide   dinitrate Tablet (ISORDIL) 10 milliGRAM(s) Oral three times a day  milrinone Infusion 0.25 MICROgram(s)/kG/Min (5.63 mL/Hr) IV Continuous <Continuous>      ====================== NEUROLOGY=====================  Sedation [ ]Yes   [ ] No  Delirium [ ]Yes   [ ] No    acetaminophen   Tablet .. 650 milliGRAM(s) Oral every 6 hours PRN Mild Pain (1 - 3)    ==================== RESPIRATORY======================  Mechanical Ventilation [ ]    BIPAP [ ]   HFNC [ ]   NC [ ]                 ===================== RENAL =========================  #EDIE on CKD, improving; likely cardiorenal    - Scr 3.46 now 2.89 (baseline 2-2.4), c/w Lasix 60mg IVP daily (-1.9L)  - Strict I/Os, renally dose meds, avoid nephrotoxins       09-09-20 @ 07:01  -  09-10-20 @ 07:00  --------------------------------------------------------  IN: 960 mL / OUT: 3450 mL / NET: -2490 mL    09-10-20 @ 07:01  - 09-11-20 @ 06:48  --------------------------------------------------------  IN: 766 mL / OUT: 2670 mL / NET: -1904 mL      Renal Replacement Therapy:  [ ] CRRT      [ ] IHD, Last Session:    Fluid removal:     [ ] Diuretic therapy, Regimen:       ==================== GASTROINTESTINAL===================    Diet:  Last BM:   Indication for Stress Ulcer Prophylaxis, [ ] Yes    [ ] No   If Yes, Medication:     dextrose 5%. 1000 milliLiter(s) (50 mL/Hr) IV Continuous <Continuous>  polyethylene glycol 3350 17 Gram(s) Oral daily PRN Constipation    ========================INFECTIOUS DISEASE================  T(C): 36.7 (09-11-20 @ 06:00), Max: 36.7 (09-10-20 @ 16:27)  WBC Count: 4.65 K/uL (09-11-20 @ 02:52)  WBC Count: 4.50 K/uL (09-10-20 @ 04:38)  WBC Count: 3.95 K/uL (09-09-20 @ 05:19)        Current Antibiotics/Antifungals with start date:       ===================HEMATOLOGIC/ONC ===================  Hemoglobin: 8.9 g/dL (09-11-20 @ 02:52)  Hemoglobin: 9.6 g/dL (09-10-20 @ 04:38)  Hemoglobin: 9.2 g/dL (09-09-20 @ 05:19)    Platelet Count - Automated: 130 K/uL (09-11-20 @ 02:52)  Platelet Count - Automated: 143 K/uL (09-10-20 @ 04:38)  Platelet Count - Automated: 136 K/uL (09-09-20 @ 05:19)    Chemical VTE Prophylaxis:  [ ] Lovenox    [ ] SQH   [ ]NA  Systemic Anticogaulation:  [ ] Yes    [ ] No,  If Yes, Medication and Indication:     heparin   Injectable 6000 Unit(s) IV Push every 6 hours PRN For aPTT less than 40  heparin   Injectable 3000 Unit(s) IV Push every 6 hours PRN For aPTT between 40 - 57  heparin  Infusion. 1100 Unit(s)/Hr (11 mL/Hr) IV Continuous <Continuous>    =======================    ENDOCRINE  =====================  Insulin drip  [ ] Yes    [ ] No  Basal Insulin [ ] Yes    [ ] No  Bolus insulin [ ] Yes    [ ] No  Sliding Scale  [ ] Yes    [ ] No  Hgb A1c    POCT Blood Glucose.: 224 mg/dL (09-10-20 @ 12:01)  POCT Blood Glucose.: 137 mg/dL (09-10-20 @ 08:23)        dextrose 40% Gel 15 Gram(s) Oral once PRN Blood Glucose LESS THAN 70 milliGRAM(s)/deciliter  dextrose 50% Injectable 12.5 Gram(s) IV Push once  dextrose 50% Injectable 25 Gram(s) IV Push once  dextrose 50% Injectable 25 Gram(s) IV Push once  glucagon  Injectable 1 milliGRAM(s) IntraMuscular once PRN Glucose LESS THAN 70 milligrams/deciliter  insulin lispro (HumaLOG) corrective regimen sliding scale   SubCutaneous three times a day before meals  insulin lispro Injectable (HumaLOG) 1 Unit(s) SubCutaneous three times a day before meals  levothyroxine 50 MICROGram(s) Oral daily    ======================= LINES/TUBES  =====================  [place]: (Date placed)  Central Line: (Date placed)  HD Catheter: (Date placed)  Arterial Line: (Date placed)  Endotracheal Tube: (Date placed)  [place]: (Date placed)

## 2020-09-11 NOTE — PROGRESS NOTE ADULT - ATTENDING COMMENTS
No acute events reported overnight.  The patient is being medically optimized in preparation of his MitraClip procedure.  If his creatinine stabilizes plan to perform cardiac catheterization at time of his MitraClip procedure or prior too.  There is concern that contrast may lead to further worsening of his tenuous heart failure balance.  Discussed pros/cons of performing the catheterization.  The patient is very concerned about transitioning towards dialysis and would like everything to be done to minimize that risk.  He had many questions concern upcoming procedure.  Questions of the patient were addressed.

## 2020-09-11 NOTE — PROGRESS NOTE ADULT - PROBLEM SELECTOR PLAN 1
- c/w milrinone 0.25mcg/kg/min  - CVP 3, would d/c lasix  - Continue carvedilol 6.25 mg BID  - c/w hydralazine 25mg TID and c/w ISDN 10 mg TID; hold for SBP <85  - Tentatively planned for MV clip on Monday, will need cath before but creatinine might be prohibitive  - f/u cardiorenal consult  - Maintain K 4-4.5 and Mg 2-2.5.  - c/w heparin gtt  - Under evaluation for LVAD as destination therapy. He is not a heart transplant candidate given age.

## 2020-09-11 NOTE — CHART NOTE - NSCHARTNOTEFT_GEN_A_CORE
75 year old man with ACC/AHA stage D chronic systolic heart failure due to a dilated nonischemic cardiomyopathy (LVIDd 6.7cm, LVEF <20%) with associated moderate to severe mitral regurgitation, s/p CRT-D, AF s/p recent admission w/ DCCV on amio, stage 4 CKD (BL Cr ~2.0-2.4), and hypothyroid presents after being referred from CHF clinic for admission due to worsening of LE edema and failure of PO diuretics at home admitted to [email or web address] undergoing diuresis w/ bumex and received inotropic support with milrinone w/ 20 lbs weight loss since admission with course complicated by EDIE likely 2/2 overdiuresis. RHC on 9/2 showed increased wedge pressures and low cardiac output and pt was maintained of milrinone, underwent a second right heart cath on 9/10 and is currently transferred to CICU for advanced hemodynamic monitoring with swan batsheva catheter for medical optimization prior to tentative LHC and mitraclip placement next week. Patient is currently hemodynamically stable and remains on milrinone at 0.25 mcg/kg/min.    in cath lab: RA 10, wedge 29, CO 3.6, CI 2.7  initial hemos on arrival: mixed 61 CO 5.6 CI 2.9    -c/w milrinone gtt, IV lasix 60 qd, hydral, isordil, coreg; will f/u AM hemos  -c/w amio  -HF following, recs appreciated    Jerome Green MD  Cardiology Fellow - F1  Text or Call: [phone]  For all New Consults and Questions:  [email or web address]   Login: tracy

## 2020-09-11 NOTE — PROGRESS NOTE ADULT - ATTENDING COMMENTS
I have personally seen, examined and participated in the care of this patient. I have reviewed all pertinent clinical information, including history, physical exam, plan and the nurse practitioner's note. I agree with the nurse practitioner's note with the following additions:    HFrEF, severe MR s/p RHC 9/10 in preparation for MitraClip  Milrinone restarted 9/10, RHC with RA 10->13, PCWP 29->46, CI 2.7  Afterload reduction with Bidil - uptrtirate as tolerated although MAPs low 60s  O2 sats mid to high 90s on room air  DASH/diabetic diet  Non-oliguric EDIE, improved with net negative fluid balance, JVP is elevated with +HJR - would diurese with IV Lasix for 1L negative  H/H low but acceptable, Heparin gtt for afib  Afebrile, no antibiotics  Sugars borderline controlled - on sliding scale  Cordis 9/11    The patient required critical care management and I personally provided 35 minutes of non-continuous care to the patient concurrently with the resident/fellow/nurse practitioner, excluding separate procedures and time spent teaching, in addition to discussing the patient and plan at length with the CICU staff and helping coordinate care. I have personally seen, examined and participated in the care of this patient. I have reviewed all pertinent clinical information, including history, physical exam, plan and the nurse practitioner's note. I agree with the nurse practitioner's note with the following additions:    HFrEF, severe MR s/p RHC 9/10 in preparation for MitraClip  Milrinone restarted 9/10, RHC with RA 10->13, PCWP 29->46, CI 2.7  Afterload reduction with Bidil - uptrtirate as tolerated although MAPs low 60s  O2 sats mid to high 90s on room air  DASH/diabetic diet  Non-oliguric EDIE, improved with net negative fluid balance, JVP is mid neck - would target even  H/H low but acceptable, Heparin gtt for afib  Afebrile, no antibiotics  Sugars borderline controlled - on sliding scale  Cordis 9/11    The patient required critical care management and I personally provided 35 minutes of non-continuous care to the patient concurrently with the resident/fellow/nurse practitioner, excluding separate procedures and time spent teaching, in addition to discussing the patient and plan at length with the CICU staff and helping coordinate care.

## 2020-09-11 NOTE — PROGRESS NOTE ADULT - SUBJECTIVE AND OBJECTIVE BOX
[facility] DIVISION OF KIDNEY DISEASES AND HYPERTENSION -- FOLLOW UP NOTE  --------------------------------------------------------------------------------  Chief Complaint:  Patient is a 75y old  Male who presents with a chief complaint of lower extremity edema (10 Sep 2020 10:40)    24 hour events/subjective:  Patient seen and examined at bedside, in NAD  speaking in full sentences-  sCr improved to 2.89 today  UOP: 2.6L with Lasix 60 mg IV daily. CVP WNL.    PAST HISTORY  --------------------------------------------------------------------------------  No significant changes to PMH, PSH, FHx, SHx, unless otherwise noted    ALLERGIES & MEDICATIONS  --------------------------------------------------------------------------------  Allergies    No Known Allergies    Intolerances    Standing Inpatient Medications  aMIOdarone    Tablet 200 milliGRAM(s) Oral daily  carvedilol 6.25 milliGRAM(s) Oral every 12 hours  chlorhexidine 2% Cloths 1 Application(s) Topical <User Schedule>  dextrose 5%. 1000 milliLiter(s) IV Continuous <Continuous>  dextrose 50% Injectable 12.5 Gram(s) IV Push once  dextrose 50% Injectable 25 Gram(s) IV Push once  dextrose 50% Injectable 25 Gram(s) IV Push once  furosemide   Injectable 60 milliGRAM(s) IV Push daily  heparin  Infusion. 1100 Unit(s)/Hr IV Continuous <Continuous>  hydrALAZINE 25 milliGRAM(s) Oral three times a day  insulin lispro (HumaLOG) corrective regimen sliding scale   SubCutaneous three times a day before meals  insulin lispro (HumaLOG) corrective regimen sliding scale   SubCutaneous at bedtime  isosorbide   dinitrate Tablet (ISORDIL) 10 milliGRAM(s) Oral three times a day  levothyroxine 50 MICROGram(s) Oral daily  milrinone Infusion 0.25 MICROgram(s)/kG/Min IV Continuous <Continuous>    REVIEW OF SYSTEMS  --------------------------------------------------------------------------------  Gen: No fevers/chills  Skin: No rashes  Head/Eyes/Ears: Normal hearing,   Respiratory: No dyspnea, cough  CV: No chest pain  GI: No abdominal pain, diarrhea  : No dysuria, hematuria  MSK: No  edema  Heme: No easy bruising or bleeding  Psych: No significant depression    All other systems were reviewed and are negative, except as noted.    VITALS/PHYSICAL EXAM  --------------------------------------------------------------------------------  T(C): 36.7 (09-11-20 @ 06:00), Max: 36.7 (09-10-20 @ 16:27)  HR: 92 (09-11-20 @ 08:00) (80 - 92)  BP: 106/63 (09-11-20 @ 08:00) (83/23 - 113/60)  RR: 14 (09-11-20 @ 08:00) (7 - 38)  SpO2: 100% (09-11-20 @ 08:00) (92% - 100%)  Wt(kg): --    09-10-20 @ 07:01  -  09-11-20 @ 07:00  --------------------------------------------------------  IN: 766 mL / OUT: 2670 mL / NET: -1904 mL    Physical Exam:  	Gen: NAD, cooperative  	HEENT: MMM  	Pulm: CTA B/L, no wheezing anteriorly   	CV: S1S2,   	Abd: Soft, +BS, NT, ND  	Ext: LE edema B/L  	Neuro: Awake, Alert and oriented x3  	Skin: Warm and dry              : no tenderness to palpation               Psych: normal affect and mood    LABS/STUDIES  --------------------------------------------------------------------------------              8.9    4.65  >-----------<  130      [09-11-20 @ 02:52]              27.9     134  |  96  |  123  ----------------------------<  199      [09-11-20 @ 02:52]  4.1   |  23  |  2.89        Ca     9.7     [09-11-20 @ 02:52]      Mg     2.3     [09-11-20 @ 02:52]      Phos  4.9     [09-11-20 @ 02:52]    TPro  6.3  /  Alb  3.9  /  TBili  0.9  /  DBili  x   /  AST  46  /  ALT  59  /  AlkPhos  67  [09-11-20 @ 02:52]    PT/INR: PT 13.3 , INR 1.12       [09-11-20 @ 02:52]  PTT: 65.3       [09-11-20 @ 02:52]    Creatinine Trend:  SCr 2.89 [09-11 @ 02:52]  SCr 3.46 [09-10 @ 04:38]  SCr 2.96 [09-09 @ 05:19]  SCr 3.41 [09-08 @ 05:28]  SCr 3.20 [09-07 @ 05:54]    Urinalysis - [09-09-20 @ 04:31]      Color Light Yellow / Appearance Clear / SG 1.010 / pH 6.5      Gluc Negative / Ketone Negative  / Bili Negative / Urobili <2 mg/dL       Blood Negative / Protein Negative / Leuk Est Negative / Nitrite Negative      RBC  / WBC  / Hyaline  / Gran  / Sq Epi  / Non Sq Epi  / Bacteria     Urine Creatinine 26      [09-08-20 @ 21:42]  Urine Protein <4      [09-08-20 @ 21:42]  Urine Sodium 85      [09-08-20 @ 21:42]  Urine Urea Nitrogen 355      [09-09-20 @ 03:59]    Iron 36, TIBC 314, %sat 12      [09-08-20 @ 08:45]  Ferritin 145      [09-08-20 @ 08:45]  Vitamin D (25OH) 21.7      [07-18-20 @ 10:13]  TSH 3.60      [09-11-20 @ 05:45]  Lipid: chol 116, TG 55, HDL 48, LDL 58      [08-26-20 @ 00:17]    HBsAb Nonreact      [08-26-20 @ 02:02]  HBsAg Nonreact      [08-26-20 @ 02:02]  HBcAb Nonreact      [08-26-20 @ 02:02]  HCV 0.10, Nonreact      [08-26-20 @ 02:02]    Rheumatoid Factor <10      [08-26-20 @ 00:17]  Syphilis Screen (Treponema Pallidum Ab) Negative      [08-26-20 @ 04:26]  Free Light Chains: kappa 6.27, lambda 4.60, ratio = 1.36      [08-26 @ 04:46]  Immunofixation Serum: No Monoclonal Band Identified    Reference Range: None Detected      [08-26-20 @ 04:46]  SPEP Interpretation: Normal Electrophoresis Pattern      [08-26-20 @ 04:46]

## 2020-09-11 NOTE — PROGRESS NOTE ADULT - SUBJECTIVE AND OBJECTIVE BOX
====================  CCU MIDNIGHT ROUNDS  ====================    MAIN BRASWELL  [id number]  Patient is a 75y old  Male who presents with a chief complaint of lower extremity edema (11 Sep 2020 16:49)  ====================  SUMMARY:  ====================  74M w/ PMHx of HFrEF (EF 10%) s/p ICD, Afib w/ recent admission for CHF/afib s/p DCCV, CKD, DM2, hypothyroidism presents after being referred from CHF clinic for admission due to worsening of LE edema and failure of PO diuretics at home. Per patient was relatively functional until ~1 mo ago when he developed palpitations and light headedness. He was found to be hypotensive and in Afib w/ RVR resulting in his hospitalization at the end  of July. His course was c/b EDIE on CKD and persistent hypotension. He improved after dccv and was ultimately discharged home off entresto, coreg and diuretics due to c/f worsening hypotension. After discharge, patient notes worsening LE edema. He was instructed to resume lasix, which was ultimately escalated to toresemide 80mg bid w/ metolazone 2.5 mg daily. With this regimen he noted increased urination and some mild improvement. However, due to ongoing reduced functional capacity, fatigue, and ALCAZAR he was referred for admission for IV diuretics. Denies any cp or palpitations. Denies repaid HR. Notes weight gain since discharge. Continues to have poor appetite and reduced exercise capacity. No fevers, chills. Notes chronic nonproductive cough which is unchanged. Sleeps elevated due to back pain, denies significant orthopnea. Is able to complete ADLs at baseline with some assistance from his niece. Notes increased urination with high dose diuretics, but no dysuria or hematuria. No abd pain, nausea, vomiting. No diarrhea. (21 Aug 2020 09:51)  ====================  NEW EVENTS:  ====================        ====================  VITALS (Last 12 hrs):  ====================  T(C): 36.9 (09-11-20 @ 18:00), Max: 36.9 (09-11-20 @ 13:00)  T(F): 98.4 (09-11-20 @ 18:00), Max: 98.4 (09-11-20 @ 13:00)  HR: 80 (09-11-20 @ 18:00) (78 - 80)  BP: 101/52 (09-11-20 @ 18:00) (84/48 - 111/43)  BP(mean): 78 (09-11-20 @ 18:00) (63 - 83)  RR: 16 (09-11-20 @ 18:00) (16 - 18)  SpO2: 100% (09-11-20 @ 13:00) (99% - 100%)  CVP(mm Hg): 1 (09-11-20 @ 17:00) (-8 - 5)  PA: 47/25 (09-11-20 @ 18:00) (38/12 - 56/20)  PA(mean): 33 (09-11-20 @ 18:00) (19 - 36)  I&O's Summary    10 Sep 2020 07:01  -  11 Sep 2020 07:00  --------------------------------------------------------  IN: 766 mL / OUT: 2670 mL / NET: -1904 mL    11 Sep 2020 07:01  -  11 Sep 2020 20:22  --------------------------------------------------------  IN: 1022.6 mL / OUT: 1750 mL / NET: -727.4 mL  ====================  NEW LABS:  ====================                          9.1    5.15  )-----------( 136      ( 11 Sep 2020 16:49 )             28.7     09-11    133<L>  |  98  |  117<H>  ----------------------------<  144<H>  4.4   |  22  |  2.71<H>    Ca    9.8      11 Sep 2020 16:49  Phos  4.4     09-11  Mg     2.3     09-11    TPro  6.3  /  Alb  3.9  /  TBili  0.9  /  DBili  x   /  AST  46<H>  /  ALT  59<H>  /  AlkPhos  67  09-11    PT/INR - ( 11 Sep 2020 02:52 )   PT: 13.3 sec;   INR: 1.12 ratio         PTT - ( 11 Sep 2020 09:20 )  PTT:97.1 sec        Blood Gas Source, Mixed: Mixed Blood Gas (09-11-20 @ 16:43)  Blood Gas Source, Mixed: Mixed Blood Gas (09-11-20 @ 02:51)  Blood Gas Source, Mixed: Mixed Blood Gas (09-10-20 @ 22:01)    ====================ASSESSMENT AND PLAN==============  74M Hx of Severe MR, HFrEF EF 10% s/p ICD, Afib (Eliquis) s/p recent DCCV on Amio, CKD, DM2, hypothyroidism p/w ADHF requiring inotropic support and diuretics s/p RHC revealing elevated filling pressures c/b EDIE on CKD improving on diuretics.  Pending ischemic eval and eventual Mitral clip.     ====================CARDIOVASCULAR==================    Mechanical Circulatory Support: None     ==Hemodynamics==  9/11 3am on Milrinone 0.25 ~Corey CO/CO 6.7/3.5, Mv02 67, , Lactate 0.7, CVP 7 PAP 49/18/30  9/11 1600 on Milrinone 0.25 ~Corey CO/CO 7.5/3.9, Mv02 71, , Lactate 0.7, CVP 3 PAP 54/19/33    Preload (fluids, diuretics): Lasix 60mg IVP daily   Afterload (anti-hypertensives, pressors):  Inotropes: Milrinone 0.25    ==Pump==    Echo Date:  LVEF:                              Regional Wall Motion Abnormaility?:  [ ]Yes   [ ] No, If Yes, Details  Diastolic function:  RV function:   Any change frim prior?: [ ] Yes   [ ] No, If Yes, Details:   Volume status:    ==Coronaries==    Last ischemic workup:   Antiplatelet regimen:   Anticoagulant:   Statin:   Beta blocker:    ==Rhythm==    Current rhythm:  AM EKG Interpretation:   Anti-arrhythmic therapies:   TVP with settings:     aMIOdarone    Tablet 200 milliGRAM(s) Oral daily  carvedilol 6.25 milliGRAM(s) Oral every 12 hours  furosemide   Injectable 60 milliGRAM(s) IV Push daily  hydrALAZINE 25 milliGRAM(s) Oral three times a day  isosorbide   dinitrate Tablet (ISORDIL) 10 milliGRAM(s) Oral three times a day  milrinone Infusion 0.25 MICROgram(s)/kG/Min (5.63 mL/Hr) IV Continuous <Continuous>      ====================== NEUROLOGY=====================  Sedation [ ]Yes   [x ] No  Delirium [ ]Yes   [ x] No    acetaminophen   Tablet .. 650 milliGRAM(s) Oral every 6 hours PRN Mild Pain (1 - 3)    ==================== RESPIRATORY======================  Mechanical Ventilation [ ]    BIPAP [ ]   HFNC [ ]   NC [ ]       ===================== RENAL =========================  #EDIE on CKD, improving; likely cardiorenal    - Scr 3.46 now 2.71 (baseline 2-2.4)  - holding Lasix for now  - Strict I/Os, renally dose meds, avoid nephrotoxins   -currrently net negative 900 ml     ==================== GASTROINTESTINAL===================    Diet: DASH diet   Last BM:   Indication for Stress Ulcer Prophylaxis, [ ] Yes    [x ] No   If Yes, Medication:     dextrose 5%. 1000 milliLiter(s) (50 mL/Hr) IV Continuous <Continuous>  polyethylene glycol 3350 17 Gram(s) Oral daily PRN Constipation    ========================INFECTIOUS DISEASE================  T(C): 36.7 (09-11-20 @ 06:00), Max: 36.7 (09-10-20 @ 16:27)  WBC Count: 4.65 K/uL (09-11-20 @ 02:52)  WBC Count: 4.50 K/uL (09-10-20 @ 04:38)  WBC Count: 3.95 K/uL (09-09-20 @ 05:19)    Current Antibiotics/Antifungals with start date:     ===================HEMATOLOGIC/ONC ===================  Hemoglobin: 8.9 g/dL (09-11-20 @ 02:52)  Hemoglobin: 9.6 g/dL (09-10-20 @ 04:38)  Hemoglobin: 9.2 g/dL (09-09-20 @ 05:19)    Platelet Count - Automated: 130 K/uL (09-11-20 @ 02:52)  Platelet Count - Automated: 143 K/uL (09-10-20 @ 04:38)  Platelet Count - Automated: 136 K/uL (09-09-20 @ 05:19)    Chemical VTE Prophylaxis:  [ ] Lovenox    [ ] SQH   [ ]NA  Systemic Anticogaulation:  [x ] Yes    [ ] No,  If Yes, Medication and Indication:     heparin   Injectable 6000 Unit(s) IV Push every 6 hours PRN For aPTT less than 40  heparin   Injectable 3000 Unit(s) IV Push every 6 hours PRN For aPTT between 40 - 57  heparin  Infusion. 1100 Unit(s)/Hr (11 mL/Hr) IV Continuous <Continuous>    =======================    ENDOCRINE  =====================  Insulin drip  [ ] Yes    [ x] No  Basal Insulin [x ] Yes    [ ] No  Bolus insulin [ ] Yes    [ x] No  Sliding Scale  [ x] Yes    [ ] No  Hgb A1c: 6.3    POCT Blood Glucose.: 224 mg/dL (09-10-20 @ 12:01)  POCT Blood Glucose.: 137 mg/dL (09-10-20 @ 08:23)    dextrose 40% Gel 15 Gram(s) Oral once PRN Blood Glucose LESS THAN 70 milliGRAM(s)/deciliter  dextrose 50% Injectable 12.5 Gram(s) IV Push once  dextrose 50% Injectable 25 Gram(s) IV Push once  dextrose 50% Injectable 25 Gram(s) IV Push once  glucagon  Injectable 1 milliGRAM(s) IntraMuscular once PRN Glucose LESS THAN 70 milligrams/deciliter  insulin lispro (HumaLOG) corrective regimen sliding scale   SubCutaneous three times a day before meals  insulin lispro Injectable (HumaLOG) 1 Unit(s) SubCutaneous three times a day before meals  levothyroxine 50 MICROGram(s) Oral daily    ======================= LINES/TUBES  =====================  Milo: (9/10/2020)   Central Line: (Date placed)  HD Catheter: (Date placed)  Arterial Line: (Date placed)  Endotracheal Tube: (Date placed)  Guidry: (Date placed)

## 2020-09-12 NOTE — PROGRESS NOTE ADULT - PROBLEM SELECTOR PLAN 2
- Creatinine fluctuating, but portends overall poor prognosis and limits therapeutic options. Creatinine stable today - 2.79 from 2.71  - avoid nephrotoxic medications  - would need renal function to improve prior to LVAD implant  - cardiorenal consult

## 2020-09-12 NOTE — PROGRESS NOTE ADULT - SUBJECTIVE AND OBJECTIVE BOX
MAIN BRASWELL  MRN-[id number]  Patient is a 75y old  Male who presents with a chief complaint of lower extremity edema (11 Sep 2020 20:19)    HPI:  74M w/ PMHx of HFrEF (EF 10%) s/p ICD, Afib w/ recent admission for CHF/afib s/p DCCV, CKD, DM2, hypothyroidism presents after being referred from CHF clinic for admission due to worsening of LE edema and failure of PO diuretics at home. Per patient was relatively functional until ~1 mo ago when he developed palpitations and light headedness. He was found to be hypotensive and in Afib w/ RVR resulting in his hospitalization at the end  of July. His course was c/b EDIE on CKD and persistent hypotension. He improved after dccv and was ultimately discharged home off entresto, coreg and diuretics due to c/f worsening hypotension. After discharge, patient notes worsening LE edema. He was instructed to resume lasix, which was ultimately escalated to toresemide 80mg bid w/ metolazone 2.5 mg daily. With this regimen he noted increased urination and some mild improvement. However, due to ongoing reduced functional capacity, fatigue, and ALCAZAR he was referred for admission for IV diuretics. Denies any cp or palpitations. Denies repaid HR. Notes weight gain since discharge. Continues to have poor appetite and reduced exercise capacity. No fevers, chills. Notes chronic nonproductive cough which is unchanged. Sleeps elevated due to back pain, denies significant orthopnea. Is able to complete ADLs at baseline with some assistance from his niece. Notes increased urination with high dose diuretics, but no dysuria or hematuria. No abd pain, nausea, vomiting. No diarrhea. (21 Aug 2020 09:51)      24 HOUR EVENTS:    REVIEW OF SYSTEMS:    CONSTITUTIONAL: No weakness, fevers or chills  EYES/ENT: No visual changes;  No vertigo or throat pain   NECK: No pain or stiffness  RESPIRATORY: No cough, wheezing, hemoptysis; No shortness of breath  CARDIOVASCULAR: No chest pain or palpitations  GASTROINTESTINAL: No abdominal or epigastric pain. No nausea, vomiting, or hematemesis; No diarrhea or constipation. No melena or hematochezia.  GENITOURINARY: No dysuria, frequency or hematuria  NEUROLOGICAL: No numbness or weakness  SKIN: No itching, rashes      ICU Vital Signs Last 24 Hrs  T(C): 37 (11 Sep 2020 19:00), Max: 37 (11 Sep 2020 19:00)  T(F): 98.6 (11 Sep 2020 19:00), Max: 98.6 (11 Sep 2020 19:00)  HR: 80 (12 Sep 2020 10:00) (80 - 80)  BP: 87/48 (12 Sep 2020 10:00) (87/48 - 111/43)  BP(mean): 59 (12 Sep 2020 10:00) (59 - 83)  ABP: --  ABP(mean): --  RR: 15 (11 Sep 2020 19:00) (15 - 18)  SpO2: 96% (12 Sep 2020 10:00) (94% - 100%)      CVP(mm Hg): 1 (20 @ 10:00) (-8 - 9)  CO: --  CI: --  PA: 42/10 (20 @ 10:00) (38/12 - 66/26)  PA(mean): 24 (20 @ 10:00) (19 - 44)  PA(direct): --  PCWP: --  LA: --  RA: --  SVR: --  SVRI: --  PVR: --  PVRI: --  I&O's Summary    11 Sep 2020 07:01  -  12 Sep 2020 07:00  --------------------------------------------------------  IN: 1172.4 mL / OUT: 2250 mL / NET: -1077.6 mL        CAPILLARY BLOOD GLUCOSE    CAPILLARY BLOOD GLUCOSE      POCT Blood Glucose.: 148 mg/dL (12 Sep 2020 08:39)      PHYSICAL EXAM:  GENERAL: No acute distress, well-developed  HEAD:  Atraumatic, Normocephalic  EYES: EOMI, PERRLA, conjunctiva and sclera clear  NECK: Supple, no lymphadenopathy, no JVD  CHEST/LUNG: CTAB; No wheezes, rales, or rhonchi  HEART: Regular rate and rhythm. Normal S1/S2. No murmurs, rubs, or gallops  ABDOMEN: Soft, non-tender, non-distended; normal bowel sounds, no organomegaly  EXTREMITIES:  2+ peripheral pulses b/l, No clubbing, cyanosis, or edema  NEUROLOGY: A&O x 3, no focal deficits  SKIN: No rashes or lesions    ============================I/O===========================   I&O's Detail    11 Sep 2020 07:01  -  12 Sep 2020 07:00  --------------------------------------------------------  IN:    Heparin Infusion: 154 mL    Milrinone: 78.4 mL    Oral Fluid: 940 mL  Total IN: 1172.4 mL    OUT:    Voided (mL): 2250 mL  Total OUT: 2250 mL    Total NET: -1077.6 mL        ============================ LABS =========================                        8.7    4.53  )-----------( 131      ( 12 Sep 2020 05:07 )             27.2     09-12    134<L>  |  98  |  113<H>  ----------------------------<  132<H>  4.2   |  21<L>  |  2.79<H>    Ca    9.8      12 Sep 2020 05:07  Phos  4.9     09-12  Mg     2.3     09-12    TPro  6.2  /  Alb  3.7  /  TBili  0.9  /  DBili  x   /  AST  33  /  ALT  45  /  AlkPhos  63  09-12                LIVER FUNCTIONS - ( 12 Sep 2020 05:07 )  Alb: 3.7 g/dL / Pro: 6.2 g/dL / ALK PHOS: 63 U/L / ALT: 45 U/L / AST: 33 U/L / GGT: x           PT/INR - ( 12 Sep 2020 05:07 )   PT: 13.4 sec;   INR: 1.13 ratio         PTT - ( 12 Sep 2020 05:07 )  PTT:119.0 sec    Lactate, Blood: 0.5 mmol/L (20 @ 05:07)  Lactate, Blood: 0.7 mmol/L (20 @ 16:49)  Lactate, Blood: 0.7 mmol/L (20 @ 02:52)      ======================Micro/Rad/Cardio=================  Telemtry: Reviewed   EKG: Reviewed  CXR: Reviewed  Culture: Reviewed   Echo:   Cath: Cardiac Cath Lab - Adult:   [facility]  Department of Cardiology  [address] [place] [address]  [phone]  Cath Lab Report -- Comprehensive Report  Patient: MAIN BRASWELL  Study date: 2020  Account number: [id number]  MR number: [id number]  : 1945  Gender: Male  Race: W  Case Physician(s):  Nadeem Jay M.D.  Referring Physician:  Mane Chacon M.D.  INDICATIONS: Acute systolic congestive heart failure.  HISTORY: There was a prior diagnosis of congestive heart failure. The  patient has hypertension.  PROCEDURE:  --  Right heart catheterization.  --  Sonosite - Diagnostic.  TECHNIQUE: The risks and alternatives of the procedures and conscious  sedation were explained to the patient and informed consent was obtained.  Cardiac catheterization performed electively.  Local anesthetic given. Right internal jugular vein access. Right heart  catheterization. The procedure was performed utilizing a catheter.  Sonosite - Diagnostic. RADIATION EXPOSURE: 0.4 min.  MEDICATIONS GIVEN: Fentanyl, 25 mcg, IV. Midazolam, 1 mg, IV.  COMPLICATIONS: There were no complications.  DIAGNOSTIC RECOMMENDATIONS: The patient should continue with the present  medications.  Prepared and signed by  Nadeem Jay M.D.  Signed 2020 08:22:42  HEMODYNAMIC TABLES  Pressures:  Baseline  Pressures:  - HR: 64  Pressures:  - Rhythm:  Pressures:  -- Pulmonary Artery (S/D/M): 58/21/38  Pressures:  -- Pulmonary Capillary Wedge: 26/38/28  Pressures:  -- Right Atrium (a/v/M): 12/12/9  Pressures:  -- Right Ventricle (s/edp): 57/10/--  O2 Sats:  Baseline  O2 Sats:  - HR: 64  O2 Sats:  - Rhythm:  O2 Sats:  -- AO: 9.7/92/12.14  O2 Sats:  -- PA: 9.7/50.2/6.62  Outputs:  Baseline  Outputs:  -- CALCULATIONS[age]  Outputs:  -- CALCULATIONS: Body Surface Area: 1.91  Outputs:  -- CALCULATIONS: Height in cm: 176.00  Outputs:  -- CALCULATIONS: Sex: Male  Outputs:  -- CALCULATIONS: Weight in k.10  Outputs:  -- OUTPUTS: CO by Corey: 4.64  Outputs:  -- OUTPUTS: Corey cardiac index: 2.43  Outputs:  -- OUTPUTS: O2 consumption: 256.00  Outputs:  -- OUTPUTS: Vo2 Indexed: 133.88  Outputs:  -- RESISTANCES: Pulmonary vascular index (dsc): 329.41  Outputs:  -- RESISTANCES: Pulmonary vascular index (Wood Units): 4.12  Outputs:  --RESISTANCES: Pulmonary vascular resistance (dsc): 172.28  Outputs:  -- RESISTANCES: Pulmonary vascular resistance (Wood Units): 2.15  Outputs:  -- RESISTANCES: Right ventricular stroke work: 25.79  Outputs:  -- RESISTANCES: Right ventricular stroke work index: 13.49  Outputs:  -- RESISTANCES: Total pulmonary index (dsc): 1251.76  Outputs:  -- RESISTANCES: Total pulmonary index (Wood Units): 15.65  Outputs:  -- RESISTANCES: Total pulmonary resistance (dsc): 654.65  Outputs:  -- RESISTANCES: Total pulmonary resistance (Wood Units): 8.19  Outputs:  -- SHUNTS: Pulmonary flow: 4.64  Outputs:  -- SHUNTS: Qp Indexed: 2.43  Outputs:  -- SHUNTS: Qs Indexed: 2.43  Outputs:  -- SHUNTS: Systemic flow: 4.64 (20 @ 15:56)    ======================================================  PAST MEDICAL & SURGICAL HISTORY:  Hypothyroidism    Afib    Type II diabetes mellitus    Aortic insufficiency    Mitral insufficiency    CKD (chronic kidney disease)    Cardiomyopathy  Systolic CHF    Hypertension    History of tonsillectomy  childhood    AICD (automatic cardioverter/defibrillator) present  2012   MAIN BRASWELL  MRN-[id number]  Patient is a 75y old  Male who presents with a chief complaint of lower extremity edema (11 Sep 2020 20:19)    HPI:  74M w/ PMHx of HFrEF (EF 10%) s/p ICD, Afib w/ recent admission for CHF/afib s/p DCCV, CKD, DM2, hypothyroidism presents after being referred from CHF clinic for admission due to worsening of LE edema and failure of PO diuretics at home. Per patient was relatively functional until ~1 mo ago when he developed palpitations and light headedness. He was found to be hypotensive and in Afib w/ RVR resulting in his hospitalization at the end  of July. His course was c/b EDIE on CKD and persistent hypotension. He improved after dccv and was ultimately discharged home off entresto, coreg and diuretics due to c/f worsening hypotension. After discharge, patient notes worsening LE edema. He was instructed to resume lasix, which was ultimately escalated to toresemide 80mg bid w/ metolazone 2.5 mg daily. With this regimen he noted increased urination and some mild improvement. However, due to ongoing reduced functional capacity, fatigue, and ALCAZAR he was referred for admission for IV diuretics. Denies any cp or palpitations. Denies repaid HR. Notes weight gain since discharge. Continues to have poor appetite and reduced exercise capacity. No fevers, chills. Notes chronic nonproductive cough which is unchanged. Sleeps elevated due to back pain, denies significant orthopnea. Is able to complete ADLs at baseline with some assistance from his niece. Notes increased urination with high dose diuretics, but no dysuria or hematuria. No abd pain, nausea, vomiting. No diarrhea. (21 Aug 2020 09:51)    REVIEW OF SYSTEMS:  CONSTITUTIONAL: No weakness, fevers or chills  EYES/ENT: No visual changes;  No vertigo or throat pain   NECK: No pain or stiffness  RESPIRATORY: No cough, wheezing, hemoptysis; No shortness of breath  CARDIOVASCULAR: No chest pain or palpitations  GASTROINTESTINAL: No abdominal or epigastric pain. No nausea, vomiting, or hematemesis; No diarrhea or constipation. No melena or hematochezia.  GENITOURINARY: No dysuria, frequency or hematuria  NEUROLOGICAL: No numbness or weakness  SKIN: No itching, rashes      ICU Vital Signs Last 24 Hrs  T(C): 37 (11 Sep 2020 19:00), Max: 37 (11 Sep 2020 19:00)  T(F): 98.6 (11 Sep 2020 19:00), Max: 98.6 (11 Sep 2020 19:00)  HR: 80 (12 Sep 2020 10:00) (80 - 80)  BP: 87/48 (12 Sep 2020 10:00) (87/48 - 111/43)  BP(mean): 59 (12 Sep 2020 10:00) (59 - 83)-  RR: 15 (11 Sep 2020 19:) (15 - 18)  SpO2: 96% (12 Sep 2020 10:00) (94% - 100%)      CVP(mm Hg): 1 (20 @ 10:00) (-8 - 9)  PA: 42/10 (20 @ 10:00) (38/12 - 66/26)  PA(mean): 24 (20 @ 10:00) (19 - 44)      I&O's Summary    11 Sep 2020 07:01  -  12 Sep 2020 07:00  --------------------------------------------------------  IN: 1172.4 mL / OUT: 2250 mL / NET: -1077.6 mL      CAPILLARY BLOOD GLUCOSE  POCT Blood Glucose.: 148 mg/dL (12 Sep 2020 08:39)      PHYSICAL EXAM:  GENERAL: No acute distress, well-developed  HEAD:  Atraumatic, Normocephalic  EYES: EOMI, PERRLA, conjunctiva and sclera clear  NECK: Supple, no lymphadenopathy, no JVD  CHEST/LUNG: CTAB; No wheezes, rales, or rhonchi  HEART: Regular rate and rhythm. Normal S1/S2. No murmurs, rubs, or gallops  ABDOMEN: Soft, non-tender, non-distended; normal bowel sounds, no organomegaly  EXTREMITIES:  2+ peripheral pulses b/l, No clubbing, cyanosis, or edema  NEUROLOGY: A&O x 3, no focal deficits  SKIN: No rashes or lesions    ============================I/O===========================   I&O's Detail    11 Sep 2020 07:01  -  12 Sep 2020 07:00  --------------------------------------------------------  IN:    Heparin Infusion: 154 mL    Milrinone: 78.4 mL    Oral Fluid: 940 mL  Total IN: 1172.4 mL    OUT:    Voided (mL): 2250 mL  Total OUT: 2250 mL    Total NET: -1077.6 mL    ============================ LABS =========================                        8.7    4.53  )-----------( 131      ( 12 Sep 2020 05:07 )             27.2     09-12    134<L>  |  98  |  113<H>  ----------------------------<  132<H>  4.2   |  21<L>  |  2.79<H>    Ca    9.8      12 Sep 2020 05:07  Phos  4.9     12  Mg     2.3         TPro  6.2  /  Alb  3.7  /  TBili  0.9  /  DBili  x   /  AST  33  /  ALT  45  /  AlkPhos  63  12    LIVER FUNCTIONS - ( 12 Sep 2020 05:07 )  Alb: 3.7 g/dL / Pro: 6.2 g/dL / ALK PHOS: 63 U/L / ALT: 45 U/L / AST: 33 U/L / GGT: x           PT/INR - ( 12 Sep 2020 05:07 )   PT: 13.4 sec;   INR: 1.13 ratio     PTT - ( 12 Sep 2020 05:07 )  PTT:119.0 sec    Lactate, Blood: 0.5 mmol/L (20 @ 05:07)  Lactate, Blood: 0.7 mmol/L (20 @ 16:49)  Lactate, Blood: 0.7 mmol/L (20 @ 02:52)    ======================Micro/Rad/Cardio=================  Telemtry: Reviewed   EKG: Reviewed  CXR: Reviewed  Culture: Reviewed   Echo:   Cath: Cardiac Cath Lab - Adult:   [facility]  Department of Cardiology  [address] [place] [id number]  [phone]  Cath Lab Report -- Comprehensive Report  Patient: MAIN BRASWELL  Study date: 2020  Account number: [id number]  MR number: [id number]  : 1945  Gender: Male  Race: W  Case Physician(s):  Nadeem Jay M.D.  Referring Physician:  Mane Chacon M.D.  INDICATIONS: Acute systolic congestive heart failure.  HISTORY: There was a prior diagnosis of congestive heart failure. The  patient has hypertension.  PROCEDURE:  --  Right heart catheterization.  --  Sonosite - Diagnostic.  TECHNIQUE: The risks and alternatives of the procedures and conscious  sedation were explained to the patient and informed consent was obtained.  Cardiac catheterization performed electively.  Local anesthetic given. Right internal jugular vein access. Right heart  catheterization. The procedure was performed utilizing a catheter.  Sonosite - Diagnostic. RADIATION EXPOSURE: 0.4 min.  MEDICATIONS GIVEN: Fentanyl, 25 mcg, IV. Midazolam, 1 mg, IV.  COMPLICATIONS: There were no complications.  DIAGNOSTIC RECOMMENDATIONS: The patient should continue with the present  medications.  Prepared and signed by  Nadeem Jay M.D.  Signed 2020 08:22:42  HEMODYNAMIC TABLES  Pressures:  Baseline  Pressures:  - HR: 64  Pressures:  - Rhythm:  Pressures:  -- Pulmonary Artery (S/D/M): 58/21/38  Pressures:  -- Pulmonary Capillary Wedge: 26/38/28  Pressures:  -- Right Atrium (a/v/M): 12/12/9  Pressures:  -- Right Ventricle (s/edp): 57/10/--  O2 Sats:  Baseline  O2 Sats:  - HR: 64  O2 Sats:  - Rhythm:  O2 Sats:  -- AO: 9.7/92/12.14  O2 Sats:  -- PA: 9.7/50.2/6.62  Outputs:  Baseline  Outputs:  -- CALCULATIONS: [age]  Outputs:  -- CALCULATIONS: Body Surface Area: 1.91  Outputs:  -- CALCULATIONS: Height in cm: 176.00  Outputs:  -- CALCULATIONS: Sex: Male  Outputs:  -- CALCULATIONS: Weight in k.10  Outputs:  -- OUTPUTS: CO by Corey: 4.64  Outputs:  -- OUTPUTS: Corey cardiac index: 2.43  Outputs:  -- OUTPUTS: O2 consumption: 256.00  Outputs:  -- OUTPUTS: Vo2 Indexed: 133.88  Outputs:  -- RESISTANCES: Pulmonary vascular index (dsc): 329.41  Outputs:  -- RESISTANCES: Pulmonary vascular index (Wood Units): 4.12  Outputs:  --RESISTANCES: Pulmonary vascular resistance (dsc): 172.28  Outputs:  -- RESISTANCES: Pulmonary vascular resistance (Wood Units): 2.15  Outputs:  -- RESISTANCES: Right ventricular stroke work: 25.79  Outputs:  -- RESISTANCES: Right ventricular stroke work index: 13.49  Outputs:  -- RESISTANCES: Total pulmonary index (dsc): 1251.76  Outputs:  -- RESISTANCES: Total pulmonary index (Wood Units): 15.65  Outputs:  -- RESISTANCES: Total pulmonary resistance (dsc): 654.65  Outputs:  -- RESISTANCES: Total pulmonary resistance (Wood Units): 8.19  Outputs:  -- SHUNTS: Pulmonary flow: 4.64  Outputs:  -- SHUNTS: Qp Indexed: 2.43  Outputs:  -- SHUNTS: Qs Indexed: 2.43  Outputs:  -- SHUNTS: Systemic flow: 4.64 (20 @ 15:56)    ======================================================  PAST MEDICAL & SURGICAL HISTORY:  Hypothyroidism    Afib    Type II diabetes mellitus    Aortic insufficiency    Mitral insufficiency    CKD (chronic kidney disease)    Cardiomyopathy  Systolic CHF    Hypertension    History of tonsillectomy  childhood    AICD (automatic cardioverter/defibrillator) present  2012

## 2020-09-12 NOTE — PROGRESS NOTE ADULT - SUBJECTIVE AND OBJECTIVE BOX
For all Cardiology service contact information, go to [email or web address] and use "[employer]" to login.    SUBJECTIVE:   No events overnight. Denies CP, SOB or Dyspnea.   -------------------------------------------------------------------------------------------  ROS:  CV: chest pain (-), palpitation (-), orthopnea (-), PND (-), edema (-)  PULM: SOB (-), cough (-), wheezing (-), hemoptysis (-).   CONST: fever (-), chills (-) or fatigue (-)  GI: abdominal distension (-), abdominal pain (-) , nausea/vomiting (-), hematemesis, (-), melena (-), hematochezia (-)  : dysuria (-), frequency (-), hematuria (-).   NEURO: numbness (-), weakness (-), dizziness (-)  SKIN: itching (-), rash (-)  HEENT:  visual changes (-); vertigo or throat pain (-);  neck stiffness (-)     All other review of systems is negative unless indicated above.   -------------------------------------------------------------------------------------------  VS:  T(F): 98.6 (09-11), Max: 98.6 (09-11)  HR: 80 (09-12) (80 - 80)  BP: 97/52 (09-12) (87/48 - 108/61)  RR: 18 (09-12)  SpO2: 98% (09-12)  I&O's Summary    11 Sep 2020 07:01  -  12 Sep 2020 07:00  --------------------------------------------------------  IN: 1303.2 mL / OUT: 3550 mL / NET: -2246.8 mL    12 Sep 2020 07:01  -  12 Sep 2020 15:12  --------------------------------------------------------  IN: 267.6 mL / OUT: 550 mL / NET: -282.4 mL      ------------------------------------------------------------------------------------------  PHYSICAL EXAM:  GENERAL: NAD  HEAD:  Atraumatic, Normocephalic.  EYES: EOMI, PERRLA, conjunctiva and sclera clear.  ENT: Moist mucous membranes.  NECK: Supple, No JVD.  CHEST/LUNG: Clear to auscultation bilaterally; No rales, rhonchi, wheezing, or rubs. Unlabored respirations.  HEART: Regular rate and rhythm; No murmurs, rubs, or gallops.  ABDOMEN: Bowel sounds present; Soft, Nontender, Nondistended.   EXTREMITIES:  2+ Peripheral Pulses, brisk capillary refill. No clubbing, cyanosis, or edema.  PSYCH: Normal affect.  SKIN: No rashes or lesions.  -------------------------------------------------------------------------------------------  LABS:                          8.7    4.53  )-----------( 131      ( 12 Sep 2020 05:07 )             27.2     09-12    134<L>  |  98  |  113<H>  ----------------------------<  132<H>  4.2   |  21<L>  |  2.79<H>    Ca    9.8      12 Sep 2020 05:07  Phos  4.9     09-12  Mg     2.3     09-12    TPro  6.2  /  Alb  3.7  /  TBili  0.9  /  DBili  x   /  AST  33  /  ALT  45  /  AlkPhos  63  09-12    PT/INR - ( 12 Sep 2020 05:07 )   PT: 13.4 sec;   INR: 1.13 ratio         PTT - ( 12 Sep 2020 05:07 )  PTT:119.0 sec            -------------------------------------------------------------------------------------------  Meds:  acetaminophen   Tablet .. 650 milliGRAM(s) Oral every 6 hours PRN  aMIOdarone    Tablet 200 milliGRAM(s) Oral daily  carvedilol 6.25 milliGRAM(s) Oral every 12 hours  chlorhexidine 2% Cloths 1 Application(s) Topical <User Schedule>  dextrose 40% Gel 15 Gram(s) Oral once PRN  dextrose 5%. 1000 milliLiter(s) IV Continuous <Continuous>  dextrose 50% Injectable 25 Gram(s) IV Push once  dextrose 50% Injectable 25 Gram(s) IV Push once  dextrose 50% Injectable 12.5 Gram(s) IV Push once  glucagon  Injectable 1 milliGRAM(s) IntraMuscular once PRN  heparin  Infusion. 1100 Unit(s)/Hr IV Continuous <Continuous>  hydrALAZINE 25 milliGRAM(s) Oral three times a day  insulin lispro (HumaLOG) corrective regimen sliding scale   SubCutaneous three times a day before meals  insulin lispro (HumaLOG) corrective regimen sliding scale   SubCutaneous at bedtime  isosorbide   dinitrate Tablet (ISORDIL) 10 milliGRAM(s) Oral three times a day  levothyroxine 50 MICROGram(s) Oral daily  milrinone Infusion 0.25 MICROgram(s)/kG/Min IV Continuous <Continuous>  polyethylene glycol 3350 17 Gram(s) Oral daily PRN    -------------------------------------------------------------------------------------------

## 2020-09-12 NOTE — PROGRESS NOTE ADULT - ASSESSMENT
Mr. 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 He was started on inotropic support with milrinone and diuresed over 20 lbs. He subsequently had worsening renal function, which was thought to be due to overdiuresis. The milrinone was stopped, but resumed after right heart catheterization last week showed elevated wedge pressure and borderline low cardiac output.

## 2020-09-12 NOTE — PROGRESS NOTE ADULT - PROBLEM SELECTOR PLAN 1
Patient is hypovolemic on exam. CVP 3, PA 44/24/27, PCWP 17  - Hold diuretics, administer 500 cc fluids over 5 hours for goal CVP 5-8. May give additional 500 cc if CVP remains low.   - Continue  milrinone 0.25mcg/kg/min  - Continue carvedilol 6.25 mg BID  - Continue hydralazine 25mg TID and c/w ISDN 10 mg TID; hold for SBP <85  - Tentatively planned for MV clip on Monday, will need cath before but creatinine might be prohibitive  - f/u cardiorenal consult  - Maintain K 4-4.5 and Mg 2-2.5.  - c/w heparin gtt  - Under evaluation for LVAD as destination therapy. He is not a heart transplant candidate given age.

## 2020-09-12 NOTE — PROGRESS NOTE ADULT - ASSESSMENT
====================ASSESSMENT AND PLAN==============      ====================CARDIOVASCULAR==================    Mechaincal Circulatory Support:  [ ] IABP   [ ] Impella 2.5   [ ] Impella CP  Settings:     ==Hemodynamics==     (Date) CVP:      PCWP:        PA S/D:            Cardiac Output:             Cardiac Index:            SVR:    Preload (fluids, diuretics):  Afterload (anti-hypertensives, pressors):  Inotropes:    ==Pump==    Echo Date:  LVEF:                              Regional Wall Motion Abnormaility?:  [ ]Yes   [ ] No, If Yes, Details  Diastolic function:  RV function:   Any change frim prior?: [ ] Yes   [ ] No, If Yes, Details:   Volume status:    ==Coronaries==    Last ischemic workup:   Antiplatelet regimen:   Anticoagulant:   Statin:   Beta blocker:    ==Rhythm==    Current rhythm:  AM EKG Interpretation:   Anti-arrhythmic therapies:   TVP with settings:     aMIOdarone    Tablet 200 milliGRAM(s) Oral daily  carvedilol 6.25 milliGRAM(s) Oral every 12 hours  hydrALAZINE 25 milliGRAM(s) Oral three times a day  isosorbide   dinitrate Tablet (ISORDIL) 10 milliGRAM(s) Oral three times a day  milrinone Infusion 0.25 MICROgram(s)/kG/Min (5.63 mL/Hr) IV Continuous <Continuous>      ====================== NEUROLOGY=====================  Sedation [ ]Yes   [ ] No  Delirium [ ]Yes   [ ] No    acetaminophen   Tablet .. 650 milliGRAM(s) Oral every 6 hours PRN Mild Pain (1 - 3)    ==================== RESPIRATORY======================  Mechanical Ventilation [ ]    BIPAP [ ]   HFNC [ ]   NC [ ]                 ===================== RENAL =========================    09-11-20 @ 07:01  -  09-12-20 @ 07:00  --------------------------------------------------------  IN: 1172.4 mL / OUT: 2250 mL / NET: -1077.6 mL      Renal Replacement Therapy:  [ ] CRRT      [ ] IHD, Last Session:    Fluid removal:     [ ] Diuretic therapy, Regimen:       ==================== GASTROINTESTINAL===================    Diet:  Last BM:   Indication for Stress Ulcer Prophylaxis, [ ] Yes    [ ] No   If Yes, Medication:     dextrose 5%. 1000 milliLiter(s) (50 mL/Hr) IV Continuous <Continuous>  polyethylene glycol 3350 17 Gram(s) Oral daily PRN Constipation    ========================INFECTIOUS DISEASE================  T(C): 37 (09-11-20 @ 19:00), Max: 37 (09-11-20 @ 19:00)  WBC Count: 4.53 K/uL (09-12-20 @ 05:07)  WBC Count: 6.11 K/uL (09-11-20 @ 21:29)  WBC Count: 5.15 K/uL (09-11-20 @ 16:49)  WBC Count: 4.65 K/uL (09-11-20 @ 02:52)  WBC Count: 4.50 K/uL (09-10-20 @ 04:38)        Current Antibiotics/Antifungals with start date:       ===================HEMATOLOGIC/ONC ===================  Hemoglobin: 8.7 g/dL (09-12-20 @ 05:07)  Hemoglobin: 9.2 g/dL (09-11-20 @ 21:29)  Hemoglobin: 9.1 g/dL (09-11-20 @ 16:49)  Hemoglobin: 8.9 g/dL (09-11-20 @ 02:52)  Hemoglobin: 9.6 g/dL (09-10-20 @ 04:38)    Platelet Count - Automated: 131 K/uL (09-12-20 @ 05:07)  Platelet Count - Automated: 133 K/uL (09-11-20 @ 21:29)  Platelet Count - Automated: 136 K/uL (09-11-20 @ 16:49)  Platelet Count - Automated: 130 K/uL (09-11-20 @ 02:52)  Platelet Count - Automated: 143 K/uL (09-10-20 @ 04:38)    Chemical VTE Prophylaxis:  [ ] Lovenox    [ ] SQH   [ ]NA  Systemic Anticogaulation:  [ ] Yes    [ ] No,  If Yes, Medication and Indication:     heparin  Infusion. 1100 Unit(s)/Hr (11 mL/Hr) IV Continuous <Continuous>    =======================    ENDOCRINE  =====================  Insulin drip  [ ] Yes    [ ] No  Basal Insulin [ ] Yes    [ ] No  Bolus insulin [ ] Yes    [ ] No  Sliding Scale  [ ] Yes    [ ] No  Hgb A1c    POCT Blood Glucose.: 148 mg/dL (09-12-20 @ 08:39)  POCT Blood Glucose.: 204 mg/dL (09-11-20 @ 21:11)  POCT Blood Glucose.: 185 mg/dL (09-11-20 @ 14:07)        dextrose 40% Gel 15 Gram(s) Oral once PRN Blood Glucose LESS THAN 70 milliGRAM(s)/deciliter  dextrose 50% Injectable 25 Gram(s) IV Push once  dextrose 50% Injectable 12.5 Gram(s) IV Push once  dextrose 50% Injectable 25 Gram(s) IV Push once  glucagon  Injectable 1 milliGRAM(s) IntraMuscular once PRN Glucose LESS THAN 70 milligrams/deciliter  insulin lispro (HumaLOG) corrective regimen sliding scale   SubCutaneous three times a day before meals  insulin lispro (HumaLOG) corrective regimen sliding scale   SubCutaneous at bedtime  levothyroxine 50 MICROGram(s) Oral daily    ======================= LINES/TUBES  =====================  [place]: (Date placed)  Central Line: (Date placed)  HD Catheter: (Date placed)  Arterial Line: (Date placed)  Endotracheal Tube: (Date placed)  [place]: (Date placed)   75M Hx ACC/AHA stage D chronic systolic heart failure 2/2 dilated NICM (LVIDd 6.7cm, LVEF <20%) w/ severe MR s/p CRT-D, Afib (Eliquis) s/p recent DCCV on amio, stage 4 CKD (baseline SCr ~3), and hypothyroid; sent from HF clinic for ADHF s/p RHC revealing elevated PCWP w/ borderline CI, restarted on Milrinone and started on Lasix, pending LHC and Mitral clip next week.      ====================ASSESSMENT AND PLAN==============  #Acute on chronic systolic HF, euvolemic to dry   - S/P RHC (9/10): RA 10->13, PCWP 29->46, CI 2.7  - c/w Milrinone 0.25, trend perfusion labs: lactate, Scr   - Holding Lasix, euvolemic to dry, CVP 2-3, IVF 500ml bolus now  - Strict I/Os, keep slightly positive today, daily standing weights   - c/w Hydral 25 TID, ISD 10 TID, Coreg 6.25 BID; hold SBP <90  - Pending LHC w/ Mitral clip on Monday (Dr. Christopher)    #Severe MR  - c/w afterload as above, pending Mitral clip 9/14    #PAF  - c/w systemic Heparin for now, trend coags   - rate controlled on Coreg, Amio 200 daily     #EDIE on CKD, likely cardiorenal and in setting of over diureses   - Hold diuretics, IVF bolus today, keep slightly positive   - Strict I/Os, renally dose meds, avoid nephrotoxins      Kavya Jaffe [facility] CICU  [phone]

## 2020-09-12 NOTE — PROGRESS NOTE ADULT - SUBJECTIVE AND OBJECTIVE BOX
MAIN BRASWELL  MRN-[id number]  Patient is a 75y old  Male who presents with a chief complaint of lower extremity edema (12 Sep 2020 15:08)    HPI:  74M w/ PMHx of HFrEF (EF 10%) s/p ICD, Afib w/ recent admission for CHF/afib s/p DCCV, CKD, DM2, hypothyroidism presents after being referred from CHF clinic for admission due to worsening of LE edema and failure of PO diuretics at home. Per patient was relatively functional until ~1 mo ago when he developed palpitations and light headedness. He was found to be hypotensive and in Afib w/ RVR resulting in his hospitalization at the end  of July. His course was c/b EDIE on CKD and persistent hypotension. He improved after dccv and was ultimately discharged home off entresto, coreg and diuretics due to c/f worsening hypotension. After discharge, patient notes worsening LE edema. He was instructed to resume lasix, which was ultimately escalated to toresemide 80mg bid w/ metolazone 2.5 mg daily. With this regimen he noted increased urination and some mild improvement. However, due to ongoing reduced functional capacity, fatigue, and ALCAZAR he was referred for admission for IV diuretics. Denies any cp or palpitations. Denies repaid HR. Notes weight gain since discharge. Continues to have poor appetite and reduced exercise capacity. No fevers, chills. Notes chronic nonproductive cough which is unchanged. Sleeps elevated due to back pain, denies significant orthopnea. Is able to complete ADLs at baseline with some assistance from his niece. Notes increased urination with high dose diuretics, but no dysuria or hematuria. No abd pain, nausea, vomiting. No diarrhea. (21 Aug 2020 09:51)      Hospital Course:    24 HOUR EVENTS:    REVIEW OF SYSTEMS:   Constitutional: No weakness, fevers, or chills  Eyes/ENT: No visual changes  Respiratory: No cough, wheezing, hemoptysis  Cardiovascular: No chest pain, no palpitations  Gastrointestinal: No abdominal pain. No nausea, vomiting, hematemesis.   Genitourinary: No dysuria  Neurological: No numbness, no weakness  Skin: No itching, rashes    ICU Vital Signs Last 24 Hrs  T(C): 36.7 (12 Sep 2020 20:00), Max: 36.8 (12 Sep 2020 16:00)  T(F): 98.1 (12 Sep 2020 20:00), Max: 98.2 (12 Sep 2020 16:00)  HR: 80 (12 Sep 2020 20:00) (80 - 80)  BP: 95/50 (12 Sep 2020 20:00) (87/48 - 106/57)  BP(mean): 69 (12 Sep 2020 20:00) (59 - 87)  ABP: --  ABP(mean): --  RR: 20 (12 Sep 2020 20:00) (16 - 20)  SpO2: 97% (12 Sep 2020 20:00) (96% - 99%)      CVP(mm Hg): 7 (09-12-20 @ 20:00) (-8 - 10)  PA: 47/26 (09-12-20 @ 20:00) (35/18 - 66/26)  PA(mean): 30 (09-12-20 @ 20:00) (19 - 44)    I&O's Summary    11 Sep 2020 07:01  -  12 Sep 2020 07:00  --------------------------------------------------------  IN: 1303.2 mL / OUT: 3550 mL / NET: -2246.8 mL    12 Sep 2020 07:01  -  12 Sep 2020 21:00  --------------------------------------------------------  IN: 1529.8 mL / OUT: 1000 mL / NET: 529.8 mL        POCT Blood Glucose.: 137 mg/dL (12 Sep 2020 17:41)      PHYSICAL EXAM:   General: No acute distress  Eyes: EOMI, PERRLA, conjunctiva and sclera clear  Chest/Lung: CTAB, no wheezes, rales, or rhonchi  Heart:Paced rhythm, . Normal S1/S2. No murmurs, rubs, or gallops.  Abdomen: Soft, nontender, nondistended. Normal bowel sounds.  Extremites: 2+ peripheral pulses B/L. No clubbing, cyanosis, or edema.  Neurology: A&O x3, no focal deficits  Skin: No rashes or lesions  Lines: Mil[place] Huertas (9/10)    ============================I/O===========================   I&O's Detail    11 Sep 2020 07:01  -  12 Sep 2020 07:00  --------------------------------------------------------  IN:    Heparin Infusion: 240 mL    Milrinone: 123.2 mL    Oral Fluid: 940 mL  Total IN: 1303.2 mL    OUT:    Voided (mL): 3550 mL  Total OUT: 3550 mL    Total NET: -2246.8 mL      12 Sep 2020 07:01  -  12 Sep 2020 21:00  --------------------------------------------------------  IN:    Heparin Infusion: 117 mL    Milrinone: 72.8 mL    Oral Fluid: 840 mL    Sodium Chloride 0.9% Bolus: 500 mL  Total IN: 1529.8 mL    OUT:    Voided (mL): 1000 mL  Total OUT: 1000 mL    Total NET: 529.8 mL        ============================ LABS =========================                        8.7    4.53  )-----------( 131      ( 12 Sep 2020 05:07 )             27.2     09-12    134<L>  |  98  |  113<H>  ----------------------------<  132<H>  4.2   |  21<L>  |  2.79<H>    Ca    9.8      12 Sep 2020 05:07  Phos  4.9     09-12  Mg     2.3     09-12    TPro  6.2  /  Alb  3.7  /  TBili  0.9  /  DBili  x   /  AST  33  /  ALT  45  /  AlkPhos  63  09-12                LIVER FUNCTIONS - ( 12 Sep 2020 05:07 )  Alb: 3.7 g/dL / Pro: 6.2 g/dL / ALK PHOS: 63 U/L / ALT: 45 U/L / AST: 33 U/L / GGT: x           PT/INR - ( 12 Sep 2020 05:07 )   PT: 13.4 sec;   INR: 1.13 ratio         PTT - ( 12 Sep 2020 16:50 )  PTT:71.9 sec    Lactate, Blood: 0.5 mmol/L (09-12-20 @ 05:07)  Lactate, Blood: 0.7 mmol/L (09-11-20 @ 16:49)  Lactate, Blood: 0.7 mmol/L (09-11-20 @ 02:52)      ======================Micro/Rad/Cardio=================  Telemetry: Reviewed   EKG: Reviewed  CXR: Reviewed  Culture: Reviewed   Echo: Reviewed  Cath: Reviewed  ======================================================  PAST MEDICAL & SURGICAL HISTORY:  Hypothyroidism    Afib    Type II diabetes mellitus    Aortic insufficiency    Mitral insufficiency    CKD (chronic kidney disease)    Cardiomyopathy  Systolic CHF    Hypertension    History of tonsillectomy  childhood    AICD (automatic cardioverter/defibrillator) present  8/2012      ====================ASSESSMENT ==============  74M Hx of Severe MR, HFrEF EF 10% s/p ICD, Afib (Eliquis) s/p recent DCCV on Amio, CKD, DM2, hypothyroidism p/w ADHF requiring inotropic support and diuretics s/p RHC revealing elevated filling pressures c/b EDIE on CKD improving on diuretics.  Pending ischemic eval and eventual Mitral clip.     Acute on chronic systolic HF  Severe MR  pAF  EDIE on CKD- improving     Plan:  ====================== NEUROLOGY=====================  Nonfocal, continue to monitor neuro status per ICU protocol.   - Tylenol 650mg PO q6 PRN for analgesia    acetaminophen   Tablet .. 650 milliGRAM(s) Oral every 6 hours PRN Mild Pain (1 - 3)    ==================== RESPIRATORY======================  Comfortable on RA, SpO2 97%.  - Cont to monitor SpO2 via pulse oximetry, follow ABGs.     ====================CARDIOVASCULAR==================  Acute on chronic systolic HF  - Euvolemic to dry  - S/P RHC (9/10): RA 10->13, PCWP 29->46, CI 2.7  - C/w Inotropic support with Milrinone infusion 0.25 mcg/kg/min   - C/w Hydralazine 37.5 mg PO TID and Coreg 6.25 mg PO BID for afterload reduction  - Pending LHC with Mitral clip on 9/14 with Dr. Christopher    Severe MR  - C/w afterload as above, plan for Mitral 9/14     pAF  - c/w Amiodarone 200mg PO QD and Coreg 6.25 mg PO BID for rate control   -C/w anticoagulation therapy with Heparin gtt    aMIOdarone    Tablet 200 milliGRAM(s) Oral daily  carvedilol 6.25 milliGRAM(s) Oral every 12 hours  hydrALAZINE 37.5 milliGRAM(s) Oral three times a day  isosorbide   dinitrate Tablet (ISORDIL) 10 milliGRAM(s) Oral three times a day  milrinone Infusion 0.25 MICROgram(s)/kG/Min (5.63 mL/Hr) IV Continuous <Continuous>    ===================HEMATOLOGIC/ONC ===================  H/H 8.7/27.2, stable.   - Continue to monitor hemoglobin and hematocrit levels.   heparin  Infusion. 1100 Unit(s)/Hr (11 mL/Hr) IV Continuous <Continuous>    ===================== RENAL =========================  EDIE on CKD, likely cardiorenal and in setting of over diuresis  - Holding diuretics, s/p IVF bolus x1 this AM  - Continue to monitor I/Os, BUN/Creatinine, and urine output.   - Goal slightly positive fluid balance. Replete lytes PRN. Keep K> 4 and Mg >2.   - Strict I&Os, daily standing weights, renally dose meds, avoid nephrotoxins  - BUN/Cr 113/2.7     ==================== GASTROINTESTINAL===================  Tolerating PO DASH/TLC diet.   Bowel regimen with Miralax.     dextrose 5%. 1000 milliLiter(s) (50 mL/Hr) IV Continuous <Continuous>  polyethylene glycol 3350 17 Gram(s) Oral daily PRN Constipation    =======================    ENDOCRINE  =====================  Stress hyperglycemia, glycemic control with Humalog sliding scale and Glucagon PRN.   Monitor blood glucose levels.     Hypothyroidism   - Continue synthroid 50 mcg PO QD    dextrose 40% Gel 15 Gram(s) Oral once PRN Blood Glucose LESS THAN 70 milliGRAM(s)/deciliter  dextrose 50% Injectable 25 Gram(s) IV Push once  dextrose 50% Injectable 25 Gram(s) IV Push once  dextrose 50% Injectable 12.5 Gram(s) IV Push once  glucagon  Injectable 1 milliGRAM(s) IntraMuscular once PRN Glucose LESS THAN 70 milligrams/deciliter  insulin lispro (HumaLOG) corrective regimen sliding scale   SubCutaneous three times a day before meals  insulin lispro (HumaLOG) corrective regimen sliding scale   SubCutaneous at bedtime  levothyroxine 50 MICROGram(s) Oral daily    ========================INFECTIOUS DISEASE================  Afebrile, slightly leukopenic downtrending WBC 4.5  - Continue to monitor WBC     Patient requires continuous monitoring with bedside rhythm monitoring, pulse ox monitoring, and intermittent blood gas analysis. Care plan discussed with ICU care team. Patient remained critical and at risk for life threatening decompensation.  Patient seen, examined and plan discussed with CCU team during rounds.     I have personally provided 35 minutes of critical care time excluding time spent on separate procedures.    By signing my name below, I, Kiah Toure, attest that this documentation has been prepared under the direction and in the presence of Nahomy Brunner NP.  Electronically signed: Willis Sotelo, 09-12-20 @ 21:00    I,Nahomy Brunner , personally performed the services described in this documentation. all medical record entries made by the scribe were at my direction and in my presence. I have reviewed the chart and agree that the record reflects my personal performance and is accurate and complete  Electronically signed: Nahomy Brunner NP.       MAIN BRASWELL  MRN-[id number]  Patient is a 75y old  Male who presents with a chief complaint of lower extremity edema (12 Sep 2020 15:08)    HPI:  74M w/ PMHx of HFrEF (EF 10%) s/p ICD, Afib w/ recent admission for CHF/afib s/p DCCV, CKD, DM2, hypothyroidism presents after being referred from CHF clinic for admission due to worsening of LE edema and failure of PO diuretics at home. Per patient was relatively functional until ~1 mo ago when he developed palpitations and light headedness. He was found to be hypotensive and in Afib w/ RVR resulting in his hospitalization at the end  of July. His course was c/b EDIE on CKD and persistent hypotension. He improved after dccv and was ultimately discharged home off entresto, coreg and diuretics due to c/f worsening hypotension. After discharge, patient notes worsening LE edema. He was instructed to resume lasix, which was ultimately escalated to toresemide 80mg bid w/ metolazone 2.5 mg daily. With this regimen he noted increased urination and some mild improvement. However, due to ongoing reduced functional capacity, fatigue, and ALCAZAR he was referred for admission for IV diuretics. Denies any cp or palpitations. Denies repaid HR. Notes weight gain since discharge. Continues to have poor appetite and reduced exercise capacity. No fevers, chills. Notes chronic nonproductive cough which is unchanged. Sleeps elevated due to back pain, denies significant orthopnea. Is able to complete ADLs at baseline with some assistance from his niece. Notes increased urination with high dose diuretics, but no dysuria or hematuria. No abd pain, nausea, vomiting. No diarrhea. (21 Aug 2020 09:51)      Hospital Course:  9/12 Admitted to CICU for further management with chief complaint of LE edema    24 HOUR EVENTS:    REVIEW OF SYSTEMS:   Constitutional: No weakness, fevers, or chills  Eyes/ENT: No visual changes  Respiratory: No cough, wheezing, hemoptysis  Cardiovascular: No chest pain, no palpitations  Gastrointestinal: No abdominal pain. No nausea, vomiting, hematemesis.   Genitourinary: No dysuria  Neurological: No numbness, no weakness  Skin: No itching, rashes    ICU Vital Signs Last 24 Hrs  T(C): 36.7 (12 Sep 2020 20:00), Max: 36.8 (12 Sep 2020 16:00)  T(F): 98.1 (12 Sep 2020 20:00), Max: 98.2 (12 Sep 2020 16:00)  HR: 80 (12 Sep 2020 20:00) (80 - 80)  BP: 95/50 (12 Sep 2020 20:00) (87/48 - 106/57)  BP(mean): 69 (12 Sep 2020 20:00) (59 - 87)  ABP: --  ABP(mean): --  RR: 20 (12 Sep 2020 20:00) (16 - 20)  SpO2: 97% (12 Sep 2020 20:00) (96% - 99%)      CVP(mm Hg): 7 (09-12-20 @ 20:00) (-8 - 10)  PA: 47/26 (09-12-20 @ 20:00) (35/18 - 66/26)  PA(mean): 30 (09-12-20 @ 20:00) (19 - 44)    I&O's Summary    11 Sep 2020 07:01  -  12 Sep 2020 07:00  --------------------------------------------------------  IN: 1303.2 mL / OUT: 3550 mL / NET: -2246.8 mL    12 Sep 2020 07:01  -  12 Sep 2020 21:00  --------------------------------------------------------  IN: 1529.8 mL / OUT: 1000 mL / NET: 529.8 mL        POCT Blood Glucose.: 137 mg/dL (12 Sep 2020 17:41)      PHYSICAL EXAM:   General: No acute distress  Eyes: EOMI, PERRLA, conjunctiva and sclera clear  Chest/Lung: CTAB, no wheezes, rales, or rhonchi  Heart:Paced rhythm, . Normal S1/S2. No murmurs, rubs, or gallops.  Abdomen: Soft, nontender, nondistended. Normal bowel sounds.  Extremites: 2+ peripheral pulses B/L. No clubbing, cyanosis, or edema.  Neurology: A&O x3, no focal deficits  Skin: No rashes or lesions  Lines: [place] Aleksandar (9/10)    ============================I/O===========================   I&O's Detail    11 Sep 2020 07:01  -  12 Sep 2020 07:00  --------------------------------------------------------  IN:    Heparin Infusion: 240 mL    Milrinone: 123.2 mL    Oral Fluid: 940 mL  Total IN: 1303.2 mL    OUT:    Voided (mL): 3550 mL  Total OUT: 3550 mL    Total NET: -2246.8 mL      12 Sep 2020 07:01  -  12 Sep 2020 21:00  --------------------------------------------------------  IN:    Heparin Infusion: 117 mL    Milrinone: 72.8 mL    Oral Fluid: 840 mL    Sodium Chloride 0.9% Bolus: 500 mL  Total IN: 1529.8 mL    OUT:    Voided (mL): 1000 mL  Total OUT: 1000 mL    Total NET: 529.8 mL        ============================ LABS =========================                        8.7    4.53  )-----------( 131      ( 12 Sep 2020 05:07 )             27.2     09-12    134<L>  |  98  |  113<H>  ----------------------------<  132<H>  4.2   |  21<L>  |  2.79<H>    Ca    9.8      12 Sep 2020 05:07  Phos  4.9     09-12  Mg     2.3     09-12    TPro  6.2  /  Alb  3.7  /  TBili  0.9  /  DBili  x   /  AST  33  /  ALT  45  /  AlkPhos  63  09-12                LIVER FUNCTIONS - ( 12 Sep 2020 05:07 )  Alb: 3.7 g/dL / Pro: 6.2 g/dL / ALK PHOS: 63 U/L / ALT: 45 U/L / AST: 33 U/L / GGT: x           PT/INR - ( 12 Sep 2020 05:07 )   PT: 13.4 sec;   INR: 1.13 ratio         PTT - ( 12 Sep 2020 16:50 )  PTT:71.9 sec    Lactate, Blood: 0.5 mmol/L (09-12-20 @ 05:07)  Lactate, Blood: 0.7 mmol/L (09-11-20 @ 16:49)  Lactate, Blood: 0.7 mmol/L (09-11-20 @ 02:52)      ======================Micro/Rad/Cardio=================  Telemetry: Reviewed   EKG: Reviewed  CXR: Reviewed  Culture: Reviewed   Echo: Reviewed  Cath: Reviewed  ======================================================  PAST MEDICAL & SURGICAL HISTORY:  Hypothyroidism    Afib    Type II diabetes mellitus    Aortic insufficiency    Mitral insufficiency    CKD (chronic kidney disease)    Cardiomyopathy  Systolic CHF    Hypertension    History of tonsillectomy  childhood    AICD (automatic cardioverter/defibrillator) present  8/2012      ====================ASSESSMENT ==============  74M Hx of Severe MR, HFrEF EF 10% s/p ICD, Afib (Eliquis) s/p recent DCCV on Amio, CKD, DM2, hypothyroidism p/w ADHF requiring inotropic support and diuretics s/p RHC revealing elevated filling pressures c/b EDIE on CKD improving on diuretics.  Pending ischemic eval and eventual Mitral clip.     Acute on chronic systolic HF  Severe MR  pAF  EDIE on CKD- improving     Plan:  ====================== NEUROLOGY=====================  Nonfocal, continue to monitor neuro status per ICU protocol.   - Tylenol 650mg PO q6 PRN for analgesia    acetaminophen   Tablet .. 650 milliGRAM(s) Oral every 6 hours PRN Mild Pain (1 - 3)    ==================== RESPIRATORY======================  Comfortable on RA, SpO2 97%.  - Cont to monitor SpO2 via pulse oximetry, follow ABGs.     ====================CARDIOVASCULAR==================  Acute on chronic systolic HF  - Euvolemic to dry  - S/P RHC (9/10): RA 10->13, PCWP 29->46, CI 2.7  - C/w Inotropic support with Milrinone infusion 0.25 mcg/kg/min   - C/w Hydralazine 37.5 mg PO TID and Coreg 6.25 mg PO BID for afterload reduction  - Pending LHC with Mitral clip on 9/14 with Dr. Christopher    Severe MR  - C/w afterload as above, plan for Mitral 9/14     pAF  - c/w Amiodarone 200mg PO QD and Coreg 6.25 mg PO BID for rate control   -C/w anticoagulation therapy with Heparin gtt    aMIOdarone    Tablet 200 milliGRAM(s) Oral daily  carvedilol 6.25 milliGRAM(s) Oral every 12 hours  hydrALAZINE 37.5 milliGRAM(s) Oral three times a day  isosorbide   dinitrate Tablet (ISORDIL) 10 milliGRAM(s) Oral three times a day  milrinone Infusion 0.25 MICROgram(s)/kG/Min (5.63 mL/Hr) IV Continuous <Continuous>    ===================HEMATOLOGIC/ONC ===================  H/H 8.7/27.2, stable.   - Continue to monitor hemoglobin and hematocrit levels.   heparin  Infusion. 1100 Unit(s)/Hr (11 mL/Hr) IV Continuous <Continuous>    ===================== RENAL =========================  EDIE on CKD, likely cardiorenal and in setting of over diuresis  - Holding diuretics, s/p IVF bolus x1 this AM  - Continue to monitor I/Os, BUN/Creatinine, and urine output.   - Goal slightly positive fluid balance. Replete lytes PRN. Keep K> 4 and Mg >2.   - Strict I&Os, daily standing weights, renally dose meds, avoid nephrotoxins  - BUN/Cr 113/2.7     ==================== GASTROINTESTINAL===================  Tolerating PO DASH/TLC diet.   Bowel regimen with Miralax.     dextrose 5%. 1000 milliLiter(s) (50 mL/Hr) IV Continuous <Continuous>  polyethylene glycol 3350 17 Gram(s) Oral daily PRN Constipation    =======================    ENDOCRINE  =====================  Stress hyperglycemia, glycemic control with Humalog sliding scale and Glucagon PRN.   Monitor blood glucose levels.     Hypothyroidism   - Continue synthroid 50 mcg PO QD    dextrose 40% Gel 15 Gram(s) Oral once PRN Blood Glucose LESS THAN 70 milliGRAM(s)/deciliter  dextrose 50% Injectable 25 Gram(s) IV Push once  dextrose 50% Injectable 25 Gram(s) IV Push once  dextrose 50% Injectable 12.5 Gram(s) IV Push once  glucagon  Injectable 1 milliGRAM(s) IntraMuscular once PRN Glucose LESS THAN 70 milligrams/deciliter  insulin lispro (HumaLOG) corrective regimen sliding scale   SubCutaneous three times a day before meals  insulin lispro (HumaLOG) corrective regimen sliding scale   SubCutaneous at bedtime  levothyroxine 50 MICROGram(s) Oral daily    ========================INFECTIOUS DISEASE================  Afebrile, slightly leukopenic downtrending WBC 4.5  - Continue to monitor WBC     Patient requires continuous monitoring with bedside rhythm monitoring, pulse ox monitoring, and intermittent blood gas analysis. Care plan discussed with ICU care team. Patient remained critical and at risk for life threatening decompensation.  Patient seen, examined and plan discussed with CCU team during rounds.     I have personally provided 35 minutes of critical care time excluding time spent on separate procedures.    By signing my name below, I, Kiah Toure, attest that this documentation has been prepared under the direction and in the presence of Nahomy Brunner NP.  Electronically signed: Willis Sotelo, 09-12-20 @ 21:00    I,Nahomy Brunner , personally performed the services described in this documentation. all medical record entries made by the scribe were at my direction and in my presence. I have reviewed the chart and agree that the record reflects my personal performance and is accurate and complete  Electronically signed: Nahomy Brunner NP.       MAIN BRASWELL  MRN-[id number]  Patient is a 75y old  Male who presents with a chief complaint of lower extremity edema (12 Sep 2020 15:08)    HPI:  74M w/ PMHx of HFrEF (EF 10%) s/p ICD, Afib w/ recent admission for CHF/afib s/p DCCV, CKD, DM2, hypothyroidism presents after being referred from CHF clinic for admission due to worsening of LE edema and failure of PO diuretics at home. Per patient was relatively functional until ~1 mo ago when he developed palpitations and light headedness. He was found to be hypotensive and in Afib w/ RVR resulting in his hospitalization at the end  of July. His course was c/b EDIE on CKD and persistent hypotension. He improved after dccv and was ultimately discharged home off entresto, coreg and diuretics due to c/f worsening hypotension. After discharge, patient notes worsening LE edema. He was instructed to resume lasix, which was ultimately escalated to toresemide 80mg bid w/ metolazone 2.5 mg daily. With this regimen he noted increased urination and some mild improvement. However, due to ongoing reduced functional capacity, fatigue, and ALCAZAR he was referred for admission for IV diuretics. Denies any cp or palpitations. Denies repaid HR. Notes weight gain since discharge. Continues to have poor appetite and reduced exercise capacity. No fevers, chills. Notes chronic nonproductive cough which is unchanged. Sleeps elevated due to back pain, denies significant orthopnea. Is able to complete ADLs at baseline with some assistance from his niece. Notes increased urination with high dose diuretics, but no dysuria or hematuria. No abd pain, nausea, vomiting. No diarrhea. (21 Aug 2020 09:51)      Hospital Course:  9/12 Admitted to CICU for further management with chief complaint of LE edema    24 HOUR EVENTS:  - s/p 500 cc NS bolus for low CVP   - hydralazine increased to 37.5 mg       REVIEW OF SYSTEMS:   Constitutional: No weakness, fevers, or chills  Eyes/ENT: No visual changes  Respiratory: No cough, wheezing, hemoptysis  Cardiovascular: No chest pain, no palpitations  Gastrointestinal: No abdominal pain. No nausea, vomiting, hematemesis.   Genitourinary: No dysuria  Neurological: No numbness, no weakness  Skin: No itching, rashes    ICU Vital Signs Last 24 Hrs  T(C): 36.7 (12 Sep 2020 20:00), Max: 36.8 (12 Sep 2020 16:00)  T(F): 98.1 (12 Sep 2020 20:00), Max: 98.2 (12 Sep 2020 16:00)  HR: 80 (12 Sep 2020 20:00) (80 - 80)  BP: 95/50 (12 Sep 2020 20:00) (87/48 - 106/57)  BP(mean): 69 (12 Sep 2020 20:00) (59 - 87)  RR: 20 (12 Sep 2020 20:00) (16 - 20)  SpO2: 97% (12 Sep 2020 20:00) (96% - 99%)  CVP(mm Hg): 7 (09-12-20 @ 20:00) (-8 - 10)  PA: 47/26 (09-12-20 @ 20:00) (35/18 - 66/26)  PA(mean): 30 (09-12-20 @ 20:00) (19 - 44)    I&O's Summary    11 Sep 2020 07:01  -  12 Sep 2020 07:00  --------------------------------------------------------  IN: 1303.2 mL / OUT: 3550 mL / NET: -2246.8 mL    12 Sep 2020 07:01  -  12 Sep 2020 21:00  --------------------------------------------------------  IN: 1529.8 mL / OUT: 1000 mL / NET: 529.8 mL        POCT Blood Glucose.: 137 mg/dL (12 Sep 2020 17:41)      PHYSICAL EXAM:   General: No acute distress  Eyes: EOMI, PERRLA, conjunctiva and sclera clear  Chest/Lung: CTAB, no wheezes, rales, or rhonchi  Heart:Paced rhythm, . Normal S1/S2. No murmurs, rubs, or gallops.  Abdomen: Soft, nontender, nondistended. Normal bowel sounds.  Extremites: 2+ peripheral pulses B/L. No clubbing, cyanosis, or edema.  Neurology: A&O x3, no focal deficits  Skin: No rashes or lesions  Lines: Milo Huertas (9/10)    ============================I/O===========================   I&O's Detail    11 Sep 2020 07:01  -  12 Sep 2020 07:00  --------------------------------------------------------  IN:    Heparin Infusion: 240 mL    Milrinone: 123.2 mL    Oral Fluid: 940 mL  Total IN: 1303.2 mL    OUT:    Voided (mL): 3550 mL  Total OUT: 3550 mL    Total NET: -2246.8 mL      12 Sep 2020 07:01  -  12 Sep 2020 21:00  --------------------------------------------------------  IN:    Heparin Infusion: 117 mL    Milrinone: 72.8 mL    Oral Fluid: 840 mL    Sodium Chloride 0.9% Bolus: 500 mL  Total IN: 1529.8 mL    OUT:    Voided (mL): 1000 mL  Total OUT: 1000 mL    Total NET: 529.8 mL        ============================ LABS =========================                        8.7    4.53  )-----------( 131      ( 12 Sep 2020 05:07 )             27.2     09-12    134<L>  |  98  |  113<H>  ----------------------------<  132<H>  4.2   |  21<L>  |  2.79<H>    Ca    9.8      12 Sep 2020 05:07  Phos  4.9     09-12  Mg     2.3     09-12    TPro  6.2  /  Alb  3.7  /  TBili  0.9  /  DBili  x   /  AST  33  /  ALT  45  /  AlkPhos  63  09-12    LIVER FUNCTIONS - ( 12 Sep 2020 05:07 )  Alb: 3.7 g/dL / Pro: 6.2 g/dL / ALK PHOS: 63 U/L / ALT: 45 U/L / AST: 33 U/L / GGT: x           PT/INR - ( 12 Sep 2020 05:07 )   PT: 13.4 sec;   INR: 1.13 ratio         PTT - ( 12 Sep 2020 16:50 )  PTT:71.9 sec    Lactate, Blood: 0.5 mmol/L (09-12-20 @ 05:07)  Lactate, Blood: 0.7 mmol/L (09-11-20 @ 16:49)  Lactate, Blood: 0.7 mmol/L (09-11-20 @ 02:52)      ======================Micro/Rad/Cardio=================  Telemetry: Reviewed   EKG: Reviewed  CXR: Reviewed  Culture: Reviewed   Echo: Reviewed  Cath: Reviewed  ======================================================  PAST MEDICAL & SURGICAL HISTORY:  Hypothyroidism    Afib    Type II diabetes mellitus    Aortic insufficiency    Mitral insufficiency    CKD (chronic kidney disease)    Cardiomyopathy  Systolic CHF    Hypertension    History of tonsillectomy  childhood    AICD (automatic cardioverter/defibrillator) present  8/2012      ====================ASSESSMENT ==============  74M Hx of Severe MR, HFrEF EF 10% s/p ICD, Afib (Eliquis) s/p recent DCCV on Amio, CKD, DM2, hypothyroidism p/w ADHF requiring inotropic support and diuretics s/p RHC revealing elevated filling pressures c/b EDIE on CKD improving on diuretics.  Pending ischemic eval and eventual Mitral clip.     Acute on chronic systolic HF  Severe MR  pAF  EDIE on CKD- improving     Plan:  ====================== NEUROLOGY=====================  -AOX3, continue to monitor neuro status per ICU protocol.   -c/w Tylenol 650mg PO q6 PRN for analgesia    acetaminophen   Tablet .. 650 milliGRAM(s) Oral every 6 hours PRN Mild Pain (1 - 3)    ==================== RESPIRATORY======================  -Comfortable on RA, SpO2 97%.  -Cont to monitor SpO2 via pulse oximetry, follow ABGs.     ====================CARDIOVASCULAR==================  Acute on chronic systolic HF  - Euvolemic to dry, CVP: 1-3   - s/p  cc bolus x1   - lasix on hold since 9/11  - S/P RHC (9/10): RA 10->13, PCWP 29->46, CI 2.7  - c/w Inotropic support with Milrinone infusion 0.25 mcg/kg/min   - c/w Hydralazine 37.5 mg PO TID and Coreg 6.25 mg PO BID for afterload reduction  - Pending LHC with Mitral clip on 9/14 with Dr. Christopher    Severe MR  - c/w afterload as above, plan for Mitral 9/14     pAF  - c/w Amiodarone 200mg PO QD and Coreg 6.25 mg PO BID for rate control   - c/w anticoagulation therapy with Heparin gtt    aMIOdarone    Tablet 200 milliGRAM(s) Oral daily  carvedilol 6.25 milliGRAM(s) Oral every 12 hours  hydrALAZINE 37.5 milliGRAM(s) Oral three times a day  isosorbide   dinitrate Tablet (ISORDIL) 10 milliGRAM(s) Oral three times a day  milrinone Infusion 0.25 MICROgram(s)/kG/Min (5.63 mL/Hr) IV Continuous <Continuous>    ===================HEMATOLOGIC/ONC ===================  H/H 8.7/27.2, stable.   - Continue to monitor hemoglobin and hematocrit levels.   heparin  Infusion. 1100 Unit(s)/Hr (11 mL/Hr) IV Continuous <Continuous>    ===================== RENAL =========================  EDIE on CKD, likely cardiorenal and in setting of over diuresis  - Holding diuretics, s/p IVF bolus x1 this AM  - Continue to monitor I/Os, BUN/Creatinine, and urine output.   - Goal slightly positive fluid balance. Replete lytes PRN. Keep K> 4 and Mg >2.   - Strict I&Os, daily standing weights, renally dose meds, avoid nephrotoxins  - BUN/Cr 113/2.7     ==================== GASTROINTESTINAL===================  Tolerating PO DASH/TLC diet.   Bowel regimen with Miralax.     dextrose 5%. 1000 milliLiter(s) (50 mL/Hr) IV Continuous <Continuous>  polyethylene glycol 3350 17 Gram(s) Oral daily PRN Constipation    =======================    ENDOCRINE  =====================  Stress hyperglycemia, glycemic control with Humalog sliding scale and Glucagon PRN.   Monitor blood glucose levels.     Hypothyroidism   - Continue synthroid 50 mcg PO QD    dextrose 40% Gel 15 Gram(s) Oral once PRN Blood Glucose LESS THAN 70 milliGRAM(s)/deciliter  dextrose 50% Injectable 25 Gram(s) IV Push once  dextrose 50% Injectable 25 Gram(s) IV Push once  dextrose 50% Injectable 12.5 Gram(s) IV Push once  glucagon  Injectable 1 milliGRAM(s) IntraMuscular once PRN Glucose LESS THAN 70 milligrams/deciliter  insulin lispro (HumaLOG) corrective regimen sliding scale   SubCutaneous three times a day before meals  insulin lispro (HumaLOG) corrective regimen sliding scale   SubCutaneous at bedtime  levothyroxine 50 MICROGram(s) Oral daily    ========================INFECTIOUS DISEASE================  Afebrile, slightly leukopenic downtrending WBC 4.5  - Continue to monitor WBC     Patient requires continuous monitoring with bedside rhythm monitoring, pulse ox monitoring, and intermittent blood gas analysis. Care plan discussed with ICU care team. Patient remained critical and at risk for life threatening decompensation.  Patient seen, examined and plan discussed with CCU team during rounds.     I have personally provided 35 minutes of critical care time excluding time spent on separate procedures.    By signing my name below, I, Kiah Toure, attest that this documentation has been prepared under the direction and in the presence of Nahomy Brunner NP.  Electronically signed: Jasmeet Sotelo, 09-12-20 @ 21:00    INahomy , personally performed the services described in this documentation. all medical record entries made by the jasmeet were at my direction and in my presence. I have reviewed the chart and agree that the record reflects my personal performance and is accurate and complete  Electronically signed: Nahomy Brunner NP.

## 2020-09-12 NOTE — PROGRESS NOTE ADULT - SUBJECTIVE AND OBJECTIVE BOX
SUBJECTIVE / OVERNIGHT EVENTS: pt seen and examined    MEDICATIONS  (STANDING):  aMIOdarone    Tablet 200 milliGRAM(s) Oral daily  carvedilol 6.25 milliGRAM(s) Oral every 12 hours  chlorhexidine 2% Cloths 1 Application(s) Topical <User Schedule>  dextrose 5%. 1000 milliLiter(s) (50 mL/Hr) IV Continuous <Continuous>  dextrose 50% Injectable 25 Gram(s) IV Push once  dextrose 50% Injectable 25 Gram(s) IV Push once  dextrose 50% Injectable 12.5 Gram(s) IV Push once  heparin  Infusion. 1100 Unit(s)/Hr (11 mL/Hr) IV Continuous <Continuous>  hydrALAZINE 37.5 milliGRAM(s) Oral three times a day  insulin lispro (HumaLOG) corrective regimen sliding scale   SubCutaneous three times a day before meals  insulin lispro (HumaLOG) corrective regimen sliding scale   SubCutaneous at bedtime  isosorbide   dinitrate Tablet (ISORDIL) 10 milliGRAM(s) Oral three times a day  levothyroxine 50 MICROGram(s) Oral daily  milrinone Infusion 0.25 MICROgram(s)/kG/Min (5.63 mL/Hr) IV Continuous <Continuous>    MEDICATIONS  (PRN):  acetaminophen   Tablet .. 650 milliGRAM(s) Oral every 6 hours PRN Mild Pain (1 - 3)  dextrose 40% Gel 15 Gram(s) Oral once PRN Blood Glucose LESS THAN 70 milliGRAM(s)/deciliter  glucagon  Injectable 1 milliGRAM(s) IntraMuscular once PRN Glucose LESS THAN 70 milligrams/deciliter  polyethylene glycol 3350 17 Gram(s) Oral daily PRN Constipation          Cardiovascular: No chest pain or palpation.  Respiratory: No cough, shortness of breath, congestion, or wheezing.  Gastrointestinal: No abdominal pain, nausea, vomiting, or diarrhea.  Genitourinary: No dysuria.  Musculoskeletal: No joint swelling.  Neurologic: No headache.    Vital Signs Last 24 Hrs  T(C): 36.7 (12 Sep 2020 20:00), Max: 36.8 (12 Sep 2020 16:00)  T(F): 98.1 (12 Sep 2020 20:00), Max: 98.2 (12 Sep 2020 16:00)  HR: 80 (12 Sep 2020 23:00) (80 - 80)  BP: 97/50 (12 Sep 2020 23:00) (86/46 - 106/57)  BP(mean): 70 (12 Sep 2020 23:00) (59 - 87)  RR: 20 (12 Sep 2020 23:00) (16 - 20)  SpO2: 97% (12 Sep 2020 23:00) (96% - 99%)PHYSICAL EXAM:  GENERAL: NAD  EYES: EOMI, PERRLA  NECK: Supple, No JVD  CHEST/LUNG: crackles at bases  HEART:  S1 , S2 +  ABDOMEN: soft , bs+  EXTREMITIES:  edema+  NEUROLOGY:alert awake oriented     LABS:      134<L>  |  98  |  113<H>  ----------------------------<  132<H>  4.2   |  21<L>  |  2.79<H>    Ca    9.8      12 Sep 2020 05:07  Phos  4.9       Mg     2.3         TPro  6.2  /  Alb  3.7  /  TBili  0.9  /  DBili      /  AST  33  /  ALT  45  /  AlkPhos  63      Creatinine Trend: 2.79 <--, 2.71 <--, 2.89 <--, 3.46 <--, 2.96 <--, 3.41 <--, 3.20 <--, 3.35 <--, 3.43 <--                        8.7    4.53  )-----------( 131      ( 12 Sep 2020 05:07 )             27.2     Urine Studies:  Urinalysis Basic - ( 09 Sep 2020 04:31 )    Color: Light Yellow / Appearance: Clear / S.010 / pH:   Gluc:  / Ketone: Negative  / Bili: Negative / Urobili: <2 mg/dL   Blood:  / Protein: Negative / Nitrite: Negative   Leuk Esterase: Negative / RBC:  / WBC    Sq Epi:  / Non Sq Epi:  / Bacteria:       Sodium, Random Urine: 85 mmol/L ( @ 21:42)  Creatinine, Random Urine: 26 mg/dL ( @ 21:42)  Protein/Creatinine Ratio Calculation: <0.2 Ratio ( @ 21:42)          LIVER FUNCTIONS - ( 12 Sep 2020 05:07 )  Alb: 3.7 g/dL / Pro: 6.2 g/dL / ALK PHOS: 63 U/L / ALT: 45 U/L / AST: 33 U/L / GGT: x           PT/INR - ( 12 Sep 2020 05:07 )   PT: 13.4 sec;   INR: 1.13 ratio         PTT - ( 12 Sep 2020 16:50 )  PTT:71.9 sec

## 2020-09-12 NOTE — PROGRESS NOTE ADULT - NUTRITIONAL ASSESSMENT
Structural Heart Team    Subjective  No acute events reported overnight.  The patient denies any chest pain/tightness/discomfort, fevers, chills, sweats, light-headed sensation, dizziness or palpitations.  No abdominal pain is noted.     Review of Systems  14 point review of systems is negative except what is described in the history of present illness    Physical Examination  GENERAL: NAD, alert and oriented times three  HEAD:  Atraumatic, Normocephalic.  EYES: EOMI, PERRLA, conjunctiva and sclera clear.  ENT: Moist mucous membranes.  NECK: Supple, No JVD. RIJ introducer/SG catheter is in position - clean/dry/intact  CHEST/LUNG: Clear to auscultation bilaterally; No rales, rhonchi, wheezing, or rubs. Unlabored respirations.  HEART: Regular rate and rhythm with II/VI holosystolic murmur at the apex  ABDOMEN: Bowel sounds present; Soft, Nontender, Nondistended.   EXTREMITIES:  2+ Peripheral Pulses, brisk capillary refill. No clubbing, cyanosis with 1+ bilateral edema.  PSYCH: Normal affect.  SKIN: No rashes or lesions.  T(C): 36.7 (09-12-20 @ 20:00), Max: 36.8 (09-12-20 @ 16:00)  HR: 80 (09-12-20 @ 20:00) (80 - 80)  BP: 95/50 (09-12-20 @ 20:00) (87/48 - 106/57)  RR: 20 (09-12-20 @ 20:00) (16 - 20)  SpO2: 97% (09-12-20 @ 20:00) (96% - 99%)  Wt(kg): --  09-11 @ 07:01  -  09-12 @ 07:00  --------------------------------------------------------  IN: 1303.2 mL / OUT: 3550 mL / NET: -2246.8 mL    09-12 @ 07:01  -  09-12 @ 21:37  --------------------------------------------------------  IN: 1529.8 mL / OUT: 1000 mL / NET: 529.8 mL      MEDICATIONS  (STANDING):  aMIOdarone    Tablet 200 milliGRAM(s) Oral daily  carvedilol 6.25 milliGRAM(s) Oral every 12 hours  chlorhexidine 2% Cloths 1 Application(s) Topical <User Schedule>  dextrose 5%. 1000 milliLiter(s) (50 mL/Hr) IV Continuous <Continuous>  dextrose 50% Injectable 25 Gram(s) IV Push once  dextrose 50% Injectable 25 Gram(s) IV Push once  dextrose 50% Injectable 12.5 Gram(s) IV Push once  heparin  Infusion. 1100 Unit(s)/Hr (11 mL/Hr) IV Continuous <Continuous>  hydrALAZINE 37.5 milliGRAM(s) Oral three times a day  insulin lispro (HumaLOG) corrective regimen sliding scale   SubCutaneous three times a day before meals  insulin lispro (HumaLOG) corrective regimen sliding scale   SubCutaneous at bedtime  isosorbide   dinitrate Tablet (ISORDIL) 10 milliGRAM(s) Oral three times a day  levothyroxine 50 MICROGram(s) Oral daily  milrinone Infusion 0.25 MICROgram(s)/kG/Min (5.63 mL/Hr) IV Continuous <Continuous>    MEDICATIONS  (PRN):  acetaminophen   Tablet .. 650 milliGRAM(s) Oral every 6 hours PRN Mild Pain (1 - 3)  dextrose 40% Gel 15 Gram(s) Oral once PRN Blood Glucose LESS THAN 70 milliGRAM(s)/deciliter  glucagon  Injectable 1 milliGRAM(s) IntraMuscular once PRN Glucose LESS THAN 70 milligrams/deciliter  polyethylene glycol 3350 17 Gram(s) Oral daily PRN Constipation                        8.7    4.53  )-----------( 131      ( 12 Sep 2020 05:07 )             27.2   09-12    134<L>  |  98  |  113<H>  ----------------------------<  132<H>  4.2   |  21<L>  |  2.79<H>    Ca    9.8      12 Sep 2020 05:07  Phos  4.9     09-12  Mg     2.3     09-12    TPro  6.2  /  Alb  3.7  /  TBili  0.9  /  DBili  x   /  AST  33  /  ALT  45  /  AlkPhos  63  09-12  PT/INR - ( 12 Sep 2020 05:07 )   PT: 13.4 sec;   INR: 1.13 ratio     PTT - ( 12 Sep 2020 16:50 )  PTT:71.9 sec    Assessment/Plan  --The patient is being medically optimized prior to his MitraClip procedure.  Reviewed RHC numbers.  Mild improvement in his renal function.  Plan for patient to be given 500cc of normal saline.  Patient had additional questions concerning the MitraClip procedure and post procedural management.  All questions and concerns of the patient were addressed.  He is aware that due to the severity of his heart dysfunction it is impossible for us to predict to what degree to any he will improve following the MitraClip procedure (which includes his renal dysfunction).  Mr. Jarquin acknowledged that he understands the limitations but is very interested in proceeding and is not ready to give up.  He is felt by the heart failure team to not be an appropriate candidate for heart transplant/LVAD (renal dysfunction will need to improve).  --Continue milrinone @ 0.25mcg/kg/min  --Continue coreg, hydralazine and ISDN  --Due to patients kidney dysfunction at this time no plan for cardiac catheterization/angiogram when he is undergoing MItraClip procedure on 9/14/2020.  Concern that contrast administration may further worsen his acute on chronic renal dysfunction which could further impair his recovery.  --Following the procedure the patient will be transferred to the CTU for further observation/management.  The SG catheter will be kept in position during the procedure to be used by his medical team following the intervention.  The hope is that milrinone may be titrated off.  The patient is aware that milrinone may not be able to be titrated off and he may be discharged on the medication.  --Based upon how the patient clinically does may consider CardioMems implantation prior to discharge.  --Continue telemetry monitoring.    All questions and concerns of the patient were addressed.    Findings and plan discussed with heart failure/Dr. Anglin, CCU/Dr. Doty and cardiac surgery/Dr. Bui

## 2020-09-12 NOTE — PROGRESS NOTE ADULT - ATTENDING COMMENTS
I personally leveled and zeroed the PAC and measured: CVP 3, PA 44/24/27, PCW 17. Last dose of IV lasix was yesterday 11 am.  Would give back 500 mL/5h today for goal CVP 5-8. May repeat if CVP < 5.  Continue milrinone 0.25 mcg/kg/min.  Once euvolemic, will escalate hydralazine and ISDN tomorrow.  Anticipate MitraClip on Monday with coronary angiography at time of procedure (if he is optimized).

## 2020-09-13 NOTE — PROGRESS NOTE ADULT - PROBLEM SELECTOR PLAN 9
-Eliquis for dvt ppx  DASH diet  PT/OT

## 2020-09-13 NOTE — PROVIDER CONTACT NOTE (CRITICAL VALUE NOTIFICATION) - PERSON GIVING RESULT:
Ann Smerling
[facility]: Jose Talley
Shannon Little/ LAB
Sukh Lynne  ([place])
Janee Williamson [facility]

## 2020-09-13 NOTE — PROGRESS NOTE ADULT - PROBLEM SELECTOR PLAN 8
-trend cbc  -hep c negative, send peripheral smear  -maintain active t+s while on eliquis

## 2020-09-13 NOTE — PROGRESS NOTE ADULT - ASSESSMENT
Mr. 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 He was started on inotropic support with milrinone and diuresed over 20 lbs. He subsequently had worsening renal function, which was thought to be due to overdiuresis. The milrinone was stopped, but resumed after right heart catheterization last week showed elevated wedge pressure and borderline low cardiac output. Currently patient is euvolemic, and with adequate cardiac output on milrinone awaiting Mitraclip.      Hemodynamic data:  9/12: CVP 3, PA 44/24/27, PCWP 17  9/13: CVP 6, PA 52/15/33, PCWP 25

## 2020-09-13 NOTE — PROGRESS NOTE ADULT - PROBLEM SELECTOR PLAN 1
preop Mitral clip  npo  after midnight preop Mitral clip    Primacor and hep gtt to th OR  npo  after midnight preop Mitral clip    Primacor  gtt to the OR,  heparin gtt to be turned off when leaving CCU in am to OR  npo  after midnight

## 2020-09-13 NOTE — PROGRESS NOTE ADULT - SUBJECTIVE AND OBJECTIVE BOX
MAIN BRASWELL  MRN-[id number]  Patient is a 75y old  Male who presents with a chief complaint of lower extremity edema (13 Sep 2020 14:59)    HPI:  74M w/ PMHx of HFrEF (EF 10%) s/p ICD, Afib w/ recent admission for CHF/afib s/p DCCV, CKD, DM2, hypothyroidism presents after being referred from CHF clinic for admission due to worsening of LE edema and failure of PO diuretics at home. Per patient was relatively functional until ~1 mo ago when he developed palpitations and light headedness. He was found to be hypotensive and in Afib w/ RVR resulting in his hospitalization at the end  of July. His course was c/b EDIE on CKD and persistent hypotension. He improved after dccv and was ultimately discharged home off entresto, coreg and diuretics due to c/f worsening hypotension. After discharge, patient notes worsening LE edema. He was instructed to resume lasix, which was ultimately escalated to toresemide 80mg bid w/ metolazone 2.5 mg daily. With this regimen he noted increased urination and some mild improvement. However, due to ongoing reduced functional capacity, fatigue, and ALCAZAR he was referred for admission for IV diuretics. Denies any cp or palpitations. Denies repaid HR. Notes weight gain since discharge. Continues to have poor appetite and reduced exercise capacity. No fevers, chills. Notes chronic nonproductive cough which is unchanged. Sleeps elevated due to back pain, denies significant orthopnea. Is able to complete ADLs at baseline with some assistance from his niece. Notes increased urination with high dose diuretics, but no dysuria or hematuria. No abd pain, nausea, vomiting. No diarrhea. (21 Aug 2020 09:51)      Hospital Course:  9/12 Admitted to CICU for further management with chief complaint of LE edema s/p 500 cc NS bolus for low CVP,  hydralazine increased to 37.5 mg    24 HOUR EVENTS:  - Hydralazine dose increased to 50mg PO TID.   - 9/13 hemodynamics: CVP 7, PA 51/16/33, PCWP 25    REVIEW OF SYSTEMS:   Constitutional: No weakness, fevers, or chills  Eyes/ENT: No visual changes  Respiratory: No cough, wheezing, hemoptysis  Cardiovascular: No chest pain, no palpitations  Gastrointestinal: No abdominal pain. No nausea, vomiting, hematemesis.   Genitourinary: No dysuria  Neurological: No numbness, no weakness  Skin: No itching, rashes    ICU Vital Signs Last 24 Hrs  T(C): 36.8 (13 Sep 2020 19:00), Max: 36.8 (13 Sep 2020 19:00)  T(F): 98.2 (13 Sep 2020 19:00), Max: 98.2 (13 Sep 2020 19:00)  HR: 80 (13 Sep 2020 19:00) (80 - 82)  BP: 100/60 (13 Sep 2020 19:00) (84/48 - 114/64)  BP(mean): 72 (13 Sep 2020 19:00) (59 - 93)  ABP: --  ABP(mean): --  RR: 15 (13 Sep 2020 19:00) (13 - 31)  SpO2: 98% (13 Sep 2020 19:00) (95% - 99%)      CVP(mm Hg): 7 (09-13-20 @ 19:00) (-3 - 11)  PA: 51/16 (09-13-20 @ 19:00) (35/18 - 82/43)  PA(mean): 33 (09-13-20 @ 19:00) (21 - 59)  I&O's Summary    12 Sep 2020 07:01  -  13 Sep 2020 07:00  --------------------------------------------------------  IN: 2442.4 mL / OUT: 2050 mL / NET: 392.4 mL    13 Sep 2020 07:01  -  13 Sep 2020 20:03  --------------------------------------------------------  IN: 131.2 mL / OUT: 250 mL / NET: -118.8 mL      POCT Blood Glucose.: 111 mg/dL (13 Sep 2020 17:15)      PHYSICAL EXAM:   General: No acute distress, lying in bed comfortably  Eyes: EOMI, PERRLA, conjunctiva and sclera clear  Chest/Lung: mild bibasilar crackles, no wheezes, rales, or rhonchi  Heart: Paced rhythm, . Normal S1/S2. No murmurs, rubs, or gallops.  Abdomen: Soft, nontender, nondistended. Normal bowel sounds.  Extremites: 2+ peripheral pulses B/L. No clubbing, cyanosis, or edema.  Neurology: A&O x3, no focal deficits  Skin: No rashes or lesions  Lines: [employer] (9/10)    ============================I/O===========================   I&O's Detail    12 Sep 2020 07:01  -  13 Sep 2020 07:00  --------------------------------------------------------  IN:    Heparin Infusion: 66 mL    Heparin Infusion: 162 mL    IV PiggyBack: 500 mL    Milrinone: 134.4 mL    Oral Fluid: 1080 mL    Sodium Chloride 0.9% Bolus: 500 mL  Total IN: 2442.4 mL    OUT:    Voided (mL): 2050 mL  Total OUT: 2050 mL    Total NET: 392.4 mL      13 Sep 2020 07:01  -  13 Sep 2020 20:03  --------------------------------------------------------  IN:    Heparin Infusion: 64 mL    Milrinone: 67.2 mL  Total IN: 131.2 mL    OUT:    Voided (mL): 250 mL  Total OUT: 250 mL    Total NET: -118.8 mL        ============================ LABS =========================                        8.8    4.52  )-----------( 127      ( 13 Sep 2020 00:42 )             28.0     09-13    134<L>  |  100  |  102<H>  ----------------------------<  124<H>  4.1   |  20<L>  |  2.63<H>    Ca    9.6      13 Sep 2020 00:42  Phos  4.3     09-13  Mg     2.3     09-13    TPro  6.1  /  Alb  3.7  /  TBili  0.8  /  DBili  x   /  AST  29  /  ALT  35  /  AlkPhos  61  09-13                LIVER FUNCTIONS - ( 13 Sep 2020 00:42 )  Alb: 3.7 g/dL / Pro: 6.1 g/dL / ALK PHOS: 61 U/L / ALT: 35 U/L / AST: 29 U/L / GGT: x           PT/INR - ( 13 Sep 2020 00:42 )   PT: 13.4 sec;   INR: 1.13 ratio         PTT - ( 13 Sep 2020 13:42 )  PTT:128.3 sec    Lactate, Blood: 0.7 mmol/L (09-13-20 @ 00:42)  Lactate, Blood: 0.5 mmol/L (09-12-20 @ 05:07)  Lactate, Blood: 0.7 mmol/L (09-11-20 @ 16:49)  Lactate, Blood: 0.7 mmol/L (09-11-20 @ 02:52)      ======================Micro/Rad/Cardio=================  Telemetry: Reviewed   EKG: Reviewed  CXR: Reviewed  Culture: Reviewed   Echo: Reviewed  Cath: Reviewed  ======================================================  PAST MEDICAL & SURGICAL HISTORY:  Hypothyroidism    Afib    Type II diabetes mellitus    Aortic insufficiency    Mitral insufficiency    CKD (chronic kidney disease)    Cardiomyopathy  Systolic CHF    Hypertension    History of tonsillectomy  childhood    AICD (automatic cardioverter/defibrillator) present  8/2012      ====================ASSESSMENT ==============  74M Hx of Severe MR, HFrEF EF 10% s/p ICD, Afib (Eliquis) s/p recent DCCV on Amio, CKD, DM2, hypothyroidism p/w ADHF requiring inotropic support and diuretics s/p RHC revealing elevated filling pressures c/b EDIE on CKD improving on diuretics.  Pending ischemic eval and eventual Mitral clip.     Acute on chronic systolic HF  Severe MR  pAF  EDIE on CKD stage 3- improving   Leukopenia  Thrombocytopenia   Hx Hypothyroidism   Preop Possible Mitral Clip on 9/14    Plan:  ====================== NEUROLOGY=====================  AOX3  - Continue to monitor neuro status per ICU protocol.   - C/w Tylenol 650mg PO q6 PRN for analgesia    acetaminophen   Tablet .. 650 milliGRAM(s) Oral every 6 hours PRN Mild Pain (1 - 3)    ==================== RESPIRATORY======================  Comfortable on RA, SpO2 99%.  - Cont to monitor SpO2 via pulse oximetry, follow ABGs    ====================CARDIOVASCULAR==================  Acute on chronic systolic HF  - hypovolemic, CVP: 6-9  - s/p  cc bolus x1 9/12  - lasix on hold since 9/11  - S/P RHC (9/10): RA 10->13, PCWP 29->46, CI 2.7  - c/w Inotropic support with Milrinone infusion 0.25 mcg/kg/min   - Increased Hydralazine from 37.5 mg PO TID to 50mg PO TID and c/w Coreg 6.25 mg PO BID for afterload reduction  - Pending LHC with Mitral clip on 9/14 with Dr. Christopher  - Not a OHT candidate given age, under eval for LVAD    Severe MR  - c/w afterload as above, plan for Mitral 9/14     pAF  - c/w Amiodarone 200mg PO QD and Coreg 6.25 mg PO BID for rate control   - c/w anticoagulation therapy with continuos Heparin gtt 1100U/hr and Heparin 6000U IVP q6 prn for pTT less than 40 and Hep 3000U IVP q6 for pTT 40-57.   - Paced rhythm     aMIOdarone    Tablet 200 milliGRAM(s) Oral daily  carvedilol 6.25 milliGRAM(s) Oral every 12 hours  hydrALAZINE 50 milliGRAM(s) Oral three times a day  isosorbide   dinitrate Tablet (ISORDIL) 10 milliGRAM(s) Oral three times a day  milrinone Infusion 0.25 MICROgram(s)/kG/Min (5.63 mL/Hr) IV Continuous <Continuous>    ===================HEMATOLOGIC/ONC ===================  H/H 8.8/28, stable; Thrombocytopenia  PLTs (127k)   - Continue to monitor hemoglobin and hematocrit levels, PLTs  - c/w anticoagulation therapy with continuos Heparin gtt 1100U/hr and Heparin 6000U IVP q6 prn for pTT less than 40 and Hep 3000U IVP q6 for pTT 40-57.     heparin   Injectable 6000 Unit(s) IV Push every 6 hours PRN For aPTT less than 40  heparin   Injectable 3000 Unit(s) IV Push every 6 hours PRN For aPTT between 40 - 57  heparin  Infusion. 1100 Unit(s)/Hr (11 mL/Hr) IV Continuous <Continuous>    ===================== RENAL =========================  EDIE on CKD, likely cardiorenal and in setting of over diuresis  - Hypovolemic, Holding diuretics, s/p IVF bolus x1 9/12  - Continue to monitor I/Os, BUN/Creatinine, and urine output.   - Goal slightly positive fluid balance. Replete lytes PRN. Keep K> 4 and Mg >2.   - Strict I&Os, daily standing weights, renally dose meds, avoid nephrotoxins  - BUN/Cr 102/2.63    ==================== GASTROINTESTINAL===================  Tolerating PO DASH/TLC diet.   Bowel regimen with Miralax.     dextrose 5%. 1000 milliLiter(s) (50 mL/Hr) IV Continuous <Continuous>  polyethylene glycol 3350 17 Gram(s) Oral daily PRN Constipation    =======================    ENDOCRINE  =====================  Stress hyperglycemia, glycemic control with Humalog sliding scale and Glucagon PRN.   Monitor blood glucose levels.     Hypothyroidism   - Continue synthroid 50 mcg PO QD    dextrose 40% Gel 15 Gram(s) Oral once PRN Blood Glucose LESS THAN 70 milliGRAM(s)/deciliter  dextrose 50% Injectable 12.5 Gram(s) IV Push once  dextrose 50% Injectable 25 Gram(s) IV Push once  dextrose 50% Injectable 25 Gram(s) IV Push once  glucagon  Injectable 1 milliGRAM(s) IntraMuscular once PRN Glucose LESS THAN 70 milligrams/deciliter  insulin lispro (HumaLOG) corrective regimen sliding scale   SubCutaneous three times a day before meals  insulin lispro (HumaLOG) corrective regimen sliding scale   SubCutaneous at bedtime  levothyroxine 50 MICROGram(s) Oral daily    ========================INFECTIOUS DISEASE================  Afebrile, slightly leukopenic WBC stable at 4.52   - Continue to monitor for leukopenia / fever      Patient requires continuous monitoring with bedside rhythm monitoring, pulse ox monitoring, and intermittent blood gas analysis. Care plan discussed with ICU care team. Patient remained critical and at risk for life threatening decompensation.  Patient seen, examined and plan discussed with CCU team during rounds.     I have personally provided 35 minutes of critical care time excluding time spent on separate procedures.    By signing my name below, I, Kiah Toure, attest that this documentation has been prepared under the direction and in the presence of Nancy Mancera PA   Electronically signed: Willis Sotelo, 09-13-20 @ 20:03    Nancy FERRELL , personally performed the services described in this documentation. all medical record entries made by the scribe were at my direction and in my presence. I have reviewed the chart and agree that the record reflects my personal performance and is accurate and complete  Electronically signed: DOC Gusman        MAIN BRASWELL  MRN-[id number]  Patient is a 75y old  Male who presents with a chief complaint of lower extremity edema (13 Sep 2020 14:59)    HPI:  74M w/ PMHx of HFrEF (EF 10%) s/p ICD, Afib w/ recent admission for CHF/afib s/p DCCV, CKD, DM2, hypothyroidism presents after being referred from CHF clinic for admission due to worsening of LE edema and failure of PO diuretics at home. Per patient was relatively functional until ~1 mo ago when he developed palpitations and light headedness. He was found to be hypotensive and in Afib w/ RVR resulting in his hospitalization at the end of July. His course was c/b EDIE on CKD and persistent hypotension. He improved after dccv and was ultimately discharged home off entresto, coreg and diuretics due to c/f worsening hypotension. After discharge, patient notes worsening LE edema. He was instructed to resume lasix, which was ultimately escalated to toresemide 80mg bid w/ metolazone 2.5 mg daily. With this regimen he noted increased urination and some mild improvement. However, due to ongoing reduced functional capacity, fatigue, and ALCAZAR he was referred for admission for IV diuretics. Denies any cp or palpitations. Denies repaid HR. Notes weight gain since discharge. Continues to have poor appetite and reduced exercise capacity. No fevers, chills. Notes chronic nonproductive cough which is unchanged. Sleeps elevated due to back pain, denies significant orthopnea. Is able to complete ADLs at baseline with some assistance from his niece. Notes increased urination with high dose diuretics, but no dysuria or hematuria. No abd pain, nausea, vomiting. No diarrhea. (21 Aug 2020 09:51)  Admitted to floors on 8/21 for IV diuresis and inotropic support with milrinone. Course c/b EDIE secondary to overdiuresis. Patient had RHC on 9/2 revealing increased filling pressures, and second RHC on 9/10 with PA catheter placed and transferred to CICU.      Hospital Course:  9/10: Admitted to CICU s/p RHC and swan aleksandar catheter placement for hemodynamic monitoring & optimization prior to tentative LHC and mitraclip  9/12: s/p 500 cc NS bolus for low CVP,  hydralazine increased to 37.5 mg    24 HOUR EVENTS:  - Hydralazine dose increased to 50mg PO TID. s/p 500ml NS bolus, diuretics held for low CVP 6  - increase isordil to 20 TID  - 9/13 hemodynamics: CVP 7, PA 51/16/33, PCWP 25    REVIEW OF SYSTEMS:   Constitutional: No weakness, fevers, or chills  Eyes/ENT: No visual changes  Respiratory: No cough, wheezing, hemoptysis  Cardiovascular: No chest pain, no palpitations  Gastrointestinal: No abdominal pain. No nausea, vomiting, hematemesis.   Genitourinary: No dysuria  Neurological: No numbness, no weakness  Skin: No itching, rashes    ICU Vital Signs Last 24 Hrs  T(C): 36.8 (13 Sep 2020 19:00), Max: 36.8 (13 Sep 2020 19:00)  T(F): 98.2 (13 Sep 2020 19:00), Max: 98.2 (13 Sep 2020 19:00)  HR: 80 (13 Sep 2020 19:00) (80 - 82)  BP: 100/60 (13 Sep 2020 19:00) (84/48 - 114/64)  BP(mean): 72 (13 Sep 2020 19:00) (59 - 93)  ABP: --  ABP(mean): --  RR: 15 (13 Sep 2020 19:00) (13 - 31)  SpO2: 98% (13 Sep 2020 19:00) (95% - 99%)      CVP(mm Hg): 7 (09-13-20 @ 19:00) (-3 - 11)  PA: 51/16 (09-13-20 @ 19:00) (35/18 - 82/43)  PA(mean): 33 (09-13-20 @ 19:00) (21 - 59)  I&O's Summary    12 Sep 2020 07:01  -  13 Sep 2020 07:00  --------------------------------------------------------  IN: 2442.4 mL / OUT: 2050 mL / NET: 392.4 mL    13 Sep 2020 07:01  -  13 Sep 2020 20:03  --------------------------------------------------------  IN: 131.2 mL / OUT: 250 mL / NET: -118.8 mL      POCT Blood Glucose.: 111 mg/dL (13 Sep 2020 17:15)      PHYSICAL EXAM:   General: No acute distress, lying in bed comfortably  Eyes: EOMI, PERRLA, conjunctiva and sclera clear  Chest/Lung: mild bibasilar crackles, no wheezes, rales, or rhonchi  Heart: Paced rhythm, . Normal S1/S2. No murmurs, rubs, or gallops.  Abdomen: Soft, nontender, nondistended. Normal bowel sounds.  Extremites: 2+ peripheral pulses B/L. No clubbing, cyanosis, or edema.  Neurology: A&O x3, no focal deficits  Skin: No rashes or lesions  Lines: [place] Ale[employer]andar (9/10)    ============================I/O===========================   I&O's Detail    12 Sep 2020 07:01  -  13 Sep 2020 07:00  --------------------------------------------------------  IN:    Heparin Infusion: 66 mL    Heparin Infusion: 162 mL    IV PiggyBack: 500 mL    Milrinone: 134.4 mL    Oral Fluid: 1080 mL    Sodium Chloride 0.9% Bolus: 500 mL  Total IN: 2442.4 mL    OUT:    Voided (mL): 2050 mL  Total OUT: 2050 mL    Total NET: 392.4 mL      13 Sep 2020 07:01  -  13 Sep 2020 20:03  --------------------------------------------------------  IN:    Heparin Infusion: 64 mL    Milrinone: 67.2 mL  Total IN: 131.2 mL    OUT:    Voided (mL): 250 mL  Total OUT: 250 mL    Total NET: -118.8 mL        ============================ LABS =========================                        8.8    4.52  )-----------( 127      ( 13 Sep 2020 00:42 )             28.0     09-13    134<L>  |  100  |  102<H>  ----------------------------<  124<H>  4.1   |  20<L>  |  2.63<H>    Ca    9.6      13 Sep 2020 00:42  Phos  4.3     09-13  Mg     2.3     09-13    TPro  6.1  /  Alb  3.7  /  TBili  0.8  /  DBili  x   /  AST  29  /  ALT  35  /  AlkPhos  61  09-13                LIVER FUNCTIONS - ( 13 Sep 2020 00:42 )  Alb: 3.7 g/dL / Pro: 6.1 g/dL / ALK PHOS: 61 U/L / ALT: 35 U/L / AST: 29 U/L / GGT: x           PT/INR - ( 13 Sep 2020 00:42 )   PT: 13.4 sec;   INR: 1.13 ratio         PTT - ( 13 Sep 2020 13:42 )  PTT:128.3 sec    Lactate, Blood: 0.7 mmol/L (09-13-20 @ 00:42)  Lactate, Blood: 0.5 mmol/L (09-12-20 @ 05:07)  Lactate, Blood: 0.7 mmol/L (09-11-20 @ 16:49)  Lactate, Blood: 0.7 mmol/L (09-11-20 @ 02:52)      ======================Micro/Rad/Cardio=================  Telemetry: Reviewed   EKG: Reviewed  CXR: Reviewed  Culture: Reviewed   Echo: Reviewed  Cath: Reviewed  ======================================================  PAST MEDICAL & SURGICAL HISTORY:  Hypothyroidism    Afib    Type II diabetes mellitus    Aortic insufficiency    Mitral insufficiency    CKD (chronic kidney disease)    Cardiomyopathy  Systolic CHF    Hypertension    History of tonsillectomy  childhood    AICD (automatic cardioverter/defibrillator) present  8/2012      ====================ASSESSMENT ==============  74M Hx of Severe MR, HFrEF EF 10% s/p ICD, Afib (Eliquis) s/p recent DCCV on Amio, CKD, DM2, hypothyroidism p/w ADHF requiring inotropic support and diuretics s/p RHC revealing elevated filling pressures c/b EDIE on CKD improving on diuretics.  Pending ischemic eval and eventual Mitral clip.     Acute on chronic systolic HF  Severe MR  pAF  EDIE on CKD stage 3- improving   Leukopenia  Thrombocytopenia   Hx Hypothyroidism   Preop Possible Mitral Clip on 9/14    Plan:  ====================== NEUROLOGY=====================  AOX3  - Continue to monitor neuro status per ICU protocol.   - C/w Tylenol 650mg PO q6 PRN for analgesia    acetaminophen   Tablet .. 650 milliGRAM(s) Oral every 6 hours PRN Mild Pain (1 - 3)    ==================== RESPIRATORY======================  Comfortable on RA, SpO2 99%.  - Cont to monitor SpO2 via pulse oximetry, follow ABGs    ====================CARDIOVASCULAR==================  Acute on chronic systolic HF  - hypovolemic, CVP: 6-9  - s/p  cc bolus x1 9/12  - lasix on hold since 9/11  - S/P RHC (9/10): RA 10->13, PCWP 29->46, CI 2.7  - c/w Inotropic support with Milrinone infusion 0.25 mcg/kg/min   - Increased Hydralazine from 37.5 mg PO TID to 50mg PO TID and c/w Coreg 6.25 mg PO BID for afterload reduction  - Pending C with Mitral clip on 9/14 with Dr. Christopher  - Not a OHT candidate given age, under eval for LVAD    Severe MR  - c/w afterload as above, plan for Mitral 9/14     pAF  - c/w Amiodarone 200mg PO QD and Coreg 6.25 mg PO BID for rate control   - c/w anticoagulation therapy with continuos Heparin gtt 1100U/hr and Heparin 6000U IVP q6 prn for pTT less than 40 and Hep 3000U IVP q6 for pTT 40-57.   - Paced rhythm     aMIOdarone    Tablet 200 milliGRAM(s) Oral daily  carvedilol 6.25 milliGRAM(s) Oral every 12 hours  hydrALAZINE 50 milliGRAM(s) Oral three times a day  isosorbide   dinitrate Tablet (ISORDIL) 10 milliGRAM(s) Oral three times a day  milrinone Infusion 0.25 MICROgram(s)/kG/Min (5.63 mL/Hr) IV Continuous <Continuous>    ===================HEMATOLOGIC/ONC ===================  H/H 8.8/28, stable; Thrombocytopenia  PLTs (127k)   - Continue to monitor hemoglobin and hematocrit levels, PLTs  - c/w anticoagulation therapy with continuos Heparin gtt 1100U/hr and Heparin 6000U IVP q6 prn for pTT less than 40 and Hep 3000U IVP q6 for pTT 40-57.     heparin   Injectable 6000 Unit(s) IV Push every 6 hours PRN For aPTT less than 40  heparin   Injectable 3000 Unit(s) IV Push every 6 hours PRN For aPTT between 40 - 57  heparin  Infusion. 1100 Unit(s)/Hr (11 mL/Hr) IV Continuous <Continuous>    ===================== RENAL =========================  EDIE on CKD, likely cardiorenal and in setting of over diuresis  - Hypovolemic, Holding diuretics, s/p IVF bolus x1 9/12  - Continue to monitor I/Os, BUN/Creatinine, and urine output.   - Goal slightly positive fluid balance. Replete lytes PRN. Keep K> 4 and Mg >2.   - Strict I&Os, daily standing weights, renally dose meds, avoid nephrotoxins  - BUN/Cr 102/2.63    ==================== GASTROINTESTINAL===================  Tolerating PO DASH/TLC diet.   Bowel regimen with Miralax.     dextrose 5%. 1000 milliLiter(s) (50 mL/Hr) IV Continuous <Continuous>  polyethylene glycol 3350 17 Gram(s) Oral daily PRN Constipation    =======================    ENDOCRINE  =====================  Stress hyperglycemia, glycemic control with Humalog sliding scale and Glucagon PRN.   Monitor blood glucose levels.     Hypothyroidism   - Continue synthroid 50 mcg PO QD    dextrose 40% Gel 15 Gram(s) Oral once PRN Blood Glucose LESS THAN 70 milliGRAM(s)/deciliter  dextrose 50% Injectable 12.5 Gram(s) IV Push once  dextrose 50% Injectable 25 Gram(s) IV Push once  dextrose 50% Injectable 25 Gram(s) IV Push once  glucagon  Injectable 1 milliGRAM(s) IntraMuscular once PRN Glucose LESS THAN 70 milligrams/deciliter  insulin lispro (HumaLOG) corrective regimen sliding scale   SubCutaneous three times a day before meals  insulin lispro (HumaLOG) corrective regimen sliding scale   SubCutaneous at bedtime  levothyroxine 50 MICROGram(s) Oral daily    ========================INFECTIOUS DISEASE================  Afebrile, slightly leukopenic WBC stable at 4.52   - Continue to trend CBC/fever curve      Patient requires continuous monitoring with bedside rhythm monitoring, pulse ox monitoring, and intermittent blood gas analysis. Care plan discussed with ICU care team. Patient remained critical and at risk for life threatening decompensation.  Patient seen, examined and plan discussed with CCU team during rounds.     By signing my name below, I, Kiah Toure, attest that this documentation has been prepared under the direction and in the presence of DOC Gusman   Electronically signed: Willis Sotelo, 09-13-20 @ 20:03    I, Nancy Mancera , personally performed the services described in this documentation. all medical record entries made by the lynnetteibneptali were at my direction and in my presence. I have reviewed the chart and agree that the record reflects my personal performance and is accurate and complete  Electronically signed: DOC Gusman        MAIN BRASWELL  MRN-[id number]  Patient is a 75y old  Male who presents with a chief complaint of lower extremity edema (13 Sep 2020 14:59)    HPI:  74M w/ PMHx of HFrEF (EF 10%) s/p ICD, Afib w/ recent admission for CHF/afib s/p DCCV, CKD, DM2, hypothyroidism presents after being referred from CHF clinic for admission due to worsening of LE edema and failure of PO diuretics at home. Per patient was relatively functional until ~1 mo ago when he developed palpitations and light headedness. He was found to be hypotensive and in Afib w/ RVR resulting in his hospitalization at the end of July. His course was c/b EDIE on CKD and persistent hypotension. He improved after dccv and was ultimately discharged home off entresto, coreg and diuretics due to c/f worsening hypotension. After discharge, patient notes worsening LE edema. He was instructed to resume lasix, which was ultimately escalated to toresemide 80mg bid w/ metolazone 2.5 mg daily. With this regimen he noted increased urination and some mild improvement. However, due to ongoing reduced functional capacity, fatigue, and ALCAZAR he was referred for admission for IV diuretics. Denies any cp or palpitations. Denies repaid HR. Notes weight gain since discharge. Continues to have poor appetite and reduced exercise capacity. No fevers, chills. Notes chronic nonproductive cough which is unchanged. Sleeps elevated due to back pain, denies significant orthopnea. Is able to complete ADLs at baseline with some assistance from his niece. Notes increased urination with high dose diuretics, but no dysuria or hematuria. No abd pain, nausea, vomiting. No diarrhea. (21 Aug 2020 09:51)  Admitted to floors on 8/21 for IV diuresis and inotropic support with milrinone. Course c/b EDIE secondary to overdiuresis. Patient had RHC on 9/2 revealing increased filling pressures, and second RHC on 9/10 with PA catheter placed and transferred to CICU.      Hospital Course:  9/10: Admitted to CICU s/p RHC and swan aleksandar catheter placement for hemodynamic monitoring & optimization prior to tentative LHC and mitraclip  9/12: s/p 500 cc NS bolus for low CVP,  hydralazine increased to 37.5 mg    24 HOUR EVENTS:  - Hydralazine dose increased to 50mg PO TID. s/p 500ml NS bolus, diuretics held for low CVP 6  - increase isordil to 20 TID  - MvO2 74%/CO 8.7/CI 4.5//CVP 3/Lactate 0.6    REVIEW OF SYSTEMS:   Constitutional: No weakness, fevers, or chills  Eyes/ENT: No visual changes  Respiratory: No cough, wheezing, hemoptysis  Cardiovascular: No chest pain, no palpitations  Gastrointestinal: No abdominal pain. No nausea, vomiting, hematemesis.   Genitourinary: No dysuria  Neurological: No numbness, no weakness  Skin: No itching, rashes    ICU Vital Signs Last 24 Hrs  T(C): 36.8 (13 Sep 2020 19:00), Max: 36.8 (13 Sep 2020 19:00)  T(F): 98.2 (13 Sep 2020 19:00), Max: 98.2 (13 Sep 2020 19:00)  HR: 80 (13 Sep 2020 19:00) (80 - 82)  BP: 100/60 (13 Sep 2020 19:00) (84/48 - 114/64)  BP(mean): 72 (13 Sep 2020 19:00) (59 - 93)  RR: 15 (13 Sep 2020 19:00) (13 - 31)  SpO2: 98% (13 Sep 2020 19:00) (95% - 99%)      CVP(mm Hg): 7 (09-13-20 @ 19:00) (-3 - 11)  PA: 51/16 (09-13-20 @ 19:00) (35/18 - 82/43)  PA(mean): 33 (09-13-20 @ 19:00) (21 - 59)  I&O's Summary    12 Sep 2020 07:01  -  13 Sep 2020 07:00  --------------------------------------------------------  IN: 2442.4 mL / OUT: 2050 mL / NET: 392.4 mL    13 Sep 2020 07:01  -  13 Sep 2020 20:03  --------------------------------------------------------  IN: 131.2 mL / OUT: 250 mL / NET: -118.8 mL      POCT Blood Glucose.: 111 mg/dL (13 Sep 2020 17:15)      PHYSICAL EXAM:   General: No acute distress, lying in bed comfortably  Eyes: EOMI, PERRLA, conjunctiva and sclera clear  Chest/Lung: cta b/l  Heart: Paced rhythm, . Normal S1/S2. No murmurs, rubs, or gallops.  Abdomen: Soft, nontender, nondistended. Normal bowel sounds.  Extremites: 2+ peripheral pulses B/L. No clubbing, cyanosis, or edema.  Neurology: A&O x3, no focal deficits  Skin: No rashes or lesions  Lines: [place] Aleksandar (9/10)    ============================I/O===========================   I&O's Detail    12 Sep 2020 07:01  -  13 Sep 2020 07:00  --------------------------------------------------------  IN:    Heparin Infusion: 66 mL    Heparin Infusion: 162 mL    IV PiggyBack: 500 mL    Milrinone: 134.4 mL    Oral Fluid: 1080 mL    Sodium Chloride 0.9% Bolus: 500 mL  Total IN: 2442.4 mL    OUT:    Voided (mL): 2050 mL  Total OUT: 2050 mL    Total NET: 392.4 mL      13 Sep 2020 07:01  -  13 Sep 2020 20:03  --------------------------------------------------------  IN:    Heparin Infusion: 64 mL    Milrinone: 67.2 mL  Total IN: 131.2 mL    OUT:    Voided (mL): 250 mL  Total OUT: 250 mL    Total NET: -118.8 mL        ============================ LABS =========================                        8.8    4.52  )-----------( 127      ( 13 Sep 2020 00:42 )             28.0     09-13    134<L>  |  100  |  102<H>  ----------------------------<  124<H>  4.1   |  20<L>  |  2.63<H>    Ca    9.6      13 Sep 2020 00:42  Phos  4.3     09-13  Mg     2.3     09-13    TPro  6.1  /  Alb  3.7  /  TBili  0.8  /  DBili  x   /  AST  29  /  ALT  35  /  AlkPhos  61  09-13                LIVER FUNCTIONS - ( 13 Sep 2020 00:42 )  Alb: 3.7 g/dL / Pro: 6.1 g/dL / ALK PHOS: 61 U/L / ALT: 35 U/L / AST: 29 U/L / GGT: x           PT/INR - ( 13 Sep 2020 00:42 )   PT: 13.4 sec;   INR: 1.13 ratio         PTT - ( 13 Sep 2020 13:42 )  PTT:128.3 sec    Lactate, Blood: 0.7 mmol/L (09-13-20 @ 00:42)  Lactate, Blood: 0.5 mmol/L (09-12-20 @ 05:07)  Lactate, Blood: 0.7 mmol/L (09-11-20 @ 16:49)  Lactate, Blood: 0.7 mmol/L (09-11-20 @ 02:52)      ======================Micro/Rad/Cardio=================  Telemetry: Reviewed   EKG: Reviewed  CXR: Reviewed  Culture: Reviewed   Echo: Reviewed  Cath: Reviewed  ======================================================  PAST MEDICAL & SURGICAL HISTORY:  Hypothyroidism    Afib    Type II diabetes mellitus    Aortic insufficiency    Mitral insufficiency    CKD (chronic kidney disease)    Cardiomyopathy  Systolic CHF    Hypertension    History of tonsillectomy  childhood    AICD (automatic cardioverter/defibrillator) present  8/2012      ====================ASSESSMENT ==============  74M Hx of Severe MR, HFrEF EF 10% s/p ICD, Afib (Eliquis) s/p recent DCCV on Amio, CKD, DM2, hypothyroidism p/w ADHF requiring inotropic support and diuretics s/p RHC revealing elevated filling pressures c/b EDIE on CKD improving on diuretics.  Pending ischemic eval and eventual Mitral clip.     Acute on chronic systolic HF  Severe MR  pAF  EDIE on CKD stage 3- improving   Leukopenia  Thrombocytopenia   Hx Hypothyroidism   Preop Possible Mitral Clip on 9/14    Plan:  ====================== NEUROLOGY=====================  AOX3  - Continue to monitor neuro status per ICU protocol.   - C/w Tylenol 650mg PO q6 PRN for analgesia    ==================== RESPIRATORY======================  Comfortable on RA, SpO2 99%.  - Cont to monitor SpO2 via pulse oximetry, follow ABGs    ====================CARDIOVASCULAR==================  Acute on chronic systolic HF  - daily MvO2, lactate; 74% and 0.6  - CVP 2-3, PAP 50s/15-20, no additional IVF given  - lasix on hold since 9/11  - S/P RHC (9/10): RA 10->13, PCWP 29->46, CI 2.7  - c/w Inotropic support with Milrinone infusion 0.25 mcg/kg/min   - Increased Hydralazine from 37.5 mg PO TID to 50mg PO TID and Isordil 10 > 20 TID for afterload reduction  - c/w Coreg 6.25 mg PO BID  - Pending LHC with Mitral clip on 9/14 with Dr. Christopher, NPO after midnight  - Not a OHT candidate given age, under eval for LVAD    Severe MR  - c/w afterload as above, plan for Mitral 9/14     pAF  - c/w Amiodarone 200mg PO QD and Coreg 6.25 mg PO BID for rate control   - c/w anticoagulation therapy with continuos Heparin gtt  - Paced rhythm     ===================HEMATOLOGIC/ONC ===================  H/H 8.8/28, stable; Thrombocytopenia  PLTs (127k)   - Continue to monitor hemoglobin and hematocrit levels, PLTs  - c/w anticoagulation therapy with continuos Heparin gtt     ===================== RENAL =========================  EDIE on CKD, likely cardiorenal and in setting of over diuresis  - Hypovolemic, Holding diuretics  - Continue to monitor I/Os, BUN/Creatinine, and urine output.   - Replete lytes PRN. Keep K> 4 and Mg >2.   - Strict I&Os, daily standing weights, renally dose meds, avoid nephrotoxins    ==================== GASTROINTESTINAL===================  Tolerating PO DASH/TLC diet.   Bowel regimen with Miralax.     =======================    ENDOCRINE  =====================  glycemic control with Humalog sliding scale and Glucagon PRN.   Monitor blood glucose levels.     Hypothyroidism   - Continue synthroid 50 mcg PO QD    ========================INFECTIOUS DISEASE================  Afebrile, slightly leukopenic WBC stable at 4.52   - Continue to trend CBC/fever curve      Patient requires continuous monitoring with bedside rhythm monitoring, pulse ox monitoring, and intermittent blood gas analysis. Care plan discussed with ICU care team. Patient remained critical and at risk for life threatening decompensation.  Patient seen, examined and plan discussed with CCU team during rounds.     By signing my name below, I, Kiah Toure, attest that this documentation has been prepared under the direction and in the presence of DOC Gusman   Electronically signed: Willis Sotelo, 09-13-20 @ 20:03    I, Nacny Mancera , personally performed the services described in this documentation. all medical record entries made by the lynnetteibneptali were at my direction and in my presence. I have reviewed the chart and agree that the record reflects my personal performance and is accurate and complete  Electronically signed: DOC Gusman

## 2020-09-13 NOTE — PROGRESS NOTE ADULT - ASSESSMENT
====================ASSESSMENT AND PLAN==============      ====================CARDIOVASCULAR==================    Mechaincal Circulatory Support:  [ ] IABP   [ ] Impella 2.5   [ ] Impella CP  Settings:     ==Hemodynamics==     (Date) CVP:      PCWP:        PA S/D:            Cardiac Output:             Cardiac Index:            SVR:    Preload (fluids, diuretics):  Afterload (anti-hypertensives, pressors):  Inotropes:    ==Pump==    Echo Date:  LVEF:                              Regional Wall Motion Abnormaility?:  [ ]Yes   [ ] No, If Yes, Details  Diastolic function:  RV function:   Any change frim prior?: [ ] Yes   [ ] No, If Yes, Details:   Volume status:    ==Coronaries==    Last ischemic workup:   Antiplatelet regimen:   Anticoagulant:   Statin:   Beta blocker:    ==Rhythm==    Current rhythm:  AM EKG Interpretation:   Anti-arrhythmic therapies:   TVP with settings:     aMIOdarone    Tablet 200 milliGRAM(s) Oral daily  carvedilol 6.25 milliGRAM(s) Oral every 12 hours  hydrALAZINE 37.5 milliGRAM(s) Oral three times a day  isosorbide   dinitrate Tablet (ISORDIL) 10 milliGRAM(s) Oral three times a day  milrinone Infusion 0.25 MICROgram(s)/kG/Min (5.63 mL/Hr) IV Continuous <Continuous>      ====================== NEUROLOGY=====================  Sedation [ ]Yes   [ ] No  Delirium [ ]Yes   [ ] No    acetaminophen   Tablet .. 650 milliGRAM(s) Oral every 6 hours PRN Mild Pain (1 - 3)    ==================== RESPIRATORY======================  Mechanical Ventilation [ ]    BIPAP [ ]   HFNC [ ]   NC [ ]                 ===================== RENAL =========================    09-12-20 @ 07:01  -  09-13-20 @ 07:00  --------------------------------------------------------  IN: 2442.4 mL / OUT: 2050 mL / NET: 392.4 mL    09-13-20 @ 07:01  -  09-13-20 @ 12:41  --------------------------------------------------------  IN: 83 mL / OUT: 250 mL / NET: -167 mL      Renal Replacement Therapy:  [ ] CRRT      [ ] IHD, Last Session:    Fluid removal:     [ ] Diuretic therapy, Regimen:       ==================== GASTROINTESTINAL===================    Diet:  Last BM:   Indication for Stress Ulcer Prophylaxis, [ ] Yes    [ ] No   If Yes, Medication:     dextrose 5%. 1000 milliLiter(s) (50 mL/Hr) IV Continuous <Continuous>  polyethylene glycol 3350 17 Gram(s) Oral daily PRN Constipation    ========================INFECTIOUS DISEASE================  T(C): 36.7 (09-12-20 @ 20:00), Max: 36.8 (09-12-20 @ 16:00)  WBC Count: 4.52 K/uL (09-13-20 @ 00:42)  WBC Count: 4.53 K/uL (09-12-20 @ 05:07)  WBC Count: 6.11 K/uL (09-11-20 @ 21:29)  WBC Count: 5.15 K/uL (09-11-20 @ 16:49)  WBC Count: 4.65 K/uL (09-11-20 @ 02:52)        Current Antibiotics/Antifungals with start date:       ===================HEMATOLOGIC/ONC ===================  Hemoglobin: 8.8 g/dL (09-13-20 @ 00:42)  Hemoglobin: 8.7 g/dL (09-12-20 @ 05:07)  Hemoglobin: 9.2 g/dL (09-11-20 @ 21:29)  Hemoglobin: 9.1 g/dL (09-11-20 @ 16:49)  Hemoglobin: 8.9 g/dL (09-11-20 @ 02:52)    Platelet Count - Automated: 127 K/uL (09-13-20 @ 00:42)  Platelet Count - Automated: 131 K/uL (09-12-20 @ 05:07)  Platelet Count - Automated: 133 K/uL (09-11-20 @ 21:29)  Platelet Count - Automated: 136 K/uL (09-11-20 @ 16:49)  Platelet Count - Automated: 130 K/uL (09-11-20 @ 02:52)    Chemical VTE Prophylaxis:  [ ] Lovenox    [ ] SQH   [ ]NA  Systemic Anticogaulation:  [ ] Yes    [ ] No,  If Yes, Medication and Indication:     heparin   Injectable 6000 Unit(s) IV Push every 6 hours PRN For aPTT less than 40  heparin   Injectable 3000 Unit(s) IV Push every 6 hours PRN For aPTT between 40 - 57  heparin  Infusion. 1100 Unit(s)/Hr (11 mL/Hr) IV Continuous <Continuous>    =======================    ENDOCRINE  =====================  Insulin drip  [ ] Yes    [ ] No  Basal Insulin [ ] Yes    [ ] No  Bolus insulin [ ] Yes    [ ] No  Sliding Scale  [ ] Yes    [ ] No  Hgb A1c    POCT Blood Glucose.: 242 mg/dL (09-13-20 @ 12:30)  POCT Blood Glucose.: 132 mg/dL (09-13-20 @ 08:50)  POCT Blood Glucose.: 255 mg/dL (09-12-20 @ 21:43)  POCT Blood Glucose.: 137 mg/dL (09-12-20 @ 17:41)        dextrose 40% Gel 15 Gram(s) Oral once PRN Blood Glucose LESS THAN 70 milliGRAM(s)/deciliter  dextrose 50% Injectable 12.5 Gram(s) IV Push once  dextrose 50% Injectable 25 Gram(s) IV Push once  dextrose 50% Injectable 25 Gram(s) IV Push once  glucagon  Injectable 1 milliGRAM(s) IntraMuscular once PRN Glucose LESS THAN 70 milligrams/deciliter  insulin lispro (HumaLOG) corrective regimen sliding scale   SubCutaneous three times a day before meals  insulin lispro (HumaLOG) corrective regimen sliding scale   SubCutaneous at bedtime  levothyroxine 50 MICROGram(s) Oral daily    ======================= LINES/TUBES  =====================  [place]: (Date placed)  Central Line: (Date placed)  HD Catheter: (Date placed)  Arterial Line: (Date placed)  Endotracheal Tube: (Date placed)  Guidry: (Date placed)   ====================ASSESSMENT AND PLAN==============  75M Hx ACC/AHA stage D chronic systolic heart failure 2/2 dilated NICM (LVIDd 6.7cm, LVEF <20%) w/ severe MR s/p CRT-D, Afib (Eliquis) s/p recent DCCV on amio, stage 4 CKD (baseline SCr ~3), and hypothyroid; sent from HF clinic for ADHF s/p RHC revealing elevated PCWP w/ borderline CI, restarted on Milrinone and started on Lasix, pending LHC and Mitral clip next week.     ====================CARDIOVASCULAR==================  #Acute on chronic systolic HF, euvolemic to dry   - S/P RHC (9/10): RA 10->13, PCWP 29->46, CI 2.7  - c/w Milrinone 0.25, trend perfusion labs: lactate, Scr   - Holding Lasix, euvolemic to dry, CVP 2-3, IVF 500ml given overnight  - Strict I/Os, keep slightly positive today, daily standing weights   - c/w Hydral 50 TID, ISD 10 TID, Coreg 6.25 BID; hold SBP <90  - Pending LHC w/ Mitral clip on Monday (Dr. Christopher)    #Severe MR  - c/w afterload as above, pending Mitral clip 9/14    #PAF  - c/w systemic Heparin for now, trend coags   - rate controlled on Coreg, Amio 200 daily     ==Hemodynamics==     (Date) CVP: 3      PCWP:        PA S/D: 53/13           Cardiac Output:             Cardiac Index:            SVR:    Preload (fluids, diuretics): Lasix held for today  Afterload (anti-hypertensives, pressors): Hydral/IsDN  Inotropes: Milrinone    ==Pump==    Echo Date: 8/21  LVEF: 24%, severe MR                             Regional Wall Motion Abnormaility?:  [ ]Yes   [ ] No, If Yes, Details  Diastolic function:  RV function:   Any change frim prior?: [ ] Yes   [ ] No, If Yes, Details:   Volume status:    ==Coronaries==    Last ischemic workup: scheduled for [facility] 9/14  Antiplatelet regimen:   Anticoagulant: Heparin  Statin:   Beta blocker:    ==Rhythm==    Current rhythm:  AM EKG Interpretation:   Anti-arrhythmic therapies:   TVP with settings:     aMIOdarone    Tablet 200 milliGRAM(s) Oral daily  carvedilol 6.25 milliGRAM(s) Oral every 12 hours  hydrALAZINE 37.5 milliGRAM(s) Oral three times a day  isosorbide   dinitrate Tablet (ISORDIL) 10 milliGRAM(s) Oral three times a day  milrinone Infusion 0.25 MICROgram(s)/kG/Min (5.63 mL/Hr) IV Continuous <Continuous>      ====================== NEUROLOGY=====================  Sedation [ ]Yes   [x ] No  Delirium [ ]Yes   [x ] No    acetaminophen   Tablet .. 650 milliGRAM(s) Oral every 6 hours PRN Mild Pain (1 - 3)    ==================== RESPIRATORY======================  Sats >97% on RA  Mechanical Ventilation [ ]    BIPAP [ ]   HFNC [ ]   NC [ ]     ===================== RENAL =========================  #EDIE on CKD, likely cardiorenal and in setting of over diureses, Cr improving  - Hold diuretics, IVF bolus 9/12  - Strict I/Os, renally dose meds, avoid nephrotoxins  09-12-20 @ 07:01 - 09-13-20 @ 07:00  --------------------------------------------------------  IN: 2442.4 mL / OUT: 2050 mL / NET: 392.4 mL    09-13-20 @ 07:01  -  09-13-20 @ 12:41  --------------------------------------------------------  IN: 83 mL / OUT: 250 mL / NET: -167 mL      Renal Replacement Therapy:  [ ] CRRT      [ ] IHD, Last Session:    Fluid removal:     [ x] Diuretic therapy, Regimen: Lasix    ==================== GASTROINTESTINAL===================    Diet: DASH/TLC  Last BM: 9/12  Indication for Stress Ulcer Prophylaxis, [ ] Yes    [x ] No   If Yes, Medication:     dextrose 5%. 1000 milliLiter(s) (50 mL/Hr) IV Continuous <Continuous>  polyethylene glycol 3350 17 Gram(s) Oral daily PRN Constipation    ========================INFECTIOUS DISEASE================  Preop Cefuroxime for 9/14  T(C): 36.7 (09-12-20 @ 20:00), Max: 36.8 (09-12-20 @ 16:00)  WBC Count: 4.52 K/uL (09-13-20 @ 00:42)  WBC Count: 4.53 K/uL (09-12-20 @ 05:07)  WBC Count: 6.11 K/uL (09-11-20 @ 21:29)  WBC Count: 5.15 K/uL (09-11-20 @ 16:49)  WBC Count: 4.65 K/uL (09-11-20 @ 02:52)        Current Antibiotics/Antifungals with start date:       ===================HEMATOLOGIC/ONC ===================  Heparin for PAF, stable H/H  Hemoglobin: 8.8 g/dL (09-13-20 @ 00:42)  Hemoglobin: 8.7 g/dL (09-12-20 @ 05:07)  Hemoglobin: 9.2 g/dL (09-11-20 @ 21:29)  Hemoglobin: 9.1 g/dL (09-11-20 @ 16:49)  Hemoglobin: 8.9 g/dL (09-11-20 @ 02:52)    Platelet Count - Automated: 127 K/uL (09-13-20 @ 00:42)  Platelet Count - Automated: 131 K/uL (09-12-20 @ 05:07)  Platelet Count - Automated: 133 K/uL (09-11-20 @ 21:29)  Platelet Count - Automated: 136 K/uL (09-11-20 @ 16:49)  Platelet Count - Automated: 130 K/uL (09-11-20 @ 02:52)    Chemical VTE Prophylaxis:  [ ] Lovenox    [ ] SQH   [ ]NA  Systemic Anticogaulation:  [x ] Yes    [ ] No,  If Yes, Medication and Indication: Heparin, Afib    heparin   Injectable 6000 Unit(s) IV Push every 6 hours PRN For aPTT less than 40  heparin   Injectable 3000 Unit(s) IV Push every 6 hours PRN For aPTT between 40 - 57  heparin  Infusion. 1100 Unit(s)/Hr (11 mL/Hr) IV Continuous <Continuous>    =======================    ENDOCRINE  =====================  ISS for DM2, goal 140-180  Synthroid for Hypothyroidism  Insulin drip  [ ] Yes    [ ] No  Basal Insulin [ ] Yes    [ ] No  Bolus insulin [ ] Yes    [ ] No  Sliding Scale  [ x] Yes    [ ] No  Hgb A1c    POCT Blood Glucose.: 242 mg/dL (09-13-20 @ 12:30)  POCT Blood Glucose.: 132 mg/dL (09-13-20 @ 08:50)  POCT Blood Glucose.: 255 mg/dL (09-12-20 @ 21:43)  POCT Blood Glucose.: 137 mg/dL (09-12-20 @ 17:41)        dextrose 40% Gel 15 Gram(s) Oral once PRN Blood Glucose LESS THAN 70 milliGRAM(s)/deciliter  dextrose 50% Injectable 12.5 Gram(s) IV Push once  dextrose 50% Injectable 25 Gram(s) IV Push once  dextrose 50% Injectable 25 Gram(s) IV Push once  glucagon  Injectable 1 milliGRAM(s) IntraMuscular once PRN Glucose LESS THAN 70 milligrams/deciliter  insulin lispro (HumaLOG) corrective regimen sliding scale   SubCutaneous three times a day before meals  insulin lispro (HumaLOG) corrective regimen sliding scale   SubCutaneous at bedtime  levothyroxine 50 MICROGram(s) Oral daily    ======================= LINES/TUBES  =====================  [place]: LACI 9/10-  Central Line: (Date placed)  HD Catheter: (Date placed)  Arterial Line: (Date placed)  Endotracheal Tube: (Date placed)  Guidry: (Date placed)

## 2020-09-13 NOTE — PROGRESS NOTE ADULT - PROBLEM SELECTOR PLAN 2
Improved with fluid hydration. Continue to monitor.   - avoid nephrotoxic medications  - would need renal function to improve prior to LVAD implant

## 2020-09-13 NOTE — PROGRESS NOTE ADULT - PROBLEM SELECTOR PLAN 7
stable at present
-repeat iron studies  -check retic count, ldh. B12/folate wnl at last admission  -monitor for bleeding  -maintain active T+S  -if anemia w/u unrevealing may need heme w/u outpatient given macrocytic anemia, thrombocytopenia
stable at present
stable at present

## 2020-09-13 NOTE — PROGRESS NOTE ADULT - SUBJECTIVE AND OBJECTIVE BOX
For all Cardiology service contact information, go to [email or web address] and use "[employer]" to login.    SUBJECTIVE:   No events overnight. Denies CP, SOB or Dyspnea.   Received 500 cc fluid.   -------------------------------------------------------------------------------------------  ROS:  CV: chest pain (-), palpitation (-), orthopnea (-), PND (-), edema (-)  PULM: SOB (-), cough (-), wheezing (-), hemoptysis (-).   CONST: fever (-), chills (-) or fatigue (-)  GI: abdominal distension (-), abdominal pain (-) , nausea/vomiting (-), hematemesis, (-), melena (-), hematochezia (-)  : dysuria (-), frequency (-), hematuria (-).   NEURO: numbness (-), weakness (-), dizziness (-)  SKIN: itching (-), rash (-)  HEENT:  visual changes (-); vertigo or throat pain (-);  neck stiffness (-)     All other review of systems is negative unless indicated above.   -------------------------------------------------------------------------------------------  VS:  T(F): 98.6 (09-11), Max: 98.6 (09-11)  HR: 80 (09-12) (80 - 80)  BP: 97/52 (09-12) (87/48 - 108/61)  RR: 18 (09-12)  SpO2: 98% (09-12)  I&O's Summary    11 Sep 2020 07:01  -  12 Sep 2020 07:00  --------------------------------------------------------  IN: 1303.2 mL / OUT: 3550 mL / NET: -2246.8 mL    12 Sep 2020 07:01  -  12 Sep 2020 15:12  --------------------------------------------------------  IN: 267.6 mL / OUT: 550 mL / NET: -282.4 mL      ------------------------------------------------------------------------------------------  PHYSICAL EXAM:  GENERAL: NAD  HEAD:  Atraumatic, Normocephalic.  EYES: EOMI, PERRLA, conjunctiva and sclera clear.  ENT: Moist mucous membranes.  NECK: Supple, No JVD.  CHEST/LUNG: Clear to auscultation bilaterally; No rales, rhonchi, wheezing, or rubs. Unlabored respirations.  HEART: Regular rate and rhythm; No murmurs, rubs, or gallops.  ABDOMEN: Bowel sounds present; Soft, Nontender, Nondistended.   EXTREMITIES:  2+ Peripheral Pulses, brisk capillary refill. No clubbing, cyanosis, or edema.  PSYCH: Normal affect.  SKIN: No rashes or lesions.  -------------------------------------------------------------------------------------------  LABS:                          8.7    4.53  )-----------( 131      ( 12 Sep 2020 05:07 )             27.2     09-12    134<L>  |  98  |  113<H>  ----------------------------<  132<H>  4.2   |  21<L>  |  2.79<H>    Ca    9.8      12 Sep 2020 05:07  Phos  4.9     09-12  Mg     2.3     09-12    TPro  6.2  /  Alb  3.7  /  TBili  0.9  /  DBili  x   /  AST  33  /  ALT  45  /  AlkPhos  63  09-12    PT/INR - ( 12 Sep 2020 05:07 )   PT: 13.4 sec;   INR: 1.13 ratio         PTT - ( 12 Sep 2020 05:07 )  PTT:119.0 sec            -------------------------------------------------------------------------------------------  Meds:  acetaminophen   Tablet .. 650 milliGRAM(s) Oral every 6 hours PRN  aMIOdarone    Tablet 200 milliGRAM(s) Oral daily  carvedilol 6.25 milliGRAM(s) Oral every 12 hours  chlorhexidine 2% Cloths 1 Application(s) Topical <User Schedule>  dextrose 40% Gel 15 Gram(s) Oral once PRN  dextrose 5%. 1000 milliLiter(s) IV Continuous <Continuous>  dextrose 50% Injectable 25 Gram(s) IV Push once  dextrose 50% Injectable 25 Gram(s) IV Push once  dextrose 50% Injectable 12.5 Gram(s) IV Push once  glucagon  Injectable 1 milliGRAM(s) IntraMuscular once PRN  heparin  Infusion. 1100 Unit(s)/Hr IV Continuous <Continuous>  hydrALAZINE 25 milliGRAM(s) Oral three times a day  insulin lispro (HumaLOG) corrective regimen sliding scale   SubCutaneous three times a day before meals  insulin lispro (HumaLOG) corrective regimen sliding scale   SubCutaneous at bedtime  isosorbide   dinitrate Tablet (ISORDIL) 10 milliGRAM(s) Oral three times a day  levothyroxine 50 MICROGram(s) Oral daily  milrinone Infusion 0.25 MICROgram(s)/kG/Min IV Continuous <Continuous>  polyethylene glycol 3350 17 Gram(s) Oral daily PRN    -------------------------------------------------------------------------------------------

## 2020-09-13 NOTE — PROGRESS NOTE ADULT - PROBLEM SELECTOR PROBLEM 9
Prophylactic measure

## 2020-09-13 NOTE — PROGRESS NOTE ADULT - PROBLEM SELECTOR PLAN 1
Patient is hypovolemic on exam. CVP 6, PCWP 25. No diuretics at this time.   - Continue  milrinone 0.25mcg/kg/min  - Continue carvedilol 6.25 mg BID  - Increase hydralazine to 50mg TID and increase ISDN 20 mg TID at next dose; hold for SBP <90  - Possible Mitraclip 9/14, keep NPO after MN.   - Maintain K 4-4.5 and Mg 2-2.5.  - Under evaluation for LVAD as destination therapy. He is not a heart transplant candidate given age.

## 2020-09-13 NOTE — PROVIDER CONTACT NOTE (CRITICAL VALUE NOTIFICATION) - ASSESSMENT
pt asymptomatic
No s/s bleeding
Pt AOX4, asymptomatic; no s/s of bleeding.
Pt asymptomatic v/s stable

## 2020-09-13 NOTE — PROVIDER CONTACT NOTE (CRITICAL VALUE NOTIFICATION) - RECOMMENDATIONS
follow heparin nomogram
Follow Full Anti coagulation protocol
Notify PA; follow heparin nomogram
decrease heparin drip to 900 units/hr

## 2020-09-13 NOTE — PROGRESS NOTE ADULT - PROBLEM SELECTOR PROBLEM 8
Thrombocytopenia

## 2020-09-13 NOTE — PROVIDER CONTACT NOTE (CRITICAL VALUE NOTIFICATION) - ACTION/TREATMENT ORDERED:
Will order PO K+
follow heparin nomogram
Follow Full Anti coagulation protocol . Continue top monitor pt.
PA notified and made aware. heparin nomogram followed.
heparin drip decreased to 900 units/hr

## 2020-09-13 NOTE — PROGRESS NOTE ADULT - SUBJECTIVE AND OBJECTIVE BOX
Cardiac Surgery Pre-op Note:    CC: Patient is a 75y old  Male who presents with a chief complaint of lower extremity edema (12 Sep 2020 21:35)      Referring Physician:                                                                                                           Surgeon:    Procedure: (Date) (Procedure)    Allergies    No Known Allergies    Intolerances        HPI:  74M w/ PMHx of HFrEF (EF 10%) s/p ICD, Afib w/ recent admission for CHF/afib s/p DCCV, CKD, DM2, hypothyroidism presents after being referred from CHF clinic for admission due to worsening of LE edema and failure of PO diuretics at home. Per patient was relatively functional until ~1 mo ago when he developed palpitations and light headedness. He was found to be hypotensive and in Afib w/ RVR resulting in his hospitalization at the end  of July. His course was c/b EDIE on CKD and persistent hypotension. He improved after dccv and was ultimately discharged home off entresto, coreg and diuretics due to c/f worsening hypotension. After discharge, patient notes worsening LE edema. He was instructed to resume lasix, which was ultimately escalated to toresemide 80mg bid w/ metolazone 2.5 mg daily. With this regimen he noted increased urination and some mild improvement. However, due to ongoing reduced functional capacity, fatigue, and ALCAZAR he was referred for admission for IV diuretics. Denies any cp or palpitations.      PAST MEDICAL & SURGICAL HISTORY:  Hypothyroidism    Afib    Type II diabetes mellitus    Aortic insufficiency    Mitral insufficiency    CKD (chronic kidney disease)    Cardiomyopathy  Systolic CHF    Hypertension    History of tonsillectomy  childhood    AICD (automatic cardioverter/defibrillator) present  8/2012        MEDICATIONS  (STANDING):  aMIOdarone    Tablet 200 milliGRAM(s) Oral daily  carvedilol 6.25 milliGRAM(s) Oral every 12 hours  chlorhexidine 2% Cloths 1 Application(s) Topical <User Schedule>  dextrose 5%. 1000 milliLiter(s) (50 mL/Hr) IV Continuous <Continuous>  dextrose 50% Injectable 12.5 Gram(s) IV Push once  dextrose 50% Injectable 25 Gram(s) IV Push once  dextrose 50% Injectable 25 Gram(s) IV Push once  heparin  Infusion. 1100 Unit(s)/Hr (11 mL/Hr) IV Continuous <Continuous>  hydrALAZINE 37.5 milliGRAM(s) Oral three times a day  insulin lispro (HumaLOG) corrective regimen sliding scale   SubCutaneous three times a day before meals  insulin lispro (HumaLOG) corrective regimen sliding scale   SubCutaneous at bedtime  isosorbide   dinitrate Tablet (ISORDIL) 10 milliGRAM(s) Oral three times a day  levothyroxine 50 MICROGram(s) Oral daily  milrinone Infusion 0.25 MICROgram(s)/kG/Min (5.63 mL/Hr) IV Continuous <Continuous>    MEDICATIONS  (PRN):  acetaminophen   Tablet .. 650 milliGRAM(s) Oral every 6 hours PRN Mild Pain (1 - 3)  dextrose 40% Gel 15 Gram(s) Oral once PRN Blood Glucose LESS THAN 70 milliGRAM(s)/deciliter  glucagon  Injectable 1 milliGRAM(s) IntraMuscular once PRN Glucose LESS THAN 70 milligrams/deciliter  heparin   Injectable 6000 Unit(s) IV Push every 6 hours PRN For aPTT less than 40  heparin   Injectable 3000 Unit(s) IV Push every 6 hours PRN For aPTT between 40 - 57  polyethylene glycol 3350 17 Gram(s) Oral daily PRN Constipation        Labs:                        8.8    4.52  )-----------( 127      ( 13 Sep 2020 00:42 )             28.0     09-13    134<L>  |  100  |  102<H>  ----------------------------<  124<H>  4.1   |  20<L>  |  2.63<H>    Ca    9.6      13 Sep 2020 00:42  Phos  4.3     09-13  Mg     2.3     09-13    TPro  6.1  /  Alb  3.7  /  TBili  0.8  /  DBili  x   /  AST  29  /  ALT  35  /  AlkPhos  61  09-13    PT/INR - ( 13 Sep 2020 00:42 )   PT: 13.4 sec;   INR: 1.13 ratio         PTT - ( 13 Sep 2020 07:09 )  PTT:90.7 sec    Blood Type: ABO Interpretation: O (09-11 @ 02:50  Thyroid Panel: 09-11 @ 05:45/3.60  --/--/--    MRSA:  / MSSA:       CXR:     EKG:    Carotid Duplex:    no significant stenosis      Echocardiogram:    Cardiac catheterization:      Gen: WN/WD NAD  Neuro: AAOx3, nonfocal  Pulm: CTA B/L  CV: RRR, S1S2  Abd: Soft, NT, ND +BS  Ext: No edema, + peripheral pulses      Pt has AICD/PPM [ ] Yes  [ ] No             Brand Name:  Pre-op Beta Blocker ordered within 24 hrs of surgery ([facility] ONLY)?  na  Type & Cross  [ ] Yes  [ ] No  NPO after MidnightYes    Pre-op ABX ordered, to be taped on chart:  [ ] Yes  [ ] No     Hibiclens/Peridex ordered [ ] Yes  [ ] No  Intraop on Hold: PRBCs, CXR, ANAHI [ ]   Consent obtained  [ ] Yes  [ ] No   Cardiac Surgery Pre-op Note:    CC: Patient is a 75y old  Male who presents with a chief complaint of lower extremity edema                                                                                                                Surgeon:  Dr Bui    Procedure: (Date) (Procedure)Mitral clip    Allergies    No Known Allergies    Intolerances        HPI:  74M w/ PMHx of HFrEF (EF 10%) s/p ICD, Afib w/ recent admission for CHF/afib s/p DCCV, CKD, DM2, hypothyroidism presents after being referred from CHF clinic for admission due to worsening of LE edema and failure of PO diuretics at home. Per patient was relatively functional until ~1 mo ago when he developed palpitations and light headedness. He was found to be hypotensive and in Afib w/ RVR resulting in his hospitalization at the end  of July. His course was c/b EDIE on CKD and persistent hypotension. He improved after dccv and was ultimately discharged home off entresto, coreg and diuretics due to c/f worsening hypotension. After discharge, patient notes worsening LE edema. He was instructed to resume lasix, which was ultimately escalated to toresemide 80mg bid w/ metolazone 2.5 mg daily. With this regimen he noted increased urination and some mild improvement. However, due to ongoing reduced functional capacity, fatigue, and ALCAZAR he was referred for admission for IV diuretics. Denies any cp or palpitations.      PAST MEDICAL & SURGICAL HISTORY:  Hypothyroidism    Afib    Type II diabetes mellitus    Aortic insufficiency    Mitral insufficiency    CKD (chronic kidney disease)    Cardiomyopathy  Systolic CHF    Hypertension    History of tonsillectomy  childhood    AICD (automatic cardioverter/defibrillator) present  8/2012        MEDICATIONS  (STANDING):  aMIOdarone    Tablet 200 milliGRAM(s) Oral daily  carvedilol 6.25 milliGRAM(s) Oral every 12 hours  chlorhexidine 2% Cloths 1 Application(s) Topical <User Schedule>  dextrose 5%. 1000 milliLiter(s) (50 mL/Hr) IV Continuous <Continuous>  dextrose 50% Injectable 12.5 Gram(s) IV Push once  dextrose 50% Injectable 25 Gram(s) IV Push once  dextrose 50% Injectable 25 Gram(s) IV Push once  heparin  Infusion. 1100 Unit(s)/Hr (11 mL/Hr) IV Continuous <Continuous>  hydrALAZINE 37.5 milliGRAM(s) Oral three times a day  insulin lispro (HumaLOG) corrective regimen sliding scale   SubCutaneous three times a day before meals  insulin lispro (HumaLOG) corrective regimen sliding scale   SubCutaneous at bedtime  isosorbide   dinitrate Tablet (ISORDIL) 10 milliGRAM(s) Oral three times a day  levothyroxine 50 MICROGram(s) Oral daily  milrinone Infusion 0.25 MICROgram(s)/kG/Min (5.63 mL/Hr) IV Continuous <Continuous>    MEDICATIONS  (PRN):  acetaminophen   Tablet .. 650 milliGRAM(s) Oral every 6 hours PRN Mild Pain (1 - 3)  dextrose 40% Gel 15 Gram(s) Oral once PRN Blood Glucose LESS THAN 70 milliGRAM(s)/deciliter  glucagon  Injectable 1 milliGRAM(s) IntraMuscular once PRN Glucose LESS THAN 70 milligrams/deciliter  heparin   Injectable 6000 Unit(s) IV Push every 6 hours PRN For aPTT less than 40  heparin   Injectable 3000 Unit(s) IV Push every 6 hours PRN For aPTT between 40 - 57  polyethylene glycol 3350 17 Gram(s) Oral daily PRN Constipation        Labs:                        8.8    4.52  )-----------( 127      ( 13 Sep 2020 00:42 )             28.0     09-13    134<L>  |  100  |  102<H>  ----------------------------<  124<H>  4.1   |  20<L>  |  2.63<H>    Ca    9.6      13 Sep 2020 00:42  Phos  4.3     09-13  Mg     2.3     09-13    TPro  6.1  /  Alb  3.7  /  TBili  0.8  /  DBili  x   /  AST  29  /  ALT  35  /  AlkPhos  61  09-13    PT/INR - ( 13 Sep 2020 00:42 )   PT: 13.4 sec;   INR: 1.13 ratio         PTT - ( 13 Sep 2020 07:09 )  PTT:90.7 sec    Blood Type: ABO Interpretation: O (09-11 @ 02:50  Thyroid Panel: 09-11 @ 05:45/3.60  --/--/--    MRSA:  / MSSA:       CXR:     EKG:    Carotid Duplex:    no significant stenosis      Echocardiogram:  ANAHI: 1. Tethered mitral valve leaflets with normal opening. At  least moderate-severe mitral regurgitation. By PISA,  calculated EROabout 37 mmsq (Pisa rad 0.9 cm, Va 28.1 cm/s,  Vm 3.8 m/sec)  2. Left atrial enlargement. No left atrial or left atrial  appendage thrombus. Decreased left atrial appendage  velocities (about 30 cm/sec) noted.  3. Severe left ventricular enlargement.  4. Severe global left ventricular systolic dysfunction.  5. Decreased right ventricular systolic function. A device  wire is noted in the right heart.  Cardiac catheterization:      Gen: WN/WD NAD  Neuro: AAOx3, nonfocal  Pulm: CTA B/L  CV: RRR, S1S2  Abd: Soft, NT, ND +BS  Ext: No edema, + peripheral pulses      Pt has AICD/PP No         Pre-op Beta Blocker ordered within 24 hrs of surgery ([facility] ONLY)?  na  Type & Cross  [ ] Yes  [ ] No  NPO after MidnightYes    Pre-op ABX ordered, to be taped on chart:  [ ] Yes  [ ] No     Hibiclens/Peridex ordered [ ] Yes  [ ] No  Intraop on Hold: PRBCs, CXR, ANAHI [ ]   Consent obtained  [ ] Yes  [ ] No   Cardiac Surgery Pre-op Note:    CC: Patient is a 75y old  Male who presents with a chief complaint of lower extremity edema                                                                                                                   Surgeon:  Dr Bui    Procedure: (Date) (Procedure)Mitral clip    Allergies    No Known Allergies    Intolerances        HPI:  74M w/ PMHx of HFrEF (EF 10%) s/p ICD, Afib w/ recent admission for CHF/afib s/p DCCV, CKD, DM2, hypothyroidism presents after being referred from CHF clinic for admission due to worsening of LE edema and failure of PO diuretics at home. Per patient was relatively functional until ~1 mo ago when he developed palpitations and light headedness. He was found to be hypotensive and in Afib w/ RVR resulting in his hospitalization at the end  of July. His course was c/b EDIE on CKD and persistent hypotension. He improved after dccv and was ultimately discharged home off entresto, coreg and diuretics due to c/f worsening hypotension. After discharge, patient notes worsening LE edema. He was instructed to resume lasix, which was ultimately escalated to toresemide 80mg bid w/ metolazone 2.5 mg daily. With this regimen he noted increased urination and some mild improvement. However, due to ongoing reduced functional capacity, fatigue, and ALCAZAR he was referred for admission for IV diuretics. Denies any cp or palpitations.      PAST MEDICAL & SURGICAL HISTORY:  Hypothyroidism    Afib    Type II diabetes mellitus    Aortic insufficiency    Mitral insufficiency    CKD (chronic kidney disease)    Cardiomyopathy  Systolic CHF    Hypertension    History of tonsillectomy  childhood    AICD (automatic cardioverter/defibrillator) present  8/2012        MEDICATIONS  (STANDING):  aMIOdarone    Tablet 200 milliGRAM(s) Oral daily  carvedilol 6.25 milliGRAM(s) Oral every 12 hours  chlorhexidine 2% Cloths 1 Application(s) Topical <User Schedule>  dextrose 5%. 1000 milliLiter(s) (50 mL/Hr) IV Continuous <Continuous>  dextrose 50% Injectable 12.5 Gram(s) IV Push once  dextrose 50% Injectable 25 Gram(s) IV Push once  dextrose 50% Injectable 25 Gram(s) IV Push once  heparin  Infusion. 1100 Unit(s)/Hr (11 mL/Hr) IV Continuous <Continuous>  hydrALAZINE 37.5 milliGRAM(s) Oral three times a day  insulin lispro (HumaLOG) corrective regimen sliding scale   SubCutaneous three times a day before meals  insulin lispro (HumaLOG) corrective regimen sliding scale   SubCutaneous at bedtime  isosorbide   dinitrate Tablet (ISORDIL) 10 milliGRAM(s) Oral three times a day  levothyroxine 50 MICROGram(s) Oral daily  milrinone Infusion 0.25 MICROgram(s)/kG/Min (5.63 mL/Hr) IV Continuous <Continuous>    MEDICATIONS  (PRN):  acetaminophen   Tablet .. 650 milliGRAM(s) Oral every 6 hours PRN Mild Pain (1 - 3)  dextrose 40% Gel 15 Gram(s) Oral once PRN Blood Glucose LESS THAN 70 milliGRAM(s)/deciliter  glucagon  Injectable 1 milliGRAM(s) IntraMuscular once PRN Glucose LESS THAN 70 milligrams/deciliter  heparin   Injectable 6000 Unit(s) IV Push every 6 hours PRN For aPTT less than 40  heparin   Injectable 3000 Unit(s) IV Push every 6 hours PRN For aPTT between 40 - 57  polyethylene glycol 3350 17 Gram(s) Oral daily PRN Constipation        Labs:                        8.8    4.52  )-----------( 127      ( 13 Sep 2020 00:42 )             28.0     09-13    134<L>  |  100  |  102<H>  ----------------------------<  124<H>  4.1   |  20<L>  |  2.63<H>    Ca    9.6      13 Sep 2020 00:42  Phos  4.3     09-13  Mg     2.3     09-13    TPro  6.1  /  Alb  3.7  /  TBili  0.8  /  DBili  x   /  AST  29  /  ALT  35  /  AlkPhos  61  09-13    PT/INR - ( 13 Sep 2020 00:42 )   PT: 13.4 sec;   INR: 1.13 ratio         PTT - ( 13 Sep 2020 07:09 )  PTT:90.7 sec    Blood Type: ABO Interpretation: O (09-11 @ 02:50  Thyroid Panel: 09-11 @ 05:45/3.60  --/--/--    MRSA:  / MSSA:   nares:  pending    CXR: clear lungs    EKG:  NORMAL SINUS RHYTHM WITH SHORT UT  Ventricular-paced rhythm  Carotid Duplex:  no significant sstenosis  no significant stenosis      Echocardiogram:  ANAHI: 1. Tethered mitral valve leaflets with normal opening. At  least moderate-severe mitral regurgitation. By PISA,  calculated EROabout 37 mmsq (Pisa rad 0.9 cm, Va 28.1 cm/s,  Vm 3.8 m/sec)  2. Left atrial enlargement. No left atrial or left atrial  appendage thrombus. Decreased left atrial appendage  velocities (about 30 cm/sec) noted.  3. Severe left ventricular enlargement.  4. Severe global left ventricular systolic dysfunction.  5. Decreased right ventricular systolic function. A device  wire is noted in the right heart.  Cardiac catheterization:      Gen: WN/WD NAD  Neuro: AAOx3, nonfocal  Pulm: CTA B/L  CV: RRR, S1S2  Abd: Soft, NT, ND +BS  Ext: No edema, + peripheral pulses      Pt has AICD/   yes     [facility] [place]   AICD  Interrogated 2/20      Pre-op Beta Blocker ordered within 24 hrs of surgery ([facility] ONLY)?  n/a  Type & Cross  Yes   NPO after MidnightYes    Pre-op ABX ordered, to be taped on chart:  Ye  Hibiclens/Peridex ordered  Yes    Intraop on Hold: PRBCs, CXR, ANAHI na  Consent obtained  [ ] Yes  [ ] No   Cardiac Surgery Pre-op Note:    CC: Patient is a 75y old  Male who presents with a chief complaint of lower extremity edema                                                                                                                   Surgeon:  Dr Bui/  Dr David Christopher    Procedure: (Date) (Procedure)Mitral clip    Allergies    No Known Allergies    Intolerances        HPI:  74M w/ PMHx of HFrEF (EF 10%) s/p ICD, Afib w/ recent admission for CHF/afib s/p DCCV, CKD, DM2, hypothyroidism presents after being referred from CHF clinic for admission due to worsening of LE edema and failure of PO diuretics at home. Per patient was relatively functional until ~1 mo ago when he developed palpitations and light headedness. He was found to be hypotensive and in Afib w/ RVR resulting in his hospitalization at the end  of July. His course was c/b EDIE on CKD and persistent hypotension. He improved after dccv and was ultimately discharged home off entresto, coreg and diuretics due to c/f worsening hypotension. After discharge, patient notes worsening LE edema. He was instructed to resume lasix, which was ultimately escalated to toresemide 80mg bid w/ metolazone 2.5 mg daily. With this regimen he noted increased urination and some mild improvement. However, due to ongoing reduced functional capacity, fatigue, and ALCAZAR he was referred for admission for IV diuretics. Denies any cp or palpitations.      PAST MEDICAL & SURGICAL HISTORY:  Hypothyroidism    Afib    Type II diabetes mellitus    Aortic insufficiency    Mitral insufficiency    CKD (chronic kidney disease)    Cardiomyopathy  Systolic CHF    Hypertension    History of tonsillectomy  childhood    AICD (automatic cardioverter/defibrillator) present  8/2012        MEDICATIONS  (STANDING):  aMIOdarone    Tablet 200 milliGRAM(s) Oral daily  carvedilol 6.25 milliGRAM(s) Oral every 12 hours  chlorhexidine 2% Cloths 1 Application(s) Topical <User Schedule>  dextrose 5%. 1000 milliLiter(s) (50 mL/Hr) IV Continuous <Continuous>  dextrose 50% Injectable 12.5 Gram(s) IV Push once  dextrose 50% Injectable 25 Gram(s) IV Push once  dextrose 50% Injectable 25 Gram(s) IV Push once  heparin  Infusion. 1100 Unit(s)/Hr (11 mL/Hr) IV Continuous <Continuous>  hydrALAZINE 37.5 milliGRAM(s) Oral three times a day  insulin lispro (HumaLOG) corrective regimen sliding scale   SubCutaneous three times a day before meals  insulin lispro (HumaLOG) corrective regimen sliding scale   SubCutaneous at bedtime  isosorbide   dinitrate Tablet (ISORDIL) 10 milliGRAM(s) Oral three times a day  levothyroxine 50 MICROGram(s) Oral daily  milrinone Infusion 0.25 MICROgram(s)/kG/Min (5.63 mL/Hr) IV Continuous <Continuous>    MEDICATIONS  (PRN):  acetaminophen   Tablet .. 650 milliGRAM(s) Oral every 6 hours PRN Mild Pain (1 - 3)  dextrose 40% Gel 15 Gram(s) Oral once PRN Blood Glucose LESS THAN 70 milliGRAM(s)/deciliter  glucagon  Injectable 1 milliGRAM(s) IntraMuscular once PRN Glucose LESS THAN 70 milligrams/deciliter  heparin   Injectable 6000 Unit(s) IV Push every 6 hours PRN For aPTT less than 40  heparin   Injectable 3000 Unit(s) IV Push every 6 hours PRN For aPTT between 40 - 57  polyethylene glycol 3350 17 Gram(s) Oral daily PRN Constipation        Labs:                        8.8    4.52  )-----------( 127      ( 13 Sep 2020 00:42 )             28.0     09-13    134<L>  |  100  |  102<H>  ----------------------------<  124<H>  4.1   |  20<L>  |  2.63<H>    Ca    9.6      13 Sep 2020 00:42  Phos  4.3     09-13  Mg     2.3     09-13    TPro  6.1  /  Alb  3.7  /  TBili  0.8  /  DBili  x   /  AST  29  /  ALT  35  /  AlkPhos  61  09-13    PT/INR - ( 13 Sep 2020 00:42 )   PT: 13.4 sec;   INR: 1.13 ratio         PTT - ( 13 Sep 2020 07:09 )  PTT:90.7 sec    Blood Type: ABO Interpretation: O (09-11 @ 02:50  Thyroid Panel: 09-11 @ 05:45/3.60  --/--/--    MRSA:  / MSSA:   nares:  pending    CXR: clear lungs    EKG:  NORMAL SINUS RHYTHM WITH SHORT MN  Ventricular-paced rhythm  Carotid Duplex:  no significant sstenosis  no significant stenosis      Echocardiogram:  ANAHI: 1. Tethered mitral valve leaflets with normal opening. At  least moderate-severe mitral regurgitation. By PISA,  calculated EROabout 37 mmsq (Pisa rad 0.9 cm, Va 28.1 cm/s,  Vm 3.8 m/sec)  2. Left atrial enlargement. No left atrial or left atrial  appendage thrombus. Decreased left atrial appendage  velocities (about 30 cm/sec) noted.  3. Severe left ventricular enlargement.  4. Severe global left ventricular systolic dysfunction.  5. Decreased right ventricular systolic function. A device  wire is noted in the right heart.  Cardiac catheterization:      Gen: WN/WD NAD  Neuro: AAOx3, nonfocal  Pulm: CTA B/L  CV: RRR, S1S2  Abd: Soft, NT, ND +BS  Ext: No edema, + peripheral pulses      Pt has AICD/   yes     [facility] [place]   AICD  Interrogated 2/20      Pre-op Beta Blocker ordered within 24 hrs of surgery ([facility] ONLY)?  n/a  Type & Cross  Yes   NPO after MidnightYes    Pre-op ABX ordered, to be taped on chart:  Ye  Hibiclens/Peridex ordered  Yes    Intraop on Hold: PRBCs, CXR, ANAHI na  Consent obtained  [ ] Yes  [ ] No

## 2020-09-13 NOTE — PROGRESS NOTE ADULT - SUBJECTIVE AND OBJECTIVE BOX
PATIENT:  MAIN BRASWELL  [id number]    CHIEF COMPLAINT:  Patient is a 75y old  Male who presents with a chief complaint of preop Mitral clip (13 Sep 2020 08:35)      INTERVAL HISTORY:    MEDICATIONS  (STANDING):  aMIOdarone    Tablet 200 milliGRAM(s) Oral daily  carvedilol 6.25 milliGRAM(s) Oral every 12 hours  chlorhexidine 2% Cloths 1 Application(s) Topical <User Schedule>  dextrose 5%. 1000 milliLiter(s) (50 mL/Hr) IV Continuous <Continuous>  dextrose 50% Injectable 12.5 Gram(s) IV Push once  dextrose 50% Injectable 25 Gram(s) IV Push once  dextrose 50% Injectable 25 Gram(s) IV Push once  heparin  Infusion. 1100 Unit(s)/Hr (11 mL/Hr) IV Continuous <Continuous>  hydrALAZINE 37.5 milliGRAM(s) Oral three times a day  insulin lispro (HumaLOG) corrective regimen sliding scale   SubCutaneous three times a day before meals  insulin lispro (HumaLOG) corrective regimen sliding scale   SubCutaneous at bedtime  isosorbide   dinitrate Tablet (ISORDIL) 10 milliGRAM(s) Oral three times a day  levothyroxine 50 MICROGram(s) Oral daily  milrinone Infusion 0.25 MICROgram(s)/kG/Min (5.63 mL/Hr) IV Continuous <Continuous>    MEDICATIONS  (PRN):  acetaminophen   Tablet .. 650 milliGRAM(s) Oral every 6 hours PRN Mild Pain (1 - 3)  dextrose 40% Gel 15 Gram(s) Oral once PRN Blood Glucose LESS THAN 70 milliGRAM(s)/deciliter  glucagon  Injectable 1 milliGRAM(s) IntraMuscular once PRN Glucose LESS THAN 70 milligrams/deciliter  heparin   Injectable 6000 Unit(s) IV Push every 6 hours PRN For aPTT less than 40  heparin   Injectable 3000 Unit(s) IV Push every 6 hours PRN For aPTT between 40 - 57  hydrocortisone 2.5% Rectal Cream 1 Application(s) Rectal daily PRN hemorrhoid pain/itch  polyethylene glycol 3350 17 Gram(s) Oral daily PRN Constipation      MEDICATIONS (DRIPS):     ALLERGIES:  Allergies    No Known Allergies    Intolerances        OBJECTIVE:  ICU Vital Signs Last 24 Hrs  T(C): 36.7 (12 Sep 2020 20:00), Max: 36.8 (12 Sep 2020 16:00)  T(F): 98.1 (12 Sep 2020 20:00), Max: 98.2 (12 Sep 2020 16:00)  HR: 80 (13 Sep 2020 12:00) (80 - 82)  BP: 105/61 (13 Sep 2020 12:00) (84/48 - 107/68)  BP(mean): 74 (13 Sep 2020 12:00) (59 - 93)  ABP: --  ABP(mean): --  RR: 15 (13 Sep 2020 12:00) (13 - 31)  SpO2: 98% (13 Sep 2020 12:00) (96% - 99%)        Hemodynamics  CVP(mm Hg): 4 (20 @ 12:00) (-3 - 11)  CO: --  CI: --  PA: 51/15 (20 @ 12:00)  PA(mean): 33 (20 @ 12:00)  PCWP: --  SVR: --    MvO2:   74 (20 @ 05:19)  64 (20 @ 00:36)      Equations:   CO = 125*BSA/[[(Hg x 13.4)][(O2 sat from ABG/vitals - [id number]*)/100]]  CO = BSA x 10/[Hg x (SaO2 - MvO2)/100] (alternate equation)  CI = CO/BSA  SVR = [(MAP – CVP)/ CO] x 80	    *Note: MvO2 is mixed venous sat and is derived from the pulm artery/needs a PA catheter. The CvO2, or central venous sat, is from the RA and is from a central line. CvO2 is an estimate of MvO2. The actual formula requires MvO2.    Height (cm): 175.7  Weight (kg): 75.1  BSA (m2): 1.91    Labs for reference:   Hgb (CBC):  8.8 (20 @ 00:42)    Hgb (ABG):    O2sat (arterial):    O2sat (mixed):  74 (20 @ 05:19)  64 (20 @ 00:36)      Fingerstick Glucose Trend:  FSG trend: 242 <-- , 132 <-- , 255 <-- , 137 <-- , 189 <-- , 148 <-- , 204 <-- , 185 <--     I/O Detail 24H    12 Sep 2020 07:  -  13 Sep 2020 07:00  --------------------------------------------------------  IN:    Heparin Infusion: 66 mL    Heparin Infusion: 162 mL    IV PiggyBack: 500 mL    Milrinone: 134.4 mL    Oral Fluid: 1080 mL    Sodium Chloride 0.9% Bolus: 500 mL  Total IN: 2442.4 mL    OUT:    Voided (mL): 2050 mL  Total OUT: 2050 mL    Total NET: 392.4 mL      13 Sep 2020 07:  -  13 Sep 2020 12:41  --------------------------------------------------------  IN:    Heparin Infusion: 55 mL    Milrinone: 28 mL  Total IN: 83 mL    OUT:    Voided (mL): 250 mL  Total OUT: 250 mL    Total NET: -167 mL          Daily     Daily Weight in k.8 (13 Sep 2020 06:30)    PHYSICAL EXAMINATION:  GENERAL: NAD, lying in bed comfortably  HEAD:  Atraumatic, Normocephalic  EYES: EOMI, PERRLA, conjunctiva and sclera clear  ENT: Moist mucous membranes  NECK: Supple, No JVD  CHEST/LUNG: Clear to auscultation bilaterally; No rales, rhonchi, wheezing, or rubs. Unlabored respirations  HEART: Regular rate and rhythm; No murmurs, rubs, or gallops  ABDOMEN: Bowel sounds present; Soft, Nontender, Nondistended. No hepatomegaly  EXTREMITIES:  2+ Peripheral Pulses, brisk capillary refill. No clubbing, cyanosis, or edema  NERVOUS SYSTEM:  Alert & Oriented X3, speech clear. No deficits   MSK: FROM all 4 extremities, full and equal strength  SKIN: No rashes or lesions  Lines/tubes:     LABS:  CBC 20 @ 00:42                        8.8    4.52  )-----------( 127                   28.0       Hgb trend: 8.8 <-- , 8.7 <-- , 9.2 <-- , 9.1 <-- , 8.9 <--   WBC trend: 4.52 <-- , 4.53 <-- , 6.11 <-- , 5.15 <-- , 4.65 <--     CMP 20 @ 00:42    134<L>  |  100  |  102<H>  ----------------------------<  124<H>  4.1   |  20<L>  |  2.63<H>    Ca    9.6      20 @ 00:42  Phos  4.3       Mg     2.3         TPro  6.1  /  Alb  3.7  /  TBili  0.8  /  DBili  x   /  AST  29  /  ALT  35  /  AlkPhos  61        Serum Cr trend: 2.63 <-- , 2.79 <-- , 2.71 <-- , 2.89 <--     PT/INR - ( 13 Sep 2020 00:42 )   PT: 13.4 sec;   INR: 1.13 ratio         PTT - ( 13 Sep 2020 07:09 ):90.7 sec    Cardiac Markers         ABG Trend:             MICRO:      TELEMETRY:     EKG:     IMAGING:    STUDIES:            PATIENT:  MAIN BRASWELL  [id number]    CHIEF COMPLAINT:  Patient is a 75y old  Male who presents with a chief complaint of preop Mitral clip (13 Sep 2020 08:35)      INTERVAL HISTORY:    MEDICATIONS  (STANDING):  aMIOdarone    Tablet 200 milliGRAM(s) Oral daily  carvedilol 6.25 milliGRAM(s) Oral every 12 hours  chlorhexidine 2% Cloths 1 Application(s) Topical <User Schedule>  dextrose 5%. 1000 milliLiter(s) (50 mL/Hr) IV Continuous <Continuous>  dextrose 50% Injectable 12.5 Gram(s) IV Push once  dextrose 50% Injectable 25 Gram(s) IV Push once  dextrose 50% Injectable 25 Gram(s) IV Push once  heparin  Infusion. 1100 Unit(s)/Hr (11 mL/Hr) IV Continuous <Continuous>  hydrALAZINE 37.5 milliGRAM(s) Oral three times a day  insulin lispro (HumaLOG) corrective regimen sliding scale   SubCutaneous three times a day before meals  insulin lispro (HumaLOG) corrective regimen sliding scale   SubCutaneous at bedtime  isosorbide   dinitrate Tablet (ISORDIL) 10 milliGRAM(s) Oral three times a day  levothyroxine 50 MICROGram(s) Oral daily  milrinone Infusion 0.25 MICROgram(s)/kG/Min (5.63 mL/Hr) IV Continuous <Continuous>    MEDICATIONS  (PRN):  acetaminophen   Tablet .. 650 milliGRAM(s) Oral every 6 hours PRN Mild Pain (1 - 3)  dextrose 40% Gel 15 Gram(s) Oral once PRN Blood Glucose LESS THAN 70 milliGRAM(s)/deciliter  glucagon  Injectable 1 milliGRAM(s) IntraMuscular once PRN Glucose LESS THAN 70 milligrams/deciliter  heparin   Injectable 6000 Unit(s) IV Push every 6 hours PRN For aPTT less than 40  heparin   Injectable 3000 Unit(s) IV Push every 6 hours PRN For aPTT between 40 - 57  hydrocortisone 2.5% Rectal Cream 1 Application(s) Rectal daily PRN hemorrhoid pain/itch  polyethylene glycol 3350 17 Gram(s) Oral daily PRN Constipation      MEDICATIONS (DRIPS):     ALLERGIES:  Allergies    No Known Allergies    Intolerances        OBJECTIVE:  ICU Vital Signs Last 24 Hrs  T(C): 36.7 (12 Sep 2020 20:00), Max: 36.8 (12 Sep 2020 16:00)  T(F): 98.1 (12 Sep 2020 20:00), Max: 98.2 (12 Sep 2020 16:00)  HR: 80 (13 Sep 2020 12:00) (80 - 82)  BP: 105/61 (13 Sep 2020 12:00) (84/48 - 107/68)  BP(mean): 74 (13 Sep 2020 12:00) (59 - 93)  ABP: --  ABP(mean): --  RR: 15 (13 Sep 2020 12:00) (13 - 31)  SpO2: 98% (13 Sep 2020 12:00) (96% - 99%)        Hemodynamics  CVP(mm Hg): 4 (20 @ 12:00) (-3 - 11)  CO: --  CI: --  PA: 51/15 (20 @ 12:00)  PA(mean): 33 (20 @ 12:00)  PCWP: --  SVR: --    MvO2:   74 (20 @ 05:19)  64 (20 @ 00:36)      Equations:   CO = 125*BSA/[[(Hg x 13.4)][(O2 sat from ABG/vitals - [id number]*)/100]]  CO = BSA x 10/[Hg x (SaO2 - MvO2)/100] (alternate equation)  CI = CO/BSA  SVR = [(MAP – CVP)/ CO] x 80	    *Note: MvO2 is mixed venous sat and is derived from the pulm artery/needs a PA catheter. The CvO2, or central venous sat, is from the RA and is from a central line. CvO2 is an estimate of MvO2. The actual formula requires MvO2.    Height (cm): 175.7  Weight (kg): 75.1  BSA (m2): 1.91    Labs for reference:   Hgb (CBC):  8.8 (20 @ 00:42)    Hgb (ABG):    O2sat (arterial):    O2sat (mixed):  74 (20 @ 05:19)  64 (20 @ 00:36)      Fingerstick Glucose Trend:  FSG trend: 242 <-- , 132 <-- , 255 <-- , 137 <-- , 189 <-- , 148 <-- , 204 <-- , 185 <--     I/O Detail 24H    12 Sep 2020 07:  -  13 Sep 2020 07:00  --------------------------------------------------------  IN:    Heparin Infusion: 66 mL    Heparin Infusion: 162 mL    IV PiggyBack: 500 mL    Milrinone: 134.4 mL    Oral Fluid: 1080 mL    Sodium Chloride 0.9% Bolus: 500 mL  Total IN: 2442.4 mL    OUT:    Voided (mL): 2050 mL  Total OUT: 2050 mL    Total NET: 392.4 mL      13 Sep 2020 07:  -  13 Sep 2020 12:41  --------------------------------------------------------  IN:    Heparin Infusion: 55 mL    Milrinone: 28 mL  Total IN: 83 mL    OUT:    Voided (mL): 250 mL  Total OUT: 250 mL    Total NET: -167 mL          Daily     Daily Weight in k.8 (13 Sep 2020 06:30)    PHYSICAL EXAMINATION:  GENERAL: NAD, lying in bed comfortably  HEAD:  Atraumatic, Normocephalic  EYES: EOMI, PERRLA, conjunctiva and sclera clear  ENT: Moist mucous membranes  NECK: Supple, mild jvd  CHEST/LUNG: mild bibasilar crackles  HEART: Regular rate and rhythm; No murmurs, rubs, or gallops  ABDOMEN: Bowel sounds present; Soft, Nontender, Nondistended. No hepatomegaly  EXTREMITIES:  2+ Peripheral Pulses, brisk capillary refill. No clubbing, cyanosis, or edema  NERVOUS SYSTEM:  Alert & Oriented X3, speech clear. No deficits   MSK: FROM all 4 extremities, full and equal strength  SKIN: No rashes or lesions  Lines/tubes:     LABS:  CBC 20 @ 00:42                        8.8    4.52  )-----------( 127                   28.0       Hgb trend: 8.8 <-- , 8.7 <-- , 9.2 <-- , 9.1 <-- , 8.9 <--   WBC trend: 4.52 <-- , 4.53 <-- , 6.11 <-- , 5.15 <-- , 4.65 <--     CMP 20 @ 00:42    134<L>  |  100  |  102<H>  ----------------------------<  124<H>  4.1   |  20<L>  |  2.63<H>    Ca    9.6      20 @ 00:42  Phos  4.3       Mg     2.3         TPro  6.1  /  Alb  3.7  /  TBili  0.8  /  DBili  x   /  AST  29  /  ALT  35  /  AlkPhos  61        Serum Cr trend: 2.63 <-- , 2.79 <-- , 2.71 <-- , 2.89 <--     PT/INR - ( 13 Sep 2020 00:42 )   PT: 13.4 sec;   INR: 1.13 ratio         PTT - ( 13 Sep 2020 07:09 ):90.7 sec    Cardiac Markers         ABG Trend:             MICRO:      TELEMETRY:     EKG:     IMAGING:    STUDIES:

## 2020-09-13 NOTE — PROGRESS NOTE ADULT - ATTENDING COMMENTS
Clinically doing well and feels better. Renal function continues to improve off diuretics with milrinone.  Continue milrinone 0.25 mcg/kg/min.  Increase hydralazine to 50 mg TID at next dose and increase ISDN to 20 mg TID for evening dose.  Anticipate MitraClip on Monday with coronary angiography at time of procedure.

## 2020-09-13 NOTE — PROGRESS NOTE ADULT - PROBLEM SELECTOR PROBLEM 7
Macrocytic anemia

## 2020-09-13 NOTE — PROGRESS NOTE ADULT - ASSESSMENT
Assessment/Plan  --The patient is being medically optimized prior to his MitraClip procedure.  Reviewed RHC numbers.  Mild improvement in his renal function.  Plan for patient to be given 500cc of normal saline.  Patient had additional questions concerning the MitraClip procedure and post procedural management.  All questions and concerns of the patient were addressed.  He is aware that due to the severity of his heart dysfunction it is impossible for us to predict to what degree to any he will improve following the MitraClip procedure (which includes his renal dysfunction).  Mr. Jarquin acknowledged that he understands the limitations but is very interested in proceeding and is not ready to give up.  He is felt by the heart failure team to not be an appropriate candidate for heart transplant/LVAD (renal dysfunction will need to improve).milrinone @ 0.25mcg/kg/min coreg, hydralazine and ISDN  --Due to patients kidney dysfunction at this time no plan for cardiac catheterization/angiogram when he is undergoing MItraClip procedure on 9/14/2020.  Concern that contrast administration may further worsen his acute on chronic renal dysfunction which could further impair his recovery.  --Following the procedure the patient will be transferred to the CTU for further observation/management.  The SG catheter will be kept in position during the procedure to be used by his medical team following the intervention.  The hope is that milrinone may be titrated off.  The patient is aware that milrinone may not be able to be titrated off and he may be discharged on the medication.  --Based upon how the patient clinically does may consider CardioMems implantation prior to discharge.   Assessment/Plan  --The patient is being medically optimized prior to his MitraClip procedure.  Reviewed RHC numbers.  Mild improvement in his renal function.  Plan for patient to be given 500cc of normal saline.  Patient had additional questions concerning the MitraClip procedure and post procedural management.  All questions and concerns of the patient were addressed.  He is aware that due to the severity of his heart dysfunction it is impossible for us to predict to what degree to any he will improve following the MitraClip procedure (which includes his renal dysfunction).  Mr. Jarquin acknowledged that he understands the limitations but is very interested in proceeding and is not ready to give up.  He is felt by the heart failure team to not be an appropriate candidate for heart transplant/LVAD (renal dysfunction will need to improve).milrinone @ 0.25mcg/kg/min coreg, hydralazine and ISDN  --Due to patients kidney dysfunction at this time no plan for cardiac catheterization/angiogram when he is undergoing MItraClip procedure on 9/14/2020.  Concern that contrast administration may further worsen his acute on chronic renal dysfunction which could further impair his recovery.  --Following the procedure the patient will be transferred to the CTU for further observation/management.  The SG catheter will be kept in position during the procedure to be used by his medical team following the intervention.  The hope is that milrinone may be titrated off.  The patient is aware that milrinone may not be able to be titrated off and he may be discharged on the medication.  --Based upon how the patient clinically does may consider CardioMems implantation prior to discharge.  9/13  Hep gtt,    primacor gtt ,  vss   --The patient is being medically optimized prior to his MitraClip procedure.  Reviewed RHC numbers.  Mild improvement in his renal function.  Plan for patient to be given 500cc of normal saline.  Patient had additional questions concerning the MitraClip procedure and post procedural management.  All questions and concerns of the patient were addressed.  He is aware that due to the severity of his heart dysfunction it is impossible for us to predict to what degree to any he will improve following the MitraClip procedure (which includes his renal dysfunction).  Mr. Jarquin acknowledged that he understands the limitations but is very interested in proceeding and is not ready to give up.  He is felt by the heart failure team to not be an appropriate candidate for heart transplant/LVAD (renal dysfunction will need to improve).milrinone @ 0.25mcg/kg/min coreg, hydralazine and ISDN  --Due to patients kidney dysfunction at this time no plan for cardiac catheterization/angiogram when he is undergoing MItraClip procedure on 9/14/2020.  Concern that contrast administration may further worsen his acute on chronic renal dysfunction which could further impair his recovery.  --Following the procedure the patient will be transferred to the CTU for further observation/management.  The SG catheter will be kept in position during the procedure to be used by his medical team following the intervention.  The hope is that milrinone may be titrated off.  The patient is aware that milrinone may not be able to be titrated off and he may be discharged on the medication.  --Based upon how the patient clinically does may consider CardioMems implantation prior to discharge.  9/13  Hep gtt,    primacor gtt ,  vss  + swan ,  hyperglycemic

## 2020-09-13 NOTE — PROGRESS NOTE ADULT - SUBJECTIVE AND OBJECTIVE BOX
SUBJECTIVE / OVERNIGHT EVENTS: pt seen and examined    MEDICATIONS  (STANDING):  aMIOdarone    Tablet 200 milliGRAM(s) Oral daily  carvedilol 6.25 milliGRAM(s) Oral every 12 hours  chlorhexidine 0.12% Liquid 5 milliLiter(s) Swish and Spit once  chlorhexidine 2% Cloths 1 Application(s) Topical <User Schedule>  chlorhexidine 4% Liquid 1 Application(s) Topical once  dextrose 5%. 1000 milliLiter(s) (50 mL/Hr) IV Continuous <Continuous>  dextrose 50% Injectable 12.5 Gram(s) IV Push once  dextrose 50% Injectable 25 Gram(s) IV Push once  dextrose 50% Injectable 25 Gram(s) IV Push once  heparin  Infusion. 1100 Unit(s)/Hr (11 mL/Hr) IV Continuous <Continuous>  hydrALAZINE 50 milliGRAM(s) Oral three times a day  insulin lispro (HumaLOG) corrective regimen sliding scale   SubCutaneous three times a day before meals  insulin lispro (HumaLOG) corrective regimen sliding scale   SubCutaneous at bedtime  isosorbide   dinitrate Tablet (ISORDIL) 10 milliGRAM(s) Oral three times a day  levothyroxine 50 MICROGram(s) Oral daily  milrinone Infusion 0.25 MICROgram(s)/kG/Min (5.63 mL/Hr) IV Continuous <Continuous>    MEDICATIONS  (PRN):  acetaminophen   Tablet .. 650 milliGRAM(s) Oral every 6 hours PRN Mild Pain (1 - 3)  dextrose 40% Gel 15 Gram(s) Oral once PRN Blood Glucose LESS THAN 70 milliGRAM(s)/deciliter  glucagon  Injectable 1 milliGRAM(s) IntraMuscular once PRN Glucose LESS THAN 70 milligrams/deciliter  heparin   Injectable 6000 Unit(s) IV Push every 6 hours PRN For aPTT less than 40  heparin   Injectable 3000 Unit(s) IV Push every 6 hours PRN For aPTT between 40 - 57  hydrocortisone 2.5% Rectal Cream 1 Application(s) Rectal daily PRN hemorrhoid pain/itch  polyethylene glycol 3350 17 Gram(s) Oral daily PRN Constipation    Vital Signs Last 24 Hrs  T(C): 36.8 (13 Sep 2020 19:00), Max: 36.8 (13 Sep 2020 19:00)  T(F): 98.2 (13 Sep 2020 19:00), Max: 98.2 (13 Sep 2020 19:00)  HR: 80 (13 Sep 2020 21:00) (80 - 82)  BP: 98/55 (13 Sep 2020 21:00) (84/48 - 114/64)  BP(mean): 66 (13 Sep 2020 21:00) (59 - 93)  RR: 20 (13 Sep 2020 21:00) (13 - 31)  SpO2: 96% (13 Sep 2020 21:00) (95% - 99%)    GENERAL: NAD  EYES: EOMI, PERRLA  NECK: Supple, No JVD  CHEST/LUNG: crackles at bases  HEART:  S1 , S2 +  ABDOMEN: soft , bs+  EXTREMITIES:  edema+  NEUROLOGY:alert awake oriented     LABS:      134<L>  |  100  |  102<H>  ----------------------------<  124<H>  4.1   |  20<L>  |  2.63<H>    Ca    9.6      13 Sep 2020 00:42  Phos  4.3       Mg     2.3         TPro  6.1  /  Alb  3.7  /  TBili  0.8  /  DBili      /  AST  29  /  ALT  35  /  AlkPhos  61      Creatinine Trend: 2.63 <--, 2.79 <--, 2.71 <--, 2.89 <--, 3.46 <--, 2.96 <--, 3.41 <--, 3.20 <--                        8.8    4.52  )-----------( 127      ( 13 Sep 2020 00:42 )             28.0     Urine Studies:  Urinalysis Basic - ( 09 Sep 2020 04:31 )    Color: Light Yellow / Appearance: Clear / S.010 / pH:   Gluc:  / Ketone: Negative  / Bili: Negative / Urobili: <2 mg/dL   Blood:  / Protein: Negative / Nitrite: Negative   Leuk Esterase: Negative / RBC:  / WBC    Sq Epi:  / Non Sq Epi:  / Bacteria:       Sodium, Random Urine: 85 mmol/L ( @ 21:42)  Creatinine, Random Urine: 26 mg/dL ( @ 21:42)  Protein/Creatinine Ratio Calculation: <0.2 Ratio ( @ 21:42)          LIVER FUNCTIONS - ( 13 Sep 2020 00:42 )  Alb: 3.7 g/dL / Pro: 6.1 g/dL / ALK PHOS: 61 U/L / ALT: 35 U/L / AST: 29 U/L / GGT: x           PT/INR - ( 13 Sep 2020 00:42 )   PT: 13.4 sec;   INR: 1.13 ratio         PTT - ( 13 Sep 2020 20:20 )  PTT:81.5 sec       PTT - ( 12 Sep 2020 16:50 )  PTT:71.9 sec

## 2020-09-14 NOTE — PROGRESS NOTE ADULT - ASSESSMENT
====================ASSESSMENT AND PLAN==============  75M Hx ACC/AHA stage D chronic systolic heart failure 2/2 dilated NICM (LVIDd 6.7cm, LVEF <20%) w/ severe MR s/p CRT-D, Afib (Eliquis) s/p recent DCCV on amio, stage 4 CKD (baseline SCr ~3), and hypothyroid; sent from HF clinic for ADHF s/p RHC revealing elevated PCWP w/ borderline CI, restarted on Milrinone and started on Lasix, pending LHC and Mitral clip next week.     ====================CARDIOVASCULAR==================  #Acute on chronic systolic HF, euvolemic to dry   - S/P RHC (9/10): RA 10->13, PCWP 29->46, CI 2.7  - c/w Milrinone 0.25, trend perfusion labs: lactate, Scr   - Holding Lasix, euvolemic to dry, CVP 2-3, IVF 500ml given overnight  - Strict I/Os, keep slightly positive today, daily standing weights   - c/w Hydral 50 TID, ISD 10 TID, Coreg 6.25 BID; hold SBP <90  - Pending LHC w/ Mitral clip on Monday (Dr. Christopher)    #Severe MR  - c/w afterload as above, pending Mitral clip 9/14    #PAF  - c/w systemic Heparin for now, trend coags   - rate controlled on Coreg, Amio 200 daily     ==Hemodynamics==     (Date) CVP: 3      PCWP:        PA S/D: 53/13           Cardiac Output:             Cardiac Index:            SVR:    Preload (fluids, diuretics): Lasix held for today  Afterload (anti-hypertensives, pressors): Hydral/IsDN  Inotropes: Milrinone    ==Pump==    Echo Date: 8/21  LVEF: 24%, severe MR                             Regional Wall Motion Abnormaility?:  [ ]Yes   [ ] No, If Yes, Details  Diastolic function:  RV function:   Any change frim prior?: [ ] Yes   [ ] No, If Yes, Details:   Volume status:    ==Coronaries==    Last ischemic workup: scheduled for [facility] 9/14  Antiplatelet regimen:   Anticoagulant: Heparin  Statin:   Beta blocker:    ==Rhythm==    Current rhythm:  AM EKG Interpretation:   Anti-arrhythmic therapies:   TVP with settings:     aMIOdarone    Tablet 200 milliGRAM(s) Oral daily  carvedilol 6.25 milliGRAM(s) Oral every 12 hours  hydrALAZINE 37.5 milliGRAM(s) Oral three times a day  isosorbide   dinitrate Tablet (ISORDIL) 10 milliGRAM(s) Oral three times a day  milrinone Infusion 0.25 MICROgram(s)/kG/Min (5.63 mL/Hr) IV Continuous <Continuous>      ====================== NEUROLOGY=====================  Sedation [ ]Yes   [x ] No  Delirium [ ]Yes   [x ] No    acetaminophen   Tablet .. 650 milliGRAM(s) Oral every 6 hours PRN Mild Pain (1 - 3)    ==================== RESPIRATORY======================  Sats >97% on RA  Mechanical Ventilation [ ]    BIPAP [ ]   HFNC [ ]   NC [ ]     ===================== RENAL =========================  #EDIE on CKD, likely cardiorenal and in setting of over diureses, Cr improving  - Hold diuretics, IVF bolus 9/12  - Strict I/Os, renally dose meds, avoid nephrotoxins  09-12-20 @ 07:01 - 09-13-20 @ 07:00  --------------------------------------------------------  IN: 2442.4 mL / OUT: 2050 mL / NET: 392.4 mL    09-13-20 @ 07:01  -  09-13-20 @ 12:41  --------------------------------------------------------  IN: 83 mL / OUT: 250 mL / NET: -167 mL      Renal Replacement Therapy:  [ ] CRRT      [ ] IHD, Last Session:    Fluid removal:     [ x] Diuretic therapy, Regimen: Lasix    ==================== GASTROINTESTINAL===================    Diet: DASH/TLC  Last BM: 9/12  Indication for Stress Ulcer Prophylaxis, [ ] Yes    [x ] No   If Yes, Medication:     dextrose 5%. 1000 milliLiter(s) (50 mL/Hr) IV Continuous <Continuous>  polyethylene glycol 3350 17 Gram(s) Oral daily PRN Constipation    ========================INFECTIOUS DISEASE================  Preop Cefuroxime for 9/14  T(C): 36.7 (09-12-20 @ 20:00), Max: 36.8 (09-12-20 @ 16:00)  WBC Count: 4.52 K/uL (09-13-20 @ 00:42)  WBC Count: 4.53 K/uL (09-12-20 @ 05:07)  WBC Count: 6.11 K/uL (09-11-20 @ 21:29)  WBC Count: 5.15 K/uL (09-11-20 @ 16:49)  WBC Count: 4.65 K/uL (09-11-20 @ 02:52)        Current Antibiotics/Antifungals with start date:       ===================HEMATOLOGIC/ONC ===================  Heparin for PAF, stable H/H  Hemoglobin: 8.8 g/dL (09-13-20 @ 00:42)  Hemoglobin: 8.7 g/dL (09-12-20 @ 05:07)  Hemoglobin: 9.2 g/dL (09-11-20 @ 21:29)  Hemoglobin: 9.1 g/dL (09-11-20 @ 16:49)  Hemoglobin: 8.9 g/dL (09-11-20 @ 02:52)    Platelet Count - Automated: 127 K/uL (09-13-20 @ 00:42)  Platelet Count - Automated: 131 K/uL (09-12-20 @ 05:07)  Platelet Count - Automated: 133 K/uL (09-11-20 @ 21:29)  Platelet Count - Automated: 136 K/uL (09-11-20 @ 16:49)  Platelet Count - Automated: 130 K/uL (09-11-20 @ 02:52)    Chemical VTE Prophylaxis:  [ ] Lovenox    [ ] SQH   [ ]NA  Systemic Anticogaulation:  [x ] Yes    [ ] No,  If Yes, Medication and Indication: Heparin, Afib    heparin   Injectable 6000 Unit(s) IV Push every 6 hours PRN For aPTT less than 40  heparin   Injectable 3000 Unit(s) IV Push every 6 hours PRN For aPTT between 40 - 57  heparin  Infusion. 1100 Unit(s)/Hr (11 mL/Hr) IV Continuous <Continuous>    =======================    ENDOCRINE  =====================  ISS for DM2, goal 140-180  Synthroid for Hypothyroidism  Insulin drip  [ ] Yes    [ ] No  Basal Insulin [ ] Yes    [ ] No  Bolus insulin [ ] Yes    [ ] No  Sliding Scale  [ x] Yes    [ ] No  Hgb A1c    POCT Blood Glucose.: 242 mg/dL (09-13-20 @ 12:30)  POCT Blood Glucose.: 132 mg/dL (09-13-20 @ 08:50)  POCT Blood Glucose.: 255 mg/dL (09-12-20 @ 21:43)  POCT Blood Glucose.: 137 mg/dL (09-12-20 @ 17:41)        dextrose 40% Gel 15 Gram(s) Oral once PRN Blood Glucose LESS THAN 70 milliGRAM(s)/deciliter  dextrose 50% Injectable 12.5 Gram(s) IV Push once  dextrose 50% Injectable 25 Gram(s) IV Push once  dextrose 50% Injectable 25 Gram(s) IV Push once  glucagon  Injectable 1 milliGRAM(s) IntraMuscular once PRN Glucose LESS THAN 70 milligrams/deciliter  insulin lispro (HumaLOG) corrective regimen sliding scale   SubCutaneous three times a day before meals  insulin lispro (HumaLOG) corrective regimen sliding scale   SubCutaneous at bedtime  levothyroxine 50 MICROGram(s) Oral daily    ======================= LINES/TUBES  =====================  Milo: LACI 9/10-  Central Line: (Date placed)  HD Catheter: (Date placed)  Arterial Line: (Date placed)  Endotracheal Tube: (Date placed)  Guidry: (Date placed)

## 2020-09-14 NOTE — PROGRESS NOTE ADULT - SUBJECTIVE AND OBJECTIVE BOX
PATIENT:  MAIN BRASWELL  [id number]    CHIEF COMPLAINT:  Patient is a 75y old  Male who presents with a chief complaint of lower extremity edema (13 Sep 2020 20:02)      INTERVAL HISTORY/OVERNIGHT EVENTS:  DEYVI    REVIEW OF SYSTEMS:    Constitutional:     [ ] negative [ ] fevers [ ] chills [ ] weight loss [ ] weight gain  HEENT:                  [ ] negative [ ] dry eyes [ ] eye irritation [ ] postnasal drip [ ] nasal congestion  CV:                         [ ] negative  [ ] chest pain [ ] orthopnea [ ] palpitations [ ] murmur  Resp:                     [ ] negative [ ] cough [ ] shortness of breath [ ] dyspnea [ ] wheezing [ ] sputum [ ] hemoptysis  GI:                          [ ] negative [ ] nausea [ ] vomiting [ ] diarrhea [ ] constipation [ ] abd pain [ ] dysphagia   :                        [ ] negative [ ] dysuria [ ] nocturia [ ] hematuria [ ] increased urinary frequency  Musculoskeletal: [ ] negative [ ] back pain [ ] myalgias [ ] arthralgias [ ] fracture  Skin:                       [ ] negative [ ] rash [ ] itch  Neurological:        [ ] negative [ ] headache [ ] dizziness [ ] syncope [ ] weakness [ ] numbness  Psychiatric:           [ ] negative [ ] anxiety [ ] depression  Endocrine:            [ ] negative [ ] diabetes [ ] thyroid problem  Heme/Lymph:      [ ] negative [ ] anemia [ ] bleeding problem  Allergic/Immune: [ ] negative [ ] itchy eyes [ ] nasal discharge [ ] hives [ ] angioedema    [ ] All other systems negative  [ ] Unable to assess ROS because ________.    MEDICATIONS:  MEDICATIONS  (STANDING):  aMIOdarone    Tablet 200 milliGRAM(s) Oral daily  carvedilol 6.25 milliGRAM(s) Oral every 12 hours  chlorhexidine 0.12% Liquid 5 milliLiter(s) Swish and Spit once  chlorhexidine 2% Cloths 1 Application(s) Topical <User Schedule>  dextrose 5%. 1000 milliLiter(s) (50 mL/Hr) IV Continuous <Continuous>  dextrose 50% Injectable 12.5 Gram(s) IV Push once  dextrose 50% Injectable 25 Gram(s) IV Push once  dextrose 50% Injectable 25 Gram(s) IV Push once  heparin  Infusion. 1100 Unit(s)/Hr (11 mL/Hr) IV Continuous <Continuous>  hydrALAZINE 50 milliGRAM(s) Oral three times a day  insulin lispro (HumaLOG) corrective regimen sliding scale   SubCutaneous three times a day before meals  insulin lispro (HumaLOG) corrective regimen sliding scale   SubCutaneous at bedtime  isosorbide   dinitrate Tablet (ISORDIL) 20 milliGRAM(s) Oral every 8 hours  levothyroxine 50 MICROGram(s) Oral daily  milrinone Infusion 0.25 MICROgram(s)/kG/Min (5.63 mL/Hr) IV Continuous <Continuous>    MEDICATIONS  (PRN):  acetaminophen   Tablet .. 650 milliGRAM(s) Oral every 6 hours PRN Mild Pain (1 - 3)  dextrose 40% Gel 15 Gram(s) Oral once PRN Blood Glucose LESS THAN 70 milliGRAM(s)/deciliter  glucagon  Injectable 1 milliGRAM(s) IntraMuscular once PRN Glucose LESS THAN 70 milligrams/deciliter  heparin   Injectable 6000 Unit(s) IV Push every 6 hours PRN For aPTT less than 40  heparin   Injectable 3000 Unit(s) IV Push every 6 hours PRN For aPTT between 40 - 57  hydrocortisone 2.5% Rectal Cream 1 Application(s) Rectal daily PRN hemorrhoid pain/itch  polyethylene glycol 3350 17 Gram(s) Oral daily PRN Constipation      ALLERGIES:  Allergies    No Known Allergies    Intolerances        OBJECTIVE:  ICU Vital Signs Last 24 Hrs  T(C): 36.8 (13 Sep 2020 19:00), Max: 36.8 (13 Sep 2020 19:00)  T(F): 98.2 (13 Sep 2020 19:00), Max: 98.2 (13 Sep 2020 19:00)  HR: 82 (14 Sep 2020 07:21) (80 - 83)  BP: 105/57 (14 Sep 2020 07:21) (87/57 - 114/64)  BP(mean): 69 (14 Sep 2020 07:21) (62 - 93)  RR: 19 (14 Sep 2020 07:21) (13 - 31)  SpO2: 97% (14 Sep 2020 07:21) (95% - 99%)      Adult Advanced Hemodynamics Last 24 Hrs  CVP(mm Hg): 2 (14 Sep 2020 06:00) (-3 - 9)  CVP(cm H2O): --  CO: --  CI: --  PA: 50/13 (14 Sep 2020 06:00) (40/9 - 61/18)  PA(mean): 30 (14 Sep 2020 06:00) (26 - 39)  PCWP: --  SVR: --  SVRI: --  PVR: --  PVRI: --  CAPILLARY BLOOD GLUCOSE      POCT Blood Glucose.: 145 mg/dL (13 Sep 2020 21:03)  POCT Blood Glucose.: 111 mg/dL (13 Sep 2020 17:15)  POCT Blood Glucose.: 242 mg/dL (13 Sep 2020 12:30)  POCT Blood Glucose.: 132 mg/dL (13 Sep 2020 08:50)    CAPILLARY BLOOD GLUCOSE      POCT Blood Glucose.: 145 mg/dL (13 Sep 2020 21:03)    I&O's Summary    13 Sep 2020 07:01  -  14 Sep 2020 07:00  --------------------------------------------------------  IN: 337.2 mL / OUT: 1430 mL / NET: -1092.8 mL      Daily Height in cm: 172.72 (14 Sep 2020 07:21)    Daily     PHYSICAL EXAMINATION:  General: WN/WD NAD  HEENT: PERRLA, EOMI, moist mucous membranes  Neurology: A&Ox3, nonfocal, WILBURN x 4  Respiratory: CTA B/L, normal respiratory effort, no wheezes, crackles, rales  CV: RRR, S1S2, no murmurs, rubs or gallops  Abdominal: Soft, NT, ND +BS, Last BM  Extremities: No edema, + peripheral pulses  Incisions:   Tubes:    LABS:                          9.1    4.81  )-----------( 120      ( 14 Sep 2020 03:00 )             28.9     09-14    135  |  103  |  92<H>  ----------------------------<  119<H>  4.1   |  19<L>  |  2.49<H>    Ca    10.0      14 Sep 2020 03:00  Phos  4.0     09-14  Mg     2.4     09-14    TPro  6.3  /  Alb  3.7  /  TBili  0.8  /  DBili  x   /  AST  29  /  ALT  35  /  AlkPhos  61  09-14    LIVER FUNCTIONS - ( 14 Sep 2020 03:00 )  Alb: 3.7 g/dL / Pro: 6.3 g/dL / ALK PHOS: 61 U/L / ALT: 35 U/L / AST: 29 U/L / GGT: x           PT/INR - ( 14 Sep 2020 03:00 )   PT: 13.0 sec;   INR: 1.10 ratio         PTT - ( 14 Sep 2020 03:00 )  PTT:77.5 sec            TELEMETRY:     EKG:     IMAGING:

## 2020-09-14 NOTE — PROGRESS NOTE ADULT - ASSESSMENT
Mr. 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 He was started on inotropic support with milrinone and diuresed over 20 lbs. He subsequently had worsening renal function, which was thought to be due to overdiuresis. The milrinone was stopped, but resumed after right heart catheterization last week showed elevated wedge pressure and borderline low cardiac output. Currently patient is euvolemic, and with adequate cardiac output on milrinone awaiting Mitraclip.      Hemodynamic data:  9/12: CVP 3, PA 44/24/27, PCWP 17  9/13: CVP 6, PA 52/15/33, PCWP 25 Mr. 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 He was started on inotropic support with milrinone and diuresed over 20 lbs. He subsequently had worsening renal function, likely from overdiuresis. An LVAD evaluation was initiated but at the time was decided that his frailty and advanced renal dysfunction would make him a poor candidate. He underwent MitraClip 9/14 with placement of 1 clip and reduction of MR to mild. At this time he has low filling pressures with preserved cardiac output and improving renal function.        Hemodynamic data:  9/12: CVP 3, PA 44/24/27, PCWP 17  9/13: CVP 6, PA 52/15/33, PCWP 25

## 2020-09-14 NOTE — PROGRESS NOTE ADULT - SUBJECTIVE AND OBJECTIVE BOX
CRITICAL CARE ATTENDING - CTICU    MEDICATIONS  (STANDING):  aMIOdarone    Tablet 200 milliGRAM(s) Oral daily  carvedilol 6.25 milliGRAM(s) Oral every 12 hours  chlorhexidine 2% Cloths 1 Application(s) Topical <User Schedule>  chlorhexidine 4% Liquid 1 Application(s) Topical <User Schedule>  dextrose 5%. 1000 milliLiter(s) (50 mL/Hr) IV Continuous <Continuous>  dextrose 50% Injectable 12.5 Gram(s) IV Push once  dextrose 50% Injectable 25 Gram(s) IV Push once  dextrose 50% Injectable 25 Gram(s) IV Push once  heparin  Infusion. 1100 Unit(s)/Hr (11 mL/Hr) IV Continuous <Continuous>  hydrALAZINE 50 milliGRAM(s) Oral three times a day  insulin lispro (HumaLOG) corrective regimen sliding scale   SubCutaneous three times a day before meals  insulin lispro (HumaLOG) corrective regimen sliding scale   SubCutaneous at bedtime  isosorbide   dinitrate Tablet (ISORDIL) 20 milliGRAM(s) Oral every 8 hours  levothyroxine 50 MICROGram(s) Oral daily  milrinone Infusion 0.25 MICROgram(s)/kG/Min (5.63 mL/Hr) IV Continuous <Continuous>                                    9.1    4.81  )-----------( 120      ( 14 Sep 2020 03:00 )             28.9       09-14    135  |  103  |  92<H>  ----------------------------<  119<H>  4.1   |  19<L>  |  2.49<H>    Ca    10.0      14 Sep 2020 03:00  Phos  4.0     09-14  Mg     2.4     09-14    TPro  6.3  /  Alb  3.7  /  TBili  0.8  /  DBili  x   /  AST  29  /  ALT  35  /  AlkPhos  61  09-14      PT/INR - ( 14 Sep 2020 03:00 )   PT: 13.0 sec;   INR: 1.10 ratio         PTT - ( 14 Sep 2020 03:00 )  PTT:77.5 sec        Daily Height in cm: 172.72 (14 Sep 2020 07:21)    Daily       09-13 @ 07:01  -  09-14 @ 07:00  --------------------------------------------------------  IN: 337.2 mL / OUT: 1430 mL / NET: -1092.8 mL        Critically Ill patient  : [ ] preoperative ,   [ x] post operative    Requires :  [x ] Arterial Line   [x ] Central Line  [x ] PA catheter  [ ] IABP  [ ] ECMO  [ ] LVAD  [ ] Ventilator  [x ] pacemaker PPM / AICD[ ] Impella.                      [ x] ABG's     [x ] Pulse Oxymetry Monitoring  Bedside evaluation , monitoring , treatment of hemodynamics , fluids , IVP/ IVCD meds.        Diagnosis:     Op Day - Mitral Clip - R -  femoral Artery Deployment Site    CHF- acute [ x]   chronic [x ]    systolic [x ]   diatolic [ ]          - Echo- EF -   AI / MR          [ ] RV dysfunction          - Cxr-cardiomegally, edema          - Clinical-  [x ]inotropes   [ ]pressors   [ x]diuresis   [ ]IABP   [ ]ECMO   [ ]LVAD   [ ]Respiratory Failure    Hemodynamic lability,  instability. Requires IVCD [ ] vasopressors [ x] inotropes  [ x] vasodilator  [ ]IVSS fluid  to maintain MAP, perfusion, C.I.     Hypotension a/w Hypertension, requiring intravenous medication.     Argyle Aleksandar catheter interpretation and therapeutic management of unstable hemodynamics     Thrombocytopenia     Chronic Renal Failure     PPM / AICD     DM    A FIB     Hypertension     Cardiomyopathy     Vascular checks     Re Start IVCD Heparin     Requires bedside physical therapy, mobilization and total retirement care.                     -                     Discussed with CT surgeon, Physician's Assistant - Nurse Practitioner- Critical care medicine team.   Dicussed at  AM / PM rounds.   Chart, labs , films reviewed.    Total Time: 30 min

## 2020-09-14 NOTE — PROGRESS NOTE ADULT - SUBJECTIVE AND OBJECTIVE BOX
Patient seen and examined at bedside.    Overnight Events:   This morning had mitral clip    Current Meds:  acetaminophen   Tablet .. 650 milliGRAM(s) Oral every 6 hours PRN  aMIOdarone    Tablet 200 milliGRAM(s) Oral daily  carvedilol 6.25 milliGRAM(s) Oral every 12 hours  chlorhexidine 2% Cloths 1 Application(s) Topical <User Schedule>  dextrose 40% Gel 15 Gram(s) Oral once PRN  dextrose 5%. 1000 milliLiter(s) IV Continuous <Continuous>  dextrose 50% Injectable 12.5 Gram(s) IV Push once  dextrose 50% Injectable 25 Gram(s) IV Push once  dextrose 50% Injectable 25 Gram(s) IV Push once  glucagon  Injectable 1 milliGRAM(s) IntraMuscular once PRN  heparin  Infusion. 1100 Unit(s)/Hr IV Continuous <Continuous>  hydrALAZINE 25 milliGRAM(s) Oral every 8 hours  hydrocortisone 2.5% Rectal Cream 1 Application(s) Rectal daily PRN  insulin lispro (HumaLOG) corrective regimen sliding scale   SubCutaneous three times a day before meals  insulin lispro (HumaLOG) corrective regimen sliding scale   SubCutaneous at bedtime  levothyroxine 50 MICROGram(s) Oral daily  milrinone Infusion 0.25 MICROgram(s)/kG/Min IV Continuous <Continuous>  polyethylene glycol 3350 17 Gram(s) Oral daily PRN        Vitals:  T(F): 96.4 (09-14), Max: 98.2 (09-13)  HR: 80 (09-14) (80 - 91)  BP: 105/57 (09-14) (87/57 - 114/64)  RR: 19 (09-14)  SpO2: 98% (09-14)  I&O's Summary    13 Sep 2020 07:01  -  14 Sep 2020 07:00  --------------------------------------------------------  IN: 337.2 mL / OUT: 1430 mL / NET: -1092.8 mL    14 Sep 2020 07:01  -  14 Sep 2020 16:06  --------------------------------------------------------  IN: 55 mL / OUT: 400 mL / NET: -345 mL        Physical Exam:  Appearance: No acute distress; well appearing  Eyes: PERRL, EOMI, pink conjunctiva  HENT: Normal oral mucosa  Cardiovascular: RRR, S1, S2, no murmurs, rubs, or gallops; no edema; no JVD  Respiratory: Clear to auscultation bilaterally  Gastrointestinal: soft, non-tender, non-distended with normal bowel sounds  Musculoskeletal: No clubbing; no joint deformity   Neurologic: Non-focal  Lymphatic: No lymphadenopathy  Psychiatry: AAOx3, mood & affect appropriate  Skin: No rashes, ecchymoses, or cyanosis                          9.1    3.94  )-----------( 101      ( 14 Sep 2020 11:51 )             29.0     09-14    137  |  103  |  86<H>  ----------------------------<  159<H>  4.0   |  18<L>  |  2.34<H>    Ca    10.0      14 Sep 2020 11:51  Phos  4.0     09-14  Mg     2.4     09-14    TPro  6.3  /  Alb  3.9  /  TBili  1.0  /  DBili  x   /  AST  32  /  ALT  34  /  AlkPhos  62  09-14    PT/INR - ( 14 Sep 2020 11:51 )   PT: 13.3 sec;   INR: 1.12 ratio         PTT - ( 14 Sep 2020 11:51 )  PTT:36.9 sec              New ECG(s): Personally reviewed    Echo:    Stress Testing:     Cath:    Imaging:    Interpretation of Telemetry:

## 2020-09-14 NOTE — PROGRESS NOTE ADULT - SUBJECTIVE AND OBJECTIVE BOX
MAIN BRASWELL  MRN-[id number]  Patient is a 75y old  Male who presents with a chief complaint of lower extremity edema (14 Sep 2020 15:55)    HPI:  74M w/ PMHx of HFrEF (EF 10%) s/p ICD, Afib w/ recent admission for CHF/afib s/p DCCV, CKD, DM2, hypothyroidism presents after being referred from CHF clinic for admission due to worsening of LE edema and failure of PO diuretics at home. Per patient was relatively functional until ~1 mo ago when he developed palpitations and light headedness. He was found to be hypotensive and in Afib w/ RVR resulting in his hospitalization at the end  of July. His course was c/b EDIE on CKD and persistent hypotension. He improved after dccv and was ultimately discharged home off entresto, coreg and diuretics due to c/f worsening hypotension. After discharge, patient notes worsening LE edema. He was instructed to resume lasix, which was ultimately escalated to toresemide 80mg bid w/ metolazone 2.5 mg daily. With this regimen he noted increased urination and some mild improvement. However, due to ongoing reduced functional capacity, fatigue, and ALCAZAR he was referred for admission for IV diuretics. Denies any cp or palpitations. Denies repaid HR. Notes weight gain since discharge. Continues to have poor appetite and reduced exercise capacity. No fevers, chills. Notes chronic nonproductive cough which is unchanged. Sleeps elevated due to back pain, denies significant orthopnea. Is able to complete ADLs at baseline with some assistance from his niece. Notes increased urination with high dose diuretics, but no dysuria or hematuria. No abd pain, nausea, vomiting. No diarrhea. (21 Aug 2020 09:51)      Surgery/Hospital course:  9/10: Admitted to CICU s/p RHC and swan batsheva catheter placement for hemodynamic monitoring & optimization prior to tentative LHC and mitraclip  9/12: s/p 500 cc NS bolus for low CVP,  hydralazine increased to 37.5 mg  9/14: s/p Mitral Clip    Today/Overnight:  Mitral Clip    Vital Signs Last 24 Hrs  T(C): 37.1 (14 Sep 2020 20:00), Max: 37.1 (14 Sep 2020 20:00)  T(F): 98.8 (14 Sep 2020 20:00), Max: 98.8 (14 Sep 2020 20:00)  HR: 80 (14 Sep 2020 21:00) (80 - 91)  BP: 105/57 (14 Sep 2020 07:21) (87/57 - 112/61)  BP(mean): 69 (14 Sep 2020 07:21) (62 - 78)  RR: 27 (14 Sep 2020 21:00) (0 - 27)  SpO2: 95% (14 Sep 2020 21:00) (95% - 100%)  ============================I/O===========================  I&O's Detail    13 Sep 2020 07:01  -  14 Sep 2020 07:00  --------------------------------------------------------  IN:    Heparin Infusion: 154 mL    Milrinone: 123.2 mL    Oral Fluid: 60 mL  Total IN: 337.2 mL    OUT:    Voided (mL): 1430 mL  Total OUT: 1430 mL    Total NET: -1092.8 mL      14 Sep 2020 07:01  -  14 Sep 2020 21:45  --------------------------------------------------------  IN:    Heparin: 9 mL    Heparin Infusion: 88 mL    Milrinone: 55 mL    Oral Fluid: 350 mL  Total IN: 502 mL    OUT:    Voided (mL): 750 mL  Total OUT: 750 mL    Total NET: -248 mL    ============================ LABS =========================                        9.1    3.94  )-----------( 101      ( 14 Sep 2020 11:51 )             29.0     09-14    137  |  103  |  86<H>  ----------------------------<  159<H>  4.0   |  18<L>  |  2.34<H>    Ca    10.0      14 Sep 2020 11:51  Phos  4.0     09-14  Mg     2.4     09-14    TPro  6.3  /  Alb  3.9  /  TBili  1.0  /  DBili  x   /  AST  32  /  ALT  34  /  AlkPhos  62  09-14    LIVER FUNCTIONS - ( 14 Sep 2020 11:51 )  Alb: 3.9 g/dL / Pro: 6.3 g/dL / ALK PHOS: 62 U/L / ALT: 34 U/L / AST: 32 U/L / GGT: x           PT/INR - ( 14 Sep 2020 11:51 )   PT: 13.3 sec;   INR: 1.12 ratio         PTT - ( 14 Sep 2020 17:59 )  PTT:142.8 sec  ABG - ( 14 Sep 2020 17:42 )  pH, Arterial: 7.38  pH, Blood: x     /  pCO2: 32    /  pO2: 171   / HCO3: 18    / Base Excess: -5.5  /  SaO2: 100         ======================Micro/Rad/Cardio=================  Culture: Reviewed   CXR: Reviewed  Echo: Reviewed  ======================================================  PAST MEDICAL & SURGICAL HISTORY:  Hypothyroidism  Afib  Type II diabetes mellitus  Aortic insufficiency  Mitral insufficiency  CKD (chronic kidney disease)  Cardiomyopathy  Systolic CHF  Hypertension  History of tonsillectomy  childhood  AICD (automatic cardioverter/defibrillator) present  8/2012    ========================ASSESSMENT ================  Acute on chronic systolic HF  Severe MR  pAF  EDIE on CKD stage 3- improving   Leukopenia  Thrombocytopenia   Hx Hypothyroidism   S/P Mitral Clip on 9/14    Plan:  ====================== NEUROLOGY=====================  Nonfocal, continue to monitor neuro status as per ICU protocol  Tylenol PRN for analgesia    acetaminophen   Tablet .. 650 milliGRAM(s) Oral every 6 hours PRN Mild Pain (1 - 3)    ==================== RESPIRATORY======================  Comfortable on RA, SpO2 100%.  - Cont to monitor SpO2 via pulse oximetry, follow ABGs    ====================CARDIOVASCULAR==================  Acute on chronic systolic HF  Severe MR s/p Mitral Clip 9/14  Continue with Amiodarone for rate control  Hydralazine and Coreg for afterload reduction  Inotropic support with Milrinone infusion    aMIOdarone    Tablet 200 milliGRAM(s) Oral daily  carvedilol 6.25 milliGRAM(s) Oral every 12 hours  hydrALAZINE 25 milliGRAM(s) Oral every 8 hours  milrinone Infusion 0.25 MICROgram(s)/kG/Min (5.63 mL/Hr) IV Continuous <Continuous>    ===================HEMATOLOGIC/ONC ===================  H/H 9.1/29.0, stable; Thrombocytopenia  PLTs (101k)   - Continue to monitor hemoglobin and hematocrit levels, PLTs    VTE prophylaxis, heparin  Infusion 900 Unit(s)/Hr (9 mL/Hr) IV Continuous <Continuous>    ===================== RENAL =========================  EDIE on CKD  Continue to monitor I/Os, BUN/Creatinine, and urine output.   Replete lytes PRN. Keep K> 4 and Mg >2.   Strict I&Os, daily standing weights, renally dose meds, avoid nephrotoxins    ==================== GASTROINTESTINAL===================  Tolerating PO DASH/TLC diet, sodium and cholesterol restricted.  Bowel regimen with Miralax PRN.    dextrose 5%. 1000 milliLiter(s) (50 mL/Hr) IV Continuous <Continuous>  polyethylene glycol 3350 17 Gram(s) Oral daily PRN Constipation    =======================    ENDOCRINE  =====================  Hx of DMT2, glucose control with Humalog sliding scale and Glucagon PRN.  Synthroid for hypothyroidism.    dextrose 40% Gel 15 Gram(s) Oral once PRN Blood Glucose LESS THAN 70 milliGRAM(s)/deciliter  dextrose 50% Injectable 12.5 Gram(s) IV Push once  dextrose 50% Injectable 25 Gram(s) IV Push once  dextrose 50% Injectable 25 Gram(s) IV Push once  glucagon  Injectable 1 milliGRAM(s) IntraMuscular once PRN Glucose LESS THAN 70 milligrams/deciliter  insulin lispro (HumaLOG) corrective regimen sliding scale   SubCutaneous three times a day before meals  insulin lispro (HumaLOG) corrective regimen sliding scale   SubCutaneous at bedtime  levothyroxine 50 MICROGram(s) Oral daily    ========================INFECTIOUS DISEASE================  Afebrile, slightly leukopenic WBC at 3.94  - Continue to trend CBC/fever curve      Patient requires continuous monitoring with bedside rhythm monitoring, pulse oximetry monitoring, and continuous central venous and arterial pressure monitoring; and intermittent blood gas analysis.  Care plan discussed with ICU care team.    Patient remained critical, at risk for life threatening decompensation.   I have spent 35 minutes providing acute care with multiple re-evaluations throughout the evening.     By signing my name below, Bebo FERRELL, attest that this documentation has been prepared under the direction and in the presence of Nita Koenig NP.  Electronically signed: Willis Kerns, 09-14-20 @ 21:45    Nita FERRELL NP, personally performed the services described in this documentation. All medical record entries made by the scribe were at my direction and in my presence. I have reviewed the chart and agree that the record reflects my personal performance and is accurate and complete  Electronically signed: Nita Koenig NP, 09-14-20 @ 21:45       MAIN BRASWELL  MRN-[id number]  Patient is a 75y old  Male who presents with a chief complaint of lower extremity edema (14 Sep 2020 15:55)    HPI:  74M w/ PMHx of HFrEF (EF 10%) s/p ICD, Afib w/ recent admission for CHF/afib s/p DCCV, CKD, DM2, hypothyroidism presents after being referred from CHF clinic for admission due to worsening of LE edema and failure of PO diuretics at home. Per patient was relatively functional until ~1 mo ago when he developed palpitations and light headedness. He was found to be hypotensive and in Afib w/ RVR resulting in his hospitalization at the end  of July. His course was c/b EDIE on CKD and persistent hypotension. He improved after dccv and was ultimately discharged home off entresto, coreg and diuretics due to c/f worsening hypotension. After discharge, patient notes worsening LE edema. He was instructed to resume lasix, which was ultimately escalated to toresemide 80mg bid w/ metolazone 2.5 mg daily. With this regimen he noted increased urination and some mild improvement. However, due to ongoing reduced functional capacity, fatigue, and ALCAZAR he was referred for admission for IV diuretics. Denies any cp or palpitations. Denies repaid HR. Notes weight gain since discharge. Continues to have poor appetite and reduced exercise capacity. No fevers, chills. Notes chronic nonproductive cough which is unchanged. Sleeps elevated due to back pain, denies significant orthopnea. Is able to complete ADLs at baseline with some assistance from his niece. Notes increased urination with high dose diuretics, but no dysuria or hematuria. No abd pain, nausea, vomiting. No diarrhea. (21 Aug 2020 09:51)      Surgery/Hospital course:  9/10: Admitted to CICU s/p RHC and swan batsheva catheter placement for hemodynamic monitoring & optimization prior to tentative LHC and mitraclip  9/12: s/p 500 cc NS bolus for low CVP,  hydralazine increased to 37.5 mg  9/14: s/p Mitral Clip  9/14 pt bp stable - tolerating low dose Hydralizine with primacor gtt , right groin stable. PA pressures stable.  Lactate stable.     Today/Overnight:  Mitral Clip    Vital Signs Last 24 Hrs  T(C): 37.1 (14 Sep 2020 20:00), Max: 37.1 (14 Sep 2020 20:00)  T(F): 98.8 (14 Sep 2020 20:00), Max: 98.8 (14 Sep 2020 20:00)  HR: 80 (14 Sep 2020 21:00) (80 - 91)  BP: 105/57 (14 Sep 2020 07:21) (87/57 - 112/61)  BP(mean): 69 (14 Sep 2020 07:21) (62 - 78)  RR: 27 (14 Sep 2020 21:00) (0 - 27)  SpO2: 95% (14 Sep 2020 21:00) (95% - 100%)  ============================I/O===========================  I&O's Detail    13 Sep 2020 07:01  -  14 Sep 2020 07:00  --------------------------------------------------------  IN:    Heparin Infusion: 154 mL    Milrinone: 123.2 mL    Oral Fluid: 60 mL  Total IN: 337.2 mL    OUT:    Voided (mL): 1430 mL  Total OUT: 1430 mL    Total NET: -1092.8 mL      14 Sep 2020 07:01  -  14 Sep 2020 21:45  --------------------------------------------------------  IN:    Heparin: 9 mL    Heparin Infusion: 88 mL    Milrinone: 55 mL    Oral Fluid: 350 mL  Total IN: 502 mL    OUT:    Voided (mL): 750 mL  Total OUT: 750 mL    Total NET: -248 mL    ============================ LABS =========================                        9.1    3.94  )-----------( 101      ( 14 Sep 2020 11:51 )             29.0     09-14    137  |  103  |  86<H>  ----------------------------<  159<H>  4.0   |  18<L>  |  2.34<H>    Ca    10.0      14 Sep 2020 11:51  Phos  4.0     09-14  Mg     2.4     09-14    TPro  6.3  /  Alb  3.9  /  TBili  1.0  /  DBili  x   /  AST  32  /  ALT  34  /  AlkPhos  62  09-14    LIVER FUNCTIONS - ( 14 Sep 2020 11:51 )  Alb: 3.9 g/dL / Pro: 6.3 g/dL / ALK PHOS: 62 U/L / ALT: 34 U/L / AST: 32 U/L / GGT: x           PT/INR - ( 14 Sep 2020 11:51 )   PT: 13.3 sec;   INR: 1.12 ratio         PTT - ( 14 Sep 2020 17:59 )  PTT:142.8 sec  ABG - ( 14 Sep 2020 17:42 )  pH, Arterial: 7.38  pH, Blood: x     /  pCO2: 32    /  pO2: 171   / HCO3: 18    / Base Excess: -5.5  /  SaO2: 100         ======================Micro/Rad/Cardio=================  Culture: Reviewed   CXR: Reviewed  Echo: Reviewed  ======================================================  PAST MEDICAL & SURGICAL HISTORY:  Hypothyroidism  Afib  Type II diabetes mellitus  Aortic insufficiency  Mitral insufficiency  CKD (chronic kidney disease)  Cardiomyopathy  Systolic CHF  Hypertension  History of tonsillectomy  childhood  AICD (automatic cardioverter/defibrillator) present  8/2012    ========================ASSESSMENT ================  Acute on chronic systolic HF  Severe MR  pAF  EDIE on CKD stage 3- improving   Leukopenia  Thrombocytopenia   Hx Hypothyroidism   S/P Mitral Clip on 9/14    Plan:  ====================== NEUROLOGY=====================  Nonfocal, continue to monitor neuro status as per ICU protocol  Tylenol PRN for analgesia    acetaminophen   Tablet .. 650 milliGRAM(s) Oral every 6 hours PRN Mild Pain (1 - 3)    ==================== RESPIRATORY======================  Comfortable on RA, SpO2 100%.  - Cont to monitor SpO2 via pulse oximetry, follow ABGs    ====================CARDIOVASCULAR==================  Acute on chronic systolic HF  Severe MR s/p Mitral Clip 9/14  Continue with Amiodarone for rate control  Hydralazine and Coreg for afterload reduction  Inotropic support with Milrinone infusion    aMIOdarone    Tablet 200 milliGRAM(s) Oral daily  carvedilol 6.25 milliGRAM(s) Oral every 12 hours  hydrALAZINE 25 milliGRAM(s) Oral every 8 hours  milrinone Infusion 0.25 MICROgram(s)/kG/Min (5.63 mL/Hr) IV Continuous <Continuous>    ===================HEMATOLOGIC/ONC ===================  H/H 9.1/29.0, stable; Thrombocytopenia  PLTs (101k)   - Continue to monitor hemoglobin and hematocrit levels, PLTs    VTE prophylaxis, heparin  Infusion 900 Unit(s)/Hr (9 mL/Hr) IV Continuous <Continuous>    ===================== RENAL =========================  EDIE on CKD  Continue to monitor I/Os, BUN/Creatinine, and urine output.   Replete lytes PRN. Keep K> 4 and Mg >2.   Strict I&Os, daily standing weights, renally dose meds, avoid nephrotoxins    ==================== GASTROINTESTINAL===================  Tolerating PO DASH/TLC diet, sodium and cholesterol restricted.  Bowel regimen with Miralax PRN.    dextrose 5%. 1000 milliLiter(s) (50 mL/Hr) IV Continuous <Continuous>  polyethylene glycol 3350 17 Gram(s) Oral daily PRN Constipation    =======================    ENDOCRINE  =====================  Hx of DMT2, glucose control with Humalog sliding scale and Glucagon PRN.  Synthroid for hypothyroidism.    dextrose 40% Gel 15 Gram(s) Oral once PRN Blood Glucose LESS THAN 70 milliGRAM(s)/deciliter  dextrose 50% Injectable 12.5 Gram(s) IV Push once  dextrose 50% Injectable 25 Gram(s) IV Push once  dextrose 50% Injectable 25 Gram(s) IV Push once  glucagon  Injectable 1 milliGRAM(s) IntraMuscular once PRN Glucose LESS THAN 70 milligrams/deciliter  insulin lispro (HumaLOG) corrective regimen sliding scale   SubCutaneous three times a day before meals  insulin lispro (HumaLOG) corrective regimen sliding scale   SubCutaneous at bedtime  levothyroxine 50 MICROGram(s) Oral daily    ========================INFECTIOUS DISEASE================  Afebrile, slightly leukopenic WBC at 3.94  - Continue to trend CBC/fever curve      Patient requires continuous monitoring with bedside rhythm monitoring, pulse oximetry monitoring, and continuous central venous and arterial pressure monitoring; and intermittent blood gas analysis.  Care plan discussed with ICU care team.    Patient remained critical, at risk for life threatening decompensation.   I have spent 35 minutes providing acute care with multiple re-evaluations throughout the evening.     By signing my name below, I, Bebo Barrientos, attest that this documentation has been prepared under the direction and in the presence of Nita Koenig NP.  Electronically signed: Jasmeet Kerns, 09-14-20 @ 21:45    I, Nita Koenig NP, personally performed the services described in this documentation. All medical record entries made by the jasmeet were at my direction and in my presence. I have reviewed the chart and agree that the record reflects my personal performance and is accurate and complete  Electronically signed: Nita Koenig NP, 09-14-20 @ 21:45

## 2020-09-14 NOTE — PRE-ANESTHESIA EVALUATION ADULT - MALLAMPATI CLASS
Bariatric Surgery Discharge Note        Patient: Maria A Sanderson Date: 2018   : 1962 Attending: Elroy Herrera MD   56 year old female          Subjective:  Afebrile, VS Stable, Pain control adequate, No nausea/vomiting, Feeling well and Wants to go home    Vitals:    Vital Last Value 24 Hour Range   Temperature 97.6 °F (36.4 °C) (180) Temp  Min: 95.9 °F (35.5 °C)  Max: 98.4 °F (36.9 °C)   Pulse 83 (180) Pulse  Min: 72  Max: 86   Respiratory 16 (18) Resp  Min: 12  Max: 20   Non-Invasive  Blood Pressure 120/78 (180) BP  Min: 110/73  Max: 129/76   Pulse Oximetry 96 % (18) SpO2  Min: 92 %  Max: 100 %   CVP   No Data Recorded     Vital Today Admit   Weight 73.9 kg (07/10/18 0823) Weight: 73.9 kg (07/10/18 0823)   Height N/A Height: 5' 3\" (160 cm) (07/10/18 0823)   BMI N/A BMI (Calculated): 28.92 (07/10/18 0823)     Weight over the past 48 Hours:  Patient Vitals for the past 48 hrs:   Weight   07/10/18 0823 73.9 kg        Intake/Output:  Last Stool Occurrence:      No intake/output data recorded.    I/O last 3 completed shifts:  In: 1400 [P.O.:300; I.V.:1100]  Out: 360 [Urine:300; Drains:60]      Physical Exam:   Abdomen is soft, non distended, dressing is dry and intact    Laboratory Results:    Lab Results   Component Value Date    WBC 6.7 2018    HCT 43.1 2018    HGB 14.0 2018     2018    INR 1.1 2018    PTT 31 (H) 2017    BUN 19 2018    CREATININE 0.86 2018    SODIUM 141 2018    POTASSIUM 3.7 2018    CHLORIDE 100 2018    AST 22 2018    ALKPT 77 2018    BILIRUBIN 0.5 2018    CO2 30 2018    GPT 30 2018    GLUCOSE 91 2018    LIPA 114 2017    ALBUMIN 4.2 2018    MG 1.9 2017         Medications/Infusions:  Scheduled:   • sodium chloride (PF)  2 mL Injection 2 times per day   • insulin regular (human)   Subcutaneous TID AC   • 
OB ULTRASOUND:

 

HISTORY: 

High-risk pregnancy.

 

FINDINGS: 

A single live intrauterine gestation is seen with measurements corresponding to an estimated gestatio
nal age of 15 weeks 2 days and MILDRED at 12/10/2019.  The estimated fetal weight measures 108 gm or 4 ou
nces.  This corresponds to 7th percentile by Hadlock criteria.

 

Fetal measurements are as follows:

BPD      2.97 cm, 15 weeks 3 days

HC      11.25 cm, 15 weeks 4 days

AC       8.46 cm, 14 weeks 6 days

FL       1.62 cm, 14 weeks 6 days

 

Fetal heart rate measures 160 b.p.m.  Placenta is fundal without evidence of placenta previa.  Amniot
ic fluid appears adequate.  The placenta is cephalic.

 

Fetal anatomy:

Four-chamber heart and 3-vessel cord are not visualized.  Cord insertion, kidneys, bladder, stomach, 
spine, lips/nose, upper and lower extremities were visualized and demonstrate no definite fetal anoma
lies.

 

IMPRESSION: 

Single live intrauterine pregnancy of 15 weeks 2 days estimated gestational age and estimated date of
 delivery at 12/10/2019.

 

POS: OFF
bumetanide  1 mg Oral Daily with dinner   • potassium chloride  20 mEq Oral BID WC   • metFORMIN  500 mg Oral Daily with breakfast   • mupirocin  22 g Topical BID   • bumetanide  2 mg Oral Daily   • verapamil  180 mg Oral Nightly   • famotidine  20 mg Oral Daily       Continuous Infusions:   • dextrose 5 % infusion     • sodium chloride 0.9% infusion 50 mL/hr at 07/11/18 0000         Surgical Care Improvement Project:    DVT/VTE Prophylaxis: Yes    Antibiotics D/C'd within 24 hours of surgery end: Yes      Diagnosis:  S/p abdominal lipectomy      Plans/Recommendations:   Progress diet as tolerated, Encourage ambulation, Encourage use of Incentive Spirometry and Home today, to follow up in office in 7-10 days    RX: Roxicodone 5 -10 mg q 6 hours prn  #30        Elroy Herrera MD  7/11/2018              
Class III - visualization of the soft palate and the base of the uvula

## 2020-09-14 NOTE — PROGRESS NOTE ADULT - PROBLEM SELECTOR PLAN 1
Patient is hypovolemic on exam. CVP 6, PCWP 25. No diuretics at this time.   - Continue  milrinone 0.25mcg/kg/min  - Continue carvedilol 6.25 mg BID  - c/w hydralazine to 50mg TID and increase ISDN 20 mg TID at next dose; hold for SBP <90  - s/p Mitraclip 9/14  - Maintain K 4-4.5 and Mg 2-2.5.  - Under evaluation for LVAD as destination therapy. He is not a heart transplant candidate given age. - Continue milrinone 0.25mcg/kg/min for now, will plan to wean to 0.125 mcg/kg/min tomorrow and off on 9/16  - Continue carvedilol 6.25 mg BID  - c/w hydralazine to 50mg TID and ISDN 20 mg TID  - Under evaluation for LVAD as destination therapy (though thought poor candidate at this time). He is not a heart transplant candidate given age.

## 2020-09-14 NOTE — PROGRESS NOTE ADULT - PROBLEM SELECTOR PLAN 2
Improved with fluid hydration. Continue to monitor.   - avoid nephrotoxic medications  - would need renal function to improve prior to LVAD implant - continue to hold diuretics  - avoid nephrotoxic medications

## 2020-09-15 NOTE — CHART NOTE - NSCHARTNOTEFT_GEN_A_CORE
Nutrition Follow Up Note    Patient seen for: Nutritional follow up     Source: RN, Medical record, CTU team, HF team.     Chart reviewed, events noted. 75 year old male with PMHx of HF EF 10%, ICD, Afib, CKD and DM and known Afib with RVR admits for SOB and edema. Pt now being followed by HF team, started on milrinone and Bumex. Under going LVAD evaluation. continues off milrinone at this time. Pt with ARF on CKD stage 3, being followed by nephrology. Pt now POD 1 from [facility].     Diet: Consistent CHO, no concentrated K+ and Glucerna x2 daily.     Patient reports: Pt seen in bed. Denies any nutrition related concerns. Only concern is  and ensuring proper meal selections.   RD reviewed menu selections, Pt confirmed.  Pt is drinking Glucerna x2 daily.     Pt aware RD remains available.      PO intake: 100%     Source for PO intake: RN and Pt     GI: Last BM on       Daily Weight in k.6 (09-15), Weight in k.8 (), Weight in k.5 (), Weight in k.1 (), Weight in k.1 (09-10), Weight in k.2 (09-10), Weight in k.3 ()    Drug Dosing Weight  Weight (kg): 75.1 (14 Sep 2020 07:21)  BMI (kg/m2): 25.2 (14 Sep 2020 07:21)      Pertinent Medications: MEDICATIONS  (STANDING):  aMIOdarone    Tablet 200 milliGRAM(s) Oral daily  carvedilol 6.25 milliGRAM(s) Oral every 12 hours  chlorhexidine 2% Cloths 1 Application(s) Topical <User Schedule>  dextrose 5%. 1000 milliLiter(s) (50 mL/Hr) IV Continuous <Continuous>  dextrose 50% Injectable 12.5 Gram(s) IV Push once  dextrose 50% Injectable 25 Gram(s) IV Push once  dextrose 50% Injectable 25 Gram(s) IV Push once  heparin  Infusion 900 Unit(s)/Hr (8 mL/Hr) IV Continuous <Continuous>  hydrALAZINE 10 milliGRAM(s) Oral every 8 hours  insulin lispro (HumaLOG) corrective regimen sliding scale   SubCutaneous at bedtime  insulin lispro (HumaLOG) corrective regimen sliding scale   SubCutaneous three times a day before meals  insulin regular Infusion 4 Unit(s)/Hr (4 mL/Hr) IV Continuous <Continuous>  levothyroxine 50 MICROGram(s) Oral daily  milrinone Infusion 0.25 MICROgram(s)/kG/Min (5.63 mL/Hr) IV Continuous <Continuous>    MEDICATIONS  (PRN):  acetaminophen   Tablet .. 650 milliGRAM(s) Oral every 6 hours PRN Mild Pain (1 - 3)  dextrose 40% Gel 15 Gram(s) Oral once PRN Blood Glucose LESS THAN 70 milliGRAM(s)/deciliter  glucagon  Injectable 1 milliGRAM(s) IntraMuscular once PRN Glucose LESS THAN 70 milligrams/deciliter  hydrocortisone 2.5% Rectal Cream 1 Application(s) Rectal daily PRN hemorrhoid pain/itch  polyethylene glycol 3350 17 Gram(s) Oral daily PRN Constipation      LABS:   09-15 @ 00:36: Sodium 134<L>, Potassium 4.3, Calcium 10.0, Magnesium 2.6, Phosphorus 4.5, BUN 89<H>, Creatinine 2.53<H>, Glucose 245<H>, Alk Phos 62, ALT/SGPT 37, AST/SGOT 37, Albumin 3.7, Prealbumin --, Total Bilirubin 0.7, Hemoglobin 8.9<L>, Hematocrit 27.8<L>, Ferritin --, C-Reactive Protein --, Creatine Kinase <<27>   @ 11:51: Sodium 137, Potassium 4.0, Calcium 10.0, Magnesium --, Phosphorus --, BUN 86<H>, Creatinine 2.34<H>, Glucose 159<H>, Alk Phos 62, ALT/SGPT 34, AST/SGOT 32, Albumin 3.9, Prealbumin --, Total Bilirubin 1.0, Hemoglobin 9.1<L>, Hematocrit 29.0<L>, Ferritin --, C-Reactive Protein --, Creatine Kinase <<27>      Finger Sticks:  POCT Blood Glucose.: 118 mg/dL (09-15 @ 08:06)  POCT Blood Glucose.: 123 mg/dL (09-15 @ 06:58)  POCT Blood Glucose.: 144 mg/dL (09-15 @ 06:00)  POCT Blood Glucose.: 160 mg/dL (09-15 @ 05:06)  POCT Blood Glucose.: 184 mg/dL (09-15 @ 04:02)  POCT Blood Glucose.: 214 mg/dL (09-15 @ 03:07)  POCT Blood Glucose.: 151 mg/dL ( @ 23:36)  POCT Blood Glucose.: 168 mg/dL ( @ 17:31)      Skin per nursing documentation: intact  Edema: +1 generalized and +2 suzy foot and suzy ankle.     Estimated Needs:   [X ] no change since previous assessment  [ ] recalculated:     Previous Nutrition Diagnosis: Severe malnutrition   Nutrition Diagnosis is: on going     New Nutrition Diagnosis: None      Interventions:     Recommend  1. Continue Consistent CHO (no snack) + DASH + No concentrated K+, and Glucerna x2 daily   2. Provide food preferences as requested by Pt/family within diet restrictions    3. Encourage PO intake during meal times   4. Pt denies education review at this time  6. Trend BG levels, renal indices, LFT's, electrolytes     Monitoring and Evaluation:   Continue to monitor nutritional intake, tolerance to diet prescription, weights, labs, skin integrity  RD remains available upon request and will follow up per protocol  Natalie Spencer RD, CDN, [facility] Pager #[phone].

## 2020-09-15 NOTE — PROGRESS NOTE ADULT - PROBLEM SELECTOR PLAN 1
- Wean milrinone to 0.125 mcg/kg/min today. Plan to d/c tomorrow  - Corey less useful given ASD and L -> R shunt.   - Please send VBG from [facility]   - Continue carvedilol 6.25 mg BID  - agree with dose adjustment of hydral to 10mg TID, isordil to 10mg TID  - Uptitrate as tolerated  - Under evaluation for LVAD as destination therapy (though thought poor candidate at this time). He is not a heart transplant candidate given age. - Wean milrinone to 0.125 mcg/kg/min today. Plan to d/c tomorrow  - Continue PAC monitoring, would prefer 24 hours off inotrope   - Follow central venous saturations from [facility] given iatrogenic ASD   - Continue carvedilol 6.25 mg BID  - Hydralizine/isordil decreased 2/2 low BP, given preserved cardiac output and renal dysfunction, will favor persistent MAP > 65 mmHg and avoid hypotension that would worsen ATN. Will uptitrate as allowed.   - Under evaluation for LVAD as destination therapy (though thought poor candidate at this time). He is not a heart transplant candidate given age.

## 2020-09-15 NOTE — PROGRESS NOTE ADULT - ASSESSMENT
Mr. 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 He was started on inotropic support with milrinone and diuresed over 20 lbs. He subsequently had worsening renal function, likely from overdiuresis. An LVAD evaluation was initiated but at the time was decided that his frailty and advanced renal dysfunction would make him a poor candidate. He underwent MitraClip 9/14 with placement of 1 clip and reduction of MR to mild. At this time he has low filling pressures with preserved cardiac output and improving renal function.        Hemodynamic data:  9/12: CVP 3, PA 44/24/27, PCWP 17  9/13: CVP 6, PA 52/15/33, PCWP 25  9/15: CVP 5 PA 47/13 PCW 13 Mr. 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 He was started on inotropic support with milrinone and diuresed over 20 lbs. He subsequently had worsening renal function, likely from overdiuresis. An LVAD evaluation was initiated but at the time was decided that his frailty and advanced renal dysfunction would make him a poor candidate. He underwent MitraClip 9/14 with placement of 1 clip and reduction of MR to mild. At this time he has low filling pressures with preserved cardiac output with stable renal function. Will wean milrinone while following hemodynamics.         Hemodynamic data after clip:  9/15 (on milrinone 0.25 mcg/kg/mi): CVP 5 PA 47/13 PCW 13, SVC saturation 68% with Corey CO/CI 5.8/3.1 and TD CO/CI 7/3.7. MAP 70 mmHg

## 2020-09-15 NOTE — PROGRESS NOTE ADULT - SUBJECTIVE AND OBJECTIVE BOX
[facility] DIVISION OF KIDNEY DISEASES AND HYPERTENSION -- FOLLOW UP NOTE  --------------------------------------------------------------------------------  If any questions, please feel free to contact me  NS pager: [phone], [facility]: [phone]  Arley Ferguson M.D.  Nephrology Fellow    (After 5 pm or on weekends please page the on-call fellow)  --------------------------------------------------------------------------------    Chief Complaint:  Patient is a 75y old  Male who presents with a chief complaint of lower extremity edema (15 Sep 2020 09:17)    24 hour events/subjective:  Patient seen and examined at bedside, in NAD  seen while having breakfast this AM, s/p mitral clip on 9/14  UOP: 1.2L - sCr has remained stable today at 2.5  vitals/labs/imaging reviewed.       PAST HISTORY  --------------------------------------------------------------------------------  No significant changes to PMH, PSH, FHx, SHx, unless otherwise noted    ALLERGIES & MEDICATIONS  --------------------------------------------------------------------------------  Allergies    No Known Allergies    Intolerances      Standing Inpatient Medications  aMIOdarone    Tablet 200 milliGRAM(s) Oral daily  carvedilol 6.25 milliGRAM(s) Oral every 12 hours  chlorhexidine 2% Cloths 1 Application(s) Topical <User Schedule>  dextrose 5%. 1000 milliLiter(s) IV Continuous <Continuous>  dextrose 50% Injectable 12.5 Gram(s) IV Push once  dextrose 50% Injectable 25 Gram(s) IV Push once  dextrose 50% Injectable 25 Gram(s) IV Push once  heparin  Infusion 900 Unit(s)/Hr IV Continuous <Continuous>  hydrALAZINE 10 milliGRAM(s) Oral every 8 hours  insulin lispro (HumaLOG) corrective regimen sliding scale   SubCutaneous at bedtime  insulin lispro (HumaLOG) corrective regimen sliding scale   SubCutaneous three times a day before meals  insulin regular Infusion 4 Unit(s)/Hr IV Continuous <Continuous>  levothyroxine 50 MICROGram(s) Oral daily  milrinone Infusion 0.25 MICROgram(s)/kG/Min IV Continuous <Continuous>    PRN Inpatient Medications  acetaminophen   Tablet .. 650 milliGRAM(s) Oral every 6 hours PRN  dextrose 40% Gel 15 Gram(s) Oral once PRN  glucagon  Injectable 1 milliGRAM(s) IntraMuscular once PRN  hydrocortisone 2.5% Rectal Cream 1 Application(s) Rectal daily PRN  polyethylene glycol 3350 17 Gram(s) Oral daily PRN      REVIEW OF SYSTEMS  --------------------------------------------------------------------------------  Gen: No fevers/chills  Skin: No rashes  Head/Eyes/Ears: Normal hearing,   Respiratory: No dyspnea, cough  CV: No chest pain  GI: No abdominal pain, diarrhea  : No dysuria, hematuria  MSK: No  edema  Heme: No easy bruising or bleeding  Psych: No significant depression      All other systems were reviewed and are negative, except as noted.    VITALS/PHYSICAL EXAM  --------------------------------------------------------------------------------  T(C): 36.8 (09-15-20 @ 08:00), Max: 37.3 (09-15-20 @ 00:00)  HR: 80 (09-15-20 @ 08:00) (76 - 91)  BP: --  RR: 14 (09-15-20 @ 07:00) (0 - 27)  SpO2: 97% (09-15-20 @ 08:00) (95% - 100%)  Wt(kg): --  Height (cm): 172.7 (09-14-20 @ 07:21)  Weight (kg): 75.1 (09-14-20 @ 07:21)  BMI (kg/m2): 25.2 (09-14-20 @ 07:21)  BSA (m2): 1.89 (09-14-20 @ 07:21)      09-14-20 @ 07:01  -  09-15-20 @ 07:00  --------------------------------------------------------  IN: 910.1 mL / OUT: 1200 mL / NET: -289.9 mL    09-15-20 @ 07:01  -  09-15-20 @ 09:54  --------------------------------------------------------  IN: 47 mL / OUT: 275 mL / NET: -228 mL        Physical Exam:  	Gen: NAD, cooperative  	HEENT: MMM  	Pulm: CTA B/L, no wheezing anteriorly   	CV: S1S2,   	Abd: Soft, +BS, NT, ND  	Ext: LE edema B/L  	Neuro: Awake, Alert and oriented x3  	Skin: Warm and dry              : no tenderness to palpation               Psych: normal affect and mood      LABS/STUDIES  --------------------------------------------------------------------------------              8.9    3.87  >-----------<  85       [09-15-20 @ 00:36]              27.8     134  |  101  |  89  ----------------------------<  245      [09-15-20 @ 00:36]  4.3   |  18  |  2.53        Ca     10.0     [09-15-20 @ 00:36]      Mg     2.6     [09-15-20 @ 00:36]      Phos  4.5     [09-15-20 @ 00:36]    TPro  6.2  /  Alb  3.7  /  TBili  0.7  /  DBili  x   /  AST  37  /  ALT  37  /  AlkPhos  62  [09-15-20 @ 00:36]    PT/INR: PT 13.3 , INR 1.12       [09-14-20 @ 11:51]  PTT: 58.7       [09-15-20 @ 06:03]      Creatinine Trend:  SCr 2.53 [09-15 @ 00:36]  SCr 2.34 [09-14 @ 11:51]  SCr 2.49 [09-14 @ 03:00]  SCr 2.63 [09-13 @ 00:42]  SCr 2.79 [09-12 @ 05:07]    Urinalysis - [09-09-20 @ 04:31]      Color Light Yellow / Appearance Clear / SG 1.010 / pH 6.5      Gluc Negative / Ketone Negative  / Bili Negative / Urobili <2 mg/dL       Blood Negative / Protein Negative / Leuk Est Negative / Nitrite Negative      RBC  / WBC  / Hyaline  / Gran  / Sq Epi  / Non Sq Epi  / Bacteria     Urine Creatinine 26      [09-08-20 @ 21:42]  Urine Protein <4      [09-08-20 @ 21:42]  Urine Sodium 85      [09-08-20 @ 21:42]  Urine Urea Nitrogen 355      [09-09-20 @ 03:59]    Iron 36, TIBC 314, %sat 12      [09-08-20 @ 08:45]  Ferritin 145      [09-08-20 @ 08:45]  Vitamin D (25OH) 21.7      [07-18-20 @ 10:13]  TSH 3.60      [09-11-20 @ 05:45]  Lipid: chol 116, TG 55, HDL 48, LDL 58      [08-26-20 @ 00:17]    HBsAb Nonreact      [08-26-20 @ 02:02]  HBsAg Nonreact      [08-26-20 @ 02:02]  HBcAb Nonreact      [08-26-20 @ 02:02]  HCV 0.10, Nonreact      [08-26-20 @ 02:02]    Rheumatoid Factor <10      [08-26-20 @ 00:17]  Syphilis Screen (Treponema Pallidum Ab) Negative      [08-26-20 @ 04:26]  Free Light Chains: kappa 6.27, lambda 4.60, ratio = 1.36      [08-26 @ 04:46]  Immunofixation Serum: No Monoclonal Band Identified    Reference Range: None Detected      [08-26-20 @ 04:46]  SPEP Interpretation: Normal Electrophoresis Pattern      [08-26-20 @ 04:46]

## 2020-09-15 NOTE — PROGRESS NOTE ADULT - SUBJECTIVE AND OBJECTIVE BOX
[facility]  MRN-[id number]  Patient is a 75y old  Male who presents with a chief complaint of lower extremity edema (15 Sep 2020 12:36)    HPI:  74M w/ PMHx of HFrEF (EF 10%) s/p ICD, Afib w/ recent admission for CHF/afib s/p DCCV, CKD, DM2, hypothyroidism presents after being referred from CHF clinic for admission due to worsening of LE edema and failure of PO diuretics at home. Per patient was relatively functional until ~1 mo ago when he developed palpitations and light headedness. He was found to be hypotensive and in Afib w/ RVR resulting in his hospitalization at the end  of July. His course was c/b EDIE on CKD and persistent hypotension. He improved after dccv and was ultimately discharged home off entresto, coreg and diuretics due to c/f worsening hypotension. After discharge, patient notes worsening LE edema. He was instructed to resume lasix, which was ultimately escalated to toresemide 80mg bid w/ metolazone 2.5 mg daily. With this regimen he noted increased urination and some mild improvement. However, due to ongoing reduced functional capacity, fatigue, and ALCAZAR he was referred for admission for IV diuretics. Denies any cp or palpitations. Denies repaid HR. Notes weight gain since discharge. Continues to have poor appetite and reduced exercise capacity. No fevers, chills. Notes chronic nonproductive cough which is unchanged. Sleeps elevated due to back pain, denies significant orthopnea. Is able to complete ADLs at baseline with some assistance from his niece. Notes increased urination with high dose diuretics, but no dysuria or hematuria. No abd pain, nausea, vomiting. No diarrhea. (21 Aug 2020 09:51)      Surgery/Hospital course:  9/10: Admitted to CICU s/p RHC and swan batsheva catheter placement for hemodynamic monitoring & optimization prior to tentative LHC and mitraclip  9/12: s/p 500 cc NS bolus for low CVP,  hydralazine increased to 37.5 mg  9/14: s/p Mitral Clip  9/15 POD #1 primacor decreased from .25mcg to .125mcg    Today/Overnight:    Vital Signs Last 24 Hrs  T(C): 37 (15 Sep 2020 16:00), Max: 37.3 (15 Sep 2020 00:00)  T(F): 98.6 (15 Sep 2020 16:00), Max: 99.1 (15 Sep 2020 00:00)  HR: 80 (15 Sep 2020 19:00) (53 - 82)  BP: --  BP(mean): --  RR: 16 (15 Sep 2020 19:00) (10 - 27)  SpO2: 97% (15 Sep 2020 19:00) (95% - 100%)    ============================I/O===========================  I&O's Detail    14 Sep 2020 07:01  -  15 Sep 2020 07:00  --------------------------------------------------------  IN:    Heparin: 101 mL    Heparin Infusion: 88 mL    Insulin: 14 mL    Milrinone: 107.1 mL    Oral Fluid: 500 mL    Sodium Chloride 0.9% Bolus: 100 mL  Total IN: 910.1 mL    OUT:    Voided (mL): 1200 mL  Total OUT: 1200 mL    Total NET: -289.9 mL      15 Sep 2020 07:01  -  15 Sep 2020 19:32  --------------------------------------------------------  IN:    Heparin: 96 mL    IV PiggyBack: 50 mL    Milrinone: 16.5 mL    Milrinone: 26.1 mL    Oral Fluid: 840 mL    Sodium Chloride 0.9% Bolus: 120 mL  Total IN: 1148.6 mL    OUT:    Insulin: 0 mL    Voided (mL): 550 mL  Total OUT: 550 mL    Total NET: 598.6 mL    ============================ LABS =========================                        8.9    3.87  )-----------( 85       ( 15 Sep 2020 00:36 )             27.8     09-15    134<L>  |  101  |  89<H>  ----------------------------<  245<H>  4.3   |  18<L>  |  2.53<H>    Ca    10.0      15 Sep 2020 00:36  Phos  4.5     09-15  Mg     2.6     09-15    TPro  6.2  /  Alb  3.7  /  TBili  0.7  /  DBili  x   /  AST  37  /  ALT  37  /  AlkPhos  62  09-15    LIVER FUNCTIONS - ( 15 Sep 2020 00:36 )  Alb: 3.7 g/dL / Pro: 6.2 g/dL / ALK PHOS: 62 U/L / ALT: 37 U/L / AST: 37 U/L / GGT: x           PT/INR - ( 14 Sep 2020 11:51 )   PT: 13.3 sec;   INR: 1.12 ratio         PTT - ( 15 Sep 2020 12:27 )  PTT:57.8 sec  ABG - ( 15 Sep 2020 11:59 )  pH, Arterial: 7.48  pH, Blood: x     /  pCO2: 27    /  pO2: 136   / HCO3: 19    / Base Excess: -3.1  /  SaO2: 100       ======================Micro/Rad/Cardio=================  CXR: Reviewed  Echo: Reviewed  ======================================================  PAST MEDICAL & SURGICAL HISTORY:  Hypothyroidism  Afib  Type II diabetes mellitus  Aortic insufficiency  Mitral insufficiency  CKD (chronic kidney disease)  Cardiomyopathy  Systolic CHF  Hypertension  History of tonsillectomy - childhood  AICD (automatic cardioverter/defibrillator) present - 8/2012    ========================ASSESSMENT ================  Acute on chronic systolic HF  Severe MR  pAF  EDIE on CKD stage 3- improving   Leukopenia  Thrombocytopenia   Hx Hypothyroidism   S/P Mitral Clip on 9/14    Plan:  ====================== NEUROLOGY=====================  Nonfocal, continue to monitor neuro status as per ICU protocol  Tylenol prn for analgesia    acetaminophen   Tablet .. 650 milliGRAM(s) Oral every 6 hours PRN Mild Pain (1 - 3)    ==================== RESPIRATORY======================  Comfortable on RA, SpO2 %.  Encourage incentive spirometry, continue pulse ox monitoring, follow ABGs     ====================CARDIOVASCULAR==================  Acute on chronic systolic HF  Severe MR s/p Mitral Clip 9/14  Continue with Amiodarone for rate control  Hydralazine and Coreg for afterload reduction  Inotropic support with Milrinone drip   Monitor PA pressures and mixed/venous gases    aMIOdarone    Tablet 200 milliGRAM(s) Oral daily  carvedilol 6.25 milliGRAM(s) Oral every 12 hours  hydrALAZINE 10 milliGRAM(s) Oral every 8 hours  milrinone Infusion 0.125 MICROgram(s)/kG/Min (2.82 mL/Hr) IV Continuous <Continuous>    ===================HEMATOLOGIC/ONC ===================  Monitor H&H, PLTs  Continue anticoagulation therapy with heparin drip   No ASA as per structural heart team    heparin  Infusion 900 Unit(s)/Hr (8 mL/Hr) IV Continuous <Continuous>    ===================== RENAL =========================  EDIE on CKD, continue monitoring urine output, I&OS, BUN/Cr   Strict I&Os, daily standing weights, renally dose meds, avoid nephrotoxins    ==================== GASTROINTESTINAL===================  Tolerating PO DASH/TLC diet, sodium and cholesterol restricted.  Bowel regimen with Miralax prn.    dextrose 5%. 1000 milliLiter(s) (50 mL/Hr) IV Continuous <Continuous>  polyethylene glycol 3350 17 Gram(s) Oral daily PRN Constipation    =======================    ENDOCRINE  =====================  Hx of DM2, glucose control with Humalog sliding scale and Glucagon PRN.  Synthroid for hypothyroidism    dextrose 40% Gel 15 Gram(s) Oral once PRN Blood Glucose LESS THAN 70 milliGRAM(s)/deciliter  dextrose 50% Injectable 12.5 Gram(s) IV Push once  dextrose 50% Injectable 25 Gram(s) IV Push once  dextrose 50% Injectable 25 Gram(s) IV Push once  glucagon  Injectable 1 milliGRAM(s) IntraMuscular once PRN Glucose LESS THAN 70 milligrams/deciliter  insulin lispro (HumaLOG) corrective regimen sliding scale   SubCutaneous three times a day before meals  insulin lispro (HumaLOG) corrective regimen sliding scale   SubCutaneous at bedtime  levothyroxine 50 MICROGram(s) Oral daily    ========================INFECTIOUS DISEASE================  Afebrile, slightly leukopenic with WBC at 3.87      Patient requires continuous monitoring with bedside rhythm monitoring, pulse oximetry monitoring, and continuous central venous and arterial pressure monitoring; and intermittent blood gas analysis.  Care plan discussed with ICU care team.    Patient remained critical, at risk for life threatening decompensation.   I have spent 35 minutes providing acute care with multiple re-evaluations throughout the evening.     By signing my name below, IBebo, attest that this documentation has been prepared under the direction and in the presence of Nita Koenig NP.  Electronically signed: Willis Kerns, 09-15-20 @ 19:32    I, Santpal Arya, NP, personally performed the services described in this documentation. All medical record entries made by the scribe were at my direction and in my presence. I have reviewed the chart and agree that the record reflects my personal performance and is accurate and complete  Electronically signed: Nita Koenig NP, 09-15-20 @ 19:32

## 2020-09-15 NOTE — PROGRESS NOTE ADULT - ASSESSMENT
74M w/ PMHx of HFrEF (EF 10%) s/p ICD, Afib w/ recent admission for CHF ex/afib s/p DCCV (on july 2020), CKD3, DM2, hypothyroidism presents after being referred from CHF clinic for admission due to worsening of LE edema and failure of PO diuretics at home.    Nephrology consulted for EDIE    #EDIE on CKD  Pt with EDIE in the setting of HFrEF requiring aggressive IV diuresis-  Baseline sCr 2.0 - 2.4.  On admission sCr 2.4 - peaked to 3.43.  EDIE 2/2 cardio-renal, ?overdiuresis vs ATN -- initially requiring Bumex infusion now off     s/p mitral valve clip on 9/14  currently sCr has improved to 2.5.  UOP: 1.2L in the past 24hrs  strict I/O, daily weights    Monitor labs and urine output.   Avoid NSAIDs, ACEI/ARBS, RCA and nephrotoxins.   Dose medications as per eGFR.

## 2020-09-15 NOTE — PROGRESS NOTE ADULT - SUBJECTIVE AND OBJECTIVE BOX
Structural Heart Team      Mr Jarquin has no complaints.  Some hypotension overnight (hydralazine decreased).      REVIEW OF SYSTEMS:    CONSTITUTIONAL: No weakness, fevers or chills  EYES/ENT: No visual changes;  No vertigo or throat pain   NECK: No pain or stiffness  RESPIRATORY: No cough, wheezing, hemoptysis; No shortness of breath  CARDIOVASCULAR: No chest pain or palpitations  GASTROINTESTINAL: No abdominal or epigastric pain. No nausea, vomiting, or hematemesis; No diarrhea or constipation. No melena or hematochezia.  GENITOURINARY: No dysuria, frequency or hematuria  NEUROLOGICAL: No numbness or weakness  SKIN: No itching, rashes      Allergies    No Known Allergies    Intolerances      Vital Signs Last 24 Hrs  T(C): 36.8 (15 Sep 2020 08:00), Max: 37.3 (15 Sep 2020 00:00)  T(F): 98.2 (15 Sep 2020 08:00), Max: 99.1 (15 Sep 2020 00:00)  HR: 64 (15 Sep 2020 12:00) (64 - 86)  BP: --  BP(mean): --  RR: 0 (15 Sep 2020 12:00) (0 - 27)  SpO2: 100% (15 Sep 2020 12:00) (95% - 100%)    MEDICATIONS  (STANDING):  aMIOdarone    Tablet 200 milliGRAM(s) Oral daily  carvedilol 6.25 milliGRAM(s) Oral every 12 hours  chlorhexidine 2% Cloths 1 Application(s) Topical <User Schedule>  dextrose 5%. 1000 milliLiter(s) (50 mL/Hr) IV Continuous <Continuous>  dextrose 50% Injectable 12.5 Gram(s) IV Push once  dextrose 50% Injectable 25 Gram(s) IV Push once  dextrose 50% Injectable 25 Gram(s) IV Push once  heparin  Infusion 900 Unit(s)/Hr (8 mL/Hr) IV Continuous <Continuous>  hydrALAZINE 10 milliGRAM(s) Oral every 8 hours  insulin lispro (HumaLOG) corrective regimen sliding scale   SubCutaneous at bedtime  insulin lispro (HumaLOG) corrective regimen sliding scale   SubCutaneous three times a day before meals  levothyroxine 50 MICROGram(s) Oral daily  milrinone Infusion 0.125 MICROgram(s)/kG/Min (2.82 mL/Hr) IV Continuous <Continuous>      Exam-  Gen: NAD  HEENT: No JVD, Neck Supple, Trachea midline, No masses  Pulmonary: Basilar Crackles,  No accessory muscle use for respiration  Cardiac: S1S2, RRR, RIJ catheter in place  ECG: Paced  Gastrointestinal: Soft, NT/ND, + Bowel Sounds  Extremities:  Edema,  No joint pain or swelling +PMSx4  Vascular: 1+ pulses B/L, No Bruits  Neurological: A&Ox3, nonfocal                        8.9    3.87  )-----------( 85       ( 15 Sep 2020 00:36 )             27.8   09-15    134<L>  |  101  |  89<H>  ----------------------------<  245<H>  4.3   |  18<L>  |  2.53<H>    Ca    10.0      15 Sep 2020 00:36  Phos  4.5     09-15  Mg     2.6     09-15    TPro  6.2  /  Alb  3.7  /  TBili  0.7  /  DBili  x   /  AST  37  /  ALT  37  /  AlkPhos  62  09-15  PT/INR - ( 14 Sep 2020 11:51 )   PT: 13.3 sec;   INR: 1.12 ratio         PTT - ( 15 Sep 2020 06:03 )  PTT:58.7 sec  I&O's Summary    14 Sep 2020 07:01  -  15 Sep 2020 07:00  --------------------------------------------------------  IN: 910.1 mL / OUT: 1200 mL / NET: -289.9 mL    15 Sep 2020 07:01  -  15 Sep 2020 12:37  --------------------------------------------------------  IN: 91.4 mL / OUT: 275 mL / NET: -183.6 mL              Assessment/Plan:  Mr Jarquin is a 75 Male with Severe Mitral Regurgitation, Acute on Chronic Class 3 Systolic Heart Failure, Acute Kidney Injury, now POD1 s/p Mitraclip   - post Clip TTE requested  - plan to wean Primacor and d/c brittany tomorrow      DOC [facility]  [phone]

## 2020-09-15 NOTE — PROGRESS NOTE ADULT - SUBJECTIVE AND OBJECTIVE BOX
MAIN BRASWELL  MRN-[id number]  Patient is a 75y old  Male who presents with a chief complaint of lower extremity edema (14 Sep 2020 21:45)    HPI:  74M w/ PMHx of HFrEF (EF 10%) s/p ICD, Afib w/ recent admission for CHF/afib s/p DCCV, CKD, DM2, hypothyroidism presents after being referred from CHF clinic for admission due to worsening of LE edema and failure of PO diuretics at home. Per patient was relatively functional until ~1 mo ago when he developed palpitations and light headedness. He was found to be hypotensive and in Afib w/ RVR resulting in his hospitalization at the end  of July. His course was c/b EDIE on CKD and persistent hypotension. He improved after dccv and was ultimately discharged home off entresto, coreg and diuretics due to c/f worsening hypotension. After discharge, patient notes worsening LE edema. He was instructed to resume lasix, which was ultimately escalated to toresemide 80mg bid w/ metolazone 2.5 mg daily. With this regimen he noted increased urination and some mild improvement. However, due to ongoing reduced functional capacity, fatigue, and ALCAZAR he was referred for admission for IV diuretics. Denies any cp or palpitations. Denies repaid HR. Notes weight gain since discharge. Continues to have poor appetite and reduced exercise capacity. No fevers, chills. Notes chronic nonproductive cough which is unchanged. Sleeps elevated due to back pain, denies significant orthopnea. Is able to complete ADLs at baseline with some assistance from his niece. Notes increased urination with high dose diuretics, but no dysuria or hematuria. No abd pain, nausea, vomiting. No diarrhea. (21 Aug 2020 09:51)      Surgery/Hospital Course:  9/10: Admitted to CICU s/p RHC and swan batsheva catheter placement for hemodynamic monitoring & optimization prior to tentative LHC and mitraclip  9/12: s/p 500 cc NS bolus for low CVP,  hydralazine increased to 37.5 mg  9/14: s/p Mitral Clip    Today/Overnight:   ·	    REVIEW OF SYSTEMS:  Gen: No fever  EYES/ENT: No visual changes;  No vertigo or throat pain   NECK: No pain   RES:  No shortness of breath or Cough  CARD: No chest pain   GI: No abdominal pain  : No dysuria  NEURO: No weakness  SKIN: No itching, rashes     ICU Vital Signs Last 24 Hrs  T(C): 36.8 (15 Sep 2020 08:00), Max: 37.3 (15 Sep 2020 00:00)  T(F): 98.2 (15 Sep 2020 08:00), Max: 99.1 (15 Sep 2020 00:00)  HR: 80 (15 Sep 2020 08:00) (76 - 91)  BP: --  BP(mean): --  ABP: 99/43 (15 Sep 2020 07:00) (93/33 - 124/57)  ABP(mean): 61 (15 Sep 2020 08:00) (50 - 79)  RR: 14 (15 Sep 2020 07:00) (0 - 27)  SpO2: 97% (15 Sep 2020 08:00) (95% - 100%)      Physical Exam:  Gen:  Awake, alert   CNS: non focal 	  Neck: no JVD  RES : clear , no wheezing              CVS: Regular  rhythm. Normal S1/S2  Abd: Soft, non-distended. Bowel sounds present.  Skin: No rash.  Ext:  no edema    ============================I/O===========================   I&O's Detail    14 Sep 2020 07:01  -  15 Sep 2020 07:00  --------------------------------------------------------  IN:    Heparin: 101 mL    Heparin Infusion: 88 mL    Insulin: 14 mL    Milrinone: 107.1 mL    Oral Fluid: 500 mL    Sodium Chloride 0.9% Bolus: 100 mL  Total IN: 910.1 mL    OUT:    Voided (mL): 1200 mL  Total OUT: 1200 mL    Total NET: -289.9 mL      15 Sep 2020 07:01  -  15 Sep 2020 09:17  --------------------------------------------------------  IN:    Heparin: 16 mL    Milrinone: 11 mL    Sodium Chloride 0.9% Bolus: 20 mL  Total IN: 47 mL    OUT:    Insulin: 0 mL    Voided (mL): 275 mL  Total OUT: 275 mL    Total NET: -228 mL        ============================ LABS =========================                        8.9    3.87  )-----------( 85       ( 15 Sep 2020 00:36 )             27.8     09-15    134<L>  |  101  |  89<H>  ----------------------------<  245<H>  4.3   |  18<L>  |  2.53<H>    Ca    10.0      15 Sep 2020 00:36  Phos  4.5     09-15  Mg     2.6     09-15    TPro  6.2  /  Alb  3.7  /  TBili  0.7  /  DBili  x   /  AST  37  /  ALT  37  /  AlkPhos  62  09-15    LIVER FUNCTIONS - ( 15 Sep 2020 00:36 )  Alb: 3.7 g/dL / Pro: 6.2 g/dL / ALK PHOS: 62 U/L / ALT: 37 U/L / AST: 37 U/L / GGT: x           PT/INR - ( 14 Sep 2020 11:51 )   PT: 13.3 sec;   INR: 1.12 ratio         PTT - ( 15 Sep 2020 06:03 )  PTT:58.7 sec  ABG - ( 15 Sep 2020 02:03 )  pH, Arterial: 7.40  pH, Blood: x     /  pCO2: 33    /  pO2: 110   / HCO3: 20    / Base Excess: -3.6  /  SaO2: 99                  ======================Micro/Rad/Cardio=================  CXR: Reviewed  Echo:Reviewed  ======================================================  PAST MEDICAL & SURGICAL HISTORY:  Hypothyroidism    Afib    Type II diabetes mellitus    Aortic insufficiency    Mitral insufficiency    CKD (chronic kidney disease)    Cardiomyopathy  Systolic CHF    Hypertension    History of tonsillectomy  childhood    AICD (automatic cardioverter/defibrillator) present  8/2012      ====================ASSESSMENT ==============  Acute on chronic systolic HF  Severe MR  pAF  EDIE on CKD stage 3- improving   Leukopenia  Thrombocytopenia   Hx Hypothyroidism   S/P Mitral Clip on 9/14    Plan:  ====================== NEUROLOGY=====================  Nonfocal, continue to monitor neuro status as per ICU protocol  Tylenol prn for analgesia    acetaminophen   Tablet .. 650 milliGRAM(s) Oral every 6 hours PRN Mild Pain (1 - 3)    ==================== RESPIRATORY======================  Comfortable on RA, SpO2 %.  Encourage incentive spirometry, continue pulse ox monitoring, follow ABGs     ====================CARDIOVASCULAR==================  Acute on chronic systolic HF  Severe MR s/p Mitral Clip 9/14  Continue with Amiodarone for rate control  Hydralazine and Coreg for afterload reduction  Inotropic support with Milrinone drip     aMIOdarone    Tablet 200 milliGRAM(s) Oral daily  carvedilol 6.25 milliGRAM(s) Oral every 12 hours  hydrALAZINE 10 milliGRAM(s) Oral every 8 hours  milrinone Infusion 0.25 MICROgram(s)/kG/Min (5.63 mL/Hr) IV Continuous <Continuous>    ===================HEMATOLOGIC/ONC ===================  Monitor H&H, PLTs  Continue anticoagulation therapy with heparin drip     heparin  Infusion 900 Unit(s)/Hr (8 mL/Hr) IV Continuous <Continuous>    ===================== RENAL =========================  EDIE on CKD, continue monitoring urine output, I&OS, BUN/Cr   Strict I&Os, daily standing weights, renally dose meds, avoid nephrotoxins    ==================== GASTROINTESTINAL===================  Tolerating PO DASH/TLC diet, sodium and cholesterol restricted.  Bowel regimen with Miralax prn     dextrose 5%. 1000 milliLiter(s) (50 mL/Hr) IV Continuous <Continuous>  polyethylene glycol 3350 17 Gram(s) Oral daily PRN Constipation    =======================    ENDOCRINE  =====================  Hx of DM2, glucose control with insulin drip, humalog sliding scale, and glucagon prn   Synthroid for hypothyroidism.    dextrose 40% Gel 15 Gram(s) Oral once PRN Blood Glucose LESS THAN 70 milliGRAM(s)/deciliter  dextrose 50% Injectable 12.5 Gram(s) IV Push once  dextrose 50% Injectable 25 Gram(s) IV Push once  glucagon  Injectable 1 milliGRAM(s) IntraMuscular once PRN Glucose LESS THAN 70 milligrams/deciliter  insulin lispro (HumaLOG) corrective regimen sliding scale   SubCutaneous at bedtime  insulin lispro (HumaLOG) corrective regimen sliding scale   SubCutaneous three times a day before meals  insulin regular Infusion 4 Unit(s)/Hr (4 mL/Hr) IV Continuous <Continuous>  levothyroxine 50 MICROGram(s) Oral daily    ========================INFECTIOUS DISEASE================  Afebrile, WBC within normal limits       Patient requires continuous monitoring with bedside rhythm monitoring, pulse ox monitoring, and intermittent blood gas analysis. Care plan discussed with ICU care team. Patient remained critical and at risk for life threatening decompensation.     By signing my name below, I, Bernarda Lofton, attest that this documentation has been prepared under the direction and in the presence of Puja Anglin NP   Electronically signed: Willis Wyatt, 09-15-20 @ 09:17    I, Puja Anglin, personally performed the services described in this documentation. all medical record entries made by the lynnetteibe were at my direction and in my presence. I have reviewed the chart and agree that the record reflects my personal performance and is accurate and complete  Electronically signed: Puja Anglin NP        MAIN BRASWELL  MRN-[id number]  Patient is a 75y old  Male who presents with a chief complaint of lower extremity edema (14 Sep 2020 21:45)    HPI:  74M w/ PMHx of HFrEF (EF 10%) s/p ICD, Afib w/ recent admission for CHF/afib s/p DCCV, CKD, DM2, hypothyroidism presents after being referred from CHF clinic for admission due to worsening of LE edema and failure of PO diuretics at home. Per patient was relatively functional until ~1 mo ago when he developed palpitations and light headedness. He was found to be hypotensive and in Afib w/ RVR resulting in his hospitalization at the end  of July. His course was c/b EDIE on CKD and persistent hypotension. He improved after dccv and was ultimately discharged home off entresto, coreg and diuretics due to c/f worsening hypotension. After discharge, patient notes worsening LE edema. He was instructed to resume lasix, which was ultimately escalated to toresemide 80mg bid w/ metolazone 2.5 mg daily. With this regimen he noted increased urination and some mild improvement. However, due to ongoing reduced functional capacity, fatigue, and ALCAZAR he was referred for admission for IV diuretics. Denies any cp or palpitations. Denies repaid HR. Notes weight gain since discharge. Continues to have poor appetite and reduced exercise capacity. No fevers, chills. Notes chronic nonproductive cough which is unchanged. Sleeps elevated due to back pain, denies significant orthopnea. Is able to complete ADLs at baseline with some assistance from his niece. Notes increased urination with high dose diuretics, but no dysuria or hematuria. No abd pain, nausea, vomiting. No diarrhea. (21 Aug 2020 09:51)      Surgery/Hospital Course:  9/10: Admitted to CICU s/p RHC and swan batsheva catheter placement for hemodynamic monitoring & optimization prior to tentative LHC and mitraclip  9/12: s/p 500 cc NS bolus for low CVP,  hydralazine increased to 37.5 mg  9/14: s/p Mitral Clip  9/15 POD #1 primacor decreased from .25mcg to .125mcg    Today/Overnight:   ·	Pt hemodynamically stable, remains on primacor .125mcg and heparin gtts.     REVIEW OF SYSTEMS:  Gen: No fever  EYES/ENT: No visual changes;  No vertigo or throat pain   NECK: No pain   RES:  No shortness of breath or Cough  CARD: No chest pain   GI: No abdominal pain  : No dysuria  NEURO: No weakness  SKIN: No itching, rashes     ICU Vital Signs Last 24 Hrs  T(C): 36.8 (15 Sep 2020 08:00), Max: 37.3 (15 Sep 2020 00:00)  T(F): 98.2 (15 Sep 2020 08:00), Max: 99.1 (15 Sep 2020 00:00)  HR: 80 (15 Sep 2020 08:00) (76 - 91)  BP: --  BP(mean): --  ABP: 99/43 (15 Sep 2020 07:00) (93/33 - 124/57)  ABP(mean): 61 (15 Sep 2020 08:00) (50 - 79)  RR: 14 (15 Sep 2020 07:00) (0 - 27)  SpO2: 97% (15 Sep 2020 08:00) (95% - 100%)      Physical Exam:  Gen:  Awake, alert   CNS: non focal 	  Neck: no JVD  RES : clear , no wheezing              CVS: Regular  rhythm. Normal S1/S2  Abd: Soft, non-distended. Bowel sounds present.  Skin: No rash.  Ext:  no edema    ============================I/O===========================   I&O's Detail    14 Sep 2020 07:01  -  15 Sep 2020 07:00  --------------------------------------------------------  IN:    Heparin: 101 mL    Heparin Infusion: 88 mL    Insulin: 14 mL    Milrinone: 107.1 mL    Oral Fluid: 500 mL    Sodium Chloride 0.9% Bolus: 100 mL  Total IN: 910.1 mL    OUT:    Voided (mL): 1200 mL  Total OUT: 1200 mL    Total NET: -289.9 mL      15 Sep 2020 07:01  -  15 Sep 2020 09:17  --------------------------------------------------------  IN:    Heparin: 16 mL    Milrinone: 11 mL    Sodium Chloride 0.9% Bolus: 20 mL  Total IN: 47 mL    OUT:    Insulin: 0 mL    Voided (mL): 275 mL  Total OUT: 275 mL    Total NET: -228 mL        ============================ LABS =========================                        8.9    3.87  )-----------( 85       ( 15 Sep 2020 00:36 )             27.8     09-15    134<L>  |  101  |  89<H>  ----------------------------<  245<H>  4.3   |  18<L>  |  2.53<H>    Ca    10.0      15 Sep 2020 00:36  Phos  4.5     09-15  Mg     2.6     09-15    TPro  6.2  /  Alb  3.7  /  TBili  0.7  /  DBili  x   /  AST  37  /  ALT  37  /  AlkPhos  62  09-15    LIVER FUNCTIONS - ( 15 Sep 2020 00:36 )  Alb: 3.7 g/dL / Pro: 6.2 g/dL / ALK PHOS: 62 U/L / ALT: 37 U/L / AST: 37 U/L / GGT: x           PT/INR - ( 14 Sep 2020 11:51 )   PT: 13.3 sec;   INR: 1.12 ratio         PTT - ( 15 Sep 2020 06:03 )  PTT:58.7 sec  ABG - ( 15 Sep 2020 02:03 )  pH, Arterial: 7.40  pH, Blood: x     /  pCO2: 33    /  pO2: 110   / HCO3: 20    / Base Excess: -3.6  /  SaO2: 99                  ======================Micro/Rad/Cardio=================  CXR: Reviewed  Echo:Reviewed  ======================================================  PAST MEDICAL & SURGICAL HISTORY:  Hypothyroidism    Afib    Type II diabetes mellitus    Aortic insufficiency    Mitral insufficiency    CKD (chronic kidney disease)    Cardiomyopathy  Systolic CHF    Hypertension    History of tonsillectomy  childhood    AICD (automatic cardioverter/defibrillator) present  8/2012      ====================ASSESSMENT ==============  Acute on chronic systolic HF  Severe MR  pAF  EDIE on CKD stage 3- improving   Leukopenia  Thrombocytopenia   Hx Hypothyroidism   S/P Mitral Clip on 9/14    Plan:  ====================== NEUROLOGY=====================  Nonfocal, continue to monitor neuro status as per ICU protocol  Tylenol prn for analgesia    acetaminophen   Tablet .. 650 milliGRAM(s) Oral every 6 hours PRN Mild Pain (1 - 3)    ==================== RESPIRATORY======================  Comfortable on RA, SpO2 %.  Encourage incentive spirometry, continue pulse ox monitoring, follow ABGs     ====================CARDIOVASCULAR==================  Acute on chronic systolic HF  Severe MR s/p Mitral Clip 9/14  Continue with Amiodarone for rate control  Hydralazine and Coreg for afterload reduction  Inotropic support with Milrinone drip   Monitor PA pressures and mixed/venous gases    aMIOdarone    Tablet 200 milliGRAM(s) Oral daily  carvedilol 6.25 milliGRAM(s) Oral every 12 hours  hydrALAZINE 10 milliGRAM(s) Oral every 8 hours  milrinone Infusion 0.125 MICROgram(s)/kG/Min    ===================HEMATOLOGIC/ONC ===================  Monitor H&H, PLTs  Continue anticoagulation therapy with heparin drip   No ASA as per structural heart team    heparin  Infusion 800 Unit(s)/Hr (8 mL/Hr) IV Continuous <Continuous>    ===================== RENAL =========================  EDIE on CKD, continue monitoring urine output, I&OS, BUN/Cr   Strict I&Os, daily standing weights, renally dose meds, avoid nephrotoxins    ==================== GASTROINTESTINAL===================  Tolerating PO DASH/TLC diet, sodium and cholesterol restricted.  Bowel regimen with Miralax prn       polyethylene glycol 3350 17 Gram(s) Oral daily PRN Constipation    =======================    ENDOCRINE  =====================  Hx of DM2, glucose control with insulin drip, humalog sliding scale  Synthroid for hypothyroidism    insulin lispro (HumaLOG) corrective regimen sliding scale   SubCutaneous at bedtime  insulin lispro (HumaLOG) corrective regimen sliding scale   SubCutaneous three times a day before meals  insulin regular Infusion 4 Unit(s)/Hr (4 mL/Hr) IV Continuous <Continuous>  levothyroxine 50 MICROGram(s) Oral daily    ========================INFECTIOUS DISEASE================  Afebrile, WBC within normal limits       Patient requires continuous monitoring with bedside rhythm monitoring, pulse ox monitoring, and intermittent blood gas analysis. Care plan discussed with ICU care team. Patient remained critical and at risk for life threatening decompensation.     By signing my name below, I, Bernarda Lofton, attest that this documentation has been prepared under the direction and in the presence of Puja Anglin, NP   Electronically signed: Willis Wyatt, 09-15-20 @ 09:17    I, Puja Anglin, personally performed the services described in this documentation. all medical record entries made by the scribe were at my direction and in my presence. I have reviewed the chart and agree that the record reflects my personal performance and is accurate and complete  Electronically signed: Puja Anglin, NP

## 2020-09-15 NOTE — PROGRESS NOTE ADULT - PROBLEM SELECTOR PLAN 3
- Currently in paced rhythm  - C/w home med amio 200 mg daily  - Continue heparin gtt - Currently BiV paced   - C/w home med amio 200 mg daily  - Continue heparin gtt

## 2020-09-15 NOTE — PROGRESS NOTE ADULT - SUBJECTIVE AND OBJECTIVE BOX
Patient seen and examined at bedside.    Overnight Events:   Yesterday received mitral clip.  Overnight low blood pressures and hydralazine dose decreased.   Pt. no complaints at rest    Current Meds:  acetaminophen   Tablet .. 650 milliGRAM(s) Oral every 6 hours PRN  aMIOdarone    Tablet 200 milliGRAM(s) Oral daily  carvedilol 6.25 milliGRAM(s) Oral every 12 hours  chlorhexidine 2% Cloths 1 Application(s) Topical <User Schedule>  dextrose 40% Gel 15 Gram(s) Oral once PRN  dextrose 5%. 1000 milliLiter(s) IV Continuous <Continuous>  dextrose 50% Injectable 12.5 Gram(s) IV Push once  dextrose 50% Injectable 25 Gram(s) IV Push once  dextrose 50% Injectable 25 Gram(s) IV Push once  glucagon  Injectable 1 milliGRAM(s) IntraMuscular once PRN  heparin  Infusion 900 Unit(s)/Hr IV Continuous <Continuous>  hydrALAZINE 10 milliGRAM(s) Oral every 8 hours  hydrocortisone 2.5% Rectal Cream 1 Application(s) Rectal daily PRN  insulin lispro (HumaLOG) corrective regimen sliding scale   SubCutaneous at bedtime  insulin lispro (HumaLOG) corrective regimen sliding scale   SubCutaneous three times a day before meals  levothyroxine 50 MICROGram(s) Oral daily  milrinone Infusion 0.125 MICROgram(s)/kG/Min IV Continuous <Continuous>  polyethylene glycol 3350 17 Gram(s) Oral daily PRN        Vitals:  T(F): 98.2 (09-15), Max: 99.1 (09-15)  HR: 65 (09-15) (65 - 91)  BP: --  RR: 0 (09-15)  SpO2: 95% (09-15)  I&O's Summary    14 Sep 2020 07:01  -  15 Sep 2020 07:00  --------------------------------------------------------  IN: 910.1 mL / OUT: 1200 mL / NET: -289.9 mL    15 Sep 2020 07:01  -  15 Sep 2020 10:35  --------------------------------------------------------  IN: 47 mL / OUT: 275 mL / NET: -228 mL        Physical Exam:  Appearance: No acute distress; well appearing  Eyes: PERRL, EOMI, pink conjunctiva  HENT: Normal oral mucosa  Cardiovascular: RRR, S1, S2, no murmurs rubs, or gallops; no edema; no JVD  Respiratory: Clear to auscultation bilaterally  Gastrointestinal: soft, non-tender, non-distended with normal bowel sounds  Musculoskeletal: No clubbing; no joint deformity   Neurologic: Non-focal  Lymphatic: No lymphadenopathy  Psychiatry: AAOx3, mood & affect appropriate  Skin: No rashes, ecchymoses, or cyanosis                          8.9    3.87  )-----------( 85       ( 15 Sep 2020 00:36 )             27.8     09-15    134<L>  |  101  |  89<H>  ----------------------------<  245<H>  4.3   |  18<L>  |  2.53<H>    Ca    10.0      15 Sep 2020 00:36  Phos  4.5     09-15  Mg     2.6     09-15    TPro  6.2  /  Alb  3.7  /  TBili  0.7  /  DBili  x   /  AST  37  /  ALT  37  /  AlkPhos  62  09-15    PT/INR - ( 14 Sep 2020 11:51 )   PT: 13.3 sec;   INR: 1.12 ratio         PTT - ( 15 Sep 2020 06:03 )  PTT:58.7 sec              New ECG(s): Personally reviewed    Echo:    Stress Testing:     Cath:    Imaging:    Interpretation of Telemetry:  roel

## 2020-09-15 NOTE — DISCHARGE NOTE NURSING/CASE MANAGEMENT/SOCIAL WORK - PATIENT PORTAL LINK FT
You can access the FollowMyHealth Patient Portal offered by [facility] by registering at the following website: [email or web address]. By joining [facility] [employer]’s FollowMyHealth portal, you will also be able to view your health information using other applications (apps) compatible with our system.

## 2020-09-16 NOTE — PROGRESS NOTE ADULT - PROBLEM SELECTOR PLAN 2
- continue to hold diuretics  - avoid nephrotoxic medications - continue to hold diuretics  - avoid nephrotoxic medications  - if renal function worsens after stopping milrinone will restart in spite of hemodynamics to see if creatinine improves

## 2020-09-16 NOTE — PROGRESS NOTE ADULT - PROBLEM SELECTOR PLAN 1
- would d/c milrinone.   - Continue PAC monitoring, resend swan numbers and mvo2 from [facility].   - Follow central venous saturations from [facility] given iatrogenic ASD   - Possible swan pull today  - Continue carvedilol 6.25 mg BID  - Hydralizine/isordil decreased 2/2 low BP, given preserved cardiac output and renal dysfunction, will favor persistent MAP > 65 mmHg and avoid hypotension that would worsen ATN. Will uptitrate as allowed.   - Under evaluation for LVAD as destination therapy (though thought poor candidate at this time). He is not a heart transplant candidate given age. - would d/c milrinone.   - Follow central venous saturations from [facility] given iatrogenic ASD   - Can d/c PA catheter and keep cordis   - Continue carvedilol 6.25 mg BID  - Hydralizine/isordil decreased 2/2 low BP, given preserved cardiac output and renal dysfunction, will favor persistent MAP > 65 mmHg and avoid hypotension that would worsen ATN. Will uptitrate as allowed.   - Under evaluation for LVAD as destination therapy (though thought poor candidate at this time). He is not a heart transplant candidate given age.

## 2020-09-16 NOTE — PROGRESS NOTE ADULT - ASSESSMENT
Mr. 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 He was started on inotropic support with milrinone and diuresed over 20 lbs. He subsequently had worsening renal function, likely from overdiuresis. An LVAD evaluation was initiated but at the time was decided that his frailty and advanced renal dysfunction would make him a poor candidate. He underwent MitraClip 9/14 with placement of 1 clip and reduction of MR to mild. At this time he has low filling pressures with preserved cardiac output with stable renal function. Will wean milrinone while following hemodynamics.         Hemodynamic data after clip:  9/15 (on milrinone 0.25 mcg/kg/mi): CVP 5 PA 47/13 PCW 13, SVC saturation 68% with Corey CO/CI 5.8/3.1 and TD CO/CI 7/3.7. MAP 70 mmHg   9/16 (on milrinone .125 mcg/kg/min) CVP 7, PA 48/15 unable to wedge. thermodilution CI 2.5,  Mr. 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 He was started on inotropic support with milrinone and diuresed over 20 lbs. He subsequently had worsening renal function, likely from overdiuresis. An LVAD evaluation was initiated but at the time was decided that his frailty and advanced renal dysfunction would make him a poor candidate. He underwent MitraClip 9/14 with placement of 1 clip and reduction of MR to mild. At this time he has low filling pressures with preserved cardiac output with stable renal function. Will wean milrinone while following hemodynamics.         Hemodynamic data after clip:  9/15 (on milrinone 0.25 mcg/kg/mi): CVP 5 PA 47/13 PCW 13, SVC saturation 68% with Corey CO/CI 5.8/3.1 and TD CO/CI 7/3.7. MAP 70 mmHg   9/16 (on milrinone .125 mcg/kg/min) CVP 7, PA 48/15 unable to wedge. thermodilution CI 2.5, Corey CI 2.8

## 2020-09-16 NOTE — PROGRESS NOTE ADULT - SUBJECTIVE AND OBJECTIVE BOX
Patient seen and examined at bedside.    Overnight Events:   No overnight events.   This morning decreased milrinone.   Pt. feels ok  Has not been able to walk around because of swan    Current Meds:  acetaminophen   Tablet .. 650 milliGRAM(s) Oral every 6 hours PRN  aMIOdarone    Tablet 200 milliGRAM(s) Oral daily  carvedilol 6.25 milliGRAM(s) Oral every 12 hours  chlorhexidine 2% Cloths 1 Application(s) Topical <User Schedule>  dextrose 40% Gel 15 Gram(s) Oral once PRN  dextrose 5%. 1000 milliLiter(s) IV Continuous <Continuous>  dextrose 50% Injectable 12.5 Gram(s) IV Push once  dextrose 50% Injectable 25 Gram(s) IV Push once  dextrose 50% Injectable 25 Gram(s) IV Push once  glucagon  Injectable 1 milliGRAM(s) IntraMuscular once PRN  heparin  Infusion 900 Unit(s)/Hr IV Continuous <Continuous>  hydrALAZINE 10 milliGRAM(s) Oral every 8 hours  hydrocortisone 2.5% Rectal Cream 1 Application(s) Rectal daily PRN  insulin lispro (HumaLOG) corrective regimen sliding scale   SubCutaneous at bedtime  insulin lispro (HumaLOG) corrective regimen sliding scale   SubCutaneous three times a day before meals  levothyroxine 50 MICROGram(s) Oral daily  milrinone Infusion 0.125 MICROgram(s)/kG/Min IV Continuous <Continuous>  polyethylene glycol 3350 17 Gram(s) Oral daily PRN        Vitals:  T(F): 98.2 (09-16), Max: 99 (09-15)  HR: 80 (09-16) (53 - 82)  BP: --  RR: 116 (09-16)  SpO2: 99% (09-16)  I&O's Summary    15 Sep 2020 07:01  -  16 Sep 2020 07:00  --------------------------------------------------------  IN: 1397.4 mL / OUT: 1450 mL / NET: -52.6 mL    16 Sep 2020 07:01  -  16 Sep 2020 12:02  --------------------------------------------------------  IN: 304 mL / OUT: 350 mL / NET: -46 mL        Physical Exam:  Appearance: No acute distress; well appearing  Eyes: PERRL, EOMI, pink conjunctiva  HENT: Normal oral mucosa  Cardiovascular: RRR, S1, S2, 2/6 holosystolic murmur loudest over sternal border, rubs, or gallops; no edema; elevated JVP. RIght IJ swan  Respiratory: Clear to auscultation bilaterally  Gastrointestinal: soft, non-tender, non-distended with normal bowel sounds  Musculoskeletal: No clubbing; no joint deformity   Neurologic: Non-focal  Lymphatic: No lymphadenopathy  Psychiatry: AAOx3, mood & affect appropriate  Skin: No rashes, ecchymoses, or cyanosis                          8.8    7.34  )-----------( 76       ( 16 Sep 2020 00:37 )             27.3     09-16    133<L>  |  101  |  98<H>  ----------------------------<  164<H>  4.6   |  18<L>  |  2.74<H>    Ca    9.8      16 Sep 2020 00:37  Phos  4.4     09-16  Mg     2.4     09-16    TPro  6.2  /  Alb  3.7  /  TBili  0.6  /  DBili  x   /  AST  44<H>  /  ALT  46<H>  /  AlkPhos  63  09-16    PT/INR - ( 16 Sep 2020 01:08 )   PT: 12.8 sec;   INR: 1.08 ratio         PTT - ( 16 Sep 2020 01:08 )  PTT:50.5 sec              New ECG(s): Personally reviewed    Echo:    Stress Testing:     Cath:    Imaging:    Interpretation of Telemetry:

## 2020-09-16 NOTE — PROGRESS NOTE ADULT - ASSESSMENT
74M w/ PMHx of HFrEF (EF 10%) s/p ICD, Afib w/ recent admission for CHF ex/afib s/p DCCV (on july 2020), CKD3, DM2, hypothyroidism presents after being referred from CHF clinic for admission due to worsening of LE edema and failure of PO diuretics at home.    Nephrology consulted for DEIE    #EDIE on CKD  Pt with EDIE in the setting of HFrEF requiring aggressive IV diuresis-  Baseline sCr 2.0 - 2.4.  On admission sCr 2.4 - peaked to 3.43.  EDIE 2/2 cardio-renal, ?overdiuresis vs ATN -- initially requiring Bumex infusion now off     s/p mitral valve clip on 9/14  currently sCr stable 2.5>2.7   UOP: 1.4L in the past 24hrs  strict I/O, daily weights    Monitor labs and urine output.   Avoid NSAIDs, ACEI/ARBS, RCA and nephrotoxins.   Dose medications as per eGFR.

## 2020-09-16 NOTE — PROGRESS NOTE ADULT - SUBJECTIVE AND OBJECTIVE BOX
[facility] DIVISION OF KIDNEY DISEASES AND HYPERTENSION -- FOLLOW UP NOTE  --------------------------------------------------------------------------------  If any questions, please feel free to contact me  NS pager: [phone], [facility]: [phone]  Arley Ferguson M.D.  Nephrology Fellow    (After 5 pm or on weekends please page the on-call fellow)  --------------------------------------------------------------------------------    Chief Complaint:  Patient is a 75y old  Male who presents with a chief complaint of lower extremity edema (16 Sep 2020 09:24)    24 hour events/subjective:  Patient seen and examined at bedside, in NAD -- s/p mitral clip on 9/14  no acute events overnight. UOP: 1.4L - sCr has remained stable  at 2.5>2.7  vitals/labs/imaging reviewed.       PAST HISTORY  --------------------------------------------------------------------------------  No significant changes to PMH, PSH, FHx, SHx, unless otherwise noted    ALLERGIES & MEDICATIONS  --------------------------------------------------------------------------------  Allergies    No Known Allergies    Intolerances      Standing Inpatient Medications  aMIOdarone    Tablet 200 milliGRAM(s) Oral daily  carvedilol 6.25 milliGRAM(s) Oral every 12 hours  chlorhexidine 2% Cloths 1 Application(s) Topical <User Schedule>  dextrose 5%. 1000 milliLiter(s) IV Continuous <Continuous>  dextrose 50% Injectable 12.5 Gram(s) IV Push once  dextrose 50% Injectable 25 Gram(s) IV Push once  dextrose 50% Injectable 25 Gram(s) IV Push once  heparin  Infusion 900 Unit(s)/Hr IV Continuous <Continuous>  hydrALAZINE 10 milliGRAM(s) Oral every 8 hours  insulin lispro (HumaLOG) corrective regimen sliding scale   SubCutaneous at bedtime  insulin lispro (HumaLOG) corrective regimen sliding scale   SubCutaneous three times a day before meals  levothyroxine 50 MICROGram(s) Oral daily  milrinone Infusion 0.125 MICROgram(s)/kG/Min IV Continuous <Continuous>    PRN Inpatient Medications  acetaminophen   Tablet .. 650 milliGRAM(s) Oral every 6 hours PRN  dextrose 40% Gel 15 Gram(s) Oral once PRN  glucagon  Injectable 1 milliGRAM(s) IntraMuscular once PRN  hydrocortisone 2.5% Rectal Cream 1 Application(s) Rectal daily PRN  polyethylene glycol 3350 17 Gram(s) Oral daily PRN      REVIEW OF SYSTEMS  --------------------------------------------------------------------------------  Gen: No fevers/chills  Skin: No rashes  Head/Eyes/Ears: Normal hearing,   Respiratory: No dyspnea, cough  CV: No chest pain  GI: No abdominal pain, diarrhea  : No dysuria, hematuria  MSK: No  edema  Heme: No easy bruising or bleeding  Psych: No significant depression      All other systems were reviewed and are negative, except as noted.    VITALS/PHYSICAL EXAM  --------------------------------------------------------------------------------  T(C): 36.8 (09-16-20 @ 07:00), Max: 37.2 (09-15-20 @ 20:00)  HR: 80 (09-16-20 @ 09:00) (53 - 82)  BP: --  RR: 17 (09-16-20 @ 09:00) (10 - 29)  SpO2: 97% (09-16-20 @ 09:00) (95% - 100%)  Wt(kg): --        09-15-20 @ 07:01  -  09-16-20 @ 07:00  --------------------------------------------------------  IN: 1397.4 mL / OUT: 1450 mL / NET: -52.6 mL        Physical Exam:  	Gen: NAD, cooperative  	HEENT: MMM  	Pulm: CTA B/L, no wheezing anteriorly   	CV: S1S2,   	Abd: Soft, +BS, NT, ND  	Ext: LE edema B/L  	Neuro: Awake, Alert and oriented x3  	Skin: Warm and dry              : no tenderness to palpation               Psych: normal affect and mood  :      LABS/STUDIES  --------------------------------------------------------------------------------              8.8    7.34  >-----------<  76       [09-16-20 @ 00:37]              27.3     133  |  101  |  98  ----------------------------<  164      [09-16-20 @ 00:37]  4.6   |  18  |  2.74        Ca     9.8     [09-16-20 @ 00:37]      Mg     2.4     [09-16-20 @ 00:37]      Phos  4.4     [09-16-20 @ 00:37]    TPro  6.2  /  Alb  3.7  /  TBili  0.6  /  DBili  x   /  AST  44  /  ALT  46  /  AlkPhos  63  [09-16-20 @ 00:37]    PT/INR: PT 12.8 , INR 1.08       [09-16-20 @ 01:08]  PTT: 50.5       [09-16-20 @ 01:08]      Creatinine Trend:  SCr 2.74 [09-16 @ 00:37]  SCr 2.53 [09-15 @ 00:36]  SCr 2.34 [09-14 @ 11:51]  SCr 2.49 [09-14 @ 03:00]  SCr 2.63 [09-13 @ 00:42]    Urinalysis - [09-09-20 @ 04:31]      Color Light Yellow / Appearance Clear / SG 1.010 / pH 6.5      Gluc Negative / Ketone Negative  / Bili Negative / Urobili <2 mg/dL       Blood Negative / Protein Negative / Leuk Est Negative / Nitrite Negative      RBC  / WBC  / Hyaline  / Gran  / Sq Epi  / Non Sq Epi  / Bacteria       Iron 36, TIBC 314, %sat 12      [09-08-20 @ 08:45]  Ferritin 145      [09-08-20 @ 08:45]  Vitamin D (25OH) 21.7      [07-18-20 @ 10:13]  TSH 3.60      [09-11-20 @ 05:45]  Lipid: chol 116, TG 55, HDL 48, LDL 58      [08-26-20 @ 00:17]    HBsAb Nonreact      [08-26-20 @ 02:02]  HBsAg Nonreact      [08-26-20 @ 02:02]  HBcAb Nonreact      [08-26-20 @ 02:02]  HCV 0.10, Nonreact      [08-26-20 @ 02:02]    Rheumatoid Factor <10      [08-26-20 @ 00:17]  Syphilis Screen (Treponema Pallidum Ab) Negative      [08-26-20 @ 04:26]  Free Light Chains: kappa 6.27, lambda 4.60, ratio = 1.36      [08-26 @ 04:46]  Immunofixation Serum: No Monoclonal Band Identified    Reference Range: None Detected      [08-26-20 @ 04:46]  SPEP Interpretation: Normal Electrophoresis Pattern      [08-26-20 @ 04:46]

## 2020-09-16 NOTE — PROGRESS NOTE ADULT - ATTENDING COMMENTS
I have seen this patient with the fellow and agree with their assessment and plan. In addition, Mr. 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 He subsequently had worsening renal function at that time. He underwent MitraClip 9/14 with placement of 1 clip and reduction of MR to mild. At this time he has low filling pressures with preserved cardiac output with stable renal function. Will wean milrinone while following hemodynamics.  Crt rising since then again. Filling pressures are low  No diuresis for now  as needed only  No RRT yet    Yemi Rees MD  Cell   Pager   Office

## 2020-09-16 NOTE — PROGRESS NOTE ADULT - SUBJECTIVE AND OBJECTIVE BOX
MAIN BRASWELL  MRN-[id number]  Patient is a 75y old  Male who presents with a chief complaint of lower extremity edema (15 Sep 2020 19:31)    HPI:  74M w/ PMHx of HFrEF (EF 10%) s/p ICD, Afib w/ recent admission for CHF/afib s/p DCCV, CKD, DM2, hypothyroidism presents after being referred from CHF clinic for admission due to worsening of LE edema and failure of PO diuretics at home. Per patient was relatively functional until ~1 mo ago when he developed palpitations and light headedness. He was found to be hypotensive and in Afib w/ RVR resulting in his hospitalization at the end  of July. His course was c/b EDIE on CKD and persistent hypotension. He improved after dccv and was ultimately discharged home off entresto, coreg and diuretics due to c/f worsening hypotension. After discharge, patient notes worsening LE edema. He was instructed to resume lasix, which was ultimately escalated to toresemide 80mg bid w/ metolazone 2.5 mg daily. With this regimen he noted increased urination and some mild improvement. However, due to ongoing reduced functional capacity, fatigue, and ALCAZAR he was referred for admission for IV diuretics. Denies any cp or palpitations. Denies repaid HR. Notes weight gain since discharge. Continues to have poor appetite and reduced exercise capacity. No fevers, chills. Notes chronic nonproductive cough which is unchanged. Sleeps elevated due to back pain, denies significant orthopnea. Is able to complete ADLs at baseline with some assistance from his niece. Notes increased urination with high dose diuretics, but no dysuria or hematuria. No abd pain, nausea, vomiting. No diarrhea. (21 Aug 2020 09:51)      Surgery/Hospital Course:  9/10: Admitted to CICU s/p RHC and swan batsheva catheter placement for hemodynamic monitoring & optimization prior to tentative LHC and mitraclip  9/12: s/p 500 cc NS bolus for low CVP,  hydralazine increased to 37.5 mg  9/14: s/p Mitral Clip  9/15 POD #1 primacor decreased from .25mcg to .125mcg    Today/Overnight:   ·	    REVIEW OF SYSTEMS:  Gen: No fever  EYES/ENT: No visual changes;  No vertigo or throat pain   NECK: No pain   RES:  No shortness of breath or Cough  CARD: No chest pain   GI: No abdominal pain  : No dysuria  NEURO: No weakness  SKIN: No itching, rashes     ICU Vital Signs Last 24 Hrs  T(C): 36.8 (16 Sep 2020 07:00), Max: 37.2 (15 Sep 2020 20:00)  T(F): 98.2 (16 Sep 2020 07:00), Max: 99 (15 Sep 2020 20:00)  HR: 80 (16 Sep 2020 08:00) (53 - 82)  BP: --  BP(mean): --  ABP: 104/43 (16 Sep 2020 08:00) (93/38 - 126/52)  ABP(mean): 62 (16 Sep 2020 08:00) (51 - 112)  RR: 17 (16 Sep 2020 08:00) (10 - 29)  SpO2: 97% (16 Sep 2020 08:00) (95% - 100%)      Physical Exam:  Gen:  Awake, alert   CNS: non focal 	  Neck: no JVD  RES : clear , no wheezing              CVS: Regular  rhythm. Normal S1/S2  Abd: Soft, non-distended. Bowel sounds present.  Skin: No rash.  Ext:  no edema    ============================I/O===========================   I&O's Detail    15 Sep 2020 07:01  -  16 Sep 2020 07:00  --------------------------------------------------------  IN:    Heparin: 192 mL    IV PiggyBack: 50 mL    Milrinone: 16.5 mL    Milrinone: 58.9 mL    Oral Fluid: 840 mL    Sodium Chloride 0.9% Bolus: 240 mL  Total IN: 1397.4 mL    OUT:    Insulin: 0 mL    Voided (mL): 1450 mL  Total OUT: 1450 mL    Total NET: -52.6 mL        ============================ LABS =========================                        8.8    7.34  )-----------( 76       ( 16 Sep 2020 00:37 )             27.3     09-16    133<L>  |  101  |  98<H>  ----------------------------<  164<H>  4.6   |  18<L>  |  2.74<H>    Ca    9.8      16 Sep 2020 00:37  Phos  4.4     09-16  Mg     2.4     09-16    TPro  6.2  /  Alb  3.7  /  TBili  0.6  /  DBili  x   /  AST  44<H>  /  ALT  46<H>  /  AlkPhos  63  09-16    LIVER FUNCTIONS - ( 16 Sep 2020 00:37 )  Alb: 3.7 g/dL / Pro: 6.2 g/dL / ALK PHOS: 63 U/L / ALT: 46 U/L / AST: 44 U/L / GGT: x           PT/INR - ( 16 Sep 2020 01:08 )   PT: 12.8 sec;   INR: 1.08 ratio         PTT - ( 16 Sep 2020 01:08 )  PTT:50.5 sec  ABG - ( 16 Sep 2020 00:24 )  pH, Arterial: 7.39  pH, Blood: x     /  pCO2: 33    /  pO2: 122   / HCO3: 19    / Base Excess: -4.4  /  SaO2: 99                  ======================Micro/Rad/Cardio=================  Culture: Reviewed   CXR: Reviewed  Echo:Reviewed  ======================================================  PAST MEDICAL & SURGICAL HISTORY:  Hypothyroidism    Afib    Type II diabetes mellitus    Aortic insufficiency    Mitral insufficiency    CKD (chronic kidney disease)    Cardiomyopathy  Systolic CHF    Hypertension    History of tonsillectomy  childhood    AICD (automatic cardioverter/defibrillator) present  8/2012      ====================ASSESSMENT ==============  Acute on chronic systolic HF  Severe MR  pAF  EDIE on CKD stage 3- improving   Leukopenia  Thrombocytopenia   Hx Hypothyroidism   S/P Mitral Clip on 9/14    Plan:  ====================== NEUROLOGY=====================  Nonfocal, continue to monitor neuro status   Tylenol prn for analgesia     acetaminophen   Tablet .. 650 milliGRAM(s) Oral every 6 hours PRN Mild Pain (1 - 3)    ==================== RESPIRATORY======================  Comfortable on RA, SpO2 %.  Encourage incentive spirometry, continue pulse ox monitoring, follow ABGs     ====================CARDIOVASCULAR==================  Acute on chronic systolic HF  Severe MR s/p Mitral Clip 9/14  Continue with Amiodarone for rate control  Hydralazine and Coreg for afterload reduction  Inotropic support with Milrinone drip   Monitor PA pressures and mixed/venous gases    aMIOdarone    Tablet 200 milliGRAM(s) Oral daily  carvedilol 6.25 milliGRAM(s) Oral every 12 hours  hydrALAZINE 10 milliGRAM(s) Oral every 8 hours  milrinone Infusion 0.125 MICROgram(s)/kG/Min (2.82 mL/Hr) IV Continuous <Continuous>    ===================HEMATOLOGIC/ONC ===================  Monitor H&H, PLTs  Continue anticoagulation therapy with heparin drip   No ASA as per structural heart team    heparin  Infusion 900 Unit(s)/Hr (8 mL/Hr) IV Continuous <Continuous>     ===================== RENAL =========================  EDIE on CKD, continue monitoring urine output, I&OS, BUN/Cr   Strict I&Os, daily standing weights, renally dose meds, avoid nephrotoxins    ==================== GASTROINTESTINAL===================  Tolerating PO DASH/TLC diet, sodium and cholesterol restricted.  Bowel regimen with Miralax prn.    dextrose 5%. 1000 milliLiter(s) (50 mL/Hr) IV Continuous <Continuous>  polyethylene glycol 3350 17 Gram(s) Oral daily PRN Constipation    =======================    ENDOCRINE  =====================  Hx of DM2, glucose control with Humalog sliding scale and Glucagon PRN.  Synthroid for hypothyroidism    dextrose 40% Gel 15 Gram(s) Oral once PRN Blood Glucose LESS THAN 70 milliGRAM(s)/deciliter  dextrose 50% Injectable 12.5 Gram(s) IV Push once  dextrose 50% Injectable 25 Gram(s) IV Push once  glucagon  Injectable 1 milliGRAM(s) IntraMuscular once PRN Glucose LESS THAN 70 milligrams/deciliter  insulin lispro (HumaLOG) corrective regimen sliding scale   SubCutaneous at bedtime  insulin lispro (HumaLOG) corrective regimen sliding scale   SubCutaneous three times a day before meals  levothyroxine 50 MICROGram(s) Oral daily    ========================INFECTIOUS DISEASE================  Afebrile, WBC within normal limits       Patient requires continuous monitoring with bedside rhythm monitoring, pulse ox monitoring, and intermittent blood gas analysis. Care plan discussed with ICU care team. Patient remained critical and at risk for life threatening decompensation.     By signing my name below, I, Bernarda Lofton, attest that this documentation has been prepared under the direction and in the presence of Puja Anglin NP   Electronically signed: Willis Wyatt, 09-16-20 @ 09:25    I, Puja Anglin, personally performed the services described in this documentation. all medical record entries made by the lynnetteibe were at my direction and in my presence. I have reviewed the chart and agree that the record reflects my personal performance and is accurate and complete  Electronically signed: Puja Anglin, NP        MAIN BRAWSELL  MRN-[id number]  Patient is a 75y old  Male who presents with a chief complaint of lower extremity edema (15 Sep 2020 19:31)    HPI:  74M w/ PMHx of HFrEF (EF 10%) s/p ICD, Afib w/ recent admission for CHF/afib s/p DCCV, CKD, DM2, hypothyroidism presents after being referred from CHF clinic for admission due to worsening of LE edema and failure of PO diuretics at home. Per patient was relatively functional until ~1 mo ago when he developed palpitations and light headedness. He was found to be hypotensive and in Afib w/ RVR resulting in his hospitalization at the end  of July. His course was c/b EDIE on CKD and persistent hypotension. He improved after dccv and was ultimately discharged home off entresto, coreg and diuretics due to c/f worsening hypotension. After discharge, patient notes worsening LE edema. He was instructed to resume lasix, which was ultimately escalated to toresemide 80mg bid w/ metolazone 2.5 mg daily. With this regimen he noted increased urination and some mild improvement. However, due to ongoing reduced functional capacity, fatigue, and ALCAZAR he was referred for admission for IV diuretics. Denies any cp or palpitations. Denies repaid HR. Notes weight gain since discharge. Continues to have poor appetite and reduced exercise capacity. No fevers, chills. Notes chronic nonproductive cough which is unchanged. Sleeps elevated due to back pain, denies significant orthopnea. Is able to complete ADLs at baseline with some assistance from his niece. Notes increased urination with high dose diuretics, but no dysuria or hematuria. No abd pain, nausea, vomiting. No diarrhea. (21 Aug 2020 09:51)      Surgery/Hospital Course:  9/10: Admitted to CICU s/p RHC and swan batsheva catheter placement for hemodynamic monitoring & optimization prior to tentative LHC and mitraclip  9/12: s/p 500 cc NS bolus for low CVP,  hydralazine increased to 37.5 mg  9/14: s/p Mitral Clip  9/15 POD #1 primacor decreased from .25mcg to .125mcg    Today/Overnight:   ·	Pt hemodynamically stable, primacor weaned off.     REVIEW OF SYSTEMS:  Gen: No fever  EYES/ENT: No visual changes;  No vertigo or throat pain   NECK: No pain   RES:  No shortness of breath or Cough  CARD: No chest pain   GI: No abdominal pain  : No dysuria  NEURO: No weakness  SKIN: No itching, rashes     ICU Vital Signs Last 24 Hrs  T(C): 36.8 (16 Sep 2020 07:00), Max: 37.2 (15 Sep 2020 20:00)  T(F): 98.2 (16 Sep 2020 07:00), Max: 99 (15 Sep 2020 20:00)  HR: 80 (16 Sep 2020 08:00) (53 - 82)  BP: --  BP(mean): --  ABP: 104/43 (16 Sep 2020 08:00) (93/38 - 126/52)  ABP(mean): 62 (16 Sep 2020 08:00) (51 - 112)  RR: 17 (16 Sep 2020 08:00) (10 - 29)  SpO2: 97% (16 Sep 2020 08:00) (95% - 100%)      Physical Exam:  Gen:  Awake, alert   CNS: non focal 	  Neck: no JVD  RES : clear , no wheezing              CVS: Regular  rhythm. Normal S1/S2  Abd: Soft, non-distended. Bowel sounds present.  Skin: No rash.  Ext:  no edema    ============================I/O===========================   I&O's Detail    15 Sep 2020 07:01  -  16 Sep 2020 07:00  --------------------------------------------------------  IN:    Heparin: 192 mL    IV PiggyBack: 50 mL    Milrinone: 16.5 mL    Milrinone: 58.9 mL    Oral Fluid: 840 mL    Sodium Chloride 0.9% Bolus: 240 mL  Total IN: 1397.4 mL    OUT:    Insulin: 0 mL    Voided (mL): 1450 mL  Total OUT: 1450 mL    Total NET: -52.6 mL        ============================ LABS =========================                        8.8    7.34  )-----------( 76       ( 16 Sep 2020 00:37 )             27.3     09-16    133<L>  |  101  |  98<H>  ----------------------------<  164<H>  4.6   |  18<L>  |  2.74<H>    Ca    9.8      16 Sep 2020 00:37  Phos  4.4     09-16  Mg     2.4     09-16    TPro  6.2  /  Alb  3.7  /  TBili  0.6  /  DBili  x   /  AST  44<H>  /  ALT  46<H>  /  AlkPhos  63  09-16    LIVER FUNCTIONS - ( 16 Sep 2020 00:37 )  Alb: 3.7 g/dL / Pro: 6.2 g/dL / ALK PHOS: 63 U/L / ALT: 46 U/L / AST: 44 U/L / GGT: x           PT/INR - ( 16 Sep 2020 01:08 )   PT: 12.8 sec;   INR: 1.08 ratio         PTT - ( 16 Sep 2020 01:08 )  PTT:50.5 sec  ABG - ( 16 Sep 2020 00:24 )  pH, Arterial: 7.39  pH, Blood: x     /  pCO2: 33    /  pO2: 122   / HCO3: 19    / Base Excess: -4.4  /  SaO2: 99                  ======================Micro/Rad/Cardio=================  Culture: Reviewed   CXR: Reviewed  Echo:Reviewed  ======================================================  PAST MEDICAL & SURGICAL HISTORY:  Hypothyroidism    Afib    Type II diabetes mellitus    Aortic insufficiency    Mitral insufficiency    CKD (chronic kidney disease)    Cardiomyopathy  Systolic CHF    Hypertension    History of tonsillectomy  childhood    AICD (automatic cardioverter/defibrillator) present  8/2012      ====================ASSESSMENT ==============  Acute on chronic systolic HF  Severe MR  pAF  EDIE on CKD stage 3- improving   Leukopenia  Thrombocytopenia   Hx Hypothyroidism   S/P Mitral Clip on 9/14    Plan:  ====================== NEUROLOGY=====================  Nonfocal, continue to monitor neuro status   Tylenol prn for analgesia     acetaminophen   Tablet .. 650 milliGRAM(s) Oral every 6 hours PRN Mild Pain (1 - 3)    ==================== RESPIRATORY======================  Comfortable on RA, SpO2 %.  Encourage incentive spirometry, continue pulse ox monitoring, follow ABGs     ====================CARDIOVASCULAR==================  Acute on chronic systolic HF  Severe MR s/p Mitral Clip 9/14  Continue with Amiodarone for rate control  Hydralazine and Coreg for afterload reduction  Monitor PA pressures and mixed/venous gases while primacor off  plan to d/c swan if hemodynamically stable post primacor discontinutaion    aMIOdarone    Tablet 200 milliGRAM(s) Oral daily  carvedilol 6.25 milliGRAM(s) Oral every 12 hours  hydrALAZINE 10 milliGRAM(s) Oral every 8 hours    ===================HEMATOLOGIC/ONC ===================  Monitor H&H, PLTs  Continue anticoagulation therapy with heparin drip   No ASA as per structural heart team    heparin  Infusion 900 Unit(s)/Hr (8 mL/Hr) IV Continuous <Continuous>     ===================== RENAL =========================  EDIE on CKD, continue monitoring urine output, I&OS, BUN/Cr   Strict I&Os, daily standing weights, renally dose meds, avoid nephrotoxins    ==================== GASTROINTESTINAL===================  Tolerating PO DASH/TLC diet, sodium and cholesterol restricted.  Bowel regimen with Miralax prn.    polyethylene glycol 3350 17 Gram(s) Oral daily PRN Constipation    =======================    ENDOCRINE  =====================  Hx of DM2, glucose control with Humalog sliding scale and Glucagon PRN.  Synthroid for hypothyroidism    insulin lispro (HumaLOG) corrective regimen sliding scale   SubCutaneous at bedtime  insulin lispro (HumaLOG) corrective regimen sliding scale   SubCutaneous three times a day before meals  levothyroxine 50 MICROGram(s) Oral daily    ========================INFECTIOUS DISEASE================  Afebrile, WBC within normal limits       Patient requires continuous monitoring with bedside rhythm monitoring, pulse ox monitoring, and intermittent blood gas analysis. Care plan discussed with ICU care team. Patient remained critical and at risk for life threatening decompensation.     By signing my name below, I, Bernarda Lofton, attest that this documentation has been prepared under the direction and in the presence of Puja Anglin NP   Electronically signed: Jasmeet Wyatt, 09-16-20 @ 09:25    I, Puja Anglin, personally performed the services described in this documentation. all medical record entries made by the jasmeet were at my direction and in my presence. I have reviewed the chart and agree that the record reflects my personal performance and is accurate and complete  Electronically signed: Puja Anglin NP

## 2020-09-17 NOTE — PROGRESS NOTE ADULT - SUBJECTIVE AND OBJECTIVE BOX
MAIN BRASWELL  MRN-[id number]  Patient is a 75y old  Male who presents with a chief complaint of lower extremity edema (17 Sep 2020 08:57)    HPI:  74M w/ PMHx of HFrEF (EF 10%) s/p ICD, Afib w/ recent admission for CHF/afib s/p DCCV, CKD, DM2, hypothyroidism presents after being referred from CHF clinic for admission due to worsening of LE edema and failure of PO diuretics at home. Per patient was relatively functional until ~1 mo ago when he developed palpitations and light headedness. He was found to be hypotensive and in Afib w/ RVR resulting in his hospitalization at the end  of July. His course was c/b EDIE on CKD and persistent hypotension. He improved after dccv and was ultimately discharged home off entresto, coreg and diuretics due to c/f worsening hypotension. After discharge, patient notes worsening LE edema. He was instructed to resume lasix, which was ultimately escalated to toresemide 80mg bid w/ metolazone 2.5 mg daily. With this regimen he noted increased urination and some mild improvement. However, due to ongoing reduced functional capacity, fatigue, and ALCAZAR he was referred for admission for IV diuretics. Denies any cp or palpitations. Denies repaid HR. Notes weight gain since discharge. Continues to have poor appetite and reduced exercise capacity. No fevers, chills. Notes chronic nonproductive cough which is unchanged. Sleeps elevated due to back pain, denies significant orthopnea. Is able to complete ADLs at baseline with some assistance from his niece. Notes increased urination with high dose diuretics, but no dysuria or hematuria. No abd pain, nausea, vomiting. No diarrhea. (21 Aug 2020 09:51)      Surgery/Hospital Course:  9/10: Admitted to CICU s/p RHC and swan batsheva catheter placement for hemodynamic monitoring & optimization prior to tentative LHC and mitraclip  9/12: s/p 500 cc NS bolus for low CVP,  hydralazine increased to 37.5 mg  9/14: s/p Mitral Clip  9/15: POD #1 primacor decreased from .25mcg to .125mcg    Today/Overnight:   ·	    REVIEW OF SYSTEMS:  Gen: No fever  EYES/ENT: No visual changes;  No vertigo or throat pain   NECK: No pain   RES:  No shortness of breath or Cough  CARD: No chest pain   GI: No abdominal pain  : No dysuria  NEURO: No weakness  SKIN: No itching, rashes     ICU Vital Signs Last 24 Hrs  T(C): 36.2 (17 Sep 2020 08:00), Max: 36.9 (16 Sep 2020 12:00)  T(F): 97.2 (17 Sep 2020 08:00), Max: 98.4 (16 Sep 2020 12:00)  HR: 83 (17 Sep 2020 09:00) (80 - 83)  BP: 91/54 (17 Sep 2020 09:00) (86/54 - 109/63)  BP(mean): 71 (17 Sep 2020 09:00) (66 - 79)  ABP: 109/44 (16 Sep 2020 14:00) (100/46 - 113/50)  ABP(mean): 109 (16 Sep 2020 14:00) (100 - 113)  RR: 30 (17 Sep 2020 09:00) (0 - 155)  SpO2: 100% (17 Sep 2020 09:00) (96% - 100%)      Physical Exam:  Gen:  Awake, alert   CNS: non focal 	  Neck: no JVD  RES : clear , no wheezing              CVS: Regular  rhythm. Normal S1/S2  Abd: Soft, non-distended. Bowel sounds present.  Skin: No rash.  Ext:  no edema    ============================I/O===========================   I&O's Detail    16 Sep 2020 07:01  -  17 Sep 2020 07:00  --------------------------------------------------------  IN:    Heparin: 198 mL    Oral Fluid: 607 mL    Sodium Chloride 0.9% Bolus: 240 mL  Total IN: 1045 mL    OUT:    Milrinone: 0 mL    Voided (mL): 1590 mL  Total OUT: 1590 mL    Total NET: -545 mL      17 Sep 2020 07:01  -  17 Sep 2020 09:29  --------------------------------------------------------  IN:    Heparin: 9.5 mL    Sodium Chloride 0.9% Bolus: 10 mL  Total IN: 19.5 mL    OUT:    Voided (mL): 250 mL  Total OUT: 250 mL    Total NET: -230.5 mL        ============================ LABS =========================                        8.8    5.53  )-----------( 67       ( 17 Sep 2020 01:52 )             28.9     09-17    132<L>  |  103  |  99<H>  ----------------------------<  144<H>  4.6   |  17<L>  |  2.42<H>    Ca    9.6      17 Sep 2020 01:52  Phos  4.0     09-17  Mg     2.4     09-17    TPro  6.3  /  Alb  3.6  /  TBili  0.6  /  DBili  x   /  AST  39  /  ALT  49<H>  /  AlkPhos  63  09-17    LIVER FUNCTIONS - ( 17 Sep 2020 01:52 )  Alb: 3.6 g/dL / Pro: 6.3 g/dL / ALK PHOS: 63 U/L / ALT: 49 U/L / AST: 39 U/L / GGT: x           PT/INR - ( 16 Sep 2020 01:08 )   PT: 12.8 sec;   INR: 1.08 ratio         PTT - ( 17 Sep 2020 01:52 )  PTT:44.7 sec  ABG - ( 16 Sep 2020 11:57 )  pH, Arterial: 7.41  pH, Blood: x     /  pCO2: 30    /  pO2: 119   / HCO3: 19    / Base Excess: -4.6  /  SaO2: 99                  ======================Micro/Rad/Cardio=================  Culture: Reviewed   CXR: Reviewed  Echo:Reviewed  ======================================================  PAST MEDICAL & SURGICAL HISTORY:  Hypothyroidism    Afib    Type II diabetes mellitus    Aortic insufficiency    Mitral insufficiency    CKD (chronic kidney disease)    Cardiomyopathy  Systolic CHF    Hypertension    History of tonsillectomy  childhood    AICD (automatic cardioverter/defibrillator) present  8/2012      ====================ASSESSMENT ==============  Acute on chronic systolic HF  Severe MR  pAF  EDIE on CKD stage 3- improving   Leukopenia  Thrombocytopenia   Hx Hypothyroidism   S/P Mitral Clip on 9/14      Plan:  ====================== NEUROLOGY=====================  Nonfocal, continue to monitor neuro status   Tylenol prn for analgesia     acetaminophen   Tablet .. 650 milliGRAM(s) Oral every 6 hours PRN Mild Pain (1 - 3)    ==================== RESPIRATORY======================  Comfortable on RA, SpO2 %.  Encourage incentive spirometry, continue pulse ox monitoring, follow ABGs     ====================CARDIOVASCULAR==================  Acute on chronic systolic HF  Severe MR s/p Mitral Clip 9/14  Continue with Amiodarone for rate control  Hydralazine and Coreg for afterload reduction  Monitor PA pressures and mixed/venous gases while primacor off    aMIOdarone    Tablet 200 milliGRAM(s) Oral daily  carvedilol 6.25 milliGRAM(s) Oral every 12 hours  hydrALAZINE 10 milliGRAM(s) Oral every 8 hours    ===================HEMATOLOGIC/ONC ===================  Monitor H&H, PLTs  Continue anticoagulation therapy with heparin drip   No ASA as per structural heart team    heparin  Infusion 900 Unit(s)/Hr (9.5 mL/Hr) IV Continuous <Continuous>    ===================== RENAL =========================  EDIE on CKD, continue monitoring urine output, I&OS, BUN/Cr   Strict I&Os, daily standing weights, renally dose meds, avoid nephrotoxins    ==================== GASTROINTESTINAL===================  Tolerating PO DASH/TLC diet, sodium and cholesterol restricted.  Bowel regimen with Miralax prn.    dextrose 5%. 1000 milliLiter(s) (50 mL/Hr) IV Continuous <Continuous>  GI prophylaxis, famotidine    Tablet 20 milliGRAM(s) Oral daily  polyethylene glycol 3350 17 Gram(s) Oral daily PRN Constipation    =======================    ENDOCRINE  =====================  Hx of DM2, glucose control with Humalog sliding scale and Glucagon prn   Synthroid for hypothyroidism    dextrose 40% Gel 15 Gram(s) Oral once PRN Blood Glucose LESS THAN 70 milliGRAM(s)/deciliter  dextrose 50% Injectable 12.5 Gram(s) IV Push once  dextrose 50% Injectable 25 Gram(s) IV Push once  glucagon  Injectable 1 milliGRAM(s) IntraMuscular once PRN Glucose LESS THAN 70 milligrams/deciliter  insulin lispro (HumaLOG) corrective regimen sliding scale   SubCutaneous three times a day before meals  insulin lispro (HumaLOG) corrective regimen sliding scale   SubCutaneous at bedtime  levothyroxine 50 MICROGram(s) Oral daily    ========================INFECTIOUS DISEASE================  Afebrile, WBC within normal limits       Patient requires continuous monitoring with bedside rhythm monitoring, pulse ox monitoring, and intermittent blood gas analysis. Care plan discussed with ICU care team. Patient remained critical and at risk for life threatening decompensation.     By signing my name below, I, Bernarda Lofton, attest that this documentation has been prepared under the direction and in the presence of Puja Anglin NP   Electronically signed: Willis Wyatt, 09-17-20 @ 09:29    I, Puja Anglin, personally performed the services described in this documentation. all medical record entries made by the lynnetteibe were at my direction and in my presence. I have reviewed the chart and agree that the record reflects my personal performance and is accurate and complete  Electronically signed: Puja Anglin NP        MAIN BRASWELL  MRN-[id number]  Patient is a 75y old  Male who presents with a chief complaint of lower extremity edema (17 Sep 2020 08:57)    HPI:  74M w/ PMHx of HFrEF (EF 10%) s/p ICD, Afib w/ recent admission for CHF/afib s/p DCCV, CKD, DM2, hypothyroidism presents after being referred from CHF clinic for admission due to worsening of LE edema and failure of PO diuretics at home. Per patient was relatively functional until ~1 mo ago when he developed palpitations and light headedness. He was found to be hypotensive and in Afib w/ RVR resulting in his hospitalization at the end  of July. His course was c/b EDIE on CKD and persistent hypotension. He improved after dccv and was ultimately discharged home off entresto, coreg and diuretics due to c/f worsening hypotension. After discharge, patient notes worsening LE edema. He was instructed to resume lasix, which was ultimately escalated to toresemide 80mg bid w/ metolazone 2.5 mg daily. With this regimen he noted increased urination and some mild improvement. However, due to ongoing reduced functional capacity, fatigue, and ALCAZAR he was referred for admission for IV diuretics. Denies any cp or palpitations. Denies repaid HR. Notes weight gain since discharge. Continues to have poor appetite and reduced exercise capacity. No fevers, chills. Notes chronic nonproductive cough which is unchanged. Sleeps elevated due to back pain, denies significant orthopnea. Is able to complete ADLs at baseline with some assistance from his niece. Notes increased urination with high dose diuretics, but no dysuria or hematuria. No abd pain, nausea, vomiting. No diarrhea. (21 Aug 2020 09:51)      Surgery/Hospital Course:  9/10: Admitted to CICU s/p RHC and swan batsheva catheter placement for hemodynamic monitoring & optimization prior to tentative LHC and mitraclip  9/12: s/p 500 cc NS bolus for low CVP,  hydralazine increased to 37.5 mg  9/14: s/p Mitral Clip  9/15: POD #1 primacor decreased from .25mcg to .125mcg    Today/Overnight:   ·	Pt hemodynamically stable, tolerated primacor off.     REVIEW OF SYSTEMS:  Gen: No fever  EYES/ENT: No visual changes;  No vertigo or throat pain   NECK: No pain   RES:  No shortness of breath or Cough  CARD: No chest pain   GI: No abdominal pain  : No dysuria  NEURO: No weakness  SKIN: No itching, rashes     ICU Vital Signs Last 24 Hrs  T(C): 36.2 (17 Sep 2020 08:00), Max: 36.9 (16 Sep 2020 12:00)  T(F): 97.2 (17 Sep 2020 08:00), Max: 98.4 (16 Sep 2020 12:00)  HR: 83 (17 Sep 2020 09:00) (80 - 83)  BP: 91/54 (17 Sep 2020 09:00) (86/54 - 109/63)  BP(mean): 71 (17 Sep 2020 09:00) (66 - 79)  ABP: 109/44 (16 Sep 2020 14:00) (100/46 - 113/50)  ABP(mean): 109 (16 Sep 2020 14:00) (100 - 113)  RR: 30 (17 Sep 2020 09:00) (0 - 155)  SpO2: 100% (17 Sep 2020 09:00) (96% - 100%)      Physical Exam:  Gen:  Awake, alert   CNS: non focal 	  Neck: no JVD  RES : clear , no wheezing              CVS: Regular  rhythm. Normal S1/S2  Abd: Soft, non-distended. Bowel sounds present.  Skin: No rash.  Ext:  no edema    ============================I/O===========================   I&O's Detail    16 Sep 2020 07:01  -  17 Sep 2020 07:00  --------------------------------------------------------  IN:    Heparin: 198 mL    Oral Fluid: 607 mL    Sodium Chloride 0.9% Bolus: 240 mL  Total IN: 1045 mL    OUT:    Milrinone: 0 mL    Voided (mL): 1590 mL  Total OUT: 1590 mL    Total NET: -545 mL      17 Sep 2020 07:01  -  17 Sep 2020 09:29  --------------------------------------------------------  IN:    Heparin: 9.5 mL    Sodium Chloride 0.9% Bolus: 10 mL  Total IN: 19.5 mL    OUT:    Voided (mL): 250 mL  Total OUT: 250 mL    Total NET: -230.5 mL        ============================ LABS =========================                        8.8    5.53  )-----------( 67       ( 17 Sep 2020 01:52 )             28.9     09-17    132<L>  |  103  |  99<H>  ----------------------------<  144<H>  4.6   |  17<L>  |  2.42<H>    Ca    9.6      17 Sep 2020 01:52  Phos  4.0     09-17  Mg     2.4     09-17    TPro  6.3  /  Alb  3.6  /  TBili  0.6  /  DBili  x   /  AST  39  /  ALT  49<H>  /  AlkPhos  63  09-17    LIVER FUNCTIONS - ( 17 Sep 2020 01:52 )  Alb: 3.6 g/dL / Pro: 6.3 g/dL / ALK PHOS: 63 U/L / ALT: 49 U/L / AST: 39 U/L / GGT: x           PT/INR - ( 16 Sep 2020 01:08 )   PT: 12.8 sec;   INR: 1.08 ratio         PTT - ( 17 Sep 2020 01:52 )  PTT:44.7 sec  ABG - ( 16 Sep 2020 11:57 )  pH, Arterial: 7.41  pH, Blood: x     /  pCO2: 30    /  pO2: 119   / HCO3: 19    / Base Excess: -4.6  /  SaO2: 99            ======================Micro/Rad/Cardio=================  Culture: Reviewed   CXR: Reviewed  Echo:Reviewed  ======================================================  PAST MEDICAL & SURGICAL HISTORY:  Hypothyroidism    Afib    Type II diabetes mellitus    Aortic insufficiency    Mitral insufficiency    CKD (chronic kidney disease)    Cardiomyopathy  Systolic CHF    Hypertension    History of tonsillectomy  childhood    AICD (automatic cardioverter/defibrillator) present  8/2012      ====================ASSESSMENT ==============  Acute on chronic systolic HF  Severe MR  pAF  EDIE on CKD stage 3- improving   Leukopenia  Thrombocytopenia   Hx Hypothyroidism   S/P Mitral Clip on 9/14      Plan:  ====================== NEUROLOGY=====================  Nonfocal, continue to monitor neuro status   Ambulate as tolerated  Tylenol prn for analgesia     acetaminophen   Tablet .. 650 milliGRAM(s) Oral every 6 hours PRN Mild Pain (1 - 3)    ==================== RESPIRATORY======================  Comfortable on RA, SpO2 %.  Encourage incentive spirometry, continue pulse ox monitoring    ====================CARDIOVASCULAR==================  Acute on chronic systolic HF  Severe MR s/p Mitral Clip 9/14  Continue with Amiodarone for rate control  Hydralazine and Coreg for afterload reduction  Monitor venous gases while primacor off    aMIOdarone    Tablet 200 milliGRAM(s) Oral daily  carvedilol 6.25 milliGRAM(s) Oral every 12 hours  hydrALAZINE 10 milliGRAM(s) Oral every 8 hours    ===================HEMATOLOGIC/ONC ===================  Monitor H&H, PLTs  Continue anticoagulation therapy with heparin drip   Plt decreased from 76 to 67, HIT sent, negative, will continue to monitor  No ASA as per structural heart team    heparin  Infusion 900 Unit(s)/Hr (9 mL/Hr) IV Continuous <Continuous>    ===================== RENAL =========================  EDIE on CKD, continue monitoring urine output, I&OS, BUN/Cr   Strict I&Os, daily standing weights, renally dose meds, avoid nephrotoxins    ==================== GASTROINTESTINAL===================  Tolerating PO DASH/TLC diet, sodium and cholesterol restricted.  Bowel regimen with Miralax prn.    GI prophylaxis, famotidine    Tablet 20 milliGRAM(s) Oral daily  polyethylene glycol 3350 17 Gram(s) Oral daily PRN Constipation    =======================    ENDOCRINE  =====================  Hx of DM2, glucose control with Humalog sliding scale and Glucagon prn   Synthroid for hypothyroidism    insulin lispro (HumaLOG) corrective regimen sliding scale   SubCutaneous three times a day before meals  insulin lispro (HumaLOG) corrective regimen sliding scale   SubCutaneous at bedtime  levothyroxine 50 MICROGram(s) Oral daily    ========================INFECTIOUS DISEASE================  Afebrile, WBC within normal limits       Patient requires continuous monitoring with bedside rhythm monitoring, pulse ox monitoring, and intermittent blood gas analysis. Care plan discussed with ICU care team. Patient remained critical and at risk for life threatening decompensation.     By signing my name below, I, Bernarda Lofton, attest that this documentation has been prepared under the direction and in the presence of Puja Anglin, NP   Electronically signed: Willis Wyatt, 09-17-20 @ 09:29    Puja FERRELL, personally performed the services described in this documentation. all medical record entries made by the scribe were at my direction and in my presence. I have reviewed the chart and agree that the record reflects my personal performance and is accurate and complete  Electronically signed: Puja Anglin, NP

## 2020-09-17 NOTE — PROGRESS NOTE ADULT - PROBLEM SELECTOR PLAN 1
- now off milrinone.   - Follow central venous saturations from cordis given iatrogenic ASD   - Keep cordis until tomorrow 9/18  - Continue carvedilol 6.25 mg BID  - Hydralizine/isordil decreased 2/2 low BP, given preserved cardiac output and renal dysfunction, will favor persistent MAP > 65 mmHg and avoid hypotension that   would worsen ATN. Will uptitrate as allowed.   - Hopeful BP will improve with mobility and downgrade from ICU  - Under evaluation for LVAD as destination therapy (though thought poor candidate at this time). He is not a heart transplant candidate given age.

## 2020-09-17 NOTE — PROGRESS NOTE ADULT - ASSESSMENT
Mr. 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 He was started on inotropic support with milrinone and diuresed over 20 lbs. He subsequently had worsening renal function, likely from overdiuresis. An LVAD evaluation was initiated but at the time was decided that his frailty and advanced renal dysfunction would make him a poor candidate. He underwent MitraClip 9/14 with placement of 1 clip and reduction of MR to mild. At this time he has low filling pressures with preserved cardiac output with stable renal function. Milrinone discontinued on 9/16.         Hemodynamic data after clip:  9/15 (on milrinone 0.25 mcg/kg/mi): CVP 5 PA 47/13 PCW 13, SVC saturation 68% with Corey CO/CI 5.8/3.1 and TD CO/CI 7/3.7. MAP 70 mmHg   9/16 (on milrinone .125 mcg/kg/min) CVP 7, PA 48/15 unable to wedge. thermodilution CI 2.5, Corye CI 2.8   9/17 (off milrinone) CVP 7, central VSPO2 56, CO: 4.84, CI 2.56

## 2020-09-17 NOTE — PROGRESS NOTE ADULT - SUBJECTIVE AND OBJECTIVE BOX
Patient seen and examined at bedside.    Overnight Events:       Current Meds:  acetaminophen   Tablet .. 650 milliGRAM(s) Oral every 6 hours PRN  aMIOdarone    Tablet 200 milliGRAM(s) Oral daily  carvedilol 6.25 milliGRAM(s) Oral every 12 hours  chlorhexidine 2% Cloths 1 Application(s) Topical <User Schedule>  dextrose 40% Gel 15 Gram(s) Oral once PRN  dextrose 5%. 1000 milliLiter(s) IV Continuous <Continuous>  dextrose 50% Injectable 12.5 Gram(s) IV Push once  dextrose 50% Injectable 25 Gram(s) IV Push once  dextrose 50% Injectable 25 Gram(s) IV Push once  famotidine    Tablet 20 milliGRAM(s) Oral daily  glucagon  Injectable 1 milliGRAM(s) IntraMuscular once PRN  heparin  Infusion 900 Unit(s)/Hr IV Continuous <Continuous>  hydrALAZINE 10 milliGRAM(s) Oral every 8 hours  hydrocortisone 2.5% Rectal Cream 1 Application(s) Rectal daily PRN  insulin lispro (HumaLOG) corrective regimen sliding scale   SubCutaneous three times a day before meals  insulin lispro (HumaLOG) corrective regimen sliding scale   SubCutaneous at bedtime  levothyroxine 50 MICROGram(s) Oral daily  polyethylene glycol 3350 17 Gram(s) Oral daily PRN        Vitals:  T(F): 97.5 (09-17), Max: 98.4 (09-16)  HR: 81 (09-17) (80 - 83)  BP: 96/61 (09-17) (86/54 - 109/63)  RR: 18 (09-17)  SpO2: 99% (09-17)  I&O's Summary    16 Sep 2020 07:01  -  17 Sep 2020 07:00  --------------------------------------------------------  IN: 1045 mL / OUT: 1590 mL / NET: -545 mL    17 Sep 2020 07:01  -  17 Sep 2020 11:25  --------------------------------------------------------  IN: 239 mL / OUT: 250 mL / NET: -11 mL        Physical Exam:  Appearance: No acute distress; well appearing  Eyes: PERRL, EOMI, pink conjunctiva  HENT: Normal oral mucosa  Cardiovascular: Right IJ cordis. RRR, S1, S2, no murmurs, rubs, or gallops; no edema; no JVD  Respiratory: Clear to auscultation bilaterally  Gastrointestinal: soft, non-tender, non-distended with normal bowel sounds  Musculoskeletal: No clubbing; no joint deformity   Neurologic: Non-focal  Lymphatic: No lymphadenopathy  Psychiatry: AAOx3, mood & affect appropriate  Skin: No rashes, ecchymoses, or cyanosis                          8.8    5.53  )-----------( 67       ( 17 Sep 2020 01:52 )             28.9     09-17    132<L>  |  103  |  99<H>  ----------------------------<  144<H>  4.6   |  17<L>  |  2.42<H>    Ca    9.6      17 Sep 2020 01:52  Phos  4.0     09-17  Mg     2.4     09-17    TPro  6.3  /  Alb  3.6  /  TBili  0.6  /  DBili  x   /  AST  39  /  ALT  49<H>  /  AlkPhos  63  09-17    PT/INR - ( 16 Sep 2020 01:08 )   PT: 12.8 sec;   INR: 1.08 ratio         PTT - ( 17 Sep 2020 09:26 )  PTT:66.0 sec              New ECG(s): Personally reviewed    Echo:    Stress Testing:     Cath:    Imaging:    Interpretation of Telemetry:

## 2020-09-17 NOTE — PROGRESS NOTE ADULT - PROBLEM SELECTOR PLAN 5
- Chronic. Appreciate hematology c/s. - Chronic but now worsening  - HIT negative  - If worsening tomorrow would consult hematology

## 2020-09-17 NOTE — PROGRESS NOTE ADULT - ATTENDING COMMENTS
I have seen this patient with the fellow and agree with their assessment and plan. In addition, Mr. 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 He subsequently had worsening renal function at that time. He underwent MitraClip 9/14 with placement of 1 clip and reduction of MR to mild. At this time he has low filling pressures with preserved cardiac output with stable renal function. Off milrinone, now in stepdown. s/p mitral clip,  No absolute indications for RRT  currently sCr stable 2.5>2.7 > 2.4  No diuresis for now, as needed only      Yemi Rees MD  Cell   Pager   Office

## 2020-09-17 NOTE — PROGRESS NOTE ADULT - SUBJECTIVE AND OBJECTIVE BOX
VITAL SIGNS    Telemetry:      Vital Signs Last 24 Hrs  T(C): 36.2 (20 @ 08:00), Max: 36.9 (20 @ 12:00)  T(F): 97.2 (20 @ 08:00), Max: 98.4 (20 @ 12:00)  HR: 83 (20 @ 09:00) (80 - 83)  BP: 91/54 (20 @ 09:00) (86/54 - 109/63)  RR: 30 (20 @ 09:00) (0 - 155)  SpO2: 100% (20 @ 09:00) (96% - 100%)                    @ 07:01  -   @ 07:00  --------------------------------------------------------  IN: 1045 mL / OUT: 1590 mL / NET: -545 mL     @ 07:01  -   @ 10:22  --------------------------------------------------------  IN: 239 mL / OUT: 250 mL / NET: -11 mL          Daily     Daily Weight in k.3 (17 Sep 2020 00:00)            CAPILLARY BLOOD GLUCOSE      POCT Blood Glucose.: 133 mg/dL (17 Sep 2020 08:09)  POCT Blood Glucose.: 151 mg/dL (16 Sep 2020 23:07)  POCT Blood Glucose.: 157 mg/dL (16 Sep 2020 17:12)  POCT Blood Glucose.: 224 mg/dL (16 Sep 2020 12:03)            Drains:     MS         [  ] Drainage:                 L Pleural  [  ]  Drainage:                R Pleural  [  ]  Drainage:    Pacing Wires        [  ]   Settings:                                  Isolated  [  ]    Coumadin    [ ] YES          [  ]      NO                                   PHYSICAL EXAM        Neurology: alert and oriented x 3, nonfocal, no gross deficits  CV : s1 s2 RRR  Sternal Wound :  CDI , Stable  Lungs: cta  Abdomen: soft, nontender, nondistended, positive bowel sounds, last bowel movement                       chest tubes  :    voiding / [place] - sbd         Extremities:      edema   /  -   calve tenderness ,    L leg  /  R leg  incisions cdi          acetaminophen   Tablet .. 650 milliGRAM(s) Oral every 6 hours PRN  aMIOdarone    Tablet 200 milliGRAM(s) Oral daily  carvedilol 6.25 milliGRAM(s) Oral every 12 hours  chlorhexidine 2% Cloths 1 Application(s) Topical <User Schedule>  dextrose 40% Gel 15 Gram(s) Oral once PRN  dextrose 5%. 1000 milliLiter(s) IV Continuous <Continuous>  dextrose 50% Injectable 12.5 Gram(s) IV Push once  dextrose 50% Injectable 25 Gram(s) IV Push once  dextrose 50% Injectable 25 Gram(s) IV Push once  famotidine    Tablet 20 milliGRAM(s) Oral daily  glucagon  Injectable 1 milliGRAM(s) IntraMuscular once PRN  heparin  Infusion 900 Unit(s)/Hr IV Continuous <Continuous>  hydrALAZINE 10 milliGRAM(s) Oral every 8 hours  hydrocortisone 2.5% Rectal Cream 1 Application(s) Rectal daily PRN  insulin lispro (HumaLOG) corrective regimen sliding scale   SubCutaneous three times a day before meals  insulin lispro (HumaLOG) corrective regimen sliding scale   SubCutaneous at bedtime  levothyroxine 50 MICROGram(s) Oral daily  polyethylene glycol 3350 17 Gram(s) Oral daily PRN                    Physical Therapy Rec:   Home  [  ]   Home w/ PT  [  ]  Rehab  [  ]  Discussed with Cardiothoracic Team at AM rounds. hello    VITAL SIGNS    Telemetry: biv paced/ afib    Vital Signs Last 24 Hrs  T(C): 36.2 (20 @ 08:00), Max: 36.9 (20 @ 12:00)  T(F): 97.2 (20 @ 08:00), Max: 98.4 (20 @ 12:00)  HR: 83 (20 @ 09:00) (80 - 83)  BP: 91/54 (20 @ 09:00) (86/54 - 109/63)  RR: 30 (20 @ 09:00) (0 - 155)  SpO2: 100% (20 @ 09:00) (96% - 100%)                    @ 07:01  -   @ 07:00  --------------------------------------------------------  IN: 1045 mL / OUT: 1590 mL / NET: -545 mL     @ 07:01  -   @ 10:22  --------------------------------------------------------  IN: 239 mL / OUT: 250 mL / NET: -11 mL          Daily     Daily Weight in k.3 (17 Sep 2020 00:00)            CAPILLARY BLOOD GLUCOSE      POCT Blood Glucose.: 133 mg/dL (17 Sep 2020 08:09)  POCT Blood Glucose.: 151 mg/dL (16 Sep 2020 23:07)  POCT Blood Glucose.: 157 mg/dL (16 Sep 2020 17:12)  POCT Blood Glucose.: 224 mg/dL (16 Sep 2020 12:03)            Drains:      Pacing Wires            Coumadin    [ ] YES          [ x ]      NO         heparin                          PHYSICAL EXAM        Neurology: alert and oriented x 3, nonfocal, no gross deficits  CV : irreg    Lungs: cta  Abdomen: soft, nontender, nondistended, positive bowel sounds                    :    voiding        Extremities:    +  edema   /  -   calve tenderness ,   L groin cdi          acetaminophen   Tablet .. 650 milliGRAM(s) Oral every 6 hours PRN  aMIOdarone    Tablet 200 milliGRAM(s) Oral daily  carvedilol 6.25 milliGRAM(s) Oral every 12 hours  chlorhexidine 2% Cloths 1 Application(s) Topical <User Schedule>  dextrose 40% Gel 15 Gram(s) Oral once PRN  dextrose 5%. 1000 milliLiter(s) IV Continuous <Continuous>  dextrose 50% Injectable 12.5 Gram(s) IV Push once  dextrose 50% Injectable 25 Gram(s) IV Push once  dextrose 50% Injectable 25 Gram(s) IV Push once  famotidine    Tablet 20 milliGRAM(s) Oral daily  glucagon  Injectable 1 milliGRAM(s) IntraMuscular once PRN  heparin  Infusion 900 Unit(s)/Hr IV Continuous <Continuous>  hydrALAZINE 10 milliGRAM(s) Oral every 8 hours  hydrocortisone 2.5% Rectal Cream 1 Application(s) Rectal daily PRN  insulin lispro (HumaLOG) corrective regimen sliding scale   SubCutaneous three times a day before meals  insulin lispro (HumaLOG) corrective regimen sliding scale   SubCutaneous at bedtime  levothyroxine 50 MICROGram(s) Oral daily  polyethylene glycol 3350 17 Gram(s) Oral daily PRN                    Physical Therapy Rec:   Home  [  ]   Home w/ PT  [  ]  Rehab  [  ]  Discussed with Cardiothoracic Team at AM rounds.

## 2020-09-17 NOTE — PROGRESS NOTE ADULT - PROBLEM SELECTOR PLAN 2
- Improved today off milrinone  - continue to hold diuretics  - avoid nephrotoxic medications  - if renal function worsens after stopping milrinone will restart in spite of hemodynamics to see if creatinine improves

## 2020-09-17 NOTE — PROGRESS NOTE ADULT - SUBJECTIVE AND OBJECTIVE BOX
[facility] DIVISION OF KIDNEY DISEASES AND HYPERTENSION -- FOLLOW UP NOTE  --------------------------------------------------------------------------------  If any questions, please feel free to contact me  NS pager: [phone], [facility]: [phone]  Arley Ferguson M.D.  Nephrology Fellow    (After 5 pm or on weekends please page the on-call fellow)  --------------------------------------------------------------------------------    Chief Complaint:  Patient is a 75y old  Male who presents with a chief complaint of lower extremity edema (16 Sep 2020 12:02)    24 hour events/subjective:  Patient seen and examined at [facility], in NAD- off Milrinone   sCr has improved 2.7>2.4. UOP: 1.5L in the past 24hrs   no acute events overnight - vitals/labs/imaging reviewed       PAST HISTORY  --------------------------------------------------------------------------------  No significant changes to PMH, PSH, FHx, SHx, unless otherwise noted    ALLERGIES & MEDICATIONS  --------------------------------------------------------------------------------  Allergies    No Known Allergies    Intolerances      Standing Inpatient Medications  aMIOdarone    Tablet 200 milliGRAM(s) Oral daily  carvedilol 6.25 milliGRAM(s) Oral every 12 hours  chlorhexidine 2% Cloths 1 Application(s) Topical <User Schedule>  dextrose 5%. 1000 milliLiter(s) IV Continuous <Continuous>  dextrose 50% Injectable 12.5 Gram(s) IV Push once  dextrose 50% Injectable 25 Gram(s) IV Push once  dextrose 50% Injectable 25 Gram(s) IV Push once  famotidine    Tablet 20 milliGRAM(s) Oral daily  heparin  Infusion 900 Unit(s)/Hr IV Continuous <Continuous>  hydrALAZINE 10 milliGRAM(s) Oral every 8 hours  insulin lispro (HumaLOG) corrective regimen sliding scale   SubCutaneous three times a day before meals  insulin lispro (HumaLOG) corrective regimen sliding scale   SubCutaneous at bedtime  levothyroxine 50 MICROGram(s) Oral daily    PRN Inpatient Medications  acetaminophen   Tablet .. 650 milliGRAM(s) Oral every 6 hours PRN  dextrose 40% Gel 15 Gram(s) Oral once PRN  glucagon  Injectable 1 milliGRAM(s) IntraMuscular once PRN  hydrocortisone 2.5% Rectal Cream 1 Application(s) Rectal daily PRN  polyethylene glycol 3350 17 Gram(s) Oral daily PRN      REVIEW OF SYSTEMS  --------------------------------------------------------------------------------  Gen: No fevers/chills  Skin: No rashes  Head/Eyes/Ears: Normal hearing,   Respiratory: No dyspnea, cough  CV: No chest pain  GI: No abdominal pain, diarrhea  : No dysuria, hematuria  MSK: No  edema  Heme: No easy bruising or bleeding  Psych: No significant depression      All other systems were reviewed and are negative, except as noted.    VITALS/PHYSICAL EXAM  --------------------------------------------------------------------------------  T(C): 36.2 (09-17-20 @ 08:00), Max: 36.9 (09-16-20 @ 12:00)  HR: 81 (09-17-20 @ 08:00) (80 - 82)  BP: 102/55 (09-17-20 @ 07:00) (86/54 - 109/63)  RR: 16 (09-17-20 @ 08:00) (0 - 155)  SpO2: 99% (09-17-20 @ 08:00) (96% - 100%)  Wt(kg): --        09-16-20 @ 07:01  -  09-17-20 @ 07:00  --------------------------------------------------------  IN: 1045 mL / OUT: 1590 mL / NET: -545 mL    09-17-20 @ 07:01  -  09-17-20 @ 08:57  --------------------------------------------------------  IN: 19.5 mL / OUT: 250 mL / NET: -230.5 mL        Physical Exam:  	Gen: NAD, cooperative  	HEENT: MMM  	Pulm: CTA B/L, no wheezing anteriorly   	CV: S1S2,   	Abd: Soft, +BS, NT, ND  	Ext: LE edema B/L  	Neuro: Awake, Alert and oriented x3  	Skin: Warm and dry              : no tenderness to palpation               Psych: normal affect and mood        LABS/STUDIES  --------------------------------------------------------------------------------              8.8    5.53  >-----------<  67       [09-17-20 @ 01:52]              28.9     132  |  103  |  99  ----------------------------<  144      [09-17-20 @ 01:52]  4.6   |  17  |  2.42        Ca     9.6     [09-17-20 @ 01:52]      Mg     2.4     [09-17-20 @ 01:52]      Phos  4.0     [09-17-20 @ 01:52]    TPro  6.3  /  Alb  3.6  /  TBili  0.6  /  DBili  x   /  AST  39  /  ALT  49  /  AlkPhos  63  [09-17-20 @ 01:52]    PT/INR: PT 12.8 , INR 1.08       [09-16-20 @ 01:08]  PTT: 44.7       [09-17-20 @ 01:52]      Creatinine Trend:  SCr 2.42 [09-17 @ 01:52]  SCr 2.74 [09-16 @ 00:37]  SCr 2.53 [09-15 @ 00:36]  SCr 2.34 [09-14 @ 11:51]  SCr 2.49 [09-14 @ 03:00]    Urinalysis - [09-09-20 @ 04:31]      Color Light Yellow / Appearance Clear / SG 1.010 / pH 6.5      Gluc Negative / Ketone Negative  / Bili Negative / Urobili <2 mg/dL       Blood Negative / Protein Negative / Leuk Est Negative / Nitrite Negative      RBC  / WBC  / Hyaline  / Gran  / Sq Epi  / Non Sq Epi  / Bacteria       Iron 36, TIBC 314, %sat 12      [09-08-20 @ 08:45]  Ferritin 145      [09-08-20 @ 08:45]  Vitamin D (25OH) 21.7      [07-18-20 @ 10:13]  TSH 3.60      [09-11-20 @ 05:45]  Lipid: chol 116, TG 55, HDL 48, LDL 58      [08-26-20 @ 00:17]    HBsAb Nonreact      [08-26-20 @ 02:02]  HBsAg Nonreact      [08-26-20 @ 02:02]  HBcAb Nonreact      [08-26-20 @ 02:02]  HCV 0.10, Nonreact      [08-26-20 @ 02:02]    Rheumatoid Factor <10      [08-26-20 @ 00:17]  Syphilis Screen (Treponema Pallidum Ab) Negative      [08-26-20 @ 04:26]  Free Light Chains: kappa 6.27, lambda 4.60, ratio = 1.36      [08-26 @ 04:46]  Immunofixation Serum: No Monoclonal Band Identified    Reference Range: None Detected      [08-26-20 @ 04:46]  SPEP Interpretation: Normal Electrophoresis Pattern      [08-26-20 @ 04:46]

## 2020-09-17 NOTE — PROGRESS NOTE ADULT - ASSESSMENT
74 year old male with PMH of chronic systolic heart failure (EF 10%) s/p ICD, Afib s/p DCCV, CKD, DM-2, hypothyroidism; initially presented to [facility] with ALCAZAR/SOB and found to be in Afib with RVR and having hypotension and EDIE on CKD; transferred to [facility] for EP eval for possible Afib ablation and ICD battery change. Diuretics and carvedilol being held for hypotension and EDIE on CKD. Amiodarone and midodrine initiated.   S/p ICD, rate control with amio/coreg  Hypotension, midodrine,  Renal for edie  Bumex infusion- d/c for worsening renal fx  Primacor-d/c for RHC-rise in creat  LVAD eval  Milrinone resumed post rhc.  He required escalating doses of diuretics and was evaluated by structural heart for MR  9/14 S/p Mitraclip  9/14 pt bp stable - tolerating low dose Hydralizine with primacor gtt , right groin stable. PA pressures stable.  Lactate stable.  Thrombocytopenia , HIT neg, no asa  EDIE/ckd  9/15 POD #1 primacor decreased from .25mcg to .125mcg  Maintain IJ for cvp monitoring and prn vbg as per HF  Anticoagulated for afib, will need eliquis after IJ is d/c, follow platelet ct  continue heparin  Hydralazine for afterload, renal fx elevated/stable. 74 year old male with PMH of chronic systolic heart failure (EF 10%) s/p ICD, Afib s/p DCCV, CKD, DM-2, hypothyroidism; initially presented to [facility] with ALCAZAR/SOB and found to be in Afib with RVR and having hypotension and EDIE on CKD; transferred to [facility] for EP eval for possible Afib ablation and ICD battery change. Diuretics and carvedilol being held for hypotension and EDIE on CKD. Amiodarone and midodrine initiated.   S/p ICD, rate control with amio/coreg  Hypotension, midodrine,  Renal for edie  Bumex infusion- d/c for worsening renal fx  Primacor-d/c for RHC-rise in creat  LVAD eval  Milrinone resumed post rhc.  He required escalating doses of diuretics and was evaluated by structural heart for MR  9/14 S/p Mitraclip  9/14 pt bp stable - tolerating low dose Hydralizine with primacor gtt , right groin stable. PA pressures stable.  Lactate stable.  Thrombocytopenia , HIT neg, no asa  EDIE/ckd  9/15 POD #1 primacor decreased from .25mcg to .125mcg- weaned off  Maintain IJ for cvp monitoring and prn vbg as per HF  Anticoagulated for afib, will need eliquis after IJ is d/c, follow platelet ct  continue heparin  Hydralazine for afterload, renal fx elevated/stable.  Transferred to [facility]

## 2020-09-17 NOTE — PROGRESS NOTE ADULT - ATTENDING COMMENTS
Renal function improving with preserved cardiac output off mirlinone. Ambulating several laps without difficulty. Will keep cordis for one more day to ensure adequate CO and likely discontinue tomorrow. Continue to hold diuretics. Will uptitrate vasodilators as tolerate but limited by hypotension and renal dysfunction.

## 2020-09-17 NOTE — PROGRESS NOTE ADULT - ASSESSMENT
74M w/ PMHx of HFrEF (EF 10%) s/p ICD, Afib w/ recent admission for CHF ex/afib s/p DCCV (on july 2020), CKD3, DM2, hypothyroidism presents after being referred from CHF clinic for admission due to worsening of LE edema and failure of PO diuretics at home.    Nephrology consulted for EDIE    #EDIE on CKD  Pt with EDIE in the setting of HFrEF requiring aggressive IV diuresis-  Baseline sCr 2.0 - 2.4.  On admission sCr 2.4 - peaked to 3.43.  EDIE 2/2 cardio-renal, ?overdiuresis vs ATN -- initially requiring Bumex infusion now off     off Milrinone now  s/p mitral valve clip on 9/14  No absolute indications for RRT  currently sCr stable 2.5>2.7 > 2.4  UOP: 1.5L in the past 24hrs  strict I/O, daily weights    Monitor labs and urine output.   Avoid NSAIDs, ACEI/ARBS, RCA and nephrotoxins.   Dose medications as per eGFR.

## 2020-09-18 NOTE — PROGRESS NOTE ADULT - PROBLEM SELECTOR PLAN 1
- Can d/c cordis given several days of stable estimated CO  - Continue carvedilol 6.25 mg BID  - Hydralizine/isordil decreased 2/2 low BP, given preserved cardiac output and renal dysfunction, will favor persistent MAP > 65 mmHg and avoid hypotension that   would worsen ATN. Will uptitrate as allowed. Currently on hydralizine 10 mg TID.   - Hold diuretics at this time, CVP has been 6-8 and renal function stable  - Under evaluation for LVAD as destination therapy (though thought poor candidate at this time in part due to renal dysfunction). He is not a heart transplant candidate given age.

## 2020-09-18 NOTE — PROGRESS NOTE ADULT - ASSESSMENT
Mr. 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 He was started on inotropic support with milrinone and diuresed over 20 lbs. He subsequently had worsening renal function, likely from overdiuresis. An LVAD evaluation was initiated but at the time was decided that his frailty and advanced renal dysfunction would make him a poor candidate. He underwent MitraClip 9/14 with placement of 1 clip and reduction of MR to mild. At this time he has low filling pressures with preserved cardiac output with stable renal function. Milrinone discontinued on 9/16.         Hemodynamic data after clip:  9/15 (on milrinone 0.25 mcg/kg/mi): CVP 5 PA 47/13 PCW 13, SVC saturation 68% with Corey CO/CI 5.8/3.1 and TD CO/CI 7/3.7. MAP 70 mmHg   9/16 (on milrinone .125 mcg/kg/min) CVP 7, PA 48/15 unable to wedge. thermodilution CI 2.5, Corey CI 2.8   9/17 (off milrinone) CVP 7, central VSPO2 56, CO: 4.84, CI 2.56  9/18 (Off milrinone) Central SPO2: CO 4.79, CI 2.53 Mr. 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 He was started on inotropic support with milrinone and diuresed over 20 lbs. He subsequently had worsening renal function, likely from overdiuresis. An LVAD evaluation was initiated but at the time was decided that his frailty and advanced renal dysfunction would make him a poor candidate. He underwent MitraClip 9/14 with placement of 1 clip and reduction of MR to mild. At this time he has low filling pressures with preserved cardiac output with stable renal function off milrinone which was discontinued on 9/16. Nearing readiness for discharge    Hemodynamic data after clip:  9/15 (on milrinone 0.25 mcg/kg/mi): CVP 5 PA 47/13 PCW 13, SVC saturation 68% with Corey CO/CI 5.8/3.1 and TD CO/CI 7/3.7. MAP 70 mmHg   9/16 (on milrinone .125 mcg/kg/min) CVP 7, PA 48/15 unable to wedge. thermodilution CI 2.5, Corey CI 2.8   9/17 (off milrinone) CVP 7, central VSPO2 56, CO: 4.84, CI 2.56  9/18 (Off milrinone) Central SPO2: CO 4.79, CI 2.53

## 2020-09-18 NOTE — PROGRESS NOTE ADULT - ASSESSMENT
74M w/ PMHx of HFrEF (EF 10%) s/p ICD, Afib w/ recent admission for CHF ex/afib s/p DCCV (on july 2020), CKD3, DM2, hypothyroidism presents after being referred from CHF clinic for admission due to worsening of LE edema and failure of PO diuretics at home.    Nephrology consulted for EDIE    #EDIE on CKD  Pt with EDIE in the setting of HFrEF requiring aggressive IV diuresis-  Baseline sCr 2.0 - 2.4.  On admission sCr 2.4 - peaked to 3.43.  EDIE 2/2 cardio-renal, ?overdiuresis vs ATN -- initially requiring Bumex infusion now off     off Milrinone now  s/p mitral valve clip on 9/14  No absolute indications for RRT  currently sCr stable 2.5>2.7 > 2.4> 2.3  UOP: 1.2L in the past 24hrs  strict I/O, daily weights    Monitor labs and urine output.   Avoid NSAIDs, ACEI/ARBS, RCA and nephrotoxins.   Dose medications as per eGFR.  Upon discharge to follow with Dr. Rees ([address] [place] [address] 2nd floor.  Clinic # [phone])

## 2020-09-18 NOTE — PROGRESS NOTE ADULT - ATTENDING COMMENTS
I have seen this patient with the fellow and agree with their assessment and plan. In addition, Mr. 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 He subsequently had worsening renal function at that time. He underwent MitraClip 9/14 with placement of 1 clip and reduction of MR to mild. At this time he has low filling pressures with preserved cardiac output with stable renal function. Off milrinone, now in stepdown. s/p mitral clip,  No absolute indications for RRT  currently sCr stable 2.5>2.7 > 2.4-2.2  No diuresis for now, as needed only    f/u with me 2-3 weeks from dc at     For weekend please contact Dr Bipin Johnson( fellow) and Dr. Pat Ojeda( Attending)      Yemi Rees MD  Cell   Pager   Office

## 2020-09-18 NOTE — PROGRESS NOTE ADULT - PROBLEM SELECTOR PLAN 3
- Currently BiV paced   - C/w home med amio 200 mg daily  - Would restart eliquis - Currently BiV paced   - C/w home med amio 200 mg daily  - Would switch heparin to eliquis

## 2020-09-18 NOTE — PROGRESS NOTE ADULT - SUBJECTIVE AND OBJECTIVE BOX
[facility] DIVISION OF KIDNEY DISEASES AND HYPERTENSION -- FOLLOW UP NOTE  --------------------------------------------------------------------------------  If any questions, please feel free to contact me  [facility] pager: [phone], [facility]: [phone]  Arley Ferguson M.D.  Nephrology Fellow    (After 5 pm or on weekends please page the on-call fellow)  --------------------------------------------------------------------------------    Chief Complaint:  Patient is a 75y old  Male who presents with a chief complaint of lower extremity edema (18 Sep 2020 10:59)    24 hour events/subjective:  Patient seen and examined at [facility], in NAD.   sCr improving - no acute events overnight.   vitals/labs/imaging reviewed.       PAST HISTORY  --------------------------------------------------------------------------------  No significant changes to PMH, PSH, FHx, SHx, unless otherwise noted    ALLERGIES & MEDICATIONS  --------------------------------------------------------------------------------  Allergies    No Known Allergies    Intolerances      Standing Inpatient Medications  aMIOdarone    Tablet 200 milliGRAM(s) Oral daily  apixaban 5 milliGRAM(s) Oral two times a day  carvedilol 6.25 milliGRAM(s) Oral every 12 hours  chlorhexidine 2% Cloths 1 Application(s) Topical <User Schedule>  dextrose 5%. 1000 milliLiter(s) IV Continuous <Continuous>  dextrose 5%. 1000 milliLiter(s) IV Continuous <Continuous>  dextrose 50% Injectable 12.5 Gram(s) IV Push once  dextrose 50% Injectable 25 Gram(s) IV Push once  dextrose 50% Injectable 25 Gram(s) IV Push once  dextrose 50% Injectable 12.5 Gram(s) IV Push once  dextrose 50% Injectable 25 Gram(s) IV Push once  dextrose 50% Injectable 25 Gram(s) IV Push once  famotidine    Tablet 20 milliGRAM(s) Oral daily  hydrALAZINE 10 milliGRAM(s) Oral every 8 hours  insulin lispro (HumaLOG) corrective regimen sliding scale   SubCutaneous at bedtime  insulin lispro (HumaLOG) corrective regimen sliding scale   SubCutaneous three times a day before meals  insulin lispro Injectable (HumaLOG) 3 Unit(s) SubCutaneous three times a day before meals  levothyroxine 50 MICROGram(s) Oral daily    PRN Inpatient Medications  acetaminophen   Tablet .. 650 milliGRAM(s) Oral every 6 hours PRN  dextrose 40% Gel 15 Gram(s) Oral once PRN  dextrose 40% Gel 15 Gram(s) Oral once PRN  glucagon  Injectable 1 milliGRAM(s) IntraMuscular once PRN  glucagon  Injectable 1 milliGRAM(s) IntraMuscular once PRN  hydrocortisone 2.5% Rectal Cream 1 Application(s) Rectal daily PRN  polyethylene glycol 3350 17 Gram(s) Oral daily PRN      REVIEW OF SYSTEMS  --------------------------------------------------------------------------------  Gen: No fevers/chills  Skin: No rashes  Head/Eyes/Ears: Normal hearing,   Respiratory: No dyspnea, cough  CV: No chest pain  GI: No abdominal pain, diarrhea  : No dysuria, hematuria  MSK: No  edema  Heme: No easy bruising or bleeding  Psych: No significant depression      All other systems were reviewed and are negative, except as noted.    VITALS/PHYSICAL EXAM  --------------------------------------------------------------------------------  T(C): 36.7 (09-18-20 @ 11:44), Max: 36.9 (09-17-20 @ 15:04)  HR: 80 (09-18-20 @ 11:44) (80 - 82)  BP: 99/57 (09-18-20 @ 11:44) (91/56 - 125/57)  RR: 18 (09-18-20 @ 11:44) (16 - 18)  SpO2: 97% (09-18-20 @ 11:44) (95% - 100%)  Wt(kg): --        09-17-20 @ 07:01  -  09-18-20 @ 07:00  --------------------------------------------------------  IN: 1033 mL / OUT: 1250 mL / NET: -217 mL    09-18-20 @ 07:01  -  09-18-20 @ 12:48  --------------------------------------------------------  IN: 190 mL / OUT: 300 mL / NET: -110 mL        Physical Exam:  	Gen: NAD, cooperative  	HEENT: MMM  	Pulm: CTA B/L, no wheezing anteriorly   	CV: S1S2,   	Abd: Soft, +BS, NT, ND  	Ext: LE edema B/L  	Neuro: Awake, Alert and oriented x3  	Skin: Warm and dry              : no tenderness to palpation               Psych: normal affect and mood        LABS/STUDIES  --------------------------------------------------------------------------------              9.2    5.33  >-----------<  65       [09-18-20 @ 05:45]              30.2     133  |  104  |  93  ----------------------------<  122      [09-18-20 @ 05:45]  4.5   |  18  |  2.38        Ca     9.8     [09-18-20 @ 05:45]      Mg     2.4     [09-17-20 @ 01:52]      Phos  4.0     [09-17-20 @ 01:52]    TPro  6.3  /  Alb  3.6  /  TBili  0.6  /  DBili  x   /  AST  39  /  ALT  49  /  AlkPhos  63  [09-17-20 @ 01:52]    PT/INR: PT 13.2 , INR 1.12       [09-18-20 @ 07:44]  PTT: 49.5       [09-18-20 @ 01:01]      Creatinine Trend:  SCr 2.38 [09-18 @ 05:45]  SCr 2.42 [09-17 @ 01:52]  SCr 2.74 [09-16 @ 00:37]  SCr 2.53 [09-15 @ 00:36]  SCr 2.34 [09-14 @ 11:51]    Urinalysis - [09-09-20 @ 04:31]      Color Light Yellow / Appearance Clear / SG 1.010 / pH 6.5      Gluc Negative / Ketone Negative  / Bili Negative / Urobili <2 mg/dL       Blood Negative / Protein Negative / Leuk Est Negative / Nitrite Negative      RBC  / WBC  / Hyaline  / Gran  / Sq Epi  / Non Sq Epi  / Bacteria       Iron 36, TIBC 314, %sat 12      [09-08-20 @ 08:45]  Ferritin 145      [09-08-20 @ 08:45]  Vitamin D (25OH) 21.7      [07-18-20 @ 10:13]  TSH 3.60      [09-11-20 @ 05:45]  Lipid: chol 116, TG 55, HDL 48, LDL 58      [08-26-20 @ 00:17]    HBsAb Nonreact      [08-26-20 @ 02:02]  HBsAg Nonreact      [08-26-20 @ 02:02]  HBcAb Nonreact      [08-26-20 @ 02:02]  HCV 0.10, Nonreact      [08-26-20 @ 02:02]    Rheumatoid Factor <10      [08-26-20 @ 00:17]  Syphilis Screen (Treponema Pallidum Ab) Negative      [08-26-20 @ 04:26]  Free Light Chains: kappa 6.27, lambda 4.60, ratio = 1.36      [08-26 @ 04:46]  Immunofixation Serum: No Monoclonal Band Identified    Reference Range: None Detected      [08-26-20 @ 04:46]  SPEP Interpretation: Normal Electrophoresis Pattern      [08-26-20 @ 04:46]   Quality 226: Preventive Care And Screening: Tobacco Use: Screening And Cessation Intervention: Patient screened for tobacco use and is an ex/non-smoker Detail Level: Detailed

## 2020-09-18 NOTE — PROGRESS NOTE ADULT - SUBJECTIVE AND OBJECTIVE BOX
Patient seen and examined at bedside.    Overnight Events:   No overnight events.         Current Meds:  acetaminophen   Tablet .. 650 milliGRAM(s) Oral every 6 hours PRN  aMIOdarone    Tablet 200 milliGRAM(s) Oral daily  apixaban 5 milliGRAM(s) Oral two times a day  carvedilol 6.25 milliGRAM(s) Oral every 12 hours  chlorhexidine 2% Cloths 1 Application(s) Topical <User Schedule>  dextrose 40% Gel 15 Gram(s) Oral once PRN  dextrose 40% Gel 15 Gram(s) Oral once PRN  dextrose 5%. 1000 milliLiter(s) IV Continuous <Continuous>  dextrose 5%. 1000 milliLiter(s) IV Continuous <Continuous>  dextrose 50% Injectable 12.5 Gram(s) IV Push once  dextrose 50% Injectable 25 Gram(s) IV Push once  dextrose 50% Injectable 25 Gram(s) IV Push once  dextrose 50% Injectable 12.5 Gram(s) IV Push once  dextrose 50% Injectable 25 Gram(s) IV Push once  dextrose 50% Injectable 25 Gram(s) IV Push once  famotidine    Tablet 20 milliGRAM(s) Oral daily  glucagon  Injectable 1 milliGRAM(s) IntraMuscular once PRN  glucagon  Injectable 1 milliGRAM(s) IntraMuscular once PRN  hydrALAZINE 10 milliGRAM(s) Oral every 8 hours  hydrocortisone 2.5% Rectal Cream 1 Application(s) Rectal daily PRN  insulin lispro (HumaLOG) corrective regimen sliding scale   SubCutaneous at bedtime  insulin lispro (HumaLOG) corrective regimen sliding scale   SubCutaneous three times a day before meals  insulin lispro Injectable (HumaLOG) 3 Unit(s) SubCutaneous three times a day before meals  levothyroxine 50 MICROGram(s) Oral daily  polyethylene glycol 3350 17 Gram(s) Oral daily PRN        Vitals:  T(F): 98 (09-18), Max: 98.5 (09-17)  HR: 80 (09-18) (80 - 82)  BP: 99/57 (09-18) (91/56 - 125/57)  RR: 18 (09-18)  SpO2: 97% (09-18)  I&O's Summary    17 Sep 2020 07:01  -  18 Sep 2020 07:00  --------------------------------------------------------  IN: 1033 mL / OUT: 1250 mL / NET: -217 mL    18 Sep 2020 07:01  -  18 Sep 2020 13:09  --------------------------------------------------------  IN: 190 mL / OUT: 300 mL / NET: -110 mL        Physical Exam:  Appearance: No acute distress; well appearing  Eyes: PERRL, EOMI, pink conjunctiva  HENT: Normal oral mucosa  Cardiovascular: RRR, S1, S2, no murmurs, rubs, or gallops; no edema; no JVD  Respiratory: Clear to auscultation bilaterally  Gastrointestinal: soft, non-tender, non-distended with normal bowel sounds  Musculoskeletal: No clubbing; no joint deformity   Neurologic: Non-focal  Lymphatic: No lymphadenopathy  Psychiatry: AAOx3, mood & affect appropriate  Skin: No rashes, ecchymoses, or cyanosis                          9.2    5.33  )-----------( 65       ( 18 Sep 2020 05:45 )             30.2     09-18    133<L>  |  104  |  93<H>  ----------------------------<  122<H>  4.5   |  18<L>  |  2.38<H>    Ca    9.8      18 Sep 2020 05:45  Phos  4.0     09-17  Mg     2.4     09-17    TPro  6.3  /  Alb  3.6  /  TBili  0.6  /  DBili  x   /  AST  39  /  ALT  49<H>  /  AlkPhos  63  09-17    PT/INR - ( 18 Sep 2020 07:44 )   PT: 13.2 sec;   INR: 1.12 ratio         PTT - ( 18 Sep 2020 01:01 )  PTT:49.5 sec              New ECG(s): Personally reviewed    Echo:    Stress Testing:     Cath:    Imaging:    Interpretation of Telemetry:

## 2020-09-18 NOTE — PROGRESS NOTE ADULT - PROBLEM SELECTOR PLAN 1
- now off milrinone.   - Can d/c cordis  - Continue carvedilol 6.25 mg BID  - Hydralizine/isordil decreased 2/2 low BP, given preserved cardiac output and renal dysfunction, will favor persistent MAP > 65 mmHg and avoid hypotension that   would worsen ATN. Will uptitrate as allowed.   - Hopeful BP will improve with mobility and downgrade from ICU  - Under evaluation for LVAD as destination therapy (though thought poor candidate at this time). He is not a heart transplant candidate given age. - Can d/c cordis given several days of stable estimated CO  - Continue carvedilol 6.25 mg BID  - Hydralizine/isordil decreased 2/2 low BP, given preserved cardiac output and renal dysfunction, will favor persistent MAP > 65 mmHg and avoid hypotension that   would worsen ATN. Will uptitrate as allowed. Currently on hydralizine 10 mg TID.   - Under evaluation for LVAD as destination therapy (though thought poor candidate at this time in part due to renal dysfunction). He is not a heart transplant candidate given age. - Can d/c cordis given several days of stable estimated CO  - Continue carvedilol 6.25 mg BID  - Hydralizine/isordil decreased 2/2 low BP, given preserved cardiac output and renal dysfunction, will favor persistent MAP > 65 mmHg and avoid hypotension that   would worsen ATN. Will uptitrate as allowed. Currently on hydralizine 10 mg TID.   - Hold diuretics at this time, CVP has been 6-8 and renal function stable  - Under evaluation for LVAD as destination therapy (though thought poor candidate at this time in part due to renal dysfunction). He is not a heart transplant candidate given age.

## 2020-09-18 NOTE — PROGRESS NOTE ADULT - ASSESSMENT
74 year old male with PMH of chronic systolic heart failure (EF 10%) s/p ICD, Afib s/p DCCV, CKD, DM-2, hypothyroidism; initially presented to [facility] with ALCAZAR/SOB and found to be in Afib with RVR and having hypotension and EDIE on CKD; transferred to [facility] for EP eval for possible Afib ablation and ICD battery change. Diuretics and carvedilol being held for hypotension and EDIE on CKD. Amiodarone and midodrine initiated.   S/p ICD, rate control with amio/coreg  Hypotension, midodrine,  Renal for edie  Bumex infusion- d/c for worsening renal fx  Primacor-d/c for RHC-rise in creat  LVAD eval  Milrinone resumed post rhc.  He required escalating doses of diuretics and was evaluated by structural heart for MR  9/14 S/p Mitraclip  9/14 pt bp stable - tolerating low dose Hydralizine with primacor gtt , right groin stable. PA pressures stable.  Lactate stable.  Thrombocytopenia , HIT neg, no asa  EDIE/ckd  9/15 POD #1 primacor decreased from .25mcg to .125mcg- weaned off  Maintain IJ for cvp monitoring and prn vbg as per HF  Anticoagulated for afib, will need eliquis after IJ is d/c, follow platelet ct  continue heparin  Hydralazine for afterload, renal fx elevated/stable.  Transferred to [facility]  9/18 Heparin gtt discontinued and eliquis initiated

## 2020-09-18 NOTE — PROGRESS NOTE ADULT - PROBLEM SELECTOR PLAN 2
- Improved again today off milrinone  - continue to hold diuretics  - avoid nephrotoxic medications  - if renal function worsens after stopping milrinone will restart in spite of hemodynamics to see if creatinine improves - Improved again today off milrinone  - continue to hold diuretics  - avoid nephrotoxic medications

## 2020-09-18 NOTE — PROGRESS NOTE ADULT - SUBJECTIVE AND OBJECTIVE BOX
SUBJECTIVE / OVERNIGHT EVENTS: pt seen and examined      MEDICATIONS  (STANDING):  aMIOdarone    Tablet 200 milliGRAM(s) Oral daily  apixaban 5 milliGRAM(s) Oral two times a day  carvedilol 3.125 milliGRAM(s) Oral every 12 hours  chlorhexidine 2% Cloths 1 Application(s) Topical <User Schedule>  dextrose 5%. 1000 milliLiter(s) (50 mL/Hr) IV Continuous <Continuous>  dextrose 5%. 1000 milliLiter(s) (50 mL/Hr) IV Continuous <Continuous>  dextrose 50% Injectable 12.5 Gram(s) IV Push once  dextrose 50% Injectable 25 Gram(s) IV Push once  dextrose 50% Injectable 25 Gram(s) IV Push once  dextrose 50% Injectable 12.5 Gram(s) IV Push once  dextrose 50% Injectable 25 Gram(s) IV Push once  dextrose 50% Injectable 25 Gram(s) IV Push once  famotidine    Tablet 20 milliGRAM(s) Oral daily  hydrALAZINE 10 milliGRAM(s) Oral every 8 hours  insulin lispro (HumaLOG) corrective regimen sliding scale   SubCutaneous at bedtime  insulin lispro (HumaLOG) corrective regimen sliding scale   SubCutaneous three times a day before meals  insulin lispro Injectable (HumaLOG) 3 Unit(s) SubCutaneous three times a day before meals  levothyroxine 50 MICROGram(s) Oral daily    MEDICATIONS  (PRN):  acetaminophen   Tablet .. 650 milliGRAM(s) Oral every 6 hours PRN Mild Pain (1 - 3)  dextrose 40% Gel 15 Gram(s) Oral once PRN Blood Glucose LESS THAN 70 milliGRAM(s)/deciliter  dextrose 40% Gel 15 Gram(s) Oral once PRN Blood Glucose LESS THAN 70 milliGRAM(s)/deciliter  glucagon  Injectable 1 milliGRAM(s) IntraMuscular once PRN Glucose LESS THAN 70 milligrams/deciliter  glucagon  Injectable 1 milliGRAM(s) IntraMuscular once PRN Glucose LESS THAN 70 milligrams/deciliter  hydrocortisone 2.5% Rectal Cream 1 Application(s) Rectal daily PRN hemorrhoid pain/itch  polyethylene glycol 3350 17 Gram(s) Oral daily PRN Constipation    Vital Signs Last 24 Hrs  T(C): 36.4 (18 Sep 2020 19:02), Max: 36.8 (17 Sep 2020 23:22)  T(F): 97.5 (18 Sep 2020 19:02), Max: 98.3 (17 Sep 2020 23:22)  HR: 80 (18 Sep 2020 19:02) (80 - 82)  BP: 96/62 (18 Sep 2020 19:02) (91/56 - 125/57)  BP(mean): 71 (18 Sep 2020 17:20) (69 - 81)  RR: 18 (18 Sep 2020 19:02) (16 - 18)  SpO2: 100% (18 Sep 2020 19:02) (95% - 100%)    CAPILLARY BLOOD GLUCOSE      POCT Blood Glucose.: 135 mg/dL (18 Sep 2020 21:58)  POCT Blood Glucose.: 110 mg/dL (18 Sep 2020 16:57)  POCT Blood Glucose.: 244 mg/dL (18 Sep 2020 11:43)  POCT Blood Glucose.: 136 mg/dL (18 Sep 2020 07:20)    I&O's Summary    17 Sep 2020 07:01  -  18 Sep 2020 07:00  --------------------------------------------------------  IN: 1033 mL / OUT: 1250 mL / NET: -217 mL    18 Sep 2020 07:01  -  18 Sep 2020 22:00  --------------------------------------------------------  IN: 390 mL / OUT: 1100 mL / NET: -710 mL        Constitutional: No fever, fatigue  Skin: No rash.  Eyes: No recent vision problems or eye pain.  ENT: No congestion, ear pain, or sore throat.  Cardiovascular: No chest pain or palpation.  Respiratory: No cough, shortness of breath, congestion, or wheezing.  Gastrointestinal: No abdominal pain, nausea, vomiting, or diarrhea.  Genitourinary: No dysuria.  Musculoskeletal: No joint swelling.  Neurologic: No headache.    PHYSICAL EXAM:  GENERAL: NAD  EYES: EOMI, PERRLA  NECK: Supple, No JVD  CHEST/LUNG: dec breath sounds at bases   HEART:  S1 , S2 +  ABDOMEN: soft , bs+  EXTREMITIES:  edema+  NEUROLOGY:alert awake      LABS:                        9.2    5.33  )-----------( 65       ( 18 Sep 2020 05:45 )             30.2     09-18    133<L>  |  104  |  93<H>  ----------------------------<  122<H>  4.5   |  18<L>  |  2.38<H>    Ca    9.8      18 Sep 2020 05:45  Phos  4.0     09-17  Mg     2.4     09-17    TPro  6.3  /  Alb  3.6  /  TBili  0.6  /  DBili  x   /  AST  39  /  ALT  49<H>  /  AlkPhos  63  09-17    PT/INR - ( 18 Sep 2020 07:44 )   PT: 13.2 sec;   INR: 1.12 ratio         PTT - ( 18 Sep 2020 01:01 )  PTT:49.5 sec          RADIOLOGY & ADDITIONAL TESTS:    Imaging Personally Reviewed:    Consultant(s) Notes Reviewed:      Care Discussed with Consultants/Other Providers:

## 2020-09-18 NOTE — PROGRESS NOTE ADULT - SUBJECTIVE AND OBJECTIVE BOX
VITAL SIGNS    Telemetry:    Vital Signs Last 24 Hrs  T(C): 36.8 (09-18-20 @ 03:30), Max: 36.9 (09-17-20 @ 15:04)  T(F): 98.3 (09-18-20 @ 03:30), Max: 98.5 (09-17-20 @ 15:04)  HR: 80 (09-18-20 @ 07:27) (80 - 83)  BP: 125/57 (09-18-20 @ 07:27) (91/54 - 125/57)  RR: 16 (09-18-20 @ 03:30) (16 - 30)  SpO2: 97% (09-18-20 @ 07:27) (92% - 100%)            09-17 @ 07:01  -  09-18 @ 07:00  --------------------------------------------------------  IN: 1033 mL / OUT: 1250 mL / NET: -217 mL       Daily     Daily   Admit Wt: Drug Dosing Weight  Height (cm): 172.7 (14 Sep 2020 07:21)  Weight (kg): 75.1 (14 Sep 2020 07:21)  BMI (kg/m2): 25.2 (14 Sep 2020 07:21)  BSA (m2): 1.89 (14 Sep 2020 07:21)      CAPILLARY BLOOD GLUCOSE      POCT Blood Glucose.: 136 mg/dL (18 Sep 2020 07:20)  POCT Blood Glucose.: 168 mg/dL (17 Sep 2020 21:44)  POCT Blood Glucose.: 91 mg/dL (17 Sep 2020 16:46)  POCT Blood Glucose.: 78 mg/dL (17 Sep 2020 16:42)  POCT Blood Glucose.: 234 mg/dL (17 Sep 2020 12:09)          MEDICATIONS  acetaminophen   Tablet .. 650 milliGRAM(s) Oral every 6 hours PRN  aMIOdarone    Tablet 200 milliGRAM(s) Oral daily  apixaban 5 milliGRAM(s) Oral two times a day  carvedilol 6.25 milliGRAM(s) Oral every 12 hours  chlorhexidine 2% Cloths 1 Application(s) Topical <User Schedule>  dextrose 40% Gel 15 Gram(s) Oral once PRN  dextrose 5%. 1000 milliLiter(s) IV Continuous <Continuous>  dextrose 50% Injectable 12.5 Gram(s) IV Push once  dextrose 50% Injectable 25 Gram(s) IV Push once  dextrose 50% Injectable 25 Gram(s) IV Push once  famotidine    Tablet 20 milliGRAM(s) Oral daily  glucagon  Injectable 1 milliGRAM(s) IntraMuscular once PRN  hydrALAZINE 10 milliGRAM(s) Oral every 8 hours  hydrocortisone 2.5% Rectal Cream 1 Application(s) Rectal daily PRN  insulin lispro (HumaLOG) corrective regimen sliding scale   SubCutaneous three times a day before meals  insulin lispro (HumaLOG) corrective regimen sliding scale   SubCutaneous at bedtime  levothyroxine 50 MICROGram(s) Oral daily  polyethylene glycol 3350 17 Gram(s) Oral daily PRN      >>> <<<  PHYSICAL EXAM  Subjective: NAD  Neurology: alert and oriented x 3, nonfocal, no gross deficits  CV : s1s2  Sternal Wound :  CDI , Stable  Lungs: CTA b/l  Abdomen: soft, NT,ND, ( +)BM  :  voiding  Extremities:   +edema b/l    LABS  09-18    133<L>  |  104  |  93<H>  ----------------------------<  122<H>  4.5   |  18<L>  |  2.38<H>    Ca    9.8      18 Sep 2020 05:45  Phos  4.0     09-17  Mg     2.4     09-17    TPro  6.3  /  Alb  3.6  /  TBili  0.6  /  DBili  x   /  AST  39  /  ALT  49<H>  /  AlkPhos  63  09-17                                 9.2    5.33  )-----------( 65       ( 18 Sep 2020 05:45 )             30.2          PT/INR - ( 18 Sep 2020 07:44 )   PT: 13.2 sec;   INR: 1.12 ratio         PTT - ( 18 Sep 2020 01:01 )  PTT:49.5 sec       PAST MEDICAL & SURGICAL HISTORY:  Hypothyroidism    Afib    Type II diabetes mellitus    Aortic insufficiency    Mitral insufficiency    CKD (chronic kidney disease)    Cardiomyopathy  Systolic CHF    Hypertension    History of tonsillectomy  childhood    AICD (automatic cardioverter/defibrillator) present  8/2012          VITAL SIGNS    Telemetry:    Vital Signs Last 24 Hrs  T(C): 36.8 (09-18-20 @ 03:30), Max: 36.9 (09-17-20 @ 15:04)  T(F): 98.3 (09-18-20 @ 03:30), Max: 98.5 (09-17-20 @ 15:04)  HR: 80 (09-18-20 @ 07:27) (80 - 83)  BP: 125/57 (09-18-20 @ 07:27) (91/54 - 125/57)  RR: 16 (09-18-20 @ 03:30) (16 - 30)  SpO2: 97% (09-18-20 @ 07:27) (92% - 100%)            09-17 @ 07:01  -  09-18 @ 07:00  --------------------------------------------------------  IN: 1033 mL / OUT: 1250 mL / NET: -217 mL       Daily     Daily   Admit Wt: Drug Dosing Weight  Height (cm): 172.7 (14 Sep 2020 07:21)  Weight (kg): 75.1 (14 Sep 2020 07:21)  BMI (kg/m2): 25.2 (14 Sep 2020 07:21)  BSA (m2): 1.89 (14 Sep 2020 07:21)      CAPILLARY BLOOD GLUCOSE      POCT Blood Glucose.: 136 mg/dL (18 Sep 2020 07:20)  POCT Blood Glucose.: 168 mg/dL (17 Sep 2020 21:44)  POCT Blood Glucose.: 91 mg/dL (17 Sep 2020 16:46)  POCT Blood Glucose.: 78 mg/dL (17 Sep 2020 16:42)  POCT Blood Glucose.: 234 mg/dL (17 Sep 2020 12:09)          MEDICATIONS  acetaminophen   Tablet .. 650 milliGRAM(s) Oral every 6 hours PRN  aMIOdarone    Tablet 200 milliGRAM(s) Oral daily  apixaban 5 milliGRAM(s) Oral two times a day  carvedilol 6.25 milliGRAM(s) Oral every 12 hours  chlorhexidine 2% Cloths 1 Application(s) Topical <User Schedule>  dextrose 40% Gel 15 Gram(s) Oral once PRN  dextrose 5%. 1000 milliLiter(s) IV Continuous <Continuous>  dextrose 50% Injectable 12.5 Gram(s) IV Push once  dextrose 50% Injectable 25 Gram(s) IV Push once  dextrose 50% Injectable 25 Gram(s) IV Push once  famotidine    Tablet 20 milliGRAM(s) Oral daily  glucagon  Injectable 1 milliGRAM(s) IntraMuscular once PRN  hydrALAZINE 10 milliGRAM(s) Oral every 8 hours  hydrocortisone 2.5% Rectal Cream 1 Application(s) Rectal daily PRN  insulin lispro (HumaLOG) corrective regimen sliding scale   SubCutaneous three times a day before meals  insulin lispro (HumaLOG) corrective regimen sliding scale   SubCutaneous at bedtime  levothyroxine 50 MICROGram(s) Oral daily  polyethylene glycol 3350 17 Gram(s) Oral daily PRN      >>> <<<  PHYSICAL EXAM  Subjective: NAD  OOB to chair  Neurology: alert and oriented x 3, nonfocal, no gross deficits  CV : s1s2 RIJ  Sternal Wound :  CDI , Stable  Lungs: CTA b/l  Abdomen: soft, NT,ND, ( +)BM  :  voiding  Extremities:   +edema b/l    LABS  09-18    133<L>  |  104  |  93<H>  ----------------------------<  122<H>  4.5   |  18<L>  |  2.38<H>    Ca    9.8      18 Sep 2020 05:45  Phos  4.0     09-17  Mg     2.4     09-17    TPro  6.3  /  Alb  3.6  /  TBili  0.6  /  DBili  x   /  AST  39  /  ALT  49<H>  /  AlkPhos  63  09-17                                 9.2    5.33  )-----------( 65       ( 18 Sep 2020 05:45 )             30.2          PT/INR - ( 18 Sep 2020 07:44 )   PT: 13.2 sec;   INR: 1.12 ratio         PTT - ( 18 Sep 2020 01:01 )  PTT:49.5 sec       PAST MEDICAL & SURGICAL HISTORY:  Hypothyroidism    Afib    Type II diabetes mellitus    Aortic insufficiency    Mitral insufficiency    CKD (chronic kidney disease)    Cardiomyopathy  Systolic CHF    Hypertension    History of tonsillectomy  childhood    AICD (automatic cardioverter/defibrillator) present  8/2012

## 2020-09-18 NOTE — PROGRESS NOTE ADULT - SUBJECTIVE AND OBJECTIVE BOX
*****Structural Heart Team*****    Subjective:    Patient resting comfortably in a chair, stating he feels his breathing is better, but he feels a little constipated. He still has a right IJ Intro in to monitor CVP.    PAST MEDICAL & SURGICAL HISTORY:  Hypothyroidism    Afib    Type II diabetes mellitus    Aortic insufficiency    Mitral insufficiency    CKD (chronic kidney disease)    Cardiomyopathy  Systolic CHF    Hypertension    History of tonsillectomy  childhood    AICD (automatic cardioverter/defibrillator) present  8/2012          T(C): 36.8 (09-18-20 @ 03:30), Max: 36.9 (09-17-20 @ 15:04)  HR: 80 (09-18-20 @ 11:00) (80 - 82)  BP: 125/57 (09-18-20 @ 10:00) (91/56 - 125/57)  RR: 16 (09-18-20 @ 03:30) (16 - 18)  SpO2: 98% (09-18-20 @ 11:00) (95% - 100%)  Wt(kg): --  09-17 @ 07:01  -  09-18 @ 07:00  --------------------------------------------------------  IN: 1033 mL / OUT: 1250 mL / NET: -217 mL    09-18 @ 07:01  -  09-18 @ 11:00  --------------------------------------------------------  IN: 10 mL / OUT: 300 mL / NET: -290 mL      MEDICATIONS  (STANDING):  aMIOdarone    Tablet 200 milliGRAM(s) Oral daily  apixaban 5 milliGRAM(s) Oral two times a day  carvedilol 6.25 milliGRAM(s) Oral every 12 hours  chlorhexidine 2% Cloths 1 Application(s) Topical <User Schedule>  dextrose 5%. 1000 milliLiter(s) (50 mL/Hr) IV Continuous <Continuous>  dextrose 50% Injectable 12.5 Gram(s) IV Push once  dextrose 50% Injectable 25 Gram(s) IV Push once  dextrose 50% Injectable 25 Gram(s) IV Push once  famotidine    Tablet 20 milliGRAM(s) Oral daily  hydrALAZINE 10 milliGRAM(s) Oral every 8 hours  insulin lispro (HumaLOG) corrective regimen sliding scale   SubCutaneous three times a day before meals  insulin lispro (HumaLOG) corrective regimen sliding scale   SubCutaneous at bedtime  levothyroxine 50 MICROGram(s) Oral daily    MEDICATIONS  (PRN):  acetaminophen   Tablet .. 650 milliGRAM(s) Oral every 6 hours PRN Mild Pain (1 - 3)  dextrose 40% Gel 15 Gram(s) Oral once PRN Blood Glucose LESS THAN 70 milliGRAM(s)/deciliter  glucagon  Injectable 1 milliGRAM(s) IntraMuscular once PRN Glucose LESS THAN 70 milligrams/deciliter  hydrocortisone 2.5% Rectal Cream 1 Application(s) Rectal daily PRN hemorrhoid pain/itch  polyethylene glycol 3350 17 Gram(s) Oral daily PRN Constipation      Review of Symptoms:  Constitutional: Awake, Alert, Follows commands  Respiratory: + SOB, + ALCAZAR  Cardiac: Denies CP, Denies Palpitations  Gastrointestinal: Denies Pain, Denies N/V, + Constipation. tolerating po intake  Vascular: Negative  Extremities: + Edema, No joint pain or swelling  Neurological: Negative  Endocrine: No heat or cold intolerance, No excessive thirst  Heme/Onc: Negative    Exam:  General: A/Ox3, NAD, following commands  HEENT: Supple, No JVD, Trachea midline, no masses  Pulmonary: CTAB, = Chest Excursion, no accessory muscle use  Cor: Irregular, S1S2, No murmur or rub noted  ECG: AFib  Groin/Wound: Soft, No hematoma,   Gastrointestinal: Soft, NT/ND, + Bowel Sounds  Neuro: = motor and sensory B/L, No focal deficits  Vascular: 1+ edema, pulses by doppler  Extremities: No joint pain or swelling.  Skin: Warm/Dry/Normal color, Normal turgor, no rashes                          9.2    5.33  )-----------( 65       ( 18 Sep 2020 05:45 )             30.2   09-18    133<L>  |  104  |  93<H>  ----------------------------<  122<H>  4.5   |  18<L>  |  2.38<H>    Ca    9.8      18 Sep 2020 05:45  Phos  4.0     09-17  Mg     2.4     09-17    TPro  6.3  /  Alb  3.6  /  TBili  0.6  /  DBili  x   /  AST  39  /  ALT  49<H>  /  AlkPhos  63  09-17  PT/INR - ( 18 Sep 2020 07:44 )   PT: 13.2 sec;   INR: 1.12 ratio         PTT - ( 18 Sep 2020 01:01 )  PTT:49.5 sec    Assesment/Plan:    75 Male, S/P MitraClip for severe MR, Acute on Chronic Systolic Heart Failure, Acute on Chronic Kidney disease    1.) Severe MR: Post op TTE shows that his Severe MR has been knocked down to mild to moderate MR. Clip well seated.   2.) Acute on Chronic Systolic Heart Failure: Continue to monitor off diuretics. He is off of Milrinone, and his renal function is improving. Continue with coreg and Hydralazine for afterload reduction. Consider discontinuing the monitoring of his CVP. Doing well off of Diuretics.  3.) AFib: Patient restarted back on apixiban  4.) Acute on Chronic Renal Failure: Continue to follow Creatinine.

## 2020-09-19 NOTE — PROGRESS NOTE ADULT - PROBLEM SELECTOR PLAN 2
- fluctuates overall but GFR has been fairly stable in 20-25 range  - continue to hold diuretics  - avoid nephrotoxic medications

## 2020-09-19 NOTE — PROGRESS NOTE ADULT - SUBJECTIVE AND OBJECTIVE BOX
VITAL SIGNS    Subjective: "I'm feeling ok." Denies CP, palpitation, SOB, ALCAZAR, HA, dizziness, N/V/D, fever or chills.  No acute event noted overnight.     Telemetry: V-paced 80      Vital Signs Last 24 Hrs  T(C): 36.4 (20 @ 13:24), Max: 36.6 (20 @ 22:22)  T(F): 97.5 (20 @ 13:24), Max: 97.9 (20 @ 22:22)  HR: 80 (20 @ :24) (80 - 80)  BP: 96/59 (20 @ 13:24) (91/59 - 100/70)  RR: 18 (20 @ :24) (18 - 18)  SpO2: 100% (20 @ 13:24) (98% - 100%)            07:01  -   @ 07:00  --------------------------------------------------------  IN: 560 mL / OUT: 1550 mL / NET: -990 mL     @ 07:01  -   @ 16:58  --------------------------------------------------------  IN: 850 mL / OUT: 650 mL / NET: 200 mL    Daily     Daily Weight in k.1 (19 Sep 2020 09:47)    CAPILLARY BLOOD GLUCOSE    POCT Blood Glucose.: 131 mg/dL (19 Sep 2020 16:55)  POCT Blood Glucose.: 195 mg/dL (19 Sep 2020 11:33)  POCT Blood Glucose.: 115 mg/dL (19 Sep 2020 07:48)  POCT Blood Glucose.: 135 mg/dL (18 Sep 2020 21:58)        PHYSICAL EXAM    Neurology: alert and oriented x 3, nonfocal, no gross deficits    CV: (+) S1 and S2, No murmurs, rubs, gallops or clicks     Lungs: CTA B/L     Abdomen: soft, nontender, nondistended, positive bowel sounds, (+) Flatus; (+) BM     :  Voiding               Extremities:  B/L LE (+) 1 edema; negative calf tenderness; (+) 2 DP palpable; B/L groin site C/D/I         acetaminophen Tablet .. 650 milliGRAM(s) Oral every 6 hours PRN  aMIOdarone Tablet 200 milliGRAM(s) Oral daily  apixaban 5 milliGRAM(s) Oral two times a day  carvedilol 3.125 milliGRAM(s) Oral every 12 hours  famotidine Tablet 20 milliGRAM(s) Oral daily  glucagon  Injectable 1 milliGRAM(s) IntraMuscular once PRN  hydrALAZINE 10 milliGRAM(s) Oral every 8 hours  hydrocortisone 2.5% Rectal Cream 1 Application(s) Rectal daily PRN  insulin lispro (HumaLOG) corrective regimen sliding scale SubCutaneous three times a day before meals  insulin lispro (HumaLOG) corrective regimen sliding scale SubCutaneous at bedtime  insulin lispro Injectable (HumaLOG) 3 Unit(s) SubCutaneous three times a day before meals  levothyroxine 50 MICROGram(s) Oral daily  polyethylene glycol 3350 17 Gram(s) Oral daily PRN    Physical Therapy Rec:   Home  [  ]   Home w/ PT  [ X ]  Rehab  [  ]    Discussed with Cardiothoracic Team at AM rounds.

## 2020-09-19 NOTE — PROGRESS NOTE ADULT - SUBJECTIVE AND OBJECTIVE BOX
SUBJECTIVE / OVERNIGHT EVENTS: pt seen and examined    MEDICATIONS  (STANDING):  aMIOdarone    Tablet 200 milliGRAM(s) Oral daily  apixaban 5 milliGRAM(s) Oral two times a day  carvedilol 3.125 milliGRAM(s) Oral every 12 hours  chlorhexidine 2% Cloths 1 Application(s) Topical <User Schedule>  dextrose 5%. 1000 milliLiter(s) (50 mL/Hr) IV Continuous <Continuous>  dextrose 5%. 1000 milliLiter(s) (50 mL/Hr) IV Continuous <Continuous>  dextrose 50% Injectable 25 Gram(s) IV Push once  dextrose 50% Injectable 12.5 Gram(s) IV Push once  dextrose 50% Injectable 25 Gram(s) IV Push once  dextrose 50% Injectable 25 Gram(s) IV Push once  famotidine    Tablet 20 milliGRAM(s) Oral daily  hydrALAZINE 10 milliGRAM(s) Oral every 8 hours  insulin lispro (HumaLOG) corrective regimen sliding scale   SubCutaneous at bedtime  insulin lispro (HumaLOG) corrective regimen sliding scale   SubCutaneous three times a day before meals  insulin lispro Injectable (HumaLOG) 3 Unit(s) SubCutaneous three times a day before meals  levothyroxine 50 MICROGram(s) Oral daily    MEDICATIONS  (PRN):  acetaminophen   Tablet .. 650 milliGRAM(s) Oral every 6 hours PRN Mild Pain (1 - 3)  dextrose 40% Gel 15 Gram(s) Oral once PRN Blood Glucose LESS THAN 70 milliGRAM(s)/deciliter  dextrose 40% Gel 15 Gram(s) Oral once PRN Blood Glucose LESS THAN 70 milliGRAM(s)/deciliter  glucagon  Injectable 1 milliGRAM(s) IntraMuscular once PRN Glucose LESS THAN 70 milligrams/deciliter  hydrocortisone 2.5% Rectal Cream 1 Application(s) Rectal daily PRN hemorrhoid pain/itch  polyethylene glycol 3350 17 Gram(s) Oral daily PRN Constipation  zaleplon 5 milliGRAM(s) Oral at bedtime PRN Insomnia    Vital Signs Last 24 Hrs  T(C): 36.8 (19 Sep 2020 19:39), Max: 36.8 (19 Sep 2020 19:39)  T(F): 98.2 (19 Sep 2020 19:39), Max: 98.2 (19 Sep 2020 19:39)  HR: 80 (19 Sep 2020 21:50) (76 - 80)  BP: 97/67 (19 Sep 2020 21:50) (94/57 - 102/67)  BP(mean): 80 (19 Sep 2020 04:40) (80 - 80)  RR: 18 (19 Sep 2020 19:39) (18 - 18)  SpO2: 100% (19 Sep 2020 19:39) (98% - 100%)    Constitutional: No fever, fatigue  Skin: No rash.  Eyes: No recent vision problems or eye pain.  ENT: No congestion, ear pain, or sore throat.  Cardiovascular: No chest pain or palpation.  Respiratory: No cough, shortness of breath, congestion, or wheezing.  Gastrointestinal: No abdominal pain, nausea, vomiting, or diarrhea.  Genitourinary: No dysuria.  Musculoskeletal: No joint swelling.  Neurologic: No headache.    PHYSICAL EXAM:  GENERAL: NAD  EYES: EOMI, PERRLA  NECK: Supple, No JVD  CHEST/LUNG: dec breath sounds at bases   HEART:  S1 , S2 +  ABDOMEN: soft , bs+  EXTREMITIES:  edema+  NEUROLOGY:alert awake    LABS:  09-19    133<L>  |  103  |  102<H>  ----------------------------<  105<H>  5.2   |  16<L>  |  2.74<H>    Ca    9.8      19 Sep 2020 06:24      Creatinine Trend: 2.74 <--, 2.38 <--, 2.42 <--, 2.74 <--, 2.53 <--, 2.34 <--, 2.49 <--, 2.63 <--                        10.0   5.71  )-----------( 78       ( 19 Sep 2020 06:24 )             32.6     Urine Studies:              PT/INR - ( 19 Sep 2020 10:21 )   PT: 15.5 sec;   INR: 1.32 ratio         PTT - ( 18 Sep 2020 01:01 )  PTT:49.5 sec

## 2020-09-19 NOTE — PROGRESS NOTE ADULT - PROBLEM SELECTOR PLAN 2
CHF following   Continue with Coreg 3.125 mg PO BID.   Continue on Hydralazine 10 mg PO TID   Strict STELLA's   Daily weights

## 2020-09-19 NOTE — PROGRESS NOTE ADULT - PROBLEM SELECTOR PLAN 1
- Continue carvedilol 6.25 mg BID  - Hydralizine/isordil decreased 2/2 low BP, given preserved cardiac output and renal dysfunction, will favor persistent MAP > 65 mmHg and avoid hypotension that   would worsen ATN. Will uptitrate as allowed. Currently on hydralizine 10 mg TID.   - Hold diuretics at this time, CVP had been 6-8 with recent central access and renal function stable  - Under evaluation for LVAD as destination therapy (though thought poor candidate at this time in part due to renal dysfunction). He is not a heart transplant candidate given age.

## 2020-09-19 NOTE — PROGRESS NOTE ADULT - ASSESSMENT
74 year old male with PMH of chronic systolic heart failure (EF 10%) s/p ICD, Afib s/p DCCV, CKD, DM-2, hypothyroidism; initially presented to [facility] with ALCAZAR/SOB and found to be in Afib with RVR and having hypotension and EDIE on CKD; transferred to [facility] for EP eval for possible Afib ablation and ICD battery change. Diuretics and carvedilol being held for hypotension and EDIE on CKD. Amiodarone and midodrine initiated.   S/p ICD, rate control with amio/coreg  Hypotension, midodrine,  Renal for edie  Bumex infusion- d/c for worsening renal fx  Primacor-d/c for RHC-rise in creat  LVAD eval  Milrinone resumed post rhc.  He required escalating doses of diuretics and was evaluated by structural heart for MR  9/14 S/P Mitraclip  9/14 pt BP stable - tolerating low dose Hydralizine with primacor gtt , right groin stable. PA pressures stable.  Lactate stable.  Thrombocytopenia, HIT neg, no asa; EDIE/ckd  9/15 POD #1 primacor decreased from .25mcg to .125mcg- weaned off  Maintain IJ for cvp monitoring and prn vbg as per HF. Anticoagulated for afib, will need Eliquis after IJ is d/c, follow platelet ct  continue heparin  Hydralazine for afterload, renal fx elevated/stable.  Transferred to sdu  9/18 Heparin gtt discontinued and Eliquis initiated   9/19 VVS; Creatinine increased to 2.74 -- >Received  cc IVB x 1.  Monitor renal function.    Disposition: Home once medically cleared Sunday / Monday

## 2020-09-19 NOTE — PROGRESS NOTE ADULT - ASSESSMENT
Mr. 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 He was started on inotropic support with milrinone and diuresed over 20 lbs. He subsequently had worsening renal function, likely from overdiuresis. An LVAD evaluation was initiated but at the time was decided that his frailty and advanced renal dysfunction would make him a poor candidate. He underwent MitraClip 9/14 with placement of 1 clip and reduction of MR to mild. At this time he has low filling pressures with preserved cardiac output with stable but fluctuating renal function off milrinone which was discontinued on 9/16. Nearing readiness for discharge but will confirm stability of renal function.     Hemodynamic data after clip:  9/15 (on milrinone 0.25 mcg/kg/mi): CVP 5 PA 47/13 PCW 13, SVC saturation 68% with Corey CO/CI 5.8/3.1 and TD CO/CI 7/3.7. MAP 70 mmHg   9/16 (on milrinone .125 mcg/kg/min) CVP 7, PA 48/15 unable to wedge. thermodilution CI 2.5, Corey CI 2.8   9/17 (off milrinone) CVP 7, central VSPO2 56, CO: 4.84, CI 2.56  9/18 (Off milrinone) Central SPO2: CO 4.79, CI 2.53

## 2020-09-19 NOTE — PROGRESS NOTE ADULT - PROBLEM SELECTOR PLAN 1
Continue with Coreg 3.125 mg PO BID.   Continue on Amio 200 mg PO daily   Increase activity as tolerated.   Encourage Chest PT / Pulmonary toileting and Incentive spirometry every 1 hour x 10 while awake.   Continue with PUD prophylaxis.   D/C plan home once medically cleared likely Sunday / Monday   Plan of care discussed with attending

## 2020-09-19 NOTE — PROGRESS NOTE ADULT - SUBJECTIVE AND OBJECTIVE BOX
Subjective:  No overnight events. Transferred from SDU to regular room. Denies complaints today.     Medications:  acetaminophen   Tablet .. 650 milliGRAM(s) Oral every 6 hours PRN  aMIOdarone    Tablet 200 milliGRAM(s) Oral daily  apixaban 5 milliGRAM(s) Oral two times a day  carvedilol 3.125 milliGRAM(s) Oral every 12 hours  chlorhexidine 2% Cloths 1 Application(s) Topical <User Schedule>  dextrose 40% Gel 15 Gram(s) Oral once PRN  dextrose 40% Gel 15 Gram(s) Oral once PRN  dextrose 5%. 1000 milliLiter(s) IV Continuous <Continuous>  dextrose 5%. 1000 milliLiter(s) IV Continuous <Continuous>  dextrose 50% Injectable 25 Gram(s) IV Push once  dextrose 50% Injectable 25 Gram(s) IV Push once  dextrose 50% Injectable 25 Gram(s) IV Push once  dextrose 50% Injectable 12.5 Gram(s) IV Push once  famotidine    Tablet 20 milliGRAM(s) Oral daily  glucagon  Injectable 1 milliGRAM(s) IntraMuscular once PRN  hydrALAZINE 10 milliGRAM(s) Oral every 8 hours  hydrocortisone 2.5% Rectal Cream 1 Application(s) Rectal daily PRN  insulin lispro (HumaLOG) corrective regimen sliding scale   SubCutaneous at bedtime  insulin lispro (HumaLOG) corrective regimen sliding scale   SubCutaneous three times a day before meals  insulin lispro Injectable (HumaLOG) 3 Unit(s) SubCutaneous three times a day before meals  levothyroxine 50 MICROGram(s) Oral daily  polyethylene glycol 3350 17 Gram(s) Oral daily PRN    Vitals:  T(C): 36.4 (20 @ 13:24), Max: 36.6 (20 @ 22:22)  HR: 80 (20 @ 13:24) (80 - 80)  BP: 96/59 (20 @ 13:24) (91/59 - 100/70)  BP(mean): 80 (20 @ 04:40) (71 - 80)  RR: 18 (20 @ 13:24) (18 - 18)  SpO2: 100% (20 @ 13:24) (98% - 100%)    Daily     Daily Weight in k.1 (19 Sep 2020 09:47)        I&O's Summary    18 Sep 2020 07:01  -  19 Sep 2020 07:00  --------------------------------------------------------  IN: 560 mL / OUT: 1550 mL / NET: -990 mL    19 Sep 2020 07:01  -  19 Sep 2020 17:06  --------------------------------------------------------  IN: 850 mL / OUT: 650 mL / NET: 200 mL        Physical Exam:  Appearance: No Acute Distress  HEENT: JVP mildly elevated  Cardiovascular: RRR, Normal S1 S2, 2/6 JORDI at apex   Respiratory: Clear to auscultation bilaterally  Gastrointestinal: Soft, Non-tender, non-distended	  Skin: no skin lesions  Neurologic: Non-focal  Extremities: 1+ LE edema, warm and well perfused  Psychiatry: A & O x 3, Mood & affect appropriate      Labs:                        10.0   5.71  )-----------( 78       ( 19 Sep 2020 06:24 )             32.6     09-19    133<L>  |  103  |  102<H>  ----------------------------<  105<H>  5.2   |  16<L>  |  2.74<H>    Ca    9.8      19 Sep 2020 06:24        PT/INR - ( 19 Sep 2020 10:21 )   PT: 15.5 sec;   INR: 1.32 ratio         PTT - ( 18 Sep 2020 01:01 )  PTT:49.5 sec

## 2020-09-19 NOTE — PROGRESS NOTE ADULT - PROBLEM SELECTOR PLAN 4
Continue with Coreg 3.125 mg PO BID.   Continue on Amio 200 mg PO daily   Continue on Eliquis 5 mg PO BID

## 2020-09-20 NOTE — PROGRESS NOTE ADULT - SUBJECTIVE AND OBJECTIVE BOX
VITAL SIGNS-Telemetry:   80  Vital Signs Last 24 Hrs  T(C): 36.4 (09-20-20 @ 05:37), Max: 36.8 (09-19-20 @ 19:39)  T(F): 97.5 (09-20-20 @ 05:37), Max: 98.2 (09-19-20 @ 19:39)  HR: 80 (09-20-20 @ 05:37) (76 - 80)  BP: 96/62 (09-20-20 @ 05:37) (94/57 - 102/67)  RR: 18 (09-20-20 @ 05:37) (18 - 18)  SpO2: 99% (09-20-20 @ 05:37) (99% - 100%)         09-19 @ 07:01  -  09-20 @ 07:00  --------------------------------------------------------  IN: 970 mL / OUT: 1450 mL / NET: -480 mL    Daily     Daily     CAPILLARY BLOOD GLUCOSE  POCT Blood Glucose.: 123 mg/dL (20 Sep 2020 07:47)  POCT Blood Glucose.: 150 mg/dL (19 Sep 2020 21:47)  POCT Blood Glucose.: 142 mg/dL (19 Sep 2020 20:06)  POCT Blood Glucose.: 131 mg/dL (19 Sep 2020 16:55)  POCT Blood Glucose.: 195 mg/dL (19 Sep 2020 11:33)        PHYSICAL EXAM:  Neurology: alert and oriented x 3, nonfocal, no gross deficits  CV : S1S2  Lungs: CTA  Abdomen: soft, nontender, nondistended, positive bowel sounds, last bowel movement   +bm      Extremities:     groin cdi. no edema no calf tenderness    acetaminophen   Tablet .. 650 milliGRAM(s) Oral every 6 hours PRN  aMIOdarone    Tablet 200 milliGRAM(s) Oral daily  apixaban 5 milliGRAM(s) Oral two times a day  carvedilol 6.25 milliGRAM(s) Oral every 12 hours  chlorhexidine 2% Cloths 1 Application(s) Topical <User Schedule>  dextrose 40% Gel 15 Gram(s) Oral once PRN  dextrose 40% Gel 15 Gram(s) Oral once PRN  dextrose 5%. 1000 milliLiter(s) IV Continuous <Continuous>  dextrose 5%. 1000 milliLiter(s) IV Continuous <Continuous>  dextrose 50% Injectable 12.5 Gram(s) IV Push once  dextrose 50% Injectable 25 Gram(s) IV Push once  dextrose 50% Injectable 25 Gram(s) IV Push once  dextrose 50% Injectable 25 Gram(s) IV Push once  famotidine    Tablet 20 milliGRAM(s) Oral daily  glucagon  Injectable 1 milliGRAM(s) IntraMuscular once PRN  hydrALAZINE 10 milliGRAM(s) Oral every 8 hours  hydrocortisone 2.5% Rectal Cream 1 Application(s) Rectal daily PRN  insulin lispro (HumaLOG) corrective regimen sliding scale   SubCutaneous at bedtime  insulin lispro (HumaLOG) corrective regimen sliding scale   SubCutaneous three times a day before meals  insulin lispro Injectable (HumaLOG) 3 Unit(s) SubCutaneous three times a day before meals  levothyroxine 50 MICROGram(s) Oral daily  polyethylene glycol 3350 17 Gram(s) Oral daily PRN  torsemide 20 milliGRAM(s) Oral daily  zaleplon 5 milliGRAM(s) Oral at bedtime PRN      Physical Therapy Rec:   Home  [x  ]   Home w/ PT  [  ]  Rehab  [  ]  Discussed with Cardiothoracic Team at AM rounds.

## 2020-09-20 NOTE — PROGRESS NOTE ADULT - PROBLEM SELECTOR PLAN 1
- Increase carvedilol to 6.25 mg BID (decreased overnight to 3.25 BID) if SBP remains > 95 mmHg  - Hydralizine/isordil decreased 2/2 low BP, given preserved cardiac output and renal dysfunction, will favor persistent MAP > 65 mmHg and avoid hypotension that   would worsen ATN. Will uptitrate as allowed. Currently on hydralizine 10 mg TID.   - Restart diuretics, will start with torsemide 20 mg daily   - Under evaluation for LVAD as destination therapy (though thought poor candidate at this time in part due to renal dysfunction). He is not a heart transplant candidate given age.

## 2020-09-20 NOTE — PROGRESS NOTE ADULT - PROBLEM SELECTOR PLAN 2
CHF following   Continue with Coreg 6.25 mg PO BID.   Continue on Hydralazine 10 mg PO TID   Strict STELLA's   Daily weights

## 2020-09-20 NOTE — PROGRESS NOTE ADULT - ASSESSMENT
74 year old male with PMH of chronic systolic heart failure (EF 10%) s/p ICD, Afib s/p DCCV, CKD, DM-2, hypothyroidism; initially presented to [facility] with ALCAZAR/SOB and found to be in Afib with RVR and having hypotension and EDIE on CKD; transferred to [facility] for EP eval for possible Afib ablation and ICD battery change. Diuretics and carvedilol being held for hypotension and EDIE on CKD. Amiodarone and midodrine initiated.   S/p ICD, rate control with amio/coreg  Hypotension, midodrine,  Renal for edie  Bumex infusion- d/c for worsening renal fx  Primacor-d/c for RHC-rise in creat  LVAD eval  Milrinone resumed post rhc.  He required escalating doses of diuretics and was evaluated by structural heart for MR  9/14 S/P Mitraclip  9/14 pt BP stable - tolerating low dose Hydralizine with primacor gtt , right groin stable. PA pressures stable.  Lactate stable.  Thrombocytopenia, HIT neg, no asa; EDIE/ckd  9/15 POD #1 primacor decreased from .25mcg to .125mcg- weaned off  Maintain IJ for cvp monitoring and prn vbg as per HF. Anticoagulated for afib, will need Eliquis after IJ is d/c, follow platelet ct  continue heparin  Hydralazine for afterload, renal fx elevated/stable.  Transferred to sdu  9/18 Heparin gtt discontinued and Eliquis initiated   9/19 VVS; Creatinine increased to 2.74 -- >Received  cc IVB x 1.  Monitor renal function.    9/20 VSS - rounds made with Heart failure MD - discharge placed on hold - will restart diuretics - Torsemide 20mg po daily started this am, Coreg increased to 6.25mg po bid, hold Hydralazine for sbp < 95.  d/c plan home when cleared by heart failure ? monday/tuesday

## 2020-09-20 NOTE — PROGRESS NOTE ADULT - ASSESSMENT
Mr. 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 He was started on inotropic support with milrinone and diuresed over 20 lbs. He subsequently had worsening renal function, likely from overdiuresis. An LVAD evaluation was initiated but at the time was decided that his frailty and advanced renal dysfunction would make him a poor candidate. He underwent MitraClip 9/14 with placement of 1 clip and reduction of MR to mild. At this time he has normal filling pressures with preserved cardiac output with stable renal function off milrinone which was discontinued on 9/16. Nearing readiness for discharge    Hemodynamic data after clip:  9/15 (on milrinone 0.25 mcg/kg/mi): CVP 5 PA 47/13 PCW 13, SVC saturation 68% with Corey CO/CI 5.8/3.1 and TD CO/CI 7/3.7. MAP 70 mmHg   9/16 (on milrinone .125 mcg/kg/min) CVP 7, PA 48/15 unable to wedge. thermodilution CI 2.5, Corey CI 2.8   9/17 (off milrinone) CVP 7, central VSPO2 56, CO: 4.84, CI 2.56  9/18 (Off milrinone) Central SPO2: CO 4.79, CI 2.53

## 2020-09-20 NOTE — PROGRESS NOTE ADULT - SUBJECTIVE AND OBJECTIVE BOX
Subjective:  No overnight events. Walked 4 laps with a cane yesterday without significant difficulty.     Medications:  acetaminophen   Tablet .. 650 milliGRAM(s) Oral every 6 hours PRN  aMIOdarone    Tablet 200 milliGRAM(s) Oral daily  apixaban 5 milliGRAM(s) Oral two times a day  carvedilol 6.25 milliGRAM(s) Oral every 12 hours  chlorhexidine 2% Cloths 1 Application(s) Topical <User Schedule>  dextrose 40% Gel 15 Gram(s) Oral once PRN  dextrose 40% Gel 15 Gram(s) Oral once PRN  dextrose 5%. 1000 milliLiter(s) IV Continuous <Continuous>  dextrose 5%. 1000 milliLiter(s) IV Continuous <Continuous>  dextrose 50% Injectable 25 Gram(s) IV Push once  dextrose 50% Injectable 12.5 Gram(s) IV Push once  dextrose 50% Injectable 25 Gram(s) IV Push once  dextrose 50% Injectable 25 Gram(s) IV Push once  famotidine    Tablet 20 milliGRAM(s) Oral daily  glucagon  Injectable 1 milliGRAM(s) IntraMuscular once PRN  hydrALAZINE 10 milliGRAM(s) Oral every 8 hours  hydrocortisone 2.5% Rectal Cream 1 Application(s) Rectal daily PRN  insulin lispro (HumaLOG) corrective regimen sliding scale   SubCutaneous at bedtime  insulin lispro (HumaLOG) corrective regimen sliding scale   SubCutaneous three times a day before meals  insulin lispro Injectable (HumaLOG) 3 Unit(s) SubCutaneous three times a day before meals  levothyroxine 50 MICROGram(s) Oral daily  polyethylene glycol 3350 17 Gram(s) Oral daily PRN  torsemide 20 milliGRAM(s) Oral daily  zaleplon 5 milliGRAM(s) Oral at bedtime PRN    Vitals:  T(C): 36.4 (09-20-20 @ 05:37), Max: 36.8 (09-19-20 @ 19:39)  HR: 80 (09-20-20 @ 05:37) (76 - 80)  BP: 96/62 (09-20-20 @ 05:37) (94/57 - 102/67)  BP(mean): --  RR: 18 (09-20-20 @ 05:37) (18 - 18)  SpO2: 99% (09-20-20 @ 05:37) (99% - 100%)    Daily     Daily         I&O's Summary    19 Sep 2020 07:01  -  20 Sep 2020 07:00  --------------------------------------------------------  IN: 970 mL / OUT: 1450 mL / NET: -480 mL    20 Sep 2020 07:01  -  20 Sep 2020 13:05  --------------------------------------------------------  IN: 360 mL / OUT: 350 mL / NET: 10 mL        Physical Exam:  Appearance: No Acute Distress  HEENT: JVP ~10 cm H20  Cardiovascular: RRR, Normal S1 S2, 2/6 HSM at apex  Respiratory: Clear to auscultation bilaterally  Gastrointestinal: Soft, Non-tender, non-distended	  Skin: no skin lesions  Neurologic: Non-focal  Extremities: 1+ LE edema, warm and well perfused  Psychiatry: A & O x 3, Mood & affect appropriate      Labs:                        9.8    6.53  )-----------( 68       ( 20 Sep 2020 06:37 )             30.5     09-20    132<L>  |  105  |  101<H>  ----------------------------<  117<H>  5.0   |  16<L>  |  2.36<H>    Ca    10.0      20 Sep 2020 06:37        PT/INR - ( 19 Sep 2020 10:21 )   PT: 15.5 sec;   INR: 1.32 ratio

## 2020-09-20 NOTE — PROGRESS NOTE ADULT - PROBLEM SELECTOR PLAN 1
Continue with Coreg 6.25 mg PO BID.   Continue on Amio 200 mg PO daily   Increase activity as tolerated.   Encourage Chest PT / Pulmonary toileting and Incentive spirometry every 1 hour x 10 while awake.   Continue with PUD prophylaxis.   D/C plan home once medically cleared likely Sunday / Monday   Plan of care discussed with attending

## 2020-09-20 NOTE — PROGRESS NOTE ADULT - PROBLEM SELECTOR PLAN 4
increase Coreg to 6.25mg PO BID.   Continue on Amio 200 mg PO daily   Continue on Eliquis 5 mg PO BID

## 2020-09-21 NOTE — DISCHARGE NOTE PROVIDER - CARE PROVIDERS DIRECT ADDRESSES
,[email or web address]

## 2020-09-21 NOTE — DISCHARGE NOTE PROVIDER - NSDCFUSCHEDAPPT_GEN_ALL_CORE_FT
[facility] [place] ; 10/08/2020 ; [facility] Nephro [address] [place] ; 11/19/2020 ; [facility] Endocr [address] [facility] [address] [place] ; 09/24/2020 ; [facility] [address] [place] ; 09/25/2020 ; [facility] CT Surg [address] [place] ; 10/08/2020 ; [facility] Med Nephro [address] [place] ; 11/19/2020 ; [facility] Med Endocr [address] [place]

## 2020-09-21 NOTE — PROGRESS NOTE ADULT - ATTENDING COMMENTS
I have seen this patient with the fellow and agree with their assessment and plan. In addition, s/p mitral clip doing well  crt stable  diuresis as needed    Yemi Rees MD  Cell   Pager   Office

## 2020-09-21 NOTE — PROGRESS NOTE ADULT - PROBLEM SELECTOR PROBLEM 1
Acute HFrEF (heart failure with reduced ejection fraction)
EDIE (acute kidney injury)
S/P mitral valve clip implantation
Acute HFrEF (heart failure with reduced ejection fraction)
EDIE (acute kidney injury)
Papillary thyroid carcinoma
S/P mitral valve clip implantation
Severe mitral regurgitation
Acute HFrEF (heart failure with reduced ejection fraction)

## 2020-09-21 NOTE — PROGRESS NOTE ADULT - SUBJECTIVE AND OBJECTIVE BOX
SUBJECTIVE / OVERNIGHT EVENTS: pt seen and examined    MEDICATIONS  (STANDING):  aMIOdarone    Tablet 200 milliGRAM(s) Oral daily  apixaban 5 milliGRAM(s) Oral two times a day  carvedilol 6.25 milliGRAM(s) Oral every 12 hours  chlorhexidine 2% Cloths 1 Application(s) Topical <User Schedule>  dextrose 5%. 1000 milliLiter(s) (50 mL/Hr) IV Continuous <Continuous>  dextrose 5%. 1000 milliLiter(s) (50 mL/Hr) IV Continuous <Continuous>  dextrose 50% Injectable 12.5 Gram(s) IV Push once  dextrose 50% Injectable 25 Gram(s) IV Push once  dextrose 50% Injectable 25 Gram(s) IV Push once  dextrose 50% Injectable 25 Gram(s) IV Push once  famotidine    Tablet 20 milliGRAM(s) Oral daily  hydrALAZINE 10 milliGRAM(s) Oral every 8 hours  insulin lispro (HumaLOG) corrective regimen sliding scale   SubCutaneous at bedtime  insulin lispro (HumaLOG) corrective regimen sliding scale   SubCutaneous three times a day before meals  insulin lispro Injectable (HumaLOG) 3 Unit(s) SubCutaneous three times a day before meals  levothyroxine 50 MICROGram(s) Oral daily  torsemide 20 milliGRAM(s) Oral daily    MEDICATIONS  (PRN):  acetaminophen   Tablet .. 650 milliGRAM(s) Oral every 6 hours PRN Mild Pain (1 - 3)  dextrose 40% Gel 15 Gram(s) Oral once PRN Blood Glucose LESS THAN 70 milliGRAM(s)/deciliter  dextrose 40% Gel 15 Gram(s) Oral once PRN Blood Glucose LESS THAN 70 milliGRAM(s)/deciliter  glucagon  Injectable 1 milliGRAM(s) IntraMuscular once PRN Glucose LESS THAN 70 milligrams/deciliter  hydrocortisone 2.5% Rectal Cream 1 Application(s) Rectal daily PRN hemorrhoid pain/itch  polyethylene glycol 3350 17 Gram(s) Oral daily PRN Constipation  zaleplon 5 milliGRAM(s) Oral at bedtime PRN Insomnia    Vital Signs Last 24 Hrs  T(C): 36.3 (21 Sep 2020 11:55), Max: 36.6 (21 Sep 2020 06:36)  T(F): 97.3 (21 Sep 2020 11:55), Max: 97.8 (21 Sep 2020 06:36)  HR: 82 (21 Sep 2020 11:55) (80 - 82)  BP: 98/63 (21 Sep 2020 11:55) (94/59 - 99/64)  BP(mean): --  RR: 18 (21 Sep 2020 11:55) (18 - 18)  SpO2: 99% (21 Sep 2020 11:55) (99% - 100%)    Constitutional: No fever, fatigue  Skin: No rash.  Eyes: No recent vision problems or eye pain.  ENT: No congestion, ear pain, or sore throat.  Cardiovascular: No chest pain or palpation.  Respiratory: No cough, shortness of breath, congestion, or wheezing.  Gastrointestinal: No abdominal pain, nausea, vomiting, or diarrhea.  Genitourinary: No dysuria.  Musculoskeletal: No joint swelling.  Neurologic: No headache.    PHYSICAL EXAM:  GENERAL: NAD  EYES: EOMI, PERRLA  NECK: Supple, No JVD  CHEST/LUNG: dec breath sounds at bases   HEART:  S1 , S2 +  ABDOMEN: soft , bs+  EXTREMITIES:  edema+  NEUROLOGY:alert awake    LABS:  09-21    133<L>  |  105  |  103<H>  ----------------------------<  106<H>  4.7   |  19<L>  |  2.77<H>    Ca    9.4      21 Sep 2020 06:06      Creatinine Trend: 2.77 <--, 2.36 <--, 2.74 <--, 2.38 <--, 2.42 <--, 2.74 <--                        9.3    5.33  )-----------( 90       ( 21 Sep 2020 06:06 )             30.2     Urine Studies:              PT/INR - ( 21 Sep 2020 07:21 )   PT: 23.5 sec;   INR: 2.04 ratio

## 2020-09-21 NOTE — DISCHARGE NOTE PROVIDER - NSDCMRMEDTOKEN_GEN_ALL_CORE_FT
amiodarone 200 mg oral tablet: 1 tab(s) orally once a day  carvedilol 12.5 mg oral tablet: 0.5 tab(s) orally 2 times a day  Eliquis 5 mg oral tablet: 1 tab(s) orally 2 times a day  levothyroxine 50 mcg (0.05 mg) oral tablet: 1 tab(s) orally once a day  metOLazone 2.5 mg oral tablet: 1 tab(s) orally once a day  torsemide 20 mg oral tablet: 4 tab(s) orally 2 times a day   acetaminophen 325 mg oral tablet: 2 tab(s) orally every 6 hours, As needed, Mild Pain (1 - 3)  amiodarone 200 mg oral tablet: 1 tab(s) orally once a day  carvedilol 6.25 mg oral tablet: 1 tab(s) orally 2 times a day   Eliquis 5 mg oral tablet: 1 tab(s) orally 2 times a day  hydrALAZINE 10 mg oral tablet: 1 tab(s) orally every 8 hours  hydrocortisone 2.5% rectal cream with applicator: 1 application rectal once a day, As needed, hemorrhoid pain/itch  levothyroxine 50 mcg (0.05 mg) oral tablet: 1 tab(s) orally once a day  torsemide 20 mg oral tablet: 4 tab(s) orally 2 times a day   acetaminophen 325 mg oral tablet: 2 tab(s) orally every 6 hours, As needed, Mild Pain (1 - 3)  amiodarone 200 mg oral tablet: 1 tab(s) orally once a day  carvedilol 6.25 mg oral tablet: 1 tab(s) orally 2 times a day   Eliquis 5 mg oral tablet: 1 tab(s) orally 2 times a day  hydrALAZINE 10 mg oral tablet: 1 tab(s) orally every 8 hours  hydrocortisone 2.5% rectal cream with applicator: 1 application rectal once a day, As needed, hemorrhoid pain/itch  levothyroxine 50 mcg (0.05 mg) oral tablet: 1 tab(s) orally once a day  torsemide 20 mg oral tablet:

## 2020-09-21 NOTE — PROGRESS NOTE ADULT - PROVIDER SPECIALTY LIST ADULT
CCU
CT Surgery
Cardiology
Critical Care
Endocrinology
Heart Failure
Heme/Onc
Internal Medicine
Nephrology
Structural Heart
TRACEE
Heart Failure
Internal Medicine
Nephrology
Internal Medicine
Heart Failure
CT Surgery
Internal Medicine
Internal Medicine
Heart Failure
Internal Medicine

## 2020-09-21 NOTE — PROGRESS NOTE ADULT - PROBLEM SELECTOR PROBLEM 2
Atrial fibrillation, unspecified type
CKD (chronic kidney disease)
Acute renal failure superimposed on stage 3 chronic kidney disease, unspecified acute renal failure type
CKD (chronic kidney disease)
Acute on chronic systolic CHF (congestive heart failure), NYHA class 3
Acute renal failure superimposed on stage 3 chronic kidney disease, unspecified acute renal failure type
Acute on chronic systolic CHF (congestive heart failure), NYHA class 3
Acute renal failure superimposed on stage 3 chronic kidney disease, unspecified acute renal failure type
Afib
Afib
Atrial fibrillation, unspecified type
Type 2 diabetes mellitus with stage 4 chronic kidney disease, without long-term current use of insulin
Acute renal failure superimposed on stage 3 chronic kidney disease, unspecified acute renal failure type
Afib
Atrial fibrillation, unspecified type
Atrial fibrillation, unspecified type
Afib
Atrial fibrillation, unspecified type
Acute renal failure superimposed on stage 3 chronic kidney disease, unspecified acute renal failure type
Atrial fibrillation, unspecified type

## 2020-09-21 NOTE — PROGRESS NOTE ADULT - ASSESSMENT
Mr. 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 He was started on inotropic support with milrinone and diuresed over 20 lbs. He subsequently had worsening renal function, likely from overdiuresis. An LVAD evaluation was initiated but at the time was decided that his frailty and advanced renal dysfunction would make him a poor candidate. He underwent MitraClip 9/14 with placement of 1 clip and reduction of MR to mild. At this time he has normal filling pressures with preserved cardiac output with stable renal function off milrinone which was discontinued on 9/16. Weight has increased but appears fairly euvolemic    Hemodynamic data after clip:  9/15 (on milrinone 0.25 mcg/kg/mi): CVP 5 PA 47/13 PCW 13, SVC saturation 68% with Corey CO/CI 5.8/3.1 and TD CO/CI 7/3.7. MAP 70 mmHg   9/16 (on milrinone .125 mcg/kg/min) CVP 7, PA 48/15 unable to wedge. thermodilution CI 2.5, Corey CI 2.8   9/17 (off milrinone) CVP 7, central VSPO2 56, CO: 4.84, CI 2.56  9/18 (Off milrinone) Central SPO2: CO 4.79, CI 2.53    NP appt given 9/24 at 9am

## 2020-09-21 NOTE — DISCHARGE NOTE PROVIDER - NSDCFUADDAPPT_GEN_ALL_CORE_FT
see  Dr Bui.. see  Dr Bui..friday 9/25th  845am  see HF team at clinic thursday 9 am   9/24 see  Dr Bui   .friday 9/25th  845am  see HF team at clinic Thursday 9 am   9/24

## 2020-09-21 NOTE — PROGRESS NOTE ADULT - PROBLEM SELECTOR PROBLEM 6
Papillary thyroid carcinoma
Type 2 diabetes mellitus without complication, without long-term current use of insulin
Papillary thyroid carcinoma
Type 2 diabetes mellitus without complication, without long-term current use of insulin
Papillary thyroid carcinoma
Type 2 diabetes mellitus without complication, without long-term current use of insulin
Papillary thyroid carcinoma
Type 2 diabetes mellitus without complication, without long-term current use of insulin
Papillary thyroid carcinoma
Type 2 diabetes mellitus without complication, without long-term current use of insulin

## 2020-09-21 NOTE — PROGRESS NOTE ADULT - REASON FOR ADMISSION
lower extremity edema
ADHF
lower extremity edema
preop Mitral clip
lower extremity edema

## 2020-09-21 NOTE — PROGRESS NOTE ADULT - SUBJECTIVE AND OBJECTIVE BOX
Interval History:  feels well  denies complaints  would like to go home    Medications:  acetaminophen   Tablet .. 650 milliGRAM(s) Oral every 6 hours PRN  aMIOdarone    Tablet 200 milliGRAM(s) Oral daily  apixaban 5 milliGRAM(s) Oral two times a day  carvedilol 6.25 milliGRAM(s) Oral every 12 hours  chlorhexidine 2% Cloths 1 Application(s) Topical <User Schedule>  dextrose 40% Gel 15 Gram(s) Oral once PRN  dextrose 40% Gel 15 Gram(s) Oral once PRN  dextrose 5%. 1000 milliLiter(s) IV Continuous <Continuous>  dextrose 5%. 1000 milliLiter(s) IV Continuous <Continuous>  dextrose 50% Injectable 12.5 Gram(s) IV Push once  dextrose 50% Injectable 25 Gram(s) IV Push once  dextrose 50% Injectable 25 Gram(s) IV Push once  dextrose 50% Injectable 25 Gram(s) IV Push once  famotidine    Tablet 20 milliGRAM(s) Oral daily  glucagon  Injectable 1 milliGRAM(s) IntraMuscular once PRN  hydrALAZINE 10 milliGRAM(s) Oral every 8 hours  hydrocortisone 2.5% Rectal Cream 1 Application(s) Rectal daily PRN  insulin lispro (HumaLOG) corrective regimen sliding scale   SubCutaneous at bedtime  insulin lispro (HumaLOG) corrective regimen sliding scale   SubCutaneous three times a day before meals  insulin lispro Injectable (HumaLOG) 3 Unit(s) SubCutaneous three times a day before meals  levothyroxine 50 MICROGram(s) Oral daily  polyethylene glycol 3350 17 Gram(s) Oral daily PRN  torsemide 20 milliGRAM(s) Oral daily  zaleplon 5 milliGRAM(s) Oral at bedtime PRN      Vitals:  T(C): 36.3 (20 @ 11:55), Max: 36.6 (20 @ 06:36)  HR: 82 (20 @ 11:55) (79 - 82)  BP: 98/63 (20 @ 11:55) (94/59 - 100/63)  BP(mean): --  RR: 18 (20 @ 11:55) (18 - 18)  SpO2: 99% (20 @ 11:55) (99% - 100%)    Daily     Daily Weight in k.8 (21 Sep 2020 08:21)        I&O's Summary    20 Sep 2020 07:01  -  21 Sep 2020 07:00  --------------------------------------------------------  IN: 740 mL / OUT: 1800 mL / NET: -1060 mL    21 Sep 2020 07:01  -  21 Sep 2020 22:20  --------------------------------------------------------  IN: 300 mL / OUT: 1150 mL / NET: -850 mL      Physical Exam:  Appearance: No Acute Distress. Frail  HEENT: PERRL  Neck: No JVD visible   Cardiovascular: Normal S1 S2, No murmurs/rubs/gallops  Respiratory: Clear to auscultation bilaterally  Gastrointestinal: Soft, Non-tender	  Skin: No cyanosis	  Neurologic: Non-focal  Extremities: No LE edema  Psychiatry: A & O x 3, Mood & affect appropriate    Labs:                        9.3    5.33  )-----------( 90       ( 21 Sep 2020 06:06 )             30.2     -    133<L>  |  105  |  103<H>  ----------------------------<  106<H>  4.7   |  19<L>  |  2.77<H>    Ca    9.4      21 Sep 2020 06:06      PT/INR - ( 21 Sep 2020 07:21 )   PT: 23.5 sec;   INR: 2.04 ratio

## 2020-09-21 NOTE — PROGRESS NOTE ADULT - PROBLEM SELECTOR PLAN 2
- fluctuates overall but GFR has been fairly stable in 20-25 range  - monitor after restarting diuretics   - avoid nephrotoxic medications.

## 2020-09-21 NOTE — PROGRESS NOTE ADULT - PROBLEM SELECTOR PROBLEM 4
Mitral insufficiency
Acute renal failure superimposed on stage 3 chronic kidney disease, unspecified acute renal failure type
Mitral insufficiency
Atrial fibrillation, unspecified type
Mitral insufficiency
Acute renal failure superimposed on stage 3 chronic kidney disease, unspecified acute renal failure type
Atrial fibrillation, unspecified type
Mitral insufficiency
Thrombocytopenia
Thrombocytopenia
Mitral insufficiency
Acute renal failure superimposed on stage 3 chronic kidney disease, unspecified acute renal failure type
Acute renal failure superimposed on stage 3 chronic kidney disease, unspecified acute renal failure type
Hypertension
Mitral insufficiency
Thrombocytopenia
Acute renal failure superimposed on stage 3 chronic kidney disease, unspecified acute renal failure type
Acute renal failure superimposed on stage 3 chronic kidney disease, unspecified acute renal failure type

## 2020-09-21 NOTE — PROGRESS NOTE ADULT - SUBJECTIVE AND OBJECTIVE BOX
[facility] DIVISION OF KIDNEY DISEASES AND HYPERTENSION -- FOLLOW UP NOTE  --------------------------------------------------------------------------------  If any questions, please feel free to contact me  NS pager: [phone], [facility]: [phone]  Arley Ferguson M.D.  Nephrology Fellow    (After 5 pm or on weekends please page the on-call fellow)  --------------------------------------------------------------------------------    Chief Complaint:  Patient is a 75y old  Male who presents with a chief complaint of lower extremity edema  sp mitral clip (21 Sep 2020 11:56)    24 hour events/subjective:  Patient seen and examined at bedside, in NAD  sCr stable - no acute events overnight  vitals/labs/imaging reviewed.     PAST HISTORY  --------------------------------------------------------------------------------  No significant changes to PMH, PSH, FHx, SHx, unless otherwise noted    ALLERGIES & MEDICATIONS  --------------------------------------------------------------------------------  Allergies    No Known Allergies    Intolerances      Standing Inpatient Medications  aMIOdarone    Tablet 200 milliGRAM(s) Oral daily  apixaban 5 milliGRAM(s) Oral two times a day  carvedilol 6.25 milliGRAM(s) Oral every 12 hours  chlorhexidine 2% Cloths 1 Application(s) Topical <User Schedule>  dextrose 5%. 1000 milliLiter(s) IV Continuous <Continuous>  dextrose 5%. 1000 milliLiter(s) IV Continuous <Continuous>  dextrose 50% Injectable 12.5 Gram(s) IV Push once  dextrose 50% Injectable 25 Gram(s) IV Push once  dextrose 50% Injectable 25 Gram(s) IV Push once  dextrose 50% Injectable 25 Gram(s) IV Push once  famotidine    Tablet 20 milliGRAM(s) Oral daily  hydrALAZINE 10 milliGRAM(s) Oral every 8 hours  insulin lispro (HumaLOG) corrective regimen sliding scale   SubCutaneous at bedtime  insulin lispro (HumaLOG) corrective regimen sliding scale   SubCutaneous three times a day before meals  insulin lispro Injectable (HumaLOG) 3 Unit(s) SubCutaneous three times a day before meals  levothyroxine 50 MICROGram(s) Oral daily  torsemide 20 milliGRAM(s) Oral daily    PRN Inpatient Medications  acetaminophen   Tablet .. 650 milliGRAM(s) Oral every 6 hours PRN  dextrose 40% Gel 15 Gram(s) Oral once PRN  dextrose 40% Gel 15 Gram(s) Oral once PRN  glucagon  Injectable 1 milliGRAM(s) IntraMuscular once PRN  hydrocortisone 2.5% Rectal Cream 1 Application(s) Rectal daily PRN  polyethylene glycol 3350 17 Gram(s) Oral daily PRN  zaleplon 5 milliGRAM(s) Oral at bedtime PRN      REVIEW OF SYSTEMS  --------------------------------------------------------------------------------  Gen: No fevers/chills  Skin: No rashes  Head/Eyes/Ears: Normal hearing,   Respiratory: No dyspnea, cough  CV: No chest pain  GI: No abdominal pain, diarrhea  : No dysuria, hematuria  MSK: No  edema  Heme: No easy bruising or bleeding  Psych: No significant depression      All other systems were reviewed and are negative, except as noted.    VITALS/PHYSICAL EXAM  --------------------------------------------------------------------------------  T(C): 36.3 (09-21-20 @ 11:55), Max: 36.6 (09-21-20 @ 06:36)  HR: 82 (09-21-20 @ 11:55) (79 - 82)  BP: 98/63 (09-21-20 @ 11:55) (94/59 - 100/63)  RR: 18 (09-21-20 @ 11:55) (18 - 18)  SpO2: 99% (09-21-20 @ 11:55) (99% - 100%)  Wt(kg): --        09-20-20 @ 07:01  -  09-21-20 @ 07:00  --------------------------------------------------------  IN: 740 mL / OUT: 1800 mL / NET: -1060 mL    09-21-20 @ 07:01  -  09-21-20 @ 14:21  --------------------------------------------------------  IN: 300 mL / OUT: 550 mL / NET: -250 mL        Physical Exam:  	Gen: NAD, cooperative  	HEENT: MMM  	Pulm: CTA B/L, no wheezing anteriorly   	CV: S1S2,   	Abd: Soft, +BS, NT, ND  	Ext: LE edema B/L  	Neuro: Awake, Alert and oriented x3  	Skin: Warm and dry              : no tenderness to palpation               Psych: normal affect and mood      LABS/STUDIES  --------------------------------------------------------------------------------              9.3    5.33  >-----------<  90       [09-21-20 @ 06:06]              30.2     133  |  105  |  103  ----------------------------<  106      [09-21-20 @ 06:06]  4.7   |  19  |  2.77        Ca     9.4     [09-21-20 @ 06:06]      PT/INR: PT 23.5 , INR 2.04       [09-21-20 @ 07:21]      Creatinine Trend:  SCr 2.77 [09-21 @ 06:06]  SCr 2.36 [09-20 @ 06:37]  SCr 2.74 [09-19 @ 06:24]  SCr 2.38 [09-18 @ 05:45]  SCr 2.42 [09-17 @ 01:52]    Urinalysis - [09-09-20 @ 04:31]      Color Light Yellow / Appearance Clear / SG 1.010 / pH 6.5      Gluc Negative / Ketone Negative  / Bili Negative / Urobili <2 mg/dL       Blood Negative / Protein Negative / Leuk Est Negative / Nitrite Negative      RBC  / WBC  / Hyaline  / Gran  / Sq Epi  / Non Sq Epi  / Bacteria       Iron 36, TIBC 314, %sat 12      [09-08-20 @ 08:45]  Ferritin 145      [09-08-20 @ 08:45]  Vitamin D (25OH) 21.7      [07-18-20 @ 10:13]  TSH 3.60      [09-11-20 @ 05:45]  Lipid: chol 116, TG 55, HDL 48, LDL 58      [08-26-20 @ 00:17]    HBsAb Nonreact      [08-26-20 @ 02:02]  HBsAg Nonreact      [08-26-20 @ 02:02]  HBcAb Nonreact      [08-26-20 @ 02:02]  HCV 0.10, Nonreact      [08-26-20 @ 02:02]    Rheumatoid Factor <10      [08-26-20 @ 00:17]  Syphilis Screen (Treponema Pallidum Ab) Negative      [08-26-20 @ 04:26]  Free Light Chains: kappa 6.27, lambda 4.60, ratio = 1.36      [08-26 @ 04:46]  Immunofixation Serum: No Monoclonal Band Identified    Reference Range: None Detected      [08-26-20 @ 04:46]  SPEP Interpretation: Normal Electrophoresis Pattern      [08-26-20 @ 04:46]

## 2020-09-21 NOTE — DISCHARGE NOTE PROVIDER - CARE PROVIDER_API CALL
John Bui  THORACIC AND CARDIAC SURGERY  [address] [place] [address]  Phone: [phone]  Fax: [phone]  Follow Up Time:

## 2020-09-21 NOTE — PROGRESS NOTE ADULT - PROBLEM SELECTOR PROBLEM 3
Afib
Essential hypertension
Afib
Acute kidney injury superimposed on chronic kidney disease
Afib
Acute kidney injury superimposed on chronic kidney disease
Afib
Essential hypertension
Mitral insufficiency
Mitral insufficiency
Afib
Essential hypertension
Essential hypertension
Mitral insufficiency
Essential hypertension
Essential hypertension

## 2020-09-21 NOTE — PROGRESS NOTE ADULT - PROBLEM SELECTOR PLAN 1
- c/w carvedilol 6.25 mg BID (decreased overnight to 3.25 BID) if SBP remains > 95 mmHg  - Hydralizine/isordil decreased 2/2 low BP, given preserved cardiac output and renal dysfunction, will favor persistent MAP > 65 mmHg and avoid hypotension that   would worsen ATN. Will uptitrate as allowed. Currently on hydralizine 10 mg TID.   - Restart diuretics, will start with torsemide 20 mg daily   - Under evaluation for LVAD as destination therapy (though thought poor candidate at this time in part due to renal dysfunction). He is not a heart transplant candidate given age.

## 2020-09-21 NOTE — DISCHARGE NOTE PROVIDER - HOSPITAL COURSE
74 year old male with PMH of chronic systolic heart failure (EF 10%) s/p ICD, Afib s/p DCCV, CKD, DM-2, hypothyroidism; initially presented to [facility] with ALCAZAR/SOB and found to be in Afib with RVR and having hypotension and EDIE on CKD; transferred to [facility] for EP eval for possible Afib ablation and ICD battery change. Diuretics and carvedilol being held for hypotension and EDIE on CKD. Amiodarone and midodrine initiated.   S/p ICD, rate control with amio/coreg  Hypotension, midodrine,  Renal for edie  Bumex infusion- d/c for worsening renal fx  Primacor-d/c for RHC-rise in creat  LVAD eval  Milrinone resumed post rhc.  He required escalating doses of diuretics and was evaluated by structural heart for MR  9/14 S/P Mitraclip  9/14 pt BP stable - tolerating low dose Hydralizine with primacor gtt , right groin stable. PA pressures stable.  Lactate stable.  Thrombocytopenia, HIT neg, no asa; EDIE/ckd  9/15 POD #1 primacor decreased from .25mcg to .125mcg- weaned off  Maintain IJ for cvp monitoring and prn vbg as per HF. Anticoagulated for afib, will need Eliquis after IJ is d/c, follow platelet ct  continue heparin  Hydralazine for afterload, renal fx elevated/stable.  Transferred to sdu  9/18 Heparin gtt discontinued and Eliquis initiated   9/19 VVS; Creatinine increased to 2.74 -- >Received  cc IVB x 1.  Monitor renal function.    9/20 VSS - rounds made with Heart failure MD - discharge placed on hold - will restart diuretics - Torsemide 20mg po daily started this am, Coreg increased to 6.25mg po bid, hold Hydralazine for sbp < 95.  d/c plan home when cleared by heart failure ? monday/tuesday 74 year old male with PMH of chronic systolic heart failure (EF 10%) s/p ICD, Afib s/p DCCV, CKD, DM-2, hypothyroidism; initially presented to [facility] with ALCAZAR/SOB and found to be in Afib with RVR and having hypotension and EDIE on CKD; transferred to [facility] for EP eval for possible Afib ablation and ICD battery change. Diuretics and carvedilol being held for hypotension and EDIE on CKD. Amiodarone and midodrine initiated.   S/p ICD, rate control with amio/coreg  Hypotension, midodrine,  Renal for edie  Bumex infusion- d/c for worsening renal fx  Primacor-d/c for RHC-rise in creat  LVAD eval  Milrinone resumed post rhc.  He required escalating doses of diuretics and was evaluated by structural heart for MR  9/14 S/P Mitraclip  9/14 pt BP stable - tolerating low dose Hydralizine with primacor gtt , right groin stable. PA pressures stable.  Lactate stable.  Thrombocytopenia, HIT neg, no asa; EDIE/ckd  9/15 POD #1 primacor decreased from .25mcg to .125mcg- weaned off  Maintain IJ for cvp monitoring and prn vbg as per HF. Anticoagulated for afib, will need Eliquis after IJ is d/c, follow platelet ct  continue heparin  Hydralazine for afterload, renal fx elevated/stable.  Transferred to sdu  9/18 Heparin gtt discontinued and Eliquis initiated   9/19 VVS; Creatinine increased to 2.74 -- >Received  cc IVB x 1.  Monitor renal function.    9/20 VSS - rounds made with Heart failure MD - discharge placed on hold - will restart diuretics - Torsemide 20mg po daily started this am, Coreg increased to 6.25mg po bid,  9/21  vss    dc home 74 year old male with PMH of chronic systolic heart failure (EF 10%) s/p ICD, Afib s/p DCCV, CKD, DM-2, hypothyroidism; initially presented to [facility] with ALCAZAR/SOB and found to be in Afib with RVR and having hypotension and EDIE on CKD; transferred to [facility] for EP eval for possible Afib ablation and ICD battery change. Diuretics and carvedilol being held for hypotension and EDIE on CKD. Amiodarone and midodrine initiated.   S/p ICD, rate control with amio/coreg  Hypotension, midodrine,  Renal for edie  Bumex infusion- d/c for worsening renal fx  Primacor-d/c for RHC-rise in creat  LVAD eval  Milrinone resumed post rhc.  He required escalating doses of diuretics and was evaluated by structural heart for MR  9/14 S/P Mitraclip  9/14 pt BP stable - tolerating low dose Hydralizine with primacor gtt , right groin stable. PA pressures stable.  Lactate stable.  Thrombocytopenia, HIT neg, no asa; EDIE/ckd  9/15 POD #1 primacor decreased from .25mcg to .125mcg- weaned off  Maintain IJ for cvp monitoring and prn vbg as per HF. Anticoagulated for afib, will need Eliquis after IJ is d/c, follow platelet ct  continue heparin  Hydralazine for afterload, renal fx elevated/stable.  Transferred to sdu  9/18 Heparin gtt discontinued and Eliquis initiated   9/19 VVS; Creatinine increased to 2.74 -- >Received  cc IVB x 1.  Monitor renal function.    9/20 VSS - rounds made with Heart failure MD - discharge placed on hold - will restart diuretics - Torsemide 20mg po daily started this am, Coreg increased to 6.25mg po bid,  9/21  vss    HF    agreed    dc home

## 2020-09-21 NOTE — DISCHARGE NOTE PROVIDER - NSDCCPCAREPLAN_GEN_ALL_CORE_FT
PRINCIPAL DISCHARGE DIAGNOSIS  Diagnosis: Mitral insufficiency  Assessment and Plan of Treatment: sp   mitral clip

## 2020-09-21 NOTE — PROGRESS NOTE ADULT - PROBLEM SELECTOR PLAN 6
- appreciate endo c/s.   - No containdication to LVAD.   - Fine needle aspiration showing L upper pole nodule to be [place] VI per outpatient endo note 8/19.
-diet controlled at home
- appreciate endo c/s.   - No contraindication to LVAD.   - Fine needle aspiration showing L upper pole nodule to be [place] VI per outpatient endo note 8/19.
-diet controlled at home
- appreciate endo c/s.   - No contraindication to LVAD.   - Fine needle aspiration showing L upper pole nodule to be [place] VI per outpatient endo note 8/19.
- appreciate endo c/s.   - No contraindication to LVAD.   - Fine needle aspiration showing L upper pole nodule to be [place] VI per outpatient endo note 8/19.
- appreciate endo c/s.   - No contraindication to LVAD.   - Fine needle aspiration showing L upper pole nodule to be [place] VI per outpatient endo note 8/19.
-diet controlled at home
- appreciate endo c/s.   - No containdication to LVAD.   - Fine needle aspiration showing L upper pole nodule to be [place] VI per outpatient endo note 8/19.
- appreciate endo c/s.   - No containdication to LVAD.   - Fine needle aspiration showing L upper pole nodule to be [place] VI per outpatient endo note 8/19.
- appreciate endo c/s.   - No containdication to LVAD.   - Fine needle aspiration showing L upper pole nodule to be [place] VI per outpatient endo note 8/19.
- appreciate endo c/s.   - No containdication to LVAD.   - Fine needle aspiration showing L upper pole nodule to be [place] VI per outpatient endo note 8/19.
- appreciate endo c/s.   - No containdication to LVAD.   - Fine needle aspiration showing L upper pole nodule to be [place] VI per outpatient endo note 8/19.
- appreciate endo c/s.   - No containdication to LVAD.   - Fine needle aspiration showing L upper pole nodule to be [place] VI per outpatient endo note 8/19.
- appreciate endo c/s.   - No containdication to LVAD.   - Fine needle aspiration showing L upper pole nodule to be [place] VI per outpatient endo note 8/19.
- appreciate endo c/s.   - No containdication to LVAD.   - Fine needle aspiration showing L upper pole nodule to be [place] VI per outpatient endo note 8/19.
- appreciate endo c/s.   - No containdication to LVAD.   - Fine needle aspiration showing L upper pole nodule to be [place] VI per outpatient endo note 8/19.
- appreciate endo c/s.   - No containdication to LVAD.   - Fine needle aspiration showing L upper pole nodule to be [place] VI per outpatient endo note 8/19.
- appreciate endo c/s.   - No contraindication to LVAD.   - Fine needle aspiration showing L upper pole nodule to be [place] VI per outpatient endo note 8/19.
- appreciate endo c/s.   - No contraindication to LVAD.   - Fine needle aspiration showing L upper pole nodule to be [place] VI per outpatient endo note 8/19.
- appreciate endo c/s.   - No contraindication to LVAD.   - Fine needle aspiration showing L upper pole nodule to be [place] VI per outpatient endo note 8/19.
- appreciate endo c/s.   - No contraindication to LVAD.   - Fine needle aspiration showing L upper pole nodule to be [place] VI per outpatient endo note 8/19.
- appreciate endo c/s.   - No contraindication to LVAD.   - Fine needle aspiration showing L upper pole nodule to be [place] VI per outpatient endo note 8/19.
- appreciate endo c/s.   - No contraindication to LVAD.   - Fine needle aspiration showing L upper pole nodule to be [place] VI per outpatient endo note 8/19.
-diet controlled at home
- appreciate endo c/s.   - No containdication to LVAD.   - Fine needle aspiration showing L upper pole nodule to be [place] VI per outpatient endo note 8/19.
- appreciate endo c/s.   - No containdication to LVAD.   - Fine needle aspiration showing L upper pole nodule to be [place] VI per outpatient endo note 8/19.
- appreciate endo c/s.   - No containdication to LVAD.   - Fine needle aspiration showing L upper pole nodule to be [place] VI per outpatient endo note 8/19.
- appreciate endo c/s.   - No containdication to LVAD.   - Fine needle aspiration showing L upper pole nodule to be [place] VI per outpatient endo note 8/19.
-diet controlled at home

## 2020-09-21 NOTE — PROGRESS NOTE ADULT - PROBLEM SELECTOR PROBLEM 5
Hypothyroidism, unspecified type
Thrombocytopenia
Hypothyroidism, unspecified type
Thrombocytopenia
Hypothyroidism, unspecified type
Papillary thyroid carcinoma
Papillary thyroid carcinoma
Thrombocytopenia
Hypothyroidism, unspecified type
Hypothyroidism, unspecified type
Thrombocytopenia
Papillary thyroid carcinoma
Hypothyroidism, unspecified type

## 2020-09-25 NOTE — REASON FOR VISIT
[Follow-Up - From Hospitalization] : follow-up of a recent hospitalization for [Discharge Date: ___] : Discharge Date: [unfilled] [Admitted for Heart Failure] : patient was admitted for heart failure

## 2020-09-29 NOTE — PHYSICAL EXAM
[General Appearance - Well Developed] : well developed [Well Groomed] : well groomed [General Appearance - In No Acute Distress] : no acute distress [Eyelids - No Xanthelasma] : the eyelids demonstrated no xanthelasmas [No Oral Cyanosis] : no oral cyanosis [Respiration, Rhythm And Depth] : normal respiratory rhythm and effort [Exaggerated Use Of Accessory Muscles For Inspiration] : no accessory muscle use [Heart Rate And Rhythm] : heart rate and rhythm were normal [Heart Sounds] : normal S1 and S2 [2+] : left 2+ [Abdomen Soft] : soft [Abdomen Tenderness] : non-tender [Abnormal Walk] : normal gait [Nail Clubbing] : no clubbing of the fingernails [Cyanosis, Localized] : no localized cyanosis [] : no rash [No Venous Stasis] : no venous stasis [Oriented To Time, Place, And Person] : oriented to person, place, and time [Impaired Insight] : insight and judgment were intact [Affect] : the affect was normal [Mood] : the mood was normal [No Anxiety] : not feeling anxious [FreeTextEntry1] : Warm peripherally

## 2020-09-29 NOTE — HISTORY OF PRESENT ILLNESS
[FreeTextEntry1] : Mr. Jarquin is a pleasant 75 year old M with longstanding history of dilated NICM with progressively worsening LV systolic function (LVIDd 6.72cm, LVEF <20%) s/p CRT-D (generator change in 7/20/20), HTN, AFib s/p DCCV 2015 & 7/20/20 (on Eliquis), CKD stage 3 (b/l SCr 1.7-2), DM 2 (Ha1c 6.1%) and anemia. His history is also notable for papillary thyroid carcinoma for which he follows with Endocrinologist Dr. Wnislow with q3 month surveillance testing as he prefers to defer surgical intervention. \par \par He reports of first being told of his cardiomyopathy approximately 35 years ago and during this time was also diagnosed with HTN which was reportedly well managed on medical therapies. He has been following  with Dr. Loya since that time. Upon review of available records, his LVEF dating back to 2018 was <25% while on low dose GDMT. Reported LHC in 2012 with nonobstructive disease. He reports last feeling well in June of 2019, where he had excellent functional capacity and was able to walk for 5-7 miles at a time. More recently, with worsening ALCAZAR and was hospitalized 7/14-7/22 with symptomatic AFib RVR, hypotension and EDIE on CKD. His Entresto and Spironolactone were discontinued and diuretic was intermittently held. He underwent successful DCCV on 7/20 with CRT-D generator change and was loaded with Amiodarone. Most recent TTE from 7/20 notable for mod-severe MR. Following discharge, he noted gradual fluid retention and was restarted on his diuretic. He established care with renal, Dr. Rees on 8/11, at which time his Furosemide was transitioned to Torsemide 40 mg BID for persistent volume overload. \par He was seen 8/19 by Dr Anglin for an initial visit and referred from CHF clinic for acute on chronic systolic heart failure with volume overload. He was initiated on inotropic support with milrinone and diuresed over 20 lbs. He subsequently had worsening renal function, likely from overdiuresis. An LVAD evaluation was initiated but at the time was decided that his frailty and advanced renal dysfunction would make him a poor candidate. He underwent MitraClip 9/14 with placement of 1 clip and reduction of MR to mild. Was followed by renal, and felt no indication for RRT at this time.\par Hemodynamic data after clip:\par 9/15 (on milrinone 0.25 mcg/kg/mi): CVP 5 PA 47/13 PCW 13, SVC saturation 68% with Corey CO/CI 5.8/3.1 and TD CO/CI 7/3.7. MAP 70 mmHg \par 9/16 (on milrinone .125 mcg/kg/min) CVP 7, PA 48/15 unable to wedge. thermodilution CI 2.5, Corey CI 2.8 \par 9/17 (off milrinone) CVP 7, central VSPO2 56, CO: 4.84, CI 2.56\par 9/18 (Off milrinone) Central SPO2: CO 4.79, CI 2.53\par Discharge 9/21 appeared euvolemic, restarted torsemide 20mg daily, bun 103 scr 2.77 K 4.7. weight 187#\par \par He presents today for hospital follow up. No sob at rest, orthopnea or PND.  He states walking a lot on the day of discharge and then being worn out the rest of the week. He is able to climb a flight of stairs slowly without dyspnea. He denies CP, LH/dizziness, abdominal discomfort, palpitations, or syncope. His appetite is fair and his bowel and bladder habits are unchanged and normal for him. He has not had an ICD discharge. He has been limiting fluid and sodium in his diet and taking his medications as directed. He does not use NSAIDs.  His weight has been stable at home, with SBP low 90's to 80's\par

## 2020-09-29 NOTE — DISCUSSION/SUMMARY
[FreeTextEntry1] : 75 year old M with dilated NICM, severely depressed LVEF, mod-severe MR s/p CRT-D and pAFib. Recent hospitalization for ADHF, s/p Mitral clip. He is ACC/AHA stage C with NYHA class II symptoms, today euvolemic and low normotensive.  \par \par 1. Chronic Systolic Heart Failure -  Continue Coreg 6.25 mg BID, hydral 10mg TID, and torsemide 20mg daily. Continue daily weights and BP.  Labs today\par \par 2. Mitral regurgitation, S/P Mitral clip\par \par 3. Chronic Kidney Disease - SCr uptrending, today 2.7 \par      F/U with Dr. Rees\par \par 5. Paroxysmal Atrial Fibrillation s/p DCCV - AC with Eliquis. Continue Amio and BB as above. \par \par RTC  with NP in 2 weeks and Dr. Anglin in 4 weeks.\par \par

## 2020-10-04 PROBLEM — I10 ESSENTIAL (PRIMARY) HYPERTENSION: Status: ACTIVE | Noted: 2018-09-05

## 2020-10-04 NOTE — HISTORY OF PRESENT ILLNESS
[FreeTextEntry1] : Mr. Jarquin is a pleasant 75 year old M with longstanding history of dilated NICM with progressively worsening LV systolic function (LVIDd 6.72cm, LVEF <20%) s/p CRT-D (generator change in 7/20/20), HTN, AFib s/p DCCV 2015 & 7/20/20 (on Eliquis), CKD stage 3 (b/l SCr 1.7-2), DM 2 (Ha1c 6.1%) and anemia. His history is also notable for papillary thyroid carcinoma for which he follows with Endocrinologist Dr. Winslow with q3 month surveillance testing as he prefers to defer surgical intervention. He presents today for consultation of his cardiomyopathy, referred by Dr. Loya. \par \par He reports of first being told of his cardiomyopathy approximately 35 years ago and during this time was also diagnosed with HTN which was reportedly well managed on medical therapies. He has been following  with Dr. Loya since that time. Upon review of available records, his LVEF dating back to 2018 was <25% while on low dose GDMT. Reported LHC in 2012 with nonobstructive disease. He reports last feeling well in June of 2019, where he had excellent functional capacity and was able to walk for 5-7 miles at a time. More recently, with worsening ALCAZAR and was hospitalized 7/14-7/22 with symptomatic AFib RVR, hypotension and EDIE on CKD. His Entresto and Spironolactone were discontinued and diuretic was intermittently held. He underwent successful DCCV on 7/20 with CRT-D generator change and was loaded with Amiodarone. Most recent TTE from 7/20 notable for mod-severe MR. Following discharge, he noted gradual fluid retention and was restarted on his diuretic. He established care with renal, Dr. Rees on 8/11, at which time his Furosemide was transitioned to Torsemide 40 mg BID for persistent volume overload. \par \par Currently, he estimates he can walk for 100-150 yards and is limited by dyspnea. He endorses development of an occasional dry cough in the past month. Since transitioning to Torsemide, he notes 5 lbs weight loss but weight has been stable between 198-200 lbs for the past several days. His LE swelling has not improved. He sleeps with HOB elevated for palliation of back discomfort. Reported BP generally 100-110/60-70s at home. He denies CP, SOB at rest, alen orthopnea, PND, LH/dizziness, abdominal discomfort, palpitations, and syncope. His appetite is fair and his bowel and bladder habits are unchanged and normal for him. He has not had an ICD discharge. He has been limiting fluid and sodium in his diet and taking his medications as directed. He does not use NSAIDs.\par

## 2020-10-04 NOTE — PHYSICAL EXAM
[General Appearance - Well Developed] : well developed [Well Groomed] : well groomed [General Appearance - In No Acute Distress] : no acute distress [Eyelids - No Xanthelasma] : the eyelids demonstrated no xanthelasmas [No Oral Cyanosis] : no oral cyanosis [Heart Rate And Rhythm] : heart rate and rhythm were normal [Heart Sounds] : normal S1 and S2 [2+] : left 2+ [] : no respiratory distress [Respiration, Rhythm And Depth] : normal respiratory rhythm and effort [Abdomen Soft] : soft [Abdomen Tenderness] : non-tender [Abnormal Walk] : normal gait [Nail Clubbing] : no clubbing of the fingernails [Cyanosis, Localized] : no localized cyanosis [No Venous Stasis] : no venous stasis [Oriented To Time, Place, And Person] : oriented to person, place, and time [Impaired Insight] : insight and judgment were intact [Affect] : the affect was normal [Mood] : the mood was normal [Bowel Sounds] : normal bowel sounds [FreeTextEntry1] : warm and dry

## 2020-10-04 NOTE — END OF VISIT
[FreeTextEntry3] : I was physically present with the HF NP for the key portions of the history and physical examination.  I agree with the above history, physical, and plan which I have reviewed and edited where appropriate.\par

## 2020-10-04 NOTE — DISCUSSION/SUMMARY
[Patient] : the patient [FreeTextEntry1] : 75 year old M with dilated NICM, severely depressed LVEF, mod-severe MR s/p CRT-D and pAFib. He is ACC/AHA stage C with NYHA class III symptoms who is significantly volume overloaded and further complicated by his renal dysfunction. I have recommended the following:\par \par 1. Acute on Chronic Systolic Heart Failure - Severely volume expanded with marginal BP. Given his degree of decompensation and comorbidities, it was recommended he be brought into the hospital for diuresis and medical optimization. He is insistent on waiting until 8/21 for admission. In the interim, will increase Torsemide to 80mg BID. Labs today with K 6 (mod hemolysis), elevated T-bili 1.8 and Pro-BNP 13,950 (although downtrended from 31,637 on 7/30). He will take Metolazone 2.5 mg 30 mins prior to his AM dose of Torsemide tomorrow morning. Continue Coreg 6.25 mg BID and KCL 20 meq QD. Will have him visit EP today to increase HR to 80 bpm in efforts to augment CO by increasing SV. Will consider implantation of CardioMEMs while inpatient to reduce the risk of HF related admissions. \par \par 2. Mitral regurgitation, mod-severe - Will reassess degree of insufficiency following optimization of volume status.\par \par 3. Non-obstructive CAD - Last reported ischemic evaluation in 2012 with nonobstructive CAD. Awaiting fax of results from Dr. Loya's office\par \par 4. Chronic Kidney Disease - SCr uptrending, today 2.39 likely due to cardiorenal syndrome. Augmentation of diuresis as above with planned inpatient admission for close monitoring. \par \par 5. Paroxysmal Atrial Fibrillation s/p DCCV - AC with Eliquis. Continue Amio and BB as above. \par \par 6. Follow-up to be scheduled after discharge from the hospital.\par \par

## 2020-10-04 NOTE — REASON FOR VISIT
[Consultation] : a consultation regarding [Heart Failure] : congestive heart failure [Cardiomyopathy] : cardiomyopathy [FreeTextEntry1] : PATIENT INSTRUCTIONS\par \par 1. Increase TORSEMIDE to 80mg TWICE DAILY\par \par 2. We will instruct you on when to take the METOLAZONE\par \par 3. Labs today, expect a call to review the results\par \par 4. Please visit the EP office following this appointment to have the heart rate on your device adjusted to 80 bpm\par \par 5. We will arrange for a direct admission for Friday 8/21 to manage the significant amount of excess volume retained in your body

## 2020-10-08 NOTE — PHYSICAL EXAM
[General Appearance - Alert] : alert [General Appearance - In No Acute Distress] : in no acute distress [General Appearance - Well Nourished] : well nourished [General Appearance - Well Developed] : well developed [PERRL With Normal Accommodation] : pupils were equal in size, round, and reactive to light [Outer Ear] : the ears and nose were normal in appearance [Both Tympanic Membranes Were Examined] : both tympanic membranes were normal [Neck Appearance] : the appearance of the neck was normal [Jugular Venous Distention Increased] : there was no jugular-venous distention [] : no respiratory distress [Oriented To Time, Place, And Person] : oriented to person, place, and time [FreeTextEntry1] : +3 edema of LE b/l

## 2020-10-08 NOTE — HISTORY OF PRESENT ILLNESS
[FreeTextEntry1] : 75 year old male with history of HTN, cardiomyopathy,  HFrEF (last EF was 10% as per pt), recent mitral clip (9/2020), Afib s/p ICD placement, anemia, thyroid carcinoma,  and CKD 3 - Patient presenting today for management of CKD stage 3.\par \par Patient recently underwent mitral clip on 9/14/20, and has been following with cardiology and surgery to monitor cardiac function. Patient saw cardiology today and diuretic was increased due to increase in weight of 10-15 lbs in the last 2 weeks. However, patient also noted to have BP of 80s. Patient mentating well, does not feel lightheaded, dizzy, chest pain or shortness of breath. Patient noted to have swelling in the legs, and diminished breath sounds.

## 2020-10-08 NOTE — ASSESSMENT
[FreeTextEntry1] : 75 year old male with history of DM, HTN, cardiomyopathy,  HFrEF (last Ef was 10% as per pt), Afib s/p ICD placement, anemia, thyroid carcinoma,  and CKD 3 - Patient presenting today for evaluation of CKD stage3 \par now s/p mitral clip done 2 weeks ago at [facility]\par \par \par CKD 3\par sCr ranging between 1.7-2.0 in the past 1 year however currently after hospitalization ~2.5 (9/2020)\par most likely secondary to cardiorenal, 3+ edema of LE \par Continue with Torsemide 40mg po bid\par avoid NSAIDs, nephrotoxic drugs \par adjust medications to current GFR. \par needs to check daily weights. \par will check kidney sonogram \par renal panel, UA, prot/Cr ratio, CBC, PTH\par will check KAMINI, dsDNA, C3, C4, ANCA\par \par BP \par patient noted hypotensive today to 80s, not symptomatic\par change coreg to metoprolol ( w dw Dr Anglin)\par decrease dose of hydralazine perhaps\par can continue with torsemide\par monitor BP at home and if it continues to be low or drops will need to call cardiology ASAP\par \par Dispo: Patient with advanced heart failure with recent increase in weight concerning for volume overload. Increase diuretics and adjustments to BP medications as per cardiology/nephrology. Continue to monitor BP and volume at home.\par \par

## 2020-10-13 NOTE — DISCUSSION/SUMMARY
[FreeTextEntry1] : 75 year old M with dilated NICM, severely depressed LVEF, mod-severe MR s/p CRT-D and pAFib. Recent hospitalization for ADHF, s/p Mitral clip. He is ACC/AHA stage C with NYHA class II symptoms, today moderaltely volume overloaded on exam, low normotensive.  \par \par 1. Chronic Systolic Heart Failure\par -   Increase torsemide to 40mg bid, continue daily weights\par -   Will f/u early next week to assess response\par  -  Continue Coreg 6.25 mg BID,\par - Considering low BP will decrease hydral to 10mg bid, to allow for more blood pressure an renal    perfusion.  \par 2. Mitral regurgitation,\par -  S/P Mitral clip, f/u per Dr. Bui\par \par 3. Chronic Kidney Disease - SCr 2.47\par      F/U with Dr. Rees today\par \par 5. Paroxysmal Atrial Fibrillation s/p DCCV - AC with Eliquis. Continue Amio and BB as above. \par \par RTC  with Dr. Anglin 10/28, sooner as needed\par \par

## 2020-10-13 NOTE — HISTORY OF PRESENT ILLNESS
[FreeTextEntry1] : Mr. Jarquin is a pleasant 75 year old M with longstanding history of dilated NICM with progressively worsening LV systolic function (LVIDd 6.72cm, LVEF <20%) s/p CRT-D (generator change in 7/20/20), HTN, AFib s/p DCCV 2015 & 7/20/20 (on Eliquis), CKD stage 3 (b/l SCr 1.7-2), DM 2 (Ha1c 6.1%) and anemia. His history is also notable for papillary thyroid carcinoma for which he follows with Endocrinologist Dr. Winslow with q3 month surveillance testing as he prefers to defer surgical intervention. \par \par He reports of first being told of his cardiomyopathy approximately 35 years ago and during this time was also diagnosed with HTN which was reportedly well managed on medical therapies. He has been following  with Dr. Loya since that time. Upon review of available records, his LVEF dating back to 2018 was <25% while on low dose GDMT. Reported LHC in 2012 with nonobstructive disease. He reports last feeling well in June of 2019, where he had excellent functional capacity and was able to walk for 5-7 miles at a time. More recently, with worsening ALCAZAR and was hospitalized 7/14-7/22 with symptomatic AFib RVR, hypotension and EDIE on CKD. His Entresto and Spironolactone were discontinued and diuretic was intermittently held. He underwent successful DCCV on 7/20 with CRT-D generator change and was loaded with Amiodarone. Most recent TTE from 7/20 notable for mod-severe MR. Following discharge, he noted gradual fluid retention and was restarted on his diuretic. He established care with renal, Dr. Rees on 8/11, at which time his Furosemide was transitioned to Torsemide 40 mg BID for persistent volume overload. \par He was seen 8/19 by Dr Anglin for an initial visit and referred from CHF clinic for acute on chronic systolic heart failure with volume overload. He was initiated on inotropic support with milrinone and diuresed over 20 lbs. He subsequently had worsening renal function, likely from overdiuresis. An LVAD evaluation was initiated but at the time was decided that his frailty and advanced renal dysfunction would make him a poor candidate. He underwent MitraClip 9/14 with placement of 1 clip and reduction of MR to mild. Was followed by renal, and felt no indication for RRT at this time.\par Hemodynamic data after clip:\par 9/15 (on milrinone 0.25 mcg/kg/mi): CVP 5 PA 47/13 PCW 13, SVC saturation 68% with Corey CO/CI 5.8/3.1 and TD CO/CI 7/3.7. MAP 70 mmHg \par 9/16 (on milrinone .125 mcg/kg/min) CVP 7, PA 48/15 unable to wedge. thermodilution CI 2.5, Corey CI 2.8 \par 9/17 (off milrinone) CVP 7, central VSPO2 56, CO: 4.84, CI 2.56\par 9/18 (Off milrinone) Central SPO2: CO 4.79, CI 2.53\par Discharge 9/21 appeared euvolemic, restarted torsemide 20mg daily, bun 103 scr 2.77 K 4.7. weight 187#\par \par He presents today for follow up. Last week he noted increasing  weight and torsemide was increased to 40mg daily.  No has no sob at rest, no orthopnea or PND.  He states walking about 250 yards daily, if weather permits, without dyspnea. He is able to climb a flight of stairs slowly without dyspnea.  And he has been cleaning around the house without issue.  His main complaint is increasing fatigue.  He denies CP, LH/dizziness, abdominal discomfort, palpitations, or syncope. His appetite is good and his bowel and bladder habits are unchanged and normal for him. No abdominal discomfort or bloating. He has not had an ICD discharge. He has been limiting fluid and sodium in his diet and taking his medications as directed. He does not use NSAIDs.  His weight has been 188-190 with SBP low 90's to 100's.  Weight today 198# up almost 15# since last visit. \par 9/25 labs no significant change from dc labs K 3.9 bun 109 scr 2.47\par

## 2020-10-30 PROBLEM — I50.23 ACUTE ON CHRONIC SYSTOLIC HEART FAILURE: Status: ACTIVE | Noted: 2020-01-01

## 2020-10-30 NOTE — DISCUSSION/SUMMARY
[FreeTextEntry1] : 75 year old M with NICM, LVEF 20% s/p CRT-D, mod-severe MR s/p MitraClip and pAFib who remains fluid overloaded and fatigued. He is ACC/AHA stage C-D, NYHA class IIIb HF and significantly fluid overloaded. I have recommended the following:  \par \par 1. Acute on chronic systolic Heart Failure - Labs today to check K as he took 2-3 extra days of metolazone rather than the one dose he was instructed. Will empirically start KDur 40 mEq po BID as his K before metolazone was 3.9. If K is ok, will increase torsemide to 80 mg po BID. Switch Coreg to Toprol XL 25 mg po qPM. The hydralazine was stopped for hypotension on a prior visit with Dr. Rees.\par \par 2. Mitral regurgitation - s/p MitraClip now with 1-2+ MR.\par \par 3. Chronic Kidney Disease - Followed by Dr. Rees. Given the degree of venous congestion, suspect a significant hemodynamic component, but also has underlying renal disease. Labs today with worsening SCr in setting of fluid overload. Will diurese and follow closely.\par \par 4. Paroxysmal AFib - s/p DCCV and on AC with Eliquis. Continue Amio and BB as above. \par \par 5. Follow-up with HF NP in 2 weeks and with me in 4 weeks.\par \par Addendum: Labs with K 2.8. Patient instructed to hold the torsemide completely for 2 days until repeat labs and to continue the KDur 40 meQ po BID in the interim. Will repeat labs on 10/30.

## 2020-10-30 NOTE — HISTORY OF PRESENT ILLNESS
[FreeTextEntry1] : This is a pleasant, 75 year old M with longstanding history of NICM, LVEF < 20% (LVIDd 6.7 cm) , s/p CRT-D, HTN, AFib s/p DCCV 7/20/20 on Eliquis, CKD stage 3 (b/l SCr 1.7-2), DM 2 (A1c 6.1%) and anemia. His history is also notable for papillary thyroid carcinoma for which he follows with Endocrinologist Dr. Winslow with q3 month surveillance testing as he prefers to defer surgical intervention. His general cardiologist is Cecil Loya.\par \par He was seen for initial consultation here in August 2020 after a recent hospitalization for AF with RVR and EDIE on CKD. At the time of our first visit, he was anasarcic prompting admission to the hospital where he was on milrinone-assisted diuresis (lost 20#) and eventually had MitraClip for mod-severe MR improved to mild. He was declined for LVAD due to frailty and CKD. At the time of discharge on 9/21, he weighed 187# with , SCr 2.77, K 4.7. \par \par He has been seen several times here and by Dr. Rees in Nephrology since discharge. His weight was increasing requiring intermittent doses of metolazone and an increase in his torsemide from 20 daily to 40 mg BID. His weights were coming down over the last 4 days or so, but he notes persistent BLE edema and fatigue with minimal activities. He described an episode of LH and feeling "off balance" while standing at the stove cooking eggs. He did not fall, but had to grab the counter for balance.\par \par His exertional tolerance has generally been stable. He generally walks ~2000 steps a day and can slowly climb a flight of stairs. He denies CP, SOB at rest, orthopnea, PND, LH/dizziness, abdominal discomfort, palpitations, and syncope. His appetite is normal and his bowel and bladder habits are unchanged. He has not had an ICD discharge. He has been limiting fluid (max 32 oz) and sodium in his diet and taking his medications as directed. He does not use NSAIDs. He has not been admitted to the hospital or seen in the ER for HF in the interim. He reports a home weight of 189# this morning (recorded here as 198# with full pockets) down from 200# on 10/22. Home BPs 90//68 typically checked first thing in the morning.

## 2020-10-30 NOTE — PHYSICAL EXAM
[General Appearance - Well Developed] : well developed [Well Groomed] : well groomed [General Appearance - In No Acute Distress] : no acute distress [Eyelids - No Xanthelasma] : the eyelids demonstrated no xanthelasmas [No Oral Cyanosis] : no oral cyanosis [] : no respiratory distress [Respiration, Rhythm And Depth] : normal respiratory rhythm and effort [Heart Rate And Rhythm] : heart rate and rhythm were normal [Heart Sounds] : normal S1 and S2 [2+] : left 2+ [Abdomen Soft] : soft [Abdomen Tenderness] : non-tender [Nail Clubbing] : no clubbing of the fingernails [Cyanosis, Localized] : no localized cyanosis [No Venous Stasis] : no venous stasis [Oriented To Time, Place, And Person] : oriented to person, place, and time [Impaired Insight] : insight and judgment were intact [Affect] : the affect was normal [Mood] : the mood was normal [No Anxiety] : not feeling anxious [Bowel Sounds] : normal bowel sounds [FreeTextEntry1] : warm and dry

## 2020-10-30 NOTE — REASON FOR VISIT
[Follow-Up - Clinic] : a clinic follow-up of [Heart Failure] : congestive heart failure [FreeTextEntry1] : PATIENT INSTRUCTIONS:\par \par 1. INCREASE the TORSEMIDE to 80 mg TWICE a day. Each tablet is 20 mg so you are taking 4 tablets TWICE a day. Separate the doses by at least 6-8 hours.\par \par 2. SWITCH the Carvedilol to Metoprolol Succinate 25 mg once daily. You can start that tonight and continue to take it every night at the same time.\par \par 3. Do not use the Metolazone unless instructed to do so.\par \par 4. Start potassium 40 mEq twice a day. You have 20 mEq tablets so take 2 tablets twice a day.\par \par 5. Please keep a log of your blood pressure, heart rate, and weight taken daily. You may check your blood pressure and heart rate at a random time, preferably when you are at rest. Your weight should be checked first thing in the morning upon wakening and after using the bathroom. \par \par 6. Labs today.\par \par 7. Follow-up with HF NP in 2 weeks and with Dr. Anglin in 4 weeks.

## 2020-11-01 NOTE — H&P ADULT - NSHPPHYSICALEXAM_GEN_ALL_CORE
PHYSICAL EXAM:      Constitutional:    Eyes:    ENMT:    Neck:    Breasts:    Back:    Respiratory:    Cardiovascular:    Gastrointestinal:    Genitourinary:    Rectal:    Extremities:    Vascular:    Neurological:    Skin:    Lymph Nodes:    Musculoskeletal:    Psychiatric: PHYSICAL EXAM:  Constitutional: ill appearing male  Neck: + JVD up to jaw  Respiratory: course breath sounds diminished at the bases. + Resp effort  Cardiovascular: S1, S2. +2 LE edema  Gastrointestinal: distended belly  Extremeties: cool LE  Neurological: A and O x 3

## 2020-11-01 NOTE — ED PROVIDER NOTE - OBJECTIVE STATEMENT
74 year old male with PMH of chronic systolic heart failure (EF 10%) s/p ICD and mitral valve clip on last admission, Afib s/p DCCV, CKD, DM-2, hypothyroidism presents to the ED for fall from bed. patient lives with his niece who states she has not been feeling well the last few days so has not been around him as much. patient states he fell out of bed, does not know how but was too weak to get up and normally can ambulate with some assistance. reports he has been "weak and wobbly" for some timebut cannot specify how long. denies fever/chills/chest pain/abd pain/vomiting/dysuria.

## 2020-11-01 NOTE — ED ADULT NURSE NOTE - OBJECTIVE STATEMENT
76 yo male presents to er complaining of generalized weakness x 2 week, status post fall out of bed while sleeping last night, denies fevers, chills, chest pain, shortness of breath, dizziness, abdominal pain and all other complaints. Noted bilateral lower extremity swelling, patient states  chronic swelling. Respirations even and nonlabored, breathing spontaneously on room air. Keshawn, RN

## 2020-11-01 NOTE — H&P ADULT - HISTORY OF PRESENT ILLNESS
74 year old male with PMH of chronic systolic heart failure (EF 10%) s/p ICD and mitral valve clip on last admission, Afib s/p DCCV, CKD, DM-2, hypothyroidism presents to the ED for fall from bed. patient lives with his niece who states she has not been feeling well the last few days so has not been around him as much. patient states he fell out of bed, does not know how but was too weak to get up and normally can ambulate with some assistance. reports he has been "weak and wobbly" for some timebut cannot specify how long. denies fever/chills/chest pain/abd pain/vomiting/dysuria.     74 year old male with PMH of long standng non ischemia chronic systolic heart failure (EF 20% LVIDD 6.7 )) s/p CRT-D and mitral valve clip on last admission, Afib on Eliquiss/p DCCV, CKD Stage 3  , DM-2, hypothyroidism and papillary thyroid carcinoma,, anemia. He was recently referred to HF in Aug 2020 for anasarca and a hospital admission that necessitated Milrinone assisted diuresis (> 20 kg off) w/ mitraclip for mod-severe MR which improved to mild. He was declined for an LVAD due to frailty and CKD. At time of discharge he weighed 187 pounds and had a Cr of 2.77 Recently had seen HF and had medications adjusted due to increasing weight ( metolazone stopped, torsemide increased to 80 BID, K increased, and carvedilol switched to Metoprolol).   Patient lives with his niece who states she has not been feeling well the last few days so has not been around him as much. patient states he fell out of bed, does not know how but was too weak to get up and normally can ambulate with some assistance. reports he has been "weak and wobbly" for some timebut cannot specify how long. denies fever/chills/chest pain/abd pain/vomiting/dysuria.    In ED pt found to be in AdHF and fluid overloaded w/ a K of 8. Lasix 80 given and gtt started. Milrinone 0.25 started.        Cardiology Summary    Echo:   09/15/20 TTE: HR 80 bpm, LVIDd 6.8 cm, LVEF 21%, diastolic dysfunction, mod RVE with low normal function, severe GONSALO, 1-2+ MR s/p MitraClip (mean 4 mmHg), mod TR, mod PH (RVSP 54 mmHg), residual ASD.    07/20/20 TTE: severe LVSD (global), decreased RVSF, severe LAE, mod-severe MR with tethered leaflets, mod TR    07/16/20 TTE: LVIDd 6.72cm, LVEF <20% w/ regional wall motion abnormalities, mildly reduced RVSF, mod GONSALO, mod-severe MR, mod TR, mod AR, est PASP 57.2 mmHg    11/23/19 TTE: LVIDd 6.93cm, LVEF <20% w/ regional wall motion abnormalities, mildly reduced RVSF, GONSALO, mild MR, mild AR, mod AR, est PASP 71 mmHg    Cardiac Cath:   9/15 (milrinone 0.25 mcg/kg/mi): CVP 5 PA 47/13 PCW 13, SVC saturation 68% with Corey CO/CI 5.8/3.1 and TD CO/CI 7/3.7. MAP 70 mmHg   9/16 (milrinone 0.125 mcg/kg/min) CVP 7, PA 48/15 unable to wedge. thermodilution CI 2.5, Corey CI 2.8   9/17 (off milrinone) CVP 7, central VSPO2 56, CO: 4.84, CI 2.56  9/18 (Off milrinone) Central SPO2: CO 4.79, CI 2.53   INCREASE the TORSEMIDE to 80 mg TWICE a day. Each tablet is 20 mg so you are taking 4 tablets TWICE a day. Separate the doses by at least 6-8 hours.    2. SWITCH the Carvedilol to Metoprolol Succinate 25 mg once daily. You can start that tonight and continue to take it every night at the same time.    3. Do not use the Metolazone unless instructed to do so.    4. Start potassium 40 mEq twice a day. You have 20 mEq tablets so take 2 tablets twice a day.       74 year old male with PMH of long standng non ischemia chronic systolic heart failure (EF 20% LVIDD 6.7 )) s/p CRT-D and mitral valve clip on last admission, Afib on Eliquiss/p DCCV, CKD Stage 3  , DM-2, hypothyroidism and papillary thyroid carcinoma,, anemia. He was recently referred to HF in Aug 2020 for anasarca and a hospital admission that necessitated Milrinone assisted diuresis (> 20 kg off) w/ mitraclip for mod-severe MR which improved to mild. He was declined for an LVAD due to frailty and CKD. At time of discharge he weighed 187 pounds and had a Cr of 2.77 Recently had seen HF and had medications adjusted due to increasing weight ( metolazone stopped, torsemide increased to 80 BID, K increased, and carvedilol switched to Metoprolol).   Patient lives with his niece who states she has not been feeling well the last few days so has not been around him as much. patient states he fell out of bed, does not know how but was too weak to get up and normally can ambulate with some assistance. reports he has been "weak and wobbly" for some timebut cannot specify how long. denies fever/chills/chest pain/abd pain/vomiting/dysuria.    In ED pt found to be in AdHF and fluid overloaded w/ a K of 8. Lasix 80 given and gtt started. Milrinone 0.25 started.        Cardiology Summary    Echo:   09/15/20 TTE: HR 80 bpm, LVIDd 6.8 cm, LVEF 21%, diastolic dysfunction, mod RVE with low normal function, severe GONSALO, 1-2+ MR s/p MitraClip (mean 4 mmHg), mod TR, mod PH (RVSP 54 mmHg), residual ASD.      Cardiac Cath:   9/18 (Off milrinone) Central SPO2: CO 4.79, CI 2.53

## 2020-11-01 NOTE — CHART NOTE - NSCHARTNOTEFT_GEN_A_CORE
Padua Prediction Score for VTE Risk within 24hours of admission:    Active malignancy:                                                    [  ] YES +3, [ x ] NO   Previous VTE (Excluding Superficial Vein Thrombosis): [  ] YES +3, [ x ] NO  Reduced mobility:                                                     [ x ] YES +3, [  ] NO  Already known thrombophilic condition:                     [  ] YES +3, [ x ] NO  Recent (</=1 month trauma and/or surgery):             [ x ] YES +2, [  ] NO  Elderly age (>/=70):                                                  [ x ] YES +1, [  ] NO  Heart and/or Respiratory Failure:                               [ x] YES +1, [  ] NO   Acute MI and/or ischemic CVA:                                  [  ] YES +1, [ x ] NO   Acute infection and/or rheumatologic disorder:          [  ] YES +1, [ x ] NO   BMI>/= 30:                                                              [ x ] YES +1, [  ] NO   Ongoing hormonal treatment:                                   [  ] YES +1, [ x ] NO    Total Score: [ 8 ]  points    [  ] Padua Score <  3: Low Risk of VTE         - Chemical Thromboprophylaxis should be considered on case-by-case basis  [ x ] Padua Score >/= 4: High Risk of VTE         - Chemical Thromboprophylaxis is recommended for nonpregnant patients without contraindications (Major bleeding, thrombocytopenia) who are >/=  18 years of age                         VTE Prophylaxis Recommendations:  Mechanical Pneumatic Compression Devices                                [ x ]  Yes,  [  ] No, Contraindicated    Chemical VTE Prophylaxis (Heparin/ Lovenox/ Fondaparinux)        [ x ] Yes,  [  ] No              [ ] Contraindicated, because _____              [ ] Already receiving Systemic Anticoagulation

## 2020-11-01 NOTE — H&P ADULT - NSHPSOCIALHISTORY_GEN_ALL_CORE
Lives in same house with niece on a different floor who is HCP  Denies smoking, drinking, drug use. Ambulates.

## 2020-11-01 NOTE — H&P ADULT - ASSESSMENT
74 year old male with PMH of long standng non ischemia chronic systolic heart failure (EF 20% LVIDD 6.7 )) s/p CRT-D and mitral valve clip on last admission, Afib on Eliquiss/p DCCV, CKD Stage 3  , DM-2, hypothyroidism and papillary thyroid carcinoma,, anemia. He was recently referred to HF in Aug 2020 for anasarca and a hospital admission that necessitated Milrinone assisted diuresis (> 20 kg off) w/ mitraclip for mod-severe MR which improved to mild. He was declined for an LVAD due to frailty and CKD. He now presented w/ AdHF and hyperkalemia in the setting of recent increase in diuretics and increase in poassium    Resp  - Oxygenating well on supplmental O2    AdHF  - Milrinone assisted diuresis, cont Milrinon 0.25 and Lasix gtt  - Keep net negative  -Continue carvedilol, hold hydralazine  - Obtain Central line 74 year old male with PMH of long standng non ischemia chronic systolic heart failure (EF 20% LVIDD 6.7 )) s/p CRT-D and mitral valve clip on last admission, Afib on Eliquiss/p DCCV, CKD Stage 3  , DM-2, hypothyroidism and papillary thyroid carcinoma,, anemia. He was recently referred to HF in Aug 2020 for anasarca and a hospital admission that necessitated Milrinone assisted diuresis (> 20 kg off) w/ mitraclip for mod-severe MR which improved to mild. He was declined for an LVAD due to frailty and CKD. He now presented w/ AdHF and hyperkalemia in the setting of recent increase in diuretics and increase in poassium    Resp  - Oxygenating well on supplmental O2    AdHF  - Milrinone assisted diuresis, cont Milrinon 0.25 and Lasix gtt  - Keep net negative  -Continue carvedilol, hold hydralazine  - Obtain Central line for CVP and central sat monitoring    RASHID Guidry for accurate I and O    Renal  - Cr currently 4.3 from 2.7  - K 7, check electrolytes, electrically stable  - Obtain renal consult     ID  - WBC normal , no fever  - covid labs pending, no e.o infection    Endo  - Continue synthroid, check TSH

## 2020-11-01 NOTE — H&P ADULT - NSHPREVIEWOFSYSTEMS_GEN_ALL_CORE
REVIEW OF SYSTEMS      General:	    Skin/Breast:  	  Ophthalmologic:  	  ENMT:	    Respiratory and Thorax:  	  Cardiovascular:	    Gastrointestinal:	    Genitourinary:	    Musculoskeletal:	    Neurological:	    Psychiatric:	    Hematology/Lymphatics:	    Endocrine:	    Allergic/Immunologic: REVIEW OF SYSTEMS  General:	not feeling himself, very tired, increased weight   Skin/Breast: denies  Ophthalmologic: denies	  ENMT:	denies  Respiratory and Thorax: difficulty breathing	  Cardiovascular:	denies chest pain  Gastrointestinal:	denies  Musculoskeletal:	+ weakness

## 2020-11-01 NOTE — H&P ADULT - NSHPLABSRESULTS_GEN_ALL_CORE
CBC Full  -  ( 01 Nov 2020 13:50 )  WBC Count : 8.66 K/uL  RBC Count : 4.31 M/uL  Hemoglobin : 11.8 g/dL  Hematocrit : 38.9 %  Platelet Count - Automated : 137 K/uL  Mean Cell Volume : 90.3 fl  Mean Cell Hemoglobin : 27.4 pg  Mean Cell Hemoglobin Concentration : 30.3 gm/dL  Auto Neutrophil # : 6.78 K/uL  Auto Lymphocyte # : 0.79 K/uL  Auto Monocyte # : 0.99 K/uL  Auto Eosinophil # : 0.04 K/uL  Auto Basophil # : 0.03 K/uL  Auto Neutrophil % : 78.4 %  Auto Lymphocyte % : 9.1 %  Auto Monocyte % : 11.4 %  Auto Eosinophil % : 0.5 %  Auto Basophil % : 0.3 %    11-01    135  |  95<L>  |  131<H>  ----------------------------<  151<H>  7.3<HH>   |  25  |  4.34<H>    Ca    10.0      01 Nov 2020 15:27  Mg     2.9     11-01    TPro  7.0  /  Alb  4.0  /  TBili  2.9<H>  /  DBili  x   /  AST  315<H>  /  ALT  254<H>  /  AlkPhos  132<H>  11-01    EKG:    Chest XR: Chest XR clear, cardiomegaly

## 2020-11-01 NOTE — PATIENT PROFILE ADULT - NSPROHMSYMPCOND_GEN_A_NUR
Patient states that if his circumstances change during his stay here he may consider home care if needed/cardiovascular/genitourinary/musculoskeletal

## 2020-11-01 NOTE — ED PROVIDER NOTE - CLINICAL SUMMARY MEDICAL DECISION MAKING FREE TEXT BOX
Dr. Faiza Sullivan:  74 year old male with PMH of chronic systolic heart failure (EF 10%) s/p ICD and mitral valve clip on last admission, Afib s/p DCCV, CKD, DM-2, hypothyroidism presented to ED s/p fall. Pt states he rolled out of the bed last night and was unable to get up due to generalized weakness. Denies head injury. Endorses to shortness of breath. On exam, skin cool and clammy, +bibasilar crackles, +bilateral pedal edema. Will r/o acute fracture, ICH secondary to trauma. Hypotensive in the setting of CHF with severely depressed EF. Plan: CT brain, cxr, pelvic x-ray, labs, EKG, cardiology consult, cardiopulmonary monitoring

## 2020-11-01 NOTE — PROGRESS NOTE ADULT - SUBJECTIVE AND OBJECTIVE BOX
MAIN BRASWELL  MRN-[id number]  Patient is a 75y old  Male who presents with a chief complaint of AdHF and hyperkalemia (01 Nov 2020 17:42)    HPI:  74 year old male with PMH of long standng non ischemia chronic systolic heart failure (EF 20% LVIDD 6.7 )) s/p CRT-D and mitral valve clip on last admission, Afib on Eliquiss/p DCCV, CKD Stage 3  , DM-2, hypothyroidism and papillary thyroid carcinoma,, anemia. He was recently referred to HF in Aug 2020 for anasarca and a hospital admission that necessitated Milrinone assisted diuresis (> 20 kg off) w/ mitraclip for mod-severe MR which improved to mild. He was declined for an LVAD due to frailty and CKD. At time of discharge he weighed 187 pounds and had a Cr of 2.77 Recently had seen HF and had medications adjusted due to increasing weight ( metolazone stopped, torsemide increased to 80 BID, K increased, and carvedilol switched to Metoprolol).   Patient lives with his niece who states she has not been feeling well the last few days so has not been around him as much. patient states he fell out of bed, does not know how but was too weak to get up and normally can ambulate with some assistance. reports he has been "weak and wobbly" for some timebut cannot specify how long. denies fever/chills/chest pain/abd pain/vomiting/dysuria.    In ED pt found to be in AdHF and fluid overloaded w/ a K of 8. Lasix 80 given and gtt started. Milrinone 0.25 started.     (01 Nov 2020 17:42)      Hospital Course:  11/1 Admitted to CICU for ADHF and hyperkalemia    24 HOUR EVENTS:    REVIEW OF SYSTEMS:   Constitutional: + weakness. no fevers, or chills  Eyes/ENT: No visual changes  Respiratory: + ALCAZAR. No cough, wheezing, hemoptysis  Cardiovascular: No chest pain, no palpitations  Gastrointestinal: No abdominal pain. No nausea, vomiting, hematemesis.   Genitourinary: No dysuria  Neurological: No numbness, no weakness  Skin: No itching, rashes    ICU Vital Signs Last 24 Hrs  T(C): 35.6 (01 Nov 2020 17:20), Max: 36.7 (01 Nov 2020 15:30)  T(F): 96.1 (01 Nov 2020 17:20), Max: 98 (01 Nov 2020 15:30)  HR: 80 (01 Nov 2020 20:45) (50 - 92)  BP: 93/54 (01 Nov 2020 20:45) (80/59 - 99/59)  BP(mean): 68 (01 Nov 2020 20:45) (61 - 76)  ABP: --  ABP(mean): --  RR: 12 (01 Nov 2020 20:45) (12 - 20)  SpO2: 100% (01 Nov 2020 20:45) (96% - 100%)    I&O's Summary    01 Nov 2020 07:01  -  01 Nov 2020 21:10  --------------------------------------------------------  IN: 33.8 mL / OUT: 255 mL / NET: -221.2 mL    POCT Blood Glucose.: 151 mg/dL (01 Nov 2020 19:15)      PHYSICAL EXAM:   General: No acute distress  Eyes: EOMI, PERRLA, conjunctiva and sclera clear  HENT: + JVD  Chest/Lung: CTAB, no wheezes, rales, or rhonchi  Heart: Regular rate, regular rhythm. Normal S1/S2. No murmurs, rubs, or gallops.  Abdomen: Soft, nontender, nondistended. Normal bowel sounds.  Extremites: 2+ peripheral pulses B/L. 2+ B/L LE edema. No clubbing, cyanosis  Neurology: A&O x3, no focal deficits  Skin: No rashes or lesions  ============================I/O===========================   I&O's Detail    01 Nov 2020 07:01  -  01 Nov 2020 21:10  --------------------------------------------------------  IN:    Furosemide: 20 mL    Milrinone: 13.8 mL  Total IN: 33.8 mL    OUT:    Indwelling Catheter - Urethral (mL): 255 mL  Total OUT: 255 mL    Total NET: -221.2 mL        ============================ LABS =========================                        11.8   8.66  )-----------( 137      ( 01 Nov 2020 13:50 )             38.9     11-01    136  |  97  |  134<H>  ----------------------------<  150<H>  6.6<HH>   |  25  |  4.28<H>    Ca    10.3      01 Nov 2020 18:16  Phos  4.3     11-01  Mg     3.0     11-01    TPro  6.3  /  Alb  3.7  /  TBili  2.3<H>  /  DBili  x   /  AST  243<H>  /  ALT  224<H>  /  AlkPhos  123<H>  11-01    Troponin T, High Sensitivity Result: 93 ng/L (11-01-20 @ 18:16)  Troponin T, High Sensitivity Result: 93 ng/L (11-01-20 @ 13:50)      Creatine Kinase, Serum: 214 U/L (11-01-20 @ 18:16)  Creatine Kinase, Serum: 325 U/L (11-01-20 @ 13:50)          LIVER FUNCTIONS - ( 01 Nov 2020 18:16 )  Alb: 3.7 g/dL / Pro: 6.3 g/dL / ALK PHOS: 123 U/L / ALT: 224 U/L / AST: 243 U/L / GGT: x               Lactate, Blood: 3.6 mmol/L (11-01-20 @ 18:16)  Blood Gas Venous - Lactate: 5.2 mmoL/L (11-01-20 @ 14:20)      ======================Micro/Rad/Cardio=================  Telemetry: Reviewed   EKG: Reviewed  CXR: Reviewed  Echo: Reviewed  ======================================================  PAST MEDICAL & SURGICAL HISTORY:  Hypothyroidism    Afib    Type II diabetes mellitus    Aortic insufficiency    Mitral insufficiency    CKD (chronic kidney disease)    Cardiomyopathy  Systolic CHF    Hypertension    History of tonsillectomy  childhood    AICD (automatic cardioverter/defibrillator) present  8/2012      ====================ASSESSMENT ==============  ADHF  Hyperkalemia    Plan:  ====================== NEUROLOGY=====================  A&Ox3, nonfocal  - Continue to monitor neuro status as per protocol   - Tylenol PRN for analgesia    ==================== RESPIRATORY======================  Receiving supplemental O2 therapy with NC, satting 100%.  - Continue to monitor RR, breathing pattern, pulse ox, and ABG's.   ====================CARDIOVASCULAR==================  AdHF  - Inotropic support with Milrinone 0.25mcg gtt  - Diuresis with Lasix 20mg gtt to maintain net negative fluid balance  - Afterload reduction with Carvedilol 3.125mg BID  - Rate control with Amiodarone 200mg QD  - Central line placed for invasive hemodynamic monitoring    ===================HEMATOLOGIC/ONC ===================  H/H 11.8/38.9  - Continue to monitor hemoglobin and hematocrit levels.   heparin   Injectable 5000 Unit(s) SubCutaneous every 8 hours    ===================== RENAL =========================  SCr currently 4.3 from 2.7  - Hyperkalemia with K of 7, trend electrolytes closely  - Obtain Renal consult   - Continue to monitor I/Os, BUN/Creatinine, and urine output via Guidry.   - Goal net neg fluid balance with Lasix 20mg gtt    ==================== GASTROINTESTINAL===================  Tolerating PO DASH/TLC diet.     =======================    ENDOCRINE  =====================  Bgl controlled  - Monitor Bgl for need to initiate sliding scale    Hypothyroidism  - Continue Synthroid 50mcg QD  - Need to check TSH    ========================INFECTIOUS DISEASE================  Afebrile, WBC within normal limits.   - Monitor for leukocytosis / fever. Monitor off abx.   - Pending COVID labs        Patient requires continuous monitoring with bedside rhythm monitoring, pulse ox monitoring, and intermittent blood gas analysis. Care plan discussed with ICU care team. Patient remained critical and at risk for life threatening decompensation.  Patient seen, examined and plan discussed with CCU team during rounds.     I have personally provided 35 minutes of critical care time excluding time spent on separate procedures.    By signing my name below, I, Mare Parada, attest that this documentation has been prepared under the direction and in the presence of DOC Gómez.  Electronically signed: Willis Stone, 11-01-20 @ 21:10    I, DOC Gómez, personally performed the services described in this documentation. all medical record entries made by the lynnetteibneptali were at my direction and in my presence. I have reviewed the chart and agree that the record reflects my personal performance and is accurate and complete  Electronically signed: DOC Gómez.         MAIN BRASWELL  MRN-[id number]  Patient is a 75y old  Male who presents with a chief complaint of AdHF and hyperkalemia (01 Nov 2020 17:42)    HPI:  74 year old male with PMH of long standng non ischemia chronic systolic heart failure (EF 20% LVIDD 6.7 )) s/p CRT-D and mitral valve clip on last admission, Afib on Eliquiss/p DCCV, CKD Stage 3  , DM-2, hypothyroidism and papillary thyroid carcinoma,, anemia. He was recently referred to HF in Aug 2020 for anasarca and a hospital admission that necessitated Milrinone assisted diuresis (> 20 kg off) w/ mitraclip for mod-severe MR which improved to mild. He was declined for an LVAD due to frailty and CKD. At time of discharge he weighed 187 pounds and had a Cr of 2.77 Recently had seen HF and had medications adjusted due to increasing weight ( metolazone stopped, torsemide increased to 80 BID, K increased, and carvedilol switched to Metoprolol).   Patient lives with his niece who states she has not been feeling well the last few days so has not been around him as much. patient states he fell out of bed, does not know how but was too weak to get up and normally can ambulate with some assistance. reports he has been "weak and wobbly" for some timebut cannot specify how long. denies fever/chills/chest pain/abd pain/vomiting/dysuria.    In ED pt found to be in AdHF and fluid overloaded w/ a K of 8. Lasix 80 given and gtt started. Milrinone 0.25 started.     (01 Nov 2020 17:42)      Hospital Course:  11/1 Admitted to CICU for ADHF and hyperkalemia    24 HOUR EVENTS:    REVIEW OF SYSTEMS:   Constitutional: + weakness. no fevers, or chills  Eyes/ENT: No visual changes  Respiratory: + ALCAZAR. No cough, wheezing, hemoptysis  Cardiovascular: No chest pain, no palpitations  Gastrointestinal: No abdominal pain. No nausea, vomiting, hematemesis.   Genitourinary: No dysuria  Neurological: No numbness, no weakness  Skin: No itching, rashes    ICU Vital Signs Last 24 Hrs  T(C): 35.6 (01 Nov 2020 17:20), Max: 36.7 (01 Nov 2020 15:30)  T(F): 96.1 (01 Nov 2020 17:20), Max: 98 (01 Nov 2020 15:30)  HR: 80 (01 Nov 2020 20:45) (50 - 92)  BP: 93/54 (01 Nov 2020 20:45) (80/59 - 99/59)  BP(mean): 68 (01 Nov 2020 20:45) (61 - 76)  ABP: --  ABP(mean): --  RR: 12 (01 Nov 2020 20:45) (12 - 20)  SpO2: 100% (01 Nov 2020 20:45) (96% - 100%)    I&O's Summary    01 Nov 2020 07:01  -  01 Nov 2020 21:10  --------------------------------------------------------  IN: 33.8 mL / OUT: 255 mL / NET: -221.2 mL    POCT Blood Glucose.: 151 mg/dL (01 Nov 2020 19:15)      PHYSICAL EXAM:   General: No acute distress  Eyes: EOMI, PERRLA, conjunctiva and sclera clear  HENT: + JVD  Chest/Lung: CTAB, no wheezes, rales, or rhonchi  Heart: Regular rate, regular rhythm. Normal S1/S2. No murmurs, rubs, or gallops.  Abdomen: Soft, nontender, nondistended. Normal bowel sounds.  Extremites: 2+ peripheral pulses B/L. 2+ B/L LE weeping edema. No clubbing, cyanosis  Neurology: A&O x2, WILBURN x4, no focal deficits   Skin: No rashes or lesions  Lines: [facility] TLC 11/1 dressing clean, dry, intact  ============================I/O===========================   I&O's Detail    01 Nov 2020 07:01  -  01 Nov 2020 21:10  --------------------------------------------------------  IN:    Furosemide: 20 mL    Milrinone: 13.8 mL  Total IN: 33.8 mL    OUT:    Indwelling Catheter - Urethral (mL): 255 mL  Total OUT: 255 mL    Total NET: -221.2 mL        ============================ LABS =========================                        11.8   8.66  )-----------( 137      ( 01 Nov 2020 13:50 )             38.9     11-01    136  |  97  |  134<H>  ----------------------------<  150<H>  6.6<HH>   |  25  |  4.28<H>    Ca    10.3      01 Nov 2020 18:16  Phos  4.3     11-01  Mg     3.0     11-01    TPro  6.3  /  Alb  3.7  /  TBili  2.3<H>  /  DBili  x   /  AST  243<H>  /  ALT  224<H>  /  AlkPhos  123<H>  11-01    Troponin T, High Sensitivity Result: 93 ng/L (11-01-20 @ 18:16)  Troponin T, High Sensitivity Result: 93 ng/L (11-01-20 @ 13:50)      Creatine Kinase, Serum: 214 U/L (11-01-20 @ 18:16)  Creatine Kinase, Serum: 325 U/L (11-01-20 @ 13:50)          LIVER FUNCTIONS - ( 01 Nov 2020 18:16 )  Alb: 3.7 g/dL / Pro: 6.3 g/dL / ALK PHOS: 123 U/L / ALT: 224 U/L / AST: 243 U/L / GGT: x               Lactate, Blood: 3.6 mmol/L (11-01-20 @ 18:16)  Blood Gas Venous - Lactate: 5.2 mmoL/L (11-01-20 @ 14:20)      ======================Micro/Rad/Cardio=================  Telemetry: Reviewed   EKG: Reviewed  CXR: Reviewed  Echo: Reviewed  ======================================================  PAST MEDICAL & SURGICAL HISTORY:  Hypothyroidism    Afib    Type II diabetes mellitus    Aortic insufficiency    Mitral insufficiency    CKD (chronic kidney disease)    Cardiomyopathy  Systolic CHF    Hypertension    History of tonsillectomy  childhood    AICD (automatic cardioverter/defibrillator) present  8/2012      ====================ASSESSMENT ==============  ADHF  Hyperkalemia    Plan:  ====================== NEUROLOGY=====================  Fall/ Weakness  - CT Head - for any acute pathology   - Fall Risk Protocol     A&Ox2, lethargic, nonfocal  - Continue to monitor neuro status as per protocol   - Tylenol PRN for analgesia    ==================== RESPIRATORY======================  Receiving supplemental O2 therapy with NC, satting 100%.  - Continue to monitor RR, breathing pattern, pulse ox  - clear lungs on CXR and on exam  ====================CARDIOVASCULAR==================  Acute on Chronic Decompensated HFrEF (13%) w/ AICD & Mitraclip, untreated severe TR   - Inotropic support with Milrinone 0.25mcg gtt. Diuresis with Lasix 20mg gtt to maintain net negative fluid balance  - Continuous CVP monitoring and ScvO2 trend    - Holding BB in setting of inotrope   - TTE: EF 13% Severe global LVSD, mild MR w/ Mitraclip in place. RVE w/ normal RVSF. Severe TR     Afib on Eliquis s/p DCCV  - On Amio 200 daily  - Heparin gtt in acute care setting    ===================HEMATOLOGIC/ONC ===================  H/H 11.8/38.9  - Continue to monitor hemoglobin and hematocrit levels.   - Heparin gtt for AFib     ===================== RENAL =========================  EDIE on CKDIII (SCr 4.3 from 2.7) in setting of ADHF  - Hyperkalemia with initial K of 8.1, shifted x2 with other temporizing measures, diuresis regimen uptitrated   - oliguria: goal net neg fluid balance with Lasix 20mg gtt  - Followed by Dr. Rees - Nephro   - Continue to monitor I/Os, BUN/Creatinine, and urine output via Guidry.       ==================== GASTROINTESTINAL==================  Transaminitis, resolving with inotropic support  - NPO for RUQ US     =======================    ENDOCRINE  =====================  Type 2 DM  - BGl < 180 while NPO, after US tmw, begin on diet/ ISS     Hypothyroidism  - Continue Synthroid 50mcg QD  - f/u TSH     ========================INFECTIOUS DISEASE================  Afebrile, WBC within normal limits.   - Monitor for leukocytosis / fever. Monitor off abx.   - COVID -         Patient requires continuous monitoring with bedside rhythm monitoring, pulse ox monitoring, and intermittent blood gas analysis. Care plan discussed with ICU care team. Patient remained critical and at risk for life threatening decompensation.  Patient seen, examined and plan discussed with CCU team during rounds.     I have personally provided 35 minutes of critical care time excluding time spent on separate procedures.    By signing my name below, I, Mare Parada, attest that this documentation has been prepared under the direction and in the presence of DOC Gómez.  Electronically signed: Willis Stone, 11-01-20 @ 21:10    I, DOC Gómez, personally performed the services described in this documentation. all medical record entries made by the scribe were at my direction and in my presence. I have reviewed the chart and agree that the record reflects my personal performance and is accurate and complete  Electronically signed: DOC Gómez.

## 2020-11-01 NOTE — ED PROVIDER NOTE - PROGRESS NOTE DETAILS
EKG mislabeled. EKG was done under patient name BE(MR [id number]). Charge nurse informed Dr. Faiza Sullivan: Repeat potassium 7.3, given 4U insulin and started on albuterol neb.  Spoke with cardiology who's comfortable with following up repeat potassium in CCCU

## 2020-11-02 NOTE — DISCHARGE NOTE PROVIDER - NSDCMRMEDTOKEN_GEN_ALL_CORE_FT
acetaminophen 325 mg oral tablet: 2 tab(s) orally every 6 hours, As needed, Mild Pain (1 - 3)  amiodarone 200 mg oral tablet: 1 tab(s) orally once a day  Eliquis 5 mg oral tablet: 1 tab(s) orally 2 times a day  hydrALAZINE 10 mg oral tablet: 1 tab(s) orally every 8 hours  hydrocortisone 2.5% rectal cream with applicator: 1 application rectal once a day, As needed, hemorrhoid pain/itch  levothyroxine 50 mcg (0.05 mg) oral tablet: 1 tab(s) orally once a day  metoprolol succinate 25 mg oral tablet, extended release: 1 tab(s) orally once a day  potassium chloride 20 mEq oral tablet, extended release: 1 tab(s) orally 2 times a day  torsemide: 80 milligram(s) orally every 12 hours  zolpidem 5 mg oral tablet: 1 tab(s) orally once a day (at bedtime)   acetaminophen 325 mg oral tablet: 2 tab(s) orally every 6 hours, As needed, Mild Pain (1 - 3)  amiodarone 200 mg oral tablet: 1 tab(s) orally once a day  Eliquis 5 mg oral tablet: 1 tab(s) orally 2 times a day  hydrALAZINE 10 mg oral tablet: 1 tab(s) orally every 8 hours    NOTE: patient reports being told to stop medication in the past few weeks  hydrocortisone 2.5% topical ointment: Apply topically to affected area prn, As Needed  levothyroxine 50 mcg (0.05 mg) oral tablet: 1 tab(s) orally once a day  metoprolol succinate 25 mg oral tablet, extended release: 1 tab(s) orally once a day  milrinone 1 mg/mL intravenous solution: primacor gtt 20mg in d5w, infuse 7.5 ml/hr, dose infusion 0.3 mcq/kg/min   potassium chloride 20 mEq oral tablet, extended release: 1 tab(s) orally 2 times a day    NOTE: directions from pharmacy &quot;take 1 tablet by mouth unless weight &gt; 193 lbs or more, then taken 2 tablets for increased furosemide dose.&quot;  Rollator: Rollator  ICD 10#: CHF I50  JENARO: 99    torsemide: 80 milligram(s) orally every 12 hours    NOTE: patient reports directions changing frequently  zolpidem 5 mg oral tablet: 1 tab(s) orally once a day (at bedtime), As Needed   acetaminophen 325 mg oral tablet: 2 tab(s) orally every 6 hours, As needed, Mild Pain (1 - 3)  amiodarone 200 mg oral tablet: 1 tab(s) orally once a day  Eliquis 5 mg oral tablet: 1 tab(s) orally 2 times a day  hydrALAZINE 10 mg oral tablet: 1 tab(s) orally every 8 hours    NOTE: patient reports being told to stop medication in the past few weeks  hydrocortisone 2.5% topical ointment: Apply topically to affected area prn, As Needed  levothyroxine 50 mcg (0.05 mg) oral tablet: 1 tab(s) orally once a day  metoprolol succinate 25 mg oral tablet, extended release: 1 tab(s) orally once a day  milrinone 1 mg/mL intravenous solution: primacor gtt 20mg in d5w, infuse 7.5 ml/hr, dose infusion 0.3 mcq/kg/min   potassium chloride 20 mEq oral tablet, extended release: 1 tab(s) orally 2 times a day    NOTE: directions from pharmacy &quot;take 1 tablet by mouth unless weight &gt; 193 lbs or more, then taken 2 tablets for increased furosemide dose.&quot;  Rollator: Rollator  ICD 10#: CHF I50  JENARO: 99    torsemide: 80 milligram(s) orally every 12 hours    NOTE: patient reports directions changing frequently  Weekly labs : BMP, MAG, PRO BNP Fax to ,  :   zolpidem 5 mg oral tablet: 1 tab(s) orally once a day (at bedtime), As Needed   acetaminophen 325 mg oral tablet: 2 tab(s) orally every 6 hours, As needed, Mild Pain (1 - 3)  amiodarone 200 mg oral tablet: 1 tab(s) orally once a day  Eliquis 5 mg oral tablet: 1 tab(s) orally 2 times a day  fluticasone 50 mcg/inh nasal spray: 1 spray(s) nasal every 12 hours  hydrocortisone 2.5% topical ointment: Apply topically to affected area prn, As Needed  isosorbide mononitrate 30 mg oral tablet, extended release: 1 tab(s) orally once a day (at bedtime)  levothyroxine 50 mcg (0.05 mg) oral tablet: 1 tab(s) orally once a day  melatonin 3 mg oral tablet: 1 tab(s) orally once a day (at bedtime)  metoprolol succinate 25 mg oral tablet, extended release: 1 tab(s) orally once a day  milrinone 1 mg/mL intravenous solution: primacor gtt 20mg in d5w, infuse 7.5 ml/hr, dose infusion 0.3 mcq/kg/min   mupirocin 2% topical ointment: Apply topically to affected area once a day   potassium chloride 20 mEq oral tablet, extended release: 2 tab(s) orally once a day  Rollator: Rollator  ICD 10#: CHF I50  JENARO: 99    spironolactone 25 mg oral tablet: 1 tab(s) orally once a day  torsemide 20 mg oral tablet: 2 tab(s) orally every 12 hours  Weekly labs : BMP, MAG, PRO BNP Fax to ,  :

## 2020-11-02 NOTE — CONSULT NOTE ADULT - ATTENDING COMMENTS
I have seen this patient with the fellow and agree with their assessment and plan. Septic vs cardiogenic shock with EDIE on CKD  No urgent need for RRT for now    Yemi Rees MD  Cell   Pager   Office

## 2020-11-02 NOTE — DISCHARGE NOTE PROVIDER - NSDCFUADDINST_GEN_ALL_CORE_FT
Podiatry Discharge Instructions:  Follow up: Please follow up with Dr. Brandt within 1 week of discharge from the hospital, please call [phone] for appointment and discuss that you recently were seen in the hospital.  Antibiotics: Please continue as instructed.

## 2020-11-02 NOTE — CONSULT NOTE ADULT - PROBLEM SELECTOR RECOMMENDATION 9
- increase milrinone to 0.375 mcg/kg/min, he will eventually be discharged on milrinone  - wean levophed, would allow for MAPs 65-70 to aid in pressor weaning  - follow SVC saturations and TD CO in case residual ASD from MitraClip is still present  - continue lasix drip, goal CVP 6-8  - holding BB/ACEi due to shock/EDIE  - pan-culture and start empiric antibiotics if pressors unable to be weaned or lactate rises

## 2020-11-02 NOTE — PROCEDURE NOTE - NSPROCDETAILS_GEN_ALL_CORE
sterile technique, catheter placed/guidewire recovered/lumen(s) aspirated and flushed/sterile dressing applied/ultrasound guidance
sutured in place/all materials/supplies accounted for at end of procedure/location identified, draped/prepped, sterile technique used, needle inserted/introduced/connected to a pressurized flush line/hemostasis with direct pressure, dressing applied/Seldinger technique/positive blood return obtained via catheter
lumen(s) aspirated and flushed/sterile dressing applied/sterile technique, catheter placed/ultrasound guidance/guidewire recovered

## 2020-11-02 NOTE — CONSULT NOTE ADULT - SUBJECTIVE AND OBJECTIVE BOX
[facility] DIVISION OF KIDNEY DISEASES AND HYPERTENSION -- [phone]  -- INITIAL CONSULT NOTE  --------------------------------------------------------------------------------  HPI: 75-year-old male with advanced heart failure, CKD, Afib, DM, hypothyroidism, was admitted to [facility] on 11/1/20 for worsening lethargy. As per chart review, pt. was brought in by james after he fell and was unable to get up. Pt. transferred to CCU and started on IV pressors and antibiotics.  Pt. evaluated by Heart Failure team, was noted to not be grossly volume overloaded on invasive hemodynamic measures concerning for possible sepsis as cause of shock. Nephrology team consulted for elevated serum creatinine. Pt. was hospitalized at [facility] from 8/21/20-9/21/20 and underwent mitraclip placement. Upon review of labs on [facility], Scr was 2.77 on 9/21/20. Scr on arrival to the ER was 4.26 yesterday (4/1/20) and today is 4.36. Pt. on IV Lasix infusion with good urine output.    Pt. evaluated at bedside, awake but lethargic. Pt. unable to offer any complaints.    PAST HISTORY  --------------------------------------------------------------------------------  PAST MEDICAL & SURGICAL HISTORY:  Hypothyroidism    Afib    Type II diabetes mellitus    Aortic insufficiency    Mitral insufficiency    CKD (chronic kidney disease)    Cardiomyopathy  Systolic CHF    Hypertension    History of tonsillectomy  childhood    AICD (automatic cardioverter/defibrillator) present  8/2012      FAMILY HISTORY:  Family history of CVA    PAST SOCIAL HISTORY: unable to obtain due to medical condition    ALLERGIES & MEDICATIONS  --------------------------------------------------------------------------------  Allergies    No Known Allergies    Intolerances    Standing Inpatient Medications  aMIOdarone    Tablet 200 milliGRAM(s) Oral daily  cefepime   IVPB      chlorhexidine 4% Liquid 1 Application(s) Topical <User Schedule>  chlorhexidine 4% Liquid 1 Application(s) Topical <User Schedule>  furosemide Infusion 20 mG/Hr IV Continuous <Continuous>  heparin  Infusion.  Unit(s)/Hr IV Continuous <Continuous>  levothyroxine 50 MICROGram(s) Oral daily  milrinone Infusion 0.375 MICROgram(s)/kG/Min IV Continuous <Continuous>  norepinephrine Infusion 0.05 MICROgram(s)/kG/Min IV Continuous <Continuous>  vancomycin  IVPB 1000 milliGRAM(s) IV Intermittent once    REVIEW OF SYSTEMS  --------------------------------------------------------------------------------  Unable to obtain due to medical condition    VITALS/PHYSICAL EXAM  --------------------------------------------------------------------------------  T(C): 36.4 (11-02-20 @ 14:00), Max: 36.6 (11-02-20 @ 08:00)  HR: 80 (11-02-20 @ 15:45) (56 - 98)  BP: 78/50 (11-01-20 @ 23:45) (64/38 - 99/59)  RR: 15 (11-02-20 @ 15:45) (12 - 24)  SpO2: 97% (11-02-20 @ 15:15) (93% - 100%)  Wt(kg): --  Height (cm): 172.7 (11-01-20 @ 13:03)  Weight (kg): 90.7 (11-01-20 @ 13:03)  BMI (kg/m2): 30.4 (11-01-20 @ 13:03)  BSA (m2): 2.04 (11-01-20 @ 13:03)    11-01-20 @ 07:01  -  11-02-20 @ 07:00  --------------------------------------------------------  IN: 1069.4 mL / OUT: 1755 mL / NET: -685.6 mL    11-02-20 @ 07:01  -  11-02-20 @ 15:57  --------------------------------------------------------  IN: 475.2 mL / OUT: 1710 mL / NET: -1234.8 mL    Physical Exam:  	Gen: NAD  	HEENT: MMM  	Pulm: good air entry B/L  	CV: S1S2  	Abd: Soft, +BS   	Ext: LE pitting edema B/L  	Neuro: Awake  	Skin: Warm and dry  	  LABS/STUDIES  --------------------------------------------------------------------------------              11.0   13.18 >-----------<  171      [11-02-20 @ 05:29]              35.1     137  |  96  |  130  ----------------------------<  72      [11-02-20 @ 05:30]  5.0   |  25  |  4.36        Ca     10.4     [11-02-20 @ 05:30]      Mg     2.8     [11-02-20 @ 05:30]      Phos  4.0     [11-02-20 @ 05:30]    Creatinine Trend:  SCr 4.36 [11-02 @ 05:30]  SCr 4.39 [11-02 @ 01:53]  SCr 4.21 [11-01 @ 21:27]  SCr 4.28 [11-01 @ 18:16]  SCr 4.34 [11-01 @ 15:27]    Urinalysis - [11-02-20 @ 14:24]      Color Light Yellow / Appearance Clear / SG 1.009 / pH 6.5      Gluc Negative / Ketone Negative  / Bili Negative / Urobili Negative       Blood Small / Protein Negative / Leuk Est Negative / Nitrite Negative      RBC 23 / WBC 2 / Hyaline 0 / Gran  / Sq Epi  / Non Sq Epi 0 / Bacteria Negative    Urine Creatinine 16      [11-02-20 @ 14:24]  Urine Sodium 86      [11-02-20 @ 14:24]  Urine Osmolality 306      [11-02-20 @ 14:24]

## 2020-11-02 NOTE — DISCHARGE NOTE PROVIDER - NSDCFUADDAPPT_GEN_ALL_CORE_FT
Please follow up with PCP in a week for further management  Please follow up with HF on 11/23 at 11:30am for your CHF management. Ph: [phone].

## 2020-11-02 NOTE — PROGRESS NOTE ADULT - ASSESSMENT
74 y/o M w/ RLE blister w/ localized cellulitis    - pt seen and evaluated  - intact blister with localized cellulitis to anterior tibia RLE, ecchymosis noted to R foot medial 1st MPJ, erythema to posterior heels b/l   - cont IV abx for cellulitis  - z-flow boots ordered, to be worn while resting in bed at all times   - will cont to monitor  - seen with attending

## 2020-11-02 NOTE — DISCHARGE NOTE PROVIDER - HOSPITAL COURSE
75 year old man with ACC/AHA stage D chronic systolic heart failure due to a dilated nonischemic cardiomyopathy (LVIDd 6.7cm, LVEF <20%) with associated moderate to severe mitral regurgitation s/p Mitral Clip 9/2020 and s/p CRT-D, AF s/p DCCV on amiodarone, stage 4 CKD (BL Cr ~3), and hypothyroidism who was admitted on 11/1 with shock (likely cardiogenic) and volume overload after recent admission with HF.      Acute on chronic systolic heart failure, NYHA class 4;  gently resumed neurohormonals as tolerates though recently on pressors   - PA diastolic pressures have remained constant despite diuresis so CardioMEMS would unlikely aid in management of volume status  - overall poor prognosis discussed with pt. given ACC/AHA Stage D HF requiring inotropes, he has previously filled out a MOLST form and does not want resuscitation in setting of futility.   - interrogated ICD given  - PMT on telemetry.     Acute kidney injury - management of hemodynamics as above and cardio-renal seen and followed      Transaminitis - u/s abdomen without pathology, management of hemodynamics and overall improved during admission      Paroxysmal atrial fibrillation  - continued heparin, previously on eliquis as outpt and resumed after Santo catheter blockage resolved    Continued PO amiodarone to maintain sinus rhythm.     Leukocytosis - pan-culture negative and after course of antibiotics favored stopping antibiotics.    Thrombocytopenia - noted at last hospitalization and overall stable.    On 11/16 plan for resolution of Santo cath. function and dc'd home.     75 year old man with ACC/AHA stage D chronic systolic heart failure due to a dilated nonischemic cardiomyopathy (LVIDd 6.7cm, LVEF <20%) with associated moderate to severe mitral regurgitation s/p Mitral Clip 9/2020 and s/p CRT-D, AF s/p DCCV on amiodarone, stage 4 CKD (BL Cr ~3), and hypothyroidism who was admitted on 11/1 with shock (likely cardiogenic) and volume overload after recent admission with HF. Patient seen and evaluated by HF.     Acute on chronic systolic heart failure, NYHA class 4;  gently resumed neurohormonals as tolerates though recently on pressors   - PA diastolic pressures have remained constant despite diuresis so CardioMEMS would unlikely aid in management of volume status  - overall poor prognosis discussed with pt. given ACC/AHA Stage D HF requiring inotropes, he has previously filled out a MOLST form and does not want resuscitation in setting of futility.   - interrogated ICD given  - PMT on telemetry.     Acute kidney injury - management of hemodynamics as above and cardio-renal seen and followed      Transaminitis - u/s abdomen without pathology, management of hemodynamics and overall improved during admission      Paroxysmal atrial fibrillation  - continued heparin, previously on eliquis as outpt and resumed after Santo catheter blockage resolved    Continued PO amiodarone to maintain sinus rhythm.     Leukocytosis - pan-culture negative and after course of antibiotics favored stopping antibiotics.    Thrombocytopenia - noted at last hospitalization and overall stable.    On 11/18 plan for resolution of Santo cath. function and dc'd home.

## 2020-11-02 NOTE — PROGRESS NOTE ADULT - ASSESSMENT
74 year old male with PMH of long standng non ischemia chronic systolic heart failure (EF 20% LVIDD 6.7 )) s/p CRT-D and mitral valve clip on last admission, Afib on Eliquiss/p DCCV, CKD Stage 3  , DM-2, hypothyroidism and papillary thyroid carcinoma,, anemia. He was recently referred to HF in Aug 2020 for anasarca and a hospital admission that necessitated Milrinone assisted diuresis (> 20 kg off) w/ mitraclip for mod-severe MR which improved to mild. He was declined for an LVAD due to frailty and CKD. He now presented w/ AdHF and hyperkalemia in the setting of recent increase in diuretics and increase in poassium    #Neuro      #Resp  - Oxygenating well on no supplemental O2    AdHF  - Milrinone assisted diuresis, cont Milrinon 0.25 and Lasix gtt  - Keep net negative  -Continue carvedilol, hold hydralazine  - Obtain Central line for CVP and central sat monitoring    RASHID Guidry for accurate I and O    Renal  - Cr currently 4.3 from 2.7  - K 7, check electrolytes, electrically stable  - Obtain renal consult     ID  - WBC normal , no fever  - covid labs pending, no e.o infection    Endo  - Continue synthroid, check TSH 73y/o M w/ h/o long standing non ischemia chronic systolic heart failure (EF 20% LVIDD 6.7 )) s/p CRT-D and mitral valve clip on last admission, Afib on Eliquis s/p DCCV, CKD-3, T2DM, hypothyroidism and papillary thyroid carcinoma, anemia, recently referred to HF in Aug 2020 for anasarca and a hospital admission that necessitated Milrinone assisted diuresis (> 20 kg off) w/ mitraclip for mod-severe MR which improved to mild. He was declined for an LVAD due to frailty and CKD. He now presented w/ AdHF and hyperkalemia in the setting of recent increase in diuretics.    #Neuro  - AAOx2, following commands    #Resp  - Oxygenating well on room air    #CV  *AdHF   - Milrinone assisted diuresis, increased Milrinone 0.25->.375 and c/w Lasix gtt @20 with goal of net negative  - Continue carvedilol, hold hydralazine  - Central line for CVP stable 6-8  - obtain central cordis sat monitoring s/p [place] clip  - c/w amiodarone 200mg qD    #GI  - Regular diet  - F/U RUQ study    #/Renal  - Monitor I/O with goal net negative  - EDIE (Cr 2.7 -> 4.4) 2/2 possible occult infection  - Renal following, appreciate recs   - f/u Urine lytes    #ID  *Cellulitis in R LE  - leukocytosis, afebrile  - elevated lactate, pending Blood Cxs, UA, started on vancomycin and cefepime renally dosed  - podiatry consulted on 11/2, appreciate recs    #Endo  - C/w synthroid, TSH 8.61, likely compensatory    #Heme  - monitor h/h and platelets   - heparin gtt with goal PTT (), aPTT elevated (>200), continue to titrate and follow up HIT labs    #Lines  - R IJ TLC (11/1 - ), R radial A line (11/1 - )    #GOC  - Full code    #Dispo  - c/w ICU level of care 75y/o M w/ h/o long standing non ischemia chronic systolic heart failure (EF 20% LVIDD 6.7 )) s/p CRT-D and mitral valve clip on last admission, Afib on Eliquis s/p DCCV, CKD-3, T2DM, hypothyroidism and papillary thyroid carcinoma, anemia, recently referred to HF in Aug 2020 for anasarca and a hospital admission that necessitated Milrinone assisted diuresis (> 20 kg off) w/ mitraclip for mod-severe MR which improved to mild. He was declined for an LVAD due to frailty and CKD. He now presented w/ AdHF and hyperkalemia in the setting of recent increase in diuretics.    #Neuro  - AAOx2, following commands    #Resp  - Oxygenating well on room air    #CV  *AdHF   - Milrinone assisted diuresis, increased Milrinone 0.25->.375 and c/w Lasix gtt @20 with goal of net negative  - withholding carvedilol, hydralazine  - Central line for CVP stable 6-8  - obtain central cordis sat monitoring s/p [place] clip  - c/w amiodarone 200mg qD    #GI  - restart Regular diet  - RUQ US - b/l pleural effusions and ascites, no acute pathology    #/Renal  - Monitor I/O with goal net negative  - EDIE (Cr 2.7 -> 4.4) 2/2 possible occult infection  - Renal following, appreciate recs   - f/u Urine lytes    #ID  *Cellulitis in R LE  - leukocytosis, afebrile  - elevated lactate, pending Blood Cxs, UA wnl, started on vancomycin and cefepime renally dosed  - podiatry consulted on 11/2 for onychomycosis appreciate recs  - f/u vanco random level in AM on 11/3    #Endo  - C/w synthroid, TSH 8.61, likely compensatory    #Heme  - monitor h/h and platelets   - heparin gtt with goal PTT (), aPTT elevated (>200), continue to titrate and follow up HIT labs    #Lines  - R IJ TLC (11/1 - ), R radial A line (11/1 - )    #GOC  - Full code    #Dispo  - c/w ICU level of care

## 2020-11-02 NOTE — PROGRESS NOTE ADULT - SUBJECTIVE AND OBJECTIVE BOX
MAIN BRASWELL  MRN-[id number]  Patient is a 75y old  Male who presents with a chief complaint of AdHF and hyperkalemia (2020 16:41)    HPI:  74 year old male with PMH of long standng non ischemia chronic systolic heart failure (EF 20% LVIDD 6.7 )) s/p CRT-D and mitral valve clip on last admission, Afib on Eliquiss/p DCCV, CKD Stage 3  , DM-2, hypothyroidism and papillary thyroid carcinoma,, anemia. He was recently referred to HF in Aug 2020 for anasarca and a hospital admission that necessitated Milrinone assisted diuresis (> 20 kg off) w/ mitraclip for mod-severe MR which improved to mild. He was declined for an LVAD due to frailty and CKD. At time of discharge he weighed 187 pounds and had a Cr of 2.77 Recently had seen HF and had medications adjusted due to increasing weight ( metolazone stopped, torsemide increased to 80 BID, K increased, and carvedilol switched to Metoprolol).   Patient lives with his niece who states she has not been feeling well the last few days so has not been around him as much. patient states he fell out of bed, does not know how but was too weak to get up and normally can ambulate with some assistance. reports he has been "weak and wobbly" for some timebut cannot specify how long. denies fever/chills/chest pain/abd pain/vomiting/dysuria.    In ED pt found to be in AdHF and fluid overloaded w/ a K of 8. Lasix 80 given and gtt started. Milrinone 0.25 started.  (2020 17:42)      Hospital Course:   Admitted to CICU for ADHF and hyperkalemia    24 HOUR EVENTS:    REVIEW OF SYSTEMS:   Constitutional: + weakness. no fevers, or chills  Eyes/ENT: No visual changes  Respiratory: + ALCAZAR. No cough, wheezing, hemoptysis  Cardiovascular: No chest pain, no palpitations  Gastrointestinal: No abdominal pain. No nausea, vomiting, hematemesis.   Genitourinary: No dysuria  Neurological: No numbness, no weakness  Skin: No itching, rashes    ICU Vital Signs Last 24 Hrs  T(C): 36.4 (2020 14:00), Max: 36.6 (2020 08:00)  T(F): 97.5 (2020 14:00), Max: 97.9 (2020 08:00)  HR: 80 (2020 18:30) (80 - 98)  BP: 78/50 (2020 23:45) (64/38 - 93/54)  BP(mean): 57 (2020 23:45) (46 - 68)  ABP: 96/44 (2020 18:30) (86/38 - 132/58)  ABP(mean): 60 (2020 18:30) (54 - 84)  RR: 14 (2020 18:30) (12 - 24)  SpO2: 98% (2020 16:15) (93% - 100%)      CVP(mm Hg): 4 (20 @ 18:30) (0 - 20)  CO: --  CI: --  PA: 47/14 (20 @ 18:30) (14/8 - 61/27)  PA(mean): 26 (20 @ 18:30) (10 - 41)  PA(direct): --  PCWP: --  LA: --  RA: --  SVR: --  SVRI: --  PVR: --  PVRI: --    I&O's Summary  2020 07:01  -  2020 07:00  --------------------------------------------------------  IN: 1069.4 mL / OUT: 1755 mL / NET: -685.6 mL    2020 07:01  -  2020 19:21  --------------------------------------------------------  IN: 847.6 mL / OUT: 2800 mL / NET: -1952.4 mL    POCT Blood Glucose.: 79 mg/dL (2020 03:24)    PHYSICAL EXAM:   General: No acute distress  Eyes: EOMI, PERRLA, conjunctiva and sclera clear  HENT: + JVD  Chest/Lung: CTAB, no wheezes, rales, or rhonchi  Heart: Regular rate, regular rhythm. Normal S1/S2. No murmurs, rubs, or gallops.  Abdomen: Soft, nontender, nondistended. Normal bowel sounds.  Extremites: 2+ peripheral pulses B/L. 2+ B/L LE weeping edema. No clubbing, cyanosis  Neurology: A&O x2, WILBURN x4, no focal deficits   Skin: Intact blister with localized cellulitis to right lower anterior tibia  Lines: RIJ TLC 11/1 dressing clean, dry, intact    ============================I/O===========================   I&O's Detail    2020 07:01  -  2020 07:00  --------------------------------------------------------  IN:    Furosemide: 50 mL    Furosemide: 90 mL    Heparin Infusion: 119 mL    IV PiggyBack: 100 mL    Milrinone: 96.6 mL    Norepinephrine: 193.8 mL    Oral Fluid: 420 mL  Total IN: 1069.4 mL    OUT:    Indwelling Catheter - Urethral (mL): 1755 mL  Total OUT: 1755 mL    Total NET: -685.6 mL      2020 07:  -  2020 19:21  --------------------------------------------------------  IN:    Furosemide: 110 mL    Heparin Infusion: 84 mL    Milrinone: 34.5 mL    Milrinone: 61.2 mL    Norepinephrine: 317.9 mL    Oral Fluid: 240 mL  Total IN: 847.6 mL    OUT:    Indwelling Catheter - Urethral (mL): 2800 mL  Total OUT: 2800 mL    Total NET: -.4 mL        ============================ LABS =========================                        11.0   13.18 )-----------( 171      ( 2020 05:29 )             35.1         137  |  94<L>  |  128<H>  ----------------------------<  98  4.1   |  28  |  4.41<H>    Ca    10.2      2020 16:06  Phos  4.0       Mg     2.6         TPro  6.0  /  Alb  3.4  /  TBili  2.8<H>  /  DBili  x   /  AST  207<H>  /  ALT  220<H>  /  AlkPhos  114      Troponin T, High Sensitivity Result: 93 ng/L (20 @ 18:16)  Troponin T, High Sensitivity Result: 93 ng/L (20 @ 13:50)      Creatine Kinase, Serum: 214 U/L (20 @ 18:16)  Creatine Kinase, Serum: 325 U/L (20 @ 13:50)          LIVER FUNCTIONS - ( 2020 16:06 )  Alb: 3.4 g/dL / Pro: 6.0 g/dL / ALK PHOS: 114 U/L / ALT: 220 U/L / AST: 207 U/L / GGT: x           PTT - ( 2020 05:29 )  PTT:>200.0 sec  ABG - ( 2020 16:05 )  pH, Arterial: 7.47  pH, Blood: x     /  pCO2: 42    /  pO2: 67    / HCO3: 30    / Base Excess: 6.3   /  SaO2: 94                Blood Gas Arterial, Lactate: 1.1 mmol/L (20 @ 16:05)  Blood Gas Venous - Lactate: 1.1 mmoL/L (20 @ 16:05)  Blood Gas Arterial, Lactate: 1.2 mmol/L (20 @ 08:26)  Blood Gas Venous - Lactate: 2.4 mmoL/L (20 @ 05:30)  Blood Gas Arterial, Lactate: 1.4 mmol/L (20 @ 01:54)  Blood Gas Venous - Lactate: 2.6 mmoL/L (20 @ 21:25)  Lactate, Blood: 3.6 mmol/L (20 @ 18:16)  Blood Gas Venous - Lactate: 5.2 mmoL/L (20 @ 14:20)    Urinalysis Basic - ( 2020 14:24 )    Color: Light Yellow / Appearance: Clear / S.009 / pH: x  Gluc: x / Ketone: Negative  / Bili: Negative / Urobili: Negative   Blood: x / Protein: Negative / Nitrite: Negative   Leuk Esterase: Negative / RBC: 23 /hpf / WBC 2 /HPF   Sq Epi: x / Non Sq Epi: 0 /hpf / Bacteria: Negative      ======================Micro/Rad/Cardio=================  Telemetry: Reviewed   EKG: Reviewed  CXR: Reviewed  Echo: Reviewed  ======================================================  PAST MEDICAL & SURGICAL HISTORY:  Hypothyroidism    Afib    Type II diabetes mellitus    Aortic insufficiency    Mitral insufficiency    CKD (chronic kidney disease)    Cardiomyopathy  Systolic CHF    Hypertension    History of tonsillectomy  childhood    AICD (automatic cardioverter/defibrillator) present  2012      ====================ASSESSMENT ==============  ADHF  Hyperkalemia  EDIE on CKD   RLE blister w/ localized cellulitis    Plan:  ====================== NEUROLOGY=====================  Fall/ Weakness  - A&Ox2, lethargic, nonfocal  - CT Head neg for any acute pathology   - Fall Risk Protocol   - Continue to monitor neuro status as per protocol   - Tylenol PRN for analgesia    ==================== RESPIRATORY======================  Receiving supplemental O2 therapy with NC, satting 100%.  - Continue to monitor RR, breathing pattern, pulse ox  - clear lungs on CXR and on exam    ====================CARDIOVASCULAR==================  Acute on Chronic Decompensated HFrEF (13%) w/ AICD & Mitraclip, untreated severe TR   - Inotropic support with Milrinone 0.375mcg gtt.   - Pressor support with Levo 0.05mcg gtt.  - Diuresis with Lasix 20mg gtt to maintain net negative fluid balance  - Continuous CVP monitoring and ScvO2 trend    - Holding BB in setting of inotrope   - TTE: EF 13% Severe global LVSD, mild MR w/ Mitraclip in place. RVE w/ normal RVSF. Severe TR     Afib S/P DCCV  - Rate control with Amio 200mg daily  - Heparin gtt in acute care setting     ===================HEMATOLOGIC/ONC ===================  H/H   - Continue to monitor hemoglobin and hematocrit levels.   - C/w Heparin gtt for AFib     ===================== RENAL =========================  EDIE on CKD III (SCr 4.41 from 2.7) in setting of ADHF  - Hyperkalemia w/ initial K of 8.1, diuresis regimen uptitrated, K now 4.1  - Oliguric: goal net neg fluid balance with Lasix 20mg gtt  - Followed by Dr. Rees - Nephro   - Continue to monitor I/Os, BUN/Creatinine, and urine output via Guidry.     ==================== GASTROINTESTINAL===================  Transaminitis, resolving with inotropic support  - AUS : Cholelithiasis without sonographic evidence of acute cholecystitis. No biliary ductal dilatation. Small ascites.     Tolerating PO DASH/TLC diet.   =======================    ENDOCRINE  =====================  Type 2 DM  - S/P insulin, Bgl controlled, cont to monitor Bgl  - Monitor need to initiate sliding scale    Hypothyroidism, TSH 8.61  - Continue Synthroid 50mcg QD    ========================INFECTIOUS DISEASE================  RLE blister w/ localized cellulitis  - Podiatry following, appreciate rec's  - Started on IV Cefepime for cellulitis (- )  - Z-flow boots to be worn while resting in bed  - Afebrile, WBC within normal limits.   - Monitor for leukocytosis / fever.         Patient requires continuous monitoring with bedside rhythm monitoring, pulse ox monitoring, and intermittent blood gas analysis. Care plan discussed with ICU care team. Patient remained critical and at risk for life threatening decompensation.  Patient seen, examined and plan discussed with CCU team during rounds.     I have personally provided 35 minutes of critical care time excluding time spent on separate procedures.    By signing my name below, I, Mare Parada, attest that this documentation has been prepared under the direction and in the presence of Vickie Rodriguez NP.  Electronically signed: Jasmeet Stone, 20 @ 19:22    I, Vickie Rodriguez NP, personally performed the services described in this documentation. all medical record entries made by the jasmeet were at my direction and in my presence. I have reviewed the chart and agree that the record reflects my personal performance and is accurate and complete  Electronically signed: Vickie Rodriguez NP.       MAIN BRASWELL  MRN-[id number]  Patient is a 75y old  Male who presents with a chief complaint of AdHF and hyperkalemia (2020 16:41)    HPI:  74 year old male with PMH of long standng non ischemia chronic systolic heart failure (EF 20% LVIDD 6.7 )) s/p CRT-D and mitral valve clip on last admission, Afib on Eliquiss/p DCCV, CKD Stage 3  , DM-2, hypothyroidism and papillary thyroid carcinoma,, anemia. He was recently referred to HF in Aug 2020 for anasarca and a hospital admission that necessitated Milrinone assisted diuresis (> 20 kg off) w/ mitraclip for mod-severe MR which improved to mild. He was declined for an LVAD due to frailty and CKD. At time of discharge he weighed 187 pounds and had a Cr of 2.77 Recently had seen HF and had medications adjusted due to increasing weight ( metolazone stopped, torsemide increased to 80 BID, K increased, and carvedilol switched to Metoprolol).   Patient lives with his niece who states she has not been feeling well the last few days so has not been around him as much. patient states he fell out of bed, does not know how but was too weak to get up and normally can ambulate with some assistance. reports he has been "weak and wobbly" for some timebut cannot specify how long. denies fever/chills/chest pain/abd pain/vomiting/dysuria.    In ED pt found to be in AdHF and fluid overloaded w/ a K of 8. Lasix 80 given and gtt started. Milrinone 0.25 started.  (2020 17:42)      Hospital Course:   Admitted to CICU for ADHF and hyperkalemia    24 HOUR EVENTS:  Milrinone increased to 0.375, MVO2 75 CO 9.6 CI 4.7     REVIEW OF SYSTEMS:   Constitutional: + weakness. no fevers, or chills  Eyes/ENT: No visual changes  Respiratory: + ALCAZAR. No cough, wheezing, hemoptysis  Cardiovascular: No chest pain, no palpitations  Gastrointestinal: No abdominal pain. No nausea, vomiting, hematemesis.   Genitourinary: No dysuria  Neurological: No numbness, no weakness  Skin: No itching, rashes    ICU Vital Signs Last 24 Hrs  T(C): 36.4 (2020 14:00), Max: 36.6 (2020 08:00)  T(F): 97.5 (2020 14:00), Max: 97.9 (2020 08:00)  HR: 80 (2020 18:30) (80 - 98)  BP: 78/50 (2020 23:45) (64/38 - 93/54)  BP(mean): 57 (2020 23:45) (46 - 68)  ABP: 96/44 (2020 18:30) (86/38 - 132/58)  ABP(mean): 60 (2020 18:30) (54 - 84)  RR: 14 (2020 18:30) (12 - 24)  SpO2: 98% (2020 16:15) (93% - 100%)      CVP(mm Hg): 4 (20 @ 18:30) (0 - 20)  CO: 9.6  CI: 4.7  PA: 47/14 (20 @ 18:30) (14/8 - 61/27)  PA(mean): 26 (20 @ 18:30) (10 - 41)      I&O's Summary  2020 07:01  -  2020 07:00  --------------------------------------------------------  IN: 1069.4 mL / OUT: 1755 mL / NET: -685.6 mL    2020 07:01  -  2020 19:21  --------------------------------------------------------  IN: 847.6 mL / OUT: 2800 mL / NET: -1952.4 mL    POCT Blood Glucose.: 79 mg/dL (2020 03:24)    PHYSICAL EXAM:   General: No acute distress  Eyes: EOMI, PERRLA, conjunctiva and sclera clear  HENT: + JVD  Chest/Lung: CTAB, no wheezes, rales, or rhonchi  Heart: Regular rate, regular rhythm. Normal S1/S2. No murmurs, rubs, or gallops.  Abdomen: Soft, nontender, nondistended. Normal bowel sounds.  Extremites: 2+ peripheral pulses B/L. 2+ B/L LE weeping edema. No clubbing, cyanosis  Neurology: A&O x2, WILBURN x4, no focal deficits   Skin: Intact blister with localized cellulitis to right lower anterior tibia  Lines: RI TLC 11/ dressing clean, dry, intact    ============================I/O===========================   I&O's Detail    2020 07:01  -  2020 07:00  --------------------------------------------------------  IN:    Furosemide: 50 mL    Furosemide: 90 mL    Heparin Infusion: 119 mL    IV PiggyBack: 100 mL    Milrinone: 96.6 mL    Norepinephrine: 193.8 mL    Oral Fluid: 420 mL  Total IN: 1069.4 mL    OUT:    Indwelling Catheter - Urethral (mL): 1755 mL  Total OUT: 1755 mL    Total NET: -685.6 mL      2020 07:01  -  2020 19:21  --------------------------------------------------------  IN:    Furosemide: 110 mL    Heparin Infusion: 84 mL    Milrinone: 34.5 mL    Milrinone: 61.2 mL    Norepinephrine: 317.9 mL    Oral Fluid: 240 mL  Total IN: 847.6 mL    OUT:    Indwelling Catheter - Urethral (mL): 2800 mL  Total OUT: 2800 mL    Total NET: -1952.4 mL        ============================ LABS =========================                        11.0   13.18 )-----------( 171      ( 2020 05:29 )             35.1         137  |  94<L>  |  128<H>  ----------------------------<  98  4.1   |  28  |  4.41<H>    Ca    10.2      2020 16:06  Phos  4.0       Mg     2.6         TPro  6.0  /  Alb  3.4  /  TBili  2.8<H>  /  DBili  x   /  AST  207<H>  /  ALT  220<H>  /  AlkPhos  114      Troponin T, High Sensitivity Result: 93 ng/L (20 @ 18:16)  Troponin T, High Sensitivity Result: 93 ng/L (20 @ 13:50)      Creatine Kinase, Serum: 214 U/L (20 @ 18:16)  Creatine Kinase, Serum: 325 U/L (20 @ 13:50)          LIVER FUNCTIONS - ( 2020 16:06 )  Alb: 3.4 g/dL / Pro: 6.0 g/dL / ALK PHOS: 114 U/L / ALT: 220 U/L / AST: 207 U/L / GGT: x           PTT - ( 2020 05:29 )  PTT:>200.0 sec  ABG - ( 2020 16:05 )  pH, Arterial: 7.47  pH, Blood: x     /  pCO2: 42    /  pO2: 67    / HCO3: 30    / Base Excess: 6.3   /  SaO2: 94          Blood Gas Arterial, Lactate: 1.1 mmol/L (20 @ 16:05)  Blood Gas Venous - Lactate: 1.1 mmoL/L (20 @ 16:05)  Blood Gas Arterial, Lactate: 1.2 mmol/L (20 @ 08:26)  Blood Gas Venous - Lactate: 2.4 mmoL/L (20 @ 05:30)  Blood Gas Arterial, Lactate: 1.4 mmol/L (20 @ 01:54)  Blood Gas Venous - Lactate: 2.6 mmoL/L (20 @ 21:25)  Lactate, Blood: 3.6 mmol/L (20 @ 18:16)  Blood Gas Venous - Lactate: 5.2 mmoL/L (20 @ 14:20)    Urinalysis Basic - ( 2020 14:24 )    Color: Light Yellow / Appearance: Clear / S.009 / pH: x  Gluc: x / Ketone: Negative  / Bili: Negative / Urobili: Negative   Blood: x / Protein: Negative / Nitrite: Negative   Leuk Esterase: Negative / RBC: 23 /hpf / WBC 2 /HPF   Sq Epi: x / Non Sq Epi: 0 /hpf / Bacteria: Negative      ======================Micro/Rad/Cardio=================  Telemetry: Reviewed   EKG: Reviewed  CXR: Reviewed  Echo: Reviewed  ======================================================  PAST MEDICAL & SURGICAL HISTORY:  Hypothyroidism    Afib    Type II diabetes mellitus    Aortic insufficiency    Mitral insufficiency    CKD (chronic kidney disease)    Cardiomyopathy  Systolic CHF    Hypertension    History of tonsillectomy  childhood    AICD (automatic cardioverter/defibrillator) present  2012      ====================ASSESSMENT ==============  ADHF  Hyperkalemia  EDIE on CKD   RLE blister w/ localized cellulitis    Plan:  ====================== NEUROLOGY=====================  Fall/ Weakness  - A&Ox2, lethargic, nonfocal  - CT Head neg for any acute pathology   - Fall Risk Protocol   - Continue to monitor neuro status as per protocol   - Tylenol PRN for analgesia    ==================== RESPIRATORY======================  Receiving supplemental O2 therapy with NC, satting 100%.  - Continue to monitor RR, breathing pattern, pulse ox  - clear lungs on CXR and on exam    ====================CARDIOVASCULAR==================  Acute on Chronic Decompensated HFrEF (13%) w/ AICD & Mitraclip, untreated severe TR   - Inotropic support with Milrinone 0.375mcg gtt, trending MVO2/lactate/CMP  - Pressor support with Levo 0.05mcg gtt.  - Diuresis with Lasix 20mg gtt to maintain net negative fluid balance  - Continuous CVP monitoring and ScvO2 trend, goal 6-8mmHg  - Holding BB in setting of inotrope   - TTE: EF 13% Severe global LVSD, mild MR w/ Mitraclip in place. RVE w/ normal RVSF. Severe TR     Afib S/P DCCV  - Rate control with Amio 200mg daily  - Heparin gtt on hold, INR 4.92 will restart when INR <2.0    ===================HEMATOLOGIC/ONC ===================  H/H   - Continue to monitor hemoglobin and hematocrit levels.     ===================== RENAL =========================  EDIE on CKD III (SCr 4.41 from 2.7) in setting of ADHF  - Hyperkalemia w/ initial K of 8.1, diuresis regimen uptitrated, K now 4.1  - Oliguric: goal net neg fluid balance with Lasix 20mg gtt  - Followed by Dr. Rees - Nephro   - Continue to monitor I/Os, BUN/Creatinine, and urine output via Guidry.     ==================== GASTROINTESTINAL===================  Transaminitis, resolving with inotropic support  - AUS : Cholelithiasis without sonographic evidence of acute cholecystitis. No biliary ductal dilatation. Small ascites.     Tolerating PO DASH/TLC diet.   =======================    ENDOCRINE  =====================  Type 2 DM  - S/P insulin, Bgl controlled, cont to monitor Bgl  - Monitor need to initiate sliding scale    Hypothyroidism, TSH 8.61  - Continue Synthroid 50mcg QD    ========================INFECTIOUS DISEASE================  RLE blister w/ localized cellulitis  - Podiatry following, appreciate rec's  - Started on IV Cefepime for cellulitis (- )  - Z-flow boots to be worn while resting in bed    Leukocytosis   - Afebrile  - Monitor for leukocytosis / fever  -f/u blood cultures x2 sets sent today, UA negative    -c/w cefepime 1g     Patient requires continuous monitoring with bedside rhythm monitoring, pulse ox monitoring, and intermittent blood gas analysis. Care plan discussed with ICU care team. Patient remained critical and at risk for life threatening decompensation.  Patient seen, examined and plan discussed with CCU team during rounds.     I have personally provided 35 minutes of critical care time excluding time spent on separate procedures.    By signing my name below, I, Mare Parada, attest that this documentation has been prepared under the direction and in the presence of Vickie Rodriguez NP.  Electronically signed: Willis Stone, 20 @ 19:22    I, Vickie Rodriguez NP, personally performed the services described in this documentation. all medical record entries made by the lynnetteibneptali were at my direction and in my presence. I have reviewed the chart and agree that the record reflects my personal performance and is accurate and complete  Electronically signed: Vickie Rodriguez NP.

## 2020-11-02 NOTE — CONSULT NOTE ADULT - ASSESSMENT
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    His TTE and physical exam suggest low cardiac output and volume overload but this is not corroborated on invasive hemodynamics. He is requiring high doses of vasopressor support to provide a perfusable blood pressure. Will augment inotrope support with overall goal of stabilization on continuous inotropes.    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    His TTE and physical exam suggest low cardiac output and volume overload but this is not corroborated on invasive hemodynamics. He is requiring high doses of vasopressor support to provide a perfusable blood pressure. Will augment inotrope support with overall goal of stabilization on continuous inotropes but also reassess for a superimposed secondary process such as infection leading to shock.    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    His TTE and physical exam suggest low cardiac output and volume overload but this is not corroborated on invasive hemodynamics. He is requiring high doses of vasopressor support to provide a perfusable blood pressure. Will augment inotrope support with overall goal of stabilization on continuous inotropes but also reassess for a superimposed secondary process such as infection leading to shock.    He was previously evaluated for LVAD as DT but declined due to combination of age, frailty, and advanced CKD.     Hemodynamics  11/2 (milrinone 0.25 and levophed) : RA 9, PA 52/20, unable to wedge, Corey CO/CI 6.2/3.0 with PA sat 74%    Review of relevant studies  TTE 11/1: LV 7.0 cm, LVEF 10-15%, severe TR, severe RV dysfunction, LVOT VTI 7 cm

## 2020-11-02 NOTE — PROGRESS NOTE ADULT - SUBJECTIVE AND OBJECTIVE BOX
PATIENT: MAIN BRASWELL, MRN: [id number]    CHIEF COMPLAINT: Patient is a 75y old  Male who presents with a chief complaint of AdHF and hyperkalemia (2020 21:10)      INTERVAL HISTORY/OVERNIGHT EVENTS: No overnight events. At bedside, patient has been sleeping and eating well. Denies N/V/D/C. Last BM x1 regular yesterday. Denies abdominal pain. Denies chest pain or SOB, cough. Has been ambulating with assistance. Oriented to person, place, and time. Breathing comfortably on room air.      MEDICATIONS:  MEDICATIONS  (STANDING):  aMIOdarone    Tablet 200 milliGRAM(s) Oral daily  chlorhexidine 4% Liquid 1 Application(s) Topical <User Schedule>  chlorhexidine 4% Liquid 1 Application(s) Topical <User Schedule>  furosemide Infusion 20 mG/Hr (10 mL/Hr) IV Continuous <Continuous>  heparin  Infusion.  Unit(s)/Hr (17 mL/Hr) IV Continuous <Continuous>  levothyroxine 50 MICROGram(s) Oral daily  milrinone Infusion 0.25 MICROgram(s)/kG/Min (6.8 mL/Hr) IV Continuous <Continuous>  norepinephrine Infusion 0.05 MICROgram(s)/kG/Min (8.5 mL/Hr) IV Continuous <Continuous>    MEDICATIONS  (PRN):  acetaminophen   Tablet .. 650 milliGRAM(s) Oral every 6 hours PRN Mild Pain (1 - 3)  hydrocortisone 2.5% Rectal Cream 1 Application(s) Rectal daily PRN hemorrhoid pain/itch  sodium chloride 0.9% lock flush 10 milliLiter(s) IV Push every 1 hour PRN Pre/post blood products, medications, blood draw, and to maintain line patency      ALLERGIES: Allergies    No Known Allergies    Intolerances        OBJECTIVE:  ICU Vital Signs Last 24 Hrs  T(C): 36.3 (2020 22:00), Max: 36.7 (2020 15:30)  T(F): 97.3 (2020 22:00), Max: 98 (2020 15:30)  HR: 80 (2020 06:45) (50 - 92)  BP: 78/50 (2020 23:45) (64/38 - 99/59)  BP(mean): 57 (2020 23:45) (46 - 76)  ABP: 104/46 (2020 06:45) (86/38 - 132/58)  ABP(mean): 64 (2020 06:45) (54 - 84)  RR: 15 (2020 06:45) (12 - 24)  SpO2: 95% (2020 06:45) (94% - 100%)      Adult Advanced Hemodynamics Last 24 Hrs  CVP(mm Hg): 6 (2020 06:45) (1 - 20)  CVP(cm H2O): --  CO: --  CI: --  PA: --  PA(mean): --  PCWP: --  SVR: --  SVRI: --  PVR: --  PVRI: --  CAPILLARY BLOOD GLUCOSE      POCT Blood Glucose.: 79 mg/dL (2020 03:24)  POCT Blood Glucose.: 151 mg/dL (2020 19:15)  POCT Blood Glucose.: 133 mg/dL (2020 16:10)    CAPILLARY BLOOD GLUCOSE      POCT Blood Glucose.: 79 mg/dL (2020 03:24)    I&O's Summary    2020 07:01  -  2020 07:00  --------------------------------------------------------  IN: 1023.6 mL / OUT: 1755 mL / NET: -731.4 mL      Daily Height in cm: 172.72 (2020 13:03)    Daily Weight in k.7 (2020 06:00)    PHYSICAL EXAMINATION:  General: Comfortable, no acute distress, cooperative with exam.  HEENT: PERRLA, EOMI, moist mucous membranes.  Respiratory: CTAB, normal respiratory effort, no coughing, wheezes, crackles, or rales.  CV: RRR, S1S2, no murmurs, rubs or gallops. No JVD. Distal pulses intact.  Abdominal: Soft, nontender, nondistended, no rebound or guarding, normal bowel sounds.  Neurology: AOx3, no focal neuro defects, WILBURN x 4.  Extremities: No pitting edema, + Peripheral pulses.  Incisions:   Tubes:    LABS:  ABG - ( 2020 01:54 )  pH, Arterial: 7.48  pH, Blood: x     /  pCO2: 39    /  pO2: 91    / HCO3: 29    / Base Excess: 5.1   /  SaO2: 98                                      11.0   13.18 )-----------( 171      ( 2020 05:29 )             35.1         137  |  96  |  130<H>  ----------------------------<  72  5.0   |  25  |  4.36<H>    Ca    10.4      2020 05:30  Phos  4.0       Mg     2.8         TPro  5.9<L>  /  Alb  3.4  /  TBili  2.3<H>  /  DBili  x   /  AST  202<H>  /  ALT  205<H>  /  AlkPhos  114      LIVER FUNCTIONS - ( 2020 01:53 )  Alb: 3.4 g/dL / Pro: 5.9 g/dL / ALK PHOS: 114 U/L / ALT: 205 U/L / AST: 202 U/L / GGT: x           PTT - ( 2020 05:29 )  PTT:>200.0 sec  Creatine Kinase, Serum: 214 U/L ( @ 18:16)  Creatine Kinase, Serum: 325 U/L ( @ 13:50)    CARDIAC MARKERS ( 2020 18:16 )  x     / x     / 214 U/L / x     / x      CARDIAC MARKERS ( 2020 13:50 )  x     / x     / 325 U/L / x     / x              TELEMETRY:     EKG:     IMAGING:     PATIENT: MAIN BRASWELL, MRN: [id number]    CHIEF COMPLAINT: Patient is a 75y old  Male who presents with a chief complaint of AdHF and hyperkalemia (2020 21:10)      INTERVAL HISTORY/OVERNIGHT EVENTS: No overnight events. [place] catheter place at bedside this AM. Has no complaints at this time. Oriented to person, place, and time. Breathing comfortably on room air.      MEDICATIONS:  MEDICATIONS  (STANDING):  aMIOdarone    Tablet 200 milliGRAM(s) Oral daily  chlorhexidine 4% Liquid 1 Application(s) Topical <User Schedule>  chlorhexidine 4% Liquid 1 Application(s) Topical <User Schedule>  furosemide Infusion 20 mG/Hr (10 mL/Hr) IV Continuous <Continuous>  heparin  Infusion.  Unit(s)/Hr (17 mL/Hr) IV Continuous <Continuous>  levothyroxine 50 MICROGram(s) Oral daily  milrinone Infusion 0.25 MICROgram(s)/kG/Min (6.8 mL/Hr) IV Continuous <Continuous>  norepinephrine Infusion 0.05 MICROgram(s)/kG/Min (8.5 mL/Hr) IV Continuous <Continuous>    MEDICATIONS  (PRN):  acetaminophen   Tablet .. 650 milliGRAM(s) Oral every 6 hours PRN Mild Pain (1 - 3)  hydrocortisone 2.5% Rectal Cream 1 Application(s) Rectal daily PRN hemorrhoid pain/itch  sodium chloride 0.9% lock flush 10 milliLiter(s) IV Push every 1 hour PRN Pre/post blood products, medications, blood draw, and to maintain line patency      ALLERGIES: Allergies    No Known Allergies    Intolerances        OBJECTIVE:  ICU Vital Signs Last 24 Hrs  T(C): 36.3 (2020 22:00), Max: 36.7 (2020 15:30)  T(F): 97.3 (2020 22:00), Max: 98 (2020 15:30)  HR: 80 (2020 06:45) (50 - 92)  BP: 78/50 (2020 23:45) (64/38 - 99/59)  BP(mean): 57 (2020 23:45) (46 - 76)  ABP: 104/46 (2020 06:45) (86/38 - 132/58)  ABP(mean): 64 (2020 06:45) (54 - 84)  RR: 15 (2020 06:45) (12 - 24)  SpO2: 95% (2020 06:45) (94% - 100%)      Adult Advanced Hemodynamics Last 24 Hrs  CVP(mm Hg): 6 (2020 06:45) (1 - 20)  CVP(cm H2O): --  CO: --  CI: --  PA: --  PA(mean): --  PCWP: --  SVR: --  SVRI: --  PVR: --  PVRI: --  CAPILLARY BLOOD GLUCOSE      POCT Blood Glucose.: 79 mg/dL (2020 03:24)  POCT Blood Glucose.: 151 mg/dL (2020 19:15)  POCT Blood Glucose.: 133 mg/dL (2020 16:10)    CAPILLARY BLOOD GLUCOSE      POCT Blood Glucose.: 79 mg/dL (2020 03:24)    I&O's Summary    2020 07:01  -  2020 07:00  --------------------------------------------------------  IN: 1023.6 mL / OUT: 1755 mL / NET: -731.4 mL      Daily Height in cm: 172.72 (2020 13:03)    Daily Weight in k.7 (2020 06:00)    PHYSICAL EXAMINATION:  General: ill appearing male, appears older than stated age.  HEENT: PERRLA, EOMI, moist mucous membranes.  Respiratory: CTAB with diminsed breath sounds at bases  CV: RRR, systolic murmur with normal S1S2,+JVD. 3+ b/l LE edema up to thigh and Distal pulses intact.  Abdominal: Soft, nontender, nondistended, no rebound or guarding, normal bowel sounds.  Neurology: AOx3, no focal neuro defects, WILBURN x 4.  Extremities: bullae present in RLE with erythema on the anterior shin, onychomycosis    Lines: R LIJ, R A-line    LABS:  ABG - ( 2020 01:54 )  pH, Arterial: 7.48  pH, Blood: x     /  pCO2: 39    /  pO2: 91    / HCO3: 29    / Base Excess: 5.1   /  SaO2: 98                                      11.0   13.18 )-----------( 171      ( 2020 05:29 )             35.1     11    137  |  96  |  130<H>  ----------------------------<  72  5.0   |  25  |  4.36<H>    Ca    10.4      2020 05:30  Phos  4.0       Mg     2.8         TPro  5.9<L>  /  Alb  3.4  /  TBili  2.3<H>  /  DBili  x   /  AST  202<H>  /  ALT  205<H>  /  AlkPhos  114      LIVER FUNCTIONS - ( 2020 01:53 )  Alb: 3.4 g/dL / Pro: 5.9 g/dL / ALK PHOS: 114 U/L / ALT: 205 U/L / AST: 202 U/L / GGT: x           PTT - ( 2020 05:29 )  PTT:>200.0 sec  Creatine Kinase, Serum: 214 U/L ( @ 18:16)  Creatine Kinase, Serum: 325 U/L ( @ 13:50)    CARDIAC MARKERS ( 2020 18:16 )  x     / x     / 214 U/L / x     / x      CARDIAC MARKERS ( 2020 13:50 )  x     / x     / 325 U/L / x     / x              TELEMETRY:     EKG:     IMAGING:

## 2020-11-02 NOTE — CONSULT NOTE ADULT - SUBJECTIVE AND OBJECTIVE BOX
HPI:  75 year old man with ACC/AHA stage D chronic systolic heart failure due to a dilated nonischemic caridomyopathy (LVIDd 6.7cm, LVEF <20%) with associated moderate to severe mitral regurgitation s/p MitraClip 9/2020 and s/p CRT-D, AF s/p DCCV on amiodarone, stage 4 CKD (BL Cr ~3), and hypothyroidism who was admitted with lethargy and edema.    He was recently hospitalized with ADHF requiring extensive diuresis and an LVAD evaluation was initiated but he was declined due to combination of age/frailty/advanced renal dysfunction. He underwent MitraClip for severe functional MR and was intermittently on inotropes though invasive hemodynamics and renal function were stable off inotropes for several days prior to discharge. He was noted to have worsening volume status as an outpatient and admitted where he was found to have signs of end organ malperfusion with EDIE/transaminitis/lactate. He was started on milrinone and PA catheter placed today.       PAST MEDICAL & SURGICAL HISTORY:  Hypothyroidism    Afib    Type II diabetes mellitus    Aortic insufficiency    Mitral insufficiency    CKD (chronic kidney disease)    Cardiomyopathy  Systolic CHF    Hypertension    History of tonsillectomy  childhood    AICD (automatic cardioverter/defibrillator) present  8/2012        FAMILY HISTORY:  Family history of CVA        SOCIAL HISTORY:    [ ] Non-smoker  [ ] Smoker  [ ] Alcohol    MEDICATIONS  (STANDING):  aMIOdarone    Tablet 200 milliGRAM(s) Oral daily  cefepime   IVPB      chlorhexidine 4% Liquid 1 Application(s) Topical <User Schedule>  chlorhexidine 4% Liquid 1 Application(s) Topical <User Schedule>  furosemide Infusion 20 mG/Hr (10 mL/Hr) IV Continuous <Continuous>  heparin  Infusion.  Unit(s)/Hr (17 mL/Hr) IV Continuous <Continuous>  levothyroxine 50 MICROGram(s) Oral daily  milrinone Infusion 0.375 MICROgram(s)/kG/Min (10.2 mL/Hr) IV Continuous <Continuous>  norepinephrine Infusion 0.05 MICROgram(s)/kG/Min (4.25 mL/Hr) IV Continuous <Continuous>  vancomycin  IVPB 1000 milliGRAM(s) IV Intermittent once    MEDICATIONS  (PRN):  acetaminophen   Tablet .. 650 milliGRAM(s) Oral every 6 hours PRN Mild Pain (1 - 3)  hydrocortisone 2.5% Rectal Cream 1 Application(s) Rectal daily PRN hemorrhoid pain/itch  sodium chloride 0.9% lock flush 10 milliLiter(s) IV Push every 1 hour PRN Pre/post blood products, medications, blood draw, and to maintain line patency    Home Medications:  acetaminophen 325 mg oral tablet: 2 tab(s) orally every 6 hours, As needed, Mild Pain (1 - 3) (01 Nov 2020 18:32)  Eliquis 5 mg oral tablet: 1 tab(s) orally 2 times a day (01 Nov 2020 18:32)  hydrocortisone 2.5% rectal cream with applicator: 1 application rectal once a day, As needed, hemorrhoid pain/itch (01 Nov 2020 18:32)  levothyroxine 50 mcg (0.05 mg) oral tablet: 1 tab(s) orally once a day (01 Nov 2020 18:32)  metoprolol succinate 25 mg oral tablet, extended release: 1 tab(s) orally once a day (01 Nov 2020 18:34)  potassium chloride 20 mEq oral tablet, extended release: 1 tab(s) orally 2 times a day (01 Nov 2020 18:34)  torsemide: 80 milligram(s) orally every 12 hours (01 Nov 2020 18:34)  zolpidem 5 mg oral tablet: 1 tab(s) orally once a day (at bedtime) (01 Nov 2020 18:34)      ALLERGIES:  No Known Allergies      REVIEW OF SYSTEMS:  [x] 10 point review of systems is otherwise negative except as mentioned above                Vital Signs Last 24 Hrs  T(C): 36.4 (02 Nov 2020 14:00), Max: 36.7 (01 Nov 2020 15:30)  T(F): 97.5 (02 Nov 2020 14:00), Max: 98 (01 Nov 2020 15:30)  HR: 80 (02 Nov 2020 14:15) (56 - 92)  BP: 78/50 (01 Nov 2020 23:45) (64/38 - 99/59)  BP(mean): 57 (01 Nov 2020 23:45) (46 - 76)  RR: 14 (02 Nov 2020 14:15) (12 - 24)  SpO2: 95% (02 Nov 2020 14:15) (93% - 100%)    I&O's Summary    01 Nov 2020 07:01  -  02 Nov 2020 07:00  --------------------------------------------------------  IN: 1069.4 mL / OUT: 1755 mL / NET: -685.6 mL    02 Nov 2020 07:01  -  02 Nov 2020 14:23  --------------------------------------------------------  IN: 475.2 mL / OUT: 1710 mL / NET: -1234.8 mL        PHYSICAL EXAM:  Appearance: Frail appearing 	  HEENT: JVP obscured by bilateral CVC	  Cardiovascular: RRR, 2/6 HSM at apex   Respiratory: Decrease BS at bases 	  Psychiatry: A & O x 3, Mood & affect appropriate  Gastrointestinal:  Soft, Non-tender, + BS	  Skin: + ecchymoses   Neurologic: Non-focal  Extremities: warm, 2+ pitting edema up to mid shins     LABS:	 	                        11.0   13.18 )-----------( 171      ( 02 Nov 2020 05:29 )             35.1     11-02    137  |  96  |  130<H>  ----------------------------<  72  5.0   |  25  |  4.36<H>    Ca    10.4      02 Nov 2020 05:30  Phos  4.0     11-02  Mg     2.8     11-02    TPro  5.9<L>  /  Alb  3.4  /  TBili  2.3<H>  /  DBili  x   /  AST  202<H>  /  ALT  205<H>  /  AlkPhos  114  11-02    PTT - ( 02 Nov 2020 05:29 )  PTT:>200.0 sec  CARDIAC MARKERS ( 01 Nov 2020 18:16 )  x     / x     / 214 U/L / x     / x      CARDIAC MARKERS ( 01 Nov 2020 13:50 )  x     / x     / 325 U/L / x     / x            proBNP:   Lipid Profile:   HgA1c:   TSH:     CARDIOVASCULAR DIAGNOSTIC TESTING:  Telemetry:  ECG:    [ ] Echocardiogram:  [ ] Stress Test:  [ ] Catheterization:    RADIOLOGY:

## 2020-11-02 NOTE — DISCHARGE NOTE PROVIDER - NSDCHHHOMEBOUND_GEN_ALL_CORE
Fall risk/Shortness of breath with minimal ambulation/Chest pain/weakness during/after ambulation   greater than 20 feet

## 2020-11-02 NOTE — CONSULT NOTE ADULT - PROBLEM SELECTOR RECOMMENDATION 9
Pt. with renal failure in the setting of sepsis and hypervolemia. Upon review of labs on [facility], Scr was 2.77 on 9/21/20. Scr on arrival was 4.26 and today is 4.36. UA significant for hematuria ? trauma. Pt. with non-oliguric hemodynamically mediated EDIE. Please repeat UA. Pt. on IV Lasix infusion for hypervolemia and pressor support to optmize hemodynamics. Monitor labs and urine output. Avoid potential nephrotoxins. Dose medications as per eGFR. Pt. with renal failure in the setting of sepsis and hypervolemia. Upon review of labs on [facility], Scr was 2.77 on 9/21/20. Scr on arrival was 4.26 and today is 4.36. UA significant for hematuria ? trauma. Pt. with non-oliguric hemodynamically mediated EDIE. Please repeat UA. Pt. on IV Lasix infusion for hypervolemia and pressor support to optimize hemodynamics. Monitor labs and urine output. Avoid potential nephrotoxins. Dose medications as per eGFR.

## 2020-11-02 NOTE — DISCHARGE NOTE PROVIDER - CARE PROVIDER_API CALL
Ramiro Anglin (MD; PhD)  Adv Heart Fail Trnsplnt Cardio; Cardiovascular Disease; Internal Medicine  [address] [place] [address]  Phone: [phone]  Fax: [phone]  Follow Up Time:

## 2020-11-02 NOTE — DISCHARGE NOTE PROVIDER - NSDCFUSCHEDAPPT_GEN_ALL_CORE_FT
MAIN BRASWELL ; 11/17/2020 ; [facility] [address] [facility] [address] [place] ; 11/19/2020 ; [facility] [address] [facility]  MAIN BRASWELL ; 12/02/2020 ; [facility] [address] [facility]  MAIN BRASWELL ; 11/19/2020 ; [facility] [address] [facility]  MAIN BRASWELL ; 11/23/2020 ; [facility] [address] [facility] [address] [place] ; 12/02/2020 ; [facility] [address] [facility]

## 2020-11-02 NOTE — PROCEDURE NOTE - NSICDXPROCEDURE_GEN_ALL_CORE_FT
PROCEDURES:  Percutaneous insertion of arterial line 02-Nov-2020 00:03:03  Nancy Mancera  
PROCEDURES:  Insertion of non-tunneled catheter in patient older than 5 years of age 01-Nov-2020 21:44:57  Paulie Renner

## 2020-11-02 NOTE — DISCHARGE NOTE PROVIDER - NSDCCPCAREPLAN_GEN_ALL_CORE_FT
PRINCIPAL DISCHARGE DIAGNOSIS  Diagnosis: CHF (congestive heart failure)  Assessment and Plan of Treatment: Acute on chronic systolic heart failure, NYHA class 4;  gently resumed neurohormonals as tolerates though recently on pressors   - PA diastolic pressures have remained constant despite diuresis so CardioMEMS would unlikely aid in management of volume status  - overall poor prognosis discussed with pt. given ACC/AHA Stage D HF requiring inotropes, he has previously filled out a MOLST form and does not want resuscitation in setting of futility.   - interrogated ICD given  - PMT on telemetry.      SECONDARY DISCHARGE DIAGNOSES  Diagnosis: EDIE (acute kidney injury)  Assessment and Plan of Treatment: Acute kidney injury - management of hemodynamics  and cardio-renal seen and followed  throughout    Diagnosis: Transaminitis  Assessment and Plan of Treatment: Transaminitis - u/s abdomen without pathology, management of hemodynamics and overall improved during admission    Diagnosis: Paroxysmal atrial fibrillation  Assessment and Plan of Treatment: Paroxysmal atrial fibrillation  - continued heparin, previously on eliquis as outpt and resumed after [place] catheter blockage resolved    Continued PO amiodarone to maintain sinus rhythm.    Diagnosis: Leukocytosis, unspecified type  Assessment and Plan of Treatment: Leukocytosis - pan-culture negative and after course of antibiotics favored stopping antibiotics.    Diagnosis: Thrombocytopenia  Assessment and Plan of Treatment: Thrombocytopenia - noted at last hospitalization and overall stable.     PRINCIPAL DISCHARGE DIAGNOSIS  Diagnosis: CHF (congestive heart failure)  Assessment and Plan of Treatment: Acute on chronic systolic heart failure, NYHA class 4;  gently resumed neurohormonals as tolerates though recently on pressors   - PA diastolic pressures have remained constant despite diuresis so CardioMEMS would unlikely aid in management of volume status  - overall poor prognosis discussed with pt. given ACC/AHA Stage D HF requiring inotropes, he has previously filled out a MOLST form and does not want resuscitation in setting of futility.   - interrogated ICD given  - PMT on telemetry.      SECONDARY DISCHARGE DIAGNOSES  Diagnosis: Severe malnutrition  Assessment and Plan of Treatment: You have been assessed with a concern for Malnutrition and have been determined to have a diagnosis/diagnoses of Severe protein-calorie malnutrition.     Diet Regular-  Consistent Carbohydrate {No Snacks} (CSTCHO)  DASH/TLC {Sodium & Cholesterol Restricted} (DASH)  Supplement Feeding Modality:  Oral  Glucerna Shake Cans or Servings Per Day:  1       Frequency:  Two Times a day      Diagnosis: Thrombocytopenia  Assessment and Plan of Treatment: Thrombocytopenia - noted at last hospitalization and overall stable.    Diagnosis: Leukocytosis, unspecified type  Assessment and Plan of Treatment: Leukocytosis - pan-culture negative and after course of antibiotics favored stopping antibiotics.    Diagnosis: Paroxysmal atrial fibrillation  Assessment and Plan of Treatment: Paroxysmal atrial fibrillation  - continued heparin, previously on eliquis as outpt and resumed after [place] catheter blockage resolved    Continued PO amiodarone to maintain sinus rhythm.    Diagnosis: Transaminitis  Assessment and Plan of Treatment: Transaminitis - u/s abdomen without pathology, management of hemodynamics and overall improved during admission    Diagnosis: EDIE (acute kidney injury)  Assessment and Plan of Treatment: Acute kidney injury - management of hemodynamics  and cardio-renal seen and followed  throughout  Avoid taking (NSAIDs) - (ex: Ibuprofen, Advil, Celebrex, Naprosyn)  Avoid taking any nephrotoxic agents (can harm kidneys) - Intravenous contrast for diagnostic testing, combination cold medications.  Have all medications adjusted for your renal function by your Health Care Provider.  Blood pressure control is important.  Take all medication as prescribed.

## 2020-11-02 NOTE — DISCHARGE NOTE PROVIDER - CARE PROVIDERS DIRECT ADDRESSES
[email or web address]

## 2020-11-02 NOTE — DISCHARGE NOTE PROVIDER - INSTRUCTIONS
Please continue a healthy and balanced diet that is low in sodium (salt) and cholesterol. Please keep your intake of carbohydrates (breads, pasta, rice) consistent. We recommend healthy or generous servings of fruits/vegetables (high-fiber containing foods) Please have Glucerna shake 2 cans per day to meet nutritional need

## 2020-11-02 NOTE — PROGRESS NOTE ADULT - SUBJECTIVE AND OBJECTIVE BOX
Podiatry pager #: [phone]/ [phone]    Patient is a 75y old  Male who presents with a chief complaint of AdHF and hyperkalemia (02 Nov 2020 15:56)      HPI:  74 year old male with PMH of long standng non ischemia chronic systolic heart failure (EF 20% LVIDD 6.7 )) s/p CRT-D and mitral valve clip on last admission, Afib on Eliquiss/p DCCV, CKD Stage 3  , DM-2, hypothyroidism and papillary thyroid carcinoma,, anemia. He was recently referred to HF in Aug 2020 for anasarca and a hospital admission that necessitated Milrinone assisted diuresis (> 20 kg off) w/ mitraclip for mod-severe MR which improved to mild. He was declined for an LVAD due to frailty and CKD. At time of discharge he weighed 187 pounds and had a Cr of 2.77 Recently had seen HF and had medications adjusted due to increasing weight ( metolazone stopped, torsemide increased to 80 BID, K increased, and carvedilol switched to Metoprolol).   Patient lives with his niece who states she has not been feeling well the last few days so has not been around him as much. patient states he fell out of bed, does not know how but was too weak to get up and normally can ambulate with some assistance. reports he has been "weak and wobbly" for some timebut cannot specify how long. denies fever/chills/chest pain/abd pain/vomiting/dysuria.    In ED pt found to be in AdHF and fluid overloaded w/ a K of 8. Lasix 80 given and gtt started. Milrinone 0.25 started.        Cardiology Summary    Echo:   09/15/20 TTE: HR 80 bpm, LVIDd 6.8 cm, LVEF 21%, diastolic dysfunction, mod RVE with low normal function, severe GONSALO, 1-2+ MR s/p MitraClip (mean 4 mmHg), mod TR, mod PH (RVSP 54 mmHg), residual ASD.      Cardiac Cath:   9/18 (Off milrinone) Central SPO2: CO 4.79, CI 2.53      (01 Nov 2020 17:42)      PAST MEDICAL & SURGICAL HISTORY:  Hypothyroidism    Afib    Type II diabetes mellitus    Aortic insufficiency    Mitral insufficiency    CKD (chronic kidney disease)    Cardiomyopathy  Systolic CHF    Hypertension    History of tonsillectomy  childhood    AICD (automatic cardioverter/defibrillator) present  8/2012        MEDICATIONS  (STANDING):  aMIOdarone    Tablet 200 milliGRAM(s) Oral daily  cefepime   IVPB      chlorhexidine 4% Liquid 1 Application(s) Topical <User Schedule>  chlorhexidine 4% Liquid 1 Application(s) Topical <User Schedule>  furosemide Infusion 20 mG/Hr (10 mL/Hr) IV Continuous <Continuous>  heparin  Infusion.  Unit(s)/Hr (17 mL/Hr) IV Continuous <Continuous>  levothyroxine 50 MICROGram(s) Oral daily  milrinone Infusion 0.375 MICROgram(s)/kG/Min (10.2 mL/Hr) IV Continuous <Continuous>  norepinephrine Infusion 0.05 MICROgram(s)/kG/Min (4.25 mL/Hr) IV Continuous <Continuous>    MEDICATIONS  (PRN):  acetaminophen   Tablet .. 650 milliGRAM(s) Oral every 6 hours PRN Mild Pain (1 - 3)  hydrocortisone 2.5% Rectal Cream 1 Application(s) Rectal daily PRN hemorrhoid pain/itch  sodium chloride 0.9% lock flush 10 milliLiter(s) IV Push every 1 hour PRN Pre/post blood products, medications, blood draw, and to maintain line patency      Allergies    No Known Allergies    Intolerances        VITALS:    Vital Signs Last 24 Hrs  T(C): 36.4 (02 Nov 2020 14:00), Max: 36.6 (02 Nov 2020 08:00)  T(F): 97.5 (02 Nov 2020 14:00), Max: 97.9 (02 Nov 2020 08:00)  HR: 80 (02 Nov 2020 16:15) (56 - 98)  BP: 78/50 (01 Nov 2020 23:45) (64/38 - 99/59)  BP(mean): 57 (01 Nov 2020 23:45) (46 - 76)  RR: 14 (02 Nov 2020 16:15) (12 - 24)  SpO2: 98% (02 Nov 2020 16:15) (93% - 100%)    LABS:                          11.0   13.18 )-----------( 171      ( 02 Nov 2020 05:29 )             35.1       11-02    137  |  96  |  130<H>  ----------------------------<  72  5.0   |  25  |  4.36<H>    Ca    10.4      02 Nov 2020 05:30  Phos  4.0     11-02  Mg     2.8     11-02    TPro  5.9<L>  /  Alb  3.4  /  TBili  2.3<H>  /  DBili  x   /  AST  202<H>  /  ALT  205<H>  /  AlkPhos  114  11-02      CAPILLARY BLOOD GLUCOSE      POCT Blood Glucose.: 79 mg/dL (02 Nov 2020 03:24)  POCT Blood Glucose.: 151 mg/dL (01 Nov 2020 19:15)      PTT - ( 02 Nov 2020 05:29 )  PTT:>200.0 sec    LOWER EXTREMITY PHYSICAL EXAM:    Vascular: DP 1 /4 B/L, PT 0/4, CFT intact B/L, Temperature gradient warm to cool, B/L, severe LE +4 pitting edema b/l  Neuro: Epicritic sensation intact to the level of digits B/L.  Musculoskeletal/Ortho: unremarkable   Skin: intact blister with localized cellulitis to anterior tibia RLE, ecchymosis noted to R foot medial 1st MPJ

## 2020-11-02 NOTE — PROGRESS NOTE ADULT - ATTENDING COMMENTS
I have personally seen, examined and participated in the care of this patient. I have reviewed all pertinent clinical information, including history, physical exam, plan and the resident's note. I agree with the resident's note with the following additions:    74 yo man with HFrEF, s/p darrion clip, CRT, afib on AC p/w acute on chronic decompensated systolic HF requiring initiation of inotropic support     Concerned for contaminant vasodilatory shock thus this am pancultured and subsequently started on broad spectrum abx   Inotrope increased to improve hemodynamics and will continue pressors  Continue diuresis given extensive overload   Unclear source of infection for now ?redness RLE  Worsening creatinine will check urine lytes, though possibly multifactorial   Follow up RUQ sono for elevated LFTs  Stable H/H, continue heparin drip for AC  glucose on BMP WNL

## 2020-11-03 NOTE — PROGRESS NOTE ADULT - ATTENDING COMMENTS
I have seen this patient with the fellow and agree with their assessment and plan. In addition, EDIE improving with diuresis and abx  Mixed cardiogenic and septic shock likely  No urgent need for dialysis    Yemi Rees MD  Cell   Pager   Office

## 2020-11-03 NOTE — PROCEDURE NOTE - NSPOSTCAREGUIDE_GEN_A_CORE
Care for catheter as per unit/ICU protocols

## 2020-11-03 NOTE — DIETITIAN INITIAL EVALUATION ADULT. - ETIOLOGY
inadequate protein-energy intake requiring regular nutrition supplementation, advanced age and medical conditions

## 2020-11-03 NOTE — DIETITIAN INITIAL EVALUATION ADULT. - ORAL INTAKE PTA/DIET HISTORY
Pt reports good appetite and PO intake PTA. Reports decreased PO intake over the last week 2/2 decreased appetite and not feeling well. Regularly eats 3 meals per day, low salt, avoids concentrated sweets, drinks Glucerna twice daily & avoids excessive fluid intake. Over the last week eating 1-2 meals/day. NKFA. Denies PTA micronutrient supplementation.

## 2020-11-03 NOTE — DIETITIAN INITIAL EVALUATION ADULT. - PERTINENT LABORATORY DATA
Labs: 11-03 @ 05:10: Sodium 138, Potassium 3.6, Calcium 9.7, Magnesium 2.5, Phosphorus 3.8, <H>, Creatinine 4.31<H>, Glucose 173<H>, Alk Phos 105, ALT/SGPT 195<H>, AST/SGOT 154<H>, Albumin 3.1<L>, Prealbumin --, Total Bilirubin 2.7<H>, Hemoglobin --, Hematocrit --, Ferritin --, C-Reactive Protein --, Creatine Kinase <<27>  11-03 @ 00:39: Sodium 136, Potassium 3.6, Calcium 9.8, Magnesium 2.6, Phosphorus 3.9, <H>, Creatinine 4.32<H>, Glucose 162<H>, Alk Phos 106, ALT/SGPT 203<H>, AST/SGOT 171<H>, Albumin 3.1<L>, Prealbumin --, Total Bilirubin 2.7<H>, Hemoglobin 10.4<L>, Hematocrit 33.6<L>, Ferritin --, C-Reactive Protein --, Creatine Kinase <<27>  POCT Blood Glucose.: 183 mg/dL (11-03-20 @ 07:10)

## 2020-11-03 NOTE — DIETITIAN INITIAL EVALUATION ADULT. - ADD RECOMMEND
Reinforce nutrition education PRN; RD availability made known. Monitor tolerance to diet prescription, nutritional intake, GI function, weight trends, labs and skin integrity. Malnutrition alert placed in chart.

## 2020-11-03 NOTE — DIETITIAN INITIAL EVALUATION ADULT. - CONTINUE CURRENT NUTRITION CARE PLAN
yes/Continue current diet order of DASH/TLC, Consistent Carbohydrate. Monitor potassium/phosphorus levels and need to restrict in oral diet.

## 2020-11-03 NOTE — PROGRESS NOTE ADULT - ATTENDING COMMENTS
I have personally seen, examined and participated in the care of this patient. I have reviewed all pertinent clinical information, including history, physical exam, plan and the resident's note. I agree with the resident's note with the following additions:    76 yo man with HFrEF, s/p darrion clip, CRT, afib on AC p/w acute on chronic decompensated systolic HF requiring initiation of inotropic support     Concerned for contaminant vasodilatory shock yesterday and pt was  pancultured and subsequently started on broad spectrum abx   Inotrope increased yesterday to improve hemodynamics and will continue to wean pressors off   Continue diuresis given extensive overload, will decrease Lasix to 10mg/hr  Unclear source of infection for now , will follow up cx   Stable creatinine   Stable H/H, continue heparin drip for AC  Elevated FS, continue NISS

## 2020-11-03 NOTE — PROGRESS NOTE ADULT - ASSESSMENT
76y/o M w/ h/o long standing non ischemia chronic systolic heart failure (EF 20% LVIDD 6.7 )) s/p CRT-D and mitral valve clip on last admission, Afib on Eliquis s/p DCCV, CKD-3, T2DM, hypothyroidism and papillary thyroid carcinoma, anemia, recently referred to HF in Aug 2020 for anasarca and a hospital admission that necessitated Milrinone assisted diuresis (> 20 kg off) w/ mitraclip for mod-severe MR which improved to mild. He was declined for an LVAD due to frailty and CKD. He now presented w/ AdHF and hyperkalemia in the setting of recent increase in diuretics.    #Neuro  - AAOx3, following commands, no active issues    #Resp  - Oxygenating well on room air, no active issues    #CV  *AdHF improved with milrinone and lasix  - c/w Milrinone assisted diuresis @.375 and decrease Lasix gtt to 10, with goal of net negative  - withholding carvedilol, hydralazine  - Central line for CVP stable 6-8  - obtain central cordis sat monitoring s/p [place] clip  - c/w amiodarone 200mg qD    #GI  - c/w Regular diet    #/Renal  *EDIE 2/2 possible occult infection  - Monitor I/O with goal net negative  - Renal following, appreciate recs     #ID  *Cellulitis in R LE  - improved leukocytosis and lactate, afebrile  - elevated lactate, pending Blood Cxs, UA wnl, c/w vancomycin and cefepime renally dosed  - podiatry consulted for onychomycosis appreciate recs  - f/u vanco random level     #Endo  *Hypothyroidism  - c/w synthroid    #Heme  - continue to monitor h/h and platelets   - heparin gtt with goal PTT (), aPTT elevated, continue to titrate    #Lines  - R IJ TLC (11/1 - ), R radial A line (11/1 - )    #GOC  - Full code    #Dispo  - c/w ICU level of care

## 2020-11-03 NOTE — DIETITIAN INITIAL EVALUATION ADULT. - PERTINENT MEDS FT
MEDICATIONS  (STANDING):  aMIOdarone    Tablet 200 milliGRAM(s) Oral daily  cefepime   IVPB      cefepime   IVPB 1000 milliGRAM(s) IV Intermittent every 24 hours  chlorhexidine 4% Liquid 1 Application(s) Topical <User Schedule>  chlorhexidine 4% Liquid 1 Application(s) Topical <User Schedule>  furosemide Infusion 20 mG/Hr (10 mL/Hr) IV Continuous <Continuous>  levothyroxine 50 MICROGram(s) Oral daily  milrinone Infusion 0.375 MICROgram(s)/kG/Min (10.2 mL/Hr) IV Continuous <Continuous>  norepinephrine Infusion 0.05 MICROgram(s)/kG/Min (4.25 mL/Hr) IV Continuous <Continuous>    MEDICATIONS  (PRN):  acetaminophen   Tablet .. 650 milliGRAM(s) Oral every 6 hours PRN Mild Pain (1 - 3)  hydrocortisone 2.5% Rectal Cream 1 Application(s) Rectal daily PRN hemorrhoid pain/itch  sodium chloride 0.9% lock flush 10 milliLiter(s) IV Push every 1 hour PRN Pre/post blood products, medications, blood draw, and to maintain line patency

## 2020-11-03 NOTE — PROGRESS NOTE ADULT - SUBJECTIVE AND OBJECTIVE BOX
Subjective:  Feels more energy today. Net negative 4.2 L UOP, levo requirements unchanged. Lactate cleared.     Medications:  acetaminophen   Tablet .. 650 milliGRAM(s) Oral every 6 hours PRN  aMIOdarone    Tablet 200 milliGRAM(s) Oral daily  cefepime   IVPB      cefepime   IVPB 1000 milliGRAM(s) IV Intermittent every 24 hours  chlorhexidine 4% Liquid 1 Application(s) Topical <User Schedule>  chlorhexidine 4% Liquid 1 Application(s) Topical <User Schedule>  furosemide Infusion 10 mG/Hr IV Continuous <Continuous>  hydrocortisone 2.5% Rectal Cream 1 Application(s) Rectal daily PRN  levothyroxine 50 MICROGram(s) Oral daily  milrinone Infusion 0.375 MICROgram(s)/kG/Min IV Continuous <Continuous>  norepinephrine Infusion 0.05 MICROgram(s)/kG/Min IV Continuous <Continuous>  sodium chloride 0.9% lock flush 10 milliLiter(s) IV Push every 1 hour PRN    Vitals:  T(C): 36.2 (20 @ 07:00), Max: 36.6 (20 @ 19:00)  HR: 84 (20 @ 09:00) (80 - 98)  BP: --  BP(mean): --  RR: 18 (20 @ 09:00) (12 - 22)  SpO2: 95% (20 @ 09:00) (93% - 100%)    Daily     Daily Weight in k.2 (2020 06:00)    Weight (kg): 90.7 ( @ 13:03)    I&O's Summary    2020 07:  -  2020 07:00  --------------------------------------------------------  IN: 1703.1 mL / OUT: 6045 mL / NET: -4341.9 mL    2020 07:  -  2020 11:11  --------------------------------------------------------  IN: 189.4 mL / OUT: 655 mL / NET: -465.6 mL        Physical Exam:  Appearance: Frail appearing 	  HEENT: + JVD  Cardiovascular: RRR, 2/6 HSM at apex   Respiratory: Decreased BS at bases 	  Psychiatry: A & O x 3, Mood & affect appropriate  Gastrointestinal:  Soft, Non-tender, + BS	  Skin: + ecchymoses   Neurologic: Non-focal  Extremities: warm, 2+ pitting edema up to mid shins, RLE with open blister with small area of erythema         Labs:                        10.4   9.41  )-----------( 142      ( 2020 00:39 )             33.6     11    138  |  95<L>  |  121<H>  ----------------------------<  173<H>  3.6   |  28  |  4.31<H>    Ca    9.7      2020 05:10  Phos  3.8       Mg     2.5         TPro  5.6<L>  /  Alb  3.1<L>  /  TBili  2.7<H>  /  DBili  x   /  AST  154<H>  /  ALT  195<H>  /  AlkPhos  105  11-      PT/INR - ( 2020 05:10 )   PT: 36.4 sec;   INR: 3.21 ratio         PTT - ( 2020 05:10 )  PTT:44.7 sec  CARDIAC MARKERS ( 2020 18:16 )  x     / x     / 214 U/L / x     / x      CARDIAC MARKERS ( 2020 13:50 )  x     / x     / 325 U/L / x     / x          Serum Pro-Brain Natriuretic Peptide: 05563 pg/mL ( @ 13:50)    Oxygen Saturation, Mixed: 75 % ( @ 00:37)  Oxygen Saturation, Mixed: 75 % ( @ 16:05)  Oxygen Saturation, Mixed: 74 % ( @ 09:41)      Lactate, Blood: 3.6 mmol/L ( @ 18:16)

## 2020-11-03 NOTE — PROGRESS NOTE ADULT - SUBJECTIVE AND OBJECTIVE BOX
MAIN BRASWELL  MRN-[id number]  Patient is a 75y old  Male who presents with a chief complaint of AdHF and hyperkalemia (2020 12:40)    HPI:  74 year old male with PMH of long standng non ischemia chronic systolic heart failure (EF 20% LVIDD 6.7 )) s/p CRT-D and mitral valve clip on last admission, Afib on Eliquiss/p DCCV, CKD Stage 3  , DM-2, hypothyroidism and papillary thyroid carcinoma,, anemia. He was recently referred to HF in Aug 2020 for anasarca and a hospital admission that necessitated Milrinone assisted diuresis (> 20 kg off) w/ mitraclip for mod-severe MR which improved to mild. He was declined for an LVAD due to frailty and CKD. At time of discharge he weighed 187 pounds and had a Cr of 2.77 Recently had seen HF and had medications adjusted due to increasing weight ( metolazone stopped, torsemide increased to 80 BID, K increased, and carvedilol switched to Metoprolol).   Patient lives with his niece who states she has not been feeling well the last few days so has not been around him as much. patient states he fell out of bed, does not know how but was too weak to get up and normally can ambulate with some assistance. reports he has been "weak and wobbly" for some timebut cannot specify how long. denies fever/chills/chest pain/abd pain/vomiting/dysuria.    In ED pt found to be in AdHF and fluid overloaded w/ a K of 8. Lasix 80 given and gtt started. Milrinone 0.25 started. (2020 17:42)      Hospital Course:   Admitted to CICU for ADHF and hyperkalemia    24 HOUR EVENTS:    REVIEW OF SYSTEMS:   Constitutional: + weakness. no fevers, or chills  Eyes/ENT: No visual changes  Respiratory: + ALCAZAR. No cough, wheezing, hemoptysis  Cardiovascular: No chest pain, no palpitations  Gastrointestinal: No abdominal pain. No nausea, vomiting, hematemesis.   Genitourinary: No dysuria  Neurological: No numbness, no weakness  Skin: No itching, rashes    ICU Vital Signs Last 24 Hrs  T(C): 36.5 (2020 19:00), Max: 36.5 (2020 19:00)  T(F): 97.7 (2020 19:00), Max: 97.7 (2020 19:00)  HR: 80 (2020 20:00) (80 - 106)  ABP: 98/44 (2020 20:00) (96/42 - 136/56)  ABP(mean): 58 (:00) (56 - 86)  RR: 15 (:) (12 - 28)  SpO2: 97% (:00) (92% - 100%)  CVP(mm Hg): 5 (20 @ 20:00) (0 - 20)  PA: 43/15 (20 @ 20:00) (14/8 - 62/33)  PA(mean): 26 (20 @ 20:00) (10 - 45)      I&O's Summary    2020 07:01  -  2020 07:00  --------------------------------------------------------  IN: 1703.1 mL / OUT: 6045 mL / NET: -4341.9 mL    2020 07:01  -  2020 20:12  --------------------------------------------------------  IN: 1303.4 mL / OUT: 3220 mL / NET: -1916.6 mL      POCT Blood Glucose.: 183 mg/dL (2020 07:10)      PHYSICAL EXAM:   General: No acute distress  Eyes: EOMI, PERRLA, conjunctiva and sclera clear  Chest/Lung: CTAB, no wheezes, rales, or rhonchi  Heart: Regular rate, regular rhythm. Normal S1/S2. Systolic murmur  Abdomen: Soft, nontender, nondistended. Normal bowel sounds.  Extremites: 2+ pitting edema up to mid-upper shin b/l, + Peripheral pulses. blister on R LE with erythema  Neurology: A&O x3, no focal deficits  Skin: Intact blister with localized cellulitis to right lower anterior tibia  Lines: RIJ TLC 11/1 dressing clean, dry, intact    ============================I/O===========================   I&O's Detail    2020 07:01  -  2020 07:00  --------------------------------------------------------  IN:    Furosemide: 240 mL    Heparin Infusion: 126 mL    IV PiggyBack: 100 mL    Milrinone: 34.5 mL    Milrinone: 193.8 mL    Norepinephrine: 182 mL    Norepinephrine: 346.8 mL    Oral Fluid: 480 mL  Total IN: 1703.1 mL    OUT:    Indwelling Catheter - Urethral (mL): 6045 mL  Total OUT: 6045 mL    Total NET: -4341.9 mL      2020 07:01  -  2020 20:12  --------------------------------------------------------  IN:    Furosemide: 40 mL    Furosemide: 45 mL    IV PiggyBack: 370 mL    Milrinone: 132.6 mL    Norepinephrine: 45 mL    Norepinephrine: 70.8 mL    Oral Fluid: 600 mL  Total IN: 1303.4 mL    OUT:    Indwelling Catheter - Urethral (mL): 3220 mL  Total OUT: 3220 mL    Total NET: -1916.6 mL    ============================ LABS =========================                        10.4   9.41  )-----------( 142      ( 2020 00:39 )             33.6     11-03    138  |  95<L>  |  121<H>  ----------------------------<  173<H>  3.6   |  28  |  4.31<H>    Ca    9.7      2020 05:10  Phos  3.8       Mg     2.5         TPro  5.6<L>  /  Alb  3.1<L>  /  TBili  2.7<H>  /  DBili  x   /  AST  154<H>  /  ALT  195<H>  /  AlkPhos  105      Troponin T, High Sensitivity Result: 93 ng/L (20 @ 18:16)  Troponin T, High Sensitivity Result: 93 ng/L (20 @ 13:50)      Creatine Kinase, Serum: 214 U/L (20 @ 18:16)  Creatine Kinase, Serum: 325 U/L (20 @ 13:50)      LIVER FUNCTIONS - ( 2020 05:10 )  Alb: 3.1 g/dL / Pro: 5.6 g/dL / ALK PHOS: 105 U/L / ALT: 195 U/L / AST: 154 U/L / GGT: x           PT/INR - ( 2020 05:10 )   PT: 36.4 sec;   INR: 3.21 ratio         PTT - ( 2020 05:10 )  PTT:44.7 sec  ABG - ( 2020 00:37 )  pH, Arterial: 7.49  pH, Blood: x     /  pCO2: 40    /  pO2: 84    / HCO3: 30    / Base Excess: 6.5   /  SaO2: 97          Blood Gas Arterial, Lactate: 1.2 mmol/L (20 @ 00:37)  Blood Gas Arterial, Lactate: 1.1 mmol/L (20 @ 16:05)  Blood Gas Venous - Lactate: 1.1 mmoL/L (20 @ 16:05)  Blood Gas Arterial, Lactate: 1.2 mmol/L (20 @ 08:26)  Blood Gas Venous - Lactate: 2.4 mmoL/L (20 @ 05:30)  Blood Gas Arterial, Lactate: 1.4 mmol/L (20 @ 01:54)  Blood Gas Venous - Lactate: 2.6 mmoL/L (20 @ 21:25)  Lactate, Blood: 3.6 mmol/L (20 @ 18:16)  Blood Gas Venous - Lactate: 5.2 mmoL/L (20 @ 14:20)    Urinalysis Basic - ( 2020 14:24 )    Color: Light Yellow / Appearance: Clear / S.009 / pH: x  Gluc: x / Ketone: Negative  / Bili: Negative / Urobili: Negative   Blood: x / Protein: Negative / Nitrite: Negative   Leuk Esterase: Negative / RBC: 23 /hpf / WBC 2 /HPF   Sq Epi: x / Non Sq Epi: 0 /hpf / Bacteria: Negative    ======================Micro/Rad/Cardio=================  Telemetry: Reviewed   EKG: Reviewed  CXR: Reviewed  Culture: Reviewed   Echo: Reviewed  Cath: Reviewed  ======================================================  PAST MEDICAL & SURGICAL HISTORY:  Hypothyroidism  Afib  Type II diabetes mellitus  Aortic insufficiency  Mitral insufficiency  CKD (chronic kidney disease)  Cardiomyopathy  Systolic CHF  Hypertension  History of tonsillectomy - childhood  AICD (automatic cardioverter/defibrillator) present - 2012    ====================ASSESSMENT ==============  ADHF  Hyperkalemia  EDIE on CKD   RLE blister w/ localized cellulitis    Plan:  ====================== NEUROLOGY=====================  Fall/ Weakness  - A&Ox3  - CT Head negative for any acute pathology  - Fall risk protocol  - Continue monitoring status as per protocol  - C/w Tylenol 650mg PRN q6h for analgesia    ==================== RESPIRATORY======================  Comfortable on room air, SpO2 97%  - Continue pulse ox monitoring, follow ABGs  - Clear lungs on CXR    ====================CARDIOVASCULAR==================  Acute on Chronic Decompensated HFrEF (13%) w/ AICD & Mitraclip, untreated severe TR   - Inotropic support with IV Milrinone 0.375mcg gtt, trending MVO2/lactate/CMP  - C/w IV Levophed 0.05mcg gtt for vasopressor support.   - Diuresis with Lasix 10mg gtt to maintain goal net negative  - Continuous CVP monitoring and ScvO2 trend, goal 6-8mmHg  - Holding BB in setting of inotrope   - TTE: EF 13% Severe global LVSD, mild MR w/ Mitraclip in place. RVE w/ normal RVSF. Severe TR     Afib S/P DCCV  - Rate control with PO Amiodarone 200mg QD  - Heparin gtt on hold, INR 3.21 will restart when INR <2.0    ===================HEMATOLOGIC/ONC ===================  H/H 10.4/33.6  - Continue to follow hemoglobin and hematocrit levels    ===================== RENAL =========================  EDIE on CKD III (SCr 4.31 from 2.7) in setting of ADHF  - Hyperkalemia w/ initial K of 8.1, diuresis regimen uptitrated, K now 3.6  - Oliguric: goal net neg fluid balance with IV Lasix 10mg   - Renal following (Dr. Rees)  - Continue monitoring I/Os, BUN/Creatinine, and UOP via Guidry.     ==================== GASTROINTESTINAL===================  Transaminitis, resolving with inotropic support  - AUS : Cholelithiasis without sonographic evidence of acute cholecystitis. No biliary ductal dilatation. Small ascites.     Tolerating PO DASH/TLC diet, supplemental Glucerna Shake Cans BID.    =======================    ENDOCRINE  =====================  History of DMT2  - S/p Insulin   - Continue glucose control with Admelog sliding scale premeals and qhs, in addition to Glucagon PRN    Hypothyroidism, TSH 8.61  - C/w Synthroid 50 mcg QD    ========================INFECTIOUS DISEASE================  RLE blister w/ localized cellulitis  - Podiatry following, appreciate rec's  - Started on IV Cefepime for cellulitis (- )  - Z-flow boots to be worn while resting in bed    Temp 97.7F, WBC downtrending and within normal limits at 9.41  - Continue to trend fever curve & CBC  - BCx 11/02 x2: NGTD  - UA negative  - C/w Cefepime 1g       Patient requires continuous monitoring with bedside rhythm monitoring, pulse ox monitoring, and intermittent blood gas analysis. Care plan discussed with ICU care team. Patient remained critical and at risk for life threatening decompensation.  Patient seen, examined and plan discussed with CCU team during rounds.     I have personally provided 35 minutes of critical care time excluding time spent on separate procedures.    By signing my name below, I, Bebo Barrientos, attest that this documentation has been prepared under the direction and in the presence of Nahomy Brunner NP  Electronically signed: Jasmeet Kerns, 20 @ 20:12    I, Nahomy Brunner NP, personally performed the services described in this documentation. All medical record entries made by the jasmeet were at my direction and in my presence. I have reviewed the chart and agree that the record reflects my personal performance and is accurate and complete  Electronically signed: Nahomy Brunner NP       MAIN BRASWELL  MRN-[id number]  Patient is a 75y old  Male who presents with a chief complaint of AdHF and hyperkalemia (2020 12:40)    HPI:  74 year old male with PMH of long standng non ischemia chronic systolic heart failure (EF 20% LVIDD 6.7 )) s/p CRT-D and mitral valve clip on last admission, Afib on Eliquiss/p DCCV, CKD Stage 3  , DM-2, hypothyroidism and papillary thyroid carcinoma,, anemia. He was recently referred to HF in Aug 2020 for anasarca and a hospital admission that necessitated Milrinone assisted diuresis (> 20 kg off) w/ mitraclip for mod-severe MR which improved to mild. He was declined for an LVAD due to frailty and CKD. At time of discharge he weighed 187 pounds and had a Cr of 2.77 Recently had seen HF and had medications adjusted due to increasing weight ( metolazone stopped, torsemide increased to 80 BID, K increased, and carvedilol switched to Metoprolol).   Patient lives with his niece who states she has not been feeling well the last few days so has not been around him as much. patient states he fell out of bed, does not know how but was too weak to get up and normally can ambulate with some assistance. reports he has been "weak and wobbly" for some timebut cannot specify how long. denies fever/chills/chest pain/abd pain/vomiting/dysuria.    In ED pt found to be in AdHF and fluid overloaded w/ a K of 8. Lasix 80 given and gtt started. Milrinone 0.25 started. (2020 17:42)      Hospital Course:   Admitted to CICU for ADHF and hyperkalemia    24 HOUR EVENTS:  - c/w milrinone @0.375  - c/w lasix gtt @10 mg/hr   - venous sat:         CO:      CI:        SVR:          Output: -2L     CVP:   - c/w levo @ 0.05    REVIEW OF SYSTEMS:   Constitutional: + weakness. no fevers, or chills  Eyes/ENT: No visual changes  Respiratory: + ALCAZAR. No cough, wheezing, hemoptysis  Cardiovascular: No chest pain, no palpitations  Gastrointestinal: No abdominal pain. No nausea, vomiting, hematemesis.   Genitourinary: No dysuria  Neurological: No numbness, no weakness  Skin: No itching, rashes    ICU Vital Signs Last 24 Hrs  T(C): 36.5 (2020 19:00), Max: 36.5 (2020 19:00)  T(F): 97.7 (2020 19:00), Max: 97.7 (2020 19:00)  HR: 80 (2020 20:00) (80 - 106)  ABP: 98/44 (2020 20:00) (96/42 - 136/56)  ABP(mean): 58 (2020 20:00) (56 - 86)  RR: 15 (:00) (12 - 28)  SpO2: 97% (:) (92% - 100%)  CVP(mm Hg): 5 (20 @ 20:00) (0 - 20)  PA: 43/15 (20 @ 20:00) (14/8 - 62/33)  PA(mean): 26 (20 @ 20:00) (10 - 45)      I&O's Summary    2020 07:01  -  2020 07:00  --------------------------------------------------------  IN: 1703.1 mL / OUT: 6045 mL / NET: -4341.9 mL    2020 07:01  -  2020 20:12  --------------------------------------------------------  IN: 1303.4 mL / OUT: 3220 mL / NET: -1916.6 mL      POCT Blood Glucose.: 183 mg/dL (2020 07:10)    PHYSICAL EXAM:   General: No acute distress  Eyes: EOMI, PERRLA, conjunctiva and sclera clear  Chest/Lung: CTAB, no wheezes, rales, or rhonchi  Heart: Regular rate, regular rhythm. Normal S1/S2. Systolic murmur  Abdomen: Soft, nontender, nondistended. Normal bowel sounds.  Extremites: 2+ pitting edema up to mid-upper shin b/l, + Peripheral pulses. blister on R LE with erythema  Neurology: A&O x3, no focal deficits  Skin: Intact blister with localized cellulitis to right lower anterior tibia  Lines: RIJ TLC 11/1 dressing clean, dry, intact    ============================I/O===========================   I&O's Detail    2020 07:01  -  2020 07:00  --------------------------------------------------------  IN:    Furosemide: 240 mL    Heparin Infusion: 126 mL    IV PiggyBack: 100 mL    Milrinone: 34.5 mL    Milrinone: 193.8 mL    Norepinephrine: 182 mL    Norepinephrine: 346.8 mL    Oral Fluid: 480 mL  Total IN: 1703.1 mL    OUT:    Indwelling Catheter - Urethral (mL): 6045 mL  Total OUT: 6045 mL    Total NET: -4341.9 mL      2020 07:01  -  2020 20:12  --------------------------------------------------------  IN:    Furosemide: 40 mL    Furosemide: 45 mL    IV PiggyBack: 370 mL    Milrinone: 132.6 mL    Norepinephrine: 45 mL    Norepinephrine: 70.8 mL    Oral Fluid: 600 mL  Total IN: 1303.4 mL    OUT:    Indwelling Catheter - Urethral (mL): 3220 mL  Total OUT: 3220 mL    Total NET: -1916.6 mL    ============================ LABS =========================                        10.4   9.41  )-----------( 142      ( 2020 00:39 )             33.6         138  |  95<L>  |  121<H>  ----------------------------<  173<H>  3.6   |  28  |  4.31<H>    Ca    9.7      2020 05:10  Phos  3.8       Mg     2.5         TPro  5.6<L>  /  Alb  3.1<L>  /  TBili  2.7<H>  /  DBili  x   /  AST  154<H>  /  ALT  195<H>  /  AlkPhos  105      Troponin T, High Sensitivity Result: 93 ng/L (20 @ 18:16)  Troponin T, High Sensitivity Result: 93 ng/L (20 @ 13:50)      Creatine Kinase, Serum: 214 U/L (20 @ 18:16)  Creatine Kinase, Serum: 325 U/L (20 @ 13:50)      LIVER FUNCTIONS - ( 2020 05:10 )  Alb: 3.1 g/dL / Pro: 5.6 g/dL / ALK PHOS: 105 U/L / ALT: 195 U/L / AST: 154 U/L / GGT: x           PT/INR - ( 2020 05:10 )   PT: 36.4 sec;   INR: 3.21 ratio         PTT - ( 2020 05:10 )  PTT:44.7 sec  ABG - ( 2020 00:37 )  pH, Arterial: 7.49  pH, Blood: x     /  pCO2: 40    /  pO2: 84    / HCO3: 30    / Base Excess: 6.5   /  SaO2: 97          Blood Gas Arterial, Lactate: 1.2 mmol/L (20 @ 00:37)  Blood Gas Arterial, Lactate: 1.1 mmol/L (20 @ 16:05)  Blood Gas Venous - Lactate: 1.1 mmoL/L (20 @ 16:05)  Blood Gas Arterial, Lactate: 1.2 mmol/L (20 @ 08:26)  Blood Gas Venous - Lactate: 2.4 mmoL/L (20 @ 05:30)  Blood Gas Arterial, Lactate: 1.4 mmol/L (20 @ 01:54)  Blood Gas Venous - Lactate: 2.6 mmoL/L (20 @ 21:25)  Lactate, Blood: 3.6 mmol/L (20 @ 18:16)  Blood Gas Venous - Lactate: 5.2 mmoL/L (20 @ 14:20)    Urinalysis Basic - ( 2020 14:24 )    Color: Light Yellow / Appearance: Clear / S.009 / pH: x  Gluc: x / Ketone: Negative  / Bili: Negative / Urobili: Negative   Blood: x / Protein: Negative / Nitrite: Negative   Leuk Esterase: Negative / RBC: 23 /hpf / WBC 2 /HPF   Sq Epi: x / Non Sq Epi: 0 /hpf / Bacteria: Negative    ======================Micro/Rad/Cardio=================  Telemetry: Reviewed   EKG: Reviewed  CXR: Reviewed  Culture: Reviewed   Echo: Reviewed  Cath: Reviewed  ======================================================  PAST MEDICAL & SURGICAL HISTORY:  Hypothyroidism  Afib  Type II diabetes mellitus  Aortic insufficiency  Mitral insufficiency  CKD (chronic kidney disease)  Cardiomyopathy  Systolic CHF  Hypertension  History of tonsillectomy - childhood  AICD (automatic cardioverter/defibrillator) present - 2012    ====================ASSESSMENT ==============  ADHF  Hyperkalemia  EDIE on CKD   RLE blister w/ localized cellulitis    Plan:  ====================== NEUROLOGY=====================  - A&Ox3  - s/p fall: CT Head negative for any acute pathology  - Fall risk protocol  - Continue monitoring status as per protocol  - C/w Tylenol 650mg PRN q6h for analgesia    ==================== RESPIRATORY======================  Comfortable on room air, SpO2 97%  - Continue pulse ox monitoring  - Clear lungs on CXR    ====================CARDIOVASCULAR==================  Acute on Chronic Decompensated HFrEF (13%) w/ AICD & Mitraclip, untreated severe TR   - c/w Inotropic support with IV Milrinone 0.375mcg gtt  - trend MVO2/lactate/CMP  - C/w IV Levophed 0.05mcg gtt for vasopressor support.   - c/w Lasix 10mg gtt to maintain goal net negative 2L  - Continuous CVP monitoring and ScvO2 trend, goal 6-8mmHg  - Holding BB in setting of inotrope   - TTE: EF 13% Severe global LVSD, mild MR w/ Mitraclip in place. RVE w/ normal RVSF. Severe TR     Afib S/P DCCV  - Rate control with PO Amiodarone 200mg QD  - Heparin gtt on hold, INR 3.21 will restart when INR <2.0    ===================HEMATOLOGIC/ONC ===================  H/H 10.4/33.6  - Continue to follow hemoglobin and hematocrit levels    ===================== RENAL =========================  EDIE on CKD III (SCr 4.31 from 2.7) in setting of ADHF  - Hyperkalemia w/ initial K of 8.1, diuresis regimen uptitrated, K now 3.6  - Oliguric: goal net neg fluid balance with IV Lasix 10mg   - Renal following (Dr. Rees)  - Continue monitoring I/Os, BUN/Creatinine, and UOP via Guidry.     ==================== GASTROINTESTINAL===================  Transaminitis, resolving with inotropic support  - AUS : Cholelithiasis without sonographic evidence of acute cholecystitis. No biliary ductal dilatation. Small ascites.   - c/w DASH/TLC diet, supplemental Glucerna Shake Cans BID.    =======================    ENDOCRINE  =====================  History of DMT2  - S/p Insulin   - Continue glucose control with Admelog sliding scale premeals and qhs, in addition to Glucagon PRN    Hypothyroidism, TSH 8.61  - C/w Synthroid 50 mcg QD    ========================INFECTIOUS DISEASE================  RLE blister w/ localized cellulitis  - Podiatry following, appreciate rec's  - Started on IV Cefepime for cellulitis (- )  - Z-flow boots to be worn while resting in bed    Temp 97.7F, WBC downtrending and within normal limits at 9.41  - Continue to trend fever curve & CBC  - BCx 11/02 x2: NGTD  - UA negative  - C/w Cefepime 1g       Patient requires continuous monitoring with bedside rhythm monitoring, pulse ox monitoring, and intermittent blood gas analysis. Care plan discussed with ICU care team. Patient remained critical and at risk for life threatening decompensation.  Patient seen, examined and plan discussed with CCU team during rounds.     I have personally provided 35 minutes of critical care time excluding time spent on separate procedures.    By signing my name below, I, Bebo Barrientos, attest that this documentation has been prepared under the direction and in the presence of Nahomy Brunner NP  Electronically signed: Willis Kerns, 20 @ 20:12    INahomy NP, personally performed the services described in this documentation. All medical record entries made by the scribe were at my direction and in my presence. I have reviewed the chart and agree that the record reflects my personal performance and is accurate and complete  Electronically signed: Nahomy Brunner, NP

## 2020-11-03 NOTE — PROGRESS NOTE ADULT - SUBJECTIVE AND OBJECTIVE BOX
PATIENT: MAIN BRASWELL, MRN: [id number]    CHIEF COMPLAINT: Patient is a 75y old  Male who presents with a chief complaint of AdHF and hyperkalemia (2020 12:40)      INTERVAL HISTORY/OVERNIGHT EVENTS: No overnight events. At bedside, patient has been sleeping and eating well. No complaints. Oriented to person, place, and time. Breathing comfortably on room air.      MEDICATIONS:  MEDICATIONS  (STANDING):  aMIOdarone    Tablet 200 milliGRAM(s) Oral daily  cefepime   IVPB      cefepime   IVPB 1000 milliGRAM(s) IV Intermittent every 24 hours  chlorhexidine 4% Liquid 1 Application(s) Topical <User Schedule>  chlorhexidine 4% Liquid 1 Application(s) Topical <User Schedule>  furosemide Infusion 10 mG/Hr (5 mL/Hr) IV Continuous <Continuous>  levothyroxine 50 MICROGram(s) Oral daily  milrinone Infusion 0.375 MICROgram(s)/kG/Min (10.2 mL/Hr) IV Continuous <Continuous>  norepinephrine Infusion 0.05 MICROgram(s)/kG/Min (4.25 mL/Hr) IV Continuous <Continuous>    MEDICATIONS  (PRN):  acetaminophen   Tablet .. 650 milliGRAM(s) Oral every 6 hours PRN Mild Pain (1 - 3)  hydrocortisone 2.5% Rectal Cream 1 Application(s) Rectal daily PRN hemorrhoid pain/itch  sodium chloride 0.9% lock flush 10 milliLiter(s) IV Push every 1 hour PRN Pre/post blood products, medications, blood draw, and to maintain line patency      ALLERGIES: Allergies    No Known Allergies    Intolerances        OBJECTIVE:  ICU Vital Signs Last 24 Hrs  T(C): 36.1 (2020 11:00), Max: 36.6 (2020 19:00)  T(F): 97 (2020 11:00), Max: 97.8 (2020 19:00)  HR: 84 (2020 13:00) (80 - 100)  BP: --  BP(mean): --  ABP: 118/56 (2020 13:00) (92/40 - 128/58)  ABP(mean): 76 (2020 13:00) (54 - 84)  RR: 20 (2020 13:00) (12 - 22)  SpO2: 96% (2020 11:00) (93% - 100%)      Adult Advanced Hemodynamics Last 24 Hrs  CVP(mm Hg): 13 (2020 13:00) (0 - 15)  CVP(cm H2O): --  CO: --  CI: --  PA: 58/24 (2020 13:00) (38/12 - 60/26)  PA(mean): 37 (2020 13:00) (22 - 41)  PCWP: --  SVR: --  SVRI: --  PVR: --  PVRI: --  CAPILLARY BLOOD GLUCOSE      POCT Blood Glucose.: 183 mg/dL (2020 07:10)    CAPILLARY BLOOD GLUCOSE      POCT Blood Glucose.: 183 mg/dL (2020 07:10)    I&O's Summary    2020 07:01  -  2020 07:00  --------------------------------------------------------  IN: 1703.1 mL / OUT: 6045 mL / NET: -4341.9 mL    2020 07:01  -  2020 13:50  --------------------------------------------------------  IN: 274 mL / OUT: 1765 mL / NET: -1491 mL      Daily     Daily Weight in k.2 (2020 06:00)    PHYSICAL EXAMINATION:  General: Comfortable, no acute distress, cooperative with exam.  HEENT: PERRLA, EOMI, moist mucous membranes.  Respiratory: CTAB, normal respiratory effort, no coughing, wheezes, crackles, or rales.  CV: RRR, S1S2, systolic murmur, No JVD. Distal pulses intact.  Abdominal: Soft, nontender, nondistended, no rebound or guarding, normal bowel sounds.  Neurology: AOx3, no focal neuro defects, WILBURN x 4.  Extremities: 2+ pitting edema up to mid-upper shin b/l, + Peripheral pulses. blister on R LE with erythema    LABS:  ABG - ( 2020 00:37 )  pH, Arterial: 7.49  pH, Blood: x     /  pCO2: 40    /  pO2: 84    / HCO3: 30    / Base Excess: 6.5   /  SaO2: 97                                      10.4   9.41  )-----------( 142      ( 2020 00:39 )             33.6     11-03    138  |  95<L>  |  121<H>  ----------------------------<  173<H>  3.6   |  28  |  4.31<H>    Ca    9.7      2020 05:10  Phos  3.8     11-03  Mg     2.5     11-03    TPro  5.6<L>  /  Alb  3.1<L>  /  TBili  2.7<H>  /  DBili  x   /  AST  154<H>  /  ALT  195<H>  /  AlkPhos  105  11-03    LIVER FUNCTIONS - ( 2020 05:10 )  Alb: 3.1 g/dL / Pro: 5.6 g/dL / ALK PHOS: 105 U/L / ALT: 195 U/L / AST: 154 U/L / GGT: x           PT/INR - ( 2020 05:10 )   PT: 36.4 sec;   INR: 3.21 ratio         PTT - ( 2020 05:10 )  PTT:44.7 sec    CARDIAC MARKERS ( 2020 18:16 )  x     / x     / 214 U/L / x     / x          Urinalysis Basic - ( 2020 14:24 )    Color: Light Yellow / Appearance: Clear / S.009 / pH: x  Gluc: x / Ketone: Negative  / Bili: Negative / Urobili: Negative   Blood: x / Protein: Negative / Nitrite: Negative   Leuk Esterase: Negative / RBC: 23 /hpf / WBC 2 /HPF   Sq Epi: x / Non Sq Epi: 0 /hpf / Bacteria: Negative        TELEMETRY:     EKG:     IMAGING:

## 2020-11-03 NOTE — PROGRESS NOTE ADULT - SUBJECTIVE AND OBJECTIVE BOX
[facility] DIVISION OF KIDNEY DISEASES AND HYPERTENSION -- FOLLOW UP NOTE  --------------------------------------------------------------------------------  HPI: 75-year-old male with advanced heart failure, CKD, Afib, DM, hypothyroidism, was admitted to [facility] on 11/1/20 for worsening lethargy. Pt. transferred to CCU and started on IV pressors and antibiotics.  Pt. evaluated by Heart Failure team, was not noted to be grossly volume overloaded on invasive hemodynamic measures concerning for possible sepsis as cause of shock. Nephrology team consulted for elevated serum creatinine. Pt. was hospitalized at [facility] from 8/21/20-9/21/20 and underwent mitraclip placement. [facility] 2.77 on 9/21/20. Scr on arrival to the ER was 4.26 on 4/1/20. Scr today is 4.31. Pt. on IV Lasix infusion with good urine output.    Pt. evaluated at bedside, awake and more responsive.    PAST HISTORY  --------------------------------------------------------------------------------  No significant changes to PMH, PSH, FHx, SHx, unless otherwise noted    ALLERGIES & MEDICATIONS  --------------------------------------------------------------------------------  Allergies    No Known Allergies    Intolerances    Standing Inpatient Medications  aMIOdarone    Tablet 200 milliGRAM(s) Oral daily  cefepime   IVPB      cefepime   IVPB 1000 milliGRAM(s) IV Intermittent every 24 hours  furosemide Infusion 10 mG/Hr IV Continuous <Continuous>  levothyroxine 50 MICROGram(s) Oral daily  milrinone Infusion 0.375 MICROgram(s)/kG/Min IV Continuous <Continuous>  norepinephrine Infusion 0.05 MICROgram(s)/kG/Min IV Continuous <Continuous>    REVIEW OF SYSTEMS  --------------------------------------------------------------------------------  Gen: no lethargy  Respiratory: No dyspnea  CV: No chest pain  GI: No abdominal pain  MSK: + LE edema  Neuro: No dizziness  Heme: No bleeding    All other systems were reviewed and are negative, except as noted.    VITALS/PHYSICAL EXAM  --------------------------------------------------------------------------------  T(C): 36.1 (11-03-20 @ 11:00), Max: 36.6 (11-02-20 @ 19:00)  HR: 80 (11-03-20 @ 11:00) (80 - 100)  BP: --  RR: 14 (11-03-20 @ 11:00) (12 - 22)  SpO2: 96% (11-03-20 @ 11:00) (93% - 100%)  Wt(kg): --  Height (cm): 172.7 (11-01-20 @ 13:03)  Weight (kg): 90.7 (11-01-20 @ 13:03)  BMI (kg/m2): 30.4 (11-01-20 @ 13:03)  BSA (m2): 2.04 (11-01-20 @ 13:03)    11-02-20 @ 07:01  -  11-03-20 @ 07:00  --------------------------------------------------------  IN: 1703.1 mL / OUT: 6045 mL / NET: -4341.9 mL    11-03-20 @ 07:01  -  11-03-20 @ 12:40  --------------------------------------------------------  IN: 274 mL / OUT: 1415 mL / NET: -1141 mL    Physical Exam:  	Gen: NAD  	HEENT: DANIEL  	Pulm: good air entry B/L  	CV: S1S2  	Abd: Soft, +BS   	Ext: LE pitting edema B/L  	Neuro: Awake  	Skin: Warm and dry    LABS/STUDIES  --------------------------------------------------------------------------------              10.4   9.41  >-----------<  142      [11-03-20 @ 00:39]              33.6     138  |  95  |  121  ----------------------------<  173      [11-03-20 @ 05:10]  3.6   |  28  |  4.31        Ca     9.7     [11-03-20 @ 05:10]      Mg     2.5     [11-03-20 @ 05:10]      Phos  3.8     [11-03-20 @ 05:10]    Creatinine Trend:  SCr 4.31 [11-03 @ 05:10]  SCr 4.32 [11-03 @ 00:39]  SCr 4.41 [11-02 @ 16:06]  SCr 4.36 [11-02 @ 05:30]  SCr 4.39 [11-02 @ 01:53]

## 2020-11-03 NOTE — DIETITIAN INITIAL EVALUATION ADULT. - OTHER INFO
Pt with history of DM which he reports is diet-controlled. Does not take medication for blood sugar management and does not self-monitor blood sugars. Most recent HbA1c from 2 months ago, 6.3% (9/11) suggested good glycemic control at that time.    Reports  pounds. Endorses recent weight gain 2/2 fluids. Per previous RD note, pt weighed 200 pounds in early August, lost ~25 pounds from diuresis, malnutrition. On this admission, weighed 200 pounds (11/1), current weight 183.4 pounds (11/3). Noted to be on Lasix gtt. Attributes weight loss within the last few days to diuresis & decreased PO intake. Nutrition Focused Physical Exam below.     Reports fair/good PO intake since admission, completing 50-75% of meals in-house. Amenable to oral nutrition supplements to optimize intake & to match PTA regimen. RD reviewed heart failure nutrition therapy, with emphasis on sources of high sodium foods to limit/avoid, how to monitor daily weights, & weight gain parameters on when to contact MD. Also reviewed malnutrition & importance of PO intake to promote general health and well-being. Written materials offered and declined, availability made known.

## 2020-11-03 NOTE — PROGRESS NOTE ADULT - ASSESSMENT
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    His invasive hemodynamics and wide pulse pressure suggest a vasoplegic picture, potentially sepsis given leukocytosis, though appears overloaded on exam with low VTI on TTE. His lactate has cleared and end organ function improving. Will continue milrinone with goal for eventual discharge on home inotropes and wean pressors as tolerates.     He was previously evaluated for LVAD as DT but declined due to combination of age, frailty, and advanced CKD.     Hemodynamics  11/2 (milrinone 0.25 and levophed) : RA 9, PA 52/20, unable to wedge, Corey CO/CI 6.2/3.0 with PA sat 74%    Review of relevant studies  TTE 11/1: LV 7.0 cm, LVEF 10-15%, severe TR, severe RV dysfunction, LVOT VTI 7 cm

## 2020-11-03 NOTE — DIETITIAN INITIAL EVALUATION ADULT. - OTHER CALCULATIONS
current weight 183.4 pounds used for energy/protein needs; needs calculated with consideration for malnutrition, CKD; defer fluid needs to team in setting of HF

## 2020-11-04 NOTE — PROGRESS NOTE ADULT - SUBJECTIVE AND OBJECTIVE BOX
Patient is a 75y old  Male who presents with a chief complaint of AdHF and hyperkalemia (04 Nov 2020 12:06)       INTERVAL HPI/OVERNIGHT EVENTS:  Patient seen and evaluated at bedside.  Pt is resting comfortable in NAD. Denies N/V/F/C.    Allergies    No Known Allergies    Intolerances        Vital Signs Last 24 Hrs  T(C): 37.1 (04 Nov 2020 12:00), Max: 37.1 (04 Nov 2020 08:00)  T(F): 98.8 (04 Nov 2020 12:00), Max: 98.8 (04 Nov 2020 08:00)  HR: 102 (04 Nov 2020 15:00) (80 - 108)  BP: --  BP(mean): --  RR: 14 (04 Nov 2020 15:00) (12 - 28)  SpO2: 97% (04 Nov 2020 15:00) (93% - 100%)    LABS:                        9.3    7.07  )-----------( 100      ( 04 Nov 2020 00:30 )             29.5     11-04    136  |  93<L>  |  104<H>  ----------------------------<  163<H>  3.4<L>   |  32<H>  |  3.46<H>    Ca    9.2      04 Nov 2020 15:00  Phos  2.1     11-04  Mg     2.0     11-04    TPro  5.5<L>  /  Alb  3.0<L>  /  TBili  1.8<H>  /  DBili  x   /  AST  92<H>  /  ALT  140<H>  /  AlkPhos  100  11-04    PT/INR - ( 04 Nov 2020 05:44 )   PT: 23.2 sec;   INR: 2.00 ratio         PTT - ( 04 Nov 2020 05:44 )  PTT:39.2 sec    CAPILLARY BLOOD GLUCOSE      POCT Blood Glucose.: 193 mg/dL (04 Nov 2020 11:30)  POCT Blood Glucose.: 201 mg/dL (04 Nov 2020 07:25)  POCT Blood Glucose.: 178 mg/dL (03 Nov 2020 21:47)      Lower Extremity Physical Exam:    Vascular: DP 1 /4 B/L, PT 0/4, CFT intact B/L, Temperature gradient warm to cool, B/L, severe LE +4 pitting edema b/l  Neuro: Epicritic sensation intact to the level of digits B/L.  Musculoskeletal/Ortho: unremarkable   Skin: intact blister with localized cellulitis to anterior tibia RLE resolving, ecchymosis noted to R foot medial 1st MPJ, thickened dystrophic elongated toenails x10

## 2020-11-04 NOTE — PHYSICAL THERAPY INITIAL EVALUATION ADULT - PRECAUTIONS/LIMITATIONS, REHAB EVAL
He was recently referred to HF in Aug 2020 for anasarca and a hospital admission that necessitated Milrinone assisted diuresis (> 20 kg off) w/ mitraclip for mod-severe MR which improved to mild. He was declined for an LVAD due to frailty and CKD. In ED pt found to be in AdHF and fluid overloaded./cardiac precautions

## 2020-11-04 NOTE — PROGRESS NOTE ADULT - SUBJECTIVE AND OBJECTIVE BOX
Subjective:  Levophed weaned significantly. Overall feels better but feels "down" mentally.     Medications:  acetaminophen   Tablet .. 650 milliGRAM(s) Oral every 6 hours PRN  aMIOdarone    Tablet 200 milliGRAM(s) Oral daily  cefepime   IVPB      cefepime   IVPB 1000 milliGRAM(s) IV Intermittent every 24 hours  chlorhexidine 4% Liquid 1 Application(s) Topical <User Schedule>  chlorhexidine 4% Liquid 1 Application(s) Topical <User Schedule>  dextrose 40% Gel 15 Gram(s) Oral once PRN  dextrose 5%. 1000 milliLiter(s) IV Continuous <Continuous>  dextrose 50% Injectable 12.5 Gram(s) IV Push once  dextrose 50% Injectable 25 Gram(s) IV Push once  dextrose 50% Injectable 25 Gram(s) IV Push once  furosemide Infusion 10 mG/Hr IV Continuous <Continuous>  glucagon  Injectable 1 milliGRAM(s) IntraMuscular once PRN  hydrocortisone 2.5% Rectal Cream 1 Application(s) Rectal daily PRN  insulin lispro (ADMELOG) corrective regimen sliding scale   SubCutaneous three times a day before meals  insulin lispro (ADMELOG) corrective regimen sliding scale   SubCutaneous at bedtime  levothyroxine 50 MICROGram(s) Oral daily  milrinone Infusion 0.375 MICROgram(s)/kG/Min IV Continuous <Continuous>  norepinephrine Infusion 0.05 MICROgram(s)/kG/Min IV Continuous <Continuous>  sodium chloride 0.9% lock flush 10 milliLiter(s) IV Push every 1 hour PRN    Vitals:  T(C): 37.1 (20 @ 08:00), Max: 37.1 (20 @ 08:00)  HR: 104 (20 @ 10:00) (80 - 108)  BP: --  BP(mean): --  RR: 18 (20 @ 10:00) (12 - 28)  SpO2: 97% (20 @ 10:00) (93% - 100%)    Daily     Daily Weight in k.9 (2020 06:00)        I&O's Summary    2020 07:01  -  2020 07:00  --------------------------------------------------------  IN: 1978 mL / OUT: 5600 mL / NET: -3622 mL    2020 07:01  -  2020 12:06  --------------------------------------------------------  IN: 668.5 mL / OUT: 725 mL / NET: -56.5 mL        Physical Exam:  Appearance: Frail appearing 	  HEENT: + JVD  Cardiovascular: RRR, 2/6 HSM at apex   Respiratory: Decreased BS at bases 	  Psychiatry: A & O x 3, Mood & affect appropriate  Gastrointestinal:  Soft, Non-tender, + BS	  Skin: + ecchymoses   Neurologic: Non-focal  Extremities: warm, 1+ pitting edema up to mid shins, RLE with open blister with small area of erythema. overall significantly improved from admission        Labs:                        9.3    7.07  )-----------( 100      ( 2020 00:30 )             29.5         138  |  95<L>  |  108<H>  ----------------------------<  170<H>  3.4<L>   |  30  |  3.48<H>    Ca    9.2      2020 05:44  Phos  2.8       Mg     2.1         TPro  5.5<L>  /  Alb  3.1<L>  /  TBili  2.0<H>  /  DBili  x   /  AST  99<H>  /  ALT  146<H>  /  AlkPhos  106        PT/INR - ( 2020 05:44 )   PT: 23.2 sec;   INR: 2.00 ratio         PTT - ( 2020 05:44 )  PTT:39.2 sec      Serum Pro-Brain Natriuretic Peptide: 06171 pg/mL ( @ 13:50)    Oxygen Saturation, Mixed: 71 % ( @ 07:36)  Oxygen Saturation, Mixed: 72 % ( @ 00:42)  Oxygen Saturation, Mixed: 70 % ( @ 17:17)  Oxygen Saturation, Mixed: 75 % ( @ 00:37)  Oxygen Saturation, Mixed: 75 % ( @ 16:05)  Oxygen Saturation, Mixed: 74 % ( @ 09:41)      Lactate, Blood: 3.6 mmol/L ( @ 18:16)

## 2020-11-04 NOTE — PROGRESS NOTE ADULT - ASSESSMENT
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    His invasive hemodynamics and wide pulse pressure suggest a vasoplegic picture, potentially sepsis given leukocytosis, though appears overloaded on exam with low VTI on TTE. He required high doses of levophed on admission which are being weaned and has been diuresed considerably. His lactate has cleared and end organ function improving. Will continue milrinone with goal for eventual discharge on home inotropes and wean pressors as tolerates.     He was previously evaluated for LVAD as DT but declined due to combination of age, frailty, and advanced CKD.     Hemodynamics  11/2 (milrinone 0.25 and levophed) : RA 9, PA 52/20, unable to wedge, Corey CO/CI 6.2/3.0 with PA sat 74%  11/4 (on milrinone 0.25 and low dose levophed): RA 5, PA 39/20, Corey CO/CI 6.2/3.0 with PA sat 71%    Review of relevant studies  TTE 11/1: LV 7.0 cm, LVEF 10-15%, severe TR, severe RV dysfunction, LVOT VTI 7 cm

## 2020-11-04 NOTE — PROGRESS NOTE ADULT - SUBJECTIVE AND OBJECTIVE BOX
[facility] DIVISION OF KIDNEY DISEASES AND HYPERTENSION -- FOLLOW UP NOTE  --------------------------------------------------------------------------------  HPI: 75-year-old male with advanced heart failure, CKD, Afib, DM, hypothyroidism, was admitted to [facility] on 11/1/20 for worsening lethargy. Pt. transferred to CCU and started on IV pressors and antibiotics.  Pt. evaluated by Heart Failure team, was not noted to be grossly volume overloaded on invasive hemodynamic measures concerning for possible sepsis as cause of shock. Nephrology team consulted for elevated serum creatinine. Pt. was hospitalized at [facility] from 8/21/20-9/21/20 and underwent mitraclip placement. Upon [facility], Scr was 2.77 on 9/21/20. Scr on arrival to the ER was 4.26 on 4/1/20. Scr today improved to 3.48. Pt. remains on IV Lasix infusion with good urine output.    Pt. evaluated at bedside, in no acute distress. Reports fatigue.    PAST HISTORY  --------------------------------------------------------------------------------  No significant changes to PMH, PSH, FHx, SHx, unless otherwise noted    ALLERGIES & MEDICATIONS  --------------------------------------------------------------------------------  Allergies    No Known Allergies    Intolerances    Standing Inpatient Medications  aMIOdarone    Tablet 200 milliGRAM(s) Oral daily  cefepime   IVPB      cefepime   IVPB 1000 milliGRAM(s) IV Intermittent every 24 hours  chlorhexidine 4% Liquid 1 Application(s) Topical <User Schedule>  chlorhexidine 4% Liquid 1 Application(s) Topical <User Schedule>  furosemide Infusion 10 mG/Hr IV Continuous <Continuous>  insulin lispro (ADMELOG) corrective regimen sliding scale   SubCutaneous three times a day before meals  insulin lispro (ADMELOG) corrective regimen sliding scale   SubCutaneous at bedtime  levothyroxine 50 MICROGram(s) Oral daily  milrinone Infusion 0.375 MICROgram(s)/kG/Min IV Continuous <Continuous>  norepinephrine Infusion 0.05 MICROgram(s)/kG/Min IV Continuous <Continuous>  potassium chloride    Tablet ER 20 milliEquivalent(s) Oral every 2 hours    REVIEW OF SYSTEMS  --------------------------------------------------------------------------------  Gen: no lethargy  Respiratory: No dyspnea  CV: No chest pain  GI: No abdominal pain  MSK: + LE edema  Neuro: No dizziness  Heme: No bleeding    All other systems were reviewed and are negative, except as noted.    VITALS/PHYSICAL EXAM  --------------------------------------------------------------------------------  T(C): 36.5 (11-03-20 @ 19:00), Max: 36.5 (11-03-20 @ 19:00)  HR: 104 (11-04-20 @ 06:45) (80 - 108)  BP: --  RR: 16 (11-04-20 @ 06:45) (12 - 28)  SpO2: 98% (11-04-20 @ 06:00) (92% - 100%)  Wt(kg): --    11-03-20 @ 07:01  -  11-04-20 @ 07:00  --------------------------------------------------------  IN: 1956.8 mL / OUT: 5300 mL / NET: -3343.2 mL    Physical Exam:  	Gen: NAD  	HEENT: MMM  	Pulm: good air entry B/L  	CV: S1S2  	Abd: Soft, +BS   	Ext: LE pitting edema B/L  	Neuro: Awake  	Skin: Warm and dry    LABS/STUDIES  --------------------------------------------------------------------------------              9.3    7.07  >-----------<  100      [11-04-20 @ 00:30]              29.5     138  |  95  |  108  ----------------------------<  170      [11-04-20 @ 05:44]  3.4   |  30  |  3.48        Ca     9.2     [11-04-20 @ 05:44]      Mg     2.1     [11-04-20 @ 05:44]      Phos  2.8     [11-04-20 @ 05:44]    Creatinine Trend:  SCr 3.48 [11-04 @ 05:44]  SCr 3.64 [11-04 @ 00:30]  SCr 4.31 [11-03 @ 05:10]  SCr 4.32 [11-03 @ 00:39]  SCr 4.41 [11-02 @ 16:06]

## 2020-11-04 NOTE — PROGRESS NOTE ADULT - ASSESSMENT
76 y/o M w/ RLE blister w/ localized cellulitis and thickened mycotic elongated toenails    - pt seen and evaluated  - intact blister with localized cellulitis to anterior tibia RLE, ecchymosis noted to R foot medial 1st MPJ, erythema to posterior heels b/l   - cont IV abx for cellulitis  - z-flow boots ordered, to be worn while resting in bed at all times   - toenails debrided x10 using sterile nippers, pt tolerated with no complications

## 2020-11-04 NOTE — PROGRESS NOTE ADULT - ATTENDING COMMENTS
I have personally seen, examined and participated in the care of this patient. I have reviewed all pertinent clinical information, including history, physical exam, plan and the resident's note. I agree with the resident's note with the following additions:    76 yo man with HFrEF, s/p darrion clip, CRT, afib on AC p/w acute on chronic decompensated systolic HF requiring initiation of inotropic support     Possible contaminant vasodilatory shock, pt was  pancultured and subsequently started on broad spectrum abx   Continue Inotrope and will continue to wean pressors off   Continue diuresis given extensive overload,  Lasix at 10mg/hr  D/c swan today   Unclear source of infection for now , will follow up cx   Stable creatinine   Stable H/H, will need AC for Afib once INR below 2  Elevated FS, continue NISS

## 2020-11-04 NOTE — PHYSICAL THERAPY INITIAL EVALUATION ADULT - GAIT DEVIATIONS NOTED, PT EVAL
decreased favian/increased time in double stance/decreased velocity of limb motion/decreased weight-shifting ability/decreased step length

## 2020-11-04 NOTE — PHYSICAL THERAPY INITIAL EVALUATION ADULT - MODALITIES TREATMENT COMMENTS
PT WC order received for dressing rec't to Rt shin blisters. christian session well, wound received C/D/I, measured: 5.2cmx3.0cmx0.1cm, no erythema, no purulent, no malodor, cleansed with NS, cavilon to periwound, adaptic touch to cover the wound, followed by DSD, secured with afia and tape,

## 2020-11-04 NOTE — PHYSICAL THERAPY INITIAL EVALUATION ADULT - GENERAL OBSERVATIONS, REHAB EVAL
received semisupine in bed, A&Ox4, following comands, pleasant  & eager to particiapte, a/w ADHF, +ICU monitoring.

## 2020-11-04 NOTE — PHYSICAL THERAPY INITIAL EVALUATION ADULT - ADDITIONAL COMMENTS
Pt resides in private home with niece, 4 steps to enter, all needs met on first level. Pt independent with mobility and ADL's, ambulatory with cane occasionally.

## 2020-11-04 NOTE — PROGRESS NOTE ADULT - ASSESSMENT
74y/o M w/ h/o long standing non ischemia chronic systolic heart failure (EF 20% LVIDD 6.7 )) s/p CRT-D and mitral valve clip on last admission, Afib on Eliquis s/p DCCV, CKD-3, T2DM, hypothyroidism and papillary thyroid carcinoma, anemia, recently referred to HF in Aug 2020 for anasarca and a hospital admission that necessitated Milrinone assisted diuresis (> 20 kg off) w/ mitraclip for mod-severe MR which improved to mild. He was declined for an LVAD due to frailty and CKD. CCU for acute decompensated HF and hyperkalemia, both improved with diuresis.    #Neuro  - AAOx3, following commands, no active issues    #Resp  - Oxygenating well on room air, no active issues    #CV  *AdHF improved with milrinone and lasix  - c/w Milrinone assisted diuresis @.375 and Lasix gtt to 10, with goal of net negative  - withholding carvedilol, hydralazine  - c/w amiodarone 200mg qD    #GI  - c/w Regular diet    #/Renal  *EDIE, likely 2/2 occult infection, improving with use of abx  - Monitor I/O with goal net negative  - Renal following, appreciate recs     #ID  *Cellulitis in R LE  - improved leukocytosis and lactate, afebrile  - Blood Cxs NGTD 11/2, UA wnl, c/w vancomycin and cefepime renally dosed  - vanco random level 7.1, last dose 11/2, due for vancomycin today     #Endo  *Hypothyroidism  - c/w home synthroid    #Heme  - continue to monitor h/h and platelets   - heparin gtt    #Lines  - R IJ TLC (11/1 - ), R radial A line (11/1 - )    #GOC  - Full code    #Dispo  - c/w ICU level of care 76y/o M w/ h/o long standing non ischemia chronic systolic heart failure (EF 20% LVIDD 6.7 )) s/p CRT-D and mitral valve clip on last admission, Afib on Eliquis s/p DCCV, CKD-3, T2DM, hypothyroidism and papillary thyroid carcinoma, anemia, recently referred to HF in Aug 2020 for anasarca and a hospital admission that necessitated Milrinone assisted diuresis (> 20 kg off) w/ mitraclip for mod-severe MR which improved to mild. He was declined for an LVAD due to frailty and CKD. CCU for acute decompensated HF and hyperkalemia, both improved with diuresis.    #Neuro  - AAOx3, following commands, no active issues    #Resp  - Oxygenating well on room air, no active issues    #CV  *AdHF improved with milrinone and lasix  - c/w Milrinone assisted diuresis @.375 and Lasix gtt to 10, with goal of net negative  - continue to withhold carvedilol, hydralazine while on levo  *Afib  - c/w amiodarone 200mg qD    #GI  - c/w Regular diet    #/Renal  *EDIE, likely 2/2 occult infection, improving with use of abx  - Monitor I/O with goal net negative  - Renal following, appreciate recs   - Replete electrolytes with K with goal >4 while on Lasix gtt    #ID  *Sepsis of unknown source improving with broad spectrum antibiotics  - improved leukocytosis and lactate, afebrile  - Blood Cxs NGTD 11/2, UA wnl, c/w vancomycin and cefepime renally dosed  - vanco random level 7.1, last dose 11/2, due for vancomycin today    #Endo  *Hypothyroidism  - c/w home synthroid  - SSI lispro before meals    #Heme  - continue to monitor h/h and platelets   - c/w heparin gtt goal PTT 60-99    #Lines  - L IJ TLC (11/1 - ), R radial A line (11/1 - )    #GOC  - Full code    #Dispo  - c/w ICU level of care 74y/o M w/ h/o long standing non ischemia chronic systolic heart failure (EF 20% LVIDD 6.7 )) s/p CRT-D and mitral valve clip on last admission, Afib on Eliquis s/p DCCV, CKD-3, T2DM, hypothyroidism and papillary thyroid carcinoma, anemia, recently referred to HF in Aug 2020 for anasarca and a hospital admission that necessitated Milrinone assisted diuresis (> 20 kg off) w/ mitraclip for mod-severe MR which improved to mild. He was declined for an LVAD due to frailty and CKD. CCU for acute decompensated HF and hyperkalemia, both improved with diuresis.    #Neuro  - AAOx3, following commands, no active issues    #Resp  - Oxygenating well on room air, no active issues    #CV  *AdHF improved with milrinone and lasix  - c/w Milrinone assisted diuresis @.375 and Lasix gtt to 10, with goal of net negative and plan on d/c lasix gtt tomorrow  - continue to withhold carvedilol, hydralazine while on levo  *Afib  - c/w amiodarone 200mg qD    #GI  - c/w Regular diet    #/Renal  *EDIE, likely 2/2 occult infection, improving with use of abx  - Guidry removed with plan to continue to monitor I/O with goal net negative  - Renal following, appreciate recs   - Replete electrolytes with K with goal >4 while on Lasix gtt    #ID  *Sepsis of unknown source improving with broad spectrum antibiotics  - improved leukocytosis and lactate, afebrile  - Blood Cxs NGTD 11/2, UA wnl, c/w vancomycin and cefepime renally dosed  - vanco random level 7.1, last dose 11/2, due for vancomycin dose today    #Endo  *Hypothyroidism  - c/w home synthroid  - SSI lispro before meals    #Heme  - continue to monitor h/h and platelets   - c/w heparin gtt goal PTT 60-99    #Lines  - L IJ TLC (11/1 - ), R radial A line (11/1 - )    #GOC  - DNR/DNI verbally stated with no MOLST form addressed    #Dispo  - c/w ICU level of care

## 2020-11-04 NOTE — PROGRESS NOTE ADULT - SUBJECTIVE AND OBJECTIVE BOX
MAIN BRASWELL  MRN-[id number]  Patient is a 75y old  Male who presents with a chief complaint of AdHF and hyperkalemia (04 Nov 2020 15:53)    HPI:  74 year old male with PMH of long standng non ischemia chronic systolic heart failure (EF 20% LVIDD 6.7 )) s/p CRT-D and mitral valve clip on last admission, Afib on Eliquiss/p DCCV, CKD Stage 3  , DM-2, hypothyroidism and papillary thyroid carcinoma,, anemia. He was recently referred to HF in Aug 2020 for anasarca and a hospital admission that necessitated Milrinone assisted diuresis (> 20 kg off) w/ mitraclip for mod-severe MR which improved to mild. He was declined for an LVAD due to frailty and CKD. At time of discharge he weighed 187 pounds and had a Cr of 2.77 Recently had seen HF and had medications adjusted due to increasing weight ( metolazone stopped, torsemide increased to 80 BID, K increased, and carvedilol switched to Metoprolol).   Patient lives with his niece who states she has not been feeling well the last few days so has not been around him as much. patient states he fell out of bed, does not know how but was too weak to get up and normally can ambulate with some assistance. reports he has been "weak and wobbly" for some timebut cannot specify how long. denies fever/chills/chest pain/abd pain/vomiting/dysuria.    In ED pt found to be in AdHF and fluid overloaded w/ a K of 8. Lasix 80 given and gtt started. Milrinone 0.25 started. (01 Nov 2020 17:42)      Hospital Course:  11/1 Admitted to CICU for ADHF and hyperkalemia    24 HOUR EVENTS:    REVIEW OF SYSTEMS:   Constitutional: + weakness. no fevers, or chills  Eyes/ENT: No visual changes  Respiratory: No cough, wheezing, hemoptysis  Cardiovascular: No chest pain, no palpitations  Gastrointestinal: No abdominal pain. No nausea, vomiting, hematemesis.   Genitourinary: No dysuria  Neurological: No numbness, no weakness  Skin: No itching, rashes    ICU Vital Signs Last 24 Hrs  T(C): 36.7 (04 Nov 2020 16:00), Max: 37.1 (04 Nov 2020 08:00)  T(F): 98.1 (04 Nov 2020 16:00), Max: 98.8 (04 Nov 2020 08:00)  HR: 90 (04 Nov 2020 18:15) (80 - 108)  ABP: 116/58 (04 Nov 2020 18:15) (84/38 - 134/66)  ABP(mean): 78 (04 Nov 2020 18:15) (52 - 86)  RR: 20 (04 Nov 2020 18:15) (11 - 28)  SpO2: 100% (04 Nov 2020 18:00) (89% - 100%)  CVP(mm Hg): 7 (11-04-20 @ 12:15) (-4 - 19)  PA: 38/19 (11-04-20 @ 12:15) (29/10 - 62/33)  PA(mean): 27 (11-04-20 @ 12:15) (17 - 45)    I&O's Summary    03 Nov 2020 07:01  -  04 Nov 2020 07:00  --------------------------------------------------------  IN: 1978 mL / OUT: 5600 mL / NET: -3622 mL    04 Nov 2020 07:01  -  04 Nov 2020 18:57  --------------------------------------------------------  IN: 2093.3 mL / OUT: 2300 mL / NET: -206.7 mL      POCT Blood Glucose.: 193 mg/dL (04 Nov 2020 11:30)      PHYSICAL EXAM:   General: No acute distress  Eyes: EOMI, PERRLA, conjunctiva and sclera clear  Chest/Lung: CTAB, no wheezes, rales, or rhonchi  Heart: Regular rate, regular rhythm. Normal S1/S2. Systolic murmur  Abdomen: Soft, nontender, nondistended. Normal bowel sounds.  Extremites: Mild lower extremity peripheral non pitting edema up to mid shins, + Peripheral pulses.  Neurology: A&O x3, no focal deficits  Skin: Intact blister with localized cellulitis to right lower anterior tibia  Lines: RIJ TLC 11/1 dressing clean, dry, intact    ============================I/O===========================   I&O's Detail    03 Nov 2020 07:01  -  04 Nov 2020 07:00  --------------------------------------------------------  IN:    Furosemide: 40 mL    Furosemide: 100 mL    IV PiggyBack: 570 mL    Milrinone: 244.8 mL    Norepinephrine: 112.4 mL    Norepinephrine: 70.8 mL    Oral Fluid: 840 mL  Total IN: 1978 mL    OUT:    Indwelling Catheter - Urethral (mL): 5600 mL  Total OUT: 5600 mL    Total NET: -3622 mL      04 Nov 2020 07:01  -  04 Nov 2020 18:57  --------------------------------------------------------  IN:    Furosemide: 55 mL    IV PiggyBack: 500 mL    IV PiggyBack: 187.5 mL    Milrinone: 112.2 mL    Norepinephrine: 38.6 mL    Oral Fluid: 1200 mL  Total IN: 2093.3 mL    OUT:    Indwelling Catheter - Urethral (mL): 2075 mL    Voided (mL): 225 mL  Total OUT: 2300 mL    Total NET: -206.7 mL    ============================ LABS =========================                        9.3    7.07  )-----------( 100      ( 04 Nov 2020 00:30 )             29.5     11-04    136  |  93<L>  |  104<H>  ----------------------------<  163<H>  3.4<L>   |  32<H>  |  3.46<H>    Ca    9.2      04 Nov 2020 15:00  Phos  2.1     11-04  Mg     2.0     11-04    TPro  5.5<L>  /  Alb  3.0<L>  /  TBili  1.8<H>  /  DBili  x   /  AST  92<H>  /  ALT  140<H>  /  AlkPhos  100  11-04      LIVER FUNCTIONS - ( 04 Nov 2020 15:00 )  Alb: 3.0 g/dL / Pro: 5.5 g/dL / ALK PHOS: 100 U/L / ALT: 140 U/L / AST: 92 U/L / GGT: x           PT/INR - ( 04 Nov 2020 05:44 )   PT: 23.2 sec;   INR: 2.00 ratio         PTT - ( 04 Nov 2020 05:44 )  PTT:39.2 sec  ABG - ( 04 Nov 2020 00:42 )  pH, Arterial: 7.53  pH, Blood: x     /  pCO2: 41    /  pO2: 121   / HCO3: 34    / Base Excess: 10.0  /  SaO2: 100         Blood Gas Arterial, Lactate: 1.5 mmol/L (11-04-20 @ 00:42)  Blood Gas Arterial, Lactate: 1.2 mmol/L (11-03-20 @ 00:37)  Blood Gas Arterial, Lactate: 1.1 mmol/L (11-02-20 @ 16:05)  Blood Gas Venous - Lactate: 1.1 mmoL/L (11-02-20 @ 16:05)  Blood Gas Arterial, Lactate: 1.2 mmol/L (11-02-20 @ 08:26)  Blood Gas Venous - Lactate: 2.4 mmoL/L (11-02-20 @ 05:30)  Blood Gas Arterial, Lactate: 1.4 mmol/L (11-02-20 @ 01:54)  Blood Gas Venous - Lactate: 2.6 mmoL/L (11-01-20 @ 21:25)      ======================Micro/Rad/Cardio=================  Telemetry: Reviewed   EKG: Reviewed  CXR: Reviewed  Culture: Reviewed   Echo: Reviewed  Cath: Reviewed  ======================================================  PAST MEDICAL & SURGICAL HISTORY:  Hypothyroidism  Afib  Type II diabetes mellitus  Aortic insufficiency  Mitral insufficiency  CKD (chronic kidney disease)  Cardiomyopathy  Systolic CHF  Hypertension  History of tonsillectomy  childhood  AICD (automatic cardioverter/defibrillator) present  8/2012    ====================ASSESSMENT ==============  ADHF  Hyperkalemia  EDIE on CKD   RLE blister w/ localized cellulitis    Plan:  ====================== NEUROLOGY=====================  A&Ox3, no active neuro issues  - s/p fall: CT Head negative for any acute pathology  - Fall risk protocol  - Continue to assess status as per protocol  - C/w Tylenol 650mg PRN q6h for analgesia    ==================== RESPIRATORY======================  Comfortable on room air, SpO2 %  - Continue to monitor SpO2 via pulse oximetry  - Clear lungs on CXR    ====================CARDIOVASCULAR==================  Acute on Chronic Decompensated HFrEF (13%) w/ AICD & Mitraclip, untreated severe TR   - Cont Inotropic support with IV Milrinone 0.375mcg infusion  - Trend MVO2/lactate/CMP  - C/w IV Levophed 0.05mcg infusion for pressor support.   - Cont diuresis with IV Lasix 10mg gtt to maintain goal net negative   - Continuous CVP monitoring and ScvO2 trending, goal 6-8mmHg  - Holding BB in setting of inotrope   - TTE: EF 13% Severe global LVSD, mild MR w/ Mitraclip in place. RVE w/ normal RVSF. Severe TR     Afib S/P DCCV  - Cont rate control with PO Amio 200mg QD  - Heparin gtt on hold, INR 2.0 will restart when INR <2.0    ===================HEMATOLOGIC/ONC ===================  H/H 9.3/29.5  - Continue trending hemoglobin and hematocrit levels    ===================== RENAL =========================  EDIE on CKD III (SCr 3.46 from 2.7), likely 2/2 occult infection  - Hyperkalemia w/ initial K of 8.1, diuresis regimen uptitrated, K now 3.4  - Oliguric: continue with IV Lasix 10mg for goal net negative fluid balance  - Renal following (Dr. Rees)  - Guidry removed, continue monitoring I/Os, BUN/Creatinine, and UOP.    ==================== GASTROINTESTINAL===================  Transaminitis, resolving with inotropic support  - AUS 11/2: Cholelithiasis without sonographic evidence of acute cholecystitis. No biliary ductal dilatation. Small ascites.   - Cont DASH/TLC diet with supplemental Glucerna Shake Cans BID.    =======================    ENDOCRINE  =====================  History of DMT2  - S/p Insulin   - C/w Admelog sliding scale premeals and qhs, Glucagon PRN for glycemic control    Hypothyroidism, TSH 8.61  - Continue with daily Synthroid 50 mcg    ========================INFECTIOUS DISEASE================  RLE blister w/ localized cellulitis  - Podiatry following, appreciate rec's  - Started on IV Cefepime for cellulitis (11/2- )  - Z-flow boots to be worn while resting in bed    Temp 98.1F, WBC within normal limits at 7.07  - Cont monitoring for fever / leukocytosis  - BCx 11/02 x2: NGTD  - UA negative  - C/w Cefepime 1g       Patient requires continuous monitoring with bedside rhythm monitoring, pulse ox monitoring, and intermittent blood gas analysis. Care plan discussed with ICU care team. Patient remained critical and at risk for life threatening decompensation.  Patient seen, examined and plan discussed with CCU team during rounds.     I have personally provided 35 minutes of critical care time excluding time spent on separate procedures.    By signing my name below, I, Bebo Barrientos, attest that this documentation has been prepared under the direction and in the presence of DOC Gómez  Electronically signed: Willis Kerns, 11-04-20 @ 18:57    I, DOC Gómez, personally performed the services described in this documentation. All medical record entries made by the lynnetteibe were at my direction and in my presence. I have reviewed the chart and agree that the record reflects my personal performance and is accurate and complete  Electronically signed: DOC Gómez       MAIN BRASWELL  MRN-[id number]  Patient is a 75y old  Male who presents with a chief complaint of AdHF and hyperkalemia (04 Nov 2020 15:53)    HPI:  74 year old male with PMH of long standng non ischemia chronic systolic heart failure (EF 20% LVIDD 6.7 )) s/p CRT-D and mitral valve clip on last admission, Afib on Eliquiss/p DCCV, CKD Stage 3  , DM-2, hypothyroidism and papillary thyroid carcinoma,, anemia. He was recently referred to HF in Aug 2020 for anasarca and a hospital admission that necessitated Milrinone assisted diuresis (> 20 kg off) w/ mitraclip for mod-severe MR which improved to mild. He was declined for an LVAD due to frailty and CKD. At time of discharge he weighed 187 pounds and had a Cr of 2.77 Recently had seen HF and had medications adjusted due to increasing weight ( metolazone stopped, torsemide increased to 80 BID, K increased, and carvedilol switched to Metoprolol).   Patient lives with his niece who states she has not been feeling well the last few days so has not been around him as much. patient states he fell out of bed, does not know how but was too weak to get up and normally can ambulate with some assistance. reports he has been "weak and wobbly" for some timebut cannot specify how long. denies fever/chills/chest pain/abd pain/vomiting/dysuria.    In ED pt found to be in AdHF and fluid overloaded w/ a K of 8. Lasix 80 given and gtt started. Milrinone 0.25 started. (01 Nov 2020 17:42)      Hospital Course:  11/1 Admitted to CICU for ADHF and hyperkalemia    24 HOUR EVENTS:    REVIEW OF SYSTEMS:   Constitutional: + weakness. no fevers, or chills  Eyes/ENT: No visual changes  Respiratory: No cough, wheezing, hemoptysis  Cardiovascular: No chest pain, no palpitations  Gastrointestinal: No abdominal pain. No nausea, vomiting, hematemesis.   Genitourinary: No dysuria  Neurological: No numbness, no weakness  Skin: No itching, rashes    ICU Vital Signs Last 24 Hrs  T(C): 36.7 (04 Nov 2020 16:00), Max: 37.1 (04 Nov 2020 08:00)  T(F): 98.1 (04 Nov 2020 16:00), Max: 98.8 (04 Nov 2020 08:00)  HR: 90 (04 Nov 2020 18:15) (80 - 108)  ABP: 116/58 (04 Nov 2020 18:15) (84/38 - 134/66)  ABP(mean): 78 (04 Nov 2020 18:15) (52 - 86)  RR: 20 (04 Nov 2020 18:15) (11 - 28)  SpO2: 100% (04 Nov 2020 18:00) (89% - 100%)  CVP(mm Hg): 7 (11-04-20 @ 12:15) (-4 - 19)  PA: 38/19 (11-04-20 @ 12:15) (29/10 - 62/33)  PA(mean): 27 (11-04-20 @ 12:15) (17 - 45)    I&O's Summary    03 Nov 2020 07:01  -  04 Nov 2020 07:00  --------------------------------------------------------  IN: 1978 mL / OUT: 5600 mL / NET: -3622 mL    04 Nov 2020 07:01  -  04 Nov 2020 18:57  --------------------------------------------------------  IN: 2093.3 mL / OUT: 2300 mL / NET: -206.7 mL      POCT Blood Glucose.: 193 mg/dL (04 Nov 2020 11:30)      PHYSICAL EXAM:   General: No acute distress  Eyes: EOMI, PERRLA, conjunctiva and sclera clear  Chest/Lung: CTAB, no wheezes, rales, or rhonchi  Heart: Regular rate, regular rhythm. Normal S1/S2. Systolic murmur  Abdomen: Soft, nontender, nondistended. Normal bowel sounds.  Extremites: Mild lower extremity peripheral non pitting edema up to mid shins, + Peripheral pulses.  Neurology: A&O x3, no focal deficits  Skin: Intact blister with localized cellulitis to right lower anterior tibia  Lines: RIJ TLC 11/1 dressing clean, dry, intact    ============================I/O===========================   I&O's Detail    03 Nov 2020 07:01  -  04 Nov 2020 07:00  --------------------------------------------------------  IN:    Furosemide: 40 mL    Furosemide: 100 mL    IV PiggyBack: 570 mL    Milrinone: 244.8 mL    Norepinephrine: 112.4 mL    Norepinephrine: 70.8 mL    Oral Fluid: 840 mL  Total IN: 1978 mL    OUT:    Indwelling Catheter - Urethral (mL): 5600 mL  Total OUT: 5600 mL    Total NET: -3622 mL      04 Nov 2020 07:01  -  04 Nov 2020 18:57  --------------------------------------------------------  IN:    Furosemide: 55 mL    IV PiggyBack: 500 mL    IV PiggyBack: 187.5 mL    Milrinone: 112.2 mL    Norepinephrine: 38.6 mL    Oral Fluid: 1200 mL  Total IN: 2093.3 mL    OUT:    Indwelling Catheter - Urethral (mL): 2075 mL    Voided (mL): 225 mL  Total OUT: 2300 mL    Total NET: -206.7 mL    ============================ LABS =========================                        9.3    7.07  )-----------( 100      ( 04 Nov 2020 00:30 )             29.5     11-04    136  |  93<L>  |  104<H>  ----------------------------<  163<H>  3.4<L>   |  32<H>  |  3.46<H>    Ca    9.2      04 Nov 2020 15:00  Phos  2.1     11-04  Mg     2.0     11-04    TPro  5.5<L>  /  Alb  3.0<L>  /  TBili  1.8<H>  /  DBili  x   /  AST  92<H>  /  ALT  140<H>  /  AlkPhos  100  11-04      LIVER FUNCTIONS - ( 04 Nov 2020 15:00 )  Alb: 3.0 g/dL / Pro: 5.5 g/dL / ALK PHOS: 100 U/L / ALT: 140 U/L / AST: 92 U/L / GGT: x           PT/INR - ( 04 Nov 2020 05:44 )   PT: 23.2 sec;   INR: 2.00 ratio         PTT - ( 04 Nov 2020 05:44 )  PTT:39.2 sec  ABG - ( 04 Nov 2020 00:42 )  pH, Arterial: 7.53  pH, Blood: x     /  pCO2: 41    /  pO2: 121   / HCO3: 34    / Base Excess: 10.0  /  SaO2: 100         Blood Gas Arterial, Lactate: 1.5 mmol/L (11-04-20 @ 00:42)  Blood Gas Arterial, Lactate: 1.2 mmol/L (11-03-20 @ 00:37)  Blood Gas Arterial, Lactate: 1.1 mmol/L (11-02-20 @ 16:05)  Blood Gas Venous - Lactate: 1.1 mmoL/L (11-02-20 @ 16:05)  Blood Gas Arterial, Lactate: 1.2 mmol/L (11-02-20 @ 08:26)  Blood Gas Venous - Lactate: 2.4 mmoL/L (11-02-20 @ 05:30)  Blood Gas Arterial, Lactate: 1.4 mmol/L (11-02-20 @ 01:54)  Blood Gas Venous - Lactate: 2.6 mmoL/L (11-01-20 @ 21:25)      ======================Micro/Rad/Cardio=================  Telemetry: Reviewed   EKG: Reviewed  CXR: Reviewed  Culture: Reviewed   Echo: Reviewed  Cath: Reviewed  ======================================================  PAST MEDICAL & SURGICAL HISTORY:  Hypothyroidism  Afib  Type II diabetes mellitus  Aortic insufficiency  Mitral insufficiency  CKD (chronic kidney disease)  Cardiomyopathy  Systolic CHF  Hypertension  History of tonsillectomy  childhood  AICD (automatic cardioverter/defibrillator) present  8/2012    ====================ASSESSMENT ==============  ADHF  Hyperkalemia  EDIE on CKD   RLE blister w/ localized cellulitis    Plan:  ====================== NEUROLOGY=====================  A&Ox3, no active neuro issues  - s/p fall: CT Head negative for any acute pathology  - Fall risk protocol  - Continue to assess status as per protocol  - C/w Tylenol 650mg PRN q6h for analgesia    ==================== RESPIRATORY======================  Comfortable on room air, SpO2 %  - Continue to monitor SpO2 via pulse oximetry  - Clear lungs on CXR    ====================CARDIOVASCULAR==================  Acute on Chronic Decompensated HFrEF (13%) w/ AICD & Mitraclip, untreated severe TR   - Cont Inotropic support with IV Milrinone 0.375mcg infusion  - [place] d/c today, trend other markers of perfusion (Uop, SCr, Lact, etc.)    - Weaned off levo at 9pm    - Cont diuresis with IV Lasix 10mg gtt to maintain goal net negative, consider transition to IVP in AM (40-80 Lasix BID)   - Holding BB/hydral in setting of inotrope   - TTE: EF 13% Severe global LVSD, mild MR w/ Mitraclip in place. RVE w/ normal RVSF. Severe TR     Afib S/P DCCV  - Cont rate control with PO Amio 200mg QD  - Heparin gtt on hold, INR 2.0 will restart when INR <2.0    ===================HEMATOLOGIC/ONC ===================  H/H 9.3/29.5  - Continue trending hemoglobin and hematocrit levels    ===================== RENAL =========================  EDIE on CKD III (SCr 3.46 from 2.7), likely 2/2 occult infection  - Hyperkalemia w/ initial K of 8.1, diuresis regimen uptitrated, K now 3.4  - continue with IV Lasix 10mg for goal net negative fluid balance, see above   - Renal following (Dr. Rees)  - Guidry removed, continue monitoring I/Os, BUN/Creatinine, and UOP.    ==================== GASTROINTESTINAL===================  Transaminitis, resolving with inotropic support  - AUS 11/2: Cholelithiasis without sonographic evidence of acute cholecystitis. No biliary ductal dilatation. Small ascites.   - Cont DASH/TLC diet with supplemental Glucerna Shake Cans BID.    =======================    ENDOCRINE  =====================  History of DMT2  - S/p Insulin   - C/w Admelog sliding scale premeals and qhs, Glucagon PRN for glycemic control    Hypothyroidism, TSH 8.61  - Continue with daily Synthroid 50 mcg    ========================INFECTIOUS DISEASE================  RLE blister w/ localized cellulitis  - Podiatry following, appreciate rec's  - Started on IV Cefepime for cellulitis (11/2- )  - Z-flow boots to be worn while resting in bed    Temp 98.1F, WBC within normal limits at 7.07  - Cont monitoring for fever / leukocytosis  - BCx 11/02 x2: NGTD  - UA negative  - C/w Cefepime 1g       Patient requires continuous monitoring with bedside rhythm monitoring, pulse ox monitoring, and intermittent blood gas analysis. Care plan discussed with ICU care team. Patient remained critical and at risk for life threatening decompensation.  Patient seen, examined and plan discussed with CCU team during rounds.     I have personally provided 0 minutes of critical care time excluding time spent on separate procedures.    By signing my name below, I, Bebo Barrientos, attest that this documentation has been prepared under the direction and in the presence of DOC Gómez  Electronically signed: Willis Kerns, 11-04-20 @ 18:57    I, DOC Gómez, personally performed the services described in this documentation. All medical record entries made by the lynnetteibe were at my direction and in my presence. I have reviewed the chart and agree that the record reflects my personal performance and is accurate and complete  Electronically signed: DOC Gómez

## 2020-11-04 NOTE — PROGRESS NOTE ADULT - SUBJECTIVE AND OBJECTIVE BOX
PATIENT: MAIN BRASWELL, MRN: [id number]    CHIEF COMPLAINT: Patient is a 75y old  Male who presents with a chief complaint of AdHF and hyperkalemia (2020 07:58)      INTERVAL HISTORY/OVERNIGHT EVENTS: No overnight events. At bedside, patient has been sleeping and eating well. Denies N/V/D/C. Last BM x1 regular two days ago. Denies abdominal pain. Denies chest pain or SOB, cough. Oriented to person, place, and time. Breathing comfortably on room air. C/o feeling tired and weak.    MEDICATIONS:  MEDICATIONS  (STANDING):  aMIOdarone    Tablet 200 milliGRAM(s) Oral daily  cefepime   IVPB      cefepime   IVPB 1000 milliGRAM(s) IV Intermittent every 24 hours  chlorhexidine 4% Liquid 1 Application(s) Topical <User Schedule>  chlorhexidine 4% Liquid 1 Application(s) Topical <User Schedule>  dextrose 5%. 1000 milliLiter(s) (50 mL/Hr) IV Continuous <Continuous>  dextrose 50% Injectable 12.5 Gram(s) IV Push once  dextrose 50% Injectable 25 Gram(s) IV Push once  dextrose 50% Injectable 25 Gram(s) IV Push once  furosemide Infusion 10 mG/Hr (5 mL/Hr) IV Continuous <Continuous>  insulin lispro (ADMELOG) corrective regimen sliding scale   SubCutaneous three times a day before meals  insulin lispro (ADMELOG) corrective regimen sliding scale   SubCutaneous at bedtime  levothyroxine 50 MICROGram(s) Oral daily  milrinone Infusion 0.375 MICROgram(s)/kG/Min (10.2 mL/Hr) IV Continuous <Continuous>  norepinephrine Infusion 0.05 MICROgram(s)/kG/Min (4.25 mL/Hr) IV Continuous <Continuous>  potassium chloride    Tablet ER 20 milliEquivalent(s) Oral every 2 hours    MEDICATIONS  (PRN):  acetaminophen   Tablet .. 650 milliGRAM(s) Oral every 6 hours PRN Mild Pain (1 - 3)  dextrose 40% Gel 15 Gram(s) Oral once PRN Blood Glucose LESS THAN 70 milliGRAM(s)/deciliter  glucagon  Injectable 1 milliGRAM(s) IntraMuscular once PRN Glucose LESS THAN 70 milligrams/deciliter  hydrocortisone 2.5% Rectal Cream 1 Application(s) Rectal daily PRN hemorrhoid pain/itch  sodium chloride 0.9% lock flush 10 milliLiter(s) IV Push every 1 hour PRN Pre/post blood products, medications, blood draw, and to maintain line patency      ALLERGIES: Allergies    No Known Allergies    Intolerances        OBJECTIVE:  ICU Vital Signs Last 24 Hrs  T(C): 36.5 (2020 19:00), Max: 36.5 (2020 19:00)  T(F): 97.7 (2020 19:00), Max: 97.7 (2020 19:00)  HR: 104 (2020 06:45) (80 - 108)  BP: --  BP(mean): --  ABP: 98/44 (2020 06:45) (84/38 - 134/66)  ABP(mean): 58 (2020 06:45) (52 - 86)  RR: 16 (2020 06:45) (12 - 28)  SpO2: 98% (2020 06:00) (92% - 100%)      Adult Advanced Hemodynamics Last 24 Hrs  CVP(mm Hg): 5 (2020 06:45) (0 - 19)  CVP(cm H2O): --  CO: --  CI: --  PA: 40/18 (2020 06:45) (35/13 - 62/33)  PA(mean): 27 (2020 06:45) (21 - 45)  PCWP: --  SVR: --  SVRI: --  PVR: --  PVRI: --  CAPILLARY BLOOD GLUCOSE      POCT Blood Glucose.: 201 mg/dL (2020 07:25)  POCT Blood Glucose.: 178 mg/dL (2020 21:47)    CAPILLARY BLOOD GLUCOSE      POCT Blood Glucose.: 201 mg/dL (2020 07:25)    I&O's Summary    2020 07:01  -  2020 07:00  --------------------------------------------------------  IN: 1956.8 mL / OUT: 5300 mL / NET: -3343.2 mL      Daily     Daily Weight in k.9 (2020 06:00)    PHYSICAL EXAMINATION:  General: Comfortable, no acute distress, cooperative with exam.  HEENT: PERRLA, EOMI, moist mucous membranes.  Respiratory: CTAB, normal respiratory effort, no coughing, wheezes, crackles, or rales.  CV: RRR, S1S2, no murmurs, rubs or gallops. No JVD. Distal pulses intact.  Abdominal: Soft, nontender, nondistended, no rebound or guarding, normal bowel sounds.  Neurology: AOx3, no focal neuro defects, WILBURN x 4.  Extremities: No pitting edema, + Peripheral pulses.  Incisions:   Tubes:    LABS:  ABG - ( 2020 00:42 )  pH, Arterial: 7.53  pH, Blood: x     /  pCO2: 41    /  pO2: 121   / HCO3: 34    / Base Excess: 10.0  /  SaO2: 100                                     9.3    7.07  )-----------( 100      ( 2020 00:30 )             29.5     11-04    138  |  95<L>  |  108<H>  ----------------------------<  170<H>  3.4<L>   |  30  |  3.48<H>    Ca    9.2      2020 05:44  Phos  2.8     11-04  Mg     2.1     11-04    TPro  5.5<L>  /  Alb  3.1<L>  /  TBili  2.0<H>  /  DBili  x   /  AST  99<H>  /  ALT  146<H>  /  AlkPhos  106  11-04    LIVER FUNCTIONS - ( 2020 05:44 )  Alb: 3.1 g/dL / Pro: 5.5 g/dL / ALK PHOS: 106 U/L / ALT: 146 U/L / AST: 99 U/L / GGT: x           PT/INR - ( 2020 05:44 )   PT: 23.2 sec;   INR: 2.00 ratio         PTT - ( 2020 05:44 )  PTT:39.2 sec        Urinalysis Basic - ( 2020 14:24 )    Color: Light Yellow / Appearance: Clear / S.009 / pH: x  Gluc: x / Ketone: Negative  / Bili: Negative / Urobili: Negative   Blood: x / Protein: Negative / Nitrite: Negative   Leuk Esterase: Negative / RBC: 23 /hpf / WBC 2 /HPF   Sq Epi: x / Non Sq Epi: 0 /hpf / Bacteria: Negative        TELEMETRY:     EKG:     IMAGING: PATIENT: MAIN BRASWELL, MRN: [id number]    CHIEF COMPLAINT: Patient is a 75y old  Male who presents with a chief complaint of AdHF and hyperkalemia (2020 07:58)      INTERVAL HISTORY/OVERNIGHT EVENTS: No overnight events. At bedside, patient has been sleeping and eating well. Denies N/V/D/C. Last BM x1 regular two days ago. Denies abdominal pain. Denies chest pain or SOB, cough. Oriented to person, place, and time. Breathing comfortably on room air. C/o feeling tired and weak.    MEDICATIONS:  MEDICATIONS  (STANDING):  aMIOdarone    Tablet 200 milliGRAM(s) Oral daily  cefepime   IVPB      cefepime   IVPB 1000 milliGRAM(s) IV Intermittent every 24 hours  chlorhexidine 4% Liquid 1 Application(s) Topical <User Schedule>  chlorhexidine 4% Liquid 1 Application(s) Topical <User Schedule>  dextrose 5%. 1000 milliLiter(s) (50 mL/Hr) IV Continuous <Continuous>  dextrose 50% Injectable 12.5 Gram(s) IV Push once  dextrose 50% Injectable 25 Gram(s) IV Push once  dextrose 50% Injectable 25 Gram(s) IV Push once  furosemide Infusion 10 mG/Hr (5 mL/Hr) IV Continuous <Continuous>  insulin lispro (ADMELOG) corrective regimen sliding scale   SubCutaneous three times a day before meals  insulin lispro (ADMELOG) corrective regimen sliding scale   SubCutaneous at bedtime  levothyroxine 50 MICROGram(s) Oral daily  milrinone Infusion 0.375 MICROgram(s)/kG/Min (10.2 mL/Hr) IV Continuous <Continuous>  norepinephrine Infusion 0.05 MICROgram(s)/kG/Min (4.25 mL/Hr) IV Continuous <Continuous>  potassium chloride    Tablet ER 20 milliEquivalent(s) Oral every 2 hours    MEDICATIONS  (PRN):  acetaminophen   Tablet .. 650 milliGRAM(s) Oral every 6 hours PRN Mild Pain (1 - 3)  dextrose 40% Gel 15 Gram(s) Oral once PRN Blood Glucose LESS THAN 70 milliGRAM(s)/deciliter  glucagon  Injectable 1 milliGRAM(s) IntraMuscular once PRN Glucose LESS THAN 70 milligrams/deciliter  hydrocortisone 2.5% Rectal Cream 1 Application(s) Rectal daily PRN hemorrhoid pain/itch  sodium chloride 0.9% lock flush 10 milliLiter(s) IV Push every 1 hour PRN Pre/post blood products, medications, blood draw, and to maintain line patency      ALLERGIES: Allergies    No Known Allergies    Intolerances        OBJECTIVE:  ICU Vital Signs Last 24 Hrs  T(C): 36.5 (2020 19:00), Max: 36.5 (2020 19:00)  T(F): 97.7 (2020 19:00), Max: 97.7 (2020 19:00)  HR: 104 (2020 06:45) (80 - 108)  BP: --  BP(mean): --  ABP: 98/44 (2020 06:45) (84/38 - 134/66)  ABP(mean): 58 (2020 06:45) (52 - 86)  RR: 16 (2020 06:45) (12 - 28)  SpO2: 98% (2020 06:00) (92% - 100%)      Adult Advanced Hemodynamics Last 24 Hrs  CVP(mm Hg): 5 (2020 06:45) (0 - 19)  CVP(cm H2O): --  CO: --  CI: --  PA: 40/18 (2020 06:45) (35/13 - 62/33)  PA(mean): 27 (2020 06:45) (21 - 45)  PCWP: --  SVR: --  SVRI: --  PVR: --  PVRI: --  CAPILLARY BLOOD GLUCOSE      POCT Blood Glucose.: 201 mg/dL (2020 07:25)  POCT Blood Glucose.: 178 mg/dL (2020 21:47)    CAPILLARY BLOOD GLUCOSE      POCT Blood Glucose.: 201 mg/dL (2020 07:25)    I&O's Summary    2020 07:01  -  2020 07:00  --------------------------------------------------------  IN: 1956.8 mL / OUT: 5300 mL / NET: -3343.2 mL      Daily     Daily Weight in k.9 (2020 06:00)    PHYSICAL EXAMINATION:  General: Comfortable, no acute distress, cooperative with exam  HEENT: PERRLA, EOMI, moist mucous membranes.  Respiratory: CTAB, normal respiratory effort, no coughing, wheezes, crackles, or rales.  CV: RRR, S1S2, systolic murmur. + JVD. Distal pulses intact.  Abdominal: Soft, nontender, nondistended, no rebound or guarding, normal bowel sounds.  Neurology: AOx3, no focal neuro defects, WILBURN x 4.  Extremities: Mild lower extremity peripheral non pitting edema up to mid shins, + Peripheral pulses.  Incisions:   Tubes:    LABS:  ABG - ( 2020 00:42 )  pH, Arterial: 7.53  pH, Blood: x     /  pCO2: 41    /  pO2: 121   / HCO3: 34    / Base Excess: 10.0  /  SaO2: 100                                     9.3    7.07  )-----------( 100      ( 2020 00:30 )             29.5     11-04    138  |  95<L>  |  108<H>  ----------------------------<  170<H>  3.4<L>   |  30  |  3.48<H>    Ca    9.2      2020 05:44  Phos  2.8     11-04  Mg     2.1     11-04    TPro  5.5<L>  /  Alb  3.1<L>  /  TBili  2.0<H>  /  DBili  x   /  AST  99<H>  /  ALT  146<H>  /  AlkPhos  106  11-04    LIVER FUNCTIONS - ( 2020 05:44 )  Alb: 3.1 g/dL / Pro: 5.5 g/dL / ALK PHOS: 106 U/L / ALT: 146 U/L / AST: 99 U/L / GGT: x           PT/INR - ( 2020 05:44 )   PT: 23.2 sec;   INR: 2.00 ratio         PTT - ( 2020 05:44 )  PTT:39.2 sec        Urinalysis Basic - ( 2020 14:24 )    Color: Light Yellow / Appearance: Clear / S.009 / pH: x  Gluc: x / Ketone: Negative  / Bili: Negative / Urobili: Negative   Blood: x / Protein: Negative / Nitrite: Negative   Leuk Esterase: Negative / RBC: 23 /hpf / WBC 2 /HPF   Sq Epi: x / Non Sq Epi: 0 /hpf / Bacteria: Negative        TELEMETRY:     EKG:     IMAGING:

## 2020-11-04 NOTE — PHYSICAL THERAPY INITIAL EVALUATION ADULT - PERTINENT HX OF CURRENT PROBLEM, REHAB EVAL
Pt is 74M admitted 11/1/20 PMHx chronic systolic heart failure (EF 20% LVIDD 6.7 )) s/p CRT-D and mitral valve clip on last admission, Afib on Eliquiss/p DCCV, CKD Stage 3, DM2, hypothyroidism and papillary thyroid carcinoma, anemia. A/w weakness.

## 2020-11-05 NOTE — PROGRESS NOTE ADULT - SUBJECTIVE AND OBJECTIVE BOX
[facility] DIVISION OF KIDNEY DISEASES AND HYPERTENSION -- FOLLOW UP NOTE  --------------------------------------------------------------------------------  HPI: 75-year-old male with advanced heart failure, CKD, Afib, DM, hypothyroidism, was admitted to [facility] on 11/1/20 for worsening lethargy. Pt. transferred to CCU and started on IV pressors and antibiotics.  Pt. evaluated by Heart Failure team, was not noted to be grossly volume overloaded on invasive hemodynamic measures concerning for possible sepsis as cause of shock. Nephrology team consulted for elevated serum creatinine. Pt. was hospitalized at [facility] from 8/21/20-9/21/20 and underwent mitraclip placement. Upon review of labs on [facility], Scr was 2.77 on 9/21/20. Scr on arrival to the ER was 4.26 on 4/1/20. Scr today is 3.25. Pt. remains on IV Lasix infusion with good urine output.    Pt. evaluated at bedside, in no acute distress. Pt. now sitting in chair.    PAST HISTORY  --------------------------------------------------------------------------------  No significant changes to PMH, PSH, FHx, SHx, unless otherwise noted    ALLERGIES & MEDICATIONS  --------------------------------------------------------------------------------  Allergies    No Known Allergies    Intolerances    Standing Inpatient Medications  aMIOdarone    Tablet 200 milliGRAM(s) Oral daily  cefepime   IVPB      cefepime   IVPB 1000 milliGRAM(s) IV Intermittent every 24 hours  chlorhexidine 4% Liquid 1 Application(s) Topical <User Schedule>  chlorhexidine 4% Liquid 1 Application(s) Topical <User Schedule>  furosemide Infusion 10 mG/Hr IV Continuous <Continuous>  heparin  Infusion.  Unit(s)/Hr IV Continuous <Continuous>  insulin lispro (ADMELOG) corrective regimen sliding scale   SubCutaneous three times a day before meals  insulin lispro (ADMELOG) corrective regimen sliding scale   SubCutaneous at bedtime  levothyroxine 50 MICROGram(s) Oral daily  melatonin 3 milliGRAM(s) Oral at bedtime  milrinone Infusion 0.375 MICROgram(s)/kG/Min IV Continuous <Continuous>  norepinephrine Infusion 0.05 MICROgram(s)/kG/Min IV Continuous <Continuous>    REVIEW OF SYSTEMS  --------------------------------------------------------------------------------  Gen: no lethargy  Respiratory: No dyspnea  CV: No chest pain  GI: No abdominal pain  MSK: + LE edema  Neuro: No dizziness  Heme: No bleeding    All other systems were reviewed and are negative, except as noted.    VITALS/PHYSICAL EXAM  --------------------------------------------------------------------------------  T(C): 36.7 (11-05-20 @ 09:00), Max: 37.1 (11-04-20 @ 12:00)  HR: 82 (11-05-20 @ 09:45) (80 - 106)  BP: --  RR: 15 (11-05-20 @ 09:45) (8 - 28)  SpO2: 98% (11-05-20 @ 09:45) (79% - 100%)  Wt(kg): --    11-04-20 @ 07:01  -  11-05-20 @ 07:00  --------------------------------------------------------  IN: 2978 mL / OUT: 4525 mL / NET: -1547 mL    11-05-20 @ 07:01  -  11-05-20 @ 10:55  --------------------------------------------------------  IN: 216.6 mL / OUT: 725 mL / NET: -508.4 mL    Physical Exam:  	Gen: NAD  	HEENT: DANIEL  	Pulm: good air entry B/L  	CV: S1S2  	Abd: Soft, +BS   	Ext: LE pitting edema B/L  	Neuro: Awake  	Skin: Warm and dry    LABS/STUDIES  --------------------------------------------------------------------------------              9.3    6.13  >-----------<  91       [11-04-20 @ 22:39]              29.5     136  |  94  |  104  ----------------------------<  194      [11-05-20 @ 05:04]  3.4   |  31  |  3.25        Ca     9.4     [11-05-20 @ 05:04]      Mg     2.4     [11-05-20 @ 05:04]      Phos  2.8     [11-05-20 @ 05:04]    Creatinine Trend:  SCr 3.25 [11-05 @ 05:04]  SCr 3.12 [11-04 @ 22:39]  SCr 3.46 [11-04 @ 15:00]  SCr 2.81 [11-04 @ 14:09]  SCr 3.48 [11-04 @ 05:44]    Urine Creatinine 16      [11-02-20 @ 14:24]  Urine Sodium 86      [11-02-20 @ 14:24]  Urine Osmolality 306      [11-02-20 @ 14:24]

## 2020-11-05 NOTE — PROGRESS NOTE ADULT - ASSESSMENT
76y/o M w/ h/o long standing non ischemia chronic systolic heart failure (EF 20% LVIDD 6.7 )) s/p CRT-D and mitral valve clip on last admission, Afib on Eliquis s/p DCCV, CKD-3, T2DM, hypothyroidism and papillary thyroid carcinoma, anemia, recently referred to HF in Aug 2020 for anasarca and a hospital admission that necessitated Milrinone assisted diuresis (> 20 kg off) w/ mitraclip for mod-severe MR which improved to mild. He was declined for an LVAD due to frailty and CKD. CCU for acute decompensated HF and hyperkalemia, both improved with diuresis.    #Neuro  - AAOx3, following commands, no active issues    #Resp  - Oxygenating well on room air, no active issues    #CV  *AdHF improved with milrinone and lasix  - c/w Milrinone assisted diuresis @.375   - d/c lasix gtt, start lasix qd  - d/c levo, continue to withhold carvedilol, hydralazine  *Afib  - c/w amiodarone 200mg qD    #GI  - c/w Regular diet    #/Renal  *EDIE, likely 2/2 occult infection, improving with use of abx  - Guidry removed with plan to continue to monitor I/O with goal net negative  - Renal following, appreciate recs   - Replete electrolytes with K with goal >4 while on Lasix gtt    #ID  *Sepsis of unknown source improving with broad spectrum antibiotics  - improved leukocytosis and lactate, afebrile  - Blood Cxs NGTD 11/2, UA wnl, d/c vancomycin, last dose cefepime renally dose for tomorrow (11/2-11/6)    #Endo  *Hypothyroidism  - c/w home synthroid  - SSI lispro before meals    #Heme  - continue to monitor h/h and platelets   - c/w heparin gtt goal PTT 60-99    #Lines  - L IJ TLC (11/1 - ), R radial A line (11/1 - )    #GOC  - DNR/DNI verbally stated with no MOLST form addressed    #Dispo  - c/w ICU level of care 76y/o M w/ h/o long standing non ischemia chronic systolic heart failure (EF 20% LVIDD 6.7 )) s/p CRT-D and mitral valve clip on last admission, Afib on Eliquis s/p DCCV, CKD-3, T2DM, hypothyroidism and papillary thyroid carcinoma, anemia, recently referred to HF in Aug 2020 for anasarca and a hospital admission that necessitated Milrinone assisted diuresis (> 20 kg off) w/ mitraclip for mod-severe MR which improved to mild. He was declined for an LVAD due to frailty and CKD. CCU for acute decompensated HF and hyperkalemia, both improved with diuresis.    #Neuro  - AAOx3, following commands, not c/o pain, able to ambulate with walker    #Resp  - Oxygenating well on room air, no active issues    #CV  *AdHF improved with milrinone and lasix  - c/w Milrinone @.375 and lasix gtt @10, with plan to d/c tomorrow and start lasix IVP  - off all pressors, continue to withhold carvedilol, hydralazine  - Heart failure consulted, appreciate recs  *Afib  - c/w amiodarone 200mg qD    #GI  - c/w Regular diet    #/Renal  *EDIE, likely 2/2 occult infection, stable and able to tolerate Lasix gtt  - Renal following, appreciate recs   - Replete electrolytes with K with goal >4 while on Lasix gtt    #ID  *Sepsis of unknown source improving with broad spectrum antibiotics  - improved leukocytosis and lactate, afebrile  - Blood Cxs NGTD 11/2, UA wnl, last dose cefepime renally dosed for tomorrow (11/2-11/6)    #Endo  *Hypothyroidism  - c/w home synthroid  - SSI lispro before meals    #Heme  - continue to monitor h/h and platelets   - hold heparin gtt, PTT>200, goal PTT 60-99    #Dispo  - does not require CCU level of care, stable for Telemetry

## 2020-11-05 NOTE — CHART NOTE - NSCHARTNOTEFT_GEN_A_CORE
Medicine PA Note     MAIN BRASWELL  MRN-[id number]       Notified by RN for rash. Pt seen and evaluated at bedside. Pt states he has had a rash on his upper extremities for over a year. He endorses that occasionally the rash will form into water blisters and will become very pruritic. He reports taking hydrocortisone cream at home which helps alleviate the rash. At this time, there are no blisters noted,     Vital Signs Last 24 Hrs  T(C): 36.8 (20 @ 20:35), Max: 36.8 (20 @ 20:35)  T(F): 98.3 (20 @ 20:35), Max: 98.3 (20 @ 20:35)  HR: 85 (20 @ 20:35) (80 - 106)  BP: 94/53 (20 @ 20:35) (83/48 - 104/61)  BP(mean): 64 (20 @ 18:00) (57 - 82)  RR: 16 (20 @ 20:35) (12 - 28)  SpO2: 99% (20 @ 20:35) (91% - 100%)  Daily     Daily Weight in k.4 (2020 05:30)  I&O's Summary    2020 07:  -  2020 07:00  --------------------------------------------------------  IN: 2978 mL / OUT: 4525 mL / NET: -1547 mL    2020 07:01  -  2020 22:07  --------------------------------------------------------  IN: 818.2 mL / OUT: 1800 mL / NET: -981.8 mL      CAPILLARY BLOOD GLUCOSE                          9.0    5.73  )-----------( 75       ( 2020 14:26 )             29.5         136  |  93<L>  |  99<H>  ----------------------------<  168<H>  3.5   |  34<H>  |  3.08<H>    Ca    9.5      2020 11:26  Phos  2.7       Mg     2.3         TPro  6.0  /  Alb  3.4  /  TBili  1.7<H>  /  DBili  x   /  AST  81<H>  /  ALT  128<H>  /  AlkPhos  104      PT/INR - ( 2020 11:26 )   PT: 19.0 sec;   INR: 1.62 ratio         PTT - ( 2020 11:26 )  PTT:>200.0 sec      ABG - ( 2020 22:36 )  pH, Arterial: 7.48  pH, Blood: x     /  pCO2: 45    /  pO2: 112   / HCO3: 33    / Base Excess: 9.0   /  SaO2: 99                  Radiology:    PHYSICAL EXAM:  GENERAL: NAD, well-developed  HEAD:  Atraumatic, Normocephalic  EYES: EOMI, PERRLA, conjunctiva and sclera clear  NECK: Supple, No JVD  CHEST/LUNG: Clear to auscultation bilaterally; No wheeze  HEART: Regular rate and rhythm; No murmurs, rubs, or gallops  ABDOMEN: Soft, Nontender, Nondistended; Bowel sounds present  EXTREMITIES:  2+ Peripheral Pulses, No clubbing, cyanosis, or edema  PSYCH: AAOx3  NEUROLOGY: non-focal  SKIN: No rashes or lesions    Assessment/Plan: HPI:  74 year old male with PMH of long standng non ischemia chronic systolic heart failure (EF 20% LVIDD 6.7 )) s/p CRT-D and mitral valve clip on last admission, Afib on Eliquiss/p DCCV, CKD Stage 3  , DM-2, hypothyroidism and papillary thyroid carcinoma,, anemia. He was recently referred to HF in Aug 2020 for anasarca and a hospital admission that necessitated Milrinone assisted diuresis (> 20 kg off) w/ mitraclip for mod-severe MR which improved to mild. He was declined for an LVAD due to frailty and CKD. At time of discharge he weighed 187 pounds and had a Cr of 2.77 Recently had seen HF and had medications adjusted due to increasing weight ( metolazone stopped, torsemide increased to 80 BID, K increased, and carvedilol switched to Metoprolol).   Patient lives with his niece who states she has not been feeling well the last few days so has not been around him as much. patient states he fell out of bed, does not know how but was too weak to get up and normally can ambulate with some assistance. reports he has been "weak and wobbly" for some timebut cannot specify how long. denies fever/chills/chest pain/abd pain/vomiting/dysuria.    In ED pt found to be in AdHF and fluid overloaded w/ a K of 8. Lasix 80 given and gtt started. Milrinone 0.25 started.        Cardiology Summary    Echo:   09/15/20 TTE: HR 80 bpm, LVIDd 6.8 cm, LVEF 21%, diastolic dysfunction, mod RVE with low normal function, severe GONSALO, 1-2+ MR s/p MitraClip (mean 4 mmHg), mod TR, mod PH (RVSP 54 mmHg), residual ASD.      Cardiac Cath:    (Off milrinone) Central SPO2: CO 4.79, CI 2.53      (2020 17:42)    >VSS aside from  >  > Will endorse to AM, attending to follow    Laly Joseph PA-C,   Department of Medicine Medicine PA Note     MAIN BRASWELL  MRN-[id number]       Notified by RN for rash. Pt seen and evaluated at bedside. Pt states he has had a rash on his upper extremities for over a year. He endorses that occasionally the rash will form into water blisters and will become very pruritic. He reports taking hydrocortisone cream at home which helps alleviate the rash. At this time, there are no blisters noted on upper extremities. Pt denies fever, headache, CP, SOB, N/V/D, weakness, dizziness.    Vital Signs Last 24 Hrs  T(C): 36.8 (20 @ 20:35), Max: 36.8 (20 @ 20:35)  T(F): 98.3 (20 @ 20:35), Max: 98.3 (20 @ 20:35)  HR: 85 (20 @ 20:35) (80 - 106)  BP: 94/53 (20 @ 20:35) (83/48 - 104/61)  BP(mean): 64 (20 @ 18:00) (57 - 82)  RR: 16 (20 @ 20:35) (12 - 28)  SpO2: 99% (20 @ 20:35) (91% - 100%)  Daily     Daily Weight in k.4 (2020 05:30)  I&O's Summary    2020 07:  -  2020 07:00  --------------------------------------------------------  IN: 2978 mL / OUT: 4525 mL / NET: -1547 mL    2020 07:01  -  2020 22:07  --------------------------------------------------------  IN: 818.2 mL / OUT: 1800 mL / NET: -981.8 mL      CAPILLARY BLOOD GLUCOSE                          9.0    5.73  )-----------( 75       ( 2020 14:26 )             29.5     11-    136  |  93<L>  |  99<H>  ----------------------------<  168<H>  3.5   |  34<H>  |  3.08<H>    Ca    9.5      2020 11:26  Phos  2.7     11  Mg     2.3         TPro  6.0  /  Alb  3.4  /  TBili  1.7<H>  /  DBili  x   /  AST  81<H>  /  ALT  128<H>  /  AlkPhos  104  11    PT/INR - ( 2020 11:26 )   PT: 19.0 sec;   INR: 1.62 ratio         PTT - ( 2020 11:26 )  PTT:>200.0 sec      ABG - ( 2020 22:36 )  pH, Arterial: 7.48  pH, Blood: x     /  pCO2: 45    /  pO2: 112   / HCO3: 33    / Base Excess: 9.0   /  SaO2: 99            PHYSICAL EXAM:  GENERAL: NAD, well-developed  CHEST/LUNG: Clear to auscultation bilaterally; No wheeze  HEART: Regular rate and rhythm; No murmurs, rubs, or gallops  ABDOMEN: Soft, Nontender, Nondistended; Bowel sounds present  SKIN: B/L upper extremity erythematous maculopapular rash noted without purulence, lichenification or open wound. RLE with 2cm blister with small area of erythema 2/2 cellulitis (improved since admission).     Assessment/Plan: HPI:  Rash 2/2 Eczema? Drug hypersensitivity?  >VS hemodynamically stable  > Hydrocortisone 2.5% ointment ordered for upper extremity rash  > C/w IV abx for RLE cellulitis with associated blister  > Consider Derm consult in AM  >Will c/w to monitor Pt closely  > Will endorse to AM team, attending to follow    Laly Joseph PA-C,   Department of Medicine

## 2020-11-05 NOTE — PROGRESS NOTE ADULT - ASSESSMENT
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    His invasive hemodynamics and wide pulse pressure suggested a vasoplegic picture, potentially sepsis given leukocytosis, though appeared overloaded on exam with low VTI on TTE and cultures have been negative. He required high doses of levophed on admission which have since been weaned and he has been diuresed considerably. His lactate has cleared and end organ function improving. Will continue milrinone with goal for eventual discharge on home inotropes and diurese to euvolemia.     He was previously evaluated for LVAD as DT but declined due to combination of age, frailty, and advanced CKD. His overall prognosis is poor and risk of readmission high given severity of heart failure and renal dysfunction.     Hemodynamics  11/2 (milrinone 0.25 and levophed) : RA 9, PA 52/20, unable to wedge, Corey CO/CI 6.2/3.0 with PA sat 74%  11/4 (on milrinone 0.25 and low dose levophed): RA 5, PA 39/20, Corey CO/CI 6.2/3.0 with PA sat 71%    Review of relevant studies  TTE 11/1: LV 7.0 cm, LVEF 10-15%, severe TR, severe RV dysfunction, LVOT VTI 7 cm

## 2020-11-05 NOTE — PROGRESS NOTE ADULT - SUBJECTIVE AND OBJECTIVE BOX
Subjective:  Weaned off levo and restarted on low dose 0.02. Denies acute complaints today and happy to be sitting in a chair. Remains concerned about ability to avoid future hospitalizations.     Medications:  acetaminophen   Tablet .. 650 milliGRAM(s) Oral every 6 hours PRN  aMIOdarone    Tablet 200 milliGRAM(s) Oral daily  cefepime   IVPB      cefepime   IVPB 1000 milliGRAM(s) IV Intermittent every 24 hours  chlorhexidine 4% Liquid 1 Application(s) Topical <User Schedule>  chlorhexidine 4% Liquid 1 Application(s) Topical <User Schedule>  dextrose 40% Gel 15 Gram(s) Oral once PRN  dextrose 5%. 1000 milliLiter(s) IV Continuous <Continuous>  dextrose 50% Injectable 12.5 Gram(s) IV Push once  dextrose 50% Injectable 25 Gram(s) IV Push once  dextrose 50% Injectable 25 Gram(s) IV Push once  furosemide Infusion 10 mG/Hr IV Continuous <Continuous>  glucagon  Injectable 1 milliGRAM(s) IntraMuscular once PRN  heparin  Infusion.  Unit(s)/Hr IV Continuous <Continuous>  hydrocortisone 2.5% Rectal Cream 1 Application(s) Rectal daily PRN  insulin lispro (ADMELOG) corrective regimen sliding scale   SubCutaneous three times a day before meals  insulin lispro (ADMELOG) corrective regimen sliding scale   SubCutaneous at bedtime  levothyroxine 50 MICROGram(s) Oral daily  melatonin 3 milliGRAM(s) Oral at bedtime  milrinone Infusion 0.375 MICROgram(s)/kG/Min IV Continuous <Continuous>  norepinephrine Infusion 0.05 MICROgram(s)/kG/Min IV Continuous <Continuous>  sodium chloride 0.9% lock flush 10 milliLiter(s) IV Push every 1 hour PRN    Vitals:  T(C): 36.7 (20 @ 09:00), Max: 36.7 (20 @ 16:00)  HR: 86 (20 @ 11:30) (80 - 106)  BP: --  BP(mean): --  RR: 24 (20 @ 11:30) (8 - 28)  SpO2: 99% (20 @ 11:30) (79% - 100%)    Daily     Daily Weight in k.4 (2020 05:30)        I&O's Summary    2020 07:01  -  2020 07:00  --------------------------------------------------------  IN: 2978 mL / OUT: 4525 mL / NET: -1547 mL    2020 07:  -  2020 13:00  --------------------------------------------------------  IN: 346.2 mL / OUT: 1175 mL / NET: -828.8 mL        Physical Exam:  Appearance: Frail appearing 	  HEENT: + JVD  Cardiovascular: RRR, 2/6 HSM at apex   Respiratory: Decreased BS at bases 	  Psychiatry: A & O x 3, Mood & affect appropriate  Gastrointestinal:  Soft, Non-tender, + BS	  Skin: + ecchymoses   Neurologic: Non-focal  Extremities: warm, 1+ pitting edema up to mid shins, RLE with open blister with small area of erythema. overall significantly improved from admission      Labs:                        9.3    6.13  )-----------( 91       ( 2020 22:39 )             29.5         136  |  93<L>  |  99<H>  ----------------------------<  168<H>  3.5   |  34<H>  |  3.08<H>    Ca    9.5      2020 11:26  Phos  2.7       Mg     2.3         TPro  6.0  /  Alb  3.4  /  TBili  1.7<H>  /  DBili  x   /  AST  81<H>  /  ALT  128<H>  /  AlkPhos  104        PT/INR - ( 2020 11:26 )   PT: 19.0 sec;   INR: 1.62 ratio         PTT - ( 2020 11:26 )  PTT:>200.0 sec      Serum Pro-Brain Natriuretic Peptide: 57637 pg/mL ( @ 13:50)    Oxygen Saturation, Mixed: 71 % ( @ 07:36)  Oxygen Saturation, Mixed: 72 % ( @ 00:42)  Oxygen Saturation, Mixed: 70 % ( @ 17:17)  Oxygen Saturation, Mixed: 75 % ( @ 00:37)  Oxygen Saturation, Mixed: 75 % ( @ 16:05)

## 2020-11-05 NOTE — PROGRESS NOTE ADULT - SUBJECTIVE AND OBJECTIVE BOX
Miguel Angel Lopez PGY-1  CCU Service  Internal Medicine  Pager: [phone] ([facility]) / [phone] ([facility])    PATIENT: MAIN BRASWELL, MRN: [id number]    CHIEF COMPLAINT: Patient is a 75y old  Male who presents with a chief complaint of AdHF and hyperkalemia (2020 07:52)      INTERVAL HISTORY/OVERNIGHT EVENTS: No overnight events. At bedside, patient has been sleeping and eating well. Denies N/V/D/C. Breathing comfortably on RA. Still c/o weakness.      MEDICATIONS:  MEDICATIONS  (STANDING):  aMIOdarone    Tablet 200 milliGRAM(s) Oral daily  cefepime   IVPB      cefepime   IVPB 1000 milliGRAM(s) IV Intermittent every 24 hours  chlorhexidine 4% Liquid 1 Application(s) Topical <User Schedule>  chlorhexidine 4% Liquid 1 Application(s) Topical <User Schedule>  dextrose 5%. 1000 milliLiter(s) (50 mL/Hr) IV Continuous <Continuous>  dextrose 50% Injectable 12.5 Gram(s) IV Push once  dextrose 50% Injectable 25 Gram(s) IV Push once  dextrose 50% Injectable 25 Gram(s) IV Push once  furosemide Infusion 10 mG/Hr (5 mL/Hr) IV Continuous <Continuous>  heparin  Infusion.  Unit(s)/Hr (17 mL/Hr) IV Continuous <Continuous>  insulin lispro (ADMELOG) corrective regimen sliding scale   SubCutaneous three times a day before meals  insulin lispro (ADMELOG) corrective regimen sliding scale   SubCutaneous at bedtime  levothyroxine 50 MICROGram(s) Oral daily  melatonin 3 milliGRAM(s) Oral at bedtime  milrinone Infusion 0.375 MICROgram(s)/kG/Min (10.2 mL/Hr) IV Continuous <Continuous>  norepinephrine Infusion 0.05 MICROgram(s)/kG/Min (4.25 mL/Hr) IV Continuous <Continuous>    MEDICATIONS  (PRN):  acetaminophen   Tablet .. 650 milliGRAM(s) Oral every 6 hours PRN Mild Pain (1 - 3)  dextrose 40% Gel 15 Gram(s) Oral once PRN Blood Glucose LESS THAN 70 milliGRAM(s)/deciliter  glucagon  Injectable 1 milliGRAM(s) IntraMuscular once PRN Glucose LESS THAN 70 milligrams/deciliter  hydrocortisone 2.5% Rectal Cream 1 Application(s) Rectal daily PRN hemorrhoid pain/itch  sodium chloride 0.9% lock flush 10 milliLiter(s) IV Push every 1 hour PRN Pre/post blood products, medications, blood draw, and to maintain line patency      ALLERGIES: Allergies    No Known Allergies    Intolerances        OBJECTIVE:  ICU Vital Signs Last 24 Hrs  T(C): 36.4 (2020 05:00), Max: 37.1 (2020 12:00)  T(F): 97.6 (2020 05:00), Max: 98.8 (2020 12:00)  HR: 80 (2020 08:00) (80 - 108)  BP: --  BP(mean): --  ABP: 108/48 (2020 08:00) (82/34 - 128/58)  ABP(mean): 66 (2020 08:00) (48 - 84)  RR: 25 (2020 08:00) (8 - 28)  SpO2: 98% (2020 08:00) (79% - 100%)      Adult Advanced Hemodynamics Last 24 Hrs  CVP(mm Hg): 7 (2020 12:15) (-4 - 7)  CVP(cm H2O): --  CO: --  CI: --  PA: 38/19 (2020 12:15) (29/10 - 43/18)  PA(mean): 27 (2020 12:15) (17 - 28)  PCWP: --  SVR: --  SVRI: --  PVR: --  PVRI: --  CAPILLARY BLOOD GLUCOSE      POCT Blood Glucose.: 182 mg/dL (2020 21:06)  POCT Blood Glucose.: 193 mg/dL (2020 11:30)    CAPILLARY BLOOD GLUCOSE      POCT Blood Glucose.: 182 mg/dL (2020 21:06)    I&O's Summary    2020 07:01  -  2020 07:00  --------------------------------------------------------  IN: 2978 mL / OUT: 4525 mL / NET: -1547 mL    2020 07:01  -  2020 08:20  --------------------------------------------------------  IN: 32.2 mL / OUT: 450 mL / NET: -417.8 mL      Daily     Daily Weight in k.4 (2020 05:30)    PHYSICAL EXAMINATION:  General: Comfortable, no acute distress, cooperative with exam.  HEENT: PERRLA, EOMI, moist mucous membranes.  Respiratory: CTAB, normal respiratory effort, no coughing, wheezes, crackles, or rales.  CV: RRR, S1S2, no murmurs, rubs or gallops. No JVD. Distal pulses intact.  Abdominal: Soft, nontender, nondistended, no rebound or guarding, normal bowel sounds.  Neurology: AOx3, no focal neuro defects, WILBURN x 4.  Extremities: 2+ pitting edema on LE up to mid shin, + Peripheral pulses.  Incisions:   Tubes:    LABS:  ABG - ( 2020 22:36 )  pH, Arterial: 7.48  pH, Blood: x     /  pCO2: 45    /  pO2: 112   / HCO3: 33    / Base Excess: 9.0   /  SaO2: 99                                      9.3    6.13  )-----------( 91       ( 2020 22:39 )             29.5     11-05    136  |  94<L>  |  104<H>  ----------------------------<  194<H>  3.4<L>   |  31  |  3.25<H>    Ca    9.4      2020 05:04  Phos  2.8     11-05  Mg     2.4     11-05    TPro  5.9<L>  /  Alb  3.2<L>  /  TBili  1.7<H>  /  DBili  x   /  AST  85<H>  /  ALT  129<H>  /  AlkPhos  101  11-05    LIVER FUNCTIONS - ( 2020 05:04 )  Alb: 3.2 g/dL / Pro: 5.9 g/dL / ALK PHOS: 101 U/L / ALT: 129 U/L / AST: 85 U/L / GGT: x           PT/INR - ( 2020 05:05 )   PT: 19.3 sec;   INR: 1.65 ratio         PTT - ( 2020 05:05 )  PTT:83.9 sec            TELEMETRY:     EKG:     IMAGING: PATIENT: MAIN BRASWELL, MRN: [id number]    CHIEF COMPLAINT: Patient is a 75y old  Male who presents with a chief complaint of AdHF and hyperkalemia (2020 07:52)      INTERVAL HISTORY/OVERNIGHT EVENTS: No overnight events. At bedside, patient has been sleeping and eating well. Denies N/V/D/C. Breathing comfortably on RA. Still c/o weakness.      MEDICATIONS:  MEDICATIONS  (STANDING):  aMIOdarone    Tablet 200 milliGRAM(s) Oral daily  cefepime   IVPB      cefepime   IVPB 1000 milliGRAM(s) IV Intermittent every 24 hours  chlorhexidine 4% Liquid 1 Application(s) Topical <User Schedule>  chlorhexidine 4% Liquid 1 Application(s) Topical <User Schedule>  dextrose 5%. 1000 milliLiter(s) (50 mL/Hr) IV Continuous <Continuous>  dextrose 50% Injectable 12.5 Gram(s) IV Push once  dextrose 50% Injectable 25 Gram(s) IV Push once  dextrose 50% Injectable 25 Gram(s) IV Push once  furosemide Infusion 10 mG/Hr (5 mL/Hr) IV Continuous <Continuous>  heparin  Infusion.  Unit(s)/Hr (17 mL/Hr) IV Continuous <Continuous>  insulin lispro (ADMELOG) corrective regimen sliding scale   SubCutaneous three times a day before meals  insulin lispro (ADMELOG) corrective regimen sliding scale   SubCutaneous at bedtime  levothyroxine 50 MICROGram(s) Oral daily  melatonin 3 milliGRAM(s) Oral at bedtime  milrinone Infusion 0.375 MICROgram(s)/kG/Min (10.2 mL/Hr) IV Continuous <Continuous>  norepinephrine Infusion 0.05 MICROgram(s)/kG/Min (4.25 mL/Hr) IV Continuous <Continuous>    MEDICATIONS  (PRN):  acetaminophen   Tablet .. 650 milliGRAM(s) Oral every 6 hours PRN Mild Pain (1 - 3)  dextrose 40% Gel 15 Gram(s) Oral once PRN Blood Glucose LESS THAN 70 milliGRAM(s)/deciliter  glucagon  Injectable 1 milliGRAM(s) IntraMuscular once PRN Glucose LESS THAN 70 milligrams/deciliter  hydrocortisone 2.5% Rectal Cream 1 Application(s) Rectal daily PRN hemorrhoid pain/itch  sodium chloride 0.9% lock flush 10 milliLiter(s) IV Push every 1 hour PRN Pre/post blood products, medications, blood draw, and to maintain line patency      ALLERGIES: Allergies    No Known Allergies    Intolerances        OBJECTIVE:  ICU Vital Signs Last 24 Hrs  T(C): 36.4 (2020 05:00), Max: 37.1 (2020 12:00)  T(F): 97.6 (2020 05:00), Max: 98.8 (2020 12:00)  HR: 80 (2020 08:00) (80 - 108)  BP: --  BP(mean): --  ABP: 108/48 (2020 08:00) (82/34 - 128/58)  ABP(mean): 66 (2020 08:00) (48 - 84)  RR: 25 (2020 08:00) (8 - 28)  SpO2: 98% (2020 08:00) (79% - 100%)      Adult Advanced Hemodynamics Last 24 Hrs  CVP(mm Hg): 7 (2020 12:15) (-4 - 7)  CVP(cm H2O): --  CO: --  CI: --  PA: 38/19 (2020 12:15) (29/10 - 43/18)  PA(mean): 27 (2020 12:15) (17 - 28)  PCWP: --  SVR: --  SVRI: --  PVR: --  PVRI: --  CAPILLARY BLOOD GLUCOSE      POCT Blood Glucose.: 182 mg/dL (2020 21:06)  POCT Blood Glucose.: 193 mg/dL (2020 11:30)    CAPILLARY BLOOD GLUCOSE      POCT Blood Glucose.: 182 mg/dL (2020 21:06)    I&O's Summary    2020 07:01  -  2020 07:00  --------------------------------------------------------  IN: 2978 mL / OUT: 4525 mL / NET: -1547 mL    2020 07:01  -  2020 08:20  --------------------------------------------------------  IN: 32.2 mL / OUT: 450 mL / NET: -417.8 mL      Daily     Daily Weight in k.4 (2020 05:30)    PHYSICAL EXAMINATION:  General: Comfortable, no acute distress, cooperative with exam.  HEENT: PERRLA, EOMI, moist mucous membranes.  Respiratory: CTAB, normal respiratory effort, no coughing, wheezes, crackles, or rales.  CV: RRR, S1S2, no murmurs, rubs or gallops. No JVD. Distal pulses intact.  Abdominal: Soft, nontender, nondistended, no rebound or guarding, normal bowel sounds.  Neurology: AOx3, no focal neuro defects, WILBURN x 4.  Extremities: 2+ pitting edema on LE up to mid shin, + Peripheral pulses.  Incisions:   Tubes:    LABS:  ABG - ( 2020 22:36 )  pH, Arterial: 7.48  pH, Blood: x     /  pCO2: 45    /  pO2: 112   / HCO3: 33    / Base Excess: 9.0   /  SaO2: 99                                      9.3    6.13  )-----------( 91       ( 2020 22:39 )             29.5     11-05    136  |  94<L>  |  104<H>  ----------------------------<  194<H>  3.4<L>   |  31  |  3.25<H>    Ca    9.4      2020 05:04  Phos  2.8     11-05  Mg     2.4     11-05    TPro  5.9<L>  /  Alb  3.2<L>  /  TBili  1.7<H>  /  DBili  x   /  AST  85<H>  /  ALT  129<H>  /  AlkPhos  101  11-05    LIVER FUNCTIONS - ( 2020 05:04 )  Alb: 3.2 g/dL / Pro: 5.9 g/dL / ALK PHOS: 101 U/L / ALT: 129 U/L / AST: 85 U/L / GGT: x           PT/INR - ( 2020 05:05 )   PT: 19.3 sec;   INR: 1.65 ratio         PTT - ( 2020 05:05 )  PTT:83.9 sec            TELEMETRY:     EKG:     IMAGING:

## 2020-11-05 NOTE — PROGRESS NOTE ADULT - ASSESSMENT
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

## 2020-11-05 NOTE — PROGRESS NOTE ADULT - SUBJECTIVE AND OBJECTIVE BOX
SUBJECTIVE / OVERNIGHT EVENTS: pt denies chest pain, shortness of breath       MEDICATIONS  (STANDING):  aMIOdarone    Tablet 200 milliGRAM(s) Oral daily  cefepime   IVPB 1000 milliGRAM(s) IV Intermittent every 12 hours  chlorhexidine 4% Liquid 1 Application(s) Topical <User Schedule>  dextrose 5%. 1000 milliLiter(s) (50 mL/Hr) IV Continuous <Continuous>  dextrose 50% Injectable 12.5 Gram(s) IV Push once  dextrose 50% Injectable 25 Gram(s) IV Push once  dextrose 50% Injectable 25 Gram(s) IV Push once  furosemide Infusion 10 mG/Hr (5 mL/Hr) IV Continuous <Continuous>  heparin  Infusion. 1400 Unit(s)/Hr (14 mL/Hr) IV Continuous <Continuous>  hydrocortisone 2.5% Ointment 1 Application(s) Topical two times a day  insulin lispro (ADMELOG) corrective regimen sliding scale   SubCutaneous three times a day before meals  insulin lispro (ADMELOG) corrective regimen sliding scale   SubCutaneous at bedtime  levothyroxine 50 MICROGram(s) Oral daily  melatonin 3 milliGRAM(s) Oral at bedtime  milrinone Infusion 0.375 MICROgram(s)/kG/Min (10.2 mL/Hr) IV Continuous <Continuous>    MEDICATIONS  (PRN):  acetaminophen   Tablet .. 650 milliGRAM(s) Oral every 6 hours PRN Mild Pain (1 - 3)  dextrose 40% Gel 15 Gram(s) Oral once PRN Blood Glucose LESS THAN 70 milliGRAM(s)/deciliter  glucagon  Injectable 1 milliGRAM(s) IntraMuscular once PRN Glucose LESS THAN 70 milligrams/deciliter  hydrocortisone 2.5% Rectal Cream 1 Application(s) Rectal daily PRN hemorrhoid pain/itch  sodium chloride 0.9% lock flush 10 milliLiter(s) IV Push every 1 hour PRN Pre/post blood products, medications, blood draw, and to maintain line patency    Vital Signs Last 24 Hrs  T(C): 36.8 (2020 20:35), Max: 36.8 (2020 20:35)  T(F): 98.3 (2020 20:35), Max: 98.3 (2020 20:35)  HR: 85 (2020 20:35) (80 - 106)  BP: 94/53 (2020 20:35) (83/48 - 104/61)  BP(mean): 64 (2020 18:00) (57 - 82)  RR: 16 (2020 20:35) (12 - 28)  SpO2: 99% (2020 20:35) (91% - 100%)    CAPILLARY BLOOD GLUCOSE      POCT Blood Glucose.: 222 mg/dL (2020 21:34)  POCT Blood Glucose.: 225 mg/dL (2020 17:39)  POCT Blood Glucose.: 178 mg/dL (2020 08:24)    I&O's Summary    2020 07:01  -  2020 07:00  --------------------------------------------------------  IN: 2978 mL / OUT: 4525 mL / NET: -1547 mL    2020 07:01  -  2020 23:35  --------------------------------------------------------  IN: 818.2 mL / OUT: 2250 mL / NET: -1431.8 mL        Constitutional: No fever, fatigue  Skin: No rash.  Eyes: No recent vision problems or eye pain.  ENT: No congestion, ear pain, or sore throat.  Cardiovascular: No chest pain or palpation.  Respiratory: No cough, shortness of breath, congestion, or wheezing.  Gastrointestinal: No abdominal pain, nausea, vomiting, or diarrhea.  Genitourinary: No dysuria.  Musculoskeletal: No joint swelling.  Neurologic: No headache.    PHYSICAL EXAM:  GENERAL: NAD  EYES: EOMI, PERRLA  NECK: Supple, No JVD  CHEST/LUNG: crackles at bases   HEART:  S1 , S2 +  ABDOMEN: soft , bs+  EXTREMITIES:  edema+  NEUROLOGY:alert awake      LABS:                        9.0    5.73  )-----------( 75       ( 2020 14:26 )             29.5     11-05    136  |  93<L>  |  99<H>  ----------------------------<  168<H>  3.5   |  34<H>  |  3.08<H>    Ca    9.5      2020 11:26  Phos  2.7       Mg     2.3         TPro  6.0  /  Alb  3.4  /  TBili  1.7<H>  /  DBili  x   /  AST  81<H>  /  ALT  128<H>  /  AlkPhos  104      PT/INR - ( 2020 11:26 )   PT: 19.0 sec;   INR: 1.62 ratio         PTT - ( 2020 11:26 )  PTT:>200.0 sec      Urinalysis Basic - ( 2020 09:42 )    Color: Light Yellow / Appearance: Clear / S.010 / pH: x  Gluc: x / Ketone: Negative  / Bili: Negative / Urobili: Negative   Blood: x / Protein: Negative / Nitrite: Negative   Leuk Esterase: Negative / RBC: 2 /hpf / WBC 1 /HPF   Sq Epi: x / Non Sq Epi: 0 /hpf / Bacteria: Negative        RADIOLOGY & ADDITIONAL TESTS:    Imaging Personally Reviewed:    Consultant(s) Notes Reviewed:      Care Discussed with Consultants/Other Providers:

## 2020-11-05 NOTE — PROGRESS NOTE ADULT - ATTENDING COMMENTS
I have personally seen, examined and participated in the care of this patient. I have reviewed all pertinent clinical information, including history, physical exam, plan and the resident's note. I agree with the resident's note with the following additions:    74 yo man with HFrEF, s/p darrion clip, CRT, afib on AC p/w acute on chronic decompensated systolic HF requiring initiation of inotropic support     Possible contaminant vasodilatory shock, pt was  pancultured and subsequently started on broad spectrum abx.  Though no clear source will complete Cefepime course.  Vanco already stopped   Continue Inotrope, pressors off.  Clinically improved significantly   Continue diuresis given extensive overload,  Lasix at 10mg/hr  Lines removed today   Unclear source of infection for now , will follow up cx   Stable creatinine   Stable H/H, continue AC for Afib   Mildly Elevated FS, continue NISS   Discussed with pt palliative consultation given extensive comorbidities and he agreed with the plan.  Consult placed.

## 2020-11-05 NOTE — PROGRESS NOTE ADULT - ATTENDING COMMENTS
I have seen this patient with the fellow and agree with their assessment and plan. In addition, EDIE improved with diuresis  No changes for now as still needs diuresis  Crt, BUN stable, not uremic on exam    Yemi Rees MD  Cell   Pager   Office

## 2020-11-05 NOTE — CHART NOTE - NSCHARTNOTEFT_GEN_A_CORE
76y/o M w/ h/o long standing non ischemia chronic systolic heart failure (EF 20% LVIDD 6.7)) s/p CRT-D and mitral valve clip on last admission, Afib on Eliquis s/p DC CV, PPM ([facility]), CKD-3, T2DM, hypothyroidism and papillary thyroid carcinoma, anemia, recently referred to HF for anasarca and a hospital admission that necessitated Milrinone assisted diuresis w/ mitraclip for mod-severe MR which improved to mild, declined for an LVAD due to frailty and CKD admitted to CCU for acute decompensated HF and hyperkalemia.     Through out hospital stay pt continued to show improvement responding well to milrinone assisted diuresis. Pt hemodynamics were monitored and fit picture of combined cardiogenic and septic shock. Responded well to low dose levophed and broad spectrum antibiotics with possible source of cellulitis infection. Last dose of cefepime for tomorrow. Pt has been weaned off the levophed and blood pressures have been stable. Plan for pt out of ICU is today discontinue the lasix gtt and transition to IVP. PICC line should be placed after last dose of IV abx (tomorrow) when he is stable to discharged home. Pt able to ambulate with some assistance but needs continuation of PT evaluation for deconditioning throughout stay in CCU.    Vital Signs Last 24 Hrs  T(C): 36.7 (05 Nov 2020 09:00), Max: 36.7 (04 Nov 2020 16:00)  T(F): 98 (05 Nov 2020 09:00), Max: 98.1 (04 Nov 2020 16:00)  HR: 86 (05 Nov 2020 11:30) (80 - 106)  BP: --  BP(mean): --  RR: 24 (05 Nov 2020 11:30) (8 - 28)  SpO2: 99% (05 Nov 2020 11:30) (79% - 100%)    General: Comfortable, no acute distress, cooperative with exam.  HEENT: PERRLA, EOMI, moist mucous membranes.  Respiratory: CTAB, normal respiratory effort, no coughing, wheezes, crackles, or rales.  CV: RRR, S1S2, no murmurs, rubs or gallops. No JVD. Distal pulses intact.  Abdominal: Soft, nontender, nondistended, no rebound or guarding, normal bowel sounds.  Neurology: AOx3, no focal neuro defects, WILBURN x 4.  Extremities: 2+ pitting edema on LE up to mid shin, + Peripheral pulses.    #Neuro  - AAOx3, following commands, not c/o pain, able to ambulate with walker    #Resp  - Oxygenating well on room air, no active issues    #CV  *AdHF improved with milrinone and lasix  - c/w Milrinone @.375   - c/w lasix gtt @10, with plan to d/c tomorrow and start lasix IVP  - off all pressors, continue to withhold carvedilol, hydralazine  *Afib  - c/w amiodarone 200mg qD  - Heart failure consulted, appreciate recs    #GI  - c/w Regular diet    #/Renal  *EDIE, likely 2/2 occult infection, stable and able to tolerate Lasix gtt  - Renal following, appreciate recs   - Replete electrolytes with K with goal >4 while on Lasix gtt    #ID  *Sepsis of unknown source improving with broad spectrum antibiotics  - improved leukocytosis and lactate, afebrile  - Blood Cxs NGTD 11/2, UA wnl, last dose cefepime renally dosed for tomorrow (11/2-11/6)    #Endo  *Hypothyroidism  - c/w home synthroid  - SSI lispro before meals    #Heme  - continue to monitor h/h and platelets   - hold heparin gtt, PTT>200, goal PTT 60-99    #Dispo  - does not require ICU level of care, stable for Telemetry 76y/o M w/ h/o long standing non ischemia chronic systolic heart failure (EF 20% LVIDD 6.7)) s/p CRT-D and mitral valve clip on last admission, Afib on Eliquis s/p DC CV, PPM ([facility]), CKD-3, T2DM, hypothyroidism and papillary thyroid carcinoma, anemia, recently referred to HF for anasarca and a hospital admission that necessitated Milrinone assisted diuresis w/ mitraclip for mod-severe MR which improved to mild, declined for an LVAD due to frailty and CKD admitted to CCU for acute decompensated HF and hyperkalemia.     Through out hospital stay pt continued to show improvement responding well to milrinone assisted diuresis. Pt hemodynamics were monitored and fit picture of combined cardiogenic and septic shock. Responded well to low dose levophed and broad spectrum antibiotics with possible source of cellulitis infection. Last dose of cefepime for tomorrow. Pt has been weaned off the levophed and blood pressures have been stable. Plan for pt out of ICU is today discontinue the lasix gtt and transition to IVP. PICC line should be placed after last dose of IV abx (tomorrow) when he is stable to discharged home, will need IR consult due to heart failure patient and eGFR <30. Pt able to ambulate with some assistance but needs continuation of PT evaluation for deconditioning throughout stay in CCU.    Vital Signs Last 24 Hrs  T(C): 36.7 (05 Nov 2020 09:00), Max: 36.7 (04 Nov 2020 16:00)  T(F): 98 (05 Nov 2020 09:00), Max: 98.1 (04 Nov 2020 16:00)  HR: 86 (05 Nov 2020 11:30) (80 - 106)  BP: --  BP(mean): --  RR: 24 (05 Nov 2020 11:30) (8 - 28)  SpO2: 99% (05 Nov 2020 11:30) (79% - 100%)    General: Comfortable, no acute distress, cooperative with exam.  HEENT: PERRLA, EOMI, moist mucous membranes.  Respiratory: CTAB, normal respiratory effort, no coughing, wheezes, crackles, or rales.  CV: RRR, S1S2, no murmurs, rubs or gallops. No JVD. Distal pulses intact.  Abdominal: Soft, nontender, nondistended, no rebound or guarding, normal bowel sounds.  Neurology: AOx3, no focal neuro defects, WILBURN x 4.  Extremities: 2+ pitting edema on LE up to mid shin, + Peripheral pulses.    #Neuro  - AAOx3, following commands, not c/o pain, able to ambulate with walker    #Resp  - Oxygenating well on room air, no active issues    #CV  *AdHF improved with milrinone and lasix  - c/w Milrinone @.375   - c/w lasix gtt @10, with plan to d/c tomorrow and start lasix IVP  - off all pressors, continue to withhold carvedilol, hydralazine  *Afib  - c/w amiodarone 200mg qD  - Heart failure consulted, appreciate recs    #GI  - c/w Regular diet    #/Renal  *EDIE, likely 2/2 occult infection, stable and able to tolerate Lasix gtt  - Renal following, appreciate recs   - Replete electrolytes with K with goal >4 while on Lasix gtt    #ID  *Sepsis of unknown source improving with broad spectrum antibiotics  - improved leukocytosis and lactate, afebrile  - Blood Cxs NGTD 11/2, UA wnl, last dose cefepime renally dosed for tomorrow (11/2-11/6)    #Endo  *Hypothyroidism  - c/w home synthroid  - SSI lispro before meals    #Heme  - continue to monitor h/h and platelets   - hold heparin gtt, PTT>200, goal PTT 60-99    #Dispo  - does not require ICU level of care, stable for Telemetry 76y/o M w/ h/o long standing non ischemia chronic systolic heart failure (EF 20% LVIDD 6.7)) s/p CRT-D and mitral valve clip on last admission, Afib on Eliquis s/p DC CV, PPM ([facility]), CKD-3, T2DM, hypothyroidism and papillary thyroid carcinoma, anemia, recently referred to HF for anasarca and a hospital admission that necessitated Milrinone assisted diuresis w/ mitraclip for mod-severe MR which improved to mild, declined for an LVAD due to frailty and CKD admitted to CCU for acute decompensated HF and hyperkalemia.     Through out hospital stay pt continued to show improvement responding well to milrinone assisted diuresis. Pt hemodynamics were monitored and fit picture of combined cardiogenic and septic shock. Responded well to low dose levophed and broad spectrum antibiotics with possible source of cellulitis infection. Last dose of cefepime for tomorrow. Pt has been weaned off the levophed and blood pressures have been stable. Plan for pt out of ICU is today discontinue the lasix gtt and transition to IVP. PICC line should be placed after last dose of IV abx (tomorrow) when he is stable to discharged home, will need IR consult due to heart failure patient and eGFR <30. Pt able to ambulate with some assistance but needs continuation of PT evaluation for deconditioning throughout stay in CCU.    Vital Signs Last 24 Hrs  T(C): 36.7 (05 Nov 2020 09:00), Max: 36.7 (04 Nov 2020 16:00)  T(F): 98 (05 Nov 2020 09:00), Max: 98.1 (04 Nov 2020 16:00)  HR: 86 (05 Nov 2020 11:30) (80 - 106)  BP: --  BP(mean): --  RR: 24 (05 Nov 2020 11:30) (8 - 28)  SpO2: 99% (05 Nov 2020 11:30) (79% - 100%)    General: Comfortable, no acute distress, cooperative with exam.  HEENT: PERRLA, EOMI, moist mucous membranes.  Respiratory: CTAB, normal respiratory effort, no coughing, wheezes, crackles, or rales.  CV: RRR, S1S2, no murmurs, rubs or gallops. No JVD. Distal pulses intact.  Abdominal: Soft, nontender, nondistended, no rebound or guarding, normal bowel sounds.  Neurology: AOx3, no focal neuro defects, WILBURN x 4.  Extremities: 2+ pitting edema on LE up to mid shin, + Peripheral pulses.    #Neuro  - AAOx3, following commands, not c/o pain, able to ambulate with walker    #Resp  - Oxygenating well on room air, no active issues    #CV  *AdHF improved with milrinone and lasix  - c/w Milrinone @.375   - c/w lasix gtt @10, with plan to d/c tomorrow and start lasix IVP  - off all pressors, continue to withhold carvedilol, hydralazine  *Afib  - c/w amiodarone 200mg qD  - Heart failure consulted, appreciate recs    #GI  - c/w Regular diet    #/Renal  *EDIE, likely 2/2 occult infection, stable and able to tolerate Lasix gtt  - Renal following, appreciate recs   - Replete electrolytes with K with goal >4 while on Lasix gtt    #ID  *Sepsis of unknown source improving with broad spectrum antibiotics  - improved leukocytosis and lactate, afebrile  - Blood Cxs NGTD 11/2, UA wnl,   - c/w cefepime 1g q12h renally dosed, last dose tomorrow (11/2-11/6)    #Endo  *Hypothyroidism  - c/w home synthroid  - SSI lispro before meals    #Heme  - continue to monitor h/h and platelets   - hold heparin gtt, PTT>200, goal PTT 60-99    #Dispo  - does not require ICU level of care, stable for Telemetry 76y/o M w/ h/o long standing non ischemia chronic systolic heart failure (EF 20% LVIDD 6.7)) s/p CRT-D and mitral valve clip on last admission, Afib on Eliquis s/p DC CV, PPM ([facility]), CKD-3, T2DM, hypothyroidism and papillary thyroid carcinoma, anemia, recently referred to HF for anasarca and a hospital admission that necessitated Milrinone assisted diuresis w/ mitraclip for mod-severe MR which improved to mild, declined for an LVAD due to frailty and CKD admitted to CCU for acute decompensated HF and hyperkalemia.     Through out hospital stay pt continued to show improvement responding well to milrinone assisted diuresis. Pt hemodynamics were monitored and fit picture of combined cardiogenic and septic shock. Responded well to low dose levophed and broad spectrum antibiotics with possible source of cellulitis infection. Last dose of cefepime for tomorrow. Pt has been weaned off the levophed and blood pressures have been stable. Plan for pt out of ICU is today discontinue the lasix gtt and transition to IVP. PICC line should be placed after last dose of IV abx (tomorrow) when he is stable to discharged home, will need IR consult due to heart failure patient and eGFR <30. Pt able to ambulate with some assistance but needs continuation of PT evaluation for deconditioning throughout stay in CCU.    Vital Signs Last 24 Hrs  T(C): 36.7 (05 Nov 2020 09:00), Max: 36.7 (04 Nov 2020 16:00)  T(F): 98 (05 Nov 2020 09:00), Max: 98.1 (04 Nov 2020 16:00)  HR: 86 (05 Nov 2020 11:30) (80 - 106)  BP: --  BP(mean): --  RR: 24 (05 Nov 2020 11:30) (8 - 28)  SpO2: 99% (05 Nov 2020 11:30) (79% - 100%)    General: Comfortable, no acute distress, cooperative with exam.  HEENT: PERRLA, EOMI, moist mucous membranes.  Respiratory: CTAB, normal respiratory effort, no coughing, wheezes, crackles, or rales.  CV: RRR, S1S2, no murmurs, rubs or gallops. No JVD. Distal pulses intact.  Abdominal: Soft, nontender, nondistended, no rebound or guarding, normal bowel sounds.  Neurology: AOx3, no focal neuro defects, WILBURN x 4.  Extremities: 2+ pitting edema on LE up to mid shin, + Peripheral pulses.    #Neuro  - AAOx3, following commands, not c/o pain, able to ambulate with walker    #Resp  - Oxygenating well on room air, no active issues    #CV  *AdHF improved with milrinone and lasix  - c/w Milrinone @.375. Will need IR placement of PICC for home milrinone  - c/w lasix gtt @10, with plan to d/c tomorrow and start lasix IVP; follow up with HF team  - off all pressors, continue to withhold carvedilol, hydralazine  - Heart failure team following    *Afib  - c/w amiodarone 200mg qD    #GI  - c/w Regular diet    #/Renal  *EDIE, likely 2/2 occult infection, stable and able to tolerate Lasix gtt  - Renal following, appreciate recs   - Replete electrolytes with K with goal >4 while on Lasix gtt    #ID  *Sepsis of unknown source improving with broad spectrum antibiotics  - improved leukocytosis and lactate, afebrile  - Blood Cxs NGTD 11/2, UA wnl,   - c/w cefepime 1g q12h renally dosed, last dose tomorrow (11/2-11/6)    #Endo  *Hypothyroidism  - c/w home synthroid  - SSI lispro before meals    #Heme  - continue to monitor h/h and platelets   - hold heparin gtt, PTT>200, goal PTT 60-99    #Dispo  - does not require ICU level of care, stable for Telemetry    For Follow-up:  [] Heart failure team to follow  [] IR for PICC/Santo placement Transfer to [facility]   Dr. Gerald Diehl      76y/o M w/ h/o long standing non ischemia chronic systolic heart failure (EF 20% LVIDD 6.7)) s/p CRT-D and mitral valve clip on last admission, Afib on Eliquis s/p DC CV, PPM ([facility]), CKD-3, T2DM, hypothyroidism and papillary thyroid carcinoma, anemia, recently referred to HF for anasarca and a hospital admission that necessitated Milrinone assisted diuresis w/ mitraclip for mod-severe MR which improved to mild, declined for an LVAD due to frailty and CKD admitted to CCU for acute decompensated HF and hyperkalemia.     Through out hospital stay pt continued to show improvement responding well to milrinone assisted diuresis. Pt hemodynamics were monitored and fit picture of combined cardiogenic and septic shock. Responded well to low dose levophed and broad spectrum antibiotics with possible source of cellulitis infection. Last dose of cefepime for tomorrow. Pt has been weaned off the levophed and blood pressures have been stable. Plan for pt out of ICU is today discontinue the lasix gtt and transition to IVP. PICC line should be placed after last dose of IV abx (tomorrow) when he is stable to discharged home, will need IR consult due to heart failure patient and eGFR <30. Pt able to ambulate with some assistance but needs continuation of PT evaluation for deconditioning throughout stay in CCU.    Vital Signs Last 24 Hrs  T(C): 36.7 (05 Nov 2020 09:00), Max: 36.7 (04 Nov 2020 16:00)  T(F): 98 (05 Nov 2020 09:00), Max: 98.1 (04 Nov 2020 16:00)  HR: 86 (05 Nov 2020 11:30) (80 - 106)  BP: --  BP(mean): --  RR: 24 (05 Nov 2020 11:30) (8 - 28)  SpO2: 99% (05 Nov 2020 11:30) (79% - 100%)    General: Comfortable, no acute distress, cooperative with exam.  HEENT: PERRLA, EOMI, moist mucous membranes.  Respiratory: CTAB, normal respiratory effort, no coughing, wheezes, crackles, or rales.  CV: RRR, S1S2, no murmurs, rubs or gallops. No JVD. Distal pulses intact.  Abdominal: Soft, nontender, nondistended, no rebound or guarding, normal bowel sounds.  Neurology: AOx3, no focal neuro defects, WILBURN x 4.  Extremities: 2+ pitting edema on LE up to mid shin, + Peripheral pulses.    #Neuro  - AAOx3, following commands, not c/o pain, able to ambulate with walker    #Resp  - Oxygenating well on room air, no active issues    #CV  *AdHF improved with milrinone and lasix  - c/w Milrinone @.375. Will need IR placement of PICC for home milrinone  - c/w lasix gtt @10, with plan to d/c tomorrow and start lasix IVP; follow up with HF team  - off all pressors, continue to withhold carvedilol, hydralazine  - Heart failure team following    *Afib  - c/w amiodarone 200mg qD    #GI  - c/w Regular diet    #/Renal  *EDIE, likely 2/2 occult infection, stable and able to tolerate Lasix gtt  - Renal following, appreciate recs   - Replete electrolytes with K with goal >4 while on Lasix gtt    #ID  *Sepsis of unknown source improving with broad spectrum antibiotics  - improved leukocytosis and lactate, afebrile  - Blood Cxs NGTD 11/2, UA wnl,   - c/w cefepime 1g q12h renally dosed, last dose tomorrow (11/2-11/6)    #Endo  *Hypothyroidism  - c/w home synthroid  - SSI lispro before meals    #Heme  - continue to monitor h/h and platelets   - hold heparin gtt, PTT>200, goal PTT 60-99    #Dispo  - does not require ICU level of care, stable for Telemetry    For Follow-up:  [] Heart failure team to follow  [] IR for PICC/Santo placement

## 2020-11-05 NOTE — CHART NOTE - NSCHARTNOTEFT_GEN_A_CORE
Nutrition Follow Up Note  Consult received for "patient requests education on diet"     Source: EMR, RN, patient    Chart reviewed, events noted. Per chart, pt is a 75 year old male admitted to CCU for acute decompensated HF and hyperkalemia, both improved with diuresis. Remains on milrinone.  Abdominal US showed cholelithiasis without sonographic evidence of acute cholecystitis. No biliary ductal dilatation. Small ascites. A1c 6.3%, BG addressed with SSI.     Diet : Consistent Carbohydrate, DASH/TLC, Glucerna shake 2x/day    Patient seen, reports feeling well. Endorses frustration with not getting food as ordered; concerns addressed & escalated to dietary supervisor. Patient eating well, consuming 75% of meals provided (flowsheets indicate 100% x1meal) & consuming both Glucerna daily. Denies GI distress but notes mild constipation, last BM  (2 days ago).     Daily Weight in k.4 (), Weight in k.9 (), Weight in k.2 (), Weight in k.7 () - Weight loss likely 2/2 diuresis, improving edema noted    Pertinent Medications: MEDICATIONS  (STANDING):  aMIOdarone    Tablet 200 milliGRAM(s) Oral daily  cefepime   IVPB      cefepime   IVPB 1000 milliGRAM(s) IV Intermittent every 24 hours  chlorhexidine 4% Liquid 1 Application(s) Topical <User Schedule>  chlorhexidine 4% Liquid 1 Application(s) Topical <User Schedule>  dextrose 5%. 1000 milliLiter(s) (50 mL/Hr) IV Continuous <Continuous>  dextrose 50% Injectable 12.5 Gram(s) IV Push once  dextrose 50% Injectable 25 Gram(s) IV Push once  dextrose 50% Injectable 25 Gram(s) IV Push once  furosemide Infusion 10 mG/Hr (5 mL/Hr) IV Continuous <Continuous>  heparin  Infusion.  Unit(s)/Hr (17 mL/Hr) IV Continuous <Continuous>  insulin lispro (ADMELOG) corrective regimen sliding scale   SubCutaneous three times a day before meals  insulin lispro (ADMELOG) corrective regimen sliding scale   SubCutaneous at bedtime  levothyroxine 50 MICROGram(s) Oral daily  melatonin 3 milliGRAM(s) Oral at bedtime  milrinone Infusion 0.375 MICROgram(s)/kG/Min (10.2 mL/Hr) IV Continuous <Continuous>  norepinephrine Infusion 0.05 MICROgram(s)/kG/Min (4.25 mL/Hr) IV Continuous <Continuous>    MEDICATIONS  (PRN):  acetaminophen   Tablet .. 650 milliGRAM(s) Oral every 6 hours PRN Mild Pain (1 - 3)  dextrose 40% Gel 15 Gram(s) Oral once PRN Blood Glucose LESS THAN 70 milliGRAM(s)/deciliter  glucagon  Injectable 1 milliGRAM(s) IntraMuscular once PRN Glucose LESS THAN 70 milligrams/deciliter  hydrocortisone 2.5% Rectal Cream 1 Application(s) Rectal daily PRN hemorrhoid pain/itch  sodium chloride 0.9% lock flush 10 milliLiter(s) IV Push every 1 hour PRN Pre/post blood products, medications, blood draw, and to maintain line patency    Pertinent Labs:  @ 05:04: Na 136, <H>, Cr 3.25<H>, <H>, K+ 3.4<L>, Phos 2.8, Mg 2.4, Alk Phos 101, ALT/SGPT 129<H>, AST/SGOT 85<H>, HbA1c --   @ 22:39: Na 135, <H>, Cr 3.12<H>, <H>, K+ 3.9, Phos 3.2, Mg 2.4, Alk Phos 103, ALT/SGPT 131<H>, AST/SGOT 85<H>, HbA1c --   @ 15:00: Na 136, <H>, Cr 3.46<H>, <H>, K+ 3.4<L>, Phos 2.1<L>, Mg 2.0, Alk Phos 100, ALT/SGPT 140<H>, AST/SGOT 92<H>, HbA1c --   @ 14:09: Na 139, BUN 93<H>, Cr 2.81<H>, <H>, K+ 2.7<LL>, Phos 1.6<L>, Mg 1.6, Alk Phos 78, ALT/SGPT 109<H>, AST/SGOT 74<H>, HbA1c --    Finger Sticks:  POCT Blood Glucose.: 178 mg/dL ( @ 08:24)  POCT Blood Glucose.: 182 mg/dL ( @ 21:06)  POCT Blood Glucose.: 193 mg/dL ( @ 11:30)    Skin per nursing documentation: shin blister  Edema per nursing documentation: 2+ generalized, 3+ bilateral legs    Estimated Needs:   Using 83.1 kg from 11/3  Energy needs (25-30 symone/kg): 0359-2804 kcal/day  Protein needs (0.8-1.0 g/kg): 66-83 g/day  Defer fluid needs to team    Previous Nutrition Diagnosis: Severe Malnutrition, 2/2 acute-on-chronic illness  Nutrition Diagnosis is: ongoing, addressed with oral nutrition supplementation    New Nutrition Diagnosis: none     Interventions: Reviewed heart failure nutrition therapy, written materials provided. Discussed how to better control blood sugars. Also escalated  concerns to kitchen management to ensure good PO intake. RD availability made known.     Recommend  1) Continue current diet order of DASH/TLC, Consistent Carbohydrate with Glucerna 2x/day  2) Ongoing nutrition education on: HF, DM, CKD  3) Consider addition of bowel regimen, pt complaining of constipation, defer to team    Continue monitoring and evaluating:   - Labs: blood glucose, electrolytes, renal indices  - GI function and BMs   - Nutritional intake, weight trends, skin integrity    RD remains available PRN and will follow up per protocol.  Sade Barragan RD CDN    Pager# [phone]

## 2020-11-06 NOTE — CONSULT NOTE ADULT - PROBLEM SELECTOR RECOMMENDATION 4
- continue heparin, previously on eliquis as outpatient   - continue PO amiodarone to maintain sinus rhythm
Will continue to for GOC, ACP, and symptoms.         Robbie Devi MD   Geriatrics and Palliative Care ([facility]) Consult Service    of Geriatric and Palliative Medicine  [facility]      Please page the following number for clinical matters between the hours of 9 am and 5 pm from Monday through Friday : [phone]    After 5pm and on weekends, please see the contact information below:    In the event of newly developing, evolving, or worsening symptoms, please contact the Palliative Medicine team via pager (if the patient is at [facility] #[phone] or if the patient is at [facility] #[phone]) The Geriatric and Palliative Medicine service has coverage 24 hours a day/ 7 days a week to provide medical recommendations regarding symptom management needs via telephone

## 2020-11-06 NOTE — PROGRESS NOTE ADULT - SUBJECTIVE AND OBJECTIVE BOX
SUBJECTIVE / OVERNIGHT EVENTS: pt denies chest pain, shortness of breath     MEDICATIONS  (STANDING):  aMIOdarone    Tablet 200 milliGRAM(s) Oral daily  chlorhexidine 4% Liquid 1 Application(s) Topical <User Schedule>  dextrose 5%. 1000 milliLiter(s) (50 mL/Hr) IV Continuous <Continuous>  dextrose 50% Injectable 12.5 Gram(s) IV Push once  dextrose 50% Injectable 25 Gram(s) IV Push once  dextrose 50% Injectable 25 Gram(s) IV Push once  fluticasone propionate 50 MICROgram(s)/spray Nasal Spray 1 Spray(s) Both Nostrils every 12 hours  furosemide Infusion 10 mG/Hr (5 mL/Hr) IV Continuous <Continuous>  heparin  Infusion. 1400 Unit(s)/Hr (14 mL/Hr) IV Continuous <Continuous>  hydrocortisone 2.5% Ointment 1 Application(s) Topical two times a day  insulin lispro (ADMELOG) corrective regimen sliding scale   SubCutaneous three times a day before meals  insulin lispro (ADMELOG) corrective regimen sliding scale   SubCutaneous at bedtime  levothyroxine 50 MICROGram(s) Oral daily  melatonin 3 milliGRAM(s) Oral at bedtime  milrinone Infusion 0.375 MICROgram(s)/kG/Min (10.2 mL/Hr) IV Continuous <Continuous>    MEDICATIONS  (PRN):  acetaminophen   Tablet .. 650 milliGRAM(s) Oral every 6 hours PRN Mild Pain (1 - 3)  dextrose 40% Gel 15 Gram(s) Oral once PRN Blood Glucose LESS THAN 70 milliGRAM(s)/deciliter  glucagon  Injectable 1 milliGRAM(s) IntraMuscular once PRN Glucose LESS THAN 70 milligrams/deciliter  hydrocortisone 2.5% Rectal Cream 1 Application(s) Rectal daily PRN hemorrhoid pain/itch  sodium chloride 0.9% lock flush 10 milliLiter(s) IV Push every 1 hour PRN Pre/post blood products, medications, blood draw, and to maintain line patency    Vital Signs Last 24 Hrs  T(C): 37 (2020 20:42), Max: 37 (2020 20:42)  T(F): 98.6 (2020 20:42), Max: 98.6 (2020 20:42)  HR: 89 (2020 20:42) (82 - 93)  BP: 94/57 (2020 20:42) (93/56 - 98/60)  BP(mean): --  RR: 17 (2020 20:42) (17 - 18)  SpO2: 95% (2020 20:42) (95% - 98%)    Skin: No rash.  Eyes: No recent vision problems or eye pain.  ENT: No congestion, ear pain, or sore throat.  Cardiovascular: No chest pain or palpation.  Respiratory: No cough, shortness of breath, congestion, or wheezing.  Gastrointestinal: No abdominal pain, nausea, vomiting, or diarrhea.  Genitourinary: No dysuria.  Musculoskeletal: No joint swelling.  Neurologic: No headache.    PHYSICAL EXAM:  GENERAL: NAD  EYES: EOMI, PERRLA  NECK: Supple, No JVD  CHEST/LUNG: crackles at bases   HEART:  S1 , S2 +  ABDOMEN: soft , bs+  EXTREMITIES:  edema+  NEUROLOGY:alert awake    LABS:      135  |  94<L>  |  97<H>  ----------------------------<  123<H>  3.4<L>   |  33<H>  |  2.86<H>    Ca    9.2      2020 05:58  Phos  2.7     11-  Mg     2.1     11-06    TPro  5.6<L>  /  Alb  2.9<L>  /  TBili  1.2  /  DBili      /  AST  61<H>  /  ALT  97<H>  /  AlkPhos  85  11-    Creatinine Trend: 2.86 <--, 3.08 <--, 3.25 <--, 3.12 <--, 3.46 <--, 2.81 <--, 3.48 <--, 3.64 <--, 4.31 <--, 4.32 <--, 4.41 <--, 4.36 <--, 4.39 <--, 4.21 <--, 4.28 <--, 4.34 <--, 4.26 <--                        8.1    4.63  )-----------( 62       ( 2020 05:58 )             26.0     Urine Studies:  Urinalysis Basic - ( 2020 09:42 )    Color: Light Yellow / Appearance: Clear / S.010 / pH:   Gluc:  / Ketone: Negative  / Bili: Negative / Urobili: Negative   Blood:  / Protein: Negative / Nitrite: Negative   Leuk Esterase: Negative / RBC: 2 /hpf / WBC 1 /HPF   Sq Epi:  / Non Sq Epi: 0 /hpf / Bacteria: Negative      Sodium, Random Urine: 86 mmol/L ( @ 14:24)  Osmolality, Random Urine: 306 mos/kg ( @ 14:24)  Creatinine, Random Urine: 16 mg/dL ( @ 14:24)          LIVER FUNCTIONS - ( 2020 05:58 )  Alb: 2.9 g/dL / Pro: 5.6 g/dL / ALK PHOS: 85 U/L / ALT: 97 U/L / AST: 61 U/L / GGT: x           PT/INR - ( 2020 11:26 )   PT: 19.0 sec;   INR: 1.62 ratio         PTT - ( 2020 17:25 )  PTT:81.3 sec

## 2020-11-06 NOTE — PROGRESS NOTE ADULT - PROBLEM SELECTOR PLAN 7
- noted at last hospitalization and overall stable - noted at last hospitalization and HIT ab negative on 9/17  - platelets currently downtrending, will continue to monitor but can consider repeating HIT antibodies

## 2020-11-06 NOTE — PROGRESS NOTE ADULT - SUBJECTIVE AND OBJECTIVE BOX
Subjective:  - Moved out of CICU overnight  - States he is doing well this morning, but remains with LE edema    Medications:  acetaminophen   Tablet .. 650 milliGRAM(s) Oral every 6 hours PRN  aMIOdarone    Tablet 200 milliGRAM(s) Oral daily  cefepime   IVPB 1000 milliGRAM(s) IV Intermittent every 12 hours  chlorhexidine 4% Liquid 1 Application(s) Topical <User Schedule>  dextrose 40% Gel 15 Gram(s) Oral once PRN  dextrose 5%. 1000 milliLiter(s) IV Continuous <Continuous>  dextrose 50% Injectable 12.5 Gram(s) IV Push once  dextrose 50% Injectable 25 Gram(s) IV Push once  dextrose 50% Injectable 25 Gram(s) IV Push once  furosemide Infusion 10 mG/Hr IV Continuous <Continuous>  glucagon  Injectable 1 milliGRAM(s) IntraMuscular once PRN  heparin  Infusion. 1400 Unit(s)/Hr IV Continuous <Continuous>  hydrocortisone 2.5% Ointment 1 Application(s) Topical two times a day  hydrocortisone 2.5% Rectal Cream 1 Application(s) Rectal daily PRN  insulin lispro (ADMELOG) corrective regimen sliding scale   SubCutaneous three times a day before meals  insulin lispro (ADMELOG) corrective regimen sliding scale   SubCutaneous at bedtime  levothyroxine 50 MICROGram(s) Oral daily  melatonin 3 milliGRAM(s) Oral at bedtime  milrinone Infusion 0.375 MICROgram(s)/kG/Min IV Continuous <Continuous>  sodium chloride 0.9% lock flush 10 milliLiter(s) IV Push every 1 hour PRN      ICU Vital Signs Last 24 Hrs  T(C): 36.6, Max: 36.8 ( @ 20:35)  HR: 90 (82 - 93)  BP: 94/56 (93/56 - 104/61)  BP(mean): 64 (64 - 64)  ABP: --  ABP(mean): --  RR: 18 (16 - 20)  SpO2: 98% (98% - 100%)    Weight in k (20)  Weight in k.4 (20)      I&O's Summary Last 24 Hrs    IN: 940.6 mL / OUT: 3130 mL / NET: -2189.4 mL      Tele: AV-paced 80s    Physical Exam:    General: No distress. Comfortable. Frail appearing.  HEENT: EOM intact.  Neck: Neck supple. JVP ~10cm H2O. No masses  Chest: Diminished at the bases bilaterally.  CV: Regular. Normal S1 and S2. II/VI HSM at apex. Radial pulses normal. 1+ BLE edema, improving.  Abdomen: Soft, non-distended, non-tender  Skin: No rashes or skin breakdown  Neurology: Alert and oriented times three. Sensation intact  Psych: Affect normal    Labs:    ( 20 @ 05:58 )               8.1    4.63  )--------( 62                   26.0     ( 20 @ 05:58 )     135  |  94  |  97  ---------------------<  123  3.4  |  33  |  2.86    Ca 9.2  Phos 2.7  Mg 2.1    ( 20 @ 05:58 )  TPro  5.6  /  Alb  2.9  /  TBili  1.2  /  DBili  x   /  AST  61  /  ALT  97  /  AlkPhos  85  ( 20 @ 11:26 )  TPro  6.0  /  Alb  3.4  /  TBili  1.7  /  DBili  x   /  AST  81  /  ALT  128  /  AlkPhos  104    PTT/PT/INR - ( 20 @ 07:54 )  PTT: 124.7 sec / PT: x     / INR: x        ( 20 @ 18:16 )  TropHS 93    /    / CKMB x      ( 20 @ 13:50 )  TropHS 93    /    / CKMB x        Serum Pro-Brain Natriuretic Peptide: [id number] (20 @ 13:50)

## 2020-11-06 NOTE — PROGRESS NOTE ADULT - ASSESSMENT
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    His invasive hemodynamics and wide pulse pressure suggested a vasoplegic picture, potentially sepsis given leukocytosis, though appeared overloaded on exam with low VTI on TTE and cultures have been negative. He required high doses of levophed on admission which have since been weaned and he has been diuresed considerably. His lactate has cleared and end organ function improving. Will continue milrinone with goal for eventual discharge on home inotropes and diurese to euvolemia.     He was previously evaluated for LVAD as DT but declined due to combination of age, frailty, and advanced CKD. His overall prognosis is poor and risk of readmission high given severity of heart failure and renal dysfunction.     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    His invasive hemodynamics and wide pulse pressure suggested a vasoplegic picture, potentially sepsis given leukocytosis, though appeared overloaded on exam with low VTI on TTE and cultures have been negative. He required high doses of levophed on admission which have since been weaned and he has been diuresed considerably. His lactate has cleared and end organ function improving. Will continue milrinone with goal for eventual discharge on home inotropes and diurese to euvolemia.     He was previously evaluated for LVAD as DT but declined due to combination of age, frailty, and advanced CKD. His overall prognosis is poor and risk of readmission high given severity of heart failure and renal dysfunction for which palliative care consulted.     Hemodynamics  11/2 (milrinone 0.25 and levophed) : RA 9, PA 52/20, unable to wedge, Corey CO/CI 6.2/3.0 with PA sat 74%  11/4 (on milrinone 0.25 and low dose levophed): RA 5, PA 39/20, Corey CO/CI 6.2/3.0 with PA sat 71%    Review of relevant studies  TTE 11/1: LV 7.0 cm, LVEF 10-15%, severe TR, severe RV dysfunction, LVOT VTI 7 cm
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
## 2020-11-06 NOTE — OCCUPATIONAL THERAPY INITIAL EVALUATION ADULT - PERTINENT HX OF CURRENT PROBLEM, REHAB EVAL
Patient is 75 year old , English speaking, single, male with PMH of "long standng non ischemia chronic systolicheart failure (EF 20% LVIDD 6.7 )) s/p CRT-D and mitral valve clip on last admission, Afib on Eliquiss/p DCCV, CKD Stage 3  , DM-2, hypothyroidism and papillary thyroid carcinoma,, anemia.

## 2020-11-06 NOTE — CHART NOTE - NSCHARTNOTEFT_GEN_A_CORE
Nutrition Chart Note.  Pt seen for: Initial Malnutrition follow-up    Source: EMR, Pt    Chart reviewed, events noted. Per chart: "74 year old male with PMH of long standng non ischemia chronic systolic heart failure (EF 20% LVIDD 6.7 )) s/p CRT-D and mitral valve clip on last admission, Afib on Eliquiss/p DCCV, CKD Stage 3  , DM-2, hypothyroidism and papillary thyroid carcinoma,, anemia. He was recently referred to HF in Aug 2020 for anasarca and a hospital admission that necessitated Milrinone assisted diuresis (> 20 kg off) w/ mitraclip for mod-severe MR which improved to mild. He was declined for an LVAD due to frailty and CKD. He now presented w/ AdHF and hyperkalemia in the setting of recent increase in diuretics and increase in potassium".    Diet :  Consistent Carbohydrate {No Snacks}, DASH, Glucerna Shake x2     Pt notes appetite is coming back and is now consuming >75% at meals with Glucerna Shake x2 daily. RD provided diet education upon initial assessment; reinforced information upon current visit. Pt with questions in reguards to fluid intake and supplements - answered all questions and made Pt aware RD remains available for additional questions/information PRN. Pt denies N+V, admits to some constipation with last BM 11/2 (no bowel regimen ordered). Denies chewing/swallowing difficulties.     Noted Potassium (3.4) <L> today - being replenished at this time.     PO intake: %     Current Weight: Wt fluctuations since last RD visit likely 2/2 fluid shifts as pt currently being diuresed on IV lasix. Will continue to monitor/trend weight status   190.4 Ibs. (11-05) - standing  193.3 Ibs. (11-02) - bed scale    Pertinent Medications: MEDICATIONS  (STANDING):  aMIOdarone    Tablet 200 milliGRAM(s) Oral daily  cefepime   IVPB 1000 milliGRAM(s) IV Intermittent every 12 hours  chlorhexidine 4% Liquid 1 Application(s) Topical <User Schedule>  dextrose 5%. 1000 milliLiter(s) (50 mL/Hr) IV Continuous <Continuous>  dextrose 50% Injectable 12.5 Gram(s) IV Push once  dextrose 50% Injectable 25 Gram(s) IV Push once  dextrose 50% Injectable 25 Gram(s) IV Push once  furosemide Infusion 10 mG/Hr (5 mL/Hr) IV Continuous <Continuous>  heparin  Infusion. 1400 Unit(s)/Hr (14 mL/Hr) IV Continuous <Continuous>  hydrocortisone 2.5% Ointment 1 Application(s) Topical two times a day  insulin lispro (ADMELOG) corrective regimen sliding scale   SubCutaneous three times a day before meals  insulin lispro (ADMELOG) corrective regimen sliding scale   SubCutaneous at bedtime  levothyroxine 50 MICROGram(s) Oral daily  melatonin 3 milliGRAM(s) Oral at bedtime  milrinone Infusion 0.375 MICROgram(s)/kG/Min (10.2 mL/Hr) IV Continuous <Continuous>    MEDICATIONS  (PRN):  acetaminophen   Tablet .. 650 milliGRAM(s) Oral every 6 hours PRN Mild Pain (1 - 3)  dextrose 40% Gel 15 Gram(s) Oral once PRN Blood Glucose LESS THAN 70 milliGRAM(s)/deciliter  glucagon  Injectable 1 milliGRAM(s) IntraMuscular once PRN Glucose LESS THAN 70 milligrams/deciliter  hydrocortisone 2.5% Rectal Cream 1 Application(s) Rectal daily PRN hemorrhoid pain/itch  sodium chloride 0.9% lock flush 10 milliLiter(s) IV Push every 1 hour PRN Pre/post blood products, medications, blood draw, and to maintain line patency    Pertinent Labs:  11-06 Na135 mmol/L Glu 123 mg/dL<H> K+ 3.4 mmol/L<L> Cr  2.86 mg/dL<H> BUN 97 mg/dL<H> 11-06 Phos 2.7 mg/dL 11-06 Alb 2.9 g/dL<L>      Skin per nursing documentation: no pressure injuries noted  Edema: 2+ generalized; 3+ B/L leg/feet    Estimated Needs:   [x ] no change since previous assessment  Based on current weight upon initial RD assessment: 183.4 lb; needs calculated with consideration for malnutrition, CKD; defer fluid needs to team in setting of HF  Energy (30-35 kcals/kg): 8027-0955  Protein (0.8-1.0 g pro/kg): 66.48-83.1      Previous Nutrition Diagnosis: Malnutrition - Severe, acute on chronic  Nutrition Diagnosis is [x ] ongoing - addressed with increasing appetite/PO intake & oral nutrition supplements.    New Nutrition Diagnosis: N/a    Interventions:   1. Continue Consistent CHO, DASH diet as ordered with Glucerna Shake x2  2. Diet education reinforced with Pt. RD remains available for additional information PRN  3. Continue to monitor electrolytes and replenish PRN     Monitoring and Evaluation:   Continue to monitor Nutritional intake, Tolerance to diet prescription, weights, labs, skin integrity  RD remains available upon request and will follow up per protocol    Jillian Miller, MS, RD, CDN  pager #[phone]

## 2020-11-06 NOTE — OCCUPATIONAL THERAPY INITIAL EVALUATION ADULT - TRANSFER TRAINING, PT EVAL
GOAL: Pt will perform all functional transfers during ADLs Independently, with/without AD in 4 weeks

## 2020-11-06 NOTE — OCCUPATIONAL THERAPY INITIAL EVALUATION ADULT - ANTICIPATED DISCHARGE DISPOSITION, OT EVAL
home w/ OT/Home OT recommended for home safety evaluation, DME training, ADLs, balance, strength, ROM, energy conservation. Recommend RW, commode and shower chair. Supervision/Assist for ADLs and functional mobility as needed.

## 2020-11-06 NOTE — CONSULT NOTE ADULT - PROBLEM SELECTOR RECOMMENDATION 2
Pt. with hyperkalemia in the setting of renal failure. Serum potassium yesterday (11/1/20) was 7.3. Hyperkalemia responded to medical management with Albuterol, Bumex, and Insulin. Serum potassium today 5.0. Pt. on IV Lasix infusion as per cardiology. Low potassium diet advised. Monitor serum potassium.    If any questions, please feel free to contact me  Ruben Aguilar   Nephrology Fellow  [phone]  (After 5 pm or on weekends please page the on-call fellow)
- management of hemodynamics as above  - consult cardiorenal who have followed this patient as well
with post nasal drip.   Will do a trial of Flonase

## 2020-11-06 NOTE — PROCEDURE NOTE - ADDITIONAL PROCEDURE DETAILS
Under sterile conditions and with proper draping of the patient, a pulmonary artery catheter was floated through the introducer catheter in the left internal jugular vein. With the balloon inflated with 1.5ml of air, the PA catheter was advanced while monitoring the pressure tracing from the central venous system to the right ventricle and to ultimately into the pulmonary artery at 48cm. The balloon was deflated, PA pressure tracing was noted again. The position of the catheter was verified by CXR.
Under sterile conditions and with proper draping of the patient, a pulmonary artery catheter was floated through the introducer catheter in the left internal jugular vein. With the ballon inflated with 1.5ml of air, the PA catheter was advanced while monitoring the pressure tracing from the central venous system to the right ventricle and to ultimately to the pulmonary artery at 48cm. The balloon was deflated, PA pressure tracing was noted again. The position of the catheter was verified by CXR.
1. Normal CRT-D function with pacing/sensing thresholds and impedances WNL  2. Battery longevity is 5.3 years  3. Patient is not pacemaker dependent  4. No arrhythmias on ICD interrogation. No VT/VF events or ICD therapies delivered.

## 2020-11-06 NOTE — PROVIDER CONTACT NOTE (CRITICAL VALUE NOTIFICATION) - RECOMMENDATIONS
repeat level
NP Jenn aware, followed heparin gtt protocol and reduced rate to 9ml/hr. Follow up apTT to be drawn at 1410 11/6/2020.

## 2020-11-06 NOTE — OCCUPATIONAL THERAPY INITIAL EVALUATION ADULT - ADDITIONAL COMMENTS
Chest XRAY 11/4: Left pleural effusion. Chest Xray 11/2: There is a right IJ approach central venous catheter appears unchanged. There is a left-sided pacemaker.There has been interval placement of a left IJ approach [place] catheter, with its tip in the main pulmonary artery. The heart is enlarged. Mitral valve clip. There is no pneumothorax.

## 2020-11-06 NOTE — CONSULT NOTE ADULT - PROBLEM SELECTOR PROBLEM 1
Renal failure, unspecified chronicity
Acute on chronic systolic heart failure, NYHA class 4
Cardiogenic shock

## 2020-11-06 NOTE — PHARMACOTHERAPY INTERVENTION NOTE - COMMENTS
Confirmed home medications with patient and pharmacy, updated in Outpatient Medication Review.     Arina Armenta, PharmD  PGY-1 Pharmacy Resident  x [phone]

## 2020-11-06 NOTE — CONSULT NOTE ADULT - PROBLEM SELECTOR RECOMMENDATION 3
- u/s abdomen without pathology, management of hemodynamics as above
See details about GOC above. However, in summary, at this time, the patient's main goals are for f/u with his HF speciality and trying to recover his functionality and improving his symptoms Rx. He is looking forward for home PT and continuing routine medical care. Though at this time I did not d/w the patient about hospice, after d/w him about GOC, it did not appear to me, he was really into trying to get those services. However, I will try to introduce the concept of hospice benefits on Monday.   Though the patient clearly stated he wanted to be DNR/I, he only wanted to complete a MOLST while discussing about it with his niece, Marilou. Will try to f/u on this discussion on Monday.

## 2020-11-06 NOTE — OCCUPATIONAL THERAPY INITIAL EVALUATION ADULT - ADL RETRAINING, OT EVAL
GOAL: Pt will perform UB/LB dressing seated to utilize energy conservation techniques independently in 4 weeks

## 2020-11-06 NOTE — CONSULT NOTE ADULT - CONVERSATION DETAILS
The patient has a really good understanding about his advanced heart failure and limited Rx options (mainly pharmacologic Rx and now on Primacor). His current goals are for trying to improve his functionality and trying to recover his independency (at least being able to ambulate in his house, care of his own hygiene, and eating by himself). He understand that by the end of this hospitalization his functionality will need to be re-evaluated and determining if he needs LATRICIA, Home care with [facility] care, or home care without it.     We discussed about code status to what he indicated if his heart were to stop that he did not want CPR or intubation. He stated his living will indicated that. He recognized that in the setting of cardiac he was propably not going to survive and if surviving that he was going to be surviving depending on life support. We discussed about completing a MOLST; however, he indicated he wanted his niece to be present f The patient has a really good understanding about his advanced heart failure and limited Rx options (mainly pharmacologic Rx and now on Primacor). His current goals are for trying to improve his functionality and trying to recover his independency (at least being able to ambulate in his house, care of his own hygiene, and eating by himself). He understand that by the end of this hospitalization his functionality will need to be re-evaluated and depending on it, it will be determined if he needs LATRICIA, Home care with jail care, or home care without it.     We discussed about code status to what he indicated if his heart were to stop that he did not want CPR or intubation. He recognized that in the setting of cardiac he was provably not going to survive and if surviving that he was going to be surviving depending on life support.  He stated his living indicates that. I explained to the patient that though a living will is a way to document a patient's wishes, it does not become a medical orders and that knowing his wishes, as expressed to me, that completing a MOLST form was a better option. We then discussed about completing a MOLST; however, he indicated he wanted his niece to be present to discuss about the form and complete it with her. On Monday, I will try to reach out to the patient's niece and see if she can be available (on the phone or in person) to help out with this conversation.     16' were spent in ACP.

## 2020-11-06 NOTE — PROGRESS NOTE ADULT - SUBJECTIVE AND OBJECTIVE BOX
[facility] DIVISION OF KIDNEY DISEASES AND HYPERTENSION -- FOLLOW UP NOTE  --------------------------------------------------------------------------------  HPI: 75-year-old male with advanced heart failure, CKD, Afib, DM, hypothyroidism, was admitted to [facility] on 11/1/20 for worsening lethargy. Pt. transferred to CCU and started on IV pressors and antibiotics.  Pt. evaluated by Heart Failure team, was not noted to be grossly volume overloaded on invasive hemodynamic measures concerning for possible sepsis as cause of shock. Pt.  transferred to medical floor today (11/6/20). Nephrology team consulted for elevated serum creatinine. Pt. was hospitalized at [facility] from 8/21/20-9/21/20 and underwent mitraclip placement. Upon review of labs on [facility], Scr was 2.77 on 9/21/20. Scr on arrival to the ER was 4.26 on 4/1/20. Scr today improved to 2.86. Pt. remains on IV Lasix infusion with good urine output.    Pt. evaluated at bedside, in no acute distress. Pt. now sitting in chair.    PAST HISTORY  --------------------------------------------------------------------------------  No significant changes to PMH, PSH, FHx, SHx, unless otherwise noted    ALLERGIES & MEDICATIONS  --------------------------------------------------------------------------------  Allergies    No Known Allergies    Intolerances    Standing Inpatient Medications  aMIOdarone    Tablet 200 milliGRAM(s) Oral daily  cefepime   IVPB 1000 milliGRAM(s) IV Intermittent every 12 hours  chlorhexidine 4% Liquid 1 Application(s) Topical <User Schedule>  furosemide Infusion 10 mG/Hr IV Continuous <Continuous>  heparin  Infusion. 1400 Unit(s)/Hr IV Continuous <Continuous>  hydrocortisone 2.5% Ointment 1 Application(s) Topical two times a day  insulin lispro (ADMELOG) corrective regimen sliding scale   SubCutaneous three times a day before meals  insulin lispro (ADMELOG) corrective regimen sliding scale   SubCutaneous at bedtime  levothyroxine 50 MICROGram(s) Oral daily  melatonin 3 milliGRAM(s) Oral at bedtime  milrinone Infusion 0.375 MICROgram(s)/kG/Min IV Continuous <Continuous>  potassium chloride    Tablet ER 20 milliEquivalent(s) Oral every 2 hours    REVIEW OF SYSTEMS  --------------------------------------------------------------------------------  Gen: no lethargy  Respiratory: No dyspnea  CV: No chest pain  GI: No abdominal pain  MSK: + LE edema  Neuro: No dizziness  Heme: No bleeding    All other systems were reviewed and are negative, except as noted.    VITALS/PHYSICAL EXAM  --------------------------------------------------------------------------------  T(C): 36.7 (11-06-20 @ 04:28), Max: 36.8 (11-05-20 @ 20:35)  HR: 93 (11-06-20 @ 04:28) (80 - 93)  BP: 98/60 (11-06-20 @ 05:38) (83/48 - 104/61)  RR: 18 (11-06-20 @ 04:28) (13 - 24)  SpO2: 98% (11-06-20 @ 04:28) (93% - 100%)  Wt(kg): --    11-05-20 @ 07:01  -  11-06-20 @ 07:00  --------------------------------------------------------  IN: 940.6 mL / OUT: 3130 mL / NET: -2189.4 mL    11-06-20 @ 07:01  -  11-06-20 @ 11:44  --------------------------------------------------------  IN: 220 mL / OUT: 900 mL / NET: -680 mL    Physical Exam:  	Gen: NAD  	HEENT: MMM  	Pulm: good air entry B/L  	CV: S1S2  	Abd: Soft, +BS   	Ext: LE pitting edema B/L  	Neuro: Awake  	Skin: Warm and dry    LABS/STUDIES  --------------------------------------------------------------------------------              8.1    4.63  >-----------<  62       [11-06-20 @ 05:58]              26.0     135  |  94  |  97  ----------------------------<  123      [11-06-20 @ 05:58]  3.4   |  33  |  2.86        Ca     9.2     [11-06-20 @ 05:58]      Mg     2.1     [11-06-20 @ 05:58]      Phos  2.7     [11-06-20 @ 05:58]    Creatinine Trend:  SCr 2.86 [11-06 @ 05:58]  SCr 3.08 [11-05 @ 11:26]  SCr 3.25 [11-05 @ 05:04]  SCr 3.12 [11-04 @ 22:39]  SCr 3.46 [11-04 @ 15:00]    Urinalysis - [11-05-20 @ 09:42]      Color Light Yellow / Appearance Clear / SG 1.010 / pH 6.5      Gluc Negative / Ketone Negative  / Bili Negative / Urobili Negative       Blood Trace / Protein Negative / Leuk Est Negative / Nitrite Negative      RBC 2 / WBC 1 / Hyaline 0 / Gran  / Sq Epi  / Non Sq Epi 0 / Bacteria Negative

## 2020-11-06 NOTE — OCCUPATIONAL THERAPY INITIAL EVALUATION ADULT - LIVES WITH, PROFILE
Pt lives with niece in private house with 4TSE and first floor set up. Pt reports being independent with all functional mobility and ADLs PTA. Pt owns a cane in which he occasionally used./other relative

## 2020-11-06 NOTE — OCCUPATIONAL THERAPY INITIAL EVALUATION ADULT - BALANCE TRAINING, PT EVAL
GOAL: Pt will demonstrate improved static/dynamic balance by 1/2 grade in order to increase safety and independence in ADLs within 4 weeks

## 2020-11-06 NOTE — OCCUPATIONAL THERAPY INITIAL EVALUATION ADULT - DIAGNOSIS, OT EVAL
Pt presents with decreased strength, balance, Rom and endurance impacting functional mobility and participation in ADLs/IADLs

## 2020-11-06 NOTE — CONSULT NOTE ADULT - ASSESSMENT
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    He was previously evaluated for LVAD as DT but declined due to combination of age, frailty, and advanced CKD. His overall prognosis is poor and risk of readmission high given severity of heart failure and renal dysfunction, reasons why geriatrics and palliative care has been consulted.

## 2020-11-06 NOTE — PROGRESS NOTE ADULT - PROBLEM SELECTOR PLAN 3
- management of hemodynamics as above  - cardiorenal recs appreciated - management of hemodynamics as above, overall improving   - cardiorenal recs appreciated

## 2020-11-06 NOTE — PROGRESS NOTE ADULT - ATTENDING COMMENTS
I have seen this patient with the fellow and agree with their assessment and plan. Continue diuresis as most of the UO and crt improved with diuresis in the CICU. Abx per primary team  Be mindful of cepefime dosing as crt fluctuates as risk of neurotoxicty    Yemi Rees MD  Cell   Pager   Office       For weekend please contact Dr Noe Pike( fellow) or Dr Van Lloyd( attending)

## 2020-11-06 NOTE — OCCUPATIONAL THERAPY INITIAL EVALUATION ADULT - PRECAUTIONS/LIMITATIONS, REHAB EVAL
Patient is 75 year old , English speaking, single, male with PMH of "long standng non ischemia chronic systolicheart failure (EF 20% LVIDD 6.7 )) s/p CRT-D and mitral valve clip on last admission, Afib on Eliquiss/p DCCV, CKD Stage 3  , DM-2, hypothyroidism and papillary thyroid carcinoma,, anemia. He was recently referred to HF in Aug 2020 for anasarca and a hospital admission that necessitated Milrinone assisted diuresis (> 20 kg off) w/ mitraclip for mod-severe MR which improved to mild. He was declined for an LVAD due to frailty and CKD. He now presented w/ AdHF and hyperkalemia in the setting of recent increase in diuretics and increase in potassium. He was recently referred to HF in Aug 2020 for anasarca and a hospital admission that necessitated Milrinone assisted diuresis (> 20 kg off) w/ mitraclip for mod-severe MR which improved to mild. He was declined for an LVAD due to frailty and CKD. He now presented w/ AdHF and hyperkalemia in the setting of recent increase in diuretics and increase in potassium.

## 2020-11-07 NOTE — PROGRESS NOTE ADULT - SUBJECTIVE AND OBJECTIVE BOX
Patient is a 75y old  Male who presents with a chief complaint of AdHF and hyperkalemia (06 Nov 2020 17:04)       INTERVAL HPI/OVERNIGHT EVENTS:  Patient seen and evaluated at bedside.  Pt is resting comfortable in NAD. Denies N/V/F/C.      Allergies    No Known Allergies    Intolerances        Vital Signs Last 24 Hrs  T(C): 36.8 (07 Nov 2020 12:30), Max: 37 (06 Nov 2020 20:42)  T(F): 98.3 (07 Nov 2020 12:30), Max: 98.6 (06 Nov 2020 20:42)  HR: 94 (07 Nov 2020 12:30) (85 - 94)  BP: 106/61 (07 Nov 2020 12:30) (94/57 - 106/61)  BP(mean): --  RR: 18 (07 Nov 2020 12:30) (17 - 18)  SpO2: 96% (07 Nov 2020 12:30) (95% - 96%)    LABS:                        8.5    4.84  )-----------( 57       ( 07 Nov 2020 07:27 )             27.9     11-07    137  |  95<L>  |  97<H>  ----------------------------<  100<H>  3.9   |  34<H>  |  2.81<H>    Ca    9.6      07 Nov 2020 15:45  Phos  2.9     11-07  Mg     2.0     11-07    TPro  5.6<L>  /  Alb  2.9<L>  /  TBili  1.2  /  DBili  x   /  AST  61<H>  /  ALT  97<H>  /  AlkPhos  85  11-06    PTT - ( 07 Nov 2020 00:26 )  PTT:68.9 sec    CAPILLARY BLOOD GLUCOSE      POCT Blood Glucose.: 126 mg/dL (07 Nov 2020 17:20)  POCT Blood Glucose.: 234 mg/dL (07 Nov 2020 11:58)  POCT Blood Glucose.: 142 mg/dL (07 Nov 2020 08:07)  POCT Blood Glucose.: 207 mg/dL (06 Nov 2020 21:44)      Lower Extremity Physical Exam:  Vascular: DP 1 /4 B/L, PT 0/4, CFT intact B/L, Temperature gradient warm to cool, B/L, severe LE +4 pitting edema b/l  Neuro: Epicritic sensation intact to the level of digits B/L.  Musculoskeletal/Ortho: unremarkable   Skin: lanced blisters with wounds partial thickness to anterior lower leg, cellulitis resolved, ecchymosis noted to R foot medial 1st MPJ, thickened dystrophic toenails x10   Patient is a 75y old  Male who presents with a chief complaint of AdHF and hyperkalemia (06 Nov 2020 17:04)       INTERVAL HPI/OVERNIGHT EVENTS:  Patient seen and evaluated at bedside.  Pt is resting comfortable in NAD. Denies N/V/F/C.      Allergies    No Known Allergies    Intolerances        Vital Signs Last 24 Hrs  T(C): 36.8 (07 Nov 2020 12:30), Max: 37 (06 Nov 2020 20:42)  T(F): 98.3 (07 Nov 2020 12:30), Max: 98.6 (06 Nov 2020 20:42)  HR: 94 (07 Nov 2020 12:30) (85 - 94)  BP: 106/61 (07 Nov 2020 12:30) (94/57 - 106/61)  BP(mean): --  RR: 18 (07 Nov 2020 12:30) (17 - 18)  SpO2: 96% (07 Nov 2020 12:30) (95% - 96%)    LABS:                        8.5    4.84  )-----------( 57       ( 07 Nov 2020 07:27 )             27.9     11-07    137  |  95<L>  |  97<H>  ----------------------------<  100<H>  3.9   |  34<H>  |  2.81<H>    Ca    9.6      07 Nov 2020 15:45  Phos  2.9     11-07  Mg     2.0     11-07    TPro  5.6<L>  /  Alb  2.9<L>  /  TBili  1.2  /  DBili  x   /  AST  61<H>  /  ALT  97<H>  /  AlkPhos  85  11-06    PTT - ( 07 Nov 2020 00:26 )  PTT:68.9 sec    CAPILLARY BLOOD GLUCOSE      POCT Blood Glucose.: 126 mg/dL (07 Nov 2020 17:20)  POCT Blood Glucose.: 234 mg/dL (07 Nov 2020 11:58)  POCT Blood Glucose.: 142 mg/dL (07 Nov 2020 08:07)  POCT Blood Glucose.: 207 mg/dL (06 Nov 2020 21:44)      Lower Extremity Physical Exam:  Vascular: DP 1 /4 B/L, PT 0/4, CFT intact B/L, Temperature gradient warm to cool, B/L, severe LE +4 pitting edema b/l  Neuro: Epicritic sensation intact to the level of digits B/L.  Musculoskeletal/Ortho: unremarkable   Skin: lanced blisters with wounds partial thickness to anterior lower leg, cellulitis resolving, ecchymosis noted to R foot medial 1st MPJ, thickened dystrophic toenails x10

## 2020-11-07 NOTE — PROGRESS NOTE ADULT - PROBLEM SELECTOR PLAN 7
- noted at last hospitalization and HIT ab negative on 9/17  - platelets currently downtrending, repeat HIT antibodies

## 2020-11-07 NOTE — PROGRESS NOTE ADULT - ASSESSMENT
76 y/o M w/ RLE partial thickness wounds w/ localized cellulitis and thickened mycotic elongated toenails    - pt seen and evaluated  - deroofed blister with to anterior lower leg, cellulitis resolved, ecchymosis noted to R foot medial 1st MPJ, early signs of tissue injury to posterior heels b/l   - rec daily dressing changes with mupirocin and DSD  - z-flow boots ordered, to be worn while resting in bed at all times   - follow up at the office upon discharge (call  for appointment) 76 y/o M w/ RLE partial thickness wounds w/ localized cellulitis and thickened mycotic elongated toenails    - pt seen and evaluated  - deroofed blister with to anterior lower leg, cellulitis resolving, ecchymosis noted to R foot medial 1st MPJ, early signs of tissue injury to posterior heels b/l   - rec daily dressing changes with mupirocin and DSD  - z-flow boots ordered, to be worn while resting in bed at all times   - follow up at the office upon discharge (call  for appointment)

## 2020-11-07 NOTE — PROGRESS NOTE ADULT - SUBJECTIVE AND OBJECTIVE BOX
SUBJECTIVE / OVERNIGHT EVENTS: pt denies chest pain, shortness of breath     MEDICATIONS  (STANDING):  aMIOdarone    Tablet 200 milliGRAM(s) Oral daily  chlorhexidine 4% Liquid 1 Application(s) Topical <User Schedule>  dextrose 5%. 1000 milliLiter(s) (50 mL/Hr) IV Continuous <Continuous>  dextrose 50% Injectable 12.5 Gram(s) IV Push once  dextrose 50% Injectable 25 Gram(s) IV Push once  dextrose 50% Injectable 25 Gram(s) IV Push once  fluticasone propionate 50 MICROgram(s)/spray Nasal Spray 1 Spray(s) Both Nostrils every 12 hours  furosemide Infusion 10 mG/Hr (5 mL/Hr) IV Continuous <Continuous>  heparin  Infusion. 1400 Unit(s)/Hr (14 mL/Hr) IV Continuous <Continuous>  hydrocortisone 2.5% Ointment 1 Application(s) Topical two times a day  insulin lispro (ADMELOG) corrective regimen sliding scale   SubCutaneous three times a day before meals  insulin lispro (ADMELOG) corrective regimen sliding scale   SubCutaneous at bedtime  levothyroxine 50 MICROGram(s) Oral daily  melatonin 3 milliGRAM(s) Oral at bedtime  milrinone Infusion 0.375 MICROgram(s)/kG/Min (10.2 mL/Hr) IV Continuous <Continuous>  mupirocin 2% Ointment 1 Application(s) Topical <User Schedule>  potassium chloride    Tablet ER 40 milliEquivalent(s) Oral every 4 hours    MEDICATIONS  (PRN):  acetaminophen   Tablet .. 650 milliGRAM(s) Oral every 6 hours PRN Mild Pain (1 - 3)  dextrose 40% Gel 15 Gram(s) Oral once PRN Blood Glucose LESS THAN 70 milliGRAM(s)/deciliter  glucagon  Injectable 1 milliGRAM(s) IntraMuscular once PRN Glucose LESS THAN 70 milligrams/deciliter  hydrocortisone 2.5% Rectal Cream 1 Application(s) Rectal daily PRN hemorrhoid pain/itch  sodium chloride 0.9% lock flush 10 milliLiter(s) IV Push every 1 hour PRN Pre/post blood products, medications, blood draw, and to maintain line patency    Vital Signs Last 24 Hrs  T(C): 36.7 (2020 21:12), Max: 36.8 (2020 12:30)  T(F): 98.1 (2020 21:12), Max: 98.3 (2020 12:30)  HR: 87 (2020 21:12) (85 - 94)  BP: 95/59 (2020 21:12) (95/59 - 106/61)  BP(mean): --  RR: 17 (2020 21:12) (17 - 18)  SpO2: 98% (2020 21:12) (96% - 98%)    Skin: No rash.  Eyes: No recent vision problems or eye pain.  ENT: No congestion, ear pain, or sore throat.  Cardiovascular: No chest pain or palpation.  Respiratory: No cough, shortness of breath, congestion, or wheezing.  Gastrointestinal: No abdominal pain, nausea, vomiting, or diarrhea.  Genitourinary: No dysuria.  Musculoskeletal: No joint swelling.  Neurologic: No headache.    PHYSICAL EXAM:  GENERAL: NAD  EYES: EOMI, PERRLA  NECK: Supple, No JVD  CHEST/LUNG: crackles at bases   HEART:  S1 , S2 +  ABDOMEN: soft , bs+  EXTREMITIES:  edema+  NEUROLOGY:alert awake    LABS:      137  |  95<L>  |  97<H>  ----------------------------<  100<H>  3.9   |  34<H>  |  2.81<H>    Ca    9.6      2020 15:45  Phos  2.9       Mg     2.0         TPro  5.6<L>  /  Alb  2.9<L>  /  TBili  1.2  /  DBili      /  AST  61<H>  /  ALT  97<H>  /  AlkPhos  85      Creatinine Trend: 2.81 <--, 2.93 <--, 2.86 <--, 3.08 <--, 3.25 <--, 3.12 <--, 3.46 <--, 2.81 <--, 3.48 <--, 3.64 <--, 4.31 <--, 4.32 <--, 4.41 <--, 4.36 <--, 4.39 <--, 4.21 <--, 4.28 <--, 4.34 <--, 4.26 <--                        8.5    4.84  )-----------( 57       ( 2020 07:27 )             27.9     Urine Studies:  Urinalysis Basic - ( 2020 09:42 )    Color: Light Yellow / Appearance: Clear / S.010 / pH:   Gluc:  / Ketone: Negative  / Bili: Negative / Urobili: Negative   Blood:  / Protein: Negative / Nitrite: Negative   Leuk Esterase: Negative / RBC: 2 /hpf / WBC 1 /HPF   Sq Epi:  / Non Sq Epi: 0 /hpf / Bacteria: Negative      Sodium, Random Urine: 86 mmol/L ( @ 14:24)  Osmolality, Random Urine: 306 mos/kg ( @ 14:24)  Creatinine, Random Urine: 16 mg/dL ( @ 14:24)          LIVER FUNCTIONS - ( 2020 05:58 )  Alb: 2.9 g/dL / Pro: 5.6 g/dL / ALK PHOS: 85 U/L / ALT: 97 U/L / AST: 61 U/L / GGT: x           PTT - ( 2020 00:26 )  PTT:68.9 sec

## 2020-11-07 NOTE — PROGRESS NOTE ADULT - SUBJECTIVE AND OBJECTIVE BOX
Interval History:  feels well  getting stronger  making good urine    Medications:  acetaminophen   Tablet .. 650 milliGRAM(s) Oral every 6 hours PRN  aMIOdarone    Tablet 200 milliGRAM(s) Oral daily  chlorhexidine 4% Liquid 1 Application(s) Topical <User Schedule>  dextrose 40% Gel 15 Gram(s) Oral once PRN  dextrose 5%. 1000 milliLiter(s) IV Continuous <Continuous>  dextrose 50% Injectable 12.5 Gram(s) IV Push once  dextrose 50% Injectable 25 Gram(s) IV Push once  dextrose 50% Injectable 25 Gram(s) IV Push once  fluticasone propionate 50 MICROgram(s)/spray Nasal Spray 1 Spray(s) Both Nostrils every 12 hours  furosemide Infusion 10 mG/Hr IV Continuous <Continuous>  glucagon  Injectable 1 milliGRAM(s) IntraMuscular once PRN  heparin  Infusion. 1400 Unit(s)/Hr IV Continuous <Continuous>  hydrocortisone 2.5% Ointment 1 Application(s) Topical two times a day  hydrocortisone 2.5% Rectal Cream 1 Application(s) Rectal daily PRN  insulin lispro (ADMELOG) corrective regimen sliding scale   SubCutaneous three times a day before meals  insulin lispro (ADMELOG) corrective regimen sliding scale   SubCutaneous at bedtime  levothyroxine 50 MICROGram(s) Oral daily  melatonin 3 milliGRAM(s) Oral at bedtime  milrinone Infusion 0.375 MICROgram(s)/kG/Min IV Continuous <Continuous>  mupirocin 2% Ointment 1 Application(s) Topical <User Schedule>  potassium chloride    Tablet ER 40 milliEquivalent(s) Oral every 4 hours  sodium chloride 0.9% lock flush 10 milliLiter(s) IV Push every 1 hour PRN      Vitals:  T(C): 36.8 (11-07-20 @ 12:30), Max: 37 (11-06-20 @ 20:42)  HR: 94 (11-07-20 @ 12:30) (85 - 94)  BP: 106/61 (11-07-20 @ 12:30) (94/57 - 106/61)  BP(mean): --  RR: 18 (11-07-20 @ 12:30) (17 - 18)  SpO2: 96% (11-07-20 @ 12:30) (95% - 96%)    Daily     Daily         I&O's Summary    06 Nov 2020 07:01  -  07 Nov 2020 07:00  --------------------------------------------------------  IN: 1337.8 mL / OUT: 2950 mL / NET: -1612.2 mL    07 Nov 2020 07:01  -  07 Nov 2020 20:21  --------------------------------------------------------  IN: 1643.2 mL / OUT: 1950 mL / NET: -306.8 mL    Physical Exam:  Appearance: No Acute Distress; frail appearing  HEENT: PERRL  Neck: JVD approx 8-10 with HJR  Cardiovascular: Normal S1 S2, No murmurs/rubs/gallops; possible S3  Respiratory: Clear to auscultation bilaterally  Gastrointestinal: Soft, Non-tender	  Skin: No cyanosis	  Neurologic: Non-focal  Extremities: b/l LE edema; WWP  Psychiatry: A & O x 3, Mood & affect appropriate    Labs:                        8.5    4.84  )-----------( 57       ( 07 Nov 2020 07:27 )             27.9     11-07    137  |  95<L>  |  97<H>  ----------------------------<  100<H>  3.9   |  34<H>  |  2.81<H>    Ca    9.6      07 Nov 2020 15:45  Phos  2.9     11-07  Mg     2.0     11-07    TPro  5.6<L>  /  Alb  2.9<L>  /  TBili  1.2  /  DBili  x   /  AST  61<H>  /  ALT  97<H>  /  AlkPhos  85  11-06    PTT - ( 07 Nov 2020 00:26 )  PTT:68.9 sec

## 2020-11-07 NOTE — PROGRESS NOTE ADULT - ASSESSMENT
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    His invasive hemodynamics and wide pulse pressure suggested a vasoplegic picture, potentially sepsis given leukocytosis, though appeared overloaded on exam with low VTI on TTE and cultures have been negative. He required high doses of levophed on admission which have since been weaned and he has been diuresed considerably. His lactate has cleared and end organ function improving. Will continue milrinone with goal for eventual discharge on home inotropes and diurese to euvolemia.     He was previously evaluated for LVAD as DT but declined due to combination of age, frailty, and advanced CKD. His overall prognosis is poor and risk of readmission high given severity of heart failure and renal dysfunction for which palliative care consulted.     Hemodynamics  11/2 (milrinone 0.25 and levophed) : RA 9, PA 52/20, unable to wedge, Corey CO/CI 6.2/3.0 with PA sat 74%  11/4 (on milrinone 0.25 and low dose levophed): RA 5, PA 39/20, Corey CO/CI 6.2/3.0 with PA sat 71%    Review of relevant studies  TTE 11/1: LV 7.0 cm, LVEF 10-15%, severe TR, severe RV dysfunction, LVOT VTI 7 cm

## 2020-11-08 NOTE — CONSULT NOTE ADULT - ASSESSMENT
Assessment: 75y Male with heart failure referred for tunneled CVC placement for home milrinone infusion.    Plan: tunneled central venous catheter placement in IR 11/9/2020 under local anesthesia  -Please place order for IR Procedure, approving attending Dr. Salo Valdez MD, RPVI  Chief Resident, Interventional Radiology  [facility]: [phone] ([phone]  [facility], [place]: [phone]

## 2020-11-08 NOTE — PROGRESS NOTE ADULT - SUBJECTIVE AND OBJECTIVE BOX
SUBJECTIVE / OVERNIGHT EVENTS: pt denies chest pain, shortness of breath     MEDICATIONS  (STANDING):  aMIOdarone    Tablet 200 milliGRAM(s) Oral daily  chlorhexidine 4% Liquid 1 Application(s) Topical <User Schedule>  dextrose 5%. 1000 milliLiter(s) (50 mL/Hr) IV Continuous <Continuous>  dextrose 50% Injectable 12.5 Gram(s) IV Push once  dextrose 50% Injectable 25 Gram(s) IV Push once  dextrose 50% Injectable 25 Gram(s) IV Push once  fluticasone propionate 50 MICROgram(s)/spray Nasal Spray 1 Spray(s) Both Nostrils every 12 hours  furosemide Infusion 15 mG/Hr (7.5 mL/Hr) IV Continuous <Continuous>  heparin  Infusion. 900 Unit(s)/Hr (9 mL/Hr) IV Continuous <Continuous>  hydrocortisone 2.5% Ointment 1 Application(s) Topical two times a day  insulin lispro (ADMELOG) corrective regimen sliding scale   SubCutaneous three times a day before meals  insulin lispro (ADMELOG) corrective regimen sliding scale   SubCutaneous at bedtime  levothyroxine 50 MICROGram(s) Oral daily  melatonin 3 milliGRAM(s) Oral at bedtime  milrinone Infusion 0.375 MICROgram(s)/kG/Min (10.2 mL/Hr) IV Continuous <Continuous>  mupirocin 2% Ointment 1 Application(s) Topical <User Schedule>    MEDICATIONS  (PRN):  acetaminophen   Tablet .. 650 milliGRAM(s) Oral every 6 hours PRN Mild Pain (1 - 3)  dextrose 40% Gel 15 Gram(s) Oral once PRN Blood Glucose LESS THAN 70 milliGRAM(s)/deciliter  glucagon  Injectable 1 milliGRAM(s) IntraMuscular once PRN Glucose LESS THAN 70 milligrams/deciliter  heparin   Injectable 7500 Unit(s) IV Push every 6 hours PRN For aPTT less than 40  heparin   Injectable 3500 Unit(s) IV Push every 6 hours PRN For aPTT between 40 - 57  hydrocortisone 2.5% Rectal Cream 1 Application(s) Rectal daily PRN hemorrhoid pain/itch  sodium chloride 0.9% lock flush 10 milliLiter(s) IV Push every 1 hour PRN Pre/post blood products, medications, blood draw, and to maintain line patency    Vital Signs Last 24 Hrs  T(C): 36.5 (2020 20:29), Max: 37.1 (2020 04:51)  T(F): 97.7 (2020 20:29), Max: 98.8 (2020 04:51)  HR: 90 (2020 22:38) (72 - 90)  BP: 97/57 (2020 22:38) (91/55 - 109/61)  BP(mean): --  RR: 17 (2020 20:29) (16 - 18)  SpO2: 93% (2020 20:29) (93% - 98%)    Skin: No rash.  Eyes: No recent vision problems or eye pain.  ENT: No congestion, ear pain, or sore throat.  Cardiovascular: No chest pain or palpation.  Respiratory: No cough, shortness of breath, congestion, or wheezing.  Gastrointestinal: No abdominal pain, nausea, vomiting, or diarrhea.  Genitourinary: No dysuria.  Musculoskeletal: No joint swelling.  Neurologic: No headache.    PHYSICAL EXAM:  GENERAL: NAD  EYES: EOMI, PERRLA  NECK: Supple, No JVD  CHEST/LUNG: crackles at bases   HEART:  S1 , S2 +  ABDOMEN: soft , bs+  EXTREMITIES:  edema+  NEUROLOGY:alert awake    LABS:      134<L>  |  94<L>  |  95<H>  ----------------------------<  167<H>  4.4   |  30  |  2.71<H>    Ca    9.6      2020 17:36  Phos  2.9       Mg     2.0           Creatinine Trend: 2.71 <--, 2.82 <--, 2.81 <--, 2.93 <--, 2.86 <--, 3.08 <--, 3.25 <--, 3.12 <--, 3.46 <--, 2.81 <--, 3.48 <--, 3.64 <--, 4.31 <--, 4.32 <--, 4.41 <--, 4.36 <--, 4.39 <--                        8.7    5.08  )-----------( 76       ( 2020 17:36 )             28.9     Urine Studies:  Urinalysis Basic - ( 2020 09:42 )    Color: Light Yellow / Appearance: Clear / S.010 / pH:   Gluc:  / Ketone: Negative  / Bili: Negative / Urobili: Negative   Blood:  / Protein: Negative / Nitrite: Negative   Leuk Esterase: Negative / RBC: 2 /hpf / WBC 1 /HPF   Sq Epi:  / Non Sq Epi: 0 /hpf / Bacteria: Negative      Sodium, Random Urine: 86 mmol/L ( @ 14:24)  Osmolality, Random Urine: 306 mos/kg ( @ 14:24)  Creatinine, Random Urine: 16 mg/dL ( @ 14:24)            PTT - ( 2020 19:18 )  PTT:57.7 sec

## 2020-11-08 NOTE — PROGRESS NOTE ADULT - PROBLEM SELECTOR PLAN 7
- noted at last hospitalization and HIT ab negative on 9/17  - platelets currently downtrending, repeat HIT antibodies negative

## 2020-11-08 NOTE — CONSULT NOTE ADULT - SUBJECTIVE AND OBJECTIVE BOX
History of Present Illness: 75yMale with decompensated systolic heart failure requiring long term milrinone infusion is referred to interventional radiology for tunneled central venous catheter placement.    Allergies:   No Known Allergies    Medications (Antibiotics/Cardiovascular/Anticoagulants/Blood Products):   aMIOdarone    Tablet: 200 milliGRAM(s) Oral (11-08-20 @ 05:15)  cefepime   IVPB: 100 mL/Hr IV Intermittent (11-06-20 @ 23:25)  furosemide   Injectable: 40 milliGRAM(s) IV Push (11-08-20 @ 10:42)  furosemide Infusion: 7.5 mL/Hr IV Continuous (11-08-20 @ 10:35)  furosemide Infusion: 5 mL/Hr IV Continuous (11-07-20 @ 11:36)  heparin  Infusion.: 900 Unit(s)/Hr IV Continuous (11-08-20 @ 07:39)  milrinone Infusion: 10.2 mL/Hr IV Continuous (11-07-20 @ 20:57)    Vital Signs:  T(F): 97.6 (11-08-20 @ 12:57), Max: 98.8 (11-08-20 @ 04:51)  HR: 72 (11-08-20 @ 12:57) (72 - 87)  BP: 109/61 (11-08-20 @ 12:57) (95/59 - 109/61)  RR: 18 (11-08-20 @ 12:57) (16 - 18)  SpO2: 98% (11-08-20 @ 12:57) (96% - 98%)    Relevant Lab Results:            8.7  5.08)-----(75     (11-08-20 @ 17:36)         28.9     134 | 94 | 95  --------------------< 167     (11-08-20 @ 17:36)  4.4 | 30 | 2.71       PT: -- 11-08-20 @ 19:18  aPTT: 57.7<H> 11-08-20 @ 19:18   INR: -- 11-08-20 @ 19:18

## 2020-11-08 NOTE — PROGRESS NOTE ADULT - SUBJECTIVE AND OBJECTIVE BOX
Interval History:  feels well  tolerated 2% saline x1 well with good response    Medications:  acetaminophen   Tablet .. 650 milliGRAM(s) Oral every 6 hours PRN  aMIOdarone    Tablet 200 milliGRAM(s) Oral daily  chlorhexidine 4% Liquid 1 Application(s) Topical <User Schedule>  dextrose 40% Gel 15 Gram(s) Oral once PRN  dextrose 5%. 1000 milliLiter(s) IV Continuous <Continuous>  dextrose 50% Injectable 12.5 Gram(s) IV Push once  dextrose 50% Injectable 25 Gram(s) IV Push once  dextrose 50% Injectable 25 Gram(s) IV Push once  fluticasone propionate 50 MICROgram(s)/spray Nasal Spray 1 Spray(s) Both Nostrils every 12 hours  furosemide Infusion 15 mG/Hr IV Continuous <Continuous>  glucagon  Injectable 1 milliGRAM(s) IntraMuscular once PRN  heparin   Injectable 7500 Unit(s) IV Push every 6 hours PRN  heparin   Injectable 3500 Unit(s) IV Push every 6 hours PRN  heparin  Infusion. 900 Unit(s)/Hr IV Continuous <Continuous>  hydrocortisone 2.5% Ointment 1 Application(s) Topical two times a day  hydrocortisone 2.5% Rectal Cream 1 Application(s) Rectal daily PRN  insulin lispro (ADMELOG) corrective regimen sliding scale   SubCutaneous three times a day before meals  insulin lispro (ADMELOG) corrective regimen sliding scale   SubCutaneous at bedtime  levothyroxine 50 MICROGram(s) Oral daily  melatonin 3 milliGRAM(s) Oral at bedtime  milrinone Infusion 0.375 MICROgram(s)/kG/Min IV Continuous <Continuous>  mupirocin 2% Ointment 1 Application(s) Topical <User Schedule>  sodium chloride 0.9% lock flush 10 milliLiter(s) IV Push every 1 hour PRN      Vitals:  T(C): 36.4 (20 @ 12:57), Max: 37.1 (20 @ 04:51)  HR: 72 (20 @ 12:57) (72 - 87)  BP: 109/61 (20 @ 12:57) (95/59 - 109/61)  BP(mean): --  RR: 18 (20 @ 12:57) (16 - 18)  SpO2: 98% (20 @ 12:57) (96% - 98%)    Daily     Daily Weight in k.5 (2020 08:00)        I&O's Summary    2020 07:01  -  2020 07:00  --------------------------------------------------------  IN: 1930.6 mL / OUT: 3430 mL / NET: -1499.4 mL    2020 07:01  -  2020 20:14  --------------------------------------------------------  IN: 837 mL / OUT: 825 mL / NET: 12 mL        Physical Exam:  Appearance: No Acute Distress; frail appearing  HEENT: PERRL  Neck: JVD approx 8-10 with v waves and HJR  Cardiovascular: Normal S1 S2, possible S3 present  Respiratory: Clear to auscultation bilaterally  Gastrointestinal: Soft, Non-tender	  Skin: No cyanosis	  Neurologic: Non-focal  Extremities: b/l LE edema wrapped  Psychiatry: A & O x 3, Mood & affect appropriate    Labs:                        8.7    5.08  )-----------( 76       ( 2020 17:36 )             28.9     11-08    134<L>  |  94<L>  |  95<H>  ----------------------------<  167<H>  4.4   |  30  |  2.71<H>    Ca    9.6      2020 17:36  Phos  2.9     11-07  Mg     2.0     11-08      PTT - ( 2020 19:18 )  PTT:57.7 sec

## 2020-11-09 NOTE — PROGRESS NOTE ADULT - SUBJECTIVE AND OBJECTIVE BOX
Subjective:      Medications:  acetaminophen   Tablet .. 650 milliGRAM(s) Oral every 6 hours PRN  aMIOdarone    Tablet 200 milliGRAM(s) Oral daily  chlorhexidine 4% Liquid 1 Application(s) Topical <User Schedule>  dextrose 40% Gel 15 Gram(s) Oral once PRN  dextrose 5%. 1000 milliLiter(s) IV Continuous <Continuous>  dextrose 50% Injectable 12.5 Gram(s) IV Push once  dextrose 50% Injectable 25 Gram(s) IV Push once  dextrose 50% Injectable 25 Gram(s) IV Push once  fluticasone propionate 50 MICROgram(s)/spray Nasal Spray 1 Spray(s) Both Nostrils every 12 hours  furosemide Infusion 15 mG/Hr IV Continuous <Continuous>  glucagon  Injectable 1 milliGRAM(s) IntraMuscular once PRN  heparin   Injectable 7500 Unit(s) IV Push every 6 hours PRN  heparin   Injectable 3500 Unit(s) IV Push every 6 hours PRN  heparin  Infusion. 900 Unit(s)/Hr IV Continuous <Continuous>  hydrocortisone 2.5% Ointment 1 Application(s) Topical two times a day  hydrocortisone 2.5% Rectal Cream 1 Application(s) Rectal daily PRN  insulin lispro (ADMELOG) corrective regimen sliding scale   SubCutaneous three times a day before meals  insulin lispro (ADMELOG) corrective regimen sliding scale   SubCutaneous at bedtime  levothyroxine 50 MICROGram(s) Oral daily  melatonin 3 milliGRAM(s) Oral at bedtime  milrinone Infusion 0.375 MICROgram(s)/kG/Min IV Continuous <Continuous>  mupirocin 2% Ointment 1 Application(s) Topical <User Schedule>  sodium chloride 0.9% lock flush 10 milliLiter(s) IV Push every 1 hour PRN      ICU Vital Signs Last 24 Hrs  T(C): 36.6, Max: 36.8 (- @ 04:21)  HR: 83 (83 - 90)  BP: 99/58 (91/55 - 105/62)  BP(mean): --  ABP: --  ABP(mean): --  RR: 16 (16 - 17)  SpO2: 99% (93% - 99%)    Weight in k.3 (20)  Weight in k.5 (20)      I&O's Summary Last 24 Hrs    IN: 1157.4 mL / OUT: 2675 mL / NET: -1517.6 mL      Tele:    Physical Exam:    General: No distress. Comfortable.  HEENT: EOM intact.  Neck: Neck supple. JVP not elevated. No masses  Chest: Clear to auscultation bilaterally  CV: Normal S1 and S2. No murmurs, rub, or gallops. Radial pulses normal.  Abdomen: Soft, non-distended, non-tender  Skin: No rashes or skin breakdown  Neurology: Alert and oriented times three. Sensation intact  Psych: Affect normal    Labs:    ( 20 @ 05:45 )               8.5    4.53  )--------( 75                   27.4     ( 20 @ 05:45 )     134  |  93  |  97  ---------------------<  131  3.8  |  31  |  2.59    Ca 9.6  Phos x   Mg 2.0      PTT/PT/INR - ( 20 @ 08:54 )  PTT: 55.0 sec / PT: x     / INR: x        ( 20 @ 18:16 )  Trop[facility] 93    /    / CKMB x      ( 20 @ 13:50 )  Trop[facility] 93    /    / CKMB x        Serum Pro-Brain Natriuretic Peptide: [id number] (20 @ 13:50)   Subjective:  - NAEO  - Denies CP, palpitations, lightheadedness, and SOB at rest    Medications:  acetaminophen   Tablet .. 650 milliGRAM(s) Oral every 6 hours PRN  aMIOdarone    Tablet 200 milliGRAM(s) Oral daily  chlorhexidine 4% Liquid 1 Application(s) Topical <User Schedule>  dextrose 40% Gel 15 Gram(s) Oral once PRN  dextrose 5%. 1000 milliLiter(s) IV Continuous <Continuous>  dextrose 50% Injectable 12.5 Gram(s) IV Push once  dextrose 50% Injectable 25 Gram(s) IV Push once  dextrose 50% Injectable 25 Gram(s) IV Push once  fluticasone propionate 50 MICROgram(s)/spray Nasal Spray 1 Spray(s) Both Nostrils every 12 hours  furosemide Infusion 15 mG/Hr IV Continuous <Continuous>  glucagon  Injectable 1 milliGRAM(s) IntraMuscular once PRN  heparin   Injectable 7500 Unit(s) IV Push every 6 hours PRN  heparin   Injectable 3500 Unit(s) IV Push every 6 hours PRN  heparin  Infusion. 900 Unit(s)/Hr IV Continuous <Continuous>  hydrocortisone 2.5% Ointment 1 Application(s) Topical two times a day  hydrocortisone 2.5% Rectal Cream 1 Application(s) Rectal daily PRN  insulin lispro (ADMELOG) corrective regimen sliding scale   SubCutaneous three times a day before meals  insulin lispro (ADMELOG) corrective regimen sliding scale   SubCutaneous at bedtime  levothyroxine 50 MICROGram(s) Oral daily  melatonin 3 milliGRAM(s) Oral at bedtime  milrinone Infusion 0.375 MICROgram(s)/kG/Min IV Continuous <Continuous>  mupirocin 2% Ointment 1 Application(s) Topical <User Schedule>  sodium chloride 0.9% lock flush 10 milliLiter(s) IV Push every 1 hour PRN      ICU Vital Signs Last 24 Hrs  T(C): 36.6, Max: 36.8 (- @ 04:21)  HR: 83 (83 - 90)  BP: 99/58 (91/55 - 105/62)  BP(mean): --  ABP: --  ABP(mean): --  RR: 16 (16 - 17)  SpO2: 99% (93% - 99%)    Weight in k.3 (20)  Weight in k.5 (20)      I&O's Summary Last 24 Hrs    IN: 1157.4 mL / OUT: 2675 mL / NET: -1517.6 mL      Tele:  80-90s    Physical Exam:    General: No distress. Comfortable.  HEENT: EOM intact.  Neck: Neck supple. JVP mildly elevated. No masses  Chest: Clear to auscultation bilaterally  CV: Regular. Normal S1 and S2. No murmurs, rub, or gallops. Radial pulses normal. BLE edema to thighs  Abdomen: Soft, non-distended, non-tender  Skin: No rashes or skin breakdown. BLE wrapped in ACE bandage.  Neurology: Alert and oriented times three. Sensation intact  Psych: Affect normal    Labs:    ( 20 @ 05:45 )               8.5    4.53  )--------( 75                   27.4     ( 20 @ 05:45 )     134  |  93  |  97  ---------------------<  131  3.8  |  31  |  2.59    Ca 9.6  Phos x   Mg 2.0      PTT/PT/INR - ( 20 @ 08:54 )  PTT: 55.0 sec / PT: x     / INR: x        ( 20 @ 18:16 )  [facility]rop[facility] 93    /    / CKMB x      ( 20 @ 13:50 )  [facility]rop[facility] 93    /    / CKMB x        Serum Pro-Brain Natriuretic Peptide: [id number] (20 @ 13:50)

## 2020-11-09 NOTE — PROGRESS NOTE ADULT - ATTENDING COMMENTS
I have seen this patient with the fellow and agree with their assessment and plan. In addition, crt stable and overall urinating and doing better  No urgent ind for RRT    Yemi Rees MD  Cell   Pager   Office

## 2020-11-09 NOTE — PROGRESS NOTE ADULT - ATTENDING COMMENTS
75yrs, DCM. LVEF 20%. s/p Mitraclip sept 2020. s/p CRTD sept 2020.  Afib on amnio and eloquis.  CKD baseline Cr 2.2  Admitted 11.1 ADHF. hypotensive. lactate 5.2, elevated LFTs. Cr 4.2  Managed with levo, milrinone, diuretics.   RHC 11.2 milrinone .25, levo: RA 9, PA 52/20, no PCWP, PA sat 74, Corey 6.2/3.0   Since admission: no standing weights. Cr peak 4.4, now 2.6. improving LFTs.   received hypertonic NaCl 150 X1 on 11.7 with good UO.  meds: milrinone .375, lasix 15/hr, heparin, amnio,   Vpaced 80-90s. 91/-105/, Afeb.   I/O: -1400  11-09    134<L>  |  93<L>  |  97<H>  ----------------------------<  131<H>  3.8   |  31  |  2.59<H>  Cr 2.6, 2.7, 2.8.     Ca    9.6      09 Nov 2020 05:45  Mg     2.0     11-09                        8.5    4.53  )-----------( 74       ( 09 Nov 2020 05:45 )             27.4   HIT neg. 75yrs, DCM. LVEF 20%. s/p Mitraclip sept 2020. s/p CRTD sept 2020.  Afib on amnio and eloquis.  CKD baseline Cr 2.2  Admitted 11.1 ADHF. hypotensive. lactate 5.2, elevated LFTs. Cr 4.2  Managed with levo, milrinone, diuretics.   RHC 11.2 milrinone .25, levo: RA 9, PA 52/20, no PCWP, PA sat 74, Corey 6.2/3.0   Since admission: no standing weights. Cr peak 4.4, now 2.6. improving LFTs.   received hypertonic NaCl 150 X1 on 11.7 with good UO.  meds: milrinone .375, lasix 15/hr, heparin, amnio,   Vpaced 80-90s. 91/-105/, Afeb.   JVP not well seen. no ascites or hepatomegaly. LE edema.   I/O: -1400  11-09    134<L>  |  93<L>  |  97<H>  ----------------------------<  131<H>  3.8   |  31  |  2.59<H>  Cr 2.6, 2.7, 2.8.     Ca    9.6      09 Nov 2020 05:45  Mg     2.0     11-09                        8.5    4.53  )-----------( 75       ( 09 Nov 2020 05:45 )             27.4   HIT neg.  Seen by palliative care today to address goals of care.  Plan  increase milrinone .4.  continue current dose of lasix.  daily standing weights.   Manpreet Rivera

## 2020-11-09 NOTE — PROGRESS NOTE ADULT - SUBJECTIVE AND OBJECTIVE BOX
SUBJECTIVE / OVERNIGHT EVENTS: pt denies chest pain, shortness of breath     MEDICATIONS  (STANDING):  aMIOdarone    Tablet 200 milliGRAM(s) Oral daily  chlorhexidine 4% Liquid 1 Application(s) Topical <User Schedule>  dextrose 5%. 1000 milliLiter(s) (50 mL/Hr) IV Continuous <Continuous>  dextrose 50% Injectable 12.5 Gram(s) IV Push once  dextrose 50% Injectable 25 Gram(s) IV Push once  dextrose 50% Injectable 25 Gram(s) IV Push once  fluticasone propionate 50 MICROgram(s)/spray Nasal Spray 1 Spray(s) Both Nostrils every 12 hours  furosemide Infusion 15 mG/Hr (7.5 mL/Hr) IV Continuous <Continuous>  heparin  Infusion 900 Unit(s)/Hr (9 mL/Hr) IV Continuous <Continuous>  hydrocortisone 2.5% Ointment 1 Application(s) Topical two times a day  insulin lispro (ADMELOG) corrective regimen sliding scale   SubCutaneous three times a day before meals  insulin lispro (ADMELOG) corrective regimen sliding scale   SubCutaneous at bedtime  levothyroxine 50 MICROGram(s) Oral daily  melatonin 3 milliGRAM(s) Oral at bedtime  milrinone Infusion 0.4 MICROgram(s)/kG/Min (10 mL/Hr) IV Continuous <Continuous>  mupirocin 2% Ointment 1 Application(s) Topical <User Schedule>    MEDICATIONS  (PRN):  acetaminophen   Tablet .. 650 milliGRAM(s) Oral every 6 hours PRN Mild Pain (1 - 3)  dextrose 40% Gel 15 Gram(s) Oral once PRN Blood Glucose LESS THAN 70 milliGRAM(s)/deciliter  glucagon  Injectable 1 milliGRAM(s) IntraMuscular once PRN Glucose LESS THAN 70 milligrams/deciliter  heparin   Injectable 7500 Unit(s) IV Push every 6 hours PRN For aPTT less than 40  heparin   Injectable 3500 Unit(s) IV Push every 6 hours PRN For aPTT between 40 - 57  hydrocortisone 2.5% Rectal Cream 1 Application(s) Rectal daily PRN hemorrhoid pain/itch  sodium chloride 0.9% lock flush 10 milliLiter(s) IV Push every 1 hour PRN Pre/post blood products, medications, blood draw, and to maintain line patency    Vital Signs Last 24 Hrs  T(C): 36.7 (2020 20:31), Max: 36.8 (2020 04:21)  T(F): 98.1 (2020 20:31), Max: 98.3 (2020 04:21)  HR: 83 (2020 20:31) (83 - 90)  BP: 94/53 (2020 20:31) (94/53 - 105/62)  BP(mean): --  RR: 18 (2020 20:31) (16 - 18)  SpO2: 97% (2020 20:31) (95% - 99%)    Skin: No rash.  Eyes: No recent vision problems or eye pain.  ENT: No congestion, ear pain, or sore throat.  Cardiovascular: No chest pain or palpation.  Respiratory: No cough, shortness of breath, congestion, or wheezing.  Gastrointestinal: No abdominal pain, nausea, vomiting, or diarrhea.  Genitourinary: No dysuria.  Musculoskeletal: No joint swelling.  Neurologic: No headache.    PHYSICAL EXAM:  GENERAL: NAD  EYES: EOMI, PERRLA  NECK: Supple, No JVD  CHEST/LUNG: crackles at bases   HEART:  S1 , S2 +  ABDOMEN: soft , bs+  EXTREMITIES:  edema+  NEUROLOGY:alert awake    LABS:      136  |  98  |  89<H>  ----------------------------<  170<H>  4.0   |  29  |  2.60<H>    Ca    8.2<L>      2020 20:34  Mg     2.0           Creatinine Trend: 2.60 <--, 2.59 <--, 2.71 <--, 2.82 <--, 2.81 <--, 2.93 <--, 2.86 <--, 3.08 <--, 3.25 <--, 3.12 <--, 3.46 <--, 2.81 <--, 3.48 <--, 3.64 <--, 4.31 <--, 4.32 <--                        8.5    4.53  )-----------( 75       ( 2020 05:45 )             27.4     Urine Studies:  Urinalysis Basic - ( 2020 09:42 )    Color: Light Yellow / Appearance: Clear / S.010 / pH:   Gluc:  / Ketone: Negative  / Bili: Negative / Urobili: Negative   Blood:  / Protein: Negative / Nitrite: Negative   Leuk Esterase: Negative / RBC: 2 /hpf / WBC 1 /HPF   Sq Epi:  / Non Sq Epi: 0 /hpf / Bacteria: Negative                PTT - ( 2020 08:54 )  PTT:55.0 sec    Sodium, Random Urine: 86 mmol/L ( @ 14:24)  Osmolality, Random Urine: 306 mos/kg ( @ 14:24)  Creatinine, Random Urine: 16 mg/dL ( @ 14:24)            PTT - ( 2020 19:18 )  PTT:57.7 sec

## 2020-11-09 NOTE — GOALS OF CARE CONVERSATION - ADVANCED CARE PLANNING - CONVERSATION DETAILS
The patient indicated his priority is for trying to improve his functionality and trying to regain some independency.   He indicated he does not want CPR; however, he will be ok with a trial of intubation or tube feeds only if there were to be an acute situation that were to improve by activating these treatment. However, if there were to be a condition were no hope for recovery were to be possible then he may not want to be intubated or having tube feeds. The patient indicated his priority is for trying to improve his functionality and trying to regain some independency by getting physical therapy and continuing HF treatment,   He indicated he does not want CPR; however, he will be ok with a trial of intubation or tube feeds only if there were to be an acute situation that were to improve by activating these treatment. However, if there were to be a situation where no hope for recovery were to be possible then he may not want to be intubated or having tube feeds. I discussed with him about completing a MOLST and we  actually reviewed this document in detail; however, the patient wanted to wait on completing a MOLST until reviewing it with his niece. I informed his niece about my discussion with the patient and she indicated she was coming to help completing a MOLST with the patient after 6 pm  tomorrow. Since palliative care will not be available after 5 pm, I informed the covering NP so she or on of her team members can help with completing the MOLST tomorrow.     I also d/w HF that was recommending hospice care. I agree with patient being hospice eligible; however, I indicated the patient did not appear to be in the hospice page since he said he was told by his cardiologist that "beyond milrinone, there were other new therapies available for him" and that people can live on milrinone for a long time. Furthermore, based on today's conversation, the patient is willing to consider a trial of intubation, tube feeds, and is looking for home PT, treatments that can be limited by hospice care.  I advised HF to f/u with the patient's HF attending in order to better define Rx options and support services.     30' were spent in ACP         Robbie Devi MD   Geriatrics and Palliative Care (GAP) Consult Service    of Geriatric and Palliative Medicine  [facility]      Please page the following number for clinical matters between the hours of 9 am and 5 pm from Monday through Friday : [phone]    After 5pm and on weekends, please see the contact information below:    In the event of newly developing, evolving, or worsening symptoms, please contact the Palliative Medicine team via pager (if the patient is at [facility] #[phone] or if the patient is at [facility] #[phone]) The Geriatric and Palliative Medicine service has coverage 24 hours a day/ 7 days a week to provide medical recommendations regarding symptom management needs via telephone The patient indicated his priority is for trying to improve his functionality and trying to regain some independency by getting physical therapy and continuing HF treatment,   He indicated he does not want CPR; however, he will be ok with a trial of intubation or tube feeds only if there were to be an acute situation that were to improve by activating these treatment. However, if there were to be a situation where no hope for recovery were to be possible then he may not want to be intubated or having tube feeds. I discussed with him about completing a MOLST and we  actually reviewed this document in detail; however, the patient wanted to wait on completing a MOLST until reviewing it with his niece. I informed his niece about my discussion with the patient and she indicated she was coming to help completing a MOLST with the patient after 6 pm  tomorrow. Since palliative care will not be available after 5 pm, I informed the covering NP so she or on of her team members can help with completing the MOLST tomorrow.     I also d/w HF that was recommending hospice care. I agree with the patient being hospice eligible; however, I indicated the patient did not appear to be in the hospice page since he said he was told by his cardiologist that "beyond milrinone, there were other new therapies available for him" and that people can live on milrinone for a long time. Furthermore, based on today's conversation, the patient is willing to consider a trial of intubation, tube feeds, and is looking for home PT, treatments that can be limited by hospice care.  I advised HF to f/u with the patient's HF attending in order to better define Rx options and support services.     30' were spent in ACP         Robbie Devi MD   Geriatrics and Palliative Care (GAP) Consult Service    of Geriatric and Palliative Medicine  [facility]      Please page the following number for clinical matters between the hours of 9 am and 5 pm from Monday through Friday : [phone]    After 5pm and on weekends, please see the contact information below:    In the event of newly developing, evolving, or worsening symptoms, please contact the Palliative Medicine team via pager (if the patient is at [facility] #[phone] or if the patient is at [facility] #[phone]) The Geriatric and Palliative Medicine service has coverage 24 hours a day/ 7 days a week to provide medical recommendations regarding symptom management needs via telephone The patient indicated his priority is for trying to improve his functionality and trying to regain some independency by getting physical therapy and continuing HF treatment,   He indicated he does not want CPR; however, he will be ok with a trial of intubation or tube feeds only if there were to be an acute situation that were to improve by activating these treatment. However, if there were to be a situation where no hope for recovery were to be possible then he may not want to be intubated or having tube feeds. I discussed with him about completing a MOLST and we  actually reviewed this document in detail; however, the patient wanted to wait on completing a MOLST until reviewing it with his niece. I informed his niece about my discussion with the patient and she indicated she was coming to help completing a MOLST with the patient after 6 pm  tomorrow. Since palliative care will not be available after 5 pm, I informed the covering NP so she or on of her team members can help with completing the MOLST tomorrow.     I also d/w HF that was recommending hospice care. I agree with the patient being hospice eligible; however,  the patient does not appear to be in the hospice page. He said he was told by his cardiologist that "beyond milrinone, there were other new therapies available for him" and that people can live on milrinone for a long time. Furthermore, based on today's conversation, the patient is willing to consider a trial of intubation, tube feeds, and is looking for home PT, treatments that can be limited by hospice care.  I advised HF to f/u with the patient's HF attending in order to better define Rx options and support services.     30' were spent in ACP         Robbie Devi MD   Geriatrics and Palliative Care (GAP) Consult Service    of Geriatric and Palliative Medicine  [facility]      Please page the following number for clinical matters between the hours of 9 am and 5 pm from Monday through Friday : [phone]    After 5pm and on weekends, please see the contact information below:    In the event of newly developing, evolving, or worsening symptoms, please contact the Palliative Medicine team via pager (if the patient is at [facility] #[phone] or if the patient is at [facility] #[phone]) The Geriatric and Palliative Medicine service has coverage 24 hours a day/ 7 days a week to provide medical recommendations regarding symptom management needs via telephone

## 2020-11-09 NOTE — PROGRESS NOTE ADULT - PROBLEM SELECTOR PLAN 7
- noted at last hospitalization and HIT ab negative on 9/17  - platelets currently stable, repeat HIT antibodies 11/7 negative

## 2020-11-09 NOTE — PROGRESS NOTE ADULT - SUBJECTIVE AND OBJECTIVE BOX
[facility] DIVISION OF KIDNEY DISEASES AND HYPERTENSION -- FOLLOW UP NOTE  --------------------------------------------------------------------------------  HPI: 75-year-old male with advanced heart failure, CKD, Afib, DM, hypothyroidism, was admitted to [facility] on 11/1/20 for worsening lethargy. Pt. transferred to CCU and started on IV pressors and antibiotics. Pt. evaluated by Heart Failure team, was not noted to be grossly volume overloaded on invasive hemodynamic measures concerning for possible sepsis as cause of shock. Pt.  transferred to medical floor on 11/6/20. Nephrology team consulted for elevated serum creatinine. Pt. was hospitalized at [facility] from 8/21/20-9/21/20 and underwent mitraclip placement. Upon review of labs on [facility], Scr was 2.77 on 9/21/20. Scr on arrival to the ER was 4.26 on 4/1/20. Scr today improved to 2.59. Pt. remains on IV Lasix infusion.    Pt. evaluated at bedside, in no acute distress. Pt. awaiting Santo catheter placement by IR team.    PAST HISTORY  --------------------------------------------------------------------------------  No significant changes to PMH, PSH, FHx, SHx, unless otherwise noted    ALLERGIES & MEDICATIONS  --------------------------------------------------------------------------------  Allergies    No Known Allergies    Intolerances    Standing Inpatient Medications  aMIOdarone    Tablet 200 milliGRAM(s) Oral daily  chlorhexidine 4% Liquid 1 Application(s) Topical <User Schedule>  fluticasone propionate 50 MICROgram(s)/spray Nasal Spray 1 Spray(s) Both Nostrils every 12 hours  furosemide Infusion 15 mG/Hr IV Continuous <Continuous>  heparin  Infusion. 900 Unit(s)/Hr IV Continuous <Continuous>  hydrocortisone 2.5% Ointment 1 Application(s) Topical two times a day  insulin lispro (ADMELOG) corrective regimen sliding scale   SubCutaneous three times a day before meals  insulin lispro (ADMELOG) corrective regimen sliding scale   SubCutaneous at bedtime  levothyroxine 50 MICROGram(s) Oral daily  melatonin 3 milliGRAM(s) Oral at bedtime  milrinone Infusion 0.375 MICROgram(s)/kG/Min IV Continuous <Continuous>  mupirocin 2% Ointment 1 Application(s) Topical <User Schedule>    REVIEW OF SYSTEMS  --------------------------------------------------------------------------------  Gen: no lethargy  Respiratory: No dyspnea  CV: No chest pain  GI: No abdominal pain  MSK: + LE edema  Neuro: No dizziness  Heme: No bleeding    All other systems were reviewed and are negative, except as noted.    VITALS/PHYSICAL EXAM  --------------------------------------------------------------------------------  T(C): 36.6 (11-09-20 @ 13:35), Max: 36.8 (11-09-20 @ 04:21)  HR: 83 (11-09-20 @ 13:35) (83 - 90)  BP: 99/58 (11-09-20 @ 13:35) (91/55 - 105/62)  RR: 16 (11-09-20 @ 12:17) (16 - 17)  SpO2: 99% (11-09-20 @ 13:35) (93% - 99%)  Wt(kg): --  Height (cm): 175.3 (11-09-20 @ 13:35)  Weight (kg): 83.3 (11-09-20 @ 13:35)  BMI (kg/m2): 27.1 (11-09-20 @ 13:35)  BSA (m2): 1.99 (11-09-20 @ 13:35)    11-08-20 @ 07:01  -  11-09-20 @ 07:00  --------------------------------------------------------  IN: 1157.4 mL / OUT: 2675 mL / NET: -1517.6 mL    11-09-20 @ 07:01  -  11-09-20 @ 14:28  --------------------------------------------------------  IN: 750 mL / OUT: 925 mL / NET: -175 mL    Physical Exam:  	Gen: NAD  	HEENT: MMROMMEL  	Pulm: good air entry B/L  	CV: S1S2  	Abd: Soft, +BS   	Ext: LE pitting edema B/L  	Neuro: Awake  	Skin: Warm and dry    LABS/STUDIES  --------------------------------------------------------------------------------              8.5    4.53  >-----------<  74       [11-09-20 @ 05:45]              27.4     134  |  93  |  97  ----------------------------<  131      [11-09-20 @ 05:45]  3.8   |  31  |  2.59        Ca     9.6     [11-09-20 @ 05:45]      Mg     2.0     [11-09-20 @ 05:45]    PTT: 55.0       [11-09-20 @ 08:54]    Creatinine Trend:  SCr 2.59 [11-09 @ 05:45]  SCr 2.71 [11-08 @ 17:36]  SCr 2.82 [11-08 @ 06:04]  SCr 2.81 [11-07 @ 15:45]  SCr 2.93 [11-07 @ 07:27]

## 2020-11-09 NOTE — CHART NOTE - NSCHARTNOTEFT_GEN_A_CORE
Patient s/p Santo cath for home milrinone gtt. As per IR, Hep gtt can be resumed 6 hours post procedure. Eliquis can be resumed 12-24 hours post procedure.  HF team made aware. Plan to continue with Hep gtt and transition to Eliquis tomorrow evening.    HD stable    Soraya You PA-C

## 2020-11-09 NOTE — PRE PROCEDURE NOTE - PRE PROCEDURE EVALUATION
Interventional Radiology Pre-Procedure Note    Patient is a 75y old Male with heart failure and cardiogenic shock who presents for placement of a tunneled central venous catheter for milrinone.     PAST MEDICAL & SURGICAL HISTORY:  Hypothyroidism    Afib    Type II diabetes mellitus    Aortic insufficiency    Mitral insufficiency    CKD (chronic kidney disease)    Cardiomyopathy  Systolic CHF    Hypertension    History of tonsillectomy  childhood    AICD (automatic cardioverter/defibrillator) present  8/2012         Allergies: No Known Allergies      LABS:                        8.5    4.53  )-----------( 75       ( 09 Nov 2020 05:45 )             27.4     11-09    134<L>  |  93<L>  |  97<H>  ----------------------------<  131<H>  3.8   |  31  |  2.59<H>    Ca    9.6      09 Nov 2020 05:45  Mg     2.0     11-09      PTT - ( 09 Nov 2020 08:54 )  PTT:55.0 sec    Procedure/ risks/ benefits were explained, informed consent obtained from patient, verbalizes understanding.

## 2020-11-10 NOTE — PROGRESS NOTE ADULT - SUBJECTIVE AND OBJECTIVE BOX
Subjective:  - NAEO  - Denies CP, palpitations, lightheadedness, dizziness, and SOB at rest    Medications:  acetaminophen   Tablet .. 650 milliGRAM(s) Oral every 6 hours PRN  aMIOdarone    Tablet 200 milliGRAM(s) Oral daily  chlorhexidine 4% Liquid 1 Application(s) Topical <User Schedule>  dextrose 40% Gel 15 Gram(s) Oral once PRN  dextrose 5%. 1000 milliLiter(s) IV Continuous <Continuous>  dextrose 50% Injectable 12.5 Gram(s) IV Push once  dextrose 50% Injectable 25 Gram(s) IV Push once  dextrose 50% Injectable 25 Gram(s) IV Push once  fluticasone propionate 50 MICROgram(s)/spray Nasal Spray 1 Spray(s) Both Nostrils every 12 hours  furosemide Infusion 15 mG/Hr IV Continuous <Continuous>  glucagon  Injectable 1 milliGRAM(s) IntraMuscular once PRN  heparin   Injectable 6500 Unit(s) IV Push every 6 hours PRN  heparin   Injectable 3000 Unit(s) IV Push every 6 hours PRN  heparin  Infusion. 1000 Unit(s)/Hr IV Continuous <Continuous>  hydrocortisone 2.5% Ointment 1 Application(s) Topical two times a day  hydrocortisone 2.5% Rectal Cream 1 Application(s) Rectal daily PRN  insulin lispro (ADMELOG) corrective regimen sliding scale   SubCutaneous three times a day before meals  insulin lispro (ADMELOG) corrective regimen sliding scale   SubCutaneous at bedtime  levothyroxine 50 MICROGram(s) Oral daily  melatonin 3 milliGRAM(s) Oral at bedtime  milrinone Infusion 0.4 MICROgram(s)/kG/Min IV Continuous <Continuous>  mupirocin 2% Ointment 1 Application(s) Topical <User Schedule>  sodium chloride 0.9% lock flush 10 milliLiter(s) IV Push every 1 hour PRN      ICU Vital Signs Last 24 Hrs  T(C): 36.8, Max: 37 (11-10 @ 04:22)  HR: 86 (83 - 86)  BP: 102/62 (94/53 - 102/62)  BP(mean): --  ABP: --  ABP(mean): --  RR: 18 (18 - 18)  SpO2: 95% (95% - 99%)    Weight in k.4 (11-10-20)  Weight in k.3 (20)      I&O's Summary Last 24 Hrs    IN: 1673 mL / OUT: 2400 mL / NET: -727 mL      Tele:  80s    Physical Exam:    General: No distress. Comfortable.  HEENT: EOM intact.  Neck: Neck supple. JVP mildly elevated. No masses  Chest: Clear to auscultation bilaterally  CV: Regular. Normal S1 and S2. No murmurs, rub, or gallops. Radial pulses normal. BLE edema to thighs  Abdomen: Soft, non-distended, non-tender  Skin: No rashes or skin breakdown. BLE wrapped in ACE bandage.  Neurology: Alert and oriented times three. Sensation intact  Psych: Affect normal    Labs:    ( 11-10-20 @ 11:16 )               8.2    4.53  )--------( 124                  27.4     ( 11-10-20 @ 05:58 )     133  |  93  |  95  ---------------------<  127  3.9  |  31  |  2.96    Ca 9.3  Phos x   Mg x       PTT/PT/INR - ( 11-10-20 @ 11:16 )  PTT: 53.7 sec / PT: x     / INR: x        ( 20 @ 18:16 )  TropHS 93    /    / CKMB x      ( 20 @ 13:50 )  TropHS 93    /    / CKMB x        Serum Pro-Brain Natriuretic Peptide: [id number] (20 @ 13:50)

## 2020-11-10 NOTE — PROGRESS NOTE ADULT - ATTENDING COMMENTS
I have seen this patient with the fellow and agree with their assessment and plan.     Yemi Rees MD  Cell   Pager   Office

## 2020-11-10 NOTE — CHART NOTE - NSCHARTNOTEFT_GEN_A_CORE
No intractable symptoms have been reported. GOC are defined as per my GOC discussion dated 11/9/2020. Based on my conversations with the patient, it does not seem that hospice care is aligned with his GOC, not at least at this point. However, it will be important if HF further clarifies to the patient about his Rx options and prognosis which may help him out for him to further consider the options for hospice care, if not now, in the near future. Primary team to f/u  on the completion of a MOLST with the patient and his niece kandy.     Will sing off. Call us back if having issues with symptoms Rx, GOC, or ACP.         Robbie Devi MD   Geriatrics and Palliative Care (GAP) Consult Service    of Geriatric and Palliative Medicine  [facility]      Please page the following number for clinical matters between the hours of 9 am and 5 pm from Monday through Friday : [phone]    After 5pm and on weekends, please see the contact information below:    In the event of newly developing, evolving, or worsening symptoms, please contact the Palliative Medicine team via pager (if the patient is at [facility] #[phone] or if the patient is at [facility] #[phone]) The Geriatric and Palliative Medicine service has coverage 24 hours a day/ 7 days a week to provide medical recommendations regarding symptom management needs via telephone

## 2020-11-10 NOTE — PROGRESS NOTE ADULT - SUBJECTIVE AND OBJECTIVE BOX
SUBJECTIVE / OVERNIGHT EVENTS: pt denies chest pain, shortness of breath     MEDICATIONS  (STANDING):  alteplase for catheter clearance 2 milliGRAM(s) Catheter once  aMIOdarone    Tablet 200 milliGRAM(s) Oral daily  chlorhexidine 4% Liquid 1 Application(s) Topical <User Schedule>  dextrose 5%. 1000 milliLiter(s) (50 mL/Hr) IV Continuous <Continuous>  dextrose 50% Injectable 12.5 Gram(s) IV Push once  dextrose 50% Injectable 25 Gram(s) IV Push once  dextrose 50% Injectable 25 Gram(s) IV Push once  fluticasone propionate 50 MICROgram(s)/spray Nasal Spray 1 Spray(s) Both Nostrils every 12 hours  furosemide Infusion 15 mG/Hr (7.5 mL/Hr) IV Continuous <Continuous>  heparin  Infusion. 1000 Unit(s)/Hr (10 mL/Hr) IV Continuous <Continuous>  hydrocortisone 2.5% Ointment 1 Application(s) Topical two times a day  insulin lispro (ADMELOG) corrective regimen sliding scale   SubCutaneous three times a day before meals  insulin lispro (ADMELOG) corrective regimen sliding scale   SubCutaneous at bedtime  levothyroxine 50 MICROGram(s) Oral daily  melatonin 3 milliGRAM(s) Oral at bedtime  milrinone Infusion 0.4 MICROgram(s)/kG/Min (10 mL/Hr) IV Continuous <Continuous>  mupirocin 2% Ointment 1 Application(s) Topical <User Schedule>  sodium chloride 2% . 150 milliLiter(s) (300 mL/Hr) IV Continuous <Continuous>    MEDICATIONS  (PRN):  acetaminophen   Tablet .. 650 milliGRAM(s) Oral every 6 hours PRN Mild Pain (1 - 3)  dextrose 40% Gel 15 Gram(s) Oral once PRN Blood Glucose LESS THAN 70 milliGRAM(s)/deciliter  glucagon  Injectable 1 milliGRAM(s) IntraMuscular once PRN Glucose LESS THAN 70 milligrams/deciliter  heparin   Injectable 6500 Unit(s) IV Push every 6 hours PRN For aPTT less than 40  heparin   Injectable 3000 Unit(s) IV Push every 6 hours PRN For aPTT between 40 - 57  hydrocortisone 2.5% Rectal Cream 1 Application(s) Rectal daily PRN hemorrhoid pain/itch  sodium chloride 0.9% lock flush 10 milliLiter(s) IV Push every 1 hour PRN Pre/post blood products, medications, blood draw, and to maintain line patency    Vital Signs Last 24 Hrs  T(C): 36.9 (10 Nov 2020 20:46), Max: 37 (10 Nov 2020 04:22)  T(F): 98.5 (10 Nov 2020 20:46), Max: 98.6 (10 Nov 2020 04:22)  HR: 88 (10 Nov 2020 20:46) (86 - 88)  BP: 102/63 (10 Nov 2020 20:46) (101/50 - 102/63)  BP(mean): --  RR: 18 (10 Nov 2020 20:46) (18 - 18)  SpO2: 95% (10 Nov 2020 20:46) (95% - 96%)    Skin: No rash.  Eyes: No recent vision problems or eye pain.  ENT: No congestion, ear pain, or sore throat.  Cardiovascular: No chest pain or palpation.  Respiratory: No cough, shortness of breath, congestion, or wheezing.  Gastrointestinal: No abdominal pain, nausea, vomiting, or diarrhea.  Genitourinary: No dysuria.  Musculoskeletal: No joint swelling.  Neurologic: No headache.    PHYSICAL EXAM:  GENERAL: NAD  EYES: EOMI, PERRLA  NECK: Supple, No JVD  CHEST/LUNG: crackles at bases   HEART:  S1 , S2 +  ABDOMEN: soft , bs+  EXTREMITIES:  edema+  NEUROLOGY:alert awake    LABS:  11-10    133<L>  |  92<L>  |  93<H>  ----------------------------<  158<H>  4.3   |  32<H>  |  2.96<H>    Ca    9.2      10 Nov 2020 19:09  Mg     2.1     11-10      Creatinine Trend: 2.96 <--, 2.96 <--, 2.60 <--, 2.59 <--, 2.71 <--, 2.82 <--, 2.81 <--, 2.93 <--, 2.86 <--, 3.08 <--, 3.25 <--, 3.12 <--, 3.46 <--, 2.81 <--, 3.48 <--, 3.64 <--                        8.2    4.56  )-----------( 105      ( 10 Nov 2020 19:09 )             27.3     Urine Studies:  Urinalysis Basic - ( 2020 09:42 )    Color: Light Yellow / Appearance: Clear / S.010 / pH:   Gluc:  / Ketone: Negative  / Bili: Negative / Urobili: Negative   Blood:  / Protein: Negative / Nitrite: Negative   Leuk Esterase: Negative / RBC: 2 /hpf / WBC 1 /HPF   Sq Epi:  / Non Sq Epi: 0 /hpf / Bacteria: Negative                PTT - ( 10 Nov 2020 19:09 )  PTT:45.1 sec

## 2020-11-10 NOTE — CHART NOTE - NSCHARTNOTEFT_GEN_A_CORE
Notified by AM team for resistance in the red lumen PICC. Cathflo ordered and instilled for approx. 1 hour. Cathether with minimal blood return and resistance when attempted to flush. Will endorse to AM team to call IR in AM to assess catheter. Will c/w to monitor.    Laly Joseph PA-C  Department of Medicine Notified by AM team for resistance in the red lumen PICC. Cathflo ordered and instilled for approx. 1 hour. Cathether with minimal blood return and resistance when attempted to flush. Will endorse to AM team to call IR in AM to assess catheter. Will c/w to monitor.    Addendum: Notified by RN for resistance in the purple lumen PICC. RN still able to infuse medications through line. Follow with IR in AM.    Laly Joseph PA-C  Department of Medicine

## 2020-11-10 NOTE — PROGRESS NOTE ADULT - ASSESSMENT
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    His invasive hemodynamics and wide pulse pressure suggested a vasoplegic picture, potentially sepsis given leukocytosis, though appeared overloaded on exam with low VTI on TTE and cultures have been negative. He required high doses of levophed on admission which have since been weaned and he has been diuresed considerably. His lactate has cleared and end organ function improved on milrinone. Will continue milrinone with goal for eventual discharge on palliative home inotropes and continue to diurese to euvolemia.     He was previously evaluated for LVAD as DT but declined due to combination of age, frailty, and advanced CKD. His overall prognosis is poor and risk of readmission high given severity of heart failure and renal dysfunction for which palliative care was consulted.    Hemodynamics  11/2 (milrinone 0.25 and levophed) : RA 9, PA 52/20, unable to wedge, Corey CO/CI 6.2/3.0 with PA sat 74%  11/4 (on milrinone 0.25 and low dose levophed): RA 5, PA 39/20, Corey CO/CI 6.2/3.0 with PA sat 71%    Review of relevant studies  TTE 11/1: LV 7.0 cm, LVEF 10-15%, severe TR, severe RV dysfunction, LVOT VTI 7 cm

## 2020-11-10 NOTE — PROGRESS NOTE ADULT - ATTENDING COMMENTS
team has further discussed goals of care and introduced the concept of home hospice, however the patient does not seem quite ready for this.  meds; milrione .4, lasix 15/hr, heparin (PTT 42), amnio, (off toprol )  Vpaced 80s, 94/-101/, 190.4, 190.2, 192.   I/O -600  11-10  133<L>  |  93<L>  |  95<H>  ----------------------------<  127<H>  3.9   |  31  |  2.96<H>  Cl 93 stable.  BUN 95 stable  Cr 2.96, 2.6, 2.7    Ca    9.3      10 Nov 2020 05:58  Mg     2.0     11-09                        8.2    4.53  )-----------( 124      ( 10 Nov 2020 11:16 )             27.4   Remains with evidence for volume overload.   Further trial of hypertonic saline which he appeared to respond to over the weekend,  150 cc of 2% NacL over 30 mins X1.   Manpreet Rivera

## 2020-11-10 NOTE — PROGRESS NOTE ADULT - SUBJECTIVE AND OBJECTIVE BOX
[facility] DIVISION OF KIDNEY DISEASES AND HYPERTENSION -- FOLLOW UP NOTE  --------------------------------------------------------------------------------  HPI: 75-year-old male with advanced heart failure, CKD, Afib, DM, hypothyroidism, was admitted to [facility] on 11/1/20 for worsening lethargy. Pt. transferred to CCU and started on IV pressors and antibiotics. Pt. evaluated by Heart Failure team, was not noted to be grossly volume overloaded on invasive hemodynamic measures concerning for possible sepsis as cause of shock. Pt.  transferred to medical floor on 11/6/20. Nephrology team consulted for elevated serum creatinine. Pt. was hospitalized at [facility] from 8/21/20-9/21/20 and underwent mitraclip placement. Upon review of labs on [facility], Scr was 2.77 on 9/21/20. Scr on arrival to the ER was 4.26 on 4/1/20. Scr improved to 2.60 yesterday. Today, Scr increased to 2.96. Pt. remains on IV Lasix infusion and started on IV Milrinone infusion (11/9/20).    Pt. evaluated at bedside, in no acute distress. Offers no complaints.    PAST HISTORY  --------------------------------------------------------------------------------  No significant changes to PMH, PSH, FHx, SHx, unless otherwise noted    ALLERGIES & MEDICATIONS  --------------------------------------------------------------------------------  Allergies    No Known Allergies    Intolerances    Standing Inpatient Medications  aMIOdarone    Tablet 200 milliGRAM(s) Oral daily  chlorhexidine 4% Liquid 1 Application(s) Topical <User Schedule>  fluticasone propionate 50 MICROgram(s)/spray Nasal Spray 1 Spray(s) Both Nostrils every 12 hours  furosemide Infusion 15 mG/Hr IV Continuous <Continuous>  heparin  Infusion 950 Unit(s)/Hr IV Continuous <Continuous>  hydrocortisone 2.5% Ointment 1 Application(s) Topical two times a day  insulin lispro (ADMELOG) corrective regimen sliding scale   SubCutaneous three times a day before meals  insulin lispro (ADMELOG) corrective regimen sliding scale   SubCutaneous at bedtime  levothyroxine 50 MICROGram(s) Oral daily  melatonin 3 milliGRAM(s) Oral at bedtime  milrinone Infusion 0.4 MICROgram(s)/kG/Min IV Continuous <Continuous>  mupirocin 2% Ointment 1 Application(s) Topical <User Schedule>    REVIEW OF SYSTEMS  --------------------------------------------------------------------------------  Gen: no lethargy  Respiratory: No dyspnea  CV: No chest pain  GI: No abdominal pain  MSK: + LE edema  Neuro: No dizziness  Heme: No bleeding    All other systems were reviewed and are negative, except as noted.    VITALS/PHYSICAL EXAM  --------------------------------------------------------------------------------  T(C): 37 (11-10-20 @ 04:22), Max: 37 (11-10-20 @ 04:22)  HR: 86 (11-10-20 @ 04:22) (83 - 86)  BP: 101/50 (11-10-20 @ 04:22) (94/53 - 105/62)  RR: 18 (11-10-20 @ 04:22) (16 - 18)  SpO2: 96% (11-10-20 @ 04:22) (96% - 99%)  Wt(kg): --  Height (cm): 175.3 (11-09-20 @ 13:35)  Weight (kg): 83.3 (11-09-20 @ 13:35)  BMI (kg/m2): 27.1 (11-09-20 @ 13:35)  BSA (m2): 1.99 (11-09-20 @ 13:35)    11-09-20 @ 07:01  -  11-10-20 @ 07:00  --------------------------------------------------------  IN: 1673 mL / OUT: 2400 mL / NET: -727 mL    11-10-20 @ 07:01  -  11-10-20 @ 12:00  --------------------------------------------------------  IN: 220 mL / OUT: 400 mL / NET: -180 mL    Physical Exam:  	Gen: NAD  	HEENT: MMM  	Pulm: good air entry B/L  	CV: S1S2  	Abd: Soft, +BS   	Ext: LE pitting edema B/L  	Neuro: Awake  	Skin: Warm and dry    LABS/STUDIES  --------------------------------------------------------------------------------              8.2    4.53  >-----------<  124      [11-10-20 @ 11:16]              27.4     133  |  93  |  95  ----------------------------<  127      [11-10-20 @ 05:58]  3.9   |  31  |  2.96        Ca     9.3     [11-10-20 @ 05:58]      Mg     2.0     [11-09-20 @ 05:45]    PTT: 53.7       [11-10-20 @ 11:16]    Creatinine Trend:  SCr 2.96 [11-10 @ 05:58]  SCr 2.60 [11-09 @ 20:34]  SCr 2.59 [11-09 @ 05:45]  SCr 2.71 [11-08 @ 17:36]  SCr 2.82 [11-08 @ 06:04]

## 2020-11-11 NOTE — PROGRESS NOTE ADULT - ATTENDING COMMENTS
s/p further bolus of hypertonic saline yesterday:   s/p RHC: RA 11 v 14, RV 53/16, PA 55/14 31, PCWP 23 (v36). Stan 6.7/3.3 PA 61.   meds: milrinone .4, lasix 15/hr, heparin (PTT 71), amnio.   (home meds: toprol 25)  vpaced 80, 96/-105/   I/O -200  11-11    134<L>  |  93<L>  |  95<H>  ----------------------------<  120<H>  3.7   |  28  |  2.77<H>  Cr 2.8, 2.96,   BUN 95,  93  CL 93, 92  Ca    9.5      11 Nov 2020 06:23  Phos  3.4     11-11  Mg     2.2     11-11                        8.1    3.99  )-----------( 128      ( 11 Nov 2020 06:23 )             27.0     TPro  6.2  /  Alb  3.2<L>  /  TBili  0.9  /  DBili  x   /  AST  38  /  ALT  45  /  AlkPhos  78  11-11  TTE 11.1: LVEDD 7.0 , LVEF 13, RVE normal systolic. severe TR  mildly elevated right sided pressures. significantly elevated left sided pressures, preserved CO.  patient can be d/c this week on milrinone.  decrease milrinone .375, start HDZN 10 tid,   one dose of metolazone 2.5  plan to transition to PO diuretics end of week.   Manpreet Rivera s/p further bolus of hypertonic saline yesterday:   s/p RHC: RA 11 v 14, RV 53/16, PA 55/14 31, PCWP 23 (v36). Stan 6.7/3.3 PA 61.   meds: milrinone .4, lasix 15/hr, heparin (PTT 71), amnio.   (home meds: toprol 25)  vpaced 80, 96/-105/   I/O -200  11-11    134<L>  |  93<L>  |  95<H>  ----------------------------<  120<H>  3.7   |  28  |  2.77<H>  Cr 2.8, 2.96,   BUN 95,  93  CL 93, 92  Ca    9.5      11 Nov 2020 06:23  Phos  3.4     11-11  Mg     2.2     11-11                        8.1    3.99  )-----------( 128      ( 11 Nov 2020 06:23 )             27.0     TPro  6.2  /  Alb  3.2<L>  /  TBili  0.9  /  DBili  x   /  AST  38  /  ALT  45  /  AlkPhos  78  11-11  TTE 11.1: LVEDD 7.0 , LVEF 13, RVE normal systolic. severe TR  mildly elevated right sided pressures. significantly elevated left sided pressures, preserved CO.  patient can be d/c this week on milrinone.  decrease milrinone .3, start HDZN 10 tid,   one dose of metolazone 2.5  plan to transition to PO diuretics end of week.   Manpreet Rivera

## 2020-11-11 NOTE — PROGRESS NOTE ADULT - SUBJECTIVE AND OBJECTIVE BOX
[facility] DIVISION OF KIDNEY DISEASES AND HYPERTENSION -- FOLLOW UP NOTE  --------------------------------------------------------------------------------  Chief Complaint:    24 hour events/subjective:        PAST HISTORY  --------------------------------------------------------------------------------  No significant changes to PMH, PSH, FHx, SHx, unless otherwise noted    ALLERGIES & MEDICATIONS  --------------------------------------------------------------------------------  Allergies    No Known Allergies    Intolerances      Standing Inpatient Medications  alteplase for catheter clearance 2 milliGRAM(s) Catheter once  aMIOdarone    Tablet 200 milliGRAM(s) Oral daily  chlorhexidine 4% Liquid 1 Application(s) Topical <User Schedule>  dextrose 5%. 1000 milliLiter(s) IV Continuous <Continuous>  dextrose 50% Injectable 12.5 Gram(s) IV Push once  dextrose 50% Injectable 25 Gram(s) IV Push once  dextrose 50% Injectable 25 Gram(s) IV Push once  fluticasone propionate 50 MICROgram(s)/spray Nasal Spray 1 Spray(s) Both Nostrils every 12 hours  furosemide Infusion 15 mG/Hr IV Continuous <Continuous>  heparin  Infusion. 1000 Unit(s)/Hr IV Continuous <Continuous>  hydrocortisone 2.5% Ointment 1 Application(s) Topical two times a day  insulin lispro (ADMELOG) corrective regimen sliding scale   SubCutaneous three times a day before meals  insulin lispro (ADMELOG) corrective regimen sliding scale   SubCutaneous at bedtime  levothyroxine 50 MICROGram(s) Oral daily  melatonin 3 milliGRAM(s) Oral at bedtime  milrinone Infusion 0.4 MICROgram(s)/kG/Min IV Continuous <Continuous>  mupirocin 2% Ointment 1 Application(s) Topical <User Schedule>    PRN Inpatient Medications  acetaminophen   Tablet .. 650 milliGRAM(s) Oral every 6 hours PRN  dextrose 40% Gel 15 Gram(s) Oral once PRN  glucagon  Injectable 1 milliGRAM(s) IntraMuscular once PRN  heparin   Injectable 6500 Unit(s) IV Push every 6 hours PRN  heparin   Injectable 3000 Unit(s) IV Push every 6 hours PRN  hydrocortisone 2.5% Rectal Cream 1 Application(s) Rectal daily PRN  sodium chloride 0.9% lock flush 10 milliLiter(s) IV Push every 1 hour PRN      REVIEW OF SYSTEMS  --------------------------------------------------------------------------------  Gen: No weight changes, fatigue, fevers/chills, weakness  Skin: No rashes  Head/Eyes/Ears/Mouth: No headache; Normal hearing; Normal vision w/o blurriness; No sinus pain/discomfort, sore throat  Respiratory: No dyspnea, cough, wheezing, hemoptysis  CV: No chest pain, PND, orthopnea  GI: No abdominal pain, diarrhea, constipation, nausea, vomiting, melena, hematochezia  : No increased frequency, dysuria, hematuria, nocturia  MSK: No joint pain/swelling; no back pain; no edema  Neuro: No dizziness/lightheadedness, weakness, seizures, numbness, tingling  Heme: No easy bruising or bleeding  Endo: No heat/cold intolerance  Psych: No significant nervousness, anxiety, stress, depression    All other systems were reviewed and are negative, except as noted.    VITALS/PHYSICAL EXAM  --------------------------------------------------------------------------------  T(C): 36.5 (11-11-20 @ 13:10), Max: 36.9 (11-10-20 @ 20:46)  HR: 80 (11-11-20 @ 13:30) (80 - 88)  BP: 100/52 (11-11-20 @ 13:30) (96/51 - 108/68)  RR: 16 (11-11-20 @ 13:30) (16 - 18)  SpO2: 98% (11-11-20 @ 13:30) (95% - 98%)  Wt(kg): --        11-10-20 @ 07:01  -  11-11-20 @ 07:00  --------------------------------------------------------  IN: 1382.5 mL / OUT: 1700 mL / NET: -317.5 mL    11-11-20 @ 07:01  -  11-11-20 @ 16:16  --------------------------------------------------------  IN: 680 mL / OUT: 1250 mL / NET: -570 mL      PHYSICAL EXAM: vital signs as above  in no apparent distress  Neck: Supple, no JVD,    Lungs: no rhonchi, no wheeze, no crackles  CVS: S1 S2 no M/R/G  Abdomen: no tenderness, no organomegaly, BS present  Neuro: Grossly intact  Skin: warm, dry      LABS/STUDIES  --------------------------------------------------------------------------------              8.1    3.99  >-----------<  128      [11-11-20 @ 06:23]              27.0     134  |  93  |  95  ----------------------------<  120      [11-11-20 @ 06:23]  3.7   |  28  |  2.77        Ca     9.5     [11-11-20 @ 06:23]      Mg     2.2     [11-11-20 @ 06:23]      Phos  3.4     [11-11-20 @ 06:23]    TPro  6.2  /  Alb  3.2  /  TBili  0.9  /  DBili  x   /  AST  38  /  ALT  45  /  AlkPhos  78  [11-11-20 @ 06:23]      PTT: 71.2       [11-11-20 @ 10:07]      Creatinine Trend:  SCr 2.77 [11-11 @ 06:23]  SCr 2.96 [11-10 @ 19:09]  SCr 2.96 [11-10 @ 05:58]  SCr 2.60 [11-09 @ 20:34]  SCr 2.59 [11-09 @ 05:45]    Urinalysis - [11-05-20 @ 09:42]      Color Light Yellow / Appearance Clear / SG 1.010 / pH 6.5      Gluc Negative / Ketone Negative  / Bili Negative / Urobili Negative       Blood Trace / Protein Negative / Leuk Est Negative / Nitrite Negative      RBC 2 / WBC 1 / Hyaline 0 / Gran  / Sq Epi  / Non Sq Epi 0 / Bacteria Negative      Iron 36, TIBC 314, %sat 12      [09-08-20 @ 08:45]  Ferritin 145      [09-08-20 @ 08:45]  Vitamin D (25OH) 21.7      [07-18-20 @ 10:13]  TSH 8.61      [11-01-20 @ 20:16]  Lipid: chol 116, TG 55, HDL 48, LDL 58      [08-26-20 @ 00:17]

## 2020-11-11 NOTE — PROGRESS NOTE ADULT - ASSESSMENT
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    His invasive hemodynamics and wide pulse pressure suggested a vasoplegic picture, potentially sepsis given leukocytosis, though appeared overloaded on exam with low VTI on TTE and cultures have been negative. He required high doses of levophed on admission which have since been weaned and he has been diuresed considerably. His lactate has cleared and end organ function improved on milrinone. Will continue milrinone with goal for eventual discharge on palliative home inotropes and continue to diurese to euvolemia.     He is s/p RHC today which showed mildly elevated right sided filling pressures, elevated left sided filling pressures, and normal cardiac output on milrinone 0.4 mcg/kg/min. He was previously evaluated for LVAD as DT but declined due to combination of age, frailty, and advanced CKD. His overall prognosis is poor and risk of readmission high given severity of heart failure and renal dysfunction for which palliative care was consulted.    Hemodynamics  11/2 (milrinone 0.25 and levophed) : RA 9, PA 52/20, unable to wedge, Corey CO/CI 6.2/3.0 with PA sat 74%  11/4 (on milrinone 0.25 and low dose levophed): RA 5, PA 39/20, Corey CO/CI 6.2/3.0 with PA sat 71%  11/11 (on milrinone 0.4): RA 11 v 14, RV 53/16, PA 55/14 31, PCWP 23 (v36). Corey CO/CI 6.7/3.3 with PA sat 61%    Review of relevant studies  TTE 11/1: LV 7.0 cm, LVEF 10-15%, severe TR, severe RV dysfunction, LVOT VTI 7 cm   75 year old man with ACC/AHA stage D chronic systolic heart failure due to a dilated nonischemic cardiomyopathy (LVIDd 6.7cm, LVEF <20%) with associated moderate to severe mitral regurgitation s/p MitraClip 9/2020 and s/p CRT-D, AF s/p DCCV on amiodarone, stage 4 CKD (BL Cr ~3), and hypothyroidism who was admitted with shock (likely cardiogenic) and volume overload after recent admission with HF.    His invasive hemodynamics and wide pulse pressure suggested a vasoplegic picture, potentially sepsis given leukocytosis, though appeared overloaded on exam with low VTI on TTE and cultures have been negative. He required high doses of levophed on admission which have since been weaned and he has been diuresed considerably. His lactate has cleared and end organ function improved on milrinone. Will continue milrinone with goal for eventual discharge on palliative home inotropes and continue to diurese to euvolemia.     He is s/p RHC today which showed mildly elevated right sided filling pressures, significantly elevated left sided filling pressures, and normal cardiac output on milrinone 0.4 mcg/kg/min. He was previously evaluated for LVAD as DT but declined due to combination of age, frailty, and advanced CKD. His overall prognosis is poor and risk of readmission high given severity of heart failure and renal dysfunction for which palliative care was consulted.    Hemodynamics  11/2 (milrinone 0.25 and levophed) : RA 9, PA 52/20, unable to wedge, Corey CO/CI 6.2/3.0 with PA sat 74%  11/4 (on milrinone 0.25 and low dose levophed): RA 5, PA 39/20, Corey CO/CI 6.2/3.0 with PA sat 71%  11/11 (on milrinone 0.4): RA 11 v 14, RV 53/16, PA 55/14 31, PCWP 23 (v36). Corey CO/CI 6.7/3.3 with PA sat 61%    Review of relevant studies  TTE 11/1: LV 7.0 cm, LVEF 10-15%, severe TR, severe RV dysfunction, LVOT VTI 7 cm

## 2020-11-11 NOTE — PROGRESS NOTE ADULT - SUBJECTIVE AND OBJECTIVE BOX
Subjective:  - s/p RHC today    Medications:  acetaminophen   Tablet .. 650 milliGRAM(s) Oral every 6 hours PRN  alteplase for catheter clearance 2 milliGRAM(s) Catheter once  aMIOdarone    Tablet 200 milliGRAM(s) Oral daily  chlorhexidine 4% Liquid 1 Application(s) Topical <User Schedule>  dextrose 40% Gel 15 Gram(s) Oral once PRN  dextrose 5%. 1000 milliLiter(s) IV Continuous <Continuous>  dextrose 50% Injectable 12.5 Gram(s) IV Push once  dextrose 50% Injectable 25 Gram(s) IV Push once  dextrose 50% Injectable 25 Gram(s) IV Push once  fluticasone propionate 50 MICROgram(s)/spray Nasal Spray 1 Spray(s) Both Nostrils every 12 hours  furosemide Infusion 15 mG/Hr IV Continuous <Continuous>  glucagon  Injectable 1 milliGRAM(s) IntraMuscular once PRN  heparin   Injectable 6500 Unit(s) IV Push every 6 hours PRN  heparin   Injectable 3000 Unit(s) IV Push every 6 hours PRN  heparin  Infusion. 1000 Unit(s)/Hr IV Continuous <Continuous>  hydrALAZINE 10 milliGRAM(s) Oral three times a day  hydrocortisone 2.5% Ointment 1 Application(s) Topical two times a day  hydrocortisone 2.5% Rectal Cream 1 Application(s) Rectal daily PRN  insulin lispro (ADMELOG) corrective regimen sliding scale   SubCutaneous three times a day before meals  insulin lispro (ADMELOG) corrective regimen sliding scale   SubCutaneous at bedtime  levothyroxine 50 MICROGram(s) Oral daily  melatonin 3 milliGRAM(s) Oral at bedtime  milrinone Infusion 0.3 MICROgram(s)/kG/Min IV Continuous <Continuous>  mupirocin 2% Ointment 1 Application(s) Topical <User Schedule>  sodium chloride 0.9% lock flush 10 milliLiter(s) IV Push every 1 hour PRN      ICU Vital Signs Last 24 Hrs  T(C): 36.5, Max: 36.9 (11-10 @ 20:46)  HR: 81 (80 - 88)  BP: 101/57 (96/51 - 108/68)  BP(mean): --  ABP: --  ABP(mean): --  RR: 17 (16 - 18)  SpO2: 98% (95% - 98%)    Weight in k.4 (11-10-20)  Weight in k.3 (20)      I&O's Summary Last 24 Hrs    IN: 1382.5 mL / OUT: 1700 mL / NET: -317.5 mL      Tele:  80s, PVCs    Physical Exam:    General: No distress. Comfortable.  HEENT: EOM intact.  Neck: Neck supple. JVP mildly elevated. No masses  Chest: Clear to auscultation bilaterally  CV: Regular. Normal S1 and S2. No murmurs, rub, or gallops. Radial pulses normal. BLE edema to thighs  Abdomen: Soft, non-distended, non-tender  Skin: No rashes or skin breakdown. BLE wrapped in ACE bandage.  Neurology: Alert and oriented times three. Sensation intact  Psych: Affect normal    Labs:    ( 20 @ 06:23 )               8.1    3.99  )--------( 128                  27.0     ( 20 @ 06:23 )     134  |  93  |  95  ---------------------<  120  3.7  |  28  |  2.77    Ca 9.5  Phos 3.4  Mg 2.2    ( 20 @ 06:23 )  TPro  6.2  /  Alb  3.2  /  TBili  0.9  /  DBili  x   /  AST  38  /  ALT  45  /  AlkPhos  78    PTT/PT/INR - ( 20 @ 10:07 )  PTT: 71.2 sec / PT: x     / INR: x        ( 20 @ 18:16 )  TropHS 93    /    / CKMB x      ( 20 @ 13:50 )  TropHS 93    /    / CKMB x        Serum Pro-Brain Natriuretic Peptide: 29535 (20 @ 13:50)

## 2020-11-11 NOTE — PROGRESS NOTE ADULT - SUBJECTIVE AND OBJECTIVE BOX
SUBJECTIVE / OVERNIGHT EVENTS: pt denies chest pain, shortness of breath     MEDICATIONS  (STANDING):  alteplase for catheter clearance 2 milliGRAM(s) Catheter once  aMIOdarone    Tablet 200 milliGRAM(s) Oral daily  chlorhexidine 4% Liquid 1 Application(s) Topical <User Schedule>  dextrose 5%. 1000 milliLiter(s) (50 mL/Hr) IV Continuous <Continuous>  dextrose 50% Injectable 12.5 Gram(s) IV Push once  dextrose 50% Injectable 25 Gram(s) IV Push once  dextrose 50% Injectable 25 Gram(s) IV Push once  fluticasone propionate 50 MICROgram(s)/spray Nasal Spray 1 Spray(s) Both Nostrils every 12 hours  furosemide Infusion 15 mG/Hr (7.5 mL/Hr) IV Continuous <Continuous>  heparin  Infusion. 1000 Unit(s)/Hr (10 mL/Hr) IV Continuous <Continuous>  hydrALAZINE 10 milliGRAM(s) Oral three times a day  hydrocortisone 2.5% Ointment 1 Application(s) Topical two times a day  insulin lispro (ADMELOG) corrective regimen sliding scale   SubCutaneous three times a day before meals  insulin lispro (ADMELOG) corrective regimen sliding scale   SubCutaneous at bedtime  levothyroxine 50 MICROGram(s) Oral daily  melatonin 3 milliGRAM(s) Oral at bedtime  milrinone Infusion 0.3 MICROgram(s)/kG/Min (7.5 mL/Hr) IV Continuous <Continuous>  mupirocin 2% Ointment 1 Application(s) Topical <User Schedule>    MEDICATIONS  (PRN):  acetaminophen   Tablet .. 650 milliGRAM(s) Oral every 6 hours PRN Mild Pain (1 - 3)  dextrose 40% Gel 15 Gram(s) Oral once PRN Blood Glucose LESS THAN 70 milliGRAM(s)/deciliter  glucagon  Injectable 1 milliGRAM(s) IntraMuscular once PRN Glucose LESS THAN 70 milligrams/deciliter  heparin   Injectable 6500 Unit(s) IV Push every 6 hours PRN For aPTT less than 40  heparin   Injectable 3000 Unit(s) IV Push every 6 hours PRN For aPTT between 40 - 57  hydrocortisone 2.5% Rectal Cream 1 Application(s) Rectal daily PRN hemorrhoid pain/itch  sodium chloride 0.9% lock flush 10 milliLiter(s) IV Push every 1 hour PRN Pre/post blood products, medications, blood draw, and to maintain line patency    Vital Signs Last 24 Hrs  T(C): 37 (2020 20:29), Max: 37 (2020 20:29)  T(F): 98.6 (:29), Max: 98.6 (:29)  HR: 80 (:29) (80 - 84)  BP: 99/58 (:) (96/51 - 108/68)  BP(mean): --  RR: 18 (:) (16 - 18)  SpO2: 99% (:) (96% - 99%)    Skin: No rash.  Eyes: No recent vision problems or eye pain.  ENT: No congestion, ear pain, or sore throat.  Cardiovascular: No chest pain or palpation.  Respiratory: No cough, shortness of breath, congestion, or wheezing.  Gastrointestinal: No abdominal pain, nausea, vomiting, or diarrhea.  Genitourinary: No dysuria.  Musculoskeletal: No joint swelling.  Neurologic: No headache.    PHYSICAL EXAM:  GENERAL: NAD  EYES: EOMI, PERRLA  NECK: Supple, No JVD  CHEST/LUNG: crackles at bases   HEART:  S1 , S2 +  ABDOMEN: soft , bs+  EXTREMITIES:  edema+  NEUROLOGY:alert awake    LABS:      135  |  93<L>  |  92<H>  ----------------------------<  173<H>  4.6   |  31  |  3.12<H>    Ca    9.7      2020 19:44  Phos  3.4       Mg     2.2         TPro  6.2  /  Alb  3.2<L>  /  TBili  0.9  /  DBili      /  AST  38  /  ALT  45  /  AlkPhos  78      Creatinine Trend: 3.12 <--, 2.77 <--, 2.96 <--, 2.96 <--, 2.60 <--, 2.59 <--, 2.71 <--, 2.82 <--, 2.81 <--, 2.93 <--, 2.86 <--, 3.08 <--, 3.25 <--                        8.1    3.99  )-----------( 128      ( 2020 06:23 )             27.0     Urine Studies:  Urinalysis Basic - ( 2020 09:42 )    Color: Light Yellow / Appearance: Clear / S.010 / pH:   Gluc:  / Ketone: Negative  / Bili: Negative / Urobili: Negative   Blood:  / Protein: Negative / Nitrite: Negative   Leuk Esterase: Negative / RBC: 2 /hpf / WBC 1 /HPF   Sq Epi:  / Non Sq Epi: 0 /hpf / Bacteria: Negative              LIVER FUNCTIONS - ( 2020 06:23 )  Alb: 3.2 g/dL / Pro: 6.2 g/dL / ALK PHOS: 78 U/L / ALT: 45 U/L / AST: 38 U/L / GGT: x           PTT - ( 2020 10:07 )  PTT:71.2 sec

## 2020-11-12 NOTE — PROGRESS NOTE ADULT - SUBJECTIVE AND OBJECTIVE BOX
SUBJECTIVE / OVERNIGHT EVENTS: pt denies chest pain, shortness of breath     MEDICATIONS  (STANDING):  aMIOdarone    Tablet 200 milliGRAM(s) Oral daily  apixaban 5 milliGRAM(s) Oral two times a day  chlorhexidine 4% Liquid 1 Application(s) Topical <User Schedule>  dextrose 5%. 1000 milliLiter(s) (50 mL/Hr) IV Continuous <Continuous>  dextrose 50% Injectable 12.5 Gram(s) IV Push once  dextrose 50% Injectable 25 Gram(s) IV Push once  dextrose 50% Injectable 25 Gram(s) IV Push once  fluticasone propionate 50 MICROgram(s)/spray Nasal Spray 1 Spray(s) Both Nostrils every 12 hours  furosemide Infusion 15 mG/Hr (7.5 mL/Hr) IV Continuous <Continuous>  hydrALAZINE 10 milliGRAM(s) Oral three times a day  hydrocortisone 2.5% Ointment 1 Application(s) Topical two times a day  insulin lispro (ADMELOG) corrective regimen sliding scale   SubCutaneous three times a day before meals  insulin lispro (ADMELOG) corrective regimen sliding scale   SubCutaneous at bedtime  levothyroxine 50 MICROGram(s) Oral daily  melatonin 3 milliGRAM(s) Oral at bedtime  milrinone Infusion 0.3 MICROgram(s)/kG/Min (7.5 mL/Hr) IV Continuous <Continuous>  mupirocin 2% Ointment 1 Application(s) Topical <User Schedule>    MEDICATIONS  (PRN):  acetaminophen   Tablet .. 650 milliGRAM(s) Oral every 6 hours PRN Mild Pain (1 - 3)  dextrose 40% Gel 15 Gram(s) Oral once PRN Blood Glucose LESS THAN 70 milliGRAM(s)/deciliter  glucagon  Injectable 1 milliGRAM(s) IntraMuscular once PRN Glucose LESS THAN 70 milligrams/deciliter  hydrocortisone 2.5% Rectal Cream 1 Application(s) Rectal daily PRN hemorrhoid pain/itch  sodium chloride 0.9% lock flush 10 milliLiter(s) IV Push every 1 hour PRN Pre/post blood products, medications, blood draw, and to maintain line patency    Vital Signs Last 24 Hrs  T(C): 36.6 (12 Nov 2020 20:16), Max: 36.9 (12 Nov 2020 05:44)  T(F): 97.9 (12 Nov 2020 20:16), Max: 98.4 (12 Nov 2020 05:44)  HR: 80 (12 Nov 2020 20:16) (80 - 81)  BP: 98/59 (12 Nov 2020 20:16) (98/59 - 107/63)  BP(mean): --  RR: 18 (12 Nov 2020 20:16) (18 - 18)  SpO2: 99% (12 Nov 2020 20:16) (96% - 99%)    Skin: No rash.  Eyes: No recent vision problems or eye pain.  ENT: No congestion, ear pain, or sore throat.  Cardiovascular: No chest pain or palpation.  Respiratory: No cough, shortness of breath, congestion, or wheezing.  Gastrointestinal: No abdominal pain, nausea, vomiting, or diarrhea.  Genitourinary: No dysuria.  Musculoskeletal: No joint swelling.  Neurologic: No headache.    PHYSICAL EXAM:  GENERAL: NAD  EYES: EOMI, PERRLA  NECK: Supple, No JVD  CHEST/LUNG: crackles at bases   HEART:  S1 , S2 +  ABDOMEN: soft , bs+  EXTREMITIES:  edema+  NEUROLOGY:alert awake    LABS:  11-12    133<L>  |  94<L>  |  96<H>  ----------------------------<  115<H>  4.2   |  27  |  2.90<H>    Ca    9.5      12 Nov 2020 06:05  Phos  3.4     11-11  Mg     2.2     11-12    TPro  6.1  /  Alb  3.1<L>  /  TBili  0.8  /  DBili      /  AST  33  /  ALT  40  /  AlkPhos  77  11-12    Creatinine Trend: 2.90 <--, 3.12 <--, 2.77 <--, 2.96 <--, 2.96 <--, 2.60 <--, 2.59 <--, 2.71 <--, 2.82 <--, 2.81 <--, 2.93 <--, 2.86 <--                        8.2    3.90  )-----------( 121      ( 12 Nov 2020 06:05 )             26.2     Urine Studies:            LIVER FUNCTIONS - ( 12 Nov 2020 06:05 )  Alb: 3.1 g/dL / Pro: 6.1 g/dL / ALK PHOS: 77 U/L / ALT: 40 U/L / AST: 33 U/L / GGT: x           PTT - ( 12 Nov 2020 08:01 )  PTT:59.8 sec              LIVER FUNCTIONS - ( 11 Nov 2020 06:23 )  Alb: 3.2 g/dL / Pro: 6.2 g/dL / ALK PHOS: 78 U/L / ALT: 45 U/L / AST: 38 U/L / GGT: x           PTT - ( 11 Nov 2020 10:07 )  PTT:71.2 sec

## 2020-11-12 NOTE — CHART NOTE - NSCHARTNOTEFT_GEN_A_CORE
Nutrition Follow Up Note  Patient seen for: malnutrition follow up     Source: Comprehensive chart review and pt    Chart reviewed, events noted.     Diet : Diet, Regular:   Consistent Carbohydrate {No Snacks} (CSTCHO)  DASH/TLC {Sodium & Cholesterol Restricted} (DASH)  Supplement Feeding Modality:  Oral  Glucerna Shake Cans or Servings Per Day:  1       Frequency:  Two Times a day (20 @ 14:21)      Subjective:     PO intake : 100%      Source for PO intake: flowsheet     Enteral /Parenteral Nutrition: n/a      Daily Weight in k.9 (11-12), Weight in k.4 (11-10), Weight in k.3 (-), Weight in k.5 (-), Weight in k (-)  % Weight Change - continued weight fluctuations noted likely due to fluid shifts     Pertinent Medications: MEDICATIONS  (STANDING):  aMIOdarone    Tablet 200 milliGRAM(s) Oral daily  chlorhexidine 4% Liquid 1 Application(s) Topical <User Schedule>  dextrose 5%. 1000 milliLiter(s) (50 mL/Hr) IV Continuous <Continuous>  dextrose 50% Injectable 12.5 Gram(s) IV Push once  dextrose 50% Injectable 25 Gram(s) IV Push once  dextrose 50% Injectable 25 Gram(s) IV Push once  fluticasone propionate 50 MICROgram(s)/spray Nasal Spray 1 Spray(s) Both Nostrils every 12 hours  furosemide Infusion 15 mG/Hr (7.5 mL/Hr) IV Continuous <Continuous>  heparin  Infusion. 1000 Unit(s)/Hr (10 mL/Hr) IV Continuous <Continuous>  hydrALAZINE 10 milliGRAM(s) Oral three times a day  hydrocortisone 2.5% Ointment 1 Application(s) Topical two times a day  insulin lispro (ADMELOG) corrective regimen sliding scale   SubCutaneous three times a day before meals  insulin lispro (ADMELOG) corrective regimen sliding scale   SubCutaneous at bedtime  levothyroxine 50 MICROGram(s) Oral daily  melatonin 3 milliGRAM(s) Oral at bedtime  milrinone Infusion 0.3 MICROgram(s)/kG/Min (7.5 mL/Hr) IV Continuous <Continuous>  mupirocin 2% Ointment 1 Application(s) Topical <User Schedule>    MEDICATIONS  (PRN):  acetaminophen   Tablet .. 650 milliGRAM(s) Oral every 6 hours PRN Mild Pain (1 - 3)  dextrose 40% Gel 15 Gram(s) Oral once PRN Blood Glucose LESS THAN 70 milliGRAM(s)/deciliter  glucagon  Injectable 1 milliGRAM(s) IntraMuscular once PRN Glucose LESS THAN 70 milligrams/deciliter  heparin   Injectable 6500 Unit(s) IV Push every 6 hours PRN For aPTT less than 40  heparin   Injectable 3000 Unit(s) IV Push every 6 hours PRN For aPTT between 40 - 57  hydrocortisone 2.5% Rectal Cream 1 Application(s) Rectal daily PRN hemorrhoid pain/itch  sodium chloride 0.9% lock flush 10 milliLiter(s) IV Push every 1 hour PRN Pre/post blood products, medications, blood draw, and to maintain line patency    Pertinent Labs:  @ 06:05: Na 133<L>, BUN 96<H>, Cr 2.90<H>, <H>, K+ 4.2, Phos --, Mg 2.2, Alk Phos 77, ALT/SGPT 40, AST/SGOT 33, HbA1c --   @ 19:44: Na 135, BUN 92<H>, Cr 3.12<H>, <H>, K+ 4.6, Phos --, Mg 2.2, Alk Phos --, ALT/SGPT --, AST/SGOT --, HbA1c --    Finger Sticks:  POCT Blood Glucose.: 147 mg/dL ( @ 17:12)  POCT Blood Glucose.: 187 mg/dL ( @ 11:56)  POCT Blood Glucose.: 202 mg/dL ( @ 21:24)      Skin per nursing documentation: no pressure injuries noted  Edema: 2+ B/L leg/feet    Estimated Needs:   [x ] no change since previous assessment  Based on current weight upon initial RD assessment: 183.4 lb; needs calculated with consideration for malnutrition, CKD; defer fluid needs to team in setting of HF  Energy (30-35 kcals/kg): 2305-3748  Protein (0.8-1.0 g pro/kg): 66.48-83.1      Previous Nutrition Diagnosis: Malnutrition - Severe, acute on chronic  Nutrition Diagnosis is [x ] ongoing - addressed with increasing appetite/PO intake & oral nutrition supplements.    New Nutrition Diagnosis: N/a    Interventions:   1. Continue Consistent CHO, DASH diet as ordered with Glucerna Shake x2  2. Diet education reinforced with Pt. RD remains available for additional information PRN  3. Continue to monitor electrolytes and replenish PRN    Monitoring and Evaluation:     Continue to monitor Nutritional intake, Tolerance to diet prescription, weights, labs, skin integrity    RD remains available upon request and will follow up per protocol    Loreto Gutierrez RD pager # [phone] Nutrition Follow Up Note  Patient seen for: malnutrition follow up     Source: Comprehensive chart review and pt    Chart reviewed, events noted.     Diet : Diet, Regular:   Consistent Carbohydrate {No Snacks} (CSTCHO)  DASH/TLC {Sodium & Cholesterol Restricted} (DASH)  Supplement Feeding Modality:  Oral  Glucerna Shake Cans or Servings Per Day:  1       Frequency:  Two Times a day (20 @ 14:21)      Subjective: Pt reports having a continued good appetite. Pt observed eating dinner with no issues. Pt reports enjoying Glucerna shakes and consuming 100% of them. No acute GI issues reported; last BM  per flowsheet.     Reviewed HF and Consistent Carbohydrate diet education. Pt able to teach back importance of monitoring intake of carbohydrate, sodium, and fluids.      PO intake : 100%      Source for PO intake: flowsheet     Enteral /Parenteral Nutrition: n/a      Daily Weight in k.9 (11-12), Weight in k.4 (11-10), Weight in k.3 (-), Weight in k.5 (-08), Weight in k (-06)  % Weight Change - continued weight fluctuations noted likely due to fluid shifts     Pertinent Medications: MEDICATIONS  (STANDING):  aMIOdarone    Tablet 200 milliGRAM(s) Oral daily  chlorhexidine 4% Liquid 1 Application(s) Topical <User Schedule>  dextrose 5%. 1000 milliLiter(s) (50 mL/Hr) IV Continuous <Continuous>  dextrose 50% Injectable 12.5 Gram(s) IV Push once  dextrose 50% Injectable 25 Gram(s) IV Push once  dextrose 50% Injectable 25 Gram(s) IV Push once  fluticasone propionate 50 MICROgram(s)/spray Nasal Spray 1 Spray(s) Both Nostrils every 12 hours  furosemide Infusion 15 mG/Hr (7.5 mL/Hr) IV Continuous <Continuous>  heparin  Infusion. 1000 Unit(s)/Hr (10 mL/Hr) IV Continuous <Continuous>  hydrALAZINE 10 milliGRAM(s) Oral three times a day  hydrocortisone 2.5% Ointment 1 Application(s) Topical two times a day  insulin lispro (ADMELOG) corrective regimen sliding scale   SubCutaneous three times a day before meals  insulin lispro (ADMELOG) corrective regimen sliding scale   SubCutaneous at bedtime  levothyroxine 50 MICROGram(s) Oral daily  melatonin 3 milliGRAM(s) Oral at bedtime  milrinone Infusion 0.3 MICROgram(s)/kG/Min (7.5 mL/Hr) IV Continuous <Continuous>  mupirocin 2% Ointment 1 Application(s) Topical <User Schedule>    MEDICATIONS  (PRN):  acetaminophen   Tablet .. 650 milliGRAM(s) Oral every 6 hours PRN Mild Pain (1 - 3)  dextrose 40% Gel 15 Gram(s) Oral once PRN Blood Glucose LESS THAN 70 milliGRAM(s)/deciliter  glucagon  Injectable 1 milliGRAM(s) IntraMuscular once PRN Glucose LESS THAN 70 milligrams/deciliter  heparin   Injectable 6500 Unit(s) IV Push every 6 hours PRN For aPTT less than 40  heparin   Injectable 3000 Unit(s) IV Push every 6 hours PRN For aPTT between 40 - 57  hydrocortisone 2.5% Rectal Cream 1 Application(s) Rectal daily PRN hemorrhoid pain/itch  sodium chloride 0.9% lock flush 10 milliLiter(s) IV Push every 1 hour PRN Pre/post blood products, medications, blood draw, and to maintain line patency    Pertinent Labs:  @ 06:05: Na 133<L>, BUN 96<H>, Cr 2.90<H>, <H>, K+ 4.2, Phos --, Mg 2.2, Alk Phos 77, ALT/SGPT 40, AST/SGOT 33, HbA1c --   @ 19:44: Na 135, BUN 92<H>, Cr 3.12<H>, <H>, K+ 4.6, Phos --, Mg 2.2, Alk Phos --, ALT/SGPT --, AST/SGOT --, HbA1c --    Finger Sticks:  POCT Blood Glucose.: 147 mg/dL ( @ 17:12)  POCT Blood Glucose.: 187 mg/dL ( @ 11:56)  POCT Blood Glucose.: 202 mg/dL ( @ 21:24)      Skin per nursing documentation: no pressure injuries noted  Edema: 2+ B/L leg/feet    Estimated Needs:   [x ] no change since previous assessment  Based on current weight upon initial RD assessment: 183.4 lb; needs calculated with consideration for malnutrition, CKD; defer fluid needs to team in setting of HF  Energy (30-35 kcals/kg): 7079-7403  Protein (0.8-1.0 g pro/kg): 66.48-83.1      Previous Nutrition Diagnosis: Malnutrition - Severe, acute on chronic  Nutrition Diagnosis is [x ] ongoing - addressed with increasing appetite/PO intake & oral nutrition supplements.    New Nutrition Diagnosis: N/a    Interventions:   1. Continue Consistent CHO, DASH diet as ordered with Glucerna Shake x2  2. Diet education reinforced with Pt. RD remains available for additional information PRN  3. Continue to monitor electrolytes and replenish PRN    Monitoring and Evaluation:     Continue to monitor Nutritional intake, Tolerance to diet prescription, weights, labs, skin integrity    RD remains available upon request and will follow up per protocol    Loreto Gutierrez RD pager # [phone]

## 2020-11-13 NOTE — PROGRESS NOTE ADULT - PROBLEM SELECTOR PLAN 7
- noted at last hospitalization and HIT ab negative on 9/17  - platelets stable, repeat HIT antibodies 11/7 negative

## 2020-11-13 NOTE — PROGRESS NOTE ADULT - ATTENDING COMMENTS
meds: lasix 15/hr, milrinone .3, HDZN 10 tid, amnio 200, eloquis, insulin, synthroid, metoalzone.   afebrile, AV paced 80s. 98/-104/ 182.7 (190).  I/o -1.7  11-13  134<L>  |  92<L>  |  100<H>  ----------------------------<  113<H>  3.7   |  30  |  2.99<H>  , 102, 96  Cr 2.99, 3.08, 2.9  Ca    9.7      13 Nov 2020 05:19  Mg     2.3     11-13  TPro  6.1  /  Alb  3.1<L>  /  TBili  0.8  /  DBili  x   /  AST  33  /  ALT  40  /  AlkPhos  77  11-12                        8.0    3.63  )-----------( 139      ( 13 Nov 2020 05:19 )             26.5   WBC 3.6, 3.9,   change IV lasix to torsemide 80/40   K supplements  d/c planning on milrinone for monday.  Manpreet Rivera meds: lasix 15/hr, milrinone .3, HDZN 10 tid, amnio 200, eloquis, insulin, synthroid, metoalzone.   afebrile, AV paced 80s. 98/-104/ 182.7 (190).  I/o -1.7  11-13  134<L>  |  92<L>  |  100<H>  ----------------------------<  113<H>  3.7   |  30  |  2.99<H>  , 102, 96  Cr 2.99, 3.08, 2.9  Ca    9.7      13 Nov 2020 05:19  Mg     2.3     11-13  TPro  6.1  /  Alb  3.1<L>  /  TBili  0.8  /  DBili  x   /  AST  33  /  ALT  40  /  AlkPhos  77  11-12                        8.0    3.63  )-----------( 139      ( 13 Nov 2020 05:19 )             26.5   WBC 3.6, 3.9,   please resolve occluded swan with IR so patient can be d/c on monday   change IV lasix to torsemide 80/40   K supplements  d/c planning on milrinone for monday.  Manpreet Rivera

## 2020-11-13 NOTE — PROGRESS NOTE ADULT - ASSESSMENT
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    His invasive hemodynamics and wide pulse pressure suggested a vasoplegic picture, potentially sepsis given leukocytosis, though appeared overloaded on exam with low VTI on TTE and cultures have been negative. He required high doses of levophed on admission which have since been weaned and he has been diuresed considerably. His lactate has cleared and end organ function improved on milrinone. Will continue milrinone with goal for eventual discharge on palliative home inotropes and continue to diurese to euvolemia.     He underwent RHC 11/11 which showed mildly elevated right sided filling pressures, significantly elevated left sided filling pressures, and normal cardiac output on milrinone 0.4 mcg/kg/min. He was previously evaluated for LVAD as DT but declined due to combination of age, frailty, and advanced CKD. His overall prognosis is poor and risk of readmission high given severity of heart failure and renal dysfunction for which palliative care was consulted.     He appears near euvolemic on exam, will plan to transition diuretics from IV to PO and monitor response. His degree of renal dysfunction appears stable and he is tolerating low dose vasodilators with low-normotensive pressures. Will anticipate for discharge Monday once home infusion services have been arranged.     Hemodynamics  11/2 (milrinone 0.25 and levophed) : RA 9, PA 52/20, unable to wedge, Corey CO/CI 6.2/3.0 with PA sat 74%  11/4 (on milrinone 0.25 and low dose levophed): RA 5, PA 39/20, Corey CO/CI 6.2/3.0 with PA sat 71%  11/11 (on milrinone 0.4): RA 11 v 14, RV 53/16, PA 55/14 31, PCWP 23 (v36). Corey CO/CI 6.7/3.3 with PA sat 61%    Review of relevant studies  TTE 11/1: LV 7.0 cm, LVEF 10-15%, severe TR, severe RV dysfunction, LVOT VTI 7 cm

## 2020-11-13 NOTE — PROGRESS NOTE ADULT - ATTENDING COMMENTS
I have seen this patient with the fellow and agree with their assessment and plan. In addition, with EDIE on CKD post mitral clip- mostly related to r-chf.  Now stable crt and BUN on diuresis  No urgent need for dialysis  rest as per above    Yemi Rees MD  Cell   Pager   Office     For weekend please contact Dr Ruben Aguilar( fellow) or Dr Festus Da Silva( attending)

## 2020-11-13 NOTE — PROGRESS NOTE ADULT - SUBJECTIVE AND OBJECTIVE BOX
Subjective:    Medications:  acetaminophen   Tablet .. 650 milliGRAM(s) Oral every 6 hours PRN  aMIOdarone    Tablet 200 milliGRAM(s) Oral daily  apixaban 5 milliGRAM(s) Oral two times a day  chlorhexidine 4% Liquid 1 Application(s) Topical <User Schedule>  dextrose 40% Gel 15 Gram(s) Oral once PRN  dextrose 5%. 1000 milliLiter(s) IV Continuous <Continuous>  dextrose 50% Injectable 12.5 Gram(s) IV Push once  dextrose 50% Injectable 25 Gram(s) IV Push once  dextrose 50% Injectable 25 Gram(s) IV Push once  fluticasone propionate 50 MICROgram(s)/spray Nasal Spray 1 Spray(s) Both Nostrils every 12 hours  furosemide Infusion 15 mG/Hr IV Continuous <Continuous>  glucagon  Injectable 1 milliGRAM(s) IntraMuscular once PRN  hydrALAZINE 10 milliGRAM(s) Oral three times a day  hydrocortisone 2.5% Ointment 1 Application(s) Topical two times a day  hydrocortisone 2.5% Rectal Cream 1 Application(s) Rectal daily PRN  insulin lispro (ADMELOG) corrective regimen sliding scale   SubCutaneous three times a day before meals  insulin lispro (ADMELOG) corrective regimen sliding scale   SubCutaneous at bedtime  levothyroxine 50 MICROGram(s) Oral daily  melatonin 3 milliGRAM(s) Oral at bedtime  milrinone Infusion 0.3 MICROgram(s)/kG/Min IV Continuous <Continuous>  mupirocin 2% Ointment 1 Application(s) Topical <User Schedule>  sodium chloride 0.9% lock flush 10 milliLiter(s) IV Push every 1 hour PRN      Physical Exam:    Vitals:  Vital Signs Last 24 Hours  T(C): 36.6 (20 @ 05:22), Max: 36.6 (20 @ 12:47)  HR: 79 (20 @ 05:22) (79 - 81)  BP: 104/61 (20 @ 05:22) (98/59 - 107/63)  RR: 18 (20 @ 05:22) (18 - 18)  SpO2: 98% (20 @ 05:22) (97% - 99%)    Weight in k.9 ( @ 08:28)    I&O's Summary    2020 07:  -  2020 07:00  --------------------------------------------------------  IN: 1149 mL / OUT: 2850 mL / NET: -1701 mL    2020 07:01  -  2020 09:59  --------------------------------------------------------  IN: 0 mL / OUT: 600 mL / NET: -600 mL        Tele:    General: No distress. Comfortable.  HEENT: EOM intact.  Neck: Neck supple. JVP not elevated. No masses  Chest: Clear to auscultation bilaterally  CV: Normal S1 and S2. No murmurs, rub, or gallops. Radial pulses normal.  Abdomen: Soft, non-distended, non-tender  Skin: No rashes or skin breakdown  Neurology: Alert and oriented times three. Sensation intact  Psych: Affect normal    Labs:                        8.0    3.63  )-----------( 139      ( 2020 05:19 )             26.5     -13    134<L>  |  92<L>  |  100<H>  ----------------------------<  113<H>  3.7   |  30  |  2.99<H>    Ca    9.7      2020 05:19  Mg     2.3     -13    TPro  6.1  /  Alb  3.1<L>  /  TBili  0.8  /  DBili  x   /  AST  33  /  ALT  40  /  AlkPhos  77  11-12    PTT - ( 2020 08:01 )  PTT:59.8 sec               Subjective:  - Feels he urinated more with dose of metolazone yesterday  - Ambulated with PT in halls without SOB, CP, palpitations, LH/dizziness    Medications:  acetaminophen   Tablet .. 650 milliGRAM(s) Oral every 6 hours PRN  aMIOdarone    Tablet 200 milliGRAM(s) Oral daily  apixaban 5 milliGRAM(s) Oral two times a day  chlorhexidine 4% Liquid 1 Application(s) Topical <User Schedule>  dextrose 40% Gel 15 Gram(s) Oral once PRN  dextrose 5%. 1000 milliLiter(s) IV Continuous <Continuous>  dextrose 50% Injectable 12.5 Gram(s) IV Push once  dextrose 50% Injectable 25 Gram(s) IV Push once  dextrose 50% Injectable 25 Gram(s) IV Push once  fluticasone propionate 50 MICROgram(s)/spray Nasal Spray 1 Spray(s) Both Nostrils every 12 hours  furosemide Infusion 15 mG/Hr IV Continuous <Continuous>  glucagon  Injectable 1 milliGRAM(s) IntraMuscular once PRN  hydrALAZINE 10 milliGRAM(s) Oral three times a day  hydrocortisone 2.5% Ointment 1 Application(s) Topical two times a day  hydrocortisone 2.5% Rectal Cream 1 Application(s) Rectal daily PRN  insulin lispro (ADMELOG) corrective regimen sliding scale   SubCutaneous three times a day before meals  insulin lispro (ADMELOG) corrective regimen sliding scale   SubCutaneous at bedtime  levothyroxine 50 MICROGram(s) Oral daily  melatonin 3 milliGRAM(s) Oral at bedtime  milrinone Infusion 0.3 MICROgram(s)/kG/Min IV Continuous <Continuous>  mupirocin 2% Ointment 1 Application(s) Topical <User Schedule>  sodium chloride 0.9% lock flush 10 milliLiter(s) IV Push every 1 hour PRN    Vitals:  Vital Signs Last 24 Hours  T(C): 36.6 (20 @ 05:22), Max: 36.6 (20 @ 12:47)  HR: 79 (20 @ 05:22) (79 - 81)  BP: 104/61 (20 @ 05:22) (98/59 - 107/63)  RR: 18 (20 @ 05:22) (18 - 18)  SpO2: 98% (20 @ 05:22) (97% - 99%)    Weight in k.9 ( @ 08:28)    I&O's Summary    2020 07:01  -  2020 07:00  --------------------------------------------------------  IN: 1149 mL / OUT: 2850 mL / NET: -1701 mL    2020 07:  -  2020 09:59  --------------------------------------------------------  IN: 0 mL / OUT: 600 mL / NET: -600 mL    Tele:  80s, PVCs    Physical Exam:    General: No distress. Comfortable.  HEENT: EOM intact.  Neck: Neck supple. JVP 8cm H20, negative HJR. No masses  Chest: Clear to auscultation bilaterally  CV: Regular. Normal S1 and S2. No murmurs, rub, or gallops. Radial pulses normal. BLE edema +2  Abdomen: Soft, non-distended, non-tender  Skin: No rashes or skin breakdown  Neurology: Alert and oriented times three. Sensation intact  Psych: Affect normal    Labs:                        8.0    3.63  )-----------( 139      ( 2020 05:19 )             26.5         134<L>  |  92<L>  |  100<H>  ----------------------------<  113<H>  3.7   |  30  |  2.99<H>    Ca    9.7      2020 05:19  Mg     2.3         TPro  6.1  /  Alb  3.1<L>  /  TBili  0.8  /  DBili  x   /  AST  33  /  ALT  40  /  AlkPhos  77  11-12    PTT - ( 2020 08:01 )  PTT:59.8 sec

## 2020-11-13 NOTE — PROGRESS NOTE ADULT - SUBJECTIVE AND OBJECTIVE BOX
[facility] DIVISION OF KIDNEY DISEASES AND HYPERTENSION -- FOLLOW UP NOTE  --------------------------------------------------------------------------------  HPI: 75-year-old male with advanced heart failure, CKD, Afib, DM, hypothyroidism, was admitted to [facility] on 11/1/20 for worsening lethargy. Pt. transferred to CCU and started on IV pressors and antibiotics. Pt. evaluated by Heart Failure team, was not noted to be grossly volume overloaded on invasive hemodynamic measures concerning for possible sepsis as cause of shock. Pt.  transferred to medical floor on 11/6/20. Nephrology team consulted for elevated serum creatinine. Upon review of labs on [facility], Scr was 2.77 on 9/21/20. Scr on arrival to the ER was 4.26 on 4/1/20. Scr improved to 2.60 on 11/9/20. Today, Scr remains elevated/stable at 2.99. Pt. remains on IV Lasix infusion and IV Milrinone infusion (11/9/20).    Pt. evaluated at bedside, in no acute distress. Offers no complaints.    PAST HISTORY  --------------------------------------------------------------------------------  No significant changes to PMH, PSH, FHx, SHx, unless otherwise noted    ALLERGIES & MEDICATIONS  --------------------------------------------------------------------------------  Allergies    No Known Allergies    Intolerances    Standing Inpatient Medications  aMIOdarone    Tablet 200 milliGRAM(s) Oral daily  apixaban 5 milliGRAM(s) Oral two times a day  chlorhexidine 4% Liquid 1 Application(s) Topical <User Schedule>  fluticasone propionate 50 MICROgram(s)/spray Nasal Spray 1 Spray(s) Both Nostrils every 12 hours  furosemide Infusion 15 mG/Hr IV Continuous <Continuous>  hydrALAZINE 10 milliGRAM(s) Oral three times a day  hydrocortisone 2.5% Ointment 1 Application(s) Topical two times a day  insulin lispro (ADMELOG) corrective regimen sliding scale   SubCutaneous at bedtime  insulin lispro (ADMELOG) corrective regimen sliding scale   SubCutaneous three times a day before meals  levothyroxine 50 MICROGram(s) Oral daily  melatonin 3 milliGRAM(s) Oral at bedtime  milrinone Infusion 0.3 MICROgram(s)/kG/Min IV Continuous <Continuous>  mupirocin 2% Ointment 1 Application(s) Topical <User Schedule>    REVIEW OF SYSTEMS  --------------------------------------------------------------------------------  Gen: no lethargy  Respiratory: No dyspnea  CV: No chest pain  GI: No abdominal pain  MSK: + LE edema  Neuro: No dizziness  Heme: No bleeding    All other systems were reviewed and are negative, except as noted.    VITALS/PHYSICAL EXAM  --------------------------------------------------------------------------------  T(C): 36.6 (11-13-20 @ 05:22), Max: 36.6 (11-12-20 @ 12:47)  HR: 79 (11-13-20 @ 05:22) (79 - 81)  BP: 104/61 (11-13-20 @ 05:22) (98/59 - 107/63)  RR: 18 (11-13-20 @ 05:22) (18 - 18)  SpO2: 98% (11-13-20 @ 05:22) (97% - 99%)  Wt(kg): --    11-12-20 @ 07:01  -  11-13-20 @ 07:00  --------------------------------------------------------  IN: 1149 mL / OUT: 2850 mL / NET: -1701 mL    11-13-20 @ 07:01  -  11-13-20 @ 11:46  --------------------------------------------------------  IN: 300 mL / OUT: 1100 mL / NET: -800 mL    Physical Exam:  	Gen: NAD  	HEENT: MMM  	Pulm: good air entry B/L  	CV: S1S2  	Abd: Soft, +BS   	Ext: LE pitting edema B/L  	Neuro: Awake  	Skin: Warm and dry    LABS/STUDIES  --------------------------------------------------------------------------------              8.0    3.63  >-----------<  139      [11-13-20 @ 05:19]              26.5     134  |  92  |  100  ----------------------------<  113      [11-13-20 @ 05:19]  3.7   |  30  |  2.99        Ca     9.7     [11-13-20 @ 05:19]      Mg     2.3     [11-13-20 @ 05:19]    Creatinine Trend:  SCr 2.99 [11-13 @ 05:19]  SCr 3.08 [11-13 @ 00:37]  SCr 2.90 [11-12 @ 06:05]  SCr 3.12 [11-11 @ 19:44]  SCr 2.77 [11-11 @ 06:23]

## 2020-11-13 NOTE — PROGRESS NOTE ADULT - SUBJECTIVE AND OBJECTIVE BOX
SUBJECTIVE / OVERNIGHT EVENTS: pt denies chest pain, shortness of breath     MEDICATIONS  (STANDING):  aMIOdarone    Tablet 200 milliGRAM(s) Oral daily  apixaban 5 milliGRAM(s) Oral two times a day  chlorhexidine 4% Liquid 1 Application(s) Topical <User Schedule>  dextrose 5%. 1000 milliLiter(s) (50 mL/Hr) IV Continuous <Continuous>  dextrose 50% Injectable 12.5 Gram(s) IV Push once  dextrose 50% Injectable 25 Gram(s) IV Push once  dextrose 50% Injectable 25 Gram(s) IV Push once  fluticasone propionate 50 MICROgram(s)/spray Nasal Spray 1 Spray(s) Both Nostrils every 12 hours  hydrALAZINE 10 milliGRAM(s) Oral three times a day  hydrocortisone 2.5% Ointment 1 Application(s) Topical two times a day  insulin lispro (ADMELOG) corrective regimen sliding scale   SubCutaneous three times a day before meals  insulin lispro (ADMELOG) corrective regimen sliding scale   SubCutaneous at bedtime  levothyroxine 50 MICROGram(s) Oral daily  melatonin 3 milliGRAM(s) Oral at bedtime  milrinone Infusion 0.3 MICROgram(s)/kG/Min (7.5 mL/Hr) IV Continuous <Continuous>  mupirocin 2% Ointment 1 Application(s) Topical <User Schedule>  torsemide 40 milliGRAM(s) Oral <User Schedule>    MEDICATIONS  (PRN):  acetaminophen   Tablet .. 650 milliGRAM(s) Oral every 6 hours PRN Mild Pain (1 - 3)  dextrose 40% Gel 15 Gram(s) Oral once PRN Blood Glucose LESS THAN 70 milliGRAM(s)/deciliter  glucagon  Injectable 1 milliGRAM(s) IntraMuscular once PRN Glucose LESS THAN 70 milligrams/deciliter  hydrocortisone 2.5% Rectal Cream 1 Application(s) Rectal daily PRN hemorrhoid pain/itch  sodium chloride 0.9% lock flush 10 milliLiter(s) IV Push every 1 hour PRN Pre/post blood products, medications, blood draw, and to maintain line patency    Vital Signs Last 24 Hrs  T(C): 36.7 (13 Nov 2020 21:45), Max: 36.7 (13 Nov 2020 21:45)  T(F): 98.1 (13 Nov 2020 21:45), Max: 98.1 (13 Nov 2020 21:45)  HR: 83 (13 Nov 2020 21:45) (79 - 83)  BP: 91/50 (13 Nov 2020 21:45) (91/50 - 104/61)  BP(mean): --  RR: 16 (13 Nov 2020 21:45) (16 - 18)  SpO2: 97% (13 Nov 2020 21:45) (97% - 99%)    Skin: No rash.  Eyes: No recent vision problems or eye pain.  ENT: No congestion, ear pain, or sore throat.  Cardiovascular: No chest pain or palpation.  Respiratory: No cough, shortness of breath, congestion, or wheezing.  Gastrointestinal: No abdominal pain, nausea, vomiting, or diarrhea.  Genitourinary: No dysuria.  Musculoskeletal: No joint swelling.  Neurologic: No headache.    PHYSICAL EXAM:  GENERAL: NAD  EYES: EOMI, PERRLA  NECK: Supple, No JVD  CHEST/LUNG: crackles at bases   HEART:  S1 , S2 +  ABDOMEN: soft , bs+  EXTREMITIES:  edema+  NEUROLOGY:alert awake    LABS:  11-13    134<L>  |  92<L>  |  100<H>  ----------------------------<  113<H>  3.7   |  30  |  2.99<H>    Ca    9.7      13 Nov 2020 05:19  Mg     2.3     11-13    TPro  6.1  /  Alb  3.1<L>  /  TBili  0.8  /  DBili      /  AST  33  /  ALT  40  /  AlkPhos  77  11-12    Creatinine Trend: 2.99 <--, 3.08 <--, 2.90 <--, 3.12 <--, 2.77 <--, 2.96 <--, 2.96 <--, 2.60 <--, 2.59 <--, 2.71 <--, 2.82 <--, 2.81 <--, 2.93 <--                        8.0    3.63  )-----------( 139      ( 13 Nov 2020 05:19 )             26.5     Urine Studies:            LIVER FUNCTIONS - ( 12 Nov 2020 06:05 )  Alb: 3.1 g/dL / Pro: 6.1 g/dL / ALK PHOS: 77 U/L / ALT: 40 U/L / AST: 33 U/L / GGT: x           PTT - ( 12 Nov 2020 08:01 )  PTT:59.8 sec

## 2020-11-14 NOTE — PROGRESS NOTE ADULT - SUBJECTIVE AND OBJECTIVE BOX
SUBJECTIVE / OVERNIGHT EVENTS: pt denies chest pain, shortness of breath     MEDICATIONS  (STANDING):  aMIOdarone    Tablet 200 milliGRAM(s) Oral daily  apixaban 5 milliGRAM(s) Oral two times a day  chlorhexidine 4% Liquid 1 Application(s) Topical <User Schedule>  dextrose 5%. 1000 milliLiter(s) (50 mL/Hr) IV Continuous <Continuous>  dextrose 50% Injectable 12.5 Gram(s) IV Push once  dextrose 50% Injectable 25 Gram(s) IV Push once  dextrose 50% Injectable 25 Gram(s) IV Push once  fluticasone propionate 50 MICROgram(s)/spray Nasal Spray 1 Spray(s) Both Nostrils every 12 hours  hydrALAZINE 10 milliGRAM(s) Oral three times a day  hydrocortisone 2.5% Ointment 1 Application(s) Topical two times a day  insulin lispro (ADMELOG) corrective regimen sliding scale   SubCutaneous three times a day before meals  insulin lispro (ADMELOG) corrective regimen sliding scale   SubCutaneous at bedtime  levothyroxine 50 MICROGram(s) Oral daily  melatonin 3 milliGRAM(s) Oral at bedtime  milrinone Infusion 0.3 MICROgram(s)/kG/Min (7.5 mL/Hr) IV Continuous <Continuous>  mupirocin 2% Ointment 1 Application(s) Topical <User Schedule>  torsemide 80 milliGRAM(s) Oral <User Schedule>  torsemide 40 milliGRAM(s) Oral <User Schedule>    MEDICATIONS  (PRN):  acetaminophen   Tablet .. 650 milliGRAM(s) Oral every 6 hours PRN Mild Pain (1 - 3)  dextrose 40% Gel 15 Gram(s) Oral once PRN Blood Glucose LESS THAN 70 milliGRAM(s)/deciliter  glucagon  Injectable 1 milliGRAM(s) IntraMuscular once PRN Glucose LESS THAN 70 milligrams/deciliter  hydrocortisone 2.5% Rectal Cream 1 Application(s) Rectal daily PRN hemorrhoid pain/itch  sodium chloride 0.9% lock flush 10 milliLiter(s) IV Push every 1 hour PRN Pre/post blood products, medications, blood draw, and to maintain line patency    Vital Signs Last 24 Hrs  T(C): 36.4 (14 Nov 2020 22:12), Max: 36.4 (14 Nov 2020 04:28)  T(F): 97.5 (14 Nov 2020 22:12), Max: 97.6 (14 Nov 2020 04:28)  HR: 7 (14 Nov 2020 22:12) (7 - 82)  BP: 101/57 (14 Nov 2020 22:12) (100/65 - 103/55)  BP(mean): --  RR: 18 (14 Nov 2020 22:12) (16 - 18)  SpO2: 99% (14 Nov 2020 22:12) (98% - 100%)    Skin: No rash.  Eyes: No recent vision problems or eye pain.  ENT: No congestion, ear pain, or sore throat.  Cardiovascular: No chest pain or palpation.  Respiratory: No cough, shortness of breath, congestion, or wheezing.  Gastrointestinal: No abdominal pain, nausea, vomiting, or diarrhea.  Genitourinary: No dysuria.  Musculoskeletal: No joint swelling.  Neurologic: No headache.    PHYSICAL EXAM:  GENERAL: NAD  EYES: EOMI, PERRLA  NECK: Supple, No JVD  CHEST/LUNG: crackles at bases   HEART:  S1 , S2 +  ABDOMEN: soft , bs+  EXTREMITIES:  edema+  NEUROLOGY:alert awake    LABS:  11-13    134<L>  |  92<L>  |  100<H>  ----------------------------<  113<H>  3.7   |  30  |  2.99<H>    Ca    9.7      13 Nov 2020 05:19  Mg     2.3     11-13      Creatinine Trend: 2.99 <--, 3.08 <--, 2.90 <--, 3.12 <--, 2.77 <--, 2.96 <--, 2.96 <--, 2.60 <--, 2.59 <--, 2.71 <--, 2.82 <--                        8.0    3.63  )-----------( 139      ( 13 Nov 2020 05:19 )             26.5     Urine Studies:

## 2020-11-15 NOTE — PROGRESS NOTE ADULT - SUBJECTIVE AND OBJECTIVE BOX
SUBJECTIVE / OVERNIGHT EVENTS: pt denies chest pain, shortness of breath     MEDICATIONS  (STANDING):  aMIOdarone    Tablet 200 milliGRAM(s) Oral daily  apixaban 5 milliGRAM(s) Oral two times a day  chlorhexidine 4% Liquid 1 Application(s) Topical <User Schedule>  dextrose 5%. 1000 milliLiter(s) (50 mL/Hr) IV Continuous <Continuous>  dextrose 50% Injectable 12.5 Gram(s) IV Push once  dextrose 50% Injectable 25 Gram(s) IV Push once  dextrose 50% Injectable 25 Gram(s) IV Push once  fluticasone propionate 50 MICROgram(s)/spray Nasal Spray 1 Spray(s) Both Nostrils every 12 hours  hydrALAZINE 10 milliGRAM(s) Oral three times a day  hydrocortisone 2.5% Ointment 1 Application(s) Topical two times a day  insulin lispro (ADMELOG) corrective regimen sliding scale   SubCutaneous three times a day before meals  insulin lispro (ADMELOG) corrective regimen sliding scale   SubCutaneous at bedtime  levothyroxine 50 MICROGram(s) Oral daily  melatonin 3 milliGRAM(s) Oral at bedtime  milrinone Infusion 0.3 MICROgram(s)/kG/Min (7.5 mL/Hr) IV Continuous <Continuous>  mupirocin 2% Ointment 1 Application(s) Topical <User Schedule>  torsemide 80 milliGRAM(s) Oral <User Schedule>  torsemide 40 milliGRAM(s) Oral <User Schedule>    MEDICATIONS  (PRN):  acetaminophen   Tablet .. 650 milliGRAM(s) Oral every 6 hours PRN Mild Pain (1 - 3)  dextrose 40% Gel 15 Gram(s) Oral once PRN Blood Glucose LESS THAN 70 milliGRAM(s)/deciliter  glucagon  Injectable 1 milliGRAM(s) IntraMuscular once PRN Glucose LESS THAN 70 milligrams/deciliter  hydrocortisone 2.5% Rectal Cream 1 Application(s) Rectal daily PRN hemorrhoid pain/itch  sodium chloride 0.9% lock flush 10 milliLiter(s) IV Push every 1 hour PRN Pre/post blood products, medications, blood draw, and to maintain line patency    Vital Signs Last 24 Hrs  T(C): 36.4 (15 Nov 2020 12:58), Max: 36.6 (15 Nov 2020 05:51)  T(F): 97.6 (15 Nov 2020 12:58), Max: 97.9 (15 Nov 2020 05:51)  HR: 83 (15 Nov 2020 17:32) (7 - 83)  BP: 102/63 (15 Nov 2020 17:32) (101/57 - 107/64)  BP(mean): --  RR: 18 (15 Nov 2020 12:58) (18 - 18)  SpO2: 99% (15 Nov 2020 12:58) (98% - 99%)      Skin: No rash.  Eyes: No recent vision problems or eye pain.  ENT: No congestion, ear pain, or sore throat.  Cardiovascular: No chest pain or palpation.  Respiratory: No cough, shortness of breath, congestion, or wheezing.  Gastrointestinal: No abdominal pain, nausea, vomiting, or diarrhea.  Genitourinary: No dysuria.  Musculoskeletal: No joint swelling.  Neurologic: No headache.    PHYSICAL EXAM:  GENERAL: NAD  EYES: EOMI, PERRLA  NECK: Supple, No JVD  CHEST/LUNG: crackles at bases   HEART:  S1 , S2 +  ABDOMEN: soft , bs+  EXTREMITIES:  edema+  NEUROLOGY:alert awake    LABS:        Creatinine Trend: 2.99 <--, 3.08 <--, 2.90 <--, 3.12 <--, 2.77 <--, 2.96 <--, 2.96 <--, 2.60 <--, 2.59 <--    Urine Studies:

## 2020-11-16 NOTE — PROCEDURE NOTE - PROCEDURE FINDINGS AND DETAILS
rt ij double lumen tunneled central venous catheter placed. tip in svc. ok to use.
-tip of central venous catheter is in the SVC

## 2020-11-16 NOTE — PROCEDURE NOTE - NSPERFORMEDBY_GEN_A_CORE
Myself
HUMBERTO Rodriguez, NP/NP/Myself
Myself
Myself/Attending
HUMBERTO Rodriguez, NP/Myself/NP

## 2020-11-16 NOTE — PROCEDURE NOTE - PROCEDURE DATE TIME, MLM
01-Nov-2020 20:10
02-Nov-2020
06-Nov-2020 14:15
02-Nov-2020
01-Nov-2020 23:45
09-Nov-2020 14:37
16-Nov-2020 18:16
02-Nov-2020

## 2020-11-16 NOTE — CHART NOTE - NSCHARTNOTEFT_GEN_A_CORE
Per review of chart and consult order patient's right IJ tunneled central venous catheter is nonfunctioning and attempted declotting with Cathflo was unsuccessful. Will thus plan to exchange tunneled CVC in IR today.    Please place order for IR procedure, approving attending Dr. Jo.    Ifeanyi Valdez MD, RPVI  Chief Resident, Interventional Radiology  [facility]: [phone]  [facility]: [phone]

## 2020-11-16 NOTE — CHART NOTE - NSCHARTNOTEFT_GEN_A_CORE
Palliative care was re-consulted for " Patient requesting further [facility] discussion with Palliative Care Team." I also received and email from the patient's niece that was concerned about the patient having a "wound on his leg that seems to be getting worse" and also regarding "palliative aftercare." I discussed the case with Dr. Diehl that indicated she will f/u with the patient and his niece in order to address goals and need for palliative care evaluation. Will f/u with Dr. Fazal becker am.         Robbie Devi MD   Geriatrics and Palliative Care ([facility]) Consult Service    of Geriatric and Palliative Medicine  [facility]      Please page the following number for clinical matters between the hours of 9 am and 5 pm from Monday through Friday : [phone]    After 5pm and on weekends, please see the contact information below:    In the event of newly developing, evolving, or worsening symptoms, please contact the Palliative Medicine team via pager (if the patient is at [facility] #[phone] or if the patient is at [facility] #[phone]) The Geriatric and Palliative Medicine service has coverage 24 hours a day/ 7 days a week to provide medical recommendations regarding symptom management needs via telephone

## 2020-11-16 NOTE — PRE PROCEDURE NOTE - PRE PROCEDURE EVALUATION
------------------------------------------------------------  Interventional Radiology Pre-Procedure Note  ------------------------------------------------------------    Indication: 75y Male with clotted tunneled central line is referred for exchange.    Past Medical History:  Hypothyroidism    Afib    Type II diabetes mellitus    Aortic insufficiency    Mitral insufficiency    CKD (chronic kidney disease)    Cardiomyopathy    Hypertension        Allergies: No Known Allergies      Medications:    aMIOdarone    Tablet: 200 milliGRAM(s) Oral (11-16-20 @ 05:26)  apixaban: 5 milliGRAM(s) Oral (11-16-20 @ 05:26)  hydrALAZINE: 10 milliGRAM(s) Oral (11-16-20 @ 14:38)  milrinone Infusion: 7.5 mL/Hr IV Continuous (11-15-20 @ 19:38)  torsemide: 80 milliGRAM(s) Oral (11-16-20 @ 08:43)  torsemide: 40 milliGRAM(s) Oral (11-15-20 @ 17:29)      Vital Signs:   T(F): 97.7 (16:53), Max: 98.2 (21:04)  HR: 80 (13:48)  BP: 104/55 (16:53)  RR: 18 (16:53)  SpO2: 100% (13:48)    Labs:           8.0  3.63)-----(139     (11-13-20 @ 05:19)         26.5     134 | 92 | 100  --------------------< 113     (11-13-20 @ 05:19)  3.7 | 30 | 2.99       PT: -- 11-12-20 @ 08:01  aPTT: 59.8<H> 11-12-20 @ 08:01   INR: -- 11-12-20 @ 08:01    Consent: The risks, benefits and alternatives of the procedure were discussed with the patient, who verbalized understanding. Signed consent is available in the paper chart.    Procedure Plan: tunneled central venous catheter exchange    Ifeanyi Valdez MD, RPVI  Chief Resident, Interventional Radiology  [facility]: [phone]  [facility]: [phone]

## 2020-11-16 NOTE — PROGRESS NOTE ADULT - ASSESSMENT
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    His invasive hemodynamics and wide pulse pressure suggested a vasoplegic picture, potentially sepsis given leukocytosis, though appeared overloaded on exam with low VTI on TTE and cultures have been negative. He required high doses of levophed on admission which have since been weaned and he has been diuresed considerably. His lactate has cleared and end organ function improved on milrinone. Will continue milrinone with goal for eventual discharge on palliative home inotropes and continue to diurese to euvolemia.     He underwent RHC 11/11 which showed mildly elevated right sided filling pressures, significantly elevated left sided filling pressures, and normal cardiac output on milrinone 0.4 mcg/kg/min. He was previously evaluated for LVAD as DT but declined due to combination of age, frailty, and advanced CKD. His overall prognosis is poor and risk of readmission high given severity of heart failure and renal dysfunction for which palliative care was consulted.     He was transitioned from Lasix gtt to oral diuretics on 11/13 but weight continues to downtrend with this. His degree of renal dysfunction appears stable and he is tolerating low dose vasodilators and is normotensive. Will anticipate for discharge with palliative milrinone once home infusion services have been fully arranged and IR has corrected Santo catheter occlusion.     Hemodynamics  11/2 (milrinone 0.25 and levophed) : RA 9, PA 52/20, unable to wedge, Corey CO/CI 6.2/3.0 with PA sat 74%  11/4 (on milrinone 0.25 and low dose levophed): RA 5, PA 39/20, Corey CO/CI 6.2/3.0 with PA sat 71%  11/11 (on milrinone 0.4): RA 11 v 14, RV 53/16, PA 55/14 31, PCWP 23 (v36). Corey CO/CI 6.7/3.3 with PA sat 61%    Review of relevant studies  TTE 11/1: LV 7.0 cm, LVEF 10-15%, severe TR, severe RV dysfunction, LVOT VTI 7 cm

## 2020-11-16 NOTE — PROGRESS NOTE ADULT - SUBJECTIVE AND OBJECTIVE BOX
Subjective:  - NAEO. Feeling well, ambulating in halls without LH/dizziness, CP, palpitations  - He denies SOB at rest, orthopnea, PND, cough    Medications:  acetaminophen   Tablet .. 650 milliGRAM(s) Oral every 6 hours PRN  aMIOdarone    Tablet 200 milliGRAM(s) Oral daily  apixaban 5 milliGRAM(s) Oral two times a day  chlorhexidine 4% Liquid 1 Application(s) Topical <User Schedule>  dextrose 40% Gel 15 Gram(s) Oral once PRN  dextrose 5%. 1000 milliLiter(s) IV Continuous <Continuous>  dextrose 50% Injectable 12.5 Gram(s) IV Push once  dextrose 50% Injectable 25 Gram(s) IV Push once  dextrose 50% Injectable 25 Gram(s) IV Push once  fluticasone propionate 50 MICROgram(s)/spray Nasal Spray 1 Spray(s) Both Nostrils every 12 hours  glucagon  Injectable 1 milliGRAM(s) IntraMuscular once PRN  hydrocortisone 2.5% Ointment 1 Application(s) Topical two times a day  hydrocortisone 2.5% Rectal Cream 1 Application(s) Rectal daily PRN  insulin lispro (ADMELOG) corrective regimen sliding scale   SubCutaneous three times a day before meals  insulin lispro (ADMELOG) corrective regimen sliding scale   SubCutaneous at bedtime  isosorbide   mononitrate ER Tablet (IMDUR) 30 milliGRAM(s) Oral at bedtime  levothyroxine 50 MICROGram(s) Oral daily  melatonin 3 milliGRAM(s) Oral at bedtime  milrinone Infusion 0.3 MICROgram(s)/kG/Min IV Continuous <Continuous>  mupirocin 2% Ointment 1 Application(s) Topical <User Schedule>  sodium chloride 0.9% lock flush 10 milliLiter(s) IV Push every 1 hour PRN    Vitals:  Vital Signs Last 24 Hours  T(C): 36.2 (20 @ 13:48), Max: 36.8 (11-15-20 @ 21:04)  HR: 80 (20 @ 13:48) (80 - 83)  BP: 92/51 (20 @ 13:48) (92/51 - 112/67)  RR: 18 (20 @ 13:48) (18 - 18)  SpO2: 100% (20 @ 13:48) (98% - 100%)    Weight in k.3 (16 @ 10:20)    I&O's Summary    15 Nov 2020 07:01  -  2020 07:00  --------------------------------------------------------  IN: 1230 mL / OUT: 2350 mL / NET: -1120 mL    2020 07:01  -  2020 16:29  --------------------------------------------------------  IN: 255 mL / OUT: 0 mL / NET: 255 mL    Tele:  80    Physical Exam:    General: No distress. Comfortable.  HEENT: EOM intact.  Neck: Neck supple. JVP does not appear elevated but with prominent V waves.  Chest: Clear to auscultation bilaterally  CV: Regular. Normal S1 and S2. No murmurs, rub, or gallops. Radial pulses normal. +2 pedal edema.  Abdomen: Soft, non-distended, non-tender  Skin: No rashes or skin breakdown  Neurology: Alert and oriented times three. Sensation intact  Psych: Affect normal    Labs:

## 2020-11-16 NOTE — CHART NOTE - NSCHARTNOTESELECT_GEN_ALL_CORE
Malnutrition Notification
Event Note
Event Note/VTE Risk Assessment
Interventional Radiology
Nutrition Services
Palliative Care/Event Note
Palliative Care/Off Service Note
Transfer Note

## 2020-11-16 NOTE — PROGRESS NOTE ADULT - SUBJECTIVE AND OBJECTIVE BOX
SUBJECTIVE / OVERNIGHT EVENTS: pt denies chest pain, shortness of breath     MEDICATIONS  (STANDING):  aMIOdarone    Tablet 200 milliGRAM(s) Oral daily  apixaban 5 milliGRAM(s) Oral two times a day  chlorhexidine 4% Liquid 1 Application(s) Topical <User Schedule>  dextrose 5%. 1000 milliLiter(s) (50 mL/Hr) IV Continuous <Continuous>  dextrose 50% Injectable 12.5 Gram(s) IV Push once  dextrose 50% Injectable 25 Gram(s) IV Push once  dextrose 50% Injectable 25 Gram(s) IV Push once  fluticasone propionate 50 MICROgram(s)/spray Nasal Spray 1 Spray(s) Both Nostrils every 12 hours  hydrocortisone 2.5% Ointment 1 Application(s) Topical two times a day  insulin lispro (ADMELOG) corrective regimen sliding scale   SubCutaneous three times a day before meals  insulin lispro (ADMELOG) corrective regimen sliding scale   SubCutaneous at bedtime  isosorbide   mononitrate ER Tablet (IMDUR) 30 milliGRAM(s) Oral at bedtime  levothyroxine 50 MICROGram(s) Oral daily  melatonin 3 milliGRAM(s) Oral at bedtime  milrinone Infusion 0.3 MICROgram(s)/kG/Min (7.5 mL/Hr) IV Continuous <Continuous>  mupirocin 2% Ointment 1 Application(s) Topical <User Schedule>    MEDICATIONS  (PRN):  acetaminophen   Tablet .. 650 milliGRAM(s) Oral every 6 hours PRN Mild Pain (1 - 3)  dextrose 40% Gel 15 Gram(s) Oral once PRN Blood Glucose LESS THAN 70 milliGRAM(s)/deciliter  glucagon  Injectable 1 milliGRAM(s) IntraMuscular once PRN Glucose LESS THAN 70 milligrams/deciliter  hydrocortisone 2.5% Rectal Cream 1 Application(s) Rectal daily PRN hemorrhoid pain/itch  sodium chloride 0.9% lock flush 10 milliLiter(s) IV Push every 1 hour PRN Pre/post blood products, medications, blood draw, and to maintain line patency    Vital Signs Last 24 Hrs  T(C): 36.6 (16 Nov 2020 20:03), Max: 36.6 (16 Nov 2020 05:03)  T(F): 97.9 (16 Nov 2020 20:03), Max: 97.9 (16 Nov 2020 20:03)  HR: 79 (16 Nov 2020 20:03) (79 - 81)  BP: 99/59 (16 Nov 2020 20:03) (92/51 - 112/67)  BP(mean): --  RR: 18 (16 Nov 2020 20:03) (18 - 18)  SpO2: 100% (16 Nov 2020 20:03) (99% - 100%)    Skin: No rash.  Eyes: No recent vision problems or eye pain.  ENT: No congestion, ear pain, or sore throat.  Cardiovascular: No chest pain or palpation.  Respiratory: No cough, shortness of breath, congestion, or wheezing.  Gastrointestinal: No abdominal pain, nausea, vomiting, or diarrhea.  Genitourinary: No dysuria.  Musculoskeletal: No joint swelling.  Neurologic: No headache.    PHYSICAL EXAM:  GENERAL: NAD  EYES: EOMI, PERRLA  NECK: Supple, No JVD  CHEST/LUNG: crackles at bases   HEART:  S1 , S2 +  ABDOMEN: soft , bs+  EXTREMITIES:  edema+  NEUROLOGY:alert awake    LABS:        Creatinine Trend: 2.99 <--, 3.08 <--, 2.90 <--, 3.12 <--, 2.77 <--, 2.96 <--, 2.96 <--    Urine Studies:

## 2020-11-16 NOTE — PROGRESS NOTE ADULT - ATTENDING COMMENTS
He is down another 5 kg since RHC done on 11/11/20 when RAP 11, but he has persistent large v-waves c/w significant TR on exam today. LE edema is markedly improved from his outpatient visit before this admission and he no longer requires ACE wraps.    Please hold further diuretics for today and will reassess tomorrow morning before dosing.  Start Toprol XL 25 mg once daily - 1st dose now.  Would stop hydralazine as his SVR ~700 dsc on RHC and instead give him Imdur 30 mg po QHS starting tonight to reduce preload.  Santo to be exchanged today for clotted lumen.  Anticipate discharge home tomorrow on milrinone 0.3 mcg/kg/min. Will arrange HF NP follow-up before Friday this week to make sure his diuretics are at the appropriate dose.

## 2020-11-17 NOTE — PROGRESS NOTE ADULT - ATTENDING COMMENTS
Patient is milrinone-dependent with Stage D, NYHA Class IV HF. Compared to baseline RHC, his CO increased and his PCW decreased by >20% on milrinone and he is symptomatically much improved.    SCr continues to improve and he overall appears euvolemic on exam.  Resume torsemide 40 mg starting this afternoon, but just one dose today then go to BID starting tomorrow AM.  Add spironolactone 25 mg once daily - 1st dose now.  Continue remaining meds. Keep K > 4.5.  Awaiting dispo pending infusion company/home nurse set-up.

## 2020-11-17 NOTE — PROGRESS NOTE ADULT - SUBJECTIVE AND OBJECTIVE BOX
SUBJECTIVE / OVERNIGHT EVENTS: pt denies chest pain, shortness of breath     MEDICATIONS  (STANDING):  aMIOdarone    Tablet 200 milliGRAM(s) Oral daily  apixaban 5 milliGRAM(s) Oral two times a day  chlorhexidine 4% Liquid 1 Application(s) Topical <User Schedule>  dextrose 5%. 1000 milliLiter(s) (50 mL/Hr) IV Continuous <Continuous>  dextrose 50% Injectable 12.5 Gram(s) IV Push once  dextrose 50% Injectable 25 Gram(s) IV Push once  dextrose 50% Injectable 25 Gram(s) IV Push once  fluticasone propionate 50 MICROgram(s)/spray Nasal Spray 1 Spray(s) Both Nostrils every 12 hours  hydrocortisone 2.5% Ointment 1 Application(s) Topical two times a day  insulin lispro (ADMELOG) corrective regimen sliding scale   SubCutaneous three times a day before meals  insulin lispro (ADMELOG) corrective regimen sliding scale   SubCutaneous at bedtime  isosorbide   mononitrate ER Tablet (IMDUR) 30 milliGRAM(s) Oral at bedtime  levothyroxine 50 MICROGram(s) Oral daily  melatonin 3 milliGRAM(s) Oral at bedtime  metoprolol succinate ER 25 milliGRAM(s) Oral daily  milrinone Infusion 0.3 MICROgram(s)/kG/Min (7.35 mL/Hr) IV Continuous <Continuous>  mupirocin 2% Ointment 1 Application(s) Topical <User Schedule>  potassium chloride    Tablet ER 40 milliEquivalent(s) Oral daily  spironolactone 25 milliGRAM(s) Oral daily  torsemide 40 milliGRAM(s) Oral every 12 hours    MEDICATIONS  (PRN):  acetaminophen   Tablet .. 650 milliGRAM(s) Oral every 6 hours PRN Mild Pain (1 - 3)  dextrose 40% Gel 15 Gram(s) Oral once PRN Blood Glucose LESS THAN 70 milliGRAM(s)/deciliter  glucagon  Injectable 1 milliGRAM(s) IntraMuscular once PRN Glucose LESS THAN 70 milligrams/deciliter  hydrocortisone 2.5% Rectal Cream 1 Application(s) Rectal daily PRN hemorrhoid pain/itch  sodium chloride 0.9% lock flush 10 milliLiter(s) IV Push every 1 hour PRN Pre/post blood products, medications, blood draw, and to maintain line patency    Vital Signs Last 24 Hrs  T(C): 36.8 (17 Nov 2020 20:44), Max: 36.8 (17 Nov 2020 20:44)  T(F): 98.2 (17 Nov 2020 20:44), Max: 98.2 (17 Nov 2020 20:44)  HR: 81 (17 Nov 2020 20:44) (80 - 81)  BP: 102/59 (17 Nov 2020 20:44) (101/53 - 104/66)  BP(mean): --  RR: 18 (17 Nov 2020 20:44) (18 - 18)  SpO2: 99% (17 Nov 2020 20:44) (98% - 99%)    Skin: No rash.  Eyes: No recent vision problems or eye pain.  ENT: No congestion, ear pain, or sore throat.  Cardiovascular: No chest pain or palpation.  Respiratory: No cough, shortness of breath, congestion, or wheezing.  Gastrointestinal: No abdominal pain, nausea, vomiting, or diarrhea.  Genitourinary: No dysuria.  Musculoskeletal: No joint swelling.  Neurologic: No headache.    PHYSICAL EXAM:  GENERAL: NAD  EYES: EOMI, PERRLA  NECK: Supple, No JVD  CHEST/LUNG: crackles at bases   HEART:  S1 , S2 +  ABDOMEN: soft , bs+  EXTREMITIES:  edema+  NEUROLOGY:alert awake    LABS:  11-17    134<L>  |  92<L>  |  113<H>  ----------------------------<  127<H>  3.2<L>   |  28  |  2.64<H>    Ca    9.4      17 Nov 2020 05:51      Creatinine Trend: 2.64 <--, 2.99 <--, 3.08 <--, 2.90 <--, 3.12 <--, 2.77 <--                        8.3    3.75  )-----------( 152      ( 17 Nov 2020 05:51 )             26.9     Urine Studies:

## 2020-11-17 NOTE — PROGRESS NOTE ADULT - PROBLEM SELECTOR PLAN 8
- history of gout without active flare   - would resume allopurinol at reduced dose, 50 mg QD given eGFR of 23 ml/min

## 2020-11-17 NOTE — GOALS OF CARE CONVERSATION - ADVANCED CARE PLANNING - CONVERSATION DETAILS
Full note to follow up.     Patient agree with DNR/I but trial of BIPAP. He also agree with returning to the hospital based on MOLST. Trial of NGT and IVF. Abx if needed.     We discussed hospice services at length and he does not want to establish hospice care right now. He will f/u with Dr. Anglin and decided with her about this type of service. He is to f/u with outpatient palliative care, Dr. Nguyen. Patient agree with DNR/I but trial of BIPAP. He also agree with returning to the hospital based on MOLST. Trial of NGT and IVF. Abx if needed. A MOLST was completed and a copy was placed in the chart and the original an a copy were given to the patient.     We discussed hospice services at length and he does not want to establish hospice care right now. He will f/u with Dr. Anglin and decide with her about this type of service. He is to f/u with outpatient palliative care, Dr. Amy Nguyen MD. [address] [place] [address], [address]. [place] [address]. Phone [phone].

## 2020-11-17 NOTE — PROGRESS NOTE ADULT - ASSESSMENT
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    His invasive hemodynamics and wide pulse pressure suggested a vasoplegic picture, potentially sepsis given leukocytosis, though appeared overloaded on exam with low VTI on TTE and cultures have been negative. He required high doses of levophed on admission which have since been weaned and he has been diuresed considerably. With addition of milrinone his lactic acidemia has cleared with improving SCr.     He was previously evaluated for LVAD as DT but declined due to combination of age, frailty, and advanced CKD. His overall prognosis is poor and risk of readmission high given severity of heart failure and renal dysfunction for which palliative care was consulted. Given demonstration of greater than 20% improvement in CO and reduction in PCWP from baseline hemodynamics accompanied by symptomatic improvement, he will be discharged with palliative inotrope. (9/2/20 PCWP 28 and CO 4.64 compared to 11/11 on milrinone 0.3 mcg/kg/min PCWP 23 and CO 6.74).     Currently, he feels well and appears euvolemic on exam. He has persistent renal dysfunction but more recently SCr appears to be downtrending. Will resume oral diuretics at reduced dose tonight. He is tolerating low dose vasodilators and is low-normotensive. He is medically stable for discharge but will need close follow-up. We are awaiting arrangement of home infusion services.      Hemodynamics  11/2 (milrinone 0.25 and levophed) : RA 9, PA 52/20, unable to wedge, Corey CO/CI 6.2/3.0 with PA sat 74%  11/4 (on milrinone 0.25 and low dose levophed): RA 5, PA 39/20, Corey CO/CI 6.2/3.0 with PA sat 71%  11/11 (on milrinone 0.4): RA 11 v 14, RV 53/16, PA 55/14 31, PCWP 23 (v36). Corey CO/CI 6.7/3.3 with PA sat 61%    Review of relevant studies  TTE 11/1: LV 7.0 cm, LVEF 10-15%, severe TR, severe RV dysfunction, LVOT VTI 7 cm

## 2020-11-17 NOTE — PROGRESS NOTE ADULT - SUBJECTIVE AND OBJECTIVE BOX
Subjective:  - Notes some reduction in urination with discontinuation of diuretics  - Feeling well otherwise and ambulating with assistance without LH/dizziness, SOB, CP, palpitations    Medications:  acetaminophen   Tablet .. 650 milliGRAM(s) Oral every 6 hours PRN  aMIOdarone    Tablet 200 milliGRAM(s) Oral daily  apixaban 5 milliGRAM(s) Oral two times a day  chlorhexidine 4% Liquid 1 Application(s) Topical <User Schedule>  dextrose 40% Gel 15 Gram(s) Oral once PRN  dextrose 5%. 1000 milliLiter(s) IV Continuous <Continuous>  dextrose 50% Injectable 12.5 Gram(s) IV Push once  dextrose 50% Injectable 25 Gram(s) IV Push once  dextrose 50% Injectable 25 Gram(s) IV Push once  fluticasone propionate 50 MICROgram(s)/spray Nasal Spray 1 Spray(s) Both Nostrils every 12 hours  glucagon  Injectable 1 milliGRAM(s) IntraMuscular once PRN  hydrocortisone 2.5% Ointment 1 Application(s) Topical two times a day  hydrocortisone 2.5% Rectal Cream 1 Application(s) Rectal daily PRN  insulin lispro (ADMELOG) corrective regimen sliding scale   SubCutaneous three times a day before meals  insulin lispro (ADMELOG) corrective regimen sliding scale   SubCutaneous at bedtime  isosorbide   mononitrate ER Tablet (IMDUR) 30 milliGRAM(s) Oral at bedtime  levothyroxine 50 MICROGram(s) Oral daily  melatonin 3 milliGRAM(s) Oral at bedtime  metoprolol succinate ER 25 milliGRAM(s) Oral daily  milrinone Infusion 0.3 MICROgram(s)/kG/Min IV Continuous <Continuous>  mupirocin 2% Ointment 1 Application(s) Topical <User Schedule>  potassium chloride    Tablet ER 40 milliEquivalent(s) Oral daily  sodium chloride 0.9% lock flush 10 milliLiter(s) IV Push every 1 hour PRN  spironolactone 25 milliGRAM(s) Oral daily  torsemide 40 milliGRAM(s) Oral every 12 hours      Physical Exam:    Vitals:  Vital Signs Last 24 Hours  T(C): 36.6 (20 @ 12:40), Max: 36.6 (20 @ 20:03)  HR: 81 (20 @ 12:40) (79 - 81)  BP: 101/53 (11-17-20 @ 12:40) (99/59 - 104/66)  RR: 18 (20 @ 12:40) (18 - 18)  SpO2: 99% (20 @ 12:40) (98% - 100%)    Weight in k.7 ( @ 08:37)    I&O's Summary    2020 07:01  -  2020 07:00  --------------------------------------------------------  IN: 442.5 mL / OUT: 1575 mL / NET: -1132.5 mL    2020 07:01  -  2020 16:39  --------------------------------------------------------  IN: 660 mL / OUT: 1475 mL / NET: -815 mL    Tele: V-paced 80s    General: No distress. Comfortable.  HEENT: EOM intact.  Neck: Neck supple. JVP 10 cm H20, v waves to 16 cm  Chest: Clear to auscultation bilaterally  CV: Regular. Normal S1 and S2. No murmurs, rub, or gallops. Radial pulses normal. Trace BLE edema  Abdomen: Soft, non-distended, non-tender  Skin: RLE wound dressing C/D/I.   Neurology: Alert and oriented times three. Sensation intact  Psych: Affect normal    Labs:                        8.3    3.75  )-----------( 152      ( 2020 05:51 )             26.9         134<L>  |  92<L>  |  113<H>  ----------------------------<  127<H>  3.2<L>   |  28  |  2.64<H>    Ca    9.4      2020 05:51                     Subjective:  - Notes some reduction in urination with discontinuation of diuretics  - Feeling well otherwise and ambulating with assistance without LH/dizziness, SOB, CP, palpitations    Medications:  acetaminophen   Tablet .. 650 milliGRAM(s) Oral every 6 hours PRN  aMIOdarone    Tablet 200 milliGRAM(s) Oral daily  apixaban 5 milliGRAM(s) Oral two times a day  chlorhexidine 4% Liquid 1 Application(s) Topical <User Schedule>  dextrose 40% Gel 15 Gram(s) Oral once PRN  dextrose 5%. 1000 milliLiter(s) IV Continuous <Continuous>  dextrose 50% Injectable 12.5 Gram(s) IV Push once  dextrose 50% Injectable 25 Gram(s) IV Push once  dextrose 50% Injectable 25 Gram(s) IV Push once  fluticasone propionate 50 MICROgram(s)/spray Nasal Spray 1 Spray(s) Both Nostrils every 12 hours  glucagon  Injectable 1 milliGRAM(s) IntraMuscular once PRN  hydrocortisone 2.5% Ointment 1 Application(s) Topical two times a day  hydrocortisone 2.5% Rectal Cream 1 Application(s) Rectal daily PRN  insulin lispro (ADMELOG) corrective regimen sliding scale   SubCutaneous three times a day before meals  insulin lispro (ADMELOG) corrective regimen sliding scale   SubCutaneous at bedtime  isosorbide   mononitrate ER Tablet (IMDUR) 30 milliGRAM(s) Oral at bedtime  levothyroxine 50 MICROGram(s) Oral daily  melatonin 3 milliGRAM(s) Oral at bedtime  metoprolol succinate ER 25 milliGRAM(s) Oral daily  milrinone Infusion 0.3 MICROgram(s)/kG/Min IV Continuous <Continuous>  mupirocin 2% Ointment 1 Application(s) Topical <User Schedule>  potassium chloride    Tablet ER 40 milliEquivalent(s) Oral daily  sodium chloride 0.9% lock flush 10 milliLiter(s) IV Push every 1 hour PRN  spironolactone 25 milliGRAM(s) Oral daily  torsemide 40 milliGRAM(s) Oral every 12 hours      Physical Exam:    Vitals:  Vital Signs Last 24 Hours  T(C): 36.6 (20 @ 12:40), Max: 36.6 (20 @ 20:03)  HR: 81 (20 @ 12:40) (79 - 81)  BP: 101/53 (11-17-20 @ 12:40) (99/59 - 104/66)  RR: 18 (20 @ 12:40) (18 - 18)  SpO2: 99% (20 @ 12:40) (98% - 100%)    Weight in k.7 ( @ 08:37)    I&O's Summary    2020 07:01  -  2020 07:00  --------------------------------------------------------  IN: 442.5 mL / OUT: 1575 mL / NET: -1132.5 mL    2020 07:01  -  2020 16:39  --------------------------------------------------------  IN: 660 mL / OUT: 1475 mL / NET: -815 mL    Tele: V-paced 80s    General: No distress. Comfortable.  HEENT: EOM intact.  Neck: Neck supple. JVP 10 cm H20, v waves to 16 cm  Chest: Clear to auscultation bilaterally  CV: Regular. Normal S1 and S2. No murmurs, rub, or gallops. Radial pulses normal. Trace BLE edema  Abdomen: Soft, non-distended, non-tender  Skin: RLE open blister dressing C/D/I.   Neurology: Alert and oriented times three. Sensation intact  Psych: Affect normal    Labs:                        8.3    3.75  )-----------( 152      ( 2020 05:51 )             26.9         134<L>  |  92<L>  |  113<H>  ----------------------------<  127<H>  3.2<L>   |  28  |  2.64<H>    Ca    9.4      2020 05:51

## 2020-11-18 NOTE — PROGRESS NOTE ADULT - ASSESSMENT
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    His invasive hemodynamics and wide pulse pressure suggested a vasoplegic picture, potentially sepsis given leukocytosis, though appeared overloaded on exam with low VTI on TTE and cultures have been negative. He required high doses of levophed on admission which have since been weaned and he has been diuresed considerably. With addition of milrinone his lactic acidemia has cleared with improving SCr.     He was previously evaluated for LVAD as DT but declined due to combination of age, frailty, and advanced CKD. His overall prognosis is poor and risk of readmission high given severity of heart failure and renal dysfunction for which palliative care was consulted. Given demonstration of greater than 20% improvement in CO and reduction in PCWP from baseline hemodynamics accompanied by symptomatic improvement, he will be discharged with palliative inotrope. (9/2/20 PCWP 28 and CO 4.64 compared to 11/11 on milrinone 0.3 mcg/kg/min PCWP 23 and CO 6.74).     Currently, he feels well and is medically stable for discharge today. He has a mild increase in SCr accompanied by exam findings of mild fluid retention, specifically worsened LE swelling and mildly elevated JVD. Will escalate his diuretics and provide further preload reduction with increase of Imdur. Otherwise, he is tolerating low dose vasodilators and is low-normotensive.     Hemodynamics  11/2 (milrinone 0.25 and levophed) : RA 9, PA 52/20, unable to wedge, Corey CO/CI 6.2/3.0 with PA sat 74%  11/4 (on milrinone 0.25 and low dose levophed): RA 5, PA 39/20, Corye CO/CI 6.2/3.0 with PA sat 71%  11/11 (on milrinone 0.4): RA 11 v 14, RV 53/16, PA 55/14 31, PCWP 23 (v36). Corey CO/CI 6.7/3.3 with PA sat 61%    Review of relevant studies  TTE 11/1: LV 7.0 cm, LVEF 10-15%, severe TR, severe RV dysfunction, LVOT VTI 7 cm

## 2020-11-18 NOTE — PROGRESS NOTE ADULT - PROBLEM SELECTOR PLAN 5
- AC with Eliquis   - continue PO amiodarone to maintain sinus rhythm
- continue heparin, previously on eliquis as outpatient   - continue PO amiodarone to maintain sinus rhythm.
- continue heparin, previously on eliquis as outpatient and will plan to resume after Santo catheter   - continue PO amiodarone to maintain sinus rhythm.
- pan-culture pending  - agree with empiric antibiotics at this time pending culture data
- continue heparin, previously on eliquis as outpatient and will plan to resume after RHC; hold heparin gtt on the way down to cath lab  - continue PO amiodarone to maintain sinus rhythm.
- continue heparin, previously on eliquis as outpatient and will plan to resume after RHC  - continue PO amiodarone to maintain sinus rhythm.
- continue heparin, previously on eliquis as outpatient and will plan to resume after Santo catheter   - continue PO amiodarone to maintain sinus rhythm.
- AC with Eliquis   - continue PO amiodarone to maintain sinus rhythm

## 2020-11-18 NOTE — PROGRESS NOTE ADULT - NUTRITIONAL ASSESSMENT
This patient has been assessed with a concern for Malnutrition and has been determined to have a diagnosis/diagnoses of Severe protein-calorie malnutrition.    This patient is being managed with:   Diet Regular-  Consistent Carbohydrate {No Snacks} (CSTCHO)  DASH/TLC {Sodium & Cholesterol Restricted} (DASH)  Supplement Feeding Modality:  Oral  Glucerna Shake Cans or Servings Per Day:  1       Frequency:  Two Times a day  Entered: Nov  3 2020  2:21PM    
This patient has been assessed with a concern for Malnutrition and has been determined to have a diagnosis/diagnoses of Severe protein-calorie malnutrition.    This patient is being managed with:   Diet Regular-  Consistent Carbohydrate {No Snacks} (CSTCHO)  DASH/TLC {Sodium & Cholesterol Restricted} (DASH)  Entered: Nov 2 2020 12:16PM    
This patient has been assessed with a concern for Malnutrition and has been determined to have a diagnosis/diagnoses of Severe protein-calorie malnutrition.    This patient is being managed with:   Diet Regular-  Consistent Carbohydrate {No Snacks} (CSTCHO)  DASH/TLC {Sodium & Cholesterol Restricted} (DASH)  Supplement Feeding Modality:  Oral  Glucerna Shake Cans or Servings Per Day:  1       Frequency:  Two Times a day  Entered: Nov  3 2020  2:21PM    
This patient has been assessed with a concern for Malnutrition and has been determined to have a diagnosis/diagnoses of Severe protein-calorie malnutrition.    This patient is being managed with:   Diet Regular-  Consistent Carbohydrate {No Snacks} (CSTCHO)  DASH/TLC {Sodium & Cholesterol Restricted} (DASH)  Entered: Nov 2 2020 12:16PM    
This patient has been assessed with a concern for Malnutrition and has been determined to have a diagnosis/diagnoses of Severe protein-calorie malnutrition.    This patient is being managed with:   Diet Regular-  Consistent Carbohydrate {No Snacks} (CSTCHO)  DASH/TLC {Sodium & Cholesterol Restricted} (DASH)  Supplement Feeding Modality:  Oral  Glucerna Shake Cans or Servings Per Day:  1       Frequency:  Two Times a day  Entered: Nov  3 2020  2:21PM    
This patient has been assessed with a concern for Malnutrition and has been determined to have a diagnosis/diagnoses of Severe protein-calorie malnutrition.    This patient is being managed with:   Diet Regular-  Consistent Carbohydrate {No Snacks} (CSTCHO)  DASH/TLC {Sodium & Cholesterol Restricted} (DASH)  Supplement Feeding Modality:  Oral  Glucerna Shake Cans or Servings Per Day:  1       Frequency:  Two Times a day  Entered: Nov  3 2020  2:21PM      This patient has been assessed with a concern for Malnutrition and has been determined to have a diagnosis/diagnoses of Severe protein-calorie malnutrition.    This patient is being managed with:   Diet Regular-  Consistent Carbohydrate {No Snacks} (CSTCHO)  DASH/TLC {Sodium & Cholesterol Restricted} (DASH)  Supplement Feeding Modality:  Oral  Glucerna Shake Cans or Servings Per Day:  1       Frequency:  Two Times a day  Entered: Nov  3 2020  2:21PM    
This patient has been assessed with a concern for Malnutrition and has been determined to have a diagnosis/diagnoses of Severe protein-calorie malnutrition.    This patient is being managed with:   Diet Regular-  Consistent Carbohydrate {No Snacks} (CSTCHO)  DASH/TLC {Sodium & Cholesterol Restricted} (DASH)  Supplement Feeding Modality:  Oral  Glucerna Shake Cans or Servings Per Day:  1       Frequency:  Two Times a day  Entered: Nov  3 2020  2:21PM    

## 2020-11-18 NOTE — DISCHARGE NOTE NURSING/CASE MANAGEMENT/SOCIAL WORK - NSDCFUADDAPPT_GEN_ALL_CORE_FT
Please follow up with PCP in a week for further management  Please follow up with HF on 11/23 at 11:30am for your CHF management. Ph: [phone].

## 2020-11-18 NOTE — PROGRESS NOTE ADULT - PROBLEM SELECTOR PLAN 6
- Resolved  - Pan-culture negative
- pan-culture negative  - abx stopped
- pan-culture negative  - agree with empiric antibiotics at this time
- pan-culture negative  - would favor stopping antibiotics
- pan-culture negative  - abx stopped
- Resolved  - Pan-culture negative

## 2020-11-18 NOTE — PROGRESS NOTE ADULT - PROBLEM SELECTOR PROBLEM 3
Acute kidney injury
Transaminitis
Acute kidney injury

## 2020-11-18 NOTE — PROGRESS NOTE ADULT - PROBLEM SELECTOR PLAN 1
- continue milrinone 0.375, will eventually plan to discharge on milrinone   - consult IR for Santo catheter     - continue lasix drip at 10 mg/hr, goal negative 2-3 L daily
- continue milrinone 0.375, will eventually plan to discharge on milrinone   - consult IR for Santo catheter   - continue lasix drip at 10 mg/hr, goal negative 2-3 L daily; will give 2% hypertonic saline x1 given hypochloremia and LE edema to help mobilize fluid intravascularly
- continue milrinone 0.375, will eventually plan to discharge on milrinone   - consult IR for Santo catheter   - discontinue levophed and remove arterial linfe   - continue lasix drip at 10 mg/hr, goal negative 2-3 L daily
- continue milrinone 0.375, will eventually plan to discharge on milrinone   - consult IR for Santo catheter   - on lasix drip at 10 mg/hr, goal negative 2-3 L daily; will give bolus 80 mg and increase to 15/hr; s/p 2% hypertonic saline x1 on 11/7 given hypochloremia and LE edema to help mobilize fluid intravascularly
- continue milrinone 0.375, will eventually plan to discharge on milrinone   - wean levophed, would allow for lower MAPs 60-65 to aid in pressor weaning  - follow SVC saturations and TD CO in case residual ASD from MitraClip is still present. will plan to remove PAC tomorrow if hemodynamics are unchanged  - continue lasix drip but decrease to 10 mg/hr, goal CVP 6-8  - holding GDMT in setting of shock/EDIE  - overall poor prognosis discussed with patient given ACC/AHA Stage D HF requiring inotropes, he has previously filled out a MOLST form and does not want resuscitation in setting of futility. will readdress prior to discharge
- continue milrinone 0.375, will eventually plan to discharge on milrinone   - wean levophed, would allow for lower MAPs 60-65 to aid in pressor weaning  - remove PA catheter   - continue lasix drip at 10 mg/hr, likely switch to pushes tomorrow
- resolved
Pt. with renal failure in the setting of sepsis and hypervolemia. Upon review of labs on [facility], Scr was 2.77 on 9/21/20. Scr on arrival was 4.26 on 11/1/20 and now slowly downtrending and stable. Pt. with non-oliguric hemodynamically mediated EDIE. Pt. on IV Lasix infusion and now started on IV Milrinone infusion for hypervolemia. Optimize hemodynamics. Diuresis per CHF team. Overall, crt stable and slightly better  Dc planning on milrinone  Would try changing to oral diuretics before dc  f/u with me in 2-3 weeks on dc
Pt. with renal failure in the setting of sepsis and hypervolemia. Upon review of labs on [facility], Scr was 2.77 on 9/21/20. Scr on arrival was 4.26 on 11/1/20 and today is 3.25. UA significant for hematuria ? trauma. Repeat UA negative for hematuria/proteinuria. Pt. with non-oliguric hemodynamically mediated EDIE. Pt. on IV Lasix infusion for hypervolemia and pressor support to optimize hemodynamics. Can add Metolazone for additional diuresis. Monitor labs and urine output. Avoid potential nephrotoxins. Dose medications as per eGFR.
Pt. with renal failure in the setting of sepsis and hypervolemia. Upon review of labs on [facility], Scr was 2.77 on 9/21/20. Scr on arrival was 4.26 on 11/1/20 and today is 3.48. UA significant for hematuria ? trauma. Pt. with non-oliguric hemodynamically mediated EDIE. Please repeat UA. Pt. on IV Lasix infusion for hypervolemia and pressor support to optimize hemodynamics. Monitor labs and urine output. Avoid potential nephrotoxins. Dose medications as per eGFR.
Pt. with renal failure in the setting of sepsis and hypervolemia. Upon review of labs on [facility], Scr was 2.77 on 9/21/20. Scr on arrival was 4.26 on 11/1/20 and today is 4.31. UA significant for hematuria ? trauma. Pt. with non-oliguric hemodynamically mediated EDIE. Please repeat UA. Pt. on IV Lasix infusion for hypervolemia and pressor support to optimize hemodynamics. Monitor labs and urine output. Avoid potential nephrotoxins. Dose medications as per eGFR.    If any questions, please feel free to contact me  Ruben Aguilar   Nephrology Fellow  [phone]  (After 5 pm or on weekends please page the on-call fellow)
Pt. with renal failure in the setting of sepsis and hypervolemia. Upon review of labs on [facility], Scr was 2.77 on 9/21/20. Scr on arrival was 4.26 on 11/1/20 and today is 2.86. Pt. with non-oliguric hemodynamically mediated EDIE. Pt. on IV Lasix infusion for hypervolemia. Optimize hemodynamics. Can add Metolazone for additional diuresis. Monitor labs and urine output. Avoid potential nephrotoxins. Dose medications as per eGFR.
Pt. with renal failure in the setting of sepsis and hypervolemia. Upon review of labs on [facility], Scr was 2.77 on 9/21/20. Scr on arrival was 4.26 on 11/1/20 and improved to 2.60 yesterday. Today, Scr increased to 2.96. Pt. with non-oliguric hemodynamically mediated EDIE. Pt. on IV Lasix infusion and now started on IV Milrinone infusion for hypervolemia. Consider decreasing IV Lasix infusion rate or switching to intermittent dosing. Optimize hemodynamics. Monitor labs and urine output. Avoid potential nephrotoxins. Dose medications as per eGFR.    If any questions, please feel free to contact me  Ruben Aguilar   Nephrology Fellow  [phone]  (After 5 pm or on weekends please page the on-call fellow)
Pt. with renal failure in the setting of sepsis and hypervolemia. Upon review of labs on [facility], Scr was 2.77 on 9/21/20. Scr on arrival was 4.26 on 11/1/20 and now slowly downtrending and stable. Pt. with non-oliguric hemodynamically mediated EDIE. Pt. on IV Lasix infusion and IV Milrinone infusion for hypervolemia. Optimize hemodynamics. Diuresis per CHF team. Monitor labs and urine output. Avoid potential nephrotoxins. Dose medications as per eGFR.    If any questions, please feel free to contact me  Ruben Aguilar   Nephrology Fellow  [phone]  (After 5 pm or on weekends please page the on-call fellow)
Pt. with renal failure in the setting of sepsis and hypervolemia. Upon review of labs on [facility], Scr was 2.77 on 9/21/20. Scr on arrival was 4.26 on 11/1/20 and today is 2.59. Pt. with non-oliguric hemodynamically mediated EDIE. Pt. on IV Lasix infusion for hypervolemia. Pt. responded well to IV 2%HTS infusion. Consider another 100 cc of 2%HTS. Optimize hemodynamics. Monitor labs and urine output. Avoid potential nephrotoxins. Dose medications as per eGFR.
cont milrinone  s/p swan cath
cont milrinone  s/p swan cath  IR to check catheter
cont milrinone  s/p swan cath  IR to check catheter
cont milrinone+lasix gtt
cont milrinone+lasix gtt  s/p swan cath
- c/w milrinone 0.4 mcg/kg/min, with plan to discharge home on milrinone, s/p Santo catheter 11/9  - c/w lasix drip at 15 mg/hr, goal negative 2-3 L daily  - will give hypertonic saline 2% 150cc over 30 mins x1 dose today  - will arrange for RHC tomorrow with Dr. Christopher for further assessment of his hemodynamics and volume status
- decrease milrinone to 0.3 mcg/kg/min, with plan to discharge home on milrinone, s/p Santo catheter 11/9  - start hydralazine 10 mg TID, hold for SBP <90  - c/w lasix drip at 15 mg/hr, goal negative 2-3 L daily  - will give 1 dose of metolazone 2.5 mg tomorrow morning
- will increase milrinone to 0.4 mcg/kg/min, with plan to discharge home on milrinone, s/p Santo catheter 11/9  - c/w lasix drip at 15 mg/hr, goal negative 2-3 L daily
- resolved

## 2020-11-18 NOTE — PROGRESS NOTE ADULT - PROBLEM SELECTOR PROBLEM 5
Leukocytosis, unspecified type
Paroxysmal atrial fibrillation

## 2020-11-18 NOTE — PROGRESS NOTE ADULT - REASON FOR ADMISSION
AdHF and hyperkalemia

## 2020-11-18 NOTE — PROGRESS NOTE ADULT - PROBLEM SELECTOR PROBLEM 7
Thrombocytopenia

## 2020-11-18 NOTE — PHARMACOTHERAPY INTERVENTION NOTE - COMMENTS
Counseled patient on discharge medication doses, indications, and possible side effects. Provided and reviewed medication cards for all medications.     Keshia Vieira, PharmD, BCPS  [phone]

## 2020-11-18 NOTE — PROGRESS NOTE ADULT - PROBLEM SELECTOR PLAN 4
- continue heparin, previously on eliquis as outpatient   - continue PO amiodarone to maintain sinus rhythm.
- u/s abdomen 11/2 without pathology, management of hemodynamics as above  - overall improved
- u/s abdomen without pathology, management of hemodynamics as above  - overall improving
- u/s abdomen 11/2 without pathology, management of hemodynamics as above  - overall improved

## 2020-11-18 NOTE — PROGRESS NOTE ADULT - PROBLEM SELECTOR PROBLEM 4
Paroxysmal atrial fibrillation
Transaminitis

## 2020-11-18 NOTE — PROGRESS NOTE ADULT - PROBLEM SELECTOR PLAN 2
- Resume Torsemide at reduced dose of 40 mg BID   - Continue KCL 40 meq QD and start Spironolactone 25 mg QD given improving renal function and persistent hypokalemia.   - Continue Toprol XL 25 mg QD  - Continue IMDUR 30 mg qHS, hold for SBP <90  - Continue milrinone 0.3 mcg/kg/min via [place] (placed 11/9 and exchanged 11/16)  - Please have him mobilize more to improve physical conditioning   - PA diastolic pressures have remained constant despite diuresis so CardioMEMS would unlikely aid in management of volume status  - overall poor prognosis discussed with patient given ACC/AHA Stage D HF requiring inotropes, he has previously filled out a MOLST form and does not want resuscitation in setting of futility but is open to trial of intubation. Appreciate palliative c/s  - Clinically stable for discharge with palliative milrinone and close follow up with HF team. Appt with HF NP set for 11/23 at 11:30am Ph: [phone].
- Stop Torsemide for now and will reassess response tomorrow  - Start Toprol XL 25 mg QD  - Please stop HDZN 10 mg TID  - Start IMDUR 30 mg qHS, first dose tonight; hold for SBP <90  - Start KDUR 40 meq QD, first dose now  - Continue milrinone 0.3 mcg/kg/min via [place] (placed 11/19)  - PA diastolic pressures have remained constant despite diuresis so CardioMEMS would unlikely aid in management of volume status  - overall poor prognosis discussed with patient given ACC/AHA Stage D HF requiring inotropes, he has previously filled out a MOLST form and does not want resuscitation in setting of futility. appreciate palliative c/s  - Plans for discharge with palliative milrinone once home infusion services are arranged and IR has corrected [place] occlusion.
- escalate Torsemide to 80 mg BID  - Continue KCL 40 meq QD and Spironolactone 25 mg QD given persistent hypokalemia  - Continue Toprol XL 25 mg QD  - Increase IMDUR to 60 mg qHS; hold for SBP <90  - Continue milrinone 0.3 mcg/kg/min via [place] (placed 11/9 and exchanged 11/16)  - Please have him mobilize more to improve physical conditioning   - PA diastolic pressures have remained constant despite diuresis so CardioMEMS would unlikely aid in management of volume status  - overall poor prognosis discussed with patient given ACC/AHA Stage D HF requiring inotropes, he has filled out a MOLST form and does not want resuscitation in setting of futility but is open to trial of intubation. Appreciate palliative c/s  - Clinically stable for discharge with palliative milrinone and close follow up with HF team for assessment of volume status. He will have repeat bloodwork Friday 11/20 at his local [facility] lab to assess renal function and electrolytes. Appt with HF NP set for 11/23 at 11:30am Ph: [phone]. Attending is Dr. Anglin who he will follow-up with in approx 2 months.
- holding GDMT in setting of shock/EDIE  - PA diastolic pressures have remained constant despite diuresis so CardioMEMS would unlikely aid in management of volume status  - overall poor prognosis discussed with patient given ACC/AHA Stage D HF requiring inotropes, he has previously filled out a MOLST form and does not want resuscitation in setting of futility. will readdress prior to discharge  - interrogate ICD given ? PMT on telemetry
- management of hemodynamics as above  - cardiorenal recs appreciated
- will gently resume neurohormonals as tolerates though recently on pressors   - PA diastolic pressures have remained constant despite diuresis so CardioMEMS would unlikely aid in management of volume status  - overall poor prognosis discussed with patient given ACC/AHA Stage D HF requiring inotropes, he has previously filled out a MOLST form and does not want resuscitation in setting of futility. appreciate palliative c/s
- will gently resume neurohormonals as tolerates though recently on pressors   - PA diastolic pressures have remained constant despite diuresis so CardioMEMS would unlikely aid in management of volume status  - overall poor prognosis discussed with patient given ACC/AHA Stage D HF requiring inotropes, he has previously filled out a MOLST form and does not want resuscitation in setting of futility. appreciate palliative c/s
- will gently resume neurohormonals as tolerates though recently on pressors   - PA diastolic pressures have remained constant despite diuresis so CardioMEMS would unlikely aid in management of volume status  - overall poor prognosis discussed with patient given ACC/AHA Stage D HF requiring inotropes, he has previously filled out a MOLST form and does not want resuscitation in setting of futility. appreciate palliative c/s  - interrogate ICD given ? PMT on telemetry
- will gently resume neurohormonals as tolerates though recently on pressors   - PA diastolic pressures have remained constant despite diuresis so CardioMEMS would unlikely aid in management of volume status  - overall poor prognosis discussed with patient given ACC/AHA Stage D HF requiring inotropes, he has previously filled out a MOLST form and does not want resuscitation in setting of futility. please consult palliative care to readdress   - interrogate ICD given ? PMT on telemetry
Pt. with hypokalemia in the setting of IV diuretics. Replete accordingly. Keep serum potassium > 4. Monitor serum potassium.    If any questions, please feel free to contact me  Ruben Aguilar   Nephrology Fellow  [phone]  (After 5 pm or on weekends please page the on-call fellow)
Pt. with hypokalemia in the setting of IV diuretics. Replete accordingly. Keep serum potassium > 4. Monitor serum potassium.    If any questions, please feel free to contact me  Ruben Aguilar   Nephrology Fellow  [phone]  (After 5 pm or on weekends please page the on-call fellow)
Pt. with hypokalemia in the setting of IV diuretics. Replete accordingly. Keep serum potassium > 4. Monitor serum potassium.    If any questions, please feel free to contact me  Ruben Aguilar   Nephrology Fellow  [phone]  (After 5 pm or on weekends please page the on-call fellow)
Pt. with hypokalemia in the setting of IV diuretics. Replete accordingly. Keep serum potassium > 4. Monitor serum potassium.    If any questions, please feel free to contact me  Ruben Aguilar   Nephrology Fellow  [phone]  (After 5 pm or on weekends please page the on-call fellow)
- will gently resume neurohormonals as tolerates though recently on pressors   - PA diastolic pressures have remained constant despite diuresis so CardioMEMS would unlikely aid in management of volume status  - overall poor prognosis discussed with patient given ACC/AHA Stage D HF requiring inotropes, he has previously filled out a MOLST form and does not want resuscitation in setting of futility. appreciate palliative c/s
- will gently resume neurohormonals as tolerates  - PA diastolic pressures have remained constant despite diuresis so CardioMEMS would unlikely aid in management of volume status  - overall poor prognosis discussed with patient given ACC/AHA Stage D HF requiring inotropes, he has previously filled out a MOLST form and does not want resuscitation in setting of futility. appreciate palliative c/s
- will gently resume neurohormonals as tolerates though recently on pressors   - PA diastolic pressures have remained constant despite diuresis so CardioMEMS would unlikely aid in management of volume status  - overall poor prognosis discussed with patient given ACC/AHA Stage D HF requiring inotropes, he has previously filled out a MOLST form and does not want resuscitation in setting of futility. appreciate palliative c/s
- Stop Lasix infusion and start Torsemide 80 mg AM / 40 mg PM  - Continue hydralazine 10 mg TID, hold for SBP <90  - Continue milrinone 0.3 mcg/kg/min via [place] (placed 11/19)  - Replete K targeting 4-4.5 and Mg >2  - PA diastolic pressures have remained constant despite diuresis so CardioMEMS would unlikely aid in management of volume status  - overall poor prognosis discussed with patient given ACC/AHA Stage D HF requiring inotropes, he has previously filled out a MOLST form and does not want resuscitation in setting of futility. appreciate palliative c/s  - Plans for discharge early next week with palliative milrinone should he remain clinically stable.

## 2020-11-18 NOTE — PROGRESS NOTE ADULT - PROBLEM SELECTOR PROBLEM 6
Leukocytosis, unspecified type

## 2020-11-18 NOTE — PROGRESS NOTE ADULT - PROBLEM SELECTOR PROBLEM 1
Cardiogenic shock
Renal failure, unspecified chronicity
Cardiogenic shock

## 2020-11-18 NOTE — PROGRESS NOTE ADULT - SUBJECTIVE AND OBJECTIVE BOX
Subjective:  - able to walk in halls with walker without LH/dizziness  - denies SOB, alen orthopnea, PND, cough, ABD discomfort and increase in LE swelling    Medications:  acetaminophen   Tablet .. 650 milliGRAM(s) Oral every 6 hours PRN  aMIOdarone    Tablet 200 milliGRAM(s) Oral daily  apixaban 5 milliGRAM(s) Oral two times a day  chlorhexidine 4% Liquid 1 Application(s) Topical <User Schedule>  dextrose 40% Gel 15 Gram(s) Oral once PRN  dextrose 5%. 1000 milliLiter(s) IV Continuous <Continuous>  dextrose 50% Injectable 12.5 Gram(s) IV Push once  dextrose 50% Injectable 25 Gram(s) IV Push once  dextrose 50% Injectable 25 Gram(s) IV Push once  fluticasone propionate 50 MICROgram(s)/spray Nasal Spray 1 Spray(s) Both Nostrils every 12 hours  glucagon  Injectable 1 milliGRAM(s) IntraMuscular once PRN  hydrocortisone 2.5% Ointment 1 Application(s) Topical two times a day  hydrocortisone 2.5% Rectal Cream 1 Application(s) Rectal daily PRN  insulin lispro (ADMELOG) corrective regimen sliding scale   SubCutaneous three times a day before meals  insulin lispro (ADMELOG) corrective regimen sliding scale   SubCutaneous at bedtime  isosorbide   mononitrate ER Tablet (IMDUR) 60 milliGRAM(s) Oral at bedtime  levothyroxine 50 MICROGram(s) Oral daily  melatonin 3 milliGRAM(s) Oral at bedtime  metoprolol succinate ER 25 milliGRAM(s) Oral daily  milrinone Infusion 0.3 MICROgram(s)/kG/Min IV Continuous <Continuous>  mupirocin 2% Ointment 1 Application(s) Topical <User Schedule>  potassium chloride    Tablet ER 40 milliEquivalent(s) Oral daily  sodium chloride 0.9% lock flush 10 milliLiter(s) IV Push every 1 hour PRN  spironolactone 25 milliGRAM(s) Oral daily  torsemide 80 milliGRAM(s) Oral two times a day      Physical Exam:    Vitals:  Vital Signs Last 24 Hours  T(C): 36.4 (20 @ 14:29), Max: 36.8 (20 @ 20:44)  HR: 80 (20 @ 14:29) (80 - 86)  BP: 99/57 (20 @ 14:29) (99/57 - 111/65)  RR: 18 (20 @ 14:29) (18 - 18)  SpO2: 99% (20 @ 14:29) (97% - 99%)    Weight in k.9 ( @ 11:32)    I&O's Summary    2020 07:  -  2020 07:00  --------------------------------------------------------  IN: 1516.2 mL / OUT: 2825 mL / NET: -1308.8 mL    2020 07:01  -  2020 16:22  --------------------------------------------------------  IN: 1000 mL / OUT: 1200 mL / NET: -200 mL    Tele: V-paced 80s    General: No distress. Comfortable.  HEENT: EOM intact.  Neck: Neck supple. JVP mildly elevated with large v waves  Chest: Clear to auscultation bilaterally  CV: Regular. Normal S1 and S2. No murmurs, rub, or gallops. Radial pulses normal. +1/2 BLE edema  Abdomen: Soft, non-distended, non-tender  Skin: RLE open blister dressing C/D/I.   Neurology: Alert and oriented times three. Sensation intact  Psych: Affect normal    Labs:                        8.2    3.85  )-----------( 138      ( 2020 06:16 )             26.8         134<L>  |  93<L>  |  116<H>  ----------------------------<  107<H>  3.7   |  29  |  2.89<H>    Ca    9.4      2020 06:16  Mg     2.5

## 2020-11-18 NOTE — PROGRESS NOTE ADULT - PROBLEM SELECTOR PLAN 3
- management of hemodynamics as above, overall stable but mild rise today  - cardiorenal recs appreciated  - adjustment of diuretics as above

## 2020-11-18 NOTE — DISCHARGE NOTE NURSING/CASE MANAGEMENT/SOCIAL WORK - PATIENT PORTAL LINK FT
You can access the FollowMyHealth Patient Portal offered by [facility] by registering at the following website: [email or web address]. By joining [facility]’s FollowMyHealth portal, you will also be able to view your health information using other applications (apps) compatible with our system.

## 2020-11-18 NOTE — PROGRESS NOTE ADULT - ATTENDING COMMENTS
Patient is milrinone-dependent with Stage D, NYHA Class IV HF. Compared to baseline RHC, his CO increased and his PCW decreased by >20% on milrinone and he is symptomatically much improved.    Retaining more volume today so would increase torsemide to 80 mg po BID.  Please also increase Imdur to 60 mg po QHS.  Continue remaining meds. Keep K > 4.5.  Ok for discharge from my perspective.  Will order labs for him to have drawn at outpatient [facility] site on Friday as STAT to follow-up K and SCr.  Follow-up on Monday in HF NP clinic.

## 2020-11-18 NOTE — PROGRESS NOTE ADULT - PROBLEM SELECTOR PROBLEM 2
Acute kidney injury
Acute on chronic systolic heart failure, NYHA class 4
Hypokalemia
Acute on chronic systolic heart failure, NYHA class 4

## 2020-11-23 PROBLEM — I50.22 ACC/AHA STAGE C CHRONIC SYSTOLIC HEART FAILURE: Status: RESOLVED | Noted: 2020-01-01 | Resolved: 2020-01-01

## 2020-12-01 PROBLEM — Z98.890 S/P MITRAL VALVE CLIP IMPLANTATION: Status: ACTIVE | Noted: 2020-01-01

## 2020-12-02 PROBLEM — N18.30 CKD (CHRONIC KIDNEY DISEASE), STAGE III: Status: ACTIVE | Noted: 2020-01-01

## 2020-12-02 PROBLEM — I50.20 ACC/AHA STAGE D SYSTOLIC HEART FAILURE: Status: ACTIVE | Noted: 2020-01-01

## 2020-12-02 PROBLEM — I34.0 MITRAL REGURGITATION: Status: ACTIVE | Noted: 2020-01-01

## 2020-12-02 PROBLEM — N17.9 ACUTE KIDNEY INJURY SUPERIMPOSED ON CHRONIC KIDNEY DISEASE: Status: ACTIVE | Noted: 2020-01-01

## 2020-12-02 PROBLEM — I48.0 PAROXYSMAL ATRIAL FIBRILLATION: Status: ACTIVE | Noted: 2020-01-01

## 2020-12-02 NOTE — HISTORY OF PRESENT ILLNESS
[FreeTextEntry1] : This is a pleasant, 75 year old M with longstanding history of NICM, LVEF < 20% (LVIDd 6.7 cm) , s/p CRT-D, HTN, AFib s/p DCCV 7/20/20 on Eliquis, CKD stage 3 (b/l SCr 1.7-2), DM 2 (A1c 6.1%) and anemia. His history is also notable for papillary thyroid carcinoma for which he follows with Endocrinologist Dr. Winslow with q3 month surveillance testing as he prefers to defer surgical intervention. His general cardiologist is Cecil Loya.\par \par He was seen for initial consultation here in August 2020 after a recent hospitalization for AF with RVR and EDIE on CKD. At the time of our first visit, he was anasarcic prompting admission to the hospital where he was on milrinone -assisted diuresis (lost 20#) and eventually had MitraClip for mod-severe MR improved to mild. He was declined for LVAD due to frailty and CKD.\par \par Following discharge he was seen several times here and by Dr. Rees, nephrology however was not thriving and became volume overloaded with fall at home prompting admission. He was hospitalized at [facility] from 11/1-11/18 for ADHF for which he was started on milrinone and diuresed with improvement in his hemodynamics and symptoms. Goals of care were discussed with Palliative Care and a MOLST was completed expressing his wishes for DNR/DNI with trial of BIPAP. He declined hospice services at this time. He was discharged home with milrinone gtt at 0.3mcg/kg/min. He was noted to be mildly volume overloaded on day of discharge at a weight of 184lbs on increased dose of torsemide 80mg BID. Discharge labs on 11/18 showed Na 134, K 3.7, BUN/Cr 116/2.89.\par \par Since discharge home he reports feeling well. He feels he is getting stronger day by day. He is able to walk 1-2 blocks without dyspnea and each day does between 6799-8774 steps. He has not yet tried going up a flight of steps. He notes a persistent occasional cough which he has had for years which is stable and not worse when he's recumbent. He sleeps with the HOB at 40 degrees for back pain and denies alen orthopnea. He reports improvement in his LE edema. His weight at home the day following discharge, 11/19 was 180lbs and has been downtrending daily with a weight today of 170lbs. Here in clinic his weight is 177lbs. His daily UOP at home has been 9643-6182 ml/day which he has been measuring in a urinal. He denies PND, CP, palpations, lightheadedness, dizziness, abdominal pain/distention, fever, chills, discharge from [place] site. He is drinking about 1L/day and following a low sodium diet. His bowel and bladder habits are normal for him. He is taking his medications as directed.

## 2020-12-02 NOTE — REASON FOR VISIT
[Heart Failure] : congestive heart failure [Follow-Up - From Hospitalization] : follow-up of a recent hospitalization for [Discharge Date: ___] : Discharge Date: [unfilled] [Admitted for Heart Failure] : patient was admitted for heart failure [FreeTextEntry1] : PATIENT INSTRUCTIONS:\par \par 1. Continue current medications. NP to follow up with you via phone regarding weights and adjustment in diuretics.\par \par 2. We will follow up labs being drawn weekly by home infusion nurse.\par \par 3. Follow up with Dr. Anglin in 2 weeks. \par \par

## 2020-12-02 NOTE — ADDENDUM
[FreeTextEntry1] : Labs from 11/24 reviewed showing Na 132, K 4.7, BUN/Cr 111/2.74, Mg 2.4. Plan as noted above.

## 2020-12-02 NOTE — DISCUSSION/SUMMARY
[FreeTextEntry1] : 75 year old M with NICM, LVEF 10-15% s/p CRT-D, mod-severe MR s/p MitraClip on home milrinone infusion and pAFib who presents today for follow up post hospital discharge overall doing well. He is ACC/AHA stage D, NYHA class III HF. He is currently volume overloaded although diuresing well with current regimen of oral diuretics and appears well supported on current dose of milrinone. He is tolerating low dose GDMT. I have recommended the following:  \par \par 1. Chronic systolic Heart Failure - Overall stable although mildly volume overloaded. Will continue milrinone infusion at 0.3mcg/kg/min. Weekly dressing changes being done by home infusion RN. Will continue current dose of diuretics torsemide 80mg BID at this time and follow up via phone. Will continue Toprol 25mg daily, spironolactone 25mg daily, imdur 60mg daily, and KCl 40 meQ daily. Will follow up lab results being drawn tomorrow.\par \par 2. Mitral regurgitation - s/p MitraClip now with mild MR.\par \par 3. Chronic Kidney Disease - Followed by Dr. Rees. Will continue to monitor closely with weekly labs being drawn by home infusion company.\par \par 4. Paroxysmal AFib - s/p DCCV and on AC with Eliquis. Continue Amio and BB as above. \par \par 5. Follow-up with Dr. Anglin on 12/2

## 2020-12-02 NOTE — PHYSICAL EXAM
[General Appearance - Well Developed] : well developed [Well Groomed] : well groomed [General Appearance - In No Acute Distress] : no acute distress [Eyelids - No Xanthelasma] : the eyelids demonstrated no xanthelasmas [No Oral Cyanosis] : no oral cyanosis [] : no respiratory distress [Respiration, Rhythm And Depth] : normal respiratory rhythm and effort [Heart Rate And Rhythm] : heart rate and rhythm were normal [Heart Sounds] : normal S1 and S2 [2+] : left 2+ [Bowel Sounds] : normal bowel sounds [Abdomen Soft] : soft [Abdomen Tenderness] : non-tender [Nail Clubbing] : no clubbing of the fingernails [Cyanosis, Localized] : no localized cyanosis [No Venous Stasis] : no venous stasis [Oriented To Time, Place, And Person] : oriented to person, place, and time [Impaired Insight] : insight and judgment were intact [Affect] : the affect was normal [Mood] : the mood was normal [Auscultation Breath Sounds / Voice Sounds] : lungs were clear to auscultation bilaterally [FreeTextEntry1] : warm peripherally, CVC site with dressing C/D/I, no erythema or drainage noted

## 2020-12-06 NOTE — PATIENT PROFILE ADULT - PRO INTERPRETER NEED 2
"Encounter Date: 12/6/2020       History     Chief Complaint   Patient presents with    Foreign Body In Throat     ? foreign body in throat s/p choking on a piece of meat(pork) last nite. Vomited,today unable to swallow.      16 yo WM with known symptomatic GERD not controlled with famotidine over the past 2 months with occasional sensation of "dry" foods sticking unless he drinks a lot of fluid with meal but food subsequently either coughed up / regurgitated or passes. Denies any substernal cramping. Ate piece of chicken last night which was unable to pass and has been unable to regurgitate or pass the bolus. Unable to swallow secretions or any fluid since impaction occurred.  Denies nausea or dyspnea. Does have some mild upper left parasternal chest pain when trying to cough up secretions / food.  No abdominal pain, diarrhea or other symptoms. Some decreased urination. No fever, sore throat or headache.  Noted at referring ER to have asymmetric pupils without associated symptoms or vision issues. Father states has not been noted previously. No history of head trauma, vertigo, scopolamine / antihistamine (other than famotidine) use.    PMH: Anxiety, GE Reflux, food allergies.  Prior Knee surgery without anesthesia complications.     The history is provided by the patient and the father.     Review of patient's allergies indicates:   Allergen Reactions    Poultry     Cashew nut     Shellfish containing products      Past Medical History:   Diagnosis Date    Anxiety     Environmental allergies     GERD (gastroesophageal reflux disease)      Past Surgical History:   Procedure Laterality Date    EYE SURGERY      KNEE ARTHROSCOPY W/ DEBRIDEMENT Left 2/13/2020    Procedure: ARTHROSCOPY, KNEE, WITH Lateral Release;  Surgeon: Den Lo MD;  Location: [facility];  Service: Orthopedics;  Laterality: Left;    RECONSTRUCTION OF MEDIAL PATELLOFEMORAL LIGAMENT Left 2/13/2020    Procedure: RECONSTRUCTION, " English LIGAMENT, MEDIAL PATELLOFEMORAL- with Allograft;  Surgeon: Den Lo MD;  Location: [facility] OR;  Service: Orthopedics;  Laterality: Left;     Family History   Problem Relation Age of Onset    Hypertension Father     No Known Problems Sister     No Known Problems Maternal Grandmother     Cancer Maternal Grandfather     Diabetes Maternal Grandfather     No Known Problems Paternal Grandmother     No Known Problems Paternal Grandfather      Social History     Tobacco Use    Smoking status: Passive Smoke Exposure - Never Smoker    Smokeless tobacco: Never Used   Substance Use Topics    Alcohol use: No     Frequency: Never    Drug use: No     Review of Systems   Constitutional: Negative for activity change, appetite change, chills, diaphoresis, fatigue and fever.   HENT: Negative for congestion, dental problem, ear pain, facial swelling, mouth sores, nosebleeds, rhinorrhea, sore throat, trouble swallowing and voice change.    Eyes: Negative.    Respiratory: Negative for chest tightness, shortness of breath, wheezing and stridor. Cough: occasional to clear secretions / saliva.    Cardiovascular: Negative for chest pain and palpitations.   Gastrointestinal: Negative for abdominal distention, abdominal pain, constipation, diarrhea and vomiting.   Endocrine: Negative.  Negative for cold intolerance and heat intolerance.   Genitourinary: Positive for decreased urine volume.   Musculoskeletal: Negative for arthralgias, back pain, gait problem, joint swelling, myalgias, neck pain and neck stiffness.   Skin: Negative for pallor and rash.   Allergic/Immunologic: Positive for environmental allergies and food allergies.   Neurological: Negative for dizziness, syncope, facial asymmetry, speech difficulty, weakness, light-headedness and headaches.   Hematological: Negative for adenopathy. Does not bruise/bleed easily.   Psychiatric/Behavioral: Positive for sleep disturbance ( due to food impaction). Negative for  agitation and confusion.   All other systems reviewed and are negative.      Physical Exam     Initial Vitals [12/06/20 1415]   BP Pulse Resp Temp SpO2   -- 72 (!) 22 98.2 °F (36.8 °C) 96 %      MAP       --         Physical Exam    Nursing note and vitals reviewed.  Constitutional: Vital signs are normal. He appears well-developed and well-nourished. He is not diaphoretic. He is active and cooperative. He is easily aroused.  Non-toxic appearance. He does not appear ill. No distress.   HENT:   Head: Normocephalic and atraumatic. Head is without abrasion, without contusion, without right periorbital erythema and without left periorbital erythema.   Right Ear: External ear normal.   Left Ear: External ear normal.   Nose: Nose normal. No mucosal edema, rhinorrhea or sinus tenderness. No epistaxis.   Mouth/Throat: Uvula is midline, oropharynx is clear and moist and mucous membranes are normal. Mucous membranes are not pale, not dry and not cyanotic. No oral lesions. No trismus in the jaw. Normal dentition. No uvula swelling. No posterior oropharyngeal edema or posterior oropharyngeal erythema.   Eyes: Conjunctivae, EOM and lids are normal. Right eye exhibits no chemosis and no discharge. Left eye exhibits no chemosis and no discharge. Right conjunctiva is not injected. Right conjunctiva has no hemorrhage. Left conjunctiva is not injected. Left conjunctiva has no hemorrhage. No scleral icterus. Right eye exhibits normal extraocular motion. Left eye exhibits normal extraocular motion. Right pupil is round and reactive. Left pupil is round and reactive. Pupils are unequal ( 5 mm  OD ; 3 mm  OS).   Neck: Trachea normal, normal range of motion, full passive range of motion without pain and phonation normal. Neck supple. No thyromegaly present. No stridor present. No spinous process tenderness and no muscular tenderness present. Normal range of motion present. No neck rigidity.   Cardiovascular: Normal rate, regular rhythm,  S1 normal, S2 normal, normal heart sounds and intact distal pulses.  No extrasystoles are present.  Exam reveals no friction rub.    No murmur heard.  Brisk capillary refill    Pulmonary/Chest: Effort normal and breath sounds normal. No accessory muscle usage or stridor. No tachypnea and no bradypnea. No respiratory distress. He has no decreased breath sounds. He has no wheezes. He has no rales. He exhibits no tenderness.   Normal work of breathing    Abdominal: Soft. Normal appearance and bowel sounds are normal. He exhibits no distension and no mass. There is no abdominal tenderness. There is no rigidity and no guarding.   Musculoskeletal: Normal range of motion. No tenderness or edema.   Lymphadenopathy:        Head (right side): No submental, no submandibular and no tonsillar adenopathy present.        Head (left side): No submental, no submandibular and no tonsillar adenopathy present.     He has no cervical adenopathy.        Right cervical: No posterior cervical adenopathy present.       Left cervical: No posterior cervical adenopathy present.   Neurological: He is alert, oriented to person, place, and time and easily aroused. He has normal strength. He is not disoriented. He displays no tremor. No cranial nerve deficit or sensory deficit. He exhibits normal muscle tone. Coordination and gait normal.   Skin: Skin is warm, dry and intact. Capillary refill takes less than 2 seconds. No bruising, no ecchymosis, no petechiae, no purpura and no rash noted. Rash is not urticarial. No cyanosis or erythema. No pallor. Nails show no clubbing.   Psychiatric: He has a normal mood and affect. His speech is normal and behavior is normal. Judgment and thought content normal. Cognition and memory are normal.         ED Course    1510: patient reports episode of vomiting and relief of symptoms. Did vomit ~ 1 cm x 4 cm chunk of meat which appears to be the entire bolus.    1530: Evaluated by GI . Asymptomatic now. Will give  trial of PO intake and release home if tolerates. Plan for GI to do elective EGD in the morning if no further issues.     1600:  Tolerating trial of PO intake without dysphagia, choking, gagging or recurring symptoms. Clinical stable and safe for discharge home      Procedures  Labs Reviewed   CBC W/ AUTO DIFFERENTIAL - Abnormal; Notable for the following components:       Result Value    WBC 14.27 (*)     RBC 5.54 (*)     Hemoglobin 16.7 (*)     Hematocrit 47.3 (*)     Platelets 399 (*)     MPV 8.9 (*)     Gran # (ANC) 11.3 (*)     Immature Grans (Abs) 0.05 (*)     Mono # 0.9 (*)     Baso # 0.07 (*)     Gran % 79.0 (*)     Lymph % 12.5 (*)     All other components within normal limits   COMPREHENSIVE METABOLIC PANEL - Abnormal; Notable for the following components:    Glucose 113 (*)     Total Bilirubin 1.4 (*)     All other components within normal limits   URINALYSIS, REFLEX TO URINE CULTURE - Abnormal; Notable for the following components:    Appearance, UA Hazy (*)     Protein, UA 1+ (*)     Ketones, UA 1+ (*)     All other components within normal limits    Narrative:     Specimen Source->Urine   URINALYSIS MICROSCOPIC - Abnormal; Notable for the following components:    Hyaline Casts, UA 4 (*)     All other components within normal limits    Narrative:     Specimen Source->Urine          Imaging Results    None          Medical Decision Making:   History:   I obtained history from: someone other than patient.       <> Summary of History: Father  Referring ER Physician   Old Medical Records: I decided to obtain old medical records.  Old Records Summarized: records from another hospital and records from clinic visits.       <> Summary of Records: Reviewed Clinic notes and prior ER visit notes in [facility]. Significant findings addressed in HPI / PMH.    Initial Assessment:   Hemodynamically stable adolescent with impacted food bolus and likely reflux induced esophagitis / esophageal edema although low grade  stricture also a consideration.  Anisocoria appears to be incidental finding without associated symptoms / issues which may represent medication reaction vs previously unrecognized congenital anisocoria vs underlying pupillary issue    Differential Diagnosis:   DDx includes: Swallowed foreign body, esophageal impaction, esophageal tear, posterior pharyngeal trauma, gastric outlet obstruction, potential for bowel wall erosion , bowel obstruction  Clinical Tests:   Lab Tests: Ordered and Reviewed  The following lab test(s) were unremarkable: CBC, CMP and Urinalysis  Other:   I have discussed this case with another health care provider.       <> Summary of the Discussion: Pediatric GI- patient has since arrival regurgitated the impacted food bolus and is asymptomatic.  Will release home if tolerates trial of PO intake and return in the AM for outpatient EGD                              Clinical Impression:     ICD-10-CM ICD-9-CM   1. Food impaction of esophagus, initial encounter  T18.128A 935.1   2. Food impaction of esophagus, sequela  T18.128S 908.5   3. Gastroesophageal reflux disease with esophagitis without hemorrhage  K21.00 530.81     530.10                          ED Disposition Condition    Discharge Stable        ED Prescriptions     None        Follow-up Information     Follow up With Specialties Details Why Contact Info    Winston Rodríguez MD Pediatrics Schedule an appointment as soon as possible for a visit  As needed [facility] [address] [id number] [phone]      Crystal Alarcon MD Pediatric Gastroenterology Go in 1 day as scheduled for Outpatient EGD . [address]  [place] [address]  [phone]                                         Yohan Hodges III, MD  12/08/20 0049

## 2020-12-07 NOTE — H&P ADULT - ASSESSMENT
75 year old M with longstanding history of NICM, LVEF < 20% (LVIDd 6.7 cm) s/p CRT-D, moderate-severe MR s/p MitraClip 8/2020, HTN, pAFib s/p DCCV 7/20/20 on Eliquis, CKD stage 3 (b/l SCr 1.7-2), DM 2 (A1c 6.1%) and anemia, transferred from [facility] after fall at home today in setting of nausea and vomiting, found there to have SBO with placement of NG tube.

## 2020-12-07 NOTE — CONSULT NOTE ADULT - ATTENDING COMMENTS
Briefly, 75 year old M with h/o stage D NICM/HFrEF (20%, LVIDd 6.7 cm) s/p CRT-D on milrinone 0.3 mcg/kg/min, moderate-severe MR s/p MitraClip 8/2020, HTN, pAFib s/p DCCV 7/20/20 on Eliquis, CKD stage 3 (b/l SCr 2.5-3), DM 2 (A1c 6.1%) and anemia who presented as transfer from OSH in the setting of presyncope/syncope, found to have SBO now s/p NGT for conservative management. Heart failure consulted for additional evaluation and management. Briefly, 75 year old M with h/o stage D NICM/HFrEF (20%, LVIDd 6.7 cm) s/p CRT-D on milrinone 0.3 mcg/kg/min, moderate-severe MR s/p MitraClip 8/2020, HTN, pAFib s/p DCCV 7/20/20 on Eliquis, CKD stage 3 (b/l SCr 2.5-3), DM 2 (A1c 6.1%) and anemia who reports having abdominal discomfort for past 3 days that worsened in severity with nausea/emesis today and presyncope/syncope. Was taken to [facility] where he was found to have SBO with transition point at terminal ileum and possible ischemia. NG tube placed with good result. Transferred here and notably with SBP in 80-90s (near baseline). Exam with low JVP, RRR, no m/r/g, CTAB, nontender/distended abdomen with hypoactive BS, no pedal edema. Labs reviewed.   - IVF as above  - c/w milrinone 0.3 mcg/kg/min  - continue NGT to suction  - awaiting general surgery input

## 2020-12-07 NOTE — CONSULT NOTE ADULT - PROBLEM SELECTOR RECOMMENDATION 5
- AC with Eliquis, would restart if no evidence of bleed from recent fall  - Continue PO amiodarone and BB as above

## 2020-12-07 NOTE — ED PROVIDER NOTE - PROGRESS NOTE DETAILS
Mike Escobedo, PGY-3: patients imaging disc sent for upload, surgery and heart failure service consulted. imaging from the other facility, noted nasal bone fracture. cards at bedside, recommends 12hrs of NS @50 cc after a bolus of 250cc. will consult facial trauma for nasal bone fracture. DJ

## 2020-12-07 NOTE — CONSULT NOTE ADULT - SUBJECTIVE AND OBJECTIVE BOX
Patient is a 75y old  Male who presents with a chief complaint of     HPI:  Mr. Jarquin is a 75 year old M with longstanding history of NICM, LVEF < 20% (LVIDd 6.7 cm) s/p CRT-D, moderate-severe MR s/p MitraClip 8/2020, HTN, pAFib s/p DCCV 7/20/20 on Eliquis, CKD stage 3 (b/l SCr 1.7-2), DM 2 (A1c 6.1%) and anemia who presented as transfer from OSH in the setting of presyncope/syncope, found to have SBO now s/p NGT for conservative management. Heart failure consulted for additional evaluation and management.  Per patient, started having worsening abdominal pain three days ago, with decreased appetite and emesis on day of admission. Still with bowel movements, denies changes in diet. No history of intra-abdominal surgery, has never had similar symptoms in past. Still was compliant with cardiac medications, but couldn't take any this morning. Was going to bathroom this morning and felt lightheaded leading to fall, unclear if there was loss of consciousness.  Taken to [facility], CT head and spine negative for acute pathology. CT abdomen/pelvis notable for SBO and mesenteric ischemia, likely as a result. NGT placed, and transferred to [facility] for additional care.  Patient was first seen by HF 8/2020 for ADHF requiring milrinone assisted diuresis, declined for LVAD due to frailty and CKD. Was admitted again 11/1-11/18 for ADHF, when GOC changed patient to DNR/DNI, discharged home with milrinone gtt at 0.3mcg/kg/min, on torsemide 80mg BID. Patient follows with Dr. Anglin as outpatient, last seen 12/2/20 in clinic. Was noted with continued weight loss (157 lbs to 149 lbs from 184 lbs from discharge) and elevated Cr, decision made to hold home torsemide and Aldactone.  Since then, patient reports continued increased urine output. Was feeling stronger on milrinone since discharge until recent illness.    PAST MEDICAL & SURGICAL HISTORY:  Hypothyroidism    Afib    Type II diabetes mellitus    Aortic insufficiency    Mitral insufficiency    CKD (chronic kidney disease)    Cardiomyopathy  Systolic CHF    Hypertension    History of tonsillectomy  childhood    AICD (automatic cardioverter/defibrillator) present  8/2012        FAMILY HISTORY:  Family history of CVA    Home Medications:  acetaminophen 325 mg oral tablet: 2 tab(s) orally every 6 hours, As needed, Mild Pain (1 - 3) (06 Nov 2020 10:53)  Eliquis 5 mg oral tablet: 1 tab(s) orally 2 times a day (06 Nov 2020 10:53)  hydrocortisone 2.5% topical ointment: Apply topically to affected area prn, As Needed (06 Nov 2020 10:53)  levothyroxine 50 mcg (0.05 mg) oral tablet: 1 tab(s) orally once a day (06 Nov 2020 10:53)  potassium chloride 20 mEq oral tablet, extended release: 2 tab(s) orally once a day (18 Nov 2020 11:28)    Medications:  milrinone Infusion 0.3 MICROgram(s)/kG/Min IV Continuous <Continuous>  sodium chloride 0.9% Bolus 250 milliLiter(s) IV Bolus once  sodium chloride 0.9%. 1000 milliLiter(s) IV Continuous <Continuous>    Review of systems:  10 point review of systems completed and are negative unless noted in HPI    Vitals:  T(C): 36.7 (12-07-20 @ 15:57), Max: 36.7 (12-07-20 @ 15:57)  HR: 105 (12-07-20 @ 16:49) (101 - 105)  BP: 86/47 (12-07-20 @ 16:49) (86/47 - 92/53)  BP(mean): --  RR: 19 (12-07-20 @ 16:49) (16 - 21)  SpO2: 96% (12-07-20 @ 16:49) (95% - 96%)    Daily Height in cm: 175.26 (07 Dec 2020 15:10)    Daily     Weight (kg): 73.8 (12-07 @ 15:57)    I&O's Summary    Physical Exam:  Appearance: Sitting up in bed with NG tube in place, draining bilious fluid, frail  HEENT: PERRL  Neck: No JVD appreciated  Cardiovascular: Normal S1 S2, regular rate and rhythm, No murmurs  Respiratory: Clear to auscultation bilaterally anteriorly  Gastrointestinal: Soft, nondistended, mildly tender to palpation, decreased bowel sounds throughout	  Skin: No cyanosis  Neurologic: Non-focal  Extremities: No LE edema, warm and well perfused throughout  Psychiatry: A & O x 3, Mood & affect appropriate    Labs:                        10.5   11.45 )-----------( 212      ( 07 Dec 2020 16:15 )             35.0     12-07    131<L>  |  98  |  109<H>  ----------------------------<  177<H>  5.1   |  18<L>  |  3.20<H>    Ca    10.6<H>      07 Dec 2020 16:15    TPro  7.9  /  Alb  4.0  /  TBili  1.5<H>  /  DBili  x   /  AST  31  /  ALT  30  /  AlkPhos  110  12-07    PT/INR - ( 07 Dec 2020 16:15 )   PT: 23.9 sec;   INR: 2.06 ratio         PTT - ( 07 Dec 2020 16:15 )  PTT:40.8 sec    TELEMETRY: v paced    Echocardiogram:  < from: TTE with Doppler (w/Cont) (11.01.20 @ 15:27) >  Conclusions:  1. A MitraClip is seen in the mitral position. Mild mitral  regurgitation.  2. Calcified trileaflet aortic valve with normal opening.  Minimal aortic regurgitation.  3. Severe left ventricularenlargement.  4. Severe global left ventricular systolic dysfunction.  Endocardial visualization enhanced with intravenous  injection of Ultrasonic Enhancing Agent (Definity). No left  ventricular thrombus.  5. Right ventricular enlargement with normal right  ventricular systolic function. A device wire is noted in  the right heart.  6. Normal tricuspid valve. Severe tricuspid regurgitation.  7. Normal pericardium with no pericardial effusion.    EF (Coats Rule): 13 %  < end of copied text >    EKG: a sensed, BiV paced Patient is a 75y old  Male who presents with a chief complaint of     HPI:  Mr. Jarquin is a 75 year old M with longstanding history of NICM, LVEF < 20% (LVIDd 6.7 cm) s/p CRT-D, moderate-severe MR s/p MitraClip 8/2020, HTN, pAFib s/p DCCV 7/20/20 on Eliquis, CKD stage 3 (b/l SCr 1.7-2), DM 2 (A1c 6.1%) and anemia who presented as transfer from OSH in the setting of presyncope/syncope, found to have SBO now s/p NGT for conservative management. Heart failure consulted for additional evaluation and management.  Per patient, started having worsening abdominal pain three days ago, with decreased appetite and emesis on day of admission. Still with bowel movements, denies changes in diet. No history of intra-abdominal surgery, has never had similar symptoms in past. Still was compliant with cardiac medications, but couldn't take any this morning. Was going to bathroom this morning and felt lightheaded leading to fall, unclear if there was loss of consciousness.  Taken to [facility], CT head and spine negative for acute pathology. CT abdomen/pelvis notable for SBO and mesenteric ischemia, likely as a result. NGT placed, and transferred to [facility] for additional care.  Patient was first seen by HF 8/2020 for ADHF requiring milrinone assisted diuresis, declined for LVAD due to frailty and CKD. Was admitted again 11/1-11/18 for ADHF, when GOC changed patient to DNR/DNI, discharged home with milrinone gtt at 0.3mcg/kg/min, on torsemide 80mg BID. Patient follows with Dr. Anglin as outpatient, last seen 12/2/20 in clinic. Was noted with continued weight loss (157 lbs to 149 lbs from 184 lbs from discharge) and elevated Cr, decision made to hold home torsemide and Aldactone.  Since then, patient reports continued increased urine output. Was feeling stronger on milrinone since discharge until recent illness.    PAST MEDICAL & SURGICAL HISTORY:  Hypothyroidism    Afib    Type II diabetes mellitus    Aortic insufficiency    Mitral insufficiency    CKD (chronic kidney disease)    Cardiomyopathy  Systolic CHF    Hypertension    History of tonsillectomy  childhood    AICD (automatic cardioverter/defibrillator) present  8/2012        FAMILY HISTORY:  Family history of CVA    Home Medications:   · Amiodarone HCl - 200 MG Oral Tablet; TAKE 1 TABLET DAILY   · Eliquis 5 MG Oral Tablet; Take 1 tablet twice daily   · Isosorbide Mononitrate ER 60 MG Oral Tablet Extended Release 24 Hour; take 1 tablet at  bedtime   · Levothyroxine Sodium 50 MCG Oral Tablet; TAKE 1 TABLET DAILY   · Metoprolol Succinate ER 25 MG Oral Tablet Extended Release 24 Hour; TAKE 1 TABLET  DAILY   · Milrinone Lactate in Dextrose 20-5 MG/100ML-% Intravenous Solution; 0.3 mcg/kg/min  continuous IV infusion    Medications:  milrinone Infusion 0.3 MICROgram(s)/kG/Min IV Continuous <Continuous>  sodium chloride 0.9% Bolus 250 milliLiter(s) IV Bolus once  sodium chloride 0.9%. 1000 milliLiter(s) IV Continuous <Continuous>    Review of systems:  10 point review of systems completed and are negative unless noted in HPI    Vitals:  T(C): 36.7 (12-07-20 @ 15:57), Max: 36.7 (12-07-20 @ 15:57)  HR: 105 (12-07-20 @ 16:49) (101 - 105)  BP: 86/47 (12-07-20 @ 16:49) (86/47 - 92/53)  BP(mean): --  RR: 19 (12-07-20 @ 16:49) (16 - 21)  SpO2: 96% (12-07-20 @ 16:49) (95% - 96%)    Daily Height in cm: 175.26 (07 Dec 2020 15:10)    Daily     Weight (kg): 73.8 (12-07 @ 15:57)    I&O's Summary    Physical Exam:  Appearance: Sitting up in bed with NG tube in place, draining bilious fluid, frail  HEENT: PERRL  Neck: No JVD appreciated  Cardiovascular: Normal S1 S2, regular rate and rhythm, No murmurs  Respiratory: Clear to auscultation bilaterally anteriorly  Gastrointestinal: Soft, nondistended, mildly tender to palpation, decreased bowel sounds throughout	  Skin: No cyanosis  Neurologic: Non-focal  Extremities: No LE edema, warm and well perfused throughout  Psychiatry: A & O x 3, Mood & affect appropriate    Labs:                        10.5   11.45 )-----------( 212      ( 07 Dec 2020 16:15 )             35.0     12-07    131<L>  |  98  |  109<H>  ----------------------------<  177<H>  5.1   |  18<L>  |  3.20<H>    Ca    10.6<H>      07 Dec 2020 16:15    TPro  7.9  /  Alb  4.0  /  TBili  1.5<H>  /  DBili  x   /  AST  31  /  ALT  30  /  AlkPhos  110  12-07    PT/INR - ( 07 Dec 2020 16:15 )   PT: 23.9 sec;   INR: 2.06 ratio         PTT - ( 07 Dec 2020 16:15 )  PTT:40.8 sec    TELEMETRY: v paced    Echocardiogram:  < from: TTE with Doppler (w/Cont) (11.01.20 @ 15:27) >  Conclusions:  1. A MitraClip is seen in the mitral position. Mild mitral  regurgitation.  2. Calcified trileaflet aortic valve with normal opening.  Minimal aortic regurgitation.  3. Severe left ventricularenlargement.  4. Severe global left ventricular systolic dysfunction.  Endocardial visualization enhanced with intravenous  injection of Ultrasonic Enhancing Agent (Definity). No left  ventricular thrombus.  5. Right ventricular enlargement with normal right  ventricular systolic function. A device wire is noted in  the right heart.  6. Normal tricuspid valve. Severe tricuspid regurgitation.  7. Normal pericardium with no pericardial effusion.    EF (Coats Rule): 13 %  < end of copied text >    EKG: a sensed, BiV paced

## 2020-12-07 NOTE — H&P ADULT - NSHPPOADEEPVENOUSTHROMB_GEN_A_CORE
History  Chief Complaint   Patient presents with    Cough     Pt presents to the ED with a cough x1 month  Father reports pt was seen by pediatrician and Rx nebilizer treatments however it has not helped her cough  Patient is an immunized 9month-old female with no pertinent past medical or surgical history that presents emergency department with intermittent hacking nonproductive cough for 1 month  Patient presents with her father this evening that provides all of patient's history  Patient's father states that patient's aforementioned cough is had been sporadic over the course of 1 month and with the last 3 days patient's father stated that patient's cough had become more frequent  Patient's father brings patient to the emergency department this evening for evaluation of patient cough  Patient's father denies patient palliative and provocative factors  Patient's mother denies patient not effective treatment  Patient's father denies patient fevers, chills, nausea, vomiting, diarrhea, constipation, and urinary symptoms  Patient's father denies patient facial grimacing upon swallowing, ear tugging, runny nose, and sneezing  Patient's father denies patient recent fall or recent trauma  Patient's father denies patient skin changes or rashes  Patient's father denies patient new contact with animals, plants, commercial and industrial products  Patient's father denies patient sick contacts and recent travel  Patient's father affirms patient is making normal amount of wet diapers daily  Patient's father affirms patient appetite and dietary intake at baseline  Patient's father denies patient chest pain, shortness of breath, and abdominal pain  Patient is not in acute distress  History provided by:   Father   used: No    Cough   Cough characteristics:  Hacking  Severity:  Mild  Onset quality:  Gradual  Duration:  3 days  Timing:  Intermittent  Progression:  Waxing and waning  Chronicity:  New  Context: not animal exposure, not exposure to allergens, not sick contacts and not smoke exposure    Relieved by:  Home nebulizer  Worsened by:  Nothing  Ineffective treatments:  None tried  Associated symptoms: sinus congestion    Associated symptoms: no chest pain, no chills, no diaphoresis, no ear fullness, no ear pain, no eye discharge, no fever, no headaches, no myalgias, no rash, no rhinorrhea, no shortness of breath, no sore throat, no weight loss and no wheezing    Behavior:     Behavior:  Normal    Intake amount:  Eating and drinking normally    Urine output:  Normal    Last void:  Less than 6 hours ago  Risk factors: no recent infection and no recent travel        None       History reviewed  No pertinent past medical history  History reviewed  No pertinent surgical history  History reviewed  No pertinent family history  I have reviewed and agree with the history as documented  Social History     Tobacco Use    Smoking status: Never Smoker    Smokeless tobacco: Never Used   Substance Use Topics    Alcohol use: Not on file    Drug use: Not on file        Review of Systems   Constitutional: Negative for activity change, appetite change, chills, crying, diaphoresis, fever, irritability and weight loss  HENT: Negative for congestion, ear pain, rhinorrhea, sneezing, sore throat and trouble swallowing  Eyes: Negative for discharge, redness and visual disturbance  Respiratory: Positive for cough  Negative for shortness of breath, wheezing and stridor  Cardiovascular: Negative for chest pain, fatigue with feeds and cyanosis  Gastrointestinal: Negative for constipation, diarrhea and vomiting  Genitourinary: Negative for decreased urine volume  Musculoskeletal: Negative for joint swelling and myalgias  Skin: Negative for color change and rash  Neurological: Negative for seizures and headaches  All other systems reviewed and are negative        Physical Exam  Physical Exam   Constitutional: Vital signs are normal  She appears well-developed, well-nourished and vigorous  She is active  She is smiling  She cries on exam  She regards caregiver  She has a strong cry  Non-toxic appearance  She does not have a sickly appearance  She does not appear ill  No distress  Patient appropriate for physical examination  Patient in no acute distress  Patient well-appearing visually tracking me around the room during physical exam, easily consoled by father  Patient interacting with father and environment   HENT:   Head: Normocephalic and atraumatic  Anterior fontanelle is flat  Right Ear: Tympanic membrane, external ear, pinna and canal normal  No swelling or tenderness  No pain on movement  No decreased hearing is noted  Left Ear: Tympanic membrane, external ear, pinna and canal normal  No swelling or tenderness  No pain on movement  No decreased hearing is noted  Nose: Congestion present  Mouth/Throat: Mucous membranes are moist  Dentition is normal  Oropharynx is clear  Eyes: Red reflex is present bilaterally  Visual tracking is normal  Pupils are equal, round, and reactive to light  Conjunctivae, EOM and lids are normal    Neck: Trachea normal, normal range of motion and full passive range of motion without pain  Neck supple  No tenderness is present  Cardiovascular: Normal rate and regular rhythm  Pulses are strong and palpable  Pulses:       Brachial pulses are 2+ on the right side, and 2+ on the left side  Pulmonary/Chest: Effort normal and breath sounds normal  There is normal air entry  No accessory muscle usage, nasal flaring or grunting  No respiratory distress  She has no decreased breath sounds  She has no wheezes  She has no rhonchi  She has no rales  She exhibits no deformity and no retraction  No signs of injury  Abdominal: Soft  Bowel sounds are normal  There is no tenderness  There is no rigidity, no rebound and no guarding  Musculoskeletal: Normal range of motion  Lymphadenopathy: No occipital adenopathy is present  She has no cervical adenopathy  Neurological: She is alert  She has normal strength and normal reflexes  She displays no tremor and normal reflexes  No sensory deficit  She exhibits normal muscle tone  Suck normal  Symmetric Van  GCS eye subscore is 4  GCS verbal subscore is 5  GCS motor subscore is 6  Reflex Scores:       Patellar reflexes are 2+ on the right side and 2+ on the left side  Skin: Skin is warm and moist  Capillary refill takes less than 2 seconds  Turgor is normal  No rash noted  Nursing note and vitals reviewed  Vital Signs  ED Triage Vitals   Temperature Pulse Respirations Blood Pressure SpO2   10/04/19 0108 10/04/19 0108 10/04/19 0108 10/04/19 0254 10/04/19 0108   99 3 °F (37 4 °C) (!) 141 28 (!) 109/68 98 %      Temp src Heart Rate Source Patient Position - Orthostatic VS BP Location FiO2 (%)   10/04/19 0108 10/04/19 0108 -- 10/04/19 0108 --   Rectal Monitor  Right arm       Pain Score       10/04/19 0108       No Pain           Vitals:    10/04/19 0108 10/04/19 0254   BP:  (!) 109/68   Pulse: (!) 141          Visual Acuity      ED Medications  Medications   sodium chloride (OCEAN) 0 65 % nasal spray 1 spray (1 spray Each Nare Given 10/4/19 0152)       Diagnostic Studies  Results Reviewed     None                 XR chest 2 views   Final Result by Veto Paige MD (10/04 5211)      No acute cardiopulmonary disease              Workstation performed: [id number]                    Procedures  Procedures       ED Course                               MDM  Number of Diagnoses or Management Options  Cough: new and does not require workup  Nasal congestion: new and does not require workup     Amount and/or Complexity of Data Reviewed  Tests in the radiology section of CPT®: ordered and reviewed  Review and summarize past medical records: yes  Independent visualization of images, tracings, or specimens: yes    Risk of Complications, Morbidity, and/or Mortality  Presenting problems: low  Diagnostic procedures: low  Management options: low    Patient Progress  Patient progress: stable    Patient is an immunized 9month-old female with no pertinent past medical or surgical history that presents emergency department with intermittent hacking nonproductive cough for 1 month  Patient's father requested chest x-ray; chest x-ray was performed; initial read of chest x-ray demonstrated no acute cardiopulmonary disease  Delivered [place] saline spray to bilateral nose, by bulb suctioning; patient had decreased nasal congestion evidence by myself and by patient's father status post saline and bulb suction delivery  Advised patient's father to procure a humidifier and use it daily  Counseled patient's father on supplying patient with daily fluids electrolytes  Follow-up with PCP  Follow up with emergency department symptoms persist or exacerbate  Patient's mother demonstrates verbal understanding of all clinical imaging findings, discharge instructions, follow-up, and verbalizes agreement with patient treatment plan  Patient hemodynamically stable in no acute distress at time of final evaluation and discharge      Disposition  Final diagnoses:   Nasal congestion   Cough     Time reflects when diagnosis was documented in both MDM as applicable and the Disposition within this note     Time User Action Codes Description Comment    2019  2:55 AM Masood Betts Add [R09 81] Nasal congestion     2019  2:55 AM Masood Betts Add [R05] Cough       ED Disposition     ED Disposition Condition Date/Time Comment    Discharge Stable Fri Oct 4, 2019  2:53 AM Haile Villa discharge to home/self care              Follow-up Information     Follow up With Specialties Details Why Contact Info Additional [address] Call in 1 week for further evaluation of symptoms [address] [facility] [address] [phone] [facility], [address], [place], [address] [facility] Emergency Department Emergency Medicine Go to  As needed [address] [facility] [address], [address], [place], [address]          There are no discharge medications for this patient  No discharge procedures on file      ED Provider  Electronically Signed by           Marlee Roy PA-C  10/04/19 0100 no

## 2020-12-07 NOTE — CONSULT NOTE ADULT - SUBJECTIVE AND OBJECTIVE BOX
[facility] Surgical Consultant Evaluation    HPI:  Mr. Jarquin is a 75 year old M with longstanding history of NICM, LVEF < 20% (LVIDd 6.7 cm) s/p CRT-D, moderate-severe MR s/p MitraClip 8/2020, HTN, pAFib s/p DCCV 7/20/20 on Eliquis, CKD stage 3 (b/l SCr 1.7-2), DM 2 (A1c 6.1%) and anemia who presented as transfer from OSH in the setting of presyncope/syncope, found to have SBO now s/p NGT for conservative management. Heart failure consulted for additional evaluation and management.  Per patient, started having worsening abdominal pain three days ago, with decreased appetite and emesis on day of admission. Still with bowel movements, denies changes in diet. No history of intra-abdominal surgery, has never had similar symptoms in past. Still was compliant with cardiac medications, but couldn't take any this morning. Was going to bathroom this morning and felt lightheaded leading to fall, unclear if there was loss of consciousness.  Taken to [facility], CT head and spine negative for acute pathology. CT abdomen/pelvis notable for SBO and mesenteric ischemia, likely as a result. NGT placed, and transferred to [facility] for additional care.  Patient was first seen by HF 8/2020 for ADHF requiring milrinone assisted diuresis, declined for LVAD due to frailty and CKD. Was admitted again 11/1-11/18 for ADHF, when GOC changed patient to DNR/DNI, discharged home with milrinone gtt at 0.3mcg/kg/min, on torsemide 80mg BID. Patient follows with Dr. Anglin as outpatient, last seen 12/2/20 in clinic. Was noted with continued weight loss (157 lbs to 149 lbs from 184 lbs from discharge) and elevated Cr, decision made to hold home torsemide and Aldactone.  Since then, patient reports continued increased urine output. Was feeling stronger on milrinone since discharge until recent illness.    Surgery consulted for evaluation of reported Small bowel obstruction. Patient without abdominal surgical history. Currently states that he has no abdominal pain. Nasogastric tube in place with 500cc output in ED. Passed gas, had bowel movement last this AM, states that he has had continued bowel function throughout the last few days.       PAST MEDICAL & SURGICAL HISTORY:  Hypothyroidism    Afib    Type II diabetes mellitus    Aortic insufficiency    Mitral insufficiency    CKD (chronic kidney disease)    Cardiomyopathy  Systolic CHF    Hypertension    History of tonsillectomy  childhood    AICD (automatic cardioverter/defibrillator) present  8/2012        MEDICATIONS  (STANDING):  milrinone Infusion 0.3 MICROgram(s)/kG/Min (6.64 mL/Hr) IV Continuous <Continuous>  sodium chloride 0.9%. 1000 milliLiter(s) (50 mL/Hr) IV Continuous <Continuous>      ___________________________________________  REVIEW OF SYSTEMS:  As stated in History of Present Illness, otherwise non-contributory.   ___________________________________________  PHYSICAL EXAM:  Vital Signs Last 24 Hrs  T(C): 37.3 (07 Dec 2020 17:45), Max: 37.3 (07 Dec 2020 17:45)  T(F): 99.2 (07 Dec 2020 17:45), Max: 99.2 (07 Dec 2020 17:45)  HR: 102 (07 Dec 2020 19:00) (101 - 105)  BP: 89/52 (07 Dec 2020 19:00) (86/47 - 92/53)  BP(mean): 61 (07 Dec 2020 19:00) (59 - 61)  RR: 19 (07 Dec 2020 19:00) (16 - 21)  SpO2: 97% (07 Dec 2020 19:00) (95% - 97%)CAPILLARY BLOOD GLUCOSE        I&O's Detail    07 Dec 2020 07:01  -  07 Dec 2020 20:07  --------------------------------------------------------  IN:  Total IN: 0 mL    OUT:    Nasogastric/Oral tube (mL): 400 mL  Total OUT: 400 mL    Total NET: -400 mL          General: Alert and Oriented x3, No acute distress.  Respiratory: Breathing non-labored.  Abdomen: Large, soft, nontender, nondistended. No rebound, no guarding, No palpable organomegaly or masses.  Extremities: Moves all four.   ____________________________________________  LABS:  CBC Full  -  ( 07 Dec 2020 16:15 )  WBC Count : 11.45 K/uL  RBC Count : 4.06 M/uL  Hemoglobin : 10.5 g/dL  Hematocrit : 35.0 %  Platelet Count - Automated : 212 K/uL  Mean Cell Volume : 86.2 fl  Mean Cell Hemoglobin : 25.9 pg  Mean Cell Hemoglobin Concentration : 30.0 gm/dL  Auto Neutrophil # : 10.10 K/uL  Auto Lymphocyte # : 0.29 K/uL  Auto Monocyte # : 0.98 K/uL  Auto Eosinophil # : 0.01 K/uL  Auto Basophil # : 0.02 K/uL  Auto Neutrophil % : 88.2 %  Auto Lymphocyte % : 2.5 %  Auto Monocyte % : 8.6 %  Auto Eosinophil % : 0.1 %  Auto Basophil % : 0.2 %    12-07    131<L>  |  98  |  109<H>  ----------------------------<  177<H>  5.1   |  18<L>  |  3.20<H>    Ca    10.6<H>      07 Dec 2020 16:15    TPro  7.9  /  Alb  4.0  /  TBili  1.5<H>  /  DBili  x   /  AST  31  /  ALT  30  /  AlkPhos  110  12-07    LIVER FUNCTIONS - ( 07 Dec 2020 16:15 )  Alb: 4.0 g/dL / Pro: 7.9 g/dL / ALK PHOS: 110 U/L / ALT: 30 U/L / AST: 31 U/L / GGT: x           PT/INR - ( 07 Dec 2020 16:15 )   PT: 23.9 sec;   INR: 2.06 ratio         PTT - ( 07 Dec 2020 16:15 )  PTT:40.8 sec   [facility] Surgical Consultant Evaluation    HPI:  Mr. Jarquin is a 75 year old M with longstanding history of NICM, LVEF < 20% (LVIDd 6.7 cm) s/p CRT-D, moderate-severe MR s/p MitraClip 8/2020, HTN, pAFib s/p DCCV 7/20/20 on Eliquis, CKD stage 3 (b/l SCr 1.7-2), DM 2 (A1c 6.1%) and anemia who presented as transfer from OSH in the setting of presyncope/syncope, found to have SBO now s/p NGT for conservative management. Heart failure consulted for additional evaluation and management.  Per patient, started having worsening abdominal pain three days ago, with decreased appetite and emesis on day of admission. Still with bowel movements, denies changes in diet. No history of intra-abdominal surgery, has never had similar symptoms in past. Still was compliant with cardiac medications, but couldn't take any this morning. Was going to bathroom this morning and felt lightheaded leading to fall, unclear if there was loss of consciousness.  Taken to [facility], CT head and spine negative for acute pathology. CT abdomen/pelvis notable for SBO and mesenteric ischemia, likely as a result. NGT placed, and transferred to [facility] for additional care.  Patient was first seen by HF 8/2020 for ADHF requiring milrinone assisted diuresis, declined for LVAD due to frailty and CKD. Was admitted again 11/1-11/18 for ADHF, when GOC changed patient to DNR/DNI, discharged home with milrinone gtt at 0.3mcg/kg/min, on torsemide 80mg BID. Patient follows with Dr. Anglin as outpatient, last seen 12/2/20 in clinic. Was noted with continued weight loss (157 lbs to 149 lbs from 184 lbs from discharge) and elevated Cr, decision made to hold home torsemide and Aldactone.  Since then, patient reports continued increased urine output. Was feeling stronger on milrinone since discharge until recent illness.    Surgery consulted for evaluation of reported Small bowel obstruction. Patient without abdominal surgical history. Currently states that he has no abdominal pain. Nasogastric tube in place with 500cc output in ED. Passed gas, had bowel movement last this AM, states that he has had continued bowel function throughout the last few days.       PAST MEDICAL & SURGICAL HISTORY:  Hypothyroidism    Afib    Type II diabetes mellitus    Aortic insufficiency    Mitral insufficiency    CKD (chronic kidney disease)    Cardiomyopathy  Systolic CHF    Hypertension    History of tonsillectomy  childhood    AICD (automatic cardioverter/defibrillator) present  8/2012        MEDICATIONS  (STANDING):  milrinone Infusion 0.3 MICROgram(s)/kG/Min (6.64 mL/Hr) IV Continuous <Continuous>  sodium chloride 0.9%. 1000 milliLiter(s) (50 mL/Hr) IV Continuous <Continuous>      ___________________________________________  REVIEW OF SYSTEMS:  As stated in History of Present Illness, otherwise non-contributory.   ___________________________________________  PHYSICAL EXAM:  Vital Signs Last 24 Hrs  T(C): 37.3 (07 Dec 2020 17:45), Max: 37.3 (07 Dec 2020 17:45)  T(F): 99.2 (07 Dec 2020 17:45), Max: 99.2 (07 Dec 2020 17:45)  HR: 102 (07 Dec 2020 19:00) (101 - 105)  BP: 89/52 (07 Dec 2020 19:00) (86/47 - 92/53)  BP(mean): 61 (07 Dec 2020 19:00) (59 - 61)  RR: 19 (07 Dec 2020 19:00) (16 - 21)  SpO2: 97% (07 Dec 2020 19:00) (95% - 97%)CAPILLARY BLOOD GLUCOSE        I&O's Detail    07 Dec 2020 07:01  -  07 Dec 2020 20:07  --------------------------------------------------------  IN:  Total IN: 0 mL    OUT:    Nasogastric/Oral tube (mL): 400 mL  Total OUT: 400 mL    Total NET: -400 mL          General: Alert and Oriented x3, No acute distress.  Respiratory: Breathing non-labored.  Abdomen: Large, soft, nontender, nondistended. No rebound, no guarding, No palpable organomegaly or masses.  Extremities: Moves all four.   ____________________________________________  LABS:  CBC Full  -  ( 07 Dec 2020 16:15 )  WBC Count : 11.45 K/uL  RBC Count : 4.06 M/uL  Hemoglobin : 10.5 g/dL  Hematocrit : 35.0 %  Platelet Count - Automated : 212 K/uL  Mean Cell Volume : 86.2 fl  Mean Cell Hemoglobin : 25.9 pg  Mean Cell Hemoglobin Concentration : 30.0 gm/dL  Auto Neutrophil # : 10.10 K/uL  Auto Lymphocyte # : 0.29 K/uL  Auto Monocyte # : 0.98 K/uL  Auto Eosinophil # : 0.01 K/uL  Auto Basophil # : 0.02 K/uL  Auto Neutrophil % : 88.2 %  Auto Lymphocyte % : 2.5 %  Auto Monocyte % : 8.6 %  Auto Eosinophil % : 0.1 %  Auto Basophil % : 0.2 %    12-07    131<L>  |  98  |  109<H>  ----------------------------<  177<H>  5.1   |  18<L>  |  3.20<H>    Ca    10.6<H>      07 Dec 2020 16:15    TPro  7.9  /  Alb  4.0  /  TBili  1.5<H>  /  DBili  x   /  AST  31  /  ALT  30  /  AlkPhos  110  12-07    LIVER FUNCTIONS - ( 07 Dec 2020 16:15 )  Alb: 4.0 g/dL / Pro: 7.9 g/dL / ALK PHOS: 110 U/L / ALT: 30 U/L / AST: 31 U/L / GGT: x           PT/INR - ( 07 Dec 2020 16:15 )   PT: 23.9 sec;   INR: 2.06 ratio         PTT - ( 07 Dec 2020 16:15 )  PTT:40.8 sec    Radiology:   CT A/P from Outside Hospitalization Facility:   IMPRESSION: 1. Moderate fluid distended small bowel with distal ileal apparent transition zone concerning for small bowel obstruction. Regions of interloop ascites and fatty edematous change suggesting possible secondary bowel ischemia are noted.   2. Mild to moderate nonspecific splenomegaly.   3. Apparent tree-in-bud component right greater than left lower lobe attenuation may reflect infiltrate. [facility] Surgical Consultant Evaluation    PATIENT CT IMAGES LOCATED UNDER [employer] PACS UNDER "TRAUMA, ALYSE"  Radiology report impression for images copied into "Radiology" section of this note.     HPI:  Mr. Jarquin is a 75 year old M with longstanding history of NICM, LVEF < 20% (LVIDd 6.7 cm) s/p CRT-D, moderate-severe MR s/p MitraClip 8/2020, HTN, pAFib s/p DCCV 7/20/20 on Eliquis, CKD stage 3 (b/l SCr 1.7-2), DM 2 (A1c 6.1%) and anemia who presented as transfer from OSH in the setting of presyncope/syncope, found to have SBO now s/p NGT for conservative management. Heart failure consulted for additional evaluation and management.  Per patient, started having worsening abdominal pain three days ago, with decreased appetite and emesis on day of admission. Still with bowel movements, denies changes in diet. No history of intra-abdominal surgery, has never had similar symptoms in past. Still was compliant with cardiac medications, but couldn't take any this morning. Was going to bathroom this morning and felt lightheaded leading to fall, unclear if there was loss of consciousness.  Taken to [facility], CT head and spine negative for acute pathology. CT abdomen/pelvis notable for SBO and mesenteric ischemia, likely as a result. NGT placed, and transferred to [facility] for additional care.  Patient was first seen by HF 8/2020 for ADHF requiring milrinone assisted diuresis, declined for LVAD due to frailty and CKD. Was admitted again 11/1-11/18 for ADHF, when GOC changed patient to DNR/DNI, discharged home with milrinone gtt at 0.3mcg/kg/min, on torsemide 80mg BID. Patient follows with Dr. Anglin as outpatient, last seen 12/2/20 in clinic. Was noted with continued weight loss (157 lbs to 149 lbs from 184 lbs from discharge) and elevated Cr, decision made to hold home torsemide and Aldactone.  Since then, patient reports continued increased urine output. Was feeling stronger on milrinone since discharge until recent illness.    Surgery consulted for evaluation of reported Small bowel obstruction. Patient without abdominal surgical history. Currently states that he has no abdominal pain. Nasogastric tube in place with 500cc output in ED. Passed gas, had bowel movement last this AM, states that he has had continued bowel function throughout the last few days.       PAST MEDICAL & SURGICAL HISTORY:  Hypothyroidism    Afib    Type II diabetes mellitus    Aortic insufficiency    Mitral insufficiency    CKD (chronic kidney disease)    Cardiomyopathy  Systolic CHF    Hypertension    History of tonsillectomy  childhood    AICD (automatic cardioverter/defibrillator) present  8/2012        MEDICATIONS  (STANDING):  milrinone Infusion 0.3 MICROgram(s)/kG/Min (6.64 mL/Hr) IV Continuous <Continuous>  sodium chloride 0.9%. 1000 milliLiter(s) (50 mL/Hr) IV Continuous <Continuous>      ___________________________________________  REVIEW OF SYSTEMS:  As stated in History of Present Illness, otherwise non-contributory.   ___________________________________________  PHYSICAL EXAM:  Vital Signs Last 24 Hrs  T(C): 37.3 (07 Dec 2020 17:45), Max: 37.3 (07 Dec 2020 17:45)  T(F): 99.2 (07 Dec 2020 17:45), Max: 99.2 (07 Dec 2020 17:45)  HR: 102 (07 Dec 2020 19:00) (101 - 105)  BP: 89/52 (07 Dec 2020 19:00) (86/47 - 92/53)  BP(mean): 61 (07 Dec 2020 19:00) (59 - 61)  RR: 19 (07 Dec 2020 19:00) (16 - 21)  SpO2: 97% (07 Dec 2020 19:00) (95% - 97%)CAPILLARY BLOOD GLUCOSE        I&O's Detail    07 Dec 2020 07:01  -  07 Dec 2020 20:07  --------------------------------------------------------  IN:  Total IN: 0 mL    OUT:    Nasogastric/Oral tube (mL): 400 mL  Total OUT: 400 mL    Total NET: -400 mL          General: Alert and Oriented x3, No acute distress.  Respiratory: Breathing non-labored.  Abdomen: Large, soft, nontender, nondistended. No rebound, no guarding, No palpable organomegaly or masses.  Extremities: Moves all four.   ____________________________________________  LABS:  CBC Full  -  ( 07 Dec 2020 16:15 )  WBC Count : 11.45 K/uL  RBC Count : 4.06 M/uL  Hemoglobin : 10.5 g/dL  Hematocrit : 35.0 %  Platelet Count - Automated : 212 K/uL  Mean Cell Volume : 86.2 fl  Mean Cell Hemoglobin : 25.9 pg  Mean Cell Hemoglobin Concentration : 30.0 gm/dL  Auto Neutrophil # : 10.10 K/uL  Auto Lymphocyte # : 0.29 K/uL  Auto Monocyte # : 0.98 K/uL  Auto Eosinophil # : 0.01 K/uL  Auto Basophil # : 0.02 K/uL  Auto Neutrophil % : 88.2 %  Auto Lymphocyte % : 2.5 %  Auto Monocyte % : 8.6 %  Auto Eosinophil % : 0.1 %  Auto Basophil % : 0.2 %    12-07    131<L>  |  98  |  109<H>  ----------------------------<  177<H>  5.1   |  18<L>  |  3.20<H>    Ca    10.6<H>      07 Dec 2020 16:15    TPro  7.9  /  Alb  4.0  /  TBili  1.5<H>  /  DBili  x   /  AST  31  /  ALT  30  /  AlkPhos  110  12-07    LIVER FUNCTIONS - ( 07 Dec 2020 16:15 )  Alb: 4.0 g/dL / Pro: 7.9 g/dL / ALK PHOS: 110 U/L / ALT: 30 U/L / AST: 31 U/L / GGT: x           PT/INR - ( 07 Dec 2020 16:15 )   PT: 23.9 sec;   INR: 2.06 ratio         PTT - ( 07 Dec 2020 16:15 )  PTT:40.8 sec    Radiology:   CT A/P from [facility]  IMPRESSION: 1. Moderate fluid distended small bowel with distal ileal apparent transition zone concerning for small bowel obstruction. Regions of interloop ascites and fatty edematous change suggesting possible secondary bowel ischemia are noted.   2. Mild to moderate nonspecific splenomegaly.   3. Apparent tree-in-bud component right greater than left lower lobe attenuation may reflect infiltrate.

## 2020-12-07 NOTE — ED PROVIDER NOTE - PHYSICAL EXAMINATION
aaox4, slow to respond but coherent  no acute distress  notable abrasions over left face, normal neurologic exam, no c spine ttp, neck supple  clear lungs, systolic murmur  distended abd, scant bowel sounds, non tender on exam  ant shin abrasions, no ttp over ext  noted to have a chest inf cath  multiple skin changes (age related?)

## 2020-12-07 NOTE — H&P ADULT - PROBLEM SELECTOR PLAN 4
Pending facial trauma consult (consulted by ER)  Pt's own packing for epistaxis removed without further bleeding at this time

## 2020-12-07 NOTE — ED ADULT NURSE REASSESSMENT NOTE - NS ED NURSE REASSESS COMMENT FT1
Pt NG tube advanced to 80- pt not nauseous or bloated- RN waiting for fluid to drain. RN assessed placement of NG tube after it appeared to pull out a small amount- RN inserted air and did not hear bowel sounds. Dr. Gerardo advanced the tube to 80 and bowel sounds heard- New canister placed to watch for drainage. Tube draining small amount now.

## 2020-12-07 NOTE — CONSULT NOTE ADULT - PROBLEM SELECTOR RECOMMENDATION 9
- cvO2 from [place] on admission 56, Corey CI ~2.1, lactate 2.0, euvolemic on exam  - Please give 250cc NS bolus, start NS mIVF at 50cc/hr for 12h   - Continue home milrinone 0.3 mcg/kg/min  - Continue to hold home torsemide 80mg BID and spironolactone 25mg daily  - Continue home Toprol XL 25 mg QD and Imdur 60mg qhs, hold for SBP < 95  - Please check daily CMP and lactate to trend perfusion  - Strict I/Os, daily standing weights (goal weight ~158 lbs)

## 2020-12-07 NOTE — H&P ADULT - HISTORY OF PRESENT ILLNESS
75 year old M with longstanding history of NICM, LVEF < 20% (LVIDd 6.7 cm) s/p CRT-D, moderate-severe MR s/p MitraClip 8/2020, HTN, pAFib s/p DCCV 7/20/20 on Eliquis, CKD stage 3 (b/l SCr 1.7-2), DM 2 (A1c 6.1%) and anemia, transferred from [facility] after fall at home today in setting of nausea and vomiting, found there to have SBO with placement of NG tube. Patient reports that over the last 2-3 days he had been feeling mildly nauseous, however he had otherwise been in his usual state of health. Reports a normal bowel movement yesterday that did not seem unusual to him. However, this AM he had suddenly began to feel much more nauseous and began to want to vomit. He walked to the bathroom and attempted to vomit but initially nothing came up. Patient reports that he was walking back from the bathroom to his bed when he suddenly began to vomit. He became lightheaded and then fell to the ground and hit his face on the ground. Denies significant pain now, but reports that immediately after his fall he noticed epistaxis from his R nostril which he had difficulty controlling until he put a cotton ball inside. Patient then went to the ER at [facility] where on CT there were findings concerning for SBO and possible bowel ischemia. He was then transferred here for further care. Patient denies any recent chest pain, or worsening of any baseline ALCAZAR/SOB. Patient does report that his potassium and his torsemide have been held over the last week due to continuing decline in his weight. No fevers or chills. Reports a chronic non productive cough that remains unchanged.

## 2020-12-07 NOTE — H&P ADULT - PROBLEM SELECTOR PLAN 6
Worsening of CKD vs EDIE, unclear at this time as baseline has fluctutated in the past  Renally dose medications and monitor creatinine

## 2020-12-07 NOTE — ED PROVIDER NOTE - OBJECTIVE STATEMENT
75yr M CHF on milrinone cont infusion, apixaban, pacemaker reports a fall early morning from feeling lightheaded, ? LOC, unable to stand up after. presented to outside facility and found to have neg traumatic imaging but CT of abd showed SBO and ? gut ischemia. reports 2-3 days of progressive loss of appetite, nausea and today vomiting, non bloody. denies prior similar symptoms. reports bowel movements non bloody, denies urinary sx, fever chills, cp. sob. denies travel, but reports having eaten food from a broken fridge.

## 2020-12-07 NOTE — H&P ADULT - PROBLEM SELECTOR PLAN 2
Heart failure consult appreciated  Holding diuretics at this time  Continue milrinone gtt, bb, imdur

## 2020-12-07 NOTE — CONSULT NOTE ADULT - ASSESSMENT
Mr. Jarquin is a 75 Year-Old Gentleman with very significant cardiac history notable for NICM, LVEF < 20% (LVIDd 6.7 cm) s/p CRT-D, moderate-severe MR s/p MitraClip 8/2020, HTN, pAFib s/p DCCV 7/20/20 on Eliquis, CKD stage 3 (b/l SCr 1.7-2), DM 2 (A1c 6.1%) and anemia who presented after fall found to have Small bowel obstruction.     - No Acute Surgical intervention indicated at this time, patient currently with benign abdominal exam, normal Lactate, has been passing gas and having bowel function.   - Will plan for continued Nasogastric tube decompression, serial abdominal exams, No Pain Medications to be given prior to exam.   - Consider landaverde catheter placement for strict monitoring of I/Os.   - Recommend medicine vs cardiac admission given extensive history.   - Will continue to follow.   - Discussed with Attending Surgeon.     [facility] Surgery Pager #[phone] Mr. Jarquin is a 75 Year-Old Gentleman with very significant cardiac history notable for NICM, LVEF < 20% (LVIDd 6.7 cm) s/p CRT-D, moderate-severe MR s/p MitraClip 8/2020, HTN, pAFib s/p DCCV 7/20/20 on Eliquis, CKD stage 3 (b/l SCr 1.7-2), DM 2 (A1c 6.1%) and anemia who presented after fall found to have Small bowel obstruction.     - No Acute Surgical intervention indicated at this time, patient currently with benign abdominal exam, normal Lactate, has been passing gas and having bowel function.   - Will plan for continued Nasogastric tube decompression, serial abdominal exams, No Pain Medications to be given prior to exam.   - Consider landaverde catheter placement for strict monitoring of I/Os.   - Recommend medicine vs cardiac admission given extensive cardiac history.   - Will continue to follow.   - Discussed with Attending Surgeon.     [facility] Team Surgery Pager #[phone]

## 2020-12-07 NOTE — CONSULT NOTE ADULT - ASSESSMENT
75 year old M with longstanding history of NICM, LVEF < 20% (LVIDd 6.7 cm) s/p CRT-D, moderate-severe MR s/p MitraClip 8/2020, HTN, pAFib s/p DCCV 7/20/20 on Eliquis, CKD stage 3 (b/l SCr 1.7-2), DM 2 (A1c 6.1%) and anemia who presented as transfer from OSH in the setting of presyncope/syncope, found to have SBO now s/p NGT for conservative management. Heart failure consulted for additional evaluation and management.  Patient currently appears euvolemic to slightly hypovolemic, in light of recent gastric losses. Will continue to hold diuretics and provide gentle hydration while NPO.    Relevant studies:  Echo:    11/1/20 TTE: LVEF 10-15%, LV 7cm, LVOT VIT 7cm, severe RV dysfunction, severly dilated atria, mild MR, min AI, severe TR, est RAP 10mmHg, est RVSP 41 mm Hg    Cardiac Cath:    9/15 (milrinone 0.25 mcg/kg/mi): CVP 5 PA 47/13 PCW 13, SVC saturation 68% with Corey CO/CI 5.8/3.1 and TD CO/CI 7/3.7. MAP 70 mmHg

## 2020-12-07 NOTE — ED ADULT NURSE NOTE - OBJECTIVE STATEMENT
Pt is a 75 yr old male with pmh of various cardiac surgeries on Eliquis and Milrinone continuously with own pump (packed and stickered) transferred from [facility] for a SBO and a fall. Pt stated he was at home and went to the bathroom- after which he got up, felt lightheaded and subsequently fell. He denies hitting his head or LOC- stated he was on the floor for a little bit and then was able to get himself up. Pt has various bruising and cuts to the left side of his face and nose. Pt packed his own nose on the right side since it was bleeding continuously. Pt went into the ED- had imaging done which resulted in the SBO. Pt had NG tube placed. Pt states he had some cramping and pain in his abd for the past 3 days. NG tube to suction. Pt states he feels less bloated. Pt is a/o x 3- has milrinone running through his double lumen Santo in his upper right chest. Medication was changed to our pump at 6.64 ml/hr. Pt states his normal bp on the medication is in the low 100's over mid 60's. Upon coming in bp at high 80's over 50's. Chest xray confirms placement of NG and Santo. Pt labs sent and covid re-sent. Pt has various abrasions and bruises to his entire body including bottoms of his feet. Pt is a 75 yr old male with pmh of CHF and pacemaker on Eliquis and Milrinone continuously with own pump (packed and stickered) transferred from [facility] for a SBO and a fall. Pt stated he was at home and went to the bathroom- after which he got up, felt lightheaded and subsequently fell. He denies hitting his head or LOC- stated he was on the floor for a little bit and then was able to get himself up. Pt has various bruising and cuts to the left side of his face and nose. Pt packed his own nose on the right side since it was bleeding continuously. Pt went into the ED- had imaging done which resulted in the SBO. Pt had NG tube placed. Pt states he had some cramping and pain in his abd for the past 3 days. NG tube to suction. Pt states he feels less bloated. Pt is a/o x 3- has milrinone running through his double lumen Santo in his upper right chest. Medication was changed to our pump at 6.64 ml/hr. Pt states his normal bp on the medication is in the low 100's over mid 60's. Upon coming in bp at high 80's over 50's. Chest xray confirms placement of NG and Santo. Pt labs sent and covid re-sent. Pt has various abrasions and bruises to his entire body including bottoms of his feet.

## 2020-12-07 NOTE — H&P ADULT - NSHPPHYSICALEXAM_GEN_ALL_CORE
Vital Signs Last 24 Hrs  T(C): 36.6 (08 Dec 2020 00:21), Max: 37.4 (07 Dec 2020 21:02)  T(F): 97.9 (08 Dec 2020 00:21), Max: 99.3 (07 Dec 2020 21:02)  HR: 100 (08 Dec 2020 00:21) (100 - 105)  BP: 92/58 (08 Dec 2020 00:21) (85/46 - 94/51)  BP(mean): 55 (07 Dec 2020 23:03) (55 - 61)  RR: 18 (08 Dec 2020 00:21) (16 - 21)  SpO2: 97% (08 Dec 2020 00:21) (95% - 97%)  GENERAL: No acute distress, well-developed  HEAD:  Atraumatic, Normocephalic  EYES: EOMI, PERRLA, conjunctiva and sclera clear  ENT: NG tube in place in L nares, dried blood in R nares with cotton ball soaked in blood-removed without recurrence of bleed.   NECK: Neck supple  CHEST/LUNG: Clear to auscultation bilaterally; No wheeze, no rales, no rhonchi.    HEART: Regular rate and rhythm; No murmurs, rubs, or gallops  ABDOMEN: Soft, Nontender, Nondistended; Bowel sounds present  EXTREMITIES:  No clubbing, cyanosis, or edema  VASCULAR: Posterior tibialis pulses intact bilaterally  PSYCH: Normal behavior, normal affect  NEUROLOGY: AAOx3  SKIN: grossly warm and dry

## 2020-12-07 NOTE — ED ADULT NURSE NOTE - INTERVENTIONS DEFINITIONS
Stretcher in lowest position, wheels locked, appropriate side rails in place/Provide visual cue, wrist band, yellow gown, etc./[place] to call system/Non-slip footwear when patient is off stretcher

## 2020-12-07 NOTE — ED PROVIDER NOTE - DOMESTIC TRAVEL HIGH RISK QUESTION
"[facility]  CLINIC PROGRESS NOTE    Subjective:  Hypertension   Chasity Hodgson has had some improvement in her blood pressure since starting amlodipine at 2.5 mg daily.  She notices a bit swelling, but really bad.   Labs were last checked in August and were stable.  She is stressed at home still.  She is working on improving the stress.  Diabetes mellitus   She did have a recent change in her insulin dosing to reduce Trujeo by 10 units due to hypoglycemia.      Past medical history, medications, allergies, social history, family history reviewed and updated in [facility] as of 9/10/2019 .    ROS  CONSTITUTIONAL: no fatigue, no unexpected change in weight  SKIN: no worrisome rashes, no worrisome moles, no worrisome lesions  EYES: no acute vision problems or changes  ENT: no ear problems, no mouth problems, no throat problems  RESP: no significant cough, no shortness of breath  CV: no chest pain, no palpitations, no new or worsening peripheral edema  GI: no nausea, no vomiting, no constipation, no diarrhea  : no frequency, no dysuria, no hematuria  MS: chronic ankle pain and instability  PSYCHIATRIC: no changes in mood or affect      Objective:  Vitals  BP (!) 156/53 (BP Location: Left arm, Patient Position: Sitting, Cuff Size: Adult Regular)   Pulse 53   Temp 98.2  F (36.8  C) (Oral)   Ht 1.549 m (5' 1\")   Wt 95.8 kg (211 lb 3.2 oz)   SpO2 98%   Breastfeeding? No   BMI 39.91 kg/m    GEN: Alert Oriented x3 NAD  HEENT: Atraumatic, normocephalic,  NEURO: Gait and station normal , No focal neurologic deficits  PSYCH: Mood good, affect mood congruent    No images are attached to the encounter.    No results found for this or any previous visit (from the past 24 hour(s)).    Assessment/Plan:  Patient Instructions   (E11.9) Type 2 diabetes, HbA1c goal < 7% (H)  (primary encounter diagnosis)  Comment: we will try to order a continuous glucose monitoring to use at home and follow up in diabetic education  Plan: " Continuous Glucose Monitor KIT            Hypertension  Comment: blood pressure is better controlled today - we will continue with current medications.        Follow up in 1 month    15 minutes spent with patient.  Over 50% of time counseling     Disclaimer: This note consists of symbols derived from keyboarding, dictation and/or voice recognition software. As a result, there may be errors in the script that have gone undetected. Please consider this when interpreting information found in this chart.    Shola Benoit MD  [phone]        No

## 2020-12-07 NOTE — CONSULT NOTE ADULT - PROBLEM SELECTOR RECOMMENDATION 3
- Most recent baseline Cr ~ 2.5-3  - Follows with Dr. Rees as outpatient  - Continue to monitor Cr daily

## 2020-12-07 NOTE — H&P ADULT - NSHPLABSRESULTS_GEN_ALL_CORE
Labs personally reviewed:                        10.5   11.45 )-----------( 212      ( 07 Dec 2020 16:15 )             35.0       12-07    131<L>  |  98  |  109<H>  ----------------------------<  177<H>  5.1   |  18<L>  |  3.20<H>    Ca    10.6<H>      07 Dec 2020 16:15    TPro  7.9  /  Alb  4.0  /  TBili  1.5<H>  /  DBili  x   /  AST  31  /  ALT  30  /  AlkPhos  110  12-07    LIVER FUNCTIONS - ( 07 Dec 2020 16:15 )  Alb: 4.0 g/dL / Pro: 7.9 g/dL / ALK PHOS: 110 U/L / ALT: 30 U/L / AST: 31 U/L / GGT: x           PT/INR - ( 07 Dec 2020 16:15 )   PT: 23.9 sec;   INR: 2.06 ratio       PTT - ( 07 Dec 2020 16:15 )  PTT:40.8 sec    Per records, CT imaging @ OSH with likely SBO with findings concerning for possible bowel ischemia. CT of head with nasal fracture as well. Pending upload of images and CD not available at this time for my personal review so this is per ER/surgery notes    CXR-ng tube in place, central line in place    EKG A-sensed BiV pacing

## 2020-12-07 NOTE — ED PROVIDER NOTE - CLINICAL SUMMARY MEDICAL DECISION MAKING FREE TEXT BOX
75yr M hx of CHF on milrinone pump, apixaban, pacemaker, w recent fall in likely setting of SBO noted on CT, ? gut ischemia. noted to have systolic of 90s which is his baseline according to him. pump is low battery will transition to normal hospital pumps (3.2ml /hr) will consult cardiology, surgery notified , will see pt. check labs, including lactate. ekg non ischemic. will be admitted.

## 2020-12-08 NOTE — PROGRESS NOTE ADULT - PROBLEM SELECTOR PLAN 1
- cvO2 from Santo 72, lactate normal  - Discontinue NS mIVF at 50cc/hr after 24h ~1800 today  - Continue home milrinone 0.3 mcg/kg/min  - Continue to hold home torsemide 80mg BID and spironolactone 25mg daily with ARF  - Holding home Toprol XL 25 mg QD and Imdur 60mg qhs while NPO  - Please check daily CMP and lactate to trend perfusion  - Strict I/Os, daily standing weights (goal weight ~158 lbs).

## 2020-12-08 NOTE — PROGRESS NOTE ADULT - SUBJECTIVE AND OBJECTIVE BOX
Interval History:     Medications:  acetaminophen   Tablet .. 650 milliGRAM(s) Oral every 6 hours PRN  aMIOdarone    Tablet 200 milliGRAM(s) Oral daily  apixaban 5 milliGRAM(s) Oral two times a day  BACItracin   Ointment 1 Application(s) Topical two times a day  isosorbide   mononitrate ER Tablet (IMDUR) 60 milliGRAM(s) Oral at bedtime  levothyroxine 50 MICROGram(s) Oral daily  melatonin 3 milliGRAM(s) Oral at bedtime  metoprolol succinate ER 25 milliGRAM(s) Oral daily  milrinone Infusion 0.3 MICROgram(s)/kG/Min IV Continuous <Continuous>  sodium chloride 0.45% 1000 milliLiter(s) IV Continuous <Continuous>    Vitals:  T(C): 36.3 (12-08-20 @ 15:17), Max: 37.4 (12-07-20 @ 21:02)  HR: 94 (12-08-20 @ 15:17) (94 - 105)  BP: 102/64 (12-08-20 @ 15:17) (85/46 - 102/65)  BP(mean): 55 (12-07-20 @ 23:03) (55 - 61)  RR: 18 (12-08-20 @ 15:17) (16 - 19)  SpO2: 100% (12-08-20 @ 15:17) (95% - 100%)    Daily     Daily     Weight (kg): 73.8 (12-07 @ 15:57)    I&O's Summary    07 Dec 2020 07:01  -  08 Dec 2020 07:00  --------------------------------------------------------  IN: 0 mL / OUT: 550 mL / NET: -550 mL    Physical Exam:  Appearance: NG tube in place, draining bilious fluid, frail  HEENT: PERRL  Neck: JVP ~9cm  Cardiovascular: Normal S1 S2  Respiratory: No increased work of breathing  Gastrointestinal: Soft, distention improving  Skin: No cyanosis  Neurologic: Non-focal  Extremities: No LE edema, warm and well perfused throughout  Psychiatry: A & O x 3, Mood & affect appropriate    Labs:                        10.0   5.65  )-----------( 199      ( 08 Dec 2020 06:52 )             32.7     12-08    133<L>  |  98  |  122<H>  ----------------------------<  183<H>  5.5<H>   |  19<L>  |  4.49<H>    Ca    10.1      08 Dec 2020 12:38  Phos  5.6     12-08  Mg     2.9     12-08    TPro  7.1  /  Alb  3.7  /  TBili  1.4<H>  /  DBili  0.6<H>  /  AST  26  /  ALT  25  /  AlkPhos  94  12-08    PT/INR - ( 07 Dec 2020 16:15 )   PT: 23.9 sec;   INR: 2.06 ratio         PTT - ( 07 Dec 2020 16:15 )  PTT:40.8 sec

## 2020-12-08 NOTE — CONSULT NOTE ADULT - ASSESSMENT
Mr. Jarquin is a 75 Year-Old Gentleman with very significant cardiac history notable for NICM, LVEF < 20% (LVIDd 6.7 cm) s/p CRT-D, moderate-severe MR s/p MitraClip 8/2020, HTN, pAFib s/p DCCV 7/20/20 on Eliquis, CKD stage 3 (b/l SCr 1.7-2), DM 2 (A1c 6.1%) and anemia who presented after fall found to have Small bowel obstruction.     Plan  - No surgical intervention from Trauma Surgery  - No new injuries found on the tertiary exam. CT report of lung nodules was given to patient with recommendation to follow up with PCP.  - Recommend consult plastics for nasal fracture  - Plan discussed with Attending Dr. Chinchilla.     Reginaldo Slade MD  General Surgery, PGY 2   Mr. Jarquin is a 75 Year-Old Gentleman with very significant cardiac history notable for NICM, LVEF < 20% (LVIDd 6.7 cm) s/p CRT-D, moderate-severe MR s/p MitraClip 8/2020, HTN, pAFib s/p DCCV 7/20/20 on Eliquis, CKD stage 3 (b/l SCr 1.7-2), DM 2 (A1c 6.1%) and anemia who presented after fall found to have Small bowel obstruction. Fall likely d/t hypovolemia 2/2 decrease SBO and decreased PO intake vs cardiogenic. Patient also found to have EDIE and in acute on chronic heart failure with increase proBNP.     Plan  - No surgical intervention from Trauma Surgery  - No new injuries found on the tertiary exam. CT report of lung nodules was given to patient with recommendation to follow up with PCP.  - Recommend consult plastics for nasal fracture  - Plan discussed with Attending Dr. Chinchilla.     Reginaldo Slade MD  General Surgery, PGY 2   Mr. Jarquin is a 75 Year-Old Gentleman with very significant cardiac history notable for NICM, LVEF < 20% (LVIDd 6.7 cm) s/p CRT-D, moderate-severe MR s/p MitraClip 8/2020, HTN, pAFib s/p DCCV 7/20/20 on Eliquis, CKD stage 3 (b/l SCr 1.7-2), DM 2 (A1c 6.1%) and anemia who presented after fall found to have Small bowel obstruction. Fall likely d/t hypovolemia 2/2 decrease SBO and decreased PO intake vs cardiogenic. Patient also found to have EDIE and in acute on chronic heart failure with increase proBNP.     Plan  - No surgical intervention from Trauma Surgery  - No new injuries found on the tertiary exam. CT report of lung nodules was given to patient with recommendation to follow up with PCP.  - SBO/NGT management per [facility]  - Recommend consult plastics for nasal fracture  - Plan discussed with Attending Dr. Chinchilla.     Reginaldo Slade MD  General Surgery, PGY 2

## 2020-12-08 NOTE — CHART NOTE - NSCHARTNOTEFT_GEN_A_CORE
Spoke with plastics attending on call Dr. Jones about [facility] CT head for Mr. Jarquin: "Mildly displaced bilateral knees [nasal?] bone fractures are seen. Possible mildly displaced nasal septum fracture also noted." Dr. Jones discussed that it will be difficult to tell severity of bone fractures without the actual imaging. He also recommended for the patient's wounds be given time to set and the swelling to decrease before any plastics intervention. He suggested that patient follow up in his office a couple days after discharge at [facility], phone number [phone].    [facility] [address]

## 2020-12-08 NOTE — CHART NOTE - NSCHARTNOTEFT_GEN_A_CORE
Dialysis Consent   Thoroughly reviewed risks and benefits of RRT/HD with patient in ED who agrees to dialytic therapy if needed. All questions answered.  Witnessed by RN Sudeep.  Paper form consent obtained/signed and placed in ED chart

## 2020-12-08 NOTE — CONSULT NOTE ADULT - SUBJECTIVE AND OBJECTIVE BOX
[facility] DIVISION OF KIDNEY DISEASES AND HYPERTENSION   -- INITIAL CONSULT NOTE --  Cristina Ramírez, Nephrology Fellow     NS Pager: [phone] / HARESH Pager: [phone]  After 5pm or on weekend, please page the on-call fellow)  --------------------------------------------------------------------------------    HPI: This is a 75M PMHx NICM, LVEF on milronine (EF 10-15%, LVIDd 6.7 cm) s/p CRT-D, moderate-severe MR s/p MitraClip 8/2020, HTN, pAFib s/p DCCV 7/20/20 on Eliquis, CKD stage 3 (b/l SCr 2.5-3), DM2 who transfer from [facility] for further management SBO and mesenteric ischemia.  Patient report 3 days prior to admission started having abdominal pain which was accompanied by nausea, vomiting, decrease appetite.  In [facility] - pt found to have SBO and mesenteric ischemia on CT scan, s/p NGT w/ improve symptoms.  Recently seen in cardiac clinic (Dr. Anglin on 12/2), wherein diuretic lasix and aldactone held d/t elevated sCr and decreasing weight (184 to 149lb since Nov 2020 per notes).      Nephrology consulted for EDIE on CKD.  [facility] serum creatinine is 2.5-3.0 in 11/2020.  On admission sCr 3.2 on 12/7 then worsened to 4.3 on 12/8.  In ED pt given NS bolus 250 then started maintenance NS 50cc/hr.  Of note, patient recently hospitalized in [facility] in November 2020 for shock presumed cardiogenic, acute decompensated heart failure and EDIE on CKD.  During that hospitalization, on admission sCr 4.3 on 11/1/20, peaked 4.4 on 11/2/20 then improved 2.59 on 11/9 and discharged with 2.8 on 11/18/20.     PAST HISTORY  --------------------------------------------------------------------------------  PAST MEDICAL & SURGICAL HISTORY:  Hypothyroidism  Afib  Type II diabetes mellitus  Aortic insufficiency  Mitral insufficiency  CKD (chronic kidney disease)  Cardiomyopathy  Systolic CHF  Hypertension  History of tonsillectomy childhood  AICD (automatic cardioverter/defibrillator) present 8/2012    FAMILY HISTORY:  Family history of CVA    PAST SOCIAL HISTORY:  ALLERGIES & MEDICATIONS  --------------------------------------------------------------------------------  Allergies    No Known Allergies    Intolerances      Standing Inpatient Medications  aMIOdarone    Tablet 200 milliGRAM(s) Oral daily  apixaban 5 milliGRAM(s) Oral two times a day  isosorbide   mononitrate ER Tablet (IMDUR) 60 milliGRAM(s) Oral at bedtime  levothyroxine 50 MICROGram(s) Oral daily  melatonin 3 milliGRAM(s) Oral at bedtime  metoprolol succinate ER 25 milliGRAM(s) Oral daily  milrinone Infusion 0.3 MICROgram(s)/kG/Min IV Continuous <Continuous>  sodium chloride 0.9%. 1000 milliLiter(s) IV Continuous <Continuous>    PRN Inpatient Medications  acetaminophen   Tablet .. 650 milliGRAM(s) Oral every 6 hours PRN    REVIEW OF SYSTEMS    VITALS/PHYSICAL EXAM  --------------------------------------------------------------------------------  T(C): 36.8 (12-08-20 @ 08:39), Max: 37.4 (12-07-20 @ 21:02)  HR: 99 (12-08-20 @ 08:39) (98 - 105)  BP: 102/65 (12-08-20 @ 08:39) (85/46 - 102/65)  RR: 19 (12-08-20 @ 08:39) (16 - 21)  SpO2: 100% (12-08-20 @ 08:39) (95% - 100%)  Wt(kg): --  Height (cm): 175.3 (12-07-20 @ 15:10)  Weight (kg): 73.8 (12-07-20 @ 15:57)  BMI (kg/m2): 24 (12-07-20 @ 15:57)  BSA (m2): 1.89 (12-07-20 @ 15:57)      12-07-20 @ 07:01  -  12-08-20 @ 07:00  --------------------------------------------------------  IN: 0 mL / OUT: 550 mL / NET: -550 mL    Physical Exam:      LABS/STUDIES  --------------------------------------------------------------------------------                10.0   5.65  >-----------<  199      [12-08-20 @ 06:52]              32.7     132  |  97  |  124  ----------------------------<  189      [12-08-20 @ 06:52]  5.7   |  19  |  4.37        Ca     10.5     [12-08-20 @ 06:52]      Mg     2.9     [12-08-20 @ 06:52]      Phos  5.6     [12-08-20 @ 06:52]    TPro  7.9  /  Alb  4.0  /  TBili  1.5  /  DBili  x   /  AST  31  /  ALT  30  /  AlkPhos  110  [12-07-20 @ 16:15]    PT/INR: PT 23.9 , INR 2.06       [12-07-20 @ 16:15]  PTT: 40.8       [12-07-20 @ 16:15]      Creatinine Trend:  SCr 4.37 [12-08 @ 06:52]  SCr 3.20 [12-07 @ 16:15]  SCr 2.89 [11-18 @ 06:16]  SCr 2.64 [11-17 @ 05:51]  SCr 2.99 [11-13 @ 05:19]    Urinalysis - [11-05-20 @ 09:42]      Color Light Yellow / Appearance Clear / SG 1.010 / pH 6.5      Gluc Negative / Ketone Negative  / Bili Negative / Urobili Negative       Blood Trace / Protein Negative / Leuk Est Negative / Nitrite Negative      RBC 2 / WBC 1 / Hyaline 0 / Gran  / Sq Epi  / Non Sq Epi 0 / Bacteria Negative      Iron 36, TIBC 314, %sat 12      [09-08-20 @ 08:45]  Ferritin 145      [09-08-20 @ 08:45]  Vitamin D (25OH) 21.7      [07-18-20 @ 10:13]  TSH 8.61      [11-01-20 @ 20:16]  Lipid: chol 116, TG 55, HDL 48, LDL 58      [08-26-20 @ 00:17]    HBsAb Nonreact      [08-26-20 @ 02:02]  HBsAg Nonreact      [08-26-20 @ 02:02]  HBcAb Nonreact      [08-26-20 @ 02:02]  HCV 0.10, Nonreact      [08-26-20 @ 02:02]    Rheumatoid Factor <10      [08-26-20 @ 00:17]  Syphilis Screen (Treponema Pallidum Ab) Negative      [08-26-20 @ 04:26]  Free Light Chains: kappa 6.27, lambda 4.60, ratio = 1.36      [08-26 @ 04:46]  Immunofixation Serum: No Monoclonal Band Identified    Reference Range: None Detected      [08-26-20 @ 04:46]  SPEP Interpretation: Normal Electrophoresis Pattern      [08-26-20 @ 04:46]  Immunofixation Urine: See Note      [07-17-20 @ 07:37]  UPEP Interpretation: See Note      [07-17-20 @ 07:37]   [facility] DIVISION OF KIDNEY DISEASES AND HYPERTENSION   -- INITIAL CONSULT NOTE --  Cristina Ramírez, Nephrology Fellow     NS Pager: [phone] / HARESH Pager: [phone]  After 5pm or on weekend, please page the on-call fellow)  --------------------------------------------------------------------------------    HPI: This is a 75M PMHx NICM, LVEF on milronine (EF 10-15%, LVIDd 6.7 cm) s/p CRT-D, moderate-severe MR s/p MitraClip 8/2020, HTN, pAFib s/p DCCV 7/20/20 on Eliquis, CKD stage 4 (b/l SCr 2.5-3), DM2 who transfer from [facility] for further management SBO and mesenteric ischemia.  Patient report 3 days prior to admission started having abdominal pain which was accompanied by nausea, vomiting, decrease appetite.  On day of admission, pt fall at home and trying get up felt nausea. In [facility] - pt found to have SBO and mesenteric ischemia on CT scan, s/p NGT w/ improve symptoms.  Recently seen in cardiac clinic (Dr. Anglin on 12/2), wherein diuretic lasix and aldactone held d/t elevated sCr and decreasing weight (184 to 149lb since Nov 2020 per notes).    Nephrology consulted for EDIE on CKD.  Pt has well known history of CKD for over 30 years likely secondary cardiorenal syndrome/HF related and/or diabetes with baseline serum creatinine 1.7-2.0 in 2019, more recently baseline sCr 2.5-3.0 in November 2020 noted in [facility].  On admission sCr 3.2 on 12/7 then worsened to 4.3 on 12/8.  In ED pt given NS bolus 250 then started maintenance NS 50cc/hr.  Of note, patient recently hospitalized in [facility] in November 2020 for shock presumed cardiogenic, acute decompensated heart failure and EDIE on CKD.  During that hospitalization, on admission sCr 4.3 on 11/1/20, peaked 4.4 on 11/2/20 then improved 2.59 on 11/9 and discharged with 2.8 on 11/18/20.     PAST HISTORY  --------------------------------------------------------------------------------  PAST MEDICAL & SURGICAL HISTORY:  Hypothyroidism  Afib  Type II diabetes mellitus  Aortic insufficiency  Mitral insufficiency  CKD (chronic kidney disease)  Cardiomyopathy  Systolic CHF  Hypertension  History of tonsillectomy childhood  AICD (automatic cardioverter/defibrillator) present 8/2012    FAMILY HISTORY:  Family history of CVA    PAST SOCIAL HISTORY:  ALLERGIES & MEDICATIONS  --------------------------------------------------------------------------------  Allergies  No Known Allergies    Intolerances    Standing Inpatient Medications  aMIOdarone    Tablet 200 milliGRAM(s) Oral daily  apixaban 5 milliGRAM(s) Oral two times a day  isosorbide   mononitrate ER Tablet (IMDUR) 60 milliGRAM(s) Oral at bedtime  levothyroxine 50 MICROGram(s) Oral daily  melatonin 3 milliGRAM(s) Oral at bedtime  metoprolol succinate ER 25 milliGRAM(s) Oral daily  milrinone Infusion 0.3 MICROgram(s)/kG/Min IV Continuous <Continuous>  sodium chloride 0.9%. 1000 milliLiter(s) IV Continuous <Continuous>    PRN Inpatient Medications  acetaminophen   Tablet .. 650 milliGRAM(s) Oral every 6 hours PRN    REVIEW OF SYSTEMS  Gen: no fever, chills, weakness  Respiratory: No dyspnea, cough  CV: No chest pain, orthopnea  GI: + abdominal pain, nausea, vomiting. no diarrhea  MSK: no edema  Neuro: + dizziness, lightheadedness  All other systems were reviewed and are negative, except as noted.    VITALS/PHYSICAL EXAM  --------------------------------------------------------------------------------  T(C): 36.8 (12-08-20 @ 08:39), Max: 37.4 (12-07-20 @ 21:02)  HR: 99 (12-08-20 @ 08:39) (98 - 105)  BP: 102/65 (12-08-20 @ 08:39) (85/46 - 102/65)  RR: 19 (12-08-20 @ 08:39) (16 - 21)  SpO2: 100% (12-08-20 @ 08:39) (95% - 100%)  Wt(kg): --  Height (cm): 175.3 (12-07-20 @ 15:10)  Weight (kg): 73.8 (12-07-20 @ 15:57)  BMI (kg/m2): 24 (12-07-20 @ 15:57)  BSA (m2): 1.89 (12-07-20 @ 15:57)    12-07-20 @ 07:01  -  12-08-20 @ 07:00  --------------------------------------------------------  IN: 0 mL / OUT: 550 mL / NET: -550 mL    Physical Exam:  	Gen: NAD, well-appearing on room air  	HEENT: dry mucous membrane, +NGT in place  	Pulm: CTA B/L, no crackles  	CV: RRR, S1S2; no rub/murmur  	GI: +BS, soft, nontender/nondistended  	: No suprapubic tenderness  	MSK: Warm, no edema, no asterixis              Neuro: AAOx3  	Psych: Normal affect and mood  	Skin: Warm    LABS/STUDIES  --------------------------------------------------------------------------------              10.0  5.65  >-----------<  199      [12-08-20 @ 06:52]              32.7     132  |  97  |  124  ----------------------------<  189      [12-08-20 @ 06:52]  5.7   |  19  |  4.37        Ca     10.5     [12-08-20 @ 06:52]      Mg     2.9     [12-08-20 @ 06:52]      Phos  5.6     [12-08-20 @ 06:52]    TPro  7.9  /  Alb  4.0  /  TBili  1.5  /  DBili  x   /  AST  31  /  ALT  30  /  AlkPhos  110  [12-07-20 @ 16:15]    PT/INR: PT 23.9 , INR 2.06       [12-07-20 @ 16:15]  PTT: 40.8       [12-07-20 @ 16:15]    Creatinine Trend:  SCr 4.37 [12-08 @ 06:52]  SCr 3.20 [12-07 @ 16:15]  SCr 2.89 [11-18 @ 06:16]  SCr 2.64 [11-17 @ 05:51]  SCr 2.99 [11-13 @ 05:19]    Urinalysis - [11-05-20 @ 09:42]      Color Light Yellow / Appearance Clear / SG 1.010 / pH 6.5      Gluc Negative / Ketone Negative  / Bili Negative / Urobili Negative       Blood Trace / Protein Negative / Leuk Est Negative / Nitrite Negative      RBC 2 / WBC 1 / Hyaline 0 / Gran  / Sq Epi  / Non Sq Epi 0 / Bacteria Negative    Iron 36, TIBC 314, %sat 12      [09-08-20 @ 08:45]  Ferritin 145      [09-08-20 @ 08:45]  Vitamin D (25OH) 21.7      [07-18-20 @ 10:13]  TSH 8.61      [11-01-20 @ 20:16]  Lipid: chol 116, TG 55, HDL 48, LDL 58      [08-26-20 @ 00:17]    HBsAb Nonreact      [08-26-20 @ 02:02]  HBsAg Nonreact      [08-26-20 @ 02:02]  HBcAb Nonreact      [08-26-20 @ 02:02]  HCV 0.10, Nonreact      [08-26-20 @ 02:02]    Rheumatoid Factor <10      [08-26-20 @ 00:17]  Syphilis Screen (Treponema Pallidum Ab) Negative      [08-26-20 @ 04:26]  Free Light Chains: kappa 6.27, lambda 4.60, ratio = 1.36      [08-26 @ 04:46]  Immunofixation Serum: No Monoclonal Band Identified    Reference Range: None Detected      [08-26-20 @ 04:46]  SPEP Interpretation: Normal Electrophoresis Pattern      [08-26-20 @ 04:46]  Immunofixation Urine: See Note      [07-17-20 @ 07:37]  UPEP Interpretation: See Note      [07-17-20 @ 07:37]   [facility] DIVISION OF KIDNEY DISEASES AND HYPERTENSION   -- INITIAL CONSULT NOTE --  Cristina Ramírez, Nephrology Fellow     NS Pager: [phone] / HARESH Pager: [phone]  After 5pm or on weekend, please page the on-call fellow)  --------------------------------------------------------------------------------    HPI: This is a 75M PMHx NICM, LVEF on milronine (EF 20%, LVIDd 6.7 cm) s/p CRT-D, moderate-severe MR s/p MitraClip 8/2020, HTN, pAFib s/p DCCV 7/20/20 on Eliquis, CKD stage 4 (b/l SCr 2.5-3), DM2 who transfer from [facility] for further management SBO and mesenteric ischemia.  Patient report 3 days prior to admission started having abdominal pain which was accompanied by nausea, vomiting, decrease appetite.  On day of admission, pt fall at home and trying get up felt nausea. In [facility] - pt found to have SBO and mesenteric ischemia on CT scan, s/p NGT w/ improve symptoms.  Recently seen in cardiac clinic (Dr. Anglin on 12/2), wherein diuretic lasix and aldactone held d/t elevated sCr and decreasing weight (184 to 149lb since Nov 2020 per notes).    Nephrology consulted for EDIE on CKD.  Pt has well known history of CKD for over 30 years likely secondary cardiorenal syndrome/HF related and/or diabetes with baseline serum creatinine 1.7-2.0 in 2019, more recently baseline sCr 2.5-3.0 in November 2020 noted in [facility].  On admission sCr 3.2 on 12/7 then worsened to 4.3 on 12/8.  In ED pt given NS bolus 250 then started maintenance NS 50cc/hr.  Of note, patient recently hospitalized in [facility] in November 2020 for shock presumed cardiogenic, acute decompensated heart failure and EDIE on CKD.  During that hospitalization, on admission sCr 4.3 on 11/1/20, peaked 4.4 on 11/2/20 then improved 2.59 on 11/9 and discharged with 2.8 on 11/18/20.     PAST HISTORY  --------------------------------------------------------------------------------  PAST MEDICAL & SURGICAL HISTORY:  Hypothyroidism  Afib  Type II diabetes mellitus  Aortic insufficiency  Mitral insufficiency  CKD (chronic kidney disease)  Cardiomyopathy  Systolic CHF  Hypertension  History of tonsillectomy childhood  AICD (automatic cardioverter/defibrillator) present 8/2012    FAMILY HISTORY:  Family history of CVA    PAST SOCIAL HISTORY:  ALLERGIES & MEDICATIONS  --------------------------------------------------------------------------------  Allergies  No Known Allergies    Intolerances    Standing Inpatient Medications  aMIOdarone    Tablet 200 milliGRAM(s) Oral daily  apixaban 5 milliGRAM(s) Oral two times a day  isosorbide   mononitrate ER Tablet (IMDUR) 60 milliGRAM(s) Oral at bedtime  levothyroxine 50 MICROGram(s) Oral daily  melatonin 3 milliGRAM(s) Oral at bedtime  metoprolol succinate ER 25 milliGRAM(s) Oral daily  milrinone Infusion 0.3 MICROgram(s)/kG/Min IV Continuous <Continuous>  sodium chloride 0.9%. 1000 milliLiter(s) IV Continuous <Continuous>    PRN Inpatient Medications  acetaminophen   Tablet .. 650 milliGRAM(s) Oral every 6 hours PRN    REVIEW OF SYSTEMS  Gen: no fever, chills, weakness  Respiratory: No dyspnea, cough  CV: No chest pain, orthopnea  GI: + abdominal pain, nausea, vomiting. no diarrhea  MSK: no edema  Neuro: + dizziness, lightheadedness  All other systems were reviewed and are negative, except as noted.    VITALS/PHYSICAL EXAM  --------------------------------------------------------------------------------  T(C): 36.8 (12-08-20 @ 08:39), Max: 37.4 (12-07-20 @ 21:02)  HR: 99 (12-08-20 @ 08:39) (98 - 105)  BP: 102/65 (12-08-20 @ 08:39) (85/46 - 102/65)  RR: 19 (12-08-20 @ 08:39) (16 - 21)  SpO2: 100% (12-08-20 @ 08:39) (95% - 100%)  Wt(kg): --  Height (cm): 175.3 (12-07-20 @ 15:10)  Weight (kg): 73.8 (12-07-20 @ 15:57)  BMI (kg/m2): 24 (12-07-20 @ 15:57)  BSA (m2): 1.89 (12-07-20 @ 15:57)    12-07-20 @ 07:01  -  12-08-20 @ 07:00  --------------------------------------------------------  IN: 0 mL / OUT: 550 mL / NET: -550 mL    Physical Exam:  	Gen: NAD, well-appearing on room air  	HEENT: dry mucous membrane, +NGT in place  	Pulm: CTA B/L, no crackles  	CV: RRR, S1S2; no rub/murmur  	GI: +BS, soft, nontender/nondistended  	: No suprapubic tenderness  	MSK: Warm, no edema, no asterixis              Neuro: AAOx3  	Psych: Normal affect and mood  	Skin: Warm    LABS/STUDIES  --------------------------------------------------------------------------------              10.0  5.65  >-----------<  199      [12-08-20 @ 06:52]              32.7     132  |  97  |  124  ----------------------------<  189      [12-08-20 @ 06:52]  5.7   |  19  |  4.37        Ca     10.5     [12-08-20 @ 06:52]      Mg     2.9     [12-08-20 @ 06:52]      Phos  5.6     [12-08-20 @ 06:52]    TPro  7.9  /  Alb  4.0  /  TBili  1.5  /  DBili  x   /  AST  31  /  ALT  30  /  AlkPhos  110  [12-07-20 @ 16:15]    PT/INR: PT 23.9 , INR 2.06       [12-07-20 @ 16:15]  PTT: 40.8       [12-07-20 @ 16:15]    Creatinine Trend:  SCr 4.37 [12-08 @ 06:52]  SCr 3.20 [12-07 @ 16:15]  SCr 2.89 [11-18 @ 06:16]  SCr 2.64 [11-17 @ 05:51]  SCr 2.99 [11-13 @ 05:19]    Urinalysis - [11-05-20 @ 09:42]      Color Light Yellow / Appearance Clear / SG 1.010 / pH 6.5      Gluc Negative / Ketone Negative  / Bili Negative / Urobili Negative       Blood Trace / Protein Negative / Leuk Est Negative / Nitrite Negative      RBC 2 / WBC 1 / Hyaline 0 / Gran  / Sq Epi  / Non Sq Epi 0 / Bacteria Negative    Iron 36, TIBC 314, %sat 12      [09-08-20 @ 08:45]  Ferritin 145      [09-08-20 @ 08:45]  Vitamin D (25OH) 21.7      [07-18-20 @ 10:13]  TSH 8.61      [11-01-20 @ 20:16]  Lipid: chol 116, TG 55, HDL 48, LDL 58      [08-26-20 @ 00:17]    HBsAb Nonreact      [08-26-20 @ 02:02]  HBsAg Nonreact      [08-26-20 @ 02:02]  HBcAb Nonreact      [08-26-20 @ 02:02]  HCV 0.10, Nonreact      [08-26-20 @ 02:02]    Rheumatoid Factor <10      [08-26-20 @ 00:17]  Syphilis Screen (Treponema Pallidum Ab) Negative      [08-26-20 @ 04:26]  Free Light Chains: kappa 6.27, lambda 4.60, ratio = 1.36      [08-26 @ 04:46]  Immunofixation Serum: No Monoclonal Band Identified    Reference Range: None Detected      [08-26-20 @ 04:46]  SPEP Interpretation: Normal Electrophoresis Pattern      [08-26-20 @ 04:46]  Immunofixation Urine: See Note      [07-17-20 @ 07:37]  UPEP Interpretation: See Note      [07-17-20 @ 07:37]

## 2020-12-08 NOTE — PROGRESS NOTE ADULT - ASSESSMENT
75 year old M with longstanding history of NICM, LVEF < 20% (LVIDd 6.7 cm) s/p CRT-D, moderate-severe MR s/p MitraClip 8/2020, HTN, pAFib s/p DCCV 7/20/20 on Eliquis, CKD stage 3 (b/l SCr 1.7-2), DM 2 (A1c 6.1%) and anemia who presented as transfer from OSH in the setting of presyncope/syncope, found to have SBO now s/p NGT for conservative management. Heart failure consulted for additional evaluation and management.  Patient with increased volume status after IVF resuscitation, would discontinue given anuric renal status currently.    Relevant studies:  Echo:    11/1/20 TTE: LVEF 10-15%, LV 7cm, LVOT VIT 7cm, severe RV dysfunction, severly dilated atria, mild MR, min AI, severe TR, est RAP 10mmHg, est RVSP 41 mm Hg    Cardiac Cath:    9/15 (milrinone 0.25 mcg/kg/mi): CVP 5 PA 47/13 PCW 13, SVC saturation 68% with Corey CO/CI 5.8/3.1 and TD CO/CI 7/3.7. MAP 70 mmHg

## 2020-12-08 NOTE — PROGRESS NOTE ADULT - PROBLEM SELECTOR PLAN 3
- Most recent baseline Cr ~ 2.5-3, follows with Dr. Rees as outpatient  - Concern for prerenal ATN with current oliguria/anuria, nephrology following  - Continue to monitor Cr BID

## 2020-12-08 NOTE — CONSULT NOTE ADULT - ATTENDING COMMENTS
Low energy fall  marked underlying medical condition of severe heart failure   imaging results show low impact injury to face / nasal bone - patient is breathing comfortable and no obvious facial deformity  focus of care is on medical management of heart failure

## 2020-12-08 NOTE — CONSULT NOTE ADULT - SUBJECTIVE AND OBJECTIVE BOX
TRAUMA SERVICE (Acute Care Surgery / ACS - #[id number]) - CONSULT NOTE  --------------------------------------------------------------------------------------------  HPI:  Mr. Jarquin is a 75 year old M with longstanding history of NICM, LVEF < 20% (LVIDd 6.7 cm) s/p CRT-D, moderate-severe MR s/p MitraClip 8/2020, HTN, pAFib s/p DCCV 7/20/20 on Eliquis, CKD stage 3 (b/l SCr 1.7-2), DM 2 (A1c 6.1%) and anemia who presented as transfer from OSH in the setting of presyncope/syncope, found to have SBO now s/p NGT for conservative management. Heart failure consulted for additional evaluation and management.  Per patient, started having worsening abdominal pain three days ago, with decreased appetite and emesis on day of admission. Still with bowel movements, denies changes in diet. No history of intra-abdominal surgery, has never had similar symptoms in past. Still was compliant with cardiac medications, but couldn't take any this morning. Was going to bathroom this morning and felt lightheaded leading to fall, denies loss of consciousness fell on the left side and had epistasis afterwards. Taken to [facility], CT head and spine found nasal bone fracture (note in chart). CT abdomen/pelvis notable for SBO and mesenteric ischemia NGT placed, and transferred to [facility] for additional care. Here in the ED patient received CT abdomen which demonstrated SBO with transition point at the terminal ileum without mesentry ischemia. Of note Patient was first seen by HF 8/2020 for ADHF requiring milrinone assisted diuresis, declined for LVAD due to frailty and CKD. Was admitted again 11/1-11/18 for ADHF, when GOC changed patient to DNR/DNI, discharged home with milrinone gtt at 0.3mcg/kg/min, on torsemide 80mg BID. Patient follows with Dr. Anglin as outpatient, last seen 12/2/20 in clinic. Was noted with continued weight loss (157 lbs to 149 lbs from 184 lbs from discharge) and elevated Cr, decision made to hold home torsemide and Aldactone. Since then, patient reports continued increased urine output. Was feeling stronger on milrinone since discharge until recent illness. [facility] Surgery consulted for evaluation of reported Small bowel obstruction. Patient without abdominal surgical history. Currently states that he has no abdominal pain. Nasogastric tube in place with bilious output. Passing gas (last one this AM), had bowel movement last yesterday AM, states that he has had continued bowel function throughout the last few days.    ROS: 10-system review is otherwise negative except HPI above.      PAST MEDICAL & SURGICAL HISTORY:  Hypothyroidism  Afib  Type II diabetes mellitus  Aortic insufficiency  Mitral insufficiency  CKD (chronic kidney disease)  Cardiomyopathy  Systolic CHF  Hypertension  History of tonsillectomy  childhood  AICD (automatic cardioverter/defibrillator) present  8/2012    FAMILY HISTORY:  Family history of CVA    SOCIAL HISTORY:    Lives in same house with niece on a different floor who is HCP  Denies smoking, drinking, drug use. Ambulates.    ALLERGIES: No Known Allergies    HOME MEDICATIONS:     CURRENT MEDICATIONS  MEDICATIONS (STANDING): aMIOdarone    Tablet 200 milliGRAM(s) Oral daily  apixaban 5 milliGRAM(s) Oral two times a day  isosorbide   mononitrate ER Tablet (IMDUR) 60 milliGRAM(s) Oral at bedtime  levothyroxine 50 MICROGram(s) Oral daily  melatonin 3 milliGRAM(s) Oral at bedtime  metoprolol succinate ER 25 milliGRAM(s) Oral daily  milrinone Infusion 0.3 MICROgram(s)/kG/Min IV Continuous <Continuous>  sodium chloride 0.9%. 1000 milliLiter(s) IV Continuous <Continuous>    MEDICATIONS (PRN):acetaminophen   Tablet .. 650 milliGRAM(s) Oral every 6 hours PRN Mild Pain (1 - 3)  --------------------------------------------------------------------------------------------    Vitals:   T(C): 36.8 (12-08-20 @ 08:39), Max: 37.4 (12-07-20 @ 21:02)  HR: 99 (12-08-20 @ 08:39) (98 - 105)  BP: 102/65 (12-08-20 @ 08:39) (85/46 - 102/65)  RR: 19 (12-08-20 @ 08:39) (16 - 21)  SpO2: 100% (12-08-20 @ 08:39) (95% - 100%)  CAPILLARY BLOOD GLUCOSE      POCT Blood Glucose.: 218 mg/dL (08 Dec 2020 06:49)  CAPILLARY BLOOD GLUCOSE    POCT Blood Glucose.: 218 mg/dL (08 Dec 2020 06:49)    12-07 @ 07:01  -  12-08 @ 07:00  --------------------------------------------------------  IN:  Total IN: 0 mL    OUT:  Nasogastric/Oral tube (mL): 550 mL  Total OUT: 550 mL    Total NET: -550 mL    Height (cm): 175.3 (12-07 @ 15:10)  Weight (kg): 73.8 (12-07 @ 15:57)  BMI (kg/m2): 24 (12-07 @ 15:57)  BSA (m2): 1.89 (12-07 @ 15:57)    PHYSICAL EXAM:   General: NAD  HEENT: Normocephalic, left side superficial abrasions over the superior orbital rim and nose  Neck: Soft, midline trachea.  Chest: No chest wall tenderness.   Cardiac: Tachy, no edema  Respiratory: Bilateral breath sounds, clear and equal bilaterally  Abdomen: Soft, non-distended, non-tender.  Pelvis: Stable, non-tender, no ecchymosis  Ext: palp radial b/l UE, b/l DP palp in Lower Extrem.   Back: no TTP, no palpable runoff/stepoff/deformity  MSK: moves extremities without issues  --------------------------------------------------------------------------------------------    LABS  CBC (12-08 @ 06:52)                              10.0<L>                         5.65    )----------------(  199        80.1<H>% Neutrophils, 6.5<L>% Lymphocytes, ANC: 4.52                                32.7<L>  CBC (12-07 @ 16:15)                              10.5<L>                         11.45<H>  )----------------(  212        88.2<H>% Neutrophils, 2.5<L>% Lymphocytes, ANC: 10.10<H>                              35.0<L>    BMP (12-08 @ 12:38)             133<L>  |  98      |  122<H>		Ca++ --      Ca 10.1               ---------------------------------( 183<H>		Mg --                 5.5<H>  |  19<L>   |  4.49<H>			Ph --      BMP (12-08 @ 06:52)             132<L>  |  97      |  124<H>		Ca++ --      Ca 10.5               ---------------------------------( 189<H>		Mg 2.9<H>             5.7<H>  |  19<L>   |  4.37<H>			Ph 5.6<H>    LFTs (12-08 @ 12:38)      TPro 7.1 / Alb 3.7 / TBili 1.4<H> / DBili 0.6<H> / AST 26 / ALT 25 / AlkPhos 94  LFTs (12-07 @ 16:15)      TPro 7.9 / Alb 4.0 / TBili 1.5<H> / DBili -- / AST 31 / ALT 30 / AlkPhos 110    Coags (12-07 @ 16:15)  aPTT 40.8<H> / INR 2.06<H> / PT 23.9<H>        VBG (12-08 @ 12:38)     7.32<L> / 42 / 44 / 21 / -4.0<L> / 72%     Lactate: 1.4  VBG (12-07 @ 16:15)     7.34<L> / 41 / 33 / 22 / -3.4<L> / 56<L>%     Lactate: 2.0    --------------------------------------------------------------------------------------------    MICROBIOLOGY      --------------------------------------------------------------------------------------------    IMAGING  [facility] CT scan  Impression in chart:  CT head: "Mildly displaced bilateral knees [nasal?] bone fractures are seen. Possible mildly displaced nasal septum fracture also noted"   < from: CT Abdomen and Pelvis w/ Oral Cont (12.08.20 @ 09:51) >  IMPRESSION:  Small bowel obstruction with transition point at the terminal ileum.  Nodular opacities in the lower lobes of uncertain etiology.  Right gluteal intramuscular hematoma.  < end of copied text >    --------------------------------------------------------------------------------------------     TRAUMA SERVICE (Acute Care Surgery / ACS - #9044) - CONSULT NOTE  --------------------------------------------------------------------------------------------  HPI:  Mr. Jarquin is a 75 year old M with longstanding history of NICM, LVEF < 20% (LVIDd 6.7 cm) s/p CRT-D, moderate-severe MR s/p MitraClip 8/2020, HTN, pAFib s/p DCCV 7/20/20 on Eliquis, CKD stage 3 (b/l SCr 1.7-2), DM 2 (A1c 6.1%) and anemia who presented as transfer from OSH in the setting of presyncope/syncope, found to have SBO now s/p NGT for conservative management. Heart failure consulted for additional evaluation and management.  Per patient, started having worsening abdominal pain three days ago, with decreased appetite and emesis on day of admission. Still with bowel movements, denies changes in diet. No history of intra-abdominal surgery, has never had similar symptoms in past. Still was compliant with cardiac medications, but couldn't take any this morning. Was going to bathroom this morning and felt lightheaded leading to fall, denies loss of consciousness fell on the left side and had epistasis afterwards. Taken to [facility], CT head and spine found nasal bone fracture (note in chart). CT abdomen/pelvis notable for SBO and mesenteric ischemia NGT placed, and transferred to [facility] for additional care. Here in the ED patient received CT abdomen which demonstrated SBO with transition point at the terminal ileum without mesentry ischemia. Of note Patient was first seen by HF 8/2020 for ADHF requiring milrinone assisted diuresis, declined for LVAD due to frailty and CKD. Was admitted again 11/1-11/18 for ADHF, when GOC changed patient to DNR/DNI, discharged home with milrinone gtt at 0.3mcg/kg/min, on torsemide 80mg BID. Patient follows with Dr. Anglin as outpatient, last seen 12/2/20 in clinic. Was noted with continued weight loss (157 lbs to 149 lbs from 184 lbs from discharge) and elevated Cr, decision made to hold home torsemide and Aldactone. Since then, patient reports continued increased urine output. Was feeling stronger on milrinone since discharge until recent illness. Green Surgery consulted for evaluation of reported Small bowel obstruction. Patient without abdominal surgical history. Currently states that he has no abdominal pain. Nasogastric tube in place with bilious output. Passing gas (last one this AM), had bowel movement last yesterday AM, states that he has had continued bowel function throughout the last few days.    ROS: 10-system review is otherwise negative except HPI above.      PAST MEDICAL & SURGICAL HISTORY:  Hypothyroidism  Afib  Type II diabetes mellitus  Aortic insufficiency  Mitral insufficiency  CKD (chronic kidney disease)  Cardiomyopathy  Systolic CHF  Hypertension  History of tonsillectomy  childhood  AICD (automatic cardioverter/defibrillator) present  8/2012    FAMILY HISTORY:  Family history of CVA    SOCIAL HISTORY:    Lives in same house with niece on a different floor who is HCP  Denies smoking, drinking, drug use. Ambulates.    ALLERGIES: No Known Allergies    HOME MEDICATIONS:     CURRENT MEDICATIONS  MEDICATIONS (STANDING): aMIOdarone    Tablet 200 milliGRAM(s) Oral daily  apixaban 5 milliGRAM(s) Oral two times a day  isosorbide   mononitrate ER Tablet (IMDUR) 60 milliGRAM(s) Oral at bedtime  levothyroxine 50 MICROGram(s) Oral daily  melatonin 3 milliGRAM(s) Oral at bedtime  metoprolol succinate ER 25 milliGRAM(s) Oral daily  milrinone Infusion 0.3 MICROgram(s)/kG/Min IV Continuous <Continuous>  sodium chloride 0.9%. 1000 milliLiter(s) IV Continuous <Continuous>    MEDICATIONS (PRN):acetaminophen   Tablet .. 650 milliGRAM(s) Oral every 6 hours PRN Mild Pain (1 - 3)  --------------------------------------------------------------------------------------------    Vitals:   T(C): 36.8 (12-08-20 @ 08:39), Max: 37.4 (12-07-20 @ 21:02)  HR: 99 (12-08-20 @ 08:39) (98 - 105)  BP: 102/65 (12-08-20 @ 08:39) (85/46 - 102/65)  RR: 19 (12-08-20 @ 08:39) (16 - 21)  SpO2: 100% (12-08-20 @ 08:39) (95% - 100%)  CAPILLARY BLOOD GLUCOSE      POCT Blood Glucose.: 218 mg/dL (08 Dec 2020 06:49)  CAPILLARY BLOOD GLUCOSE    POCT Blood Glucose.: 218 mg/dL (08 Dec 2020 06:49)    12-07 @ 07:01  -  12-08 @ 07:00  --------------------------------------------------------  IN:  Total IN: 0 mL    OUT:  Nasogastric/Oral tube (mL): 550 mL  Total OUT: 550 mL    Total NET: -550 mL    Height (cm): 175.3 (12-07 @ 15:10)  Weight (kg): 73.8 (12-07 @ 15:57)  BMI (kg/m2): 24 (12-07 @ 15:57)  BSA (m2): 1.89 (12-07 @ 15:57)    PHYSICAL EXAM:   General: NAD  HEENT: Normocephalic, left side superficial abrasions over the superior orbital rim and nose  Neck: Soft, midline trachea.  Chest: No chest wall tenderness.   Cardiac: Tachy, no edema  Respiratory: Bilateral breath sounds, clear and equal bilaterally  Abdomen: Soft, non-distended, non-tender.  Pelvis: Stable, non-tender, no ecchymosis  Ext: palp radial b/l UE, b/l DP palp in Lower Extrem.   Back: no TTP, no palpable runoff/stepoff/deformity  MSK: moves extremities without issues  --------------------------------------------------------------------------------------------    LABS  CBC (12-08 @ 06:52)                              10.0<L>                         5.65    )----------------(  199        80.1<H>% Neutrophils, 6.5<L>% Lymphocytes, ANC: 4.52                                32.7<L>  CBC (12-07 @ 16:15)                              10.5<L>                         11.45<H>  )----------------(  212        88.2<H>% Neutrophils, 2.5<L>% Lymphocytes, ANC: 10.10<H>                              35.0<L>    BMP (12-08 @ 12:38)             133<L>  |  98      |  122<H>		Ca++ --      Ca 10.1               ---------------------------------( 183<H>		Mg --                 5.5<H>  |  19<L>   |  4.49<H>			Ph --      BMP (12-08 @ 06:52)             132<L>  |  97      |  124<H>		Ca++ --      Ca 10.5               ---------------------------------( 189<H>		Mg 2.9<H>             5.7<H>  |  19<L>   |  4.37<H>			Ph 5.6<H>    LFTs (12-08 @ 12:38)      TPro 7.1 / Alb 3.7 / TBili 1.4<H> / DBili 0.6<H> / AST 26 / ALT 25 / AlkPhos 94  LFTs (12-07 @ 16:15)      TPro 7.9 / Alb 4.0 / TBili 1.5<H> / DBili -- / AST 31 / ALT 30 / AlkPhos 110    Coags (12-07 @ 16:15)  aPTT 40.8<H> / INR 2.06<H> / PT 23.9<H>        VBG (12-08 @ 12:38)     7.32<L> / 42 / 44 / 21 / -4.0<L> / 72%     Lactate: 1.4  VBG (12-07 @ 16:15)     7.34<L> / 41 / 33 / 22 / -3.4<L> / 56<L>%     Lactate: 2.0    --------------------------------------------------------------------------------------------    MICROBIOLOGY      --------------------------------------------------------------------------------------------    IMAGING  [facility] CT scan   scan in [facility] under alias: Trauma, [place]  Impression in chart:  CT head: "Mildly displaced bilateral knees [nasal?] bone fractures are seen. Possible mildly displaced nasal septum fracture also noted"   < from: CT Abdomen and Pelvis w/ Oral Cont (12.08.20 @ 09:51) >  IMPRESSION:  Small bowel obstruction with transition point at the terminal ileum.  Nodular opacities in the lower lobes of uncertain etiology.  Right gluteal intramuscular hematoma.  < end of copied text >    --------------------------------------------------------------------------------------------

## 2020-12-08 NOTE — CONSULT NOTE ADULT - ASSESSMENT
75M PMHx NICM, LVEF on milronine (EF 10-15%, LVIDd 6.7 cm) s/p CRT-D, moderate-severe MR s/p MitraClip 8/2020, HTN, pAFib s/p DCCV 7/20/20 on Eliquis, CKD stage 3 (b/l SCr 2.5-3), DM2 who transfer from [facility] for further management SBO and mesenteric ischemia.  Patient report 3 days prior to admission started having abdominal pain which was accompanied by nausea, vomiting, decrease appetite.  [facility] found to have SBO and mesenteric ischemia on CT scan, s/p NGT w/ improve symptoms.  Recently seen in cardiac clinic (Dr. Anglin on 12/2), wherein diuretic lasix and aldactone held d/t elevated sCr and decreasing weight (184 to 149lb since Nov 2020 per notes).      Nephrology consulted for EDIE on CKD.  Upon review of [facility], baseline serum creatinine is 2.5-3.0 in 11/2020.  On admission sCr 3.2 on 12/7 then worsened to 4.3 on 12/8.  In ED pt given NS bolus 250 then started maintenance NS 50cc/hr.  Of note, patient recently hospitalized in [facility] in November 2020 for shock presumed cardiogenic, acute decompensated heart failure and EDIE on CKD.  During that hospitalization, on admission sCr 4.3 on 11/1/20, peaked 4.4 on 11/2/20 then improved 2.59 on 11/9 and discharged with 2.8 on 11/18/20. 75M PMHx NICM, LVEF on milronine (EF 10-15%, LVIDd 6.7 cm) s/p CRT-D, moderate-severe MR s/p MitraClip 8/2020, HTN, pAFib s/p DCCV 7/20/20 on Eliquis, CKD stage 4 (baseline SCr 2.5-3), DM2 transfer from [facility] to [facility] for further management SBO and mesenteric ischemia.  Nephrology consulted for EDIE on CKD.        # Hyperkalemia   Pt with hyperkalemia possibly 2/2 EDIE on CKD and acidosis.  On admission serum K 5.1 worsened to 5.7 (no hemolysis) s/p IVF NS, pH 7.32, HCO3 19  - stat BMP ordered (pending), on IVF if serum K worsen will need consider urgent HD for hyperkalemia  - please check CPK, bladder scan r/o retention, avoid ACEI/ARB/Aldactone for now  - monitor BMP    # EDIE on CKDIV  EDIE likely 2/2 pre renal in setting volume depletion from vomiting, decrease PO intake and diuretic (lasix/aldactone)  CKD4 possibly 2/2 cardiorenal/HF and/or diabetes, baseline sCr 2.5-3.0 in 11/2020  On admission sCr 3.2 on 12/7, worsened to 4.3 on 12/8  - dialysis consent obtained if serum K doesn't improve with IVF, BMP stat pending  - ordered for urinalysis, urine electrolyte (Na, Cr, K, Cl), serum uric acid  - ordered for bladder scan r/o retention, consider landaverde catheter if has retention  - check renal/bladder u/s assess for reversible etiology  - continue hold torsemide and aldactone for now if no cardiac contraindication  - monitor BMP, strict I/O, avoid nephrotoxic agents (NSAIDs, PPI, contrast), renally dose medications per GFR.    # Metabolic acidosis  In setting of advanced CKD. Serum bicarbonate noted to be 18 on 12/7, improved w/ IVF. Monitor serum bicarbonate    # Hyperphosphatemia  Pt. with hyperphosphatemia in setting of advanced CKD. Monitor serum phosphorus given NPO    Plan discussed w/ primary team NP   75M PMHx NICM, LVEF on milronine (EF 10-15%, LVIDd 6.7 cm) s/p CRT-D, moderate-severe MR s/p MitraClip 8/2020, HTN, pAFib s/p DCCV 7/20/20 on Eliquis, CKD stage 4 (baseline SCr 2.5-3), DM2 transfer from [facility] to [facility] for further management SBO and mesenteric ischemia.  Nephrology consulted for EDIE on CKD.        # Hyperkalemia   Pt with hyperkalemia possibly 2/2 EDIE on CKD and acidosis.  On admission serum K 5.1 worsened to 5.7 (no hemolysis) s/p IVF NS, pH 7.32, HCO3 19  - stat BMP ordered (pending), on IVF if serum K worsen will need consider urgent HD for hyperkalemia  - please check CPK, bladder scan r/o retention, avoid ACEI/ARB/Aldactone for now  - monitor BMP    # EDIE on CKDIV  EDIE likely 2/2 pre renal in setting volume depletion from vomiting, decrease PO intake and diuretic (lasix/aldactone)  CKD4 possibly 2/2 cardiorenal/HF and/or diabetes, baseline sCr 2.5-3.0 in 11/2020  On admission sCr 3.2 on 12/7, worsened to 4.3 on 12/8  - switch from NS to bicarb based IVF (1/2 NS with bicarb 1amp at 50 cc/hr)  - please check bladder scan r/o retention, landaverde catheter if has urinary retention  - dialysis consent obtained if serum K doesn't improve with IVF, BMP stat pending  - ordered for urinalysis, urine electrolyte (Na, Cr, K, Cl), serum uric acid  - check renal/bladder u/s assess for reversible etiology  - continue hold torsemide and aldactone for now if no cardiac contraindication  - monitor BMP, strict I/O, avoid nephrotoxic agents (NSAIDs, PPI, contrast), renally dose medications per GFR.    # Metabolic acidosis  In setting of advanced CKD. Serum bicarbonate noted to be 18 on 12/7, improved w/ IVF. Monitor serum bicarbonate    # Hyperphosphatemia  Pt. with hyperphosphatemia in setting of advanced CKD. Monitor serum phosphorus given NPO    Plan discussed w/ primary team NP   75M PMHx NICM, LVEF on milronine (EF 10-15%, LVIDd 6.7 cm) s/p CRT-D, moderate-severe MR s/p MitraClip 8/2020, HTN, pAFib s/p DCCV 7/20/20 on Eliquis, CKD stage 4 (baseline SCr 2.5-3), DM2 transfer from [facility] to [facility] for further management SBO and mesenteric ischemia.  Nephrology consulted for EDIE on CKD.        # Hyperkalemia   Pt with hyperkalemia possibly 2/2 EDIE on CKD and acidosis.  On admission serum K 5.1 worsened to 5.7 (no hemolysis) s/p IVF NS, pH 7.32, HCO3 19  - stat BMP ordered (pending), on IVF if serum K worsen will need consider urgent HD for hyperkalemia  - please check CPK, bladder scan r/o retention, avoid ACEI/ARB/Aldactone for now  - monitor BMP    # EDIE on CKDIV  EDIE likely 2/2 ATN in setting hypotension and pre renal in setting volume depletion from vomiting, decrease PO intake and diuretic (lasix/aldactone)  CKD4 possibly 2/2 cardiorenal/HF and/or diabetes, baseline sCr 2.5-3.0 in 11/2020  On admission sCr 3.2 on 12/7, worsened to 4.3 on 12/8, ED triage BP 80s/50s  - switch from NS to bicarb based IVF (1/2 NS with bicarb 1amp at 50 cc/hr)  - please check bladder scan r/o retention, landaverde catheter if has urinary retention  - dialysis consent obtained if serum K doesn't improve with IVF, BMP stat pending  - ordered for urinalysis, urine electrolyte (Na, Cr, K, Cl), serum uric acid  - check renal/bladder u/s assess for reversible etiology  - continue hold torsemide and aldactone for now if no cardiac contraindication  - monitor BMP, strict I/O, avoid nephrotoxic agents (NSAIDs, PPI, contrast), renally dose medications per GFR.    # Metabolic acidosis  In setting of advanced CKD. Serum bicarbonate noted to be 18 on 12/7, improved w/ IVF. Monitor serum bicarbonate    # Hyperphosphatemia  Pt. with hyperphosphatemia in setting of advanced CKD. Monitor serum phosphorus given NPO    Plan discussed w/ primary team NP

## 2020-12-08 NOTE — CONSULT NOTE ADULT - ATTENDING COMMENTS
EDIE on CKD (Scr was between 1.7-2.0 until recent hospitalization in Nov 2020 when had cardiogenic shock, with Scr being ~2.5 since), now admitted with SBO, off all diuretics at this time. BP low, however baseline for him. Likely with pre-renal EDIE, need to rule out obstructive uropathy.  Recommend serial bladder scans to monitor for PVR.   Check UA, urine electrolytes and spot urine Tp/CR.  Continue IVF, and hold diuretics.     Hyperkalemia: in the setting of EDIE and acidosis, slightly improved with IV NS. Continue with IVF, will consider addition of bicitra once pt. able to tolerate PO intake. EDIE on CKD (Scr was between 1.7-2.0 until recent hospitalization in Nov 2020 when had cardiogenic shock, with Scr being ~2.5 since), now admitted with SBO, off all diuretics at this time. BP low, however baseline for him. Likely with pre-renal EDIE, need to rule out obstructive uropathy.  Recommend serial bladder scans to monitor for PVR.   Check UA, urine electrolytes and spot urine Tp/CR.  Continue IVF, and hold diuretics.     Hyperkalemia: in the setting of EDIE, acidosis, and use of beta-blockers, with volume depletion. slightly improved with IV NS. Continue with IVF, will consider addition of bicitra once pt. able to tolerate PO intake. consider lowering dose of metoprolol (NPO at present).

## 2020-12-08 NOTE — PROGRESS NOTE ADULT - ASSESSMENT
Mr. Jarquin is a 75 Year-Old Gentleman with very significant cardiac history notable for NICM, LVEF < 20% (LVIDd 6.7 cm) s/p CRT-D, moderate-severe MR s/p MitraClip 8/2020, HTN, pAFib s/p DCCV 7/20/20 on Eliquis, CKD stage 3 (b/l SCr 1.7-2), DM 2 (A1c 6.1%) and anemia who presented after fall found to have Small bowel obstruction.     - No Acute Surgical intervention indicated at this time, patient currently with benign abdominal exam, normal Lactate, has been passing gas and having bowel function.   - continue NGT.  -No pain meds before exam  - Consider landaverde catheter placement for strict monitoring of I/Os.   - Will continue to follow.   -Final plan to be d/w attending in AM rounds      Green Team Surgery Pager #[phone] Mr. Jarquin is a 75 Year-Old Gentleman with very significant cardiac history notable for NICM, LVEF < 20% (LVIDd 6.7 cm) s/p CRT-D, moderate-severe MR s/p MitraClip 8/2020, HTN, pAFib s/p DCCV 7/20/20 on Eliquis, CKD stage 3 (b/l SCr 1.7-2), DM 2 (A1c 6.1%) and anemia who presented after fall found to have Small bowel obstruction.     - No Acute Surgical intervention indicated at this time, patient currently with benign abdominal exam, normal Lactate, has been passing gas and having bowel function.   - CT chest for evaluation for fall  - CT a/p with oral contrast via NGT as last CT was non con  - continue NGT  - Serial abdominal exams; No pain meds before exam  - Place landaverde catheter for strict monitoring of I/Os.   - Will continue to follow.       Green Team Surgery Pager #[phone]

## 2020-12-08 NOTE — PROGRESS NOTE ADULT - PROBLEM SELECTOR PLAN 4
- AC with Eliquis, would hold with fluctuating renal function and NPO status, recent traumatic fall  - PO amiodarone and BB on hold while NPO

## 2020-12-08 NOTE — PROGRESS NOTE ADULT - SUBJECTIVE AND OBJECTIVE BOX
GREEN Team Surgery Daily Progress Note  =====================================================    SUBJECTIVE: Patient seen and examined at bedside on AM rounds. NPO, denies nausea, vomiting.   Reports + Flatus/   + BM. OOB/Ambulating as tolerated. Denies fever, chills.       MEDICATIONS  (STANDING):  aMIOdarone    Tablet 200 milliGRAM(s) Oral daily  apixaban 5 milliGRAM(s) Oral two times a day  isosorbide   mononitrate ER Tablet (IMDUR) 60 milliGRAM(s) Oral at bedtime  levothyroxine 50 MICROGram(s) Oral daily  melatonin 3 milliGRAM(s) Oral at bedtime  metoprolol succinate ER 25 milliGRAM(s) Oral daily  milrinone Infusion 0.3 MICROgram(s)/kG/Min (6.64 mL/Hr) IV Continuous <Continuous>  sodium chloride 0.9%. 1000 milliLiter(s) (50 mL/Hr) IV Continuous <Continuous>    MEDICATIONS  (PRN):  acetaminophen   Tablet .. 650 milliGRAM(s) Oral every 6 hours PRN Mild Pain (1 - 3)      OBJECTIVE:    Vital Signs Last 24 Hrs  T(C): 36.6 (08 Dec 2020 00:21), Max: 37.4 (07 Dec 2020 21:02)  T(F): 97.9 (08 Dec 2020 00:21), Max: 99.3 (07 Dec 2020 21:02)  HR: 100 (08 Dec 2020 00:21) (100 - 105)  BP: 92/58 (08 Dec 2020 00:21) (85/46 - 94/51)  BP(mean): 55 (07 Dec 2020 23:03) (55 - 61)  RR: 18 (08 Dec 2020 00:21) (16 - 21)  SpO2: 97% (08 Dec 2020 00:21) (95% - 97%)    General: Alert and Oriented x3, No acute distress.  Respiratory: Breathing non-labored. NGT in place  Abdomen: Large, soft, nontender, nondistended. No rebound, no guarding, No palpable organomegaly or masses.  Extremities: Moves all four.         I&O's Detail    07 Dec 2020 07:01  -  08 Dec 2020 03:40  --------------------------------------------------------  IN:  Total IN: 0 mL    OUT:    Nasogastric/Oral tube (mL): 400 mL  Total OUT: 400 mL    Total NET: -400 mL          Daily Height in cm: 175.26 (07 Dec 2020 15:10)    Daily     LABS:                        10.5   11.45 )-----------( 212      ( 07 Dec 2020 16:15 )             35.0     12-07    131<L>  |  98  |  109<H>  ----------------------------<  177<H>  5.1   |  18<L>  |  3.20<H>    Ca    10.6<H>      07 Dec 2020 16:15    TPro  7.9  /  Alb  4.0  /  TBili  1.5<H>  /  DBili  x   /  AST  31  /  ALT  30  /  AlkPhos  110  12-07    PT/INR - ( 07 Dec 2020 16:15 )   PT: 23.9 sec;   INR: 2.06 ratio         PTT - ( 07 Dec 2020 16:15 )  PTT:40.8 sec

## 2020-12-09 NOTE — PROGRESS NOTE ADULT - ASSESSMENT
75 year old M with longstanding history of NICM, LVEF < 20% (LVIDd 6.7 cm) s/p CRT-D, moderate-severe MR s/p MitraClip 8/2020, HTN, pAFib s/p DCCV 7/20/20 on Eliquis, CKD stage 3 (b/l SCr 1.7-2), DM 2 (A1c 6.1%) and anemia, transferred from [facility] after fall at home today in setting of nausea and vomiting, found there to have SBO with placement of NG tube.

## 2020-12-09 NOTE — PROGRESS NOTE ADULT - PROBLEM SELECTOR PLAN 2
- CT abdomen/pelvis confirms SBO, s/p NGT at OSH  - Surgery consulted, appreciate recs - CT abdomen/pelvis confirms SBO, s/p NGT at OSH now removed  - Surgery consulted, appreciate recs

## 2020-12-09 NOTE — PROGRESS NOTE ADULT - PROBLEM SELECTOR PLAN 2
Continue home milrinone 0.3 mcg/kg/min  - Restart home Toprol XL 25 mg QD and Imdur 60mg qhs now that advancing diet  - Continue to hold home torsemide 80mg BID and spironolactone 25mg daily with ARF  appreciate chf input

## 2020-12-09 NOTE — PROGRESS NOTE ADULT - SUBJECTIVE AND OBJECTIVE BOX
GREEN Team Surgery Daily Progress Note  =====================================================    SUBJECTIVE: Patient seen and examined at bedside on AM rounds. Patient reports that they're feeling well. NPO, denies nausea, vomiting.    +Flatus/    -BM. OOB/Ambulating as tolerated. Denies fever, chills.     24 HOUR EVENTS:  Trauma consult placed-Incidental findings on CT scan addressed  Plastics recommend outpt f/u for nasal fractures    MEDICATIONS  (STANDING):  aMIOdarone    Tablet 200 milliGRAM(s) Oral daily  apixaban 5 milliGRAM(s) Oral two times a day  BACItracin   Ointment 1 Application(s) Topical two times a day  isosorbide   mononitrate ER Tablet (IMDUR) 60 milliGRAM(s) Oral at bedtime  levothyroxine 50 MICROGram(s) Oral daily  melatonin 3 milliGRAM(s) Oral at bedtime  metoprolol succinate ER 25 milliGRAM(s) Oral daily  milrinone Infusion 0.3 MICROgram(s)/kG/Min (6.64 mL/Hr) IV Continuous <Continuous>  sodium chloride 0.45% 1000 milliLiter(s) (50 mL/Hr) IV Continuous <Continuous>    MEDICATIONS  (PRN):  acetaminophen   Tablet .. 650 milliGRAM(s) Oral every 6 hours PRN Mild Pain (1 - 3)      OBJECTIVE:    Vital Signs Last 24 Hrs  T(C): 36.3 (08 Dec 2020 21:13), Max: 36.8 (08 Dec 2020 08:39)  T(F): 97.3 (08 Dec 2020 21:13), Max: 98.2 (08 Dec 2020 08:39)  HR: 96 (08 Dec 2020 21:13) (94 - 99)  BP: 124/67 (08 Dec 2020 21:13) (95/60 - 124/67)  BP(mean): --  RR: 18 (08 Dec 2020 21:13) (18 - 19)  SpO2: 97% (08 Dec 2020 21:13) (97% - 100%)    General: Alert and Oriented x3, No acute distress.  Respiratory: Breathing non-labored. NGT in place  Abdomen: Large, soft, nontender, nondistended. No rebound, no guarding, No palpable organomegaly or masses.  Extremities: Moves all four.           I&O's Detail    07 Dec 2020 07:01  -  08 Dec 2020 07:00  --------------------------------------------------------  IN:  Total IN: 0 mL    OUT:    Nasogastric/Oral tube (mL): 550 mL  Total OUT: 550 mL    Total NET: -550 mL      08 Dec 2020 07:01  -  09 Dec 2020 01:35  --------------------------------------------------------  IN:  Total IN: 0 mL    OUT:    Voided (mL): 250 mL  Total OUT: 250 mL    Total NET: -250 mL          Daily     Daily Weight in k (09 Dec 2020 00:25)    LABS:                        10.0   5.65  )-----------( 199      ( 08 Dec 2020 06:52 )             32.7     12-08    133<L>  |  98  |  129<H>  ----------------------------<  179<H>  5.3   |  18<L>  |  4.49<H>    Ca    10.0      08 Dec 2020 21:10  Phos  5.6     12-08  Mg     2.9     12-08    TPro  7.1  /  Alb  3.7  /  TBili  1.4<H>  /  DBili  0.6<H>  /  AST  26  /  ALT  25  /  AlkPhos  94  12-08    PT/INR - ( 07 Dec 2020 16:15 )   PT: 23.9 sec;   INR: 2.06 ratio         PTT - ( 07 Dec 2020 16:15 )  PTT:40.8 sec

## 2020-12-09 NOTE — PROGRESS NOTE ADULT - ASSESSMENT
75M PMHx NICM, LVEF on milronine (EF 10-15%, LVIDd 6.7 cm) s/p CRT-D, moderate-severe MR s/p MitraClip 8/2020, HTN, pAFib s/p DCCV 7/20/20 on Eliquis, CKD stage 4 (baseline SCr 2.5-3), DM2 transfer from [facility] to [facility] for further management SBO and mesenteric ischemia.  Nephrology consulted for EDIE on CKD.        # Hyperkalemia   Pt with hyperkalemia possibly 2/2 EDIE on CKD and acidosis.  On admission serum K 5.1 worsened to 5.7 (no hemolysis) s/p IVF NS switch 1/2NS bicarb, improved with last serum K 5.3  - check CPK, monitor BMP, avoid ACEI/ARB/Aldactone given serum K and EDIE  - monitor BMP    # EDIE on CKDIV  EDIE likely 2/2 ATN in setting hypotension and pre renal in setting volume depletion from vomiting, decrease PO intake and diuretic (lasix/aldactone)  CKD4 possibly 2/2 cardiorenal/HF and/or diabetes, baseline sCr 2.5-3.0 in 11/2020  On admission sCr 3.2 on 12/7, worsened to 4.3 on 12/8, ED triage BP 80s/50s, last sCr 4.4    - continue 1/2 NS with bicarb 1amp at 50 cc/hr  - repeat bladder scan and if >200 consider insertion landaverde catheter to monitor I/O and r/o retention  - ordered for urinalysis, urine electrolyte (Na, Cr, K, Cl), serum uric acid  - check renal/bladder u/s assess for reversible etiology  - continue hold torsemide and aldactone for now if no cardiac contraindication  - monitor BMP, strict I/O, avoid nephrotoxic agents (NSAIDs, PPI, contrast), renally dose medications per GFR.    # Metabolic acidosis  In setting of advanced CKD. Serum bicarbonate noted to be 18 on 12/7, improved w/ IVF.   - continue 1/2 NS bicarb 50 cc/hr for now, continue monitor serum bicarbonate    # Hyperphosphatemia  Pt. with hyperphosphatemia in setting of advanced CKD. Monitor serum phosphorus given NPO    # Anemia  Pt with normocytic anemia likely 2/2 ACD CKD and hematoma (intragluteal).  On admission Hgb 10.5, decreasing 8.5 possibly dilutional on IVF?  - check iron studies, occult test, blood transfusion prn, monitor CBC 75M PMHx NICM, LVEF on milronine (EF 10-15%, LVIDd 6.7 cm) s/p CRT-D, moderate-severe MR s/p MitraClip 8/2020, HTN, pAFib s/p DCCV 7/20/20 on Eliquis, CKD stage 4 (baseline SCr 2.5-3), DM2 transfer from [facility] to [facility] for further management SBO and mesenteric ischemia.  Nephrology consulted for EDIE on CKD.        # Hyperkalemia   Pt with hyperkalemia possibly 2/2 EDIE on CKD and acidosis.  On admission serum K 5.1 worsened to 5.7 (no hemolysis) s/p IVF NS switch 1/2NS bicarb, improved with last serum K 5.3  - continue bicarb based IVF for now, if eating/drinking today can stop IVF  - check CPK, monitor BMP, avoid ACEI/ARB/Aldactone given serum K and EDIE  - monitor BMP    # EDIE on CKDIV  EDIE likely 2/2 ATN in setting hypotension and pre renal in setting volume depletion from vomiting, decrease PO intake and diuretic (lasix/aldactone)  CKD4 possibly 2/2 cardiorenal/HF and/or diabetes, baseline sCr 2.5-3.0 in 11/2020  On admission sCr 3.2 on 12/7, worsened to 4.3 on 12/8, ED triage BP 80s/50s, last sCr 4.4    - continue bicarb based IVF (50 cc/hr) for now, once eating/drinking can stop.  - repeat bladder scan and if >200 consider insertion landaverde catheter to monitor I/O and r/o retention  - ordered for urinalysis, urine electrolyte (Na, Cr, K, Cl), serum uric acid  - check renal/bladder u/s assess for reversible etiology  - continue hold torsemide and aldactone for now if no cardiac contraindication  - monitor BMP, strict I/O, avoid nephrotoxic agents (NSAIDs, PPI, contrast), renally dose medications per GFR.    # Metabolic acidosis  In setting of advanced CKD. Serum bicarbonate noted to be 18 on 12/7, improved on bicarb fluid.  - continue monitor serum bicarbonate    # Hyperphosphatemia  Pt. with hyperphosphatemia in setting of advanced CKD. Monitor serum phosphorus given NPO    # Anemia  Pt with normocytic anemia likely 2/2 ACD CKD and hematoma (intragluteal).  On admission Hgb 10.5, decreasing 8.5 possibly dilutional on IVF?  - check iron studies, occult test, blood transfusion prn, monitor CBC 75M PMHx NICM, LVEF on milronine (EF 10-15%, LVIDd 6.7 cm) s/p CRT-D, moderate-severe MR s/p MitraClip 8/2020, HTN, pAFib s/p DCCV 7/20/20 on Eliquis, CKD stage 4 (baseline SCr 2.5-3), DM2 transfer from [facility] to [facility] for further management SBO and mesenteric ischemia.  Nephrology consulted for EDIE on CKD.        # Hyperkalemia   Pt with hyperkalemia possibly 2/2 EDIE on CKD and acidosis.  On admission serum K 5.1 worsened to 5.7 (no hemolysis) s/p IVF NS switch 1/2NS bicarb, improved with last serum K 5.3  - continue bicarb based IVF for now, if eating/drinking today can stop IVF  - check CPK, monitor BMP, avoid ACEI/ARB/Aldactone given serum K and EDIE  - monitor BMP    # EDIE on CKDIV  EDIE likely 2/2 ATN in setting hypotension and pre renal in setting volume depletion from vomiting, decrease PO intake and diuretic (lasix/aldactone)  CKD4 possibly 2/2 cardiorenal/HF and/or diabetes, baseline sCr 2.5-3.0 in 11/2020  On admission sCr 3.2 on 12/7, worsened to 4.3 on 12/8, ED triage BP 80s/50s, last sCr 4.4    - Stop IVF.  - repeat bladder scan post void to look for PVR.   - check urinalysis, urine electrolyte (Na, Cr, K, Cl), serum uric acid  - check renal/bladder u/s   - continue to hold aldactone for now, consider giving a dose of torsemide if hypervolemia worsens  - pt. agreeable for temporary HD, if needed to optimize electrolytes/ volume status  - monitor BMP, strict I/O, avoid nephrotoxic agents (NSAIDs, PPI, contrast), renally dose medications per GFR.    # Metabolic acidosis  In setting of advanced CKD. Serum bicarbonate noted to be 18 on 12/7, receiving bicarb fluid.  - continue monitor serum bicarbonate    # Hyperphosphatemia  Pt. with hyperphosphatemia in setting of advanced CKD. Monitor serum phosphorus, will consider starting binders if gets worse.    # Anemia  Pt with normocytic anemia likely 2/2 ACD CKD and hematoma (intragluteal).  On admission Hgb 10.5, decreasing 8.5 possibly dilutional on IVF  - check iron studies, consider occult test, blood transfusion prn, monitor CBC 75M PMHx NICM, LVEF on milronine (EF 10-15%, LVIDd 6.7 cm) s/p CRT-D, moderate-severe MR s/p MitraClip 8/2020, HTN, pAFib s/p DCCV 7/20/20 on Eliquis, CKD stage 4 (baseline SCr 2.5-3), DM2 transfer from [facility] to [facility] for further management SBO and mesenteric ischemia.  Nephrology consulted for EDIE on CKD.        # Hyperkalemia   Pt with hyperkalemia possibly 2/2 EDIE on CKD and acidosis.  On admission serum K 5.1 worsened to 5.7 (no hemolysis) s/p IVF NS switch 1/2NS bicarb, improved with last serum K 5.3  - please discontinue IVF as patient started on diet today and high risk fluid overload  - check CPK, monitor BMP, avoid ACEI/ARB/Aldactone given serum K and EDIE  - monitor BMP    # EDIE on CKDIV  EDIE likely 2/2 ATN in setting hypotension and pre renal in setting volume depletion from vomiting, decrease PO intake and diuretic (lasix/aldactone)  CKD4 possibly 2/2 cardiorenal/HF and/or diabetes, baseline sCr 2.5-3.0 in 11/2020  On admission sCr 3.2 on 12/7, worsened to 4.3 on 12/8, ED triage BP 80s/50s, last sCr 4.4    - Stop IVF.  - repeat bladder scan post void to look for PVR.   - check urinalysis, urine electrolyte (Na, Cr, K, Cl), serum uric acid  - check renal/bladder u/s   - continue to hold aldactone for now, consider giving a dose of torsemide if hypervolemia worsens  - pt. agreeable for temporary HD, if needed to optimize electrolytes/ volume status  - monitor BMP, strict I/O, avoid nephrotoxic agents (NSAIDs, PPI, contrast), renally dose medications per GFR.    # Metabolic acidosis  In setting of advanced CKD. Serum bicarbonate noted to be 18 on 12/7, receiving bicarb fluid.  - continue monitor serum bicarbonate    # Hyperphosphatemia  Pt. with hyperphosphatemia in setting of advanced CKD. Monitor serum phosphorus, will consider starting binders if gets worse.    # Anemia  Pt with normocytic anemia likely 2/2 ACD CKD and hematoma (intragluteal).  On admission Hgb 10.5, decreasing 8.5 possibly dilutional on IVF  - check iron studies, consider occult test, blood transfusion prn, monitor CBC

## 2020-12-09 NOTE — PROGRESS NOTE ADULT - ASSESSMENT
75 year old M with longstanding history of NICM, LVEF < 20% (LVIDd 6.7 cm) s/p CRT-D, moderate-severe MR s/p MitraClip 8/2020, HTN, pAFib s/p DCCV 7/20/20 on Eliquis, CKD stage 3 (b/l SCr 1.7-2), DM 2 (A1c 6.1%) and anemia who presented as transfer from OSH in the setting of presyncope/syncope, found to have SBO now s/p NGT for conservative management. Heart failure consulted for additional evaluation and management.  Patient with increased volume status after IVF resuscitation, would discontinue given anuric renal status currently.    Relevant studies:  Echo:    11/1/20 TTE: LVEF 10-15%, LV 7cm, LVOT VIT 7cm, severe RV dysfunction, severly dilated atria, mild MR, min AI, severe TR, est RAP 10mmHg, est RVSP 41 mm Hg    Cardiac Cath:    9/15 (milrinone 0.25 mcg/kg/mi): CVP 5 PA 47/13 PCW 13, SVC saturation 68% with Corey CO/CI 5.8/3.1 and TD CO/CI 7/3.7. MAP 70 mmHg 75 year old M with longstanding history of NICM, LVEF < 20% (LVIDd 6.7 cm) s/p CRT-D, moderate-severe MR s/p MitraClip 8/2020, HTN, pAFib s/p DCCV 7/20/20 on Eliquis, CKD stage 3 (b/l SCr 1.7-2), DM 2 (A1c 6.1%) and anemia who presented as transfer from OSH in the setting of presyncope/syncope, found to have SBO now s/p NGT for conservative management. Heart failure consulted for additional evaluation and management.  Patient appears euvolemic on exam, can continue gentle volume resuscitation with sodium bicarb given renal dysfunction.    Relevant studies:  Echo:    11/1/20 TTE: LVEF 10-15%, LV 7cm, LVOT VIT 7cm, severe RV dysfunction, severly dilated atria, mild MR, min AI, severe TR, est RAP 10mmHg, est RVSP 41 mm Hg    Cardiac Cath:    9/15 (milrinone 0.25 mcg/kg/mi): CVP 5 PA 47/13 PCW 13, SVC saturation 68% with Corey CO/CI 5.8/3.1 and TD CO/CI 7/3.7. MAP 70 mmHg

## 2020-12-09 NOTE — PROGRESS NOTE ADULT - SUBJECTIVE AND OBJECTIVE BOX
Interval History: NGT discontinued by surgery this morning, advanced to CLD, tolerated well, feeling a little full. Denies dyspnea or other heart failure symptoms.    Medications:  acetaminophen   Tablet .. 650 milliGRAM(s) Oral every 6 hours PRN  aMIOdarone    Tablet 200 milliGRAM(s) Oral daily  apixaban 5 milliGRAM(s) Oral two times a day  BACItracin   Ointment 1 Application(s) Topical two times a day  isosorbide   mononitrate ER Tablet (IMDUR) 60 milliGRAM(s) Oral at bedtime  levothyroxine 50 MICROGram(s) Oral daily  melatonin 3 milliGRAM(s) Oral at bedtime  metoprolol succinate ER 25 milliGRAM(s) Oral daily  milrinone Infusion 0.3 MICROgram(s)/kG/Min IV Continuous <Continuous>  sodium chloride 0.45% 1000 milliLiter(s) IV Continuous <Continuous>    Vitals:  T(C): 36.8 (20 @ 04:33), Max: 36.8 (20 @ 04:33)  HR: 101 (20 @ 04:33) (94 - 101)  BP: 96/60 (20 @ 04:33) (96/60 - 124/67)  BP(mean): --  RR: 18 (20 @ 04:33) (18 - 18)  SpO2: 96% (20 @ 04:33) (96% - 99%)    Daily     Daily Weight in k (09 Dec 2020 00:25)    Weight (kg): 73.8 ( @ 15:57)    I&O's Summary    08 Dec 2020 07:01  -  09 Dec 2020 07:00  --------------------------------------------------------  IN: 679.2 mL / OUT: 450 mL / NET: 229.2 mL    09 Dec 2020 07:  -  09 Dec 2020 15:44  --------------------------------------------------------  IN: 220 mL / OUT: 350 mL / NET: -130 mL    Physical Exam:  Appearance: Sitting up in bed in room air, no acute distress, frail  HEENT: PERRL  Neck: No JVD  Cardiovascular: Regular rate and rhythm  Respiratory: No increased work of breathing  Gastrointestinal: Soft, nondistended  Skin: No cyanosis  Neurologic: Non-focal  Extremities: No LE edema, warm and well perfused throughout  Psychiatry: A & O x 3, Mood & affect appropriate    Labs:                        8.7    4.81  )-----------( 170      ( 09 Dec 2020 07:37 )             29.1     12-08    133<L>  |  98  |  129<H>  ----------------------------<  179<H>  5.3   |  18<L>  |  4.49<H>    Ca    10.0      08 Dec 2020 21:10  Phos  5.6     12-08  Mg     2.9     12-08    TPro  7.1  /  Alb  3.7  /  TBili  1.4<H>  /  DBili  0.6<H>  /  AST  26  /  ALT  25  /  AlkPhos  94  12-08    PT/INR - ( 07 Dec 2020 16:15 )   PT: 23.9 sec;   INR: 2.06 ratio         PTT - ( 07 Dec 2020 16:15 )  PTT:40.8 sec    TELEMETRY: v paced

## 2020-12-09 NOTE — PROGRESS NOTE ADULT - PROBLEM SELECTOR PLAN 4
- AC with Eliquis, would hold with fluctuating renal function and NPO status, recent traumatic fall  - PO amiodarone and BB on hold while NPO - AC with Eliquis, can hold with fluctuating renal function and recent traumatic fall, would restart if ok per surgery  - Restart PO amiodarone and BB as above

## 2020-12-09 NOTE — PROGRESS NOTE ADULT - PROBLEM SELECTOR PLAN 3
- Most recent baseline Cr ~ 2.5-3, follows with Dr. Rees as outpatient  - Concern for prerenal ATN with current oliguria/anuria, nephrology following  - Continue to monitor Cr BID - Most recent baseline Cr ~ 2.5-3, follows with Dr. Rees as outpatient  - Concern for prerenal ATN with current oliguria/anuria, slowly improving, nephrology following  - Continue to monitor Cr BID

## 2020-12-09 NOTE — PROGRESS NOTE ADULT - SUBJECTIVE AND OBJECTIVE BOX
[facility] DIVISION OF KIDNEY DISEASES AND HYPERTENSION   -- FOLLOW UP NOTE --   Cristina Ramírez  Nephrology Fellow  Pager [facility]: [phone]   /  Pager [facility]: [phone]  (after 5pm or weekend please page the on-call fellow)  --------------------------------------------------------------------------------  24 hour events/subjective:  - overnight no events reported bladder scan show >157 cc urine, vitals afebrile, no hypotensive episode, total UOP voided 350 cc in the past 24hr  - patient seen and examined at bedside this morning without complaints  - vitals/lab/medications reviewed, noted for serum K improved 5.7 to 5.3 and stable sCr 4.4 on 1/2 NS Bicarb IVF  - seen by surgery cleared for advance diet    PAST HISTORY  --------------------------------------------------------------------------------  No significant changes to PMH, PSH, FHx, SHx, unless otherwise noted    ALLERGIES & MEDICATIONS  --------------------------------------------------------------------------------  Allergies  No Known Allergies    Intolerances    Standing Inpatient Medications  aMIOdarone    Tablet 200 milliGRAM(s) Oral daily  apixaban 5 milliGRAM(s) Oral two times a day  BACItracin   Ointment 1 Application(s) Topical two times a day  isosorbide   mononitrate ER Tablet (IMDUR) 60 milliGRAM(s) Oral at bedtime  levothyroxine 50 MICROGram(s) Oral daily  melatonin 3 milliGRAM(s) Oral at bedtime  metoprolol succinate ER 25 milliGRAM(s) Oral daily  milrinone Infusion 0.3 MICROgram(s)/kG/Min IV Continuous <Continuous>  sodium chloride 0.45% 1000 milliLiter(s) IV Continuous <Continuous>    PRN Inpatient Medications  acetaminophen   Tablet .. 650 milliGRAM(s) Oral every 6 hours PRN    REVIEW OF SYSTEMS  --------------------------------------------------------------------------------  Gen: no fever, chills, weakness  Respiratory: No dyspnea, cough  CV: No chest pain, orthopnea  GI: No abdominal pain, nausea, vomiting, diarrhea  MSK: no edema  Neuro: No dizziness, lightheadedness  Heme: No bleeding  All other systems were reviewed and are negative, except as noted.    VITALS/PHYSICAL EXAM  --------------------------------------------------------------------------------  T(C): 36.8 (12-09-20 @ 04:33), Max: 36.8 (12-08-20 @ 08:39)  HR: 101 (12-09-20 @ 04:33) (94 - 101)  BP: 96/60 (12-09-20 @ 04:33) (96/60 - 124/67)  RR: 18 (12-09-20 @ 04:33) (18 - 19)  SpO2: 96% (12-09-20 @ 04:33) (96% - 100%)  Wt(kg): --  Height (cm): 175.3 (12-07-20 @ 15:10)  Weight (kg): 73.8 (12-07-20 @ 15:57)  BMI (kg/m2): 24 (12-07-20 @ 15:57)  BSA (m2): 1.89 (12-07-20 @ 15:57)    12-08-20 @ 07:01  -  12-09-20 @ 07:00  --------------------------------------------------------  IN: 679.2 mL / OUT: 450 mL / NET: 229.2 mL    Physical Exam:  	Gen: NAD, well-appearing on room air  	HEENT: dry mucous membrane, +NGT in place  	Pulm: CTA B/L, no crackles  	CV: RRR, S1S2; no rub/murmur  	GI: +BS, soft, nontender/nondistended  	: No suprapubic tenderness  	MSK: Warm, no edema, no asterixis              Neuro: AAOx3  	Psych: Normal affect and mood  	Skin: Warm    LABS/STUDIES  --------------------------------------------------------------------------------              8.7    4.81  >-----------<  170      [12-09-20 @ 07:37]              29.1     133  |  98  |  129  ----------------------------<  179      [12-08-20 @ 21:10]  5.3   |  18  |  4.49        Ca     10.0     [12-08-20 @ 21:10]      Mg     2.9     [12-08-20 @ 06:52]      Phos  5.6     [12-08-20 @ 06:52]    TPro  7.1  /  Alb  3.7  /  TBili  1.4  /  DBili  0.6  /  AST  26  /  ALT  25  /  AlkPhos  94  [12-08-20 @ 12:38]    PT/INR: PT 23.9 , INR 2.06       [12-07-20 @ 16:15]  PTT: 40.8       [12-07-20 @ 16:15]    Uric acid 6.1      [12-08-20 @ 12:38]    Creatinine Trend:  SCr 4.49 [12-08 @ 21:10]  SCr 4.49 [12-08 @ 12:38]  SCr 4.37 [12-08 @ 06:52]  SCr 3.20 [12-07 @ 16:15]  SCr 2.89 [11-18 @ 06:16]        Iron 36, TIBC 314, %sat 12      [09-08-20 @ 08:45]  Ferritin 145      [09-08-20 @ 08:45]  Vitamin D (25OH) 21.7      [07-18-20 @ 10:13]  TSH 8.61      [11-01-20 @ 20:16]  Lipid: chol 116, TG 55, HDL 48, LDL 58      [08-26-20 @ 00:17]         [facility] DIVISION OF KIDNEY DISEASES AND HYPERTENSION   -- FOLLOW UP NOTE --   Cristina Ramírez  Nephrology Fellow  Pager [facility]: [phone]   /  Pager [facility]: [phone]  (after 5pm or weekend please page the on-call fellow)  --------------------------------------------------------------------------------  24 hour events/subjective:  - overnight no events reported bladder scan show >157 cc urine, vitals afebrile, no hypotensive episode, total UOP voided 350 cc in the past 24hr  - patient seen and examined at bedside this morning initial with NGT however RN removed, state he feeling better but hungry.  Denies any abdominal pain, shortness of breath, chest pain, urinary urgency/bladder fullness sensation  - vitals/lab/medications reviewed, noted for serum K improved 5.7 to 5.3 and stable sCr 4.4 on 1/2 NS Bicarb IVF  - seen by surgery cleared for advance diet    PAST HISTORY  --------------------------------------------------------------------------------  No significant changes to PMH, PSH, FHx, SHx, unless otherwise noted    ALLERGIES & MEDICATIONS  --------------------------------------------------------------------------------  Allergies  No Known Allergies    Intolerances    Standing Inpatient Medications  aMIOdarone    Tablet 200 milliGRAM(s) Oral daily  apixaban 5 milliGRAM(s) Oral two times a day  BACItracin   Ointment 1 Application(s) Topical two times a day  isosorbide   mononitrate ER Tablet (IMDUR) 60 milliGRAM(s) Oral at bedtime  levothyroxine 50 MICROGram(s) Oral daily  melatonin 3 milliGRAM(s) Oral at bedtime  metoprolol succinate ER 25 milliGRAM(s) Oral daily  milrinone Infusion 0.3 MICROgram(s)/kG/Min IV Continuous <Continuous>  sodium chloride 0.45% 1000 milliLiter(s) IV Continuous <Continuous>    PRN Inpatient Medications  acetaminophen   Tablet .. 650 milliGRAM(s) Oral every 6 hours PRN    REVIEW OF SYSTEMS  --------------------------------------------------------------------------------  Gen: no fever, chills, weakness  Respiratory: No dyspnea, cough  CV: No chest pain, orthopnea  GI: No abdominal pain, nausea, vomiting, diarrhea. + hungry  MSK: no edema  Neuro: No dizziness, lightheadedness  Heme: No bleeding  All other systems were reviewed and are negative, except as noted.    VITALS/PHYSICAL EXAM  --------------------------------------------------------------------------------  T(C): 36.8 (12-09-20 @ 04:33), Max: 36.8 (12-08-20 @ 08:39)  HR: 101 (12-09-20 @ 04:33) (94 - 101)  BP: 96/60 (12-09-20 @ 04:33) (96/60 - 124/67)  RR: 18 (12-09-20 @ 04:33) (18 - 19)  SpO2: 96% (12-09-20 @ 04:33) (96% - 100%)  Wt(kg): --  Height (cm): 175.3 (12-07-20 @ 15:10)  Weight (kg): 73.8 (12-07-20 @ 15:57)  BMI (kg/m2): 24 (12-07-20 @ 15:57)  BSA (m2): 1.89 (12-07-20 @ 15:57)    12-08-20 @ 07:01  -  12-09-20 @ 07:00  --------------------------------------------------------  IN: 679.2 mL / OUT: 450 mL / NET: 229.2 mL    Physical Exam:  	Gen: NAD, well-appearing on room air  	HEENT: dry mucous membrane  	Pulm: CTA B/L, no crackles  	CV: RRR, S1S2; + murmur  	GI: +BS, soft, nontender/nondistended  	: No suprapubic tenderness  	MSK: Warm, no edema              Neuro: AAOx3  	Psych: Normal affect and mood  	Skin: Warm    LABS/STUDIES  --------------------------------------------------------------------------------              8.7    4.81  >-----------<  170      [12-09-20 @ 07:37]              29.1     133  |  98  |  129  ----------------------------<  179      [12-08-20 @ 21:10]  5.3   |  18  |  4.49        Ca     10.0     [12-08-20 @ 21:10]      Mg     2.9     [12-08-20 @ 06:52]      Phos  5.6     [12-08-20 @ 06:52]    TPro  7.1  /  Alb  3.7  /  TBili  1.4  /  DBili  0.6  /  AST  26  /  ALT  25  /  AlkPhos  94  [12-08-20 @ 12:38]    PT/INR: PT 23.9 , INR 2.06       [12-07-20 @ 16:15]  PTT: 40.8       [12-07-20 @ 16:15]    Uric acid 6.1      [12-08-20 @ 12:38]    Creatinine Trend:  SCr 4.49 [12-08 @ 21:10]  SCr 4.49 [12-08 @ 12:38]  SCr 4.37 [12-08 @ 06:52]  SCr 3.20 [12-07 @ 16:15]  SCr 2.89 [11-18 @ 06:16]        Iron 36, TIBC 314, %sat 12      [09-08-20 @ 08:45]  Ferritin 145      [09-08-20 @ 08:45]  Vitamin D (25OH) 21.7      [07-18-20 @ 10:13]  TSH 8.61      [11-01-20 @ 20:16]  Lipid: chol 116, TG 55, HDL 48, LDL 58      [08-26-20 @ 00:17]

## 2020-12-09 NOTE — PROGRESS NOTE ADULT - ASSESSMENT
Mr. Jarquin is a 75 Year-Old Gentleman with very significant cardiac history notable for NICM, LVEF < 20% (LVIDd 6.7 cm) s/p CRT-D, moderate-severe MR s/p MitraClip 8/2020, HTN, pAFib s/p DCCV 7/20/20 on Eliquis, CKD stage 3 (b/l SCr 1.7-2), DM 2 (A1c 6.1%) and anemia who presented after fall found to have Small bowel obstruction.     - No Acute Surgical intervention indicated at this time, patient currently with benign abdominal exam, normal Lactate, has been passing gas and having bowel function.   - continue NGT  - Serial abdominal exams; No pain meds before exam  - patient currently refusing landaverde catheter for strict monitoring of I/Os.   - Will continue to follow.   -Final plans to be d/w attending in AM rounds    [facility] Pager #[phone] Mr. Jarquin is a 75 Year-Old Gentleman with very significant cardiac history notable for NICM, LVEF < 20% (LVIDd 6.7 cm) s/p CRT-D, moderate-severe MR s/p MitraClip 8/2020, HTN, pAFib s/p DCCV 7/20/20 on Eliquis, CKD stage 3 (b/l SCr 1.7-2), DM 2 (A1c 6.1%) and anemia who presented after fall found to have Small bowel obstruction. Patient has return of bowel function.     - No Acute Surgical intervention indicated at this time, patient currently with benign abdominal exam, normal Lactate, has been passing gas and having bowel function.   - Discontinue NGT  - Adv diet as tolerated to baseline diet   - Call if any further concerns     [facility] Pager #[phone]

## 2020-12-09 NOTE — PROGRESS NOTE ADULT - PROBLEM SELECTOR PLAN 1
- cvO2 from [place], lactate normal  - Discontinue NS mIVF at 50cc/hr after 24h ~1800 today  - Continue home milrinone 0.3 mcg/kg/min  - Continue to hold home torsemide 80mg BID and spironolactone 25mg daily with ARF  - Holding home Toprol XL 25 mg QD and Imdur 60mg qhs while NPO  - Please check daily CMP and lactate to trend perfusion  - Strict I/Os, daily standing weights (goal weight ~158 lbs). - Can continue IVF with sodium bicarb per nephrology at 50cc/hr, monitor volume status closely  - Continue home milrinone 0.3 mcg/kg/min  - Restart home Toprol XL 25 mg QD and Imdur 60mg qhs now that advancing diet  - Continue to hold home torsemide 80mg BID and spironolactone 25mg daily with ARF  - Please check daily CMP and lactate to trend perfusion  - Strict I/Os, daily standing weights (goal weight ~158 lbs).

## 2020-12-09 NOTE — PROGRESS NOTE ADULT - SUBJECTIVE AND OBJECTIVE BOX
SUBJECTIVE / OVERNIGHT EVENTS: pt seen and examined      MEDICATIONS  (STANDING):  aMIOdarone    Tablet 200 milliGRAM(s) Oral daily  apixaban 5 milliGRAM(s) Oral two times a day  BACItracin   Ointment 1 Application(s) Topical two times a day  isosorbide   mononitrate ER Tablet (IMDUR) 60 milliGRAM(s) Oral at bedtime  levothyroxine 50 MICROGram(s) Oral daily  melatonin 3 milliGRAM(s) Oral at bedtime  metoprolol succinate ER 25 milliGRAM(s) Oral daily  milrinone Infusion 0.3 MICROgram(s)/kG/Min (6.64 mL/Hr) IV Continuous <Continuous>  sodium chloride 0.45% 1000 milliLiter(s) (50 mL/Hr) IV Continuous <Continuous>    MEDICATIONS  (PRN):  acetaminophen   Tablet .. 650 milliGRAM(s) Oral every 6 hours PRN Mild Pain (1 - 3)    Vital Signs Last 24 Hrs  T(C): 36.8 (09 Dec 2020 21:14), Max: 36.8 (09 Dec 2020 04:33)  T(F): 98.2 (09 Dec 2020 21:14), Max: 98.3 (09 Dec 2020 04:33)  HR: 91 (09 Dec 2020 21:14) (91 - 101)  BP: 94/60 (09 Dec 2020 22:44) (93/53 - 96/60)  BP(mean): --  RR: 18 (09 Dec 2020 21:14) (18 - 18)  SpO2: 97% (09 Dec 2020 21:14) (96% - 97%)    CAPILLARY BLOOD GLUCOSE      POCT Blood Glucose.: 216 mg/dL (09 Dec 2020 21:25)  POCT Blood Glucose.: 218 mg/dL (09 Dec 2020 17:31)  POCT Blood Glucose.: 239 mg/dL (09 Dec 2020 13:03)    I&O's Summary    08 Dec 2020 07:01  -  09 Dec 2020 07:00  --------------------------------------------------------  IN: 679.2 mL / OUT: 450 mL / NET: 229.2 mL    09 Dec 2020 07:01  -  09 Dec 2020 22:53  --------------------------------------------------------  IN: 600 mL / OUT: 900 mL / NET: -300 mL        Constitutional: No fever, fatigue  Skin: No rash.  Eyes: No recent vision problems or eye pain.  ENT: No congestion, ear pain, or sore throat.  Cardiovascular: No chest pain or palpation.  Respiratory: No cough, shortness of breath, congestion, or wheezing.  Gastrointestinal: No abdominal pain, nausea, vomiting, or diarrhea.  Genitourinary: No dysuria.  Musculoskeletal: No joint swelling.  Neurologic: No headache.    PHYSICAL EXAM:  GENERAL: NAD  EYES: EOMI, PERRLA  NECK: Supple, No JVD  CHEST/LUNG: dec breath sounds at bases   HEART:  S1 , S2 +  ABDOMEN: soft , bs+, mild distension +  EXTREMITIES:  edema+  NEUROLOGY:alert awake oriented       LABS:                        8.7    4.81  )-----------( 170      ( 09 Dec 2020 07:37 )             29.1     12-08    133<L>  |  98  |  129<H>  ----------------------------<  179<H>  5.3   |  18<L>  |  4.49<H>    Ca    10.0      08 Dec 2020 21:10  Phos  5.6     12-08  Mg     2.9     12-08    TPro  7.1  /  Alb  3.7  /  TBili  1.4<H>  /  DBili  0.6<H>  /  AST  26  /  ALT  25  /  AlkPhos  94  12-08              RADIOLOGY & ADDITIONAL TESTS:    Imaging Personally Reviewed:    Consultant(s) Notes Reviewed:      Care Discussed with Consultants/Other Providers:

## 2020-12-10 NOTE — PROGRESS NOTE ADULT - ASSESSMENT
Mr. Jarquin is a 75 Year-Old Gentleman with very significant cardiac history notable for NICM, LVEF < 20% (LVIDd 6.7 cm) s/p CRT-D, moderate-severe MR s/p MitraClip 8/2020, HTN, pAFib s/p DCCV 7/20/20 on Eliquis, CKD stage 3 (b/l SCr 1.7-2), DM 2 (A1c 6.1%) and anemia who presented after fall found to have Small bowel obstruction. Patient has return of bowel function. NGT out 12/9    - No Acute Surgical intervention indicated at this time, patient currently with benign abdominal exam, normal Lactate, has been passing gas and having bowel function.   - Adv diet as tolerated to baseline diet   - Call if any further concerns     [facility] Pager #[phone]

## 2020-12-10 NOTE — PROGRESS NOTE ADULT - SUBJECTIVE AND OBJECTIVE BOX
Interval History: Worsening uremia and Cr, denies confusion, nausea or vomiting. Passing gas, no bowel movements, tolerating some clears    Medications:  acetaminophen   Tablet .. 650 milliGRAM(s) Oral every 6 hours PRN  aMIOdarone    Tablet 200 milliGRAM(s) Oral daily  apixaban 5 milliGRAM(s) Oral two times a day  BACItracin   Ointment 1 Application(s) Topical two times a day  isosorbide   mononitrate ER Tablet (IMDUR) 60 milliGRAM(s) Oral at bedtime  levothyroxine 50 MICROGram(s) Oral daily  melatonin 3 milliGRAM(s) Oral at bedtime  metoprolol succinate ER 25 milliGRAM(s) Oral daily  milrinone Infusion 0.3 MICROgram(s)/kG/Min IV Continuous <Continuous>  sodium chloride 0.45% 1000 milliLiter(s) IV Continuous <Continuous>    Vitals:  T(C): 36.4 (12-10-20 @ 13:15), Max: 36.8 (12-09-20 @ 21:14)  HR: 91 (12-10-20 @ 13:15) (84 - 91)  BP: 95/58 (12-10-20 @ 13:15) (93/53 - 95/58)  BP(mean): --  RR: 18 (12-10-20 @ 13:15) (18 - 18)  SpO2: 96% (12-10-20 @ 13:15) (96% - 97%)    Daily     Daily     I&O's Summary    09 Dec 2020 07:01  -  10 Dec 2020 07:00  --------------------------------------------------------  IN: 739.2 mL / OUT: 1020 mL / NET: -280.8 mL    10 Dec 2020 07:01  -  10 Dec 2020 15:02  --------------------------------------------------------  IN: 240 mL / OUT: 100 mL / NET: 140 mL    Physical Exam:  Appearance: Sitting up in bed in room air, no acute distress, frail  HEENT: PERRL  Neck: JVP ~8cm  Cardiovascular: Regular rate and rhythm  Respiratory: No increased work of breathing  Gastrointestinal: Soft, nondistended  Skin: No cyanosis  Neurologic: Non-focal  Extremities: No LE edema, warm and well perfused throughout  Psychiatry: A & O x 3, Mood & affect appropriate    Labs:                        9.3    4.03  )-----------( 199      ( 10 Dec 2020 07:12 )             29.9     12-10    132<L>  |  96  |  141<H>  ----------------------------<  187<H>  5.3   |  18<L>  |  5.15<H>    Ca    9.9      10 Dec 2020 07:12    TPro  7.0  /  Alb  3.6  /  TBili  1.4<H>  /  DBili  x   /  AST  22  /  ALT  21  /  AlkPhos  88  12-10    TELEMETRY: v paced

## 2020-12-10 NOTE — PHYSICAL THERAPY INITIAL EVALUATION ADULT - PLANNED THERAPY INTERVENTIONS, PT EVAL
LTG 1: Stairs - Pt will negotiate 4 steps w/ CGA and unilateral handrail within 4 weeks./balance training/bed mobility training/gait training/transfer training

## 2020-12-10 NOTE — PROGRESS NOTE ADULT - SUBJECTIVE AND OBJECTIVE BOX
[facility] Daily Progress Note  =====================================================    SUBJECTIVE: Patient seen and examined at bedside on AM rounds. Patient reports that they're feeling well. Tolerating CLD, denies nausea, vomiting.   + Flatus/    -BM. OOB/Ambulating as tolerated. Denies fever, chills.     24 HOUR EVENTS:  NGT out  Advanced to CLD    MEDICATIONS  (STANDING):  aMIOdarone    Tablet 200 milliGRAM(s) Oral daily  apixaban 5 milliGRAM(s) Oral two times a day  BACItracin   Ointment 1 Application(s) Topical two times a day  isosorbide   mononitrate ER Tablet (IMDUR) 60 milliGRAM(s) Oral at bedtime  levothyroxine 50 MICROGram(s) Oral daily  melatonin 3 milliGRAM(s) Oral at bedtime  metoprolol succinate ER 25 milliGRAM(s) Oral daily  milrinone Infusion 0.3 MICROgram(s)/kG/Min (6.64 mL/Hr) IV Continuous <Continuous>  sodium chloride 0.45% 1000 milliLiter(s) (50 mL/Hr) IV Continuous <Continuous>    MEDICATIONS  (PRN):  acetaminophen   Tablet .. 650 milliGRAM(s) Oral every 6 hours PRN Mild Pain (1 - 3)      OBJECTIVE:    Vital Signs Last 24 Hrs  T(C): 36.8 (09 Dec 2020 21:14), Max: 36.8 (09 Dec 2020 04:33)  T(F): 98.2 (09 Dec 2020 21:14), Max: 98.3 (09 Dec 2020 04:33)  HR: 91 (09 Dec 2020 21:14) (91 - 101)  BP: 94/60 (09 Dec 2020 22:44) (93/53 - 96/60)  BP(mean): --  RR: 18 (09 Dec 2020 21:14) (18 - 18)  SpO2: 97% (09 Dec 2020 21:14) (96% - 97%)    General: Alert and Oriented x3, No acute distress.  Respiratory: Breathing non-labored.  Abdomen: Large, soft, nontender, nondistended. No rebound, no guarding, No palpable organomegaly or masses.  Extremities: Moves all four.           I&O's Detail    08 Dec 2020 07:01  -  09 Dec 2020 07:00  --------------------------------------------------------  IN:    Milrinone: 79.2 mL    sodium chloride 0.45% w/ Additives: 600 mL  Total IN: 679.2 mL    OUT:    Nasogastric/Oral tube (mL): 100 mL    Voided (mL): 350 mL  Total OUT: 450 mL    Total NET: 229.2 mL      09 Dec 2020 07:01  -  10 Dec 2020 03:59  --------------------------------------------------------  IN:    Milrinone: 59.4 mL    Oral Fluid: 400 mL    sodium chloride 0.45% w/ Additives: 200 mL  Total IN: 659.4 mL    OUT:    Voided (mL): 1020 mL  Total OUT: 1020 mL    Total NET: -360.6 mL          Daily     Daily     LABS:                        8.7    4.81  )-----------( 170      ( 09 Dec 2020 07:37 )             29.1     12-08    133<L>  |  98  |  129<H>  ----------------------------<  179<H>  5.3   |  18<L>  |  4.49<H>    Ca    10.0      08 Dec 2020 21:10  Phos  5.6     12-08  Mg     2.9     12-08    TPro  7.1  /  Alb  3.7  /  TBili  1.4<H>  /  DBili  0.6<H>  /  AST  26  /  ALT  25  /  AlkPhos  94  12-08                      Assessment and Plan:    Final plan to be discussed with attending in AM rounds    [facility] [id number]       [facility] Daily Progress Note  =====================================================    SUBJECTIVE: Patient seen and examined at bedside on AM rounds. Patient reports that they're feeling well. Tolerating CLD, denies nausea, vomiting but does feel a little bloated.   + Flatus/    -BM. OOB/Ambulating as tolerated. Denies fever, chills.     24 HOUR EVENTS:  NGT out  Advanced to CLD    MEDICATIONS  (STANDING):  aMIOdarone    Tablet 200 milliGRAM(s) Oral daily  apixaban 5 milliGRAM(s) Oral two times a day  BACItracin   Ointment 1 Application(s) Topical two times a day  isosorbide   mononitrate ER Tablet (IMDUR) 60 milliGRAM(s) Oral at bedtime  levothyroxine 50 MICROGram(s) Oral daily  melatonin 3 milliGRAM(s) Oral at bedtime  metoprolol succinate ER 25 milliGRAM(s) Oral daily  milrinone Infusion 0.3 MICROgram(s)/kG/Min (6.64 mL/Hr) IV Continuous <Continuous>  sodium chloride 0.45% 1000 milliLiter(s) (50 mL/Hr) IV Continuous <Continuous>    MEDICATIONS  (PRN):  acetaminophen   Tablet .. 650 milliGRAM(s) Oral every 6 hours PRN Mild Pain (1 - 3)      OBJECTIVE:    Vital Signs Last 24 Hrs  T(C): 36.8 (09 Dec 2020 21:14), Max: 36.8 (09 Dec 2020 04:33)  T(F): 98.2 (09 Dec 2020 21:14), Max: 98.3 (09 Dec 2020 04:33)  HR: 91 (09 Dec 2020 21:14) (91 - 101)  BP: 94/60 (09 Dec 2020 22:44) (93/53 - 96/60)  BP(mean): --  RR: 18 (09 Dec 2020 21:14) (18 - 18)  SpO2: 97% (09 Dec 2020 21:14) (96% - 97%)    General: Alert and Oriented x3, No acute distress.  Respiratory: Breathing non-labored.  Abdomen: Large, soft, nontender, mildly distended. No rebound, no guarding, No palpable organomegaly or masses.  Extremities: Moves all four.           I&O's Detail    08 Dec 2020 07:01  -  09 Dec 2020 07:00  --------------------------------------------------------  IN:    Milrinone: 79.2 mL    sodium chloride 0.45% w/ Additives: 600 mL  Total IN: 679.2 mL    OUT:    Nasogastric/Oral tube (mL): 100 mL    Voided (mL): 350 mL  Total OUT: 450 mL    Total NET: 229.2 mL      09 Dec 2020 07:01  -  10 Dec 2020 03:59  --------------------------------------------------------  IN:    Milrinone: 59.4 mL    Oral Fluid: 400 mL    sodium chloride 0.45% w/ Additives: 200 mL  Total IN: 659.4 mL    OUT:    Voided (mL): 1020 mL  Total OUT: 1020 mL    Total NET: -360.6 mL          Daily     Daily     LABS:                        8.7    4.81  )-----------( 170      ( 09 Dec 2020 07:37 )             29.1     12-08    133<L>  |  98  |  129<H>  ----------------------------<  179<H>  5.3   |  18<L>  |  4.49<H>    Ca    10.0      08 Dec 2020 21:10  Phos  5.6     12-08  Mg     2.9     12-08    TPro  7.1  /  Alb  3.7  /  TBili  1.4<H>  /  DBili  0.6<H>  /  AST  26  /  ALT  25  /  AlkPhos  94  12-08

## 2020-12-10 NOTE — PROGRESS NOTE ADULT - PROBLEM SELECTOR PLAN 4
- AC with Eliquis, would hold with fluctuating renal function and recent traumatic fall, - Continue PO amiodarone and BB

## 2020-12-10 NOTE — PROGRESS NOTE ADULT - PROBLEM SELECTOR PLAN 2
- CT abdomen/pelvis confirms SBO, s/p NGT at OSH now removed  - Surgery consulted, appreciate recs - CT abdomen/pelvis confirms SBO, s/p NGT at OSH now removed  - Surgery consulted, appreciate recs  - Recommend PT evaluation

## 2020-12-10 NOTE — PROGRESS NOTE ADULT - ASSESSMENT
75 year old M with longstanding history of NICM, LVEF < 20% (LVIDd 6.7 cm) s/p CRT-D, moderate-severe MR s/p MitraClip 8/2020, HTN, pAFib s/p DCCV 7/20/20 on Eliquis, CKD stage 3 (b/l SCr 1.7-2), DM 2 (A1c 6.1%) and anemia, transferred from [facility] after fall at home today in setting of nausea and vomiting, found there to have SBO with placement of NG tube.

## 2020-12-10 NOTE — PROGRESS NOTE ADULT - ASSESSMENT
75 year old M with longstanding history of NICM, LVEF < 20% (LVIDd 6.7 cm) s/p CRT-D, moderate-severe MR s/p MitraClip 8/2020, HTN, pAFib s/p DCCV 7/20/20 on Eliquis, CKD stage 3 (b/l SCr 1.7-2), DM 2 (A1c 6.1%) and anemia who presented as transfer from OSH in the setting of presyncope/syncope, found to have SBO now s/p NGT for conservative management. Heart failure consulted for additional evaluation and management.  Patient appears euvolemic on exam, awaiting resolution of acute renal failure.    Relevant studies:  Echo:    11/1/20 TTE: LVEF 10-15%, LV 7cm, LVOT VIT 7cm, severe RV dysfunction, severly dilated atria, mild MR, min AI, severe TR, est RAP 10mmHg, est RVSP 41 mm Hg    Cardiac Cath:    9/15 (milrinone 0.25 mcg/kg/mi): CVP 5 PA 47/13 PCW 13, SVC saturation 68% with Corey CO/CI 5.8/3.1 and TD CO/CI 7/3.7. MAP 70 mmHg

## 2020-12-10 NOTE — PROGRESS NOTE ADULT - SUBJECTIVE AND OBJECTIVE BOX
SUBJECTIVE / OVERNIGHT EVENTS: pt seen and examined    MEDICATIONS  (STANDING):  aMIOdarone    Tablet 200 milliGRAM(s) Oral daily  BACItracin   Ointment 1 Application(s) Topical two times a day  isosorbide   mononitrate ER Tablet (IMDUR) 60 milliGRAM(s) Oral at bedtime  levothyroxine 50 MICROGram(s) Oral daily  melatonin 3 milliGRAM(s) Oral at bedtime  metoprolol succinate ER 25 milliGRAM(s) Oral daily  milrinone Infusion 0.3 MICROgram(s)/kG/Min (6.64 mL/Hr) IV Continuous <Continuous>    MEDICATIONS  (PRN):  acetaminophen   Tablet .. 650 milliGRAM(s) Oral every 6 hours PRN Mild Pain (1 - 3)    Vital Signs Last 24 Hrs  T(C): 36.5 (10 Dec 2020 21:07), Max: 36.5 (10 Dec 2020 21:07)  T(F): 97.7 (10 Dec 2020 21:07), Max: 97.7 (10 Dec 2020 21:07)  HR: 85 (10 Dec 2020 21:) (84 - 91)  BP: 99/64 (10 Dec 2020 21:) (93/58 - 99/64)  BP(mean): --  RR: 18 (10 Dec 2020 21:07) (18 - 18)  SpO2: 98% (10 Dec 2020 21:) (96% - 98%)    Constitutional: No fever, fatigue  Skin: No rash.  Eyes: No recent vision problems or eye pain.  ENT: No congestion, ear pain, or sore throat.  Cardiovascular: No chest pain or palpation.  Respiratory: No cough, shortness of breath, congestion, or wheezing.  Gastrointestinal: No abdominal pain, nausea, vomiting, or diarrhea.  Genitourinary: No dysuria.  Musculoskeletal: No joint swelling.  Neurologic: No headache.    PHYSICAL EXAM:  GENERAL: NAD  EYES: EOMI, PERRLA  NECK: Supple, No JVD  CHEST/LUNG: dec breath sounds at bases   HEART:  S1 , S2 +  ABDOMEN: soft , bs+, mild distension +  EXTREMITIES:  edema+  NEUROLOGY:alert awake oriented     LABS:  12-10    132<L>  |  96  |  141<H>  ----------------------------<  187<H>  5.3   |  18<L>  |  5.15<H>    Ca    9.9      10 Dec 2020 07:12    TPro  7.0  /  Alb  3.6  /  TBili  1.4<H>  /  DBili      /  AST  22  /  ALT  21  /  AlkPhos  88  12-10    Creatinine Trend: 5.15 <--, 4.49 <--, 4.49 <--, 4.37 <--, 3.20 <--                        9.3    4.03  )-----------( 199      ( 10 Dec 2020 07:12 )             29.9     Urine Studies:  Urinalysis Basic - ( 10 Dec 2020 18:53 )    Color: Yellow / Appearance: Clear / S.016 / pH:   Gluc:  / Ketone: Negative  / Bili: Negative / Urobili: Negative   Blood:  / Protein: Trace / Nitrite: Negative   Leuk Esterase: Negative / RBC: 2 /hpf / WBC 1 /HPF   Sq Epi:  / Non Sq Epi: 0 /hpf / Bacteria: Negative      Chloride, Random Urine: <35 mmol/L (12-10 @ 18:53)  Creatinine, Random Urine: 85 mg/dL (12-10 @ 18:53)  Sodium, Random Urine: <35 mmol/L (12-10 @ 18:53)          LIVER FUNCTIONS - ( 10 Dec 2020 07:12 )  Alb: 3.6 g/dL / Pro: 7.0 g/dL / ALK PHOS: 88 U/L / ALT: 21 U/L / AST: 22 U/L / GGT: x                     LIVER FUNCTIONS - ( 10 Dec 2020 07:12 )  Alb: 3.6 g/dL / Pro: 7.0 g/dL / ALK PHOS: 88 U/L / ALT: 21 U/L / AST: 22 U/L / GGT: x               Care Discussed with Consultants/Other Providers:

## 2020-12-10 NOTE — PHYSICAL THERAPY INITIAL EVALUATION ADULT - ADDITIONAL COMMENTS
Pt lives with his niece in a private house; niece lives on another floor. (continuation of H&P) Pt also found to have acute on chronic heart failure and EDIE. Surgery consulted and SBO medically managed with NGT for decompression. Plastics consulted for nasal fracture; pt to foloow up at discharge after swelling decreases.   Pt lives with his niece in a private house; niece lives on another floor. (continuation of H&P) Pt also found to have acute on chronic heart failure and EDIE. Surgery consulted and SBO medically managed with NGT for decompression. Plastics consulted for nasal fracture; pt to foloow up at discharge after swelling decreases.     Pt resides in private home with niece, 4 steps to enter, all needs met on first level. Pt independent with mobility and ADL's, ambulatory with cane occasionally.

## 2020-12-10 NOTE — PROGRESS NOTE ADULT - ASSESSMENT
75M PMHx NICM, LVEF on milronine (EF 10-15%, LVIDd 6.7 cm) s/p CRT-D, moderate-severe MR s/p MitraClip 8/2020, HTN, pAFib s/p DCCV 7/20/20 on Eliquis, CKD stage 4 (baseline SCr 2.5-3), DM2 transfer from [facility] to [facility] for further management SBO and mesenteric ischemia.  Nephrology consulted for EDIE on CKD.        # Hyperkalemia   Pt with hyperkalemia possibly 2/2 EDIE on CKD and acidosis.  On admission serum K 5.1 worsened to 5.7 (no hemolysis) s/p bicarb based IVF, improved to with last serum K 5.3, stable  - monitor serum K for now  - avoid ACEI/ARB/Aldactone given serum K and EDIE  - monitor BMP    # EDIE on CKDIV  EDIE likely 2/2 ATN in the setting hypotension, on diuretic lasix/aldactone & volume depletion from vomiting, decrease PO intake  CKD4 possibly 2/2 cardiorenal/HF and/or diabetes, baseline sCr 2.5-3.0 in 11/2020  On admission sCr 3.2 on 12/7, worsened to 4.3 on 12/8, ED triage BP 80s/50s, sCr worsened to 5.1 on 12/10    - please check urinalysis, urine electrolyte (Na, Cr, K, Cl)  - please obtain renal/bladder u/s   - continue to hold aldactone for now, consider giving a dose of torsemide if hypervolemia worsens  - pt. agreeable for temporary HD, if needed to optimize electrolytes/ volume status  - monitor BMP, strict I/O, avoid nephrotoxic agents (NSAIDs, PPI, contrast), renally dose medications per GFR.    # Metabolic acidosis  In setting of EDIE on CKD, no lactate. Serum bicarbonate is 18, unchanged  - please start sodium bicarbonate tabs 650 TID  - continue monitor serum bicarbonate    # Hyperphosphatemia  Pt. with hyperphosphatemia in setting of advanced CKD. Monitor serum phosphorus, will consider starting binders if gets worse.    # Anemia  Pt with normocytic anemia likely 2/2 ACD CKD and hematoma (intragluteal).  On admission Hgb 10.5, decreasing 8.5 possibly dilutional on IVF  - check iron studies, consider occult test, blood transfusion prn, monitor CBC 75M PMHx NICM, LVEF on milronine (EF 10-15%, LVIDd 6.7 cm) s/p CRT-D, moderate-severe MR s/p MitraClip 8/2020, HTN, pAFib s/p DCCV 7/20/20 on Eliquis, CKD stage 4 (baseline SCr 2.5-3), DM2 transfer from [facility] to [facility] for further management SBO and mesenteric ischemia.  Nephrology consulted for EDIE on CKD.        # Hyperkalemia   Pt with hyperkalemia possibly 2/2 EDIE on CKD and acidosis.  On admission serum K 5.1 worsened to 5.7 (no hemolysis) s/p bicarb based IVF, improved to with last serum K 5.3, stable  - monitor serum K for now  - avoid ACEI/ARB/Aldactone given serum K and EDIE  - monitor BMP    # EDIE on CKDIV  EDIE likely 2/2 ATN in the setting hypotension, on diuretic lasix/aldactone & volume depletion from vomiting, decrease PO intake  CKD4 possibly 2/2 cardiorenal/HF and/or diabetes, baseline sCr 2.5-3.0 in 11/2020  On admission sCr 3.2 on 12/7, worsened to 4.3 on 12/8, ED triage BP 80s/50s, sCr worsened to 5.1 on 12/10 however improving UOP    - please check urinalysis, urine electrolyte (Na, Cr, K, Cl)  - please obtain renal/bladder u/s   - continue to hold aldactone for now, consider giving a dose of torsemide if hypervolemia worsens  - pt. agreeable for temporary HD, if needed to optimize electrolytes/ volume status  - monitor BMP, strict I/O, avoid nephrotoxic agents (NSAIDs, PPI, contrast), renally dose medications per GFR.    # Metabolic acidosis  In setting of EDIE on CKD, no lactate. Serum bicarbonate is 18, unchanged  - continue monitor serum bicarbonate    # Hyperphosphatemia  Pt. with hyperphosphatemia in setting of advanced CKD. Monitor serum phosphorus, will consider starting binders if gets worse.    # Anemia  Pt with normocytic anemia likely 2/2 ACD CKD and hematoma (intragluteal).  On admission Hgb 10.5, decreasing 8.5 possibly dilutional on IVF  - check iron studies, consider occult test, blood transfusion prn, monitor CBC 75M PMHx NICM, LVEF on milronine (EF 10-15%, LVIDd 6.7 cm) s/p CRT-D, moderate-severe MR s/p MitraClip 8/2020, HTN, pAFib s/p DCCV 7/20/20 on Eliquis, CKD stage 4 (baseline SCr 2.5-3), DM2 transfer from [facility] to [facility] for further management SBO and mesenteric ischemia.  Nephrology consulted for EDIE on CKD.        # Hyperkalemia   Pt with hyperkalemia possibly 2/2 EDIE on CKD and acidosis.  On admission serum K 5.1 worsened to 5.7 (no hemolysis) s/p bicarb based IVF, improved to with last serum K 5.3, stable  - monitor serum K for now, low potassium diet  - avoid ACEI/ARB/Aldactone given serum K and EDIE  - monitor BMP    # EDIE on CKDIV  EDIE likely 2/2 ATN in the setting hypotension, on diuretic lasix/aldactone & volume depletion from vomiting, decrease PO intake  CKD4 possibly 2/2 cardiorenal/HF and/or diabetes, baseline sCr 2.5-3.0 in 11/2020  On admission sCr 3.2 on 12/7, worsened to 4.3 on 12/8, ED triage BP 80s/50s, sCr worsened to 5.1 on 12/10 however improving UOP    - please check urinalysis, urine electrolyte (Na, Cr, K, Cl)  - please obtain renal/bladder u/s   - continue to hold aldactone for now, consider giving a dose of torsemide if hypervolemia worsens  - pt. agreeable for temporary HD, if needed to optimize electrolytes/ volume status  - monitor BMP, strict I/O, avoid nephrotoxic agents (NSAIDs, PPI, contrast), renally dose medications per GFR.    # Metabolic acidosis  In setting of EDIE on CKD, no lactate. Serum bicarbonate is 18, unchanged  - continue monitor serum bicarbonate    # Hyperphosphatemia  Pt. with hyperphosphatemia in setting of advanced CKD. Monitor serum phosphorus, will consider starting binders if gets worse.    # Anemia  Pt with normocytic anemia likely 2/2 ACD CKD and hematoma (intragluteal).  On admission Hgb 10.5, decreasing 8.5 possibly dilutional on IVF  - check iron studies, consider occult test, blood transfusion prn, monitor CBC 75M PMHx NICM, LVEF on milronine (EF 10-15%, LVIDd 6.7 cm) s/p CRT-D, moderate-severe MR s/p MitraClip 8/2020, HTN, pAFib s/p DCCV 7/20/20 on Eliquis, CKD stage 4 (baseline SCr 2.5-3), DM2 transfer from [facility] to [facility] for further management SBO and mesenteric ischemia.  Nephrology consulted for EDIE on CKD.        # Hyperkalemia   Pt with hyperkalemia possibly 2/2 EDIE on CKD and acidosis.  On admission serum K 5.1 worsened to 5.7 (no hemolysis) s/p bicarb based IVF, improved to with last serum K 5.3, stable  - monitor serum K for now, low potassium diet  - avoid ACEI/ARB/Aldactone given serum K and EDIE  - monitor BMP    # EDIE on CKDIV  EDIE likely 2/2 ATN in the setting hypotension, on diuretic lasix/aldactone & volume depletion from vomiting, decrease PO intake  CKD4 possibly 2/2 cardiorenal/HF and/or diabetes, baseline sCr 2.5-3.0 in 11/2020  On admission sCr 3.2 on 12/7, worsened to 4.3 on 12/8, ED triage BP 80s/50s, sCr worsened to 5.1 on 12/10 however improving UOP    - please check urinalysis, urine electrolyte (Na, Cr, K, Cl)  - please obtain renal/bladder u/s   - continue to hold diuretics for now, can resume torsemide if volume statua worsens  - pt. agreeable for temporary HD, if needed to optimize electrolytes/ volume status  - monitor BMP, strict I/O, avoid nephrotoxic agents (NSAIDs, PPI, contrast), renally dose medications per GFR.    # Metabolic acidosis  In setting of EDIE on CKD, no lactate. Serum bicarbonate is 18, unchanged  - continue monitor serum bicarbonate    # Hyperphosphatemia  Pt. with hyperphosphatemia in setting of advanced CKD. Monitor serum phosphorus, will consider starting binders if gets worse.    # Anemia  Pt with normocytic anemia likely 2/2 ACD CKD and hematoma (intragluteal).  On admission Hgb 10.5, decreasing 8.5 possibly dilutional on IVF  - check iron studies, consider occult test, blood transfusion prn, monitor CBC

## 2020-12-10 NOTE — PROGRESS NOTE ADULT - PROBLEM SELECTOR PLAN 3
- Most recent baseline Cr ~ 2.5-3, follows with Dr. Rees as outpatient  - Concern for prerenal ATN with current oliguria/anuria, nephrology following, no evidence of hyperkalemia or symptomatic uremia  - Continue to monitor Cr BID

## 2020-12-10 NOTE — PHYSICAL THERAPY INITIAL EVALUATION ADULT - PERTINENT HX OF CURRENT PROBLEM, REHAB EVAL
75 year old M with longstanding history of NICM, LVEF < 20% (LVIDd 6.7 cm) s/p CRT-D, moderate-severe MR s/p MitraClip 8/2020, HTN, pAFib s/p DCCV 7/20/20 on Eliquis, CKD stage 3 (b/l SCr 1.7-2), DM 2 (A1c 6.1%) and anemia, transferred from [facility] after fall at home today in setting of nausea and vomiting, found there to have SBO with placement of NG tube.

## 2020-12-10 NOTE — PHYSICAL THERAPY INITIAL EVALUATION ADULT - DISCHARGE DISPOSITION, PT EVAL
Subacute rehab. Pt prefers d/c home with home PT. If home pt will require assistance w/ all ADLs and mobility. DME: RW and transport chair (or transportation into home via ambulette)./rehabilitation facility

## 2020-12-10 NOTE — PROGRESS NOTE ADULT - PROBLEM SELECTOR PLAN 1
- Euvolemic on exam currently, continue to hold home torsemide 80mg BID and spironolactone 25mg daily with ARF  - Continue home milrinone 0.3 mcg/kg/min  - Continue home Toprol XL 25 mg QD  - Continue Imdur 60mg qhs, hold parameters SBP < 110 to prevent hypotension in setting of likely ATN  - Would repeat TTE while inpatient  - Please check daily CMP and lactate to trend perfusion  - Strict I/Os, daily standing weights (goal weight ~158 lbs)

## 2020-12-11 NOTE — PROGRESS NOTE ADULT - ASSESSMENT
75M PMHx NICM, LVEF on milronine (EF 10-15%, LVIDd 6.7 cm) s/p CRT-D, moderate-severe MR s/p MitraClip 8/2020, HTN, pAFib s/p DCCV 7/20/20 on Eliquis, CKD stage 4 (baseline SCr 2.5-3), DM2 transfer from [facility] to [facility] for further management SBO and mesenteric ischemia.  Nephrology consulted for EDIE on CKD.        # Hyperkalemia   Pt with hyperkalemia possibly 2/2 DEIE on CKD and acidosis.  On admission serum K 5.1 worsened to 5.7 (no hemolysis) s/p bicarb based IVF, improved to with last serum K 5.3, am labs pending  - monitor serum K for now, low potassium diet  - avoid ACEI/ARB/Aldactone given serum K and EDIE  - monitor BMP    # EDIE on CKDIV  EDIE likely 2/2 ATN in the setting hypotension, on diuretic lasix/aldactone & volume depletion from vomiting, decrease PO intake  CKD4 possibly 2/2 cardiorenal/HF and/or diabetes, baseline sCr 2.5-3.0 in 11/2020  On admission sCr 3.2 on 12/7, worsened to 4.3 on 12/8, ED triage BP 80s/50s, sCr worsened to 5.15 on 12/10, urine sodium <35 consistent w/ pre renal etiology and renal ultrasound show CKD changes only (no hydronephrosis), am labs pending     - continue to hold diuretics for now, can resume torsemide if volume statua worsens  - pt. agreeable for temporary HD, if needed to optimize electrolytes/ volume status  - monitor BMP, strict I/O, avoid nephrotoxic agents (NSAIDs, PPI, contrast), renally dose medications per GFR.    # Metabolic acidosis  In setting of EDIE on CKD, no lactate. Serum bicarbonate is 18, unchanged  - continue monitor serum bicarbonate    # Hyperphosphatemia  Pt. with hyperphosphatemia in setting of advanced CKD. Monitor serum phosphorus, will consider starting binders if gets worse.    # Anemia  Pt with normocytic anemia likely 2/2 ACD CKD and hematoma (intragluteal).  On admission Hgb 10.5, decreasing 8.5 possibly dilutional on IVF  - check iron studies, consider occult test, blood transfusion prn, monitor CBC 75M PMHx NICM, LVEF on milronine (EF 10-15%, LVIDd 6.7 cm) s/p CRT-D, moderate-severe MR s/p MitraClip 8/2020, HTN, pAFib s/p DCCV 7/20/20 on Eliquis, CKD stage 4 (baseline SCr 2.5-3), DM2 transfer from [facility] to [facility] for further management SBO and mesenteric ischemia.  Nephrology consulted for EDIE on CKD, worsening BUN/Cr plan start HD 12/11/20 for uremia and hyperkalemia.      # Hyperkalemia   Pt with hyperkalemia possibly 2/2 EDIE on CKD and acidosis.  On admission serum K 5.1 worsened to 5.7 (no hemolysis) s/p bicarb based IVF, improved to with last serum K worsened 5.3 to 5.6  - plan for hemodialysis 2K bath today  - please insert landaverde catheter to monitor strict I/O and r/o retention  - low potassium diet, avoid ACEI/ARB/Aldactone given serum K and EDIE  - monitor BMP    # EDIE on CKDIV  EDIE on CKD likely 2/2 ATN in the setting hypotension, on diuretic lasix/aldactone & volume depletion from vomiting, decrease PO intake  CKD4 possibly 2/2 cardiorenal/HF and/or diabetes, baseline sCr 2.5-3.0 in 11/2020  On admission sCr 3.2 on 12/7, worsened to 4.3 on 12/8, ED triage BP 80s/50s, sCr worsened to 5.15 on 12/10, urine sodium <35 consistent w/ pre renal etiology and renal ultrasound show CKD changes only (no hydronephrosis), last serum creatinine worsened 5.1 to 5.87    - Plan for hemodialysis today given uremia and hyperkalemia  - ordered for DDAVP 20 mcg please give 30-60 minutes before shiley placement  - please insert landaverde catheter to monitor strict I/O and r/o retention  - monitor BMP, strict I/O, avoid nephrotoxic agents (NSAIDs, PPI, contrast), renally dose medications per HD.    # Metabolic acidosis  In setting of EDIE on CKD, no lactate. Serum bicarbonate is 18  - plan for HD as outline above  - continue monitor serum bicarbonate    # Hyperphosphatemia  Pt. with hyperphosphatemia in setting of advanced CKD.   - Monitor serum phosphorus, will consider starting binders if gets worse.    # Anemia  Pt with normocytic anemia likely 2/2 ACD CKD and hematoma (intragluteal).  On admission Hgb 10.5, last hgb 9.7  - check iron studies, consider occult test, blood transfusion prn, monitor CBC 75M PMHx NICM, LVEF on milronine (EF 10-15%, LVIDd 6.7 cm) s/p CRT-D, moderate-severe MR s/p MitraClip 8/2020, HTN, pAFib s/p DCCV 7/20/20 on Eliquis, CKD stage 4 (baseline SCr 2.5-3), DM2 transfer from [facility] to [facility] for further management SBO and mesenteric ischemia.  Nephrology consulted for EDIE on CKD, worsening BUN/Cr plan start HD 12/11/20 for uremia and hyperkalemia.      # Hyperkalemia   Pt with hyperkalemia possibly 2/2 EDIE on CKD and acidosis.  On admission serum K 5.1 worsened to 5.7 (no hemolysis) s/p bicarb based IVF, improved to with last serum K worsened 5.3 to 5.6  - plan for hemodialysis 2K bath today after shiley placement  - please insert landaverde catheter to monitor strict I/O and r/o retention  - low potassium diet, avoid ACEI/ARB/Aldactone given serum K and EDIE  - monitor BMP    # EDIE on CKDIV  EDIE on CKD likely 2/2 ATN in the setting hypotension, on diuretic lasix/aldactone & volume depletion from vomiting, decrease PO intake  CKD4 possibly 2/2 cardiorenal/HF and/or diabetes, baseline sCr 2.5-3.0 in 11/2020  On admission sCr 3.2 on 12/7, worsened to 4.3 on 12/8, ED triage BP 80s/50s, sCr worsened to 5.15 on 12/10, urine sodium <35 consistent w/ pre renal etiology and renal ultrasound show CKD changes only (no hydronephrosis), last serum creatinine worsened 5.1 to 5.87    - Plan for hemodialysis today given uremia and hyperkalemia  - ordered for DDAVP 20 mcg please give 30-60 minutes before shiley placement  - please insert landaverde catheter to monitor strict I/O and r/o retention  - monitor BMP, strict I/O, avoid nephrotoxic agents (NSAIDs, PPI, contrast), renally dose medications per HD.    # Metabolic acidosis  In setting of EDIE on CKD, no lactate. Serum bicarbonate is 18  - plan for HD as outline above  - continue monitor serum bicarbonate    # Hyperphosphatemia  Pt. with hyperphosphatemia in setting of advanced CKD.   - Monitor serum phosphorus, will consider starting binders if gets worse.    # Anemia  Pt with normocytic anemia likely 2/2 ACD CKD and hematoma (intragluteal).  On admission Hgb 10.5, last hgb 9.7  - check iron studies, consider occult test, blood transfusion prn, monitor CBC

## 2020-12-11 NOTE — PROCEDURE NOTE - NSPOSTPRCRAD_GEN_A_CORE
no pneumothorax/central line located in the superior vena cava/line adjusted to depth of insertion/post-procedure radiography performed

## 2020-12-11 NOTE — CONSULT NOTE ADULT - SUBJECTIVE AND OBJECTIVE BOX
Assessment: 75y Male PMHx NICM, LVEF on milronine (EF 10-15%, LVIDd 6.7 cm) s/p CRT-D, moderate-severe MR s/p MitraClip 8/2020, HTN, pAFib s/p DCCV 7/20/20 on Eliquis (last dose 24hrs ago), CKD stage 4 (baseline SCr 2.5-3), DM2 transfer from [facility] to [facility] for further management SBO and mesenteric ischemia. Patient now with EDIE on CKD, hyperkalemia requiring HD. Spoke with Dr. Rivera from nephrology, pt planned for HD today once catheter in place. IR consulted for shiley placement.                            9.3    4.03  )-----------( 199      ( 10 Dec 2020 07:12 )             29.9       12-11    129<L>  |  93<L>  |  150<H>  ----------------------------<  192<H>  5.6<H>   |  18<L>  |  5.87<H>    Ca    9.7      11 Dec 2020 07:11    TPro  7.0  /  Alb  3.6  /  TBili  1.4<H>  /  DBili  x   /  AST  22  /  ALT  21  /  AlkPhos  88  12-10            Plan:  -Please place order for IR Procedure, approving provider DOC Lee  -Spoke with SERGIO Varela, will place order for DDAVP to minimize bleeding risk  -Will plan for procedure today  -Please call IR at extension [phone] with any questions, concerns, or issues regarding above.      Cheryl Lee PA-C  Spectra # [id number]

## 2020-12-11 NOTE — PROGRESS NOTE ADULT - PROBLEM SELECTOR PLAN 3
- Most recent baseline Cr ~ 2.5-3, follows with Dr. Rees as outpatient  - Concern for prerenal ATN with renal failure, nephrology following, agree with HD with worsening hyperkalemia and uremia  - Continue frequent monitoring of BMP

## 2020-12-11 NOTE — PROGRESS NOTE ADULT - PROBLEM SELECTOR PLAN 2
- CT abdomen/pelvis confirms SBO, s/p NGT at OSH now removed  - Surgery consulted, appreciate recs  - Recommend PT evaluation

## 2020-12-11 NOTE — PROGRESS NOTE ADULT - SUBJECTIVE AND OBJECTIVE BOX
[facility] DIVISION OF KIDNEY DISEASES AND HYPERTENSION   -- FOLLOW UP NOTE --   Cristina Ramírez  Nephrology Fellow  Pager NS: [phone]   /  Pager HARESH: [phone]  (after 5pm or weekend please page the on-call fellow)  ======================================================    24 hour events/subjective:  - overnight no events reported, vitals afebrile no hypotensive episode, total  cc voided in the past 24hr  - patient seen and examined at bedside this morning on room air in no acute distress, report tolerating diet   - vitals/lab/medications reviewed    PAST HISTORY  --------------------------------------------------------------------------------  No significant changes to PMH, PSH, FHx, SHx, unless otherwise noted    ALLERGIES & MEDICATIONS  --------------------------------------------------------------------------------  Allergies  No Known Allergies    Intolerances    Standing Inpatient Medications  aMIOdarone    Tablet 200 milliGRAM(s) Oral daily  BACItracin   Ointment 1 Application(s) Topical two times a day  isosorbide   mononitrate ER Tablet (IMDUR) 60 milliGRAM(s) Oral at bedtime  levothyroxine 50 MICROGram(s) Oral daily  melatonin 3 milliGRAM(s) Oral at bedtime  metoprolol succinate ER 25 milliGRAM(s) Oral daily  milrinone Infusion 0.3 MICROgram(s)/kG/Min IV Continuous <Continuous>    PRN Inpatient Medications  acetaminophen   Tablet .. 650 milliGRAM(s) Oral every 6 hours PRN    REVIEW OF SYSTEMS  --------------------------------------------------------------------------------  Gen: no fever, chills, weakness  Respiratory: No dyspnea, cough  CV: No chest pain, orthopnea  GI: No abdominal pain, nausea, vomiting, diarrhea  MSK: no edema  Neuro: No dizziness, lightheadedness  Heme: No bleeding  All other systems were reviewed and are negative, except as noted.    VITALS/PHYSICAL EXAM  --------------------------------------------------------------------------------  T(C): 36.4 (12-11-20 @ 04:46), Max: 36.5 (12-10-20 @ 21:07)  HR: 86 (12-11-20 @ 04:46) (85 - 91)  BP: 99/50 (12-11-20 @ 04:46) (95/58 - 99/64)  RR: 18 (12-11-20 @ 04:46) (18 - 18)  SpO2: 97% (12-11-20 @ 04:46) (96% - 98%)  Wt(kg): --    12-10-20 @ 07:01  -  12-11-20 @ 07:00  --------------------------------------------------------  IN: 578.4 mL / OUT: 550 mL / NET: 28.4 mL    Physical Exam:  	Gen: NAD, well-appearing on room air  	HEENT: dry mucous membrane  	Pulm: CTA B/L, no crackles  	CV: RRR, S1S2; + murmur  	GI: +BS, soft, nontender/nondistended  	: No suprapubic tenderness  	MSK: Warm, no edema              Neuro: AAOx3  	Psych: Normal affect and mood  	Skin: Warm    LABS/STUDIES  --------------------------------------------------------------------------------              9.3    4.03  >-----------<  199      [12-10-20 @ 07:12]              29.9     132  |  96  |  141  ----------------------------<  187      [12-10-20 @ 07:12]  5.3   |  18  |  5.15        Ca     9.9     [12-10-20 @ 07:12]    TPro  7.0  /  Alb  3.6  /  TBili  1.4  /  DBili  x   /  AST  22  /  ALT  21  /  AlkPhos  88  [12-10-20 @ 07:12]    Creatinine Trend:  SCr 5.15 [12-10 @ 07:12]  SCr 4.49 [12-08 @ 21:10]  SCr 4.49 [12-08 @ 12:38]  SCr 4.37 [12-08 @ 06:52]  SCr 3.20 [12-07 @ 16:15]    Urinalysis - [12-10-20 @ 18:53]      Color Yellow / Appearance Clear / SG 1.016 / pH 5.5      Gluc Negative / Ketone Negative  / Bili Negative / Urobili Negative       Blood Negative / Protein Trace / Leuk Est Negative / Nitrite Negative      RBC 2 / WBC 1 / Hyaline 6 / Gran  / Sq Epi  / Non Sq Epi 0 / Bacteria Negative    Urine Creatinine 85      [12-10-20 @ 18:53]  Urine Sodium <35      [12-10-20 @ 18:53]  Urine Urea Nitrogen 673      [12-11-20 @ 02:41]  Urine Chloride <35      [12-10-20 @ 18:53]  Urine Phosphorus 52.9      [12-11-20 @ 04:07]    Iron 36, TIBC 314, %sat 12      [09-08-20 @ 08:45]  Ferritin 145      [09-08-20 @ 08:45]  Vitamin D (25OH) 21.7      [07-18-20 @ 10:13]  TSH 8.61      [11-01-20 @ 20:16]  Lipid: chol 116, TG 55, HDL 48, LDL 58      [08-26-20 @ 00:17]         [facility] DIVISION OF KIDNEY DISEASES AND HYPERTENSION   -- FOLLOW UP NOTE --   Cristina Ramírez  Nephrology Fellow  Pager NS: [phone]   /  Pager HARESH: [phone]  (after 5pm or weekend please page the on-call fellow)  ======================================================    24 hour events/subjective:  - overnight no events reported, vitals afebrile no hypotensive episode, total  cc voided in the past 24hr  - patient seen and examined at bedside this morning on room air in no acute distress, patient endorse nausea w/o vomiting, abdominal pain from bladder up to midepigastric and back pain.  Pt state had small bowel movement yesterday non bloody/watery.     - vitals/lab/medications reviewed    PAST HISTORY  --------------------------------------------------------------------------------  No significant changes to PMH, PSH, FHx, SHx, unless otherwise noted    ALLERGIES & MEDICATIONS  --------------------------------------------------------------------------------  Allergies  No Known Allergies    Intolerances    Standing Inpatient Medications  aMIOdarone    Tablet 200 milliGRAM(s) Oral daily  BACItracin   Ointment 1 Application(s) Topical two times a day  isosorbide   mononitrate ER Tablet (IMDUR) 60 milliGRAM(s) Oral at bedtime  levothyroxine 50 MICROGram(s) Oral daily  melatonin 3 milliGRAM(s) Oral at bedtime  metoprolol succinate ER 25 milliGRAM(s) Oral daily  milrinone Infusion 0.3 MICROgram(s)/kG/Min IV Continuous <Continuous>    PRN Inpatient Medications  acetaminophen   Tablet .. 650 milliGRAM(s) Oral every 6 hours PRN    REVIEW OF SYSTEMS  --------------------------------------------------------------------------------  Gen: no fever, chills, weakness  Respiratory: No dyspnea, cough  CV: No chest pain, orthopnea  GI: + abdominal pain, nausea.  No vomiting, diarrhea  MSK: no edema.  + back pain  Neuro: No dizziness, lightheadedness  Heme: No bleeding  All other systems were reviewed and are negative, except as noted.    VITALS/PHYSICAL EXAM  --------------------------------------------------------------------------------  T(C): 36.4 (12-11-20 @ 04:46), Max: 36.5 (12-10-20 @ 21:07)  HR: 86 (12-11-20 @ 04:46) (85 - 91)  BP: 99/50 (12-11-20 @ 04:46) (95/58 - 99/64)  RR: 18 (12-11-20 @ 04:46) (18 - 18)  SpO2: 97% (12-11-20 @ 04:46) (96% - 98%)  Wt(kg): --    12-10-20 @ 07:01  -  12-11-20 @ 07:00  --------------------------------------------------------  IN: 578.4 mL / OUT: 550 mL / NET: 28.4 mL    Physical Exam:  	Gen: NAD, well-appearing on room air  	HEENT: dry mucous membrane  	Pulm: CTA B/L, no crackles  	CV: RRR, S1S2; + murmur  	GI: +BS, soft, nontender/nondistended  	: No suprapubic tenderness  	MSK: Warm, no edema              Neuro: AAOx3  	Psych: Normal affect and mood  	Skin: Warm, facial abrasion (present on admission)    LABS/STUDIES  --------------------------------------------------------------------------------              9.3    4.03  >-----------<  199      [12-10-20 @ 07:12]              29.9     132  |  96  |  141  ----------------------------<  187      [12-10-20 @ 07:12]  5.3   |  18  |  5.15        Ca     9.9     [12-10-20 @ 07:12]    TPro  7.0  /  Alb  3.6  /  TBili  1.4  /  DBili  x   /  AST  22  /  ALT  21  /  AlkPhos  88  [12-10-20 @ 07:12]    Creatinine Trend:  SCr 5.15 [12-10 @ 07:12]  SCr 4.49 [12-08 @ 21:10]  SCr 4.49 [12-08 @ 12:38]  SCr 4.37 [12-08 @ 06:52]  SCr 3.20 [12-07 @ 16:15]    Urinalysis - [12-10-20 @ 18:53]      Color Yellow / Appearance Clear / SG 1.016 / pH 5.5      Gluc Negative / Ketone Negative  / Bili Negative / Urobili Negative       Blood Negative / Protein Trace / Leuk Est Negative / Nitrite Negative      RBC 2 / WBC 1 / Hyaline 6 / Gran  / Sq Epi  / Non Sq Epi 0 / Bacteria Negative    Urine Creatinine 85      [12-10-20 @ 18:53]  Urine Sodium <35      [12-10-20 @ 18:53]  Urine Urea Nitrogen 673      [12-11-20 @ 02:41]  Urine Chloride <35      [12-10-20 @ 18:53]  Urine Phosphorus 52.9      [12-11-20 @ 04:07]    Iron 36, TIBC 314, %sat 12      [09-08-20 @ 08:45]  Ferritin 145      [09-08-20 @ 08:45]  Vitamin D (25OH) 21.7      [07-18-20 @ 10:13]  TSH 8.61      [11-01-20 @ 20:16]  Lipid: chol 116, TG 55, HDL 48, LDL 58      [08-26-20 @ 00:17]         [facility] DIVISION OF KIDNEY DISEASES AND HYPERTENSION   -- FOLLOW UP NOTE --   Cristina Ramírez  Nephrology Fellow  Pager NS: [phone]   /  Pager HARESH: [phone]  (after 5pm or weekend please page the on-call fellow)  ======================================================    24 hour events/subjective:  - overnight no events reported, vitals afebrile no hypotensive episode, total  cc voided in the past 24hr  - patient seen and examined at bedside this morning on room air in no acute distress, patient endorse nausea w/o vomiting, abdominal pain from bladder up to midepigastric and back pain.  Pt state had small bowel movement yesterday non bloody/watery.     - vitals/lab/medications reviewed, noted for worsening BUN/Cr 5.1 to 5.8, , serum K 5.6    PAST HISTORY  --------------------------------------------------------------------------------  No significant changes to PMH, PSH, FHx, SHx, unless otherwise noted    ALLERGIES & MEDICATIONS  --------------------------------------------------------------------------------  Allergies  No Known Allergies    Intolerances    Standing Inpatient Medications  aMIOdarone    Tablet 200 milliGRAM(s) Oral daily  BACItracin   Ointment 1 Application(s) Topical two times a day  isosorbide   mononitrate ER Tablet (IMDUR) 60 milliGRAM(s) Oral at bedtime  levothyroxine 50 MICROGram(s) Oral daily  melatonin 3 milliGRAM(s) Oral at bedtime  metoprolol succinate ER 25 milliGRAM(s) Oral daily  milrinone Infusion 0.3 MICROgram(s)/kG/Min IV Continuous <Continuous>    PRN Inpatient Medications  acetaminophen   Tablet .. 650 milliGRAM(s) Oral every 6 hours PRN    REVIEW OF SYSTEMS  --------------------------------------------------------------------------------  Gen: no fever, chills, weakness  Respiratory: No dyspnea, cough  CV: No chest pain, orthopnea  GI: + abdominal pain, nausea.  No vomiting, diarrhea  MSK: no edema.  + back pain  Neuro: No dizziness, lightheadedness  Heme: No bleeding  All other systems were reviewed and are negative, except as noted.    VITALS/PHYSICAL EXAM  --------------------------------------------------------------------------------  T(C): 36.4 (12-11-20 @ 04:46), Max: 36.5 (12-10-20 @ 21:07)  HR: 86 (12-11-20 @ 04:46) (85 - 91)  BP: 99/50 (12-11-20 @ 04:46) (95/58 - 99/64)  RR: 18 (12-11-20 @ 04:46) (18 - 18)  SpO2: 97% (12-11-20 @ 04:46) (96% - 98%)  Wt(kg): --    12-10-20 @ 07:01  -  12-11-20 @ 07:00  --------------------------------------------------------  IN: 578.4 mL / OUT: 550 mL / NET: 28.4 mL    Physical Exam:  	Gen: NAD, well-appearing on room air  	HEENT: dry mucous membrane  	Pulm: CTA B/L, no crackles  	CV: RRR, S1S2; + murmur  	GI: +BS, soft, nontender/nondistended  	: No suprapubic tenderness  	MSK: Warm, no edema              Neuro: AAOx3  	Psych: Normal affect and mood  	Skin: Warm, facial abrasion (present on admission)    LABS/STUDIES  --------------------------------------------------------------------------------              9.3    4.03  >-----------<  199      [12-10-20 @ 07:12]              29.9     132  |  96  |  141  ----------------------------<  187      [12-10-20 @ 07:12]  5.3   |  18  |  5.15        Ca     9.9     [12-10-20 @ 07:12]    TPro  7.0  /  Alb  3.6  /  TBili  1.4  /  DBili  x   /  AST  22  /  ALT  21  /  AlkPhos  88  [12-10-20 @ 07:12]    Creatinine Trend:  SCr 5.15 [12-10 @ 07:12]  SCr 4.49 [12-08 @ 21:10]  SCr 4.49 [12-08 @ 12:38]  SCr 4.37 [12-08 @ 06:52]  SCr 3.20 [12-07 @ 16:15]    Urinalysis - [12-10-20 @ 18:53]      Color Yellow / Appearance Clear / SG 1.016 / pH 5.5      Gluc Negative / Ketone Negative  / Bili Negative / Urobili Negative       Blood Negative / Protein Trace / Leuk Est Negative / Nitrite Negative      RBC 2 / WBC 1 / Hyaline 6 / Gran  / Sq Epi  / Non Sq Epi 0 / Bacteria Negative    Urine Creatinine 85      [12-10-20 @ 18:53]  Urine Sodium <35      [12-10-20 @ 18:53]  Urine Urea Nitrogen 673      [12-11-20 @ 02:41]  Urine Chloride <35      [12-10-20 @ 18:53]  Urine Phosphorus 52.9      [12-11-20 @ 04:07]    Iron 36, TIBC 314, %sat 12      [09-08-20 @ 08:45]  Ferritin 145      [09-08-20 @ 08:45]  Vitamin D (25OH) 21.7      [07-18-20 @ 10:13]  TSH 8.61      [11-01-20 @ 20:16]  Lipid: chol 116, TG 55, HDL 48, LDL 58      [08-26-20 @ 00:17]         [facility] DIVISION OF KIDNEY DISEASES AND HYPERTENSION   -- FOLLOW UP NOTE --   Cristina Ramírez  Nephrology Fellow  Pager NS: [phone]   /  Pager HARESH: [phone]  (after 5pm or weekend please page the on-call fellow)  ======================================================    24 hour events/subjective:  - overnight no events reported, vitals afebrile no hypotensive episode, total  cc voided in the past 24hr  - patient seen and examined at bedside this morning on room air in no acute distress, patient endorse nausea w/o vomiting, abdominal pain from bladder up to midepigastric and back pain.  Pt state had small bowel movement yesterday non bloody/watery.     - vitals/lab/medications reviewed, noted for worsening BUN/Cr 5.1 to 5.8, , serum K 5.6    PAST HISTORY  --------------------------------------------------------------------------------  No significant changes to PMH, PSH, FHx, SHx, unless otherwise noted    ALLERGIES & MEDICATIONS  --------------------------------------------------------------------------------  Allergies  No Known Allergies    Intolerances    Standing Inpatient Medications  aMIOdarone    Tablet 200 milliGRAM(s) Oral daily  BACItracin   Ointment 1 Application(s) Topical two times a day  isosorbide   mononitrate ER Tablet (IMDUR) 60 milliGRAM(s) Oral at bedtime  levothyroxine 50 MICROGram(s) Oral daily  melatonin 3 milliGRAM(s) Oral at bedtime  metoprolol succinate ER 25 milliGRAM(s) Oral daily  milrinone Infusion 0.3 MICROgram(s)/kG/Min IV Continuous <Continuous>    PRN Inpatient Medications  acetaminophen   Tablet .. 650 milliGRAM(s) Oral every 6 hours PRN    REVIEW OF SYSTEMS  --------------------------------------------------------------------------------  Gen: no fever, chills, weakness  Respiratory: No dyspnea, cough  CV: No chest pain, orthopnea  GI: + abdominal pain, nausea.  No vomiting, diarrhea  MSK: no edema.  + back pain  Neuro: No dizziness, lightheadedness  Heme: No bleeding  All other systems were reviewed and are negative, except as noted.    VITALS/PHYSICAL EXAM  --------------------------------------------------------------------------------  T(C): 36.4 (12-11-20 @ 04:46), Max: 36.5 (12-10-20 @ 21:07)  HR: 86 (12-11-20 @ 04:46) (85 - 91)  BP: 99/50 (12-11-20 @ 04:46) (95/58 - 99/64)  RR: 18 (12-11-20 @ 04:46) (18 - 18)  SpO2: 97% (12-11-20 @ 04:46) (96% - 98%)  Wt(kg): --    12-10-20 @ 07:01  -  12-11-20 @ 07:00  --------------------------------------------------------  IN: 578.4 mL / OUT: 550 mL / NET: 28.4 mL    Physical Exam:  	Gen: appears lethargic  	HEENT: dry mucous membrane  	Pulm: CTA B/L, no crackles  	CV: RRR, S1S2; + murmur  	GI: distended abdomen  	: No suprapubic tenderness  	MSK: Warm, no edema              Neuro: AAOx3, +mild asterixis noted  	Psych: Normal affect and mood  	Skin: Warm, facial abrasion (present on admission)    LABS/STUDIES  --------------------------------------------------------------------------------              9.3    4.03  >-----------<  199      [12-10-20 @ 07:12]              29.9     132  |  96  |  141  ----------------------------<  187      [12-10-20 @ 07:12]  5.3   |  18  |  5.15        Ca     9.9     [12-10-20 @ 07:12]    TPro  7.0  /  Alb  3.6  /  TBili  1.4  /  DBili  x   /  AST  22  /  ALT  21  /  AlkPhos  88  [12-10-20 @ 07:12]    Creatinine Trend:  SCr 5.15 [12-10 @ 07:12]  SCr 4.49 [12-08 @ 21:10]  SCr 4.49 [12-08 @ 12:38]  SCr 4.37 [12-08 @ 06:52]  SCr 3.20 [12-07 @ 16:15]    Urinalysis - [12-10-20 @ 18:53]      Color Yellow / Appearance Clear / SG 1.016 / pH 5.5      Gluc Negative / Ketone Negative  / Bili Negative / Urobili Negative       Blood Negative / Protein Trace / Leuk Est Negative / Nitrite Negative      RBC 2 / WBC 1 / Hyaline 6 / Gran  / Sq Epi  / Non Sq Epi 0 / Bacteria Negative    Urine Creatinine 85      [12-10-20 @ 18:53]  Urine Sodium <35      [12-10-20 @ 18:53]  Urine Urea Nitrogen 673      [12-11-20 @ 02:41]  Urine Chloride <35      [12-10-20 @ 18:53]  Urine Phosphorus 52.9      [12-11-20 @ 04:07]    Iron 36, TIBC 314, %sat 12      [09-08-20 @ 08:45]  Ferritin 145      [09-08-20 @ 08:45]  Vitamin D (25OH) 21.7      [07-18-20 @ 10:13]  TSH 8.61      [11-01-20 @ 20:16]  Lipid: chol 116, TG 55, HDL 48, LDL 58      [08-26-20 @ 00:17]

## 2020-12-11 NOTE — PROCEDURE NOTE - NSPROCDETAILS_GEN_ALL_CORE
lumen(s) aspirated and flushed/guidewire recovered/ultrasound guidance with use of sterile gel and probe cove/sterile dressing applied/sterile technique, catheter placed

## 2020-12-11 NOTE — PROGRESS NOTE ADULT - PROBLEM SELECTOR PLAN 4
- AC with Eliquis, would hold with fluctuating renal function and recent traumatic fall - Continue PO amiodarone and BB

## 2020-12-11 NOTE — PROGRESS NOTE ADULT - ASSESSMENT
75 year old M with longstanding history of NICM, LVEF < 20% (LVIDd 6.7 cm) s/p CRT-D, moderate-severe MR s/p MitraClip 8/2020, HTN, pAFib s/p DCCV 7/20/20 on Eliquis, CKD stage 3 (b/l SCr 1.7-2), DM 2 (A1c 6.1%) and anemia who presented as transfer from OSH in the setting of presyncope/syncope, found to have SBO now s/p NGT for conservative management. Heart failure consulted for additional evaluation and management.  Patient appears euvolemic on exam, unfortunately with worsening renal failure and hyperkalemia, will require initiation of dialysis.    Relevant studies:  Echo:    11/1/20 TTE: LVEF 10-15%, LV 7cm, LVOT VIT 7cm, severe RV dysfunction, severly dilated atria, mild MR, min AI, severe TR, est RAP 10mmHg, est RVSP 41 mm Hg    Cardiac Cath:    9/15 (milrinone 0.25 mcg/kg/mi): CVP 5 PA 47/13 PCW 13, SVC saturation 68% with Corey CO/CI 5.8/3.1 and TD CO/CI 7/3.7. MAP 70 mmHg

## 2020-12-11 NOTE — PROGRESS NOTE ADULT - PROBLEM SELECTOR PLAN 2
- Continue home milrinone 0.3 mcg/kg/min  - Continue home Toprol XL 25 mg QD  - Continue Imdur 60mg qhs, hold parameters SBP < 110 to prevent hypotension in setting of likely ATN- Continue home milrinone 0.3 mcg/kg/min  - Continue home Toprol XL 25 mg QD  - Continue Imdur 60mg qhs, hold parameters SBP < 110 to prevent hypotension in setting of likely ATN

## 2020-12-11 NOTE — PROCEDURE NOTE - NSPOSTCAREGUIDE_GEN_A_CORE
Instructed patient/caregiver regarding signs and symptoms of infection/Keep the cast/splint/dressing clean and dry/Verbal/written post procedure instructions were given to patient/caregiver/Instructed patient/caregiver to follow-up with primary care physician

## 2020-12-11 NOTE — PROGRESS NOTE ADULT - SUBJECTIVE AND OBJECTIVE BOX
SUBJECTIVE / OVERNIGHT EVENTS: pt seen and examined. pt c/o abd pain , + flatus      MEDICATIONS  (STANDING):  aMIOdarone    Tablet 200 milliGRAM(s) Oral daily  BACItracin   Ointment 1 Application(s) Topical two times a day  chlorhexidine 4% Liquid 1 Application(s) Topical <User Schedule>  isosorbide   mononitrate ER Tablet (IMDUR) 60 milliGRAM(s) Oral at bedtime  levothyroxine 50 MICROGram(s) Oral daily  melatonin 3 milliGRAM(s) Oral at bedtime  metoprolol succinate ER 25 milliGRAM(s) Oral daily  milrinone Infusion 0.3 MICROgram(s)/kG/Min (6.64 mL/Hr) IV Continuous <Continuous>    MEDICATIONS  (PRN):  acetaminophen   Tablet .. 650 milliGRAM(s) Oral every 6 hours PRN Mild Pain (1 - 3)  sodium chloride 0.9% lock flush 10 milliLiter(s) IV Push every 1 hour PRN Pre/post blood products, medications, blood draw, and to maintain line patency    Vital Signs Last 24 Hrs  T(C): 36.7 (11 Dec 2020 21:40), Max: 36.8 (11 Dec 2020 19:07)  T(F): 98 (11 Dec 2020 21:40), Max: 98.2 (11 Dec 2020 19:07)  HR: 109 (11 Dec 2020 22:13) (86 - 122)  BP: 88/51 (11 Dec 2020 22:13) (88/51 - 106/61)  BP(mean): --  RR: 16 (11 Dec 2020 21:40) (16 - 18)  SpO2: 96% (11 Dec 2020 21:40) (96% - 99%)    Constitutional: No fever, fatigue  Skin: No rash.  Eyes: No recent vision problems or eye pain.  ENT: No congestion, ear pain, or sore throat.  Cardiovascular: No chest pain or palpation.  Respiratory: No cough, shortness of breath, congestion, or wheezing.  Gastrointestinal: +abdominal pain, nausea, vomiting, or diarrhea.  Genitourinary: No dysuria.  Musculoskeletal: No joint swelling.  Neurologic: No headache.    PHYSICAL EXAM:  GENERAL: NAD  EYES: EOMI, PERRLA  NECK: Supple, No JVD  CHEST/LUNG: dec breath sounds at bases   HEART:  S1 , S2 +  ABDOMEN: soft , bs+, mild distension +  EXTREMITIES:  edema+  NEUROLOGY:alert awake oriented     LABS:      129<L>  |  93<L>  |  150<H>  ----------------------------<  192<H>  5.6<H>   |  18<L>  |  5.87<H>    Ca    9.7      11 Dec 2020 07:11    TPro  7.0  /  Alb  3.6  /  TBili  1.4<H>  /  DBili      /  AST  22  /  ALT  21  /  AlkPhos  88  12-10    Creatinine Trend: 5.87 <--, 5.15 <--, 4.49 <--, 4.49 <--, 4.37 <--, 3.20 <--                        9.3    4.03  )-----------( 199      ( 10 Dec 2020 07:12 )             29.9     Urine Studies:  Urinalysis Basic - ( 10 Dec 2020 18:53 )    Color: Yellow / Appearance: Clear / S.016 / pH:   Gluc:  / Ketone: Negative  / Bili: Negative / Urobili: Negative   Blood:  / Protein: Trace / Nitrite: Negative   Leuk Esterase: Negative / RBC: 2 /hpf / WBC 1 /HPF   Sq Epi:  / Non Sq Epi: 0 /hpf / Bacteria: Negative      Osmolality, Random Urine: 387 mosm/Kg ( @ 05:47)  Chloride, Random Urine: <35 mmol/L (12-10 @ 18:53)  Creatinine, Random Urine: 85 mg/dL (12-10 @ 18:53)  Sodium, Random Urine: <35 mmol/L (12-10 @ 18:53)          LIVER FUNCTIONS - ( 10 Dec 2020 07:12 )  Alb: 3.6 g/dL / Pro: 7.0 g/dL / ALK PHOS: 88 U/L / ALT: 21 U/L / AST: 22 U/L / GGT: x                 LIVER FUNCTIONS - ( 10 Dec 2020 07:12 )  Alb: 3.6 g/dL / Pro: 7.0 g/dL / ALK PHOS: 88 U/L / ALT: 21 U/L / AST: 22 U/L / GGT: x               Care Discussed with Consultants/Other Providers:

## 2020-12-11 NOTE — PROGRESS NOTE ADULT - ASSESSMENT
Mr. Jarquin is a 75 Year-Old Gentleman with very significant cardiac history and acute on chronic renal failure who presented after fall found to have Small bowel obstruction. Patient has return of bowel function however continue to have worsening renal function, now requiring HD (access to be placed by IR 12/11), nausea possible 2/2 to hyperuremia.     Plan  - Increase nausea but still with GI function   - will follow up with CT scan  - Agrees with NPO    Green Team Surgery Pager #[phone]   Mr. Jarquin is a 75 Year-Old Gentleman with very significant cardiac history and acute on chronic renal failure who presented after fall found to have Small bowel obstruction. Patient has return of bowel function however continue to have worsening renal function, now requiring HD (access to be placed by IR 12/11), nausea possible 2/2 to hyperuremia.     Plan  - No surgical intervention   - Increase nausea but still with GI function   - will follow up with CT scan  - Agrees with NPO    Green Team Surgery Pager #[phone]

## 2020-12-11 NOTE — PROGRESS NOTE ADULT - ASSESSMENT
75 year old M with longstanding history of NICM, LVEF < 20% (LVIDd 6.7 cm) s/p CRT-D, moderate-severe MR s/p MitraClip 8/2020, HTN, pAFib s/p DCCV 7/20/20 on Eliquis, CKD stage 3 (b/l SCr 1.7-2), DM 2 (A1c 6.1%) and anemia, transferred from [facility] after fall at home today in setting of nausea and vomiting, found there to have SBO with placement of NG tube.

## 2020-12-11 NOTE — PROGRESS NOTE ADULT - SUBJECTIVE AND OBJECTIVE BOX
Interval History: Still oliguric will worsening renal function, reports feeling more nauseous today. +BM yesterday    Medications:  acetaminophen   Tablet .. 650 milliGRAM(s) Oral every 6 hours PRN  aMIOdarone    Tablet 200 milliGRAM(s) Oral daily  BACItracin   Ointment 1 Application(s) Topical two times a day  desmopressin IVPB 20 MICROGram(s) IV Intermittent once  isosorbide   mononitrate ER Tablet (IMDUR) 60 milliGRAM(s) Oral at bedtime  levothyroxine 50 MICROGram(s) Oral daily  melatonin 3 milliGRAM(s) Oral at bedtime  metoprolol succinate ER 25 milliGRAM(s) Oral daily  milrinone Infusion 0.3 MICROgram(s)/kG/Min IV Continuous <Continuous>    Vitals:  T(C): 36.3 (12-11-20 @ 13:05), Max: 36.5 (12-10-20 @ 21:07)  HR: 91 (12-11-20 @ 13:05) (85 - 91)  BP: 100/64 (12-11-20 @ 13:05) (95/61 - 100/64)  BP(mean): --  RR: 18 (12-11-20 @ 13:05) (18 - 18)  SpO2: 99% (12-11-20 @ 13:05) (97% - 99%)    Daily     Daily     I&O's Summary    10 Dec 2020 07:01  -  11 Dec 2020 07:00  --------------------------------------------------------  IN: 578.4 mL / OUT: 550 mL / NET: 28.4 mL    11 Dec 2020 07:01  -  11 Dec 2020 14:13  --------------------------------------------------------  IN: 240 mL / OUT: 200 mL / NET: 40 mL    Physical Exam:  Appearance: Laying in room air, mild distress  HEENT: PERRL  Neck: JVP ~8cm  Cardiovascular: Regular rate and rhythm  Respiratory: No increased work of breathing  Gastrointestinal: Soft, nondistended, decreased bowel sounds  Skin: No cyanosis  Neurologic: Non-focal  Extremities: No LE edema, warm and well perfused throughout  Psychiatry: A & O x 3, Mood & affect appropriate    Labs:                        9.3    4.03  )-----------( 199      ( 10 Dec 2020 07:12 )             29.9     12-11    129<L>  |  93<L>  |  150<H>  ----------------------------<  192<H>  5.6<H>   |  18<L>  |  5.87<H>    Ca    9.7      11 Dec 2020 07:11    TPro  7.0  /  Alb  3.6  /  TBili  1.4<H>  /  DBili  x   /  AST  22  /  ALT  21  /  AlkPhos  88  12-10    TELEMETRY: sinus rhythm

## 2020-12-11 NOTE — PROGRESS NOTE ADULT - SUBJECTIVE AND OBJECTIVE BOX
Surgery Progress Note    S: Patient seen and examined. More nauseous today, retching but no vomiting. Continue to pass gas, last BM yesterday.     O:  Physical Exam:  Gen: Laying in bed, NAD  HEENT: atrumatic, EMOI  Resp: Unlabored breathing  Abd: soft, nontender, nondistended, no rebound or guarding.    Ext: Moves 4 extremities spontaneously    Vital Signs Last 24 Hrs  T(C): 36.3 (11 Dec 2020 13:05), Max: 36.5 (10 Dec 2020 21:07)  T(F): 97.4 (11 Dec 2020 13:05), Max: 97.7 (10 Dec 2020 21:07)  HR: 91 (11 Dec 2020 13:05) (85 - 91)  BP: 100/64 (11 Dec 2020 13:05) (95/61 - 100/64)  BP(mean): --  RR: 18 (11 Dec 2020 13:05) (18 - 18)  SpO2: 99% (11 Dec 2020 13:05) (97% - 99%)    I&O's Detail    10 Dec 2020 07:01  -  11 Dec 2020 07:00  --------------------------------------------------------  IN:    Milrinone: 158.4 mL    Oral Fluid: 420 mL  Total IN: 578.4 mL    OUT:    Voided (mL): 550 mL  Total OUT: 550 mL    Total NET: 28.4 mL      11 Dec 2020 07:01  -  11 Dec 2020 15:05  --------------------------------------------------------  IN:    Oral Fluid: 480 mL  Total IN: 480 mL    OUT:    Voided (mL): 200 mL  Total OUT: 200 mL    Total NET: 280 mL                                9.3    4.03  )-----------( 199      ( 10 Dec 2020 07:12 )             29.9       12-11    129<L>  |  93<L>  |  150<H>  ----------------------------<  192<H>  5.6<H>   |  18<L>  |  5.87<H>    Ca    9.7      11 Dec 2020 07:11    TPro  7.0  /  Alb  3.6  /  TBili  1.4<H>  /  DBili  x   /  AST  22  /  ALT  21  /  AlkPhos  88  12-10

## 2020-12-12 NOTE — CHART NOTE - NSCHARTNOTEFT_GEN_A_CORE
Notified by RN of pt noted to have coffee ground emesis about 200cc. Pt seen and examined. Pt found resting comforting in bed. Pt states he felt slightly nauseated and vomited afterwards. Spoke to pt regarding NGT, pt refused the NGT. Risk and benefits discussed, not limiting to aspiration pneumonia and death. Pt states he will consider if vomiting persists. Dr. Diehl made aware. Will continue to monitor.    Bahman Nur NP  [id number]

## 2020-12-12 NOTE — PROVIDER CONTACT NOTE (OTHER) - ACTION/TREATMENT ORDERED:
NP assessed. Reglan ordered. Discussing goals such as DNR/DNI and NG tube placement. Continue to monitor emesis and nausea,

## 2020-12-12 NOTE — PROGRESS NOTE ADULT - ASSESSMENT
75 year old M with longstanding history of NICM, LVEF < 20% (LVIDd 6.7 cm) s/p CRT-D, moderate-severe MR s/p MitraClip 8/2020, HTN, pAFib s/p DCCV 7/20/20 on Eliquis, CKD stage 3 (b/l SCr 1.7-2), DM 2 (A1c 6.1%) and anemia, transferred from [facility] after fall at home today in setting of nausea and vomiting, found there to have SBO with placement of NG tube.

## 2020-12-12 NOTE — PROGRESS NOTE ADULT - PROBLEM SELECTOR PLAN 1
s/p NGT   surgery f/u  frequent abd exam  repeat abd ct < from: CT Abdomen and Pelvis No Cont (12.11.20 @ 17:25) >    Persistent small bowel obstruction with transition point in the right lower quadrant, in the terminal ileum, as on the prior CT.    Unchanged small volume ascites.    Evolving partially imaged right gluteal hematoma, likely decreased in size.    < end of copied text >

## 2020-12-12 NOTE — PROGRESS NOTE ADULT - ASSESSMENT
75M PMHx NICM, LVEF on milronine (EF 10-15%, LVIDd 6.7 cm) s/p CRT-D, moderate-severe MR s/p MitraClip 8/2020, HTN, pAFib s/p DCCV 7/20/20 on Eliquis, CKD stage 4 (baseline SCr 2.5-3), DM2 transfer from [facility] to [facility] for further management SBO and mesenteric ischemia.  Nephrology consulted for EDIE on CKD, worsening BUN/Cr plan start HD 12/11/20 for uremia and hyperkalemia.      # EDIE on CKDIV  EDIE on CKD likely 2/2 ATN in the setting hypotension, on diuretic lasix/aldactone & volume depletion from vomiting, decrease PO intake  CKD4 possibly 2/2 cardiorenal/HF and/or diabetes, baseline sCr 2.5-3.0 in 11/2020  On admission sCr 3.2 on 12/7, worsened to 4.3 on 12/8, ED triage BP 80s/50s, sCr worsened to 5.15 on 12/10, urine sodium <35 consistent w/ pre renal etiology and renal ultrasound show CKD changes only (no hydronephrosis), serum BUN/creatinine worsened 150/5.87, initiated on hemodialysis on 12/11 for uremia/hyperkalemia w/ decrease UOP and unable give lokema d/t SBO, tolerated well.    - Plan for hemodialysis today for elevated BUN  - please insert landaverde catheter to monitor strict I/O and r/o retention  - monitor BMP, strict I/O, avoid nephrotoxic agents (NSAIDs, PPI, contrast), renally dose medications per HD.    # Hyperkalemia   Pt with hyperkalemia possibly 2/2 EDIE on CKD and acidosis.  On admission serum K 5.1 worsened to 5.7 (no hemolysis) s/p bicarb based IVF, improved to with serum K worsened 5.6 on 12/11 thereby initiated on HD (for uremia/hyperK), last serum K 5.3  - plan for another session of HD today  - low potassium diet, avoid ACEI/ARB/Aldactone given serum K and EDIE  - monitor BMP    # Metabolic acidosis  In setting of EDIE on CKD, no lactate. Last serum bicarbonate 20  - plan for HD as outline above  - continue monitor serum bicarbonate    # Hyperphosphatemia  Pt. with hyperphosphatemia in setting of advanced CKD.   - Monitor serum phosphorus, will consider starting binders if gets worse.    # Anemia  Pt with normocytic anemia likely 2/2 ACD CKD and hematoma (intragluteal).  On admission Hgb 10.5, last hgb 9.7, negative FOBT, CT showed right gluteal hematoma (after fall), decreasing in size (CT 12/11)  - monitor CBC, blood transfusion prn

## 2020-12-12 NOTE — PROGRESS NOTE ADULT - SUBJECTIVE AND OBJECTIVE BOX
[facility] DIVISION OF KIDNEY DISEASES AND HYPERTENSION   -- FOLLOW UP NOTE --   Cristina Ramírez  Nephrology Fellow  Pager NS: [phone]   /  Pager HARESH: [phone]  (after 5pm or weekend please page the on-call fellow)  ======================================================  24 hour events/subjective:  - yesterday started on hemodialysis for uremia/hyperkalemia, tolerated well  - overnight no events reported, vitals afebrile, BP 90-100s/50-60s (baseline) on milrinone  total UOP voided 200 cc in the past 24hr  - patient seen and examined at bedside this morning on room air who endorse nausea, improving abdominal pain  - vitals/lab/medications reviewed, noted for  to 112, serum K 5.3    PAST HISTORY  --------------------------------------------------------------------------------  No significant changes to PMH, PSH, FHx, SHx, unless otherwise noted    ALLERGIES & MEDICATIONS  --------------------------------------------------------------------------------  Allergies  No Known Allergies    Intolerances    Standing Inpatient Medications  aMIOdarone    Tablet 200 milliGRAM(s) Oral daily  BACItracin   Ointment 1 Application(s) Topical two times a day  chlorhexidine 4% Liquid 1 Application(s) Topical <User Schedule>  isosorbide   mononitrate ER Tablet (IMDUR) 60 milliGRAM(s) Oral at bedtime  levothyroxine 50 MICROGram(s) Oral daily  melatonin 3 milliGRAM(s) Oral at bedtime  metoprolol succinate ER 25 milliGRAM(s) Oral daily  milrinone Infusion 0.3 MICROgram(s)/kG/Min IV Continuous <Continuous>    PRN Inpatient Medications  acetaminophen   Tablet .. 650 milliGRAM(s) Oral every 6 hours PRN  sodium chloride 0.9% lock flush 10 milliLiter(s) IV Push every 1 hour PRN    REVIEW OF SYSTEMS  --------------------------------------------------------------------------------  Gen: no fever, chills, weakness  Respiratory: No dyspnea, cough  CV: No chest pain, orthopnea  GI: No abdominal pain, vomiting, diarrhea. + nausea  MSK: no edema  Neuro: No dizziness, lightheadedness  All other systems were reviewed and are negative, except as noted.    VITALS/PHYSICAL EXAM  --------------------------------------------------------------------------------  T(C): 36.7 (12-12-20 @ 04:18), Max: 36.8 (12-11-20 @ 19:07)  HR: 92 (12-12-20 @ 04:18) (88 - 122)  BP: 100/58 (12-12-20 @ 04:18) (88/51 - 106/61)  RR: 18 (12-12-20 @ 04:18) (16 - 18)  SpO2: 96% (12-12-20 @ 04:18) (95% - 99%)  Wt(kg): --  Height (cm): 175.3 (12-11-20 @ 16:05)  Weight (kg): 72 (12-11-20 @ 16:05)  BMI (kg/m2): 23.4 (12-11-20 @ 16:05)  BSA (m2): 1.87 (12-11-20 @ 16:05)    12-11-20 @ 07:01  -  12-12-20 @ 07:00  --------------------------------------------------------  IN: 609.2 mL / OUT: 200 mL / NET: 409.2 mL    Physical Exam:  	Gen: NAD   	HEENT: moist mucous membrane  	Pulm: CTA B/L, no crackles  	CV: RRR, S1S2; + murmur  	GI: distended abdomen  	: No suprapubic tenderness  	MSK: Warm, no edema              Neuro: AAOx3, +mild asterixis noted  	Psych: Normal affect and mood  	Skin: Warm, facial abrasion (present on admission)              Access: RIANA non tunnel HD catheter    LABS/STUDIES  --------------------------------------------------------------------------------              9.0    4.31  >-----------<  156      [12-12-20 @ 06:12]              29.4     130  |  93  |  112  ----------------------------<  176      [12-12-20 @ 06:13]  5.3   |  20  |  5.00        Ca     9.1     [12-12-20 @ 06:13]    Creatinine Trend:  SCr 5.00 [12-12 @ 06:13]  SCr 5.87 [12-11 @ 07:11]  SCr 5.15 [12-10 @ 07:12]  SCr 4.49 [12-08 @ 21:10]  SCr 4.49 [12-08 @ 12:38]    Urinalysis - [12-10-20 @ 18:53]      Color Yellow / Appearance Clear / SG 1.016 / pH 5.5      Gluc Negative / Ketone Negative  / Bili Negative / Urobili Negative       Blood Negative / Protein Trace / Leuk Est Negative / Nitrite Negative      RBC 2 / WBC 1 / Hyaline 6 / Gran  / Sq Epi  / Non Sq Epi 0 / Bacteria Negative    Urine Creatinine 85      [12-10-20 @ 18:53]  Urine Sodium <35      [12-10-20 @ 18:53]  Urine Urea Nitrogen 673      [12-11-20 @ 02:41]  Urine Chloride <35      [12-10-20 @ 18:53]  Urine Phosphorus 52.9      [12-11-20 @ 04:07]  Urine Osmolality 387      [12-11-20 @ 05:47]    Iron 36, TIBC 314, %sat 12      [09-08-20 @ 08:45]  Ferritin 145      [09-08-20 @ 08:45]  Vitamin D (25OH) 21.7      [07-18-20 @ 10:13]  TSH 8.61      [11-01-20 @ 20:16]  Lipid: chol 116, TG 55, HDL 48, LDL 58      [08-26-20 @ 00:17]    HBsAb Nonreact      [12-11-20 @ 15:03]  HBsAg Nonreact      [12-11-20 @ 15:03]

## 2020-12-12 NOTE — PROGRESS NOTE ADULT - SUBJECTIVE AND OBJECTIVE BOX
[facility] Daily Progress Note  =====================================================    SUBJECTIVE: Patient seen and examined at bedside on AM rounds. NPO, some nausea, denies vomiting.    +Flatus/    +BM. OOB/Ambulating as tolerated. Denies fever, chills.     24 HOUR EVENTS:  HD for hyperuremia  Repeat CTAP pending final read     MEDICATIONS  (STANDING):  aMIOdarone    Tablet 200 milliGRAM(s) Oral daily  BACItracin   Ointment 1 Application(s) Topical two times a day  chlorhexidine 4% Liquid 1 Application(s) Topical <User Schedule>  isosorbide   mononitrate ER Tablet (IMDUR) 60 milliGRAM(s) Oral at bedtime  levothyroxine 50 MICROGram(s) Oral daily  melatonin 3 milliGRAM(s) Oral at bedtime  metoprolol succinate ER 25 milliGRAM(s) Oral daily  milrinone Infusion 0.3 MICROgram(s)/kG/Min (6.64 mL/Hr) IV Continuous <Continuous>    MEDICATIONS  (PRN):  acetaminophen   Tablet .. 650 milliGRAM(s) Oral every 6 hours PRN Mild Pain (1 - 3)  sodium chloride 0.9% lock flush 10 milliLiter(s) IV Push every 1 hour PRN Pre/post blood products, medications, blood draw, and to maintain line patency      OBJECTIVE:    Vital Signs Last 24 Hrs  T(C): 36.7 (12 Dec 2020 00:27), Max: 36.8 (11 Dec 2020 19:07)  T(F): 98.1 (12 Dec 2020 00:27), Max: 98.2 (11 Dec 2020 19:07)  HR: 99 (12 Dec 2020 00:27) (86 - 122)  BP: 94/52 (12 Dec 2020 00:27) (88/51 - 106/61)  BP(mean): --  RR: 18 (12 Dec 2020 00:27) (16 - 18)  SpO2: 95% (12 Dec 2020 00:27) (95% - 99%)    Physical Exam:  Gen: Laying in bed, NAD  HEENT: atrumatic, EMOI  Resp: Unlabored breathing  Abd: soft, nontender, nondistended, no rebound or guarding.    Ext: Moves 4 extremities spontaneously        I&O's Detail    10 Dec 2020 07:01  -  11 Dec 2020 07:00  --------------------------------------------------------  IN:    Milrinone: 158.4 mL    Oral Fluid: 420 mL  Total IN: 578.4 mL    OUT:    Voided (mL): 550 mL  Total OUT: 550 mL    Total NET: 28.4 mL      11 Dec 2020 07:01  -  12 Dec 2020 04:05  --------------------------------------------------------  IN:    IV PiggyBack: 50 mL    Milrinone: 79.2 mL    Oral Fluid: 480 mL  Total IN: 609.2 mL    OUT:    Other (mL): 0 mL    Voided (mL): 200 mL  Total OUT: 200 mL    Total NET: 409.2 mL          Daily Height in cm: 175.26 (11 Dec 2020 16:05)    Daily Weight in k (11 Dec 2020 21:40)    LABS:                        9.3    4.03  )-----------( 199      ( 10 Dec 2020 07:12 )             29.9     12-11    129<L>  |  93<L>  |  150<H>  ----------------------------<  192<H>  5.6<H>   |  18<L>  |  5.87<H>    Ca    9.7      11 Dec 2020 07:11    TPro  7.0  /  Alb  3.6  /  TBili  1.4<H>  /  DBili  x   /  AST  22  /  ALT  21  /  AlkPhos  88  12-10      Urinalysis Basic - ( 10 Dec 2020 18:53 )    Color: Yellow / Appearance: Clear / S.016 / pH: x  Gluc: x / Ketone: Negative  / Bili: Negative / Urobili: Negative   Blood: x / Protein: Trace / Nitrite: Negative   Leuk Esterase: Negative / RBC: 2 /hpf / WBC 1 /HPF   Sq Epi: x / Non Sq Epi: 0 /hpf / Bacteria: Negative                         [facility] Daily Progress Note  =====================================================    SUBJECTIVE: Patient seen and examined at bedside on AM rounds. NPO, some nausea, denies vomiting.    +Flatus/    +BM. OOB/Ambulating as tolerated. Denies fever, chills. Patient requests waiting w/o NGT for now     24 HOUR EVENTS:  HD for hyperuremia  Repeat CTAP pending final read     MEDICATIONS  (STANDING):  aMIOdarone    Tablet 200 milliGRAM(s) Oral daily  BACItracin   Ointment 1 Application(s) Topical two times a day  chlorhexidine 4% Liquid 1 Application(s) Topical <User Schedule>  isosorbide   mononitrate ER Tablet (IMDUR) 60 milliGRAM(s) Oral at bedtime  levothyroxine 50 MICROGram(s) Oral daily  melatonin 3 milliGRAM(s) Oral at bedtime  metoprolol succinate ER 25 milliGRAM(s) Oral daily  milrinone Infusion 0.3 MICROgram(s)/kG/Min (6.64 mL/Hr) IV Continuous <Continuous>    MEDICATIONS  (PRN):  acetaminophen   Tablet .. 650 milliGRAM(s) Oral every 6 hours PRN Mild Pain (1 - 3)  sodium chloride 0.9% lock flush 10 milliLiter(s) IV Push every 1 hour PRN Pre/post blood products, medications, blood draw, and to maintain line patency      OBJECTIVE:    Vital Signs Last 24 Hrs  T(C): 36.7 (12 Dec 2020 00:27), Max: 36.8 (11 Dec 2020 19:07)  T(F): 98.1 (12 Dec 2020 00:27), Max: 98.2 (11 Dec 2020 19:07)  HR: 99 (12 Dec 2020 00:27) (86 - 122)  BP: 94/52 (12 Dec 2020 00:27) (88/51 - 106/61)  BP(mean): --  RR: 18 (12 Dec 2020 00:27) (16 - 18)  SpO2: 95% (12 Dec 2020 00:27) (95% - 99%)    Physical Exam:  Gen: Laying in bed, NAD  HEENT: atrumatic, EMOI  Resp: Unlabored breathing  Abd: soft, nontender, nondistended, no rebound or guarding.    Ext: Moves 4 extremities spontaneously        I&O's Detail    10 Dec 2020 07:01  -  11 Dec 2020 07:00  --------------------------------------------------------  IN:    Milrinone: 158.4 mL    Oral Fluid: 420 mL  Total IN: 578.4 mL    OUT:    Voided (mL): 550 mL  Total OUT: 550 mL    Total NET: 28.4 mL      11 Dec 2020 07:01  -  12 Dec 2020 04:05  --------------------------------------------------------  IN:    IV PiggyBack: 50 mL    Milrinone: 79.2 mL    Oral Fluid: 480 mL  Total IN: 609.2 mL    OUT:    Other (mL): 0 mL    Voided (mL): 200 mL  Total OUT: 200 mL    Total NET: 409.2 mL          Daily Height in cm: 175.26 (11 Dec 2020 16:05)    Daily Weight in k (11 Dec 2020 21:40)    LABS:                        9.3    4.03  )-----------( 199      ( 10 Dec 2020 07:12 )             29.9     12-11    129<L>  |  93<L>  |  150<H>  ----------------------------<  192<H>  5.6<H>   |  18<L>  |  5.87<H>    Ca    9.7      11 Dec 2020 07:11    TPro  7.0  /  Alb  3.6  /  TBili  1.4<H>  /  DBili  x   /  AST  22  /  ALT  21  /  AlkPhos  88  12-10      Urinalysis Basic - ( 10 Dec 2020 18:53 )    Color: Yellow / Appearance: Clear / S.016 / pH: x  Gluc: x / Ketone: Negative  / Bili: Negative / Urobili: Negative   Blood: x / Protein: Trace / Nitrite: Negative   Leuk Esterase: Negative / RBC: 2 /hpf / WBC 1 /HPF   Sq Epi: x / Non Sq Epi: 0 /hpf / Bacteria: Negative

## 2020-12-12 NOTE — CHART NOTE - NSCHARTNOTEFT_GEN_A_CORE
Notified by RN of pt's BP 88/48, HR 120s. Pt seen and examined. Pt found in bed, asymptomatic, talking over the phone. Pt denies any symptoms of dizziness, and/or lightheadedness. Spoke with ron Gallardo to order Normal saline 100cc/hr x 2hrs and then pt to continue 50cc/hr. IVF ordered, will continue to monitor.    Bahman Nur NP  [phone]

## 2020-12-12 NOTE — CHART NOTE - NSCHARTNOTEFT_GEN_A_CORE
Notified by RN, pt with brown emesis in the setting of SBO. Pt had refused NGT for LWS. Also, day provider had informed Dr. Diehl pt with low BP, asymptomatic, small bolus given in the setting of CHF on Milrinone gtt.  Dr. Diehl requested to discuss MOLST with patient. Pt denies any lightheadedness, dizziness, SOB, CP, abd pain.  Pt is a RR. 75 y.o. M with very significant cardiac history and acute on chronic renal failure who presented after fall found to have Small bowel obstruction. Patient has return of bowel function however continue to have worsening renal function, now requiring HD (access to be placed by IR 12/11), nausea possible 2/2 to hyperuremia. CTAP 12/12 prelim read with redemonstrated SBO w/ TP at terminal ileum, likely partial SBO with contrast passing through colon and rectum.     Plan  - No surgical intervention   -Some nausea no vomit overnight but still with GI function  -if vomit, at high risk aspiration and would recommend NGT however would recommend keeping NPO for now  - will follow up with CT scan final read  -Final plan to be d/w attending in AM rounds Notified by RN, pt with brown emesis in the setting of SBO. Pt had refused NGT for LWS. Also, day provider had informed Dr. Diehl pt with low BP, asymptomatic, small bolus given in the setting of CHF on Milrinone gtt.  Dr. Diehl requested to discuss MOLST with patient. Pt denies any lightheadedness, dizziness, SOB, CP, abd pain. Pt seen at bedside, educated pt on need for NGT, surgery notified, who also saw pt at bedside, pt agreed to place NGT to LWS. Pt BP unchanged with bolus, continues to be asymptomatic. [facility] discussed with pt, pt wants niece to bring in his MOLST for filled out previous admission.   Pt is a RR. 75 y.o. M with very significant cardiac history and acute on chronic renal failure who presented after fall found to have Small bowel obstruction. Patient has return of bowel function however continue to have worsening renal function, now requiring HD (access to be placed by IR 12/11), nausea possible 2/2 to hyperuremia. CTAP 12/12 prelim read with redemonstrated SBO w/ TP at terminal ileum, likely partial SBO with contrast passing through colon and rectum. Pt now with brown colored emesis, previously refusing NGT, now willing to place NGT after discussion with pt and reiterating it by Surgery. Pt also with BP labile after small bolus in the setting of CHF exac with no increase in BP, however pt is asymptomatic.     ICU Vital Signs Last 24 Hrs  T(C): 36.8 (12 Dec 2020 20:49), Max: 36.9 (12 Dec 2020 14:40)  T(F): 98.3 (12 Dec 2020 20:49), Max: 98.4 (12 Dec 2020 14:40)  HR: 120 (12 Dec 2020 20:49) (92 - 125)  BP: 82/53 (12 Dec 2020 22:10) (80/48 - 105/67)  BP(mean): --  ABP: --  ABP(mean): --  RR: 17 (12 Dec 2020 20:49) (17 - 18)  SpO2: 94% (12 Dec 2020 20:49) (94% - 97%)      A/P: RR. 75 y.o. M with very significant cardiac history and acute on chronic renal failure who presented after fall found to have Small bowel obstruction. Patient has return of bowel function however continue to have worsening renal function, now requiring HD (access to be placed by IR 12/11), nausea possible 2/2 to hyperuremia. CTAP 12/12 prelim read with redemonstrated SBO w/ TP at terminal ileum, likely partial SBO with contrast passing through colon and rectum.     - Pt with brown emesis several times now, Surgery team Dr. More and physician Dr. Diehl notified.  - NGT placed, awaiting CXR to confirm location before placing to suction. Surgery fellow, Dr. More at bedside, helped convince pt to agreed with NGT placement.   - Discussed GOC with pt, pt informs he filled out MOLST on previous admission, refuses to have it filled out on this admission, will request callie Zamarripa to bring it in.   -Reglan 5 mg IVP given  - Pt s/p small bolus for systolic in the 80s in the setting of HF. Pt on low maintenance IVF in the setting of SBO, BP with no improvement, House Cards called for possible use of midodrine, discussed with Dr. Diehl too.    Janeth Card, NP  x [id number] Notified by RN, pt with brown emesis in the setting of SBO. Pt had refused NGT for LWS. Also, day provider had informed Dr. Diehl pt with low BP, asymptomatic, small bolus given in the setting of CHF on Milrinone gtt.  Dr. Diehl requested to discuss MOLST with patient. Pt denies any lightheadedness, dizziness, SOB, CP, abd pain. Pt seen at bedside, educated pt on need for NGT, surgery notified, who also saw pt at bedside, pt agreed to place NGT to LWS. Pt BP unchanged with bolus, continues to be asymptomatic notified Dr. Diehl for possible Midodrine, requested to discuss with Cards. GOCn also discussed with pt, pt wants niece to bring in his MOLST filled out on previous admission.   Pt is a RR. 75 y.o. M with very significant cardiac history and acute on chronic renal failure who presented after fall found to have Small bowel obstruction. Patient has return of bowel function however continue to have worsening renal function, now requiring HD (access to be placed by IR 12/11), nausea possible 2/2 to hyperuremia. CTAP 12/12 prelim read with redemonstrated SBO w/ TP at terminal ileum, likely partial SBO with contrast passing through colon and rectum. Pt now with brown colored emesis, previously refusing NGT, now willing to place NGT after discussion with pt and reiterating it by Surgery. Pt also with BP labile after small bolus in the setting of CHF exac with no increase in BP, however pt is asymptomatic.     ICU Vital Signs Last 24 Hrs  T(C): 36.8 (12 Dec 2020 20:49), Max: 36.9 (12 Dec 2020 14:40)  T(F): 98.3 (12 Dec 2020 20:49), Max: 98.4 (12 Dec 2020 14:40)  HR: 120 (12 Dec 2020 20:49) (92 - 125)  BP: 82/53 (12 Dec 2020 22:10) (80/48 - 105/67)  BP(mean): --  ABP: --  ABP(mean): --  RR: 17 (12 Dec 2020 20:49) (17 - 18)  SpO2: 94% (12 Dec 2020 20:49) (94% - 97%)      A/P: RR. 75 y.o. M with very significant cardiac history and acute on chronic renal failure who presented after fall found to have Small bowel obstruction. Patient has return of bowel function however continue to have worsening renal function, now requiring HD (access to be placed by IR 12/11), nausea possible 2/2 to hyperuremia. CTAP 12/12 prelim read with redemonstrated SBO w/ TP at terminal ileum, likely partial SBO with contrast passing through colon and rectum.     - Pt with brown emesis several times now, Surgery team Dr. More and physician Dr. Diehl notified.  - NGT placed, awaiting CXR to confirm location before placing to suction. Surgery fellow, Dr. More at bedside, helped convince pt to agreed to NGT placement.   - Discussed GOC with pt, pt informs he filled out MOLST on previous admission, refuses to have it filled out again on this admission, will request callie Zamarripa to bring it in.   -Reglan 5 mg IVP given  - Pt s/p small bolus for systolic in the 80s in the setting of HF. Pt on low maintenance IVF in the setting of SBO, BP with no improvement, House Cards  Dr. Hua called for possible use of midodrine, informed labile BP is likely d/t HD today, pt with 800 cc out. may given additional 200 cc bolus, ok w/ MAP of 60.   - Discussed plan of care with RN     Janeth Card, NP  x [id number] Notified by RN, pt with brown emesis in the setting of SBO. Pt had refused NGT for LWS. Also, day provider had informed Dr. Diehl pt with low BP, asymptomatic, small bolus given in the setting of CHF on Milrinone gtt.  Dr. Diehl requested to discuss MOLST with patient. Pt denies any lightheadedness, dizziness, SOB, CP, abd pain. Pt seen at bedside, educated pt on need for NGT, surgery notified, who also saw pt at bedside, pt agreed to place NGT to LWS. Pt BP unchanged with bolus, continues to be asymptomatic notified Dr. Diehl for possible Midodrine, requested to discuss with Cards. GOCn also discussed with pt, pt wants niece to bring in his MOLST filled out on previous admission.   Pt is a RR. 75 y.o. M with very significant cardiac history and acute on chronic renal failure who presented after fall found to have Small bowel obstruction. Patient has return of bowel function however continue to have worsening renal function, now requiring HD (access to be placed by IR 12/11), nausea possible 2/2 to hyperuremia. CTAP 12/12 prelim read with redemonstrated SBO w/ TP at terminal ileum, likely partial SBO with contrast passing through colon and rectum. Pt now with brown colored emesis, previously refusing NGT, now willing to place NGT after discussion with pt and reiterating it by Surgery. Pt also with BP labile after small bolus in the setting of CHF exac with no increase in BP, however pt is asymptomatic.     ICU Vital Signs Last 24 Hrs  T(C): 36.8 (12 Dec 2020 20:49), Max: 36.9 (12 Dec 2020 14:40)  T(F): 98.3 (12 Dec 2020 20:49), Max: 98.4 (12 Dec 2020 14:40)  HR: 120 (12 Dec 2020 20:49) (92 - 125)  BP: 82/53 (12 Dec 2020 22:10) (80/48 - 105/67)  BP(mean): --  ABP: --  ABP(mean): --  RR: 17 (12 Dec 2020 20:49) (17 - 18)  SpO2: 94% (12 Dec 2020 20:49) (94% - 97%)      A/P: RR. 75 y.o. M with very significant cardiac history and acute on chronic renal failure who presented after fall found to have Small bowel obstruction. Patient has return of bowel function however continue to have worsening renal function, now requiring HD (access to be placed by IR 12/11), nausea possible 2/2 to hyperuremia. CTAP 12/12 prelim read with redemonstrated SBO w/ TP at terminal ileum, likely partial SBO with contrast passing through colon and rectum.     - Pt with brown emesis several times now, Surgery team Dr. More and physician Dr. Diehl notified.  - NGT placed, CXR  confirmed location before placing to suction. Surgery fellow, Dr. More at bedside, helped convince pt to agreed to NGT placement. Pt noted with large output of 400 cc prior to placing tube to suction   f/u BMP, phos and mag in AM for possible need for replacement and notified RN to monitor BP.   - Discussed GOC with pt, pt informs he filled out MOLST on previous admission, refuses to have it filled out again on this admission, will request callie Zamarripa to bring it in.   -Reglan 5 mg IVP given  - Pt s/p small bolus for systolic in the 80s in the setting of HF. Pt on low maintenance IVF in the setting of SBO, BP with no improvement, House Cards  Dr. Hua called for possible use of midodrine, informed labile BP is likely d/t HD today, pt with 800 cc out. may given additional 200 cc bolus, ok w/ MAP of 60.   - Discussed plan of care with RN     Janeth Card, NP  x [id number] Notified by RN, pt with brown emesis in the setting of SBO. Pt had refused NGT for LWS. Also, day provider had informed Dr. Diehl pt with low BP, asymptomatic, small bolus given in the setting of CHF on Milrinone gtt.  Dr. Diehl requested to discuss MOLST with patient. Pt denies any lightheadedness, dizziness, SOB, CP, abd pain. Pt seen at bedside, educated pt on need for NGT, surgery notified, who also saw pt at bedside, pt agreed to place NGT to LWS. Pt BP unchanged with bolus, continues to be asymptomatic notified Dr. Diehl for possible Midodrine, requested to discuss with Cards. GOCn also discussed with pt, pt wants niece to bring in his MOLST filled out on previous admission.   Pt is a RR. 75 y.o. M with very significant cardiac history and acute on chronic renal failure who presented after fall found to have Small bowel obstruction. Patient has return of bowel function however continue to have worsening renal function, now requiring HD (access to be placed by IR 12/11), nausea possible 2/2 to hyperuremia. CTAP 12/12 prelim read with redemonstrated SBO w/ TP at terminal ileum, likely partial SBO with contrast passing through colon and rectum. Pt now with brown colored emesis, previously refusing NGT, now willing to place NGT after discussion with pt and reiterating it by Surgery. Pt also with BP labile after small bolus in the setting of CHF exac with no increase in BP, however pt is asymptomatic.     ICU Vital Signs Last 24 Hrs  T(C): 36.8 (12 Dec 2020 20:49), Max: 36.9 (12 Dec 2020 14:40)  T(F): 98.3 (12 Dec 2020 20:49), Max: 98.4 (12 Dec 2020 14:40)  HR: 120 (12 Dec 2020 20:49) (92 - 125)  BP: 82/53 (12 Dec 2020 22:10) (80/48 - 105/67)  BP(mean): --  ABP: --  ABP(mean): --  RR: 17 (12 Dec 2020 20:49) (17 - 18)  SpO2: 94% (12 Dec 2020 20:49) (94% - 97%)      A/P: RR. 75 y.o. M with very significant cardiac history and acute on chronic renal failure who presented after fall found to have Small bowel obstruction. Patient has return of bowel function however continue to have worsening renal function, now requiring HD (access to be placed by IR 12/11), nausea possible 2/2 to hyperuremia. CTAP 12/12 prelim read with redemonstrated SBO w/ TP at terminal ileum, likely partial SBO with contrast passing through colon and rectum.     - Pt with brown emesis several times now, Surgery team Dr. More and physician Dr. Diehl notified.  - NGT placed, CXR  confirmed location before placing to suction. Surgery fellow, Dr. More at bedside, helped convince pt to agreed to NGT placement. Pt noted with large output of 400 cc prior to placing tube to suction   f/u BMP, phos and mag in AM for possible need for replacement and notified RN to monitor BP.   - Discussed GOC with pt, pt informs he filled out MOLST on previous admission, refuses to have it filled out again on this admission, will request callie Zamarripa to bring it in.   -Reglan 5 mg IVP given  - Pt s/p small bolus for systolic in the 80s in the setting of HF. Pt on low maintenance IVF in the setting of SBO, BP with no improvement, House Cards  Dr. Hua called for possible use of midodrine, informed labile BP is likely d/t HD today, pt with 800 cc out. may given additional 200 cc bolus, ok w/ MAP of 60.   - Discussed plan of care with RN     Janeth Card, NP  x [id number]      Addendum 12/13/2020 @ 4:30 am  RRT called for hypotension with systolic in low 70s/ diastolic in high 20's, pt asymptomatic, fully alert. MAR, Dr. BESSY Garcia, and RRT team placed 250 cc bolus. Pt with minimal improvement, team suggest House Cards to be called for possible increased dose of Milrinone gtt, House Cards, Dr. CHRISTIANO Hua at bedside, suggested a 2nd bolus of 250 cc. Informed pt likely not in shock, Lactate and LFTs WNL, but with large output about 1L in total from NGT. Suspect hypotension 2/2 hypovolemia.  Cards suggest further, fluid resuscitation with an additional 500cc bolus.  Cards documented, if BP does not response after appropriate volume resuscitation, will consider uptitration of inotropic support.    Vital Signs Last 24 Hrs  T(C): 36.8 (12 Dec 2020 20:49), Max: 36.9 (12 Dec 2020 14:40)  T(F): 98.3 (12 Dec 2020 20:49), Max: 98.4 (12 Dec 2020 14:40)  HR: 120 (13 Dec 2020 03:45) (95 - 125)  BP: 72/36 (13 Dec 2020 03:45) (72/36 - 105/67)  BP(mean): --  RR: 17 (12 Dec 2020 20:49) (17 - 18)  SpO2: 94% (12 Dec 2020 20:49) (94% - 97%)    Janeth Card, SERGIO  [id number] Notified by RN, pt with brown emesis in the setting of SBO. Pt had refused NGT for LWS. Also, day provider had informed Dr. Diehl pt with low BP, asymptomatic, small bolus given in the setting of CHF on Milrinone gtt.  Dr. Diehl requested to discuss MOLST with patient. Pt denies any lightheadedness, dizziness, SOB, CP, abd pain. Pt seen at bedside, educated pt on need for NGT, surgery notified, who also saw pt at bedside, pt agreed to place NGT to LWS. Pt BP unchanged with bolus, continues to be asymptomatic notified Dr. Diehl for possible Midodrine, requested to discuss with Cards. GOCn also discussed with pt, pt wants niece to bring in his MOLST filled out on previous admission.   Pt is a RR. 75 y.o. M with very significant cardiac history and acute on chronic renal failure who presented after fall found to have Small bowel obstruction. Patient has return of bowel function however continue to have worsening renal function, now requiring HD (access to be placed by IR 12/11), nausea possible 2/2 to hyperuremia. CTAP 12/12 prelim read with redemonstrated SBO w/ TP at terminal ileum, likely partial SBO with contrast passing through colon and rectum. Pt now with brown colored emesis, previously refusing NGT, now willing to place NGT after discussion with pt and reiterating it by Surgery. Pt also with BP labile after small bolus in the setting of CHF exac with no increase in BP, however pt is asymptomatic.     ICU Vital Signs Last 24 Hrs  T(C): 36.8 (12 Dec 2020 20:49), Max: 36.9 (12 Dec 2020 14:40)  T(F): 98.3 (12 Dec 2020 20:49), Max: 98.4 (12 Dec 2020 14:40)  HR: 120 (12 Dec 2020 20:49) (92 - 125)  BP: 82/53 (12 Dec 2020 22:10) (80/48 - 105/67)  BP(mean): --  ABP: --  ABP(mean): --  RR: 17 (12 Dec 2020 20:49) (17 - 18)  SpO2: 94% (12 Dec 2020 20:49) (94% - 97%)      A/P: RR. 75 y.o. M with very significant cardiac history and acute on chronic renal failure who presented after fall found to have Small bowel obstruction. Patient has return of bowel function however continue to have worsening renal function, now requiring HD (access to be placed by IR 12/11), nausea possible 2/2 to hyperuremia. CTAP 12/12 prelim read with redemonstrated SBO w/ TP at terminal ileum, likely partial SBO with contrast passing through colon and rectum.     - Pt with brown emesis several times now, Surgery team Dr. More and physician Dr. Diehl notified.  - NGT placed, CXR  confirmed location before placing to suction. Surgery fellow, Dr. More at bedside, helped convince pt to agreed to NGT placement. Pt noted with large output of 400 cc prior to placing tube to suction   f/u BMP, phos and mag in AM for possible need for replacement and notified RN to monitor BP.   - Discussed GOC with pt, pt informs he filled out MOLST on previous admission, refuses to have it filled out again on this admission, will request callie Zamarripa to bring it in.   -Reglan 5 mg IVP given  - Pt s/p small bolus for systolic in the 80s in the setting of HF. Pt on low maintenance IVF in the setting of SBO, BP with no improvement, House Cards  Dr. Hua called for possible use of midodrine, informed labile BP is likely d/t HD today, pt with 800 cc out. may given additional 200 cc bolus, ok w/ MAP of 60.   - Discussed plan of care with RN     Janeth Card, NP  x [phone]      Addendum 12/13/2020 @ 4:30 am  RRT called for hypotension with systolic in low 70s/ diastolic in high 20's, pt asymptomatic, fully alert. MAR, Dr. BESSY Garcia, and RRT team placed 250 cc bolus. Pt with minimal improvement, team suggest House Cards to be called for possible increased dose of Milrinone gtt, House Cards, Dr. CHRISTIANO Hua at bedside, suggested a 2nd bolus of 250 cc. Informed pt likely not in shock, Lactate and LFTs WNL, but with large output about 1L in total from NGT. Suspect hypotension 2/2 hypovolemia.  Cards suggest further, fluid resuscitation with an additional 500cc bolus.  Cards documented, if BP does not response after appropriate volume resuscitation, will consider uptitration of inotropic support.  Pt also with 9 beats of NSVT, seen by Cards, pt was receiving 12 lead EKG at the time with RN in room. Pt denies any lightheadedness, CP, SOB, palpitations.   Vital Signs Last 24 Hrs  T(C): 36.8 (12 Dec 2020 20:49), Max: 36.9 (12 Dec 2020 14:40)  T(F): 98.3 (12 Dec 2020 20:49), Max: 98.4 (12 Dec 2020 14:40)  HR: 120 (13 Dec 2020 03:45) (95 - 125)  BP: 72/36 (13 Dec 2020 03:45) (72/36 - 105/67)  BP(mean): --  RR: 17 (12 Dec 2020 20:49) (17 - 18)  SpO2: 94% (12 Dec 2020 20:49) (94% - 97%)    Janeth Card, SERGIO  [id number] Notified by RN, pt with brown emesis in the setting of SBO. Pt had refused NGT for LWS. Also, day provider had informed Dr. Diehl pt with low BP, asymptomatic, small bolus given in the setting of CHF on Milrinone gtt.  Dr. Diehl requested to discuss MOLST with patient. Pt denies any lightheadedness, dizziness, SOB, CP, abd pain. Pt seen at bedside, educated pt on need for NGT, surgery notified, who also saw pt at bedside, pt agreed to place NGT to LWS. Pt BP unchanged with bolus, continues to be asymptomatic notified Dr. Diehl for possible Midodrine, requested to discuss with Cards. GOCn also discussed with pt, pt wants niece to bring in his MOLST filled out on previous admission.   Pt is a RR. 75 y.o. M with very significant cardiac history and acute on chronic renal failure who presented after fall found to have Small bowel obstruction. Patient has return of bowel function however continue to have worsening renal function, now requiring HD (access to be placed by IR 12/11), nausea possible 2/2 to hyperuremia. CTAP 12/12 prelim read with redemonstrated SBO w/ TP at terminal ileum, likely partial SBO with contrast passing through colon and rectum. Pt now with brown colored emesis, previously refusing NGT, now willing to place NGT after discussion with pt and reiterating it by Surgery. Pt also with BP labile after small bolus in the setting of CHF exac with no increase in BP, however pt is asymptomatic.     ICU Vital Signs Last 24 Hrs  T(C): 36.8 (12 Dec 2020 20:49), Max: 36.9 (12 Dec 2020 14:40)  T(F): 98.3 (12 Dec 2020 20:49), Max: 98.4 (12 Dec 2020 14:40)  HR: 120 (12 Dec 2020 20:49) (92 - 125)  BP: 82/53 (12 Dec 2020 22:10) (80/48 - 105/67)  BP(mean): --  ABP: --  ABP(mean): --  RR: 17 (12 Dec 2020 20:49) (17 - 18)  SpO2: 94% (12 Dec 2020 20:49) (94% - 97%)      A/P: RR. 75 y.o. M with very significant cardiac history and acute on chronic renal failure who presented after fall found to have Small bowel obstruction. Patient has return of bowel function however continue to have worsening renal function, now requiring HD (access to be placed by IR 12/11), nausea possible 2/2 to hyperuremia. CTAP 12/12 prelim read with redemonstrated SBO w/ TP at terminal ileum, likely partial SBO with contrast passing through colon and rectum.     - Pt with brown emesis several times now, Surgery team Dr. Mroe and physician Dr. Diehl notified.  - NGT placed, CXR  confirmed location before placing to suction. Surgery fellow, Dr. More at bedside, helped convince pt to agreed to NGT placement. Pt noted with large output of 400 cc prior to placing tube to suction   f/u BMP, phos and mag in AM for possible need for replacement and notified RN to monitor BP.   - Discussed GOC with pt, pt informs he filled out MOLST on previous admission, refuses to have it filled out again on this admission, will request callie Zamarripa to bring it in.   -Reglan 5 mg IVP given  - Pt s/p small bolus for systolic in the 80s in the setting of HF. Pt on low maintenance IVF in the setting of SBO, BP with no improvement, House Cards  Dr. Hua called for possible use of midodrine, informed labile BP is likely d/t HD today, pt with 800 cc out. may given additional 200 cc bolus, ok w/ MAP of 60.   - Discussed plan of care with RN     Janeth Card, NP  x [phone]      Addendum 12/13/2020 @ 4:30 am  RRT called for hypotension with systolic in low 70s/ diastolic in high 20's, pt asymptomatic, fully alert. MAR, Dr. BESSY Garcia, and RRT team placed 250 cc bolus. Pt with minimal improvement, team suggest House Cards to be called for possible increased dose of Milrinone gtt, House Cards, Dr. CHRISTIANO Hua at bedside, suggested a 2nd bolus of 250 cc. Informed pt likely not in shock, Lactate and LFTs WNL, but with large output about 1L in total from NGT. Suspect hypotension 2/2 hypovolemia.  Cards suggest further, fluid resuscitation with an additional 500cc bolus.  Cards documented, if BP does not response after appropriate volume resuscitation, will consider uptitration of inotropic support.  Pt also with 9 beats of NSVT, seen by Cards, pt was receiving 12 lead EKG at the time with RN in room. Pt denies any lightheadedness, CP, SOB, palpitations.   Vital Signs Last 24 Hrs  T(C): 36.8 (12 Dec 2020 20:49), Max: 36.9 (12 Dec 2020 14:40)  T(F): 98.3 (12 Dec 2020 20:49), Max: 98.4 (12 Dec 2020 14:40)  HR: 120 (13 Dec 2020 03:45) (95 - 125)  BP: 72/36 (13 Dec 2020 03:45) (72/36 - 105/67)  BP(mean): --  RR: 17 (12 Dec 2020 20:49) (17 - 18)  SpO2: 94% (12 Dec 2020 20:49) (94% - 97%)    Janeth Card NP  [id number]    Addendum 12/13/2020 @ 6:48 am  Pt's bp improved but labile, reviewed vital sign with Cards, who suggested continuing maintenance IVFs to compensate fluid loss through NGT. Cards also interrogated device at bedside for tachycardia, pt's HR informed atrial tachycardia. Pt is v paced, HR 110s, BP now in mid 80s. Pt still continues to deny any symptoms. Dr. Hua to follow up VBG ordered to assess Lactate and O2 pending.  Called callie update on pt's events over night, informed pt requested her to bring in BionizST, callie Zamarripa informed she will bring in the form this morning.   Updated events overnight with Dr. Diehl, including niece to bring in MOLST. Informed to pass on to next shift to call MICU consult if pt becomes more hemodynamically unstable or further declines.    Janeth Card NP  x [phone]  [facility], spoke to HF attending, Dr. Gonzalez who suggested decreasing Milirone gtt to 0.15 mcg/kg/hr. Pt

## 2020-12-12 NOTE — PROGRESS NOTE ADULT - ASSESSMENT
Mr. Jarquin is a 75 Year-Old Gentleman with very significant cardiac history and acute on chronic renal failure who presented after fall found to have Small bowel obstruction. Patient has return of bowel function however continue to have worsening renal function, now requiring HD (access to be placed by IR 12/11), nausea possible 2/2 to hyperuremia. CTAP 12/12 prelim read with redemonstrated SBO w/ TP at terminal ileum.    Plan  - No surgical intervention   - Increase nausea but still with GI function   - will follow up with CT scan final read  - Agrees with NPO  -Final plan to be d/w attending in AM rounds    Green Team Surgery Pager #[phone] Mr. Jarquin is a 75 Year-Old Gentleman with very significant cardiac history and acute on chronic renal failure who presented after fall found to have Small bowel obstruction. Patient has return of bowel function however continue to have worsening renal function, now requiring HD (access to be placed by IR 12/11), nausea possible 2/2 to hyperuremia. CTAP 12/12 prelim read with redemonstrated SBO w/ TP at terminal ileum, likely partial SBO with contrast passing through colon and rectum.     Plan  - No surgical intervention   - No nausea or vomit overnight but still with GI function  -if vomit, can place NGT however would recommend keeping NPO for now  - will follow up with CT scan final read  -Final plan to be d/w attending in AM rounds    Green Team Surgery Pager #[phone] Mr. Jarquin is a 75 Year-Old Gentleman with very significant cardiac history and acute on chronic renal failure who presented after fall found to have Small bowel obstruction. Patient has return of bowel function however continue to have worsening renal function, now requiring HD (access to be placed by IR 12/11), nausea possible 2/2 to hyperuremia. CTAP 12/12 prelim read with redemonstrated SBO w/ TP at terminal ileum, likely partial SBO with contrast passing through colon and rectum.     Plan  - No surgical intervention   -Some nausea no vomit overnight but still with GI function  -if vomit, at high risk aspiration and would recommend NGT however would recommend keeping NPO for now  - will follow up with CT scan final read  -Final plan to be d/w attending in AM rounds    Green Team Surgery Pager #[phone]

## 2020-12-12 NOTE — PROGRESS NOTE ADULT - SUBJECTIVE AND OBJECTIVE BOX
SUBJECTIVE / OVERNIGHT EVENTS: pt seen and examined. pt c/o abd pain , + flatus      MEDICATIONS  (STANDING):  aMIOdarone    Tablet 200 milliGRAM(s) Oral daily  BACItracin   Ointment 1 Application(s) Topical two times a day  chlorhexidine 4% Liquid 1 Application(s) Topical <User Schedule>  dextrose 5% + sodium chloride 0.45%. 1000 milliLiter(s) (50 mL/Hr) IV Continuous <Continuous>  isosorbide   mononitrate ER Tablet (IMDUR) 60 milliGRAM(s) Oral at bedtime  levothyroxine 50 MICROGram(s) Oral daily  melatonin 3 milliGRAM(s) Oral at bedtime  metoprolol succinate ER 25 milliGRAM(s) Oral daily  milrinone Infusion 0.3 MICROgram(s)/kG/Min (6.64 mL/Hr) IV Continuous <Continuous>    MEDICATIONS  (PRN):  acetaminophen   Tablet .. 650 milliGRAM(s) Oral every 6 hours PRN Mild Pain (1 - 3)  sodium chloride 0.9% lock flush 10 milliLiter(s) IV Push every 1 hour PRN Pre/post blood products, medications, blood draw, and to maintain line patency    Vital Signs Last 24 Hrs  T(C): 36.8 (12 Dec 2020 20:49), Max: 36.9 (12 Dec 2020 14:40)  T(F): 98.3 (12 Dec 2020 20:49), Max: 98.4 (12 Dec 2020 14:40)  HR: 120 (12 Dec 2020 20:49) (92 - 125)  BP: 80/48 (12 Dec 2020 19:25) (80/48 - 105/67)  BP(mean): --  RR: 17 (12 Dec 2020 20:49) (17 - 18)  SpO2: 94% (12 Dec 2020 20:49) (94% - 97%)  Constitutional: No fever, fatigue  Skin: No rash.  Eyes: No recent vision problems or eye pain.  ENT: No congestion, ear pain, or sore throat.  Cardiovascular: No chest pain or palpation.  Respiratory: No cough, shortness of breath, congestion, or wheezing.  Gastrointestinal: +abdominal pain, nausea, vomiting, or diarrhea.  Genitourinary: No dysuria.  Musculoskeletal: No joint swelling.  Neurologic: No headache.    PHYSICAL EXAM:  GENERAL: NAD  EYES: EOMI, PERRLA  NECK: Supple, No JVD  CHEST/LUNG: dec breath sounds at bases   HEART:  S1 , S2 +  ABDOMEN: soft , bs+, mild distension +  EXTREMITIES:  edema+  NEUROLOGY:alert awake oriented     LABS:      136  |  96  |  70<H>  ----------------------------<  151<H>  4.3   |  23  |  3.60<H>    Ca    9.2      12 Dec 2020 20:16      Creatinine Trend: 3.60 <--, 5.00 <--, 5.87 <--, 5.15 <--, 4.49 <--, 4.49 <--, 4.37 <--, 3.20 <--                        9.0    4.31  )-----------( 156      ( 12 Dec 2020 06:12 )             29.4     Urine Studies:  Urinalysis Basic - ( 10 Dec 2020 18:53 )    Color: Yellow / Appearance: Clear / S.016 / pH:   Gluc:  / Ketone: Negative  / Bili: Negative / Urobili: Negative   Blood:  / Protein: Trace / Nitrite: Negative   Leuk Esterase: Negative / RBC: 2 /hpf / WBC 1 /HPF   Sq Epi:  / Non Sq Epi: 0 /hpf / Bacteria: Negative      Osmolality, Random Urine: 387 mosm/Kg ( @ 05:47)  Chloride, Random Urine: <35 mmol/L (12-10 @ 18:53)  Creatinine, Random Urine: 85 mg/dL (12-10 @ 18:53)  Sodium, Random Urine: <35 mmol/L (12-10 @ 18:53)

## 2020-12-13 NOTE — PROGRESS NOTE ADULT - PROBLEM SELECTOR PLAN 2
- CT abdomen/pelvis confirms SBO, s/p NGT at OSH removed but replaced   - Surgery consulted, appreciate recs  - Recommend PT evaluation

## 2020-12-13 NOTE — CHART NOTE - NSCHARTNOTEFT_GEN_A_CORE
Called for ongoing labile BP in the 70-80s/50s and tachycardic to 120s.  Chart reviewed, low-normal /50s until post-HD session with 800cc removed.  Pt also with NGT placement with near 1L removed and ongoing suction.  Received total ~450cc IVF prior to repeat BP which showed BP 80/50 with improvement in tachycardia to 110s.  At bedside, pt asymptomatic, appears dry without appreciable JVD and warm extremities without edema, saturating well on RA.  Labs reviewed, notable for stable Hgb, normal LFTs, lactate 1.2  Suspect hypotension 2/2 hypovolemia.  Will aim for more aggressive fluid resuscitation with an additional 500cc bolus.  Will monitor for signs of end organ dysfunction.  If BP does not response after appropriate volume resuscitation, will consider uptitration of inotropic support.    Carter Hua MD  Cardiology Fellow - PGY 5  For all New Consults and Questions:  [email or web address]   Login: [email or web address] Called for ongoing labile BP in the 70-80s/50s and tachycardic to 120s.  Chart reviewed, low-normal /50s until post-HD session with 800cc removed.  Pt also with NGT placement with near 1L removed and ongoing suction.  Received total ~450cc IVF prior to repeat BP which showed BP 80/50 with improvement in tachycardia to 110s.  At bedside, pt asymptomatic, appears dry without appreciable JVD and warm extremities without edema, saturating well on RA.  Labs reviewed, notable for stable Hgb, normal LFTs, lactate 1.2  Suspect hypotension 2/2 hypovolemia.  Will aim for more aggressive fluid resuscitation with an additional 500cc bolus.  Will monitor for signs of end organ dysfunction.  If BP does not respond after appropriate volume resuscitation, will consider titration of inotropic support.    Carter Hua MD  Cardiology Fellow - PGY 5  For all New Consults and Questions:  [email or web address]   Login: [employer]

## 2020-12-13 NOTE — PROGRESS NOTE ADULT - ASSESSMENT
75 year old M with longstanding history of NICM, LVEF < 20% (LVIDd 6.7 cm) s/p CRT-D, moderate-severe MR s/p MitraClip 8/2020, HTN, pAFib s/p DCCV 7/20/20 on Eliquis, CKD stage 3 (b/l SCr 1.7-2), DM 2 (A1c 6.1%) and anemia, transferred from [facility] after fall at home today in setting of nausea and vomiting, found there to have SBO with placement of NG tube.

## 2020-12-13 NOTE — PROGRESS NOTE ADULT - SUBJECTIVE AND OBJECTIVE BOX
[facility] DIVISION OF KIDNEY DISEASES AND HYPERTENSION   -- FOLLOW UP NOTE --   Cristina Ramírez  Nephrology Fellow  Pager NS: [phone]   /  Pager HARESH: [phone]  (after 5pm or weekend please page the on-call fellow)  ======================================================  24 hour events/subjective:  - yesterday patient had hemodialysis with NO FLUID REMOVAL (net zero).  RRT called in afternoon for acute hypotension with coffee ground emesis initial documented patient decline NGT but agreed later per notes.    - overnight no events reported, vitals afebrile, hypotensive lowest bp 72/36, total UOP voided 100 cc plus few unsaved voids, HD/UF zero in the past 24hr  - patient seen and examined at bedside this morning endorse discomfort from NGT.  Kayla Zamarripa at [facility] updated.  - vitals/lab/medications reviewed, noted for serum  to 80, K 4.7, bicarb 22    PAST HISTORY  --------------------------------------------------------------------------------  No significant changes to PMH, PSH, FHx, SHx, unless otherwise noted    ALLERGIES & MEDICATIONS  --------------------------------------------------------------------------------  Allergies  No Known Allergies    Intolerances    Standing Inpatient Medications  aMIOdarone    Tablet 200 milliGRAM(s) Oral daily  BACItracin   Ointment 1 Application(s) Topical two times a day  chlorhexidine 4% Liquid 1 Application(s) Topical <User Schedule>  dextrose 5% + sodium chloride 0.45%. 1000 milliLiter(s) IV Continuous <Continuous>  isosorbide   mononitrate ER Tablet (IMDUR) 60 milliGRAM(s) Oral at bedtime  levothyroxine Injectable 25 MICROGram(s) IV Push at bedtime  melatonin 3 milliGRAM(s) Oral at bedtime  metoprolol succinate ER 25 milliGRAM(s) Oral daily  milrinone Infusion 0.15 MICROgram(s)/kG/Min IV Continuous <Continuous>    PRN Inpatient Medications  acetaminophen   Tablet .. 650 milliGRAM(s) Oral every 6 hours PRN  sodium chloride 0.9% lock flush 10 milliLiter(s) IV Push every 1 hour PRN    REVIEW OF SYSTEMS  --------------------------------------------------------------------------------  Gen: no fever, chills, weakness  Respiratory: No dyspnea, cough  CV: No chest pain, orthopnea  GI: No abdominal pain, nausea, vomiting, diarrhea  MSK: no edema  Neuro: No dizziness, lightheadedness  All other systems were reviewed and are negative, except as noted.    VITALS/PHYSICAL EXAM  --------------------------------------------------------------------------------  T(C): 36.8 (12-13-20 @ 05:45), Max: 36.9 (12-12-20 @ 14:40)  HR: 122 (12-13-20 @ 10:24) (95 - 125)  BP: 79/44 (12-13-20 @ 10:24) (72/36 - 105/67)  RR: 18 (12-13-20 @ 10:24) (17 - 18)  SpO2: 94% (12-13-20 @ 10:24) (94% - 97%)  Wt(kg): --  Height (cm): 175.3 (12-11-20 @ 16:05)  Weight (kg): 72 (12-11-20 @ 16:05)  BMI (kg/m2): 23.4 (12-11-20 @ 16:05)  BSA (m2): 1.87 (12-11-20 @ 16:05)    12-12-20 @ 07:01  -  12-13-20 @ 07:00  --------------------------------------------------------  IN: 800 mL / OUT: 2550 mL / NET: -1750 mL    Physical Exam:  	Gen: NAD on room air  	HEENT: =NGT  	Pulm: CTA B/L, no crackles  	CV: RRR, S1S2; + murmur  	GI: distended abdomen  	: No suprapubic tenderness  	MSK: Warm, no edema              Neuro: AAOx3, no asterixis noted  	Psych: Normal affect and mood  	Skin: Warm, facial abrasion (present on admission)              Access: RIJ non tunnel HD catheter    LABS/STUDIES  --------------------------------------------------------------------------------              8.7    7.60  >-----------<  111      [12-13-20 @ 06:36]              28.3     135  |  96  |  80  ----------------------------<  156      [12-13-20 @ 06:36]  4.7   |  22  |  4.25        Ca     8.7     [12-13-20 @ 06:36]      Mg     2.2     [12-13-20 @ 06:36]      Phos  4.7     [12-13-20 @ 06:36]    TPro  6.0  /  Alb  3.2  /  TBili  1.3  /  DBili  x   /  AST  25  /  ALT  20  /  AlkPhos  78  [12-13-20 @ 02:37]    PT/INR: PT 25.1 , INR 2.17       [12-13-20 @ 02:43]    Creatinine Trend:  SCr 4.25 [12-13 @ 06:36]  SCr 4.14 [12-13 @ 02:37]  SCr 3.60 [12-12 @ 20:16]  SCr 5.00 [12-12 @ 06:13]  SCr 5.87 [12-11 @ 07:11]    Urinalysis - [12-10-20 @ 18:53]      Color Yellow / Appearance Clear / SG 1.016 / pH 5.5      Gluc Negative / Ketone Negative  / Bili Negative / Urobili Negative       Blood Negative / Protein Trace / Leuk Est Negative / Nitrite Negative      RBC 2 / WBC 1 / Hyaline 6 / Gran  / Sq Epi  / Non Sq Epi 0 / Bacteria Negative    Urine Creatinine 85      [12-10-20 @ 18:53]  Urine Sodium <35      [12-10-20 @ 18:53]  Urine Urea Nitrogen 673      [12-11-20 @ 02:41]  Urine Chloride <35      [12-10-20 @ 18:53]  Urine Phosphorus 52.9      [12-11-20 @ 04:07]  Urine Osmolality 387      [12-11-20 @ 05:47]    Iron 36, TIBC 314, %sat 12      [09-08-20 @ 08:45]  Ferritin 145      [09-08-20 @ 08:45]  Vitamin D (25OH) 21.7      [07-18-20 @ 10:13]  TSH 8.61      [11-01-20 @ 20:16]  Lipid: chol 116, TG 55, HDL 48, LDL 58      [08-26-20 @ 00:17]    HBsAb <3.0      [12-12-20 @ 04:04]  HBsAb Nonreact      [12-11-20 @ 15:03]  HBsAg Nonreact      [12-11-20 @ 15:03]  HCV 0.16, Nonreact      [12-12-20 @ 04:04]

## 2020-12-13 NOTE — PROGRESS NOTE ADULT - SUBJECTIVE AND OBJECTIVE BOX
SUBJECTIVE / OVERNIGHT EVENTS: pt seen and examined. c/o fatigue  pts niece next to pts bed  NGT+    MEDICATIONS  (STANDING):  aMIOdarone    Tablet 200 milliGRAM(s) Oral daily  BACItracin   Ointment 1 Application(s) Topical two times a day  chlorhexidine 4% Liquid 1 Application(s) Topical <User Schedule>  dextrose 5% + sodium chloride 0.45%. 1000 milliLiter(s) (50 mL/Hr) IV Continuous <Continuous>  isosorbide   mononitrate ER Tablet (IMDUR) 60 milliGRAM(s) Oral at bedtime  levothyroxine Injectable 25 MICROGram(s) IV Push at bedtime  melatonin 3 milliGRAM(s) Oral at bedtime  metoprolol succinate ER 25 milliGRAM(s) Oral daily  milrinone Infusion 0.15 MICROgram(s)/kG/Min (3.24 mL/Hr) IV Continuous <Continuous>    MEDICATIONS  (PRN):  acetaminophen   Tablet .. 650 milliGRAM(s) Oral every 6 hours PRN Mild Pain (1 - 3)  sodium chloride 0.9% lock flush 10 milliLiter(s) IV Push every 1 hour PRN Pre/post blood products, medications, blood draw, and to maintain line patency    Vital Signs Last 24 Hrs  T(C): 37.1 (13 Dec 2020 20:10), Max: 37.1 (13 Dec 2020 20:10)  T(F): 98.7 (13 Dec 2020 20:10), Max: 98.7 (13 Dec 2020 20:10)  HR: 126 (13 Dec 2020 20:10) (112 - 126)  BP: 82/53 (13 Dec 2020 20:10) (72/36 - 94/60)  BP(mean): --  RR: 18 (13 Dec 2020 20:10) (18 - 18)  SpO2: 96% (13 Dec 2020 20:10) (94% - 97%)    Constitutional: No fever,+ fatigue  Skin: No rash.  Eyes: No recent vision problems or eye pain.  ENT: No congestion, ear pain, or sore throat.  Cardiovascular: No chest pain or palpation.  Respiratory: No cough, shortness of breath, congestion, or wheezing.  Gastrointestinal: +abdominal pain, nausea, vomiting, or diarrhea.  Genitourinary: No dysuria.  Musculoskeletal: No joint swelling.  Neurologic: No headache.    PHYSICAL EXAM:  ngt+  CHEST/LUNG: dec breath sounds at bases   HEART:  S1 , S2 +  ABDOMEN: soft , bs+, mild distension +  EXTREMITIES:  edema+  NEUROLOGY:alert awake oriented       LABS:      135  |  96  |  80<H>  ----------------------------<  156<H>  4.7   |  22  |  4.25<H>    Ca    8.7      13 Dec 2020 06:36  Phos  4.7       Mg     2.2         TPro  6.0  /  Alb  3.2<L>  /  TBili  1.3<H>  /  DBili      /  AST  25  /  ALT  20  /  AlkPhos  78      Creatinine Trend: 4.25 <--, 4.14 <--, 3.60 <--, 5.00 <--, 5.87 <--, 5.15 <--, 4.49 <--, 4.49 <--, 4.37 <--, 3.20 <--                        8.7    7.60  )-----------( 111      ( 13 Dec 2020 06:36 )             28.3     Urine Studies:  Urinalysis Basic - ( 10 Dec 2020 18:53 )    Color: Yellow / Appearance: Clear / S.016 / pH:   Gluc:  / Ketone: Negative  / Bili: Negative / Urobili: Negative   Blood:  / Protein: Trace / Nitrite: Negative   Leuk Esterase: Negative / RBC: 2 /hpf / WBC 1 /HPF   Sq Epi:  / Non Sq Epi: 0 /hpf / Bacteria: Negative      Osmolality, Random Urine: 387 mosm/Kg ( @ 05:47)  Chloride, Random Urine: <35 mmol/L (12-10 @ 18:53)  Creatinine, Random Urine: 85 mg/dL (12-10 @ 18:53)  Sodium, Random Urine: <35 mmol/L (12-10 @ 18:53)          LIVER FUNCTIONS - ( 13 Dec 2020 02:37 )  Alb: 3.2 g/dL / Pro: 6.0 g/dL / ALK PHOS: 78 U/L / ALT: 20 U/L / AST: 25 U/L / GGT: x           PT/INR - ( 13 Dec 2020 02:43 )   PT: 25.1 sec;   INR: 2.17 ratio

## 2020-12-13 NOTE — PROGRESS NOTE ADULT - ASSESSMENT
75 year old M with longstanding history of NICM, LVEF < 20% (LVIDd 6.7 cm) s/p CRT-D, moderate-severe MR s/p MitraClip 8/2020, HTN, pAFib s/p DCCV 7/20/20 on Eliquis, CKD stage 3 (b/l SCr 1.7-2), DM 2 (A1c 6.1%) and anemia who presented as transfer from OSH in the setting of presyncope/syncope, found to have SBO now s/p NGT for conservative management. Heart failure consulted for additional evaluation and management.  Patient appears euvolemic on exam, unfortunately with worsening renal failure and hyperkalemia, required initiation of dialysis.    Relevant studies:  Echo:    11/1/20 TTE: LVEF 10-15%, LV 7cm, LVOT VIT 7cm, severe RV dysfunction, severly dilated atria, mild MR, min AI, severe TR, est RAP 10mmHg, est RVSP 41 mm Hg    Cardiac Cath:    9/15 (milrinone 0.25 mcg/kg/mi): CVP 5 PA 47/13 PCW 13, SVC saturation 68% with Corey CO/CI 5.8/3.1 and TD CO/CI 7/3.7. MAP 70 mmHg

## 2020-12-13 NOTE — PROGRESS NOTE ADULT - SUBJECTIVE AND OBJECTIVE BOX
GREEN Team Surgery Daily Progress Note  =====================================================    SUBJECTIVE: Patient seen and examined at bedside on AM rounds. NPO, endorses some nausea, vomiting before NGT put in.    +Flatus/   - BM. OOB/Ambulating as tolerated. Denies fever, chills.     24 HOUR EVENTS:  Patient vomited and NGT re-inserted    MEDICATIONS  (STANDING):  aMIOdarone    Tablet 200 milliGRAM(s) Oral daily  BACItracin   Ointment 1 Application(s) Topical two times a day  chlorhexidine 4% Liquid 1 Application(s) Topical <User Schedule>  dextrose 5% + sodium chloride 0.45%. 1000 milliLiter(s) (50 mL/Hr) IV Continuous <Continuous>  isosorbide   mononitrate ER Tablet (IMDUR) 60 milliGRAM(s) Oral at bedtime  levothyroxine Injectable 25 MICROGram(s) IV Push at bedtime  melatonin 3 milliGRAM(s) Oral at bedtime  metoprolol succinate ER 25 milliGRAM(s) Oral daily  milrinone Infusion 0.3 MICROgram(s)/kG/Min (6.64 mL/Hr) IV Continuous <Continuous>    MEDICATIONS  (PRN):  acetaminophen   Tablet .. 650 milliGRAM(s) Oral every 6 hours PRN Mild Pain (1 - 3)  sodium chloride 0.9% lock flush 10 milliLiter(s) IV Push every 1 hour PRN Pre/post blood products, medications, blood draw, and to maintain line patency      OBJECTIVE:    Vital Signs Last 24 Hrs  T(C): 36.8 (12 Dec 2020 20:49), Max: 36.9 (12 Dec 2020 14:40)  T(F): 98.3 (12 Dec 2020 20:49), Max: 98.4 (12 Dec 2020 14:40)  HR: 120 (12 Dec 2020 20:49) (92 - 125)  BP: 82/53 (12 Dec 2020 22:10) (80/48 - 105/67)  BP(mean): --  RR: 17 (12 Dec 2020 20:49) (17 - 18)  SpO2: 94% (12 Dec 2020 20:49) (94% - 97%)    Physical Exam:  Gen: Laying in bed, NAD  HEENT: atrumatic, EMOI, NGT in place  Resp: Unlabored breathing  Abd: soft, nontender, slightly distended, no rebound or guarding    Ext: Moves 4 extremities spontaneously      I&O's Detail    11 Dec 2020 07:01  -  12 Dec 2020 07:00  --------------------------------------------------------  IN:    IV PiggyBack: 50 mL    Milrinone: 79.2 mL    Oral Fluid: 480 mL  Total IN: 609.2 mL    OUT:    Other (mL): 0 mL    Voided (mL): 200 mL  Total OUT: 200 mL    Total NET: 409.2 mL      12 Dec 2020 07:01  -  13 Dec 2020 00:15  --------------------------------------------------------  IN:    Other (mL): 800 mL  Total IN: 800 mL    OUT:    Emesis (mL): 200 mL    Oral Fluid: 0 mL    Other (mL): 800 mL    Voided (mL): 100 mL  Total OUT: 1100 mL    Total NET: -300 mL          Daily     Daily Weight in k.1 (12 Dec 2020 17:10)    LABS:                        9.0    4.31  )-----------( 156      ( 12 Dec 2020 06:12 )             29.4         136  |  96  |  70<H>  ----------------------------<  151<H>  4.3   |  23  |  3.60<H>    Ca    9.2      12 Dec 2020 20:16                               GREEN Team Surgery Daily Progress Note  =====================================================    SUBJECTIVE: Patient seen and examined at bedside on AM rounds. NPO, endorses some nausea, vomiting before NGT put in.    +Flatus/   - BM. OOB/Ambulating as tolerated. Denies fever, chills.     24 HOUR EVENTS:  Patient vomited and NGT placed-850 cc output  RRT called for sbp in 70s and dbps 20s. patient was given 450 cc in IV bolus    MEDICATIONS  (STANDING):  aMIOdarone    Tablet 200 milliGRAM(s) Oral daily  BACItracin   Ointment 1 Application(s) Topical two times a day  chlorhexidine 4% Liquid 1 Application(s) Topical <User Schedule>  dextrose 5% + sodium chloride 0.45%. 1000 milliLiter(s) (50 mL/Hr) IV Continuous <Continuous>  isosorbide   mononitrate ER Tablet (IMDUR) 60 milliGRAM(s) Oral at bedtime  levothyroxine Injectable 25 MICROGram(s) IV Push at bedtime  melatonin 3 milliGRAM(s) Oral at bedtime  metoprolol succinate ER 25 milliGRAM(s) Oral daily  milrinone Infusion 0.3 MICROgram(s)/kG/Min (6.64 mL/Hr) IV Continuous <Continuous>    MEDICATIONS  (PRN):  acetaminophen   Tablet .. 650 milliGRAM(s) Oral every 6 hours PRN Mild Pain (1 - 3)  sodium chloride 0.9% lock flush 10 milliLiter(s) IV Push every 1 hour PRN Pre/post blood products, medications, blood draw, and to maintain line patency      OBJECTIVE:    Vital Signs Last 24 Hrs  T(C): 36.8 (12 Dec 2020 20:49), Max: 36.9 (12 Dec 2020 14:40)  T(F): 98.3 (12 Dec 2020 20:49), Max: 98.4 (12 Dec 2020 14:40)  HR: 120 (12 Dec 2020 20:49) (92 - 125)  BP: 82/53 (12 Dec 2020 22:10) (80/48 - 105/67)  BP(mean): --  RR: 17 (12 Dec 2020 20:49) (17 - 18)  SpO2: 94% (12 Dec 2020 20:49) (94% - 97%)    Physical Exam:  Gen: Laying in bed, NAD  HEENT: atrumatic, EMOI, NGT in place  Resp: Unlabored breathing  Abd: soft, nontender, slightly distended, no rebound or guarding    Ext: Moves 4 extremities spontaneously      I&O's Detail    11 Dec 2020 07:01  -  12 Dec 2020 07:00  --------------------------------------------------------  IN:    IV PiggyBack: 50 mL    Milrinone: 79.2 mL    Oral Fluid: 480 mL  Total IN: 609.2 mL    OUT:    Other (mL): 0 mL    Voided (mL): 200 mL  Total OUT: 200 mL    Total NET: 409.2 mL      12 Dec 2020 07:01  -  13 Dec 2020 00:15  --------------------------------------------------------  IN:    Other (mL): 800 mL  Total IN: 800 mL    OUT:    Emesis (mL): 200 mL    Oral Fluid: 0 mL    Other (mL): 800 mL    Voided (mL): 100 mL  Total OUT: 1100 mL    Total NET: -300 mL          Daily     Daily Weight in k.1 (12 Dec 2020 17:10)    LABS:                        9.0    4.31  )-----------( 156      ( 12 Dec 2020 06:12 )             29.4         136  |  96  |  70<H>  ----------------------------<  151<H>  4.3   |  23  |  3.60<H>    Ca    9.2      12 Dec 2020 20:16

## 2020-12-13 NOTE — PROGRESS NOTE ADULT - PROBLEM SELECTOR PLAN 1
- Euvolemic on exam currently,  home torsemide 80mg BID and spironolactone 25mg daily on hold   - c/w IVF   - reduce milrinone 0.15 mcg/kg/min  - prev on home Toprol XL 25 mg QD; unable to get d/t SBO  - prev on Imdur 60mg qhs, hold parameters SBP < 110 to prevent hypotension in setting of likely ATN  - Would repeat TTE while inpatient  - Please check daily CMP and lactate to trend perfusion  - Strict I/Os, daily standing weights (goal weight ~158 lbs)

## 2020-12-13 NOTE — PROGRESS NOTE ADULT - PROBLEM SELECTOR PLAN 3
- Most recent baseline Cr ~ 2.5-3, follows with Dr. Rees as outpatient  - c/w HD with worsening hyperkalemia and uremia  - Continue frequent monitoring of BMP

## 2020-12-13 NOTE — PROGRESS NOTE ADULT - PROBLEM SELECTOR PLAN 2
Euvolemic on exam currently,  home torsemide 80mg BID and spironolactone 25mg daily on hold   as per cards , c/w IVF   - reduce milrinone 0.15 mcg/kg/min  - prev on home Toprol XL 25 mg QD; unable to get d/t SBO

## 2020-12-13 NOTE — PROGRESS NOTE ADULT - ASSESSMENT
Mr. Jarquin is a 75 Year-Old Gentleman with very significant cardiac history and acute on chronic renal failure who presented after fall found to have Small bowel obstruction. Patient has return of bowel function however continue to have worsening renal function, now requiring HD (access to be placed by IR 12/11), nausea possible 2/2 to hyperuremia. CTAP 12/12 prelim read with redemonstrated SBO w/ TP at terminal ileum, likely partial SBO with contrast passing through colon and rectum.     Plan  - No surgical intervention   -NGT/NPO  -care as per primary team  -Final plan to be d/w attending in AM rounds    Green Team Surgery Pager #[phone]

## 2020-12-13 NOTE — PROGRESS NOTE ADULT - SUBJECTIVE AND OBJECTIVE BOX
Interval History:  appears weak  tachycardic overnight; possible atach  had emesis x 2; received OG tube with 2 liters removed  started on IVF  discussion held with patient and niece and confirmed DNR/DNI but accepting of intensive care if able to help him     Medications:  acetaminophen   Tablet .. 650 milliGRAM(s) Oral every 6 hours PRN  aMIOdarone    Tablet 200 milliGRAM(s) Oral daily  BACItracin   Ointment 1 Application(s) Topical two times a day  chlorhexidine 4% Liquid 1 Application(s) Topical <User Schedule>  dextrose 5% + sodium chloride 0.45%. 1000 milliLiter(s) IV Continuous <Continuous>  isosorbide   mononitrate ER Tablet (IMDUR) 60 milliGRAM(s) Oral at bedtime  levothyroxine Injectable 25 MICROGram(s) IV Push at bedtime  melatonin 3 milliGRAM(s) Oral at bedtime  metoprolol succinate ER 25 milliGRAM(s) Oral daily  milrinone Infusion 0.15 MICROgram(s)/kG/Min IV Continuous <Continuous>  sodium chloride 0.9% lock flush 10 milliLiter(s) IV Push every 1 hour PRN      Vitals:  T(C): 37.1 (20 @ 20:10), Max: 37.1 (20 @ 20:10)  HR: 126 (20 @ 20:10) (112 - 126)  BP: 82/53 (20 @ 20:10) (72/36 - 94/60)  BP(mean): --  RR: 18 (20 @ 20:10) (18 - 18)  SpO2: 96% (20 @ 20:10) (94% - 97%)    Daily     Daily Weight in k.8 (13 Dec 2020 08:00)        I&O's Summary    12 Dec 2020 07:01  -  13 Dec 2020 07:00  --------------------------------------------------------  IN: 800 mL / OUT: 2550 mL / NET: -1750 mL    13 Dec 2020 07:01  -  13 Dec 2020 21:16  --------------------------------------------------------  IN: 0 mL / OUT: 150 mL / NET: -150 mL        Physical Exam:  Appearance: No Acute Distress  HEENT: PERRL; OG tube  Neck: No JVD  Cardiovascular: Normal S1 S2, No murmurs/rubs/gallops; tachycardic  Respiratory: Clear to auscultation bilaterally  Gastrointestinal: Soft, Non-tender	  Skin: No cyanosis	  Neurologic: Non-focal  Extremities: No LE edema  Psychiatry: A & O x 3, Mood & affect appropriate    Labs:                        8.7    7.60  )-----------( 111      ( 13 Dec 2020 06:36 )             28.3     12-    135  |  96  |  80<H>  ----------------------------<  156<H>  4.7   |  22  |  4.25<H>    Ca    8.7      13 Dec 2020 06:36  Phos  4.7     12-  Mg     2.2     12-13    TPro  6.0  /  Alb  3.2<L>  /  TBili  1.3<H>  /  DBili  x   /  AST  25  /  ALT  20  /  AlkPhos  78  12-13    PT/INR - ( 13 Dec 2020 02:43 )   PT: 25.1 sec;   INR: 2.17 ratio

## 2020-12-13 NOTE — CHART NOTE - NSCHARTNOTEFT_GEN_A_CORE
Notified by Tele tech that patient has 8.1 sec of PMT upto 150s. Pt seen and examined. Pt found in best resting comfortably with niece at bedside. Pt denies any feeling of lightheadedness and dizziness. Vital signs reviewed. Spoke with Dr. Lisa velasquez to give patient 250cc FB x1.     Bah[place] Nur [place]  [id number] Notified by [email or web address] that patient has 8.1 sec of PMT upto 150s. Pt seen and examined. Pt found in best resting comfortably with niece at bedside. Pt denies any feeling of lightheadedness and dizziness. Vital signs reviewed. Spoke with Dr. Lisa velasquez to give patient 250cc FB x1 and repeat lactate. RN notified[place]  [id number]

## 2020-12-13 NOTE — PROGRESS NOTE ADULT - ASSESSMENT
75M PMHx NICM, LVEF on milronine (EF 10-15%, LVIDd 6.7 cm) s/p CRT-D, moderate-severe MR s/p MitraClip 8/2020, HTN, pAFib s/p DCCV 7/20/20 on Eliquis, CKD stage 4 (baseline SCr 2.5-3), DM2 transfer from [facility] to [facility] for further management SBO and mesenteric ischemia.  Nephrology consulted for EDIE on CKD, worsening BUN/Cr plan start HD 12/11/20 for uremia and hyperkalemia.      # EDIE on CKDIV  EDIE on CKD likely 2/2 ATN in the setting hypotension, on diuretic lasix/aldactone & volume depletion from vomiting, decrease PO intake  CKD4 possibly 2/2 cardiorenal/HF and/or diabetes, baseline sCr 2.5-3.0 in 11/2020  On admission sCr 3.2 on 12/7, worsened to 4.3 on 12/8, ED triage BP 80s/50s, sCr worsened to 5.15 on 12/10, urine sodium <35 consistent w/ pre renal etiology and renal ultrasound show CKD changes only (no hydronephrosis), serum BUN/creatinine worsened 150/5.87, initiated on hemodialysis on 12/11 for uremia/hyperkalemia w/ decrease UOP and unable give lokema d/t SBO, tolerated well. s/p 2nd HD session 12/12 no UF.    - today no plan for HD, will monitor renal recovery from ANT  - bladder scan and consider insert landaverde catheter to monitor strict I/O  - monitor BMP, strict I/O, avoid nephrotoxic agents (NSAIDs, PPI, contrast), renally dose medications per HD.    # Hyperkalemia   Pt with hyperkalemia possibly 2/2 EDIE on CKD and acidosis.  On admission serum K 5.1 worsened to 5.7 (no hemolysis) s/p bicarb based IVF, improved to with serum K worsened 5.6 on 12/11 thereby initiated on HD 12/11 (for uremia/hyperK), last serum K 4.7  - no HD today, will monitor  - low potassium diet, avoid ACEI/ARB/Aldactone given serum K and EDIE  - monitor BMP    # Metabolic acidosis  In setting of EDIE on CKD, no lactate. Last serum bicarbonate 22  - continue monitor serum bicarbonate    # Hyperphosphatemia  Pt. with hyperphosphatemia in setting of advanced CKD.   - Monitor serum phosphorus, will consider starting binders if gets worse.    # Anemia  Pt with normocytic anemia likely 2/2 ACD CKD and hematoma (intragluteal).  On admission Hgb 10.5, last hgb 9.7, negative FOBT, CT showed right gluteal hematoma (after fall), decreasing in size (CT 12/11)  - monitor CBC, blood transfusion prn   75M PMHx NICM, LVEF on milronine (EF 10-15%, LVIDd 6.7 cm) s/p CRT-D, moderate-severe MR s/p MitraClip 8/2020, HTN, pAFib s/p DCCV 7/20/20 on Eliquis, CKD stage 4 (baseline SCr 2.5-3), DM2 transfer from [facility] to [facility] for further management SBO and mesenteric ischemia.  Nephrology consulted for EDIE on CKD, worsening BUN/Cr plan start HD 12/11/20 for uremia and hyperkalemia.      # EDIE on CKDIV  EDIE on CKD likely 2/2 ATN in the setting hypotension, on diuretic lasix/aldactone & volume depletion from vomiting, decrease PO intake  CKD4 possibly 2/2 cardiorenal/HF and/or diabetes, baseline sCr 2.5-3.0 in 11/2020  On admission sCr 3.2 on 12/7, worsened to 4.3 on 12/8, ED triage BP 80s/50s, sCr worsened to 5.15 on 12/10, urine sodium <35 consistent w/ pre renal etiology and renal ultrasound show CKD changes only (no hydronephrosis), serum BUN/creatinine worsened 150/5.87, initiated on hemodialysis on 12/11 for uremia/hyperkalemia w/ decrease UOP and unable give lokema d/t SBO, tolerated well. s/p 2nd HD session 12/12 no UF.    - today no plan for HD, will monitor renal recovery from ATN  - bladder scan and consider insert landaverde catheter to monitor strict I/O  - monitor BMP, strict I/O, avoid nephrotoxic agents (NSAIDs, PPI, contrast), renally dose medications per HD.    # Hyperkalemia   Pt with hyperkalemia possibly 2/2 EDIE on CKD and acidosis.  On admission serum K 5.1 worsened to 5.7 (no hemolysis) s/p bicarb based IVF, improved to with serum K worsened 5.6 on 12/11 thereby initiated on HD 12/11 (for uremia/hyperK), last serum K 4.7  - no HD today, will monitor  - low potassium diet, avoid ACEI/ARB/Aldactone given serum K and EDIE  - monitor BMP    # Metabolic acidosis  In setting of EDIE on CKD, no lactate. Last serum bicarbonate 22  - continue monitor serum bicarbonate    # Hyperphosphatemia  Pt. with hyperphosphatemia in setting of advanced CKD.   - Monitor serum phosphorus, will consider starting binders if gets worse.    # Anemia  Pt with normocytic anemia likely 2/2 ACD CKD and hematoma (intragluteal).  On admission Hgb 10.5, last hgb 9.7, negative FOBT, CT showed right gluteal hematoma (after fall), decreasing in size (CT 12/11)  - monitor CBC, blood transfusion prn

## 2020-12-13 NOTE — RAPID RESPONSE TEAM SUMMARY - NSSITUATIONBACKGROUNDRRT_GEN_ALL_CORE
RRT called for sbp in 70s, dbp in 20s    Patient is a 75 y.o M w/ PMH NICM (EF <20%) s/p CRT-D (on home milrinone), severe MR s/p mitraclip, HTN, a fib DCCV on eliquis, CKD stage III, T2DM who presents s/p fall, found to have SBO s/p NG tube. Course c/b EDIE on CKD s/p urgent HD for hyperkalemia and uremia.     RRT was called for hypotension after NG tube placement. At encounter, patient was AOx3 and mentating. Bp noted to be 70/40s, HR in 110s. On exam, extremities warm, no crackles noted in lungs.  Per primary team, patient has significant output from NG tube and bps have been running soft.  He has received 200 cc bolus x2 tonight w/o significant improvement in bp. Ordered additional 250 cc bolus x1 and bp improved to 80/50. OBtained blood work including lactate and bmp.  Cards called who will come see patient at bedside.

## 2020-12-13 NOTE — CHART NOTE - NSCHARTNOTEFT_GEN_A_CORE
Notified by RN of pt noted to have 7.72 sec of PMT. Pt also has SBP 90s and HR 120s. Spoke with Dr. Gonzalez, HF attending, recs to continue to monitor pt on IVF at 50cc/hr and milirone gtt. Will continue to monitor.    Bahman Nur, SERGIO  [id number]

## 2020-12-14 NOTE — DIETITIAN INITIAL EVALUATION ADULT. - OTHER CALCULATIONS
%IBW: 96%; defer fluid needs to team IBW used to calculate needs; %IBW: 96%; defer fluid needs to team

## 2020-12-14 NOTE — PROGRESS NOTE ADULT - PROBLEM SELECTOR PLAN 1
-Euvolemic on exam currently, home torsemide 80mg BID and spironolactone 25mg daily on hold   -Continue milrinone 0.15 mcg/kg/min  -Prev on home Toprol XL 25 mg QD; unable to get d/t SBO; please provide Lopressor 12.5mg PO BID with no holding parameters  -Reduce Imdur to 10mg PO TID and hold for SBP <95  -Would repeat TTE while inpatient  - Please check daily CMP and lactate to trend perfusion  -Strict I/Os, daily standing weights (goal weight ~158 lbs)

## 2020-12-14 NOTE — PROGRESS NOTE ADULT - SUBJECTIVE AND OBJECTIVE BOX
[facility] DIVISION OF KIDNEY DISEASES AND HYPERTENSION   -- FOLLOW UP NOTE --   Cristina Ramírez  Nephrology Fellow  Pager NS: [phone]   /  Pager LORIEJ: [phone]  (after 5pm or weekend please page the on-call fellow)  ======================================================  24 hour events/subjective:  - overnight no events reported, vitals afebrile, UOP not documented  - patient seen and examined at bedside this morning on room air w/ NGT in place, he endorse feeling weak and hungry.  Passing alot gas however no bowel movement yet per patient  - vitals/lab/medications reviewed, noted for rise sCr 4.3 to 4.9 and BUN 80 to 93    PAST HISTORY  --------------------------------------------------------------------------------  No significant changes to PMH, PSH, FHx, SHx, unless otherwise noted    ALLERGIES & MEDICATIONS  --------------------------------------------------------------------------------  Allergies  No Known Allergies    Intolerances    Standing Inpatient Medications  aMIOdarone    Tablet 200 milliGRAM(s) Oral daily  BACItracin   Ointment 1 Application(s) Topical two times a day  chlorhexidine 4% Liquid 1 Application(s) Topical <User Schedule>  dextrose 5% + sodium chloride 0.45%. 1000 milliLiter(s) IV Continuous <Continuous>  isosorbide   mononitrate ER Tablet (IMDUR) 60 milliGRAM(s) Oral at bedtime  levothyroxine Injectable 25 MICROGram(s) IV Push at bedtime  melatonin 3 milliGRAM(s) Oral at bedtime  metoprolol succinate ER 25 milliGRAM(s) Oral daily  milrinone Infusion 0.15 MICROgram(s)/kG/Min IV Continuous <Continuous>    PRN Inpatient Medications  acetaminophen   Tablet .. 650 milliGRAM(s) Oral every 6 hours PRN  sodium chloride 0.9% lock flush 10 milliLiter(s) IV Push every 1 hour PRN    REVIEW OF SYSTEMS  --------------------------------------------------------------------------------  Gen: no fever, chills, weakness +decrease   Respiratory: No dyspnea, cough  CV: No chest pain, orthopnea  GI: No abdominal pain, nausea, vomiting, diarrhea  MSK: no edema  Neuro: No dizziness, lightheadedness  Heme: No bleeding  All other systems were reviewed and are negative, except as noted.    VITALS/PHYSICAL EXAM  --------------------------------------------------------------------------------  T(C): 37.4 (12-14-20 @ 04:48), Max: 37.4 (12-14-20 @ 04:48)  HR: 121 (12-14-20 @ 04:48) (112 - 126)  BP: 91/55 (12-14-20 @ 04:48) (75/43 - 94/60)  RR: 18 (12-14-20 @ 04:48) (18 - 18)  SpO2: 95% (12-14-20 @ 04:48) (95% - 97%)  Wt(kg): --    12-13-20 @ 07:01  -  12-14-20 @ 07:00  --------------------------------------------------------  IN: 0 mL / OUT: 200 mL / NET: -200 mL    Physical Exam:  	Gen: NAD on room air  	HEENT: =NGT  	Pulm: CTA B/L, no crackles  	CV: RRR, S1S2; + murmur  	GI: distended abdomen  	: No suprapubic tenderness  	MSK: Warm, no edema              Neuro: AAOx3, no asterixis noted  	Psych: Normal affect and mood  	Skin: Warm, facial abrasion (present on admission)              Access: [facility] non tunnel HD catheter    LABS/STUDIES  --------------------------------------------------------------------------------              8.3    5.56  >-----------<  121      [12-14-20 @ 06:37]              27.8     132  |  96  |  93  ----------------------------<  151      [12-14-20 @ 06:36]  4.3   |  21  |  4.94        Ca     9.2     [12-14-20 @ 06:36]      Mg     2.2     [12-13-20 @ 06:36]      Phos  4.7     [12-13-20 @ 06:36]    TPro  6.0  /  Alb  3.2  /  TBili  1.3  /  DBili  x   /  AST  25  /  ALT  20  /  AlkPhos  78  [12-13-20 @ 02:37]    PT/INR: PT 25.1 , INR 2.17       [12-13-20 @ 02:43]    Creatinine Trend:  SCr 4.94 [12-14 @ 06:36]  SCr 4.25 [12-13 @ 06:36]  SCr 4.14 [12-13 @ 02:37]  SCr 3.60 [12-12 @ 20:16]  SCr 5.00 [12-12 @ 06:13]    Urinalysis - [12-10-20 @ 18:53]      Color Yellow / Appearance Clear / SG 1.016 / pH 5.5      Gluc Negative / Ketone Negative  / Bili Negative / Urobili Negative       Blood Negative / Protein Trace / Leuk Est Negative / Nitrite Negative      RBC 2 / WBC 1 / Hyaline 6 / Gran  / Sq Epi  / Non Sq Epi 0 / Bacteria Negative    Urine Creatinine 85      [12-10-20 @ 18:53]  Urine Sodium <35      [12-10-20 @ 18:53]  Urine Urea Nitrogen 673      [12-11-20 @ 02:41]  Urine Chloride <35      [12-10-20 @ 18:53]  Urine Phosphorus 52.9      [12-11-20 @ 04:07]  Urine Osmolality 387      [12-11-20 @ 05:47]    Iron 36, TIBC 314, %sat 12      [09-08-20 @ 08:45]  Ferritin 145      [09-08-20 @ 08:45]  Vitamin D (25OH) 21.7      [07-18-20 @ 10:13]  TSH 8.61      [11-01-20 @ 20:16]  Lipid: chol 116, TG 55, HDL 48, LDL 58      [08-26-20 @ 00:17]    HBsAb <3.0      [12-12-20 @ 04:04]  HBsAb Nonreact      [12-11-20 @ 15:03]  HBsAg Nonreact      [12-11-20 @ 15:03]  HCV 0.16, Nonreact      [12-12-20 @ 04:04]

## 2020-12-14 NOTE — PROGRESS NOTE ADULT - SUBJECTIVE AND OBJECTIVE BOX
SUBJECTIVE / OVERNIGHT EVENTS: pt seen and examined. c/o fatigue  pts niece next to pts bed  NGT+    MEDICATIONS  (STANDING):  aMIOdarone    Tablet 200 milliGRAM(s) Oral daily  BACItracin   Ointment 1 Application(s) Topical two times a day  chlorhexidine 4% Liquid 1 Application(s) Topical <User Schedule>  dextrose 5% + sodium chloride 0.45%. 1000 milliLiter(s) (50 mL/Hr) IV Continuous <Continuous>  isosorbide   mononitrate ER Tablet (IMDUR) 60 milliGRAM(s) Oral at bedtime  levothyroxine Injectable 25 MICROGram(s) IV Push at bedtime  melatonin 3 milliGRAM(s) Oral at bedtime  metoprolol succinate ER 25 milliGRAM(s) Oral daily  milrinone Infusion 0.15 MICROgram(s)/kG/Min (3.24 mL/Hr) IV Continuous <Continuous>    MEDICATIONS  (PRN):  acetaminophen   Tablet .. 650 milliGRAM(s) Oral every 6 hours PRN Mild Pain (1 - 3)  sodium chloride 0.9% lock flush 10 milliLiter(s) IV Push every 1 hour PRN Pre/post blood products, medications, blood draw, and to maintain line patency    Vital Signs Last 24 Hrs  T(C): 37.4 (14 Dec 2020 04:48), Max: 37.4 (14 Dec 2020 04:48)  T(F): 99.3 (14 Dec 2020 04:48), Max: 99.3 (14 Dec 2020 04:48)  HR: 121 (14 Dec 2020 04:48) (112 - 126)  BP: 91/55 (14 Dec 2020 04:48) (75/43 - 94/60)  BP(mean): --  RR: 18 (14 Dec 2020 04:48) (18 - 18)  SpO2: 95% (14 Dec 2020 04:48) (95% - 97%)    Constitutional: No fever,+ fatigue  Skin: No rash.  Eyes: No recent vision problems or eye pain.  ENT: No congestion, ear pain, or sore throat.  Cardiovascular: No chest pain or palpation.  Respiratory: No cough, shortness of breath, congestion, or wheezing.  Gastrointestinal: +abdominal pain, nausea, vomiting, or diarrhea.  Genitourinary: No dysuria.  Musculoskeletal: No joint swelling.  Neurologic: No headache.    PHYSICAL EXAM:  ngt+  CHEST/LUNG: dec breath sounds at bases   HEART:  S1 , S2 +  ABDOMEN: soft , bs+, mild distension +  EXTREMITIES:  edema+  NEUROLOGY:alert awake oriented     LABS:      132<L>  |  96  |  93<H>  ----------------------------<  151<H>  4.3   |  21<L>  |  4.94<H>    Ca    9.2      14 Dec 2020 06:36  Phos  4.7       Mg     2.2         TPro  6.0  /  Alb  3.2<L>  /  TBili  1.3<H>  /  DBili      /  AST  25  /  ALT  20  /  AlkPhos  78      Creatinine Trend: 4.94 <--, 4.25 <--, 4.14 <--, 3.60 <--, 5.00 <--, 5.87 <--, 5.15 <--, 4.49 <--, 4.49 <--, 4.37 <--, 3.20 <--                        8.3    5.56  )-----------( 121      ( 14 Dec 2020 06:37 )             27.8     Urine Studies:  Urinalysis Basic - ( 10 Dec 2020 18:53 )    Color: Yellow / Appearance: Clear / S.016 / pH:   Gluc:  / Ketone: Negative  / Bili: Negative / Urobili: Negative   Blood:  / Protein: Trace / Nitrite: Negative   Leuk Esterase: Negative / RBC: 2 /hpf / WBC 1 /HPF   Sq Epi:  / Non Sq Epi: 0 /hpf / Bacteria: Negative      Osmolality, Random Urine: 387 mosm/Kg ( @ 05:47)  Chloride, Random Urine: <35 mmol/L (12-10 @ 18:53)  Creatinine, Random Urine: 85 mg/dL (12-10 @ 18:53)  Sodium, Random Urine: <35 mmol/L (12-10 @ 18:53)          LIVER FUNCTIONS - ( 13 Dec 2020 02:37 )  Alb: 3.2 g/dL / Pro: 6.0 g/dL / ALK PHOS: 78 U/L / ALT: 20 U/L / AST: 25 U/L / GGT: x           PT/INR - ( 13 Dec 2020 02:43 )   PT: 25.1 sec;   INR: 2.17 ratio

## 2020-12-14 NOTE — PROGRESS NOTE ADULT - ASSESSMENT
75M PMHx NICM, LVEF on milronine (EF 10-15%, LVIDd 6.7 cm) s/p CRT-D, moderate-severe MR s/p MitraClip 8/2020, HTN, pAFib s/p DCCV 7/20/20 on Eliquis, CKD stage 4 (baseline SCr 2.5-3), DM2 transfer from [facility] to [facility] for further management SBO and mesenteric ischemia.  Nephrology consulted for EDIE on CKD, worsening BUN/Cr plan start HD 12/11/20 for uremia and hyperkalemia.      # EDIE on CKDIV  EDIE on CKD likely 2/2 ATN in the setting hypotension, on diuretic lasix/aldactone & volume depletion from vomiting, decrease PO intake  CKD4 possibly 2/2 cardiorenal/HF and/or diabetes, baseline sCr 2.5-3.0 in 11/2020  On admission sCr 3.2 on 12/7, worsened to 4.3 on 12/8, ED triage BP 80s/50s, sCr worsened to 5.15 on 12/10, urine sodium <35 consistent w/ pre renal etiology and renal ultrasound show CKD changes only (no hydronephrosis), serum BUN/creatinine worsened 150/5.87, initiated on hemodialysis on 12/11 for uremia/hyperkalemia w/ decrease UOP and unable give lokema d/t SBO, tolerated well. s/p 2nd HD session 12/12 no UF.    - today for HD for uremia, continue monitor renal recovery from ATN  - bladder scan and consider insert landaverde catheter to monitor strict I/O  - monitor BMP, strict I/O, avoid nephrotoxic agents (NSAIDs, PPI, contrast), renally dose medications per HD.    # Hyperkalemia   Pt with hyperkalemia possibly 2/2 EDIE on CKD and acidosis.  On admission serum K 5.1 worsened to 5.7 (no hemolysis) s/p bicarb based IVF, improved to with serum K worsened 5.6 on 12/11 thereby initiated on HD 12/11 (for uremia/hyperK), last serum K 4.3  - HD as outline above  - low potassium diet, avoid ACEI/ARB/Aldactone given serum K and EDIE  - monitor BMP    # Metabolic acidosis  In setting of EDIE on CKD, no lactate. Last serum bicarbonate 21  - HD as outline above  - continue monitor serum bicarbonate      # Anemia  Pt with normocytic anemia likely 2/2 ACD CKD and hematoma (intragluteal).  On admission Hgb 10.5, last hgb 9.7, negative FOBT, CT showed right gluteal hematoma (after fall), decreasing in size (CT 12/11)  - monitor CBC, blood transfusion prn

## 2020-12-14 NOTE — DIETITIAN INITIAL EVALUATION ADULT. - ADD RECOMMEND
Recommendations: 1.) Continue to monitor status of SBO. 2.) Monitor weight trends and labs. 3.) Educate pt on renal restricted diet and increased protein-energy needs PRN. Recommendations: 1.) Continue to monitor status of SBO. 2.) Monitor weight trends and labs. 3.) Educate pt on renal restricted diet and increased protein-energy needs PRN. 4.) If diet advances to PO would recommend consistent carbohydrate, renal diet. 5.) Malnutrition alert placed, will follow up as per protocol.

## 2020-12-14 NOTE — PROGRESS NOTE ADULT - SUBJECTIVE AND OBJECTIVE BOX
SUBJECTIVE:  NGT output low. NPO. Denies N/V. Bowel Function +/-        OBJECTIVE:  Vital Signs Last 24 Hrs  T(C): 37.1 (13 Dec 2020 20:10), Max: 37.1 (13 Dec 2020 20:10)  T(F): 98.7 (13 Dec 2020 20:10), Max: 98.7 (13 Dec 2020 20:10)  HR: 126 (13 Dec 2020 20:10) (112 - 126)  BP: 82/53 (13 Dec 2020 20:10) (72/36 - 94/60)  BP(mean): --  RR: 18 (13 Dec 2020 20:10) (18 - 18)  SpO2: 96% (13 Dec 2020 20:10) (94% - 97%)    Physical Examination:  GEN: NAD, resting quietly  PULM: symmetric chest rise bilaterally, no increased WOB  ABD: soft, nontender, less distended  EXTR: no LE erythema, moving all extremities      LABS:                        8.7    7.60  )-----------( 111      ( 13 Dec 2020 06:36 )             28.3       12-13    135  |  96  |  80<H>  ----------------------------<  156<H>  4.7   |  22  |  4.25<H>    Ca    8.7      13 Dec 2020 06:36  Phos  4.7     12-13  Mg     2.2     12-13    TPro  6.0  /  Alb  3.2<L>  /  TBili  1.3<H>  /  DBili  x   /  AST  25  /  ALT  20  /  AlkPhos  78  12-13

## 2020-12-14 NOTE — DIETITIAN INITIAL EVALUATION ADULT. - ETIOLOGY
inadequate protein-energy intake inadequate protein-energy intake in the presence of CKD, CHF, cardiac hx inadequate protein-energy intake with increased physiological demands in the presence of pressure injury, CKD, CHF, cardiac hx

## 2020-12-14 NOTE — DIETITIAN INITIAL EVALUATION ADULT. - PHYSCIAL ASSESSMENT
Per chart, stage 1 sacrum pressure injury  IBW used to calculate needs other (specify) Per chart, stage 1 sacrum pressure injury

## 2020-12-14 NOTE — PROGRESS NOTE ADULT - PROBLEM SELECTOR PLAN 2
-CT abdomen/pelvis confirms SBO, s/p NGT at OSH removed but replaced   -Surgery consulted, appreciate recs  -Recommend PT evaluation

## 2020-12-14 NOTE — DIETITIAN INITIAL EVALUATION ADULT. - SIGNS/SYMPTOMS
Weight loss >5% in 1 month, physical findings of severe muscle loss Weight loss >14% in 1 month, physical findings of severe muscle loss

## 2020-12-14 NOTE — CHART NOTE - FINDINGS AS BASED ON:
Food acceptance and intake status from observations by staff/Calorie counts (nutrient intake analysis)/Comprehensive nutrition assessment and consultation/Patient Education

## 2020-12-14 NOTE — DIETITIAN INITIAL EVALUATION ADULT. - CHIEF COMPLAINT
The patient is a 75y Male transferred from [facility] for intestinal obstruction (12/07). Pt c/o of nausea and vomiting in the setting of SBO; NG placed prior to admission to [facility]. Pt started on HD 2/2 EDIE on CKD. The patient is a 75y Male PMH includes T2DM, CKD, CHF, cardiomyopathy, HTN. Transferred from [facility] for intestinal obstruction (12/07). Pt c/o of nausea and vomiting in the setting of SBO; NG placed for suction prior to admission to [facility]. Pt started on HD on 12/11 2/2 EDIE on CKD.

## 2020-12-14 NOTE — PROGRESS NOTE ADULT - ASSESSMENT
Mr. Jarquin is a 75 Year-Old Gentleman with very significant cardiac history and acute on chronic renal failure who presented after fall found to have Small bowel obstruction. Patient has return of bowel function however continue to have worsening renal function, now requiring HD (access to be placed by IR 12/11), nausea possible 2/2 to hyperuremia. CTAP 12/12 prelim read with redemonstrated SBO w/ TP at terminal ileum, likely partial SBO with contrast passing through colon and rectum.     Plan  - No surgical intervention   - NGT - possible clamp trial   - Continue NPO  - hydration  -care as per primary team  -Final plan to be d/w attending in AM rounds    Green Team Surgery Pager #[phone] Mr. Jarquin is a 75 Year-Old Gentleman with very significant cardiac history and acute on chronic renal failure who presented after fall found to have Small bowel obstruction. Patient has return of bowel function however continue to have worsening renal function, now requiring HD, nausea possible 2/2 to hyperuremia. CTAP 12/12 prelim read with redemonstrated SBO w/ TP at terminal ileum, likely partial SBO with contrast passing through colon and rectum.     Plan  - No surgical intervention   - NGT - possible clamp trial   - Continue NPO  - hydration  -care as per primary team  -Final plan to be d/w attending in AM rounds    Green Team Surgery Pager #[phone] Mr. Jarquin is a 75 Year-Old Gentleman with very significant cardiac history and acute on chronic renal failure who presented after fall found to have Small bowel obstruction. Patient has return of bowel function however continue to have worsening renal function, now requiring HD, nausea possible 2/2 to hyperuremia. CTAP 12/12 prelim read with redemonstrated SBO w/ TP at terminal ileum, likely partial SBO with contrast passing through colon and rectum.     Plan  - No surgical intervention   - NGT to low continous   - Continue NPO  - Recommend US Vena Cava to delineate volume status   - Cardiology: cardiac risk assessment   - care as per primary team  -Final plan to be d/w attending in AM rounds    Green Team Surgery Pager #[phone] Mr. Jarquin is a 75 Year-Old Gentleman with very significant cardiac history and acute on chronic renal failure who presented after fall found to have Small bowel obstruction. Patient has return of bowel function however continue to have worsening renal function, now requiring HD, nausea possible 2/2 to hyperuremia. CTAP 12/12 prelim read with redemonstrated SBO w/ TP at terminal ileum, likely partial SBO with contrast passing through colon and rectum.     Plan  - No surgical intervention   - NGT to low continuous wall suction   - Continue NPO  - Recommend US Vena Cava to delineate volume status   - Cardiology: cardiac risk assessment   - care as per primary team  -Final plan to be d/w attending in AM rounds    Green Team Surgery Pager #[phone]

## 2020-12-14 NOTE — PROGRESS NOTE ADULT - SUBJECTIVE AND OBJECTIVE BOX
Daily In-House Cardiology Progress Note  -------------------------------------------------------    24-Hour Events/Subjective:      -Endorsed feeling weak and hungry.     Telemetry:  -V-paced    ROS:   Constitutional: No chills or fever  HEENT: No sore throat, dryness, hoarseness, congestion  Cardiovascular: No chest pain, SOB, palpitations, ALCAZAR, lightheadedness, dizziness  Respiratory: No wheezing, cough, phlegm  GI: No nausea, vomiting, diarrhea, poor PO tolerance, dyspepsia, dysphagia  Musculoskeletal: No myalgias, arthralgias, joint swelling, back pain  Extremities: No swelling, coldness  Skin: No rashes, lesions, pruritis   Neuro: No weakness, confusion, blurry vision  Psych: No anxiety, depression    Current Meds:  acetaminophen   Tablet .. 650 milliGRAM(s) Oral every 6 hours PRN  aMIOdarone    Tablet 200 milliGRAM(s) Oral daily  BACItracin   Ointment 1 Application(s) Topical two times a day  chlorhexidine 4% Liquid 1 Application(s) Topical <User Schedule>  dextrose 5% + sodium chloride 0.45%. 1000 milliLiter(s) IV Continuous <Continuous>  isosorbide   mononitrate ER Tablet (IMDUR) 60 milliGRAM(s) Oral at bedtime  levothyroxine Injectable 25 MICROGram(s) IV Push at bedtime  melatonin 3 milliGRAM(s) Oral at bedtime  metoprolol succinate ER 25 milliGRAM(s) Oral daily  milrinone Infusion 0.15 MICROgram(s)/kG/Min IV Continuous <Continuous>  sodium chloride 0.9% lock flush 10 milliLiter(s) IV Push every 1 hour PRN    Vitals:  T(F): 98.9 (12-14), Max: 99.3 (12-14)  HR: 124 (12-14) (120 - 126)  BP: 92/56 (12-14) (82/53 - 94/60)  RR: 18 (12-14)  SpO2: 95% (12-14)    I&O's Summary  13 Dec 2020 07:01  -  14 Dec 2020 07:00  --------------------------------------------------------  IN: 0 mL / OUT: 200 mL / NET: -200 mL    Physical Exam:  Appearance: No Acute Distress  HEENT: PERRL; OG tube  Neck: No JVD  Cardiovascular: Normal S1 S2, No murmurs/rubs/gallops; tachycardic  Respiratory: Clear to auscultation bilaterally  Gastrointestinal: Soft, Non-tender	  Skin: No cyanosis	  Neurologic: Non-focal  Extremities: No LE edema  Psychiatry: A & O x 3, Mood & affect appropriate                       8.3    5.56  )-----------( 121      ( 14 Dec 2020 06:37 )             27.8     12-14    132<L>  |  96  |  93<H>  ----------------------------<  151<H>  4.3   |  21<L>  |  4.94<H>    Ca    9.2      14 Dec 2020 06:36  Phos  4.7     12-13  Mg     2.2     12-13    TPro  6.0  /  Alb  3.2<L>  /  TBili  1.3<H>  /  DBili  x   /  AST  25  /  ALT  20  /  AlkPhos  78  12-13    PT/INR - ( 13 Dec 2020 02:43 )   PT: 25.1 sec;   INR: 2.17 ratio

## 2020-12-14 NOTE — DIETITIAN INITIAL EVALUATION ADULT. - OTHER INFO
Pt admitted with NG tube from [facility] in setting of SBO. PTA, pt reports decreased PO intake since first admission (07/2020). Pt reports stable PO intake since July 2020 d/c; typical diet consists of consistent carbohydrates, lean protein, fruits/vegetables, no added salt, low potassium. Pt with hx of T2DM; denies any DM medicine use at home. Per H&P, recent A1c= 6.1%; indicates controlled via diet.     Per chart, pt with coffee ground emesis (12/12). Pt states N/V/D/C subsided; last BM 12/10. Pt reports weight hx: 340 pounds (2006), 2/2 [employer] 192 pounds (2006), UBW= 180 pounds (11/2020), current weight= 153 pounds (12/13). Recent weight loss of ~30 pounds x1 month in the setting of multiple hospitalization, NPO. Weight continues to fluctuate 2/2 fluid rentention/removal in the setting of CHF and HD.     Discussed HD renal restriction diet and importance of increased needs/maintaining weight upon reintroduction of PO diet. Pt able to teach back lean proteins; appears compliant with dietary modifications outlined. Provided HD handout for patient; continue education PRN. Pt admitted with NG tube from [facility] in setting of SBO; pt NPO upon admission, CLD 12/09-12/11, NPO 12/11-present. PTA, pt reports decreased PO intake since first admission (07/2020). Pt reports stable PO intake since July 2020 d/c; typical diet consists of consistent carbohydrates intake throughout the day, lean protein, fruits/vegetables, no added salt, low potassium. Pt with hx of T2DM; denies any DM medicine use at home. Per H&P, recent A1c= 6.1%; indicates controlled via diet.     Per chart, pt with coffee ground emesis (12/12). Pt states N/V/D/C subsided; last BM 12/10. Pt reports weight hx: 340 pounds (2006), 2/2 [employer] 192 pounds (2006), UBW= 180 pounds (11/2020), current weight= 153 pounds (12/13). Recent weight loss of ~30 pounds x1 month in the setting of multiple hospitalization, NPO. Weight continues to fluctuate 2/2 fluid rentention/removal in the setting of CHF and HD.     Discussed HD renal restriction diet and importance of increased needs/maintaining weight upon reintroduction of PO diet. Pt able to teach back lean proteins; appears compliant with dietary modifications outlined. Provided HD handout for patient; continue education PRN.

## 2020-12-14 NOTE — PROGRESS NOTE ADULT - PROBLEM SELECTOR PLAN 3
-Most recent baseline Cr ~ 2.5-3, follows with Dr. Rees as outpatient  -c/w HD with worsening hyperkalemia and uremia  -Continue frequent monitoring of BMP

## 2020-12-14 NOTE — PROGRESS NOTE ADULT - ASSESSMENT
75 year old M with longstanding history of NICM, LVEF < 20% (LVIDd 6.7 cm) s/p CRT-D, moderate-severe MR s/p MitraClip 8/2020, HTN, pAFib s/p DCCV 7/20/20 on Eliquis, CKD stage 3 (b/l SCr 1.7-2), DM 2 (A1c 6.1%) and anemia, transferred from [facility] after fall at home today in setting of nausea and vomiting, found there to have SBO with placement of NG tube.

## 2020-12-14 NOTE — DIETITIAN INITIAL EVALUATION ADULT. - EDUCATION DIETARY MODIFICATIONS
verbalization/teach back/(2) meets goals/outcomes verbalization/teach back/(1) partially meets; needs review/practice

## 2020-12-14 NOTE — DIETITIAN INITIAL EVALUATION ADULT. - PERTINENT LABORATORY DATA
Prothrombin Time: 132 <L>, BUN: 93 <H>, Creatinine: 4.94 <H>, Glucose: 151 <H>, Bilirubin Total: (12/13) 1.3 <H>, ALT: (12/13) 149 <H>< Phosphorus: (12/13) 4.7 <H>, A1c: (H&P): 6.1% <H>

## 2020-12-15 NOTE — PROGRESS NOTE ADULT - SUBJECTIVE AND OBJECTIVE BOX
Interval History: Remains NPO, per surgery plan for NG tube with clamp trial today. Did not receive HD yesterday due to BP being low    Medications:  acetaminophen   Tablet .. 650 milliGRAM(s) Oral every 6 hours PRN  aMIOdarone    Tablet 200 milliGRAM(s) Oral daily  BACItracin   Ointment 1 Application(s) Topical two times a day  benzocaine 15 mG/menthol 3.6 mG (Sugar-Free) Lozenge 1 Lozenge Oral two times a day  chlorhexidine 4% Liquid 1 Application(s) Topical <User Schedule>  dextrose 5% + sodium chloride 0.45%. 1000 milliLiter(s) IV Continuous <Continuous>  isosorbide   dinitrate Tablet (ISORDIL) 10 milliGRAM(s) Oral three times a day  levothyroxine Injectable 25 MICROGram(s) IV Push at bedtime  melatonin 3 milliGRAM(s) Oral at bedtime  metoprolol tartrate 12.5 milliGRAM(s) Oral two times a day  milrinone Infusion 0.15 MICROgram(s)/kG/Min IV Continuous <Continuous>  sodium chloride 0.9% lock flush 10 milliLiter(s) IV Push every 1 hour PRN    Vitals:  T(C): 36.7 (12-15-20 @ 12:29), Max: 37.1 (20 @ 18:20)  HR: 115 (12-15-20 @ 12:29) (100 - 125)  BP: 98/54 (12-15-20 @ 12:29) (84/46 - 98/61)  BP(mean): --  RR: 18 (12-15-20 @ 12:29) (18 - 18)  SpO2: 97% (12-15-20 @ 12:29) (96% - 97%)    Daily     Daily Weight in k.4 (15 Dec 2020 09:56)    I&O's Summary    14 Dec 2020 07:01  -  15 Dec 2020 07:00  --------------------------------------------------------  IN: 639.6 mL / OUT: 495 mL / NET: 144.6 mL    15 Dec 2020 07:01  -  15 Dec 2020 13:08  --------------------------------------------------------  IN: 0 mL / OUT: 150 mL / NET: -150 mL    Physical Exam:  Appearance: Laying in room air, NG tube in place, no increased work of breathing  HEENT: PERRL  Neck: JVP ~10cm  Cardiovascular: Regular rate and rhythm  Respiratory: Clear to auscultation bilaterally  Gastrointestinal: Soft, nondistended, decreased bowel sounds  Skin: No cyanosis  Neurologic: Non-focal  Extremities: No LE edema, warm and well perfused throughout  Psychiatry: A & O x 3, Mood & affect appropriate    Labs:                        7.9    7.70  )-----------( 105      ( 15 Dec 2020 04:29 )             25.3     12-15    133<L>  |  99  |  100<H>  ----------------------------<  138<H>  4.1   |  20<L>  |  4.91<H>    Ca    8.5      15 Dec 2020 04:29  Phos  5.5     12-15  Mg     2.3     12-15    TELEMETRY: v paced 100s, 10 beats WCT overnight

## 2020-12-15 NOTE — PROGRESS NOTE ADULT - SUBJECTIVE AND OBJECTIVE BOX
[facility] DIVISION OF KIDNEY DISEASES AND HYPERTENSION   -- FOLLOW UP NOTE --   Cristina Ramírez  Nephrology Fellow  Pager NS: [phone]   /  Pager HARESH: [phone]  (after 5pm or weekend please page the on-call fellow)  ======================================================  24 hour events/subjective:  - yesterday HD held off d/t hemodynamically instability w/ persistent tachycardic   - overnight no events reported, vitals afebrile, total UOP voided 250 cc in the past 24hr  - patient seen and examined at bedside this morning  - vitals/lab/medications reviewed, noted for stable sCr 4.9    PAST HISTORY  --------------------------------------------------------------------------------  No significant changes to PMH, PSH, FHx, SHx, unless otherwise noted    ALLERGIES & MEDICATIONS  --------------------------------------------------------------------------------  Allergies  No Known Allergies    Intolerances    Standing Inpatient Medications  aMIOdarone    Tablet 200 milliGRAM(s) Oral daily  BACItracin   Ointment 1 Application(s) Topical two times a day  benzocaine 15 mG/menthol 3.6 mG (Sugar-Free) Lozenge 1 Lozenge Oral two times a day  chlorhexidine 4% Liquid 1 Application(s) Topical <User Schedule>  dextrose 5% + sodium chloride 0.45%. 1000 milliLiter(s) IV Continuous <Continuous>  isosorbide   dinitrate Tablet (ISORDIL) 10 milliGRAM(s) Oral three times a day  levothyroxine Injectable 25 MICROGram(s) IV Push at bedtime  melatonin 3 milliGRAM(s) Oral at bedtime  metoprolol tartrate 12.5 milliGRAM(s) Oral two times a day  milrinone Infusion 0.15 MICROgram(s)/kG/Min IV Continuous <Continuous>    PRN Inpatient Medications  acetaminophen   Tablet .. 650 milliGRAM(s) Oral every 6 hours PRN  sodium chloride 0.9% lock flush 10 milliLiter(s) IV Push every 1 hour PRN    REVIEW OF SYSTEMS  --------------------------------------------------------------------------------  Gen: no fever, chills, weakness  Respiratory: No dyspnea, cough  CV: No chest pain, orthopnea  GI: No abdominal pain, nausea, vomiting, diarrhea, + no bowel movement  MSK: no edema  Neuro: No dizziness, lightheadedness  Heme: No bleeding  All other systems were reviewed and are negative, except as noted.    VITALS/PHYSICAL EXAM  --------------------------------------------------------------------------------  T(C): 36.7 (12-15-20 @ 03:20), Max: 37.2 (12-14-20 @ 12:14)  HR: 100 (12-15-20 @ 08:00) (100 - 125)  BP: 93/55 (12-15-20 @ 08:00) (84/46 - 98/61)  RR: 18 (12-15-20 @ 03:20) (18 - 18)  SpO2: 96% (12-15-20 @ 03:20) (95% - 96%)  Wt(kg): --    12-14-20 @ 07:01  -  12-15-20 @ 07:00  --------------------------------------------------------  IN: 639.6 mL / OUT: 495 mL / NET: 144.6 mL    12-15-20 @ 07:01  -  12-15-20 @ 10:13  --------------------------------------------------------  IN: 0 mL / OUT: 150 mL / NET: -150 mL    Physical Exam:  	Gen: NAD on room air  	HEENT: =NGT  	Pulm: CTA B/L, no crackles  	CV: RRR, S1S2; + murmur  	GI: distended abdomen  	: No suprapubic tenderness  	MSK: Warm, no edema              Neuro: AAOx3, no asterixis noted  	Psych: Normal affect and mood  	Skin: Warm, facial abrasion (present on admission)              Access: LACI non tunnel HD catheter    LABS/STUDIES  --------------------------------------------------------------------------------              7.9    7.70  >-----------<  105      [12-15-20 @ 04:29]              25.3     133  |  99  |  100  ----------------------------<  138      [12-15-20 @ 04:29]  4.1   |  20  |  4.91        Ca     8.5     [12-15-20 @ 04:29]      Mg     2.3     [12-15-20 @ 08:20]      Phos  5.5     [12-15-20 @ 08:20]    Creatinine Trend:  SCr 4.91 [12-15 @ 04:29]  SCr 4.94 [12-14 @ 06:36]  SCr 4.25 [12-13 @ 06:36]  SCr 4.14 [12-13 @ 02:37]  SCr 3.60 [12-12 @ 20:16]    Urinalysis - [12-10-20 @ 18:53]      Color Yellow / Appearance Clear / SG 1.016 / pH 5.5      Gluc Negative / Ketone Negative  / Bili Negative / Urobili Negative       Blood Negative / Protein Trace / Leuk Est Negative / Nitrite Negative      RBC 2 / WBC 1 / Hyaline 6 / Gran  / Sq Epi  / Non Sq Epi 0 / Bacteria Negative    Urine Creatinine 85      [12-10-20 @ 18:53]  Urine Sodium <35      [12-10-20 @ 18:53]  Urine Urea Nitrogen 673      [12-11-20 @ 02:41]  Urine Chloride <35      [12-10-20 @ 18:53]  Urine Phosphorus 52.9      [12-11-20 @ 04:07]  Urine Osmolality 387      [12-11-20 @ 05:47]    Iron 36, TIBC 314, %sat 12      [09-08-20 @ 08:45]  Ferritin 145      [09-08-20 @ 08:45]  Vitamin D (25OH) 21.7      [07-18-20 @ 10:13]  TSH 8.61      [11-01-20 @ 20:16]  Lipid: chol 116, TG 55, HDL 48, LDL 58      [08-26-20 @ 00:17]    HBsAb <3.0      [12-12-20 @ 04:04]  HBsAb Nonreact      [12-11-20 @ 15:03]  HBsAg Nonreact      [12-11-20 @ 15:03]  HCV 0.16, Nonreact      [12-12-20 @ 04:04]       [facility] DIVISION OF KIDNEY DISEASES AND HYPERTENSION   -- FOLLOW UP NOTE --   Cristina Ramírez  Nephrology Fellow  Pager NS: [phone]   /  Pager HARESH: [phone]  (after 5pm or weekend please page the on-call fellow)  ======================================================  24 hour events/subjective:  - yesterday HD held off d/t hemodynamically instability w/ persistent tachycardic   - overnight no events reported, vitals afebrile, total UOP voided 250 cc in the past 24hr  - patient seen and examined at bedside this morning in bed on room air with NGT in place, he reports having bowel movement and passing gas.  Endorse feeling hungry otherwise no chest pain, sob, nausea, vomiting.  - vitals/lab/medications reviewed, noted for stable sCr 4.9    PAST HISTORY  --------------------------------------------------------------------------------  No significant changes to PMH, PSH, FHx, SHx, unless otherwise noted    ALLERGIES & MEDICATIONS  --------------------------------------------------------------------------------  Allergies  No Known Allergies    Intolerances    Standing Inpatient Medications  aMIOdarone    Tablet 200 milliGRAM(s) Oral daily  BACItracin   Ointment 1 Application(s) Topical two times a day  benzocaine 15 mG/menthol 3.6 mG (Sugar-Free) Lozenge 1 Lozenge Oral two times a day  chlorhexidine 4% Liquid 1 Application(s) Topical <User Schedule>  dextrose 5% + sodium chloride 0.45%. 1000 milliLiter(s) IV Continuous <Continuous>  isosorbide   dinitrate Tablet (ISORDIL) 10 milliGRAM(s) Oral three times a day  levothyroxine Injectable 25 MICROGram(s) IV Push at bedtime  melatonin 3 milliGRAM(s) Oral at bedtime  metoprolol tartrate 12.5 milliGRAM(s) Oral two times a day  milrinone Infusion 0.15 MICROgram(s)/kG/Min IV Continuous <Continuous>    PRN Inpatient Medications  acetaminophen   Tablet .. 650 milliGRAM(s) Oral every 6 hours PRN  sodium chloride 0.9% lock flush 10 milliLiter(s) IV Push every 1 hour PRN    REVIEW OF SYSTEMS  --------------------------------------------------------------------------------  Gen: no fever, chills, weakness  Respiratory: No dyspnea, cough  CV: No chest pain, orthopnea  GI: No abdominal pain, nausea, vomiting, diarrhea, + hungry  MSK: no edema  Neuro: No dizziness, lightheadedness  Heme: No bleeding  All other systems were reviewed and are negative, except as noted.    VITALS/PHYSICAL EXAM  --------------------------------------------------------------------------------  T(C): 36.7 (12-15-20 @ 03:20), Max: 37.2 (12-14-20 @ 12:14)  HR: 100 (12-15-20 @ 08:00) (100 - 125)  BP: 93/55 (12-15-20 @ 08:00) (84/46 - 98/61)  RR: 18 (12-15-20 @ 03:20) (18 - 18)  SpO2: 96% (12-15-20 @ 03:20) (95% - 96%)  Wt(kg): --    12-14-20 @ 07:01  -  12-15-20 @ 07:00  --------------------------------------------------------  IN: 639.6 mL / OUT: 495 mL / NET: 144.6 mL    12-15-20 @ 07:01  -  12-15-20 @ 10:13  --------------------------------------------------------  IN: 0 mL / OUT: 150 mL / NET: -150 mL    Physical Exam:  	Gen: NAD on room air  	HEENT: +NGT  	Pulm: CTA B/L, no crackles  	CV: RRR, S1S2; + murmur  	GI: distended abdomen  	: No suprapubic tenderness  	MSK: Warm, no edema              Neuro: AAOx3, no asterixis noted  	Psych: Normal affect and mood  	Skin: Warm, facial abrasion (present on admission)              Access: RIJ non tunnel HD catheter    LABS/STUDIES  --------------------------------------------------------------------------------              7.9    7.70  >-----------<  105      [12-15-20 @ 04:29]              25.3     133  |  99  |  100  ----------------------------<  138      [12-15-20 @ 04:29]  4.1   |  20  |  4.91        Ca     8.5     [12-15-20 @ 04:29]      Mg     2.3     [12-15-20 @ 08:20]      Phos  5.5     [12-15-20 @ 08:20]    Creatinine Trend:  SCr 4.91 [12-15 @ 04:29]  SCr 4.94 [12-14 @ 06:36]  SCr 4.25 [12-13 @ 06:36]  SCr 4.14 [12-13 @ 02:37]  SCr 3.60 [12-12 @ 20:16]    Urinalysis - [12-10-20 @ 18:53]      Color Yellow / Appearance Clear / SG 1.016 / pH 5.5      Gluc Negative / Ketone Negative  / Bili Negative / Urobili Negative       Blood Negative / Protein Trace / Leuk Est Negative / Nitrite Negative      RBC 2 / WBC 1 / Hyaline 6 / Gran  / Sq Epi  / Non Sq Epi 0 / Bacteria Negative    Urine Creatinine 85      [12-10-20 @ 18:53]  Urine Sodium <35      [12-10-20 @ 18:53]  Urine Urea Nitrogen 673      [12-11-20 @ 02:41]  Urine Chloride <35      [12-10-20 @ 18:53]  Urine Phosphorus 52.9      [12-11-20 @ 04:07]  Urine Osmolality 387      [12-11-20 @ 05:47]    Iron 36, TIBC 314, %sat 12      [09-08-20 @ 08:45]  Ferritin 145      [09-08-20 @ 08:45]  Vitamin D (25OH) 21.7      [07-18-20 @ 10:13]  TSH 8.61      [11-01-20 @ 20:16]  Lipid: chol 116, TG 55, HDL 48, LDL 58      [08-26-20 @ 00:17]    HBsAb <3.0      [12-12-20 @ 04:04]  HBsAb Nonreact      [12-11-20 @ 15:03]  HBsAg Nonreact      [12-11-20 @ 15:03]  HCV 0.16, Nonreact      [12-12-20 @ 04:04]

## 2020-12-15 NOTE — PROGRESS NOTE ADULT - ASSESSMENT
Mr. Jarquin is a 75 Year-Old Gentleman with very significant cardiac history and acute on chronic renal failure who presented after fall found to have Small bowel obstruction. Patient has return of bowel function however continue to have worsening renal function, now requiring HD, nausea possible 2/2 to hyperuremia. CTAP 12/12 prelim read with redemonstrated SBO w/ TP at terminal ileum, likely partial SBO with contrast passing through colon and rectum.     Plan  - No urgent surgical intervention indicated, abdomen benign and having bowel function   - Goals of care  - NGT to low continuous wall suction   - Continue NPO  - Recommend US Vena Cava to delineate volume status   - Cardiology: cardiac risk assessment   - care as per primary team  -Final plan to be d/w attending in AM rounds    Green Team Surgery Pager #[phone]

## 2020-12-15 NOTE — PROGRESS NOTE ADULT - PROBLEM SELECTOR PLAN 3
-Most recent baseline Cr ~ 2.5-3, follows with Dr. Rees as outpatient  -c/w HD with worsening hyperkalemia and uremia, midodrine PRN as above  -Continue frequent monitoring of BMP

## 2020-12-15 NOTE — PROVIDER CONTACT NOTE (OTHER) - ACTION/TREATMENT ORDERED:
SERGIO White notified and aware, AM dose of metoprolol to be given now, labs to be drawn by 4am. Will continue to monitor.

## 2020-12-15 NOTE — PROGRESS NOTE ADULT - PROBLEM SELECTOR PLAN 2
- CT abdomen/pelvis confirms SBO, s/p NGT   - Surgery consulted, appreciate recs  - re: risk stratification for possible ex-lap, patient at high risk for intra-op MACE given advanced HF and CKD->ESRD, discussed with patient who currently would not want to proceed with high risk procedure  - Recommend PT evaluation

## 2020-12-15 NOTE — PROGRESS NOTE ADULT - ASSESSMENT
75M PMHx NICM, LVEF on milronine (EF 10-15%, LVIDd 6.7 cm) s/p CRT-D, moderate-severe MR s/p MitraClip 8/2020, HTN, pAFib s/p DCCV 7/20/20 on Eliquis, CKD stage 4 (baseline SCr 2.5-3), DM2 transfer from [facility] to [facility] for further management SBO and mesenteric ischemia.  Nephrology consulted for EDIE on CKD, worsening BUN/Cr plan start HD 12/11/20 for uremia and hyperkalemia.      # EDIE on CKDIV  EDIE on CKD likely 2/2 ATN in the setting hypotension, on diuretic lasix/aldactone & volume depletion from vomiting, decrease PO intake  CKD4 possibly 2/2 cardiorenal/HF and/or diabetes, baseline sCr 2.5-3.0 in 11/2020  On admission sCr 3.2 on 12/7, worsened to 4.3 on 12/8, ED triage BP 80s/50s, sCr worsened to 5.15 on 12/10, urine sodium <35 consistent w/ pre renal etiology and renal ultrasound show CKD changes only (no hydronephrosis), serum BUN/creatinine worsened 150/5.87, initiated on hemodialysis on 12/11 for uremia/hyperkalemia w/ decrease UOP and unable give lokema d/t SBO, tolerated well. s/p 2nd HD session 12/12 no UF.    - today plan for HD for uremia, continue monitor renal recovery from ATN  - bladder scan and consider insert landaverde catheter to monitor strict I/O  - monitor BMP, strict I/O, avoid nephrotoxic agents (NSAIDs, PPI, contrast), renally dose medications per HD.    # Hyperkalemia - resolved  Pt with hyperkalemia possibly 2/2 EDIE on CKD and acidosis.  On admission serum K 5.1 worsened to 5.7 (no hemolysis), improved with bicarb based IVF then worsened to K 5.6 with uremia on 12/11 thereby initiated on HD 12/11, last serum K 4.1  - HD as outline above  - low potassium diet, avoid ACEI/ARB/Aldactone given serum K and EDIE  - monitor BMP    # Metabolic acidosis  In setting of EDIE on CKD, no lactate. Last serum bicarbonate 20  - HD as outline above  - continue monitor serum bicarbonate    # Anemia  Pt with normocytic anemia likely 2/2 ACD CKD and hematoma (intragluteal).  On admission Hgb 10.5, last hgb 9.7, negative FOBT, CT showed right gluteal hematoma (after fall), decreasing in size (CT 12/11)  - check iron studies, monitor CBC, blood transfusion prn   75M PMHx NICM, LVEF on milronine (EF 10-15%, LVIDd 6.7 cm) s/p CRT-D, moderate-severe MR s/p MitraClip 8/2020, HTN, pAFib s/p DCCV 7/20/20 on Eliquis, CKD stage 4 (baseline SCr 2.5-3), DM2 transfer from [facility] to [facility] for further management SBO and mesenteric ischemia.  Nephrology consulted for EDIE on CKD, worsening BUN/Cr plan start HD 12/11/20 for uremia and hyperkalemia.      # EDIE on CKDIV  EDIE on CKD likely 2/2 ATN in the setting hypotension, on diuretic lasix/aldactone & volume depletion from vomiting, decrease PO intake  CKD4 possibly 2/2 cardiorenal/HF and/or diabetes, baseline sCr 2.5-3.0 in 11/2020  On admission sCr 3.2 on 12/7, worsened to 4.3 on 12/8, ED triage BP 80s/50s, sCr worsened to 5.15 on 12/10, urine sodium <35 consistent w/ pre renal etiology and renal ultrasound show CKD changes only (no hydronephrosis), serum BUN/creatinine worsened 150/5.87, initiated on hemodialysis on 12/11 for uremia/hyperkalemia w/ decrease UOP and unable give lokema d/t SBO, tolerated well. s/p 2nd HD session 12/12 no UF.  Today serum sCr plateauing 4.9    - NO plan for HD today given plateauing sCr, will continue to monitor for renal recovery from ATN  - bladder scan and consider insert landaverde catheter to monitor strict I/O  - monitor BMP, strict I/O, avoid nephrotoxic agents (NSAIDs, PPI, contrast), renally dose medications per HD.    # Hyperkalemia - resolved  Pt with hyperkalemia possibly 2/2 EDIE on CKD and acidosis.  On admission serum K 5.1 worsened to 5.7 (no hemolysis), improved with bicarb based IVF then worsened to K 5.6 with uremia on 12/11 thereby initiated on HD 12/11, last serum K 4.1  - low potassium diet, avoid ACEI/ARB/Aldactone given serum K and EDIE  - monitor BMP    # Metabolic acidosis  In setting of EDIE on CKD, no lactate. Last serum bicarbonate 20  - continue monitor serum bicarbonate    # Anemia  Pt with normocytic anemia likely 2/2 ACD CKD and hematoma (intragluteal).  On admission Hgb 10.5, last hgb 9.7, negative FOBT, CT showed right gluteal hematoma (after fall), decreasing in size (CT 12/11)  - check iron studies, monitor CBC, blood transfusion prn

## 2020-12-15 NOTE — PROGRESS NOTE ADULT - SUBJECTIVE AND OBJECTIVE BOX
SUBJECTIVE / OVERNIGHT EVENTS: pt seen and examined. c/o fatigue    MEDICATIONS  (STANDING):  aMIOdarone    Tablet 200 milliGRAM(s) Oral daily  BACItracin   Ointment 1 Application(s) Topical two times a day  benzocaine 15 mG/menthol 3.6 mG (Sugar-Free) Lozenge 1 Lozenge Oral two times a day  chlorhexidine 4% Liquid 1 Application(s) Topical <User Schedule>  dextrose 5% + sodium chloride 0.45%. 1000 milliLiter(s) (50 mL/Hr) IV Continuous <Continuous>  isosorbide   dinitrate Tablet (ISORDIL) 10 milliGRAM(s) Oral three times a day  levothyroxine Injectable 25 MICROGram(s) IV Push at bedtime  melatonin 3 milliGRAM(s) Oral at bedtime  metoprolol tartrate 12.5 milliGRAM(s) Oral two times a day  milrinone Infusion 0.2 MICROgram(s)/kG/Min (4.32 mL/Hr) IV Continuous <Continuous>    MEDICATIONS  (PRN):  acetaminophen   Tablet .. 650 milliGRAM(s) Oral every 6 hours PRN Mild Pain (1 - 3)  midodrine. 5 milliGRAM(s) Oral <User Schedule> PRN 30mins prior to HD  sodium chloride 0.9% lock flush 10 milliLiter(s) IV Push every 1 hour PRN Pre/post blood products, medications, blood draw, and to maintain line patency    Vital Signs Last 24 Hrs  T(C): 37 (15 Dec 2020 20:59), Max: 37 (15 Dec 2020 20:59)  T(F): 98.6 (15 Dec 2020 20:59), Max: 98.6 (15 Dec 2020 20:59)  HR: 107 (15 Dec 2020 20:59) (100 - 118)  BP: 78/44 (15 Dec 2020 22:30) (78/44 - 98/61)  BP(mean): --  RR: 18 (15 Dec 2020 20:59) (18 - 18)  SpO2: 94% (15 Dec 2020 20:59) (94% - 97%)    Constitutional: No fever,+ fatigue  Skin: No rash.  Eyes: No recent vision problems or eye pain.  ENT: No congestion, ear pain, or sore throat.  Cardiovascular: No chest pain or palpation.  Respiratory: No cough, shortness of breath, congestion, or wheezing.  Gastrointestinal: +abdominal pain, nausea, vomiting, or diarrhea.  Genitourinary: No dysuria.  Musculoskeletal: No joint swelling.  Neurologic: No headache.    PHYSICAL EXAM:  ngt+  CHEST/LUNG: dec breath sounds at bases   HEART:  S1 , S2 +  ABDOMEN: soft , bs+, mild distension +  EXTREMITIES:  edema+  NEUROLOGY:alert awake oriented     LABS:  12-15    133<L>  |  99  |  100<H>  ----------------------------<  138<H>  4.1   |  20<L>  |  4.91<H>    Ca    8.5      15 Dec 2020 04:29  Phos  5.5     12-15  Mg     2.3     12-15      Creatinine Trend: 4.91 <--, 4.94 <--, 4.25 <--, 4.14 <--, 3.60 <--, 5.00 <--, 5.87 <--, 5.15 <--                        7.9    7.70  )-----------( 105      ( 15 Dec 2020 04:29 )             25.3     Urine Studies:  Urinalysis Basic - ( 10 Dec 2020 18:53 )    Color: Yellow / Appearance: Clear / S.016 / pH:   Gluc:  / Ketone: Negative  / Bili: Negative / Urobili: Negative   Blood:  / Protein: Trace / Nitrite: Negative   Leuk Esterase: Negative / RBC: 2 /hpf / WBC 1 /HPF   Sq Epi:  / Non Sq Epi: 0 /hpf / Bacteria: Negative      Osmolality, Random Urine: 387 mosm/Kg ( @ 05:47)  Chloride, Random Urine: <35 mmol/L (12-10 @ 18:53)  Creatinine, Random Urine: 85 mg/dL (12-10 @ 18:53)  Sodium, Random Urine: <35 mmol/L (12-10 @ 18:53)              Osmolality, Random Urine: 387 mosm/Kg ( @ 05:47)  Chloride, Random Urine: <35 mmol/L (12-10 @ 18:53)  Creatinine, Random Urine: 85 mg/dL (12-10 @ 18:53)  Sodium, Random Urine: <35 mmol/L (12-10 @ 18:53)          LIVER FUNCTIONS - ( 13 Dec 2020 02:37 )  Alb: 3.2 g/dL / Pro: 6.0 g/dL / ALK PHOS: 78 U/L / ALT: 20 U/L / AST: 25 U/L / GGT: x           PT/INR - ( 13 Dec 2020 02:43 )   PT: 25.1 sec;   INR: 2.17 ratio

## 2020-12-15 NOTE — CHART NOTE - NSCHARTNOTEFT_GEN_A_CORE
Pt evaluated at end of 4-hour clamp trial. Approximately 10mL output after return to suction. Pt not nauseated during trial. NGT removed.     Plan:  - Keep NPO  - May recommend advance to CLD tomorrow following surgical team evaluation

## 2020-12-15 NOTE — PROGRESS NOTE ADULT - PROBLEM SELECTOR PLAN 1
-Euvolemic on exam currently, home torsemide 80mg BID and spironolactone 25mg daily  - Increase milrinone 0.15 mcg/kg/min  - Continue Lopressor 12.5mg PO BID with no holding parameters  - Continue Isordil 10mg PO TID, hold for SBP <95  - If hypotensive precluding dialysis, would give midrodrine 5mg PRN 30 min prior to HD  - Patient DNR/DNI, would consult palliative for continuity of care while inpatient given worsening clinical status  - Would repeat TTE while inpatient  - Please check daily CMP and lactate to trend perfusion  - Strict I/Os, daily standing weights (goal weight ~158 lbs)

## 2020-12-15 NOTE — CHART NOTE - NSCHARTNOTEFT_GEN_A_CORE
MEDICINE NP    MAIN BRASWELL  75y Male    Patient is a 75y old  Male who presents with a chief complaint of SBO w/weakness and fall (15 Dec 2020 04:09)       > Event Summary:  Notified by RN, Patient with 10Beat WCT, asymp.  Patient seen at bedside, denies chest pain , sob, palpitations, dizziness.    -F/u BMP /Lytes and replete as indicated  -C/w Lopressor BID  -f/u Cardiology       -Vital Signs Last 24 Hrs  T(C): 36.7 (15 Dec 2020 03:20), Max: 37.2 (14 Dec 2020 12:14)  T(F): 98 (15 Dec 2020 03:20), Max: 98.9 (14 Dec 2020 12:14)  HR: 102 (15 Dec 2020 03:20) (100 - 125)  BP: 98/61 (15 Dec 2020 03:20) (84/46 - 98/61)  RR: 18 (15 Dec 2020 03:20) (18 - 18)  SpO2: 96% (15 Dec 2020 03:20) (95% - 96%)    Cindy Joy, MAGO-BC  Medicine Department  #[id number]

## 2020-12-15 NOTE — PROGRESS NOTE ADULT - ASSESSMENT
75 year old M with longstanding history of NICM, LVEF < 20% (LVIDd 6.7 cm) s/p CRT-D, moderate-severe MR s/p MitraClip 8/2020, HTN, pAFib s/p DCCV 7/20/20 on Eliquis, CKD stage 3 (b/l SCr 1.7-2), DM 2 (A1c 6.1%) and anemia, transferred from [facility] after fall at home today in setting of nausea and vomiting, found there to have SBO with placement of NG tube.

## 2020-12-16 NOTE — PROGRESS NOTE ADULT - SUBJECTIVE AND OBJECTIVE BOX
SUBJECTIVE:  NGT output low yesterday. Minimal output after clamp trial with no nausea. NGT removed. No continued nausea overnight.     OBJECTIVE:  Vital Signs Last 24 Hrs  T(C): 37 (15 Dec 2020 20:59), Max: 37 (15 Dec 2020 20:59)  T(F): 98.6 (15 Dec 2020 20:59), Max: 98.6 (15 Dec 2020 20:59)  HR: 107 (15 Dec 2020 20:59) (100 - 118)  BP: 78/44 (15 Dec 2020 22:30) (78/44 - 98/61)  BP(mean): --  RR: 18 (15 Dec 2020 20:59) (18 - 18)  SpO2: 94% (15 Dec 2020 20:59) (94% - 97%)    Physical Examination:  GEN: NAD, resting quietly  PULM: symmetric chest rise bilaterally, no increased WOB  ABD: soft, nontender, nondistended  EXTR: no LE erythema, moving all extremities                LABS:                        7.9    7.70  )-----------( 105      ( 15 Dec 2020 04:29 )             25.3     12-15    133<L>  |  99  |  100<H>  ----------------------------<  138<H>  4.1   |  20<L>  |  4.91<H>    Ca    8.5      15 Dec 2020 04:29  Phos  5.5     12-15  Mg     2.3     12-15

## 2020-12-16 NOTE — CONSULT NOTE ADULT - ASSESSMENT
76 yo M with acute on chronic renal failure no requiring HD. Initally presented with SBO that resolved, developed nausea now with recurrence of SBO s/p NGT removal early today now tolerating clears.  Palliative care called by surgery to discuss possibility of need for surgery.

## 2020-12-16 NOTE — CHART NOTE - NSCHARTNOTEFT_GEN_A_CORE
MEDICINE NP    MAIN BRASWELL  75y Male    Patient is a 75y old  Male who presents with a chief complaint of SBO w/weakness and fall (16 Dec 2020 00:20)       > Event Summary: Low BP   Notified by RN, Patient BP 78/44, 74/44, asymptomatic and seen at bedside.    Patient awake and alert, OX3.  Denies dizziness, weakness, lethargy, sob, or chest pain.  Extremities warm and dry to touch.    BP rechecked manually by me 88/40.      75 year old M with longstanding history of NICM, LVEF < 20% (LVIDd 6.7 cm) s/p CRT-D, moderate-severe MR s/p MitraClip 8/2020, HTN, pAFib s/p DCCV 7/20/20 on Eliquis, CKD stage 3 (b/l SCr 1.7-2), DM 2 (A1c 6.1%) and Anemia.    Admitted with  SBO now s/p NGT removed today, and acute on chronic systolic heart failure/ end stage on Milrinone gtt.   Now with recurrent Hypotension  Hypotension - ?Likely in setting of Milrinone gtt increased today to 0.2mcg/kg/min (from 0.15mcg)   -As MAP 56/57, will give Midodrine 5mg x1 now and re-evaluate   -Patient asymptomatic, without complaints at this time, mentating well.  Will continue close monitoring   -f/u CMP, Lytes, Lactate,  -Can consider lowering Milrinone gtt back to prior rate   -F/u HF team in AM  -Will endorse to day team     -Vital Signs Last 24 Hrs  T(C): 37.3 (16 Dec 2020 00:44), Max: 37.3 (16 Dec 2020 00:44)  T(F): 99.2 (16 Dec 2020 00:44), Max: 99.2 (16 Dec 2020 00:44)  HR: 105 (16 Dec 2020 01:00) (99 - 118)  BP: 85/43 (16 Dec 2020 01:00) (74/44 - 98/61)  RR: 19 (16 Dec 2020 00:44) (18 - 19)  SpO2: 93% (16 Dec 2020 00:44) (93% - 97%)            MAGO Gan-BC  Medicine Department  #[id number] MEDICINE NP    MAIN BRASWELL  75y Male    Patient is a 75y old  Male who presents with a chief complaint of SBO w/weakness and fall (16 Dec 2020 00:20)       > Event Summary: Low BP   Notified by RN, Patient BP 78/44, 74/44, asymptomatic and seen at bedside.    Patient awake and alert, OX3.  Denies dizziness, weakness, lethargy, sob, or chest pain.  Extremities warm and dry to touch.    BP rechecked manually by me 88/40.      75 year old M with longstanding history of NICM, LVEF < 20% (LVIDd 6.7 cm) s/p CRT-D, moderate-severe MR s/p MitraClip 8/2020, HTN, pAFib s/p DCCV 7/20/20 on Eliquis, CKD stage 3 (b/l SCr 1.7-2), DM 2 (A1c 6.1%) and Anemia.    Admitted with  SBO now s/p NGT removed today, and acute on chronic systolic heart failure/ end stage on Milrinone gtt.   Now with recurrent Hypotension  Hypotension - ?Likely in setting of Milrinone gtt increased today to 0.2mcg/kg/min (from 0.15mcg)   -As MAP 56/57, will give Midodrine 5mg x1 now and re-evaluate   -Patient asymptomatic, without complaints at this time, mentating well.  Will continue close monitoring   -f/u STAT CBC, CMP, Lytes, Lactate,  -Can consider lowering Milrinone gtt back to prior rate   -F/u HF team in AM  -Will endorse to day team and attending to follow     -Vital Signs Last 24 Hrs  T(C): 37.3 (16 Dec 2020 00:44), Max: 37.3 (16 Dec 2020 00:44)  T(F): 99.2 (16 Dec 2020 00:44), Max: 99.2 (16 Dec 2020 00:44)  HR: 105 (16 Dec 2020 01:00) (99 - 118)  BP: 85/43 (16 Dec 2020 01:00) (74/44 - 98/61)  RR: 19 (16 Dec 2020 00:44) (18 - 19)  SpO2: 93% (16 Dec 2020 00:44) (93% - 97%)            MAGO Gan-BC  Medicine Department  #[id number]        >>>ADDEND: (12-16-20 @ 05:15)  -Follow-up STAT Labs above, wnl  -Repeat BP x2 now with MAP >/= 60, will continue to monitoring and follow-up with day team    VS (12-16-20 @ 04:00)    HR: 108  BP: 87/48 (MAP 61)      VS (12-16-20 @ 05:00)    T(F): 99  HR: 108  BP: 94/56  (MAP 69)  RR: 17  SpO2: 95%    MAGO Gan-BC  Medicine Department  #[id number]

## 2020-12-16 NOTE — PROGRESS NOTE ADULT - ASSESSMENT
75M PMHx NICM, LVEF on milronine (EF 10-15%, LVIDd 6.7 cm) s/p CRT-D, moderate-severe MR s/p MitraClip 8/2020, HTN, pAFib s/p DCCV 7/20/20 on Eliquis, CKD stage 4 (baseline SCr 2.5-3), DM2 transfer from [facility] to [facility] for further management SBO and mesenteric ischemia.  Nephrology consulted for EDIE on CKD, worsening BUN/Cr plan start HD 12/11/20 for uremia and hyperkalemia.      # EDIE on CKDIV  EDIE on CKD likely 2/2 ATN in the setting hypotension, on diuretic lasix/aldactone & volume depletion from vomiting, decrease PO intake  CKD4 possibly 2/2 cardiorenal/HF and/or diabetes, baseline sCr 2.5-3.0 in 11/2020  On admission sCr 3.2 on 12/7, worsened to 4.3 on 12/8, ED triage BP 80s/50s, sCr worsened to 5.15 on 12/10, urine sodium <35 consistent w/ pre renal etiology and renal ultrasound show CKD changes only (no hydronephrosis), serum BUN/creatinine worsened 150/5.87, initiated on hemodialysis on 12/11 for uremia/hyperkalemia w/ decrease UOP and unable give lokema d/t SBO, tolerated well. s/p 2nd HD session 12/12 no UF.  Today serum sCr increased 4.9 to 5.1 with     - NO plan for HD today given no absolute/urgent indication, will continue to monitor for renal recovery from ATN  - bladder scan and consider insert landaverde catheter to monitor strict I/O  - monitor BMP, strict I/O, avoid nephrotoxic agents (NSAIDs, PPI, contrast), renally dose medications per HD.    # Hyperkalemia - resolved  Pt with hyperkalemia possibly 2/2 EDIE on CKD and acidosis.  On admission serum K 5.1 worsened to 5.7 (no hemolysis), improved with bicarb based IVF then worsened to K 5.6 with uremia on 12/11 thereby initiated on HD 12/11, last serum K 4.1  - low potassium diet, avoid ACEI/ARB/Aldactone given serum K and EDIE  - monitor BMP    # Metabolic acidosis  In setting of EDIE on CKD, no lactate. Last serum bicarbonate 20  - continue monitor serum bicarbonate    # Anemia  Pt with normocytic anemia likely 2/2 ACD CKD and hematoma (intragluteal).  On admission Hgb 10.5, last hgb 9.7, negative FOBT, CT showed right gluteal hematoma (after fall), decreasing in size (CT 12/11)  - monitor CBC, blood transfusion per primary team   75M PMHx NICM, LVEF on milronine (EF 10-15%, LVIDd 6.7 cm) s/p CRT-D, moderate-severe MR s/p MitraClip 8/2020, HTN, pAFib s/p DCCV 7/20/20 on Eliquis, CKD stage 4 (baseline SCr 2.5-3), DM2 transfer from [facility] to [facility] for further management SBO and mesenteric ischemia.  Nephrology consulted for EDIE on CKD, worsening BUN/Cr plan start HD 12/11/20 for uremia and hyperkalemia.      # EDIE on CKDIV  EDIE on CKD likely 2/2 ATN in the setting hypotension, on diuretic lasix/aldactone & volume depletion from vomiting, decrease PO intake  CKD4 possibly 2/2 cardiorenal/HF and/or diabetes, baseline sCr 2.5-3.0 in 11/2020  On admission sCr 3.2 on 12/7, worsened to 4.3 on 12/8, ED triage BP 80s/50s, sCr worsened to 5.15 on 12/10, urine sodium <35 consistent w/ pre renal etiology and renal ultrasound show CKD changes only (no hydronephrosis), serum BUN/creatinine worsened 150/5.87, initiated on hemodialysis on 12/11 for uremia/hyperkalemia w/ decrease UOP and unable give lokema d/t SBO, tolerated well. s/p 2nd HD session 12/12 no UF.  Today serum sCr increased 4.9 to 5.1 with     - NO plan for HD today given no absolute/urgent indication, will continue to monitor for renal recovery from ATN  - ordered for repeat urinalysis, urine electrolytes, Uosm  - bladder scan and consider insert landaverde catheter to monitor strict I/O  - monitor BMP, strict I/O, avoid nephrotoxic agents (NSAIDs, PPI, contrast), renally dose medications per HD.    # Hyperkalemia - resolved  Pt with hyperkalemia possibly 2/2 EDIE on CKD and acidosis.  On admission serum K 5.1 worsened to 5.7 (no hemolysis), improved with bicarb based IVF then worsened to K 5.6 with uremia on 12/11 thereby initiated on HD 12/11, last serum K 4.1  - low potassium diet, avoid ACEI/ARB/Aldactone given serum K and EDIE  - monitor BMP    # Metabolic acidosis  In setting of EDIE on CKD, no lactate. Last serum bicarbonate 20  - continue monitor serum bicarbonate    # Anemia  Pt with normocytic anemia likely 2/2 ACD CKD and hematoma (intragluteal).  On admission Hgb 10.5, last hgb 9.7, negative FOBT, CT showed right gluteal hematoma (after fall), decreasing in size (CT 12/11)  - monitor CBC, blood transfusion per primary team

## 2020-12-16 NOTE — PROGRESS NOTE ADULT - ASSESSMENT
75 year old M with longstanding history of NICM, LVEF < 20% (LVIDd 6.7 cm) s/p CRT-D, moderate-severe MR s/p MitraClip 8/2020, HTN, pAFib s/p DCCV 7/20/20 on Eliquis, CKD stage 3 (b/l SCr 1.7-2), DM 2 (A1c 6.1%) and anemia, transferred from [facility] after fall at home today in setting of nausea and vomiting, found there to have SBO with placement of NG tube.

## 2020-12-16 NOTE — PROGRESS NOTE ADULT - SUBJECTIVE AND OBJECTIVE BOX
Interval History: Passed NG clamping trial yesterday, removed, diet now advanced to clears. Asymptomatic hypotension, SBP 77 overnight, improved s/p midodrine 5mg x 1. Has not had HD for past 48h    Medications:  acetaminophen   Tablet .. 650 milliGRAM(s) Oral every 6 hours PRN  alteplase for catheter clearance 2 milliGRAM(s) Catheter once  aMIOdarone    Tablet 200 milliGRAM(s) Oral daily  BACItracin   Ointment 1 Application(s) Topical two times a day  benzocaine 15 mG/menthol 3.6 mG (Sugar-Free) Lozenge 1 Lozenge Oral two times a day  chlorhexidine 4% Liquid 1 Application(s) Topical <User Schedule>  dextrose 5% + sodium chloride 0.45%. 1000 milliLiter(s) IV Continuous <Continuous>  isosorbide   dinitrate Tablet (ISORDIL) 10 milliGRAM(s) Oral three times a day  levothyroxine Injectable 25 MICROGram(s) IV Push at bedtime  melatonin 3 milliGRAM(s) Oral at bedtime  metoprolol tartrate 12.5 milliGRAM(s) Oral two times a day  midodrine. 5 milliGRAM(s) Oral <User Schedule> PRN  midodrine. 2.5 milliGRAM(s) Oral three times a day  milrinone Infusion 0.2 MICROgram(s)/kG/Min IV Continuous <Continuous>  sodium chloride 0.9% lock flush 10 milliLiter(s) IV Push every 1 hour PRN    Vitals:  T(C): 36.6 (20 @ 13:05), Max: 37.3 (20 @ 00:44)  HR: 120 (20 @ 13:05) (92 - 120)  BP: 76/44 (20 @ 13:05) (74/44 - 95/60)  BP(mean): --  RR: 16 (20 @ 13:05) (16 - 19)  SpO2: 97% (20 @ 13:05) (93% - 97%)    Daily     Daily Weight in k.8 (16 Dec 2020 08:36)      I&O's Summary    15 Dec 2020 07:01  -  16 Dec 2020 07:00  --------------------------------------------------------  IN: 651.8 mL / OUT: 255 mL / NET: 396.8 mL    16 Dec 2020 07:01  -  16 Dec 2020 15:08  --------------------------------------------------------  IN: 1020 mL / OUT: 450 mL / NET: 570 mL    Physical Exam:  Appearance: Sitting up in bed in room air, no increased work of breathing, NG tube removed  HEENT: PERRL  Neck: JVP ~9cm, unchanged  Cardiovascular: Regular rate and rhythm  Respiratory: Clear to auscultation bilaterally  Gastrointestinal: Soft, nondistended, nontender  Skin: No cyanosis  Neurologic: Non-focal  Extremities: No LE edema, warm and well perfused throughout  Psychiatry: A & O x 3, Mood & affect appropriate    Labs:                        7.8    10.92 )-----------( 103      ( 16 Dec 2020 02:57 )             25.4     12-16    133<L>  |  98  |  111<H>  ----------------------------<  117<H>  4.1   |  20<L>  |  5.18<H>    Ca    8.6      16 Dec 2020 02:57  Phos  5.5     12-16  Mg     2.3     12-16    TELEMETRY: v paced    < from: Transthoracic Echocardiogram (12.15.20 @ 15:41) >  Conclusions:  1. A MitralClip is seen in the mitral position. Mild mitral  regurgitation.  Peak mitral valve gradient equals 13 mm Hg,  mean transmitral valve gradient equals 10 mm Hg at a HR of  120 which is mildly elevated even in the setting of  MitraClip. Gradient should be re-evaluated after HR better  controlled.  2. Calcified trileaflet aortic valve with normal opening.  3. Severely dilated left atrium.  LA volume index = 85  cc/m2.  4. Eccentric left ventricular hypertrophy (dilated left  ventricle with normal relative wall thickness).  5. Severe global left ventricular systolic dysfunction.  Septal motion is consistent with conduction defect.  6. A device wire is noted in the right heart. Severe right  atrial enlargement.  7. Right ventricular enlargement with normal right  ventricular systolic function.  *** Compared with echocardiogram of 2020, no  significant changes noted.    < end of copied text >

## 2020-12-16 NOTE — PROGRESS NOTE ADULT - PROBLEM SELECTOR PLAN 2
- CT abdomen/pelvis confirms SBO, s/p NGT now removed  - Surgery consulted, appreciate recs  - re: risk stratification for possible ex-lap, patient at high risk for intra-op MACE given advanced HF and CKD->ESRD, discussed with patient who currently would not want to proceed with high risk procedure  - Recommend PT evaluation

## 2020-12-16 NOTE — PROGRESS NOTE ADULT - SUBJECTIVE AND OBJECTIVE BOX
[facility] DIVISION OF KIDNEY DISEASES AND HYPERTENSION   -- FOLLOW UP NOTE --   Cristina Ramírez  Nephrology Fellow  Pager NS: [phone]   /  Pager HARESH: [phone]  (after 5pm or weekend please page the on-call fellow)  ======================================================  24 hour events/subjective:  - yesterday NGT removed, HD cancelled   - overnight hypotension 74/44, vitals afebrile, total  cc in the past 24hr  - patient seen and examined at bedside this morning endorse nausea  - vitals/lab/medications reviewed, noted for SCr 4.9 to 5.1 ()    PAST HISTORY  --------------------------------------------------------------------------------  No significant changes to PMH, PSH, FHx, SHx, unless otherwise noted    ALLERGIES & MEDICATIONS  --------------------------------------------------------------------------------  Allergies  No Known Allergies    Intolerances    Standing Inpatient Medications  aMIOdarone    Tablet 200 milliGRAM(s) Oral daily  BACItracin   Ointment 1 Application(s) Topical two times a day  benzocaine 15 mG/menthol 3.6 mG (Sugar-Free) Lozenge 1 Lozenge Oral two times a day  chlorhexidine 4% Liquid 1 Application(s) Topical <User Schedule>  dextrose 5% + sodium chloride 0.45%. 1000 milliLiter(s) IV Continuous <Continuous>  isosorbide   dinitrate Tablet (ISORDIL) 10 milliGRAM(s) Oral three times a day  levothyroxine Injectable 25 MICROGram(s) IV Push at bedtime  melatonin 3 milliGRAM(s) Oral at bedtime  metoprolol tartrate 12.5 milliGRAM(s) Oral two times a day  milrinone Infusion 0.2 MICROgram(s)/kG/Min IV Continuous <Continuous>    PRN Inpatient Medications  acetaminophen   Tablet .. 650 milliGRAM(s) Oral every 6 hours PRN  midodrine. 5 milliGRAM(s) Oral <User Schedule> PRN  sodium chloride 0.9% lock flush 10 milliLiter(s) IV Push every 1 hour PRN    REVIEW OF SYSTEMS  --------------------------------------------------------------------------------  Gen: no fever, chills, weakness  Respiratory: No dyspnea, cough  CV: No chest pain  GI: No abdominal pain, vomiting, diarrhea. + nausea  MSK: no edema  Neuro: No dizziness, lightheadedness  All other systems were reviewed and are negative, except as noted.    VITALS/PHYSICAL EXAM  --------------------------------------------------------------------------------  T(C): 37.2 (12-16-20 @ 05:00), Max: 37.3 (12-16-20 @ 00:44)  HR: 92 (12-16-20 @ 09:05) (92 - 118)  BP: 83/45 (12-16-20 @ 09:05) (74/44 - 98/54)  RR: 16 (12-16-20 @ 09:05) (16 - 19)  SpO2: 95% (12-16-20 @ 09:05) (93% - 97%)  Wt(kg): --    12-15-20 @ 07:01  -  12-16-20 @ 07:00  --------------------------------------------------------  IN: 651.8 mL / OUT: 255 mL / NET: 396.8 mL    12-16-20 @ 07:01  -  12-16-20 @ 10:27  --------------------------------------------------------  IN: 0 mL / OUT: 200 mL / NET: -200 mL    Physical Exam:  	Gen: NAD on room air  	HEENT: +NGT  	Pulm: CTA B/L, no crackles  	CV: RRR, S1S2; + murmur  	GI: distended abdomen  	: No suprapubic tenderness  	MSK: Warm, no edema              Neuro: AAOx3, no asterixis noted  	Psych: Normal affect and mood  	Skin: Warm, facial abrasion (present on admission)              Access: RIJ non tunnel HD catheter    LABS/STUDIES  --------------------------------------------------------------------------------              7.8    10.92 >-----------<  103      [12-16-20 @ 02:57]              25.4     133  |  98  |  111  ----------------------------<  117      [12-16-20 @ 02:57]  4.1   |  20  |  5.18        Ca     8.6     [12-16-20 @ 02:57]      Mg     2.3     [12-16-20 @ 02:57]      Phos  5.5     [12-16-20 @ 02:57]    Creatinine Trend:  SCr 5.18 [12-16 @ 02:57]  SCr 4.91 [12-15 @ 04:29]  SCr 4.94 [12-14 @ 06:36]  SCr 4.25 [12-13 @ 06:36]  SCr 4.14 [12-13 @ 02:37]    Urinalysis - [12-10-20 @ 18:53]      Color Yellow / Appearance Clear / SG 1.016 / pH 5.5      Gluc Negative / Ketone Negative  / Bili Negative / Urobili Negative       Blood Negative / Protein Trace / Leuk Est Negative / Nitrite Negative      RBC 2 / WBC 1 / Hyaline 6 / Gran  / Sq Epi  / Non Sq Epi 0 / Bacteria Negative    Urine Creatinine 85      [12-10-20 @ 18:53]  Urine Sodium <35      [12-10-20 @ 18:53]  Urine Urea Nitrogen 673      [12-11-20 @ 02:41]  Urine Chloride <35      [12-10-20 @ 18:53]  Urine Phosphorus 52.9      [12-11-20 @ 04:07]  Urine Osmolality 387      [12-11-20 @ 05:47]    Iron 36, TIBC 314, %sat 12      [09-08-20 @ 08:45]  Ferritin 145      [09-08-20 @ 08:45]  Vitamin D (25OH) 21.7      [07-18-20 @ 10:13]  TSH 8.61      [11-01-20 @ 20:16]  Lipid: chol 116, TG 55, HDL 48, LDL 58      [08-26-20 @ 00:17]    HBsAb <3.0      [12-12-20 @ 04:04]  HBsAb Nonreact      [12-11-20 @ 15:03]  HBsAg Nonreact      [12-11-20 @ 15:03]  HCV 0.16, Nonreact      [12-12-20 @ 04:04]

## 2020-12-16 NOTE — PROGRESS NOTE ADULT - PROBLEM SELECTOR PLAN 2
Euvolemic on exam currently,  home torsemide 80mg BID and spironolactone 25mg daily on hold  milrinone as per chf team

## 2020-12-16 NOTE — PROGRESS NOTE ADULT - PROBLEM SELECTOR PLAN 3
-Most recent baseline Cr ~ 2.5-3, follows with Dr. Rees as outpatient, started on HD this admission for oliguric renal failure  -Nephrology following for HD needs, can receive midodrine prior to HD sessions as above  -Continue frequent monitoring of BMP

## 2020-12-16 NOTE — CONSULT NOTE ADULT - PROBLEM SELECTOR RECOMMENDATION 4
met with pt at bedside.  Greater then 20 mins spent discussing ACP.  Pt is clear that he does not want any surgical intervention if he SBO returns again.  His goal is to go home.  He states he would prefer not to go to rehab and have PT come in to his home with home care if that is possible. Pt is concerned about is clogged Santo cath Communicated with primary team about surgical wishes and intervention for Santo cath.  Palliative care will sign off as goals are established.  Please reconsult if necessary.
- AC with Eliquis, would restart if no evidence of bleed from recent fall  - Continue PO amiodarone and BB as above

## 2020-12-16 NOTE — CONSULT NOTE ADULT - SUBJECTIVE AND OBJECTIVE BOX
HPI:  75 year old M with longstanding history of NICM, LVEF < 20% (LVIDd 6.7 cm) s/p CRT-D, moderate-severe MR s/p MitraClip 8/2020, HTN, pAFib s/p DCCV 7/20/20 on Eliquis, CKD stage 3 (b/l SCr 1.7-2), DM 2 (A1c 6.1%) and anemia, transferred from [facility] after fall at home today in setting of nausea and vomiting, found there to have SBO with placement of NG tube. Patient reports that over the last 2-3 days he had been feeling mildly nauseous, however he had otherwise been in his usual state of health. Reports a normal bowel movement yesterday that did not seem unusual to him. However, this AM he had suddenly began to feel much more nauseous and began to want to vomit. He walked to the bathroom and attempted to vomit but initially nothing came up. Patient reports that he was walking back from the bathroom to his bed when he suddenly began to vomit. He became lightheaded and then fell to the ground and hit his face on the ground. Denies significant pain now, but reports that immediately after his fall he noticed epistaxis from his R nostril which he had difficulty controlling until he put a cotton ball inside. Patient then went to the ER at [facility] where on CT there were findings concerning for SBO and possible bowel ischemia. He was then transferred here for further care. Patient denies any recent chest pain, or worsening of any baseline ALCAZAR/SOB. Patient does report that his potassium and his torsemide have been held over the last week due to continuing decline in his weight. No fevers or chills. Reports a chronic non productive cough that remains unchanged.  (07 Dec 2020 23:25)    PERTINENT PM/SXH:   Hypothyroidism    Afib    Type II diabetes mellitus    Aortic insufficiency    Mitral insufficiency    CKD (chronic kidney disease)    Cardiomyopathy    Hypertension      History of tonsillectomy    AICD (automatic cardioverter/defibrillator) present      FAMILY HISTORY:  Family history of CVA      ITEMS NOT CHECKED ARE NOT PRESENT    SOCIAL HISTORY:   Significant other/partner[ ]  Children[ ]  Orthodoxy/Spirituality:  Substance hx:  [ ]   Tobacco hx:  [ ]   Alcohol hx: [ ]   Home Opioid hx:  [ ] I-Stop Reference No:  Living Situation: [ ]Home  [ ]Long term care  [ ]Rehab [ ]Other    ADVANCE DIRECTIVES:    DNR  Yes  MOLST  [x ]    Living Will  [ ]     DECISION MAKER(s):  [ ] Health Care Proxy(s)  [x ] Surrogate(s)  [ ] Guardian           Name(s): Phone Number(s):  Marilou (niamanda) [phone]    BASELINE (I)ADL(s) (prior to admission):  [place]: [ ]Total  [x ] Moderate [ ]Dependent    Allergies    No Known Allergies    Intolerances    MEDICATIONS  (STANDING):  aMIOdarone    Tablet 200 milliGRAM(s) Oral daily  BACItracin   Ointment 1 Application(s) Topical two times a day  benzocaine 15 mG/menthol 3.6 mG (Sugar-Free) Lozenge 1 Lozenge Oral two times a day  chlorhexidine 4% Liquid 1 Application(s) Topical <User Schedule>  dextrose 5% + sodium chloride 0.45%. 1000 milliLiter(s) (50 mL/Hr) IV Continuous <Continuous>  isosorbide   dinitrate Tablet (ISORDIL) 10 milliGRAM(s) Oral three times a day  levothyroxine Injectable 25 MICROGram(s) IV Push at bedtime  melatonin 3 milliGRAM(s) Oral at bedtime  metoprolol tartrate 12.5 milliGRAM(s) Oral two times a day  milrinone Infusion 0.2 MICROgram(s)/kG/Min (4.32 mL/Hr) IV Continuous <Continuous>    MEDICATIONS  (PRN):  acetaminophen   Tablet .. 650 milliGRAM(s) Oral every 6 hours PRN Mild Pain (1 - 3)  midodrine. 5 milliGRAM(s) Oral <User Schedule> PRN 30mins prior to HD  sodium chloride 0.9% lock flush 10 milliLiter(s) IV Push every 1 hour PRN Pre/post blood products, medications, blood draw, and to maintain line patency    PRESENT SYMPTOMS: [ ]Unable to obtain due to poor mentation   Source if other than patient:  [ ]Family   [ ]Team     Pain: [ ]yes [ x]no  QOL impact -   Location -                    Aggravating factors -  Quality -  Radiation -  Timing-  Severity (0-10 scale):  Minimal acceptable level (0-10 scale):     PAIN AD Score:     http://geriatrictoolkit.[place].[email or web address]/cog/painad.pdf (press ctrl +  left click to view)    Dyspnea:                           [ ]Mild [ ]Moderate [ ]Severe  Anxiety:                             [ ]Mild [ ]Moderate [ ]Severe  Fatigue:                             [ ]Mild [ ]Moderate [ ]Severe  Nausea:                             [ ]Mild [ ]Moderate [ ]Severe  Loss of appetite:              [ ]Mild [ ]Moderate [ ]Severe  Constipation:                    [ ]Mild [ ]Moderate [ ]Severe    Other Symptoms:  [ ]All other review of systems negative     Palliative Performance Status Version 2:      50-60   %    http://[facility].org/files/news/palliative_performance_scale_ppsv2.pdf  PHYSICAL EXAM:  Vital Signs Last 24 Hrs  T(C): 37.2 (16 Dec 2020 05:00), Max: 37.3 (16 Dec 2020 00:44)  T(F): 99 (16 Dec 2020 05:00), Max: 99.2 (16 Dec 2020 00:44)  HR: 92 (16 Dec 2020 09:05) (92 - 118)  BP: 83/45 (16 Dec 2020 09:05) (74/44 - 98/54)  BP(mean): --  RR: 16 (16 Dec 2020 09:05) (16 - 19)  SpO2: 95% (16 Dec 2020 09:05) (93% - 97%) I&O's Summary    15 Dec 2020 07:01  -  16 Dec 2020 07:00  --------------------------------------------------------  IN: 651.8 mL / OUT: 255 mL / NET: 396.8 mL    16 Dec 2020 07:01  -  16 Dec 2020 12:24  --------------------------------------------------------  IN: 0 mL / OUT: 200 mL / NET: -200 mL      GENERAL:  [x ]Alert  [ x]Oriented x3   [ ]Lethargic  [ ]Cachexia  [ ]Unarousable  [x ]Verbal  [ ]Non-Verbal    Behavioral:   [ ] Anxiety  [ ] Delirium [ ] Agitation [ ] Other    HEENT:  [ ]Normal   [x ]Dry mouth   [ ]ET Tube/Trach  [ ]Oral lesions    PULMONARY:   [x ]Clear [ ]Tachypnea  [ ]Audible excessive secretions   [ ]Rhonchi        [ ]Right [ ]Left [ ]Bilateral  [ ]Crackles        [ ]Right [ ]Left [ ]Bilateral  [ ]Wheezing     [ ]Right [ ]Left [ ]Bilateral  [x ]Diminished breath sounds [ ]right [ ]left [x ]bilateral at bases    CARDIOVASCULAR:    [x ]Regular [ ]Irregular [ ]Tachy  [ ]Eric [ ]Murmur [ ]Other    GASTROINTESTINAL:  [x ]Soft  [x ]Distended   [x ]+BS  [ x]Non tender [ ]Tender  [ ]PEG [ ]OGT/ NGT  Last BM:     GENITOURINARY: on hd  [ ]Normal [ ] Incontinent   [x ]Oliguria/Anuria   [ ]Guidry    [ ]External cath    MUSCULOSKELETAL:   [ ]Normal   [ ]Weakness  [x ]Bed/Wheelchair bound [ ]Edema    NEUROLOGIC:   [x ]No focal deficits  [ ]Cognitive impairment  [ ]Dysphagia [ ]Dysarthria [ ]Paresis [ ]Other     SKIN:  ecchymosis and skin tears   [ ]Normal    [ ]Rash  [ ]Pressure ulcer(s)       Present on admission [ ]y [ ]n    CRITICAL CARE:  [ ]Shock Present  [ ]Septic [ ]Cardiogenic [ ]Neurologic [ ]Hypovolemic  [ ]  Vasopressors [ ]  Inotropes   [ ]Respiratory failure present [ ]Mechanical ventilation [ ]Non-invasive ventilatory support [ ]High flow  [x ]Acute  [ x]Chronic [ ]Hypoxic  [ ]Hypercarbic [ ]Other  [x ]Other organ failure renal failure    LABS:                        7.8    10.92 )-----------( 103      ( 16 Dec 2020 02:57 )             25.4   12-16    133<L>  |  98  |  111<H>  ----------------------------<  117<H>  4.1   |  20<L>  |  5.18<H>    Ca    8.6      16 Dec 2020 02:57  Phos  5.5     12-16  Mg     2.3     12-16          RADIOLOGY & ADDITIONAL STUDIES:    PROTEIN CALORIE MALNUTRITION PRESENT: [ ]mild [ ]moderate [ ]severe [ ]underweight [ ]morbid obesity  https://www.andeal.org/vault/0610/web/files/ONC/Table_Clinical%20Characteristics%20to%20Document%20Malnutrition-White%20JV%20et%20al%202012.pdf    Height (cm): 175.3 (11-16-20 @ 16:53), 172.7 (09-14-20 @ 07:21), 177.8 (07-20-20 @ 13:41)  Weight (kg): 72 (12-11-20 @ 16:05), 81.7 (11-17-20 @ 18:57), 75.1 (09-14-20 @ 07:21)  BMI (kg/m2): 23.4 (12-11-20 @ 16:05), 26.6 (11-17-20 @ 18:57), 24.4 (11-16-20 @ 16:53)    [ ]PPSV2 < or = to 30% [ ]significant weight loss  [ ]poor nutritional intake  [ ]anasarca     Albumin, Serum: 3.2 g/dL (12-13-20 @ 02:37)   [ ]Artificial Nutrition      REFERRALS:   [ ]Chaplaincy  [ ]Hospice  [ ]Child Life  [ ]Social Work  [ ]Case management [ ]Holistic Therapy     Goals of Care Document: HUMBERTO Devi (11-17-20 @ 17:09)  Goals of Care Conversation:   Participants   · Participants  Patient; Staff  · Provider  Dr Devi.    Advance Directives   · Does patient have Advance Directive  Yes  · Indicate Type  Living Will; Power of   · Are any of the items on the chart  Yes  · Specify which ones are on chart  Living Will  · Does Patient Have a Surrogate  Yes  · Surrogate's Name  Zully Jarquin  · Caregiver:  yes  · Name  Zluly Jarquin  · Phone Number  [phone]    Conversation Discussion   · Conversation Details  Patient agree with DNR/I but trial of BIPAP. He also agree with returning to the hospital based on MOLST. Trial of NGT and IVF. Abx if needed. A MOLST was completed and a copy was placed in the chart and the original an a copy were given to the patient.     We discussed hospice services at length and he does not want to establish hospice care right now. He will f/u with Dr. Anglin and decide with her about this type of service. He is to f/u with outpatient palliative care, Dr. Amy Nguyen MD. [address] [place] [address], [address]. [place] [address]. Phone [phone].    Personal Advance Directives Treatment Guidelines:   Treatment Guidelines   · Decision Maker  Patient  · Treatment Guidelines  DNR Order; Artificial nutrition trial; IV fluid trial  · Treatment Guideline Comments  As above    MOLST   · Completed  17-Nov-2020    Location of Discussion:   Duration of Advanced Care Planning Meeting   · Time spent (in minutes)  30    Electronic Signatures for Addendum Section:   Robbie Devi) (Signed Addendum 18-Nov-2020 14:45)    Will sing off. Please call us back if needing help with symptoms Rx, GOC, ACP, or resources.    Electronic Signatures:  Robbie Devi)  (Signed 18-Nov-2020 14:43)  	Authored: Goals of Care Conversation, Personal Advance Directives Treatment Guidelines, Location of Discussion      Last Updated: 18-Nov-2020 14:45 by Robbie Devi)

## 2020-12-16 NOTE — PROGRESS NOTE ADULT - ASSESSMENT
Mr. Jarquin is a 75 Year-Old Gentleman with very significant cardiac history and acute on chronic renal failure who presented after fall found to have Small bowel obstruction. Patient has return of bowel function however continue to have worsening renal function, now requiring HD, nausea possible 2/2 to hyperuremia. CTAP 12/12 prelim read with redemonstrated SBO w/ TP at terminal ileum, likely partial SBO with contrast passing through colon and rectum.     Plan  - No urgent surgical intervention indicated, abdomen benign and having bowel function   - Goals of care  - NGT removed yesterday  - NPO, possible advance to CLD and assess for tolerance later  - Cardiology: appreciate cardiac risk assessment   - care as per primary team  - Final plan to be d/w attending in AM rounds    Green Team Surgery Pager #[phone] Mr. Jarquin is a 75 Year-Old Gentleman with very significant cardiac history and acute on chronic renal failure who presented after fall found to have Small bowel obstruction. Patient has return of bowel function however continue to have worsening renal function, now requiring HD, nausea possible 2/2 to hyperuremia. CTAP 12/12 prelim read with redemonstrated SBO w/ TP at terminal ileum, likely partial SBO with contrast passing through colon and rectum.     Plan  - No urgent surgical intervention indicated, abdomen benign and having bowel function   - Goals of care  - NGT removed yesterday  - recommend advance to CLD and assess for tolerance later  - Cardiology: appreciate cardiac risk assessment   - care as per primary team  - Final plan to be d/w attending in AM rounds    Green Team Surgery Pager #[phone]

## 2020-12-16 NOTE — PROGRESS NOTE ADULT - SUBJECTIVE AND OBJECTIVE BOX
SUBJECTIVE / OVERNIGHT EVENTS: pt seen and examined. c/o fatigue    MEDICATIONS  (STANDING):  alteplase for catheter clearance 2 milliGRAM(s) Catheter once  aMIOdarone    Tablet 200 milliGRAM(s) Oral daily  BACItracin   Ointment 1 Application(s) Topical two times a day  benzocaine 15 mG/menthol 3.6 mG (Sugar-Free) Lozenge 1 Lozenge Oral two times a day  chlorhexidine 4% Liquid 1 Application(s) Topical <User Schedule>  dextrose 5% + sodium chloride 0.45%. 1000 milliLiter(s) (50 mL/Hr) IV Continuous <Continuous>  isosorbide   dinitrate Tablet (ISORDIL) 10 milliGRAM(s) Oral three times a day  levothyroxine Injectable 25 MICROGram(s) IV Push at bedtime  melatonin 3 milliGRAM(s) Oral at bedtime  metoprolol tartrate 12.5 milliGRAM(s) Oral two times a day  midodrine. 2.5 milliGRAM(s) Oral three times a day  milrinone Infusion 0.2 MICROgram(s)/kG/Min (4.32 mL/Hr) IV Continuous <Continuous>    MEDICATIONS  (PRN):  acetaminophen   Tablet .. 650 milliGRAM(s) Oral every 6 hours PRN Mild Pain (1 - 3)  midodrine. 5 milliGRAM(s) Oral <User Schedule> PRN 30mins prior to HD  sodium chloride 0.9% lock flush 10 milliLiter(s) IV Push every 1 hour PRN Pre/post blood products, medications, blood draw, and to maintain line patency    Vital Signs Last 24 Hrs  T(C): 36.6 (16 Dec 2020 13:05), Max: 37.3 (16 Dec 2020 00:44)  T(F): 97.9 (16 Dec 2020 13:05), Max: 99.2 (16 Dec 2020 00:44)  HR: 120 (16 Dec 2020 13:05) (92 - 120)  BP: 76/44 (16 Dec 2020 13:05) (74/44 - 95/60)  BP(mean): --  RR: 16 (16 Dec 2020 13:05) (16 - 19)  SpO2: 97% (16 Dec 2020 13:05) (93% - 97%)    Eyes: No recent vision problems or eye pain.  ENT: No congestion, ear pain, or sore throat.  Cardiovascular: No chest pain or palpation.  Respiratory: No cough, shortness of breath, congestion, or wheezing.  Gastrointestinal: +abdominal pain, nausea, vomiting, or diarrhea.  Genitourinary: No dysuria.  Musculoskeletal: No joint swelling.  Neurologic: No headache.    PHYSICAL EXAM:  CHEST/LUNG: dec breath sounds at bases   HEART:  S1 , S2 +  ABDOMEN: soft , bs+, mild distension +  EXTREMITIES:  edema+  NEUROLOGY:alert awake oriented     LABS:      133<L>  |  98  |  111<H>  ----------------------------<  117<H>  4.1   |  20<L>  |  5.18<H>    Ca    8.6      16 Dec 2020 02:57  Phos  5.5       Mg     2.3           Creatinine Trend: 5.18 <--, 4.91 <--, 4.94 <--, 4.25 <--, 4.14 <--, 3.60 <--, 5.00 <--, 5.87 <--, 5.15 <--                        7.8    10.92 )-----------( 103      ( 16 Dec 2020 02:57 )             25.4     Urine Studies:  Urinalysis Basic - ( 10 Dec 2020 18:53 )    Color: Yellow / Appearance: Clear / S.016 / pH:   Gluc:  / Ketone: Negative  / Bili: Negative / Urobili: Negative   Blood:  / Protein: Trace / Nitrite: Negative   Leuk Esterase: Negative / RBC: 2 /hpf / WBC 1 /HPF   Sq Epi:  / Non Sq Epi: 0 /hpf / Bacteria: Negative      Osmolality, Random Urine: 387 mosm/Kg ( @ 05:47)  Chloride, Random Urine: <35 mmol/L (12-10 @ 18:53)  Creatinine, Random Urine: 85 mg/dL (12-10 @ 18:53)  Sodium, Random Urine: <35 mmol/L (12-10 @ 18:53)

## 2020-12-16 NOTE — PROGRESS NOTE ADULT - PROBLEM SELECTOR PLAN 1
- Continue milrinone 0.2 mcg/kg/min  - Change Lopressor 12.5mg PO BID to Toprol 25mg daily with no holding parameters  - Discontinue Isordil 10mg PO TID  - Start midodrine 2.5mg TID for persistent hypotension, may increase as needed  -Euvolemic on exam currently despite oliguria, continue to hold home torsemide 80mg BID and spironolactone 25mg daily  - Patient DNR/DNI, appreicate palliative assistance while inpatient given worsening clinical status  - Check daily CMP and lactate to trend perfusion  - Strict I/Os, daily standing weights (goal weight ~158 lbs)

## 2020-12-16 NOTE — CONSULT NOTE ADULT - PROBLEM SELECTOR PROBLEM 2
Acute renal failure superimposed on stage 5 chronic kidney disease, not on chronic dialysis, unspecified acute renal failure type
Small bowel obstruction
Acute on chronic kidney failure

## 2020-12-16 NOTE — CHART NOTE - NSCHARTNOTEFT_GEN_A_CORE
Notified BY RN patient with hypotension sbp 70's  - pt seen denies all complaints   - Discussed with /    - + Midodrine 2.5mg TID   - Will cont to monitor

## 2020-12-16 NOTE — PROGRESS NOTE ADULT - ASSESSMENT
75 year old M with longstanding history of NICM, LVEF < 20% (LVIDd 6.7 cm) s/p CRT-D, moderate-severe MR s/p MitraClip 8/2020, HTN, pAFib s/p DCCV 7/20/20 on Eliquis, CKD stage 3 (b/l SCr 1.7-2), DM 2 (A1c 6.1%) and anemia who presented as transfer from OSH in the setting of presyncope/syncope, found to have SBO now s/p NGT for conservative management. Heart failure consulted for additional evaluation and management.  Patient appears euvolemic on exam, unfortunately with worsening renal failure and hyperkalemia, required initiation of dialysis.    Relevant studies:  Echo:  12/15 TTE: LVEF 20-25%, LV 6.4cm, normal RV function, LA 85 cc/m2, increased gradient across MitraClip  11/1/20 TTE: LVEF 10-15%, LV 7cm, LVOT VIT 7cm, severe RV dysfunction, severly dilated atria, mild MR, min AI, severe TR, est RAP 10mmHg, est RVSP 41 mm Hg    Cardiac Cath:    9/15 (milrinone 0.25 mcg/kg/mi): CVP 5 PA 47/13 PCW 13, SVC saturation 68% with Corey CO/CI 5.8/3.1 and TD CO/CI 7/3.7. MAP 70 mmHg

## 2020-12-17 NOTE — PRE-OP CHECKLIST - WEIGHT IN LBS
Telephone Encounter by Mariposa Romo RN at 07/17/17 04:32 PM     Author:  Mariposa Romo RN Service:  (none) Author Type:  Registered Nurse     Filed:  07/17/17 04:35 PM Encounter Date:  7/17/2017 Status:  Signed     :  Mariposa Romo RN (Registered Nurse)            Patient states he only took Janumet this morning as he had left shoulder impingement sx this morning. He is questioning if should take his Lynette Orn as his current blood glucose is 325. Per Dr. Mable Flores, patient should take his Lynette Orn now and then resume normal for medications tomorrow. Patient voiced understanding and agreed with plan.  Denies further questions or concerns at this time.[AM1.1M]    Electronically Signed by:    Mariposa Romo RN , 7/17/2017[AM1.2T]        Revision History        User Key Date/Time User Provider Type Action    > AM1.2 07/17/17 04:35 PM Mariposa Romo RN Registered Nurse Sign     AM1.1 07/17/17 04:32 PM Mariposa Romo RN Registered Nurse     M - Manual, T - Template
158.7
158.7

## 2020-12-17 NOTE — CHART NOTE - NSCHARTNOTEFT_GEN_A_CORE
Called BY RN to report patient with 16  beats of wct on tele   - Patient seen and examined , denies all complaints   - Electrolytes reviewed , vitals stable , cont Metoprolol , cont cardiac monitoring   - D/w Dr. Ag ( chf) , will cont to monitor

## 2020-12-17 NOTE — PROGRESS NOTE ADULT - SUBJECTIVE AND OBJECTIVE BOX
Interval events: Palliative care meeting yesterday, patient declared that he does not want surgery     SUBJECTIVE:  Reports pain controlled. Tolerating clear liquid diet. Denies N/V. Bowel Function +/+  Ambulating w/ assistance       OBJECTIVE:  Vital Signs Last 24 Hrs  T(C): 36.9 (16 Dec 2020 20:06), Max: 37.3 (16 Dec 2020 00:44)  T(F): 98.4 (16 Dec 2020 20:06), Max: 99.2 (16 Dec 2020 00:44)  HR: 119 (16 Dec 2020 20:06) (92 - 120)  BP: 86/51 (16 Dec 2020 20:06) (74/44 - 95/55)  BP(mean): --  RR: 17 (16 Dec 2020 20:06) (16 - 19)  SpO2: 98% (16 Dec 2020 20:06) (93% - 98%)    Physical Examination:  GEN: NAD, resting quietly  PULM: symmetric chest rise bilaterally, no increased WOB  ABD: soft, nontender, nondistended  EXTR: no LE erythema, moving all extremities      LABS:                        7.8    10.92 )-----------( 103      ( 16 Dec 2020 02:57 )             25.4       12-16    133<L>  |  98  |  111<H>  ----------------------------<  117<H>  4.1   |  20<L>  |  5.18<H>    Ca    8.6      16 Dec 2020 02:57  Phos  5.5     12-16  Mg     2.3     12-16

## 2020-12-17 NOTE — CHART NOTE - NSCHARTNOTEFT_GEN_A_CORE
IR contacted regarding inability to flush red port of swan catheter. Patient seen at bedside earlier this am and was able to administer 2mg of TPA into red lumen ~ 1010. @ 1330 I attempted to aspirate tpa however, was unsuccesful. Will plan for catheter exchange today in IR.     Above d/w patient and medicine team  d/w Dr. Hernandez  Consent to be obtained.     Cheryl Lee PA-C  Spectra #[phone]  IR #[id number]

## 2020-12-17 NOTE — PROGRESS NOTE ADULT - ASSESSMENT
75 year old M with longstanding history of NICM, LVEF < 20% (LVIDd 6.7 cm) s/p CRT-D, moderate-severe MR s/p MitraClip 8/2020, HTN, pAFib s/p DCCV 7/20/20 on Eliquis, CKD stage 3 (b/l SCr 1.7-2), DM 2 (A1c 6.1%) and anemia, transferred from [facility] after fall at home today in setting of nausea and vomiting, found there to have SBO with placement of NG tube.

## 2020-12-17 NOTE — PROGRESS NOTE ADULT - ASSESSMENT
75M PMHx NICM, LVEF on milronine (EF 10-15%, LVIDd 6.7 cm) s/p CRT-D, moderate-severe MR s/p MitraClip 8/2020, HTN, pAFib s/p DCCV 7/20/20 on Eliquis, CKD stage 4 (baseline SCr 2.5-3), DM2 transfer from [facility] to [facility] for further management SBO and mesenteric ischemia.  Nephrology consulted for EDIE on CKD, worsening BUN/Cr plan start HD 12/11/20 for uremia and hyperkalemia.      # EDIE on CKDIV  EDIE on CKD (cardiorenal/DM) likely 2/2 ATN in the setting hypotension, on diuretic lasix/aldactone or pre renal 2/2 volume depletion from vomiting, decrease PO intake  On admission sCr 3.2 on 12/7/20 (baseline sCr 2.5-3.0 in 11/2020), peaked 5.8 on 12/11 with urine sodium <35 consistent with pre renal etiology dehydration/HF. Kidney ultrasound show no hydronephrosis.  Pt initiated on HD on 12/11 for anuric + uremia (BUN/Cr 150/5.87, 2nd HD session 12/12.  Today serum sCr slowly rising 5.1 to 5.2 with  with improving UOP.  Lab noted for Mariluz <35 consistent w/ pre renal etiology dehydration/HF    - recommend switch 1/2 NS D5 to NORMAL SALINE for intravascular repletion (UA consistent w/ pre renal)  - NO plan for HD today given no absolute/urgent indication, will continue to monitor for renal recovery from ATN  - bladder scan and consider insert landaverde catheter to monitor strict I/O  - monitor BMP, strict I/O, avoid nephrotoxic agents (NSAIDs, PPI, contrast), renally dose medications per HD.    # Hyperkalemia - resolved  Pt with hyperkalemia possibly 2/2 EDIE on CKD and acidosis.  On admission serum K 5.1 worsened to 5.7 (no hemolysis), improved with IVF then worsened again to K 5.6 with uremia on 12/11 thereby initiated on HD 12/11, last serum K 4.0  - low potassium diet, avoid ACEI/ARB/Aldactone given serum K and EDIE, monitor BMP    # Metabolic acidosis  In setting of EDIE on CKD, no lactate. Last serum bicarbonate 19.   - monitor serum bicarbonate    # Anemia  Pt with normocytic anemia likely 2/2 ACD CKD and hematoma (intragluteal).  On admission Hgb 10.5, last hgb 9.7, negative FOBT, CT showed right gluteal hematoma (after fall), decreasing in size (CT 12/11)  - monitor CBC, blood transfusion per primary team

## 2020-12-17 NOTE — CHART NOTE - NSCHARTNOTEFT_GEN_A_CORE
Nutrition Follow Up Note  Patient seen for: malnutrition follow up on 3[facility]    · Reason for Admission	SBO w/weakness and fall  chart reviewed, events noted      unclear if pt will need long-term HD,    Source: pt, EMR, nurse    Diet : Diet, Consistent Carbohydrate/No Snacks:   Dysphagia 3, Soft, Thin Liquids ([id number])  Low Sodium (1217-20 @ 12:53) [Active]          Patient reports: has been consuming Clear Liquids up until today. diet order just advanced to Dysphagia 3 low sodium.  pt being transported to IR at time of visit.      PO intake :  as per flow sheet          Daily Weight in k.6 (12-17), Weight in k.8 (12-16), Weight in k.4 (12-15), Weight in k.3 (12-15), Weight in k.8 (-13), Weight in k.1 (12-12), Weight in k.1 (12-12)  % Weight Change: 2% gain since     Pertinent Medications: MEDICATIONS  (STANDING):  aMIOdarone    Tablet 200 milliGRAM(s) Oral daily  BACItracin   Ointment 1 Application(s) Topical two times a day  benzocaine 15 mG/menthol 3.6 mG (Sugar-Free) Lozenge 1 Lozenge Oral two times a day  chlorhexidine 4% Liquid 1 Application(s) Topical <User Schedule>  dextrose 5% + sodium chloride 0.45%. 1000 milliLiter(s) (50 mL/Hr) IV Continuous <Continuous>  levothyroxine Injectable 25 MICROGram(s) IV Push at bedtime  melatonin 3 milliGRAM(s) Oral at bedtime  metoprolol succinate ER 25 milliGRAM(s) Oral daily  midodrine. 2.5 milliGRAM(s) Oral three times a day  milrinone Infusion 0.2 MICROgram(s)/kG/Min (4.32 mL/Hr) IV Continuous <Continuous>    MEDICATIONS  (PRN):  acetaminophen   Tablet .. 650 milliGRAM(s) Oral every 6 hours PRN Mild Pain (1 - 3)  midodrine. 5 milliGRAM(s) Oral <User Schedule> PRN 30mins prior to HD  sodium chloride 0.9% lock flush 10 milliLiter(s) IV Push every 1 hour PRN Pre/post blood products, medications, blood draw, and to maintain line patency    Pertinent Labs: 12-17 @ 06:04: Na 132<L>, <H>, Cr 5.22<H>, <H>, K+ 4.0          Skin per nursing documentation: no pressure injury  Edema: +1 dependent     Estimated Needs:   [x ] no change since previous assessment  [ ] recalculated:      Estimated Energy Needs:  · Weight (lbs)	160 lb  · Weight (kg)	72.5 kg  · Enter From (symone/kg)	30  · Enter To (symone/kg)	35  · Calculated From (symone/kg)	2175  · Calculated To (symone/kg)	2537     Estimated Protein Needs:  · Weight (lbs)	160 lb  · Weight (kg)	72.5 kg    · Enter To (g/kg)	1.4  · Calculated From (g/kg)	87  · Calculated To (g/kg)	101.5     Other Calculations:  · Other Calculations	IBW used to calculate needs; %IBW: 96%; defer fluid needs to team  	      Previous Nutrition Diagnosis: severe Malnutrition...     Nutrition Diagnosis is: being addressed with diet upgrade and supplem  Continue to monitor Nutritional intake, Tolerance to diet prescription, weights, labs, skin integrity      recommend   change diet to Consistent Carbohydrate with snack, low sodium, Dysphagia 3 thin liquids, no concentrated K+, no concentrated phosphorous, 1000mL fluid restriction. add Glucerna (strawberry) x 2 daily  monitor weights, labs, meds and skin  pending verification placed   RD will follow up as per protocol  Cheryl Chavez MA, RD, CDN #[id number] Nutrition Follow Up Note  Patient seen for: malnutrition follow up on 3[facility]    · Reason for Admission	SBO w/weakness and fall  chart reviewed, events noted      unclear if pt will need long-term HD,    Source: pt, EMR, nurse    Diet : Diet, Consistent Carbohydrate/No Snacks:   Dysphagia 3, Soft, Thin Liquids ([id number])  Low Sodium (1217-20 @ 12:53) [Active]          Patient reports: has been consuming Clear Liquids up until today. diet order just advanced to Dysphagia 3 low sodium.  pt being transported to IR at time of visit.      PO intake :  as per flow sheet          Daily Weight in k.6 (12-17), Weight in k.8 (12-16), Weight in k.4 (12-15), Weight in k.3 (12-15), Weight in k.8 (-13), Weight in k.1 (12-12), Weight in k.1 (12-12)  % Weight Change: 2% gain since     Pertinent Medications: MEDICATIONS  (STANDING):  aMIOdarone    Tablet 200 milliGRAM(s) Oral daily  BACItracin   Ointment 1 Application(s) Topical two times a day  benzocaine 15 mG/menthol 3.6 mG (Sugar-Free) Lozenge 1 Lozenge Oral two times a day  chlorhexidine 4% Liquid 1 Application(s) Topical <User Schedule>  dextrose 5% + sodium chloride 0.45%. 1000 milliLiter(s) (50 mL/Hr) IV Continuous <Continuous>  levothyroxine Injectable 25 MICROGram(s) IV Push at bedtime  melatonin 3 milliGRAM(s) Oral at bedtime  metoprolol succinate ER 25 milliGRAM(s) Oral daily  midodrine. 2.5 milliGRAM(s) Oral three times a day  milrinone Infusion 0.2 MICROgram(s)/kG/Min (4.32 mL/Hr) IV Continuous <Continuous>    MEDICATIONS  (PRN):  acetaminophen   Tablet .. 650 milliGRAM(s) Oral every 6 hours PRN Mild Pain (1 - 3)  midodrine. 5 milliGRAM(s) Oral <User Schedule> PRN 30mins prior to HD  sodium chloride 0.9% lock flush 10 milliLiter(s) IV Push every 1 hour PRN Pre/post blood products, medications, blood draw, and to maintain line patency    Pertinent Labs: 12-17 @ 06:04: Na 132<L>, <H>, Cr 5.22<H>, <H>, K+ 4.0          Skin per nursing documentation: no pressure injury  Edema: +1 dependent     Estimated Needs:   [x ] no change since previous assessment  [ ] recalculated:      Estimated Energy Needs:  · Weight (lbs)	160 lb  · Weight (kg)	72.5 kg  · Enter From (symone/kg)	30  · Enter To (symone/kg)	35  · Calculated From (symone/kg)	2175  · Calculated To (symone/kg)	2537     Estimated Protein Needs:  · Weight (lbs)	160 lb  · Weight (kg)	72.5 kg    · Enter To (g/kg)	1.4  · Calculated From (g/kg)	87  · Calculated To (g/kg)	101.5     Other Calculations:  · Other Calculations	IBW used to calculate needs; %IBW: 96%; defer fluid needs to team  	      Previous Nutrition Diagnosis: severe Malnutrition...     Nutrition Diagnosis is: being addressed with diet upgrade and supplem  Continue to monitor Nutritional intake, Tolerance to diet prescription, weights, labs, skin integrity      recommend   change diet to Consistent Carbohydrate with snack, low sodium, Dysphagia 3 thin liquids, no concentrated K+, no concentrated phosphorous, 1000mL fluid restriction. pt requesting add Glucerna (strawberry) x 2 daily. RD recommends Suplena 2 x daily at this time given decreased renal function.  monitor weights, labs, meds and skin  pending verification placed   RD will follow up as per protocol  Cheryl Chavez MA, RD, CDN #[phone]

## 2020-12-17 NOTE — PROGRESS NOTE ADULT - SUBJECTIVE AND OBJECTIVE BOX
[facility] DIVISION OF KIDNEY DISEASES AND HYPERTENSION   -- FOLLOW UP NOTE --   Cristina Ramírez  Nephrology Fellow  Pager NS: [phone]   /  Pager HARESH: [phone]  (after 5pm or weekend please page the on-call fellow)  ======================================================  24 hour events/subjective:  - overnight no events reported, vitals afebrile, total UOP increased to 775 cc (compare prior day 250 cc) in the past 24hr  - patient seen and examined at bedside this morning sitting in bed without NGT on room air, endorse feeling fatigue  - vitals/lab/medications reviewed, noted for sCr stable 5.1-5.2    PAST HISTORY  --------------------------------------------------------------------------------  No significant changes to PMH, PSH, FHx, SHx, unless otherwise noted    ALLERGIES & MEDICATIONS  --------------------------------------------------------------------------------  Allergies  No Known Allergies    Intolerances    Standing Inpatient Medications  aMIOdarone    Tablet 200 milliGRAM(s) Oral daily  BACItracin   Ointment 1 Application(s) Topical two times a day  benzocaine 15 mG/menthol 3.6 mG (Sugar-Free) Lozenge 1 Lozenge Oral two times a day  chlorhexidine 4% Liquid 1 Application(s) Topical <User Schedule>  dextrose 5% + sodium chloride 0.45%. 1000 milliLiter(s) IV Continuous <Continuous>  levothyroxine Injectable 25 MICROGram(s) IV Push at bedtime  melatonin 3 milliGRAM(s) Oral at bedtime  metoprolol succinate ER 25 milliGRAM(s) Oral daily  midodrine. 2.5 milliGRAM(s) Oral three times a day  milrinone Infusion 0.2 MICROgram(s)/kG/Min IV Continuous <Continuous>    PRN Inpatient Medications  acetaminophen   Tablet .. 650 milliGRAM(s) Oral every 6 hours PRN  midodrine. 5 milliGRAM(s) Oral <User Schedule> PRN  sodium chloride 0.9% lock flush 10 milliLiter(s) IV Push every 1 hour PRN    REVIEW OF SYSTEMS  --------------------------------------------------------------------------------  Gen: no fever, chills, weakness  Respiratory: No dyspnea, cough  CV: No chest pain, orthopnea  GI: No abdominal pain, nausea, vomiting, diarrhea  MSK: no edema  Neuro: No dizziness, lightheadedness  Heme: No bleeding  All other systems were reviewed and are negative, except as noted.    VITALS/PHYSICAL EXAM  --------------------------------------------------------------------------------  T(C): 36.8 (12-17-20 @ 14:14), Max: 37.4 (12-17-20 @ 00:38)  HR: 114 (12-17-20 @ 14:14) (102 - 125)  BP: 89/45 (12-17-20 @ 14:14) (84/49 - 95/55)  RR: 16 (12-17-20 @ 14:14) (16 - 19)  SpO2: 96% (12-17-20 @ 14:14) (94% - 98%)  Wt(kg): --    Weight (kg): 72 (12-17-20 @ 14:14)    12-16-20 @ 07:01  -  12-17-20 @ 07:00  --------------------------------------------------------  IN: 1671.8 mL / OUT: 775 mL / NET: 896.8 mL    Physical Exam:  	Gen: NAD on room air  	HEENT: MMM  	Pulm: CTA B/L, no crackles  	CV: RRR, S1S2; + murmur  	GI: distended abdomen  	: No suprapubic tenderness  	MSK: Warm, no edema              Neuro: AAOx3, no asterixis noted  	Psych: Normal affect and mood  	Skin: Warm, facial abrasion (present on admission)              Access: LACI non tunnel HD catheter    LABS/STUDIES  --------------------------------------------------------------------------------              7.5    14.15 >-----------<  101      [12-17-20 @ 06:04]              24.1     132  |  97  |  112  ----------------------------<  126      [12-17-20 @ 06:04]  4.0   |  19  |  5.22        Ca     8.4     [12-17-20 @ 06:04]      Mg     2.3     [12-16-20 @ 02:57]      Phos  5.5     [12-16-20 @ 02:57]    Creatinine Trend:  SCr 5.22 [12-17 @ 06:04]  SCr 5.18 [12-16 @ 02:57]  SCr 4.91 [12-15 @ 04:29]  SCr 4.94 [12-14 @ 06:36]  SCr 4.25 [12-13 @ 06:36]    Urinalysis - [12-10-20 @ 18:53]      Color Yellow / Appearance Clear / SG 1.016 / pH 5.5      Gluc Negative / Ketone Negative  / Bili Negative / Urobili Negative       Blood Negative / Protein Trace / Leuk Est Negative / Nitrite Negative      RBC 2 / WBC 1 / Hyaline 6 / Gran  / Sq Epi  / Non Sq Epi 0 / Bacteria Negative    Urine Creatinine 105      [12-17-20 @ 16:30]  Urine Sodium <35      [12-17-20 @ 16:30]  Urine Urea Nitrogen 673      [12-11-20 @ 02:41]  Urine Potassium 42      [12-17-20 @ 16:30]  Urine Chloride <35      [12-17-20 @ 16:30]  Urine Phosphorus 52.9      [12-11-20 @ 04:07]  Urine Osmolality 387      [12-11-20 @ 05:47]    Iron 36, TIBC 314, %sat 12      [09-08-20 @ 08:45]  Ferritin 145      [09-08-20 @ 08:45]  Vitamin D (25OH) 21.7      [07-18-20 @ 10:13]  TSH 8.61      [11-01-20 @ 20:16]  Lipid: chol 116, TG 55, HDL 48, LDL 58      [08-26-20 @ 00:17]    HBsAb <3.0      [12-12-20 @ 04:04]  HBsAb Nonreact      [12-11-20 @ 15:03]  HBsAg Nonreact      [12-11-20 @ 15:03]  HCV 0.16, Nonreact      [12-12-20 @ 04:04]

## 2020-12-17 NOTE — PROGRESS NOTE ADULT - SUBJECTIVE AND OBJECTIVE BOX
SUBJECTIVE / OVERNIGHT EVENTS: pt seen and examined. c/o fatigue    MEDICATIONS  (STANDING):  aMIOdarone    Tablet 200 milliGRAM(s) Oral daily  BACItracin   Ointment 1 Application(s) Topical two times a day  benzocaine 15 mG/menthol 3.6 mG (Sugar-Free) Lozenge 1 Lozenge Oral two times a day  chlorhexidine 4% Liquid 1 Application(s) Topical <User Schedule>  dextrose 5% + sodium chloride 0.45%. 1000 milliLiter(s) (50 mL/Hr) IV Continuous <Continuous>  levothyroxine Injectable 25 MICROGram(s) IV Push at bedtime  melatonin 3 milliGRAM(s) Oral at bedtime  metoprolol succinate ER 25 milliGRAM(s) Oral daily  midodrine. 2.5 milliGRAM(s) Oral three times a day  milrinone Infusion 0.2 MICROgram(s)/kG/Min (4.32 mL/Hr) IV Continuous <Continuous>    MEDICATIONS  (PRN):  acetaminophen   Tablet .. 650 milliGRAM(s) Oral every 6 hours PRN Mild Pain (1 - 3)  midodrine. 5 milliGRAM(s) Oral <User Schedule> PRN 30mins prior to HD  sodium chloride 0.9% lock flush 10 milliLiter(s) IV Push every 1 hour PRN Pre/post blood products, medications, blood draw, and to maintain line patency    Vital Signs Last 24 Hrs  T(C): 36.8 (17 Dec 2020 14:14), Max: 37.4 (17 Dec 2020 00:38)  T(F): 98.2 (17 Dec 2020 14:14), Max: 99.3 (17 Dec 2020 00:38)  HR: 114 (17 Dec 2020 14:14) (102 - 125)  BP: 89/45 (17 Dec 2020 14:14) (84/49 - 95/55)  BP(mean): --  RR: 16 (17 Dec 2020 14:14) (16 - 19)  SpO2: 96% (17 Dec 2020 14:14) (94% - 98%)    Eyes: No recent vision problems or eye pain.  ENT: No congestion, ear pain, or sore throat.  Cardiovascular: No chest pain or palpation.  Respiratory: No cough, shortness of breath, congestion, or wheezing.  Gastrointestinal: +abdominal pain, nausea, vomiting, or diarrhea.  Genitourinary: No dysuria.  Musculoskeletal: No joint swelling.  Neurologic: No headache.    PHYSICAL EXAM:  CHEST/LUNG: dec breath sounds at bases   HEART:  S1 , S2 +  ABDOMEN: soft , bs+, mild distension +  EXTREMITIES:  edema+  NEUROLOGY:alert awake oriented     LABS:      132<L>  |  97  |  112<H>  ----------------------------<  126<H>  4.0   |  19<L>  |  5.22<H>    Ca    8.4      17 Dec 2020 06:04  Phos  5.5     12-16  Mg     2.3     12-16      Creatinine Trend: 5.22 <--, 5.18 <--, 4.91 <--, 4.94 <--, 4.25 <--, 4.14 <--, 3.60 <--, 5.00 <--, 5.87 <--                        7.5    14.15 )-----------( 101      ( 17 Dec 2020 06:04 )             24.1     Urine Studies:  Urinalysis Basic - ( 10 Dec 2020 18:53 )    Color: Yellow / Appearance: Clear / S.016 / pH:   Gluc:  / Ketone: Negative  / Bili: Negative / Urobili: Negative   Blood:  / Protein: Trace / Nitrite: Negative   Leuk Esterase: Negative / RBC: 2 /hpf / WBC 1 /HPF   Sq Epi:  / Non Sq Epi: 0 /hpf / Bacteria: Negative      Osmolality, Random Urine: 387 mosm/Kg ( @ 05:47)  Chloride, Random Urine: <35 mmol/L (12-10 @ 18:53)  Creatinine, Random Urine: 85 mg/dL (12-10 @ 18:53)  Sodium, Random Urine: <35 mmol/L (12-10 @ 18:53)

## 2020-12-17 NOTE — PROGRESS NOTE ADULT - SUBJECTIVE AND OBJECTIVE BOX
Helga Eason MD  Cardiology Fellow   [phone]  All Cardiology service information can be found 24/7 on [email or web address], password: [email or web address]    No events overnight    Meds:  aMIOdarone    Tablet 200 milliGRAM(s) Oral daily  BACItracin   Ointment 1 Application(s) Topical two times a day  benzocaine 15 mG/menthol 3.6 mG (Sugar-Free) Lozenge 1 Lozenge Oral two times a day  chlorhexidine 4% Liquid 1 Application(s) Topical <User Schedule>  dextrose 5% + sodium chloride 0.45%. 1000 milliLiter(s) IV Continuous <Continuous>  fluticasone propionate 50 MICROgram(s)/spray Nasal Spray 1 Spray(s) Both Nostrils two times a day  levothyroxine Injectable 25 MICROGram(s) IV Push at bedtime  melatonin 3 milliGRAM(s) Oral at bedtime  metoprolol succinate ER 25 milliGRAM(s) Oral daily  midodrine. 2.5 milliGRAM(s) Oral three times a day  milrinone Infusion 0.2 MICROgram(s)/kG/Min IV Continuous <Continuous>      T(C): 36.7 (12-17-20 @ 17:37), Max: 37.4 (12-17-20 @ 00:38)  HR: 114 (12-17-20 @ 17:37) (109 - 125)  BP: 90/51 (12-17-20 @ 17:37) (84/49 - 92/55)  RR: 16 (12-17-20 @ 17:37) (16 - 19)  SpO2: 99% (12-17-20 @ 17:37) (94% - 99%)    12-16-20 @ 07:01  -  12-17-20 @ 07:00  --------------------------------------------------------  IN: 1671.8 mL / OUT: 775 mL / NET: 896.8 mL    12-17-20 @ 07:01  -  12-17-20 @ 18:31  --------------------------------------------------------  IN: 180 mL / OUT: 0 mL / NET: 180 mL      Exam:  unable to examine, patient getting swan replaced    12-17    132<L>  |  97  |  112<H>  ----------------------------<  126<H>  4.0   |  19<L>  |  5.22<H>    Ca    8.4      17 Dec 2020 06:04  Phos  5.5     12-16  Mg     2.3     12-16                            7.5    14.15 )-----------( 101      ( 17 Dec 2020 06:04 )             24.1

## 2020-12-17 NOTE — PROGRESS NOTE ADULT - PROBLEM SELECTOR PLAN 1
- Continue milrinone 0.2 mcg/kg/min  - Continue Toprol 25mg daily with no holding parameters  - Continue midodrine 2.5mg TID for persistent hypotension, may increase as needed  -Euvolemic on exam currently despite oliguria, continue to hold home torsemide 80mg BID and spironolactone 25mg daily  - Patient DNR/DNI, appreicate palliative assistance while inpatient given worsening clinical status  - Check daily CMP and lactate to trend perfusion  - Strict I/Os, daily standing weights (goal weight ~158 lbs)

## 2020-12-17 NOTE — PROGRESS NOTE ADULT - SUBJECTIVE AND OBJECTIVE BOX
SUBJECTIVE / OVERNIGHT EVENTS: pt seen and examined. c/o fatigue    MEDICATIONS  (STANDING):  aMIOdarone    Tablet 200 milliGRAM(s) Oral daily  BACItracin   Ointment 1 Application(s) Topical two times a day  benzocaine 15 mG/menthol 3.6 mG (Sugar-Free) Lozenge 1 Lozenge Oral two times a day  chlorhexidine 4% Liquid 1 Application(s) Topical <User Schedule>  dextrose 5% + sodium chloride 0.45%. 1000 milliLiter(s) (50 mL/Hr) IV Continuous <Continuous>  fluticasone propionate 50 MICROgram(s)/spray Nasal Spray 1 Spray(s) Both Nostrils two times a day  levothyroxine Injectable 25 MICROGram(s) IV Push at bedtime  melatonin 3 milliGRAM(s) Oral at bedtime  metoprolol succinate ER 25 milliGRAM(s) Oral daily  midodrine. 2.5 milliGRAM(s) Oral three times a day  milrinone Infusion 0.2 MICROgram(s)/kG/Min (4.32 mL/Hr) IV Continuous <Continuous>    MEDICATIONS  (PRN):  acetaminophen   Tablet .. 650 milliGRAM(s) Oral every 6 hours PRN Mild Pain (1 - 3)  midodrine. 5 milliGRAM(s) Oral <User Schedule> PRN 30mins prior to HD  sodium chloride 0.9% lock flush 10 milliLiter(s) IV Push every 1 hour PRN Pre/post blood products, medications, blood draw, and to maintain line patency    Vital Signs Last 24 Hrs  T(C): 37.2 (17 Dec 2020 20:14), Max: 37.4 (17 Dec 2020 00:38)  T(F): 99 (17 Dec 2020 20:14), Max: 99.3 (17 Dec 2020 00:38)  HR: 114 (17 Dec 2020 20:14) (109 - 125)  BP: 85/42 (17 Dec 2020 20:14) (84/49 - 92/55)  BP(mean): --  RR: 16 (17 Dec 2020 20:14) (16 - 19)  SpO2: 95% (17 Dec 2020 20:14) (94% - 99%)  Eyes: No recent vision problems or eye pain.  ENT: No congestion, ear pain, or sore throat.  Cardiovascular: No chest pain or palpation.  Respiratory: No cough, shortness of breath, congestion, or wheezing.  Gastrointestinal: +abdominal pain, nausea, vomiting, or diarrhea.  Genitourinary: No dysuria.  Musculoskeletal: No joint swelling.  Neurologic: No headache.    PHYSICAL EXAM:  CHEST/LUNG: dec breath sounds at bases   HEART:  S1 , S2 +  ABDOMEN: soft , bs+, mild distension +  EXTREMITIES:  edema+  NEUROLOGY:alert awake oriented     LABS:      132<L>  |  97  |  112<H>  ----------------------------<  126<H>  4.0   |  19<L>  |  5.22<H>    Ca    8.4      17 Dec 2020 06:04  Phos  5.5       Mg     2.3           Creatinine Trend: 5.22 <--, 5.18 <--, 4.91 <--, 4.94 <--, 4.25 <--, 4.14 <--, 3.60 <--, 5.00 <--, 5.87 <--                        7.5    14.15 )-----------( 101      ( 17 Dec 2020 06:04 )             24.1     Urine Studies:  Urinalysis Basic - ( 17 Dec 2020 18:01 )    Color: Yellow / Appearance: Clear / S.014 / pH:   Gluc:  / Ketone: Negative  / Bili: Negative / Urobili: 3 mg/dL   Blood:  / Protein: Trace / Nitrite: Negative   Leuk Esterase: Negative / RBC:  / WBC    Sq Epi:  / Non Sq Epi:  / Bacteria:       Osmolality, Random Urine: 307 mosm/Kg ( @ 18:01)  Chloride, Random Urine: <35 mmol/L ( @ 16:30)  Creatinine, Random Urine: 105 mg/dL ( @ 16:30)  Sodium, Random Urine: <35 mmol/L ( @ 16:30)  Potassium, Random Urine: 42 mmol/L ( @ 16:30)  Osmolality, Random Urine: 387 mosm/Kg ( @ 05:47)

## 2020-12-17 NOTE — PROGRESS NOTE ADULT - ASSESSMENT
Mr. Jarquin is a 75 Year-Old Gentleman with very significant cardiac history and acute on chronic renal failure who presented after fall found to have Small bowel obstruction. Patient has return of bowel function however continue to have worsening renal function, now requiring HD, nausea possible 2/2 to hyperuremia. CTAP 12/12 prelim read with redemonstrated SBO w/ TP at terminal ileum, likely partial SBO with contrast passing through colon and rectum.     Plan  - No urgent surgical intervention indicated, abdomen benign and having bowel function   - Goals of care: patient does not want to have surgery   - recommend advance diet as tolerated   - Cardiology: appreciate cardiac risk assessment   - care as per primary team  - Final plan to be d/w attending in AM rounds    Green Team Surgery Pager #[phone] Mr. Jarquin is a 75 Year-Old Gentleman with very significant cardiac history and acute on chronic renal failure who presented after fall found to have Small bowel obstruction. Patient has return of bowel function however continue to have worsening renal function, now requiring HD, nausea possible 2/2 to hyperuremia. CTAP 12/12 prelim read with redemonstrated SBO w/ TP at terminal ileum, likely partial SBO with contrast passing through colon and rectum.     Plan  - No urgent surgical intervention indicated, abdomen benign and having bowel function   - Goals of care: patient does not want to have surgery   - recommend advance diet as tolerated; pt requesting strawberry glucerna  - Cardiology: appreciate cardiac risk assessment   - care as per primary team  - Final plan to be d/w attending in AM rounds    Green Team Surgery Pager #[phone]

## 2020-12-18 NOTE — PHARMACOTHERAPY INTERVENTION NOTE - COMMENTS
Patient previously with NG tube, with home levothyroxine 50 mcg daily changed to 25 mcg IV. Now on dysphagia diet and receiving other oral medications. Recommended changing therapy back to oral levothyroxine 50 mcg daily if no contraindications.    Keshia Vieira, PharmD, BCPS  [phone] Patient presenting with SBO previously with NG tube, with home levothyroxine 50 mcg daily changed to 25 mcg IV. Now on dysphagia diet, documented as having bowel function, and receiving other oral medications. Recommended changing therapy back to oral levothyroxine 50 mcg daily if no contraindications.    Keshia Vieira, PharmD, BCPS  [phone]

## 2020-12-18 NOTE — PROGRESS NOTE ADULT - SUBJECTIVE AND OBJECTIVE BOX
[facility] DIVISION OF KIDNEY DISEASES AND HYPERTENSION -- FOLLOW UP NOTE  --------------------------------------------------------------------------------  24 hour events/subjective: urine output over 24h is 775, currently on D5 1/2 NS at 50cc/hr. Also on milrinone drip. Patient was seen and examined at bedside. Reported feeling well. Denies CP, SOB, fever, chills, nausea, vomiting, diarrhea, LE edema or dysuria          PAST HISTORY  --------------------------------------------------------------------------------  No significant changes to PMH, PSH, FHx, SHx, unless otherwise noted    ALLERGIES & MEDICATIONS  --------------------------------------------------------------------------------  Allergies    No Known Allergies    Intolerances      Standing Inpatient Medications  aMIOdarone    Tablet 200 milliGRAM(s) Oral daily  BACItracin   Ointment 1 Application(s) Topical two times a day  benzocaine 15 mG/menthol 3.6 mG (Sugar-Free) Lozenge 1 Lozenge Oral two times a day  chlorhexidine 4% Liquid 1 Application(s) Topical <User Schedule>  dextrose 5% + sodium chloride 0.45%. 1000 milliLiter(s) IV Continuous <Continuous>  fluticasone propionate 50 MICROgram(s)/spray Nasal Spray 1 Spray(s) Both Nostrils two times a day  levothyroxine Injectable 25 MICROGram(s) IV Push at bedtime  melatonin 3 milliGRAM(s) Oral at bedtime  metoprolol succinate ER 25 milliGRAM(s) Oral daily  midodrine. 2.5 milliGRAM(s) Oral three times a day  milrinone Infusion 0.2 MICROgram(s)/kG/Min IV Continuous <Continuous>    PRN Inpatient Medications  acetaminophen   Tablet .. 650 milliGRAM(s) Oral every 6 hours PRN  midodrine. 5 milliGRAM(s) Oral <User Schedule> PRN  sodium chloride 0.9% lock flush 10 milliLiter(s) IV Push every 1 hour PRN      REVIEW OF SYSTEMS  --------------------------------------------------------------------------------  Gen: No fevers/chills  Respiratory: No dyspnea, cough  CV: No chest pain  GI: No abdominal pain, diarrhea  : No dysuria, hematuria  MSK: No  edema  Heme: No easy bruising or bleeding      All other systems were reviewed and are negative, except as noted.    VITALS/PHYSICAL EXAM  --------------------------------------------------------------------------------  T(C): 36.7 (12-18-20 @ 11:54), Max: 37.6 (12-18-20 @ 00:16)  HR: 117 (12-18-20 @ 11:54) (103 - 118)  BP: 89/54 (12-18-20 @ 11:54) (81/43 - 90/51)  RR: 18 (12-18-20 @ 11:54) (16 - 18)  SpO2: 94% (12-18-20 @ 11:54) (93% - 99%)  Wt(kg): --    Weight (kg): 72 (12-17-20 @ 14:14)      12-17-20 @ 07:01  -  12-18-20 @ 07:00  --------------------------------------------------------  IN: 1479.8 mL / OUT: 0 mL / NET: 1479.8 mL    12-18-20 @ 07:01  -  12-18-20 @ 12:46  --------------------------------------------------------  IN: 120 mL / OUT: 0 mL / NET: 120 mL        Physical Exam:  	Gen: NAD  	HEENT: MMM  	Pulm: CTA B/L  	CV: S1S2  	Abd: Soft, +BS   	Ext: No LE edema B/L  	Neuro: Awake  	Skin: Warm and dry  	Vascular access: IV lines       LABS/STUDIES  --------------------------------------------------------------------------------              7.5    14.15 >-----------<  101      [12-17-20 @ 06:04]              24.1     128  |  94  |  125  ----------------------------<  236      [12-18-20 @ 05:13]  3.9   |  17  |  5.20        Ca     8.2     [12-18-20 @ 05:13]    TPro  4.8  /  Alb  2.2  /  TBili  1.4  /  DBili  x   /  AST  18  /  ALT  12  /  AlkPhos  96  [12-18-20 @ 05:13]          Creatinine Trend:  SCr 5.20 [12-18 @ 05:13]  SCr 5.22 [12-17 @ 06:04]  SCr 5.18 [12-16 @ 02:57]  SCr 4.91 [12-15 @ 04:29]  SCr 4.94 [12-14 @ 06:36]    Urinalysis - [12-17-20 @ 18:01]      Color Yellow / Appearance Clear / SG 1.014 / pH 5.0      Gluc Negative / Ketone Negative  / Bili Negative / Urobili 3 mg/dL       Blood Negative / Protein Trace / Leuk Est Negative / Nitrite Negative      RBC  / WBC  / Hyaline  / Gran  / Sq Epi  / Non Sq Epi  / Bacteria     Urine Creatinine 105      [12-17-20 @ 16:30]  Urine Sodium <35      [12-17-20 @ 16:30]  Urine Urea Nitrogen 453      [12-17-20 @ 18:01]  Urine Potassium 42      [12-17-20 @ 16:30]  Urine Chloride <35      [12-17-20 @ 16:30]  Urine Osmolality 307      [12-17-20 @ 18:01]    Iron 36, TIBC 314, %sat 12      [09-08-20 @ 08:45]  Ferritin 145      [09-08-20 @ 08:45]  Vitamin D (25OH) 21.7      [07-18-20 @ 10:13]  TSH 8.61      [11-01-20 @ 20:16]  Lipid: chol 116, TG 55, HDL 48, LDL 58      [08-26-20 @ 00:17]    HBsAb <3.0      [12-12-20 @ 04:04]  HBsAb Nonreact      [12-11-20 @ 15:03]  HBsAg Nonreact      [12-11-20 @ 15:03]  HCV 0.16, Nonreact      [12-12-20 @ 04:04]

## 2020-12-18 NOTE — CONSULT NOTE ADULT - CONSULT REQUESTED DATE/TIME
07-Dec-2020 20:06
08-Dec-2020 08:30
08-Dec-2020 14:00
11-Dec-2020 12:07
16-Dec-2020 11:24
18-Dec-2020 12:45
07-Dec-2020 15:27

## 2020-12-18 NOTE — PROGRESS NOTE ADULT - SUBJECTIVE AND OBJECTIVE BOX
Interval History: Taking some PO, reports poor appetite, no abdominal pain. Still oliguric, monitoring off HD    Medications:  acetaminophen   Tablet .. 650 milliGRAM(s) Oral every 6 hours PRN  aMIOdarone    Tablet 200 milliGRAM(s) Oral daily  BACItracin   Ointment 1 Application(s) Topical two times a day  benzocaine 15 mG/menthol 3.6 mG (Sugar-Free) Lozenge 1 Lozenge Oral two times a day  chlorhexidine 4% Liquid 1 Application(s) Topical <User Schedule>  dextrose 5% + sodium chloride 0.45%. 1000 milliLiter(s) IV Continuous <Continuous>  fluticasone propionate 50 MICROgram(s)/spray Nasal Spray 1 Spray(s) Both Nostrils two times a day  levothyroxine Injectable 25 MICROGram(s) IV Push at bedtime  melatonin 3 milliGRAM(s) Oral at bedtime  metoprolol succinate ER 25 milliGRAM(s) Oral daily  midodrine. 5 milliGRAM(s) Oral <User Schedule> PRN  midodrine. 2.5 milliGRAM(s) Oral three times a day  milrinone Infusion 0.2 MICROgram(s)/kG/Min IV Continuous <Continuous>  sodium chloride 0.9% lock flush 10 milliLiter(s) IV Push every 1 hour PRN    Vitals:  T(C): 36.7 (20 @ 11:54), Max: 37.6 (20 @ 00:16)  HR: 118 (20 @ 14:11) (103 - 118)  BP: 82/42 (20 @ 14:11) (81/43 - 90/51)  BP(mean): --  RR: 18 (20 @ 11:54) (16 - 18)  SpO2: 94% (20 @ 11:54) (93% - 99%)    Daily     Daily Weight in k.4 (18 Dec 2020 07:15)    Weight (kg): 72 ( @ 14:14)    I&O's Summary    17 Dec 2020 07:01  -  18 Dec 2020 07:00  --------------------------------------------------------  IN: 1479.8 mL / OUT: 0 mL / NET: 1479.8 mL    18 Dec 2020 07:01  -  18 Dec 2020 15:56  --------------------------------------------------------  IN: 120 mL / OUT: 0 mL / NET: 120 mL    Physical Exam:  Appearance: Sitting up in bed in room air, no increased work of breathing  HEENT: PERRL  Neck: JVP ~9-10cm  Cardiovascular: Regular rate and rhythm  Respiratory: Clear to auscultation bilaterally  Gastrointestinal: Soft, +bowel sounds  Skin: No cyanosis  Neurologic: Non-focal  Extremities: No LE edema, warm and well perfused throughout  Psychiatry: Sad affect    Labs:                        7.5    15.75 )-----------( 118      ( 18 Dec 2020 13:07 )             23.8     12-18    128<L>  |  94<L>  |  125<H>  ----------------------------<  236<H>  3.9   |  17<L>  |  5.20<H>    Ca    8.2<L>      18 Dec 2020 05:13    TPro  4.8<L>  /  Alb  2.2<L>  /  TBili  1.4<H>  /  DBili  x   /  AST  18  /  ALT  12  /  AlkPhos  96      TELEMETRY: av paced 100s

## 2020-12-18 NOTE — PROGRESS NOTE ADULT - ASSESSMENT
74 yo M with acute on chronic renal failure no requiring HD. Initally presented with SBO that resolved, developed nausea now with recurrence of SBO s/p NGT removal early today now tolerating clears.  Palliative care called by surgery to discuss possibility of need for surgery.     74 yo M with acute on chronic renal failure no requiring HD. Initally presented with SBO that resolved, developed nausea now with recurrence of SBO s/p NGT removal  tolerating diet. Palliative care called by surgery to discuss possibility of need for surgery.

## 2020-12-18 NOTE — PROGRESS NOTE ADULT - ASSESSMENT
75 year old M with longstanding history of NICM, LVEF < 20% (LVIDd 6.7 cm) s/p CRT-D, moderate-severe MR s/p MitraClip 8/2020, HTN, pAFib s/p DCCV 7/20/20 on Eliquis, CKD stage 3 (b/l SCr 1.7-2), DM 2 (A1c 6.1%) and anemia who presented as transfer from OSH in the setting of presyncope/syncope, found to have SBO now s/p NGT for conservative management. Heart failure consulted for additional evaluation and management.  Patient appears euvolemic on exam, unfortunately with worsening renal failure and hyperkalemia, required initiation of dialysis. Now monitoring off HD for return of renal recovery with continued oliguria.    Relevant studies:  Echo:  12/15 TTE: LVEF 20-25%, LV 6.4cm, normal RV function, LA 85 cc/m2, increased gradient across MitraClip  11/1/20 TTE: LVEF 10-15%, LV 7cm, LVOT VIT 7cm, severe RV dysfunction, severly dilated atria, mild MR, min AI, severe TR, est RAP 10mmHg, est RVSP 41 mm Hg    Cardiac Cath:    9/15 (milrinone 0.25 mcg/kg/mi): CVP 5 PA 47/13 PCW 13, SVC saturation 68% with Corey CO/CI 5.8/3.1 and TD CO/CI 7/3.7. MAP 70 mmHg

## 2020-12-18 NOTE — PROGRESS NOTE ADULT - PROBLEM SELECTOR PLAN 1
- Continue milrinone 0.2 mcg/kg/min  - Continue Toprol 25mg daily with no holding parameters  - Increase midodrine to 5mg TID for increased perfusion pressures to kidneys  - Increased volume on exam, will discuss with nephrology utility of diuretic challenge, previously on home torsemide 80mg BID and spironolactone 25mg daily  - Patient DNR/DNI, appreicate palliative assistance while inpatient given worsening clinical status  - Check daily CMP and lactate to trend perfusion  - Strict I/Os, daily standing weights (goal weight ~158 lbs)

## 2020-12-18 NOTE — PROGRESS NOTE ADULT - ASSESSMENT
75 year old M with longstanding history of NICM, LVEF < 20% (LVIDd 6.7 cm) s/p CRT-D, moderate-severe MR s/p MitraClip 8/2020, HTN, pAFib s/p DCCV 7/20/20 on Eliquis, CKD stage 3 (b/l SCr 1.7-2), DM 2 (A1c 6.1%) and anemia, transferred from [facility] ER after fall at home today in setting of nausea and vomiting, found there to have SBO with placement of NG tube.

## 2020-12-18 NOTE — PROGRESS NOTE ADULT - PROBLEM SELECTOR PLAN 3
Spoke with team regarding pt update.  elevated WBC count, recommending blood cx's and cxr.  attempted to reach pt via phone but pt not answering as he is sleeping.  Will attempt to call again later.

## 2020-12-18 NOTE — CONSULT NOTE ADULT - SUBJECTIVE AND OBJECTIVE BOX
Patient is a 75y old  Male who presents with a chief complaint of SBO w/weakness and fall (18 Dec 2020 10:01)    From HPI" HPI:  75 year old M with longstanding history of NICM, LVEF < 20% (LVIDd 6.7 cm) s/p CRT-D, moderate-severe MR s/p MitraClip 8/2020, HTN, pAFib s/p DCCV 7/20/20 on Eliquis, CKD stage 3 (b/l SCr 1.7-2), DM 2 (A1c 6.1%) and anemia, transferred from [facility] after fall at home today in setting of nausea and vomiting, found there to have SBO with placement of NG tube. Patient reports that over the last 2-3 days he had been feeling mildly nauseous, however he had otherwise been in his usual state of health. Reports a normal bowel movement yesterday that did not seem unusual to him. However, this AM he had suddenly began to feel much more nauseous and began to want to vomit. He walked to the bathroom and attempted to vomit but initially nothing came up. Patient reports that he was walking back from the bathroom to his bed when he suddenly began to vomit. He became lightheaded and then fell to the ground and hit his face on the ground. Denies significant pain now, but reports that immediately after his fall he noticed epistaxis from his R nostril which he had difficulty controlling until he put a cotton ball inside. Patient then went to the ER at [facility] where on CT there were findings concerning for SBO and possible bowel ischemia. He was then transferred here for further care. Patient denies any recent chest pain, or worsening of any baseline ALCAZAR/SOB. Patient does report that his potassium and his torsemide have been held over the last week due to continuing decline in his weight. No fevers or chills. Reports a chronic non productive cough that remains unchanged.  (07 Dec 2020 23:25)  "    Above verified-agree with above unless noted below.    Hospital Course:      ID consulted     PAST MEDICAL & SURGICAL HISTORY:  Hypothyroidism    Afib    Type II diabetes mellitus    Aortic insufficiency    Mitral insufficiency    CKD (chronic kidney disease)    Cardiomyopathy  Systolic CHF    Hypertension    History of tonsillectomy  childhood    AICD (automatic cardioverter/defibrillator) present  8/2012        Social history:  denies    Smoking,    ETOH,      IVDU       FAMILY HISTORY:  Family history of CVA    - Family history of medical problems in all first degree relatives reviewed.None of these were found to be related to patients current illness.    REVIEW OF SYSTEMS  General:+ malaise fatigue no  chills. Fevers absent    Skin:No rash  	  Ophthalmologic:Denies any visual complaints,discharge redness or photophobia  	  ENMT:No nasal discharge,headache,sinus congestion or throat pain.No dental complaints    Respiratory and Thorax:No cough,sputum or chest pain.Denies shortness of breath  	  Cardiovascular:	No chest pain,palpitaions or dizziness    Gastrointestinal:	NO nausea,abdominal pain or diarrhea.    Genitourinary:	No dysuria,frequency. No flank pain    Musculoskeletal:	No joint swelling or pain.No weakness    Neurological:No confusion,diziness.No extremity weakness.No bladder or bowel incontinence	    Psychiatric:No delusions or hallucinations	    Hematology/Lymphatics:	No LN swelling.No gum bleeding     Endocrine:	No recent weight gain or loss.No abnormal heat/cold intolerance    Allergic/Immunologic:	No hives or rash   Allergies    No Known Allergies    Intolerances        Antimicrobials:        Prior Antimicrobials:  MEDICATIONS  (STANDING):      Other medications reviewed  MEDICATIONS  (STANDING):  aMIOdarone    Tablet 200 milliGRAM(s) Oral daily  BACItracin   Ointment 1 Application(s) Topical two times a day  benzocaine 15 mG/menthol 3.6 mG (Sugar-Free) Lozenge 1 Lozenge Oral two times a day  chlorhexidine 4% Liquid 1 Application(s) Topical <User Schedule>  dextrose 5% + sodium chloride 0.45%. 1000 milliLiter(s) (50 mL/Hr) IV Continuous <Continuous>  fluticasone propionate 50 MICROgram(s)/spray Nasal Spray 1 Spray(s) Both Nostrils two times a day  levothyroxine Injectable 25 MICROGram(s) IV Push at bedtime  melatonin 3 milliGRAM(s) Oral at bedtime  metoprolol succinate ER 25 milliGRAM(s) Oral daily  midodrine. 2.5 milliGRAM(s) Oral three times a day  milrinone Infusion 0.2 MICROgram(s)/kG/Min (4.32 mL/Hr) IV Continuous <Continuous>        Vital Signs Last 24 Hrs  T(C): 36.7 (18 Dec 2020 11:54), Max: 37.6 (18 Dec 2020 00:16)  T(F): 98.1 (18 Dec 2020 11:54), Max: 99.7 (18 Dec 2020 00:16)  HR: 117 (18 Dec 2020 11:54) (103 - 118)  BP: 89/54 (18 Dec 2020 11:54) (81/43 - 90/51)  BP(mean): --  RR: 18 (18 Dec 2020 11:54) (16 - 18)  SpO2: 94% (18 Dec 2020 11:54) (93% - 99%)    PHYSICAL EXAM:Pleasant patient in no acute distress.      Constitutional:Comfortable.Awake and alert  + cachexia     Eyes:PERRL EOMI.NO discharge or conjunctival injection    Right SC mediport no erythema or tenderness    ENMT:No sinus tenderness.No thrush.No pharyngeal exudate or erythema.Fair dental hygiene    Neck:Supple,No LN,+ JVD      Respiratory:Good air entry bilaterally,bibasilar crackles     Cardiovascular:S1 S2 wnl, ESM no rub or gallops    Gastrointestinal:Soft BS(+) no tenderness no masses ,No rebound or guarding    Genitourinary:No CVA tendereness         Extremities:No cyanosis,clubbing or edema.    Vascular:peripheral pulses felt    Neurological:AAO X 3,No grossly focal deficits        Lymph Nodes:No palpable LNs    Musculoskeletal:No joint swelling or LOM    Psychiatric:Affect normal.          Labs:                            7.5    14.15 )-----------( 101      ( 17 Dec 2020 06:04 )             24.1       WBC Count: 14.15 (12-17-20 @ 06:04)  WBC Count: 10.92 (12-16-20 @ 02:57)  WBC Count: 7.70 (12-15-20 @ 04:29)  WBC Count: 5.56 (12-14-20 @ 06:37)  WBC Count: 7.60 (12-13-20 @ 06:36)  WBC Count: 7.52 (12-13-20 @ 02:37)  WBC Count: 4.31 (12-12-20 @ 06:12)    12-18    128<L>  |  94<L>  |  125<H>  ----------------------------<  236<H>  3.9   |  17<L>  |  5.20<H>    Creatinine, Serum: 5.20 mg/dL (12-18-20 @ 05:13)  Creatinine, Serum: 5.22 mg/dL (12-17-20 @ 06:04)  Creatinine, Serum: 5.18 mg/dL (12-16-20 @ 02:57)  Creatinine, Serum: 4.91 mg/dL (12-15-20 @ 04:29)  Creatinine, Serum: 4.94 mg/dL (12-14-20 @ 06:36)      Ca    8.2<L>      18 Dec 2020 05:13    TPro  4.8<L>  /  Alb  2.2<L>  /  TBili  1.4<H>  /  DBili  x   /  AST  18  /  ALT  12  /  AlkPhos  96  12-18      < from: US Abdomen Limited (12.15.20 @ 08:42) >  IMPRESSION:    Normal caliber patent IVC.          < end of copied text >  < from: Xray Chest 1 View- PORTABLE-Urgent (Xray Chest 1 View- PORTABLE-Urgent .) (12.12.20 @ 23:30) >  AP chest. Prior 12/7/2020.  Impression:  Nasogastric tube tip in the stomach.  left AICD reidentified in position. Right PICC line tip in the right subclavian region. Right internal jugular line tip in the right atrium. No pneumothorax. No gross focal infiltrate or pleural effusion. Distended bowel visualized upper abdomen similar to prior.    < end of copied text >  < from: CT Abdomen and Pelvis No Cont (12.11.20 @ 17:25) >  IMPRESSION:  Persistent small bowel obstruction with transition point in the right lower quadrant, in the terminal ileum, as on the prior CT.    Unchanged small volume ascites.    Evolving partially imaged right gluteal hematoma, likely decreased in size.      < from: CT Abdomen and Pelvis w/ Oral Cont (12.08.20 @ 09:51) >    IMPRESSION:  Small bowel obstruction with transition point at the terminal ileum.    Nodular opacities in the lower lobes of uncertain etiology.    Right gluteal intramuscular hematoma.        < end of copied text >    < end of copied text >              Imaging studies(films) were independently reviewed.Findings as detailed in report above    Patient is a 75y old  Male who presents with a chief complaint of SBO w/weakness and fall (18 Dec 2020 10:01)    From HPI" HPI:  75 year old M with longstanding history of NICM, LVEF < 20% (LVIDd 6.7 cm) s/p CRT-D, moderate-severe MR s/p MitraClip 8/2020, HTN, pAFib s/p DCCV 7/20/20 on Eliquis, CKD stage 3 (b/l SCr 1.7-2), DM 2 (A1c 6.1%) and anemia, transferred from [facility] after fall at home today in setting of nausea and vomiting, found there to have SBO with placement of NG tube. Patient reports that over the last 2-3 days he had been feeling mildly nauseous, however he had otherwise been in his usual state of health. Reports a normal bowel movement yesterday that did not seem unusual to him. However, this AM he had suddenly began to feel much more nauseous and began to want to vomit. He walked to the bathroom and attempted to vomit but initially nothing came up. Patient reports that he was walking back from the bathroom to his bed when he suddenly began to vomit. He became lightheaded and then fell to the ground and hit his face on the ground. Denies significant pain now, but reports that immediately after his fall he noticed epistaxis from his R nostril which he had difficulty controlling until he put a cotton ball inside. Patient then went to the ER at [facility] where on CT there were findings concerning for SBO and possible bowel ischemia. He was then transferred here for further care. Patient denies any recent chest pain, or worsening of any baseline ALCAZAR/SOB. Patient does report that his potassium and his torsemide have been held over the last week due to continuing decline in his weight. No fevers or chills. Reports a chronic non productive cough that remains unchanged.  (07 Dec 2020 23:25)  "    Above verified-agree with above unless noted below.    Hospital Course:  12/7 SBO    12/8   Cont bicarb drip x 12 hr, F/U K , if rising notify renal-may need dialysis  12/9  NGT d/c.  Advanced to clears.  12/10: Eliquis on Hold since Renal functions worsening   12/11: HD today   12/12: CT neg for SBO.   	sx: pt refusing NGT, strong risk for aspiration, monitor for vomiting.  	HF: D5 1/2NS at 50cc/hr while pt is npo.            	Pt had an episode of coffee ground emesis abt 200cc. pt refused NGT.   12/12: Night NP: Pt s/p 200 cc bolus w/ no improvement in BP. Spoke to Cards give another 200 cc likely from HD today pt had 800 cc removed, no Midodrine. NGT placed after sev episode of brown-likey fecal emesis. Sx and MD aware.GOC discussed pt wants  niece to bring copy from previous admission.   12/13- Night NP: RRT called for hypotension, 250 cc Bolus given, House Card at bedside, requested additional 250 cc bolus, then 500 cc for volume repletion. 9 beats of NSVT,  Pt's b/p still labile, Cards suggest to keep maintenance IVFs going, HF suggest dec Mil gtt to 0.15 mcg,   12/13	Pt continues to be hypotensive SBP 70s. 250cc FB x1 given. Lactate wnl.   12/14: 	HF: Lopressor 12.5mg BID, Imdur 30 and isordil 10mg TID ordered as per HF.  	NGT with low outpt. f/u surgery in am for possible NGT removal.    	-night NP: 10B WCT, asymp. f/u AM BMP/Lytes,[  ]  c/w Lopressor   12/15  Primicar gtt inc to 0.2mcg/kg/min           --night NP; SBP 74/44 - 85/43, asympt. Midodrine 5mg po x1 given. f/u HF [ ]   12/16: Hypotension - Midodrine ordered, med managment   12/17: S/P IR swan exchange, hypotension med management       Noted to have leucocytosis       ID consulted     PAST MEDICAL & SURGICAL HISTORY:  Hypothyroidism    Afib    Type II diabetes mellitus    Aortic insufficiency    Mitral insufficiency    CKD (chronic kidney disease)    Cardiomyopathy  Systolic CHF    Hypertension    History of tonsillectomy  childhood    AICD (automatic cardioverter/defibrillator) present  8/2012        Social history:  denies    Smoking,    ETOH,      IVDU       FAMILY HISTORY:  Family history of CVA    - Family history of medical problems in all first degree relatives reviewed.None of these were found to be related to patients current illness.    REVIEW OF SYSTEMS  General:+ malaise fatigue no  chills. Fevers absent    Skin:No rash  	  Ophthalmologic:Denies any visual complaints,discharge redness or photophobia  	  ENMT:No nasal discharge,headache,sinus congestion or throat pain.No dental complaints    Respiratory and Thorax:No cough,sputum or chest pain.Denies shortness of breath  	  Cardiovascular:	No chest pain,palpitaions or dizziness    Gastrointestinal:	NO nausea,abdominal pain or diarrhea.    Genitourinary:	No dysuria,frequency. No flank pain    Musculoskeletal:	No joint swelling or pain.No weakness    Neurological:No confusion,diziness.No extremity weakness.No bladder or bowel incontinence	    Psychiatric:No delusions or hallucinations	    Hematology/Lymphatics:	No LN swelling.No gum bleeding     Endocrine:	No recent weight gain or loss.No abnormal heat/cold intolerance    Allergic/Immunologic:	No hives or rash   Allergies    No Known Allergies    Intolerances        Antimicrobials:        Prior Antimicrobials:  MEDICATIONS  (STANDING):      Other medications reviewed  MEDICATIONS  (STANDING):  aMIOdarone    Tablet 200 milliGRAM(s) Oral daily  BACItracin   Ointment 1 Application(s) Topical two times a day  benzocaine 15 mG/menthol 3.6 mG (Sugar-Free) Lozenge 1 Lozenge Oral two times a day  chlorhexidine 4% Liquid 1 Application(s) Topical <User Schedule>  dextrose 5% + sodium chloride 0.45%. 1000 milliLiter(s) (50 mL/Hr) IV Continuous <Continuous>  fluticasone propionate 50 MICROgram(s)/spray Nasal Spray 1 Spray(s) Both Nostrils two times a day  levothyroxine Injectable 25 MICROGram(s) IV Push at bedtime  melatonin 3 milliGRAM(s) Oral at bedtime  metoprolol succinate ER 25 milliGRAM(s) Oral daily  midodrine. 2.5 milliGRAM(s) Oral three times a day  milrinone Infusion 0.2 MICROgram(s)/kG/Min (4.32 mL/Hr) IV Continuous <Continuous>        Vital Signs Last 24 Hrs  T(C): 36.7 (18 Dec 2020 11:54), Max: 37.6 (18 Dec 2020 00:16)  T(F): 98.1 (18 Dec 2020 11:54), Max: 99.7 (18 Dec 2020 00:16)  HR: 117 (18 Dec 2020 11:54) (103 - 118)  BP: 89/54 (18 Dec 2020 11:54) (81/43 - 90/51)  BP(mean): --  RR: 18 (18 Dec 2020 11:54) (16 - 18)  SpO2: 94% (18 Dec 2020 11:54) (93% - 99%)    PHYSICAL EXAM:Pleasant patient in no acute distress.      Constitutional:Comfortable.Awake and alert  + cachexia     Eyes:PERRL EOMI.NO discharge or conjunctival injection    Right SC mediport no erythema or tenderness    Left SC PPM no erythema or tenderness    ENMT:No sinus tenderness.No thrush.No pharyngeal exudate or erythema.Fair dental hygiene    Neck:Supple,No LN,+ JVD      Respiratory:Good air entry bilaterally,bibasilar crackles     Cardiovascular:S1 S2 wnl, ESM no rub or gallops    Gastrointestinal:Soft BS(+) no tenderness no masses ,No rebound or guarding    Genitourinary:No CVA tendereness         Extremities:No cyanosis,clubbing or edema.    Vascular:peripheral pulses felt    Neurological:AAO X 3,No grossly focal deficits        Lymph Nodes:No palpable LNs    Musculoskeletal:No joint swelling or LOM    Psychiatric:Affect normal.          Labs:                            7.5    14.15 )-----------( 101      ( 17 Dec 2020 06:04 )             24.1       WBC Count: 14.15 (12-17-20 @ 06:04)  WBC Count: 10.92 (12-16-20 @ 02:57)  WBC Count: 7.70 (12-15-20 @ 04:29)  WBC Count: 5.56 (12-14-20 @ 06:37)  WBC Count: 7.60 (12-13-20 @ 06:36)  WBC Count: 7.52 (12-13-20 @ 02:37)  WBC Count: 4.31 (12-12-20 @ 06:12)    12-18    128<L>  |  94<L>  |  125<H>  ----------------------------<  236<H>  3.9   |  17<L>  |  5.20<H>    Creatinine, Serum: 5.20 mg/dL (12-18-20 @ 05:13)  Creatinine, Serum: 5.22 mg/dL (12-17-20 @ 06:04)  Creatinine, Serum: 5.18 mg/dL (12-16-20 @ 02:57)  Creatinine, Serum: 4.91 mg/dL (12-15-20 @ 04:29)  Creatinine, Serum: 4.94 mg/dL (12-14-20 @ 06:36)      Ca    8.2<L>      18 Dec 2020 05:13    TPro  4.8<L>  /  Alb  2.2<L>  /  TBili  1.4<H>  /  DBili  x   /  AST  18  /  ALT  12  /  AlkPhos  96  12-18      < from: US Abdomen Limited (12.15.20 @ 08:42) >  IMPRESSION:    Normal caliber patent IVC.          < end of copied text >  < from: Xray Chest 1 View- PORTABLE-Urgent (Xray Chest 1 View- PORTABLE-Urgent .) (12.12.20 @ 23:30) >  AP chest. Prior 12/7/2020.  Impression:  Nasogastric tube tip in the stomach.  left AICD reidentified in position. Right PICC line tip in the right subclavian region. Right internal jugular line tip in the right atrium. No pneumothorax. No gross focal infiltrate or pleural effusion. Distended bowel visualized upper abdomen similar to prior.    < end of copied text >  < from: CT Abdomen and Pelvis No Cont (12.11.20 @ 17:25) >  IMPRESSION:  Persistent small bowel obstruction with transition point in the right lower quadrant, in the terminal ileum, as on the prior CT.    Unchanged small volume ascites.    Evolving partially imaged right gluteal hematoma, likely decreased in size.      < from: CT Abdomen and Pelvis w/ Oral Cont (12.08.20 @ 09:51) >    IMPRESSION:  Small bowel obstruction with transition point at the terminal ileum.    Nodular opacities in the lower lobes of uncertain etiology.    Right gluteal intramuscular hematoma.        < end of copied text >    < end of copied text >              Imaging studies(films) were independently reviewed.Findings as detailed in report above

## 2020-12-18 NOTE — PROGRESS NOTE ADULT - PROBLEM SELECTOR PLAN 3
-Most recent baseline Cr ~ 2.5-3, follows with Dr. Rees as outpatient, started on HD this admission for oliguric renal failure  -Nephrology following for HD needs, continue midodrine as above  -Continue frequent monitoring of BMP

## 2020-12-18 NOTE — CONSULT NOTE ADULT - REASON FOR ADMISSION
SBO w/weakness and fall

## 2020-12-18 NOTE — CONSULT NOTE ADULT - PROVIDER SPECIALTY LIST ADULT
Infectious Disease
Intervent Radiology
Surgery
Nephrology
Trauma Surgery
Palliative Care
Heart Failure

## 2020-12-18 NOTE — PROGRESS NOTE ADULT - SUBJECTIVE AND OBJECTIVE BOX
SUBJECTIVE / OVERNIGHT EVENTS: pt seen and examined. c/o fatigue    MEDICATIONS  (STANDING):  aMIOdarone    Tablet 200 milliGRAM(s) Oral daily  BACItracin   Ointment 1 Application(s) Topical two times a day  benzocaine 15 mG/menthol 3.6 mG (Sugar-Free) Lozenge 1 Lozenge Oral two times a day  chlorhexidine 4% Liquid 1 Application(s) Topical <User Schedule>  dextrose 5% + sodium chloride 0.45%. 1000 milliLiter(s) (50 mL/Hr) IV Continuous <Continuous>  fluticasone propionate 50 MICROgram(s)/spray Nasal Spray 1 Spray(s) Both Nostrils two times a day  levothyroxine Injectable 25 MICROGram(s) IV Push at bedtime  melatonin 3 milliGRAM(s) Oral at bedtime  metoprolol succinate ER 25 milliGRAM(s) Oral daily  midodrine. 2.5 milliGRAM(s) Oral three times a day  milrinone Infusion 0.2 MICROgram(s)/kG/Min (4.32 mL/Hr) IV Continuous <Continuous>    MEDICATIONS  (PRN):  acetaminophen   Tablet .. 650 milliGRAM(s) Oral every 6 hours PRN Mild Pain (1 - 3)  midodrine. 5 milliGRAM(s) Oral <User Schedule> PRN 30mins prior to HD  sodium chloride 0.9% lock flush 10 milliLiter(s) IV Push every 1 hour PRN Pre/post blood products, medications, blood draw, and to maintain line patency    Vital Signs Last 24 Hrs  T(C): 36.7 (18 Dec 2020 11:54), Max: 37.6 (18 Dec 2020 00:16)  T(F): 98.1 (18 Dec 2020 11:54), Max: 99.7 (18 Dec 2020 00:16)  HR: 117 (18 Dec 2020 11:54) (103 - 118)  BP: 89/54 (18 Dec 2020 11:54) (81/43 - 90/51)  BP(mean): --  RR: 18 (18 Dec 2020 11:54) (16 - 18)  SpO2: 94% (18 Dec 2020 11:54) (93% - 99%)    Eyes: No recent vision problems or eye pain.  ENT: No congestion, ear pain, or sore throat.  Cardiovascular: No chest pain or palpation.  Respiratory: No cough, shortness of breath, congestion, or wheezing.  Gastrointestinal: +abdominal pain, nausea, vomiting, or diarrhea.  Genitourinary: No dysuria.  Musculoskeletal: No joint swelling.  Neurologic: No headache.    PHYSICAL EXAM:  CHEST/LUNG: dec breath sounds at bases   HEART:  S1 , S2 +  ABDOMEN: soft , bs+, mild distension +  EXTREMITIES:  edema+  NEUROLOGY:alert awake oriented     LABS:      128<L>  |  94<L>  |  125<H>  ----------------------------<  236<H>  3.9   |  17<L>  |  5.20<H>    Ca    8.2<L>      18 Dec 2020 05:13    TPro  4.8<L>  /  Alb  2.2<L>  /  TBili  1.4<H>  /  DBili      /  AST  18  /  ALT  12  /  AlkPhos  96      Creatinine Trend: 5.20 <--, 5.22 <--, 5.18 <--, 4.91 <--, 4.94 <--, 4.25 <--, 4.14 <--, 3.60 <--, 5.00 <--                        7.5    14.15 )-----------( 101      ( 17 Dec 2020 06:04 )             24.1     Urine Studies:  Urinalysis Basic - ( 17 Dec 2020 18:01 )    Color: Yellow / Appearance: Clear / S.014 / pH:   Gluc:  / Ketone: Negative  / Bili: Negative / Urobili: 3 mg/dL   Blood:  / Protein: Trace / Nitrite: Negative   Leuk Esterase: Negative / RBC:  / WBC    Sq Epi:  / Non Sq Epi:  / Bacteria:       Osmolality, Random Urine: 307 mosm/Kg ( @ 18:01)  Chloride, Random Urine: <35 mmol/L ( @ 16:30)  Creatinine, Random Urine: 105 mg/dL ( @ 16:30)  Sodium, Random Urine: <35 mmol/L ( @ 16:30)  Potassium, Random Urine: 42 mmol/L ( @ 16:30)          LIVER FUNCTIONS - ( 18 Dec 2020 05:13 )  Alb: 2.2 g/dL / Pro: 4.8 g/dL / ALK PHOS: 96 U/L / ALT: 12 U/L / AST: 18 U/L / GGT: x

## 2020-12-18 NOTE — PROGRESS NOTE ADULT - SUBJECTIVE AND OBJECTIVE BOX
SUBJECTIVE AND OBJECTIVE: Pt sleeping in bed  INTERVAL HPI/OVERNIGHT EVENTS: rising wbc's    DNR on chart: Yes      Allergies    No Known Allergies    Intolerances    MEDICATIONS  (STANDING):  aMIOdarone    Tablet 200 milliGRAM(s) Oral daily  BACItracin   Ointment 1 Application(s) Topical two times a day  benzocaine 15 mG/menthol 3.6 mG (Sugar-Free) Lozenge 1 Lozenge Oral two times a day  chlorhexidine 4% Liquid 1 Application(s) Topical <User Schedule>  dextrose 5% + sodium chloride 0.45%. 1000 milliLiter(s) (50 mL/Hr) IV Continuous <Continuous>  fluticasone propionate 50 MICROgram(s)/spray Nasal Spray 1 Spray(s) Both Nostrils two times a day  levothyroxine Injectable 25 MICROGram(s) IV Push at bedtime  melatonin 3 milliGRAM(s) Oral at bedtime  metoprolol succinate ER 25 milliGRAM(s) Oral daily  midodrine. 2.5 milliGRAM(s) Oral three times a day  milrinone Infusion 0.2 MICROgram(s)/kG/Min (4.32 mL/Hr) IV Continuous <Continuous>    MEDICATIONS  (PRN):  acetaminophen   Tablet .. 650 milliGRAM(s) Oral every 6 hours PRN Mild Pain (1 - 3)  midodrine. 5 milliGRAM(s) Oral <User Schedule> PRN 30mins prior to HD  sodium chloride 0.9% lock flush 10 milliLiter(s) IV Push every 1 hour PRN Pre/post blood products, medications, blood draw, and to maintain line patency      ITEMS UNCHECKED ARE NOT PRESENT    PRESENT SYMPTOMS: [ ]Unable to obtain due to poor mentation   Source if other than patient:  [ ]Family   [ ]Team     Pain:  [ ]yes [x ]no  QOL impact -   Location -                    Aggravating factors -  Quality -  Radiation -  Timing-  Severity (0-10 scale):  Minimal acceptable level (0-10 scale):     Dyspnea:                           [ ]Mild [ ]Moderate [ ]Severe  Anxiety:                             [ ]Mild [ ]Moderate [ ]Severe  Fatigue:                             [ ]Mild [x ]Moderate [ ]Severe  Nausea:                             [ ]Mild [ ]Moderate [ ]Severe  Loss of appetite:              [ ]Mild [ ]Moderate [ ]Severe  Constipation:                    [ ]Mild [ ]Moderate [ ]Severe    PAIN AD Score:	  [email or web address] (Ctrl + left click to view)    Other Symptoms:  [ ]All other review of systems negative     Palliative Performance Status Version 2:  40       %      [email or web address]  PHYSICAL EXAM:  Vital Signs Last 24 Hrs  T(C): 36.7 (18 Dec 2020 11:54), Max: 37.6 (18 Dec 2020 00:16)  T(F): 98.1 (18 Dec 2020 11:54), Max: 99.7 (18 Dec 2020 00:16)  HR: 117 (18 Dec 2020 11:54) (103 - 118)  BP: 89/54 (18 Dec 2020 11:54) (81/43 - 90/51)  BP(mean): --  RR: 18 (18 Dec 2020 11:54) (16 - 18)  SpO2: 94% (18 Dec 2020 11:54) (93% - 99%) I&O's Summary    17 Dec 2020 07:01  -  18 Dec 2020 07:00  --------------------------------------------------------  IN: 1479.8 mL / OUT: 0 mL / NET: 1479.8 mL    18 Dec 2020 07:01  -  18 Dec 2020 13:46  --------------------------------------------------------  IN: 120 mL / OUT: 0 mL / NET: 120 mL       GENERAL:  [x ]Alert  [ ]Oriented x   [x ]Lethargic  [ ]Cachexia  [ ]Unarousable  [x ]Verbal  [ ]Non-Verbal    Behavioral:   [ ]Anxiety  [ ]Delirium [ ]Agitation [ ]Other    HEENT:  [ ]Normal   [x ]Dry mouth   [ ]ET Tube/Trach  [ ]Oral lesions    PULMONARY:   [x ]Clear [ ]Tachypnea  [ ]Audible excessive secretions   [ ]Rhonchi        [ ]Right [ ]Left [ ]Bilateral  [ x]Crackles        [ ]Right [x ]Left [x ]Bilateral  [ ]Wheezing     [ ]Right [ ]Left [ ]Bilateral  [x ]Diminished BS [ ] Right [ ]Left [ x]Bilateral    CARDIOVASCULAR:    [ ]Regular [ ]Irregular [x ]Tachy  [ ]Eric [ ]Murmur [ ]Other    GASTROINTESTINAL:  [ x]Soft  [ ]Distended   [x ]+BS  [ ]Non tender [ ]Tender  [ ]PEG [ ]OGT/ NGT   Last BM:     GENITOURINARY:  [ ]Normal [ ]Incontinent   [ ]xOliguria/Anuria   [ ]Guidry   [ ] External cath    MUSCULOSKELETAL:   [ ]Normal   [ ]Weakness  [ ]Bed/Wheelchair bound [x ]Edema    NEUROLOGIC:   [ x]No focal deficits  [ ] Cognitive impairment  [ ] Dysphagia [ ]Dysarthria [ ] Paresis [ ]Other     SKIN:   [ x]Normal  [ ]Rash   [ ]Pressure ulcer(s) [ ]y [ ]n present on admission    CRITICAL CARE:  [ ]Shock Present  [ ]Septic [ ]Cardiogenic [ ]Neurologic [ ]Hypovolemic  [ ]Vasopressors [ ]Inotropes  [ ]Respiratory failure present [ ]Mechanical Ventilation [ ]Non-invasive ventilatory support [ ]High-Flow  [ ]Acute  [ ]Chronic [ ]Hypoxic  [ ]Hypercarbic [ ]Other  [ ]Other organ failure     LABS:                        7.5    15.75 )-----------( 118      ( 18 Dec 2020 13:07 )             23.8   12-18    128<L>  |  94<L>  |  125<H>  ----------------------------<  236<H>  3.9   |  17<L>  |  5.20<H>    Ca    8.2<L>      18 Dec 2020 05:13    TPro  4.8<L>  /  Alb  2.2<L>  /  TBili  1.4<H>  /  DBili  x   /  AST  18  /  ALT  12  /  AlkPhos  96  12-18      Urinalysis Basic - ( 17 Dec 2020 18:01 )    Color: Yellow / Appearance: Clear / S.014 / pH: x  Gluc: x / Ketone: Negative  / Bili: Negative / Urobili: 3 mg/dL   Blood: x / Protein: Trace / Nitrite: Negative   Leuk Esterase: Negative / RBC: x / WBC x   Sq Epi: x / Non Sq Epi: x / Bacteria: x      RADIOLOGY & ADDITIONAL STUDIES:    Protein Calorie Malnutrition Present: [ ]mild [ ]moderate [ ]severe [ ]underweight [ ]morbid obesity  https://www.andeal.org/vault/8710/web/files/ONC/Table_Clinical%20Characteristics%20to%20Document%20Malnutrition-White%20JV%20et%20al%166277.pdf    Height (cm): 175.3 (20 @ 16:53), 172.7 (20 @ 07:21), 177.8 (20 @ 13:41)  Weight (kg): 72 (20 @ 14:14), 81.7 (20 @ 18:57), 75.1 (20 @ 07:21)  BMI (kg/m2): 23.4 (20 @ 14:14), 26.6 (20 @ 18:57), 24.4 (20 @ 16:53)    [ ]PPSV2 < or = 30%  [ ]significant weight loss [ ]poor nutritional intake [ ]anasarca   Albumin, Serum: 2.2 g/dL (20 @ 05:13)   [ ]Artificial Nutrition    REFERRALS:   [ ]Chaplaincy  [ ]Hospice  [ ]Child Life  [ ]Social Work  [ ]Case management [ ]Holistic Therapy     Goals of Care Document:  HUMBERTO Devi (20 @ 17:09)  Goals of Care Conversation:   Participants   · Participants  Patient; Staff  · Provider  Dr Devi.    Advance Directives   · Does patient have Advance Directive  Yes  · Indicate Type  Living Will; Power of   · Are any of the items on the chart  Yes  · Specify which ones are on chart  Living Will  · Does Patient Have a Surrogate  Yes  · Surrogate's Name  Zully Jarquin  · Caregiver:  yes  · Name  Zully Jarquin  · Phone Number  [phone]    Conversation Discussion   · Conversation Details  Patient agree with DNR/I but trial of BIPAP. He also agree with returning to the hospital based on MOLST. Trial of NGT and IVF. Abx if needed. A MOLST was completed and a copy was placed in the chart and the original an a copy were given to the patient.     We discussed hospice services at length and he does not want to establish hospice care right now. He will f/u with Dr. Anglin and decide with her about this type of service. He is to f/u with outpatient palliative care, Dr. Amy Nguyen MD. [address] [place] [address], [address]. [place] [address]. Phone [phone].    Personal Advance Directives Treatment Guidelines:   Treatment Guidelines   · Decision Maker  Patient  · Treatment Guidelines  DNR Order; Artificial nutrition trial; IV fluid trial  · Treatment Guideline Comments  As above    MOLST   · Completed  2020    Location of Discussion:   Duration of Advanced Care Planning Meeting   · Time spent (in minutes)  30    Electronic Signatures for Addendum Section:   Robbie Devi) (Signed Addendum 2020 14:45)    Will sign off. Please call us back if needing help with symptoms Rx, GOC, ACP, or resources.    Electronic Signatures:  Robbie Devi)  (Signed 2020 14:43)  	Authored: Goals of Care Conversation, Personal Advance Directives Treatment Guidelines, Location of Discussion      Last Updated: 2020 14:45 by Robbie Devi)       SUBJECTIVE AND OBJECTIVE: Pt sleeping in bed  INTERVAL HPI/OVERNIGHT EVENTS: rising wbc's    DNR on chart: Yes      Allergies    No Known Allergies    Intolerances    MEDICATIONS  (STANDING):  aMIOdarone    Tablet 200 milliGRAM(s) Oral daily  BACItracin   Ointment 1 Application(s) Topical two times a day  benzocaine 15 mG/menthol 3.6 mG (Sugar-Free) Lozenge 1 Lozenge Oral two times a day  chlorhexidine 4% Liquid 1 Application(s) Topical <User Schedule>  dextrose 5% + sodium chloride 0.45%. 1000 milliLiter(s) (50 mL/Hr) IV Continuous <Continuous>  fluticasone propionate 50 MICROgram(s)/spray Nasal Spray 1 Spray(s) Both Nostrils two times a day  levothyroxine Injectable 25 MICROGram(s) IV Push at bedtime  melatonin 3 milliGRAM(s) Oral at bedtime  metoprolol succinate ER 25 milliGRAM(s) Oral daily  midodrine. 2.5 milliGRAM(s) Oral three times a day  milrinone Infusion 0.2 MICROgram(s)/kG/Min (4.32 mL/Hr) IV Continuous <Continuous>    MEDICATIONS  (PRN):  acetaminophen   Tablet .. 650 milliGRAM(s) Oral every 6 hours PRN Mild Pain (1 - 3)  midodrine. 5 milliGRAM(s) Oral <User Schedule> PRN 30mins prior to HD  sodium chloride 0.9% lock flush 10 milliLiter(s) IV Push every 1 hour PRN Pre/post blood products, medications, blood draw, and to maintain line patency      ITEMS UNCHECKED ARE NOT PRESENT    PRESENT SYMPTOMS: [ ]Unable to obtain due to poor mentation   Source if other than patient:  [ ]Family   [ ]Team     Pain:  [ ]yes [x ]no  QOL impact -   Location -                    Aggravating factors -  Quality -  Radiation -  Timing-  Severity (0-10 scale):  Minimal acceptable level (0-10 scale):     Dyspnea:                           [ ]Mild [ ]Moderate [ ]Severe  Anxiety:                             [ ]Mild [ ]Moderate [ ]Severe  Fatigue:                             [ ]Mild [x ]Moderate [ ]Severe  Nausea:                             [ ]Mild [ ]Moderate [ ]Severe  Loss of appetite:              [ ]Mild [ ]Moderate [ ]Severe  Constipation:                    [ ]Mild [ ]Moderate [ ]Severe    PAIN AD Score:	  [email or web address]/[email or web address] (Ctrl + left click to view)    Other Symptoms:  [ ]All other review of systems negative     Palliative Performance Status Version 2:  40       %      [email or web address]  PHYSICAL EXAM:  Vital Signs Last 24 Hrs  T(C): 36.7 (18 Dec 2020 11:54), Max: 37.6 (18 Dec 2020 00:16)  T(F): 98.1 (18 Dec 2020 11:54), Max: 99.7 (18 Dec 2020 00:16)  HR: 117 (18 Dec 2020 11:54) (103 - 118)  BP: 89/54 (18 Dec 2020 11:54) (81/43 - 90/51)  BP(mean): --  RR: 18 (18 Dec 2020 11:54) (16 - 18)  SpO2: 94% (18 Dec 2020 11:54) (93% - 99%) I&O's Summary    17 Dec 2020 07:01  -  18 Dec 2020 07:00  --------------------------------------------------------  IN: 1479.8 mL / OUT: 0 mL / NET: 1479.8 mL    18 Dec 2020 07:01  -  18 Dec 2020 13:46  --------------------------------------------------------  IN: 120 mL / OUT: 0 mL / NET: 120 mL      GENERAL:  [x ]Alert  [ ]Oriented x   [x ]Lethargic  [ ]Cachexia  [ ]Unarousable  [x ]Verbal  [ ]Non-Verbal    Behavioral:   [ ]Anxiety  [ ]Delirium [ ]Agitation [ ]Other    HEENT:  [ ]Normal   [x ]Dry mouth   [ ]ET Tube/Trach  [ ]Oral lesions    PULMONARY:   [x ]Clear [ ]Tachypnea  [ ]Audible excessive secretions   [ ]Rhonchi        [ ]Right [ ]Left [ ]Bilateral  [ x]Crackles        [ ]Right [x ]Left [x ]Bilateral  [ ]Wheezing     [ ]Right [ ]Left [ ]Bilateral  [x ]Diminished BS [ ] Right [ ]Left [ x]Bilateral    CARDIOVASCULAR:    [ ]Regular [ ]Irregular [x ]Tachy  [ ]Eric [ ]Murmur [ ]Other    GASTROINTESTINAL:  [ x]Soft  [ ]Distended   [x ]+BS  [ ]Non tender [ ]Tender  [ ]PEG [ ]OGT/ NGT   Last BM:     GENITOURINARY:  [ ]Normal [ ]Incontinent   [ ]xOliguria/Anuria   [ ]Guidry   [ ] External cath    MUSCULOSKELETAL:   [ ]Normal   [ ]Weakness  [ ]Bed/Wheelchair bound [x ]Edema    NEUROLOGIC:   [ x]No focal deficits  [ ] Cognitive impairment  [ ] Dysphagia [ ]Dysarthria [ ] Paresis [ ]Other     SKIN:   [ x]Normal  [ ]Rash   [ ]Pressure ulcer(s) [ ]y [ ]n present on admission    CRITICAL CARE:  [ ]Shock Present  [ ]Septic [ ]Cardiogenic [ ]Neurologic [ ]Hypovolemic  [ ]Vasopressors [ ]Inotropes  [ ]Respiratory failure present [ ]Mechanical Ventilation [ ]Non-invasive ventilatory support [ ]High-Flow  [ ]Acute  [ ]Chronic [ ]Hypoxic  [ ]Hypercarbic [ ]Other  [ ]Other organ failure     LABS:                        7.5    15.75 )-----------( 118      ( 18 Dec 2020 13:07 )             23.8   12-18    128<L>  |  94<L>  |  125<H>  ----------------------------<  236<H>  3.9   |  17<L>  |  5.20<H>    Ca    8.2<L>      18 Dec 2020 05:13    TPro  4.8<L>  /  Alb  2.2<L>  /  TBili  1.4<H>  /  DBili  x   /  AST  18  /  ALT  12  /  AlkPhos  96  12-18      Urinalysis Basic - ( 17 Dec 2020 18:01 )    Color: Yellow / Appearance: Clear / S.014 / pH: x  Gluc: x / Ketone: Negative  / Bili: Negative / Urobili: 3 mg/dL   Blood: x / Protein: Trace / Nitrite: Negative   Leuk Esterase: Negative / RBC: x / WBC x   Sq Epi: x / Non Sq Epi: x / Bacteria: x      RADIOLOGY & ADDITIONAL STUDIES:    Protein Calorie Malnutrition Present: [ ]mild [ ]moderate [ ]severe [ ]underweight [ ]morbid obesity  https://www.andeal.org/vault/1420/web/files/ONC/Table_Clinical%20Characteristics%20to%20Document%20Malnutrition-White%20JV%20et%20al%966911.pdf    Height (cm): 175.3 (20 @ 16:53), 172.7 (20 @ 07:21), 177.8 (20 @ 13:41)  Weight (kg): 72 (20 @ 14:14), 81.7 (20 @ 18:57), 75.1 (20 @ 07:21)  BMI (kg/m2): 23.4 (20 @ 14:14), 26.6 (20 @ 18:57), 24.4 (20 @ 16:53)    [ ]PPSV2 < or = 30%  [ ]significant weight loss [ ]poor nutritional intake [ ]anasarca   Albumin, Serum: 2.2 g/dL (20 @ 05:13)   [ ]Artificial Nutrition    REFERRALS:   [ ]Chaplaincy  [ ]Hospice  [ ]Child Life  [ ]Social Work  [ ]Case management [ ]Holistic Therapy     Goals of Care Document:  HUMBERTO Devi (20 @ 17:09)  Goals of Care Conversation:   Participants   · Participants  Patient; Staff  · Provider  Dr Devi.    Advance Directives   · Does patient have Advance Directive  Yes  · Indicate Type  Living Will; Power of   · Are any of the items on the chart  Yes  · Specify which ones are on chart  Living Will  · Does Patient Have a Surrogate  Yes  · Surrogate's Name  Zully Jarquin  · Caregiver:  yes  · Name  Zully Jarquin  · Phone Number  [phone]    Conversation Discussion   · Conversation Details  Patient agree with DNR/I but trial of BIPAP. He also agree with returning to the hospital based on MOLST. Trial of NGT and IVF. Abx if needed. A MOLST was completed and a copy was placed in the chart and the original an a copy were given to the patient.     We discussed hospice services at length and he does not want to establish hospice care right now. He will f/u with Dr. Anglin and decide with her about this type of service. He is to f/u with outpatient palliative care, Dr. Amy Nguyen MD. [address] [place] [address], [address]. [place] [address]. Phone [phone].    Personal Advance Directives Treatment Guidelines:   Treatment Guidelines   · Decision Maker  Patient  · Treatment Guidelines  DNR Order; Artificial nutrition trial; IV fluid trial  · Treatment Guideline Comments  As above    MOLST   · Completed  2020    Location of Discussion:   Duration of Advanced Care Planning Meeting   · Time spent (in minutes)  30    Electronic Signatures for Addendum Section:   Robbie Devi) (Signed Addendum 2020 14:45)    Will sign off. Please call us back if needing help with symptoms Rx, GOC, ACP, or resources.    Electronic Signatures:  Robbie Devi)  (Signed 2020 14:43)  	Authored: Goals of Care Conversation, Personal Advance Directives Treatment Guidelines, Location of Discussion      Last Updated: 2020 14:45 by Robbie Devi)

## 2020-12-18 NOTE — PROGRESS NOTE ADULT - ASSESSMENT
75M PMHx NICM, LVEF on milronine (EF 10-15%, LVIDd 6.7 cm) s/p CRT-D, moderate-severe MR s/p MitraClip 8/2020, HTN, pAFib s/p DCCV 7/20/20 on Eliquis, CKD stage 4 (baseline SCr 2.5-3), DM2 transfer from [facility] to [facility] for further management SBO and mesenteric ischemia.  Nephrology consulted for EDIE on CKD, worsening BUN/Cr plan start HD 12/11/20 for uremia and hyperkalemia.      # EDIE on CKDIV  EDIE on CKD (cardiorenal/DM) likely 2/2 ATN in the setting hypotension, on diuretic lasix/aldactone or pre renal 2/2 volume depletion from vomiting, decrease PO intake  On admission sCr 3.2 on 12/7/20 (baseline sCr 2.5-3.0 in 11/2020), peaked 5.8 on 12/11 with urine sodium <35 consistent with pre renal etiology dehydration/HF. Kidney ultrasound show no hydronephrosis.  Pt initiated on HD on 12/11 for anuric + uremia (BUN/Cr 150/5.87, 2nd HD session 12/12.  Today serum sCr plateaued, stable at 5.2.  Lab noted for Mariluz <35 consistent w/ pre renal etiology dehydration/HF    - recommend switch 1/2 NS D5 to D5 NS fomr intravascular repletion (UA consistent w/ pre renal)  - NO plan for HD today given no absolute/urgent indication, will continue to monitor for renal recovery from ATN  - bladder scan and consider insert landaverde catheter to monitor strict I/O  - monitor BMP, strict I/O, avoid nephrotoxic agents (NSAIDs, PPI, contrast), renally dose medications per HD.    # Hyperkalemia - resolved  Pt with hyperkalemia possibly 2/2 EDIE on CKD and acidosis.  On admission serum K 5.1 worsened to 5.7 (no hemolysis), improved with IVF then worsened again to K 5.6 with uremia on 12/11 thereby initiated on HD 12/11, last serum K 3.9  - low potassium diet, avoid ACEI/ARB/Aldactone given serum K and EDIE, monitor BMP    # Metabolic acidosis  In setting of EDIE on CKD, no lactate. Last serum bicarbonate 17  - monitor serum bicarbonate    # Anemia  Pt with normocytic anemia likely 2/2 ACD CKD and hematoma (intragluteal).  On admission Hgb 10.5, last hgb 7.5, negative FOBT, CT showed right gluteal hematoma (after fall), decreasing in size (CT 12/11)  - monitor CBC, blood transfusion per primary team    If any questions, please feel free to contact me     Javier Junior  Nephrology Fellow  [facility] Pager: [phone]  [facility] Pager: [phone]

## 2020-12-18 NOTE — PROGRESS NOTE ADULT - PROBLEM SELECTOR PLAN 1
persistent sbow/ TP at terminal ileum, likely partial SBO with contrast passing through colon and rectum,  on ct abd   as per surgery   no acute surgical intervention

## 2020-12-18 NOTE — CONSULT NOTE ADULT - CONSULT REASON
leucocytosis
NICM on milrinone, presenting with presyncope in setting of SBO
EDIE on CKD
GOc regarding surgery
Shiley placement
Small bowel obstruction
Trauma

## 2020-12-19 NOTE — PROGRESS NOTE ADULT - PROBLEM SELECTOR PLAN 2
- CT abdomen/pelvis confirms SBO, s/p NGT now removed  - Surgery consulted, appreciate recs  - re: risk stratification for possible ex-lap, patient at high risk for intra-op MACE given advanced HF and CKD->ESRD, discussed with patient who currently would not want to proceed with high risk procedure  - Recommend PT evaluation - CT abdomen/pelvis confirms SBO, s/p NGT now removed  - re: risk stratification for possible ex-lap, patient at high risk for intra-op MACE given advanced HF and CKD->ESRD, discussed with patient who currently would not want to proceed with high risk procedure  - Recommend PT evaluation

## 2020-12-19 NOTE — CHART NOTE - NSCHARTNOTEFT_GEN_A_CORE
Received call from ACP provider on 2 DSU. Per report, patient is hypotensive and lethargic and has been unable to receive HD since 12/12 and heart failure service attending Dr. Anglin spoke with family who would like to symptom-directed focus of care. Patient without any symptoms of pain or dyspnea at this time per ACP provider. Please page [phone] for any acute symptoms. Full evaluation to follow 12/20.     Discussed with Palliative attending Dr. Wu.     Yvette Lua MD  Hospice and Palliative Medicine Fellow   [facility] pager [phone]  [facility] pager [phone] Received call from ACP provider on 2 DSU. Per report, patient is hypotensive and lethargic and has been unable to receive HD since 12/12 and heart failure service attending Dr. Anglin spoke with family who would like to symptom-directed focus of care. Patient without any symptoms of pain or dyspnea at this time per ACP provider. Please page [phone] for any acute symptoms. Full evaluation to follow 12/20.     Discussed with Palliative attending Dr. Wu.     Yvette Lua MD  Hospice and Palliative Medicine Fellow   [facility] pager [phone]  [facility] pager [phone]    Addendum 12/19/20 1557: Dr. Wu spoke with Dr. Anglin this afternoon. AICD deactivated and goal is for symptom-directed therapy. Recommend:  Dilaudid 0.2 mg IV q 4 hr prn pain  Dilaudid 0.2 mg IV q 4 hr prn dyspnea  Ativan 0.25 mg IV q 4 hr prn anxiety or dyspnea not relieved with Dilaudid  Hold parameters for above medications RR less than 11  Zofran 4 mg IV q 8 hr prn nausea and/or vomiting.    Please page if patient requires >3 prn doses of any single medication in 24 hours or less.    Discussed with ACP provider.    Yvette Lua MD  Hospice and Palliative Medicine Fellow   [facility] pager [phone]  [facility] pager [phone]

## 2020-12-19 NOTE — PROGRESS NOTE ADULT - ASSESSMENT
75 year old M with longstanding history of NICM, LVEF < 20% (LVIDd 6.7 cm) s/p CRT-D, moderate-severe MR s/p MitraClip 8/2020, HTN, pAFib s/p DCCV 7/20/20 on Eliquis, CKD stage 3 (b/l SCr 1.7-2), DM 2 (A1c 6.1%) and anemia, transferred from [facility] after fall at home today in setting of nausea and vomiting, found there to have SBO with placement of NG tube.

## 2020-12-19 NOTE — PROGRESS NOTE ADULT - SUBJECTIVE AND OBJECTIVE BOX
Patient is a 75y old  Male who presents with a chief complaint of SBO w/weakness and fall (19 Dec 2020 08:16)    Being followed by ID for leucocytosis    Interval history:Refused blood cx yesterday  again refusing  says I feel fine  hypotensive overnight  denies complaints   No acute events      ROS:  No cough,SOB,CP  No N/V/D./abd pain  No other complaints      Antimicrobials:  vanco X 1 yesterday     Other medications reviewed  MEDICATIONS  (STANDING):  aMIOdarone    Tablet 200 milliGRAM(s) Oral daily  BACItracin   Ointment 1 Application(s) Topical two times a day  benzocaine 15 mG/menthol 3.6 mG (Sugar-Free) Lozenge 1 Lozenge Oral two times a day  chlorhexidine 4% Liquid 1 Application(s) Topical <User Schedule>  dextrose 5% + sodium chloride 0.9%. 1000 milliLiter(s) (50 mL/Hr) IV Continuous <Continuous>  fluticasone propionate 50 MICROgram(s)/spray Nasal Spray 1 Spray(s) Both Nostrils two times a day  levothyroxine 50 MICROGram(s) Oral daily  melatonin 3 milliGRAM(s) Oral at bedtime  metoprolol succinate ER 25 milliGRAM(s) Oral daily  midodrine. 5 milliGRAM(s) Oral three times a day  milrinone Infusion 0.2 MICROgram(s)/kG/Min (4.32 mL/Hr) IV Continuous <Continuous>      Vital Signs Last 24 Hrs  T(C): 36.9 (12-19-20 @ 04:16), Max: 37.2 (12-18-20 @ 20:08)  T(F): 98.4 (12-19-20 @ 04:16), Max: 99 (12-18-20 @ 20:08)  HR: 125 (12-19-20 @ 04:16) (117 - 125)  BP: 84/62 (12-19-20 @ 06:41) (80/43 - 89/65)  BP(mean): --  RR: 16 (12-19-20 @ 04:16) (16 - 18)  SpO2: 93% (12-19-20 @ 04:16) (93% - 98%)    Physical Exam:        HEENT PERRLA EOMI    No oral exudate or erythema    R SC Port, HD cath  no erythema or tenderness    Chest Good AE,CTA    CVS RRR S1 S2 WNl ESM no rub or gallop    Abd soft BS normal No tenderness no masses    IV site no erythema tenderness or discharge    CNS AAO X 3 no focal    Lab Data:                          7.8    17.04 )-----------( 137      ( 19 Dec 2020 05:51 )             25.3     WBC Count: 17.04 (12-19-20 @ 05:51)  WBC Count: 15.75 (12-18-20 @ 13:07)  WBC Count: 14.15 (12-17-20 @ 06:04)  WBC Count: 10.92 (12-16-20 @ 02:57)  WBC Count: 7.70 (12-15-20 @ 04:29)  WBC Count: 5.56 (12-14-20 @ 06:37)  WBC Count: 7.60 (12-13-20 @ 06:36)  WBC Count: 7.52 (12-13-20 @ 02:37)    12-19    130<L>  |  95<L>  |  134<H>  ----------------------------<  185<H>  4.3   |  17<L>  |  5.68<H>    Ca    8.4      19 Dec 2020 05:57    TPro  5.1<L>  /  Alb  2.1<L>  /  TBili  1.6<H>  /  DBili  x   /  AST  17  /  ALT  12  /  AlkPhos  131<H>  12-19        < from: US Abdomen Limited (12.15.20 @ 08:42) >    IMPRESSION:    Normal caliber patent IVC.    < end of copied text >

## 2020-12-19 NOTE — CHART NOTE - NSCHARTNOTEFT_GEN_A_CORE
[facility]    Notified by RN patient SBP 80, but no c/o cp or sob.       Interventions taken     NS 250cc iv bolus x1 with poor response, SBP up to 84  c/w Midodrine   Hold Primacor drip for now  cont assess and monitor  f/u with am team.                    Piyush Faria ANP-BC  Spectralink #[id number]

## 2020-12-19 NOTE — PROGRESS NOTE ADULT - SUBJECTIVE AND OBJECTIVE BOX
Cardiology Progress Note    Interval:    Tele:    Medications:  acetaminophen   Tablet .. 650 milliGRAM(s) Oral every 6 hours PRN  aluminum hydroxide/magnesium hydroxide/simethicone Suspension 30 milliLiter(s) Oral every 4 hours PRN  aMIOdarone    Tablet 200 milliGRAM(s) Oral daily  BACItracin   Ointment 1 Application(s) Topical two times a day  benzocaine 15 mG/menthol 3.6 mG (Sugar-Free) Lozenge 1 Lozenge Oral two times a day  chlorhexidine 4% Liquid 1 Application(s) Topical <User Schedule>  dextrose 5% + sodium chloride 0.9%. 1000 milliLiter(s) IV Continuous <Continuous>  fluticasone propionate 50 MICROgram(s)/spray Nasal Spray 1 Spray(s) Both Nostrils two times a day  levothyroxine 50 MICROGram(s) Oral daily  melatonin 3 milliGRAM(s) Oral at bedtime  metoprolol succinate ER 25 milliGRAM(s) Oral daily  midodrine. 5 milliGRAM(s) Oral <User Schedule> PRN  midodrine. 5 milliGRAM(s) Oral three times a day  milrinone Infusion 0.2 MICROgram(s)/kG/Min IV Continuous <Continuous>  ondansetron Injectable 4 milliGRAM(s) IV Push every 6 hours PRN  sodium chloride 0.9% lock flush 10 milliLiter(s) IV Push every 1 hour PRN      Review of Systems:  Constitutional: [ ] Fever [ ] Chills [ ] Fatigue [ ] Weight change   HEENT: [ ] Blurred vision [ ] Eye Pain [ ] Headache [ ] Runny nose [ ] Sore Throat   Respiratory: [ ] Cough [ ] Wheezing [ ] Shortness of breath  Cardiovascular: [ ] Chest Pain [ ] Palpitations [ ] ALCAZAR [ ] PND [ ] Orthopnea  Gastrointestinal: [ ] Abdominal Pain [ ] Diarrhea [ ] Constipation [ ] Hemorrhoids [ ] Nausea [ ] Vomiting  Genitourinary: [ ] Nocturia [ ] Dysuria [ ] Incontinence  Extremities: [ ] Swelling [ ] Joint Pain  Neurologic: [ ] Focal deficit [ ] Paresthesias [ ] Syncope  Lymphatic: [ ] Swelling [ ] Lymphadenopathy   Skin: [ ] Rash [ ] Ecchymoses [ ] Wounds [ ] Lesions  Psychiatry: [ ] Depression [ ] Suicidal/Homicidal Ideation [ ] Anxiety [ ] Sleep Disturbances  [ ] 10 point review of systems is otherwise negative except as mentioned above            [ ]Unable to obtain    Vitals:  T(C): 36.9 (12-19-20 @ 04:16), Max: 37.2 (12-18-20 @ 20:08)  HR: 125 (12-19-20 @ 04:16) (117 - 125)  BP: 84/62 (12-19-20 @ 06:41) (80/43 - 89/65)  BP(mean): --  RR: 16 (12-19-20 @ 04:16) (16 - 18)  SpO2: 93% (12-19-20 @ 04:16) (93% - 98%)  Wt(kg): --  Daily     Daily   I&O's Summary    18 Dec 2020 07:01  -  19 Dec 2020 07:00  --------------------------------------------------------  IN: 931.8 mL / OUT: 350 mL / NET: 581.8 mL        Physical Exam:  General: NAD  Eye: PERRL, EOMI  HENT: Normal oral mucosa NC/AT  CV: Normal S1/S2, RRR, No M/R/G, no edema, no elevation in JVP  Resp: Normal respiratory effort, clear to auscultation bilaterally, no wheezing, no crackles  Abd: Soft, Non-tender, Non-distended, BS+  Ext: No clubbing, No joint deformity   Neuro: Non-focal, motor and sensory intact  Lymph: No lymphadenopathy  Psych: AAOx3, Mood & affect appropriate  Skin: No rashes, No ecchymoses, No cyanosis    Labs:                        7.8    17.04 )-----------( 137      ( 19 Dec 2020 05:51 )             25.3     12-19    130<L>  |  95<L>  |  134<H>  ----------------------------<  185<H>  4.3   |  17<L>  |  5.68<H>    Ca    8.4      19 Dec 2020 05:57    TPro  5.1<L>  /  Alb  2.1<L>  /  TBili  1.6<H>  /  DBili  x   /  AST  17  /  ALT  12  /  AlkPhos  131<H>  12-19                  New results/imaging:   Cardiology Progress Note    Interval: Pt resting in bed. Flat affect.  Endorsed SOB.    Tele: AV paced    Medications:  acetaminophen   Tablet .. 650 milliGRAM(s) Oral every 6 hours PRN  aluminum hydroxide/magnesium hydroxide/simethicone Suspension 30 milliLiter(s) Oral every 4 hours PRN  aMIOdarone    Tablet 200 milliGRAM(s) Oral daily  BACItracin   Ointment 1 Application(s) Topical two times a day  benzocaine 15 mG/menthol 3.6 mG (Sugar-Free) Lozenge 1 Lozenge Oral two times a day  chlorhexidine 4% Liquid 1 Application(s) Topical <User Schedule>  dextrose 5% + sodium chloride 0.9%. 1000 milliLiter(s) IV Continuous <Continuous>  fluticasone propionate 50 MICROgram(s)/spray Nasal Spray 1 Spray(s) Both Nostrils two times a day  levothyroxine 50 MICROGram(s) Oral daily  melatonin 3 milliGRAM(s) Oral at bedtime  metoprolol succinate ER 25 milliGRAM(s) Oral daily  midodrine. 5 milliGRAM(s) Oral <User Schedule> PRN  midodrine. 5 milliGRAM(s) Oral three times a day  milrinone Infusion 0.2 MICROgram(s)/kG/Min IV Continuous <Continuous>  ondansetron Injectable 4 milliGRAM(s) IV Push every 6 hours PRN  sodium chloride 0.9% lock flush 10 milliLiter(s) IV Push every 1 hour PRN      Review of Systems:  Constitutional: [ ] Fever [ ] Chills [ ] Fatigue [ ] Weight change   HEENT: [ ] Blurred vision [ ] Eye Pain [ ] Headache [ ] Runny nose [ ] Sore Throat   Respiratory: [ ] Cough [ ] Wheezing [ ] Shortness of breath  Cardiovascular: [ ] Chest Pain [ ] Palpitations [ ] ALCAZAR [ ] PND [ ] Orthopnea  Gastrointestinal: [ ] Abdominal Pain [ ] Diarrhea [ ] Constipation [ ] Hemorrhoids [ ] Nausea [ ] Vomiting  Genitourinary: [ ] Nocturia [ ] Dysuria [ ] Incontinence  Extremities: [ ] Swelling [ ] Joint Pain  Neurologic: [ ] Focal deficit [ ] Paresthesias [ ] Syncope  Lymphatic: [ ] Swelling [ ] Lymphadenopathy   Skin: [ ] Rash [ ] Ecchymoses [ ] Wounds [ ] Lesions  Psychiatry: [ ] Depression [ ] Suicidal/Homicidal Ideation [ ] Anxiety [ ] Sleep Disturbances  [ ] 10 point review of systems is otherwise negative except as mentioned above            [ ]Unable to obtain    Vitals:  T(C): 36.9 (12-19-20 @ 04:16), Max: 37.2 (12-18-20 @ 20:08)  HR: 125 (12-19-20 @ 04:16) (117 - 125)  BP: 84/62 (12-19-20 @ 06:41) (80/43 - 89/65)  BP(mean): --  RR: 16 (12-19-20 @ 04:16) (16 - 18)  SpO2: 93% (12-19-20 @ 04:16) (93% - 98%)  Wt(kg): --  Daily     Daily   I&O's Summary    18 Dec 2020 07:01  -  19 Dec 2020 07:00  --------------------------------------------------------  IN: 931.8 mL / OUT: 350 mL / NET: 581.8 mL        Physical Exam:  Appearance: Resting comfortably in bed in room air, no increased work of breathing  HEENT: PERRL  Neck: JVP ~16cm  Cardiovascular: Regular rate and rhythm  Respiratory: Clear to auscultation bilaterally  Gastrointestinal: Soft, +bowel sounds  Skin: No cyanosis  Neurologic: Non-focal  Extremities: No LE edema, warm and well perfused throughout  Psychiatry: Sad affect      Labs:                        7.8    17.04 )-----------( 137      ( 19 Dec 2020 05:51 )             25.3     12-19    130<L>  |  95<L>  |  134<H>  ----------------------------<  185<H>  4.3   |  17<L>  |  5.68<H>    Ca    8.4      19 Dec 2020 05:57    TPro  5.1<L>  /  Alb  2.1<L>  /  TBili  1.6<H>  /  DBili  x   /  AST  17  /  ALT  12  /  AlkPhos  131<H>  12-19                  New results/imaging:

## 2020-12-19 NOTE — PROGRESS NOTE ADULT - SUBJECTIVE AND OBJECTIVE BOX
[facility] DIVISION OF KIDNEY DISEASES AND HYPERTENSION -- FOLLOW UP NOTE  --------------------------------------------------------------------------------  Bipin Johnson   Nephrology Fellow  Pager NS: [phone]/ [facility]: [phone]  (After 5 pm or on weekends please page the on-call fellow)      Patient is a 75y old  Male who presents with a chief complaint of SBO w/weakness and fall (19 Dec 2020 08:57)      24 hour events/subjective: Patient seen and examined at the bedside. Vital signs, labs, medications reviewed. Patient denies CP/SOB/abdominal pain. Nauseous while eating french toast. Does not want to live on dialysis or have dialysis at this time.        PAST HISTORY  --------------------------------------------------------------------------------  No significant changes to PMH, PSH, FHx, SHx, unless otherwise noted    ALLERGIES & MEDICATIONS  --------------------------------------------------------------------------------  Allergies    No Known Allergies    Intolerances      Standing Inpatient Medications  aMIOdarone    Tablet 200 milliGRAM(s) Oral daily  BACItracin   Ointment 1 Application(s) Topical two times a day  benzocaine 15 mG/menthol 3.6 mG (Sugar-Free) Lozenge 1 Lozenge Oral two times a day  cefepime   IVPB 1000 milliGRAM(s) IV Intermittent once  cefepime   IVPB      chlorhexidine 4% Liquid 1 Application(s) Topical <User Schedule>  dextrose 5% + sodium chloride 0.9%. 1000 milliLiter(s) IV Continuous <Continuous>  fluticasone propionate 50 MICROgram(s)/spray Nasal Spray 1 Spray(s) Both Nostrils two times a day  levothyroxine 50 MICROGram(s) Oral daily  melatonin 3 milliGRAM(s) Oral at bedtime  metoprolol succinate ER 25 milliGRAM(s) Oral daily  midodrine. 5 milliGRAM(s) Oral three times a day  milrinone Infusion 0.2 MICROgram(s)/kG/Min IV Continuous <Continuous>    PRN Inpatient Medications  acetaminophen   Tablet .. 650 milliGRAM(s) Oral every 6 hours PRN  aluminum hydroxide/magnesium hydroxide/simethicone Suspension 30 milliLiter(s) Oral every 4 hours PRN  midodrine. 5 milliGRAM(s) Oral <User Schedule> PRN  ondansetron Injectable 4 milliGRAM(s) IV Push every 6 hours PRN  sodium chloride 0.9% lock flush 10 milliLiter(s) IV Push every 1 hour PRN      REVIEW OF SYSTEMS  --------------------------------------------------------------------------------  Gen: No fevers/chills  Skin: No rashes  Head/Eyes/Ears: Normal hearing, no difficulty seeing  Respiratory: No dyspnea, cough  CV: No chest pain  GI: No abdominal pain, diarrhea  : No dysuria, hematuria  MSK: No  edema  Heme: No easy bruising or bleeding  Psych: No significant depression    >>> <<<    VITALS/PHYSICAL EXAM  --------------------------------------------------------------------------------  T(C): 36.9 (12-19-20 @ 04:16), Max: 37.2 (12-18-20 @ 20:08)  HR: 125 (12-19-20 @ 04:16) (117 - 125)  BP: 84/62 (12-19-20 @ 06:41) (80/43 - 89/65)  RR: 16 (12-19-20 @ 04:16) (16 - 18)  SpO2: 93% (12-19-20 @ 04:16) (93% - 98%)  Wt(kg): --    Weight (kg): 72 (12-17-20 @ 14:14)      12-18-20 @ 07:01  -  12-19-20 @ 07:00  --------------------------------------------------------  IN: 931.8 mL / OUT: 350 mL / NET: 581.8 mL      Physical Exam:    	Gen: NAD  	HEENT: Anicteric  	Pulm: CTA B/L  	CV: S1S2  	Abd: Soft, +BS   	MSK: No LE edema B/L  	Neuro: Awake  	Skin: Warm and dry  	Vascular access: PIV      LABS/STUDIES  --------------------------------------------------------------------------------              7.8    17.04 >-----------<  137      [12-19-20 @ 05:51]              25.3     130  |  95  |  134  ----------------------------<  185      [12-19-20 @ 05:57]  4.3   |  17  |  5.68        Ca     8.4     [12-19-20 @ 05:57]    TPro  5.1  /  Alb  2.1  /  TBili  1.6  /  DBili  x   /  AST  17  /  ALT  12  /  AlkPhos  131  [12-19-20 @ 05:57]          Creatinine Trend:  SCr 5.68 [12-19 @ 05:57]  SCr 5.20 [12-18 @ 05:13]  SCr 5.22 [12-17 @ 06:04]  SCr 5.18 [12-16 @ 02:57]  SCr 4.91 [12-15 @ 04:29]    Urinalysis - [12-17-20 @ 18:01]      Color Yellow / Appearance Clear / SG 1.014 / pH 5.0      Gluc Negative / Ketone Negative  / Bili Negative / Urobili 3 mg/dL       Blood Negative / Protein Trace / Leuk Est Negative / Nitrite Negative      RBC  / WBC  / Hyaline  / Gran  / Sq Epi  / Non Sq Epi  / Bacteria     Urine Creatinine 105      [12-17-20 @ 16:30]  Urine Sodium <35      [12-17-20 @ 16:30]  Urine Urea Nitrogen 453      [12-17-20 @ 18:01]  Urine Potassium 42      [12-17-20 @ 16:30]  Urine Chloride <35      [12-17-20 @ 16:30]  Urine Osmolality 307      [12-17-20 @ 18:01]    Iron 36, TIBC 314, %sat 12      [09-08-20 @ 08:45]  Ferritin 145      [09-08-20 @ 08:45]  Vitamin D (25OH) 21.7      [07-18-20 @ 10:13]  TSH 8.61      [11-01-20 @ 20:16]  Lipid: chol 116, TG 55, HDL 48, LDL 58      [08-26-20 @ 00:17]    HBsAb <3.0      [12-12-20 @ 04:04]  HBsAb Nonreact      [12-11-20 @ 15:03]  HBsAg Nonreact      [12-11-20 @ 15:03]  HCV 0.16, Nonreact      [12-12-20 @ 04:04]

## 2020-12-19 NOTE — PROGRESS NOTE ADULT - PROBLEM SELECTOR PLAN 3
-Most recent baseline Cr ~ 2.5-3, follows with Dr. Rees as outpatient, started on HD this admission for oliguric renal failure  -Nephrology following for HD needs, continue midodrine as above  -Continue frequent monitoring of BMP -Most recent baseline Cr ~ 2.5-3, follows with Dr. Rees as outpatient, started on HD this admission for oliguric renal failure  -Nephrology following for HD needs, continue midodrine

## 2020-12-19 NOTE — PROVIDER CONTACT NOTE (OTHER) - ACTION/TREATMENT ORDERED:
SERGIO Vázquez notified and aware. AM dose of lopressor to be held, midodrine and 250mL bolus of NS given. To hold milrinone per provider order.

## 2020-12-19 NOTE — PROGRESS NOTE ADULT - PROBLEM SELECTOR PLAN 4
- Hold AC with Eliquis  - On PO amiodarone - Hold AC with Eliquis  - On PO amiodarone  - EP asked to turn off ICD therapy, pending further discussion regarding GOC as noted above

## 2020-12-19 NOTE — PROGRESS NOTE ADULT - PROBLEM SELECTOR PLAN 1
- Continue milrinone 0.2 mcg/kg/min  - Continue Toprol 25mg daily with no holding parameters  - Increase midodrine to 5mg TID for increased perfusion pressures to kidneys  - Increased volume on exam, will discuss with nephrology utility of diuretic challenge, previously on home torsemide 80mg BID and spironolactone 25mg daily  - Patient DNR/DNI, appreicate palliative assistance while inpatient given worsening clinical status  - Check daily CMP and lactate to trend perfusion  - Strict I/Os, daily standing weights (goal weight ~158 lbs) - Continue milrinone 0.2 mcg/kg/min  - Continue Toprol 25mg daily with no holding parameters  - Patient DNR/DNI, appreicate palliative assistance while inpatient given worsening clinical status  - EP asked to turn off ICD therapy; pending further discussion with palliative/hospice - Continue milrinone 0.2 mcg/kg/min  - Continue Toprol 25mg daily with no holding parameters  - Patient DNR/DNI, appreicate palliative assistance while inpatient given worsening clinical status  - ICD turned off  - no further blood draws per palliative care meeting  - pending transfer to [facility] palliative care unit

## 2020-12-19 NOTE — PROGRESS NOTE ADULT - ASSESSMENT
75M PMHx NICM, LVEF on milronine (EF 10-15%, LVIDd 6.7 cm) s/p CRT-D, moderate-severe MR s/p MitraClip 8/2020, HTN, pAFib s/p DCCV 7/20/20 on Eliquis, CKD stage 4 (baseline SCr 2.5-3), DM2 transfer from [facility] to [facility] for further management SBO and mesenteric ischemia.  Nephrology consulted for EDIE on CKD, worsening BUN/Cr plan start HD 12/11/20 for uremia and hyperkalemia.      # EDIE on CKDIV  EDIE on CKD (cardiorenal/DM) likely 2/2 ATN in the setting hypotension, on diuretic lasix/aldactone or pre renal 2/2 volume depletion from vomiting, decrease PO intake  On admission sCr 3.2 on 12/7/20 (baseline sCr 2.5-3.0 in 11/2020), peaked 5.8 on 12/11 with urine sodium <35 consistent with pre renal etiology dehydration/HF. Kidney ultrasound show no hydronephrosis.  Pt initiated on HD on 12/11 for anuric + uremia (BUN/Cr 150/5.87, 2nd HD session 12/12  - recommend to hold further IVF. Continue to discuss GOC with the patient, appears he is leaning towards hospice and comfort care  - NO plan for HD today given no absolute/urgent indication, will continue to monitor for renal recovery from ATN  - bladder scan per protocol  - monitor BMP, strict I/O, avoid nephrotoxic agents (NSAIDs, PPI, contrast), renally dose medications per HD.    # Hyperkalemia - resolved  Pt with hyperkalemia possibly 2/2 EDIE on CKD and acidosis.  On admission serum K 5.1 worsened to 5.7 (no hemolysis), improved with IVF then worsened again to K 5.6 with uremia on 12/11 thereby initiated on HD 12/11, last serum K 4.3  - low potassium diet, avoid ACEI/ARB/Aldactone given serum K and EDIE, monitor BMP    # Metabolic acidosis  In setting of EDIE on CKD, no lactate. Last serum bicarbonate 17  - monitor serum bicarbonate    # Anemia  Pt with normocytic anemia likely 2/2 ACD CKD and hematoma (intragluteal).  On admission Hgb 10.5, last hgb 7.5, negative FOBT, CT showed right gluteal hematoma (after fall), decreasing in size (CT 12/11)  - monitor CBC, blood transfusion per primary team

## 2020-12-19 NOTE — PROGRESS NOTE ADULT - ASSESSMENT
75 year old M with longstanding history of NICM, LVEF < 20% (LVIDd 6.7 cm) s/p CRT-D, moderate-severe MR s/p MitraClip 8/2020, HTN, pAFib s/p DCCV 7/20/20 on Eliquis, CKD stage 3 (b/l SCr 1.7-2), DM 2 (A1c 6.1%) and anemia, transferred from [facility] after fall at home today in setting of nausea and vomiting, found there to have SBO  Patient also on home milrinone via SC catheter  SBO managed conservatively  Pt feels better  s/p IR catheter exchange  Noted to have leucocytosis  No obvious clinical localization  Hemodynamically stable though has hypotension(chronic)    ? Nosocomial Infection  ? Reactive  ? Non infectious etiology    REc:  A) leucocytosis  send blood Cx  vanco x 1 today + start cefepime 1 gm IV q 24  follow clinically  monitor catheteres  importance of Cx explained to pt       B) Chronic HF  HF team on case  will defer to them on plan      C) CRI  chronic  monitor clinically      D) SBO  surgery on case  conservative management  No intervention for now  abx plan (if any ) as above       Will tailor plan for ID issues  per course,results.Will defer to primary team on management of other issues.  Assessment, plan and recommendations as detailed above were discussed with the medical/primary  team.  Infectious Diseases Service will cover over weekend.  Please call [phone] if issues

## 2020-12-20 NOTE — PROGRESS NOTE ADULT - SUBJECTIVE AND OBJECTIVE BOX
Cardiology Progress Note    Interval: Pt resting comfortably in bed. Flat affect. Noted persistent SOB and cough but overall comfortable.    Tele: AV paced 100's    Medications:  acetaminophen   Tablet .. 650 milliGRAM(s) Oral every 6 hours PRN  aluminum hydroxide/magnesium hydroxide/simethicone Suspension 30 milliLiter(s) Oral every 4 hours PRN  aMIOdarone    Tablet 200 milliGRAM(s) Oral daily  BACItracin   Ointment 1 Application(s) Topical two times a day  benzocaine 15 mG/menthol 3.6 mG (Sugar-Free) Lozenge 1 Lozenge Oral two times a day  chlorhexidine 4% Liquid 1 Application(s) Topical <User Schedule>  fluticasone propionate 50 MICROgram(s)/spray Nasal Spray 1 Spray(s) Both Nostrils two times a day  HYDROmorphone  Injectable 0.2 milliGRAM(s) IV Push every 4 hours PRN  HYDROmorphone  Injectable 0.2 milliGRAM(s) IV Push every 4 hours PRN  levothyroxine 50 MICROGram(s) Oral daily  LORazepam   Injectable 0.25 milliGRAM(s) IV Push every 4 hours PRN  melatonin 3 milliGRAM(s) Oral at bedtime  metoclopramide 10 milliGRAM(s) Oral three times a day before meals  metoprolol succinate ER 25 milliGRAM(s) Oral daily  midodrine. 5 milliGRAM(s) Oral <User Schedule> PRN  midodrine. 5 milliGRAM(s) Oral three times a day  milrinone Infusion 0.2 MICROgram(s)/kG/Min IV Continuous <Continuous>  ondansetron Injectable 4 milliGRAM(s) IV Push every 6 hours PRN  simethicone 80 milliGRAM(s) Chew two times a day PRN  sodium chloride 0.9% lock flush 10 milliLiter(s) IV Push every 1 hour PRN  zaleplon 5 milliGRAM(s) Oral at bedtime PRN      Review of Systems:  Constitutional: [ ] Fever [ ] Chills [ ] Fatigue [ ] Weight change   HEENT: [ ] Blurred vision [ ] Eye Pain [ ] Headache [ ] Runny nose [ ] Sore Throat   Respiratory: [ ] Cough [ ] Wheezing [ ] Shortness of breath  Cardiovascular: [ ] Chest Pain [ ] Palpitations [ ] ALCAZAR [ ] PND [ ] Orthopnea  Gastrointestinal: [ ] Abdominal Pain [ ] Diarrhea [ ] Constipation [ ] Hemorrhoids [ ] Nausea [ ] Vomiting  Genitourinary: [ ] Nocturia [ ] Dysuria [ ] Incontinence  Extremities: [ ] Swelling [ ] Joint Pain  Neurologic: [ ] Focal deficit [ ] Paresthesias [ ] Syncope  Lymphatic: [ ] Swelling [ ] Lymphadenopathy   Skin: [ ] Rash [ ] Ecchymoses [ ] Wounds [ ] Lesions  Psychiatry: [ ] Depression [ ] Suicidal/Homicidal Ideation [ ] Anxiety [ ] Sleep Disturbances  [ ] 10 point review of systems is otherwise negative except as mentioned above            [ ]Unable to obtain    Vitals:  T(C): 36.8 (20 @ 14:06), Max: 37.2 (20 @ 20:34)  HR: 117 (20 @ 14:06) (61 - 125)  BP: 93/59 (20 @ 14:06) (85/50 - 93/59)  BP(mean): --  RR: 16 (20 @ 14:06) (16 - 17)  SpO2: 92% (20 @ 14:06) (92% - 93%)  Wt(kg): --  Daily     Daily Weight in k.8 (20 Dec 2020 07:15)  I&O's Summary    19 Dec 2020 07:01  -  20 Dec 2020 07:00  --------------------------------------------------------  IN: 283 mL / OUT: 30 mL / NET: 253 mL        Physical Exam:  Appearance: Resting comfortably in bed in room air, no increased work of breathing  HEENT: PERRL  Neck: JVP ~16cm  Cardiovascular: Regular rate and rhythm  Respiratory: Clear to auscultation bilaterally  Gastrointestinal: Soft, +bowel sounds  Skin: No cyanosis  Neurologic: Non-focal  Extremities: No LE edema, warm and well perfused throughout  Psychiatry: Sad affect      Labs:                        7.8    17.04 )-----------( 137      ( 19 Dec 2020 05:51 )             25.3     12-19    130<L>  |  95<L>  |  134<H>  ----------------------------<  185<H>  4.3   |  17<L>  |  5.68<H>    Ca    8.4      19 Dec 2020 05:57    TPro  5.1<L>  /  Alb  2.1<L>  /  TBili  1.6<H>  /  DBili  x   /  AST  17  /  ALT  12  /  AlkPhos  131<H>  12-19                  New results/imaging:

## 2020-12-20 NOTE — PROGRESS NOTE ADULT - PROBLEM SELECTOR PLAN 1
.  -ordered Reglan 10mg PO qac ATC for persistent nausea with meals (had a BM this AM, not clinically obstructed)

## 2020-12-20 NOTE — PROGRESS NOTE ADULT - PROBLEM SELECTOR PLAN 2
as per heart failure, Continue milrinone 0.2 mcg/kg/min  - Continue Toprol 25mg daily with no holding parameters  - Patient DNR/DNI, appreicate palliative assistance while inpatient given worsening clinical status  - ICD therapy turned off.

## 2020-12-20 NOTE — PROGRESS NOTE ADULT - SUBJECTIVE AND OBJECTIVE BOX
[facility] Geriatrics and Palliative Care  Jeff Wu, Palliative Care Attending  Contact Info: Page [phone] (including Nights/Weekend), message on Microsoft Teams (Jeff Wu), or leave VM at Palliative Office [phone] (Non-Urgent)    SUBJECTIVE AND OBJECTIVE:  INTERVAL HPI/OVERNIGHT EVENTS: Interval events noted. Patient denies pain but instead endorses GI discomfort due to gas and hiccupping. States that he has trouble keeping food down but liquids are ok. Patient required PRNs of Dilaudid x1 in past 24hrs. No adverse effects of opiates noted: no sedation/dizziness/nausea.    Allergies  No Known Allergies    MEDICATIONS  (STANDING):  aMIOdarone    Tablet 200 milliGRAM(s) Oral daily  BACItracin   Ointment 1 Application(s) Topical two times a day  benzocaine 15 mG/menthol 3.6 mG (Sugar-Free) Lozenge 1 Lozenge Oral two times a day  cefepime   IVPB 1000 milliGRAM(s) IV Intermittent every 24 hours  chlorhexidine 4% Liquid 1 Application(s) Topical <User Schedule>  fluticasone propionate 50 MICROgram(s)/spray Nasal Spray 1 Spray(s) Both Nostrils two times a day  levothyroxine 50 MICROGram(s) Oral daily  melatonin 3 milliGRAM(s) Oral at bedtime  metoclopramide 10 milliGRAM(s) Oral three times a day before meals  metoclopramide Injectable 10 milliGRAM(s) IV Push once  metoprolol succinate ER 25 milliGRAM(s) Oral daily  midodrine. 5 milliGRAM(s) Oral three times a day  milrinone Infusion 0.2 MICROgram(s)/kG/Min (4.32 mL/Hr) IV Continuous <Continuous>    MEDICATIONS  (PRN):  acetaminophen   Tablet .. 650 milliGRAM(s) Oral every 6 hours PRN Mild Pain (1 - 3)  aluminum hydroxide/magnesium hydroxide/simethicone Suspension 30 milliLiter(s) Oral every 4 hours PRN Dyspepsia  HYDROmorphone  Injectable 0.2 milliGRAM(s) IV Push every 4 hours PRN Severe Pain (7 - 10)  HYDROmorphone  Injectable 0.2 milliGRAM(s) IV Push every 4 hours PRN dyspnea  LORazepam   Injectable 0.25 milliGRAM(s) IV Push every 4 hours PRN anxiety or dyspnea not relieved with Dilaudid  midodrine. 5 milliGRAM(s) Oral <User Schedule> PRN 30mins prior to HD  ondansetron Injectable 4 milliGRAM(s) IV Push every 6 hours PRN Nausea and/or Vomiting  simethicone 80 milliGRAM(s) Chew two times a day PRN Gas  sodium chloride 0.9% lock flush 10 milliLiter(s) IV Push every 1 hour PRN Pre/post blood products, medications, blood draw, and to maintain line patency  zaleplon 5 milliGRAM(s) Oral at bedtime PRN Insomnia      ITEMS UNCHECKED ARE NOT PRESENT    PRESENT SYMPTOMS: [ ]Unable to obtain due to poor mentation   Source if other than patient:  [ ]Family   [ ]Team     Pain:  [ ]yes [x]no  QOL impact -   Location -                    Aggravating factors -  Quality -  Radiation -  Timing-  Severity (0-10 scale):  Minimal acceptable level (0-10 scale):     Dyspnea:                           [ ]Mild [ ]Moderate [ ]Severe  Anxiety:                             [ ]Mild [ ]Moderate [ ]Severe  Fatigue:                             [ ]Mild [x]Moderate [ ]Severe  Nausea:                             [x]Mild [ ]Moderate [ ]Severe  Loss of appetite:              [ ]Mild [ ]Moderate [ ]Severe  Constipation:                    [ ]Mild [ ]Moderate [ ]Severe    PAIN AD Score:	  http://geriatrictoolkit.[place].[facility]/cog/painad.pdf (Ctrl + left click to view)    Other Symptoms:  [x]All other review of systems negative     Palliative Performance Status Version 2:  40%  http://npcrc.org/files/news/palliative_performance_scale_ppsv2.pdf    PHYSICAL EXAM:  Vital Signs Last 24 Hrs  T(C): 36.6 (20 Dec 2020 05:03), Max: 37.2 (19 Dec 2020 20:34)  T(F): 97.8 (20 Dec 2020 05:03), Max: 98.9 (19 Dec 2020 20:34)  HR: 120 (20 Dec 2020 05:03) (61 - 125)  BP: 86/51 (20 Dec 2020 05:03) (85/50 - 92/57)  BP(mean): --  RR: 16 (20 Dec 2020 05:03) (16 - 17)  SpO2: 93% (20 Dec 2020 05:03) (92% - 93%) I&O's Summary    19 Dec 2020 07:01  -  20 Dec 2020 07:00  --------------------------------------------------------  IN: 283 mL / OUT: 30 mL / NET: 253 mL      GENERAL:  [x]Alert  [x]Oriented x3   [ ]Lethargic  [ ]Cachexia  [ ]Unarousable  [x ]Verbal  [ ]Non-Verbal    Behavioral:   [ ]Anxiety  [ ]Delirium [ ]Agitation [ ]Other    HEENT:  [ ]Normal   [x]Dry mouth   [ ]ET Tube/Trach  [ ]Oral lesions    PULMONARY:   [ ]Clear [ ]Tachypnea  [ ]Audible excessive secretions   [ ]Rhonchi        [ ]Right [ ]Left [ ]Bilateral  [x]Crackles        [ ]Right [ ]Left [x]Bilateral  [ ]Wheezing     [ ]Right [ ]Left [ ]Bilateral  [x]Diminished BS [ ] Right [ ]Left [x]Bilateral    CARDIOVASCULAR:    [ ]Regular [ ]Irregular [x]Tachy  [ ]Eric [ ]Murmur [ ]Other    GASTROINTESTINAL:  [x]Soft  [x]Distended   [x]+BS  [ ]Non tender [ ]Tender  [ ]PEG [ ]OGT/ NGT   Last BM: 12/20    GENITOURINARY:  [ ]Normal [ ]Incontinent   [x]Oliguria/Anuria   [ ]Guidry   [ ] External cath    MUSCULOSKELETAL:   [ ]Normal   [ ]Weakness  [x]Bed/Wheelchair bound [x]Edema    NEUROLOGIC:   [x]No focal deficits  [ ] Cognitive impairment  [ ] Dysphagia [ ]Dysarthria [ ] Paresis [ ]Other     SKIN:   [x]Normal  [ ]Rash   [ ]Pressure ulcer(s) [ ]y [ ]n present on admission    CRITICAL CARE:  [ ]Shock Present  [ ]Septic [ ]Cardiogenic [ ]Neurologic [ ]Hypovolemic  [ ]Vasopressors [x]Inotropes  [ ]Respiratory failure present [ ]Mechanical Ventilation [ ]Non-invasive ventilatory support [ ]High-Flow  [ ]Acute  [ ]Chronic [ ]Hypoxic  [ ]Hypercarbic [ ]Other  [ ]Other organ failure      LABS:                         7.8    17.04 )-----------( 137      ( 19 Dec 2020 05:51 )             25.3   12-19    130<L>  |  95<L>  |  134<H>  ----------------------------<  185<H>  4.3   |  17<L>  |  5.68<H>    Ca    8.4      19 Dec 2020 05:57  TPro  5.1<L>  /  Alb  2.1<L>  /  TBili  1.6<H>  /  DBili  x   /  AST  17  /  ALT  12  /  AlkPhos  131<H>  12-19      RADIOLOGY & ADDITIONAL STUDIES: None new    PROTEIN CALORIE MALNUTRITION PRESENT: [ ]mild [ ]moderate [ ]severe [ ]underweight [ ]morbid obesity  [] PPSV2 < or = 30% [] significant weight loss [] poor nutritional intake [] anasarca [] catabolic state   Albumin, Serum: 2.1 g/dL (12-19-20 @ 05:57)   Artificial Nutrition []     REFERRALS:   []Chaplaincy  []Hospice  []Child Life  [x]Social Work  []Case management []Holistic Therapy []Physical Therapy []Dietary

## 2020-12-20 NOTE — PROGRESS NOTE ADULT - ASSESSMENT
Full Note to Follow.    ·	ordered Reglan 10mg IV qac ATC for persistent nausea with meals  ·	ordered Zolpidem 5mg PO qhs PRN for Insomnia (patient is chronically prescribed) Full Note to Follow.    ·	ordered Reglan 10mg PO qac ATC for persistent nausea with meals (had a BM this AM, not clinically obstructed)  ·	ordered Zaleplon 5mg PO qhs PRN for Insomnia (patient is chronically prescribed Zolpidem as outpatient)  ·	continue with Dilaudid 0.2mg IV q4h PRN for Pain/Dyspnea (not an issue at this time)    Emotional support provided, all questions answered. Full Note to Follow.    ·	ordered Reglan 10mg PO qac ATC for persistent nausea with meals (had a BM this AM, not clinically obstructed)  ·	ordered Zaleplon 5mg PO qhs PRN for Insomnia (patient is chronically prescribed Zolpidem as outpatient)  ·	continue with Dilaudid 0.2mg IV q4h PRN for Pain/Dyspnea (not an issue at this time)    Emotional support provided, all questions answered.  Patient reaffirms symptom-directed approach; MOLST reviewed, DNR/DNI. 74yo M with PMH of NICM (EF <20%) and CKD transferred from OSH for syncope and found to have SBO, medically managed. Palliative consulted for complex symptom management.    ·	ordered Reglan 10mg PO qac ATC for persistent nausea with meals (had a BM this AM, not clinically obstructed)  ·	ordered Zaleplon 5mg PO qhs PRN for Insomnia (patient is chronically prescribed Zolpidem as outpatient)  ·	continue with Dilaudid 0.2mg IV q4h PRN for Pain/Dyspnea (not an issue at this time)    Emotional support provided, all questions answered.  Patient reaffirms symptom-directed approach; MOLST reviewed, DNR/DNI.

## 2020-12-20 NOTE — PROGRESS NOTE ADULT - SUBJECTIVE AND OBJECTIVE BOX
SUBJECTIVE / OVERNIGHT EVENTS: pt seen and examined. c/o fatigue    MEDICATIONS  (STANDING):  aMIOdarone    Tablet 200 milliGRAM(s) Oral daily  BACItracin   Ointment 1 Application(s) Topical two times a day  benzocaine 15 mG/menthol 3.6 mG (Sugar-Free) Lozenge 1 Lozenge Oral two times a day  chlorhexidine 4% Liquid 1 Application(s) Topical <User Schedule>  fluticasone propionate 50 MICROgram(s)/spray Nasal Spray 1 Spray(s) Both Nostrils two times a day  levothyroxine 50 MICROGram(s) Oral daily  melatonin 3 milliGRAM(s) Oral at bedtime  metoclopramide 10 milliGRAM(s) Oral three times a day before meals  metoprolol succinate ER 25 milliGRAM(s) Oral daily  midodrine. 5 milliGRAM(s) Oral three times a day  milrinone Infusion 0.2 MICROgram(s)/kG/Min (4.32 mL/Hr) IV Continuous <Continuous>    MEDICATIONS  (PRN):  acetaminophen   Tablet .. 650 milliGRAM(s) Oral every 6 hours PRN Mild Pain (1 - 3)  aluminum hydroxide/magnesium hydroxide/simethicone Suspension 30 milliLiter(s) Oral every 4 hours PRN Dyspepsia  HYDROmorphone  Injectable 0.2 milliGRAM(s) IV Push every 4 hours PRN Severe Pain (7 - 10)  HYDROmorphone  Injectable 0.2 milliGRAM(s) IV Push every 4 hours PRN dyspnea  LORazepam   Injectable 0.25 milliGRAM(s) IV Push every 4 hours PRN anxiety or dyspnea not relieved with Dilaudid  midodrine. 5 milliGRAM(s) Oral <User Schedule> PRN 30mins prior to HD  ondansetron Injectable 4 milliGRAM(s) IV Push every 6 hours PRN Nausea and/or Vomiting  simethicone 80 milliGRAM(s) Chew two times a day PRN Gas  sodium chloride 0.9% lock flush 10 milliLiter(s) IV Push every 1 hour PRN Pre/post blood products, medications, blood draw, and to maintain line patency  zaleplon 5 milliGRAM(s) Oral at bedtime PRN Insomnia    Vital Signs Last 24 Hrs  T(C): 36.6 (20 Dec 2020 20:21), Max: 36.8 (20 Dec 2020 14:06)  T(F): 97.9 (20 Dec 2020 20:21), Max: 98.3 (20 Dec 2020 14:06)  HR: 107 (20 Dec 2020 20:21) (107 - 120)  BP: 89/52 (20 Dec 2020 20:21) (86/51 - 93/59)  BP(mean): --  RR: 17 (20 Dec 2020 20:21) (16 - 17)  SpO2: 94% (20 Dec 2020 20:21) (92% - 94%)    Eyes: No recent vision problems or eye pain.  ENT: No congestion, ear pain, or sore throat.  Cardiovascular: No chest pain or palpation.  Respiratory: No cough, shortness of breath, congestion, or wheezing.  Gastrointestinal: +abdominal pain, nausea, vomiting, or diarrhea.  Genitourinary: No dysuria.  Musculoskeletal: No joint swelling.  Neurologic: No headache.    PHYSICAL EXAM:  CHEST/LUNG: dec breath sounds at bases   HEART:  S1 , S2 +  ABDOMEN: soft , bs+, mild distension +  EXTREMITIES:  edema+  NEUROLOGY:alert awake oriented     LABS:      130<L>  |  95<L>  |  134<H>  ----------------------------<  185<H>  4.3   |  17<L>  |  5.68<H>    Ca    8.4      19 Dec 2020 05:57    TPro  5.1<L>  /  Alb  2.1<L>  /  TBili  1.6<H>  /  DBili      /  AST  17  /  ALT  12  /  AlkPhos  131<H>      Creatinine Trend: 5.68 <--, 5.20 <--, 5.22 <--, 5.18 <--, 4.91 <--, 4.94 <--                        7.8    17.04 )-----------( 137      ( 19 Dec 2020 05:51 )             25.3     Urine Studies:  Urinalysis Basic - ( 17 Dec 2020 18:01 )    Color: Yellow / Appearance: Clear / S.014 / pH:   Gluc:  / Ketone: Negative  / Bili: Negative / Urobili: 3 mg/dL   Blood:  / Protein: Trace / Nitrite: Negative   Leuk Esterase: Negative / RBC:  / WBC    Sq Epi:  / Non Sq Epi:  / Bacteria:       Osmolality, Random Urine: 307 mosm/Kg ( @ 18:01)  Chloride, Random Urine: <35 mmol/L ( 16:30)  Creatinine, Random Urine: 105 mg/dL ( @ 16:30)  Sodium, Random Urine: <35 mmol/L ( @ 16:30)  Potassium, Random Urine: 42 mmol/L ( 16:30)          LIVER FUNCTIONS - ( 19 Dec 2020 05:57 )  Alb: 2.1 g/dL / Pro: 5.1 g/dL / ALK PHOS: 131 U/L / ALT: 12 U/L / AST: 17 U/L / GGT: x                 LIVER FUNCTIONS - ( 19 Dec 2020 05:57 )  Alb: 2.1 g/dL / Pro: 5.1 g/dL / ALK PHOS: 131 U/L / ALT: 12 U/L / AST: 17 U/L / GGT: x             Chloride, Random Urine: <35 mmol/L ( 16:30)  Creatinine, Random Urine: 105 mg/dL ( @ 16:30)  Sodium, Random Urine: <35 mmol/L ( 16:30)  Potassium, Random Urine: 42 mmol/L ( @ 16:30)          LIVER FUNCTIONS - ( 18 Dec 2020 05:13 )  Alb: 2.2 g/dL / Pro: 4.8 g/dL / ALK PHOS: 96 U/L / ALT: 12 U/L / AST: 18 U/L / GGT: x

## 2020-12-20 NOTE — CHART NOTE - NSCHARTNOTEFT_GEN_A_CORE
ISTOP Reference #[id number]     08/17/2020 08/17/2020 zolpidem tartrate 10 mg tablet  30 30 Schecter, Marli S RPA Insurance [id number]   04/01/2020 07/10/2020 zolpidem tartrate 10 mg tablet  30 30 Schecter, Marli S RPA Insurance [id number]   04/01/2020 06/10/2020 zolpidem tartrate 10 mg tablet  30 30 Schecter, Marli S RPA Insurance [id number]   04/01/2020 05/12/2020 zolpidem tartrate 10 mg tablet  30 30 Schecter, Marli S RPA Insurance [id number]   04/01/2020 04/11/2020 zolpidem tartrate 10 mg tablet  30 30 Schecter, Marli S RPA Insurance [id number]   02/12/2020 03/11/2020 zolpidem tartrate 10 mg tablet  30 30 Cecil Loya MD Insurance [id number]   11/05/2019 03/11/2020 tramadol-acetaminophen 37.5-325 mg tab  60 30 Cecil Loya MD Insurance [id number]   02/12/2020 02/12/2020 zolpidem tartrate 10 mg tablet  30 30 Cecil Loya MD Insurance [id number]   11/07/2019 01/10/2020 zolpidem tartrate 10 mg tablet  30 30 Cecil Loya MD Insurance [id number]   11/05/2019 01/10/2020 tramadol-acetaminophen 37.5-325 mg tab  60 30 Cecil Loya MD Insurance [id number]

## 2020-12-20 NOTE — PROGRESS NOTE ADULT - PROBLEM SELECTOR PLAN 3
-Most recent baseline Cr ~ 2.5-3, follows with Dr. Rees as outpatient, started on HD this admission for oliguric renal failure  -Nephrology following for HD needs, continue midodrine

## 2020-12-20 NOTE — PROGRESS NOTE ADULT - SUBJECTIVE AND OBJECTIVE BOX
SUBJECTIVE / OVERNIGHT EVENTS: pt seen and examined. c/o fatigue    MEDICATIONS  (STANDING):  aMIOdarone    Tablet 200 milliGRAM(s) Oral daily  BACItracin   Ointment 1 Application(s) Topical two times a day  benzocaine 15 mG/menthol 3.6 mG (Sugar-Free) Lozenge 1 Lozenge Oral two times a day  chlorhexidine 4% Liquid 1 Application(s) Topical <User Schedule>  fluticasone propionate 50 MICROgram(s)/spray Nasal Spray 1 Spray(s) Both Nostrils two times a day  levothyroxine 50 MICROGram(s) Oral daily  melatonin 3 milliGRAM(s) Oral at bedtime  metoclopramide 10 milliGRAM(s) Oral three times a day before meals  metoprolol succinate ER 25 milliGRAM(s) Oral daily  midodrine. 5 milliGRAM(s) Oral three times a day  milrinone Infusion 0.2 MICROgram(s)/kG/Min (4.32 mL/Hr) IV Continuous <Continuous>    MEDICATIONS  (PRN):  acetaminophen   Tablet .. 650 milliGRAM(s) Oral every 6 hours PRN Mild Pain (1 - 3)  aluminum hydroxide/magnesium hydroxide/simethicone Suspension 30 milliLiter(s) Oral every 4 hours PRN Dyspepsia  HYDROmorphone  Injectable 0.2 milliGRAM(s) IV Push every 4 hours PRN Severe Pain (7 - 10)  HYDROmorphone  Injectable 0.2 milliGRAM(s) IV Push every 4 hours PRN dyspnea  LORazepam   Injectable 0.25 milliGRAM(s) IV Push every 4 hours PRN anxiety or dyspnea not relieved with Dilaudid  midodrine. 5 milliGRAM(s) Oral <User Schedule> PRN 30mins prior to HD  ondansetron Injectable 4 milliGRAM(s) IV Push every 6 hours PRN Nausea and/or Vomiting  simethicone 80 milliGRAM(s) Chew two times a day PRN Gas  sodium chloride 0.9% lock flush 10 milliLiter(s) IV Push every 1 hour PRN Pre/post blood products, medications, blood draw, and to maintain line patency  zaleplon 5 milliGRAM(s) Oral at bedtime PRN Insomnia    Vital Signs Last 24 Hrs  T(C): 36.6 (20 Dec 2020 20:21), Max: 36.8 (20 Dec 2020 14:06)  T(F): 97.9 (20 Dec 2020 20:21), Max: 98.3 (20 Dec 2020 14:06)  HR: 107 (20 Dec 2020 20:21) (107 - 120)  BP: 89/52 (20 Dec 2020 20:21) (86/51 - 93/59)  BP(mean): --  RR: 17 (20 Dec 2020 20:21) (16 - 17)  SpO2: 94% (20 Dec 2020 20:21) (92% - 94%)    Eyes: No recent vision problems or eye pain.  ENT: No congestion, ear pain, or sore throat.  Cardiovascular: No chest pain or palpation.  Respiratory: No cough, shortness of breath, congestion, or wheezing.  Gastrointestinal: +abdominal pain, nausea, vomiting, or diarrhea.  Genitourinary: No dysuria.  Musculoskeletal: No joint swelling.  Neurologic: No headache.    PHYSICAL EXAM:  CHEST/LUNG: dec breath sounds at bases   HEART:  S1 , S2 +  ABDOMEN: soft , bs+, mild distension +  EXTREMITIES:  edema+  NEUROLOGY:alert awake oriented     LABS:      130<L>  |  95<L>  |  134<H>  ----------------------------<  185<H>  4.3   |  17<L>  |  5.68<H>    Ca    8.4      19 Dec 2020 05:57    TPro  5.1<L>  /  Alb  2.1<L>  /  TBili  1.6<H>  /  DBili      /  AST  17  /  ALT  12  /  AlkPhos  131<H>      Creatinine Trend: 5.68 <--, 5.20 <--, 5.22 <--, 5.18 <--, 4.91 <--, 4.94 <--                        7.8    17.04 )-----------( 137      ( 19 Dec 2020 05:51 )             25.3     Urine Studies:  Urinalysis Basic - ( 17 Dec 2020 18:01 )    Color: Yellow / Appearance: Clear / S.014 / pH:   Gluc:  / Ketone: Negative  / Bili: Negative / Urobili: 3 mg/dL   Blood:  / Protein: Trace / Nitrite: Negative   Leuk Esterase: Negative / RBC:  / WBC    Sq Epi:  / Non Sq Epi:  / Bacteria:       Osmolality, Random Urine: 307 mosm/Kg ( @ 18:01)  Chloride, Random Urine: <35 mmol/L ( 16:30)  Creatinine, Random Urine: 105 mg/dL ( @ 16:30)  Sodium, Random Urine: <35 mmol/L ( @ 16:30)  Potassium, Random Urine: 42 mmol/L ( 16:30)          LIVER FUNCTIONS - ( 19 Dec 2020 05:57 )  Alb: 2.1 g/dL / Pro: 5.1 g/dL / ALK PHOS: 131 U/L / ALT: 12 U/L / AST: 17 U/L / GGT: x             Chloride, Random Urine: <35 mmol/L ( 16:30)  Creatinine, Random Urine: 105 mg/dL ( @ 16:30)  Sodium, Random Urine: <35 mmol/L ( 16:30)  Potassium, Random Urine: 42 mmol/L ( 16:30)          LIVER FUNCTIONS - ( 18 Dec 2020 05:13 )  Alb: 2.2 g/dL / Pro: 4.8 g/dL / ALK PHOS: 96 U/L / ALT: 12 U/L / AST: 18 U/L / GGT: x

## 2020-12-20 NOTE — PROGRESS NOTE ADULT - ASSESSMENT
75 year old M with longstanding history of NICM, LVEF < 20% (LVIDd 6.7 cm) s/p CRT-D, moderate-severe MR s/p MitraClip 8/2020, HTN, pAFib s/p DCCV 7/20/20 on Eliquis, CKD stage 3 (b/l SCr 1.7-2), DM 2 (A1c 6.1%) and anemia, transferred from [facility] after fall at home today in setting of nausea and vomiting, found there to have SBO with placement of NG tube.

## 2020-12-20 NOTE — PROGRESS NOTE ADULT - PROBLEM SELECTOR PLAN 2
.  -ordered Zaleplon 5mg PO qhs PRN for Insomnia (patient is chronically prescribed Zolpidem as outpatient)

## 2020-12-20 NOTE — PROGRESS NOTE ADULT - ASSESSMENT
75 year old M with longstanding history of NICM, LVEF < 20% (LVIDd 6.7 cm) s/p CRT-D, moderate-severe MR s/p MitraClip 8/2020, HTN, pAFib s/p DCCV 7/20/20 on Eliquis, CKD stage 3 (b/l SCr 1.7-2), DM 2 (A1c 6.1%) and anemia, transferred from [facility] after fall at home today in setting of nausea and vomiting, found there to have SBO with placement of NG tube.

## 2020-12-20 NOTE — PROGRESS NOTE ADULT - PROBLEM SELECTOR PLAN 1
- Continue milrinone 0.2 mcg/kg/min  - Continue Toprol 25mg daily with no holding parameters  - Patient DNR/DNI, appreicate palliative assistance while inpatient given worsening clinical status  - ICD therapy turned off

## 2020-12-20 NOTE — PROGRESS NOTE ADULT - PROBLEM SELECTOR PLAN 5
.  Remains on Milrinone as per HF  -weaning as tolerated but certainly providing symptomatic relief  -theoretically, patient could go enroll in home hospice with fixed dose Milrinone via PICC but this would require 24hr caregiver support as dictated by home infusion service  -AICD already deactivated

## 2020-12-20 NOTE — PROGRESS NOTE ADULT - PROBLEM SELECTOR PLAN 2
- CT abdomen/pelvis confirms SBO, s/p NGT now removed  - no further intervention as pt prefers comfort care

## 2020-12-21 NOTE — PROGRESS NOTE ADULT - ASSESSMENT
75 year old M with longstanding history of NICM, LVEF < 20% (LVIDd 6.7 cm) s/p CRT-D, moderate-severe MR s/p MitraClip 8/2020, HTN, pAFib s/p DCCV 7/20/20 on Eliquis, CKD stage 3 (b/l SCr 1.7-2), DM 2 (A1c 6.1%) and anemia, transferred from [facility] after fall at home today in setting of nausea and vomiting, found there to have SBO with placement of NG tube.

## 2020-12-21 NOTE — PROGRESS NOTE ADULT - SUBJECTIVE AND OBJECTIVE BOX
Patient is a 75y old  Male who presents with a chief complaint of SBO w/weakness and fall (20 Dec 2020 14:13)    Being followed by ID for hypotension,leucocytosis    Interval history:patioent for palliative care  No blood draws-all other meds stopped  denies any complaints  No urinary complaints   No acute events      ROS:  No cough,SOB,CP  No N/V/D./abd pain  No other complaints      Antimicrobials:      Other medications reviewed  MEDICATIONS  (STANDING):  aMIOdarone    Tablet 200 milliGRAM(s) Oral daily  BACItracin   Ointment 1 Application(s) Topical two times a day  benzocaine 15 mG/menthol 3.6 mG (Sugar-Free) Lozenge 1 Lozenge Oral two times a day  chlorhexidine 4% Liquid 1 Application(s) Topical <User Schedule>  fluticasone propionate 50 MICROgram(s)/spray Nasal Spray 1 Spray(s) Both Nostrils two times a day  levothyroxine 50 MICROGram(s) Oral daily  melatonin 3 milliGRAM(s) Oral at bedtime  metoclopramide 10 milliGRAM(s) Oral three times a day before meals  metoprolol succinate ER 25 milliGRAM(s) Oral daily  midodrine. 5 milliGRAM(s) Oral three times a day  milrinone Infusion 0.2 MICROgram(s)/kG/Min (4.32 mL/Hr) IV Continuous <Continuous>      Vital Signs Last 24 Hrs  T(C): 36.4 (12-21-20 @ 04:52), Max: 36.8 (12-20-20 @ 14:06)  T(F): 97.5 (12-21-20 @ 04:52), Max: 98.3 (12-20-20 @ 14:06)  HR: 104 (12-21-20 @ 04:52) (104 - 117)  BP: 90/54 (12-21-20 @ 04:52) (89/52 - 95/59)  BP(mean): --  RR: 16 (12-21-20 @ 04:52) (16 - 17)  SpO2: 92% (12-21-20 @ 04:52) (92% - 94%)    Physical Exam:      HEENT PERRLA EOMI    No oral exudate or erythema    R SC Port, HD cath  no erythema or tenderness    Chest Good AE,CTA    CVS RRR S1 S2 WNl ESM no rub or gallop    Abd soft BS normal No tenderness no masses    IV site no erythema tenderness or discharge    CNS AAO X 3 no focal      Lab Data:                  Culture - Urine (collected 18 Dec 2020 23:18)  Source: .Urine Clean Catch (Midstream)  Preliminary Report (20 Dec 2020 11:53):    50,000 - 99,000 CFU/mL Gram Negative Rods

## 2020-12-21 NOTE — PROGRESS NOTE ADULT - ASSESSMENT
74 yo M with acute on chronic renal failure no requiring HD. Initally presented with SBO that resolved, developed nausea now with recurrence of SBO s/p NGT removal early today now tolerating clears.  Palliative care called by surgery to discuss possibility of need for surgery.

## 2020-12-21 NOTE — PROGRESS NOTE ADULT - PROBLEM SELECTOR PLAN 1
- Continue milrinone 0.2 mcg/kg/min  - Continue Toprol 25mg daily with no holding parameters  - Patient DNR/DNI, appreciate palliative assistance while inpatient given worsening clinical status  - ICD therapy turned off

## 2020-12-21 NOTE — PROGRESS NOTE ADULT - PROBLEM SELECTOR PLAN 1
persistent sbow/ TP at terminal ileum, likely partial SBO with contrast passing through colon and rectum,  on ct abd   as per surgery, no acute surgical intervention

## 2020-12-21 NOTE — PROGRESS NOTE ADULT - PROBLEM SELECTOR PLAN 4
pt is dnr/dni  continue milrinone gtt for comfort  no further medical interventions  will discuss discharge planning

## 2020-12-21 NOTE — PROGRESS NOTE ADULT - PROBLEM SELECTOR PLAN 3
goal is for home with hospice but when speaking with niece she became overwhelmed and stated her uncle needed to make the decision about privately hiring  will have cm reach out to pt and discuss

## 2020-12-21 NOTE — PROGRESS NOTE ADULT - PROBLEM SELECTOR PLAN 3
-Most recent baseline Cr ~ 2.5-3, follows with Dr. Rees as outpatient, started on HD this admission for oliguric renal failure  - Now off HD  -Continue midodrine

## 2020-12-21 NOTE — PROGRESS NOTE ADULT - ASSESSMENT
75 year old M with longstanding history of NICM, LVEF < 20% (LVIDd 6.7 cm) s/p CRT-D, moderate-severe MR s/p MitraClip 8/2020, HTN, pAFib s/p DCCV 7/20/20 on Eliquis, CKD stage 3 (b/l SCr 1.7-2), DM 2 (A1c 6.1%) and anemia, transferred from [facility] after fall at home today in setting of nausea and vomiting, found there to have SBO  Patient also on home milrinone via SC catheter  SBO managed conservatively  Pt feels better  s/p IR catheter exchange  Noted to have leucocytosis  No obvious clinical localization  withdrawing acre  No labs  Bacteriuria-denies urinary complaints  refused blood Cx  clinically stable  Continue care per primary team,palliative  Antimicrobials( at present likely not indicated though dont have blood Cx or other data to guide decision)-unlikley to alter prognosis  Patient for palliation  will defer to primary team,palliative   ID will follow as needed,please call [phone] if any questions or issues.

## 2020-12-21 NOTE — PROGRESS NOTE ADULT - SUBJECTIVE AND OBJECTIVE BOX
Subjective:      Medications:  acetaminophen   Tablet .. 650 milliGRAM(s) Oral every 6 hours PRN  aluminum hydroxide/magnesium hydroxide/simethicone Suspension 30 milliLiter(s) Oral every 4 hours PRN  aMIOdarone    Tablet 200 milliGRAM(s) Oral daily  BACItracin   Ointment 1 Application(s) Topical two times a day  benzocaine 15 mG/menthol 3.6 mG (Sugar-Free) Lozenge 1 Lozenge Oral two times a day  chlorhexidine 4% Liquid 1 Application(s) Topical <User Schedule>  fluticasone propionate 50 MICROgram(s)/spray Nasal Spray 1 Spray(s) Both Nostrils two times a day  HYDROmorphone  Injectable 0.2 milliGRAM(s) IV Push every 4 hours PRN  HYDROmorphone  Injectable 0.2 milliGRAM(s) IV Push every 4 hours PRN  levothyroxine 50 MICROGram(s) Oral daily  LORazepam   Injectable 0.25 milliGRAM(s) IV Push every 4 hours PRN  melatonin 3 milliGRAM(s) Oral at bedtime  metoclopramide 10 milliGRAM(s) Oral three times a day before meals  metoprolol succinate ER 25 milliGRAM(s) Oral daily  midodrine. 5 milliGRAM(s) Oral <User Schedule> PRN  midodrine. 5 milliGRAM(s) Oral three times a day  milrinone Infusion 0.2 MICROgram(s)/kG/Min IV Continuous <Continuous>  ondansetron Injectable 4 milliGRAM(s) IV Push every 6 hours PRN  simethicone 80 milliGRAM(s) Chew two times a day PRN  sodium chloride 0.9% lock flush 10 milliLiter(s) IV Push every 1 hour PRN  zaleplon 5 milliGRAM(s) Oral at bedtime PRN      Physical Exam:    Vitals:  Vital Signs Last 24 Hours  T(C): 36.3 (20 @ 13:57), Max: 36.7 (20 @ 00:52)  HR: 99 (20 @ 13:57) (99 - 107)  BP: 85/49 (20 @ 13:57) (85/49 - 95/59)  RR: 16 (20 @ 13:57) (16 - 17)  SpO2: 93% (12-21-20 @ 13:57) (92% - 94%)    Weight in k.9 ( @ 09:54)    I&O's Summary    20 Dec 2020 07:  -  21 Dec 2020 07:00  --------------------------------------------------------  IN: 0 mL / OUT: 100 mL / NET: -100 mL    21 Dec 2020 07:  -  21 Dec 2020 15:27  --------------------------------------------------------  IN: 0 mL / OUT: 150 mL / NET: -150 mL    Tele:    General: No distress. Comfortable.  HEENT: EOM intact.  Neck: Neck supple. JVP not elevated. No masses  Chest: Clear to auscultation bilaterally  CV: Normal S1 and S2. No murmurs, rub, or gallops. Radial pulses normal.  Abdomen: Soft, non-distended, non-tender  Skin: No rashes or skin breakdown  Neurology: Alert and oriented times three. Sensation intact  Psych: Affect normal    Labs:                      Lactate, Blood: 1.1 mmol/L ( @ 05:53)     Subjective:  - Appetite remains poor. All he wanted was ice cream today.   - Denies SOB at rest, abdominal pain, nausea. BM overnight      Medications:  acetaminophen   Tablet .. 650 milliGRAM(s) Oral every 6 hours PRN  aluminum hydroxide/magnesium hydroxide/simethicone Suspension 30 milliLiter(s) Oral every 4 hours PRN  aMIOdarone    Tablet 200 milliGRAM(s) Oral daily  BACItracin   Ointment 1 Application(s) Topical two times a day  benzocaine 15 mG/menthol 3.6 mG (Sugar-Free) Lozenge 1 Lozenge Oral two times a day  chlorhexidine 4% Liquid 1 Application(s) Topical <User Schedule>  fluticasone propionate 50 MICROgram(s)/spray Nasal Spray 1 Spray(s) Both Nostrils two times a day  HYDROmorphone  Injectable 0.2 milliGRAM(s) IV Push every 4 hours PRN  HYDROmorphone  Injectable 0.2 milliGRAM(s) IV Push every 4 hours PRN  levothyroxine 50 MICROGram(s) Oral daily  LORazepam   Injectable 0.25 milliGRAM(s) IV Push every 4 hours PRN  melatonin 3 milliGRAM(s) Oral at bedtime  metoclopramide 10 milliGRAM(s) Oral three times a day before meals  metoprolol succinate ER 25 milliGRAM(s) Oral daily  midodrine. 5 milliGRAM(s) Oral <User Schedule> PRN  midodrine. 5 milliGRAM(s) Oral three times a day  milrinone Infusion 0.2 MICROgram(s)/kG/Min IV Continuous <Continuous>  ondansetron Injectable 4 milliGRAM(s) IV Push every 6 hours PRN  simethicone 80 milliGRAM(s) Chew two times a day PRN  sodium chloride 0.9% lock flush 10 milliLiter(s) IV Push every 1 hour PRN  zaleplon 5 milliGRAM(s) Oral at bedtime PRN      Physical Exam:    Vitals:  Vital Signs Last 24 Hours  T(C): 36.3 (20 @ 13:57), Max: 36.7 (20 @ 00:52)  HR: 99 (20 @ 13:57) (99 - 107)  BP: 85/49 (20 @ 13:57) (85/49 - 95/59)  RR: 16 (20 @ 13:57) (16 - 17)  SpO2: 93% (20 @ 13:57) (92% - 94%)    Weight in k.9 ( @ 09:54)    I&O's Summary    20 Dec 2020 07  -  21 Dec 2020 07:00  --------------------------------------------------------  IN: 0 mL / OUT: 100 mL / NET: -100 mL    21 Dec 2020 07:01  -  21 Dec 2020 15:27  --------------------------------------------------------  IN: 0 mL / OUT: 150 mL / NET: -150 mL    Tele: AV paced 100-110    General: No distress. Comfortable.  HEENT: EOM intact.  Neck: Neck supple. JVP not elevated. No masses  Chest: Clear to auscultation bilaterally  CV: Normal S1 and S2. No murmurs, rub, or gallops. Radial pulses normal.  Abdomen: Soft, non-distended, non-tender  Skin: No rashes or skin breakdown  Neurology: Alert and oriented times three. Sensation intact  Psych: Affect normal    Labs:                      Lactate, Blood: 1.1 mmol/L ( @ 05:53)     Subjective:  - Appetite remains poor. All he wanted was ice cream today.   - Denies SOB at rest, abdominal pain, nausea. BM overnight      Medications:  acetaminophen   Tablet .. 650 milliGRAM(s) Oral every 6 hours PRN  aluminum hydroxide/magnesium hydroxide/simethicone Suspension 30 milliLiter(s) Oral every 4 hours PRN  aMIOdarone    Tablet 200 milliGRAM(s) Oral daily  BACItracin   Ointment 1 Application(s) Topical two times a day  benzocaine 15 mG/menthol 3.6 mG (Sugar-Free) Lozenge 1 Lozenge Oral two times a day  chlorhexidine 4% Liquid 1 Application(s) Topical <User Schedule>  fluticasone propionate 50 MICROgram(s)/spray Nasal Spray 1 Spray(s) Both Nostrils two times a day  HYDROmorphone  Injectable 0.2 milliGRAM(s) IV Push every 4 hours PRN  HYDROmorphone  Injectable 0.2 milliGRAM(s) IV Push every 4 hours PRN  levothyroxine 50 MICROGram(s) Oral daily  LORazepam   Injectable 0.25 milliGRAM(s) IV Push every 4 hours PRN  melatonin 3 milliGRAM(s) Oral at bedtime  metoclopramide 10 milliGRAM(s) Oral three times a day before meals  metoprolol succinate ER 25 milliGRAM(s) Oral daily  midodrine. 5 milliGRAM(s) Oral <User Schedule> PRN  midodrine. 5 milliGRAM(s) Oral three times a day  milrinone Infusion 0.2 MICROgram(s)/kG/Min IV Continuous <Continuous>  ondansetron Injectable 4 milliGRAM(s) IV Push every 6 hours PRN  simethicone 80 milliGRAM(s) Chew two times a day PRN  sodium chloride 0.9% lock flush 10 milliLiter(s) IV Push every 1 hour PRN  zaleplon 5 milliGRAM(s) Oral at bedtime PRN      Physical Exam:    Vitals:  Vital Signs Last 24 Hours  T(C): 36.3 (20 @ 13:57), Max: 36.7 (20 @ 00:52)  HR: 99 (20 @ 13:57) (99 - 107)  BP: 85/49 (20 @ 13:57) (85/49 - 95/59)  RR: 16 (20 @ 13:57) (16 - 17)  SpO2: 93% (20 @ 13:57) (92% - 94%)    Weight in k.9 ( @ 09:54)    I&O's Summary    20 Dec 2020 07  -  21 Dec 2020 07:00  --------------------------------------------------------  IN: 0 mL / OUT: 100 mL / NET: -100 mL    21 Dec 2020 07:01  -  21 Dec 2020 15:27  --------------------------------------------------------  IN: 0 mL / OUT: 150 mL / NET: -150 mL    Tele: AV paced 100-110    General: No distress. Comfortable.  HEENT: EOM intact.  Neck: Neck supple. JVP 6-8 cm H2O. No masses  Chest: Clear to auscultation bilaterally  CV: Regular, Normal S1 and S2. No murmurs, rub, or gallops. Radial pulses normal. No LE edema  Abdomen: Soft, non-distended, non-tender, normoactive BS  Skin: No rashes or skin breakdown  Neurology: Alert and oriented times three. Sensation intact  Psych: Affect normal    Labs:    Lactate, Blood: 1.1 mmol/L ( @ 05:53)

## 2020-12-21 NOTE — PROGRESS NOTE ADULT - SUBJECTIVE AND OBJECTIVE BOX
SUBJECTIVE AND OBJECTIVE:  pt awake alert  eating ice cream with  no pain   INTERVAL HPI/OVERNIGHT EVENTS:    DNR on chart: Yes      Allergies    No Known Allergies    Intolerances    MEDICATIONS  (STANDING):  aMIOdarone    Tablet 200 milliGRAM(s) Oral daily  BACItracin   Ointment 1 Application(s) Topical two times a day  benzocaine 15 mG/menthol 3.6 mG (Sugar-Free) Lozenge 1 Lozenge Oral two times a day  chlorhexidine 4% Liquid 1 Application(s) Topical <User Schedule>  fluticasone propionate 50 MICROgram(s)/spray Nasal Spray 1 Spray(s) Both Nostrils two times a day  levothyroxine 50 MICROGram(s) Oral daily  melatonin 3 milliGRAM(s) Oral at bedtime  metoclopramide 10 milliGRAM(s) Oral three times a day before meals  metoprolol succinate ER 25 milliGRAM(s) Oral daily  midodrine. 5 milliGRAM(s) Oral three times a day  milrinone Infusion 0.2 MICROgram(s)/kG/Min (4.32 mL/Hr) IV Continuous <Continuous>    MEDICATIONS  (PRN):  acetaminophen   Tablet .. 650 milliGRAM(s) Oral every 6 hours PRN Mild Pain (1 - 3)  aluminum hydroxide/magnesium hydroxide/simethicone Suspension 30 milliLiter(s) Oral every 4 hours PRN Dyspepsia  HYDROmorphone  Injectable 0.2 milliGRAM(s) IV Push every 4 hours PRN Severe Pain (7 - 10)  HYDROmorphone  Injectable 0.2 milliGRAM(s) IV Push every 4 hours PRN dyspnea  LORazepam   Injectable 0.25 milliGRAM(s) IV Push every 4 hours PRN anxiety or dyspnea not relieved with Dilaudid  midodrine. 5 milliGRAM(s) Oral <User Schedule> PRN 30mins prior to HD  ondansetron Injectable 4 milliGRAM(s) IV Push every 6 hours PRN Nausea and/or Vomiting  simethicone 80 milliGRAM(s) Chew two times a day PRN Gas  sodium chloride 0.9% lock flush 10 milliLiter(s) IV Push every 1 hour PRN Pre/post blood products, medications, blood draw, and to maintain line patency  zaleplon 5 milliGRAM(s) Oral at bedtime PRN Insomnia      ITEMS UNCHECKED ARE NOT PRESENT    PRESENT SYMPTOMS: [ ]Unable to obtain due to poor mentation   Source if other than patient:  [ ]Family   [ ]Team     Pain (Impact on QOL):  denies  Location:  Minimal acceptable level (0-10 scale):            Aggravating factors:  Quality:  Radiation:  Severity (0-10 scale):    Timing:    Dyspnea:                           [ ]Mild [ ]Moderate [ ]Severe  Anxiety:                             [ ]Mild [ ]Moderate [ ]Severe  Fatigue:                             [ ]Mild [x ]Moderate [ ]Severe  Nausea:                             [ ]Mild [ ]Moderate [ ]Severe  Loss of appetite:              [ ]Mild [ ]Moderate [ ]Severe  Constipation:                    [ ]Mild [ ]Moderate [ ]Severe    PAIN AD Score:	  [email or web address]/[email or web address] (Ctrl + left click to view)    Other Symptoms:  [ x]All other review of systems negative     Karnofsky Performance Score/Palliative Performance Status Version 2:     50    %    http://palliative.info/resource_material/[email or web address].pdf  PHYSICAL EXAM:  Vital Signs Last 24 Hrs  T(C): 36.3 (21 Dec 2020 13:57), Max: 36.7 (21 Dec 2020 00:52)  T(F): 97.3 (21 Dec 2020 13:57), Max: 98.1 (21 Dec 2020 00:52)  HR: 99 (21 Dec 2020 13:57) (99 - 107)  BP: 85/49 (21 Dec 2020 13:57) (85/49 - 95/59)  BP(mean): --  RR: 16 (21 Dec 2020 13:57) (16 - 17)  SpO2: 93% (21 Dec 2020 13:57) (92% - 94%) I&O's Summary    20 Dec 2020 07:01  -  21 Dec 2020 07:00  --------------------------------------------------------  IN: 0 mL / OUT: 100 mL / NET: -100 mL    21 Dec 2020 07:01  -  21 Dec 2020 15:53  --------------------------------------------------------  IN: 0 mL / OUT: 150 mL / NET: -150 mL     GENERAL:  [x ]Alert  [ x]Oriented x 3  [ ]Lethargic  [ ]Cachexia  [ ]Unarousable  [ ]Verbal  [ ]Non-Verbal    Behavioral:   [ ] Anxiety  [ ] Delirium [ ] Agitation [ ] Other    HEENT:  [x ]Normal   [ ]Dry mouth   [ ]ET Tube/Trach  [ ]Oral lesions    PULMONARY:   [ x]Clear [ ]Tachypnea  [ ]Audible excessive secretions   [ ]Rhonchi        [ ]Right [ ]Left [ ]Bilateral  [ ]Crackles        [ ]Right [ ]Left [ ]Bilateral  [ ]Wheezing     [ ]Right [ ]Left [ ]Bilateral    CARDIOVASCULAR:    [ ]Regular [ ]Irregular [x ]Tachy  [ ]Eric [ ]Murmur [ ]Other    GASTROINTESTINAL:  [ x]Soft  [ ]Distended   [ x]+BS  [x ]Non tender [ ]Tender  [ ]PEG [ ]OGT/ NGT   Last BM:    GENITOURINARY:  [ ]Normal [ ] Incontinent   [ ]Oliguria/Anuria   [x ]Guidry    MUSCULOSKELETAL:   [ ]Normal   [x ]Weakness  [ x]Bed/Wheelchair bound [x ]Edema    NEUROLOGIC:   [x ]No focal deficits  [ ] Cognitive impairment  [ ] Dysphagia [ ]Dysarthria [ ] Paresis [ ]Other     SKIN:   [ x]Normal   [ ]Pressure ulcer(s)  [ ]Rash    CRITICAL CARE:  [ ] Shock Present  [ ]Septic [ ]Cardiogenic [ ]Neurologic [ ]Hypovolemic  [ ]  Vasopressors [ ]  Inotropes   [ ] Respiratory failure present  [ ] Acute  [ ] Chronic [ ] Hypoxic  [ ] Hypercarbic [ ] Other  [ ] Other organ failure     LABS:            RADIOLOGY & ADDITIONAL STUDIES:    Protein Calorie Malnutrition Present: [ ] yes [ ] no  [ ] PPSV2 < or = 30%  [ ] significant weight loss [ ] poor nutritional intake [ ] anasarca [ ] catabolic state Albumin, Serum: 2.1 g/dL (12-19-20 @ 05:57)  Artificial Nutrition [ ]     REFERRALS:   [ ]Chaplaincy  [ ] Hospice  [ ]Child Life  [ ]Social Work  [ ]Case management [ ]Holistic Therapy   Goals of Care Document: HUMBERTO Devi (11-17-20 @ 17:09)  Goals of Care Conversation:   Participants   · Participants  Patient; Staff  · Provider  Dr Devi.    Advance Directives   · Does patient have Advance Directive  Yes  · Indicate Type  Living Will; Power of   · Are any of the items on the chart  Yes  · Specify which ones are on chart  Living Will  · Does Patient Have a Surrogate  Yes  · Surrogate's Name  Zully aJrquin  · Caregiver:  yes  · Name  Zully Jarquin  · Phone Number  [phone]    Conversation Discussion   · Conversation Details  Patient agree with DNR/I but trial of BIPAP. He also agree with returning to the hospital based on MOLST. Trial of NGT and IVF. Abx if needed. A MOLST was completed and a copy was placed in the chart and the original an a copy were given to the patient.     We discussed hospice services at length and he does not want to establish hospice care right now. He will f/u with Dr. Anglin and decide with her about this type of service. He is to f/u with outpatient palliative care, Dr. Amy Nguyen MD. [address] [place] [address], [address]. [place] [address]. Phone [phone].    Personal Advance Directives Treatment Guidelines:   Treatment Guidelines   · Decision Maker  Patient  · Treatment Guidelines  DNR Order; Artificial nutrition trial; IV fluid trial  · Treatment Guideline Comments  As above    MOLST   · Completed  17-Nov-2020    Location of Discussion:   Duration of Advanced Care Planning Meeting   · Time spent (in minutes)  30    Electronic Signatures for Addendum Section:   Robbie Devi) (Signed Addendum 18-Nov-2020 14:45)    Will sing off. Please call us back if needing help with symptoms Rx, GOC, ACP, or resources.    Electronic Signatures:  Robbie Devi)  (Signed 18-Nov-2020 14:43)  	Authored: Goals of Care Conversation, Personal Advance Directives Treatment Guidelines, Location of Discussion      Last Updated: 18-Nov-2020 14:45 by Robbie Devi)

## 2020-12-21 NOTE — PROGRESS NOTE ADULT - SUBJECTIVE AND OBJECTIVE BOX
SUBJECTIVE / OVERNIGHT EVENTS: pt seen and examined. c/o fatigue    MEDICATIONS  (STANDING):  aMIOdarone    Tablet 200 milliGRAM(s) Oral daily  BACItracin   Ointment 1 Application(s) Topical two times a day  benzocaine 15 mG/menthol 3.6 mG (Sugar-Free) Lozenge 1 Lozenge Oral two times a day  chlorhexidine 4% Liquid 1 Application(s) Topical <User Schedule>  fluticasone propionate 50 MICROgram(s)/spray Nasal Spray 1 Spray(s) Both Nostrils two times a day  levothyroxine 50 MICROGram(s) Oral daily  melatonin 3 milliGRAM(s) Oral at bedtime  metoclopramide 10 milliGRAM(s) Oral three times a day before meals  metoprolol succinate ER 25 milliGRAM(s) Oral daily  midodrine. 5 milliGRAM(s) Oral three times a day  milrinone Infusion 0.2 MICROgram(s)/kG/Min (4.32 mL/Hr) IV Continuous <Continuous>    MEDICATIONS  (PRN):  acetaminophen   Tablet .. 650 milliGRAM(s) Oral every 6 hours PRN Mild Pain (1 - 3)  aluminum hydroxide/magnesium hydroxide/simethicone Suspension 30 milliLiter(s) Oral every 4 hours PRN Dyspepsia  HYDROmorphone  Injectable 0.2 milliGRAM(s) IV Push every 4 hours PRN Severe Pain (7 - 10)  HYDROmorphone  Injectable 0.2 milliGRAM(s) IV Push every 4 hours PRN dyspnea  LORazepam   Injectable 0.25 milliGRAM(s) IV Push every 4 hours PRN anxiety or dyspnea not relieved with Dilaudid  midodrine. 5 milliGRAM(s) Oral <User Schedule> PRN 30mins prior to HD  ondansetron Injectable 4 milliGRAM(s) IV Push every 6 hours PRN Nausea and/or Vomiting  simethicone 80 milliGRAM(s) Chew two times a day PRN Gas  sodium chloride 0.9% lock flush 10 milliLiter(s) IV Push every 1 hour PRN Pre/post blood products, medications, blood draw, and to maintain line patency  zaleplon 5 milliGRAM(s) Oral at bedtime PRN Insomnia    Vital Signs Last 24 Hrs  T(C): 36.6 (21 Dec 2020 20:37), Max: 36.7 (21 Dec 2020 00:52)  T(F): 97.8 (21 Dec 2020 20:37), Max: 98.1 (21 Dec 2020 00:52)  HR: 96 (21 Dec 2020 20:37) (96 - 107)  BP: 88/55 (21 Dec 2020 20:37) (85/49 - 95/59)  BP(mean): --  RR: 17 (21 Dec 2020 20:37) (16 - 17)  SpO2: 95% (21 Dec 2020 20:37) (92% - 95%)    Eyes: No recent vision problems or eye pain.  ENT: No congestion, ear pain, or sore throat.  Cardiovascular: No chest pain or palpation.  Respiratory: No cough, shortness of breath, congestion, or wheezing.  Gastrointestinal: +abdominal pain, nausea, vomiting, or diarrhea.  Genitourinary: No dysuria.  Musculoskeletal: No joint swelling.  Neurologic: No headache.    PHYSICAL EXAM:  CHEST/LUNG: dec breath sounds at bases   HEART:  S1 , S2 +  ABDOMEN: soft , bs+, mild distension +  EXTREMITIES:  edema+  NEUROLOGY:alert awake oriented     LABS:        Creatinine Trend: 5.68 <--, 5.20 <--, 5.22 <--, 5.18 <--, 4.91 <--    Urine Studies:  Urinalysis Basic - ( 17 Dec 2020 18:01 )    Color: Yellow / Appearance: Clear / S.014 / pH:   Gluc:  / Ketone: Negative  / Bili: Negative / Urobili: 3 mg/dL   Blood:  / Protein: Trace / Nitrite: Negative   Leuk Esterase: Negative / RBC:  / WBC    Sq Epi:  / Non Sq Epi:  / Bacteria:       Osmolality, Random Urine: 307 mosm/Kg ( @ 18:01)  Chloride, Random Urine: <35 mmol/L ( @ 16:30)  Creatinine, Random Urine: 105 mg/dL ( @ 16:30)  Sodium, Random Urine: <35 mmol/L ( @ 16:30)  Potassium, Random Urine: 42 mmol/L ( @ 16:30)

## 2020-12-21 NOTE — PROGRESS NOTE ADULT - ASSESSMENT
75 year old M with longstanding history of NICM, LVEF < 20% (LVIDd 6.7 cm) s/p CRT-D, moderate-severe MR s/p MitraClip 8/2020, HTN, pAFib s/p DCCV 7/20/20 on Eliquis, CKD stage 3 (b/l SCr 1.7-2), DM 2 (A1c 6.1%) and anemia who presented as transfer from OSH in the setting of presyncope/syncope, found to have SBO now s/p NGT for conservative management. Heart failure consulted for additional evaluation and management.  Patient appears euvolemic on exam, unfortunately with worsening renal failure and hyperkalemia, required initiation of dialysis. Now monitoring off HD for return of renal recovery with continued oliguria. His wishes at this time are for comfort measures only. His ICD therapy has been deactivated. He is awaiting discharge home with home hospice services.    Relevant studies:  Echo:  12/15 TTE: LVEF 20-25%, LV 6.4cm, normal RV function, LA 85 cc/m2, increased gradient across MitraClip  11/1/20 TTE: LVEF 10-15%, LV 7cm, LVOT VIT 7cm, severe RV dysfunction, severly dilated atria, mild MR, min AI, severe TR, est RAP 10mmHg, est RVSP 41 mm Hg    Cardiac Cath:    9/15 (milrinone 0.25 mcg/kg/mi): CVP 5 PA 47/13 PCW 13, SVC saturation 68% with Corey CO/CI 5.8/3.1 and TD CO/CI 7/3.7. MAP 70 mmHg

## 2020-12-22 NOTE — PROGRESS NOTE ADULT - PROBLEM SELECTOR PLAN 4
pt is dnr/dni  continue milrinone gtt for comfort  no further medical interventions  pt to be transferred to PCU

## 2020-12-22 NOTE — PROGRESS NOTE ADULT - SUBJECTIVE AND OBJECTIVE BOX
SUBJECTIVE AND OBJECTIVE: complains of abdominal cramps.  +passing gas and had bm last evening.  tolerating po   INTERVAL HPI/OVERNIGHT EVENTS:    DNR on chart: Yes      Allergies    No Known Allergies    Intolerances    MEDICATIONS  (STANDING):  aMIOdarone    Tablet 200 milliGRAM(s) Oral daily  BACItracin   Ointment 1 Application(s) Topical two times a day  benzocaine 15 mG/menthol 3.6 mG (Sugar-Free) Lozenge 1 Lozenge Oral two times a day  chlorhexidine 4% Liquid 1 Application(s) Topical <User Schedule>  fluticasone propionate 50 MICROgram(s)/spray Nasal Spray 1 Spray(s) Both Nostrils two times a day  levothyroxine 50 MICROGram(s) Oral daily  melatonin 3 milliGRAM(s) Oral at bedtime  metoclopramide 10 milliGRAM(s) Oral three times a day before meals  metoprolol succinate ER 25 milliGRAM(s) Oral daily  midodrine. 5 milliGRAM(s) Oral three times a day  milrinone Infusion 0.2 MICROgram(s)/kG/Min (4.32 mL/Hr) IV Continuous <Continuous>  simethicone 80 milliGRAM(s) Chew once    MEDICATIONS  (PRN):  acetaminophen   Tablet .. 650 milliGRAM(s) Oral every 6 hours PRN Mild Pain (1 - 3)  aluminum hydroxide/magnesium hydroxide/simethicone Suspension 30 milliLiter(s) Oral every 4 hours PRN Dyspepsia  HYDROmorphone  Injectable 0.2 milliGRAM(s) IV Push every 4 hours PRN Severe Pain (7 - 10)  HYDROmorphone  Injectable 0.2 milliGRAM(s) IV Push every 4 hours PRN dyspnea  LORazepam   Injectable 0.25 milliGRAM(s) IV Push every 4 hours PRN anxiety or dyspnea not relieved with Dilaudid  midodrine. 5 milliGRAM(s) Oral <User Schedule> PRN 30mins prior to HD  ondansetron Injectable 4 milliGRAM(s) IV Push every 6 hours PRN Nausea and/or Vomiting  simethicone 80 milliGRAM(s) Chew two times a day PRN Gas  sodium chloride 0.9% lock flush 10 milliLiter(s) IV Push every 1 hour PRN Pre/post blood products, medications, blood draw, and to maintain line patency  zaleplon 5 milliGRAM(s) Oral at bedtime PRN Insomnia      ITEMS UNCHECKED ARE NOT PRESENT    PRESENT SYMPTOMS: [ ]Unable to obtain due to poor mentation   Source if other than patient:  [ ]Family   [ ]Team     Pain (Impact on QOL):   yes  Location: abdomen  Minimal acceptable level (0-10 scale):     3/10       Aggravating factors: unknown  Quality:  crampy  Radiation: non  Severity (0-10 scale):  5/10  Timing:  comes and goes    Dyspnea:                           [ ]Mild [ ]Moderate [ ]Severe  Anxiety:                             [ ]Mild [ ]Moderate [ ]Severe  Fatigue:                             [ ]Mild [ ]Moderate [ x]Severe  Nausea:                             [ ]Mild [ ]Moderate [ ]Severe  Loss of appetite:              [ ]Mild [ ]Moderate [ ]Severe  Constipation:                    [ ]Mild [ ]Moderate [ ]Severe    PAIN AD Score:	  [email or web address]/[email or web address] (Ctrl + left click to view)    Other Symptoms:  [x ]All other review of systems negative     Karnofsky Performance Score/Palliative Performance Status Version 2:   40      %    http://palliative.info/resource_material/[email or web address].pdf  PHYSICAL EXAM:  Vital Signs Last 24 Hrs  T(C): 36.6 (22 Dec 2020 04:16), Max: 36.6 (21 Dec 2020 20:37)  T(F): 97.9 (22 Dec 2020 04:16), Max: 97.9 (22 Dec 2020 00:30)  HR: 102 (22 Dec 2020 11:08) (96 - 102)  BP: 99/62 (22 Dec 2020 11:08) (88/55 - 99/62)  BP(mean): --  RR: 17 (22 Dec 2020 04:16) (17 - 17)  SpO2: 94% (22 Dec 2020 04:16) (93% - 95%) I&O's Summary    21 Dec 2020 07:01  -  22 Dec 2020 07:00  --------------------------------------------------------  IN: 0 mL / OUT: 885 mL / NET: -885 mL     GENERAL:  [x ]Alert  [x ]Oriented x 3  [ ]Lethargic  [ ]Cachexia  [ ]Unarousable  [ ]Verbal  [ ]Non-Verbal    Behavioral:   [ ] Anxiety  [ ] Delirium [ ] Agitation [ ] Other    HEENT:  [x ]Normal   [ ]Dry mouth   [ ]ET Tube/Trach  [ ]Oral lesions    PULMONARY:   [x ]Clear [ ]Tachypnea  [ ]Audible excessive secretions   [ ]Rhonchi        [ ]Right [ ]Left [ ]Bilateral  [ ]Crackles        [ ]Right [ ]Left [ ]Bilateral  [ ]Wheezing     [ ]Right [ ]Left [ ]Bilateral    CARDIOVASCULAR:    [ ]Regular [ ]Irregular [x ]Tachy  [ ]Eric [ ]Murmur [ ]Other    GASTROINTESTINAL:  [x ]Soft  [ x]Distended   [ x]+BS  [ ]Non tender [x ]Tender  [ ]PEG [ ]OGT/ NGT   Last BM:    GENITOURINARY:  [ ]Normal [ ] Incontinent   [ x]Oliguria/Anuria   [ ]Guidry    MUSCULOSKELETAL:   [ ]Normal   [x ]Weakness  [ ]Bed/Wheelchair bound [x ]Edema    NEUROLOGIC:   [ x]No focal deficits  [ ] Cognitive impairment  [ ] Dysphagia [ ]Dysarthria [ ] Paresis [ ]Other     SKIN:   [ ]xNormal   [ ]Pressure ulcer(s)  [ ]Rash    CRITICAL CARE:  [ ] Shock Present  [ ]Septic [ ]Cardiogenic [ ]Neurologic [ ]Hypovolemic  [ ]  Vasopressors [ ]  Inotropes   [ ] Respiratory failure present  [ ] Acute  [ ] Chronic [ ] Hypoxic  [ ] Hypercarbic [ ] Other  [ ] Other organ failure     LABS:            RADIOLOGY & ADDITIONAL STUDIES:    Protein Calorie Malnutrition Present: [ ] yes [ ] no  [ ] PPSV2 < or = 30%  [ ] significant weight loss [ ] poor nutritional intake [ ] anasarca [ ] catabolic state Albumin, Serum: 2.1 g/dL (12-19-20 @ 05:57)  Artificial Nutrition [ ]     REFERRALS:   [ ]Chaplaincy  [ ] Hospice  [ ]Child Life  [ ]Social Work  [ ]Case management [ ]Holistic Therapy   Goals of Care Document: HUMBERTO Devi (11-17-20 @ 17:09)  Goals of Care Conversation:   Participants   · Participants  Patient; Staff  · Provider  Dr Devi.    Advance Directives   · Does patient have Advance Directive  Yes  · Indicate Type  Living Will; Power of   · Are any of the items on the chart  Yes  · Specify which ones are on chart  Living Will  · Does Patient Have a Surrogate  Yes  · Surrogate's Name  Zully Jarquin  · Caregiver:  yes  · Name  Zully Jarquin  · Phone Number  [phone]    Conversation Discussion   · Conversation Details  Patient agree with DNR/I but trial of BIPAP. He also agree with returning to the hospital based on MOLST. Trial of NGT and IVF. Abx if needed. A MOLST was completed and a copy was placed in the chart and the original an a copy were given to the patient.     We discussed hospice services at length and he does not want to establish hospice care right now. He will f/u with Dr. Anglin and decide with her about this type of service. He is to f/u with outpatient palliative care, Dr. Amy Nguyen MD. [address] [place] [address], [address]. [place] [address]. Phone [phone].    Personal Advance Directives Treatment Guidelines:   Treatment Guidelines   · Decision Maker  Patient  · Treatment Guidelines  DNR Order; Artificial nutrition trial; IV fluid trial  · Treatment Guideline Comments  As above    MOLST   · Completed  17-Nov-2020    Location of Discussion:   Duration of Advanced Care Planning Meeting   · Time spent (in minutes)  30    Electronic Signatures for Addendum Section:   Robbie Devi) (Signed Addendum 18-Nov-2020 14:45)    Will sing off. Please call us back if needing help with symptoms Rx, GOC, ACP, or resources.    Electronic Signatures:  Robbie Devi)  (Signed 18-Nov-2020 14:43)  	Authored: Goals of Care Conversation, Personal Advance Directives Treatment Guidelines, Location of Discussion      Last Updated: 18-Nov-2020 14:45 by Robbie Devi)

## 2020-12-22 NOTE — PROGRESS NOTE ADULT - PROBLEM SELECTOR PLAN 3
goals for comfort  no escalation of care  no further blood draws  pt is dnr/dni  dilaudid iv 0.2mg prn pain, sob

## 2020-12-23 NOTE — PROGRESS NOTE ADULT - PROBLEM SELECTOR PLAN 1
resolved   continue reglan atc -Abdominal, unclear etiology  -c/w Dilaudid 0.2mg IV, once dose required in 24 hours

## 2020-12-23 NOTE — CHART NOTE - NSCHARTNOTEFT_GEN_A_CORE
Nutrition Follow Up Note  Patient seen for: nutrition follow up    Pt is a 74 yo M with PMH: acute on chronic renal failure, not requiring HD. Initially presented with SBO that resolved; developed nausea now with recurrence of SBO s/p NGT removal early. Nephrology saw pt  for EDIE on CKD, worsening BUN/Cr, uremia and hyperkalemia. Followed by palliative care to determine goals of care.     Source: RN, comprehensive chart review    Diet: consistent carbohydrate/no snacks; dysphagia 3, soft, thin liquids, low sodium + Glucerna Shakes twice daily     Per discussion with RN, reports pt ate "some" of breakfast this AM. Denies specific dietary requests/concerns at this time. Denies GI intolerance. Per nursing flow sheet, noted pt "didn't feel well enough to eat" between  to . Continues to receive Glucerna Shakes 2x daily, with fair intake of same. Last BM 12/21 x 1. On bowel regimen as ordered. Last received HD . Pt currently placed at [facility] for further care at this time.     Daily Weight in k.6 (-), Weight in k.9 (-), Weight in k.8 (-), Weight in k.5 (-), Weight in k.4 (-18), Weight in k.6 (-17)  % Weight Change: Wt typically stable    Pertinent Medications: MEDICATIONS  (STANDING):  aMIOdarone    Tablet 200 milliGRAM(s) Oral daily  BACItracin   Ointment 1 Application(s) Topical two times a day  benzocaine 15 mG/menthol 3.6 mG (Sugar-Free) Lozenge 1 Lozenge Oral two times a day  chlorhexidine 4% Liquid 1 Application(s) Topical <User Schedule>  fluticasone propionate 50 MICROgram(s)/spray Nasal Spray 1 Spray(s) Both Nostrils two times a day  levothyroxine 50 MICROGram(s) Oral daily  melatonin 3 milliGRAM(s) Oral at bedtime  metoclopramide 10 milliGRAM(s) Oral three times a day before meals  metoprolol succinate ER 25 milliGRAM(s) Oral daily  midodrine. 5 milliGRAM(s) Oral three times a day  milrinone Infusion 0.2 MICROgram(s)/kG/Min (4.32 mL/Hr) IV Continuous <Continuous>    MEDICATIONS  (PRN):  acetaminophen   Tablet .. 650 milliGRAM(s) Oral every 6 hours PRN Mild Pain (1 - 3)  aluminum hydroxide/magnesium hydroxide/simethicone Suspension 30 milliLiter(s) Oral every 4 hours PRN Dyspepsia  bisacodyl Suppository 10 milliGRAM(s) Rectal daily PRN Constipation  HYDROmorphone  Injectable 0.2 milliGRAM(s) IV Push every 1 hour PRN Severe Pain (7 - 10)  HYDROmorphone  Injectable 0.2 milliGRAM(s) IV Push every 1 hour PRN dyspnea  LORazepam   Injectable 0.5 milliGRAM(s) IV Push every 1 hour PRN anxiety or dyspnea not relieved with Dilaudid  midodrine. 5 milliGRAM(s) Oral <User Schedule> PRN 30mins prior to HD  sodium chloride 0.9% lock flush 10 milliLiter(s) IV Push every 1 hour PRN Pre/post blood products, medications, blood draw, and to maintain line patency  zaleplon 5 milliGRAM(s) Oral at bedtime PRN Insomnia    Pertinent Labs: No new labs   Finger Sticks:    Skin per nursing documentation: wound nose abrasion, left eye abrasion, left cheek abrasion   Edema: 1+ generalized, 2+ left arm    Estimated Needs:   [x] no change since previous assessment  [ ] recalculated:     Previous Nutrition Diagnosis: severe Malnutrition...   Nutrition Diagnosis is: being addressed with diet upgrade and supplement     Interventions:     Recommend  1) Continue PO diet as ordered (consistent carbohydrate, low sodium) + Glucerna shakes twice daily. Consider liberalizing diet to promote optimal PO intake potential given hx of severe malnutrition.   2) Determine nutritional goals of care.   3) Encourage and monitor PO intake. Ada dietary preferences as expressed as able.     Monitoring and Evaluation:     Continue to monitor Nutritional intake, Tolerance to diet prescription, weights, labs, skin integrity    RD remains available upon request and will follow up per protocol

## 2020-12-23 NOTE — PROGRESS NOTE ADULT - ASSESSMENT
74 yo M with acute on chronic renal failure no requiring HD. Initally presented with SBO that resolved, developed nausea now with recurrence of SBO s/p NGT removal early today now tolerating clears.  Palliative care called by surgery to discuss possibility of need for surgery.   75 year old manwith acute on chronic renal failure no requiring HD. Initally presented with SBO that resolved, developed nausea now with recurrence of SBO s/p NGT removal early today now tolerating clears.  Palliative care called for GOC now transferred to PCU.

## 2020-12-23 NOTE — CHART NOTE - NSCHARTNOTESELECT_GEN_ALL_CORE
Event Note
ISTOP/Event Note
Malnutrition Notification
Event Note
Event Note/DIALYSIS CONSENT
Event Note/wCT
Hansa evaluation/Event Note
Nephrology
Nutrition Services
Nutrition Services
Palliative
Plastics Contact Note
SBO w/ emesis/ GOC discussion/Event Note
hypotension/Event Note
low BP/Event Note

## 2020-12-23 NOTE — PROGRESS NOTE ADULT - PROBLEM SELECTOR PLAN 4
pt is dnr/dni  continue milrinone gtt for comfort  no further medical interventions  pt to be transferred to PCU -EF <20%. AICD de-activated  -c/w Milrinone gtt 0.2cc/hr for comfort

## 2020-12-23 NOTE — PROGRESS NOTE ADULT - PROBLEM SELECTOR PROBLEM 3
Encounter for palliative care Acute renal failure superimposed on stage 5 chronic kidney disease, not on chronic dialysis, unspecified acute renal failure type

## 2020-12-23 NOTE — PROGRESS NOTE ADULT - PROBLEM SELECTOR PROBLEM 2
Acute renal failure superimposed on stage 5 chronic kidney disease, not on chronic dialysis, unspecified acute renal failure type SBO (small bowel obstruction)

## 2020-12-23 NOTE — PROGRESS NOTE ADULT - PROBLEM SELECTOR PLAN 5
DNR/DNI, MOLST in chart  goal for comfort  -Working with  for dispo planning. As per daughter, pt cannot go home. DNR/DNI, MOLST in chart  goal for preventing and treating distressful symptoms   -Working with  for dispo planning. As per patient's niece ( also HCP), pt cannot go home.

## 2020-12-23 NOTE — PROGRESS NOTE ADULT - PROBLEM SELECTOR PLAN 3
goals for comfort  no escalation of care  no further blood draws  pt is dnr/dni  dilaudid iv 0.2mg prn pain, sob remains stable no further hd, [place] removed today

## 2020-12-23 NOTE — PROGRESS NOTE ADULT - SUBJECTIVE AND OBJECTIVE BOX
GAP TEAM PALLIATIVE CARE UNIT PROGRESS NOTE:      [  ] Patient on hospice program.    INDICATION FOR PALLIATIVE CARE UNIT SERVICES:    INTERVAL HPI/OVERNIGHT EVENTS:    DNR on chart: Yes      Allergies    No Known Allergies    Intolerances    MEDICATIONS  (STANDING):  aMIOdarone    Tablet 200 milliGRAM(s) Oral daily  BACItracin   Ointment 1 Application(s) Topical two times a day  benzocaine 15 mG/menthol 3.6 mG (Sugar-Free) Lozenge 1 Lozenge Oral two times a day  chlorhexidine 4% Liquid 1 Application(s) Topical <User Schedule>  fluticasone propionate 50 MICROgram(s)/spray Nasal Spray 1 Spray(s) Both Nostrils two times a day  levothyroxine 50 MICROGram(s) Oral daily  melatonin 3 milliGRAM(s) Oral at bedtime  metoclopramide 10 milliGRAM(s) Oral three times a day before meals  metoprolol succinate ER 25 milliGRAM(s) Oral daily  midodrine. 5 milliGRAM(s) Oral three times a day  milrinone Infusion 0.2 MICROgram(s)/kG/Min (4.32 mL/Hr) IV Continuous <Continuous>    MEDICATIONS  (PRN):  acetaminophen   Tablet .. 650 milliGRAM(s) Oral every 6 hours PRN Mild Pain (1 - 3)  aluminum hydroxide/magnesium hydroxide/simethicone Suspension 30 milliLiter(s) Oral every 4 hours PRN Dyspepsia  bisacodyl Suppository 10 milliGRAM(s) Rectal daily PRN Constipation  HYDROmorphone  Injectable 0.2 milliGRAM(s) IV Push every 1 hour PRN Severe Pain (7 - 10)  HYDROmorphone  Injectable 0.2 milliGRAM(s) IV Push every 1 hour PRN dyspnea  LORazepam   Injectable 0.5 milliGRAM(s) IV Push every 1 hour PRN anxiety or dyspnea not relieved with Dilaudid  midodrine. 5 milliGRAM(s) Oral <User Schedule> PRN 30mins prior to HD  sodium chloride 0.9% lock flush 10 milliLiter(s) IV Push every 1 hour PRN Pre/post blood products, medications, blood draw, and to maintain line patency  zaleplon 5 milliGRAM(s) Oral at bedtime PRN Insomnia    ITEMS UNCHECKED ARE NOT PRESENT    PRESENT SYMPTOMS: [ ]Unable to obtain due to poor mentation   Source if other than patient:  [ ]Family   [ ]Team     Pain: [ ] yes [ ] no  QOL impact -   Location -                    Aggravating factors -  Quality -  Radiation -  Timing-  Severity (0-10 scale):  Minimal acceptable level (0-10 scale):     Dyspnea:                           [ ]Mild [ ]Moderate [ ]Severe  Anxiety:                             [ ]Mild [ ]Moderate [ ]Severe  Fatigue:                             [ ]Mild [ ]Moderate [ ]Severe  Nausea:                             [ ]Mild [ ]Moderate [ ]Severe  Loss of appetite:              [ ]Mild [ ]Moderate [ ]Severe  Constipation:                    [ ]Mild [ ]Moderate [ ]Severe    PAINAD Score:    [email or web address]/painad.pdf (Ctrl +  left click to view)  		  Other Symptoms:  [ ]All other review of systems negative     Palliative Performance Status Version 2:         %         http://npcrc.org/files/news/palliative_performance_scale_ppsv2.pdf  PHYSICAL EXAM:  Vital Signs Last 24 Hrs  T(C): 36.3 (23 Dec 2020 08:06), Max: 36.3 (22 Dec 2020 15:10)  T(F): 97.4 (23 Dec 2020 08:06), Max: 97.4 (23 Dec 2020 08:06)  HR: 90 (23 Dec 2020 08:06) (90 - 102)  BP: 90/55 (23 Dec 2020 08:06) (88/51 - 99/62)  BP(mean): --  RR: 18 (23 Dec 2020 08:06) (18 - 18)  SpO2: 99% (23 Dec 2020 08:06) (98% - 99%) I&O's Summary    22 Dec 2020 07:01  -  23 Dec 2020 07:00  --------------------------------------------------------  IN: 251.6 mL / OUT: 100 mL / NET: 151.6 mL    GENERAL:  [ ]Alert  [ ]Oriented x   [ ]Lethargic  [ ]Cachexia  [ ]Unarousable  [ ]Verbal  [ ]Non-Verbal  Behavioral:   [ ] Anxiety  [ ] Delirium [ ] Agitation [ ] Other  HEENT:  [ ]Normal   [ ]Dry mouth   [ ]ET Tube/Trach  [ ]Oral lesions  PULMONARY:   [ ]Clear [ ]Tachypnea  [ ]Audible excessive secretions   [ ]Rhonchi        [ ]Right [ ]Left [ ]Bilateral  [ ]Crackles        [ ]Right [ ]Left [ ]Bilateral  [ ]Wheezing     [ ]Right [ ]Left [ ]Bilateral  [ ]Diminshed BS [ ]Right [ ]Left [ ]Bilateral    CARDIOVASCULAR:    [ ]Regular [ ]Irregular [ ]Tachy  [ ]Eric [ ]Murmur [ ]Other  GASTROINTESTINAL:  [ ]Soft  [ ]Distended   [ ]+BS  [ ]Non tender [ ]Tender  [ ]PEG [ ]OGT/ NGT   Last BM:       GENITOURINARY:  [ ]Normal [ ] Incontinent   [ ]Oliguria/Anuria   [ ]Guidry  MUSCULOSKELETAL:   [ ]Normal   [ ]Weakness  [ ]Bed/Wheelchair bound [ ]Edema  NEUROLOGIC:   [ ]No focal deficits  [ ] Cognitive impairment  [ ] Dysphagia [ ]Dysarthria [ ] Paresis [ ]Other   SKIN:   [ ]Normal  [ ]Rash     [ ]Pressure ulcer(s)  [ ]y [ ]n  Present on admission      CRITICAL CARE:  [ ] Shock Present  [ ]Septic [ ]Cardiogenic [ ]Neurologic [ ]Hypovolemic  [ ]  Vasopressors [ ]  Inotropes   [ ] Respiratory failure present [ ] Mechanical Ventilation [ ] Non-invasive ventilatory support [ ] High-Flow  [ ] Acute  [ ] Chronic [ ] Hypoxic  [ ] Hypercarbic [ ] Other  [ ] Other organ failure     LABS:            RADIOLOGY & ADDITIONAL STUDIES:    PROTEIN CALORIE MALNUTRITION: [ ] mild [ ] moderate [ ] severe  [ ] underweight [ ] morbid obesity    https://www.andeal.org/vault/2440/web/files/ONC/Table_Clinical%20Characteristics%20to%20Document%20Malnutrition-White%20JV%20et%20al%479514.pdf    Height (cm): 175.3 (11-16-20 @ 16:53), 172.7 (09-14-20 @ 07:21), 177.8 (07-20-20 @ 13:41)  Weight (kg): 72 (12-17-20 @ 14:14), 81.7 (11-17-20 @ 18:57), 75.1 (09-14-20 @ 07:21)  BMI (kg/m2): 23.4 (12-17-20 @ 14:14), 26.6 (11-17-20 @ 18:57), 24.4 (11-16-20 @ 16:53)    [ ] PPSV2 < or = 30% [ ] significant weight loss [ ] poor nutritional intake [ ] anasarca Albumin, Serum: 2.1 g/dL (12-19-20 @ 05:57)    Artificial Nutrition [ ]     REFERRALS:   [ ]Chaplaincy  [ ] Hospice  [ ]Child Life  [ ]Social Work  [ ]Case management [ ]Holistic Therapy [ ] Physical Therapy [ ] Dietary   Goals of Care Document: HUMBERTO Devi (11-17-20 @ 17:09)  Goals of Care Conversation:   Participants   · Participants  Patient; Staff  · Provider  Dr Devi.    Advance Directives   · Does patient have Advance Directive  Yes  · Indicate Type  Living Will; Power of   · Are any of the items on the chart  Yes  · Specify which ones are on chart  Living Will  · Does Patient Have a Surrogate  Yes  · Surrogate's Name  Zully Jarquin  · Caregiver:  yes  · Name  Zully Jarquin  · Phone Number  [phone]    Conversation Discussion   · Conversation Details  Patient agree with DNR/I but trial of BIPAP. He also agree with returning to the hospital based on MOLST. Trial of NGT and IVF. Abx if needed. A MOLST was completed and a copy was placed in the chart and the original an a copy were given to the patient.     We discussed hospice services at length and he does not want to establish hospice care right now. He will f/u with Dr. Anglin and decide with her about this type of service. He is to f/u with outpatient palliative care, Dr. Amy Nguyen MD. [address] [place] [address], [address]. [place] [address]. Phone [phone].    Personal Advance Directives Treatment Guidelines:   Treatment Guidelines   · Decision Maker  Patient  · Treatment Guidelines  DNR Order; Artificial nutrition trial; IV fluid trial  · Treatment Guideline Comments  As above    MOLST   · Completed  17-Nov-2020    Location of Discussion:   Duration of Advanced Care Planning Meeting   · Time spent (in minutes)  30    Electronic Signatures for Addendum Section:   Robbie Devi) (Signed Addendum 18-Nov-2020 14:45)    Will sing off. Please call us back if needing help with symptoms Rx, GOC, ACP, or resources.    Electronic Signatures:  Robbie Devi)  (Signed 18-Nov-2020 14:43)  	Authored: Goals of Care Conversation, Personal Advance Directives Treatment Guidelines, Location of Discussion      Last Updated: 18-Nov-2020 14:45 by Robbie Devi)       GAP TEAM PALLIATIVE CARE UNIT PROGRESS NOTE:      [  ] Patient on hospice program.    INDICATION FOR PALLIATIVE CARE UNIT SERVICES: End of life care in the setting of end stage HF    INTERVAL HPI/OVERNIGHT EVENTS: No major overnight events. Pt required one PRN of Dilaudid 0.2mg for in pain in 24 hours and Ativan 0.25mg IV x 1.    DNR on chart: Yes      Allergies    No Known Allergies    Intolerances    MEDICATIONS  (STANDING):  aMIOdarone    Tablet 200 milliGRAM(s) Oral daily  BACItracin   Ointment 1 Application(s) Topical two times a day  benzocaine 15 mG/menthol 3.6 mG (Sugar-Free) Lozenge 1 Lozenge Oral two times a day  chlorhexidine 4% Liquid 1 Application(s) Topical <User Schedule>  fluticasone propionate 50 MICROgram(s)/spray Nasal Spray 1 Spray(s) Both Nostrils two times a day  levothyroxine 50 MICROGram(s) Oral daily  melatonin 3 milliGRAM(s) Oral at bedtime  metoclopramide 10 milliGRAM(s) Oral three times a day before meals  metoprolol succinate ER 25 milliGRAM(s) Oral daily  midodrine. 5 milliGRAM(s) Oral three times a day  milrinone Infusion 0.2 MICROgram(s)/kG/Min (4.32 mL/Hr) IV Continuous <Continuous>    MEDICATIONS  (PRN):  acetaminophen   Tablet .. 650 milliGRAM(s) Oral every 6 hours PRN Mild Pain (1 - 3)  aluminum hydroxide/magnesium hydroxide/simethicone Suspension 30 milliLiter(s) Oral every 4 hours PRN Dyspepsia  bisacodyl Suppository 10 milliGRAM(s) Rectal daily PRN Constipation  HYDROmorphone  Injectable 0.2 milliGRAM(s) IV Push every 1 hour PRN Severe Pain (7 - 10)  HYDROmorphone  Injectable 0.2 milliGRAM(s) IV Push every 1 hour PRN dyspnea  LORazepam   Injectable 0.5 milliGRAM(s) IV Push every 1 hour PRN anxiety or dyspnea not relieved with Dilaudid  midodrine. 5 milliGRAM(s) Oral <User Schedule> PRN 30mins prior to HD  sodium chloride 0.9% lock flush 10 milliLiter(s) IV Push every 1 hour PRN Pre/post blood products, medications, blood draw, and to maintain line patency  zaleplon 5 milliGRAM(s) Oral at bedtime PRN Insomnia    ITEMS UNCHECKED ARE NOT PRESENT    PRESENT SYMPTOMS: [ ]Unable to obtain due to poor mentation   Source if other than patient:  [ ]Family   [ ]Team     Pain: [ ] yes [X ] no  QOL impact -   Location -                    Aggravating factors -  Quality -  Radiation -  Timing-  Severity (0-10 scale):  Minimal acceptable level (0-10 scale):     Dyspnea:                           [ ]Mild [ ]Moderate [ ]Severe  Anxiety:                             [ ]Mild [ ]Moderate [ ]Severe  Fatigue:                             [ ]Mild [ ]Moderate [ ]Severe  Nausea:                             [ ]Mild [ ]Moderate [ ]Severe  Loss of appetite:              [ ]Mild [ ]Moderate [ ]Severe  Constipation:                    [ ]Mild [ ]Moderate [ ]Severe    PAINAD Score:    http://[email or web address]/cog/painad.pdf (Ctrl +  left click to view)  		  Other Symptoms:  [ ]All other review of systems negative     Palliative Performance Status Version 2:     10    %         http://npcrc.org/files/news/palliative_performance_scale_ppsv2.pdf  PHYSICAL EXAM:  Vital Signs Last 24 Hrs  T(C): 36.3 (23 Dec 2020 08:06), Max: 36.3 (22 Dec 2020 15:10)  T(F): 97.4 (23 Dec 2020 08:06), Max: 97.4 (23 Dec 2020 08:06)  HR: 90 (23 Dec 2020 08:06) (90 - 102)  BP: 90/55 (23 Dec 2020 08:06) (88/51 - 99/62)  BP(mean): --  RR: 18 (23 Dec 2020 08:06) (18 - 18)  SpO2: 99% (23 Dec 2020 08:06) (98% - 99%) I&O's Summary    22 Dec 2020 07:01  -  23 Dec 2020 07:00  --------------------------------------------------------  IN: 251.6 mL / OUT: 100 mL / NET: 151.6 mL    GENERAL:  [X ]Alert  [ X]Oriented x3   [ ]Lethargic  [ ]Cachexia  [ ]Unarousable  [X ]Verbal  [ ]Non-Verbal  Behavioral:   [ ] Anxiety  [ ] Delirium [ ] Agitation [ ] Other  HEENT:  [X ]Normal   [ ]Dry mouth   [ ]ET Tube/Trach  [ ]Oral lesions  PULMONARY:   [X ]Clear [ ]Tachypnea  [ ]Audible excessive secretions   [ ]Rhonchi        [ ]Right [ ]Left [ ]Bilateral  [ ]Crackles        [ ]Right [ ]Left [ ]Bilateral  [ ]Wheezing     [ ]Right [ ]Left [ ]Bilateral  [ ]Diminshed BS [ ]Right [ ]Left [ ]Bilateral    CARDIOVASCULAR:    [ X]Regular [ ]Irregular [ ]Tachy  [ ]Eric [ ]Murmur [ ]Other  GASTROINTESTINAL:  [X ]Soft  [ ]Distended   [ ]+BS  [X ]Non tender [ ]Tender  [ ]PEG [ ]OGT/ NGT   Last BM:   12/21/2020    GENITOURINARY:  [ X]Normal [ ] Incontinent   [ ]Oliguria/Anuria   [ ]Guidry  MUSCULOSKELETAL:   [ ]Normal   [ X]Weakness  [ ]Bed/Wheelchair bound [ ]Edema  NEUROLOGIC:   [ X]No focal deficits  [ ] Cognitive impairment  [ ] Dysphagia [ ]Dysarthria [ ] Paresis [ ]Other   SKIN:   [X ]Normal  [ ]Rash     [ ]Pressure ulcer(s)  [ ]y [ ]n  Present on admission      CRITICAL CARE:  [X ] Shock Present  [ ]Septic [ X]Cardiogenic [ ]Neurologic [ ]Hypovolemic  [ ]  Vasopressors [X ]  Inotropes   [ ] Respiratory failure present [ ] Mechanical Ventilation [ ] Non-invasive ventilatory support [ ] High-Flow  [ ] Acute  [ ] Chronic [ ] Hypoxic  [ ] Hypercarbic [ ] Other  [ ] Other organ failure     LABS:  No new labs          RADIOLOGY & ADDITIONAL STUDIES: No new imaging    PROTEIN CALORIE MALNUTRITION: [ ] mild [ ] moderate [ X] severe  [ ] underweight [ ] morbid obesity    https://www.andeal.org/vault/8800/web/files/ONC/Table_Clinical%20Characteristics%20to%20Document%20Malnutrition-White%20JV%20et%20al%202012.pdf    Height (cm): 175.3 (11-16-20 @ 16:53), 172.7 (09-14-20 @ 07:21), 177.8 (07-20-20 @ 13:41)  Weight (kg): 72 (12-17-20 @ 14:14), 81.7 (11-17-20 @ 18:57), 75.1 (09-14-20 @ 07:21)  BMI (kg/m2): 23.4 (12-17-20 @ 14:14), 26.6 (11-17-20 @ 18:57), 24.4 (11-16-20 @ 16:53)    [X ] PPSV2 < or = 30% [ ] significant weight loss [X] poor nutritional intake [ ] anasarca Albumin, Serum: 2.1 g/dL (12-19-20 @ 05:57)    Artificial Nutrition [ ]     REFERRALS:   [ ]Chaplaincy  [ ] Hospice  [ ]Child Life  [X ]Social Work  [ ]Case management [ ]Holistic Therapy [ ] Physical Therapy [ ] Dietary   Goals of Care Document: HUMBERTO Dvei (11-17-20 @ 17:09)  Goals of Care Conversation:   Participants   · Participants  Patient; Staff  · Provider  Dr Devi.    Advance Directives   · Does patient have Advance Directive  Yes  · Indicate Type  Living Will; Power of   · Are any of the items on the chart  Yes  · Specify which ones are on chart  Living Will  · Does Patient Have a Surrogate  Yes  · Surrogate's Name  Zully Jarquin  · Caregiver:  yes  · Name  Zully Jarquin  · Phone Number  [phone]    Conversation Discussion   · Conversation Details  Patient agree with DNR/I but trial of BIPAP. He also agree with returning to the hospital based on MOLST. Trial of NGT and IVF. Abx if needed. A MOLST was completed and a copy was placed in the chart and the original an a copy were given to the patient.     We discussed hospice services at length and he does not want to establish hospice care right now. He will f/u with Dr. Anglin and decide with her about this type of service. He is to f/u with outpatient palliative care, Dr. Amy Nguyen MD. [address] [place] [address], [address]. [place] [address]. Phone [phone].    Personal Advance Directives Treatment Guidelines:   Treatment Guidelines   · Decision Maker  Patient  · Treatment Guidelines  DNR Order; Artificial nutrition trial; IV fluid trial  · Treatment Guideline Comments  As above    MOLST   · Completed  17-Nov-2020    Location of Discussion:   Duration of Advanced Care Planning Meeting   · Time spent (in minutes)  30    Electronic Signatures for Addendum Section:   Robbie Devi) (Signed Addendum 18-Nov-2020 14:45)    Will sing off. Please call us back if needing help with symptoms Rx, GOC, ACP, or resources.    Electronic Signatures:  Robbie Devi)  (Signed 18-Nov-2020 14:43)  	Authored: Goals of Care Conversation, Personal Advance Directives Treatment Guidelines, Location of Discussion      Last Updated: 18-Nov-2020 14:45 by Robbie Devi)       GAP TEAM PALLIATIVE CARE UNIT PROGRESS NOTE:      [  ] Patient on hospice program.    INDICATION FOR PALLIATIVE CARE UNIT SERVICES: End of life care in the setting of end stage HF    INTERVAL HPI/OVERNIGHT EVENTS: No major overnight events. Pt required one PRN of Dilaudid 0.2mg IV for pain in 24 hours and Ativan 0.25mg IV x 1.    DNR on chart: Yes      Allergies    No Known Allergies    Intolerances    MEDICATIONS  (STANDING):  aMIOdarone    Tablet 200 milliGRAM(s) Oral daily  BACItracin   Ointment 1 Application(s) Topical two times a day  benzocaine 15 mG/menthol 3.6 mG (Sugar-Free) Lozenge 1 Lozenge Oral two times a day  chlorhexidine 4% Liquid 1 Application(s) Topical <User Schedule>  fluticasone propionate 50 MICROgram(s)/spray Nasal Spray 1 Spray(s) Both Nostrils two times a day  levothyroxine 50 MICROGram(s) Oral daily  melatonin 3 milliGRAM(s) Oral at bedtime  metoclopramide 10 milliGRAM(s) Oral three times a day before meals  metoprolol succinate ER 25 milliGRAM(s) Oral daily  midodrine. 5 milliGRAM(s) Oral three times a day  milrinone Infusion 0.2 MICROgram(s)/kG/Min (4.32 mL/Hr) IV Continuous <Continuous>    MEDICATIONS  (PRN):  acetaminophen   Tablet .. 650 milliGRAM(s) Oral every 6 hours PRN Mild Pain (1 - 3)  aluminum hydroxide/magnesium hydroxide/simethicone Suspension 30 milliLiter(s) Oral every 4 hours PRN Dyspepsia  bisacodyl Suppository 10 milliGRAM(s) Rectal daily PRN Constipation  HYDROmorphone  Injectable 0.2 milliGRAM(s) IV Push every 1 hour PRN Severe Pain (7 - 10)  HYDROmorphone  Injectable 0.2 milliGRAM(s) IV Push every 1 hour PRN dyspnea  LORazepam   Injectable 0.5 milliGRAM(s) IV Push every 1 hour PRN anxiety or dyspnea not relieved with Dilaudid  midodrine. 5 milliGRAM(s) Oral <User Schedule> PRN 30mins prior to HD  sodium chloride 0.9% lock flush 10 milliLiter(s) IV Push every 1 hour PRN Pre/post blood products, medications, blood draw, and to maintain line patency  zaleplon 5 milliGRAM(s) Oral at bedtime PRN Insomnia    ITEMS UNCHECKED ARE NOT PRESENT    PRESENT SYMPTOMS: [ ]Unable to obtain due to poor mentation   Source if other than patient:  [ ]Family   [ ]Team     Pain: [ ] yes [X ] no  QOL impact -   Location -                    Aggravating factors -  Quality -  Radiation -  Timing-  Severity (0-10 scale):  Minimal acceptable level (0-10 scale):     Dyspnea:                           [ ]Mild [ ]Moderate [ ]Severe  Anxiety:                             [ ]Mild [ ]Moderate [ ]Severe  Fatigue:                             [ ]Mild [ ]Moderate [ ]Severe  Nausea:                             [ ]Mild [ ]Moderate [ ]Severe  Loss of appetite:              [ ]Mild [ ]Moderate [ ]Severe  Constipation:                    [ ]Mild [ ]Moderate [ ]Severe    PAINAD Score:    http://[email or web address]/painad.pdf (Ctrl +  left click to view)  		  Other Symptoms:  [ ]All other review of systems negative     Palliative Performance Status Version 2:     10    %         http://npcrc.org/files/news/palliative_performance_scale_ppsv2.pdf  PHYSICAL EXAM:  Vital Signs Last 24 Hrs  T(C): 36.3 (23 Dec 2020 08:06), Max: 36.3 (22 Dec 2020 15:10)  T(F): 97.4 (23 Dec 2020 08:06), Max: 97.4 (23 Dec 2020 08:06)  HR: 90 (23 Dec 2020 08:06) (90 - 102)  BP: 90/55 (23 Dec 2020 08:06) (88/51 - 99/62)  BP(mean): --  RR: 18 (23 Dec 2020 08:06) (18 - 18)  SpO2: 99% (23 Dec 2020 08:06) (98% - 99%) I&O's Summary    22 Dec 2020 07:01  -  23 Dec 2020 07:00  --------------------------------------------------------  IN: 251.6 mL / OUT: 100 mL / NET: 151.6 mL    GENERAL:  [X ]Alert  [ X]Oriented x3   [x ] Mildly Lethargic  [ ]Cachexia  [ ]Unarousable  [X ]Verbal  [ ]Non-Verbal  Behavioral:   [ ] Anxiety  [ ] Delirium [ ] Agitation [ ] Other  HEENT:  [X ]Normal   [ ]Dry mouth   [ ]ET Tube/Trach  [ ]Oral lesions  PULMONARY:   [X ]Clear [ ]Tachypnea  [ ]Audible excessive secretions   [ ]Rhonchi        [ ]Right [ ]Left [ ]Bilateral  [ ]Crackles        [ ]Right [ ]Left [ ]Bilateral  [ ]Wheezing     [ ]Right [ ]Left [ ]Bilateral  [ ]Diminshed BS [ ]Right [ ]Left [ ]Bilateral    CARDIOVASCULAR:    [ X]Regular [ ]Irregular [ ]Tachy  [ ]Eric [ ]Murmur [ ]Other  GASTROINTESTINAL:  [X ]Soft  [ ]Distended   [ ]+BS  [X ]Non tender [ ]Tender  [ ]PEG [ ]OGT/ NGT   Last BM:   12/21/2020    GENITOURINARY:  [ X]Normal [ ] Incontinent   [ ]Oliguria/Anuria   [ ]Guidry  MUSCULOSKELETAL:   [ ]Normal   [ X]Weakness  [ ]Bed/Wheelchair bound [ ]Edema  NEUROLOGIC:   [ X]No focal deficits  [ ] Cognitive impairment  [ ] Dysphagia [ ]Dysarthria [ ] Paresis [ ]Other   SKIN:   [X ]Normal  [ ]Rash     [ ]Pressure ulcer(s)  [ ]y [ ]n  Present on admission      CRITICAL CARE:  [X ] Shock Present  [ ]Septic [ X]Cardiogenic [ ]Neurologic [ ]Hypovolemic  [ ]  Vasopressors [X ]  Inotropes   [ ] Respiratory failure present [ ] Mechanical Ventilation [ ] Non-invasive ventilatory support [ ] High-Flow  [ ] Acute  [ ] Chronic [ ] Hypoxic  [ ] Hypercarbic [ ] Other  [ ] Other organ failure     LABS:  No new labs          RADIOLOGY & ADDITIONAL STUDIES: No new imaging    PROTEIN CALORIE MALNUTRITION: [ ] mild [ ] moderate [ X] severe  [ ] underweight [ ] morbid obesity    https://www.andeal.org/vault/8241/web/files/ONC/Table_Clinical%20Characteristics%20to%20Document%20Malnutrition-White%20JV%20et%20al%202012.pdf    Height (cm): 175.3 (11-16-20 @ 16:53), 172.7 (09-14-20 @ 07:21), 177.8 (07-20-20 @ 13:41)  Weight (kg): 72 (12-17-20 @ 14:14), 81.7 (11-17-20 @ 18:57), 75.1 (09-14-20 @ 07:21)  BMI (kg/m2): 23.4 (12-17-20 @ 14:14), 26.6 (11-17-20 @ 18:57), 24.4 (11-16-20 @ 16:53)    [X ] PPSV2 < or = 30% [ ] significant weight loss [X] poor nutritional intake [ ] anasarca Albumin, Serum: 2.1 g/dL (12-19-20 @ 05:57)    Artificial Nutrition [ ]     REFERRALS:   [ ]Chaplaincy  [ ] Hospice  [ ]Child Life  [X ]Social Work  [ ]Case management [ ]Holistic Therapy [ ] Physical Therapy [ ] Dietary   Goals of Care Document: HUMBETRO Devi (11-17-20 @ 17:09)  Goals of Care Conversation:   Participants   · Participants  Patient; Staff  · Provider  Dr Devi.    Advance Directives   · Does patient have Advance Directive  Yes  · Indicate Type  Living Will; Power of   · Are any of the items on the chart  Yes  · Specify which ones are on chart  Living Will  · Does Patient Have a Surrogate  Yes  · Surrogate's Name  Zully Jarquin  · Caregiver:  yes  · Name  Zully Jarquin  · Phone Number  [phone]    Conversation Discussion   · Conversation Details  Patient agree with DNR/I but trial of BIPAP. He also agree with returning to the hospital based on MOLST. Trial of NGT and IVF. Abx if needed. A MOLST was completed and a copy was placed in the chart and the original an a copy were given to the patient.     We discussed hospice services at length and he does not want to establish hospice care right now. He will f/u with Dr. Anglin and decide with her about this type of service. He is to f/u with outpatient palliative care, Dr. Amy Nguyen MD. [address] [place] [address], [address]. [place] [address]. Phone [phone].    Personal Advance Directives Treatment Guidelines:   Treatment Guidelines   · Decision Maker  Patient  · Treatment Guidelines  DNR Order; Artificial nutrition trial; IV fluid trial  · Treatment Guideline Comments  As above    MOLST   · Completed  17-Nov-2020    Location of Discussion:   Duration of Advanced Care Planning Meeting   · Time spent (in minutes)  30    Electronic Signatures for Addendum Section:   Robbie Devi) (Signed Addendum 18-Nov-2020 14:45)    Will sing off. Please call us back if needing help with symptoms Rx, GOC, ACP, or resources.    Electronic Signatures:  Robbie Devi)  (Signed 18-Nov-2020 14:43)  	Authored: Goals of Care Conversation, Personal Advance Directives Treatment Guidelines, Location of Discussion      Last Updated: 18-Nov-2020 14:45 by Robbie Devi)

## 2020-12-23 NOTE — CHART NOTE - NSCHARTNOTEFT_GEN_A_CORE
75M PMHx NICM, LVEF on milronine (EF 10-15%, LVIDd 6.7 cm) s/p CRT-D, moderate-severe MR s/p MitraClip 8/2020, HTN, pAFib s/p DCCV 7/20/20 on Eliquis, CKD stage 4 (baseline SCr 2.5-3), DM2 transfer from [facility] to [facility] for further management SBO and mesenteric ischemia.  Nephrology consulted for EDIE on CKD, worsening BUN/Cr plan start HD 12/11/20 for uremia and hyperkalemia. Patient currently for comfort care. Nephrology will sign off.     If any questions, please feel free to contact me     Javier Junior  Nephrology Fellow  [facility] Pager: [phone]  [facility] Pager: [phone]

## 2020-12-24 NOTE — PROGRESS NOTE ADULT - SUBJECTIVE AND OBJECTIVE BOX
GAP TEAM PALLIATIVE CARE UNIT PROGRESS NOTE:      [  ] Patient on hospice program.    INDICATION FOR PALLIATIVE CARE UNIT SERVICES: End of life care and symptom management in the setting of HF    INTERVAL HPI/OVERNIGHT EVENTS: No major overnight events. Required one PRN of Ativan 0.5mg IV and Dilaudid 0.2mg IV in 24 hours. Pt with nausea this morning.    DNR on chart: Yes      Allergies:  No Known Allergies      MEDICATIONS  (STANDING):  aMIOdarone    Tablet 200 milliGRAM(s) Oral daily  BACItracin   Ointment 1 Application(s) Topical two times a day  benzocaine 15 mG/menthol 3.6 mG (Sugar-Free) Lozenge 1 Lozenge Oral two times a day  chlorhexidine 4% Liquid 1 Application(s) Topical <User Schedule>  fluticasone propionate 50 MICROgram(s)/spray Nasal Spray 1 Spray(s) Both Nostrils two times a day  levothyroxine 50 MICROGram(s) Oral daily  melatonin 3 milliGRAM(s) Oral at bedtime  metoclopramide 10 milliGRAM(s) Oral three times a day before meals  metoprolol succinate ER 25 milliGRAM(s) Oral daily  midodrine. 5 milliGRAM(s) Oral three times a day  milrinone Infusion 0.2 MICROgram(s)/kG/Min (4.32 mL/Hr) IV Continuous <Continuous>    MEDICATIONS  (PRN):  acetaminophen   Tablet .. 650 milliGRAM(s) Oral every 6 hours PRN Mild Pain (1 - 3)  aluminum hydroxide/magnesium hydroxide/simethicone Suspension 30 milliLiter(s) Oral every 4 hours PRN Dyspepsia  bisacodyl Suppository 10 milliGRAM(s) Rectal daily PRN Constipation  HYDROmorphone  Injectable 0.2 milliGRAM(s) IV Push every 1 hour PRN Severe Pain (7 - 10)  HYDROmorphone  Injectable 0.2 milliGRAM(s) IV Push every 1 hour PRN dyspnea  LORazepam   Injectable 0.5 milliGRAM(s) IV Push every 1 hour PRN anxiety or dyspnea not relieved with Dilaudid  metoclopramide Injectable 10 milliGRAM(s) IV Push every 8 hours PRN Intractable to oral myra  midodrine. 5 milliGRAM(s) Oral <User Schedule> PRN 30mins prior to HD  sodium chloride 0.9% lock flush 10 milliLiter(s) IV Push every 1 hour PRN Pre/post blood products, medications, blood draw, and to maintain line patency  zaleplon 5 milliGRAM(s) Oral at bedtime PRN Insomnia    ITEMS UNCHECKED ARE NOT PRESENT    PRESENT SYMPTOMS: [ ]Unable to obtain due to poor mentation   Source if other than patient:  [ ]Family   [ ]Team     Pain: [ ] yes [X ] no  QOL impact -   Location -                    Aggravating factors -  Quality -  Radiation -  Timing-  Severity (0-10 scale):  Minimal acceptable level (0-10 scale):     Dyspnea:                           [X]Mild [ ]Moderate [ ]Severe  Anxiety:                             [ ]Mild [ ]Moderate [ ]Severe  Fatigue:                             [ ]Mild [ ]Moderate [ ]Severe  Nausea:                             [ X]Mild [ ]Moderate [ ]Severe  Loss of appetite:              [ ]Mild [ ]Moderate [ ]Severe  Constipation:                    [ ]Mild [ ]Moderate [ ]Severe    PAINAD Score:    [email or web address]/painad.pdf (Ctrl +  left click to view)  		  Other Symptoms:  [ ]All other review of systems negative     Palliative Performance Status Version 2:  20  %         http://npcrc.org/files/news/palliative_performance_scale_ppsv2.pdf    PHYSICAL EXAM:  Vital Signs Last 24 Hrs  T(C): 36.8 (24 Dec 2020 05:00), Max: 36.8 (24 Dec 2020 05:00)  T(F): 98.2 (24 Dec 2020 05:00), Max: 98.2 (24 Dec 2020 05:00)  HR: 95 (24 Dec 2020 05:00) (95 - 101)  BP: 89/58 (24 Dec 2020 05:00) (88/59 - 89/58)  RR: 18 (24 Dec 2020 05:00) (18 - 18)  SpO2: 92% (24 Dec 2020 05:00) (92% - 92%) I&O's Summary    GENERAL:  [ X]Alert  [ X]Oriented x  3 [ ]Lethargic  [ ]Cachexia  [ ]Unarousable  [X ]Verbal  [ ]Non-Verbal  Behavioral:   [ ] Anxiety  [ ] Delirium [ ] Agitation [ ] Other  HEENT:  [X ]Normal   [ ]Dry mouth   [ ]ET Tube/Trach  [ ]Oral lesions  PULMONARY:   [ ]Clear [ ]Tachypnea  [ ]Audible excessive secretions   [ ]Rhonchi        [ ]Right [ ]Left [ ]Bilateral  [ ]Crackles        [ ]Right [ ]Left [ ]Bilateral  [ ]Wheezing     [ ]Right [ ]Left [ ]Bilateral  [ X]Diminshed BS [ ]Right [ ]Left [X ]Bilateral    CARDIOVASCULAR:    [X ]Regular [ ]Irregular [ ]Tachy  [ ]Eric [ ]Murmur [ ]Other  GASTROINTESTINAL:  [ X]Soft  [ ]Distended   [ ]+BS  [X ]Non tender [ ]Tender  [ ]PEG [ ]OGT/ NGT   Last BM:       GENITOURINARY:  [X ]Normal [ ] Incontinent   [ ]Oliguria/Anuria   [ ]Guidry  MUSCULOSKELETAL:   [ ]Normal   [ X]Weakness  [ ]Bed/Wheelchair bound [ ]Edema  NEUROLOGIC:   [X]No focal deficits  [ ] Cognitive impairment  [ ] Dysphagia [ ]Dysarthria [ ] Paresis [ ]Other   SKIN:   [X ]Normal  [ ]Rash     [ ]Pressure ulcer(s)  [ ]y [ ]n  Present on admission      CRITICAL CARE:  [ ] Shock Present  [ ]Septic [ ]Cardiogenic [ ]Neurologic [ ]Hypovolemic  [ ]  Vasopressors [ ]  Inotropes   [ ] Respiratory failure present [ ] Mechanical Ventilation [ ] Non-invasive ventilatory support [ ] High-Flow  [ ] Acute  [ ] Chronic [ ] Hypoxic  [ ] Hypercarbic [ ] Other  [ ] Other organ failure     LABS: No new labs    RADIOLOGY & ADDITIONAL STUDIES: No new imaging    PROTEIN CALORIE MALNUTRITION: [ ] mild [ ] moderate [X ] severe  [ ] underweight [ ] morbid obesity    https://www.andeal.org/vault/2440/web/files/ONC/Table_Clinical%20Characteristics%20to%20Document%20Malnutrition-White%20JV%20et%20al%984749.pdf    Height (cm): 175.3 (11-16-20 @ 16:53), 172.7 (09-14-20 @ 07:21), 177.8 (07-20-20 @ 13:41)  Weight (kg): 72 (12-17-20 @ 14:14), 81.7 (11-17-20 @ 18:57), 75.1 (09-14-20 @ 07:21)  BMI (kg/m2): 23.4 (12-17-20 @ 14:14), 26.6 (11-17-20 @ 18:57), 24.4 (11-16-20 @ 16:53)    [ X] PPSV2 < or = 30% [ ] significant weight loss [ X] poor nutritional intake [ ] anasarca Albumin, Serum: 2.1 g/dL (12-19-20 @ 05:57)    Artificial Nutrition [ ]     REFERRALS:   [ ]Chaplaincy  [ ] Hospice  [ ]Child Life  [ ]Social Work  [ ]Case management [ ]Holistic Therapy [ ] Physical Therapy [ ] Dietary   Goals of Care Document: HUMBERTO Devi (11-17-20 @ 17:09)  Goals of Care Conversation:   Participants   · Participants  Patient; Staff  · Provider  Dr Devi.    Advance Directives   · Does patient have Advance Directive  Yes  · Indicate Type  Living Will; Power of   · Are any of the items on the chart  Yes  · Specify which ones are on chart  Living Will  · Does Patient Have a Surrogate  Yes  · Surrogate's Name  Zully Jarquin  · Caregiver:  yes  · Name  Zully Jarquin  · Phone Number  [phone]    Conversation Discussion   · Conversation Details  Patient agree with DNR/I but trial of BIPAP. He also agree with returning to the hospital based on MOLST. Trial of NGT and IVF. Abx if needed. A MOLST was completed and a copy was placed in the chart and the original an a copy were given to the patient.     We discussed hospice services at length and he does not want to establish hospice care right now. He will f/u with Dr. Anglin and decide with her about this type of service. He is to f/u with outpatient palliative care, Dr. Amy Nguyen MD. [address] [place] [address], [address]. [place] [address]. Phone [phone].    Personal Advance Directives Treatment Guidelines:   Treatment Guidelines   · Decision Maker  Patient  · Treatment Guidelines  DNR Order; Artificial nutrition trial; IV fluid trial  · Treatment Guideline Comments  As above    MOLST   · Completed  17-Nov-2020    Location of Discussion:   Duration of Advanced Care Planning Meeting   · Time spent (in minutes)  30    Electronic Signatures for Addendum Section:   Robbie Devi) (Signed Addendum 18-Nov-2020 14:45)    Will sing off. Please call us back if needing help with symptoms Rx, GOC, ACP, or resources.    Electronic Signatures:  Robbie Devi)  (Signed 18-Nov-2020 14:43)  	Authored: Goals of Care Conversation, Personal Advance Directives Treatment Guidelines, Location of Discussion      Last Updated: 18-Nov-2020 14:45 by Robbie Devi)

## 2020-12-24 NOTE — PROGRESS NOTE ADULT - ASSESSMENT
75 year old manwith acute on chronic renal failure no requiring HD. Initally presented with SBO that resolved, developed nausea now with recurrence of SBO s/p NGT removal early today now tolerating clears.  Palliative care called for GOC now transferred to PCU.

## 2020-12-25 NOTE — PROGRESS NOTE ADULT - SUBJECTIVE AND OBJECTIVE BOX
GAP TEAM PALLIATIVE CARE UNIT PROGRESS NOTE:      [  ] Patient on hospice program.    INDICATION FOR PALLIATIVE CARE UNIT SERVICES: End of life care and symptom management in the setting of HF    INTERVAL HPI/OVERNIGHT EVENTS: Pt required 2 doses of Ativan 0.5mg IV and 2 doses of Dilaudid 0.2mg IV for dyspnea in the past 24 hours. Pt also with more difficulties with swallowing.    DNR on chart: Yes      Allergies:  No Known Allergies      MEDICATIONS  (STANDING):  BACItracin   Ointment 1 Application(s) Topical two times a day  benzocaine 15 mG/menthol 3.6 mG (Sugar-Free) Lozenge 1 Lozenge Oral two times a day  chlorhexidine 4% Liquid 1 Application(s) Topical <User Schedule>  fluticasone propionate 50 MICROgram(s)/spray Nasal Spray 1 Spray(s) Both Nostrils two times a day  milrinone Infusion 0.2 MICROgram(s)/kG/Min (4.32 mL/Hr) IV Continuous <Continuous>    MEDICATIONS  (PRN):  acetaminophen   Tablet .. 650 milliGRAM(s) Oral every 6 hours PRN Mild Pain (1 - 3)  aluminum hydroxide/magnesium hydroxide/simethicone Suspension 30 milliLiter(s) Oral every 4 hours PRN Dyspepsia  bisacodyl Suppository 10 milliGRAM(s) Rectal daily PRN Constipation  HYDROmorphone  Injectable 0.2 milliGRAM(s) IV Push every 1 hour PRN dyspnea  HYDROmorphone  Injectable 0.2 milliGRAM(s) IV Push every 1 hour PRN Severe Pain (7 - 10)  LORazepam   Injectable 0.5 milliGRAM(s) IV Push every 1 hour PRN anxiety or dyspnea not relieved with Dilaudid  metoclopramide Injectable 10 milliGRAM(s) IV Push every 8 hours PRN Intractable to oral myra  midodrine. 5 milliGRAM(s) Oral <User Schedule> PRN 30mins prior to HD  sodium chloride 0.9% lock flush 10 milliLiter(s) IV Push every 1 hour PRN Pre/post blood products, medications, blood draw, and to maintain line patency  zaleplon 5 milliGRAM(s) Oral at bedtime PRN Insomnia    ITEMS UNCHECKED ARE NOT PRESENT    PRESENT SYMPTOMS: [ ]Unable to obtain due to poor mentation   Source if other than patient:  [ ]Family   [ ]Team     Pain: [ ] yes [ X] no  QOL impact -   Location -                    Aggravating factors -  Quality -  Radiation -  Timing-  Severity (0-10 scale):  Minimal acceptable level (0-10 scale):     Dyspnea:                           [ ]Mild [ ]Moderate [ ]Severe  Anxiety:                             [ ]Mild [ ]Moderate [ ]Severe  Fatigue:                             [ ]Mild [ ]Moderate [ ]Severe  Nausea:                             [ ]Mild [ ]Moderate [ ]Severe  Loss of appetite:              [ ]Mild [ ]Moderate [ ]Severe  Constipation:                    [ ]Mild [ ]Moderate [ ]Severe    PAINAD Score:    [email or web address]/painad.pdf (Ctrl +  left click to view)  		  Other Symptoms:  [ ]All other review of systems negative     Palliative Performance Status Version 2:  10       %         http://npcrc.org/files/news/palliative_performance_scale_ppsv2.pdf  PHYSICAL EXAM:  Vital Signs Last 24 Hrs  T(C): 36.1 (25 Dec 2020 08:23), Max: 36.2 (25 Dec 2020 06:00)  T(F): 97 (25 Dec 2020 08:23), Max: 97.2 (25 Dec 2020 06:00)  HR: 80 (25 Dec 2020 08:23) (80 - 83)  BP: 90/60 (25 Dec 2020 08:23) (88/55 - 90/60)  BP(mean): --  RR: 20 (25 Dec 2020 08:23) (18 - 20)  SpO2: 90% (25 Dec 2020 08:23) (90% - 95%) I&O's Summary  GENERAL:  [ X]Alert  [ ]Oriented x   [X ]Lethargic  [ ]Cachexia  [ ]Unarousable  [ X]Verbal  [ ]Non-Verbal  Behavioral:   [ ] Anxiety  [ ] Delirium [ ] Agitation [ ] Other  HEENT:  [X ]Normal   [ ]Dry mouth   [ ]ET Tube/Trach  [ ]Oral lesions  PULMONARY:   [ ]Clear [ ]Tachypnea  [ ]Audible excessive secretions   [ ]Rhonchi        [ ]Right [ ]Left [ ]Bilateral  [ ]Crackles        [ ]Right [ ]Left [ ]Bilateral  [ ]Wheezing     [ ]Right [ ]Left [ ]Bilateral  [X ]Diminshed BS [ ]Right [ ]Left [ X]Bilateral    CARDIOVASCULAR:    [X ]Regular [ ]Irregular [ ]Tachy  [ ]Eric [ ]Murmur [ ]Other  GASTROINTESTINAL:  [X ]Soft  [ ]Distended   [ ]+BS  [X ]Non tender [ ]Tender  [ ]PEG [ ]OGT/ NGT   Last BM:       GENITOURINARY:  [X ]Normal [ ] Incontinent   [ ]Oliguria/Anuria   [ ]Guidry  MUSCULOSKELETAL:   [ ]Normal   [ ]Weakness  [ X]Bed/Wheelchair bound [ ]Edema  NEUROLOGIC:   [X ]No focal deficits  [ ] Cognitive impairment  [X ] Dysphagia [ ]Dysarthria [ ] Paresis [ ]Other   SKIN:   [X]Normal  [ ]Rash     [ ]Pressure ulcer(s)  [ ]y [ ]n  Present on admission      CRITICAL CARE:  [ X] Shock Present  [ ]Septic [X ]Cardiogenic [ ]Neurologic [ ]Hypovolemic  [ ]  Vasopressors [ X]  Inotropes   [ ] Respiratory failure present [ ] Mechanical Ventilation [ ] Non-invasive ventilatory support [ ] High-Flow  [ ] Acute  [ ] Chronic [ ] Hypoxic  [ ] Hypercarbic [ ] Other  [ ] Other organ failure     LABS: No new labs      RADIOLOGY & ADDITIONAL STUDIES: No new imaging    PROTEIN CALORIE MALNUTRITION: [ ] mild [ ] moderate [ X] severe  [ ] underweight [ ] morbid obesity    https://www.andeal.org/vault/2440/web/files/ONC/Table_Clinical%20Characteristics%20to%20Document%20Malnutrition-White%20JV%20et%20al%241074.pdf    Height (cm): 175.3 (11-16-20 @ 16:53), 172.7 (09-14-20 @ 07:21), 177.8 (07-20-20 @ 13:41)  Weight (kg): 72 (12-17-20 @ 14:14), 81.7 (11-17-20 @ 18:57), 75.1 (09-14-20 @ 07:21)  BMI (kg/m2): 23.4 (12-17-20 @ 14:14), 26.6 (11-17-20 @ 18:57), 24.4 (11-16-20 @ 16:53)    [ X] PPSV2 < or = 30% [ ] significant weight loss [ X] poor nutritional intake [ ] anasarca Albumin, Serum: 2.1 g/dL (12-19-20 @ 05:57)    Artificial Nutrition [ ]     REFERRALS:   [ ]Chaplaincy  [ ] Hospice  [ ]Child Life  [ ]Social Work  [ ]Case management [ ]Holistic Therapy [ ] Physical Therapy [ ] Dietary   Goals of Care Document: HUMBERTO Devi (11-17-20 @ 17:09)  Goals of Care Conversation:   Participants   · Participants  Patient; Staff  · Provider  Dr Devi.    Advance Directives   · Does patient have Advance Directive  Yes  · Indicate Type  Living Will; Power of   · Are any of the items on the chart  Yes  · Specify which ones are on chart  Living Will  · Does Patient Have a Surrogate  Yes  · Surrogate's Name  Zully Jarquin  · Caregiver:  yes  · Name  Zully Jarquin  · Phone Number  [phone]    Conversation Discussion   · Conversation Details  Patient agree with DNR/I but trial of BIPAP. He also agree with returning to the hospital based on MOLST. Trial of NGT and IVF. Abx if needed. A MOLST was completed and a copy was placed in the chart and the original an a copy were given to the patient.     We discussed hospice services at length and he does not want to establish hospice care right now. He will f/u with Dr. Anglin and decide with her about this type of service. He is to f/u with outpatient palliative care, Dr. Amy Nguyen MD. [address] [place] [address], [address]. [place] [address]. Phone [phone].    Personal Advance Directives Treatment Guidelines:   Treatment Guidelines   · Decision Maker  Patient  · Treatment Guidelines  DNR Order; Artificial nutrition trial; IV fluid trial  · Treatment Guideline Comments  As above    MOLST   · Completed  17-Nov-2020    Location of Discussion:   Duration of Advanced Care Planning Meeting   · Time spent (in minutes)  30    Electronic Signatures for Addendum Section:   Robbie Devi) (Signed Addendum 18-Nov-2020 14:45)    Will sing off. Please call us back if needing help with symptoms Rx, GOC, ACP, or resources.    Electronic Signatures:  Robbie Devi)  (Signed 18-Nov-2020 14:43)  	Authored: Goals of Care Conversation, Personal Advance Directives Treatment Guidelines, Location of Discussion      Last Updated: 18-Nov-2020 14:45 by Robbie Devi)

## 2020-12-26 NOTE — PROGRESS NOTE ADULT - PROBLEM SELECTOR PROBLEM 7
Chronic systolic congestive heart failure
Chronic systolic congestive heart failure
Encounter for palliative care

## 2020-12-26 NOTE — PROGRESS NOTE ADULT - SUBJECTIVE AND OBJECTIVE BOX
GAP TEAM PALLIATIVE CARE UNIT PROGRESS NOTE:      [  ] Patient on hospice program.    INDICATION FOR PALLIATIVE CARE UNIT SERVICES: End of life care and symptom management in the setting of HF    INTERVAL HPI/OVERNIGHT EVENTS: No major overnight events. Pt received one dose of dilaudid 0.2mg for dyspnea, and Ativan 0.5mg Iv x2 in the past 24 hours.    DNR on chart: Yes    Allergies  No Known Allergies        MEDICATIONS  (STANDING):  BACItracin   Ointment 1 Application(s) Topical two times a day  benzocaine 15 mG/menthol 3.6 mG (Sugar-Free) Lozenge 1 Lozenge Oral two times a day  chlorhexidine 4% Liquid 1 Application(s) Topical <User Schedule>  fluticasone propionate 50 MICROgram(s)/spray Nasal Spray 1 Spray(s) Both Nostrils two times a day  milrinone Infusion 0.2 MICROgram(s)/kG/Min (4.32 mL/Hr) IV Continuous <Continuous>    MEDICATIONS  (PRN):  acetaminophen  Suppository .. 650 milliGRAM(s) Rectal every 6 hours PRN Temp greater or equal to 38C (100.4F)  bisacodyl Suppository 10 milliGRAM(s) Rectal daily PRN Constipation  glycopyrrolate Injectable 0.4 milliGRAM(s) IV Push every 4 hours PRN Secretions  HYDROmorphone  Injectable 0.2 milliGRAM(s) IV Push every 1 hour PRN Severe Pain (7 - 10)  HYDROmorphone  Injectable 0.2 milliGRAM(s) IV Push every 1 hour PRN dyspnea  LORazepam   Injectable 0.5 milliGRAM(s) IV Push every 1 hour PRN anxiety or dyspnea not relieved with Dilaudid  metoclopramide Injectable 10 milliGRAM(s) IV Push every 8 hours PRN Intractable to oral myra  sodium chloride 0.9% lock flush 10 milliLiter(s) IV Push every 1 hour PRN Pre/post blood products, medications, blood draw, and to maintain line patency  zaleplon 5 milliGRAM(s) Oral at bedtime PRN Insomnia    ITEMS UNCHECKED ARE NOT PRESENT    PRESENT SYMPTOMS: [ ]Unable to obtain due to poor mentation   Source if other than patient:  [ ]Family   [ ]Team     Pain: [ ] yes [X ] no  QOL impact -   Location -                    Aggravating factors -  Quality -  Radiation -  Timing-  Severity (0-10 scale):  Minimal acceptable level (0-10 scale):     Dyspnea:                           [X ]Mild [ ]Moderate [ ]Severe  Anxiety:                             [ ]Mild [ ]Moderate [ ]Severe  Fatigue:                             [ ]Mild [ ]Moderate [ ]Severe  Nausea:                             [ ]Mild [ ]Moderate [ ]Severe  Loss of appetite:              [ ]Mild [ ]Moderate [ ]Severe  Constipation:                    [ ]Mild [ ]Moderate [ ]Severe    PAINAD Score:    [email or web address].[email or web address]/cog/painad.pdf (Ctrl +  left click to view)  		  Other Symptoms:  [ ]All other review of systems negative     Palliative Performance Status Version 2:   10%         http://[facility].org/files/news/palliative_performance_scale_ppsv2.pdf  PHYSICAL EXAM:  Vital Signs Last 24 Hrs  T(C): 36.6 (26 Dec 2020 08:47), Max: 36.6 (26 Dec 2020 08:47)  T(F): 97.8 (26 Dec 2020 08:47), Max: 97.8 (26 Dec 2020 08:47)  HR: 80 (26 Dec 2020 08:47) (80 - 80)  BP: 68/40 (26 Dec 2020 08:47) (68/40 - 68/40)  RR: 17 (26 Dec 2020 08:47) (17 - 17)  SpO2: 90% (26 Dec 2020 08:47) (90% - 90%) I&O's Summary    GENERAL:  [X ]Alert  [ ]Oriented x   [X ]Lethargic  [ ]Cachexia  [ ]Unarousable  [ X]Verbal  [ ]Non-Verbal  Behavioral:   [ ] Anxiety  [ ] Delirium [ ] Agitation [ ] Other  HEENT:  [X ]Normal   [ ]Dry mouth   [ ]ET Tube/Trach  [ ]Oral lesions  PULMONARY:   [ ]Clear [ ]Tachypnea  [ ]Audible excessive secretions   [ ]Rhonchi        [ ]Right [ ]Left [ ]Bilateral  [ ]Crackles        [ ]Right [ ]Left [ ]Bilateral  [ ]Wheezing     [ ]Right [ ]Left [ ]Bilateral  [X ]Diminshed BS [ ]Right [ ]Left [ X]Bilateral    CARDIOVASCULAR:    [X ]Regular [ ]Irregular [ ]Tachy  [ ]Eric [ ]Murmur [ ]Other  GASTROINTESTINAL:  [ X]Soft  [ ]Distended   [ ]+BS  [X ]Non tender [ ]Tender  [ ]PEG [ ]OGT/ NGT   Last BM:   12/21/2020    GENITOURINARY:  [ ]Normal [X ] Incontinent   [ ]Oliguria/Anuria   [ ]Guidry  MUSCULOSKELETAL:   [ ]Normal   [ ]Weakness  [X ]Bed/Wheelchair bound [ ]Edema  NEUROLOGIC:   [X ]No focal deficits  [ ] Cognitive impairment  [ ] Dysphagia [ ]Dysarthria [ ] Paresis [ ]Other   SKIN:   [X ]Normal  [ ]Rash     [ ]Pressure ulcer(s)  [ ]y [ ]n  Present on admission      CRITICAL CARE:  [X ] Shock Present  [ ]Septic [X ]Cardiogenic [ ]Neurologic [ ]Hypovolemic  [ ]  Vasopressors [ X]  Inotropes   [ ] Respiratory failure present [ ] Mechanical Ventilation [ ] Non-invasive ventilatory support [ ] High-Flow  [ ] Acute  [ ] Chronic [ ] Hypoxic  [ ] Hypercarbic [ ] Other  [ ] Other organ failure     LABS: No new labs      RADIOLOGY & ADDITIONAL STUDIES: No new imaging    PROTEIN CALORIE MALNUTRITION: [ ] mild [ ] moderate [X ] severe  [ ] underweight [ ] morbid obesity    https://www.andeal.org/vault/2440/web/files/ONC/Table_Clinical%20Characteristics%20to%20Document%20Malnutrition-White%20JV%20et%20al%322430.pdf    Height (cm): 175.3 (11-16-20 @ 16:53), 172.7 (09-14-20 @ 07:21), 177.8 (07-20-20 @ 13:41)  Weight (kg): 72 (12-17-20 @ 14:14), 81.7 (11-17-20 @ 18:57), 75.1 (09-14-20 @ 07:21)  BMI (kg/m2): 23.4 (12-17-20 @ 14:14), 26.6 (11-17-20 @ 18:57), 24.4 (11-16-20 @ 16:53)    [X ] PPSV2 < or = 30% [ ] significant weight loss [X ] poor nutritional intake [ ] anasarca Albumin, Serum: 2.1 g/dL (12-19-20 @ 05:57)    Artificial Nutrition [ ]     REFERRALS:   [ ]Chaplaincy  [ ] Hospice  [ ]Child Life  [ ]Social Work  [ ]Case management [ ]Holistic Therapy [ ] Physical Therapy [ ] Dietary   Goals of Care Document: HUMBERTO Devi (11-17-20 @ 17:09)  Goals of Care Conversation:   Participants   · Participants  Patient; Staff  · Provider  Dr Devi.    Advance Directives   · Does patient have Advance Directive  Yes  · Indicate Type  Living Will; Power of   · Are any of the items on the chart  Yes  · Specify which ones are on chart  Living Will  · Does Patient Have a Surrogate  Yes  · Surrogate's Name  Zully Jarquin  · Caregiver:  yes  · Name  Zully Jarquin  · Phone Number  [phone]    Conversation Discussion   · Conversation Details  Patient agree with DNR/I but trial of BIPAP. He also agree with returning to the hospital based on MOLST. Trial of NGT and IVF. Abx if needed. A MOLST was completed and a copy was placed in the chart and the original an a copy were given to the patient.     We discussed hospice services at length and he does not want to establish hospice care right now. He will f/u with Dr. Anglin and decide with her about this type of service. He is to f/u with outpatient palliative care, Dr. Amy Nguyen MD. [address] [place] [address], [address]. [place] [address]. Phone [phone].    Personal Advance Directives Treatment Guidelines:   Treatment Guidelines   · Decision Maker  Patient  · Treatment Guidelines  DNR Order; Artificial nutrition trial; IV fluid trial  · Treatment Guideline Comments  As above    MOLST   · Completed  17-Nov-2020    Location of Discussion:   Duration of Advanced Care Planning Meeting   · Time spent (in minutes)  30    Electronic Signatures for Addendum Section:   Robbie Devi) (Signed Addendum 18-Nov-2020 14:45)    Will sing off. Please call us back if needing help with symptoms Rx, GOC, ACP, or resources.    Electronic Signatures:  Robbie Devi)  (Signed 18-Nov-2020 14:43)  	Authored: Goals of Care Conversation, Personal Advance Directives Treatment Guidelines, Location of Discussion      Last Updated: 18-Nov-2020 14:45 by Robbie Devi)

## 2020-12-26 NOTE — PROGRESS NOTE ADULT - REASON FOR ADMISSION
SBO w/weakness and fall

## 2020-12-26 NOTE — PROGRESS NOTE ADULT - PROBLEM SELECTOR PLAN 8
DNR/DNI, MOLST in chart  goal for preventing and treating distressful symptoms
DNR/DNI, MOLST in chart  goal for preventing and treating distressful symptoms

## 2020-12-26 NOTE — PROGRESS NOTE ADULT - PROVIDER SPECIALTY LIST ADULT
Heart Failure
Internal Medicine
Nephrology
Palliative Care
Palliative Care
Surgery
Infectious Disease
Palliative Care
Heart Failure
Infectious Disease
Heart Failure
Internal Medicine
Internal Medicine
Nephrology
Palliative Care
Surgery
Palliative Care
Palliative Care
Internal Medicine
Heart Failure
Heart Failure
Nephrology
Palliative Care
Nephrology
Heart Failure
Internal Medicine
Palliative Care
Internal Medicine

## 2020-12-26 NOTE — PROGRESS NOTE ADULT - NSHPATTENDINGPLANDISCUSS_GEN_ALL_CORE
primary team
RN at bedside
primary team
team
medicine, heart failure and IR
primary team
primary team
family, team
primary team
family, team
family, team

## 2020-12-26 NOTE — PROGRESS NOTE ADULT - NUTRITIONAL ASSESSMENT
This patient has been assessed with a concern for Malnutrition and has been determined to have a diagnosis/diagnoses of Severe protein-calorie malnutrition.    This patient is being managed with:   Diet Consistent Carbohydrate/No Snacks-  Dysphagia 3 Soft Thin Liquids ([id number])  Low Sodium  Supplement Feeding Modality:  Oral  Glucerna Shake Cans or Servings Per Day:  2       Frequency:  Daily  Entered: Dec 18 2020  6:44PM    The following pending diet order is being considered for treatment of Severe protein-calorie malnutrition:  Diet Consistent Carbohydrate w/Evening Snack-  1000mL Fluid Restriction ([id number])  No Concentrated Potassium  Low Sodium  No Concentrated Phosphorus  Supplement Feeding Modality:  Oral  Suplena Cans or Servings Per Day:  2       Frequency:  Daily  Entered: Dec 17 2020  2:36PM  
This patient has been assessed with a concern for Malnutrition and has been determined to have a diagnosis/diagnoses of Severe protein-calorie malnutrition.    This patient is being managed with:   Diet NPO-  Entered: Dec 12 2020 11:54PM    
This patient has been assessed with a concern for Malnutrition and has been determined to have a diagnosis/diagnoses of Severe protein-calorie malnutrition.    This patient is being managed with:   Diet Consistent Carbohydrate/No Snacks-  Dysphagia 3 Soft Thin Liquids ([id number])  Low Sodium  Supplement Feeding Modality:  Oral  Glucerna Shake Cans or Servings Per Day:  2       Frequency:  Daily  Entered: Dec 18 2020  6:44PM    The following pending diet order is being considered for treatment of Severe protein-calorie malnutrition:  Diet Consistent Carbohydrate w/Evening Snack-  1000mL Fluid Restriction ([id number])  No Concentrated Potassium  Low Sodium  No Concentrated Phosphorus  Supplement Feeding Modality:  Oral  Suplena Cans or Servings Per Day:  2       Frequency:  Daily  Entered: Dec 17 2020  2:36PM  
This patient has been assessed with a concern for Malnutrition and has been determined to have a diagnosis/diagnoses of Severe protein-calorie malnutrition.    This patient is being managed with:   Diet NPO-  Entered: Dec 12 2020 11:54PM    
This patient has been assessed with a concern for Malnutrition and has been determined to have a diagnosis/diagnoses of Severe protein-calorie malnutrition.    This patient is being managed with:   Diet NPO-  Entered: Dec 25 2020  4:13PM    The following pending diet order is being considered for treatment of Severe protein-calorie malnutrition:  Diet Consistent Carbohydrate w/Evening Snack-  1000mL Fluid Restriction ([id number])  No Concentrated Potassium  Low Sodium  No Concentrated Phosphorus  Supplement Feeding Modality:  Oral  Suplena Cans or Servings Per Day:  2       Frequency:  Daily  Entered: Dec 17 2020  2:36PM

## 2020-12-26 NOTE — PROGRESS NOTE ADULT - PROBLEM SELECTOR PLAN 7
-EF <20%. AICD de-activated  -c/w Milrinone gtt 0.2cc/hr for comfort
.  Complex medical decision making / symptom management in the setting of end-stage cardiac disease.    Will continue to follow for ongoing GOC discussion / titration of palliative regimen.   Emotional support provided, questions answered.  Active Psychosocial Referrals: TERRA GEORGE
-EF <20%. AICD de-activated  -c/w Milrinone gtt 0.2cc/hr for comfort

## 2020-12-26 NOTE — PROGRESS NOTE ADULT - ATTENDING COMMENTS
I have seen and evaluated the patient and discussed the relevant clinical findings and plan with the surgical housestaff and fellow.  Agree with the above documentation with addenda as noted.     Feels well, no pain, + flatus  Abd soft, nontender, nondistended  Minimal output from NGT    Will d/c NGT  Trial of clears PO    Tolu Sandoval MD
I have evaluated the relevant clinical findings and plan with the surgical housestaff and/or fellow.  Agree with the above documentation with addenda as noted.    Tolu Sandoval MD
I have seen and evaluated the patient and discussed the relevant clinical findings and plan with the surgical housestaff and fellow.  Agree with the above documentation with addenda as noted.     + bowel function, flatus and BM  No pain  Abdomen soft, nontender    SBO resolving    Tolu Sandoval MD
I have seen and evaluated the patient and discussed the relevant clinical findings and plan with the surgical housestaff and fellow.  Agree with the above documentation with addenda as noted.     75y M with complicated medical and cardiac hx, including heart failure with LVAD and on milrinone gtt, presented to ED after a fall at home.  He reported feeling abdominal cramping and bloating for 2 days prior to admission, then fell and struck his face on the floor.  + Nausea and vomiting yesterday after the fall  Had initial trauma workup at outside ED and was then transferred to [facility] as per his request as his cardiologist is here.  Non-con CT abdomen demonstrated dilated loops with transition point, possible mesenteric edema and some ascites.  He had NGT placed prior to transfer.  Tertiary trauma survey done this morning demonstrates no bony pain and no evidence of further traumatic injury.    Today he reports no abdominal discomfort and is passing flatus.  NGT is in place  Abdomen is soft, nontender, and nondistended.    Recommend repeat imaging with oral contrast  Strict I/Os; fluid management as per cardiology  No surgical intervention; obstruction appears to be resolving.      Tolu Sandoval MD
I have seen and examined the patient.  I agree with the surgical resident's note.  The abd is soft and non-tender - In view of the patient's poor medical condition will continue to keep NPO with NG tube 0 needs to be rehydrated as dialyss took off moderate amount of fluid and NG tube is draining mederate.  Will continue to follow    Trent Morris contact information [phone]
I have evaluated the relevant clinical findings and plan with the surgical housestaff and/or fellow.  Agree with the above documentation with addenda as noted.     Tolu Sandoval MD
I have seen and evaluated the patient and discussed the relevant clinical findings and plan with the surgical housestaff and fellow.  Agree with the above documentation with addenda as noted.     Good response to NGT.  Patient is passing flatus and reports no abdominal pain.  Abdomen is soft, nontender, and nondistended.    Significant medical issues incl heart failure and kidney failure causing hemodynamic instability.  Patient is DNR.  Any surgical intervention would be very high risk.  It is possible his obstruction will recur without surgical exploration/lysis of adhesions, however mortality and morbidity risk are too high.    Will discuss goals of care with patient as well as with cardiology team.  No urgent surgical intervention is indicated at this moment as his abdomen is benign and he is having bowel function.    Tolu Sandoval MD
Surgery Attending    Pt seen and examined; agree with above assessment and plan    C/O intermittent nausea, no emesis. Passing scant flatus, no BM today.  Mild distention& tympany on exam w/o tenderness    CT w dilated SB w RLQ transition point    Continue NPO, NG if any sign of emesis.
Agree w above
I have seen this patient with the fellow and agree with their assessment and plan. In addition,    # EDIE on CKD - Started on diaysis on 12/11/20 for uremia. Planned for a second dialysis session today.     # Hyperkalemia - 2K bath today.     # Medication toxicity Monitoring: medication dose reviewed. Please dose medications to CrCl<10    The rest of the recommendations as per fellow's note.    Marie Schultz MD  Attending Nephrologist  [phone]
NGT out  Doing OK but SCr inc  Will not start HD  Reviewed record, saw and examined patient  Agree with above w my edits
Pt. see examined and reviewed.  I agree with above H/P/A&P w my edits.  SBO better  SCr stable
CKD 4, progressive EDIE/cardiorenal, hyponatremia, metabolic acidosis    In discussion with fellow, notes he does not want to continue with dialysis. Tells me he is leaning that way, but wishes to discuss with Dr. Anglin.    Worsening renal failure, decision should be made about kidney replacement. Given advanced HF and current vitals/hemodynamics, I suspect he would not tolerate dialysis well and it would not substantively improve (and may well decrease) his quality of life. It is reasonable at this time to forgo continuation of this treatment, if the patient wishes. In the interim, can try diuretics for volume management, but we should ascertain wishes to best make ongoing management decisions.    D/w Dr. Anglin, will await her conversation with Mr. Jarquin before final decision is made.    Remainder per fellow, will follow.
I have seen this patient with the fellow and agree with their assessment and plan. In addition,    # EDIE on CKD - Started on diaysis on 12/11/20 for uremia. NO HD today. Planned for a third dialysis session on MOnday 12/14/20.       # Medication toxicity Monitoring: medication dose reviewed. Please dose medications to CrCl<10    The rest of the recommendations as per fellow's note.    Marie Schultz MD  Attending Nephrologist  [phone]
Lethargic, no abd pain  Still with NGT w suction maybe out today  otherwise pt seen / examined / reviewed   Agree w above w edits
He is generally stable physically, but mentally exhausted by his physical state. He feels weak and tired. Not thriving and has a poor appetite, although not due to nausea, but moreso general depression from still being here.    Please STOP giving fluids as he is now hypervolemic with moderate JVP. Will either give a diuretic challenge tomorrow or he may require HD. Will need to discuss with renal.  Increase midodrine to 5 mg po TID to help increase perfusion pressure to the kidneys.  Continue current dose of milrinone 0.2 mcg/kg/min and Toprol XL 25 mg daily.  Disposition pending need for HD.
NGT on intermittent suction. Patient is miserable and wants the NGT removed, but cautious that it should not need to be replaced.  Should conservative strategies fail, it seems surgery would consider the next step is an exploratory laparotomy. However, the patient would be an exceedingly high risk for any surgery given his end-stage HF and kidney disease, currently on HD, along with his frailty and general deconditioning. In addition, the patient does not wish to undergo surgery.    Switched Imdur to ISDN 10 mg TID to allow for administration via NGT. Hold for SBP < 95.  Continue lopressor 12.5 mg BID via NGT. No hold parameter as this will not lower his BP and he has back-up rate on ICD.  Increase milrinone to 0.2 mcg/kg/min to see if this helps his BP. OK to give midodrine 5-10 mg po x1 at least 30 minutes before HD to help BP.  Conservative management and hopefully the SBO improves. Whether he requires ongoing HD is still unclear.
Patient has no complaints currently, slightly better than yesterday, but still cannot stand thought of food.  ICD shock therapies turned off yesterday. He is DNR/DNI. Comfort measures only at this point.  He does not want any type of artificial feeding and we are not pursuing further blood draws.  Appreciate input of Palliative Care team. Hoping he can move to [facility] for new PCU when bed available.    Continue current dose of milrinone 0.2 mcg/kg/min as this likely provides some comfort.  No need to continue Eliquis for AC.
NGT removed and he is now on clear liquid diet, seems to be tolerating it well.  He remains quite frail and overall debilitated.  Per renal, no plan for HD today.    Given recurrent hypotension and desire to improve renal perfusion, please stop the ISDN and add midodrine 2.5 mg po TID.  Can switch lopressor back to Toprol XL 25 mg once daily in AM.  Continue milrinone 0.2 mcg/kg/min.
Started HD over the weekend and has NGT back in for SBO. More tachycardic, but his Toprol XL was held as it cannot be given via NGT. Same with Imdur.    Would reduce total dose of Imdur to 30 mg, but give as ISDN 10 mg TID via NGT. Hold for SBP < 95.  OK to switch to lopressor 12.5 mg BID via NGT. No hold parameter as this will not lower his BP and he has back-up rate on ICD.  Continue milrinone 0.15 mcg/kg/min reduced dose for now and will monitor status on HD.
He is relatively comfortable, but complains that the bed is not comfortable. He cannot shift himself around and feels quite weak.  Had ice cream earlier today, which brought him pleasure.    He is DNR/DNI, does not want dialysis or artifical feeding. Milrinone continues and he is on midodrine to help with BP.  No further labs being drawn.    He is concerned about going home with hospice as this will have a cost and a burden to his niece, Cleopatra. He would prefer an inpatient facility. Will discuss with the Palliative Care team tomorrow. Hoping he can move to [facility] for new PCU when bed available.
Patient is s/p replacement of his central venous catheter by IR. Tolerated procedure well.  Reports he was given solid food today and tolerated it without incident. No abdominal pain or bloating.  He remains off diuretics and HF meds due to hypotension and limited oral intake.  JVP is currently 6-8 cm H2O and he is without LE edema.    Continue current dose of milrinone 0.2 mcg/kg/min.  Would add midodrine 2.5 mg po TID to increase perfusion pressure to the kidneys.  Continue Toprol XL 25 mg once daily in AM.
Patient is depressed and says he feels "horrible." He does not want to live like this. We discussed at length along with his niece, Cleopatra, on the phone, the options going forward. These include proceeding with HD or considering hospice/comfort care. His renal failure and heart failure are both end-stage and he has become deconditioned and bedridden. He expressed his desire to pursue comfort measures only and does not want to be shocked by his AICD and does not want life prolonging therapy. He wants just comfort.    I requested that the cardiology fellow come and turn of the shock therapies of his AICD. He is already DNR/DNI. We will stop all blood draws and the goals of care are now comfort. I discussed his case with the Palliative Care attending, but there is no PCU in the hospital at the moment. They are moving to [facility] on Monday. Will address his placement at that time, if needed.    Continue current dose of milrinone 0.2 mcg/kg/min as this likely provides some comfort.  No additional medications are needed.
Pt seen with fellow.  Agree with above.  ES CHF. ESRD no HD with nausea this am- ativan and reglan given with relief.  continue milrinone as tolerated.
Ongoing work on dispo planning; however, the patient's condition may be staring to rapidly decline and then inpatient hospice may need to be explored.
Pt seen with fellow.  Agree with above.  Declining with increasing lethargy, poor po intake.  dilaudid prn sob or pain.  ativan prn nausea  reglan iv atc.  goals are for comfort
Pt seen with fellow. Agree with above.  Significant decline in last 24 hours.  Discontinue po meds as unable to tolerate.  Iv Reglan atc and iv dilaudid prn dyspnea, pain.  goals are for comfort.

## 2020-12-27 NOTE — DISCHARGE NOTE FOR THE EXPIRED PATIENT - SECONDARY DIAGNOSIS.
Afib Acute renal failure superimposed on stage 5 chronic kidney disease, not on chronic dialysis, unspecified acute renal failure type Hypertension

## 2020-12-27 NOTE — PROVIDER CONTACT NOTE (OTHER) - BACKGROUND
Patient admitted with intestinal obstruction.
Pt admitted with intestinal obstruction
Pt has DNR order
1st time
Patient admitted for SBO, s/p fall.  Hx: hypothyroidsm, Afib, dm2, CKD, HTN
Patient admitted with intestinal obstruction.
Pt admitted for SBO and is s/p fall
Pt admitted for SBO w/ weakness and fall.
Pt admitted with intestinal obstruction
pt admitted for small bowel obstruction
pt admitted for small bowel obstruction
pt admitted with intestinal obstruction. pt had previous pacemaker tachy which resolved

## 2020-12-27 NOTE — PROVIDER CONTACT NOTE (OTHER) - RECOMMENDATIONS
Hold am dose of lopressor
Continue to monitor
NP Cindy aware, no acute intervention at this time
Pt pronounced dead at 0256, HCP Marilou Jarquin made aware. Declined autopsy
RRT
SERGIO escoto
continue to monitor, give lopressor
provider at

## 2020-12-27 NOTE — PROVIDER CONTACT NOTE (OTHER) - ASSESSMENT
No response to external stimuli, no spontaneous respirations, no apical heart rate, negative papillary response to light
Pt is aox4, aymptomatic
Pt VSS, A&Ox4, denies any CP or SOB.
Pt resting in bed, denies any CP or SOB. VSS pt A&Ox4
hypotensive, bedside dialysis right now, 89/50
Patient experienced 9 beats wide complexes on tele. Patient asymptomatic.
Patient experiencing nausea and brown emesis.
Patient hypotensive post dialysis. Patient BP 88/51, other VSS. Patient  Asymptomatic.
Patient hypotensive. Blood pressure 78/26 manually. Asymptomatic. Other VSS. McPherson Sump maintained.
Patient hypotensive. Blood pressure 80/58 manually. Other VSS. Patient asymptomatic.
Pt alert, vitals stable at this time. Denies pain or discomfort.
Pt is aox4, asymptomatic, VSS
no s.s of distress. VSS. pt reverted back to Sinus tachy
patient asymptomatic. denies any GI symptoms.
pt a/ox4;  on tele
pt a/ox4; asymptomatic- no complaints of dizziness, lightheadedness, confusion
pt is resting comfortably in bed. Pt is AP x 4 and is refusing landaverde that has been ordered.
Patient blood pressure 92/55 ordered Metoprolol. VSS. Patient asymptomatic.

## 2020-12-27 NOTE — DISCHARGE NOTE FOR THE EXPIRED PATIENT - HOSPITAL COURSE
75 year old M with longstanding history of NICM, LVEF < 20% (LVIDd 6.7 cm) s/p CRT-D, moderate-severe MR s/p MitraClip 8/2020, HTN, pAFib s/p DCCV 7/20/20 on Eliquis, CKD stage 3 (b/l SCr 1.7-2), DM 2 (A1c 6.1%) and anemia, transferred from [facility] after fall at home today in setting of nausea and vomiting, found there to have SBO with placement of NG tube, which self resolved. Palliative consulted for [facility]. Pt made the decision for DNR/DNI and comfort measures. Pt transferred to PCU for symptom management. On 12/27/2020 at 2:26AM pt found to have non reactive pupils, no heart sounds, no spontaneous breathing, and no withdrawal to painful stimuli, thus pronounced. Family alerted.

## 2020-12-27 NOTE — PROVIDER CONTACT NOTE (OTHER) - DATE AND TIME:
22-Dec-2020 05:30
08-Dec-2020 09:32
08-Dec-2020 10:00
08-Dec-2020 22:30
11-Dec-2020 22:03
12-Dec-2020 16:21
12-Dec-2020 19:30
12-Dec-2020 20:00
13-Dec-2020 02:00
13-Dec-2020 03:23
13-Dec-2020 10:26
13-Dec-2020 18:28
14-Dec-2020 16:24
15-Dec-2020 03:24
15-Dec-2020 22:30
16-Dec-2020 03:50
27-Dec-2020 03:07
19-Dec-2020 05:15

## 2020-12-27 NOTE — PROVIDER CONTACT NOTE (OTHER) - REASON
13 beats WCT
18 beats of WCT
4 WCT
Event on tele
Off tele order
PMT 7.72 seconds
Patient experienced 9 beats wide complexes on tele.
Patient experiencing nausea and brown emesis.
Patient hypotensive post dialysis.
Patient hypotensive.
Patient hypotensive.
clarification of order
pt BP 78/44 manual
pt is refusing Guirdy
pts Right double lumen Santo red port not flushing, blue port flushing with difficulty
Patient blood pressure 92/55 ordered Metoprolol.
Pt expiration
Low BP

## 2020-12-27 NOTE — PROVIDER CONTACT NOTE (OTHER) - SITUATION
Patient experienced 9 beats wide complexes on tele.
Patient experiencing nausea and brown emesis.
Patient hypotensive post dialysis.
Patient hypotensive.
Patient hypotensive.
Pt SBP of 80
Pt going to CT, AV paced 96 on tele.
Pt had 10 beats wide complex on tele
Pt had 18 beats of WCT on tele monitor.
RN notified by tele tech for PMT 7.72 seconds
clarification on NGT to decompression vs clamped
pt BP 78/44 manual
pt admitted with intestinal obstruction. pt on tele. pt had previous pacemaker tachy which resolved. pt was Sinus tachy 110-120s on tele.
pt is refusing Guidry
pts Right double lumen Santo red port not flushing, blue port flushing with difficulty
Patient blood pressure 92/55 ordered Metoprolol.
Patient without spontaneous respirations or pulse

## 2020-12-29 ENCOUNTER — NON-APPOINTMENT (OUTPATIENT)
Age: 75
End: 2020-12-29

## 2020-12-29 NOTE — HISTORY OF PRESENT ILLNESS
[FreeTextEntry1] : This is a pleasant, 75 year old M with longstanding history of NICM, LVEF < 20% (LVIDd 6.7 cm) , s/p CRT-D, HTN, AFib s/p DCCV 7/20/20 on Eliquis, CKD stage 3 (b/l SCr 1.7-2), DM 2 (A1c 6.1%) and anemia. His history is also notable for papillary thyroid carcinoma for which he follows with Endocrinologist Dr. Winslow with q3 month surveillance testing as he prefers to defer surgical intervention. His general cardiologist is Cecil Loya.\par \par He was seen for initial consultation here in August 2020 after a recent hospitalization for AF with RVR and EDIE on CKD. At the time of our first visit, he was anasarcic prompting admission to the hospital where he was on milrinone -assisted diuresis (lost 20#) and eventually had MitraClip for mod-severe MR improved to mild. He was declined for LVAD due to frailty and CKD.\par \par Following discharge he was seen several times here and by Dr. Rees, nephrology however was not thriving and became volume overloaded with fall at home prompting admission. He was hospitalized at [facility] from 11/1-11/18 for ADHF for which he was started on milrinone and diuresed with improvement in his hemodynamics and symptoms. Goals of care were discussed with Palliative Care and a MOLST was completed expressing his wishes for DNR/DNI with trial of BIPAP. He declined hospice services at this time. He was discharged home with milrinone gtt at 0.3mcg/kg/min. He was noted to be mildly volume overloaded on day of discharge at a weight of 184lbs on increased dose of torsemide 80mg BID. Discharge labs on 11/18 showed Na 134, K 3.7, BUN/Cr 116/2.89.\par \par He presents today for routine follow up. When he was seen in clinic last week, he was diuresing well on torsemide 80 mg BID with his weight down to 170# from 184# at discharge. He was noted to still be mildly volume overloaded, so was kept on torsemide 80 mg BID. Over the past week his weight has continued to decline and when I spoke to him on the phone yesterday it was down to 157#. Recent labs from 11/30 was noted to have K 5.5,  (from 111) and SCr 3.38 (from 2.27). He was instructed to hold the torsemide, spironolactone, and KCl 40 meq daily. \par \par Currently states he is able to walk multiple laps around his home without SOB or fatigue. He is also exercising daily with light weights without difficulty. He endorses a chronic non-productive occasional cough that has been unchanged since his last visit. He reports no change in appetite. He denies CP, palpitations, syncope, LH/dizziness, orthopnea, PND, abdominal discomfort, and LE edema. He has been limiting fluid and sodium in his diet and taking his medications as directed. He does not use NSAIDs. His ICD has not discharged and he has not been admitted to the hospital or seen in the ER for HF in the interim.

## 2020-12-29 NOTE — REASON FOR VISIT
[Heart Failure] : congestive heart failure [Follow-Up - Clinic] : a clinic follow-up of [FreeTextEntry1] : PATIENT INSTRUCTIONS:\par \par 1. Continue taking your current medications. \par \par 2. Continue monitoring your weight daily\par \par 2. We will follow up labs being drawn weekly by home infusion nurse on Mondays.\par \par 3. Follow up with Dr. Anglin in 3 months.

## 2020-12-29 NOTE — DISCUSSION/SUMMARY
[FreeTextEntry1] : 75 year old M with NICM, LVEF 10-15% s/p CRT-D, mod-severe MR s/p MitraClip on home milrinone infusion and pAFib who presents today for follow up visit overall is doing well. He is ACC/AHA stage D, NYHA class III HF. He is currently euvolemic/slightly dry on exam and appears well supported on current dose of milrinone. He is tolerating low dose GDMT. I have recommended the following:  \par \par 1. Chronic Systolic Heart Failure -  Will continue milrinone infusion at 0.3 mcg/kg/min. Weekly dressing changes being done by home infusion RN. Will continue to hold diuretics, spironolactone, and KCl. His weight today at home was 149#. Will allow his weight to increase about 8-9# prior to resuming his diuretics. Will follow up repeat labs on Monday. Will continue Toprol XL 25 mg daily and Imdur 60 mg daily.\par \par 2. Mitral regurgitation - s/p MitraClip now with mild MR.\par \par 3. EDIE on Chronic Kidney Disease - Followed by Dr. Rees. Will hold diuretics as above and follow up his renal function on Monday.\par \par 4. Paroxysmal AFib - s/p DCCV and on AC with Eliquis. Continue Amio and BB as above. \par \par 5. Follow-up with Dr. Anglin in 3 months.

## 2020-12-29 NOTE — PHYSICAL EXAM
[General Appearance - Well Developed] : well developed [Well Groomed] : well groomed [General Appearance - In No Acute Distress] : no acute distress [Eyelids - No Xanthelasma] : the eyelids demonstrated no xanthelasmas [No Oral Cyanosis] : no oral cyanosis [] : no respiratory distress [Respiration, Rhythm And Depth] : normal respiratory rhythm and effort [Auscultation Breath Sounds / Voice Sounds] : lungs were clear to auscultation bilaterally [Heart Rate And Rhythm] : heart rate and rhythm were normal [Heart Sounds] : normal S1 and S2 [2+] : left 2+ [Bowel Sounds] : normal bowel sounds [Abdomen Soft] : soft [Abdomen Tenderness] : non-tender [No Venous Stasis] : no venous stasis [Oriented To Time, Place, And Person] : oriented to person, place, and time [Impaired Insight] : insight and judgment were intact [Affect] : the affect was normal [Mood] : the mood was normal [FreeTextEntry1] : warm peripherally, CVC site with dressing C/D/I, no erythema or drainage noted

## 2021-01-01 NOTE — PROGRESS NOTE ADULT - SUBJECTIVE AND OBJECTIVE BOX
[facility] room 202 Ileana Carrasco  11/29/21 1950 DATE OF SERVICE:09/07/2020        Patient seen and examined.Interim events reviewed.       HPI:74M w/ PMHx of HFrEF (EF 10%) s/p ICD, Afib w/ recent admission for CHF/afib s/p DCCV, CKD, DM2, hypothyroidism presents after being referred from CHF clinic for admission due to worsening of LE edema and failure of PO diuretics at home.      SUBJECTIVE / OVERNIGHT EVENTS:Awake is ambulating no chest pain or dyspnea off Milrinone    MEDICATIONS  (STANDING):  aMIOdarone    Tablet 200 milliGRAM(s) Oral daily  carvedilol 6.25 milliGRAM(s) Oral every 12 hours  furosemide    Tablet 40 milliGRAM(s) Oral daily  heparin  Infusion. 700 Unit(s)/Hr (7 mL/Hr) IV Continuous <Continuous>  hydrALAZINE 10 milliGRAM(s) Oral three times a day  insulin lispro (HumaLOG) corrective regimen sliding scale   SubCutaneous three times a day before meals  insulin lispro Injectable (HumaLOG) 1 Unit(s) SubCutaneous three times a day before meals  isosorbide   dinitrate Tablet (ISORDIL) 10 milliGRAM(s) Oral three times a day  levothyroxine 50 MICROGram(s) Oral daily  spironolactone 12.5 milliGRAM(s) Oral daily        Vital Signs Last 24 Hrs  T(C): 36.7 (07 Sep 2020 21:15), Max: 36.7 (07 Sep 2020 12:46)  T(F): 98.1 (07 Sep 2020 21:15), Max: 98.1 (07 Sep 2020 21:15)  HR: 80 (07 Sep 2020 21:15) (78 - 82)  BP: 96/61 (07 Sep 2020 21:15) (96/61 - 108/67)-  RR: 17 (07 Sep 2020 21:15) (17 - 18)  SpO2: 96% (07 Sep 2020 21:15) (96% - 100%)    PHYSICAL EXAM:  General: No distress. Comfortable.  HEENT: EOM intact.  Neck: Neck supple. JVP not elevated. No masses  Chest: Clear to auscultation bilaterally  CV: Normal S1 and S2. 2/6 systolic murmur,no rub, or gallops. Radial pulses normal. No edema.   Abdomen: Soft, non-distended, non-tender  Skin: No rashes or skin breakdown  Neurology: Alert and oriented times three. Sensation intact  Psych: Affect normal      LABS:  09-07    135  |  97  |  105<H>  ----------------------------<  131<H>  4.5   |  24  |  3.20<H>    Ca    9.9      07 Sep 2020 05:54      Creatinine Trend: 3.20 <--, 3.35 <--, 3.43 <--, 2.67 <--, 2.94 <--, 2.92 <--, 3.16 <--, 3.32 <--, 2.93 <--                        8.6    3.93  )-----------( 129      ( 07 Sep 2020 05:54 )             27.5

## 2021-01-21 PROCEDURE — 84100 ASSAY OF PHOSPHORUS: CPT

## 2021-01-21 PROCEDURE — 86850 RBC ANTIBODY SCREEN: CPT

## 2021-01-21 PROCEDURE — 83935 ASSAY OF URINE OSMOLALITY: CPT

## 2021-01-21 PROCEDURE — 74018 RADEX ABDOMEN 1 VIEW: CPT

## 2021-01-21 PROCEDURE — 81003 URINALYSIS AUTO W/O SCOPE: CPT

## 2021-01-21 PROCEDURE — 71250 CT THORAX DX C-: CPT

## 2021-01-21 PROCEDURE — 80076 HEPATIC FUNCTION PANEL: CPT

## 2021-01-21 PROCEDURE — 82330 ASSAY OF CALCIUM: CPT

## 2021-01-21 PROCEDURE — 85730 THROMBOPLASTIN TIME PARTIAL: CPT

## 2021-01-21 PROCEDURE — 86706 HEP B SURFACE ANTIBODY: CPT

## 2021-01-21 PROCEDURE — 85014 HEMATOCRIT: CPT

## 2021-01-21 PROCEDURE — 87086 URINE CULTURE/COLONY COUNT: CPT

## 2021-01-21 PROCEDURE — 93005 ELECTROCARDIOGRAM TRACING: CPT

## 2021-01-21 PROCEDURE — 82570 ASSAY OF URINE CREATININE: CPT

## 2021-01-21 PROCEDURE — 82947 ASSAY GLUCOSE BLOOD QUANT: CPT

## 2021-01-21 PROCEDURE — 84132 ASSAY OF SERUM POTASSIUM: CPT

## 2021-01-21 PROCEDURE — 74176 CT ABD & PELVIS W/O CONTRAST: CPT

## 2021-01-21 PROCEDURE — 99261: CPT

## 2021-01-21 PROCEDURE — 81001 URINALYSIS AUTO W/SCOPE: CPT

## 2021-01-21 PROCEDURE — 85025 COMPLETE CBC W/AUTO DIFF WBC: CPT

## 2021-01-21 PROCEDURE — 71045 X-RAY EXAM CHEST 1 VIEW: CPT

## 2021-01-21 PROCEDURE — 86901 BLOOD TYPING SEROLOGIC RH(D): CPT

## 2021-01-21 PROCEDURE — 82803 BLOOD GASES ANY COMBINATION: CPT

## 2021-01-21 PROCEDURE — 82436 ASSAY OF URINE CHLORIDE: CPT

## 2021-01-21 PROCEDURE — 87186 SC STD MICRODIL/AGAR DIL: CPT

## 2021-01-21 PROCEDURE — C1894: CPT

## 2021-01-21 PROCEDURE — 80053 COMPREHEN METABOLIC PANEL: CPT

## 2021-01-21 PROCEDURE — 36581 REPLACE TUNNELED CV CATH: CPT

## 2021-01-21 PROCEDURE — 85018 HEMOGLOBIN: CPT

## 2021-01-21 PROCEDURE — 85027 COMPLETE CBC AUTOMATED: CPT

## 2021-01-21 PROCEDURE — 83880 ASSAY OF NATRIURETIC PEPTIDE: CPT

## 2021-01-21 PROCEDURE — C1769: CPT

## 2021-01-21 PROCEDURE — 36556 INSERT NON-TUNNEL CV CATH: CPT

## 2021-01-21 PROCEDURE — 86769 SARS-COV-2 COVID-19 ANTIBODY: CPT

## 2021-01-21 PROCEDURE — 76937 US GUIDE VASCULAR ACCESS: CPT

## 2021-01-21 PROCEDURE — 97161 PT EVAL LOW COMPLEX 20 MIN: CPT

## 2021-01-21 PROCEDURE — 84484 ASSAY OF TROPONIN QUANT: CPT

## 2021-01-21 PROCEDURE — 84295 ASSAY OF SERUM SODIUM: CPT

## 2021-01-21 PROCEDURE — 94640 AIRWAY INHALATION TREATMENT: CPT

## 2021-01-21 PROCEDURE — 84300 ASSAY OF URINE SODIUM: CPT

## 2021-01-21 PROCEDURE — C1752: CPT

## 2021-01-21 PROCEDURE — 84105 ASSAY OF URINE PHOSPHORUS: CPT

## 2021-01-21 PROCEDURE — 84540 ASSAY OF URINE/UREA-N: CPT

## 2021-01-21 PROCEDURE — 96376 TX/PRO/DX INJ SAME DRUG ADON: CPT

## 2021-01-21 PROCEDURE — 80048 BASIC METABOLIC PNL TOTAL CA: CPT

## 2021-01-21 PROCEDURE — 87340 HEPATITIS B SURFACE AG IA: CPT

## 2021-01-21 PROCEDURE — 76705 ECHO EXAM OF ABDOMEN: CPT

## 2021-01-21 PROCEDURE — 86803 HEPATITIS C AB TEST: CPT

## 2021-01-21 PROCEDURE — 99291 CRITICAL CARE FIRST HOUR: CPT | Mod: 25

## 2021-01-21 PROCEDURE — 76770 US EXAM ABDO BACK WALL COMP: CPT

## 2021-01-21 PROCEDURE — 83605 ASSAY OF LACTIC ACID: CPT

## 2021-01-21 PROCEDURE — 96374 THER/PROPH/DIAG INJ IV PUSH: CPT

## 2021-01-21 PROCEDURE — 86900 BLOOD TYPING SEROLOGIC ABO: CPT

## 2021-01-21 PROCEDURE — 84550 ASSAY OF BLOOD/URIC ACID: CPT

## 2021-01-21 PROCEDURE — 82962 GLUCOSE BLOOD TEST: CPT

## 2021-01-21 PROCEDURE — 85610 PROTHROMBIN TIME: CPT

## 2021-01-21 PROCEDURE — 82435 ASSAY OF BLOOD CHLORIDE: CPT

## 2021-01-21 PROCEDURE — 84133 ASSAY OF URINE POTASSIUM: CPT

## 2021-01-21 PROCEDURE — C1751: CPT

## 2021-01-21 PROCEDURE — U0003: CPT

## 2021-01-21 PROCEDURE — 83735 ASSAY OF MAGNESIUM: CPT

## 2021-01-21 PROCEDURE — 77001 FLUOROGUIDE FOR VEIN DEVICE: CPT

## 2021-01-21 PROCEDURE — 93306 TTE W/DOPPLER COMPLETE: CPT

## 2021-01-22 ENCOUNTER — APPOINTMENT (OUTPATIENT)
Dept: ENDOCRINOLOGY | Facility: CLINIC | Age: 76
End: 2021-01-22

## 2021-02-09 NOTE — ED ADULT NURSE NOTE - AS SC BRADEN FRICTION
Assessment/Plan:    Diabetic foot ulcer ([facility] )    Lab Results   Component Value Date    HGBA1C 9 9 (H) 01/13/2021     Continue plan per [facility] wound care  Diagnoses and all orders for this visit:    Diabetic ulcer of left heel associated with type 2 diabetes mellitus, limited to breakdown of skin ([facility] )  -     Diabetic Shoe    Anemia due to stage 3b chronic kidney disease  -     Ambulatory referral to Gastroenterology; Future    Atrial fibrillation, unspecified type [facility])  -     Ambulatory referral to Cardiology; Future    Need for influenza vaccination  -     Cancel: FLU VACCINE RIV3 PRESERVATIVE FREE IM    Other orders  -     metroNIDAZOLE (FLAGYL) 500 mg tablet; Take 500 mg by mouth 3 (three) times a day  -     B-D UF III MINI PEN NEEDLES 31G X 5 MM MISC; use 1 four times a day  -     True Metrix Blood Glucose Test test strip; 4 (four) times a day Test  -     ciprofloxacin (CIPRO) 500 mg tablet; take 1 tablet by mouth twice a day for 7 days          Subjective:      Patient ID: Kaveh Parker is a 63 y o  male  He has type 2 diabetes  His A1c is not at goal   Fortunately, he has no side effects or hypoglycemic events  He has a diabetic foot ulcer and should have diabetic shoes  I have ordered these today  He sees [facility] wound care for this chronic foot ulcer  He has atrial fibrillation  He has most recently been in sinus rhythm  He has a normal left ventricular ejection fraction and no evidence of valvulopathy on recent transthoracic echocardiogram   He is anticoagulated  I am having difficult time determining how long he has been in atrial fibrillation or how long it has been since he has converted to sinus rhythm  He does not have a cardiologist     He has had pulmonary emboli in the past   It is unclear to me whether not he has had a hypercoagulable work up  He was told that he would be on lifelong anticoagulation  I do not have appropriate documentation for that, however  He has a hemoglobin of about 9 grams/deciliter  This appears stable to me  The working diagnosis was anemia of chronic renal disease  His most recent GFR is greater than 60 however  The following portions of the patient's history were reviewed and updated as appropriate:   He  has a past medical history of Arthritis, Atrial fibrillation ([place] [address] ), Chronic kidney disease, stage 3, COPD with acute exacerbation ([place] [address] ), Hyperkalemia, Hyperlipidemia, Hypertension, Hypoglycemia, Hypomagnesemia, Hyponatremia, Hypothyroidism, Morbid obesity ([place] [address] ), Pulmonary embolism ([place] [address] ), Thrombocytopenia ([place] [address] ), Type 2 diabetes mellitus ([place] [address] ), Urinary retention, and Vitamin D deficiency  He   Patient Active Problem List    Diagnosis Date Noted    Diabetic foot ulcer associated with type 2 diabetes mellitus ([place] [address] ) 01/04/2021    Osteomyelitis ([place] [address] ) 01/04/2021    Chronic venous hypertension (idiopathic) with ulcer and inflammation of left lower extremity ([place] [address] ) 01/04/2021    COPD with acute exacerbation ([place] [address] ) 01/04/2021    Congestive heart failure ([place] [address] ) 01/04/2021    Atrial fibrillation ([place] [address] ) 01/04/2021    Encounter for medical examination to establish care 12/21/2020    Cellulitis of right lower extremity 07/21/2016    Diabetic foot ulcer ([place] [address] ) 07/21/2016    Diabetes mellitus ([place] [address] ) 07/21/2016    Morbid obesity ([place] [address] ) 07/21/2016    Hypothyroid 07/21/2016    Anxiety 07/21/2016    H/O CHF 07/21/2016    Neuropathy 07/21/2016    HTN (hypertension) 07/21/2016    H/O osteomyelitis 07/21/2016    Hx pulmonary embolism 07/21/2016     He  has a past surgical history that includes Tonsillectomy and adenoidectomy and Gallbladder surgery  His family history includes Cancer in his maternal grandmother and mother; Diabetes in his sister; Heart disease in his father; Hypertension in his father and mother  He  reports that he has never smoked   He has never used smokeless tobacco  He reports that he does not drink alcohol or use drugs   Current Outpatient Medications   Medication Sig Dispense Refill    acetaminophen (TYLENOL) 500 mg tablet Take 1 tablet by mouth 2 (two) times a day      albuterol (PROVENTIL HFA,VENTOLIN HFA) 90 mcg/act inhaler Inhale 2 puffs every 6 (six) hours as needed for wheezing 3 Inhaler 3    amitriptyline (ELAVIL) 25 mg tablet Take 1 tablet (25 mg total) by mouth daily at bedtime 30 tablet 3    atorvastatin (LIPITOR) 20 mg tablet Take 2 tablets (40 mg total) by mouth daily 30 tablet 3    B-D UF III MINI PEN NEEDLES 31G X 5 MM MISC use 1 four times a day      cetirizine (ZyrTEC) 10 mg tablet Take 10 mg by mouth      Cholecalciferol 25 MCG (1000 UT) capsule Take 2,000 Units by mouth      ciprofloxacin (CIPRO) 500 mg tablet take 1 tablet by mouth twice a day for 7 days      clonazePAM (KlonoPIN) 1 mg tablet Take 2 tablets (2 mg total) by mouth 2 (two) times a day 60 tablet 3    cyclobenzaprine (FLEXERIL) 10 mg tablet Take 10 mg by mouth 2 (two) times a day as needed      Dextrose, Diabetic Use, (Glucose) 15 GM/59ML LIQD Take by mouth      docusate sodium (COLACE) 100 mg capsule Take 100 mg by mouth 2 (two) times a day as needed      Eliquis 5 MG Take 1 tablet (5 mg total) by mouth 2 (two) times a day 60 tablet 5    fenofibrate (TRICOR) 145 mg tablet Take 145 mg by mouth daily   fenofibrate micronized (LOFIBRA) 200 MG capsule Take 200 mg by mouth every morning before breakfast      FLUoxetine (PROzac) 40 MG capsule Take 40 mg by mouth      fluticasone (FLONASE) 50 mcg/act nasal spray 1 spray into each nostril daily as needed for rhinitis 3 Bottle 3    furosemide (LASIX) 40 mg tablet Take 1 tablet (40 mg total) by mouth daily 30 tablet 3    gabapentin (NEURONTIN) 300 mg capsule Take 300 mg by mouth 2 (two) times a day        gabapentin (NEURONTIN) 600 MG tablet Take 1,200 mg by mouth daily at bedtime      HumaLOG 100 UNIT/ML injection 5 Units       insulin aspart (NovoLOG FlexPen) 100 UNIT/ML injection pen Inject 12 Units under the skin 3 (three) times a day with meals 5 pen 5    insulin glargine (LANTUS) 100 units/mL subcutaneous injection Inject 50 Units under the skin daily 10 mL 5    insulin regular (HumuLIN R,NovoLIN R) 100 units/mL injection Inject under the skin 3 (three) times a day before meals   lamoTRIgine (LaMICtal) 25 mg tablet Take 1 tablet (25 mg total) by mouth daily 30 tablet 3    levETIRAcetam (KEPPRA) 500 mg tablet Take 1 tablet (500 mg total) by mouth 2 (two) times a day 60 tablet 3    levothyroxine 100 mcg tablet Take 1 tablet (100 mcg total) by mouth daily 30 tablet 3    levothyroxine 125 mcg tablet Take 1 tablet (125 mcg total) by mouth daily 30 tablet 3    levothyroxine 150 mcg tablet Take 125 mcg by mouth daily   levothyroxine 300 MCG tablet Take 1 tablet (300 mcg total) by mouth daily 30 tablet 3    lisinopril (ZESTRIL) 2 5 mg tablet Take 1 tablet (2 5 mg total) by mouth daily 30 tablet 3    loratadine (CLARITIN) 10 mg tablet Take 10 mg by mouth daily        Magnesium 400 MG TABS Take 1 tablet (400 mg total) by mouth 2 (two) times a day 60 tablet 3    meloxicam (MOBIC) 15 mg tablet Take 15 mg by mouth      metFORMIN (GLUCOPHAGE) 1000 MG tablet Take 1,000 mg by mouth      metoprolol tartrate (LOPRESSOR) 25 mg tablet Take 1 tablet (25 mg total) by mouth 2 (two) times a day 60 tablet 3    metroNIDAZOLE (FLAGYL) 500 mg tablet Take 500 mg by mouth 3 (three) times a day      montelukast (SINGULAIR) 10 mg tablet Take 1 tablet (10 mg total) by mouth daily at bedtime 30 tablet 3    ondansetron (ZOFRAN) 4 mg tablet Take 4 mg by mouth      pantoprazole (PROTONIX) 40 mg tablet Take 1 tablet (40 mg total) by mouth daily 30 tablet 3    polyethylene glycol (MIRALAX) 17 g packet Take 17 g by mouth daily 30 each 0    potassium chloride (K-DUR,KLOR-CON) 20 mEq tablet Take 1 tablet (20 mEq total) by mouth 2 (two) times a day 60 tablet 3    potassium chloride (KLOR-CON) 20 mEq packet Take 20 mEq by mouth      pregabalin (LYRICA) 100 mg capsule Take 1 capsule (100 mg total) by mouth 2 (two) times a day 60 capsule 1    rOPINIRole (REQUIP) 0 5 mg tablet Take 0 5 mg by mouth daily   rOPINIRole (REQUIP) 4 mg tablet Take 1 tablet (4 mg total) by mouth 3 (three) times a day 90 tablet 3    rOPINIRole (REQUIP) 5 MG tablet Take 5 mg by mouth 2 (two) times a day       simvastatin (ZOCOR) 40 mg tablet Take 40 mg by mouth daily at bedtime   spironolactone (ALDACTONE) 50 mg tablet       tamsulosin (FLOMAX) 0 4 mg Take 0 4 mg by mouth      tamsulosin (FLOMAX) 0 4 mg Take 1 capsule (0 4 mg total) by mouth daily with dinner 30 capsule 3    triamcinolone (KENALOG) 0 1 % cream Apply topically 2 (two) times a day      True Metrix Blood Glucose Test test strip 4 (four) times a day Test       No current facility-administered medications for this visit        Current Outpatient Medications on File Prior to Visit   Medication Sig    acetaminophen (TYLENOL) 500 mg tablet Take 1 tablet by mouth 2 (two) times a day    albuterol (PROVENTIL HFA,VENTOLIN HFA) 90 mcg/act inhaler Inhale 2 puffs every 6 (six) hours as needed for wheezing    amitriptyline (ELAVIL) 25 mg tablet Take 1 tablet (25 mg total) by mouth daily at bedtime    atorvastatin (LIPITOR) 20 mg tablet Take 2 tablets (40 mg total) by mouth daily    B-D UF III MINI PEN NEEDLES 31G X 5 MM MISC use 1 four times a day    cetirizine (ZyrTEC) 10 mg tablet Take 10 mg by mouth    Cholecalciferol 25 MCG (1000 UT) capsule Take 2,000 Units by mouth    ciprofloxacin (CIPRO) 500 mg tablet take 1 tablet by mouth twice a day for 7 days    clonazePAM (KlonoPIN) 1 mg tablet Take 2 tablets (2 mg total) by mouth 2 (two) times a day    cyclobenzaprine (FLEXERIL) 10 mg tablet Take 10 mg by mouth 2 (two) times a day as needed    Dextrose, Diabetic Use, (Glucose) 15 GM/59ML LIQD Take by mouth    docusate sodium (COLACE) 100 mg capsule Take 100 mg by mouth 2 (two) times a day as needed    Eliquis 5 MG Take 1 tablet (5 mg total) by mouth 2 (two) times a day    fenofibrate (TRICOR) 145 mg tablet Take 145 mg by mouth daily   fenofibrate micronized (LOFIBRA) 200 MG capsule Take 200 mg by mouth every morning before breakfast    FLUoxetine (PROzac) 40 MG capsule Take 40 mg by mouth    fluticasone (FLONASE) 50 mcg/act nasal spray 1 spray into each nostril daily as needed for rhinitis    furosemide (LASIX) 40 mg tablet Take 1 tablet (40 mg total) by mouth daily    gabapentin (NEURONTIN) 300 mg capsule Take 300 mg by mouth 2 (two) times a day   gabapentin (NEURONTIN) 600 MG tablet Take 1,200 mg by mouth daily at bedtime    HumaLOG 100 UNIT/ML injection 5 Units     insulin aspart (NovoLOG FlexPen) 100 UNIT/ML injection pen Inject 12 Units under the skin 3 (three) times a day with meals    insulin glargine (LANTUS) 100 units/mL subcutaneous injection Inject 50 Units under the skin daily    insulin regular (HumuLIN R,NovoLIN R) 100 units/mL injection Inject under the skin 3 (three) times a day before meals   lamoTRIgine (LaMICtal) 25 mg tablet Take 1 tablet (25 mg total) by mouth daily    levETIRAcetam (KEPPRA) 500 mg tablet Take 1 tablet (500 mg total) by mouth 2 (two) times a day    levothyroxine 100 mcg tablet Take 1 tablet (100 mcg total) by mouth daily    levothyroxine 125 mcg tablet Take 1 tablet (125 mcg total) by mouth daily    levothyroxine 150 mcg tablet Take 125 mcg by mouth daily   levothyroxine 300 MCG tablet Take 1 tablet (300 mcg total) by mouth daily    lisinopril (ZESTRIL) 2 5 mg tablet Take 1 tablet (2 5 mg total) by mouth daily    loratadine (CLARITIN) 10 mg tablet Take 10 mg by mouth daily      Magnesium 400 MG TABS Take 1 tablet (400 mg total) by mouth 2 (two) times a day    meloxicam (MOBIC) 15 mg tablet Take 15 mg by mouth    metFORMIN (GLUCOPHAGE) 1000 MG tablet Take 1,000 mg by mouth    metoprolol tartrate (LOPRESSOR) 25 mg tablet Take 1 tablet (25 mg total) by mouth 2 (two) times a day    metroNIDAZOLE (FLAGYL) 500 mg tablet Take 500 mg by mouth 3 (three) times a day    montelukast (SINGULAIR) 10 mg tablet Take 1 tablet (10 mg total) by mouth daily at bedtime    ondansetron (ZOFRAN) 4 mg tablet Take 4 mg by mouth    pantoprazole (PROTONIX) 40 mg tablet Take 1 tablet (40 mg total) by mouth daily    polyethylene glycol (MIRALAX) 17 g packet Take 17 g by mouth daily    potassium chloride (K-DUR,KLOR-CON) 20 mEq tablet Take 1 tablet (20 mEq total) by mouth 2 (two) times a day    potassium chloride (KLOR-CON) 20 mEq packet Take 20 mEq by mouth    pregabalin (LYRICA) 100 mg capsule Take 1 capsule (100 mg total) by mouth 2 (two) times a day    rOPINIRole (REQUIP) 0 5 mg tablet Take 0 5 mg by mouth daily   rOPINIRole (REQUIP) 4 mg tablet Take 1 tablet (4 mg total) by mouth 3 (three) times a day    rOPINIRole (REQUIP) 5 MG tablet Take 5 mg by mouth 2 (two) times a day     simvastatin (ZOCOR) 40 mg tablet Take 40 mg by mouth daily at bedtime   spironolactone (ALDACTONE) 50 mg tablet     tamsulosin (FLOMAX) 0 4 mg Take 0 4 mg by mouth    tamsulosin (FLOMAX) 0 4 mg Take 1 capsule (0 4 mg total) by mouth daily with dinner    triamcinolone (KENALOG) 0 1 % cream Apply topically 2 (two) times a day    True Metrix Blood Glucose Test test strip 4 (four) times a day Test     No current facility-administered medications on file prior to visit  He is allergic to carbamazepine; clindamycin; phenytoin; and pneumococcal vaccine       Review of Systems   All other systems reviewed and are negative  Objective:      Temp 97 6 °F (36 4 °C) (Temporal)   Wt (!) 161 kg (355 lb)   SpO2 98%   PF 70 L/min   BMI 50 94 kg/m²          Physical Exam  Vitals signs and nursing note reviewed  Constitutional:       Appearance: Normal appearance  He is obese     Neck:      Musculoskeletal: Normal range of motion and neck supple  Cardiovascular:      Rate and Rhythm: Normal rate and regular rhythm  Pulses: Normal pulses  Heart sounds: Normal heart sounds  Pulmonary:      Effort: Pulmonary effort is normal       Breath sounds: Normal breath sounds  Abdominal:      General: Abdomen is flat  Bowel sounds are normal       Palpations: Abdomen is soft  Musculoskeletal: Normal range of motion  Skin:     General: Skin is warm and dry  Neurological:      General: No focal deficit present  Mental Status: He is alert and oriented to person, place, and time  Mental status is at baseline  Psychiatric:         Mood and Affect: Mood normal          Behavior: Behavior normal          Thought Content:  Thought content normal          Judgment: Judgment normal  (2) potential problem

## 2021-09-16 NOTE — ED ADULT NURSE NOTE - ALCOHOL PRE SCREEN (AUDIT - C)
Statement Selected As certified below, I, or a nurse practitioner or physician assistant working with me, had a face-to-face encounter that meets the physician face-to-face encounter requirements.

## 2021-11-12 NOTE — PROCEDURE NOTE - NSCOMPLICATION_GEN_A_CORE
Notified patient will need home oxygen & home care with [facility] for covid recovery. Helped ED problem solve lack of PCP and for patient to have TCM appointment, oxygen tank provided to patient at discharge and [facility] DME team notiifed of delivery needs.   no complications

## 2021-11-15 NOTE — ED ADULT NURSE NOTE - FINAL NURSING ELECTRONIC SIGNATURE
Referred by: Jagruti Patten MD; Medical Diagnosis (from order):    Diagnosis Information      Diagnosis    719.43 (ICD-9-CM) - M25.532 (ICD-10-CM) - Left wrist pain                Daily Treatment Note -  Occupational Therapy    Visit:  7     SUBJECTIVE                                                                                                               Reports was able to do a few handstands and cartwheels at the gym with spandagrip and KT on without pain.   Pain / Symptoms:  Pain rating (out of 10): Current: 0 ; Best: 0; Worst: 2    OBJECTIVE                                                           Measurements upon evaluation:                                                            Range of Motion (ROM)   (degrees unless noted; active unless noted; norms in ( ); negative=lacking to 0, positive=beyond 0)   Elbow/Forearm:     - Supination (80):        • Left: WNL    - Pronation (80):        • Left: WNL pain  Wrist:    - Extension (60-70):        • Left: 65 pain        • Right: 69    - Radial Deviation (20):        • Left: 80 pain        • Right: 75  Details / Comments: Pronation pain in radial wrist  Wrist extension pain in mid volar wrist  Wrist flexion pain ulnar distal forearm     Strength  (out of 5 unless noted, standard test position unless noted, lbs tested with hand held dynamometer)   Wrist:    - Extension:        • Left: 5, pain  : (lbs)    - Neutral:        • Left: Trial(s): 41.2, 54, 57.7, Average: 50.97, pain          • Right: Trial(s): 56.4, 66.7, 61, Average: 61.37     norms: Age: 12-13: male: left 55.4+/-16.9, right 58.7+/-15.5; female: left 50.9+/-11.9, right 56.8+/-10.6  Pinch: (lbs)    - 3 Point:         • Left: Trial(s) 11.7        • Right: Trial(s): 14.7  Comments / Details: Pain in radial wrist with MMT Of wrist extension  GG pain level of 5 in ulnar distal forearm Pain level of 5          Wrist/Hand Circumference   wrist Crease: Left: 15.4 cm Right: 15.7 cm     TREATMENT                                                                                                                   Therapeutic Exercise:  HEP review and upgrade    Push up through B UE's  On parallel bars x10 seconds push up with pike seat  reobounder toss and catch with unweighted ball, 1 # ball and 1.5# ball  Toss and catch with 1.5# ball  Short and longer body blade sagittal and frontal planes and using as staff to challenge wrist in all directions   2# CW and CCW 200sec.   4# Wrist RD and UD 200sec.          Manual Therapy:  Ice massage after therapeutic exercise to decrease any inflammation which may have occurred from exercise.    KT tape with spot correction L dorsal wrist.    Home Exercise Program/Education Materials: Discussed PRE's in context of karate and gymnastics with mom and Cece.  Provided cylindrical foam for pen/pencil  Encouraged patient to try cartwheel and handstand at gym on floor mats  Educated in progression of PRE's with knee push ups.  Yellow tubing for wall walk.  Light blue Cylindrical foam for pen  Green putty 1/2 strength for GG and 3 jaw pinch  Patient educated in kinesiotape precautions and and indications for use.  Educated in L wrist strengthening with wrist gravity eliminated position with yellow theraband flexion and extension.    Access Code: [email or web address]  URL: [email or web address]  Date: 10/11/2021  Prepared by: Melanie Cook    Program Notes  Wean away from wrist brace, spandagrip C  1 time daily - Massage about the wrist 20 times  Massage into muscle belly deep into tight spots 5 - 10 minutes       Exercises  Seated Finger Composite Flexion Extension - 2 x daily - 7 x weekly - 1 sets - 10 reps  Wrist AROM Flexion Extension - 2 x daily - 7 x weekly - 1 sets - 10 reps  Wrist AROM Radial Ulnar Deviation - 2 x daily - 7 x weekly - 1 sets - 10 reps  Seated Forearm Pronation and Supination AROM - 2 x daily - 7 x weekly - 1 sets - 10 reps  Thumb  Opposition - 1 x daily - 7 x weekly - 10 reps - 3 sets  Seated Thumb Abduction Adduction AROM - 1 x daily - 7 x weekly - 10 reps - 3 sets         ASSESSMENT                                                                                                             Using cylindrical foam for writing at school and able to write for longer without pain. Has some fear about doing uneven bars, returning to staff class and sparring in [employer]. Able to do 2 toe push ups and able to do 3 sets of 10 of knee push ups. No pain with any exercise in clinic today        PLAN                                                                                                                           Suggestions for next session as indicated: Progress per plan of care: PRE's with weight bearing, check status of activity level      GOALS                                                                                                                           Long Term Goals: to be met by end of plan of care  1. I with HEP including weaning from wrist brace to decrease L wrist  pain from 8 to 3 to be able to write during school. Status: met   2. Improve L wrist extension to 69 to be able to participate in floor work in gymnastics. Status: progressing/udnpech26 degrees extension  82 degrees flexion  3. Improve GG to L hand to 61# to be able to open jars. Status: progressing/ongoing  4. Improve L 3 jaw pinch to 14.5# to open water bottles.  Status: progressing/ongoing 12#  5. Improve weight bearing onto extended L  Wrist=65# to be able to push off floor.      Therapy procedure time and total treatment time can be found documented on the Time Entry flowsheet   01-Nov-2020 16:40

## 2021-12-28 NOTE — PROGRESS NOTE ADULT - SUBJECTIVE AND OBJECTIVE BOX
September 28, 2018. He status post cystoscopy, left retrograde pyelogram left diagnostic ureteroscopy and left stent placement September 13, 2018. He had presented to the hospital with right-sided flank pain secondary to a 2 mm obstructing stone.   CAREY foll SUBJECTIVE:  NGT output continues to be low. NPO. Denies N/V. Bowel Function +/+    OBJECTIVE:  Vital Signs Last 24 Hrs  T(C): 36.7 (15 Dec 2020 03:20), Max: 37.4 (14 Dec 2020 04:48)  T(F): 98 (15 Dec 2020 03:20), Max: 99.3 (14 Dec 2020 04:48)  HR: 102 (15 Dec 2020 03:20) (100 - 125)  BP: 98/61 (15 Dec 2020 03:20) (84/46 - 98/61)  BP(mean): --  RR: 18 (15 Dec 2020 03:20) (18 - 18)  SpO2: 96% (15 Dec 2020 03:20) (95% - 96%)    Physical Examination:  GEN: NAD, resting quietly  PULM: symmetric chest rise bilaterally, no increased WOB  ABD: soft, nontender, nondistended  EXTR: no LE erythema, moving all extremities      LABS:                        8.3    5.56  )-----------( 121      ( 14 Dec 2020 06:37 )             27.8       12-14    132<L>  |  96  |  93<H>  ----------------------------<  151<H>  4.3   |  21<L>  |  4.94<H>    Ca    9.2      14 Dec 2020 06:36  Phos  4.7     12-13  Mg     2.2     12-13

## 2022-02-07 ENCOUNTER — RX RENEWAL (OUTPATIENT)
Age: 77
End: 2022-02-07

## 2022-02-07 RX ORDER — LEVOTHYROXINE SODIUM 0.05 MG/1
50 TABLET ORAL DAILY
Qty: 90 | Refills: 0 | Status: ACTIVE | COMMUNITY
Start: 2020-01-01 | End: 1900-01-01

## 2022-02-07 NOTE — CONSULT NOTE ADULT - ASSESSMENT
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 Normal CXR on 1/23. Lungs are clear.\par Will check labs today, CBC, CMP, D dimer, CRP, ferritin and pro-BNP\par If normal, from a pulmonary perspective, would give him a few more weeks to continue to feel better.\par If symptoms do not improve, will schedule PFT and further work up\par Patient also needs a cardiology evaluation and TTE -- he is in the process of having that arranged 75 year old M with longstanding history of NICM, LVEF < 20% (LVIDd 6.7 cm) s/p CRT-D, moderate-severe MR s/p MitraClip 8/2020, HTN, pAFib s/p DCCV 7/20/20 on Eliquis, CKD stage 3 (b/l SCr 1.7-2), DM 2 (A1c 6.1%) and anemia, transferred from [facility] after fall at home today in setting of nausea and vomiting, found there to have SBO  Patient also on home milrinone via SC catheter  SBO managed conservatively  Pt feels better  s/p IR catheter exchange  Noted to have leucocytosis  No obvious clinical localization  Hemodynamically stable though has hypotension(chronic)    ? Nosocomial Infection  ? Reactive  ? Non infectious etiology    REc:  A) leucocytosis  Follow WBC from today-pending  send blood CX, UA urine CX and CXR  If any s/s infection or WBC trending higher or any hemodynamic worsening [id number] IV x 1 and Cefepime 1gm IV x 1  Else tailor per results and course      B) Chronic HF  HF team on case  will defer to them on plan      C) CRI  chronic  monitor clinically      D) SBO  surgery on case  conservative management  No intervention for now  abx plan (if any ) as above       Will tailor plan for ID issues  per course,results.Will defer to primary team on management of other issues.  Assessment, plan and recommendations as detailed above were discussed with the medical/primary  team.  Infectious Diseases Service will cover over weekend.  Please call [phone] if issues

## 2022-02-23 NOTE — ED PROVIDER NOTE - RATE
Last visit:   Last Med refill:   Does patient have enough medication for 72 hours: no    Next Visit Date:  Future Appointments   Date Time Provider Fredi Nohemi   3/3/2022 10:30 AM Antonio Tolentino MD [address] [place] [address] Maintenance   Topic Date Due    Varicella vaccine (1 of 2 - 2-dose childhood series) Never done    COVID-19 Vaccine (1) Never done    Flu vaccine (1) 09/01/2021    Depression Screen  05/24/2022    Potassium monitoring  09/21/2022    Creatinine monitoring  09/21/2022    DTaP/Tdap/Td vaccine (2 - Td or Tdap) 03/17/2025    Lipid screen  09/21/2026    Pneumococcal 0-64 years Vaccine (2 of 2 - PPSV23) 01/23/2046    Hepatitis C screen  Completed    HIV screen  Completed    Hepatitis A vaccine  Aged Out    Hepatitis B vaccine  Aged Out    Hib vaccine  Aged Out    Meningococcal (ACWY) vaccine  Aged Out       Hemoglobin A1C (%)   Date Value   08/12/2014 5.3             ( goal A1C is < 7)   Microalb/Crt. Ratio (mcg/mg creat)   Date Value   11/20/2015 14     LDL Cholesterol (mg/dL)   Date Value   09/21/2021 109       (goal LDL is <100)   AST (U/L)   Date Value   02/06/2015 15     ALT (U/L)   Date Value   02/06/2015 18     BUN (mg/dL)   Date Value   09/21/2021 14     BP Readings from Last 3 Encounters:   10/13/21 (!) 140/89   09/21/21 (!) 153/107   05/24/21 (!) 137/100          (goal 120/80)    All Future Testing planned in [facility]  Lab Frequency Next Occurrence   Nerve conduction test Once 05/24/2022   US THYROID Once 10/13/2022               Patient Active Problem List:     Fibroids     Dysmenorrhea     Menometrorrhagia     Essential hypertension     Tobacco use     Obesity (BMI 30.0-34. 9)     Enlarged uterus     Uterine Polyps     Abnormal uterine bleeding (AUB)     Hypokalemia     Endometriosis, stage 4     Thyromegaly     Bilateral carpal tunnel syndrome           Please address the medication refill and close the encounter.   If I can be of assistance, please route to the 80

## 2022-06-09 NOTE — DIETITIAN INITIAL EVALUATION ADULT. - PERTINENT LABORATORY DATA
08-28 @ 06:32: Sodium 143, Potassium 3.7, Chloride 100, Calcium 10.2, Magnesium 2.1, Phosphorus 2.6, BUN 69<H>, Creatinine 2.40<H>, <H>, HbA1c 6.1, Prealbumin 15, CRP: 0.66
no

## 2022-07-06 NOTE — ED PROVIDER NOTE - PMH
Afib    Aortic insufficiency    Cardiomyopathy  Systolic CHF  CKD (chronic kidney disease)    Hypertension    Hypothyroidism    Mitral insufficiency    Type II diabetes mellitus     Patient expressed no known problems or needs

## 2022-10-26 NOTE — PROCEDURE NOTE - NSSITEPREP_SKIN_A_CORE
The patient has been re-examined and I agree with the above assessment or I updated with my findings. chlorhexidine

## 2023-01-20 NOTE — DIETITIAN INITIAL EVALUATION ADULT. - HEIGHT FOR BMI (FEET)
Received request to assist with physical therapy.  System reflects treatment has been authorized.  Called therapy and wellness, whose number forwards to an individual voicemail; left voicemail requesting callback.  Called [facility], who confirm patient approved for treatment and voicemail left on 1/17 regarding scheduling service.  Spoke to harlan Carbajal and provide number for scheduling care.  Home health PT remains unavailable through patient's insurance and no home health agencies in her area will accept her insurance for nursing services.  Will follow.    
5

## 2023-01-20 NOTE — H&P ADULT - PROBLEM/PLAN-2
DISPLAY PLAN FREE TEXT Render Risk Assessment In Note?: no Additional Notes: 9.3 grams out of 15 cumulative.\\nPatient will alternate between one and two capsules a day.\\nLabs are in everything is good. Detail Level: Simple

## 2023-02-09 NOTE — PRE-ANESTHESIA EVALUATION ADULT - O2 FLOW (L/MIN)
Per Dr Gonzalez's LOS  Return for visit with me in 6 months with CBC, Cmp and LDH prior --- Lab orders entered -- Scheduled on 8/9/23 for lab at 1450 and MD at 1530.         2

## 2023-02-28 NOTE — CONSULT NOTE ADULT - ASSESSMENT
74 year old male with PMH of chronic systolic heart failure (NICM, EF <20%) s/p BIV ICD 2012 at [facility], Afib diagnosed approx 2015, s/p DCCV 2015, CKD, DM-2, hypothyroidism; initially presented to [facility] with worsening dyspnea on exertion and shortness of breath, foundt o be in Atrial Fibrillation with RVR hypotension systolic in 80’s and EDIE Cr today 3.1. Patient was transferred to [facility] for further AF management and ICD generator change. Currently denies chest pain, palpitations, dizziness, lightheadedness, and shortness of breath.     1. NICM s/p BIV ICD  2. Atrial Fibrillation  3. Systolic HF EF <20%    -Patient is in rate controlled AF  -Continue Amiodarone 200mg daily  -Increase Eliquis to 5mg BID and continue  -On interrogation AF burden markedly increased since July 5th. Battery nearing FRANKY. BIV pacing lost with increased rates AF RVR, upper tracking limit changed to 130bpm.  -Plan for ANAHI/DCCV and generator change Monday 7/20  -Keep NPO Sunday at midnight, will need current type and screen   -Plan discussed with EP attending MD Mccarty and primary team      [id number] normal performance 74 year old male with PMH of chronic systolic heart failure (NICM, EF <20%) s/p BIV ICD 2012 at [facility], Afib diagnosed approx 2015, s/p DCCV 2015, CKD, DM-2, hypothyroidism; initially presented to [facility] with worsening dyspnea on exertion and shortness of breath, foundt o be in Atrial Fibrillation with RVR hypotension systolic in 80’s and EDIE Cr today 3.1. Patient was transferred to [facility] for further AF management and ICD generator change.     1. NICM s/p BIV ICD  2. Atrial Fibrillation  3. Systolic HF EF <20%    -Patient is in rate controlled AF  -Continue Amiodarone 200mg daily  -Increase Eliquis to 5mg BID and continue  -On interrogation AF burden markedly increased since July 5th. Battery nearing FRANKY. BIV pacing lost with increased rates AF RVR, upper tracking limit changed to 130bpm.  -Plan for ANAHI/DCCV and generator change Monday 7/20  -Keep NPO Sunday at midnight, will need current type and screen   -Plan discussed with EP attending MD Mccarty and primary team      [id number] 74 year old male with PMH of chronic systolic heart failure (NICM, EF <20%) s/p BIV ICD 2012 at [facility], Afib diagnosed approx 2015, s/p DCCV 2015, CKD, DM-2, hypothyroidism; initially presented to [facility] with worsening dyspnea on exertion and shortness of breath, found to be in Atrial Fibrillation with RVR hypotension systolic in 80’s and EDIE Cr today 3.1. Patient was transferred to [facility] for further AF management and ICD generator change.     1. NICM s/p BIV ICD  2. Atrial Fibrillation  3. Systolic HF EF <20%    -Patient is in rate controlled AF  -Continue Amiodarone 200mg daily  -Increase Eliquis to 5mg BID and continue  -On interrogation AF burden markedly increased since July 5th. Battery nearing FRANKY. BIV pacing lost with increased rates AF RVR, upper tracking limit changed to 130bpm.  -Plan for ANAHI/DCCV and generator change Monday 7/20  -Keep NPO Sunday at midnight, will need current type and screen   -Plan discussed with EP attending MD Mccarty and primary team      [id number] 74 year old male with PMH of chronic systolic heart failure (NICM, EF <20%) s/p BIV ICD 2012 at [facility], Afib diagnosed approx 2015, s/p DCCV 2015, CKD, DM-2, hypothyroidism; initially presented to [facility] with worsening dyspnea on exertion and shortness of breath, found to be in Atrial Fibrillation with RVR hypotension systolic in 80’s and EDIE Cr today 3.1. Patient was transferred to [facility] for further AF management and ICD generator change.     1. NICM s/p BIV ICD  2. Atrial Fibrillation  3. Systolic HF EF <20%    -Patient is in rate controlled AF  -Continue Amiodarone 200mg daily  -Increase Eliquis to 5mg BID   -On interrogation AF burden markedly increased since July 5th. Battery nearing FRANKY. BIV pacing lost with increased rates AF RVR, upper tracking limit changed to 130bpm.  -Plan for ANAHI/DCCV and generator change Monday 7/20  -Keep NPO Sunday at midnight, will need current type and screen   -Plan discussed with EP attending MD Mccarty and primary team      [id number]

## 2023-04-11 NOTE — ED PROVIDER NOTE - SECONDARY DIAGNOSIS.
6w1d      NN visit scheduled 23   Component      Latest Ref Rng & Units 2023 4/10/2023   HCG Quantitative      <=4 mUnits/mL 1,376 (H) 3,198 (H)     New OB education sent to pt via portal   babyscripts order placed     Message postponed for third trimester edu to be sent      Pneumonia of both lower lobes due to infectious organism Acute on chronic congestive heart failure, unspecified heart failure type

## 2023-05-19 NOTE — PHYSICAL THERAPY INITIAL EVALUATION ADULT - NAME OF CLINICIAN
3:30pm pt began feeling lightheaded. Vision started to blur and pt felt he was going to pass out. Pt then noticed his HR on his apple watch was in the 200s and pt felt his heart racing.  Pt states it took about 10-15 minutes for his HR to get back down to 105  Denies CP, SOB. Denies falls, BEFAST negative.  Pt had PE 3 weeks ago as complication after knee surgery. was started on eliquis  Pt texted his vascular surgeon Dr. Rubio who instructed him to come to ER. Requesting MD to page him.  
VINICIO Jiménez

## 2023-10-27 NOTE — PROGRESS NOTE ADULT - PROBLEM SELECTOR PLAN 2
stated - fluctuates overall but GFR has been fairly stable in 20-25 range  - monitor after restarting diuretics   - avoid nephrotoxic medications.

## 2024-01-22 NOTE — DISCHARGE NOTE NURSING/CASE MANAGEMENT/SOCIAL WORK - NSDCPEPTCAREGIVEDUMATLIST _GEN_ALL_CORE
----- Message from Carmen Hay MD sent at 3/29/2018  8:09 AM CDT -----  Labs look good.  Diabetes labs good.  Kidney function stable.  Vitamin D level good.  
Spoke to patient and advised of results below.  She had no further questions/concerns at this time.  
Detail Level: Simple
Influenza Vaccination/Heart Failure
Price (Do Not Change): 0.00
Instructions: This plan will send the code FBSE to the PM system.  DO NOT or CHANGE the price.

## 2024-05-10 NOTE — ED ADULT NURSE NOTE - AS TEMP SITE
[FreeTextEntry1] : will send to [facility] for echo and carotid due to scheduling issues patient advised to go to er if any chest pain or lightheadedness until cardiac work up is complete oral

## 2024-09-04 NOTE — ED ADULT NURSE NOTE - NSHOSCREENINGQ1_ED_ALL_ED
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  Can we reach out to his nephrologist Dr. Hensley and see if he would be able to get a CTA done and what precautions he would want.  After this is done if you could assist the patient with scheduling this.  We also need to schedule him for an echo in the next month   No

## 2024-10-19 NOTE — H&P ADULT - BIRTH SEX
Discharge education provided. Pt verbalized understanding. No questions or concerns at this time. AVS provided. Pt discharged.  
Male

## 2024-11-19 NOTE — PROCEDURE NOTE - NSINFORMCONSENT_GEN_A_CORE
Chart reviewed, immunization record updated.  No new results noted on [facility] or [facility] web site.  Care Everywhere updated.   Patient care coordination note  Next PCP visit 12/30/2024  LOV with PCP 08/26/2024    Patient responded to a Campaign Outreach Questionnaire requesting to be scheduled for a diabetes eye exam. First available was 3/6/2025. Added patient to the Wait List for a sooner appointment. Sent a portal message to let him know.   
Benefits, risks, and possible complications of procedure explained to patient/caregiver who verbalized understanding and gave verbal consent.
Benefits, risks, and possible complications of procedure explained to patient/caregiver who verbalized understanding and gave written consent.
Benefits, risks, and possible complications of procedure explained to patient/caregiver who verbalized understanding and gave written consent.
Benefits, risks, and possible complications of procedure explained to patient/caregiver who verbalized understanding and gave verbal consent.
Benefits, risks, and possible complications of procedure explained to patient/caregiver who verbalized understanding and gave written consent.

## 2024-12-11 NOTE — PATIENT PROFILE ADULT - BILL PAYMENT
"Chief Complaint  Med Refill    Subjective        Octavia Pendleton presents to [facility]  History of Present Illness  History of Present Illness  The patient is a 73-year-old female who presents for a 6-week follow-up trial of a new medication for reflux.    She reports no significant improvement in her reflux symptoms or vocal cord irritation following the initiation of the new medication, Voquenza. In fact, she experiences increased burning sensations and more frequent symptoms than before. She also notes intermittent hoarseness, which was particularly pronounced this morning. She has been on the new medication for approximately 4 weeks and expresses a desire to continue its use until her upcoming appointment with the ENT specialist in February 2025. She recalls that her previous treatment with a proton pump inhibitor (PPI), administered twice daily, appeared to be more effective in managing her reflux.    She has been adhering to a strict diet devoid of sweets, sugar, bread, and potatoes, resulting in a weight loss of 12 pounds since her last visit.    Her cholesterol levels were elevated, A1c was high, and B12 was low during her last evaluation. She has been receiving B12 injections every 3 months, with the most recent one administered 6 weeks ago. She had intended to bring her thiamine for injection today but was unable to do so. She finds self-administration of thiamine injections challenging due to the associated pain.    MEDICATIONS  Current: Vonoprazan, B12 injections, thiamine injections       Objective   Vital Signs:  /80   Pulse 76   Temp 98.4 °F (36.9 °C)   Ht 160 cm (63\")   Wt 97.3 kg (214 lb 6.4 oz)   SpO2 96%   BMI 37.98 kg/m²   Estimated body mass index is 37.98 kg/m² as calculated from the following:    Height as of this encounter: 160 cm (63\").    Weight as of this encounter: 97.3 kg (214 lb 6.4 oz).               Physical Exam  Constitutional:       " General: She is not in acute distress.     Appearance: Normal appearance. She is well-developed.   HENT:      Head: Normocephalic and atraumatic.      Right Ear: External ear normal.      Left Ear: External ear normal.   Eyes:      Conjunctiva/sclera: Conjunctivae normal.      Pupils: Pupils are equal, round, and reactive to light.   Neck:      Thyroid: No thyromegaly.   Pulmonary:      Effort: Pulmonary effort is normal.   Neurological:      Mental Status: She is alert and oriented to person, place, and time.   Psychiatric:         Mood and Affect: Mood normal.         Behavior: Behavior normal.        Physical Exam  Vital Signs  Blood pressure is normal. Weight is down 12 pounds.       Result Review :          Results                Assessment and Plan     There are no diagnoses linked to this encounter.  Assessment & Plan  1. Gastroesophageal Reflux Disease (GERD).  She reports no significant improvement in her reflux symptoms or vocal cord irritation following the initiation of the new medication, Vonoprazan. She has been on the new medication for approximately 4 weeks and expresses a desire to continue its use until her upcoming appointment with the ENT specialist in February 2025. She will continue with the current regimen of Vonoprazan until her consultation with the gastroenterologist.    2. Weight management.  She has successfully lost 12 pounds since her last visit, which is a commendable achievement. She has been adhering to a strict diet devoid of sweets, sugar, bread, and potatoes. She is advised to maintain her current dietary modifications, with the understanding that once her target weight is achieved, she can gradually reintroduce healthy foods into her diet. Occasional indulgence in treats is permissible, but should not become a daily habit.    3. Vitamin B12 deficiency.  Her B12 levels were low at the last check. She has been receiving B12 injections every 3 months, with the most recent one  administered 6 weeks ago. A fasting blood test will be scheduled in 6 weeks to monitor her B12 levels.    4. Elevated cholesterol.  Her cholesterol levels were high at the last check. A fasting blood test will be scheduled in 6 weeks to monitor her cholesterol levels.    5. Elevated A1c.  Her A1c was high at the last check. A fasting blood test will be scheduled in 6 weeks to monitor her A1c levels.            Follow Up     No follow-ups on file.  Patient was given instructions and counseling regarding her condition or for health maintenance advice. Please see specific information pulled into the AVS if appropriate.    Patient or patient representative verbalized consent for the use of Ambient Listening during the visit with  Meredith Lea Kehrer, MD for chart documentation. 12/11/2024  08:38 EST      Answers submitted by the patient for this visit:  Primary Reason for Visit (Submitted on 12/5/2024)  What is the primary reason for your visit?: Problem Not Listed     no

## 2024-12-13 NOTE — CONSULT NOTE ADULT - SUBJECTIVE AND OBJECTIVE BOX
HPI: 74M w/ PMHx of HFrEF (EF 10%) s/p ICD, Afib w/ recent admission for CHF/afib s/p DCCV, CKD, DM2, hypothyroidism presents after being referred from CHF clinic for admission due to worsening of LE edema and failure of PO diuretics at home. Per patient was relatively functional until ~1 mo ago when he developed palpitations and light headedness. He was found to be hypotensive and in Afib w/ RVR resulting in his hospitalization at the end  of July. His course was c/b EDIE on CKD and persistent hypotension. He improved after dccv and was ultimately discharged home off entresto, coreg and diuretics due to c/f worsening hypotension. After discharge, patient notes worsening LE edema. He was instructed to resume lasix, which was ultimately escalated to torsemide 80mg bid w/ metolazone 2.5 mg daily. With this regimen he noted increased urination and some mild improvement. However, due to ongoing reduced functional capacity, fatigue, and ALCAZAR he was referred for admission for IV diuretics. Denies any cp or palpitations. Denies repaid HR. Notes weight gain since discharge. Continues to have poor appetite and reduced exercise capacity. No fevers, chills. Notes chronic nonproductive cough which is unchanged. Sleeps elevated due to back pain, denies significant orthopnea. Is able to complete ADLs at baseline with some assistance from his niece. Notes increased urination with high dose diuretics, but no dysuria or hematuria. No abd pain, nausea, vomiting. No diarrhea. (21 Aug 2020 09:51)    ===============================================================================  Patient’s understanding of heart failure/ cardiac condition: Patient has appropriate insight into his heart failure, which was diagnosed 37 years ago. He states his "LV is damaged and was only functioning at 10%". Patient re-iterated his course of prior hospitalizations for pneumonia x4, recent [facility] admission for A.fib, transferred to [facility] for cardioversion, discharge and current admission.     What the CHF team has told them: He states that he was being evaluated for LVAD, not a candidate for heart transplant.     What the CHF team has told them about LVAD:    What is their understanding of LVAD:    What are their thoughts of living with an LVAD, and their understanding of how it may affect ADLs:    What is their social situation: Employed [ ] Retired [ ] Who resides at home:                             Who will care for patient should he or she have an LVAD:       PERTINENT PM/SXH:   Hypothyroidism  Afib  Type II diabetes mellitus  Aortic insufficiency  Mitral insufficiency  CKD (chronic kidney disease)  Cardiomyopathy  Hypertension    History of tonsillectomy  AICD (automatic cardioverter/defibrillator) present    FAMILY HISTORY:  Family history of CVA    ITEMS NOT CHECKED ARE NOT PRESENT    SOCIAL HISTORY:   Significant other/partner:  [ ]  Children:  [ ]  Anabaptist/Spirituality:  Substance hx:  [ ]   Tobacco hx:  [ ]   Alcohol hx: [ ]   Home Opioid hx:  [ ] I-Stop Reference No:  Living Situation: [ ]Home  [ ]Long term care  [ ]Rehab [ ]Other    ADVANCE DIRECTIVES:    DNR  MOLST  [ ]  Living Will  [ ]   DECISION MAKER(s):  [ ] Health Care Proxy(s)  [ ] Surrogate(s)  [ ] Guardian           Name(s): Phone Number(s):    BASELINE (I)ADL(s) (prior to admission):  [place]: [ ]Total  [ ] Moderate [ ]Dependent    Allergies    No Known Allergies    Intolerances    MEDICATIONS  (STANDING):  aMIOdarone    Tablet 200 milliGRAM(s) Oral daily  apixaban 5 milliGRAM(s) Oral two times a day  buMETAnide Infusion 1 mG/Hr (5 mL/Hr) IV Continuous <Continuous>  carvedilol 6.25 milliGRAM(s) Oral every 12 hours  dextrose 5%. 1000 milliLiter(s) (50 mL/Hr) IV Continuous <Continuous>  dextrose 50% Injectable 12.5 Gram(s) IV Push once  dextrose 50% Injectable 25 Gram(s) IV Push once  dextrose 50% Injectable 25 Gram(s) IV Push once  insulin lispro (HumaLOG) corrective regimen sliding scale   SubCutaneous three times a day before meals  levothyroxine 50 MICROGram(s) Oral daily  milrinone Infusion 0.3 MICROgram(s)/kG/Min (7.47 mL/Hr) IV Continuous <Continuous>  potassium chloride    Tablet ER 40 milliEquivalent(s) Oral daily  spironolactone 12.5 milliGRAM(s) Oral daily    MEDICATIONS  (PRN):  dextrose 40% Gel 15 Gram(s) Oral once PRN Blood Glucose LESS THAN 70 milliGRAM(s)/deciliter  glucagon  Injectable 1 milliGRAM(s) IntraMuscular once PRN Glucose LESS THAN 70 milligrams/deciliter  polyethylene glycol 3350 17 Gram(s) Oral daily PRN Constipation    PRESENT SYMPTOMS: [ ]Unable to obtain due to poor mentation   Source if other than patient:  [ ]Family   [ ]Team     Pain (Impact on QOL):    Location -         Minimal acceptable level (0-10 scale):                    Aggravating factors -  Quality -  Radiation -  Severity (0-10 scale) -    Timing -    PAIN AD Score:     [email or web address] (press ctrl +  left click to view)    Dyspnea:                           [ ]Mild [ ]Moderate [ ]Severe  Anxiety:                             [ ]Mild [ ]Moderate [ ]Severe  Fatigue:                             [ ]Mild [ ]Moderate [ ]Severe  Nausea:                             [ ]Mild [ ]Moderate [ ]Severe  Loss of appetite:              [ ]Mild [ ]Moderate [ ]Severe  Constipation:                    [ ]Mild [ ]Moderate [ ]Severe    Other Symptoms:  [ ]All other review of systems negative     Karnofsky Performance Score/Palliative Performance Status Version 2:         %    http://palliative.info/resource_material/[email or web address].pdf  PHYSICAL EXAM:  Vital Signs Last 24 Hrs  T(C): 36.6 (28 Aug 2020 04:31), Max: 36.9 (27 Aug 2020 20:15)  T(F): 97.9 (28 Aug 2020 04:31), Max: 98.5 (27 Aug 2020 20:15)  HR: 80 (28 Aug 2020 06:45) (80 - 89)  BP: 106/65 (28 Aug 2020 04:31) (99/61 - 106/65)  BP(mean): --  RR: 17 (28 Aug 2020 04:31) (17 - 18)  SpO2: 97% (28 Aug 2020 04:31) (97% - 100%) I&O's Summary    27 Aug 2020 07:01  -  28 Aug 2020 07:00  --------------------------------------------------------  IN: 1319 mL / OUT: 3160 mL / NET: -1841 mL    GENERAL:  [ ]Alert  [ ]Oriented x   [ ]Lethargic  [ ]Cachexia  [ ]Unarousable  [ ]Verbal  [ ]Non-Verbal  Behavioral:   [ ] Anxiety  [ ] Delirium [ ] Agitation [ ] Other  HEENT:  [ ]Normal   [ ]Dry mouth   [ ]ET Tube/Trach  [ ]Oral lesions  PULMONARY:   [ ]Clear [ ]Tachypnea  [ ]Audible excessive secretions   [ ]Rhonchi        [ ]Right [ ]Left [ ]Bilateral  [ ]Crackles        [ ]Right [ ]Left [ ]Bilateral  [ ]Wheezing     [ ]Right [ ]Left [ ]Bilateral  CARDIOVASCULAR:    [ ]Regular [ ]Irregular [ ]Tachy  [ ]Eric [ ]Murmur [ ]Other  GASTROINTESTINAL:  [ ]Soft  [ ]Distended   [ ]+BS  [ ]Non tender [ ]Tender  [ ]PEG [ ]OGT/ NGT  Last BM:   GENITOURINARY:  [ ]Normal [ ] Incontinent   [ ]Oliguria/Anuria   [ ]Guidry  MUSCULOSKELETAL:   [ ]Normal   [ ]Weakness  [ ]Bed/Wheelchair bound [ ]Edema  NEUROLOGIC:   [ ]No focal deficits  [ ] Cognitive impairment  [ ] Dysphagia [ ]Dysarthria [ ] Paresis [ ]Other   SKIN:   [ ]Normal   [ ]Pressure ulcer(s)  [ ]Rash    CRITICAL CARE:  [ ] Shock Present  [ ]Septic [ ]Cardiogenic [ ]Neurologic [ ]Hypovolemic  [ ]  Vasopressors [ ]  Inotropes   [ ] Respiratory failure present  [ ] Acute  [ ] Chronic [ ] Hypoxic  [ ] Hypercarbic [ ] Other  [ ] Other organ failure     LABS:                        9.7    4.71  )-----------( 76       ( 27 Aug 2020 06:10 )             30.9   08-28    143  |  100  |  69<H>  ----------------------------<  156<H>  3.7   |  33<H>  |  2.40<H>    Ca    10.2      28 Aug 2020 06:32  Phos  2.6     08-28  Mg     2.1     08-28          RADIOLOGY & ADDITIONAL STUDIES:    PROTEIN CALORIE MALNUTRITION PRESENT: [ ] Yes [ ] No  [ ] PPSV2 < or = to 30% [ ] significant weight loss  [ ] poor nutritional intake [ ] catabolic state [ ] anasarca     Prealbumin, Serum: 15 mg/dL (08-26-20 @ 00:19)  Artificial Nutrition [ ]     REFERRALS:   [ ]Chaplaincy  [ ] Hospice  [ ]Child Life  [ ]Social Work  [ ]Case management [ ]Holistic Therapy   Goals of Care Discussion Document: HPI: 75M w/ PMHx of HFrEF (EF 10%) s/p ICD, Afib w/ recent admission for CHF/afib s/p DCCV, CKD, DM2, hypothyroidism presents after being referred from CHF clinic for admission due to worsening of LE edema and failure of PO diuretics at home. Per patient was relatively functional until ~1 mo ago when he developed palpitations and light headedness. He was found to be hypotensive and in Afib w/ RVR resulting in his hospitalization at the end  of July. His course was c/b EDIE on CKD and persistent hypotension. He improved after dccv and was ultimately discharged home off entresto, coreg and diuretics due to c/f worsening hypotension. After discharge, patient notes worsening LE edema. He was instructed to resume lasix, which was ultimately escalated to torsemide 80mg bid w/ metolazone 2.5 mg daily. With this regimen he noted increased urination and some mild improvement. However, due to ongoing reduced functional capacity, fatigue, and ALCAZAR he was referred for admission for IV diuretics. Denies any cp or palpitations. Denies repaid HR. Notes weight gain since discharge. Continues to have poor appetite and reduced exercise capacity. No fevers, chills. Notes chronic nonproductive cough which is unchanged. Sleeps elevated due to back pain, denies significant orthopnea. Is able to complete ADLs at baseline with some assistance from his niece. Notes increased urination with high dose diuretics, but no dysuria or hematuria. No abd pain, nausea, vomiting. No diarrhea. (21 Aug 2020 09:51)    ===============================================================================  Patient’s understanding of heart failure/ cardiac condition: Patient has appropriate insight into his heart failure, which was diagnosed 37 years ago. He states his "LV is damaged and was only functioning at 10%". Patient re-iterated his course of prior hospitalizations for pneumonia x4, recent [facility] admission for A.fib, transferred to [facility] for cardioversion, discharge and current admission.     What the CHF team has told them: He states that he was being evaluated for LVAD, not a candidate for heart transplant. He states that he would prefer to be stabilized on medications and avoid LVAD if possible but understands that he has a weak heart and "if LVAD is needed, he would be willing to highly consider it as an option".     What the CHF team has told them about LVAD: He understands that he will need to "live on a battery, have dressings changed, and have multiple follow ups".     What is their understanding of LVAD: He knows it involves a surgery that "is inserted into the left ventricle to assist the heart to help blood flow to the body to oxygenate cells".     What are their thoughts of living with an LVAD, and their understanding of how it may affect ADLs: He is very aware of the lengthy process of LVAD evaluation, need for follow ups, financing it, dressing changes. He stated he has "savings that can be used to finance the LVAD if needed but mentioned that he lost 69% of his finances during [facility] administration". He states that he lives with his niece, Marilou Jarquin, who would help with his dressing changes but "he would not want to put her in the situation if it can be avoided". He mentioned that his niece does not drive and he would not be able to drive post operatively. He states that the LVAD is "not appealing to him but he's not suicidal and would do whatever it takes if needed". He does state that he has living will at home that designates Marilou as his HCP and that depending on the circumstance, he is a DNR. He states "he can ask her to dig it up if needed".     What is their social situation: Employed [ ] Retired [x ]  for [employer], retired in 2014. Who resides at home:       Marilou Jarquin (niece)                       Who will care for patient should he or she have an LVAD: Marilou Jarquin (niece)       PERTINENT PM/SXH:   Hypothyroidism  Afib  Type II diabetes mellitus  Aortic insufficiency  Mitral insufficiency  CKD (chronic kidney disease)  Cardiomyopathy  Hypertension    History of tonsillectomy  AICD (automatic cardioverter/defibrillator) present    FAMILY HISTORY:  Family history of CVA    ITEMS NOT CHECKED ARE NOT PRESENT    SOCIAL HISTORY:   Significant other/partner:  [ ]  Children:  [ ]  Mormonism/Spirituality:  Substance hx: denies  [ ]   Tobacco hx:  [x ]   remote history of smoking 50 years ago Alcohol hx: [x ] socially   Home Opioid hx:  [ ] I-Stop Reference No:  Living Situation: [x ]Home  [ ]Long term care  [ ]Rehab [ ]Other    ADVANCE DIRECTIVES:    DNR  MOLST  [ ]  Living Will  [ ]   DECISION MAKER(s):  [ x] Health Care Proxy(s)  [ ] Surrogate(s)  [ ] Guardian           Name(s): Marilou Jarquin Phone Number(s): [phone]    BASELINE (I)ADL(s) (prior to admission):  [place]: [x ]Total  [ ] Moderate [ ]Dependent    Allergies    No Known Allergies    Intolerances    MEDICATIONS  (STANDING):  aMIOdarone    Tablet 200 milliGRAM(s) Oral daily  apixaban 5 milliGRAM(s) Oral two times a day  buMETAnide Infusion 1 mG/Hr (5 mL/Hr) IV Continuous <Continuous>  carvedilol 6.25 milliGRAM(s) Oral every 12 hours  dextrose 5%. 1000 milliLiter(s) (50 mL/Hr) IV Continuous <Continuous>  dextrose 50% Injectable 12.5 Gram(s) IV Push once  dextrose 50% Injectable 25 Gram(s) IV Push once  dextrose 50% Injectable 25 Gram(s) IV Push once  insulin lispro (HumaLOG) corrective regimen sliding scale   SubCutaneous three times a day before meals  levothyroxine 50 MICROGram(s) Oral daily  milrinone Infusion 0.3 MICROgram(s)/kG/Min (7.47 mL/Hr) IV Continuous <Continuous>  potassium chloride    Tablet ER 40 milliEquivalent(s) Oral daily  spironolactone 12.5 milliGRAM(s) Oral daily    MEDICATIONS  (PRN):  dextrose 40% Gel 15 Gram(s) Oral once PRN Blood Glucose LESS THAN 70 milliGRAM(s)/deciliter  glucagon  Injectable 1 milliGRAM(s) IntraMuscular once PRN Glucose LESS THAN 70 milligrams/deciliter  polyethylene glycol 3350 17 Gram(s) Oral daily PRN Constipation    PRESENT SYMPTOMS: [ ]Unable to obtain due to poor mentation   Source if other than patient:  [ ]Family   [ ]Team     Pain (Impact on QOL):    Location -         Minimal acceptable level (0-10 scale):                    Aggravating factors -  Quality -  Radiation -  Severity (0-10 scale) -    Timing -    PAIN AD Score:     [email or web address] (press ctrl +  left click to view)    Dyspnea:                           [x ]Mild [ ]Moderate [ ]Severe  Anxiety:                             [ ]Mild [ ]Moderate [ ]Severe  Fatigue:                             [ ]Mild [ ]Moderate [ ]Severe  Nausea:                             [ ]Mild [ ]Moderate [ ]Severe  Loss of appetite:              [ ]Mild [ ]Moderate [ ]Severe  Constipation:                    [ ]Mild [ ]Moderate [ ]Severe    Other Symptoms:  [ ]All other review of systems negative     Karnofsky Performance Score/Palliative Performance Status Version 2:  80- 90  %    http://palliative.info/resource_material/[email or web address].pdf  PHYSICAL EXAM:  Vital Signs Last 24 Hrs  T(C): 36.6 (28 Aug 2020 04:31), Max: 36.9 (27 Aug 2020 20:15)  T(F): 97.9 (28 Aug 2020 04:31), Max: 98.5 (27 Aug 2020 20:15)  HR: 80 (28 Aug 2020 06:45) (80 - 89)  BP: 106/65 (28 Aug 2020 04:31) (99/61 - 106/65)  BP(mean): --  RR: 17 (28 Aug 2020 04:31) (17 - 18)  SpO2: 97% (28 Aug 2020 04:31) (97% - 100%) I&O's Summary    27 Aug 2020 07:01  -  28 Aug 2020 07:00  --------------------------------------------------------  IN: 1319 mL / OUT: 3160 mL / NET: -1841 mL    GENERAL:  [x ]Alert  [x ]Oriented x 3  [ ]Lethargic  [ ]Cachexia  [ ]Unarousable  [x ]Verbal  [ ]Non-Verbal  Behavioral:   [ ] Anxiety  [ ] Delirium [ ] Agitation [ ] Other  HEENT:  [x ]Normal   [ ]Dry mouth   [ ]ET Tube/Trach  [ ]Oral lesions  PULMONARY:   [x ]Clear [ ]Tachypnea  [ ]Audible excessive secretions   [ ]Rhonchi        [ ]Right [ ]Left [ ]Bilateral  [ ]Crackles        [ ]Right [ ]Left [ ]Bilateral  [ ]Wheezing     [ ]Right [ ]Left [ ]Bilateral  CARDIOVASCULAR:    [x ]Regular [ ]Irregular [ ]Tachy  [ ]Eric [ ]Murmur [ ]Other  GASTROINTESTINAL:  [x ]Soft  [ ]Distended   [ ]+BS  [ ]Non tender [ ]Tender  [ ]PEG [ ]OGT/ NGT  Last BM:   GENITOURINARY:  [x ]Normal [ ] Incontinent   [ ]Oliguria/Anuria   [ ]Guidry  MUSCULOSKELETAL:   [ ]Normal   [x ]Weakness  [ ]Bed/Wheelchair bound [ ]Edema  NEUROLOGIC:   [x ]No focal deficits  [ ] Cognitive impairment  [ ] Dysphagia [ ]Dysarthria [ ] Paresis [ ]Other   SKIN: there are dressings on his b/l lower extremities by the elan (did not uncover)   [ ]Normal   [ ]Pressure ulcer(s)  [ ]Rash    CRITICAL CARE:  [ ] Shock Present  [ ]Septic [ ]Cardiogenic [ ]Neurologic [ ]Hypovolemic  [ ]  Vasopressors [x ]  Inotropes   [ ] Respiratory failure present  [ ] Acute  [ ] Chronic [ ] Hypoxic  [ ] Hypercarbic [ ] Other  [ ] Other organ failure     LABS:                        9.7    4.71  )-----------( 76       ( 27 Aug 2020 06:10 )             30.9   08-28    143  |  100  |  69<H>  ----------------------------<  156<H>  3.7   |  33<H>  |  2.40<H>    Ca    10.2      28 Aug 2020 06:32  Phos  2.6     08-28  Mg     2.1     08-28    RADIOLOGY & ADDITIONAL STUDIES:  < from: TTE with Doppler (w/Cont) (08.21.20 @ 15:56) >  Conclusions:  1. Mitral annular calcification.  Tethered mitral valve  leaflets with normal opening. Moderate-severe mitral  regurgitation.  2. Calcified trileaflet aortic valve with normal opening.  Mild aortic regurgitation.  3. Severely dilated left atrium.  LA volume index = 69  cc/m2.  4. Moderate left ventricular enlargement.  5. Endocardial visualization enhanced with intravenous  injection of Ultrasonic Enhancing Agent (Definity). Severe  global left ventricular systolic dysfunction. No left  ventricular thrombus.  6. Severe diastolic dysfunction  7. Right ventricular enlargement with decreased right  ventricular systolic function.  *** Compared with echocardiogram of 7/20/2020, no  significant changes noted.    < end of copied text >      PROTEIN CALORIE MALNUTRITION PRESENT: [ ] Yes [ ] No  [ ] PPSV2 < or = to 30% [ ] significant weight loss  [ ] poor nutritional intake [ ] catabolic state [ ] anasarca     Prealbumin, Serum: 15 mg/dL (08-26-20 @ 00:19)  Artificial Nutrition [ ]     REFERRALS:   [ ]Chaplaincy  [ ] Hospice  [ ]Child Life  [ ]Social Work  [ ]Case management [ ]Holistic Therapy   Goals of Care Discussion Document: HPI: 75M w/ PMHx of HFrEF (EF 10%) s/p ICD, Afib w/ recent admission for CHF/afib s/p DCCV, CKD, DM2, hypothyroidism presents after being referred from CHF clinic for admission due to worsening of LE edema and failure of PO diuretics at home. Per patient was relatively functional until ~1 mo ago when he developed palpitations and light headedness. He was found to be hypotensive and in Afib w/ RVR resulting in his hospitalization at the end  of July. His course was c/b EDIE on CKD and persistent hypotension. He improved after dccv and was ultimately discharged home off entresto, coreg and diuretics due to c/f worsening hypotension. After discharge, patient notes worsening LE edema. He was instructed to resume lasix, which was ultimately escalated to torsemide 80mg bid w/ metolazone 2.5 mg daily. With this regimen he noted increased urination and some mild improvement. However, due to ongoing reduced functional capacity, fatigue, and ALCAZAR he was referred for admission for IV diuretics. Denies any cp or palpitations. Denies repaid HR. Notes weight gain since discharge. Continues to have poor appetite and reduced exercise capacity. No fevers, chills. Notes chronic nonproductive cough which is unchanged. Sleeps elevated due to back pain, denies significant orthopnea. Is able to complete ADLs at baseline with some assistance from his niece. Notes increased urination with high dose diuretics, but no dysuria or hematuria. No abd pain, nausea, vomiting. No diarrhea. (21 Aug 2020 09:51)    ===============================================================================  Patient’s understanding of heart failure/ cardiac condition: Patient has appropriate insight into his heart failure, which was diagnosed 37 years ago. He states his "LV is damaged and was only functioning at 10%". Patient re-iterated his course of prior hospitalizations for pneumonia x4, recent [facility] admission for A.fib, transferred to [facility] for cardioversion, discharge and current admission.     What the CHF team has told them: He states that he was being evaluated for LVAD, not a candidate for heart transplant. He states that he would prefer to be stabilized on medications and avoid LVAD if possible but understands that he has a weak heart and "if LVAD is needed, he would be willing to highly consider it as an option".     What the CHF team has told them about LVAD: He understands that he will need to "live on a battery, have dressings changed, and have multiple follow ups".     What is their understanding of LVAD: He knows it involves a surgery that "is inserted into the left ventricle to assist the heart to help blood flow to the body to oxygenate cells".     What are their thoughts of living with an LVAD, and their understanding of how it may affect ADLs: He is very aware of the lengthy process of LVAD evaluation, need for follow ups, financing it, dressing changes. He stated he has "savings that can be used to finance the LVAD if needed but mentioned that he lost 69% of his finances during [facility] administration". He states that he lives with his niece, Marilou Jarquin, who would help with his dressing changes but "he would not want to put her in the situation if it can be avoided". He mentioned that his niece does not drive and he would not be able to drive post operatively. He states that the LVAD is "not appealing to him but he's not suicidal and would do whatever it takes if needed". He does state that he has living will at home that designates Marilou as his HCP and that depending on the circumstance, he is a DNR. He states "he can ask her to dig it up if needed".     What is their social situation: Employed [ ] Retired [x ]  for [employer], retired in 2014. Who resides at home:       Marilou Jarquin (niece)                       Who will care for patient should he or she have an LVAD: Marilou Jarquin (niece)       PERTINENT PM/SXH:   Hypothyroidism  Afib  Type II diabetes mellitus  Aortic insufficiency  Mitral insufficiency  CKD (chronic kidney disease)  Cardiomyopathy  Hypertension    History of tonsillectomy  AICD (automatic cardioverter/defibrillator) present    FAMILY HISTORY:  Family history of CVA    ITEMS NOT CHECKED ARE NOT PRESENT    SOCIAL HISTORY:   Significant other/partner:  [ ]  Children:  [ ]  Protestant/Spirituality:  Substance hx: denies  [ ]   Tobacco hx:  [x ]   remote history of smoking 50 years ago Alcohol hx: [x ] socially   Home Opioid hx:  [ ] I-Stop Reference No:  Living Situation: [x ]Home  [ ]Long term care  [ ]Rehab [ ]Other    ADVANCE DIRECTIVES:    DNR  MOLST  [ ]  Living Will  [ ]   DECISION MAKER(s):  [ x] Health Care Proxy(s)  [ ] Surrogate(s)  [ ] Guardian           Name(s): Marilou Jarquin Phone Number(s): [phone]    BASELINE (I)ADL(s) (prior to admission):  [place]: [x ]Total  [ ] Moderate [ ]Dependent    Allergies    No Known Allergies    Intolerances    MEDICATIONS  (STANDING):  aMIOdarone    Tablet 200 milliGRAM(s) Oral daily  apixaban 5 milliGRAM(s) Oral two times a day  buMETAnide Infusion 1 mG/Hr (5 mL/Hr) IV Continuous <Continuous>  carvedilol 6.25 milliGRAM(s) Oral every 12 hours  dextrose 5%. 1000 milliLiter(s) (50 mL/Hr) IV Continuous <Continuous>  dextrose 50% Injectable 12.5 Gram(s) IV Push once  dextrose 50% Injectable 25 Gram(s) IV Push once  dextrose 50% Injectable 25 Gram(s) IV Push once  insulin lispro (HumaLOG) corrective regimen sliding scale   SubCutaneous three times a day before meals  levothyroxine 50 MICROGram(s) Oral daily  milrinone Infusion 0.3 MICROgram(s)/kG/Min (7.47 mL/Hr) IV Continuous <Continuous>  potassium chloride    Tablet ER 40 milliEquivalent(s) Oral daily  spironolactone 12.5 milliGRAM(s) Oral daily    MEDICATIONS  (PRN):  dextrose 40% Gel 15 Gram(s) Oral once PRN Blood Glucose LESS THAN 70 milliGRAM(s)/deciliter  glucagon  Injectable 1 milliGRAM(s) IntraMuscular once PRN Glucose LESS THAN 70 milligrams/deciliter  polyethylene glycol 3350 17 Gram(s) Oral daily PRN Constipation    PRESENT SYMPTOMS: [ ]Unable to obtain due to poor mentation   Source if other than patient:  [ ]Family   [ ]Team     Pain (Impact on QOL):    Location -         Minimal acceptable level (0-10 scale):                    Aggravating factors -  Quality -  Radiation -  Severity (0-10 scale) -    Timing -    PAIN AD Score:     [email or web address] (press ctrl +  left click to view)    Dyspnea:                           [x ]Mild [ ]Moderate [ ]Severe  Anxiety:                             [ ]Mild [ ]Moderate [ ]Severe  Fatigue:                             [ ]Mild [ ]Moderate [ ]Severe  Nausea:                             [ ]Mild [ ]Moderate [ ]Severe  Loss of appetite:              [ ]Mild [ ]Moderate [ ]Severe  Constipation:                    [ ]Mild [ ]Moderate [ ]Severe    Other Symptoms:  [ ]All other review of systems negative     Karnofsky Performance Score/Palliative Performance Status Version 2:  80- 90  %    http://palliative.info/resource_material/[email or web address].pdf  PHYSICAL EXAM:  Vital Signs Last 24 Hrs  T(C): 36.6 (28 Aug 2020 04:31), Max: 36.9 (27 Aug 2020 20:15)  T(F): 97.9 (28 Aug 2020 04:31), Max: 98.5 (27 Aug 2020 20:15)  HR: 80 (28 Aug 2020 06:45) (80 - 89)  BP: 106/65 (28 Aug 2020 04:31) (99/61 - 106/65)  BP(mean): --  RR: 17 (28 Aug 2020 04:31) (17 - 18)  SpO2: 97% (28 Aug 2020 04:31) (97% - 100%) I&O's Summary    27 Aug 2020 07:01  -  28 Aug 2020 07:00  --------------------------------------------------------  IN: 1319 mL / OUT: 3160 mL / NET: -1841 mL    GENERAL:  [x ]Alert  [x ]Oriented x 3  [ ]Lethargic  [ ]Cachexia  [ ]Unarousable  [x ]Verbal  [ ]Non-Verbal  Behavioral:   [ ] Anxiety  [ ] Delirium [ ] Agitation [ ] Other  HEENT:  [x ]Normal   [ ]Dry mouth   [ ]ET Tube/Trach  [ ]Oral lesions  PULMONARY:   [x ]Clear [ ]Tachypnea  [ ]Audible excessive secretions   [ ]Rhonchi        [ ]Right [ ]Left [ ]Bilateral  [ ]Crackles        [ ]Right [ ]Left [ ]Bilateral  [ ]Wheezing     [ ]Right [ ]Left [ ]Bilateral  CARDIOVASCULAR:    [x ]Regular [ ]Irregular [ ]Tachy  [ ]Eric [ ]Murmur [ ]Other  GASTROINTESTINAL:  [x ]Soft  [ ]Distended   [ ]+BS  [ ]Non tender [ ]Tender  [ ]PEG [ ]OGT/ NGT  Last BM:   GENITOURINARY:  [x ]Normal [ ] Incontinent   [ ]Oliguria/Anuria   [ ]Guidry  MUSCULOSKELETAL:   [ ]Normal   [x ]Weakness  [ ]Bed/Wheelchair bound [ ]Edema  NEUROLOGIC:   [x ]No focal deficits  [ ] Cognitive impairment  [ ] Dysphagia [ ]Dysarthria [ ] Paresis [ ]Other   SKIN: there are dressings on his b/l lower extremities by the elan (did not uncover)   [ ]Normal   [ ]Pressure ulcer(s)  [ ]Rash    CRITICAL CARE:  [ ] Shock Present  [ ]Septic [x ]Cardiogenic [ ]Neurologic [ ]Hypovolemic  [ ]  Vasopressors [x ]  Inotropes   [ ] Respiratory failure present  [ ] Acute  [ ] Chronic [ ] Hypoxic  [ ] Hypercarbic [ ] Other  [ ] Other organ failure     LABS:                        9.7    4.71  )-----------( 76       ( 27 Aug 2020 06:10 )             30.9   08-28    143  |  100  |  69<H>  ----------------------------<  156<H>  3.7   |  33<H>  |  2.40<H>    Ca    10.2      28 Aug 2020 06:32  Phos  2.6     08-28  Mg     2.1     08-28    RADIOLOGY & ADDITIONAL STUDIES:  < from: TTE with Doppler (w/Cont) (08.21.20 @ 15:56) >  Conclusions:  1. Mitral annular calcification.  Tethered mitral valve  leaflets with normal opening. Moderate-severe mitral  regurgitation.  2. Calcified trileaflet aortic valve with normal opening.  Mild aortic regurgitation.  3. Severely dilated left atrium.  LA volume index = 69  cc/m2.  4. Moderate left ventricular enlargement.  5. Endocardial visualization enhanced with intravenous  injection of Ultrasonic Enhancing Agent (Definity). Severe  global left ventricular systolic dysfunction. No left  ventricular thrombus.  6. Severe diastolic dysfunction  7. Right ventricular enlargement with decreased right  ventricular systolic function.  *** Compared with echocardiogram of 7/20/2020, no  significant changes noted.    < end of copied text >      PROTEIN CALORIE MALNUTRITION PRESENT: [x ] Yes [ ] No  [ ] PPSV2 < or = to 30% [x ] significant weight loss  [x ] poor nutritional intake [ ] catabolic state [ ] anasarca     Prealbumin, Serum: 15 mg/dL (08-26-20 @ 00:19)  Artificial Nutrition [ ]     REFERRALS:   [ ]Chaplaincy  [ ] Hospice  [ ]Child Life  [x ]Social Work  [ ]Case management [ ]Holistic Therapy   Goals of Care Discussion Document: Previously Declined (within the last year)

## 2025-04-29 NOTE — PRE-ANESTHESIA EVALUATION ADULT - WEIGHT IN LBS
165.5 Detail Level: Simple Render Risk Assessment In Note?: no Additional Notes: Patient will schedule full examination.

## 2025-05-16 NOTE — DIETITIAN INITIAL EVALUATION ADULT. - ETIOLOGY
Anesthesia Pre Eval Note    Anesthesia ROS/Med Hx        Anesthetic Complication History:    Patient does not have a history of anesthetic complications      Pulmonary Review:  Patient does not have a pulmonary history      Neuro/Psych Review:  Patient does not have a neuro/psych history         Cardiovascular Review:     Positive for CHF  Positive for pulmonary hypertension  Positive for hypertension    GI/HEPATIC/RENAL Review:     Positive for renal disease    End/Other Review:  Positive for diabetes  Positive for anemia  Positive for cancer      Relevant Problems   No relevant active problems       Physical Exam     Airway   Mallampati: II  TM Distance: >3 FB  Neck ROM: Full  Neck: Non-tender and Able to place in sniff position  TMJ Mobility: Good    Cardiovascular  Cardiovascular exam normal  Cardio Rhythm: Regular  Cardio Rate: Normal    Head Assessment  Head assessment: Normocephalic and Atraumatic    General Assessment  General Assessment: Alert and oriented and No acute distress    Dental Exam  Dental exam normal    Pulmonary Exam  Pulmonary exam normal  Breath sounds clear to auscultation:  Yes    Abdominal Exam  Abdominal exam normal      Anesthesia Plan:    ASA Status: 4  Anesthesia Type: General    Induction: Intravenous  Preferred Airway Type: ETT  Maintenance: Inhalational    Post-op Pain Management: Per Surgeon      Checklist  Reviewed: NPO Status, Allergies, Medications, Problem list and Past Med History  Consent/Risks Discussed Statement:  The proposed anesthetic plan, including its risks and benefits, have been discussed with the Patient along with the risks and benefits of alternatives. Questions were encouraged and answered and the patient and/or representative understands and agrees to proceed.        I discussed with the patient (and/or patient's legal representative) the risks and benefits of the proposed anesthesia plan, General, which may include services performed by other anesthesia  providers.    Alternative anesthesia plans, if available, were reviewed with the patient (and/or patient's legal representative). Discussion has been held with the patient (and/or patient's legal representative) regarding risks of anesthesia, which include Nausea, Vomiting and Dental Injury and emergent situations that may require change in anesthesia plan.    The patient (and/or patient's legal representative) has indicated understanding, his/her questions have been answered, and he/she wishes to proceed with the planned anesthetic.    Blood Products: Not Anticipated    Comments  Plan Comments: Risks and benefits of GA discussed with patient.  Discussion included, but was not limited to, above plus postoperative pain and nausea.  Discussed that any and all measures necessary for care and safety of patient will be undertaken as needed.  Pt voiced understanding of discussion as laid out.  All questions answered to voiced satisfaction and understanding.         Chronic illness. decline in appetite

## 2025-05-19 NOTE — PROGRESS NOTE ADULT - SUBJECTIVE AND OBJECTIVE BOX
<--- Click to Launch ICDx for PreOp, PostOp and Procedure [facility] DIVISION OF KIDNEY DISEASES AND HYPERTENSION   -- FOLLOW UP NOTE --   Cristina Ramírez  Nephrology Fellow  Pager NS: [phone]   /  Pager LIANA: [phone]  (after 5pm or weekend please page the on-call fellow)  --------------------------------------------------------------------------------  24 hour events/subjective:  - overnight no events reported (yesterday d/c IVF, tolerated diet well), vitals afebrile, hypotensive episode lowest bp 93/53, total UOP 1 liter in the past 24hr  - patient seen and examined at bedside this morning without complaints  - vitals/lab/medications reviewed, noted for worsening sCr 4.4 to 5.1    PAST HISTORY  --------------------------------------------------------------------------------  No significant changes to PMH, PSH, FHx, SHx, unless otherwise noted    ALLERGIES & MEDICATIONS  --------------------------------------------------------------------------------  Allergies  No Known Allergies    Intolerances    Standing Inpatient Medications  aMIOdarone    Tablet 200 milliGRAM(s) Oral daily  apixaban 5 milliGRAM(s) Oral two times a day  BACItracin   Ointment 1 Application(s) Topical two times a day  isosorbide   mononitrate ER Tablet (IMDUR) 60 milliGRAM(s) Oral at bedtime  levothyroxine 50 MICROGram(s) Oral daily  melatonin 3 milliGRAM(s) Oral at bedtime  metoprolol succinate ER 25 milliGRAM(s) Oral daily  milrinone Infusion 0.3 MICROgram(s)/kG/Min IV Continuous <Continuous>  sodium chloride 0.45% 1000 milliLiter(s) IV Continuous <Continuous>    PRN Inpatient Medications  acetaminophen   Tablet .. 650 milliGRAM(s) Oral every 6 hours PRN    REVIEW OF SYSTEMS  --------------------------------------------------------------------------------  Gen: no fever, chills, weakness  Respiratory: No dyspnea, cough  CV: No chest pain, orthopnea  GI: No abdominal pain, nausea, vomiting, diarrhea  MSK: no edema  Neuro: No dizziness, lightheadedness  Heme: No bleeding  All other systems were reviewed and are negative, except as noted.    VITALS/PHYSICAL EXAM  --------------------------------------------------------------------------------  T(C): 36.4 (12-10-20 @ 04:38), Max: 36.8 (12-09-20 @ 21:14)  HR: 84 (12-10-20 @ 04:38) (84 - 91)  BP: 93/58 (12-10-20 @ 04:38) (93/53 - 94/60)  RR: 18 (12-10-20 @ 04:38) (18 - 18)  SpO2: 97% (12-10-20 @ 04:38) (97% - 97%)  Wt(kg): --    12-09-20 @ 07:01  -  12-10-20 @ 07:00  --------------------------------------------------------  IN: 739.2 mL / OUT: 1020 mL / NET: -280.8 mL    Physical Exam:  	Gen: NAD, well-appearing on room air  	HEENT: dry mucous membrane  	Pulm: CTA B/L, no crackles  	CV: RRR, S1S2; + murmur  	GI: +BS, soft, nontender/nondistended  	: No suprapubic tenderness  	MSK: Warm, no edema              Neuro: AAOx3  	Psych: Normal affect and mood  	Skin: Warm    LABS/STUDIES  --------------------------------------------------------------------------------              9.3    4.03  >-----------<  199      [12-10-20 @ 07:12]              29.9     132  |  96  |  141  ----------------------------<  187      [12-10-20 @ 07:12]  5.3   |  18  |  5.15        Ca     9.9     [12-10-20 @ 07:12]    TPro  7.0  /  Alb  3.6  /  TBili  1.4  /  DBili  x   /  AST  22  /  ALT  21  /  AlkPhos  88  [12-10-20 @ 07:12]    Uric acid 6.1      [12-08-20 @ 12:38]    Creatinine Trend:  SCr 5.15 [12-10 @ 07:12]  SCr 4.49 [12-08 @ 21:10]  SCr 4.49 [12-08 @ 12:38]  SCr 4.37 [12-08 @ 06:52]  SCr 3.20 [12-07 @ 16:15]        Iron 36, TIBC 314, %sat 12      [09-08-20 @ 08:45]  Ferritin 145      [09-08-20 @ 08:45]  Vitamin D (25OH) 21.7      [07-18-20 @ 10:13]  TSH 8.61      [11-01-20 @ 20:16]  Lipid: chol 116, TG 55, HDL 48, LDL 58      [08-26-20 @ 00:17]         [facility] DIVISION OF KIDNEY DISEASES AND HYPERTENSION   -- FOLLOW UP NOTE --   Cristina Ramírez  Nephrology Fellow  Pager NS: [phone]   /  Pager LIANA: [phone]  (after 5pm or weekend please page the on-call fellow)  --------------------------------------------------------------------------------  24 hour events/subjective:  - overnight no events reported (yesterday d/c IVF, tolerated diet well), vitals afebrile, hypotensive episode lowest bp 93/53, total UOP 1 liter in the past 24hr  - patient seen and examined at bedside this morning who report passing gas however hasn't had bowel movement.  Mild bleeding from skin tear over nose bridge.  Denies any chest pain, shortness of breath, nausea, vomiting, abdominal pain  - vitals/lab/medications reviewed, noted for worsening sCr 4.4 to 5.1    PAST HISTORY  --------------------------------------------------------------------------------  No significant changes to PMH, PSH, FHx, SHx, unless otherwise noted    ALLERGIES & MEDICATIONS  --------------------------------------------------------------------------------  Allergies  No Known Allergies    Intolerances    Standing Inpatient Medications  aMIOdarone    Tablet 200 milliGRAM(s) Oral daily  apixaban 5 milliGRAM(s) Oral two times a day  BACItracin   Ointment 1 Application(s) Topical two times a day  isosorbide   mononitrate ER Tablet (IMDUR) 60 milliGRAM(s) Oral at bedtime  levothyroxine 50 MICROGram(s) Oral daily  melatonin 3 milliGRAM(s) Oral at bedtime  metoprolol succinate ER 25 milliGRAM(s) Oral daily  milrinone Infusion 0.3 MICROgram(s)/kG/Min IV Continuous <Continuous>  sodium chloride 0.45% 1000 milliLiter(s) IV Continuous <Continuous>    PRN Inpatient Medications  acetaminophen   Tablet .. 650 milliGRAM(s) Oral every 6 hours PRN    REVIEW OF SYSTEMS  --------------------------------------------------------------------------------  Gen: no fever, chills, weakness  Respiratory: No dyspnea, cough  CV: No chest pain, orthopnea  GI: No abdominal pain, nausea, vomiting, diarrhea, no bowel movement  MSK: no edema  Neuro: No dizziness, lightheadedness  Heme: No bleeding  All other systems were reviewed and are negative, except as noted.    VITALS/PHYSICAL EXAM  --------------------------------------------------------------------------------  T(C): 36.4 (12-10-20 @ 04:38), Max: 36.8 (12-09-20 @ 21:14)  HR: 84 (12-10-20 @ 04:38) (84 - 91)  BP: 93/58 (12-10-20 @ 04:38) (93/53 - 94/60)  RR: 18 (12-10-20 @ 04:38) (18 - 18)  SpO2: 97% (12-10-20 @ 04:38) (97% - 97%)  Wt(kg): --    12-09-20 @ 07:01  -  12-10-20 @ 07:00  --------------------------------------------------------  IN: 739.2 mL / OUT: 1020 mL / NET: -280.8 mL    Physical Exam:  	Gen: NAD, well-appearing on room air  	HEENT: dry mucous membrane  	Pulm: CTA B/L, no crackles  	CV: RRR, S1S2; + murmur  	GI: +BS, soft, nontender/nondistended  	: No suprapubic tenderness  	MSK: Warm, no edema              Neuro: AAOx3  	Psych: Normal affect and mood  	Skin: Warm    LABS/STUDIES  --------------------------------------------------------------------------------              9.3    4.03  >-----------<  199      [12-10-20 @ 07:12]              29.9     132  |  96  |  141  ----------------------------<  187      [12-10-20 @ 07:12]  5.3   |  18  |  5.15        Ca     9.9     [12-10-20 @ 07:12]    TPro  7.0  /  Alb  3.6  /  TBili  1.4  /  DBili  x   /  AST  22  /  ALT  21  /  AlkPhos  88  [12-10-20 @ 07:12]    Uric acid 6.1      [12-08-20 @ 12:38]    Creatinine Trend:  SCr 5.15 [12-10 @ 07:12]  SCr 4.49 [12-08 @ 21:10]  SCr 4.49 [12-08 @ 12:38]  SCr 4.37 [12-08 @ 06:52]  SCr 3.20 [12-07 @ 16:15]        Iron 36, TIBC 314, %sat 12      [09-08-20 @ 08:45]  Ferritin 145      [09-08-20 @ 08:45]  Vitamin D (25OH) 21.7      [07-18-20 @ 10:13]  TSH 8.61      [11-01-20 @ 20:16]  Lipid: chol 116, TG 55, HDL 48, LDL 58      [08-26-20 @ 00:17]